# Patient Record
Sex: FEMALE | Race: WHITE | NOT HISPANIC OR LATINO | Employment: OTHER | URBAN - METROPOLITAN AREA
[De-identification: names, ages, dates, MRNs, and addresses within clinical notes are randomized per-mention and may not be internally consistent; named-entity substitution may affect disease eponyms.]

---

## 2017-01-03 RX ORDER — FAMOTIDINE 20 MG/1
20 TABLET, FILM COATED ORAL 2 TIMES DAILY
Qty: 120 TABLET | Refills: 1 | Status: SHIPPED | OUTPATIENT
Start: 2017-01-03 | End: 2017-11-22 | Stop reason: SDUPTHER

## 2017-02-16 ENCOUNTER — OFFICE VISIT (OUTPATIENT)
Dept: INTERNAL MEDICINE | Facility: CLINIC | Age: 72
End: 2017-02-16

## 2017-02-16 VITALS
TEMPERATURE: 97.4 F | DIASTOLIC BLOOD PRESSURE: 76 MMHG | SYSTOLIC BLOOD PRESSURE: 138 MMHG | RESPIRATION RATE: 16 BRPM | HEIGHT: 66 IN | WEIGHT: 181 LBS | HEART RATE: 68 BPM | BODY MASS INDEX: 29.09 KG/M2

## 2017-02-16 DIAGNOSIS — N18.30 CKD (CHRONIC KIDNEY DISEASE), STAGE III (HCC): ICD-10-CM

## 2017-02-16 DIAGNOSIS — I10 ESSENTIAL HYPERTENSION: ICD-10-CM

## 2017-02-16 DIAGNOSIS — E78.2 MIXED HYPERLIPIDEMIA: Primary | ICD-10-CM

## 2017-02-16 PROCEDURE — 99214 OFFICE O/P EST MOD 30 MIN: CPT | Performed by: FAMILY MEDICINE

## 2017-02-16 RX ORDER — TRAZODONE HYDROCHLORIDE 150 MG/1
TABLET ORAL
COMMUNITY
Start: 2017-01-16 | End: 2017-11-01 | Stop reason: DRUGHIGH

## 2017-02-16 RX ORDER — OXYCODONE HYDROCHLORIDE 15 MG/1
TABLET ORAL
COMMUNITY
Start: 2017-02-15 | End: 2017-02-16 | Stop reason: DRUGHIGH

## 2017-02-16 NOTE — PROGRESS NOTES
Subjective   Rachana Arguelles is a 71 y.o. female.     Chief Complaint   Patient presents with   • Back Pain   • Hypertension         History of Present Illness   Patient is seen with history of chronic pain syndrome chronic back pain with also chronic kidney disease level III and hypertension hyperlipidemia.  Her history is reviewed with her and her .  Medications are continued.  She'll follow-up with Dr. Downs nephrologist as well as Dr. Harper pain management.      The following portions of the patient's history were reviewed and updated as appropriate: allergies, current medications, past social history and problem list.    Review of Systems   Constitutional: Negative.    HENT: Negative.    Eyes: Negative.    Respiratory: Negative.    Cardiovascular: Negative.    Gastrointestinal: Negative.    Endocrine: Negative.    Genitourinary: Negative.    Musculoskeletal: Positive for arthralgias, back pain and myalgias.   Skin: Negative.    Allergic/Immunologic: Negative.    Neurological: Negative.    Hematological: Negative.    Psychiatric/Behavioral: Negative.        Objective   Vitals:    02/16/17 1149   BP: 138/76   Pulse: 68   Resp: 16   Temp: 97.4 °F (36.3 °C)     Physical Exam   Constitutional: She is oriented to person, place, and time. She appears well-developed and well-nourished.   HENT:   Head: Normocephalic and atraumatic.   Right Ear: Tympanic membrane and external ear normal.   Left Ear: Tympanic membrane and external ear normal.   Nose: Nose normal.   Mouth/Throat: Oropharynx is clear and moist.   Eyes: Conjunctivae and EOM are normal. Pupils are equal, round, and reactive to light.   Neck: Normal range of motion. Neck supple. No JVD present. No thyromegaly present.   Cardiovascular: Normal rate, regular rhythm, normal heart sounds and intact distal pulses.    Pulmonary/Chest: Effort normal and breath sounds normal.   Abdominal: Soft. Bowel sounds are normal.   Musculoskeletal:        Lumbar back:  She exhibits decreased range of motion.   Lymphadenopathy:     She has no cervical adenopathy.   Neurological: She is alert and oriented to person, place, and time. No cranial nerve deficit. Gait abnormal. Coordination normal.   Skin: Skin is warm and dry. No rash noted.   Psychiatric: She has a normal mood and affect. Her behavior is normal. Judgment and thought content normal.   Vitals reviewed.      Assessment/Plan   Problem List Items Addressed This Visit        Cardiovascular and Mediastinum    Hyperlipidemia - Primary    Hypertension       Genitourinary    CKD (chronic kidney disease), stage III       plan: Continue current medications recheck and labs in 6 months.  Follow-up with Dr. Stephenson nephrology.  Follow-up with chronic pain management.

## 2017-02-24 DIAGNOSIS — R11.0 NAUSEA: ICD-10-CM

## 2017-02-24 RX ORDER — PROMETHAZINE HYDROCHLORIDE 12.5 MG/1
TABLET ORAL
Qty: 30 TABLET | Refills: 1 | Status: SHIPPED | OUTPATIENT
Start: 2017-02-24 | End: 2017-09-22 | Stop reason: SDUPTHER

## 2017-04-28 RX ORDER — CARVEDILOL 25 MG/1
TABLET ORAL
Qty: 30 TABLET | Refills: 0 | OUTPATIENT
Start: 2017-04-28

## 2017-05-09 RX ORDER — CARVEDILOL 25 MG/1
TABLET ORAL
Qty: 30 TABLET | Refills: 0 | OUTPATIENT
Start: 2017-05-09

## 2017-05-12 RX ORDER — CARVEDILOL 25 MG/1
TABLET ORAL
Qty: 30 TABLET | Refills: 0 | OUTPATIENT
Start: 2017-05-12

## 2017-05-15 RX ORDER — CARVEDILOL 25 MG/1
TABLET ORAL
Qty: 30 TABLET | Refills: 0 | OUTPATIENT
Start: 2017-05-15

## 2017-05-17 RX ORDER — CARVEDILOL 25 MG/1
12.5 TABLET ORAL 2 TIMES DAILY WITH MEALS
Qty: 90 TABLET | Refills: 1 | Status: SHIPPED | OUTPATIENT
Start: 2017-05-17 | End: 2017-11-13 | Stop reason: SDUPTHER

## 2017-05-18 ENCOUNTER — TRANSCRIBE ORDERS (OUTPATIENT)
Dept: ADMINISTRATIVE | Facility: HOSPITAL | Age: 72
End: 2017-05-18

## 2017-05-18 DIAGNOSIS — M16.11 OSTEOARTHRITIS OF RIGHT HIP, UNSPECIFIED OSTEOARTHRITIS TYPE: Primary | ICD-10-CM

## 2017-05-26 ENCOUNTER — HOSPITAL ENCOUNTER (OUTPATIENT)
Dept: GENERAL RADIOLOGY | Facility: HOSPITAL | Age: 72
Discharge: HOME | End: 2017-05-26
Attending: ORTHOPAEDIC SURGERY | Admitting: ORTHOPAEDIC SURGERY

## 2017-05-26 DIAGNOSIS — M16.11 OSTEOARTHRITIS OF RIGHT HIP, UNSPECIFIED OSTEOARTHRITIS TYPE: ICD-10-CM

## 2017-05-26 PROCEDURE — 25010000002 METHYLPREDNISOLONE PER 80 MG: Performed by: RADIOLOGY

## 2017-05-26 PROCEDURE — 0 IOPAMIDOL 61 % SOLUTION: Performed by: RADIOLOGY

## 2017-05-26 PROCEDURE — 77002 NEEDLE LOCALIZATION BY XRAY: CPT

## 2017-05-26 RX ORDER — METHYLPREDNISOLONE ACETATE 80 MG/ML
80 INJECTION, SUSPENSION INTRA-ARTICULAR; INTRALESIONAL; INTRAMUSCULAR; SOFT TISSUE ONCE
Status: COMPLETED | OUTPATIENT
Start: 2017-05-26 | End: 2017-05-26

## 2017-05-26 RX ORDER — LIDOCAINE HYDROCHLORIDE 10 MG/ML
10 INJECTION, SOLUTION INFILTRATION; PERINEURAL ONCE
Status: COMPLETED | OUTPATIENT
Start: 2017-05-26 | End: 2017-05-26

## 2017-05-26 RX ORDER — BUPIVACAINE HYDROCHLORIDE 2.5 MG/ML
10 INJECTION, SOLUTION EPIDURAL; INFILTRATION; INTRACAUDAL ONCE
Status: COMPLETED | OUTPATIENT
Start: 2017-05-26 | End: 2017-05-26

## 2017-05-26 RX ADMIN — LIDOCAINE HYDROCHLORIDE 10 ML: 10 INJECTION, SOLUTION INFILTRATION; PERINEURAL at 11:15

## 2017-05-26 RX ADMIN — METHYLPREDNISOLONE ACETATE 80 MG: 80 INJECTION, SUSPENSION INTRA-ARTICULAR; INTRALESIONAL; INTRAMUSCULAR; SOFT TISSUE at 11:15

## 2017-05-26 RX ADMIN — IOPAMIDOL 0.5 ML: 612 INJECTION, SOLUTION INTRAVENOUS at 11:15

## 2017-05-26 RX ADMIN — BUPIVACAINE HYDROCHLORIDE 10 ML: 2.5 INJECTION, SOLUTION EPIDURAL; INFILTRATION; INTRACAUDAL; PERINEURAL at 11:15

## 2017-06-21 RX ORDER — SENNA AND DOCUSATE SODIUM 50; 8.6 MG/1; MG/1
TABLET, FILM COATED ORAL
Qty: 180 TABLET | Refills: 1 | Status: SHIPPED | OUTPATIENT
Start: 2017-06-21 | End: 2019-05-30

## 2017-08-17 ENCOUNTER — OFFICE VISIT (OUTPATIENT)
Dept: INTERNAL MEDICINE | Facility: CLINIC | Age: 72
End: 2017-08-17

## 2017-08-17 VITALS
BODY MASS INDEX: 30.51 KG/M2 | WEIGHT: 189 LBS | TEMPERATURE: 97.7 F | SYSTOLIC BLOOD PRESSURE: 146 MMHG | DIASTOLIC BLOOD PRESSURE: 66 MMHG | OXYGEN SATURATION: 97 % | HEART RATE: 71 BPM

## 2017-08-17 DIAGNOSIS — N18.30 CKD (CHRONIC KIDNEY DISEASE), STAGE III (HCC): ICD-10-CM

## 2017-08-17 DIAGNOSIS — D50.8 OTHER IRON DEFICIENCY ANEMIA: ICD-10-CM

## 2017-08-17 DIAGNOSIS — I10 ESSENTIAL HYPERTENSION: Primary | ICD-10-CM

## 2017-08-17 DIAGNOSIS — E78.2 MIXED HYPERLIPIDEMIA: ICD-10-CM

## 2017-08-17 PROCEDURE — 99214 OFFICE O/P EST MOD 30 MIN: CPT | Performed by: FAMILY MEDICINE

## 2017-08-17 NOTE — PROGRESS NOTES
Subjective   Rachana Arguelles is a 71 y.o. female.     Chief Complaint   Patient presents with   • Hyperlipidemia   • Hypertension   • Knee Pain         History of Present Illness patient has a fairly complex history.  She takes medicine for depression from her psychiatrist as well as seeing pain management Dr. Harper.  She also has a follow-up with Dr. Downs nephrologist for chronic kidney disease level III.  She has a history of hypertension hyperlipidemia but she is not on a statin medicine she cannot recall when she was on one half of one.  Her history is reviewed and she is seeing orthopedics Dr. Zelaya and Dr. Carrillo for chronic right shoulder right hip and right knee problems which she's had a left total knee replacement.    We discussed Prevnar 13 she has had Pneumovax last year.  She is in the hospital with pneumonia within the past year.  She'll consider getting Prevnar 13 which he gets a flu shot at [facility] in the fall.    Associated history of anemia lab wise but she cannot recall this.    The following portions of the patient's history were reviewed and updated as appropriate: allergies, current medications, past social history and problem list.    Review of Systems   Constitutional: Negative.    HENT: Negative.    Eyes: Negative.    Respiratory: Negative.    Cardiovascular: Negative.    Gastrointestinal: Negative.    Endocrine: Negative.    Genitourinary: Negative.    Musculoskeletal: Positive for arthralgias, back pain and myalgias.   Skin: Negative.    Allergic/Immunologic: Negative.    Neurological: Negative.    Hematological: Negative.    Psychiatric/Behavioral: Negative.        Objective   Vitals:    08/17/17 1017   BP: 146/66   Pulse: 71   Temp: 97.7 °F (36.5 °C)   SpO2: 97%     Physical Exam   Constitutional: She is oriented to person, place, and time. She appears well-developed and well-nourished.   HENT:   Head: Normocephalic and atraumatic.   Right Ear: Tympanic membrane and external ear  normal.   Left Ear: Tympanic membrane and external ear normal.   Nose: Nose normal.   Mouth/Throat: Oropharynx is clear and moist.   Eyes: Conjunctivae and EOM are normal. Pupils are equal, round, and reactive to light.   Neck: Normal range of motion. Neck supple. No JVD present. No thyromegaly present.   Cardiovascular: Normal rate, regular rhythm, normal heart sounds and intact distal pulses.    Pulmonary/Chest: Effort normal and breath sounds normal.   Abdominal: Soft. Bowel sounds are normal.   Musculoskeletal:        Right shoulder: She exhibits decreased range of motion and tenderness.        Right hip: She exhibits decreased range of motion.        Right knee: She exhibits decreased range of motion. Tenderness found.   Lymphadenopathy:     She has no cervical adenopathy.   Neurological: She is alert and oriented to person, place, and time. No cranial nerve deficit. Coordination normal.   Skin: Skin is warm and dry. No rash noted.   Psychiatric: She has a normal mood and affect. Her behavior is normal. Judgment and thought content normal.   Vitals reviewed.      Assessment/Plan   Problem List Items Addressed This Visit        Cardiovascular and Mediastinum    Hypertension - Primary    Relevant Orders    Iron and TIBC    Ferritin    Vitamin B12    CBC & Differential    Basic Metabolic Panel    Lipid Panel With / Chol / HDL Ratio    Hyperlipidemia    Relevant Orders    Iron and TIBC    Ferritin    Vitamin B12    CBC & Differential    Basic Metabolic Panel    Lipid Panel With / Chol / HDL Ratio       Genitourinary    CKD (chronic kidney disease), stage III    Relevant Orders    Iron and TIBC    Ferritin    Vitamin B12    CBC & Differential    Basic Metabolic Panel    Lipid Panel With / Chol / HDL Ratio      Other Visit Diagnoses     Other iron deficiency anemia        Relevant Orders    Iron and TIBC    Ferritin    Vitamin B12    CBC & Differential    Basic Metabolic Panel    Lipid Panel With / Chol / HDL Ratio       Plan: Screening labs including iron tests and CBC.  Follow-up with nephrology.  Follow-up in 6 months sooner if needed.  [facility]

## 2017-08-18 LAB
BASOPHILS # BLD AUTO: 0 10*3/MM3 (ref 0–0.2)
BASOPHILS NFR BLD AUTO: 0 % (ref 0–1.5)
BUN SERPL-MCNC: 23 MG/DL (ref 8–23)
BUN/CREAT SERPL: 13.1 (ref 7–25)
CALCIUM SERPL-MCNC: 9.1 MG/DL (ref 8.6–10.5)
CHLORIDE SERPL-SCNC: 101 MMOL/L (ref 98–107)
CHOLEST SERPL-MCNC: 263 MG/DL (ref 0–200)
CHOLEST/HDLC SERPL: 5.6 {RATIO}
CO2 SERPL-SCNC: 22.8 MMOL/L (ref 22–29)
CREAT SERPL-MCNC: 1.75 MG/DL (ref 0.57–1)
EOSINOPHIL # BLD AUTO: 0.02 10*3/MM3 (ref 0–0.7)
EOSINOPHIL NFR BLD AUTO: 0.3 % (ref 0.3–6.2)
ERYTHROCYTE [DISTWIDTH] IN BLOOD BY AUTOMATED COUNT: 12.7 % (ref 11.7–13)
FERRITIN SERPL-MCNC: 72.98 NG/ML (ref 13–150)
GLUCOSE SERPL-MCNC: 107 MG/DL (ref 65–99)
HCT VFR BLD AUTO: 37.7 % (ref 35.6–45.5)
HDLC SERPL-MCNC: 47 MG/DL (ref 40–60)
HGB BLD-MCNC: 11.9 G/DL (ref 11.9–15.5)
IMM GRANULOCYTES # BLD: 0.02 10*3/MM3 (ref 0–0.03)
IMM GRANULOCYTES NFR BLD: 0.3 % (ref 0–0.5)
IRON SATN MFR SERPL: 20 % (ref 20–50)
IRON SERPL-MCNC: 61 MCG/DL (ref 37–145)
LDLC SERPL CALC-MCNC: 190 MG/DL (ref 0–100)
LYMPHOCYTES # BLD AUTO: 1.92 10*3/MM3 (ref 0.9–4.8)
LYMPHOCYTES NFR BLD AUTO: 31.1 % (ref 19.6–45.3)
MCH RBC QN AUTO: 31.2 PG (ref 26.9–32)
MCHC RBC AUTO-ENTMCNC: 31.6 G/DL (ref 32.4–36.3)
MCV RBC AUTO: 98.7 FL (ref 80.5–98.2)
MONOCYTES # BLD AUTO: 0.44 10*3/MM3 (ref 0.2–1.2)
MONOCYTES NFR BLD AUTO: 7.1 % (ref 5–12)
NEUTROPHILS # BLD AUTO: 3.77 10*3/MM3 (ref 1.9–8.1)
NEUTROPHILS NFR BLD AUTO: 61.2 % (ref 42.7–76)
PLATELET # BLD AUTO: 261 10*3/MM3 (ref 140–500)
POTASSIUM SERPL-SCNC: 5.5 MMOL/L (ref 3.5–5.2)
RBC # BLD AUTO: 3.82 10*6/MM3 (ref 3.9–5.2)
SODIUM SERPL-SCNC: 139 MMOL/L (ref 136–145)
TIBC SERPL-MCNC: 308 MCG/DL
TRIGL SERPL-MCNC: 131 MG/DL (ref 0–150)
UIBC SERPL-MCNC: 247 MCG/DL
VIT B12 SERPL-MCNC: 178 PG/ML (ref 211–946)
VLDLC SERPL CALC-MCNC: 26.2 MG/DL (ref 5–40)
WBC # BLD AUTO: 6.17 10*3/MM3 (ref 4.5–10.7)

## 2017-08-21 DIAGNOSIS — E53.8 B12 DEFICIENCY: Primary | ICD-10-CM

## 2017-08-21 RX ORDER — CYANOCOBALAMIN 1000 UG/ML
1000 INJECTION, SOLUTION INTRAMUSCULAR; SUBCUTANEOUS
Status: DISCONTINUED | OUTPATIENT
Start: 2017-08-28 | End: 2018-08-10 | Stop reason: HOSPADM

## 2017-09-05 ENCOUNTER — CLINICAL SUPPORT (OUTPATIENT)
Dept: INTERNAL MEDICINE | Facility: CLINIC | Age: 72
End: 2017-09-05

## 2017-09-05 DIAGNOSIS — E53.8 B12 DEFICIENCY: Primary | ICD-10-CM

## 2017-09-05 PROCEDURE — 96372 THER/PROPH/DIAG INJ SC/IM: CPT | Performed by: FAMILY MEDICINE

## 2017-09-05 RX ADMIN — CYANOCOBALAMIN 1000 MCG: 1000 INJECTION, SOLUTION INTRAMUSCULAR; SUBCUTANEOUS at 16:07

## 2017-09-22 DIAGNOSIS — R11.0 NAUSEA: ICD-10-CM

## 2017-09-22 RX ORDER — PROMETHAZINE HYDROCHLORIDE 12.5 MG/1
TABLET ORAL
Qty: 30 TABLET | Refills: 0 | Status: SHIPPED | OUTPATIENT
Start: 2017-09-22 | End: 2017-11-01

## 2017-10-05 ENCOUNTER — CLINICAL SUPPORT (OUTPATIENT)
Dept: INTERNAL MEDICINE | Facility: CLINIC | Age: 72
End: 2017-10-05

## 2017-10-05 PROCEDURE — 96372 THER/PROPH/DIAG INJ SC/IM: CPT | Performed by: FAMILY MEDICINE

## 2017-10-05 RX ADMIN — CYANOCOBALAMIN 1000 MCG: 1000 INJECTION, SOLUTION INTRAMUSCULAR; SUBCUTANEOUS at 12:56

## 2017-10-30 ENCOUNTER — OFFICE VISIT (OUTPATIENT)
Dept: INTERNAL MEDICINE | Facility: CLINIC | Age: 72
End: 2017-10-30

## 2017-10-30 VITALS
WEIGHT: 194 LBS | SYSTOLIC BLOOD PRESSURE: 172 MMHG | BODY MASS INDEX: 31.18 KG/M2 | RESPIRATION RATE: 16 BRPM | HEIGHT: 66 IN | DIASTOLIC BLOOD PRESSURE: 80 MMHG | TEMPERATURE: 96.9 F | HEART RATE: 60 BPM | OXYGEN SATURATION: 96 %

## 2017-10-30 DIAGNOSIS — N18.30 CKD (CHRONIC KIDNEY DISEASE), STAGE III (HCC): Primary | ICD-10-CM

## 2017-10-30 DIAGNOSIS — F43.21 GRIEVING: ICD-10-CM

## 2017-10-30 DIAGNOSIS — F33.9 EPISODE OF RECURRENT MAJOR DEPRESSIVE DISORDER, UNSPECIFIED DEPRESSION EPISODE SEVERITY (HCC): ICD-10-CM

## 2017-10-30 DIAGNOSIS — F41.9 ANXIETY: ICD-10-CM

## 2017-10-30 DIAGNOSIS — Z09 HOSPITAL DISCHARGE FOLLOW-UP: ICD-10-CM

## 2017-10-30 PROCEDURE — 99213 OFFICE O/P EST LOW 20 MIN: CPT | Performed by: NURSE PRACTITIONER

## 2017-10-30 RX ORDER — ONDANSETRON 4 MG/1
TABLET, ORALLY DISINTEGRATING ORAL
COMMUNITY
Start: 2017-10-17 | End: 2017-11-01 | Stop reason: SDUPTHER

## 2017-10-30 RX ORDER — AMOXICILLIN AND CLAVULANATE POTASSIUM 875; 125 MG/1; MG/1
TABLET, FILM COATED ORAL
COMMUNITY
Start: 2017-10-17 | End: 2017-12-04

## 2017-10-30 NOTE — PROGRESS NOTES
"Vitals:    10/30/17 1307   BP: 172/80   Pulse: 60   Resp: 16   Temp: 96.9 °F (36.1 °C)   SpO2: 96%     Last 2 weights    10/30/17  1307   Weight: 194 lb (88 kg)     Social History   Substance Use Topics   • Smoking status: Former Smoker     Packs/day: 3.00     Years: 15.00     Types: Cigarettes     Quit date: 9/22/2003   • Smokeless tobacco: Not on file   • Alcohol use Yes      Comment: Social       Subjective     HPI  Pt presents to office today with ER follow up. She states she went to [facility] ER for a stinking feeling in her epigastric area and some SOA for past couple of days on 10/16. She was also having some nausea. Blood work up was relatively normal, MRA of neck was normal, MRI of brain showed fluid in mastoid on right side and recommeded correlation for mastoiditis. She followed up with ENT who stated that she should be evaluated by cardiology for some unkown reason. Today she is here for follow up.    She states she recently lost brother to stroke, and found out she has basal cell carcinoma. This news has created a toll on pt and has been very tearful at sporadic times, as seen in office visit today. Her med list shows she is on celexa 20 mg daily, ativan 0.5 mg bid (she states she take 0.5 mg QID per her psych md), and, effexor xr 150 mg daily. However pt is unsure if she is on these medications.     Dr Saha, psych doc, told pt to taking 0.5 mg five times a day but is taking 0.5 mg QID    She is followed by pain management as well.      Currently pt denies cp, palptiation, headaches, vision changes, dizziness, lightheadedness, URI symptoms, diarrhea or constipation, urinary complication, abdominal pain or cramping. Pt keeps referring to a \"sinking\" \"roller coaster\" \"throw up\" sensation in her stomach.     The following portions of the patient's history were reviewed and updated as appropriate: allergies, current medications, past medical history, past social history and problem list.    Review of Systems "   Constitutional: Negative.    Respiratory: Negative.    Cardiovascular: Negative.    Gastrointestinal: Positive for nausea.        ER follow up   Psychiatric/Behavioral: The patient is nervous/anxious.        Objective     Physical Exam   Constitutional: She is oriented to person, place, and time. Vital signs are normal. She appears well-developed and well-nourished.   HENT:   Head: Normocephalic and atraumatic.   Neck: Normal range of motion.   Cardiovascular: Normal rate, regular rhythm and normal heart sounds.    Pulmonary/Chest: Effort normal and breath sounds normal.   Musculoskeletal: Normal range of motion.   Neurological: She is oriented to person, place, and time.   Psychiatric: Her speech is normal. Judgment and thought content normal. Her mood appears anxious. She is hyperactive. Cognition and memory are normal. She exhibits a depressed mood.   Tearful during her HPI at random times and quick to stop crying   Nursing note and vitals reviewed.      Assessment/Plan   Rachana was seen today for follow-up.    Diagnoses and all orders for this visit:    CKD (chronic kidney disease), stage III  -     CBC & Differential  -     Comprehensive Metabolic Panel    Anxiety    Episode of recurrent major depressive disorder, unspecified depression episode severity    Grieving    Hospital discharge follow-up           -stress test?  -recommended pt come back with medications in hand to go over what she is currently taking as it is difficult to develop a plan  -will discuss pt with dr holloway prior to follow up  -lab work today  -cont home meds at this time

## 2017-10-31 LAB
ALBUMIN SERPL-MCNC: 4.1 G/DL (ref 3.5–5.2)
ALBUMIN/GLOB SERPL: 1.6 G/DL
ALP SERPL-CCNC: 96 U/L (ref 39–117)
ALT SERPL-CCNC: 20 U/L (ref 1–33)
AST SERPL-CCNC: 47 U/L (ref 1–32)
BASOPHILS # BLD AUTO: 0.01 10*3/MM3 (ref 0–0.2)
BASOPHILS NFR BLD AUTO: 0.1 % (ref 0–1.5)
BILIRUB SERPL-MCNC: 0.3 MG/DL (ref 0.1–1.2)
BUN SERPL-MCNC: 27 MG/DL (ref 8–23)
BUN/CREAT SERPL: 14.1 (ref 7–25)
CALCIUM SERPL-MCNC: 9.2 MG/DL (ref 8.6–10.5)
CHLORIDE SERPL-SCNC: 99 MMOL/L (ref 98–107)
CO2 SERPL-SCNC: 25.4 MMOL/L (ref 22–29)
CREAT SERPL-MCNC: 1.92 MG/DL (ref 0.57–1)
EOSINOPHIL # BLD AUTO: 0.05 10*3/MM3 (ref 0–0.7)
EOSINOPHIL NFR BLD AUTO: 0.7 % (ref 0.3–6.2)
ERYTHROCYTE [DISTWIDTH] IN BLOOD BY AUTOMATED COUNT: 13.3 % (ref 11.7–13)
GFR SERPLBLD CREATININE-BSD FMLA CKD-EPI: 26 ML/MIN/1.73
GFR SERPLBLD CREATININE-BSD FMLA CKD-EPI: 31 ML/MIN/1.73
GLOBULIN SER CALC-MCNC: 2.6 GM/DL
GLUCOSE SERPL-MCNC: 97 MG/DL (ref 65–99)
HCT VFR BLD AUTO: 37.9 % (ref 35.6–45.5)
HGB BLD-MCNC: 11.8 G/DL (ref 11.9–15.5)
IMM GRANULOCYTES # BLD: 0.03 10*3/MM3 (ref 0–0.03)
IMM GRANULOCYTES NFR BLD: 0.4 % (ref 0–0.5)
LYMPHOCYTES # BLD AUTO: 2.13 10*3/MM3 (ref 0.9–4.8)
LYMPHOCYTES NFR BLD AUTO: 28.4 % (ref 19.6–45.3)
MCH RBC QN AUTO: 31.2 PG (ref 26.9–32)
MCHC RBC AUTO-ENTMCNC: 31.1 G/DL (ref 32.4–36.3)
MCV RBC AUTO: 100.3 FL (ref 80.5–98.2)
MONOCYTES # BLD AUTO: 0.52 10*3/MM3 (ref 0.2–1.2)
MONOCYTES NFR BLD AUTO: 6.9 % (ref 5–12)
NEUTROPHILS # BLD AUTO: 4.76 10*3/MM3 (ref 1.9–8.1)
NEUTROPHILS NFR BLD AUTO: 63.5 % (ref 42.7–76)
PLATELET # BLD AUTO: 264 10*3/MM3 (ref 140–500)
POTASSIUM SERPL-SCNC: 5.8 MMOL/L (ref 3.5–5.2)
PROT SERPL-MCNC: 6.7 G/DL (ref 6–8.5)
RBC # BLD AUTO: 3.78 10*6/MM3 (ref 3.9–5.2)
SODIUM SERPL-SCNC: 137 MMOL/L (ref 136–145)
WBC # BLD AUTO: 7.5 10*3/MM3 (ref 4.5–10.7)

## 2017-11-01 ENCOUNTER — OFFICE VISIT (OUTPATIENT)
Dept: INTERNAL MEDICINE | Facility: CLINIC | Age: 72
End: 2017-11-01

## 2017-11-01 VITALS
OXYGEN SATURATION: 96 % | DIASTOLIC BLOOD PRESSURE: 70 MMHG | BODY MASS INDEX: 31.18 KG/M2 | HEART RATE: 93 BPM | WEIGHT: 194 LBS | SYSTOLIC BLOOD PRESSURE: 110 MMHG | HEIGHT: 66 IN | TEMPERATURE: 98.1 F

## 2017-11-01 DIAGNOSIS — R07.9 CHEST PAIN AT REST: Primary | ICD-10-CM

## 2017-11-01 DIAGNOSIS — R00.2 PALPITATION: ICD-10-CM

## 2017-11-01 DIAGNOSIS — R42 DIZZINESS: ICD-10-CM

## 2017-11-01 PROCEDURE — 99214 OFFICE O/P EST MOD 30 MIN: CPT | Performed by: NURSE PRACTITIONER

## 2017-11-01 RX ORDER — FLUCONAZOLE 150 MG/1
150 TABLET ORAL ONCE
Qty: 1 TABLET | Refills: 0 | Status: SHIPPED | OUTPATIENT
Start: 2017-11-01 | End: 2017-11-01

## 2017-11-01 RX ORDER — ONDANSETRON 4 MG/1
4 TABLET, ORALLY DISINTEGRATING ORAL EVERY 8 HOURS PRN
Qty: 45 TABLET | Refills: 0 | Status: SHIPPED | OUTPATIENT
Start: 2017-11-01 | End: 2018-02-06 | Stop reason: SDUPTHER

## 2017-11-01 RX ORDER — CYCLOBENZAPRINE HCL 10 MG
10 TABLET ORAL 3 TIMES DAILY PRN
COMMUNITY
End: 2017-11-01

## 2017-11-01 RX ORDER — OXYCODONE HYDROCHLORIDE 20 MG/1
20 TABLET ORAL EVERY 4 HOURS PRN
COMMUNITY
End: 2020-06-04

## 2017-11-01 RX ORDER — LORAZEPAM 0.5 MG/1
0.5 TABLET ORAL 3 TIMES DAILY
COMMUNITY
End: 2018-07-05 | Stop reason: SDUPTHER

## 2017-11-01 RX ORDER — TRAZODONE HYDROCHLORIDE 150 MG/1
75 TABLET ORAL NIGHTLY
COMMUNITY
End: 2018-06-28 | Stop reason: SDUPTHER

## 2017-11-01 RX ORDER — CITALOPRAM 20 MG/1
20 TABLET ORAL DAILY
Qty: 30 TABLET | Refills: 0 | Status: SHIPPED | OUTPATIENT
Start: 2017-11-01 | End: 2017-11-22 | Stop reason: SDUPTHER

## 2017-11-01 RX ORDER — CYCLOBENZAPRINE HCL 10 MG
10 TABLET ORAL 3 TIMES DAILY PRN
Status: ON HOLD | COMMUNITY
End: 2021-06-05 | Stop reason: SDUPTHER

## 2017-11-01 NOTE — PROGRESS NOTES
"Vitals:    11/01/17 1408   BP: 110/70   Pulse:    Temp:    SpO2:      Last 2 weights    11/01/17  1327   Weight: 194 lb (88 kg)     Social History   Substance Use Topics   • Smoking status: Former Smoker     Packs/day: 3.00     Years: 15.00     Types: Cigarettes     Quit date: 9/22/2003   • Smokeless tobacco: None   • Alcohol use Yes      Comment: Social       Subjective     HPI  Pt presents to office today with medication reconcillation and follow up. She states she is feeling better than a couple of days ago but feels like she still needs some extra to help with her anxiety. She if followed by a psych doc, Dr. Saha, and pain management for her roxicodone. Pt states she is still occasionally feeling the \"flip flop\" feeling in her chest at random times but not currently in office. Will do 48 hr holter monitor for that. Spoke to dr holloway and he would like her to see cardiology as well.     Would like to start pt on celexa 20 mg daily and okayed by dr holloway    Pt followed by pain management and psych.      Pt also states she has been having some vaginal itching    The following portions of the patient's history were reviewed and updated as appropriate: allergies, current medications, past medical history, past social history and problem list.    Review of Systems   Constitutional: Negative.         Med reconciliation     Respiratory: Negative.    Cardiovascular: Negative.         Flip flop in chest   Psychiatric/Behavioral: Positive for dysphoric mood. The patient is nervous/anxious.        Objective     Physical Exam   Constitutional: She is oriented to person, place, and time. Vital signs are normal. She appears well-developed and well-nourished.   HENT:   Head: Normocephalic and atraumatic.   Neck: Normal range of motion.   Cardiovascular: Normal rate, regular rhythm and normal heart sounds.    auscultating heart and normal; recent ER visit showed normal EKG   Pulmonary/Chest: Effort normal and breath sounds normal. "   Musculoskeletal: Normal range of motion.   Neurological: She is alert and oriented to person, place, and time. She has normal strength. No cranial nerve deficit or sensory deficit. She displays a negative Romberg sign. GCS eye subscore is 4. GCS verbal subscore is 5. GCS motor subscore is 6.   Psychiatric: Her speech is normal and behavior is normal. Judgment and thought content normal. Her mood appears anxious. Cognition and memory are normal.   Nursing note and vitals reviewed.      Assessment/Plan   Diagnoses and all orders for this visit:    Chest pain at rest  -     Ambulatory Referral to Cardiology  -     Holter Monitor - 48 Hour    Palpitation  -     Ambulatory Referral to Cardiology  -     Holter Monitor - 48 Hour    Dizziness  -     Ambulatory Referral to Cardiology  -     Holter Monitor - 48 Hour    Other orders  -     fluconazole (DIFLUCAN) 150 MG tablet; Take 1 tablet by mouth 1 (One) Time for 1 dose.  -     ondansetron ODT (ZOFRAN-ODT) 4 MG disintegrating tablet; Take 1 tablet by mouth Every 8 (Eight) Hours As Needed for Nausea or Vomiting.  -     citalopram (CeleXA) 20 MG tablet; Take 1 tablet by mouth Daily.           -start celexa 20 mg daily  -refer cardiology, start holter  -review and updated medications on her list in [facility]  -cont other home meds  -FU with dr holloway in 1 months  -spent more than 30 min in room with pt discussing hpi/pe/plan of care  -fu with me prn

## 2017-11-06 ENCOUNTER — CLINICAL SUPPORT (OUTPATIENT)
Dept: INTERNAL MEDICINE | Facility: CLINIC | Age: 72
End: 2017-11-06

## 2017-11-06 ENCOUNTER — TELEPHONE (OUTPATIENT)
Dept: INTERNAL MEDICINE | Facility: CLINIC | Age: 72
End: 2017-11-06

## 2017-11-06 DIAGNOSIS — E53.8 VITAMIN B12 DEFICIENCY: ICD-10-CM

## 2017-11-06 PROCEDURE — 96372 THER/PROPH/DIAG INJ SC/IM: CPT | Performed by: FAMILY MEDICINE

## 2017-11-06 RX ADMIN — CYANOCOBALAMIN 1000 MCG: 1000 INJECTION, SOLUTION INTRAMUSCULAR; SUBCUTANEOUS at 11:54

## 2017-11-06 NOTE — TELEPHONE ENCOUNTER
Pt came by for a b12 injections and was wondering about her recent labs, please review and either advise on what to tell the pt or call her please  She will be home later this afternoon

## 2017-11-06 NOTE — TELEPHONE ENCOUNTER
The pt came by for her B12 shot and was wanting you to look at some pictures the pain management took while doing one of her epidurals, he told her it looked like her esophagus was filled with stuff? She is wondering if she still needs to do the heart monitor (which she hasnt heard anything on)  Pics are in your folder please review and advise

## 2017-11-07 ENCOUNTER — TELEPHONE (OUTPATIENT)
Dept: INTERNAL MEDICINE | Facility: CLINIC | Age: 72
End: 2017-11-07

## 2017-11-07 DIAGNOSIS — I73.9 CLAUDICATION OF BOTH LOWER EXTREMITIES (HCC): ICD-10-CM

## 2017-11-07 DIAGNOSIS — I99.8 VASCULAR CALCIFICATION: Primary | ICD-10-CM

## 2017-11-07 NOTE — TELEPHONE ENCOUNTER
Attempted to call both numbers provided on pt chart regarding lab work and dr mcnally order to send to vascular regarding pain managements finding of vascular calcification and possible claudication of lower extremities. Also, potassium is a little elevated so I would like to stop ramipril and increase norvasc from 2.5 mg to 5 mg at night. Hemoglobin is stable. Will send message to ma to attempt to call back again later

## 2017-11-13 RX ORDER — CARVEDILOL 25 MG/1
TABLET ORAL
Qty: 90 TABLET | Refills: 1 | Status: SHIPPED | OUTPATIENT
Start: 2017-11-13 | End: 2018-06-03 | Stop reason: SDUPTHER

## 2017-11-14 RX ORDER — DOXYCYCLINE HYCLATE 100 MG/1
100 TABLET, DELAYED RELEASE ORAL 2 TIMES DAILY
Qty: 20 TABLET | Refills: 0 | Status: SHIPPED | OUTPATIENT
Start: 2017-11-14 | End: 2017-11-14

## 2017-11-14 RX ORDER — DOXYCYCLINE HYCLATE 50 MG/1
100 CAPSULE ORAL 2 TIMES DAILY
Qty: 40 CAPSULE | Refills: 0 | Status: SHIPPED | OUTPATIENT
Start: 2017-11-14 | End: 2017-12-04

## 2017-11-22 RX ORDER — FAMOTIDINE 20 MG/1
20 TABLET, FILM COATED ORAL 2 TIMES DAILY
Qty: 180 TABLET | Refills: 1 | Status: SHIPPED | OUTPATIENT
Start: 2017-11-22 | End: 2018-05-28 | Stop reason: SDUPTHER

## 2017-11-22 RX ORDER — CITALOPRAM 20 MG/1
20 TABLET ORAL DAILY
Qty: 90 TABLET | Refills: 1 | Status: SHIPPED | OUTPATIENT
Start: 2017-11-22 | End: 2017-12-04

## 2017-11-22 RX ORDER — CITALOPRAM 20 MG/1
TABLET ORAL
Qty: 30 TABLET | Refills: 5 | Status: SHIPPED | OUTPATIENT
Start: 2017-11-22 | End: 2017-11-22 | Stop reason: SDUPTHER

## 2017-12-04 ENCOUNTER — OFFICE VISIT (OUTPATIENT)
Dept: INTERNAL MEDICINE | Facility: CLINIC | Age: 72
End: 2017-12-04

## 2017-12-04 VITALS
TEMPERATURE: 97.7 F | SYSTOLIC BLOOD PRESSURE: 162 MMHG | WEIGHT: 191 LBS | DIASTOLIC BLOOD PRESSURE: 74 MMHG | OXYGEN SATURATION: 98 % | HEART RATE: 69 BPM | BODY MASS INDEX: 30.83 KG/M2

## 2017-12-04 DIAGNOSIS — I71.40 ABDOMINAL AORTIC ANEURYSM (AAA) WITHOUT RUPTURE (HCC): Primary | ICD-10-CM

## 2017-12-04 DIAGNOSIS — I95.1 ORTHOSTATIC HYPOTENSION: Chronic | ICD-10-CM

## 2017-12-04 DIAGNOSIS — E53.8 B12 DEFICIENCY: ICD-10-CM

## 2017-12-04 DIAGNOSIS — E78.2 MIXED HYPERLIPIDEMIA: ICD-10-CM

## 2017-12-04 PROCEDURE — 99214 OFFICE O/P EST MOD 30 MIN: CPT | Performed by: FAMILY MEDICINE

## 2017-12-04 PROCEDURE — 96372 THER/PROPH/DIAG INJ SC/IM: CPT | Performed by: FAMILY MEDICINE

## 2017-12-04 RX ORDER — SILVER SULFADIAZINE 1 %
CREAM (GRAM) TOPICAL
COMMUNITY
Start: 2017-11-13 | End: 2018-07-26

## 2017-12-04 RX ORDER — CYANOCOBALAMIN 1000 UG/ML
1000 INJECTION, SOLUTION INTRAMUSCULAR; SUBCUTANEOUS
Status: DISCONTINUED | OUTPATIENT
Start: 2017-12-04 | End: 2018-08-10 | Stop reason: HOSPADM

## 2017-12-04 RX ORDER — VENLAFAXINE HYDROCHLORIDE 150 MG/1
CAPSULE, EXTENDED RELEASE ORAL
COMMUNITY
Start: 2017-12-03 | End: 2018-06-28 | Stop reason: SDUPTHER

## 2017-12-04 RX ADMIN — CYANOCOBALAMIN 1000 MCG: 1000 INJECTION, SOLUTION INTRAMUSCULAR; SUBCUTANEOUS at 13:19

## 2017-12-04 NOTE — PROGRESS NOTES
Subjective   Rachana Arguelles is a 72 y.o. female.     Chief Complaint   Patient presents with   • GI Problem   • Chest Pain         History of Present Illness patient returns for recheck with the concern being a possible abdominal aortic aneurysm seen while getting an epidural from Dr. Harper pain management specialist.  Prior CTs in 2016 did not reveal specific aneurysm.  She has an upcoming appointment vascular surgery Dr. Mancera.  There is December 8.  I would like to get a duplex aortic scan prior to this.  Otherwise she has a vague sensation in the epigastrium periodically which she cannot adequately describe but it happens almost daily.    Patient otherwise has a B12 deficiency and she would B12 shot today.  She has chronic kidney disease and anemia which is not iron deficient.  She has a follow-up with nephrologist Dr. Dudley.    Otherwise she is off ramipril.  Treatment of hypertension and lipidemia are reviewed.    The following portions of the patient's history were reviewed and updated as appropriate: allergies, current medications, past social history and problem list.    Review of Systems   Constitutional: Negative.    HENT: Negative.    Eyes: Negative.    Respiratory: Negative.    Cardiovascular: Negative.    Gastrointestinal: Negative.    Endocrine: Negative.    Genitourinary: Negative.    Musculoskeletal: Negative.    Skin: Negative.    Allergic/Immunologic: Negative.    Neurological: Negative.    Hematological: Negative.    Psychiatric/Behavioral: Negative.        Objective   Vitals:    12/04/17 1151   BP: 162/74   Pulse: 69   Temp: 97.7 °F (36.5 °C)   SpO2: 98%     Physical Exam   Constitutional: She is oriented to person, place, and time. She appears well-developed and well-nourished.   HENT:   Head: Normocephalic and atraumatic.   Right Ear: Tympanic membrane and external ear normal.   Left Ear: Tympanic membrane and external ear normal.   Nose: Nose normal.   Mouth/Throat: Oropharynx is clear and  moist.   Eyes: Conjunctivae and EOM are normal. Pupils are equal, round, and reactive to light.   Neck: Normal range of motion. Neck supple. No JVD present. No thyromegaly present.   Cardiovascular: Normal rate, regular rhythm, normal heart sounds and intact distal pulses.    Pulmonary/Chest: Effort normal and breath sounds normal.   Abdominal: Soft. Bowel sounds are normal. She exhibits abdominal bruit (2/6 systolic abdominal bruit).       Musculoskeletal: Normal range of motion.   Lymphadenopathy:     She has no cervical adenopathy.   Neurological: She is alert and oriented to person, place, and time. No cranial nerve deficit. Coordination normal.   Skin: Skin is warm and dry. No rash noted.   Psychiatric: She has a normal mood and affect. Her behavior is normal. Judgment and thought content normal.   Vitals reviewed.      Assessment/Plan   Problem List Items Addressed This Visit        Cardiovascular and Mediastinum    Hyperlipidemia    RESOLVED: Orthostatic hypotension (Chronic)      Other Visit Diagnoses     Abdominal aortic aneurysm (AAA) without rupture    -  Primary    Relevant Orders    Duplex Abdominal Aorta & Iliac Artery Limited CAR    B12 deficiency        Relevant Medications    cyanocobalamin injection 1,000 mcg (Start on 12/4/2017  1:00 PM)      She has a follow-up with cardiology and vascular surgery.  We'll get an ultrasound duplex of abdominal aortic area.  Otherwise B12 shot today recheck in 3 months.  Continue treatment of hypertension hyperlipidemia.

## 2017-12-07 DIAGNOSIS — I71.40 ABDOMINAL AORTIC ANEURYSM WITHOUT RUPTURE (HCC): Primary | ICD-10-CM

## 2017-12-11 ENCOUNTER — OFFICE VISIT (OUTPATIENT)
Dept: CARDIOLOGY | Facility: CLINIC | Age: 72
End: 2017-12-11

## 2017-12-11 VITALS
DIASTOLIC BLOOD PRESSURE: 62 MMHG | SYSTOLIC BLOOD PRESSURE: 148 MMHG | WEIGHT: 191 LBS | HEART RATE: 60 BPM | HEIGHT: 66 IN | BODY MASS INDEX: 30.7 KG/M2

## 2017-12-11 DIAGNOSIS — E78.2 MIXED HYPERLIPIDEMIA: Primary | ICD-10-CM

## 2017-12-11 DIAGNOSIS — I73.9 PAD (PERIPHERAL ARTERY DISEASE) (HCC): ICD-10-CM

## 2017-12-11 DIAGNOSIS — I10 ESSENTIAL HYPERTENSION: ICD-10-CM

## 2017-12-11 PROCEDURE — 93000 ELECTROCARDIOGRAM COMPLETE: CPT | Performed by: INTERNAL MEDICINE

## 2017-12-11 PROCEDURE — 99204 OFFICE O/P NEW MOD 45 MIN: CPT | Performed by: INTERNAL MEDICINE

## 2017-12-11 NOTE — PROGRESS NOTES
Rachana Arguelles  1945  Date of Office Visit: 12/11/2017  Encounter Provider: Mat Coello MD  Place of Service: [facility] CARDIOLOGY      CHIEF COMPLAINT:  Palpitations  Chest pain  Abdominal pain  Aortic calcification     HISTORY OF PRESENT ILLNESS:  Dr. Crowder:    The patient is a very pleasant, 72-year-old female with a medical history of palpitations, chest pain with prior negative ischemic evaluation, hyperlipidemia, lumbosacral back pain, and essential hypertension who presents to me for evaluation of palpitations and previous chest discomfort.  She states that she has discomfort that she describes as a rumbling, dull, flip-flopping, along with multiple other terms type discomfort.  It seems to be more in her bilateral costal margin.  She does not have increase in that pain with activity.  It is moderate in intensity and she thinks may get better if she eats something.  She denies any orthopnea or paroxysmal nocturnal dyspnea.  She does have chronic lumbosacral back pain and has been receiving epidural injections.  She had some scanned imaging with her epidurals and was told that she might have an abdominal aortic aneurysm.  Of note, her imaging from 2016, including a CT of abdomen and pelvis, showed aortic calcification extending into the common iliacs; however, no evidence of aneurysm.      She does complain of leg cramping at night and some bilateral calf pain that is mild in intensity when she walks.         Review of Systems   Constitution: Negative for fever, weakness and malaise/fatigue.   HENT: Negative for nosebleeds and sore throat.    Eyes: Negative for blurred vision and double vision.   Cardiovascular: Positive for chest pain, dyspnea on exertion and leg swelling. Negative for claudication, palpitations and syncope.   Respiratory: Negative for cough, shortness of breath and snoring.    Endocrine: Negative for cold intolerance, heat intolerance and  polydipsia.   Skin: Negative for itching, poor wound healing and rash.   Musculoskeletal: Negative for joint pain, joint swelling, muscle weakness and myalgias.   Gastrointestinal: Negative for abdominal pain, melena, nausea and vomiting.   Neurological: Negative for light-headedness, loss of balance, seizures and vertigo.   Psychiatric/Behavioral: Negative for altered mental status and depression.       Past Medical History:   Diagnosis Date   • AAA (abdominal aortic aneurysm) without rupture    • Anxiety    • Arthritis    • Chest pain     at rest   • Chronic low back pain    • Chronic pain syndrome 3/12/2016   • CKD (chronic kidney disease), stage III    • Claustrophobia 10/26/2015    Resolved   • Depression    • Dizziness    • Gastroesophageal reflux disease 3/12/2016   • GERD (gastroesophageal reflux disease)    • GI problem    • Hiatal hernia    • History of migraine headaches    • Hyperlipidemia    • Hypertension    • Lumbar postlaminectomy syndrome 10/26/2015    Resolved   • Palpitations    • Polypharmacy 4/22/2016   • Pulmonary embolism 10/26/2015    January 2013 - Resolved, felt to be secondary to estrogen use   • Submandibular sialoadenitis 10/26/2015    Resolved   • Vitamin B 12 deficiency        The following portions of the patient's history were reviewed and updated as appropriate: Social history , Family history and Surgical history     Current Outpatient Prescriptions on File Prior to Visit   Medication Sig Dispense Refill   • aluminum hydroxide-mag carbonate (GAVISCON EXTRA RELIEF) 160-105 MG chewable tablet chewable tablet Chew 2 tablets 2 (Two) Times a Day As Needed.     • amLODIPine (NORVASC) 2.5 MG tablet Take 2.5 mg by mouth daily.     • aspirin-acetaminophen-caffeine (EXCEDRIN MIGRAINE) 250-250-65 MG per tablet Take 1 tablet by mouth Every 8 (Eight) Hours As Needed.     • carvedilol (COREG) 25 MG tablet TAKE ONE-HALF TABLET BY MOUTH TWICE DAILY WITH MEALS 90 tablet 1   • cetirizine (ZyrTEC) 10  "MG tablet Take 10 mg by mouth daily.     • cyclobenzaprine (FLEXERIL) 10 MG tablet Take 10 mg by mouth 3 (Three) Times a Day As Needed.     • famotidine (PEPCID) 20 MG tablet Take 1 tablet by mouth 2 (Two) Times a Day. 180 tablet 1   • fluticasone (FLONASE) 50 MCG/ACT nasal spray 2 sprays into each nostril 3 (three) times a day.     • LORazepam (ATIVAN) 0.5 MG tablet Take 0.5 mg by mouth 3 (Three) Times a Day.     • ondansetron ODT (ZOFRAN-ODT) 4 MG disintegrating tablet Take 1 tablet by mouth Every 8 (Eight) Hours As Needed for Nausea or Vomiting. 45 tablet 0   • oxyCODONE (ROXICODONE) 20 MG tablet Take 20 mg by mouth Every 4 (Four) Hours As Needed.     • polyethylene glycol (MIRALAX) powder Take 17 g by mouth daily as needed. Constipation. Mix in 4oz of water/juice/milk     • sennosides-docusate sodium (SENOKOT-S) 8.6-50 MG tablet TAKE TWO TABLETS BY MOUTH ONCE DAILY 180 tablet 1   • SSD 1 % cream      • sucralfate (CARAFATE) 1 G tablet Take 1 tablet by mouth 4 (four) times a day. (Patient taking differently: Take 1 g by mouth daily.) 120 tablet 0   • traZODone (DESYREL) 75 MG half tablet Take 75 mg by mouth Every Night.     • venlafaxine XR (EFFEXOR-XR) 150 MG 24 hr capsule      • vitamin D (ERGOCALCIFEROL) 68922 UNITS capsule capsule Take 50,000 Units by mouth 1 (one) time per week.       Current Facility-Administered Medications on File Prior to Visit   Medication Dose Route Frequency Provider Last Rate Last Dose   • cyanocobalamin injection 1,000 mcg  1,000 mcg Intramuscular Q28 Days Rhys Crowder Jr., MD   1,000 mcg at 11/06/17 1154   • cyanocobalamin injection 1,000 mcg  1,000 mcg Intramuscular Q30 Days Rhys Crowder Jr., MD   1,000 mcg at 12/04/17 1319       Allergies   Allergen Reactions   • Morphine        Vitals:    12/11/17 1351   BP: 148/62   BP Location: Right arm   Patient Position: Sitting   Pulse: 60   Weight: 86.6 kg (191 lb)   Height: 167.6 cm (66\")     Physical Exam   Constitutional: She is oriented " to person, place, and time. She appears well-developed and well-nourished.   HENT:   Head: Normocephalic and atraumatic.   Eyes: Conjunctivae and EOM are normal. No scleral icterus.   Neck: Normal range of motion. Neck supple. Normal carotid pulses, no hepatojugular reflux and no JVD present. Carotid bruit is not present. No tracheal deviation present. No thyromegaly present.   Cardiovascular: Normal rate and regular rhythm.  Exam reveals no gallop and no friction rub.    No murmur heard.  Pulses:       Carotid pulses are 2+ on the right side, and 2+ on the left side.       Radial pulses are 2+ on the right side, and 2+ on the left side.        Femoral pulses are 2+ on the right side, and 2+ on the left side.       Dorsalis pedis pulses are 2+ on the right side, and 2+ on the left side.        Posterior tibial pulses are 2+ on the right side, and 2+ on the left side.   Pulmonary/Chest: Breath sounds normal. No respiratory distress. She has no decreased breath sounds. She has no wheezes. She has no rhonchi. She has no rales. She exhibits no tenderness.   Abdominal: Soft. Bowel sounds are normal. She exhibits no distension. There is no tenderness. There is no rebound.   Musculoskeletal: She exhibits no edema or deformity.   Neurological: She is alert and oriented to person, place, and time. She has normal strength. No sensory deficit.   Skin: No rash noted. No erythema.   Psychiatric: She has a normal mood and affect. Her behavior is normal.       No components found for: CBC  No results found for: CMP  No components found for: LIPID  No results found for: BMP      ECG 12 Lead  Date/Time: 12/11/2017 1:56 PM  Performed by: TIKI WADE  Authorized by: TIKI WADE   Comparison: compared with previous ECG from 12/15/2016  Similar to previous ECG  Rhythm: sinus rhythm  Rate: normal  ST Segments: ST segments normal  T Waves: T waves normal  QRS axis: normal  Clinical impression: normal ECG            4/22/16  · Calculated EF = 69.2%.  · All left ventricular wall segments contract normally.  · Left ventricular diastolic dysfunction (grade I) consistent with impaired relaxation.  · Valve structure and doppler normal        4/23/16  · Myocardial perfusion imaging indicates a normal myocardial perfusion study with no evidence of ischemia.  · Left ventricular ejection fraction is hyperdynamic (Calculated EF > 70%).  · Impressions are consistent with a low risk study.          DISCUSSION/SUMMARYThe patient is a very pleasant, year-old female with a medical history of hypertension, reported hyperlipidemia (the last lipid panel with an LDL of 190) who presented to me for evaluation of palpitations along with discomfort that appears to be more bilateral around the costophrenic angle.  It does not appear to be consistent with angina.  She has previously undergone myocardial perfusion imaging, which was without evidence of ischemia or infarction.      1. Chest pain.  This appears to be a little lower and bilateral and not consistent with angina.  - Prior stress test was normal.   - I would recommend consideration of gastrointestinal follow-up.  I do not think she needs an additional ischemic evaluation at this time.  - No evidence of aneurysm in her abdominal aorta documented on previous imaging and just calcification.    - She thinks that this also improves with eating, which would not be consistent with mesenteric ischemia.  2.  Palpitations.  Monitor order has been placed.  I will try to make sure that this actually happens.    3.  Aortic along with common iliac calcification.  - She has an appointment scheduled with Dr. Christophe Mancera.  I will defer evaluation of that to him.  Hyperlipidemia.  The last lipid panel showed an LDL that was significantly out of control.  She does not appear to be on a statin at this time.  I will discuss this with her.  I do think we should start her on Lipitor or Crestor based on those  numbers.

## 2017-12-18 ENCOUNTER — TRANSCRIBE ORDERS (OUTPATIENT)
Dept: ADMINISTRATIVE | Facility: HOSPITAL | Age: 72
End: 2017-12-18

## 2017-12-18 DIAGNOSIS — I73.9 CLAUDICATION (HCC): Primary | ICD-10-CM

## 2017-12-21 ENCOUNTER — HOSPITAL ENCOUNTER (OUTPATIENT)
Dept: ULTRASOUND IMAGING | Facility: HOSPITAL | Age: 72
Discharge: HOME | End: 2017-12-21
Admitting: FAMILY MEDICINE

## 2017-12-21 DIAGNOSIS — I71.40 ABDOMINAL AORTIC ANEURYSM WITHOUT RUPTURE (HCC): ICD-10-CM

## 2017-12-21 PROCEDURE — 76775 US EXAM ABDO BACK WALL LIM: CPT

## 2018-01-03 RX ORDER — SUCRALFATE 1 G/1
1 TABLET ORAL 4 TIMES DAILY
Qty: 120 TABLET | Refills: 5 | Status: SHIPPED | OUTPATIENT
Start: 2018-01-03 | End: 2018-10-12 | Stop reason: SDUPTHER

## 2018-02-07 RX ORDER — ONDANSETRON 4 MG/1
TABLET, ORALLY DISINTEGRATING ORAL
Qty: 45 TABLET | Refills: 0 | Status: SHIPPED | OUTPATIENT
Start: 2018-02-07 | End: 2018-07-26

## 2018-03-19 DIAGNOSIS — N18.30 CKD (CHRONIC KIDNEY DISEASE), STAGE III (HCC): ICD-10-CM

## 2018-03-19 DIAGNOSIS — E78.2 MIXED HYPERLIPIDEMIA: Primary | ICD-10-CM

## 2018-03-19 DIAGNOSIS — I10 ESSENTIAL HYPERTENSION: ICD-10-CM

## 2018-03-20 LAB
ALBUMIN SERPL-MCNC: 4.1 G/DL (ref 3.5–5.2)
ALBUMIN/GLOB SERPL: 1.6 G/DL
ALP SERPL-CCNC: 98 U/L (ref 39–117)
ALT SERPL-CCNC: 12 U/L (ref 1–33)
APPEARANCE UR: CLEAR
AST SERPL-CCNC: 13 U/L (ref 1–32)
BASOPHILS # BLD AUTO: 0.01 10*3/MM3 (ref 0–0.2)
BASOPHILS NFR BLD AUTO: 0.2 % (ref 0–1.5)
BILIRUB SERPL-MCNC: 0.2 MG/DL (ref 0.1–1.2)
BILIRUB UR QL STRIP: NEGATIVE
BUN SERPL-MCNC: 19 MG/DL (ref 8–23)
BUN/CREAT SERPL: 15.3 (ref 7–25)
CALCIUM SERPL-MCNC: 9.5 MG/DL (ref 8.6–10.5)
CHLORIDE SERPL-SCNC: 102 MMOL/L (ref 98–107)
CHOLEST SERPL-MCNC: 294 MG/DL (ref 0–200)
CHOLEST/HDLC SERPL: 5.16 {RATIO}
CO2 SERPL-SCNC: 30.3 MMOL/L (ref 22–29)
COLOR UR: YELLOW
CREAT SERPL-MCNC: 1.24 MG/DL (ref 0.57–1)
EOSINOPHIL # BLD AUTO: 0.14 10*3/MM3 (ref 0–0.7)
EOSINOPHIL NFR BLD AUTO: 2.4 % (ref 0.3–6.2)
ERYTHROCYTE [DISTWIDTH] IN BLOOD BY AUTOMATED COUNT: 13.8 % (ref 11.7–13)
GFR SERPLBLD CREATININE-BSD FMLA CKD-EPI: 43 ML/MIN/1.73
GFR SERPLBLD CREATININE-BSD FMLA CKD-EPI: 52 ML/MIN/1.73
GLOBULIN SER CALC-MCNC: 2.5 GM/DL
GLUCOSE SERPL-MCNC: 108 MG/DL (ref 65–99)
GLUCOSE UR QL: NEGATIVE
HCT VFR BLD AUTO: 39.9 % (ref 35.6–45.5)
HDLC SERPL-MCNC: 57 MG/DL (ref 40–60)
HGB BLD-MCNC: 12.4 G/DL (ref 11.9–15.5)
HGB UR QL STRIP: ABNORMAL
IMM GRANULOCYTES # BLD: 0.03 10*3/MM3 (ref 0–0.03)
IMM GRANULOCYTES NFR BLD: 0.5 % (ref 0–0.5)
KETONES UR QL STRIP: NEGATIVE
LDLC SERPL CALC-MCNC: 202 MG/DL (ref 0–100)
LEUKOCYTE ESTERASE UR QL STRIP: ABNORMAL
LYMPHOCYTES # BLD AUTO: 2.07 10*3/MM3 (ref 0.9–4.8)
LYMPHOCYTES NFR BLD AUTO: 34.8 % (ref 19.6–45.3)
MCH RBC QN AUTO: 30.8 PG (ref 26.9–32)
MCHC RBC AUTO-ENTMCNC: 31.1 G/DL (ref 32.4–36.3)
MCV RBC AUTO: 99.3 FL (ref 80.5–98.2)
MONOCYTES # BLD AUTO: 0.51 10*3/MM3 (ref 0.2–1.2)
MONOCYTES NFR BLD AUTO: 8.6 % (ref 5–12)
NEUTROPHILS # BLD AUTO: 3.19 10*3/MM3 (ref 1.9–8.1)
NEUTROPHILS NFR BLD AUTO: 53.5 % (ref 42.7–76)
NITRITE UR QL STRIP: NEGATIVE
PH UR STRIP: 6.5 [PH] (ref 5–8)
PLATELET # BLD AUTO: 251 10*3/MM3 (ref 140–500)
POTASSIUM SERPL-SCNC: 4.9 MMOL/L (ref 3.5–5.2)
PROT SERPL-MCNC: 6.6 G/DL (ref 6–8.5)
PROT UR QL STRIP: ABNORMAL
RBC # BLD AUTO: 4.02 10*6/MM3 (ref 3.9–5.2)
SODIUM SERPL-SCNC: 143 MMOL/L (ref 136–145)
SP GR UR: 1.02 (ref 1–1.03)
TRIGL SERPL-MCNC: 176 MG/DL (ref 0–150)
UROBILINOGEN UR STRIP-MCNC: ABNORMAL MG/DL
VLDLC SERPL CALC-MCNC: 35.2 MG/DL (ref 5–40)
WBC # BLD AUTO: 5.95 10*3/MM3 (ref 4.5–10.7)

## 2018-03-27 ENCOUNTER — OFFICE VISIT (OUTPATIENT)
Dept: INTERNAL MEDICINE | Facility: CLINIC | Age: 73
End: 2018-03-27

## 2018-03-27 VITALS
SYSTOLIC BLOOD PRESSURE: 146 MMHG | HEART RATE: 75 BPM | TEMPERATURE: 96.4 F | WEIGHT: 192 LBS | OXYGEN SATURATION: 97 % | BODY MASS INDEX: 30.99 KG/M2 | DIASTOLIC BLOOD PRESSURE: 70 MMHG

## 2018-03-27 DIAGNOSIS — N18.30 CKD (CHRONIC KIDNEY DISEASE), STAGE III (HCC): ICD-10-CM

## 2018-03-27 DIAGNOSIS — E78.2 MIXED HYPERLIPIDEMIA: ICD-10-CM

## 2018-03-27 DIAGNOSIS — I10 ESSENTIAL HYPERTENSION: ICD-10-CM

## 2018-03-27 DIAGNOSIS — Z23 NEED FOR PNEUMOCOCCAL VACCINE: ICD-10-CM

## 2018-03-27 DIAGNOSIS — Z87.891 HISTORY OF NICOTINE DEPENDENCE: ICD-10-CM

## 2018-03-27 DIAGNOSIS — R49.0 HOARSENESS: ICD-10-CM

## 2018-03-27 DIAGNOSIS — F32.4 MAJOR DEPRESSIVE DISORDER WITH SINGLE EPISODE, IN PARTIAL REMISSION (HCC): ICD-10-CM

## 2018-03-27 DIAGNOSIS — F41.9 ANXIETY: Primary | ICD-10-CM

## 2018-03-27 DIAGNOSIS — Z12.2 ENCOUNTER FOR SCREENING FOR LUNG CANCER: ICD-10-CM

## 2018-03-27 PROCEDURE — 99214 OFFICE O/P EST MOD 30 MIN: CPT | Performed by: FAMILY MEDICINE

## 2018-03-27 PROCEDURE — 90670 PCV13 VACCINE IM: CPT | Performed by: FAMILY MEDICINE

## 2018-03-27 PROCEDURE — G0009 ADMIN PNEUMOCOCCAL VACCINE: HCPCS | Performed by: FAMILY MEDICINE

## 2018-03-27 RX ORDER — LOSARTAN POTASSIUM 25 MG/1
25 TABLET ORAL DAILY
COMMUNITY
Start: 2018-03-07 | End: 2018-07-26

## 2018-03-27 NOTE — PROGRESS NOTES
Low-Dose Lung Cancer CT Screening Visit    CHIEF COMPLAINT:    Shared Decision Making  I am discussing tobacco cessation today with Rachana Arguelles    SMOKING HISTORY:     History   Smoking Status   • Former Smoker   • Packs/day: 3.00   • Years: 15.00   • Types: Cigarettes   • Quit date: 9/22/2003   Smokeless Tobacco   • Not on file       SUBJECTIVE:     Rachana Arguelles is a former smoker quitting 12 years ago with a  40  pack year history.  she reports no use of alternate forms of tobacco, electronic cigarettes, marijuana or other substances.  Based on the recommendation of the [place] Preventive Services Task Force, this patient is at high risk for lung cancer and a low-dose CT screening scan is recommended.     The patient has had no hemoptysis, unintentional weight loss or increasing shortness of breath. The patient is asymptomatic and has no signs or symptoms of lung cancer.     Together we discussed the potential benefits and potential harms of being screened for lung cancer including the potential for follow up diagnostic testing, risk for over diagnosis, false positive rate and radiation exposure using the AHRQ: Is Lung Cancer Screening Right for Me Decision Aid (Publication 87-[id number]-[id number], web site www.ahrq.gov). A copy of this decision aid resource has been provided in the after visit summary.  We also reviewed the patient's smoking history and counseled her on the importance and health benefits of maintaining cigarette smoking abstinence.      OBJECTIVE:    /70 (BP Location: Left arm, Patient Position: Sitting, Cuff Size: Adult)   Pulse 75   Temp 96.4 °F (35.8 °C) (Tympanic)   Wt 87.1 kg (192 lb)   SpO2 97%   BMI 30.99 kg/m²   General: no distress, alert and oriented  Chest: Lung sounds are clear to auscultation, no wheezes, rales or rhonchi, with symmetric air entry. No respiratory distress  Cardiovascular: RRR with no murmur auscultated      Continued Smoking Abstinence discussion:      We discussed that there are a number of resources and interventions to assist with smoking cessation if needed in the future including the 1-800-Quit Now line.(Included in the decision aid shared with the patient today).   On a scale of zero to ten, the patient rates their motivation to stay quit at a 10 out of 10 today.  The patient is confident that they will never smoke in the future.    Recommendations for continued lung cancer screening:      We discussed the NCCN guidelines for lung cancer screening and the patient verbalized understanding that annual screening is recommended until fifteen years beyond smoking as long as they have no other disease or comorbidity that would prevent them from receiving cancer treatments such as surgery should a lung cancer be detected.  After review of the NCCN guidelines and recommendations for ongoing screening, the patient verbalized understanding of recommendations for follow-up.  The patient has decided to proceed with a Low Dose Lung Cancer Screening CT today.       10minutes face-to-face spent counseling patient on the continued health benefits of having quit tobacco, maintaining smoking abstinence, smoking cessation strategies and resources, as well as the importance of adherence to annual lung cancer low-dose CT screening.

## 2018-03-27 NOTE — PROGRESS NOTES
Subjective   Rachana Arguelles is a 72 y.o. female.     Chief Complaint   Patient presents with   • Anxiety   • Cough   • Hyperlipidemia   • Hypertension     Also anxiety depression hoarseness    History of Present Illness   Patient presents with a 12 year history of abstinence from smoking and a previous 40-pack-year history of smoking.  We will arrange for a CT low-dose for screening and also for hoarseness.  She has had recurrent hoarseness the past several months.  She got very ill in [place] in February.    Otherwise her psychiatrist to stop working as an outpatient psychiatrist and will return or resume her regular prescriptions pending further psychiatrist 3 follow-up.  We discussed treatment for hypercholesterolemia as her cholesterol is gone up a get a set of thyroid functions before the next visit.    She does have back pain and history of symptoms of warm sensation down both legs when she stands up.      The following portions of the patient's history were reviewed and updated as appropriate: allergies, current medications, past social history and problem list.    Review of Systems   Constitutional: Positive for fatigue.   HENT: Positive for voice change.    Eyes: Negative.    Respiratory: Positive for cough.    Cardiovascular: Negative.    Gastrointestinal: Negative.    Endocrine: Negative.    Genitourinary: Negative.    Musculoskeletal: Positive for back pain.   Skin: Negative.    Allergic/Immunologic: Negative.    Neurological: Negative.    Hematological: Negative.    Psychiatric/Behavioral: The patient is nervous/anxious.        Objective   Vitals:    03/27/18 1650   BP: 146/70   Pulse: 75   Temp: 96.4 °F (35.8 °C)   SpO2: 97%     Physical Exam   Constitutional: She is oriented to person, place, and time. She appears lethargic.   HENT:   Head: Normocephalic.   Right Ear: External ear normal.   Left Ear: External ear normal.   Nose: Nose normal.   Mouth/Throat: Oropharynx is clear and moist.   Eyes:  Conjunctivae and EOM are normal. Pupils are equal, round, and reactive to light.   Neck: Normal range of motion. Neck supple. No thyromegaly present.   Cardiovascular: Normal rate and regular rhythm.    Murmur heard.  Pulmonary/Chest: Effort normal and breath sounds normal.   Abdominal: Soft. Bowel sounds are normal.   Musculoskeletal:        Lumbar back: She exhibits decreased range of motion and tenderness.   Lymphadenopathy:     She has no cervical adenopathy.   Neurological: She is oriented to person, place, and time. She has normal strength and normal reflexes. She appears lethargic. A cranial nerve deficit is present. No sensory deficit. She exhibits abnormal muscle tone.   Nursing note and vitals reviewed.      Assessment/Plan   Problem List Items Addressed This Visit        Cardiovascular and Mediastinum    Hypertension    Relevant Medications    losartan (COZAAR) 25 MG tablet    Other Relevant Orders    Lipid Panel With / Chol / HDL Ratio    TSH    T4, Free    Comprehensive Metabolic Panel    Hyperlipidemia    Relevant Orders    Lipid Panel With / Chol / HDL Ratio    TSH    T4, Free    Comprehensive Metabolic Panel       Genitourinary    CKD (chronic kidney disease), stage III    Relevant Orders    Lipid Panel With / Chol / HDL Ratio    TSH    T4, Free    Comprehensive Metabolic Panel       Other    Anxiety - Primary    Depression      Other Visit Diagnoses     Encounter for screening for lung cancer        Relevant Orders    CT chest low dose wo    History of nicotine dependence        Relevant Orders    CT chest low dose wo    Need for pneumococcal vaccine        Relevant Orders    Pneumococcal Conjugate Vaccine 13-Valent All (PCV13)    Hoarseness        Relevant Orders    Ambulatory Referral to ENT (Otolaryngology)    Lipid Panel With / Chol / HDL Ratio    TSH    T4, Free    Comprehensive Metabolic Panel      plan: I recommend back in 3 months.  We'll get a referral to ENT and also get low-dose CT of the  chest.  Labs pending follow-up next 3 months including thyroid functions.  Consider refilling psychiatric medications pending follow-up.

## 2018-04-04 ENCOUNTER — HOSPITAL ENCOUNTER (OUTPATIENT)
Dept: PET IMAGING | Facility: HOSPITAL | Age: 73
Discharge: HOME | End: 2018-04-04
Admitting: FAMILY MEDICINE

## 2018-04-04 DIAGNOSIS — Z12.2 ENCOUNTER FOR SCREENING FOR LUNG CANCER: ICD-10-CM

## 2018-04-04 DIAGNOSIS — Z87.891 HISTORY OF NICOTINE DEPENDENCE: ICD-10-CM

## 2018-04-04 PROCEDURE — G0297 LDCT FOR LUNG CA SCREEN: HCPCS

## 2018-04-13 DIAGNOSIS — R93.89 ABNORMAL CHEST CT: Primary | ICD-10-CM

## 2018-04-20 ENCOUNTER — TELEPHONE (OUTPATIENT)
Dept: INTERNAL MEDICINE | Facility: CLINIC | Age: 73
End: 2018-04-20

## 2018-04-20 RX ORDER — METHYLPREDNISOLONE 4 MG/1
TABLET ORAL
Qty: 21 EACH | Refills: 0 | Status: SHIPPED | OUTPATIENT
Start: 2018-04-20 | End: 2018-06-28

## 2018-05-18 ENCOUNTER — TRANSCRIBE ORDERS (OUTPATIENT)
Dept: ADMINISTRATIVE | Facility: HOSPITAL | Age: 73
End: 2018-05-18

## 2018-05-18 DIAGNOSIS — R91.8 PULMONARY INFILTRATE: ICD-10-CM

## 2018-05-18 DIAGNOSIS — R91.1 LUNG NODULE: Primary | ICD-10-CM

## 2018-05-29 RX ORDER — FAMOTIDINE 20 MG/1
TABLET, FILM COATED ORAL
Qty: 180 TABLET | Refills: 1 | Status: SHIPPED | OUTPATIENT
Start: 2018-05-29 | End: 2018-07-26

## 2018-06-04 RX ORDER — CARVEDILOL 25 MG/1
TABLET ORAL
Qty: 90 TABLET | Refills: 1 | Status: SHIPPED | OUTPATIENT
Start: 2018-06-04 | End: 2018-07-26

## 2018-06-11 ENCOUNTER — HOSPITAL ENCOUNTER (OUTPATIENT)
Dept: CARDIOLOGY | Facility: HOSPITAL | Age: 73
Discharge: HOME | End: 2018-06-11
Attending: SURGERY | Admitting: SURGERY

## 2018-06-11 DIAGNOSIS — I73.9 CLAUDICATION (HCC): ICD-10-CM

## 2018-06-11 LAB
BH CV LOWER ARTERIAL LEFT ABI RATIO: 0.63
BH CV LOWER ARTERIAL LEFT CALF RATIO: 0.67
BH CV LOWER ARTERIAL LEFT DORSALIS PEDIS SYS MAX: 108 MMHG
BH CV LOWER ARTERIAL LEFT GREAT TOE SYS MAX: 85 MMHG
BH CV LOWER ARTERIAL LEFT HIGH THIGH SYS MAX: 141 MMHG
BH CV LOWER ARTERIAL LEFT LOW THIGH SYS MAX: 138 MMHG
BH CV LOWER ARTERIAL LEFT POPLITEAL SYS MAX: 114 MMHG
BH CV LOWER ARTERIAL LEFT POST TIBIAL SYS MAX: 105 MMHG
BH CV LOWER ARTERIAL LEFT TBI RATIO: 0.5
BH CV LOWER ARTERIAL RIGHT ABI RATIO: 1.09
BH CV LOWER ARTERIAL RIGHT CALF RATIO: 1.08
BH CV LOWER ARTERIAL RIGHT DORSALIS PEDIS SYS MAX: 173 MMHG
BH CV LOWER ARTERIAL RIGHT GREAT TOE SYS MAX: 173 MMHG
BH CV LOWER ARTERIAL RIGHT HIGH THIGH SYS MAX: 215 MMHG
BH CV LOWER ARTERIAL RIGHT LOW THIGH SYS MAX: 207 MMHG
BH CV LOWER ARTERIAL RIGHT POPLITEAL SYS MAX: 184 MMHG
BH CV LOWER ARTERIAL RIGHT POST TIBIAL SYS MAX: 187 MMHG
BH CV LOWER ARTERIAL RIGHT TBI RATIO: 1.01
UPPER ARTERIAL LEFT ARM BRACHIAL SYS MAX: 171 MMHG
UPPER ARTERIAL RIGHT ARM BRACHIAL SYS MAX: 167 MMHG

## 2018-06-11 PROCEDURE — 93923 UPR/LXTR ART STDY 3+ LVLS: CPT

## 2018-06-14 ENCOUNTER — RESULTS ENCOUNTER (OUTPATIENT)
Dept: INTERNAL MEDICINE | Facility: CLINIC | Age: 73
End: 2018-06-14

## 2018-06-14 DIAGNOSIS — E78.2 MIXED HYPERLIPIDEMIA: ICD-10-CM

## 2018-06-14 DIAGNOSIS — N18.30 CKD (CHRONIC KIDNEY DISEASE), STAGE III (HCC): ICD-10-CM

## 2018-06-14 DIAGNOSIS — I10 ESSENTIAL HYPERTENSION: ICD-10-CM

## 2018-06-14 DIAGNOSIS — R49.0 HOARSENESS: ICD-10-CM

## 2018-06-18 ENCOUNTER — TRANSCRIBE ORDERS (OUTPATIENT)
Dept: ADMINISTRATIVE | Facility: HOSPITAL | Age: 73
End: 2018-06-18

## 2018-06-21 LAB
ALBUMIN SERPL-MCNC: 3.7 G/DL (ref 3.5–5.2)
ALBUMIN/GLOB SERPL: 1.4 G/DL
ALP SERPL-CCNC: 107 U/L (ref 39–117)
ALT SERPL-CCNC: 9 U/L (ref 1–33)
AST SERPL-CCNC: 15 U/L (ref 1–32)
BILIRUB SERPL-MCNC: 0.2 MG/DL (ref 0.1–1.2)
BUN SERPL-MCNC: 33 MG/DL (ref 8–23)
BUN/CREAT SERPL: 21 (ref 7–25)
CALCIUM SERPL-MCNC: 9 MG/DL (ref 8.6–10.5)
CHLORIDE SERPL-SCNC: 103 MMOL/L (ref 98–107)
CHOLEST SERPL-MCNC: 223 MG/DL (ref 0–200)
CHOLEST/HDLC SERPL: 3.98 {RATIO}
CO2 SERPL-SCNC: 26.6 MMOL/L (ref 22–29)
CREAT SERPL-MCNC: 1.57 MG/DL (ref 0.57–1)
GFR SERPLBLD CREATININE-BSD FMLA CKD-EPI: 32 ML/MIN/1.73
GFR SERPLBLD CREATININE-BSD FMLA CKD-EPI: 39 ML/MIN/1.73
GLOBULIN SER CALC-MCNC: 2.7 GM/DL
GLUCOSE SERPL-MCNC: 97 MG/DL (ref 65–99)
HDLC SERPL-MCNC: 56 MG/DL (ref 40–60)
LDLC SERPL CALC-MCNC: 126 MG/DL (ref 0–100)
POTASSIUM SERPL-SCNC: 5.1 MMOL/L (ref 3.5–5.2)
PROT SERPL-MCNC: 6.4 G/DL (ref 6–8.5)
SODIUM SERPL-SCNC: 142 MMOL/L (ref 136–145)
T4 FREE SERPL-MCNC: 1 NG/DL (ref 0.93–1.7)
TRIGL SERPL-MCNC: 205 MG/DL (ref 0–150)
TSH SERPL DL<=0.005 MIU/L-ACNC: 2.28 MIU/ML (ref 0.27–4.2)
VLDLC SERPL CALC-MCNC: 41 MG/DL (ref 5–40)

## 2018-06-28 ENCOUNTER — OFFICE VISIT (OUTPATIENT)
Dept: INTERNAL MEDICINE | Facility: CLINIC | Age: 73
End: 2018-06-28

## 2018-06-28 VITALS
DIASTOLIC BLOOD PRESSURE: 80 MMHG | WEIGHT: 203 LBS | BODY MASS INDEX: 32.77 KG/M2 | HEART RATE: 73 BPM | TEMPERATURE: 97.3 F | SYSTOLIC BLOOD PRESSURE: 148 MMHG | OXYGEN SATURATION: 96 %

## 2018-06-28 DIAGNOSIS — N18.30 CKD (CHRONIC KIDNEY DISEASE), STAGE III (HCC): ICD-10-CM

## 2018-06-28 DIAGNOSIS — I73.9 PAD (PERIPHERAL ARTERY DISEASE) (HCC): Primary | ICD-10-CM

## 2018-06-28 DIAGNOSIS — I10 ESSENTIAL HYPERTENSION: ICD-10-CM

## 2018-06-28 DIAGNOSIS — E78.2 MIXED HYPERLIPIDEMIA: ICD-10-CM

## 2018-06-28 DIAGNOSIS — F32.1 MODERATE SINGLE CURRENT EPISODE OF MAJOR DEPRESSIVE DISORDER (HCC): ICD-10-CM

## 2018-06-28 DIAGNOSIS — I73.9 CLAUDICATION OF LEFT LOWER EXTREMITY (HCC): ICD-10-CM

## 2018-06-28 PROCEDURE — 99214 OFFICE O/P EST MOD 30 MIN: CPT | Performed by: FAMILY MEDICINE

## 2018-06-28 RX ORDER — VENLAFAXINE HYDROCHLORIDE 150 MG/1
150 CAPSULE, EXTENDED RELEASE ORAL DAILY
Qty: 30 CAPSULE | Refills: 5 | Status: SHIPPED | OUTPATIENT
Start: 2018-06-28 | End: 2018-09-13 | Stop reason: SDUPTHER

## 2018-06-28 RX ORDER — TRAZODONE HYDROCHLORIDE 150 MG/1
75 TABLET ORAL NIGHTLY
Qty: 30 TABLET | Refills: 5 | Status: SHIPPED | OUTPATIENT
Start: 2018-06-28 | End: 2018-12-13 | Stop reason: SDUPTHER

## 2018-06-28 NOTE — PROGRESS NOTES
Subjective   Rachana Arguelles is a 72 y.o. female.     Chief Complaint   Patient presents with   • Depression   Hypertension hyperlipidemia chronic kidney disease stage III peripheral artery disease.  Patient has had a number of problems.  Her psychiatrist is retiring.  Refilled her antidepressants.  Otherwise she saw ENT with evidence of reflux treated as a causative portions.  She saw Dr. Riki Mancera with evidence of peripheral rashes worse in the left leg with claudication at night.  She has night pain.  She is seeing her kidney specialist Dr. Downs.  We're trying to work out a method to safely give her IV contrast to take a look at the runoff vessels in both of her lower extremities.  I encouraged her to follow through with this.    Otherwise continue current medication for reflux.      History of Present Illness         The following portions of the patient's history were reviewed and updated as appropriate: allergies, current medications, past social history and problem list.    Review of Systems   Constitutional: Negative.    HENT: Negative.    Eyes: Negative.    Respiratory: Negative.    Cardiovascular: Negative.    Endocrine: Negative.    Genitourinary: Negative.    Musculoskeletal: Positive for arthralgias, gait problem and myalgias.   Allergic/Immunologic: Negative.    Hematological: Negative.    Psychiatric/Behavioral: Negative.        Objective   Vitals:    06/28/18 1624   BP: 148/80   Pulse: 73   Temp: 97.3 °F (36.3 °C)   SpO2: 96%     Physical Exam   Constitutional: She is oriented to person, place, and time. She appears well-developed.   HENT:   Head: Normocephalic.   Right Ear: External ear normal.   Left Ear: External ear normal.   Mouth/Throat: Oropharynx is clear and moist.   Eyes: Pupils are equal, round, and reactive to light.   Neck: Normal range of motion.   Cardiovascular: Normal rate, regular rhythm and normal heart sounds.    Pulmonary/Chest: Effort normal and breath sounds normal.    Abdominal: Soft. Bowel sounds are normal.   Musculoskeletal:        Lumbar back: She exhibits decreased range of motion and tenderness.     Vascular Status -  Her right foot exhibits abnormal foot vasculature . Her left foot exhibits abnormal foot vasculature .     Neurological: She is alert and oriented to person, place, and time.   Psychiatric: Her mood appears anxious. She exhibits a depressed mood.   Vitals reviewed.      Assessment/Plan   Problem List Items Addressed This Visit        Cardiovascular and Mediastinum    Hypertension    Hyperlipidemia       Genitourinary    CKD (chronic kidney disease), stage III       Other    Depression    Relevant Medications    venlafaxine XR (EFFEXOR-XR) 150 MG 24 hr capsule    traZODone (DESYREL) 150 MG tablet      Other Visit Diagnoses     PAD (peripheral artery disease)    -  Primary    Claudication of left lower extremity          Return in 3 months.  Have advised her to follow-up with Dr. Mancera vascular surgery refill venlafaxine also trazodone.

## 2018-07-06 RX ORDER — LORAZEPAM 0.5 MG/1
0.5 TABLET ORAL 3 TIMES DAILY
Qty: 90 TABLET | Refills: 2 | Status: SHIPPED | OUTPATIENT
Start: 2018-07-06 | End: 2018-10-06 | Stop reason: SDUPTHER

## 2018-07-09 ENCOUNTER — TRANSCRIBE ORDERS (OUTPATIENT)
Dept: ADMINISTRATIVE | Facility: HOSPITAL | Age: 73
End: 2018-07-09

## 2018-07-09 DIAGNOSIS — I73.9 PAD (PERIPHERAL ARTERY DISEASE) (HCC): Primary | ICD-10-CM

## 2018-07-09 DIAGNOSIS — N18.30 ACUTE WORSENING OF STAGE 3 CHRONIC KIDNEY DISEASE (HCC): Primary | ICD-10-CM

## 2018-07-17 ENCOUNTER — HOSPITAL ENCOUNTER (OUTPATIENT)
Dept: RADIOLOGY | Facility: HOSPITAL | Age: 73
Discharge: HOME | End: 2018-07-17
Attending: SURGERY

## 2018-07-17 ENCOUNTER — HOSPITAL ENCOUNTER (OUTPATIENT)
Dept: CT IMAGING | Facility: HOSPITAL | Age: 73
Discharge: HOME | End: 2018-07-17
Attending: SURGERY | Admitting: SURGERY

## 2018-07-17 VITALS — TEMPERATURE: 97.6 F

## 2018-07-17 DIAGNOSIS — I73.9 PAD (PERIPHERAL ARTERY DISEASE) (HCC): ICD-10-CM

## 2018-07-17 DIAGNOSIS — N18.30 ACUTE WORSENING OF STAGE 3 CHRONIC KIDNEY DISEASE (HCC): ICD-10-CM

## 2018-07-17 LAB — CREAT BLDA-MCNC: 1.5 MG/DL (ref 0.6–1.3)

## 2018-07-17 PROCEDURE — 96360 HYDRATION IV INFUSION INIT: CPT

## 2018-07-17 PROCEDURE — 96361 HYDRATE IV INFUSION ADD-ON: CPT

## 2018-07-17 PROCEDURE — 82565 ASSAY OF CREATININE: CPT

## 2018-07-17 PROCEDURE — 75635 CT ANGIO ABDOMINAL ARTERIES: CPT

## 2018-07-17 PROCEDURE — 25010000002 IOPAMIDOL 61 % SOLUTION: Performed by: SURGERY

## 2018-07-17 RX ORDER — SODIUM CHLORIDE 9 MG/ML
200 INJECTION, SOLUTION INTRAVENOUS ONCE
Status: COMPLETED | OUTPATIENT
Start: 2018-07-17 | End: 2018-07-17

## 2018-07-17 RX ORDER — SODIUM CHLORIDE 9 MG/ML
500 INJECTION, SOLUTION INTRAVENOUS ONCE
Status: COMPLETED | OUTPATIENT
Start: 2018-07-17 | End: 2018-07-17

## 2018-07-17 RX ADMIN — IOPAMIDOL 95 ML: 612 INJECTION, SOLUTION INTRAVENOUS at 12:04

## 2018-07-17 RX ADMIN — SODIUM CHLORIDE 500 ML: 0.9 INJECTION, SOLUTION INTRAVENOUS at 10:43

## 2018-07-17 RX ADMIN — SODIUM CHLORIDE 200 ML: 9 INJECTION, SOLUTION INTRAVENOUS at 12:15

## 2018-07-26 ENCOUNTER — HOSPITAL ENCOUNTER (OUTPATIENT)
Dept: GENERAL RADIOLOGY | Facility: HOSPITAL | Age: 73
Discharge: HOME | End: 2018-07-26
Admitting: SURGERY

## 2018-07-26 ENCOUNTER — APPOINTMENT (OUTPATIENT)
Dept: PREADMISSION TESTING | Facility: HOSPITAL | Age: 73
End: 2018-07-26

## 2018-07-26 VITALS
OXYGEN SATURATION: 98 % | TEMPERATURE: 97.8 F | HEIGHT: 66 IN | BODY MASS INDEX: 31.82 KG/M2 | SYSTOLIC BLOOD PRESSURE: 178 MMHG | HEART RATE: 73 BPM | RESPIRATION RATE: 16 BRPM | DIASTOLIC BLOOD PRESSURE: 81 MMHG | WEIGHT: 198 LBS

## 2018-07-26 LAB
ALBUMIN SERPL-MCNC: 4.3 G/DL (ref 3.5–5.2)
ALBUMIN/GLOB SERPL: 1.3 G/DL
ALP SERPL-CCNC: 128 U/L (ref 39–117)
ALT SERPL W P-5'-P-CCNC: 11 U/L (ref 1–33)
ANION GAP SERPL CALCULATED.3IONS-SCNC: 15.5 MMOL/L
APTT PPP: 32.5 SECONDS (ref 22.7–35.4)
AST SERPL-CCNC: 22 U/L (ref 1–32)
BACTERIA UR QL AUTO: ABNORMAL /HPF
BILIRUB SERPL-MCNC: 0.5 MG/DL (ref 0.1–1.2)
BILIRUB UR QL STRIP: NEGATIVE
BUN BLD-MCNC: 22 MG/DL (ref 8–23)
BUN/CREAT SERPL: 13.1 (ref 7–25)
CALCIUM SPEC-SCNC: 9.3 MG/DL (ref 8.6–10.5)
CHLORIDE SERPL-SCNC: 98 MMOL/L (ref 98–107)
CLARITY UR: CLEAR
CO2 SERPL-SCNC: 24.5 MMOL/L (ref 22–29)
COLOR UR: YELLOW
CREAT BLD-MCNC: 1.68 MG/DL (ref 0.57–1)
DEPRECATED RDW RBC AUTO: 46.8 FL (ref 37–54)
ERYTHROCYTE [DISTWIDTH] IN BLOOD BY AUTOMATED COUNT: 12.9 % (ref 11.7–13)
GFR SERPL CREATININE-BSD FRML MDRD: 30 ML/MIN/1.73
GLOBULIN UR ELPH-MCNC: 3.2 GM/DL
GLUCOSE BLD-MCNC: 92 MG/DL (ref 65–99)
GLUCOSE UR STRIP-MCNC: NEGATIVE MG/DL
HCT VFR BLD AUTO: 40 % (ref 35.6–45.5)
HGB BLD-MCNC: 12.4 G/DL (ref 11.9–15.5)
HGB UR QL STRIP.AUTO: ABNORMAL
HYALINE CASTS UR QL AUTO: ABNORMAL /LPF
INR PPP: 0.93 (ref 0.9–1.1)
KETONES UR QL STRIP: NEGATIVE
LEUKOCYTE ESTERASE UR QL STRIP.AUTO: NEGATIVE
MCH RBC QN AUTO: 30.7 PG (ref 26.9–32)
MCHC RBC AUTO-ENTMCNC: 31 G/DL (ref 32.4–36.3)
MCV RBC AUTO: 99 FL (ref 80.5–98.2)
NITRITE UR QL STRIP: NEGATIVE
PH UR STRIP.AUTO: <=5 [PH] (ref 5–8)
PLATELET # BLD AUTO: 280 10*3/MM3 (ref 140–500)
PMV BLD AUTO: 9.5 FL (ref 6–12)
POTASSIUM BLD-SCNC: 4.8 MMOL/L (ref 3.5–5.2)
PROT SERPL-MCNC: 7.5 G/DL (ref 6–8.5)
PROT UR QL STRIP: ABNORMAL
PROTHROMBIN TIME: 12.3 SECONDS (ref 11.7–14.2)
RBC # BLD AUTO: 4.04 10*6/MM3 (ref 3.9–5.2)
RBC # UR: ABNORMAL /HPF
REF LAB TEST METHOD: ABNORMAL
SODIUM BLD-SCNC: 138 MMOL/L (ref 136–145)
SP GR UR STRIP: 1.02 (ref 1–1.03)
SQUAMOUS #/AREA URNS HPF: ABNORMAL /HPF
UROBILINOGEN UR QL STRIP: ABNORMAL
WBC NRBC COR # BLD: 8.04 10*3/MM3 (ref 4.5–10.7)
WBC UR QL AUTO: ABNORMAL /HPF

## 2018-07-26 PROCEDURE — 85027 COMPLETE CBC AUTOMATED: CPT | Performed by: SURGERY

## 2018-07-26 PROCEDURE — 93005 ELECTROCARDIOGRAM TRACING: CPT

## 2018-07-26 PROCEDURE — 36415 COLL VENOUS BLD VENIPUNCTURE: CPT

## 2018-07-26 PROCEDURE — 71046 X-RAY EXAM CHEST 2 VIEWS: CPT

## 2018-07-26 PROCEDURE — 85610 PROTHROMBIN TIME: CPT | Performed by: SURGERY

## 2018-07-26 PROCEDURE — 93010 ELECTROCARDIOGRAM REPORT: CPT | Performed by: INTERNAL MEDICINE

## 2018-07-26 PROCEDURE — 85730 THROMBOPLASTIN TIME PARTIAL: CPT | Performed by: SURGERY

## 2018-07-26 PROCEDURE — 80053 COMPREHEN METABOLIC PANEL: CPT | Performed by: SURGERY

## 2018-07-26 PROCEDURE — 81001 URINALYSIS AUTO W/SCOPE: CPT | Performed by: SURGERY

## 2018-07-26 RX ORDER — CARVEDILOL 25 MG/1
12.5 TABLET ORAL 2 TIMES DAILY WITH MEALS
COMMUNITY
End: 2018-10-11 | Stop reason: SDUPTHER

## 2018-07-26 RX ORDER — AMLODIPINE BESYLATE 2.5 MG/1
5 TABLET ORAL DAILY
COMMUNITY
End: 2018-12-09

## 2018-07-26 RX ORDER — FAMOTIDINE 20 MG/1
20 TABLET, FILM COATED ORAL 2 TIMES DAILY
COMMUNITY
End: 2018-12-04 | Stop reason: SDUPTHER

## 2018-07-26 RX ORDER — OMEPRAZOLE 40 MG/1
40 CAPSULE, DELAYED RELEASE ORAL EVERY EVENING
COMMUNITY
End: 2019-12-30 | Stop reason: SDUPTHER

## 2018-07-26 RX ORDER — ATORVASTATIN CALCIUM 40 MG/1
40 TABLET, FILM COATED ORAL NIGHTLY
COMMUNITY
End: 2022-01-01 | Stop reason: HOSPADM

## 2018-07-26 RX ORDER — ONDANSETRON 8 MG/1
8 TABLET, ORALLY DISINTEGRATING ORAL EVERY 8 HOURS PRN
COMMUNITY
End: 2019-05-03 | Stop reason: SDUPTHER

## 2018-07-26 NOTE — DISCHARGE INSTRUCTIONS
Take the following medications the morning of surgery with a small sip of water: AMLODIPINE, CARVEDILOL, FAMOTIDINE, OMEPRAZOLE    ARRIVAL TIME 07:30        General Instructions:  • Do not eat solid food after midnight the night before surgery.  • You may drink clear liquids day of surgery but must stop at least one hour before your hospital arrival time.  • It is beneficial for you to have a clear drink that contains carbohydrates the day of surgery.  We suggest a 12 to 20 ounce bottle of Gatorade or Powerade for non-diabetic patients or a 12 to 20 ounce bottle of G2 or Powerade Zero for diabetic patients. (Pediatric patients, are not advised to drink a 12 to 20 ounce carbohydrate drink)    Clear liquids are liquids you can see through.  Nothing red in color.     Plain water                               Sports drinks  Sodas                                   Gelatin (Jell-O)  Fruit juices without pulp such as white grape juice and apple juice  Popsicles that contain no fruit or yogurt  Tea or coffee (no cream or milk added)  Gatorade / Powerade  G2 / Powerade Zero      • Patients who avoid smoking, chewing tobacco and alcohol for 4 weeks prior to surgery have a reduced risk of post-operative complications.  Quit smoking as many days before surgery as you can.  • Do not smoke, use chewing tobacco or drink alcohol the day of surgery.   • Bring any papers given to you in the doctor’s office.  • Wear clean comfortable clothes and socks.  • Do not wear contact lenses or make-up.  Bring a case for your glasses.   • Bring crutches or walker if applicable.  • Remove all piercings.  Leave jewelry and any other valuables at home.  • The Pre-Admission Testing nurse will instruct you to bring medications if unable to obtain an accurate list in Pre-Admission Testing.            Preventing a Surgical Site Infection:  • For 2 to 3 days before surgery, avoid shaving with a razor because the razor can irritate skin and make it  easier to develop an infection.    • Any areas of open skin can increase the risk of a post-operative wound infection by allowing bacteria to enter and travel throughout the body.  Notify your surgeon if you have any skin wounds / rashes even if it is not near the expected surgical site.  The area will need assessed to determine if surgery should be delayed until it is healed.  • The night prior to surgery sleep in a clean bed with clean clothing.  Do not allow pets to sleep with you.  • Shower on the morning of surgery using a fresh bar of anti-bacterial soap (such as Dial) and clean washcloth.  Dry with a clean towel and dress in clean clothing.  • Ask your surgeon if you will be receiving antibiotics prior to surgery.  • Make sure you, your family, and all healthcare providers clean their hands with soap and water or an alcohol based hand  before caring for you or your wound.    Day of surgery:  Upon arrival, a Pre-op nurse and Anesthesiologist will review your health history, obtain vital signs, and answer questions you may have.  The only belongings needed at this time will be your home medications and if applicable your C-PAP/BI-PAP machine.  If you are staying overnight your family can leave the rest of your belongings in the car and bring them to your room later.  A Pre-op nurse will start an IV and you may receive medication in preparation for surgery, including something to help you relax.  Your family will be able to see you in the Pre-op area.  While you are in surgery your family should notify the waiting room  if they leave the waiting room area and provide a contact phone number.    Please be aware that surgery does come with discomfort.  We want to make every effort to control your discomfort so please discuss any uncontrolled symptoms with your nurse.   Your doctor will most likely have prescribed pain medications.      If you are going home after surgery you will receive  individualized written care instructions before being discharged.  A responsible adult must drive you to and from the hospital on the day of your surgery and stay with you for 24 hours.    If you are staying overnight following surgery, you will be transported to your hospital room following the recovery period.  [facility] has all private rooms.    You have received a list of surgical assistants for your reference.  If you have any questions please call Pre-Admission Testing at [phone].  Deductibles and co-payments are collected on the day of service. Please be prepared to pay the required co-pay, deductible or deposit on the day of service as defined by your plan.

## 2018-08-01 ENCOUNTER — ANESTHESIA (OUTPATIENT)
Dept: PERIOP | Facility: HOSPITAL | Age: 73
End: 2018-08-01

## 2018-08-01 ENCOUNTER — ANESTHESIA EVENT (OUTPATIENT)
Dept: PERIOP | Facility: HOSPITAL | Age: 73
End: 2018-08-01

## 2018-08-01 ENCOUNTER — HOSPITAL ENCOUNTER (OUTPATIENT)
Facility: HOSPITAL | Age: 73
Setting detail: HOSPITAL OUTPATIENT SURGERY
Discharge: HOME | End: 2018-08-01
Attending: SURGERY | Admitting: SURGERY

## 2018-08-01 ENCOUNTER — APPOINTMENT (OUTPATIENT)
Dept: GENERAL RADIOLOGY | Facility: HOSPITAL | Age: 73
End: 2018-08-01

## 2018-08-01 VITALS
OXYGEN SATURATION: 94 % | RESPIRATION RATE: 16 BRPM | HEART RATE: 55 BPM | BODY MASS INDEX: 33.01 KG/M2 | DIASTOLIC BLOOD PRESSURE: 91 MMHG | WEIGHT: 205.4 LBS | SYSTOLIC BLOOD PRESSURE: 169 MMHG | HEIGHT: 66 IN | TEMPERATURE: 98 F

## 2018-08-01 PROCEDURE — 25010000002 MIDAZOLAM PER 1 MG: Performed by: ANESTHESIOLOGY

## 2018-08-01 RX ORDER — MIDAZOLAM HYDROCHLORIDE 1 MG/ML
1 INJECTION INTRAMUSCULAR; INTRAVENOUS
Status: DISCONTINUED | OUTPATIENT
Start: 2018-08-01 | End: 2018-08-01 | Stop reason: HOSPADM

## 2018-08-01 RX ORDER — OXYMETAZOLINE HYDROCHLORIDE 0.05 G/100ML
2 SPRAY NASAL AS NEEDED
COMMUNITY
End: 2019-05-17

## 2018-08-01 RX ORDER — SODIUM CHLORIDE 0.9 % (FLUSH) 0.9 %
1-10 SYRINGE (ML) INJECTION AS NEEDED
Status: DISCONTINUED | OUTPATIENT
Start: 2018-08-01 | End: 2018-08-01 | Stop reason: HOSPADM

## 2018-08-01 RX ORDER — MIDAZOLAM HYDROCHLORIDE 1 MG/ML
2 INJECTION INTRAMUSCULAR; INTRAVENOUS
Status: DISCONTINUED | OUTPATIENT
Start: 2018-08-01 | End: 2018-08-01 | Stop reason: HOSPADM

## 2018-08-01 RX ORDER — FAMOTIDINE 10 MG/ML
20 INJECTION, SOLUTION INTRAVENOUS
Status: DISCONTINUED | OUTPATIENT
Start: 2018-08-01 | End: 2018-08-01 | Stop reason: HOSPADM

## 2018-08-01 RX ORDER — SODIUM CHLORIDE, SODIUM LACTATE, POTASSIUM CHLORIDE, CALCIUM CHLORIDE 600; 310; 30; 20 MG/100ML; MG/100ML; MG/100ML; MG/100ML
9 INJECTION, SOLUTION INTRAVENOUS CONTINUOUS PRN
Status: DISCONTINUED | OUTPATIENT
Start: 2018-08-01 | End: 2018-08-01 | Stop reason: HOSPADM

## 2018-08-01 RX ORDER — CEFAZOLIN SODIUM 2 G/100ML
2 INJECTION, SOLUTION INTRAVENOUS ONCE
Status: DISCONTINUED | OUTPATIENT
Start: 2018-08-01 | End: 2018-08-01 | Stop reason: HOSPADM

## 2018-08-01 RX ADMIN — FAMOTIDINE 20 MG: 10 INJECTION, SOLUTION INTRAVENOUS at 08:50

## 2018-08-01 RX ADMIN — MIDAZOLAM 2 MG: 1 INJECTION INTRAMUSCULAR; INTRAVENOUS at 08:50

## 2018-08-01 RX ADMIN — SODIUM CHLORIDE, POTASSIUM CHLORIDE, SODIUM LACTATE AND CALCIUM CHLORIDE 9 ML/HR: 600; 310; 30; 20 INJECTION, SOLUTION INTRAVENOUS at 08:50

## 2018-08-01 NOTE — ANESTHESIA PREPROCEDURE EVALUATION
Anesthesia Evaluation     Patient summary reviewed                Airway   Mallampati: II  No difficulty expected  Dental      Pulmonary    (+) pulmonary embolism,   Cardiovascular     ECG reviewed  Rhythm: regular    (+) hypertension, PVD,       Neuro/Psych  GI/Hepatic/Renal/Endo    (+)  GERD,      Musculoskeletal     Abdominal    Substance History      OB/GYN          Other                        Anesthesia Plan    ASA 3     MAC     Anesthetic plan and risks discussed with patient.  Use of blood products discussed with patient .

## 2018-08-01 NOTE — PERIOPERATIVE NURSING NOTE
Vicky Villarreal RN from OR called and relayed message from Dr. Christophe Mancera that pt to be canceled today due to extended surgery time for the first surgery today.  Pt and spouse instructed to call Dr. Mancera's office to reschedule.

## 2018-08-07 ENCOUNTER — HOSPITAL ENCOUNTER (OUTPATIENT)
Dept: CT IMAGING | Facility: HOSPITAL | Age: 73
Discharge: HOME | End: 2018-08-07
Attending: INTERNAL MEDICINE | Admitting: INTERNAL MEDICINE

## 2018-08-07 DIAGNOSIS — R91.8 PULMONARY INFILTRATE: ICD-10-CM

## 2018-08-07 DIAGNOSIS — R91.1 LUNG NODULE: ICD-10-CM

## 2018-08-07 PROCEDURE — 71250 CT THORAX DX C-: CPT

## 2018-08-10 ENCOUNTER — ANESTHESIA (OUTPATIENT)
Dept: PERIOP | Facility: HOSPITAL | Age: 73
End: 2018-08-10

## 2018-08-10 ENCOUNTER — HOSPITAL ENCOUNTER (OUTPATIENT)
Facility: HOSPITAL | Age: 73
Setting detail: HOSPITAL OUTPATIENT SURGERY
Discharge: HOME | End: 2018-08-10
Attending: SURGERY | Admitting: SURGERY

## 2018-08-10 ENCOUNTER — APPOINTMENT (OUTPATIENT)
Dept: GENERAL RADIOLOGY | Facility: HOSPITAL | Age: 73
End: 2018-08-10

## 2018-08-10 ENCOUNTER — ANESTHESIA EVENT (OUTPATIENT)
Dept: PERIOP | Facility: HOSPITAL | Age: 73
End: 2018-08-10

## 2018-08-10 VITALS
OXYGEN SATURATION: 98 % | RESPIRATION RATE: 16 BRPM | HEART RATE: 66 BPM | SYSTOLIC BLOOD PRESSURE: 163 MMHG | WEIGHT: 195.11 LBS | TEMPERATURE: 98 F | DIASTOLIC BLOOD PRESSURE: 76 MMHG | BODY MASS INDEX: 30.62 KG/M2 | HEIGHT: 67 IN

## 2018-08-10 PROBLEM — I70.212 ATHEROSCLEROSIS OF NATIVE ARTERY OF LEFT LOWER EXTREMITY WITH INTERMITTENT CLAUDICATION (HCC): Status: ACTIVE | Noted: 2018-08-10

## 2018-08-10 PROCEDURE — 25010000002 HEPARIN (PORCINE) PER 1000 UNITS: Performed by: NURSE ANESTHETIST, CERTIFIED REGISTERED

## 2018-08-10 PROCEDURE — 25010000002 MIDAZOLAM PER 1 MG: Performed by: ANESTHESIOLOGY

## 2018-08-10 PROCEDURE — C1894 INTRO/SHEATH, NON-LASER: HCPCS | Performed by: SURGERY

## 2018-08-10 PROCEDURE — 25010000002 DEXAMETHASONE PER 1 MG: Performed by: NURSE ANESTHETIST, CERTIFIED REGISTERED

## 2018-08-10 PROCEDURE — C1874 STENT, COATED/COV W/DEL SYS: HCPCS | Performed by: SURGERY

## 2018-08-10 PROCEDURE — C1760 CLOSURE DEV, VASC: HCPCS | Performed by: SURGERY

## 2018-08-10 PROCEDURE — C1725 CATH, TRANSLUMIN NON-LASER: HCPCS | Performed by: SURGERY

## 2018-08-10 PROCEDURE — 0 IOPAMIDOL PER 1 ML: Performed by: SURGERY

## 2018-08-10 PROCEDURE — 25010000002 HYDROMORPHONE PER 4 MG: Performed by: NURSE ANESTHETIST, CERTIFIED REGISTERED

## 2018-08-10 PROCEDURE — 25010000002 HYDRALAZINE PER 20 MG: Performed by: ANESTHESIOLOGY

## 2018-08-10 PROCEDURE — 25010000002 SUCCINYLCHOLINE PER 20 MG: Performed by: NURSE ANESTHETIST, CERTIFIED REGISTERED

## 2018-08-10 PROCEDURE — 25010000002 FENTANYL CITRATE (PF) 100 MCG/2ML SOLUTION: Performed by: NURSE ANESTHETIST, CERTIFIED REGISTERED

## 2018-08-10 PROCEDURE — 25010000002 ONDANSETRON PER 1 MG: Performed by: NURSE ANESTHETIST, CERTIFIED REGISTERED

## 2018-08-10 PROCEDURE — 25010000002 PROPOFOL 10 MG/ML EMULSION: Performed by: NURSE ANESTHETIST, CERTIFIED REGISTERED

## 2018-08-10 PROCEDURE — 25010000002 HYDRALAZINE PER 20 MG

## 2018-08-10 PROCEDURE — 25010000003 CEFAZOLIN IN DEXTROSE 2-4 GM/100ML-% SOLUTION: Performed by: SURGERY

## 2018-08-10 PROCEDURE — C1769 GUIDE WIRE: HCPCS | Performed by: SURGERY

## 2018-08-10 PROCEDURE — 25010000002 PROTAMINE SULFATE PER 10 MG: Performed by: NURSE ANESTHETIST, CERTIFIED REGISTERED

## 2018-08-10 PROCEDURE — 25010000002 HEPARIN (PORCINE) PER 1000 UNITS: Performed by: SURGERY

## 2018-08-10 DEVICE — STENTGR ENDOPROSTH VIABAHN VBX EXP 7F 8X16X39MM 135CM: Type: IMPLANTABLE DEVICE | Status: FUNCTIONAL

## 2018-08-10 RX ORDER — FENTANYL CITRATE 50 UG/ML
50 INJECTION, SOLUTION INTRAMUSCULAR; INTRAVENOUS
Status: DISCONTINUED | OUTPATIENT
Start: 2018-08-10 | End: 2018-08-10 | Stop reason: HOSPADM

## 2018-08-10 RX ORDER — OXYCODONE HYDROCHLORIDE 5 MG/1
20 TABLET ORAL ONCE AS NEEDED
Status: COMPLETED | OUTPATIENT
Start: 2018-08-10 | End: 2018-08-10

## 2018-08-10 RX ORDER — PROPOFOL 10 MG/ML
VIAL (ML) INTRAVENOUS AS NEEDED
Status: DISCONTINUED | OUTPATIENT
Start: 2018-08-10 | End: 2018-08-10 | Stop reason: SURG

## 2018-08-10 RX ORDER — HYDROMORPHONE HYDROCHLORIDE 1 MG/ML
0.5 INJECTION, SOLUTION INTRAMUSCULAR; INTRAVENOUS; SUBCUTANEOUS
Status: DISCONTINUED | OUTPATIENT
Start: 2018-08-10 | End: 2018-08-10 | Stop reason: HOSPADM

## 2018-08-10 RX ORDER — FAMOTIDINE 10 MG/ML
20 INJECTION, SOLUTION INTRAVENOUS ONCE
Status: COMPLETED | OUTPATIENT
Start: 2018-08-10 | End: 2018-08-10

## 2018-08-10 RX ORDER — LIDOCAINE HYDROCHLORIDE 20 MG/ML
INJECTION, SOLUTION INFILTRATION; PERINEURAL AS NEEDED
Status: DISCONTINUED | OUTPATIENT
Start: 2018-08-10 | End: 2018-08-10 | Stop reason: SURG

## 2018-08-10 RX ORDER — LABETALOL HYDROCHLORIDE 5 MG/ML
5 INJECTION, SOLUTION INTRAVENOUS
Status: DISCONTINUED | OUTPATIENT
Start: 2018-08-10 | End: 2018-08-10 | Stop reason: HOSPADM

## 2018-08-10 RX ORDER — DEXAMETHASONE SODIUM PHOSPHATE 10 MG/ML
INJECTION INTRAMUSCULAR; INTRAVENOUS AS NEEDED
Status: DISCONTINUED | OUTPATIENT
Start: 2018-08-10 | End: 2018-08-10 | Stop reason: SURG

## 2018-08-10 RX ORDER — PROMETHAZINE HYDROCHLORIDE 25 MG/ML
12.5 INJECTION, SOLUTION INTRAMUSCULAR; INTRAVENOUS ONCE AS NEEDED
Status: DISCONTINUED | OUTPATIENT
Start: 2018-08-10 | End: 2018-08-10 | Stop reason: HOSPADM

## 2018-08-10 RX ORDER — SUCCINYLCHOLINE CHLORIDE 20 MG/ML
INJECTION INTRAMUSCULAR; INTRAVENOUS AS NEEDED
Status: DISCONTINUED | OUTPATIENT
Start: 2018-08-10 | End: 2018-08-10 | Stop reason: SURG

## 2018-08-10 RX ORDER — HYDRALAZINE HYDROCHLORIDE 20 MG/ML
5 INJECTION INTRAMUSCULAR; INTRAVENOUS
Status: DISCONTINUED | OUTPATIENT
Start: 2018-08-10 | End: 2018-08-10 | Stop reason: HOSPADM

## 2018-08-10 RX ORDER — NALOXONE HCL 0.4 MG/ML
0.2 VIAL (ML) INJECTION AS NEEDED
Status: DISCONTINUED | OUTPATIENT
Start: 2018-08-10 | End: 2018-08-10 | Stop reason: HOSPADM

## 2018-08-10 RX ORDER — LIDOCAINE HYDROCHLORIDE 10 MG/ML
0.5 INJECTION, SOLUTION EPIDURAL; INFILTRATION; INTRACAUDAL; PERINEURAL ONCE AS NEEDED
Status: DISCONTINUED | OUTPATIENT
Start: 2018-08-10 | End: 2018-08-10 | Stop reason: HOSPADM

## 2018-08-10 RX ORDER — SODIUM CHLORIDE, SODIUM LACTATE, POTASSIUM CHLORIDE, CALCIUM CHLORIDE 600; 310; 30; 20 MG/100ML; MG/100ML; MG/100ML; MG/100ML
9 INJECTION, SOLUTION INTRAVENOUS CONTINUOUS
Status: DISCONTINUED | OUTPATIENT
Start: 2018-08-10 | End: 2018-08-10 | Stop reason: HOSPADM

## 2018-08-10 RX ORDER — PROMETHAZINE HYDROCHLORIDE 25 MG/1
25 SUPPOSITORY RECTAL ONCE AS NEEDED
Status: DISCONTINUED | OUTPATIENT
Start: 2018-08-10 | End: 2018-08-10 | Stop reason: HOSPADM

## 2018-08-10 RX ORDER — MIDAZOLAM HYDROCHLORIDE 1 MG/ML
2 INJECTION INTRAMUSCULAR; INTRAVENOUS
Status: DISCONTINUED | OUTPATIENT
Start: 2018-08-10 | End: 2018-08-10 | Stop reason: HOSPADM

## 2018-08-10 RX ORDER — SODIUM CHLORIDE 0.9 % (FLUSH) 0.9 %
1-10 SYRINGE (ML) INJECTION AS NEEDED
Status: DISCONTINUED | OUTPATIENT
Start: 2018-08-10 | End: 2018-08-10 | Stop reason: HOSPADM

## 2018-08-10 RX ORDER — PROMETHAZINE HYDROCHLORIDE 25 MG/1
25 TABLET ORAL ONCE AS NEEDED
Status: DISCONTINUED | OUTPATIENT
Start: 2018-08-10 | End: 2018-08-10 | Stop reason: HOSPADM

## 2018-08-10 RX ORDER — GLYCOPYRROLATE 0.2 MG/ML
INJECTION INTRAMUSCULAR; INTRAVENOUS AS NEEDED
Status: DISCONTINUED | OUTPATIENT
Start: 2018-08-10 | End: 2018-08-10 | Stop reason: SURG

## 2018-08-10 RX ORDER — DIPHENHYDRAMINE HYDROCHLORIDE 50 MG/ML
12.5 INJECTION INTRAMUSCULAR; INTRAVENOUS
Status: DISCONTINUED | OUTPATIENT
Start: 2018-08-10 | End: 2018-08-10 | Stop reason: HOSPADM

## 2018-08-10 RX ORDER — CLOPIDOGREL BISULFATE 75 MG/1
75 TABLET ORAL DAILY
Qty: 30 TABLET | Refills: 3 | Status: ON HOLD | OUTPATIENT
Start: 2018-08-10 | End: 2019-12-20

## 2018-08-10 RX ORDER — PROTAMINE SULFATE 10 MG/ML
INJECTION, SOLUTION INTRAVENOUS AS NEEDED
Status: DISCONTINUED | OUTPATIENT
Start: 2018-08-10 | End: 2018-08-10 | Stop reason: SURG

## 2018-08-10 RX ORDER — HEPARIN SODIUM 1000 [USP'U]/ML
INJECTION, SOLUTION INTRAVENOUS; SUBCUTANEOUS AS NEEDED
Status: DISCONTINUED | OUTPATIENT
Start: 2018-08-10 | End: 2018-08-10 | Stop reason: SURG

## 2018-08-10 RX ORDER — PROMETHAZINE HYDROCHLORIDE 25 MG/1
12.5 TABLET ORAL ONCE AS NEEDED
Status: DISCONTINUED | OUTPATIENT
Start: 2018-08-10 | End: 2018-08-10 | Stop reason: HOSPADM

## 2018-08-10 RX ORDER — HYDRALAZINE HYDROCHLORIDE 20 MG/ML
INJECTION INTRAMUSCULAR; INTRAVENOUS
Status: COMPLETED
Start: 2018-08-10 | End: 2018-08-10

## 2018-08-10 RX ORDER — HYDROCODONE BITARTRATE AND ACETAMINOPHEN 7.5; 325 MG/1; MG/1
1 TABLET ORAL ONCE AS NEEDED
Status: DISCONTINUED | OUTPATIENT
Start: 2018-08-10 | End: 2018-08-10 | Stop reason: HOSPADM

## 2018-08-10 RX ORDER — HYDROCODONE BITARTRATE AND ACETAMINOPHEN 5; 325 MG/1; MG/1
2 TABLET ORAL EVERY 6 HOURS PRN
Qty: 24 TABLET | Refills: 0 | Status: SHIPPED | OUTPATIENT
Start: 2018-08-10 | End: 2019-01-09

## 2018-08-10 RX ORDER — ONDANSETRON 2 MG/ML
4 INJECTION INTRAMUSCULAR; INTRAVENOUS ONCE AS NEEDED
Status: DISCONTINUED | OUTPATIENT
Start: 2018-08-10 | End: 2018-08-10 | Stop reason: HOSPADM

## 2018-08-10 RX ORDER — CEFAZOLIN SODIUM 2 G/100ML
2 INJECTION, SOLUTION INTRAVENOUS ONCE
Status: COMPLETED | OUTPATIENT
Start: 2018-08-10 | End: 2018-08-10

## 2018-08-10 RX ORDER — FLUMAZENIL 0.1 MG/ML
0.2 INJECTION INTRAVENOUS AS NEEDED
Status: DISCONTINUED | OUTPATIENT
Start: 2018-08-10 | End: 2018-08-10 | Stop reason: HOSPADM

## 2018-08-10 RX ORDER — ROCURONIUM BROMIDE 10 MG/ML
INJECTION, SOLUTION INTRAVENOUS AS NEEDED
Status: DISCONTINUED | OUTPATIENT
Start: 2018-08-10 | End: 2018-08-10 | Stop reason: SURG

## 2018-08-10 RX ORDER — MIDAZOLAM HYDROCHLORIDE 1 MG/ML
1 INJECTION INTRAMUSCULAR; INTRAVENOUS
Status: DISCONTINUED | OUTPATIENT
Start: 2018-08-10 | End: 2018-08-10 | Stop reason: HOSPADM

## 2018-08-10 RX ORDER — FENTANYL CITRATE 50 UG/ML
INJECTION, SOLUTION INTRAMUSCULAR; INTRAVENOUS AS NEEDED
Status: DISCONTINUED | OUTPATIENT
Start: 2018-08-10 | End: 2018-08-10 | Stop reason: SURG

## 2018-08-10 RX ORDER — ONDANSETRON 2 MG/ML
INJECTION INTRAMUSCULAR; INTRAVENOUS AS NEEDED
Status: DISCONTINUED | OUTPATIENT
Start: 2018-08-10 | End: 2018-08-10 | Stop reason: SURG

## 2018-08-10 RX ORDER — EPHEDRINE SULFATE 50 MG/ML
5 INJECTION, SOLUTION INTRAVENOUS ONCE AS NEEDED
Status: DISCONTINUED | OUTPATIENT
Start: 2018-08-10 | End: 2018-08-10 | Stop reason: HOSPADM

## 2018-08-10 RX ADMIN — CEFAZOLIN SODIUM 2 G: 2 INJECTION, SOLUTION INTRAVENOUS at 07:26

## 2018-08-10 RX ADMIN — HYDRALAZINE HYDROCHLORIDE 5 MG: 20 INJECTION INTRAMUSCULAR; INTRAVENOUS at 09:37

## 2018-08-10 RX ADMIN — FENTANYL CITRATE 50 MCG: 50 INJECTION INTRAMUSCULAR; INTRAVENOUS at 07:56

## 2018-08-10 RX ADMIN — SUGAMMADEX 100 MG: 100 INJECTION, SOLUTION INTRAVENOUS at 08:59

## 2018-08-10 RX ADMIN — ROCURONIUM BROMIDE 25 MG: 10 INJECTION INTRAVENOUS at 07:45

## 2018-08-10 RX ADMIN — SUCCINYLCHOLINE CHLORIDE 100 MG: 20 INJECTION, SOLUTION INTRAMUSCULAR; INTRAVENOUS; PARENTERAL at 07:35

## 2018-08-10 RX ADMIN — HYDROMORPHONE HYDROCHLORIDE 0.5 MG: 1 INJECTION, SOLUTION INTRAMUSCULAR; INTRAVENOUS; SUBCUTANEOUS at 09:39

## 2018-08-10 RX ADMIN — HEPARIN SODIUM 5000 UNITS: 1000 INJECTION, SOLUTION INTRAVENOUS; SUBCUTANEOUS at 08:17

## 2018-08-10 RX ADMIN — LIDOCAINE HYDROCHLORIDE 100 MG: 20 INJECTION, SOLUTION INFILTRATION; PERINEURAL at 07:35

## 2018-08-10 RX ADMIN — HYDRALAZINE HYDROCHLORIDE 5 MG: 20 INJECTION INTRAMUSCULAR; INTRAVENOUS at 09:45

## 2018-08-10 RX ADMIN — GLYCOPYRROLATE 0.2 MG: 0.2 INJECTION INTRAMUSCULAR; INTRAVENOUS at 07:33

## 2018-08-10 RX ADMIN — PROPOFOL 150 MG: 10 INJECTION, EMULSION INTRAVENOUS at 07:35

## 2018-08-10 RX ADMIN — GLYCOPYRROLATE 0.2 MG: 0.2 INJECTION INTRAMUSCULAR; INTRAVENOUS at 08:56

## 2018-08-10 RX ADMIN — OXYCODONE HYDROCHLORIDE 20 MG: 5 TABLET ORAL at 09:52

## 2018-08-10 RX ADMIN — PROTAMINE SULFATE 30 MG: 10 INJECTION, SOLUTION INTRAVENOUS at 08:54

## 2018-08-10 RX ADMIN — ROCURONIUM BROMIDE 5 MG: 10 INJECTION INTRAVENOUS at 07:35

## 2018-08-10 RX ADMIN — ONDANSETRON 4 MG: 2 INJECTION INTRAMUSCULAR; INTRAVENOUS at 08:58

## 2018-08-10 RX ADMIN — FENTANYL CITRATE 50 MCG: 50 INJECTION INTRAMUSCULAR; INTRAVENOUS at 09:58

## 2018-08-10 RX ADMIN — HYDROMORPHONE HYDROCHLORIDE 0.5 MG: 1 INJECTION, SOLUTION INTRAMUSCULAR; INTRAVENOUS; SUBCUTANEOUS at 09:28

## 2018-08-10 RX ADMIN — IOPAMIDOL 99.6 ML: 510 INJECTION, SOLUTION INTRAVASCULAR at 07:11

## 2018-08-10 RX ADMIN — FAMOTIDINE 20 MG: 10 INJECTION INTRAVENOUS at 07:15

## 2018-08-10 RX ADMIN — SODIUM CHLORIDE, POTASSIUM CHLORIDE, SODIUM LACTATE AND CALCIUM CHLORIDE 9 ML/HR: 600; 310; 30; 20 INJECTION, SOLUTION INTRAVENOUS at 06:18

## 2018-08-10 RX ADMIN — MIDAZOLAM 2 MG: 1 INJECTION INTRAMUSCULAR; INTRAVENOUS at 07:16

## 2018-08-10 RX ADMIN — DEXAMETHASONE SODIUM PHOSPHATE 6 MG: 10 INJECTION INTRAMUSCULAR; INTRAVENOUS at 08:19

## 2018-08-10 RX ADMIN — FENTANYL CITRATE 50 MCG: 50 INJECTION INTRAMUSCULAR; INTRAVENOUS at 07:33

## 2018-08-10 NOTE — NURSING NOTE
Spoke to Dr ugarte through the OR nurse while he is scrubbed in assisting another MD. I reported the patient's fall from last night that she did not report prior to sx today that has resulted in a 5-6cm hematoma on her left flank. Pt's  is in demanding an xray before discharge. Dr ugarte reported that he would be very happy to come and see the pt and speak to her after he is out of sx if she wants to wait for him.

## 2018-08-10 NOTE — ANESTHESIA PROCEDURE NOTES
Airway  Urgency: elective    Airway not difficult    General Information and Staff    Patient location during procedure: OR  Anesthesiologist: APRIL LEON  CRNA: JONATHAN BAINS    Indications and Patient Condition  Indications for airway management: airway protection    Preoxygenated: yes  MILS maintained throughout  Mask difficulty assessment: 1 - vent by mask    Final Airway Details  Final airway type: endotracheal airway      Successful airway: ETT  Cuffed: yes   Successful intubation technique: direct laryngoscopy  Facilitating devices/methods: cricoid pressure  Endotracheal tube insertion site: oral  Blade: Martin  Blade size: #3  ETT size: 7.0 mm  Cormack-Lehane Classification: grade I - full view of glottis  Placement verified by: chest auscultation and capnometry   Inital cuff pressure (cm H2O): 22  Cuff volume (mL): 7  Measured from: lips  ETT to lips (cm): 22  Number of attempts at approach: 1

## 2018-08-10 NOTE — PERIOPERATIVE NURSING NOTE
Patient asked if could use Afrin nasal spray again in preop RN told patient no but could discuss with anesthesiologist. Patient ignored RN and the pt's  gave her the medication and she self administered anyway. Will alert anesthesia.

## 2018-08-10 NOTE — ANESTHESIA PREPROCEDURE EVALUATION
Anesthesia Evaluation     NPO Solid Status: > 8 hours             Airway   Mallampati: III  TM distance: >3 FB  Neck ROM: full  No difficulty expected  Dental          Pulmonary - normal exam   (+) pulmonary embolism,   Cardiovascular - normal exam    Patient on routine beta blocker    (+) hypertension,       Neuro/Psych  (+) dizziness/light headedness, psychiatric history Depression,     GI/Hepatic/Renal/Endo    (+)  hiatal hernia, GERD,  renal disease CRI,     Musculoskeletal     (+) back pain, chronic pain,   Abdominal    Substance History      OB/GYN          Other   (+) arthritis                     Anesthesia Plan    ASA 3     general     intravenous induction   Anesthetic plan and risks discussed with patient.

## 2018-08-10 NOTE — H&P
Patient Care Team:  Rhys Crowder Jr., MD as PCP - General (Family Medicine)  Reasor, Jaciel Torres MD as PCP - [facility]  Whitney Dudley MD as Consulting Physician (Nephrology)    Chief complaint PAD    Subjective     History of Present Illness  PAD with left leg pain  Review of Systems   No chest pain  Past Medical History:   Diagnosis Date   • AAA (abdominal aortic aneurysm) without rupture (CMS/HCC)    • Anxiety    • Arthritis    • Chest pain     at rest   • Chronic low back pain    • Chronic pain syndrome 3/12/2016   • CKD (chronic kidney disease), stage III    • Claustrophobia 10/26/2015    Resolved   • Depression    • Dizziness    • GERD (gastroesophageal reflux disease)    • GI problem    • Hiatal hernia    • History of migraine headaches    • Hyperlipidemia    • Hypertension    • Lumbar postlaminectomy syndrome 10/26/2015    Resolved   • Palpitations    • Polypharmacy 4/22/2016   • Pulmonary embolism (CMS/HCC) 10/26/2015    January 2013 - Resolved, felt to be secondary to estrogen use   • Submandibular sialoadenitis 10/26/2015    Resolved   • Vitamin B 12 deficiency      Past Surgical History:   Procedure Laterality Date   • BREAST SURGERY      Breast Surgery Reduction Procedure   • HYSTERECTOMY     • LUMBAR FUSION      Lumbar Vertebral Fusion   • SHOULDER ARTHROSCOPY  04/04/2013    Dr. Carrillo/[facility] - Resolved   • TOTAL HIP ARTHROPLASTY REVISION Left    • TOTAL KNEE ARTHROPLASTY Left    • TOTAL SHOULDER ARTHROPLASTY Left      Family History   Problem Relation Age of Onset   • Hypertension Mother    • Lung cancer Mother    • Cancer Mother         lung   • Kidney disease Father    • Other Brother         Coronary Artery Bypass Grafting   • Stroke Brother         Cerebrovascular Accident   • Malig Hyperthermia Neg Hx      Social History   Substance Use Topics   • Smoking status: Former Smoker     Packs/day: 2.50     Years: 15.00     Types: Cigarettes     Quit date: 9/22/2003   • Smokeless  tobacco: Never Used   • Alcohol use 0.6 oz/week     1 Cans of beer per week     Facility-Administered Medications Prior to Admission   Medication Dose Route Frequency Provider Last Rate Last Dose   • cyanocobalamin injection 1,000 mcg  1,000 mcg Intramuscular Q28 Days Rhys Crowder Jr., MD   1,000 mcg at 11/06/17 1154   • cyanocobalamin injection 1,000 mcg  1,000 mcg Intramuscular Q30 Days Rhys Crowder Jr., MD   1,000 mcg at 12/04/17 1319     Prescriptions Prior to Admission   Medication Sig Dispense Refill Last Dose   • aluminum hydroxide-mag carbonate (GAVISCON EXTRA RELIEF) 160-105 MG chewable tablet chewable tablet Chew 2 tablets 2 (Two) Times a Day As Needed.   Past Month at Unknown time   • amLODIPine (NORVASC) 2.5 MG tablet Take 2.5 mg by mouth Daily.   8/10/2018 at 0430   • atorvastatin (LIPITOR) 40 MG tablet Take 40 mg by mouth Every Night.   8/9/2018 at 2200   • carvedilol (COREG) 25 MG tablet Take 12.5 mg by mouth 2 (Two) Times a Day With Meals.   8/10/2018 at 0430   • cetirizine (ZyrTEC) 10 MG tablet Take 10 mg by mouth daily.   8/9/2018 at 0800   • cyclobenzaprine (FLEXERIL) 10 MG tablet Take 10 mg by mouth 3 (Three) Times a Day As Needed.   8/9/2018 at 2200   • famotidine (PEPCID) 20 MG tablet Take 20 mg by mouth 2 (Two) Times a Day.   8/10/2018 at 0430   • LORazepam (ATIVAN) 0.5 MG tablet Take 1 tablet by mouth 3 (Three) Times a Day. 90 tablet 2 8/9/2018 at 2200   • omeprazole (priLOSEC) 40 MG capsule Take 40 mg by mouth Daily.   8/10/2018 at 0430   • ondansetron ODT (ZOFRAN-ODT) 8 MG disintegrating tablet Take 8 mg by mouth Every 8 (Eight) Hours As Needed for Nausea or Vomiting.   8/9/2018 at Unknown time   • oxyCODONE (ROXICODONE) 20 MG tablet Take 20 mg by mouth Every 4 (Four) Hours As Needed.   8/9/2018 at 2200   • oxymetazoline (AFRIN) 0.05 % nasal spray 2 sprays into the nostril(s) as directed by provider As Needed for Congestion.   8/10/2018 at 0635   • polyethylene glycol (MIRALAX) powder Take  17 g by mouth daily as needed. Constipation. Mix in 4oz of water/juice/milk   8/9/2018 at 2200   • sennosides-docusate sodium (SENOKOT-S) 8.6-50 MG tablet TAKE TWO TABLETS BY MOUTH ONCE DAILY (Patient taking differently: Take 2 tablets by mouth 2 (Two) Times a Day.) 180 tablet 1 Past Month at Unknown time   • sucralfate (CARAFATE) 1 g tablet Take 1 tablet by mouth 4 (Four) Times a Day. 120 tablet 5 8/9/2018 at 1300   • traZODone (DESYREL) 150 MG tablet Take 0.5 tablets by mouth Every Night. (Patient taking differently: Take 225 mg by mouth Every Night.) 30 tablet 5 8/9/2018 at 2200   • venlafaxine XR (EFFEXOR-XR) 150 MG 24 hr capsule Take 1 capsule by mouth Daily. 30 capsule 5 8/9/2018 at 0800   • aspirin-acetaminophen-caffeine (EXCEDRIN MIGRAINE) 250-250-65 MG per tablet Take 1 tablet by mouth Every 8 (Eight) Hours As Needed. PT TO STOP PER MD INSTRUCTION   8/7/2018     Allergies:  Morphine    Objective      Vital Signs  Temp:  [97.8 °F (36.6 °C)] 97.8 °F (36.6 °C)  Heart Rate:  [60] 60  Resp:  [20] 20  BP: (177)/(73) 177/73    Physical Exam   Constitutional: She is oriented to person, place, and time. She appears well-developed and well-nourished.   Eyes: Conjunctivae and EOM are normal.   Cardiovascular: Normal rate.    Pulmonary/Chest: Effort normal.   Abdominal: Soft. There is no tenderness.   Neurological: She is alert and oriented to person, place, and time.   Vitals reviewed.      Results Review:   I reviewed the patient's new clinical results.      Assessment/Plan     Active Problems:    * No active hospital problems. *      Assessment & Plan      I discussed the patients findings and my recommendations with patient    Riki Mancera MD  08/10/18  7:05 AM    Time: OR

## 2018-08-10 NOTE — ANESTHESIA POSTPROCEDURE EVALUATION
Patient: Rachana Arguelles    Procedure Summary     Date:  08/10/18 Room / Location:  [facility] OR [address] [place] [address] MAIN OR    Anesthesia Start:  0724 Anesthesia Stop:  0913    Procedure:  BILATERAL ILIAC STENTS (Bilateral Thigh) Diagnosis:  Atherosclerosis of native arteries of extremities with intermittent claudication, left leg    Surgeon:  Riki Mancera MD Provider:  Katia Stevenson MD    Anesthesia Type:  general ASA Status:  3          Anesthesia Type: general  Last vitals  BP   163/76 (08/10/18 1325)   Temp   36.7 °C (98 °F) (08/10/18 1200)   Pulse   66 (08/10/18 1325)   Resp   16 (08/10/18 1325)     SpO2   98 % (08/10/18 1325)     Post Anesthesia Care and Evaluation    Patient location during evaluation: PACU  Patient participation: complete - patient participated  Level of consciousness: awake  Pain score: 2  Pain management: adequate  Airway patency: patent  Anesthetic complications: No anesthetic complications  PONV Status: none  Cardiovascular status: acceptable  Respiratory status: acceptable  Hydration status: acceptable

## 2018-08-10 NOTE — NURSING NOTE
Pt would like to go home rather than wait to see Dr ugarte. I suggested to her to use ice on her back and seek care at an urgent care or with her pcp if she felt she needed to be seen. She agreed with that plan

## 2018-08-10 NOTE — OP NOTE
Operative Note  Date of Admission:  8/10/2018  OR Date: 8/10/2018    Pre-op Diagnosis:  Atherosclerosis of native arteries of extremities with intermittent claudication, left leg [I70.212]    Post-op Diagnosis: Same    Procedure:   1) ultrasound-guided vascular access bilateral common femoral arteries (no charge)  2) bilateral common iliac artery stent (8 x 39 [place] VBX x2)    Surgeon: Riki Mancera MD    Assistant: Anselmo HERNÁNDEZA    Anesthesia: General    Staff:   Circulator: Quita Frye RN  Scrub Person: Robel Dupont; Francois Carrillo  Vascular Radiology Technician: Emilia Menjivar    Estimated Blood Loss: minimal    Specimen: * No orders in the log *    Complications: None    Findings: Minimal residual stenosis    Implants:   Implant Name Type Inv. Item Serial No.  Lot No. LRB No. Used   VIA BAHN VBX STENT Stent     Right 1   STENTGR ENDOPROSTH VIABAHN VBX EXP 7F [id number] 135CM - [id number] Implant STENTGR ENDOPROSTH VIABAHN VBX EXP 7F [id number] 135CM   [employer]   Left 1       Indications:    The patient is an 72 y.o. female seen for evaluation for left lower extremity claudication.  She also has a history of chronic pain is difficult to see how much of this was neurogenic versus vascular in etiology.  CT angiogram showed a heavily calcified severe stenosis at the origin of the left common iliac artery..       Procedure:    The patient was taken to the operating room and placed supine on the operating room table. After the induction of IV sedation and general anesthesia, because of patient's request for discomfort, the groins were prepped and draped with ChloraPrep. A timeout was observed. Femoral heads were marked under fluoroscopy. Ultrasound-guided vascular access was used to access both common femoral arteries under direct vision. Micropuncture technique was used and a 6-[place] sheath was placed on each side. From the right side, a pigtail catheter was introduced  into the suprarenal aorta. An AIF was obtained. Indeed, the patient does have a very severe near occluded right renal artery. We had already decided not to intervene after discussion with the nephrologist prior to today's case. Pull-down views in multiple angles were done to evaluate the anatomy of the origin of the iliacs. The left common iliac artery had an 80% heavily calcified stenosis best seen in an NICHOLE angle in order to get the lesion off spinal hardware. I elected to primarily stent this. I upsized to a 7-[place] sheath bilaterally. We began to place an 8 x 39 [place] VBX stent on the right but the device did not fit well into the 7-Gibraltarian sheath. I then upsized to an 8-[place] sheath bilaterally. We placed the right-sided VBX and positioned it based on magnified views and then tried to place the left. The left-sided stent would not cross the lesion without predilatation because of its severity. I went ahead and angioplastied that with a 5 x 4 balloon and then the stent graft crossed that easily. The stents were deployed sequentially and taken up to nominal pressures. There was a little bit of wasting on the left. I elected not to take it up to burst pressures because of the heavily calcified nature of the lesion and the risk of rupture. Completion angiogram showed about a 20% residual stenosis with a poor mismatch of the distal end of the stent. I had already felt like we would like need to flare this with a 10 balloon based on the patient's CT angiogram and we went ahead and did so by using a 10 x 4 balloon. After we molded the end of the left-sided stent, that looked much better. I did not feel like it was necessary on the right based on the angiogram. The sheaths were removed and Mynx closure devices were used successfully bilaterally. Heparin was reversed with protamine. Patient tolerated procedure well. There were no immediately apparent intraoperative complications, and she had excellent pedal Doppler  signals bilaterally.        Radiographic Findings:  1) Aorta is heavily calcified.  The right renal artery has a high-grade stenosis.  Left renal artery with minimal minimal stenosis.  Distal aorta is narrowed.  Left common iliac artery has an 80% heavily calcified stenosis at its origin.  SMA and MARILYNN are both patent.  External iliac arteries are patent and there is post stenotic dilatation of the left common iliac artery.    2) After angioplasty/stenting, there is minimal residual stenosis on the left estimated to be less than 20%.  Good flow was seen throughout.  No evidence of perforation or extravasation.            Active Hospital Problems    Diagnosis Date Noted   • Atherosclerosis of native artery of left lower extremity with intermittent claudication (CMS/[facility]) [I70.212] 08/10/2018      Resolved Hospital Problems    Diagnosis Date Noted Date Resolved   No resolved problems to display.      Riki Mancera MD     Date: 8/10/2018  Time: 9:02 AM

## 2018-08-10 NOTE — PERIOPERATIVE NURSING NOTE
Updated dr mancera of left groin small drainage and patients lower back pain 8/10, which is consistent with preop back pain. Patient takes 20mg roxicodone q4h at home. Dr Mancera stated ok to give dose in pacu.

## 2018-08-16 ENCOUNTER — TRANSCRIBE ORDERS (OUTPATIENT)
Dept: ADMINISTRATIVE | Facility: HOSPITAL | Age: 73
End: 2018-08-16

## 2018-08-16 DIAGNOSIS — R91.1 LUNG NODULE: Primary | ICD-10-CM

## 2018-08-22 ENCOUNTER — HOSPITAL ENCOUNTER (OUTPATIENT)
Dept: CT IMAGING | Facility: HOSPITAL | Age: 73
Discharge: HOME | End: 2018-08-22
Attending: INTERNAL MEDICINE | Admitting: RADIOLOGY

## 2018-08-22 VITALS
TEMPERATURE: 97.4 F | RESPIRATION RATE: 16 BRPM | OXYGEN SATURATION: 97 % | HEART RATE: 70 BPM | HEIGHT: 66 IN | BODY MASS INDEX: 31.34 KG/M2 | DIASTOLIC BLOOD PRESSURE: 71 MMHG | WEIGHT: 195 LBS | SYSTOLIC BLOOD PRESSURE: 150 MMHG

## 2018-08-22 DIAGNOSIS — R91.1 LUNG NODULE: ICD-10-CM

## 2018-08-22 LAB
INR PPP: 1.1 (ref 0.8–1.2)
PROTHROMBIN TIME: 12.8 SECONDS (ref 12.8–15.2)

## 2018-08-22 PROCEDURE — 88305 TISSUE EXAM BY PATHOLOGIST: CPT | Performed by: INTERNAL MEDICINE

## 2018-08-22 PROCEDURE — 77012 CT SCAN FOR NEEDLE BIOPSY: CPT

## 2018-08-22 PROCEDURE — 85610 PROTHROMBIN TIME: CPT

## 2018-08-22 RX ORDER — OXYCODONE HCL 20 MG/1
TABLET, FILM COATED, EXTENDED RELEASE ORAL
COMMUNITY
End: 2018-10-24

## 2018-08-22 RX ORDER — LIDOCAINE HYDROCHLORIDE 10 MG/ML
20 INJECTION, SOLUTION INFILTRATION; PERINEURAL ONCE
Status: COMPLETED | OUTPATIENT
Start: 2018-08-22 | End: 2018-08-22

## 2018-08-22 RX ORDER — SODIUM CHLORIDE 0.9 % (FLUSH) 0.9 %
1-10 SYRINGE (ML) INJECTION AS NEEDED
Status: DISCONTINUED | OUTPATIENT
Start: 2018-08-22 | End: 2018-08-23 | Stop reason: HOSPADM

## 2018-08-22 RX ADMIN — LIDOCAINE HYDROCHLORIDE 20 ML: 10 INJECTION, SOLUTION INFILTRATION; PERINEURAL at 14:19

## 2018-08-22 NOTE — DISCHARGE INSTRUCTIONS
EDUCATION /DISCHARGE INSTRUCTIONS  CT/US guided biopsy:  A biopsy is a procedure done to remove tissue for further analysis.  Before images are taken to locate the target area.  Images can be obtained using ultrasound, CT or MRI.  A physician will clean your skin with antiseptic soap, place a sterile towel around the site and administer a local anesthetic to numb the area.  The physician will then insert a special needle.  Sometimes images are taken of the needle after it is inserted to ensure the needle is in the correct area to be biopsied.   A sample is obtained and sent to the laboratory for study.  Occasionally the laboratory is unable to make a diagnosis from the sample and the procedure may need to be repeated.  Within a week the radiologist will send a report to your physician.  A pathologist will also examine the tissue and send a report.      Risks of the procedure include but are not limited to:   *  Bleeding    *  Infection   *  Puncture of surrounding organs *  Death     *  Lung collapse if the biopsy is near the chest which may require insertion of a       tube to re-inflate the lung if severe.      Benefits of the procedure:  Using x-ray helps to locate the area that requires a biopsy. The procedure is less invasive than a surgical procedure, there are no large incisions and it does not require anesthesia.      Alternatives to the procedure:  A biopsy can be performed surgically.  Risks of a surgical biopsy include exposure to anesthesia, infection, excessive bleeding and injury to abdominal organs.  A benefit of surgical biopsy is the ability to see the area to be biopsied and remove of a larger piece of tissue.    THIS EDUCATION INFORMATION WAS REVIEWED PRIOR TO PROCEDURE AND CONSENT. Patient initials__________________Time___________________    Post Procedure:    *  Expect the biopsy site may be tender up to one week.    *  Rest today (no pushing pulling or straining).   *  Slowly increase activity  tomorrow.    *  If you received sedation do not drive for 24 hours.   *  Keep dressing clean and dry.   *  Leave dressing on puncture site for 24 hours.    *  You may shower when dressing removed.    Call your doctor if experiencing:   *  Signs of infection such as redness, swelling, excessive pain and / or foul        smelling drainage from the puncture site.   *  Chills or fever over 101 degrees (by mouth).   *  Unrelieved pain.   *  Any new or severe symptoms.   *  If experiencing sudden / severe shortness of breath or chest pain go to the       nearest emergency room.     Following the procedure:     Follow-up with the ordering physician as directed.    Continue to take other medications as directed by your physician unless    otherwise instructed.   If applicable, resume taking your Plavix or Aspirin on Thursday 8/23/2018 after 1 pm       If you have any concerns please call the Radiology Nurses Desk at [phone].  You are the most important factor in your recovery.  Follow the above instructions carefully.

## 2018-08-22 NOTE — NURSING NOTE
Returned from CT. Dressing to right upper lateral trunk near armpit dry and intact. No complaints of chest pain or SOA. No distress noted.Lunch served.

## 2018-08-22 NOTE — NURSING NOTE
D/C instructions discussed and understood by patient and family member. Dressing dry and intact. No SOA. No distress.

## 2018-08-22 NOTE — H&P (VIEW-ONLY)
Patient Care Team:  Rhys Crowder Jr., MD as PCP - General (Family Medicine)  Reasor, Jaciel Torres MD as PCP - [facility]  Whitney Dudley MD as Consulting Physician (Nephrology)    Chief complaint PAD    Subjective     History of Present Illness  PAD with left leg pain  Review of Systems   No chest pain  Past Medical History:   Diagnosis Date   • AAA (abdominal aortic aneurysm) without rupture (CMS/HCC)    • Anxiety    • Arthritis    • Chest pain     at rest   • Chronic low back pain    • Chronic pain syndrome 3/12/2016   • CKD (chronic kidney disease), stage III    • Claustrophobia 10/26/2015    Resolved   • Depression    • Dizziness    • GERD (gastroesophageal reflux disease)    • GI problem    • Hiatal hernia    • History of migraine headaches    • Hyperlipidemia    • Hypertension    • Lumbar postlaminectomy syndrome 10/26/2015    Resolved   • Palpitations    • Polypharmacy 4/22/2016   • Pulmonary embolism (CMS/HCC) 10/26/2015    January 2013 - Resolved, felt to be secondary to estrogen use   • Submandibular sialoadenitis 10/26/2015    Resolved   • Vitamin B 12 deficiency      Past Surgical History:   Procedure Laterality Date   • BREAST SURGERY      Breast Surgery Reduction Procedure   • HYSTERECTOMY     • LUMBAR FUSION      Lumbar Vertebral Fusion   • SHOULDER ARTHROSCOPY  04/04/2013    Dr. Carrillo/[facility] - Resolved   • TOTAL HIP ARTHROPLASTY REVISION Left    • TOTAL KNEE ARTHROPLASTY Left    • TOTAL SHOULDER ARTHROPLASTY Left      Family History   Problem Relation Age of Onset   • Hypertension Mother    • Lung cancer Mother    • Cancer Mother         lung   • Kidney disease Father    • Other Brother         Coronary Artery Bypass Grafting   • Stroke Brother         Cerebrovascular Accident   • Malig Hyperthermia Neg Hx      Social History   Substance Use Topics   • Smoking status: Former Smoker     Packs/day: 2.50     Years: 15.00     Types: Cigarettes     Quit date: 9/22/2003   • Smokeless  tobacco: Never Used   • Alcohol use 0.6 oz/week     1 Cans of beer per week     Facility-Administered Medications Prior to Admission   Medication Dose Route Frequency Provider Last Rate Last Dose   • cyanocobalamin injection 1,000 mcg  1,000 mcg Intramuscular Q28 Days Rhys Crowder Jr., MD   1,000 mcg at 11/06/17 1154   • cyanocobalamin injection 1,000 mcg  1,000 mcg Intramuscular Q30 Days Rhys Crowder Jr., MD   1,000 mcg at 12/04/17 1319     Prescriptions Prior to Admission   Medication Sig Dispense Refill Last Dose   • aluminum hydroxide-mag carbonate (GAVISCON EXTRA RELIEF) 160-105 MG chewable tablet chewable tablet Chew 2 tablets 2 (Two) Times a Day As Needed.   Past Month at Unknown time   • amLODIPine (NORVASC) 2.5 MG tablet Take 2.5 mg by mouth Daily.   8/10/2018 at 0430   • atorvastatin (LIPITOR) 40 MG tablet Take 40 mg by mouth Every Night.   8/9/2018 at 2200   • carvedilol (COREG) 25 MG tablet Take 12.5 mg by mouth 2 (Two) Times a Day With Meals.   8/10/2018 at 0430   • cetirizine (ZyrTEC) 10 MG tablet Take 10 mg by mouth daily.   8/9/2018 at 0800   • cyclobenzaprine (FLEXERIL) 10 MG tablet Take 10 mg by mouth 3 (Three) Times a Day As Needed.   8/9/2018 at 2200   • famotidine (PEPCID) 20 MG tablet Take 20 mg by mouth 2 (Two) Times a Day.   8/10/2018 at 0430   • LORazepam (ATIVAN) 0.5 MG tablet Take 1 tablet by mouth 3 (Three) Times a Day. 90 tablet 2 8/9/2018 at 2200   • omeprazole (priLOSEC) 40 MG capsule Take 40 mg by mouth Daily.   8/10/2018 at 0430   • ondansetron ODT (ZOFRAN-ODT) 8 MG disintegrating tablet Take 8 mg by mouth Every 8 (Eight) Hours As Needed for Nausea or Vomiting.   8/9/2018 at Unknown time   • oxyCODONE (ROXICODONE) 20 MG tablet Take 20 mg by mouth Every 4 (Four) Hours As Needed.   8/9/2018 at 2200   • oxymetazoline (AFRIN) 0.05 % nasal spray 2 sprays into the nostril(s) as directed by provider As Needed for Congestion.   8/10/2018 at 0635   • polyethylene glycol (MIRALAX) powder Take  17 g by mouth daily as needed. Constipation. Mix in 4oz of water/juice/milk   8/9/2018 at 2200   • sennosides-docusate sodium (SENOKOT-S) 8.6-50 MG tablet TAKE TWO TABLETS BY MOUTH ONCE DAILY (Patient taking differently: Take 2 tablets by mouth 2 (Two) Times a Day.) 180 tablet 1 Past Month at Unknown time   • sucralfate (CARAFATE) 1 g tablet Take 1 tablet by mouth 4 (Four) Times a Day. 120 tablet 5 8/9/2018 at 1300   • traZODone (DESYREL) 150 MG tablet Take 0.5 tablets by mouth Every Night. (Patient taking differently: Take 225 mg by mouth Every Night.) 30 tablet 5 8/9/2018 at 2200   • venlafaxine XR (EFFEXOR-XR) 150 MG 24 hr capsule Take 1 capsule by mouth Daily. 30 capsule 5 8/9/2018 at 0800   • aspirin-acetaminophen-caffeine (EXCEDRIN MIGRAINE) 250-250-65 MG per tablet Take 1 tablet by mouth Every 8 (Eight) Hours As Needed. PT TO STOP PER MD INSTRUCTION   8/7/2018     Allergies:  Morphine    Objective      Vital Signs  Temp:  [97.8 °F (36.6 °C)] 97.8 °F (36.6 °C)  Heart Rate:  [60] 60  Resp:  [20] 20  BP: (177)/(73) 177/73    Physical Exam   Constitutional: She is oriented to person, place, and time. She appears well-developed and well-nourished.   Eyes: Conjunctivae and EOM are normal.   Cardiovascular: Normal rate.    Pulmonary/Chest: Effort normal.   Abdominal: Soft. There is no tenderness.   Neurological: She is alert and oriented to person, place, and time.   Vitals reviewed.      Results Review:   I reviewed the patient's new clinical results.      Assessment/Plan     Active Problems:    * No active hospital problems. *      Assessment & Plan      I discussed the patients findings and my recommendations with patient    Riki Mancera MD  08/10/18  7:05 AM    Time: OR

## 2018-08-23 ENCOUNTER — TELEPHONE (OUTPATIENT)
Dept: INTERVENTIONAL RADIOLOGY/VASCULAR | Facility: HOSPITAL | Age: 73
End: 2018-08-23

## 2018-08-23 LAB
CYTO UR: NORMAL
LAB AP CASE REPORT: NORMAL
PATH REPORT.FINAL DX SPEC: NORMAL
PATH REPORT.GROSS SPEC: NORMAL

## 2018-08-27 ENCOUNTER — TELEPHONE (OUTPATIENT)
Dept: INTERNAL MEDICINE | Facility: CLINIC | Age: 73
End: 2018-08-27

## 2018-08-27 RX ORDER — METHYLPREDNISOLONE 4 MG/1
TABLET ORAL
Qty: 21 TABLET | Refills: 0 | Status: SHIPPED | OUTPATIENT
Start: 2018-08-27 | End: 2018-10-23

## 2018-08-29 ENCOUNTER — TRANSCRIBE ORDERS (OUTPATIENT)
Dept: ADMINISTRATIVE | Facility: HOSPITAL | Age: 73
End: 2018-08-29

## 2018-08-29 DIAGNOSIS — R91.1 LUNG NODULE: Primary | ICD-10-CM

## 2018-09-13 RX ORDER — VENLAFAXINE HYDROCHLORIDE 150 MG/1
150 CAPSULE, EXTENDED RELEASE ORAL DAILY
Qty: 30 CAPSULE | Refills: 5 | Status: SHIPPED | OUTPATIENT
Start: 2018-09-13 | End: 2019-07-28 | Stop reason: SDUPTHER

## 2018-10-08 RX ORDER — LORAZEPAM 0.5 MG/1
TABLET ORAL
Qty: 90 TABLET | Refills: 1 | Status: SHIPPED | OUTPATIENT
Start: 2018-10-08 | End: 2018-12-06 | Stop reason: SDUPTHER

## 2018-10-11 RX ORDER — CARVEDILOL 25 MG/1
TABLET ORAL
Qty: 90 TABLET | Refills: 1 | Status: SHIPPED | OUTPATIENT
Start: 2018-10-11 | End: 2019-01-09

## 2018-10-12 RX ORDER — SUCRALFATE 1 G/1
1 TABLET ORAL 4 TIMES DAILY
Qty: 120 TABLET | Refills: 5 | Status: SHIPPED | OUTPATIENT
Start: 2018-10-12 | End: 2018-10-16 | Stop reason: SDUPTHER

## 2018-10-16 RX ORDER — SUCRALFATE 1 G/1
1 TABLET ORAL 4 TIMES DAILY
Qty: 120 TABLET | Refills: 5 | Status: SHIPPED | OUTPATIENT
Start: 2018-10-16 | End: 2019-05-17

## 2018-10-23 ENCOUNTER — OFFICE VISIT (OUTPATIENT)
Dept: INTERNAL MEDICINE | Facility: CLINIC | Age: 73
End: 2018-10-23

## 2018-10-23 ENCOUNTER — HOSPITAL ENCOUNTER (OUTPATIENT)
Dept: GENERAL RADIOLOGY | Facility: HOSPITAL | Age: 73
Discharge: HOME | End: 2018-10-23
Admitting: NURSE PRACTITIONER

## 2018-10-23 VITALS
OXYGEN SATURATION: 99 % | TEMPERATURE: 97.3 F | DIASTOLIC BLOOD PRESSURE: 66 MMHG | HEIGHT: 65 IN | WEIGHT: 214 LBS | RESPIRATION RATE: 16 BRPM | BODY MASS INDEX: 35.65 KG/M2 | SYSTOLIC BLOOD PRESSURE: 160 MMHG | HEART RATE: 74 BPM

## 2018-10-23 DIAGNOSIS — R05.3 CHRONIC COUGH: Primary | ICD-10-CM

## 2018-10-23 DIAGNOSIS — M79.89 LEG SWELLING: ICD-10-CM

## 2018-10-23 PROCEDURE — 99213 OFFICE O/P EST LOW 20 MIN: CPT | Performed by: NURSE PRACTITIONER

## 2018-10-23 PROCEDURE — 71046 X-RAY EXAM CHEST 2 VIEWS: CPT

## 2018-10-23 RX ORDER — METHYLPREDNISOLONE 4 MG/1
TABLET ORAL
Qty: 21 TABLET | Refills: 0 | Status: SHIPPED | OUTPATIENT
Start: 2018-10-23 | End: 2018-12-09

## 2018-10-23 NOTE — PROGRESS NOTES
"Subjective   Rachana Arguelles is a 73 y.o. female.   Chief Complaint   Patient presents with   • Cough     Persistant cough for the past 2 months    • Edema     Legs and ankles       Patient presents for evaluation of cough that has been present for 2 months.  This is a dry cough.  Patient states that she feels there is \"something to cough up\" but she has been unable to cough up any mucus.  She has been taking over-the-counter Mucinex, decongestants, and Zyrtec.  She does have a history significant for benign lung nodules.  On August 22, 2018 she had a lung biopsy which showed benign pulmonary parenchyma.  She does follow up with her pulmonologist on November 21, 2018, and has a follow-up CT of the chest scheduled on November 14, 2018.  This cough is persistent throughout the day, and worsens at night.  She also complains of shortness of breath on exertion.  She has no shortness of breath at rest.  She denies chest pain, palpitations, chest tightness.  She does have a history of hypertension for which she takes Corag 25 mg and Norvasc 2.5 mg.  She denies that she has ever been diagnosed with asthma or COPD.  She does have an appointment scheduled tomorrow with her cardiologist as well.  She also has a history of sleep apnea for which she wears a CPAP nightly since August.  She also endorses daily use of an Anoro inhaler prescribed by pulmonary.    She also presents today with a one-week history of bilateral leg and feet swelling.  She does have a significant history of peripheral artery disease for which she received peripheral vascular stents in August 2018.  She denies any history of DVT to her knowledge.  She states that the swelling in her legs began gradually one week ago and has gradually worsened.  She states that throughout the day her legs become increasingly red and hot to the touch, and she endorses pain to the posterior bilateral calves.  She denies any other symptoms associated with this new " "problem.      Cough   Pertinent negatives include no chest pain, chills, fever, postnasal drip, sore throat, shortness of breath, weight loss or wheezing.        The following portions of the patient's history were reviewed and updated as appropriate: allergies, current medications, past family history, past medical history, past social history, past surgical history and problem list.    Review of Systems   Constitutional: Negative for activity change, chills, fatigue, fever, unexpected weight gain and unexpected weight loss.   HENT: Negative for congestion, hearing loss, postnasal drip, sinus pressure, sneezing, sore throat and tinnitus.    Eyes: Negative for photophobia, pain and visual disturbance.   Respiratory: Positive for cough. Negative for apnea, chest tightness, shortness of breath, wheezing and stridor.    Cardiovascular: Positive for leg swelling. Negative for chest pain and palpitations.   Gastrointestinal: Negative for abdominal distention, abdominal pain, constipation, diarrhea, nausea and vomiting.   Endocrine: Negative for polydipsia, polyphagia and polyuria.   Genitourinary: Negative for dysuria, frequency, hematuria and urgency.   Skin: Positive for dry skin (bilateral legs).   Neurological: Negative for dizziness, weakness, numbness and headache.   Psychiatric/Behavioral: The patient is nervous/anxious.    All other systems reviewed and are negative.      Objective    /66 (BP Location: Left arm, Patient Position: Sitting, Cuff Size: Large Adult)   Pulse 74   Temp 97.3 °F (36.3 °C) (Oral)   Resp 16   Ht 165.1 cm (65\")   Wt 97.1 kg (214 lb)   SpO2 99%   BMI 35.61 kg/m²     Physical Exam   Constitutional: She is oriented to person, place, and time. She appears well-developed.   HENT:   Head: Normocephalic and atraumatic.   Right Ear: External ear normal.   Left Ear: External ear normal.   Nose: Nose normal.   Mouth/Throat: Oropharynx is clear and moist. No oropharyngeal exudate.   Eyes: " Pupils are equal, round, and reactive to light. Conjunctivae and EOM are normal.   Neck: Normal range of motion. Neck supple. No JVD present. No tracheal deviation present. No thyromegaly present.   Cardiovascular: Normal rate, regular rhythm, normal heart sounds and intact distal pulses.  Exam reveals no gallop and no friction rub.    No murmur heard.  Bilateral 2+ edema, nonpitting, 2 lower legs and feet.  No redness, slight heat to bilateral calves   Pulmonary/Chest: Effort normal. No apnea and no tachypnea. No respiratory distress. She has wheezes in the right upper field, the right middle field, the left upper field and the left middle field. She exhibits no tenderness.   Abdominal: Soft. Bowel sounds are normal. She exhibits no distension. There is no tenderness.   Musculoskeletal: Normal range of motion.   Lymphadenopathy:     She has no cervical adenopathy.   Neurological: She is alert and oriented to person, place, and time.   Skin: Capillary refill takes less than 2 seconds.   Psychiatric: She has a normal mood and affect. Her behavior is normal.   Nursing note and vitals reviewed.        Assessment/Plan   Rachana was seen today for cough and edema.    Diagnoses and all orders for this visit:    Chronic cough  -     MethylPREDNISolone (MEDROL, ARMIDA,) 4 MG tablet; Take as directed on package instructions.  -     XR Chest 2 View  -     CBC & Differential  -     Comprehensive metabolic panel    Leg swelling  -     Duplex Venous Lower Extremity - Bilateral CAR  -     CBC & Differential  -     Comprehensive metabolic panel      -Chronic cough: Prescribed Medrol Dosepak.  She has an appointment with cardiology tomorrow, who I suspect may order an echo for her progressive shortness of breath on exertion.  She also has follow-up CT scheduled on November 14, and an appointment with pulmonology on November 21. Continue Anoro inhaler as prescribed.  Ordered chest x-ray to rule out acute process as cause of  cough.    -Bilateral lower leg swelling: As she has a significant history of vascular issues, ordered bilateral venous Dopplers to rule out DVT.  Also ordered CBC and CMP to evaluate electrolyte status and renal status.  She has a follow-up appointment scheduled with nephrology on November 20.    -Educated patient on symptoms for which she should proceed to the ER including acute shortness of breath or chest pain.  Follow-up with cardiology, pulmonology, nephrology as scheduled.  Follow-up if symptoms persist or worsen.

## 2018-10-23 NOTE — PROGRESS NOTES
Please let patient know that her chest x-ray showed no acute abnormality. Ask her to make sure she sees cardiology tomorrow at her previously scheduled appointment, and follows up with her scheduled chest CT and pulmonology appointment. Let us know if she does not improve with completion of the medrol dosepack.

## 2018-10-24 ENCOUNTER — HOSPITAL ENCOUNTER (OUTPATIENT)
Dept: CARDIOLOGY | Facility: HOSPITAL | Age: 73
Discharge: HOME | End: 2018-10-24
Admitting: PHYSICIAN ASSISTANT

## 2018-10-24 ENCOUNTER — OFFICE VISIT (OUTPATIENT)
Dept: CARDIOLOGY | Facility: CLINIC | Age: 73
End: 2018-10-24

## 2018-10-24 VITALS
WEIGHT: 212 LBS | DIASTOLIC BLOOD PRESSURE: 90 MMHG | BODY MASS INDEX: 35.32 KG/M2 | HEIGHT: 65 IN | SYSTOLIC BLOOD PRESSURE: 182 MMHG | HEART RATE: 73 BPM | OXYGEN SATURATION: 98 %

## 2018-10-24 DIAGNOSIS — R60.0 LOWER EXTREMITY EDEMA: ICD-10-CM

## 2018-10-24 DIAGNOSIS — I10 ESSENTIAL HYPERTENSION: ICD-10-CM

## 2018-10-24 DIAGNOSIS — R00.2 PALPITATIONS: Primary | ICD-10-CM

## 2018-10-24 LAB
BH CV LOWER VASCULAR LEFT COMMON FEMORAL AUGMENT: NORMAL
BH CV LOWER VASCULAR LEFT COMMON FEMORAL COMPETENT: NORMAL
BH CV LOWER VASCULAR LEFT COMMON FEMORAL COMPRESS: NORMAL
BH CV LOWER VASCULAR LEFT COMMON FEMORAL PHASIC: NORMAL
BH CV LOWER VASCULAR LEFT COMMON FEMORAL SPONT: NORMAL
BH CV LOWER VASCULAR LEFT DISTAL FEMORAL COMPRESS: NORMAL
BH CV LOWER VASCULAR LEFT GASTRONEMIUS COMPRESS: NORMAL
BH CV LOWER VASCULAR LEFT GREATER SAPH AK COMPRESS: NORMAL
BH CV LOWER VASCULAR LEFT GREATER SAPH BK COMPRESS: NORMAL
BH CV LOWER VASCULAR LEFT MID FEMORAL AUGMENT: NORMAL
BH CV LOWER VASCULAR LEFT MID FEMORAL COMPETENT: NORMAL
BH CV LOWER VASCULAR LEFT MID FEMORAL COMPRESS: NORMAL
BH CV LOWER VASCULAR LEFT MID FEMORAL PHASIC: NORMAL
BH CV LOWER VASCULAR LEFT MID FEMORAL SPONT: NORMAL
BH CV LOWER VASCULAR LEFT PERONEAL COMPRESS: NORMAL
BH CV LOWER VASCULAR LEFT POPLITEAL AUGMENT: NORMAL
BH CV LOWER VASCULAR LEFT POPLITEAL COMPETENT: NORMAL
BH CV LOWER VASCULAR LEFT POPLITEAL COMPRESS: NORMAL
BH CV LOWER VASCULAR LEFT POPLITEAL PHASIC: NORMAL
BH CV LOWER VASCULAR LEFT POPLITEAL SPONT: NORMAL
BH CV LOWER VASCULAR LEFT POSTERIOR TIBIAL COMPRESS: NORMAL
BH CV LOWER VASCULAR LEFT PROXIMAL FEMORAL COMPRESS: NORMAL
BH CV LOWER VASCULAR LEFT SAPHENOFEMORAL JUNCTION AUGMENT: NORMAL
BH CV LOWER VASCULAR LEFT SAPHENOFEMORAL JUNCTION COMPETENT: NORMAL
BH CV LOWER VASCULAR LEFT SAPHENOFEMORAL JUNCTION COMPRESS: NORMAL
BH CV LOWER VASCULAR LEFT SAPHENOFEMORAL JUNCTION PHASIC: NORMAL
BH CV LOWER VASCULAR LEFT SAPHENOFEMORAL JUNCTION SPONT: NORMAL
BH CV LOWER VASCULAR RIGHT COMMON FEMORAL AUGMENT: NORMAL
BH CV LOWER VASCULAR RIGHT COMMON FEMORAL COMPETENT: NORMAL
BH CV LOWER VASCULAR RIGHT COMMON FEMORAL COMPRESS: NORMAL
BH CV LOWER VASCULAR RIGHT COMMON FEMORAL PHASIC: NORMAL
BH CV LOWER VASCULAR RIGHT COMMON FEMORAL SPONT: NORMAL
BH CV LOWER VASCULAR RIGHT DISTAL FEMORAL COMPRESS: NORMAL
BH CV LOWER VASCULAR RIGHT GASTRONEMIUS COMPRESS: NORMAL
BH CV LOWER VASCULAR RIGHT GREATER SAPH AK COMPRESS: NORMAL
BH CV LOWER VASCULAR RIGHT GREATER SAPH BK COMPRESS: NORMAL
BH CV LOWER VASCULAR RIGHT MID FEMORAL AUGMENT: NORMAL
BH CV LOWER VASCULAR RIGHT MID FEMORAL COMPETENT: NORMAL
BH CV LOWER VASCULAR RIGHT MID FEMORAL COMPRESS: NORMAL
BH CV LOWER VASCULAR RIGHT MID FEMORAL PHASIC: NORMAL
BH CV LOWER VASCULAR RIGHT MID FEMORAL SPONT: NORMAL
BH CV LOWER VASCULAR RIGHT PERONEAL COMPRESS: NORMAL
BH CV LOWER VASCULAR RIGHT POPLITEAL AUGMENT: NORMAL
BH CV LOWER VASCULAR RIGHT POPLITEAL COMPETENT: NORMAL
BH CV LOWER VASCULAR RIGHT POPLITEAL COMPRESS: NORMAL
BH CV LOWER VASCULAR RIGHT POPLITEAL PHASIC: NORMAL
BH CV LOWER VASCULAR RIGHT POPLITEAL SPONT: NORMAL
BH CV LOWER VASCULAR RIGHT POSTERIOR TIBIAL COMPRESS: NORMAL
BH CV LOWER VASCULAR RIGHT PROXIMAL FEMORAL COMPRESS: NORMAL
BH CV LOWER VASCULAR RIGHT SAPHENOFEMORAL JUNCTION AUGMENT: NORMAL
BH CV LOWER VASCULAR RIGHT SAPHENOFEMORAL JUNCTION COMPETENT: NORMAL
BH CV LOWER VASCULAR RIGHT SAPHENOFEMORAL JUNCTION COMPRESS: NORMAL
BH CV LOWER VASCULAR RIGHT SAPHENOFEMORAL JUNCTION PHASIC: NORMAL
BH CV LOWER VASCULAR RIGHT SAPHENOFEMORAL JUNCTION SPONT: NORMAL

## 2018-10-24 PROCEDURE — 99214 OFFICE O/P EST MOD 30 MIN: CPT | Performed by: PHYSICIAN ASSISTANT

## 2018-10-24 PROCEDURE — 93970 EXTREMITY STUDY: CPT

## 2018-10-24 PROCEDURE — 93000 ELECTROCARDIOGRAM COMPLETE: CPT | Performed by: PHYSICIAN ASSISTANT

## 2018-10-24 NOTE — PROGRESS NOTES
Date of Office Visit: 10/24/2018  Encounter Provider: JOSE ALBERTO Guerin  Place of Service: [facility] CARDIOLOGY  Patient Name: Rachana Arguelles  :1945    Chief Complaint   Patient presents with   • Hypertension     6 month follow up   • Pulmonary Embolism   :     HPI: Rachana Arguelles is a 73 y.o. female , new to me, who presents today for follow-up.  Old records have been obtained and reviewed by me.  She is a patient who was first evaluated by Dr. Coello in 2017 for chest pain and palpitations.  In 2016 she had a normal echocardiogram and a normal nuclear stress test.  She had an incidental finding of calcium in her aorta and iliac arteries, and was planning on following up with vascular surgery.  Dr. Coello felt that her chest pain did not sound anginal, and in light of her normal nuclear stress test did not recommend any further ischemic evaluation.  He ordered a Holter monitor to evaluate her palpitations.  Her 48 hour Holter monitor was normal.  It looks like she did follow up with Dr. Mancera and ultimately underwent bilateral common iliac artery stent placement in August of this year.   Since she was last in our office she's been doing fairly well from a cardiac standpoint.  She is no longer having any palpitations.  She is no longer having any chest pain.  Every now and then she will have pain in her right jaw.  This is random in its occurrence, it is not associated with exertion.  She had a lung biopsy a couple of months ago, this was benign.  Ever since then she has had a cough.  She also complains of some swelling in her left leg, which I can appreciate on physical exam.  Her left leg is a little tender to palpation.  Overall she seems a little scattered in her thoughts.  Her blood pressure is elevated today, however she does not check it at home.  She states that her nephrologist asked her to increase her amlodipine to 10 mg daily, however she has not  done this yet.  Toward the end of our visit she pulled a bottle of losartan out of her bag and states that her primary care doctor gave her that, however it was prescribed by her nephrologist.  She has not taken that yet either.    Past Medical History:   Diagnosis Date   • AAA (abdominal aortic aneurysm) without rupture (CMS/HCC)    • Anxiety    • Arthritis    • Cancer (CMS/HCC)     skin cancer   • Chest pain     at rest   • Chronic low back pain    • Chronic pain syndrome 3/12/2016   • CKD (chronic kidney disease), stage III (CMS/HCC)    • Claustrophobia 10/26/2015    Resolved   • Depression    • Dizziness    • GERD (gastroesophageal reflux disease)    • GI problem    • Hiatal hernia    • History of migraine headaches    • Hyperlipidemia    • Hypertension    • Lumbar postlaminectomy syndrome 10/26/2015    Resolved   • Palpitations    • Polypharmacy 4/22/2016   • Pulmonary embolism (CMS/HCC) 10/26/2015    January 2013 - Resolved, felt to be secondary to estrogen use   • Submandibular sialoadenitis 10/26/2015    Resolved   • Vitamin B 12 deficiency        Past Surgical History:   Procedure Laterality Date   • ANGIOPLASTY ILIAC ARTERY Bilateral 8/10/2018    Procedure: BILATERAL ILIAC STENTS;  Surgeon: Riki Mancera MD;  Location[facility];  Service: Vascular   • BREAST SURGERY      Breast Surgery Reduction Procedure   • HYSTERECTOMY     • LUMBAR FUSION      Lumbar Vertebral Fusion   • SHOULDER ARTHROSCOPY  04/04/2013    Dr. Carrillo/[facility]AN[facility] - Resolved   • TOTAL HIP ARTHROPLASTY REVISION Left    • TOTAL KNEE ARTHROPLASTY Left    • TOTAL SHOULDER ARTHROPLASTY Left        Social History     Social History   • Marital status:      Spouse name: N/A   • Number of children: N/A   • Years of education: N/A     Occupational History   • Not on file.     Social History Main Topics   • Smoking status: Former Smoker     Packs/day: 2.50     Years: 15.00     Types: Cigarettes     Quit date: 9/22/2003   • Smokeless  tobacco: Never Used   • Alcohol use 0.6 oz/week     1 Cans of beer per week   • Drug use: No   • Sexual activity: Defer     Other Topics Concern   • Not on file     Social History Narrative   • No narrative on file       Family History   Problem Relation Age of Onset   • Hypertension Mother    • Lung cancer Mother    • Cancer Mother         lung   • Kidney disease Father    • Other Brother         Coronary Artery Bypass Grafting   • Stroke Brother         Cerebrovascular Accident   • Malig Hyperthermia Neg Hx        Review of Systems   Constitution: Positive for malaise/fatigue. Negative for chills and fever.   Cardiovascular: Positive for dyspnea on exertion and leg swelling. Negative for chest pain, near-syncope, orthopnea, palpitations, paroxysmal nocturnal dyspnea and syncope.   Respiratory: Negative for cough and shortness of breath.    Musculoskeletal: Negative for joint pain, joint swelling and myalgias.   Gastrointestinal: Negative for abdominal pain, diarrhea, melena, nausea and vomiting.   Genitourinary: Negative for frequency and hematuria.   Neurological: Negative for light-headedness, numbness, paresthesias and seizures.   Allergic/Immunologic: Negative.    All other systems reviewed and are negative.      Allergies   Allergen Reactions   • Morphine Other (See Comments)     HEADACHE         Current Outpatient Prescriptions:   •  aluminum hydroxide-mag carbonate (GAVISCON EXTRA RELIEF) 160-105 MG chewable tablet chewable tablet, Chew 2 tablets 2 (Two) Times a Day As Needed., Disp: , Rfl:   •  amLODIPine (NORVASC) 2.5 MG tablet, Take 5 mg by mouth Daily., Disp: , Rfl:   •  aspirin-acetaminophen-caffeine (EXCEDRIN MIGRAINE) 250-250-65 MG per tablet, Take 1 tablet by mouth Every 8 (Eight) Hours As Needed. PT TO STOP PER MD INSTRUCTION, Disp: , Rfl:   •  atorvastatin (LIPITOR) 40 MG tablet, Take 40 mg by mouth Every Night., Disp: , Rfl:   •  carvedilol (COREG) 25 MG tablet, TAKE 1/2 TAB BY MOUTH TWICE A DAY  WITH MEALS, Disp: 90 tablet, Rfl: 1  •  cetirizine (ZyrTEC) 10 MG tablet, Take 10 mg by mouth daily., Disp: , Rfl:   •  clopidogrel (PLAVIX) 75 MG tablet, Take 1 tablet by mouth Daily., Disp: 30 tablet, Rfl: 3  •  cyclobenzaprine (FLEXERIL) 10 MG tablet, Take 10 mg by mouth 3 (Three) Times a Day As Needed., Disp: , Rfl:   •  famotidine (PEPCID) 20 MG tablet, Take 20 mg by mouth 2 (Two) Times a Day., Disp: , Rfl:   •  HYDROcodone-acetaminophen (NORCO) 5-325 MG per tablet, Take 2 tablets by mouth Every 6 (Six) Hours As Needed for Moderate Pain ., Disp: 24 tablet, Rfl: 0  •  LORazepam (ATIVAN) 0.5 MG tablet, TAKE 1 TABLET BY MOUTH THREE TIMES A DAY, Disp: 90 tablet, Rfl: 1  •  MethylPREDNISolone (MEDROL, ARMIDA,) 4 MG tablet, Take as directed on package instructions., Disp: 21 tablet, Rfl: 0  •  omeprazole (priLOSEC) 40 MG capsule, Take 40 mg by mouth Daily., Disp: , Rfl:   •  ondansetron ODT (ZOFRAN-ODT) 8 MG disintegrating tablet, Take 8 mg by mouth Every 8 (Eight) Hours As Needed for Nausea or Vomiting., Disp: , Rfl:   •  oxyCODONE (ROXICODONE) 20 MG tablet, Take 20 mg by mouth Every 4 (Four) Hours As Needed., Disp: , Rfl:   •  oxymetazoline (AFRIN) 0.05 % nasal spray, 2 sprays into the nostril(s) as directed by provider As Needed for Congestion., Disp: , Rfl:   •  polyethylene glycol (MIRALAX) powder, Take 17 g by mouth daily as needed. Constipation. Mix in 4oz of water/juice/milk, Disp: , Rfl:   •  sennosides-docusate sodium (SENOKOT-S) 8.6-50 MG tablet, TAKE TWO TABLETS BY MOUTH ONCE DAILY (Patient taking differently: Take 2 tablets by mouth 2 (Two) Times a Day.), Disp: 180 tablet, Rfl: 1  •  sucralfate (CARAFATE) 1 g tablet, Take 1 tablet by mouth 4 (Four) Times a Day., Disp: 120 tablet, Rfl: 5  •  traZODone (DESYREL) 150 MG tablet, Take 0.5 tablets by mouth Every Night. (Patient taking differently: Take 225 mg by mouth Every Night.), Disp: 30 tablet, Rfl: 5  •  venlafaxine XR (EFFEXOR-XR) 150 MG 24 hr capsule,  "Take 1 capsule by mouth Daily., Disp: 30 capsule, Rfl: 5      Objective:     Vitals:    10/24/18 1001 10/24/18 1016   BP: 170/86 (!) 182/90   BP Location: Right arm Left arm   Pulse: 73    SpO2: 98%    Weight: 96.2 kg (212 lb)    Height: 165.1 cm (65\")      Body mass index is 35.28 kg/m².    PHYSICAL EXAM:    Physical Exam   Constitutional: She is oriented to person, place, and time. She appears well-developed and well-nourished. No distress.   HENT:   Head: Normocephalic and atraumatic.   Eyes: Pupils are equal, round, and reactive to light.   Neck: No JVD present. No thyromegaly present.   Cardiovascular: Normal rate, regular rhythm, normal heart sounds and intact distal pulses.    No murmur heard.  Swelling left lower extremity up to the mid shin.   Pulmonary/Chest: Effort normal and breath sounds normal. No respiratory distress.   Abdominal: Soft. Bowel sounds are normal. She exhibits no distension. There is no splenomegaly or hepatomegaly. There is no tenderness.   Musculoskeletal: Normal range of motion. She exhibits no edema.   Neurological: She is alert and oriented to person, place, and time.   Skin: Skin is warm and dry. She is not diaphoretic. No erythema.   Psychiatric: She has a normal mood and affect. Her behavior is normal. Judgment normal. Her speech is slurred.         ECG 12 Lead  Date/Time: 10/24/2018 10:27 AM  Performed by: ASHER MARTINEZ  Authorized by: ASHER MARTINEZ   Comparison: compared with previous ECG from 7/26/2018  Similar to previous ECG  Rhythm: sinus rhythm  BPM: 69  Clinical impression: normal ECG  Comments: Indication: Palpitations.              Assessment:       Diagnosis Plan   1. Palpitations  ECG 12 Lead   2. Essential hypertension     3. Lower extremity edema  Duplex Venous Lower Extremity - Left CAR     Orders Placed This Encounter   Procedures   • ECG 12 Lead     This order was created via procedure documentation          Plan:       1.  Palpitations.  These have " resolved.    2.  Hypertension.  Her blood pressure is elevated today.  I did recheck it, and it remained in the 180s systolic.  It looks like her nephrologist is managing her blood pressure medications, she does have stage III kidney disease.  I have a call out to her nephrologist for further recommendations.  She has not yet made the changes that her nephrologist recommended.    3.  Leg swelling.  She does have swelling in her left ankle with some mild erythema and some tenderness.  She is very inactive, I'm going to check a left lower extremity Doppler ultrasound.    I'm not going to make any other changes to her medical regimen, and she will follow-up with Dr. Coello in 1 year or sooner if needed.    As always, it has been a pleasure to participate in your patient's care.      Sincerely,         Skylar Early PA-C

## 2018-11-14 ENCOUNTER — HOSPITAL ENCOUNTER (OUTPATIENT)
Dept: CT IMAGING | Facility: HOSPITAL | Age: 73
Discharge: HOME | End: 2018-11-14
Attending: INTERNAL MEDICINE | Admitting: INTERNAL MEDICINE

## 2018-11-14 DIAGNOSIS — R91.1 LUNG NODULE: ICD-10-CM

## 2018-11-14 PROCEDURE — 71250 CT THORAX DX C-: CPT

## 2018-11-21 ENCOUNTER — TRANSCRIBE ORDERS (OUTPATIENT)
Dept: ADMINISTRATIVE | Facility: HOSPITAL | Age: 73
End: 2018-11-21

## 2018-11-21 DIAGNOSIS — R91.1 LUNG NODULE: Primary | ICD-10-CM

## 2018-11-27 ENCOUNTER — HOSPITAL ENCOUNTER (OUTPATIENT)
Dept: PET IMAGING | Facility: HOSPITAL | Age: 73
Discharge: HOME | End: 2018-11-27
Attending: INTERNAL MEDICINE

## 2018-11-27 ENCOUNTER — HOSPITAL ENCOUNTER (OUTPATIENT)
Dept: PET IMAGING | Facility: HOSPITAL | Age: 73
Discharge: HOME | End: 2018-11-27
Attending: INTERNAL MEDICINE | Admitting: INTERNAL MEDICINE

## 2018-11-27 DIAGNOSIS — R91.1 LUNG NODULE: ICD-10-CM

## 2018-11-27 LAB — GLUCOSE BLDC GLUCOMTR-MCNC: 109 MG/DL (ref 70–130)

## 2018-11-27 PROCEDURE — 0 FLUDEOXYGLUCOSE F18 SOLUTION: Performed by: INTERNAL MEDICINE

## 2018-11-27 PROCEDURE — 78815 PET IMAGE W/CT SKULL-THIGH: CPT

## 2018-11-27 PROCEDURE — 82962 GLUCOSE BLOOD TEST: CPT

## 2018-11-27 PROCEDURE — A9552 F18 FDG: HCPCS | Performed by: INTERNAL MEDICINE

## 2018-11-27 RX ADMIN — FLUDEOXYGLUCOSE F18 1 DOSE: 300 INJECTION INTRAVENOUS at 12:00

## 2018-12-04 RX ORDER — FAMOTIDINE 20 MG/1
20 TABLET, FILM COATED ORAL 2 TIMES DAILY
Qty: 180 TABLET | Refills: 1 | Status: SHIPPED | OUTPATIENT
Start: 2018-12-04 | End: 2019-03-26 | Stop reason: SDUPTHER

## 2018-12-05 ENCOUNTER — OFFICE VISIT (OUTPATIENT)
Dept: OTHER | Facility: HOSPITAL | Age: 73
End: 2018-12-05
Attending: THORACIC SURGERY (CARDIOTHORACIC VASCULAR SURGERY)

## 2018-12-05 VITALS
TEMPERATURE: 97 F | DIASTOLIC BLOOD PRESSURE: 85 MMHG | OXYGEN SATURATION: 97 % | BODY MASS INDEX: 33.95 KG/M2 | RESPIRATION RATE: 18 BRPM | WEIGHT: 203.8 LBS | HEIGHT: 65 IN | SYSTOLIC BLOOD PRESSURE: 186 MMHG | HEART RATE: 73 BPM

## 2018-12-05 DIAGNOSIS — E66.01 MORBIDLY OBESE (HCC): ICD-10-CM

## 2018-12-05 DIAGNOSIS — I27.82 OTHER CHRONIC PULMONARY EMBOLISM WITHOUT ACUTE COR PULMONALE (HCC): ICD-10-CM

## 2018-12-05 DIAGNOSIS — R91.1 LUNG NODULE: Primary | ICD-10-CM

## 2018-12-05 PROCEDURE — 99204 OFFICE O/P NEW MOD 45 MIN: CPT | Performed by: THORACIC SURGERY (CARDIOTHORACIC VASCULAR SURGERY)

## 2018-12-05 PROCEDURE — G0463 HOSPITAL OUTPT CLINIC VISIT: HCPCS | Performed by: THORACIC SURGERY (CARDIOTHORACIC VASCULAR SURGERY)

## 2018-12-05 RX ORDER — ERGOCALCIFEROL 1.25 MG/1
CAPSULE ORAL
Refills: 2 | COMMUNITY
Start: 2018-11-12 | End: 2019-01-09

## 2018-12-05 RX ORDER — AMLODIPINE BESYLATE 10 MG/1
10 TABLET ORAL EVERY MORNING
Refills: 3 | COMMUNITY
Start: 2018-09-26 | End: 2021-05-25

## 2018-12-05 RX ORDER — FUROSEMIDE 40 MG/1
TABLET ORAL
Refills: 3 | COMMUNITY
Start: 2018-11-20 | End: 2018-12-12

## 2018-12-05 RX ORDER — LOSARTAN POTASSIUM 25 MG/1
25 TABLET ORAL NIGHTLY
Refills: 2 | COMMUNITY
Start: 2018-10-19 | End: 2018-12-12

## 2018-12-05 NOTE — PATIENT INSTRUCTIONS
Pt seen by Dr. Figueroa and will be scheduled for a stress test, CT needle bx and PFTs were completed today. Pt given instructions for PFTs and CT needle bx.  Pt instructed to call nurse navigator with any further questions or concerns. Pt given contact cards for Dr. Figueroa and nurse navigator.

## 2018-12-05 NOTE — H&P (VIEW-ONLY)
Subjective   Patient ID: Rachana Arguelles is a 73 y.o. female is being seen for consultation today at the request of Álvaro Gifford MD    History of Present Illness  Dear Colleague,  Rachana Arguelles was seen in our multidisciplinary lung cancer clinic today in consultation for a right upper lobe lung nodule.  Ms. Arguelles is a pleasant 72 yo lady who was found to have a 6mm lung mass in April of 2018.  On followup imaging in August, the mass had increased in size.  A CT guided biopsy was performed and was nondiagnostic.  She underwent a follow up CT chest in November which demonstrated a further increase in size.  Ms. Arguelles has significant complaints of shortness of breath and OSEGUERA.  She is unable to walk up a flight of steps without SOA.     The following portions of the patient's history were reviewed and updated as appropriate: allergies, current medications, past family history, past medical history, past social history, past surgical history and problem list.  Review of Systems   Constitution: Positive for weakness, malaise/fatigue, night sweats and weight gain.   HENT: Positive for congestion, hoarse voice and sore throat.    Eyes: Positive for blurred vision and visual disturbance.   Cardiovascular: Positive for dyspnea on exertion and leg swelling.   Respiratory: Positive for cough, shortness of breath and sputum production.    Endocrine: Positive for heat intolerance.   Hematologic/Lymphatic: Bruises/bleeds easily.   Skin: Positive for dry skin and skin cancer.   Musculoskeletal: Positive for arthritis, back pain, falls, gout, joint pain, joint swelling, muscle cramps, muscle weakness, neck pain and stiffness.   Gastrointestinal: Positive for bloating, abdominal pain, constipation, flatus, heartburn and nausea.   Genitourinary: Positive for bladder incontinence.   Neurological: Positive for difficulty with concentration, disturbances in coordination, excessive daytime sleepiness, focal weakness, headaches, loss of  balance and numbness.   Psychiatric/Behavioral: Positive for depression and memory loss. The patient is nervous/anxious.    Allergic/Immunologic: Negative.    All other systems reviewed and are negative.    Patient Active Problem List   Diagnosis   • Anxiety   • Chronic pain syndrome   • Depression   • Gastroesophageal reflux disease   • Hyperlipidemia   • Hypertension   • Osteoarthritis of hip   • Chronic low back pain   • Failed back syndrome of lumbar spine   • Pulmonary embolus (CMS/HCC)   • Weight loss   • Polypharmacy   • CKD (chronic kidney disease), stage III (CMS/HCC)   • Chronic constipation   • Sacroiliac joint pain   • Mixed incontinence   • Renal cyst   • Achilles tendonitis   • Atherosclerosis of native artery of left lower extremity with intermittent claudication (CMS/HCC)   • Morbidly obese (CMS/HCC)   • Lung nodule     Past Medical History:   Diagnosis Date   • AAA (abdominal aortic aneurysm) without rupture (CMS/HCC)    • Anxiety    • Arthritis    • Cancer (CMS/HCC)     skin cancer   • Chest pain     at rest   • Chronic low back pain    • Chronic pain syndrome 3/12/2016   • CKD (chronic kidney disease), stage III (CMS/HCC)    • Claustrophobia 10/26/2015    Resolved   • Depression    • Dizziness    • GERD (gastroesophageal reflux disease)    • GI problem    • Hiatal hernia    • History of migraine headaches    • Hyperlipidemia    • Hypertension    • Lumbar postlaminectomy syndrome 10/26/2015    Resolved   • Lung nodule    • Palpitations    • Polypharmacy 4/22/2016   • Pulmonary embolism (CMS/HCC) 10/26/2015    January 2013 - Resolved, felt to be secondary to estrogen use   • Submandibular sialoadenitis 10/26/2015    Resolved   • Vitamin B 12 deficiency      Past Surgical History:   Procedure Laterality Date   • BREAST SURGERY      Breast Surgery Reduction Procedure   • HYSTERECTOMY     • LUMBAR FUSION      Lumbar Vertebral Fusion   • SHOULDER ARTHROSCOPY  04/04/2013    Dr. Carrillo/[facility] - Resolved   •  TOTAL HIP ARTHROPLASTY REVISION Left    • TOTAL KNEE ARTHROPLASTY Left    • TOTAL SHOULDER ARTHROPLASTY Left      Family History   Problem Relation Age of Onset   • Hypertension Mother    • Lung cancer Mother    • Cancer Mother         lung   • Kidney disease Father    • Other Brother         Coronary Artery Bypass Grafting   • Stroke Brother         Cerebrovascular Accident   • Malig Hyperthermia Neg Hx      Social History     Socioeconomic History   • Marital status:      Spouse name: Not on file   • Number of children: Not on file   • Years of education: Not on file   • Highest education level: Not on file   Social Needs   • Financial resource strain: Not on file   • Food insecurity - worry: Not on file   • Food insecurity - inability: Not on file   • Transportation needs - medical: Not on file   • Transportation needs - non-medical: Not on file   Occupational History   • Not on file   Tobacco Use   • Smoking status: Former Smoker     Packs/day: 2.50     Years: 15.00     Pack years: 37.50     Types: Cigarettes     Last attempt to quit: 9/22/2003     Years since quitting: 15.2   • Smokeless tobacco: Never Used   Substance and Sexual Activity   • Alcohol use: Yes     Alcohol/week: 0.6 oz     Types: 1 Cans of beer per week   • Drug use: No   • Sexual activity: Defer   Other Topics Concern   • Not on file   Social History Narrative   • Not on file       Current Outpatient Medications:   •  aluminum hydroxide-mag carbonate (GAVISCON EXTRA RELIEF) 160-105 MG chewable tablet chewable tablet, Chew 2 tablets 2 (Two) Times a Day As Needed., Disp: , Rfl:   •  amLODIPine (NORVASC) 10 MG tablet, Take 10 mg by mouth Daily., Disp: , Rfl: 3  •  ANORO ELLIPTA 62.5-25 MCG/INH aerosol powder  inhaler, TAKE 1 PUFF BY MOUTH EVERY DAY, Disp: , Rfl: 5  •  aspirin-acetaminophen-caffeine (EXCEDRIN MIGRAINE) 250-250-65 MG per tablet, Take 1 tablet by mouth Every 8 (Eight) Hours As Needed. PT TO STOP PER MD INSTRUCTION, Disp: , Rfl:    •  atorvastatin (LIPITOR) 40 MG tablet, Take 40 mg by mouth Every Night., Disp: , Rfl:   •  carvedilol (COREG) 25 MG tablet, TAKE 1/2 TAB BY MOUTH TWICE A DAY WITH MEALS, Disp: 90 tablet, Rfl: 1  •  cetirizine (ZyrTEC) 10 MG tablet, Take 10 mg by mouth daily., Disp: , Rfl:   •  clopidogrel (PLAVIX) 75 MG tablet, Take 1 tablet by mouth Daily., Disp: 30 tablet, Rfl: 3  •  cyclobenzaprine (FLEXERIL) 10 MG tablet, Take 10 mg by mouth 3 (Three) Times a Day As Needed., Disp: , Rfl:   •  famotidine (PEPCID) 20 MG tablet, Take 1 tablet by mouth 2 (Two) Times a Day., Disp: 180 tablet, Rfl: 1  •  furosemide (LASIX) 40 MG tablet, TAKE 1 TABLET BY MOUTH EVERY DAY IN THE MORNING, Disp: , Rfl: 3  •  HYDROcodone-acetaminophen (NORCO) 5-325 MG per tablet, Take 2 tablets by mouth Every 6 (Six) Hours As Needed for Moderate Pain ., Disp: 24 tablet, Rfl: 0  •  losartan (COZAAR) 25 MG tablet, Take 25 mg by mouth Daily., Disp: , Rfl: 2  •  omeprazole (priLOSEC) 40 MG capsule, Take 40 mg by mouth Daily., Disp: , Rfl:   •  ondansetron ODT (ZOFRAN-ODT) 8 MG disintegrating tablet, Take 8 mg by mouth Every 8 (Eight) Hours As Needed for Nausea or Vomiting., Disp: , Rfl:   •  oxyCODONE (ROXICODONE) 20 MG tablet, Take 20 mg by mouth Every 4 (Four) Hours As Needed., Disp: , Rfl:   •  oxymetazoline (AFRIN) 0.05 % nasal spray, 2 sprays into the nostril(s) as directed by provider As Needed for Congestion., Disp: , Rfl:   •  polyethylene glycol (MIRALAX) powder, Take 17 g by mouth daily as needed. Constipation. Mix in 4oz of water/juice/milk, Disp: , Rfl:   •  sennosides-docusate sodium (SENOKOT-S) 8.6-50 MG tablet, TAKE TWO TABLETS BY MOUTH ONCE DAILY (Patient taking differently: Take 2 tablets by mouth 2 (Two) Times a Day.), Disp: 180 tablet, Rfl: 1  •  sucralfate (CARAFATE) 1 g tablet, Take 1 tablet by mouth 4 (Four) Times a Day., Disp: 120 tablet, Rfl: 5  •  traZODone (DESYREL) 150 MG tablet, Take 0.5 tablets by mouth Every Night. (Patient  taking differently: Take 225 mg by mouth Every Night.), Disp: 30 tablet, Rfl: 5  •  venlafaxine XR (EFFEXOR-XR) 150 MG 24 hr capsule, Take 1 capsule by mouth Daily., Disp: 30 capsule, Rfl: 5  •  vitamin D (ERGOCALCIFEROL) 68162 units capsule capsule, 1 (ONE) CAPSULE, ORAL, WEEKLY FOR VITAMIN D DEFICIENCY, Disp: , Rfl: 2  •  amLODIPine (NORVASC) 2.5 MG tablet, Take 5 mg by mouth Daily., Disp: , Rfl:   •  LORazepam (ATIVAN) 0.5 MG tablet, TAKE 1 TABLET BY MOUTH THREE TIMES A DAY, Disp: 90 tablet, Rfl: 1  •  MethylPREDNISolone (MEDROL, ARMIDA,) 4 MG tablet, Take as directed on package instructions., Disp: 21 tablet, Rfl: 0  Allergies   Allergen Reactions   • Morphine Other (See Comments)     HEADACHE        Objective   Vitals:    12/05/18 1024   BP: (!) 186/85   Pulse: 73   Resp: 18   Temp: 97 °F (36.1 °C)   SpO2: 97%     Physical Exam   Constitutional: She is oriented to person, place, and time. She appears well-developed and well-nourished.   HENT:   Head: Normocephalic and atraumatic.   Nose: Nose normal.   Mouth/Throat: Oropharynx is clear and moist.   Eyes: Conjunctivae and EOM are normal. Pupils are equal, round, and reactive to light.   Neck: Normal range of motion. Neck supple.   Cardiovascular: Normal rate, regular rhythm, normal heart sounds and intact distal pulses.   Pulmonary/Chest: Effort normal. She has wheezes.   Abdominal: Soft. Bowel sounds are normal.   Musculoskeletal: Normal range of motion. She exhibits edema.   Neurological: She is alert and oriented to person, place, and time.   Skin: Skin is warm and dry. Capillary refill takes less than 2 seconds.   Psychiatric: She has a normal mood and affect. Her behavior is normal. Judgment and thought content normal.   Nursing note and vitals reviewed.    Independent Review of Radiographic Studies:    I have indepently reviewed the images from the CT chest and PET CT scan from 11/14/18 and 11/27/18 respectively.  There is a 1.2cm right upper lobe lesion that  has increased from 6mm in 4/18.  This lesion is hypermetabolic.  There are no enlarged mediastinal lymph nodes.     Assessment/Plan   Ms. Arguelles is a pleasant 72 yo lady with multiple medical problems including a recent iliac stent for PAD, who presents with an enlarging right upper lobe mass that is concerning for bronchogenic carcinoma.  The lesion is located more centrally in the upper lobe and would be difficult for a VATS wedge resection.  She is also high risk for a surgical procedure and will require a CT guided biopsy for diagnosis.  I would also like to risk stratify her with PFTs and a stress test prior to any consideraiton of surgery as well.   I will plan to see her back with a CT guided biopsy, PFTs and stress.     Diagnoses and all orders for this visit:    Lung nodule  -     Full Pulmonary Function Test With Bronchodilator; Future  -     CT Biopsy Lung Mediastinum Right; Future  -     Tissue Pathology Exam; Standing    Other chronic pulmonary embolism without acute cor pulmonale (CMS/HCC)    Morbidly obese (CMS/HCC)    Other orders  -     amLODIPine (NORVASC) 10 MG tablet; Take 10 mg by mouth Daily.  -     vitamin D (ERGOCALCIFEROL) 25992 units capsule capsule; 1 (ONE) CAPSULE, ORAL, WEEKLY FOR VITAMIN D DEFICIENCY  -     furosemide (LASIX) 40 MG tablet; TAKE 1 TABLET BY MOUTH EVERY DAY IN THE MORNING  -     losartan (COZAAR) 25 MG tablet; Take 25 mg by mouth Daily.  -     ANORO ELLIPTA 62.5-25 MCG/INH aerosol powder  inhaler; TAKE 1 PUFF BY MOUTH EVERY DAY

## 2018-12-05 NOTE — PROGRESS NOTES
Subjective   Patient ID: Rachana Arguelles is a 73 y.o. female is being seen for consultation today at the request of Álvaro Gifford MD    History of Present Illness  Dear Colleague,  Rachana Arguelles was seen in our multidisciplinary lung cancer clinic today in consultation for a right upper lobe lung nodule.  Ms. Arguelles is a pleasant 72 yo lady who was found to have a 6mm lung mass in April of 2018.  On followup imaging in August, the mass had increased in size.  A CT guided biopsy was performed and was nondiagnostic.  She underwent a follow up CT chest in November which demonstrated a further increase in size.  Ms. Arguelles has significant complaints of shortness of breath and OSEGUERA.  She is unable to walk up a flight of steps without SOA.     The following portions of the patient's history were reviewed and updated as appropriate: allergies, current medications, past family history, past medical history, past social history, past surgical history and problem list.  Review of Systems   Constitution: Positive for weakness, malaise/fatigue, night sweats and weight gain.   HENT: Positive for congestion, hoarse voice and sore throat.    Eyes: Positive for blurred vision and visual disturbance.   Cardiovascular: Positive for dyspnea on exertion and leg swelling.   Respiratory: Positive for cough, shortness of breath and sputum production.    Endocrine: Positive for heat intolerance.   Hematologic/Lymphatic: Bruises/bleeds easily.   Skin: Positive for dry skin and skin cancer.   Musculoskeletal: Positive for arthritis, back pain, falls, gout, joint pain, joint swelling, muscle cramps, muscle weakness, neck pain and stiffness.   Gastrointestinal: Positive for bloating, abdominal pain, constipation, flatus, heartburn and nausea.   Genitourinary: Positive for bladder incontinence.   Neurological: Positive for difficulty with concentration, disturbances in coordination, excessive daytime sleepiness, focal weakness, headaches, loss of  balance and numbness.   Psychiatric/Behavioral: Positive for depression and memory loss. The patient is nervous/anxious.    Allergic/Immunologic: Negative.    All other systems reviewed and are negative.    Patient Active Problem List   Diagnosis   • Anxiety   • Chronic pain syndrome   • Depression   • Gastroesophageal reflux disease   • Hyperlipidemia   • Hypertension   • Osteoarthritis of hip   • Chronic low back pain   • Failed back syndrome of lumbar spine   • Pulmonary embolus (CMS/HCC)   • Weight loss   • Polypharmacy   • CKD (chronic kidney disease), stage III (CMS/HCC)   • Chronic constipation   • Sacroiliac joint pain   • Mixed incontinence   • Renal cyst   • Achilles tendonitis   • Atherosclerosis of native artery of left lower extremity with intermittent claudication (CMS/HCC)   • Morbidly obese (CMS/HCC)   • Lung nodule     Past Medical History:   Diagnosis Date   • AAA (abdominal aortic aneurysm) without rupture (CMS/HCC)    • Anxiety    • Arthritis    • Cancer (CMS/HCC)     skin cancer   • Chest pain     at rest   • Chronic low back pain    • Chronic pain syndrome 3/12/2016   • CKD (chronic kidney disease), stage III (CMS/HCC)    • Claustrophobia 10/26/2015    Resolved   • Depression    • Dizziness    • GERD (gastroesophageal reflux disease)    • GI problem    • Hiatal hernia    • History of migraine headaches    • Hyperlipidemia    • Hypertension    • Lumbar postlaminectomy syndrome 10/26/2015    Resolved   • Lung nodule    • Palpitations    • Polypharmacy 4/22/2016   • Pulmonary embolism (CMS/HCC) 10/26/2015    January 2013 - Resolved, felt to be secondary to estrogen use   • Submandibular sialoadenitis 10/26/2015    Resolved   • Vitamin B 12 deficiency      Past Surgical History:   Procedure Laterality Date   • BREAST SURGERY      Breast Surgery Reduction Procedure   • HYSTERECTOMY     • LUMBAR FUSION      Lumbar Vertebral Fusion   • SHOULDER ARTHROSCOPY  04/04/2013    Dr. Carrillo/[facility] - Resolved   •  TOTAL HIP ARTHROPLASTY REVISION Left    • TOTAL KNEE ARTHROPLASTY Left    • TOTAL SHOULDER ARTHROPLASTY Left      Family History   Problem Relation Age of Onset   • Hypertension Mother    • Lung cancer Mother    • Cancer Mother         lung   • Kidney disease Father    • Other Brother         Coronary Artery Bypass Grafting   • Stroke Brother         Cerebrovascular Accident   • Malig Hyperthermia Neg Hx      Social History     Socioeconomic History   • Marital status:      Spouse name: Not on file   • Number of children: Not on file   • Years of education: Not on file   • Highest education level: Not on file   Social Needs   • Financial resource strain: Not on file   • Food insecurity - worry: Not on file   • Food insecurity - inability: Not on file   • Transportation needs - medical: Not on file   • Transportation needs - non-medical: Not on file   Occupational History   • Not on file   Tobacco Use   • Smoking status: Former Smoker     Packs/day: 2.50     Years: 15.00     Pack years: 37.50     Types: Cigarettes     Last attempt to quit: 9/22/2003     Years since quitting: 15.2   • Smokeless tobacco: Never Used   Substance and Sexual Activity   • Alcohol use: Yes     Alcohol/week: 0.6 oz     Types: 1 Cans of beer per week   • Drug use: No   • Sexual activity: Defer   Other Topics Concern   • Not on file   Social History Narrative   • Not on file       Current Outpatient Medications:   •  aluminum hydroxide-mag carbonate (GAVISCON EXTRA RELIEF) 160-105 MG chewable tablet chewable tablet, Chew 2 tablets 2 (Two) Times a Day As Needed., Disp: , Rfl:   •  amLODIPine (NORVASC) 10 MG tablet, Take 10 mg by mouth Daily., Disp: , Rfl: 3  •  ANORO ELLIPTA 62.5-25 MCG/INH aerosol powder  inhaler, TAKE 1 PUFF BY MOUTH EVERY DAY, Disp: , Rfl: 5  •  aspirin-acetaminophen-caffeine (EXCEDRIN MIGRAINE) 250-250-65 MG per tablet, Take 1 tablet by mouth Every 8 (Eight) Hours As Needed. PT TO STOP PER MD INSTRUCTION, Disp: , Rfl:    •  atorvastatin (LIPITOR) 40 MG tablet, Take 40 mg by mouth Every Night., Disp: , Rfl:   •  carvedilol (COREG) 25 MG tablet, TAKE 1/2 TAB BY MOUTH TWICE A DAY WITH MEALS, Disp: 90 tablet, Rfl: 1  •  cetirizine (ZyrTEC) 10 MG tablet, Take 10 mg by mouth daily., Disp: , Rfl:   •  clopidogrel (PLAVIX) 75 MG tablet, Take 1 tablet by mouth Daily., Disp: 30 tablet, Rfl: 3  •  cyclobenzaprine (FLEXERIL) 10 MG tablet, Take 10 mg by mouth 3 (Three) Times a Day As Needed., Disp: , Rfl:   •  famotidine (PEPCID) 20 MG tablet, Take 1 tablet by mouth 2 (Two) Times a Day., Disp: 180 tablet, Rfl: 1  •  furosemide (LASIX) 40 MG tablet, TAKE 1 TABLET BY MOUTH EVERY DAY IN THE MORNING, Disp: , Rfl: 3  •  HYDROcodone-acetaminophen (NORCO) 5-325 MG per tablet, Take 2 tablets by mouth Every 6 (Six) Hours As Needed for Moderate Pain ., Disp: 24 tablet, Rfl: 0  •  losartan (COZAAR) 25 MG tablet, Take 25 mg by mouth Daily., Disp: , Rfl: 2  •  omeprazole (priLOSEC) 40 MG capsule, Take 40 mg by mouth Daily., Disp: , Rfl:   •  ondansetron ODT (ZOFRAN-ODT) 8 MG disintegrating tablet, Take 8 mg by mouth Every 8 (Eight) Hours As Needed for Nausea or Vomiting., Disp: , Rfl:   •  oxyCODONE (ROXICODONE) 20 MG tablet, Take 20 mg by mouth Every 4 (Four) Hours As Needed., Disp: , Rfl:   •  oxymetazoline (AFRIN) 0.05 % nasal spray, 2 sprays into the nostril(s) as directed by provider As Needed for Congestion., Disp: , Rfl:   •  polyethylene glycol (MIRALAX) powder, Take 17 g by mouth daily as needed. Constipation. Mix in 4oz of water/juice/milk, Disp: , Rfl:   •  sennosides-docusate sodium (SENOKOT-S) 8.6-50 MG tablet, TAKE TWO TABLETS BY MOUTH ONCE DAILY (Patient taking differently: Take 2 tablets by mouth 2 (Two) Times a Day.), Disp: 180 tablet, Rfl: 1  •  sucralfate (CARAFATE) 1 g tablet, Take 1 tablet by mouth 4 (Four) Times a Day., Disp: 120 tablet, Rfl: 5  •  traZODone (DESYREL) 150 MG tablet, Take 0.5 tablets by mouth Every Night. (Patient  taking differently: Take 225 mg by mouth Every Night.), Disp: 30 tablet, Rfl: 5  •  venlafaxine XR (EFFEXOR-XR) 150 MG 24 hr capsule, Take 1 capsule by mouth Daily., Disp: 30 capsule, Rfl: 5  •  vitamin D (ERGOCALCIFEROL) 98307 units capsule capsule, 1 (ONE) CAPSULE, ORAL, WEEKLY FOR VITAMIN D DEFICIENCY, Disp: , Rfl: 2  •  amLODIPine (NORVASC) 2.5 MG tablet, Take 5 mg by mouth Daily., Disp: , Rfl:   •  LORazepam (ATIVAN) 0.5 MG tablet, TAKE 1 TABLET BY MOUTH THREE TIMES A DAY, Disp: 90 tablet, Rfl: 1  •  MethylPREDNISolone (MEDROL, ARMIDA,) 4 MG tablet, Take as directed on package instructions., Disp: 21 tablet, Rfl: 0  Allergies   Allergen Reactions   • Morphine Other (See Comments)     HEADACHE        Objective   Vitals:    12/05/18 1024   BP: (!) 186/85   Pulse: 73   Resp: 18   Temp: 97 °F (36.1 °C)   SpO2: 97%     Physical Exam   Constitutional: She is oriented to person, place, and time. She appears well-developed and well-nourished.   HENT:   Head: Normocephalic and atraumatic.   Nose: Nose normal.   Mouth/Throat: Oropharynx is clear and moist.   Eyes: Conjunctivae and EOM are normal. Pupils are equal, round, and reactive to light.   Neck: Normal range of motion. Neck supple.   Cardiovascular: Normal rate, regular rhythm, normal heart sounds and intact distal pulses.   Pulmonary/Chest: Effort normal. She has wheezes.   Abdominal: Soft. Bowel sounds are normal.   Musculoskeletal: Normal range of motion. She exhibits edema.   Neurological: She is alert and oriented to person, place, and time.   Skin: Skin is warm and dry. Capillary refill takes less than 2 seconds.   Psychiatric: She has a normal mood and affect. Her behavior is normal. Judgment and thought content normal.   Nursing note and vitals reviewed.    Independent Review of Radiographic Studies:    I have indepently reviewed the images from the CT chest and PET CT scan from 11/14/18 and 11/27/18 respectively.  There is a 1.2cm right upper lobe lesion that  has increased from 6mm in 4/18.  This lesion is hypermetabolic.  There are no enlarged mediastinal lymph nodes.     Assessment/Plan   Ms. Arguelles is a pleasant 74 yo lady with multiple medical problems including a recent iliac stent for PAD, who presents with an enlarging right upper lobe mass that is concerning for bronchogenic carcinoma.  The lesion is located more centrally in the upper lobe and would be difficult for a VATS wedge resection.  She is also high risk for a surgical procedure and will require a CT guided biopsy for diagnosis.  I would also like to risk stratify her with PFTs and a stress test prior to any consideraiton of surgery as well.   I will plan to see her back with a CT guided biopsy, PFTs and stress.     Diagnoses and all orders for this visit:    Lung nodule  -     Full Pulmonary Function Test With Bronchodilator; Future  -     CT Biopsy Lung Mediastinum Right; Future  -     Tissue Pathology Exam; Standing    Other chronic pulmonary embolism without acute cor pulmonale (CMS/HCC)    Morbidly obese (CMS/HCC)    Other orders  -     amLODIPine (NORVASC) 10 MG tablet; Take 10 mg by mouth Daily.  -     vitamin D (ERGOCALCIFEROL) 42371 units capsule capsule; 1 (ONE) CAPSULE, ORAL, WEEKLY FOR VITAMIN D DEFICIENCY  -     furosemide (LASIX) 40 MG tablet; TAKE 1 TABLET BY MOUTH EVERY DAY IN THE MORNING  -     losartan (COZAAR) 25 MG tablet; Take 25 mg by mouth Daily.  -     ANORO ELLIPTA 62.5-25 MCG/INH aerosol powder  inhaler; TAKE 1 PUFF BY MOUTH EVERY DAY

## 2018-12-06 RX ORDER — LORAZEPAM 0.5 MG/1
TABLET ORAL
Qty: 90 TABLET | Refills: 1 | Status: SHIPPED | OUTPATIENT
Start: 2018-12-06 | End: 2019-01-09

## 2018-12-12 ENCOUNTER — OFFICE VISIT (OUTPATIENT)
Dept: CARDIOLOGY | Facility: CLINIC | Age: 73
End: 2018-12-12

## 2018-12-12 VITALS
WEIGHT: 203 LBS | HEART RATE: 70 BPM | OXYGEN SATURATION: 99 % | BODY MASS INDEX: 33.82 KG/M2 | HEIGHT: 65 IN | SYSTOLIC BLOOD PRESSURE: 160 MMHG | DIASTOLIC BLOOD PRESSURE: 88 MMHG

## 2018-12-12 DIAGNOSIS — I73.9 PAD (PERIPHERAL ARTERY DISEASE) (HCC): Primary | ICD-10-CM

## 2018-12-12 PROCEDURE — 99213 OFFICE O/P EST LOW 20 MIN: CPT | Performed by: PHYSICIAN ASSISTANT

## 2018-12-12 PROCEDURE — 93000 ELECTROCARDIOGRAM COMPLETE: CPT | Performed by: PHYSICIAN ASSISTANT

## 2018-12-12 NOTE — PROGRESS NOTES
Date of Office Visit: 2018  Encounter Provider: JOSE ALBERTO Guerin  Place of Service: [facility] CARDIOLOGY  Patient Name: Rachana Arguelles  :1945    Chief Complaint   Patient presents with   • Hypertension     2 month follow up   • Surgical Clearance   :     HPI: Rachana Arguelles is a 73 y.o. female who presents today for follow-up  Old records have been obtained and reviewed by me.  She is a patient who was first evaluated by Dr. Coello in 2017 for chest pain and palpitations.  In 2016 she had a normal echocardiogram and a normal nuclear stress test.  She had an incidental finding of calcium in her aorta and iliac arteries, and was planning on following up with vascular surgery.  Dr. Coello felt that her chest pain did not sound anginal, and in light of her normal nuclear stress test did not recommend any further ischemic evaluation.  He ordered a Holter monitor to evaluate her palpitations.  Her 48 hour Holter monitor was normal.  It looks like she did follow up with Dr. Mancera and ultimately underwent bilateral common iliac artery stent placement in August of this year.   She was last in our office to see me on 10/24/2018.  At that visit she was doing well from a cardiac standpoint.  She had recently had a lung biopsy and had a cough.  She also had some swelling in her left leg and tenderness in her left calf, I checked an ultrasound and this was normal with no evidence of DVT or SVT.  Her blood pressure was elevated, and I recommended that she follow-up with her nephrologist to is managing her blood pressure medications.  My recommendation was for her to follow-up with Dr. Coello in a year.  She is here today for cardiac clearance prior to a lobectomy.   She states that evidently the mass that they biopsied is growing and they want to do a lobectomy to remove the mass altogether.  She has some shortness of breath that is chronic and unchanged.  She still  has swelling in her left leg.  She denies any palpitations, anginal chest pain, dizziness, syncope, orthopnea, or PND.    Past Medical History:   Diagnosis Date   • AAA (abdominal aortic aneurysm) without rupture (CMS/HCC)    • Anxiety    • Arthritis    • Cancer (CMS/HCC)     skin cancer   • Chest pain     at rest   • Chronic low back pain    • Chronic pain syndrome 3/12/2016   • CKD (chronic kidney disease), stage III (CMS/HCC)    • Claustrophobia 10/26/2015    Resolved   • Depression    • Dizziness    • GERD (gastroesophageal reflux disease)    • GI problem    • Hiatal hernia    • History of migraine headaches    • Hyperlipidemia    • Hypertension    • Lumbar postlaminectomy syndrome 10/26/2015    Resolved   • Lung nodule    • Palpitations    • Polypharmacy 4/22/2016   • Pulmonary embolism (CMS/HCC) 10/26/2015    January 2013 - Resolved, felt to be secondary to estrogen use   • Submandibular sialoadenitis 10/26/2015    Resolved   • Vitamin B 12 deficiency        Past Surgical History:   Procedure Laterality Date   • BREAST SURGERY      Breast Surgery Reduction Procedure   • HYSTERECTOMY     • LUMBAR FUSION      Lumbar Vertebral Fusion   • SHOULDER ARTHROSCOPY  04/04/2013    Dr. Carrillo/[facility] - Resolved   • TOTAL HIP ARTHROPLASTY REVISION Left    • TOTAL KNEE ARTHROPLASTY Left    • TOTAL SHOULDER ARTHROPLASTY Left        Social History     Socioeconomic History   • Marital status:      Spouse name: Not on file   • Number of children: Not on file   • Years of education: Not on file   • Highest education level: Not on file   Social Needs   • Financial resource strain: Not on file   • Food insecurity - worry: Not on file   • Food insecurity - inability: Not on file   • Transportation needs - medical: Not on file   • Transportation needs - non-medical: Not on file   Occupational History   • Not on file   Tobacco Use   • Smoking status: Former Smoker     Packs/day: 2.50     Years: 15.00     Pack years: 37.50      Types: Cigarettes     Last attempt to quit: 9/22/2003     Years since quitting: 15.2   • Smokeless tobacco: Never Used   Substance and Sexual Activity   • Alcohol use: Yes     Alcohol/week: 0.6 oz     Types: 1 Cans of beer per week     Comment: occ   • Drug use: No   • Sexual activity: Defer   Other Topics Concern   • Not on file   Social History Narrative   • Not on file       Family History   Problem Relation Age of Onset   • Hypertension Mother    • Lung cancer Mother    • Cancer Mother         lung   • Kidney disease Father    • Other Brother         Coronary Artery Bypass Grafting   • Stroke Brother         Cerebrovascular Accident   • Malig Hyperthermia Neg Hx        Review of Systems   Constitution: Positive for malaise/fatigue. Negative for chills and fever.   Cardiovascular: Positive for dyspnea on exertion and leg swelling. Negative for chest pain, near-syncope, orthopnea, palpitations, paroxysmal nocturnal dyspnea and syncope.   Respiratory: Positive for cough. Negative for shortness of breath.    Musculoskeletal: Negative for joint pain, joint swelling and myalgias.   Gastrointestinal: Negative for abdominal pain, diarrhea, melena, nausea and vomiting.   Genitourinary: Negative for frequency and hematuria.   Neurological: Negative for light-headedness, numbness, paresthesias and seizures.   Allergic/Immunologic: Negative.    All other systems reviewed and are negative.      Allergies   Allergen Reactions   • Morphine Other (See Comments)     HEADACHE         Current Outpatient Medications:   •  aluminum hydroxide-mag carbonate (GAVISCON EXTRA RELIEF) 160-105 MG chewable tablet chewable tablet, Chew 2 tablets 2 (Two) Times a Day As Needed., Disp: , Rfl:   •  amLODIPine (NORVASC) 10 MG tablet, Take 10 mg by mouth Daily., Disp: , Rfl: 3  •  ANORO ELLIPTA 62.5-25 MCG/INH aerosol powder  inhaler, TAKE 1 PUFF BY MOUTH EVERY DAY, Disp: , Rfl: 5  •  aspirin-acetaminophen-caffeine (EXCEDRIN MIGRAINE) 250-250-65  MG per tablet, Take 1 tablet by mouth Every 8 (Eight) Hours As Needed. PT TO STOP PER MD INSTRUCTION, Disp: , Rfl:   •  atorvastatin (LIPITOR) 40 MG tablet, Take 40 mg by mouth Every Night., Disp: , Rfl:   •  carvedilol (COREG) 25 MG tablet, TAKE 1/2 TAB BY MOUTH TWICE A DAY WITH MEALS, Disp: 90 tablet, Rfl: 1  •  cetirizine (ZyrTEC) 10 MG tablet, Take 10 mg by mouth daily., Disp: , Rfl:   •  clopidogrel (PLAVIX) 75 MG tablet, Take 1 tablet by mouth Daily., Disp: 30 tablet, Rfl: 3  •  cyclobenzaprine (FLEXERIL) 10 MG tablet, Take 10 mg by mouth 3 (Three) Times a Day As Needed., Disp: , Rfl:   •  famotidine (PEPCID) 20 MG tablet, Take 1 tablet by mouth 2 (Two) Times a Day., Disp: 180 tablet, Rfl: 1  •  HYDROcodone-acetaminophen (NORCO) 5-325 MG per tablet, Take 2 tablets by mouth Every 6 (Six) Hours As Needed for Moderate Pain ., Disp: 24 tablet, Rfl: 0  •  LORazepam (ATIVAN) 0.5 MG tablet, TAKE 1 TABLET BY MOUTH THREE TIMES A DAY, Disp: 90 tablet, Rfl: 1  •  omeprazole (priLOSEC) 40 MG capsule, Take 40 mg by mouth Daily., Disp: , Rfl:   •  ondansetron ODT (ZOFRAN-ODT) 8 MG disintegrating tablet, Take 8 mg by mouth Every 8 (Eight) Hours As Needed for Nausea or Vomiting., Disp: , Rfl:   •  oxyCODONE (ROXICODONE) 20 MG tablet, Take 20 mg by mouth Every 4 (Four) Hours As Needed., Disp: , Rfl:   •  oxymetazoline (AFRIN) 0.05 % nasal spray, 2 sprays into the nostril(s) as directed by provider As Needed for Congestion., Disp: , Rfl:   •  polyethylene glycol (MIRALAX) powder, Take 17 g by mouth daily as needed. Constipation. Mix in 4oz of water/juice/milk, Disp: , Rfl:   •  sennosides-docusate sodium (SENOKOT-S) 8.6-50 MG tablet, TAKE TWO TABLETS BY MOUTH ONCE DAILY (Patient taking differently: Take 2 tablets by mouth 2 (Two) Times a Day.), Disp: 180 tablet, Rfl: 1  •  sucralfate (CARAFATE) 1 g tablet, Take 1 tablet by mouth 4 (Four) Times a Day., Disp: 120 tablet, Rfl: 5  •  traZODone (DESYREL) 150 MG tablet, Take 0.5  "tablets by mouth Every Night. (Patient taking differently: Take 225 mg by mouth Every Night.), Disp: 30 tablet, Rfl: 5  •  venlafaxine XR (EFFEXOR-XR) 150 MG 24 hr capsule, Take 1 capsule by mouth Daily., Disp: 30 capsule, Rfl: 5  •  vitamin D (ERGOCALCIFEROL) 86364 units capsule capsule, 1 (ONE) CAPSULE, ORAL, WEEKLY FOR VITAMIN D DEFICIENCY, Disp: , Rfl: 2      Objective:     Vitals:    12/12/18 0938 12/12/18 0954   BP: 158/90 160/88   BP Location: Right arm Left arm   Pulse: 70    SpO2: 99%    Weight: 92.1 kg (203 lb)    Height: 165.1 cm (65\")      Body mass index is 33.78 kg/m².    PHYSICAL EXAM:    Physical Exam   Constitutional: She is oriented to person, place, and time. She appears well-developed and well-nourished. No distress.   HENT:   Head: Normocephalic and atraumatic.   Eyes: Pupils are equal, round, and reactive to light.   Neck: No JVD present. No thyromegaly present.   Cardiovascular: Normal rate, regular rhythm, normal heart sounds and intact distal pulses.   No murmur heard.  Pulmonary/Chest: Effort normal and breath sounds normal. No respiratory distress.   Abdominal: Soft. Bowel sounds are normal. She exhibits no distension. There is no splenomegaly or hepatomegaly. There is no tenderness.   Musculoskeletal: Normal range of motion. She exhibits no edema.   Neurological: She is alert and oriented to person, place, and time.   Skin: Skin is warm and dry. She is not diaphoretic. No erythema.   Psychiatric: She has a normal mood and affect. Her behavior is normal. Judgment normal.         ECG 12 Lead  Date/Time: 12/12/2018 10:09 AM  Performed by: Skylar Early PA  Authorized by: Skylar Early PA   Comparison: compared with previous ECG from 10/24/2018  Similar to previous ECG  Rhythm: sinus rhythm  BPM: 71  Clinical impression: normal ECG  Comments: Indication: Peripheral arterial disease.              Assessment:       Diagnosis Plan   1. PAD (peripheral artery disease) (CMS/HCC)  ECG 12 " Lead     Orders Placed This Encounter   Procedures   • ECG 12 Lead     This order was created via procedure documentation          Plan:       This is a patient who has peripheral arterial disease and is followed by vascular surgery.  She is on aspirin and Plavix per their recommendations.  She has had a normal nuclear stress test and a normal echocardiogram in April 2016.  She has no symptoms of angina or heart failure.  She has a normal ECG with no evidence for ischemia or infarct.  At this time, I am not recommending any further cardiac workup prior to her lobectomy.  From a cardiac standpoint she is at an acceptable risk to undergo her surgery.  Her blood pressure is elevated today, this is managed by her PCP and her nephrologist.    She will follow-up with Dr. Coello for her regular appointment next year or sooner if needed.    As always, it has been a pleasure to participate in your patient's care.      Sincerely,         Skylar Early PA-C

## 2018-12-13 RX ORDER — TRAZODONE HYDROCHLORIDE 150 MG/1
75 TABLET ORAL NIGHTLY
Qty: 45 TABLET | Refills: 1 | Status: SHIPPED | OUTPATIENT
Start: 2018-12-13 | End: 2019-08-25 | Stop reason: SDUPTHER

## 2018-12-17 ENCOUNTER — HOSPITAL ENCOUNTER (OUTPATIENT)
Dept: GENERAL RADIOLOGY | Facility: HOSPITAL | Age: 73
Discharge: HOME | End: 2018-12-17
Attending: THORACIC SURGERY (CARDIOTHORACIC VASCULAR SURGERY)

## 2018-12-17 ENCOUNTER — HOSPITAL ENCOUNTER (OUTPATIENT)
Dept: CT IMAGING | Facility: HOSPITAL | Age: 73
Discharge: HOME | End: 2018-12-17
Attending: THORACIC SURGERY (CARDIOTHORACIC VASCULAR SURGERY) | Admitting: RADIOLOGY

## 2018-12-17 VITALS
HEIGHT: 65 IN | DIASTOLIC BLOOD PRESSURE: 60 MMHG | HEART RATE: 67 BPM | OXYGEN SATURATION: 95 % | TEMPERATURE: 97.9 F | RESPIRATION RATE: 18 BRPM | BODY MASS INDEX: 33.32 KG/M2 | SYSTOLIC BLOOD PRESSURE: 131 MMHG | WEIGHT: 200 LBS

## 2018-12-17 DIAGNOSIS — R91.1 LUNG NODULE: ICD-10-CM

## 2018-12-17 LAB
INR PPP: 1 (ref 0.8–1.2)
PROTHROMBIN TIME: 11.8 SECONDS (ref 12.8–15.2)

## 2018-12-17 PROCEDURE — 85610 PROTHROMBIN TIME: CPT

## 2018-12-17 PROCEDURE — 77012 CT SCAN FOR NEEDLE BIOPSY: CPT

## 2018-12-17 PROCEDURE — A9270 NON-COVERED ITEM OR SERVICE: HCPCS | Performed by: RADIOLOGY

## 2018-12-17 PROCEDURE — 63710000001 ALPRAZOLAM 0.5 MG TABLET: Performed by: RADIOLOGY

## 2018-12-17 PROCEDURE — 88342 IMHCHEM/IMCYTCHM 1ST ANTB: CPT | Performed by: THORACIC SURGERY (CARDIOTHORACIC VASCULAR SURGERY)

## 2018-12-17 PROCEDURE — 88360 TUMOR IMMUNOHISTOCHEM/MANUAL: CPT

## 2018-12-17 PROCEDURE — 71045 X-RAY EXAM CHEST 1 VIEW: CPT

## 2018-12-17 PROCEDURE — 63710000001 OXYCODONE-ACETAMINOPHEN 10-325 MG TABLET: Performed by: RADIOLOGY

## 2018-12-17 PROCEDURE — 88341 IMHCHEM/IMCYTCHM EA ADD ANTB: CPT | Performed by: THORACIC SURGERY (CARDIOTHORACIC VASCULAR SURGERY)

## 2018-12-17 PROCEDURE — 88305 TISSUE EXAM BY PATHOLOGIST: CPT | Performed by: THORACIC SURGERY (CARDIOTHORACIC VASCULAR SURGERY)

## 2018-12-17 RX ORDER — SODIUM CHLORIDE 0.9 % (FLUSH) 0.9 %
1-10 SYRINGE (ML) INJECTION AS NEEDED
Status: DISCONTINUED | OUTPATIENT
Start: 2018-12-17 | End: 2018-12-18 | Stop reason: HOSPADM

## 2018-12-17 RX ORDER — SODIUM CHLORIDE 0.9 % (FLUSH) 0.9 %
3 SYRINGE (ML) INJECTION EVERY 12 HOURS SCHEDULED
Status: DISCONTINUED | OUTPATIENT
Start: 2018-12-17 | End: 2018-12-18 | Stop reason: HOSPADM

## 2018-12-17 RX ORDER — ALPRAZOLAM 0.5 MG/1
0.5 TABLET ORAL ONCE
Status: COMPLETED | OUTPATIENT
Start: 2018-12-17 | End: 2018-12-17

## 2018-12-17 RX ORDER — OXYCODONE AND ACETAMINOPHEN 10; 325 MG/1; MG/1
2 TABLET ORAL ONCE
Status: COMPLETED | OUTPATIENT
Start: 2018-12-17 | End: 2018-12-17

## 2018-12-17 RX ORDER — LIDOCAINE HYDROCHLORIDE 10 MG/ML
20 INJECTION, SOLUTION INFILTRATION; PERINEURAL ONCE
Status: COMPLETED | OUTPATIENT
Start: 2018-12-17 | End: 2018-12-17

## 2018-12-17 RX ADMIN — ALPRAZOLAM 0.5 MG: 0.5 TABLET ORAL at 08:27

## 2018-12-17 RX ADMIN — LIDOCAINE HYDROCHLORIDE 20 ML: 10 INJECTION, SOLUTION INFILTRATION; PERINEURAL at 09:33

## 2018-12-17 RX ADMIN — OXYCODONE AND ACETAMINOPHEN 2 TABLET: 10; 325 TABLET ORAL at 10:30

## 2018-12-17 NOTE — NURSING NOTE
"Patient complaining of right shoulder pain.  Apparently, has a \"broken\" shoulder.  Arm was placed above her head during lung biopsy.  Some discomfort in back when she takes a breathe.  Most pain from shoulder.  Given what she normally takes at home. Hydrocodone does not work  "

## 2018-12-17 NOTE — NURSING NOTE
Dressing to outer right side with blood spot.  Coughing.  Scant amount of bright red blood.  No SOA or chest pain.

## 2018-12-17 NOTE — DISCHARGE INSTRUCTIONS
EDUCATION /DISCHARGE INSTRUCTIONS  CT/US guided biopsy:  A biopsy is a procedure done to remove tissue for further analysis.  Before images are taken to locate the target area.  Images can be obtained using ultrasound, CT or MRI.  A physician will clean your skin with antiseptic soap, place a sterile towel around the site and administer a local anesthetic to numb the area.  The physician will then insert a special needle.  Sometimes images are taken of the needle after it is inserted to ensure the needle is in the correct area to be biopsied.   A sample is obtained and sent to the laboratory for study.  Occasionally the laboratory is unable to make a diagnosis from the sample and the procedure may need to be repeated.  Within a week the radiologist will send a report to your physician.  A pathologist will also examine the tissue and send a report.      Risks of the procedure include but are not limited to:   *  Bleeding    *  Infection   *  Puncture of surrounding organs *  Death     *  Lung collapse if the biopsy is near the chest which may require insertion of a       tube to re-inflate the lung if severe.      Benefits of the procedure:  Using x-ray helps to locate the area that requires a biopsy. The procedure is less invasive than a surgical procedure, there are no large incisions and it does not require anesthesia.      Alternatives to the procedure:  A biopsy can be performed surgically.  Risks of a surgical biopsy include exposure to anesthesia, infection, excessive bleeding and injury to abdominal organs.  A benefit of surgical biopsy is the ability to see the area to be biopsied and remove of a larger piece of tissue.    THIS EDUCATION INFORMATION WAS REVIEWED PRIOR TO PROCEDURE AND CONSENT. Patient initials__________________Time_______0810____________    Post Procedure:    *  Expect the biopsy site may be tender up to one week.    *  Rest today (no pushing pulling or straining).   *  Slowly increase  activity tomorrow.    *  If you received sedation do not drive for 24 hours.   *  Keep dressing clean and dry.   *  Leave dressing on puncture site for 24 hours.    *  You may shower when dressing removed.    Call your doctor if experiencing:   *  Signs of infection such as redness, swelling, excessive pain and / or foul        smelling drainage from the puncture site.   *  Chills or fever over 101 degrees (by mouth).   *  Unrelieved pain.   *  Any new or severe symptoms.   *  If experiencing sudden / severe shortness of breath or chest pain go to the       nearest emergency room.     Following the procedure:     Follow-up with the ordering physician as directed.    Continue to take other medications as directed by your physician unless    otherwise instructed.   If applicable, resume taking your blood thinners (Clopidergrel) Plavix and Aspirin _12/18/18 after 1100__________.       If you have any concerns please call the Radiology Nurses Desk at [phone].  You are the most important factor in your recovery.  Follow the above instructions carefully.

## 2018-12-21 LAB
CYTO UR: NORMAL
LAB AP CASE REPORT: NORMAL
LAB AP CLINICAL INFORMATION: NORMAL
LAB AP DIAGNOSIS COMMENT: NORMAL
PATH REPORT.ADDENDUM SPEC: NORMAL
PATH REPORT.FINAL DX SPEC: NORMAL
PATH REPORT.GROSS SPEC: NORMAL

## 2018-12-26 ENCOUNTER — OFFICE VISIT (OUTPATIENT)
Dept: OTHER | Facility: HOSPITAL | Age: 73
End: 2018-12-26
Attending: THORACIC SURGERY (CARDIOTHORACIC VASCULAR SURGERY)

## 2018-12-26 ENCOUNTER — OFFICE VISIT (OUTPATIENT)
Dept: OTHER | Facility: HOSPITAL | Age: 73
End: 2018-12-26
Attending: INTERNAL MEDICINE

## 2018-12-26 ENCOUNTER — PREP FOR SURGERY (OUTPATIENT)
Dept: OTHER | Facility: HOSPITAL | Age: 73
End: 2018-12-26

## 2018-12-26 VITALS
SYSTOLIC BLOOD PRESSURE: 170 MMHG | RESPIRATION RATE: 18 BRPM | HEIGHT: 65 IN | OXYGEN SATURATION: 96 % | DIASTOLIC BLOOD PRESSURE: 90 MMHG | BODY MASS INDEX: 34.01 KG/M2 | TEMPERATURE: 98.1 F | WEIGHT: 204.1 LBS | HEART RATE: 75 BPM

## 2018-12-26 VITALS
BODY MASS INDEX: 34.01 KG/M2 | HEIGHT: 65 IN | DIASTOLIC BLOOD PRESSURE: 90 MMHG | SYSTOLIC BLOOD PRESSURE: 170 MMHG | WEIGHT: 204.1 LBS | HEART RATE: 75 BPM | RESPIRATION RATE: 18 BRPM | TEMPERATURE: 98.1 F | OXYGEN SATURATION: 96 %

## 2018-12-26 DIAGNOSIS — C34.11 MALIGNANT NEOPLASM OF RIGHT UPPER LOBE OF LUNG (HCC): Primary | ICD-10-CM

## 2018-12-26 DIAGNOSIS — C34.90 LUNG CANCER (HCC): Primary | ICD-10-CM

## 2018-12-26 DIAGNOSIS — R79.1 ABNORMAL COAGULATION PROFILE: ICD-10-CM

## 2018-12-26 DIAGNOSIS — C34.11 MALIGNANT NEOPLASM OF UPPER LOBE OF RIGHT LUNG (HCC): Primary | ICD-10-CM

## 2018-12-26 DIAGNOSIS — R06.02 SHORTNESS OF BREATH: ICD-10-CM

## 2018-12-26 PROCEDURE — G0463 HOSPITAL OUTPT CLINIC VISIT: HCPCS | Performed by: INTERNAL MEDICINE

## 2018-12-26 PROCEDURE — G0463 HOSPITAL OUTPT CLINIC VISIT: HCPCS

## 2018-12-26 PROCEDURE — 99215 OFFICE O/P EST HI 40 MIN: CPT | Performed by: THORACIC SURGERY (CARDIOTHORACIC VASCULAR SURGERY)

## 2018-12-26 PROCEDURE — 99205 OFFICE O/P NEW HI 60 MIN: CPT | Performed by: INTERNAL MEDICINE

## 2018-12-26 RX ORDER — HEPARIN SODIUM 5000 [USP'U]/ML
5000 INJECTION, SOLUTION INTRAVENOUS; SUBCUTANEOUS ONCE
Status: CANCELLED | OUTPATIENT
Start: 2019-01-11

## 2018-12-26 RX ORDER — SODIUM CHLORIDE 0.9 % (FLUSH) 0.9 %
3 SYRINGE (ML) INJECTION EVERY 12 HOURS SCHEDULED
Status: CANCELLED | OUTPATIENT
Start: 2019-01-11

## 2018-12-26 RX ORDER — CEFAZOLIN SODIUM 2 G/100ML
2 INJECTION, SOLUTION INTRAVENOUS ONCE
Status: CANCELLED | OUTPATIENT
Start: 2019-01-11 | End: 2018-12-26

## 2018-12-26 RX ORDER — SODIUM CHLORIDE 0.9 % (FLUSH) 0.9 %
3-10 SYRINGE (ML) INJECTION AS NEEDED
Status: CANCELLED | OUTPATIENT
Start: 2019-01-11

## 2018-12-26 NOTE — PROGRESS NOTES
Subjective   Patient ID: Rachana Arguelles is a 73 y.o. female is here today for follow-up.    History of Present Illness  Dear Colleague,  Rachana Arguelles was seen in our multidisciplinary lung cancer clinic today in follow up for a newly diagnosed right upper lobe lung nodule.   Ms. Arguelles is a pleasant 72 yo lady who was found to have a 6mm lung mass in April of 2018.  On followup imaging in August, the mass had increased in size.  A CT guided biopsy was performed and was nondiagnostic.  She underwent a follow up CT chest in November which demonstrated a further increase in size.  Ms. Arguelles has significant complaints of shortness of breath and OSEGUERA.  She is unable to walk up a flight of steps without SOA.     She presents today to discuss her most recent biopsy and PFTs.     The following portions of the patient's history were reviewed and updated as appropriate: allergies, current medications, past family history, past medical history, past social history, past surgical history and problem list.  Review of Systems   Constitution: Positive for weakness, malaise/fatigue and night sweats.   Eyes: Negative.    Cardiovascular: Negative.    Respiratory: Positive for cough, shortness of breath and sputum production.    Endocrine: Negative.    Hematologic/Lymphatic: Bruises/bleeds easily.   Skin: Negative.    Musculoskeletal: Negative.    Gastrointestinal: Negative.    Genitourinary: Negative.    Neurological: Positive for difficulty with concentration and headaches.   Psychiatric/Behavioral: Positive for depression and memory loss. The patient is nervous/anxious.    Allergic/Immunologic: Negative.    All other systems reviewed and are negative.    Patient Active Problem List   Diagnosis   • Anxiety   • Chronic pain syndrome   • Depression   • Gastroesophageal reflux disease   • Hyperlipidemia   • Hypertension   • Osteoarthritis of hip   • Chronic low back pain   • Failed back syndrome of lumbar spine   • Pulmonary embolus  (CMS/HCC)   • Weight loss   • Polypharmacy   • CKD (chronic kidney disease), stage III (CMS/HCC)   • Chronic constipation   • Sacroiliac joint pain   • Mixed incontinence   • Renal cyst   • Achilles tendonitis   • Atherosclerosis of native artery of left lower extremity with intermittent claudication (CMS/HCC)   • Morbidly obese (CMS/HCC)   • Lung nodule   • Malignant neoplasm of right upper lobe of lung (CMS/HCC)     Past Medical History:   Diagnosis Date   • AAA (abdominal aortic aneurysm) without rupture (CMS/HCC)    • Anxiety    • Arthritis    • Cancer (CMS/HCC)     skin cancer   • Chest pain     at rest   • Chronic low back pain    • Chronic pain syndrome 3/12/2016   • CKD (chronic kidney disease), stage III (CMS/HCC)    • Claustrophobia 10/26/2015    Resolved   • Depression    • Dizziness    • GERD (gastroesophageal reflux disease)    • GI problem    • Hiatal hernia    • History of migraine headaches    • Hyperlipidemia    • Hypertension    • Lumbar postlaminectomy syndrome 10/26/2015    Resolved   • Lung cancer (CMS/HCC)    • Lung nodule    • Palpitations    • Polypharmacy 4/22/2016   • Pulmonary embolism (CMS/HCC) 10/26/2015    January 2013 - Resolved, felt to be secondary to estrogen use   • Submandibular sialoadenitis 10/26/2015    Resolved   • Vitamin B 12 deficiency      Past Surgical History:   Procedure Laterality Date   • ANGIOPLASTY ILIAC ARTERY Bilateral 8/10/2018    Procedure: BILATERAL ILIAC STENTS;  Surgeon: Riki Mancera MD;  Location: [facility];  Service: Vascular   • BREAST SURGERY      Breast Surgery Reduction Procedure   • HYSTERECTOMY     • LUMBAR FUSION      Lumbar Vertebral Fusion   • SHOULDER ARTHROSCOPY  04/04/2013    Dr. Carrillo/MERRILL - Resolved   • TOTAL HIP ARTHROPLASTY REVISION Left    • TOTAL KNEE ARTHROPLASTY Left    • TOTAL SHOULDER ARTHROPLASTY Left      Family History   Problem Relation Age of Onset   • Hypertension Mother    • Lung cancer Mother    • Cancer Mother          lung   • Kidney disease Father    • Other Brother         Coronary Artery Bypass Grafting   • Stroke Brother         Cerebrovascular Accident   • Malig Hyperthermia Neg Hx      Social History     Socioeconomic History   • Marital status:      Spouse name: Not on file   • Number of children: Not on file   • Years of education: Not on file   • Highest education level: Not on file   Social Needs   • Financial resource strain: Not on file   • Food insecurity - worry: Not on file   • Food insecurity - inability: Not on file   • Transportation needs - medical: Not on file   • Transportation needs - non-medical: Not on file   Occupational History   • Not on file   Tobacco Use   • Smoking status: Former Smoker     Packs/day: 2.50     Years: 15.00     Pack years: 37.50     Types: Cigarettes     Last attempt to quit: 9/22/2003     Years since quitting: 15.2   • Smokeless tobacco: Never Used   Substance and Sexual Activity   • Alcohol use: Yes     Alcohol/week: 0.6 oz     Types: 1 Cans of beer per week     Comment: occ   • Drug use: No   • Sexual activity: Defer   Other Topics Concern   • Not on file   Social History Narrative   • Not on file       Current Outpatient Medications:   •  aluminum hydroxide-mag carbonate (GAVISCON EXTRA RELIEF) 160-105 MG chewable tablet chewable tablet, Chew 2 tablets 2 (Two) Times a Day As Needed., Disp: , Rfl:   •  amLODIPine (NORVASC) 10 MG tablet, Take 10 mg by mouth Daily., Disp: , Rfl: 3  •  ANORO ELLIPTA 62.5-25 MCG/INH aerosol powder  inhaler, TAKE 1 PUFF BY MOUTH EVERY DAY, Disp: , Rfl: 5  •  aspirin-acetaminophen-caffeine (EXCEDRIN MIGRAINE) 250-250-65 MG per tablet, Take 1 tablet by mouth Every 8 (Eight) Hours As Needed. PT TO STOP PER MD INSTRUCTION, Disp: , Rfl:   •  atorvastatin (LIPITOR) 40 MG tablet, Take 40 mg by mouth Every Night., Disp: , Rfl:   •  carvedilol (COREG) 25 MG tablet, TAKE 1/2 TAB BY MOUTH TWICE A DAY WITH MEALS, Disp: 90 tablet, Rfl: 1  •  cetirizine  (ZyrTEC) 10 MG tablet, Take 10 mg by mouth daily., Disp: , Rfl:   •  clopidogrel (PLAVIX) 75 MG tablet, Take 1 tablet by mouth Daily., Disp: 30 tablet, Rfl: 3  •  cyclobenzaprine (FLEXERIL) 10 MG tablet, Take 10 mg by mouth 3 (Three) Times a Day As Needed., Disp: , Rfl:   •  famotidine (PEPCID) 20 MG tablet, Take 1 tablet by mouth 2 (Two) Times a Day., Disp: 180 tablet, Rfl: 1  •  HYDROcodone-acetaminophen (NORCO) 5-325 MG per tablet, Take 2 tablets by mouth Every 6 (Six) Hours As Needed for Moderate Pain ., Disp: 24 tablet, Rfl: 0  •  LORazepam (ATIVAN) 0.5 MG tablet, TAKE 1 TABLET BY MOUTH THREE TIMES A DAY, Disp: 90 tablet, Rfl: 1  •  omeprazole (priLOSEC) 40 MG capsule, Take 40 mg by mouth Daily., Disp: , Rfl:   •  ondansetron ODT (ZOFRAN-ODT) 8 MG disintegrating tablet, Take 8 mg by mouth Every 8 (Eight) Hours As Needed for Nausea or Vomiting., Disp: , Rfl:   •  oxyCODONE (ROXICODONE) 20 MG tablet, Take 20 mg by mouth Every 4 (Four) Hours As Needed., Disp: , Rfl:   •  oxymetazoline (AFRIN) 0.05 % nasal spray, 2 sprays into the nostril(s) as directed by provider As Needed for Congestion., Disp: , Rfl:   •  polyethylene glycol (MIRALAX) powder, Take 17 g by mouth daily as needed. Constipation. Mix in 4oz of water/juice/milk, Disp: , Rfl:   •  sennosides-docusate sodium (SENOKOT-S) 8.6-50 MG tablet, TAKE TWO TABLETS BY MOUTH ONCE DAILY (Patient taking differently: Take 2 tablets by mouth 2 (Two) Times a Day.), Disp: 180 tablet, Rfl: 1  •  sucralfate (CARAFATE) 1 g tablet, Take 1 tablet by mouth 4 (Four) Times a Day., Disp: 120 tablet, Rfl: 5  •  traZODone (DESYREL) 150 MG tablet, Take 0.5 tablets by mouth Every Night., Disp: 45 tablet, Rfl: 1  •  venlafaxine XR (EFFEXOR-XR) 150 MG 24 hr capsule, Take 1 capsule by mouth Daily., Disp: 30 capsule, Rfl: 5  •  vitamin D (ERGOCALCIFEROL) 56793 units capsule capsule, 1 (ONE) CAPSULE, ORAL, WEEKLY FOR VITAMIN D DEFICIENCY, Disp: , Rfl: 2  Allergies   Allergen Reactions    • Morphine Other (See Comments)     HEADACHE        Objective   Vitals:    12/26/18 0957   BP: 170/90   Pulse: 75   Resp: 18   Temp: 98.1 °F (36.7 °C)   SpO2: 96%     Physical Exam   Constitutional: She is oriented to person, place, and time. She appears well-developed and well-nourished.   HENT:   Head: Normocephalic and atraumatic.   Nose: Nose normal.   Mouth/Throat: Oropharynx is clear and moist.   Eyes: Conjunctivae and EOM are normal. Pupils are equal, round, and reactive to light.   Neck: Normal range of motion. Neck supple.   Cardiovascular: Normal rate, regular rhythm, normal heart sounds and intact distal pulses.   Pulmonary/Chest: Effort normal and breath sounds normal.   Abdominal: Soft. Bowel sounds are normal.   Musculoskeletal: Normal range of motion.   Neurological: She is alert and oriented to person, place, and time.   Skin: Skin is warm and dry. Capillary refill takes less than 2 seconds.   Psychiatric: She has a normal mood and affect. Her behavior is normal. Judgment and thought content normal.   Nursing note and vitals reviewed.    Independent Review of Radiographic Studies:    I have indepently reviewed the images from the CT chest and PET CT scan from 11/14/18 and 11/27/18 respectively.  There is a 1.2cm right upper lobe lesion that has increased from 6mm in 4/18.  This lesion is hypermetabolic.  There are no enlarged mediastinal lymph nodes.     CT guided biopsy is consistent with Squamous cell lung cancer.      PFTs: FEV1: 2.05 or 90% predicted, DLCO 19.05 or 74% predicted    Assessment/Plan      Ms. Arguelles is a very pleasant 72 yo lady with a clinical stage I NSCLC.  We discussed possible treatment options at length this am, which include surgical resection vs. SBRT for this small right upper lobe lesion.  Ms. Arguelles is moderately high risk for lung resection surgery given her history of CKD, PAD and CAD, but her PFTs would support a resection.  I would like to have a stress echo prior to  proceeding with resection given her significant vascular disease and shortness of breath.  She would like to proceed to VATS lobectomy as the primary treatment of her lung cancer.  Risks and benefits of this procedure were discussed with the patient today including pneumonia, prolonged air leak and atrial fibrillaiton.        Diagnoses and all orders for this visit:    Malignant neoplasm of upper lobe of right lung (CMS/HCC)  -     Adult Stress Echo W/ Cont or Stress Agent if Necessary Per Protocol; Future    Shortness of breath  -     Adult Stress Echo W/ Cont or Stress Agent if Necessary Per Protocol; Future

## 2018-12-26 NOTE — PATIENT INSTRUCTIONS
"Pt seen by Dr. Figueroa and will be scheduled for a stress test and a right upper lobectomy. Pt given VATS brochure along with \"What to Expect\" VATS/Thoracotomy  handout with discharge instructions. Pt instructed to call nurse navigator with any questions or concerns. Pt given contact cards for Dr. Figueroa and nurse navigator.    "

## 2018-12-26 NOTE — PROGRESS NOTES
[facility] MULTIDISCIPLINARY THORACIC CLINIC MEDICAL ONCOLOGY NOTE    REFERRING PROVIDER:    Álvaro Gifford MD  [address] [facility] [address] [address]    REASON FOR CONSULTATION:  Clinical stage 1A2 (T1b, N0) squamous cell carcinoma of the right upper lobe.       HISTORY OF PRESENT ILLNESS:  Rachana Arguelles is a 73 y.o. female who is referred today for newly diagnosed right upper lobe squamous cell carcinoma.      She has seen Dr. Figueroa in the thoracic clinic and now returns today to follow up biopsy and imaging results.    She has a history of peripheral arterial disease s/p bilateral common iliac stenting on 8/10/2018 by Dr. Christophe Mancera.       She was initially seen by Dr. Figueroa on 12/7/2018 for an enlarging right upper lobe nodule.  This measured 6 mm in April 2018 and there was an increase in August.  A CT guided biopsy was non-diagnostic.  A PET scan on 11/27/2018 showed a 1.2 cm right upper lobe pulmonary nodule with no evidence for lymphadenopathy.  A biopsy performed on 12/17/2018 showed moderately differentiated squamous cell carcinoma.  PD-L1 60%.  She had repeat PFTs done but has not yet had a stress test.       She complains of some dyspnea on exertion.  She has a chronic cough.  No hemoptysis.  No chest pain.  She reports some leg edema worse in the left leg.                  Past Medical History:   Diagnosis Date   • AAA (abdominal aortic aneurysm) without rupture (CMS/HCC)    • Anxiety    • Arthritis    • Cancer (CMS/HCC)     skin cancer   • Chest pain     at rest   • Chronic low back pain    • Chronic pain syndrome 3/12/2016   • CKD (chronic kidney disease), stage III (CMS/HCC)    • Claustrophobia 10/26/2015    Resolved   • Depression    • Dizziness    • GERD (gastroesophageal reflux disease)    • GI problem    • Hiatal hernia    • History of migraine headaches    • Hyperlipidemia    • Hypertension    • Lumbar postlaminectomy syndrome 10/26/2015    Resolved   • Lung cancer  (CMS/[facility])    • Lung nodule    • Palpitations    • Polypharmacy 4/22/2016   • Pulmonary embolism (CMS/HCC) 10/26/2015    January 2013 - Resolved, felt to be secondary to estrogen use   • Submandibular sialoadenitis 10/26/2015    Resolved   • Vitamin B 12 deficiency        Past Surgical History:   Procedure Laterality Date   • ANGIOPLASTY ILIAC ARTERY Bilateral 8/10/2018    Procedure: BILATERAL ILIAC STENTS;  Surgeon: Riki Mancera MD;  Location: [facility];  Service: Vascular   • BREAST SURGERY      Breast Surgery Reduction Procedure   • HYSTERECTOMY     • LUMBAR FUSION      Lumbar Vertebral Fusion   • SHOULDER ARTHROSCOPY  04/04/2013    Dr. Carrillo/[facility] - Resolved   • TOTAL HIP ARTHROPLASTY REVISION Left    • TOTAL KNEE ARTHROPLASTY Left    • TOTAL SHOULDER ARTHROPLASTY Left        SOCIAL HISTORY:   reports that she quit smoking about 15 years ago. Her smoking use included cigarettes. She has a 37.50 pack-year smoking history. she has never used smokeless tobacco. She reports that she drinks about 0.6 oz of alcohol per week. She reports that she does not use drugs.    FAMILY HISTORY:  family history includes Cancer in her mother; Hypertension in her mother; Kidney disease in her father; Lung cancer in her mother; Other in her brother; Stroke in her brother.    ALLERGIES:  Allergies   Allergen Reactions   • Morphine Other (See Comments)     HEADACHE       MEDICATIONS:  The medication list has been reviewed with the patient by the medical assistant, and the list has been updated in the electronic medical record, which I reviewed.  Medication dosages and frequencies were confirmed to be accurate.    Review of Systems   Review of Systems   Constitutional: Negative for appetite change, chills, fatigue, fever and unexpected weight change.   HENT: Negative for congestion, dental problem, facial swelling, hearing loss, mouth sores, nosebleeds, rhinorrhea, sore throat, tinnitus, trouble swallowing and voice  "change.    Eyes: Negative.    Respiratory: Positive for cough and shortness of breath. Negative for chest tightness, wheezing and stridor.    Cardiovascular: Positive for leg swelling. Negative for chest pain and palpitations.   Gastrointestinal: Negative for abdominal distention, abdominal pain, blood in stool, constipation, diarrhea, nausea and vomiting.   Endocrine: Negative.    Genitourinary: Negative for difficulty urinating, dysuria, flank pain, frequency, hematuria, menstrual problem, pelvic pain, vaginal bleeding and vaginal discharge.   Musculoskeletal: Positive for joint swelling. Negative for arthralgias, back pain, myalgias, neck pain and neck stiffness.   Skin: Negative for color change, rash and wound.   Allergic/Immunologic: Negative for environmental allergies.   Neurological: Negative for dizziness, seizures, syncope, weakness, numbness and headaches.   Hematological: Negative for adenopathy. Does not bruise/bleed easily.   Psychiatric/Behavioral: Negative for agitation, confusion and sleep disturbance. The patient is not nervous/anxious.    All other systems reviewed and are negative.      Vitals:    12/26/18 0958   BP: 170/90   Pulse: 75   Resp: 18   Temp: 98.1 °F (36.7 °C)   TempSrc: Oral   SpO2: 96%  Comment: room air   Weight: 92.6 kg (204 lb 1.6 oz)   Height: 165.1 cm (65\")       Physical Exam   Constitutional: She is oriented to person, place, and time. She appears well-developed and well-nourished.   HENT:   Head: Normocephalic and atraumatic.   Nose: Nose normal.   Eyes: Conjunctivae and EOM are normal. Pupils are equal, round, and reactive to light.   Neck: Neck supple.   Cardiovascular: Normal rate, regular rhythm, S1 normal, S2 normal and normal heart sounds. Exam reveals no gallop and no friction rub.   No murmur heard.  Pulmonary/Chest: Effort normal and breath sounds normal. No stridor. No respiratory distress. She has no wheezes. She has no rhonchi. She has no rales. She exhibits no " tenderness.   Abdominal: Soft. Bowel sounds are normal. She exhibits no distension and no mass. There is no tenderness. There is no rebound and no guarding.   Musculoskeletal: Normal range of motion. She exhibits edema (1+ LLE).   Lymphadenopathy:     She has no cervical adenopathy.     She has no axillary adenopathy.        Right: No inguinal and no supraclavicular adenopathy present.        Left: No inguinal and no supraclavicular adenopathy present.   Neurological: She is alert and oriented to person, place, and time. No cranial nerve deficit or sensory deficit.   Skin: Skin is warm and dry. No rash noted. No erythema.   Psychiatric: Judgment and thought content normal.   anxious   Vitals reviewed.      DIAGNOSTIC DATA:    Results for orders placed or performed during the hospital encounter of 12/17/18   POC Protime / INR   Result Value Ref Range    Protime 11.8 (L) 12.8 - 15.2 seconds    INR 1.0 0.8 - 1.2   Tissue Pathology Exam   Result Value Ref Range    Addendum       Please see below the completely scanned PD-L1 Immunohistochemistry Analysis report from [facility].      Case Report       Surgical Pathology Report                         Case: [id number]                                  Authorizing Provider:  Quita Figueroa MD        Collected:           12/17/2018 09:55 AM          Ordering Location:     [facility]  Received:            12/17/2018 10:38 AM                                 CT                                                                           Pathologist:           Dino Garvey MD                                                         Specimen:    Lung, Right Upper Lobe, Right upper lobe lung bx                                           Clinical Information       Right upper lobe pet positive nodule      Final Diagnosis       1. Right Upper Lobe Lung Nodule, Biopsy:   A. Moderately differentiated squamous cell carcinoma.   B. Associated marked chronic inflammation and  "focal foreign body giant cell reaction noted.      Comment       A short panel of immunoperoxidase stains was performed to confirm the diagnosis including p40, p63, TTF1 and CK7 with appropriate controls.  Carcinoma is strongly immunoreactive for p63 and p40, but judged negative utilizing TTF1 and CK7. Results of IHC support a diagnosis of squamous cell carcinoma of lung. Metastatic squamous cell carcinoma cannot be excluded. Internal consultation with Dr. Tanner concurred with the above diagnosis.  The paraffin block will be sent for PD-L1 testing and reported separately.     bk/agueda      Gross Description       1.Received in formalin labeled \"right upper lobe lung nodule\" are two, tan-pink, cylindrical soft tissues measuring 0.8 x 0.1 cm and 1.0 x 0.1 cm, which are submitted en toto following eosin dye staining as 1A.      Jap/uso/bk/ks      Microscopic Description       Performed, incorporated in diagnosis.         IMAGING:    I personally reviewed PET scan images from 11/27/2018 that show a 1.2 cm right upper lobe pulmonary nodule that is hypermetabolic with an SUV of 4.9.  There is no lymphadenopathy.      ASSESSMENT:  This is a 73 y.o. female with:  1.  Peripheral arterial disease s/p bilateral iliac stenting on 8/10/2018  2.  Clinical stage 1A2 (T1b, N0) squamous cell carcinoma of the right upper lobe.   PD-L1 60%.  She had PFTs done in May that are acceptable for surgery.  Repeat PFTs were done recently in the pulmonary office with the result not yet available.  She has not yet had a stress test.  Dr. Figueroa has seen her again today as well.  She may be a candidate for a right upper lobectomy with lymphadenectomy.  Stereotactic radiation could be considered as well if for some reason she is not a surgical candidate.  We do not anticipate the need for adjuvant chemotherapy at this time.      PLAN:   1.  She will proceed with local therapy either with a right upper lobectomy (most likely) or potentially with " stereotactic radiation depending on the results of her stress test.    2.  At this time we do not anticipate the need for systemic therapy.  3.  I will not arrange a routine follow up appointment with me, but I am glad to see her in the future if needed.      Seen and dicussed today with Dr. Figueroa.  Discussed with the patient, , and daughter.

## 2019-01-02 PROBLEM — C34.90 LUNG CANCER: Status: ACTIVE | Noted: 2019-01-02

## 2019-01-03 ENCOUNTER — HOSPITAL ENCOUNTER (OUTPATIENT)
Dept: CARDIOLOGY | Facility: HOSPITAL | Age: 74
Discharge: HOME | End: 2019-01-03
Attending: THORACIC SURGERY (CARDIOTHORACIC VASCULAR SURGERY) | Admitting: THORACIC SURGERY (CARDIOTHORACIC VASCULAR SURGERY)

## 2019-01-03 VITALS
BODY MASS INDEX: 33.99 KG/M2 | SYSTOLIC BLOOD PRESSURE: 140 MMHG | OXYGEN SATURATION: 95 % | WEIGHT: 204 LBS | DIASTOLIC BLOOD PRESSURE: 60 MMHG | HEART RATE: 71 BPM | HEIGHT: 65 IN

## 2019-01-03 DIAGNOSIS — R06.02 SHORTNESS OF BREATH: ICD-10-CM

## 2019-01-03 DIAGNOSIS — C34.11 MALIGNANT NEOPLASM OF UPPER LOBE OF RIGHT LUNG (HCC): ICD-10-CM

## 2019-01-03 LAB
ASCENDING AORTA: 3.1 CM
BH CV ECHO MEAS - ACS: 1.7 CM
BH CV ECHO MEAS - AO MAX PG: 8.8 MMHG
BH CV ECHO MEAS - AO ROOT AREA (BSA CORRECTED): 1.6
BH CV ECHO MEAS - AO ROOT AREA: 7.7 CM^2
BH CV ECHO MEAS - AO ROOT DIAM: 3.1 CM
BH CV ECHO MEAS - AO V2 MAX: 148 CM/SEC
BH CV ECHO MEAS - ASC AORTA: 3.1 CM
BH CV ECHO MEAS - BSA(HAYCOCK): 2.1 M^2
BH CV ECHO MEAS - BSA: 2 M^2
BH CV ECHO MEAS - BZI_BMI: 33.9 KILOGRAMS/M^2
BH CV ECHO MEAS - BZI_METRIC_HEIGHT: 165.1 CM
BH CV ECHO MEAS - BZI_METRIC_WEIGHT: 92.5 KG
BH CV ECHO MEAS - EDV(CUBED): 165 ML
BH CV ECHO MEAS - EDV(MOD-SP2): 59 ML
BH CV ECHO MEAS - EDV(MOD-SP4): 62 ML
BH CV ECHO MEAS - EDV(TEICH): 146.5 ML
BH CV ECHO MEAS - EF(CUBED): 77.9 %
BH CV ECHO MEAS - EF(MOD-BP): 67 %
BH CV ECHO MEAS - EF(MOD-SP2): 62.7 %
BH CV ECHO MEAS - EF(MOD-SP4): 67.7 %
BH CV ECHO MEAS - EF(TEICH): 69.5 %
BH CV ECHO MEAS - ESV(CUBED): 36.6 ML
BH CV ECHO MEAS - ESV(MOD-SP2): 22 ML
BH CV ECHO MEAS - ESV(MOD-SP4): 20 ML
BH CV ECHO MEAS - ESV(TEICH): 44.7 ML
BH CV ECHO MEAS - FS: 39.5 %
BH CV ECHO MEAS - IVS/LVPW: 1
BH CV ECHO MEAS - IVSD: 1.1 CM
BH CV ECHO MEAS - LAT PEAK E' VEL: 7 CM/SEC
BH CV ECHO MEAS - LV DIASTOLIC VOL/BSA (35-75): 31.1 ML/M^2
BH CV ECHO MEAS - LV MASS(C)D: 222.7 GRAMS
BH CV ECHO MEAS - LV MASS(C)DI: 111.6 GRAMS/M^2
BH CV ECHO MEAS - LV SYSTOLIC VOL/BSA (12-30): 10 ML/M^2
BH CV ECHO MEAS - LVIDD: 5.5 CM
BH CV ECHO MEAS - LVIDS: 3.3 CM
BH CV ECHO MEAS - LVLD AP2: 7.6 CM
BH CV ECHO MEAS - LVLD AP4: 7.4 CM
BH CV ECHO MEAS - LVLS AP2: 6 CM
BH CV ECHO MEAS - LVLS AP4: 6.1 CM
BH CV ECHO MEAS - LVOT AREA (M): 2.5 CM^2
BH CV ECHO MEAS - LVOT AREA: 2.6 CM^2
BH CV ECHO MEAS - LVOT DIAM: 1.8 CM
BH CV ECHO MEAS - LVPWD: 1 CM
BH CV ECHO MEAS - MED PEAK E' VEL: 7 CM/SEC
BH CV ECHO MEAS - MV A DUR: 0.11 SEC
BH CV ECHO MEAS - MV A MAX VEL: 94.6 CM/SEC
BH CV ECHO MEAS - MV DEC SLOPE: 312.6 CM/SEC^2
BH CV ECHO MEAS - MV DEC TIME: 0.2 SEC
BH CV ECHO MEAS - MV E MAX VEL: 78.1 CM/SEC
BH CV ECHO MEAS - MV E/A: 0.83
BH CV ECHO MEAS - MV P1/2T MAX VEL: 85.3 CM/SEC
BH CV ECHO MEAS - MV P1/2T: 80 MSEC
BH CV ECHO MEAS - MVA P1/2T LCG: 2.6 CM^2
BH CV ECHO MEAS - MVA(P1/2T): 2.8 CM^2
BH CV ECHO MEAS - PULM A REVS DUR: 0.11 SEC
BH CV ECHO MEAS - PULM A REVS VEL: 30.2 CM/SEC
BH CV ECHO MEAS - PULM DIAS VEL: 43 CM/SEC
BH CV ECHO MEAS - PULM S/D: 1.7
BH CV ECHO MEAS - PULM SYS VEL: 71.7 CM/SEC
BH CV ECHO MEAS - SI(CUBED): 64.4 ML/M^2
BH CV ECHO MEAS - SI(MOD-SP2): 18.5 ML/M^2
BH CV ECHO MEAS - SI(MOD-SP4): 21.1 ML/M^2
BH CV ECHO MEAS - SI(TEICH): 51 ML/M^2
BH CV ECHO MEAS - SV(CUBED): 128.5 ML
BH CV ECHO MEAS - SV(MOD-SP2): 37 ML
BH CV ECHO MEAS - SV(MOD-SP4): 42 ML
BH CV ECHO MEAS - SV(TEICH): 101.8 ML
BH CV ECHO MEAS - TAPSE (>1.6): 2.2 CM2
BH CV ECHO MEAS - TR MAX VEL: 228 CM/SEC
BH CV ECHO MEASUREMENTS AVERAGE E/E' RATIO: 11.16
BH CV STRESS ATROPINE STAGE 5: 1
BH CV STRESS BP STAGE 1: NORMAL
BH CV STRESS BP STAGE 2: NORMAL
BH CV STRESS BP STAGE 3: NORMAL
BH CV STRESS BP STAGE 4: NORMAL
BH CV STRESS BP STAGE 5: NORMAL
BH CV STRESS DOB - ATROPINE STAGE 4: 1
BH CV STRESS DOSE DOBUTAMINE STAGE 1: 5
BH CV STRESS DOSE DOBUTAMINE STAGE 2: 10
BH CV STRESS DOSE DOBUTAMINE STAGE 3: 20
BH CV STRESS DOSE DOBUTAMINE STAGE 4: 30
BH CV STRESS DOSE DOBUTAMINE STAGE 5: 40
BH CV STRESS DURATION MIN STAGE 1: 3
BH CV STRESS DURATION MIN STAGE 2: 3
BH CV STRESS DURATION MIN STAGE 3: 3
BH CV STRESS DURATION MIN STAGE 4: 3
BH CV STRESS DURATION MIN STAGE 5: 6
BH CV STRESS DURATION SEC STAGE 1: 0
BH CV STRESS DURATION SEC STAGE 2: 0
BH CV STRESS DURATION SEC STAGE 3: 0
BH CV STRESS DURATION SEC STAGE 4: 0
BH CV STRESS DURATION SEC STAGE 5: 0
BH CV STRESS ECHO POST STRESS EJECTION FRACTION EF: 68 %
BH CV STRESS HR STAGE 1: 65
BH CV STRESS HR STAGE 2: 67
BH CV STRESS HR STAGE 3: 70
BH CV STRESS HR STAGE 4: 73
BH CV STRESS HR STAGE 5: 82
BH CV STRESS PROTOCOL 1: NORMAL
BH CV STRESS RATE STAGE 1: 28
BH CV STRESS RATE STAGE 2: 56
BH CV STRESS RATE STAGE 3: 112
BH CV STRESS RATE STAGE 4: 168
BH CV STRESS RATE STAGE 5: 224 ML/HR
BH CV STRESS RECOVERY BP: NORMAL MMHG
BH CV STRESS RECOVERY HR: 67 BPM
BH CV STRESS STAGE 1: 1
BH CV STRESS STAGE 2: 2
BH CV STRESS STAGE 3: 3
BH CV STRESS STAGE 4: 4
BH CV STRESS STAGE 5: 5
BH CV VAS BP RIGHT ARM: NORMAL MMHG
BH CV XLRA - RV BASE: 2.2 CM
BH CV XLRA - TDI S': 16 CM/SEC
LEFT ATRIUM VOLUME INDEX: 19 ML/M2
MAXIMAL PREDICTED HEART RATE: 147 BPM
PERCENT MAX PREDICTED HR: 55.78 %
SINUS: 2.8 CM
STJ: 3 CM
STRESS BASELINE BP: NORMAL MMHG
STRESS BASELINE HR: 71 BPM
STRESS PERCENT HR: 66 %
STRESS POST EXERCISE DUR MIN: 19 MIN
STRESS POST PEAK BP: NORMAL MMHG
STRESS POST PEAK HR: 82 BPM
STRESS TARGET HR: 125 BPM

## 2019-01-03 PROCEDURE — 93018 CV STRESS TEST I&R ONLY: CPT | Performed by: INTERNAL MEDICINE

## 2019-01-03 PROCEDURE — 93350 STRESS TTE ONLY: CPT

## 2019-01-03 PROCEDURE — 93320 DOPPLER ECHO COMPLETE: CPT

## 2019-01-03 PROCEDURE — 93325 DOPPLER ECHO COLOR FLOW MAPG: CPT

## 2019-01-03 PROCEDURE — 93325 DOPPLER ECHO COLOR FLOW MAPG: CPT | Performed by: INTERNAL MEDICINE

## 2019-01-03 PROCEDURE — 93320 DOPPLER ECHO COMPLETE: CPT | Performed by: INTERNAL MEDICINE

## 2019-01-03 PROCEDURE — 93017 CV STRESS TEST TRACING ONLY: CPT

## 2019-01-03 PROCEDURE — 93350 STRESS TTE ONLY: CPT | Performed by: INTERNAL MEDICINE

## 2019-01-03 PROCEDURE — 93016 CV STRESS TEST SUPVJ ONLY: CPT | Performed by: INTERNAL MEDICINE

## 2019-01-03 RX ORDER — DOBUTAMINE HYDROCHLORIDE 100 MG/100ML
35 INJECTION INTRAVENOUS
Status: DISCONTINUED | OUTPATIENT
Start: 2019-01-03 | End: 2019-01-04 | Stop reason: HOSPADM

## 2019-01-03 RX ORDER — ATROPINE SULFATE 1 MG/ML
2 INJECTION, SOLUTION INTRAMUSCULAR; INTRAVENOUS; SUBCUTANEOUS ONCE
Status: DISCONTINUED | OUTPATIENT
Start: 2019-01-03 | End: 2019-01-04 | Stop reason: HOSPADM

## 2019-01-04 DIAGNOSIS — R06.02 SHORTNESS OF BREATH: ICD-10-CM

## 2019-01-04 DIAGNOSIS — C34.11 MALIGNANT NEOPLASM OF UPPER LOBE OF RIGHT LUNG (HCC): Primary | ICD-10-CM

## 2019-01-07 ENCOUNTER — HOSPITAL ENCOUNTER (OUTPATIENT)
Dept: NUCLEAR MEDICINE | Facility: HOSPITAL | Age: 74
Discharge: HOME | End: 2019-01-07
Attending: THORACIC SURGERY (CARDIOTHORACIC VASCULAR SURGERY)

## 2019-01-07 DIAGNOSIS — R06.02 SHORTNESS OF BREATH: ICD-10-CM

## 2019-01-07 DIAGNOSIS — C34.11 MALIGNANT NEOPLASM OF UPPER LOBE OF RIGHT LUNG (HCC): ICD-10-CM

## 2019-01-07 LAB
BH CV STRESS COMMENTS STAGE 1: NORMAL
BH CV STRESS DOSE REGADENOSON STAGE 1: 0.4
BH CV STRESS DURATION MIN STAGE 1: 0
BH CV STRESS DURATION SEC STAGE 1: 10
BH CV STRESS PROTOCOL 1: NORMAL
BH CV STRESS RECOVERY BP: NORMAL MMHG
BH CV STRESS RECOVERY HR: 70 BPM
BH CV STRESS STAGE 1: 1
LV EF NUC BP: 64 %
MAXIMAL PREDICTED HEART RATE: 147 BPM
PERCENT MAX PREDICTED HR: 51.7 %
STRESS BASELINE BP: NORMAL MMHG
STRESS BASELINE HR: 63 BPM
STRESS PERCENT HR: 61 %
STRESS POST PEAK BP: NORMAL MMHG
STRESS POST PEAK HR: 76 BPM
STRESS TARGET HR: 125 BPM

## 2019-01-07 PROCEDURE — 78452 HT MUSCLE IMAGE SPECT MULT: CPT | Performed by: INTERNAL MEDICINE

## 2019-01-07 PROCEDURE — A9500 TC99M SESTAMIBI: HCPCS | Performed by: THORACIC SURGERY (CARDIOTHORACIC VASCULAR SURGERY)

## 2019-01-07 PROCEDURE — 25010000002 REGADENOSON 0.4 MG/5ML SOLUTION: Performed by: THORACIC SURGERY (CARDIOTHORACIC VASCULAR SURGERY)

## 2019-01-07 PROCEDURE — 0 TECHNETIUM SESTAMIBI: Performed by: THORACIC SURGERY (CARDIOTHORACIC VASCULAR SURGERY)

## 2019-01-07 PROCEDURE — 78452 HT MUSCLE IMAGE SPECT MULT: CPT

## 2019-01-07 PROCEDURE — 93017 CV STRESS TEST TRACING ONLY: CPT

## 2019-01-07 PROCEDURE — 93018 CV STRESS TEST I&R ONLY: CPT | Performed by: INTERNAL MEDICINE

## 2019-01-07 RX ADMIN — TECHNETIUM TC 99M SESTAMIBI 1 DOSE: 1 INJECTION INTRAVENOUS at 07:59

## 2019-01-07 RX ADMIN — REGADENOSON 0.4 MG: 0.08 INJECTION, SOLUTION INTRAVENOUS at 09:39

## 2019-01-07 RX ADMIN — TECHNETIUM TC 99M SESTAMIBI 1 DOSE: 1 INJECTION INTRAVENOUS at 09:39

## 2019-01-09 ENCOUNTER — APPOINTMENT (OUTPATIENT)
Dept: PREADMISSION TESTING | Facility: HOSPITAL | Age: 74
End: 2019-01-09

## 2019-01-09 VITALS
HEIGHT: 66 IN | BODY MASS INDEX: 31.98 KG/M2 | SYSTOLIC BLOOD PRESSURE: 155 MMHG | WEIGHT: 199 LBS | DIASTOLIC BLOOD PRESSURE: 78 MMHG | OXYGEN SATURATION: 99 % | TEMPERATURE: 97.3 F | HEART RATE: 90 BPM | RESPIRATION RATE: 18 BRPM

## 2019-01-09 DIAGNOSIS — R79.1 ABNORMAL COAGULATION PROFILE: ICD-10-CM

## 2019-01-09 DIAGNOSIS — C34.11 MALIGNANT NEOPLASM OF UPPER LOBE OF RIGHT LUNG (HCC): ICD-10-CM

## 2019-01-09 DIAGNOSIS — C34.90 LUNG CANCER (HCC): ICD-10-CM

## 2019-01-09 LAB
ABO GROUP BLD: NORMAL
ANION GAP SERPL CALCULATED.3IONS-SCNC: 12.2 MMOL/L
APTT PPP: 32.1 SECONDS (ref 22.7–35.4)
BASOPHILS # BLD AUTO: 0.01 10*3/MM3 (ref 0–0.2)
BASOPHILS NFR BLD AUTO: 0.1 % (ref 0–1.5)
BLD GP AB SCN SERPL QL: NEGATIVE
BUN BLD-MCNC: 20 MG/DL (ref 8–23)
BUN/CREAT SERPL: 13.1 (ref 7–25)
CALCIUM SPEC-SCNC: 9.2 MG/DL (ref 8.6–10.5)
CHLORIDE SERPL-SCNC: 97 MMOL/L (ref 98–107)
CO2 SERPL-SCNC: 26.8 MMOL/L (ref 22–29)
CREAT BLD-MCNC: 1.53 MG/DL (ref 0.57–1)
DEPRECATED RDW RBC AUTO: 44 FL (ref 37–54)
EOSINOPHIL # BLD AUTO: 0.08 10*3/MM3 (ref 0–0.7)
EOSINOPHIL NFR BLD AUTO: 1.1 % (ref 0.3–6.2)
ERYTHROCYTE [DISTWIDTH] IN BLOOD BY AUTOMATED COUNT: 12.4 % (ref 11.7–13)
GFR SERPL CREATININE-BSD FRML MDRD: 33 ML/MIN/1.73
GLUCOSE BLD-MCNC: 119 MG/DL (ref 65–99)
HCT VFR BLD AUTO: 37.6 % (ref 35.6–45.5)
HGB BLD-MCNC: 11.8 G/DL (ref 11.9–15.5)
IMM GRANULOCYTES # BLD AUTO: 0.02 10*3/MM3 (ref 0–0.03)
IMM GRANULOCYTES NFR BLD AUTO: 0.3 % (ref 0–0.5)
INR PPP: 0.99 (ref 0.9–1.1)
LYMPHOCYTES # BLD AUTO: 1.68 10*3/MM3 (ref 0.9–4.8)
LYMPHOCYTES NFR BLD AUTO: 22.7 % (ref 19.6–45.3)
MCH RBC QN AUTO: 30.4 PG (ref 26.9–32)
MCHC RBC AUTO-ENTMCNC: 31.4 G/DL (ref 32.4–36.3)
MCV RBC AUTO: 96.9 FL (ref 80.5–98.2)
MONOCYTES # BLD AUTO: 0.55 10*3/MM3 (ref 0.2–1.2)
MONOCYTES NFR BLD AUTO: 7.4 % (ref 5–12)
NEUTROPHILS # BLD AUTO: 5.07 10*3/MM3 (ref 1.9–8.1)
NEUTROPHILS NFR BLD AUTO: 68.4 % (ref 42.7–76)
PLATELET # BLD AUTO: 275 10*3/MM3 (ref 140–500)
PMV BLD AUTO: 8.9 FL (ref 6–12)
POTASSIUM BLD-SCNC: 4.3 MMOL/L (ref 3.5–5.2)
PROTHROMBIN TIME: 12.9 SECONDS (ref 11.7–14.2)
RBC # BLD AUTO: 3.88 10*6/MM3 (ref 3.9–5.2)
RH BLD: POSITIVE
SODIUM BLD-SCNC: 136 MMOL/L (ref 136–145)
T&S EXPIRATION DATE: NORMAL
WBC NRBC COR # BLD: 7.41 10*3/MM3 (ref 4.5–10.7)

## 2019-01-09 PROCEDURE — 86901 BLOOD TYPING SEROLOGIC RH(D): CPT | Performed by: THORACIC SURGERY (CARDIOTHORACIC VASCULAR SURGERY)

## 2019-01-09 PROCEDURE — 36415 COLL VENOUS BLD VENIPUNCTURE: CPT

## 2019-01-09 PROCEDURE — 80048 BASIC METABOLIC PNL TOTAL CA: CPT | Performed by: THORACIC SURGERY (CARDIOTHORACIC VASCULAR SURGERY)

## 2019-01-09 PROCEDURE — 85730 THROMBOPLASTIN TIME PARTIAL: CPT | Performed by: THORACIC SURGERY (CARDIOTHORACIC VASCULAR SURGERY)

## 2019-01-09 PROCEDURE — 86850 RBC ANTIBODY SCREEN: CPT | Performed by: THORACIC SURGERY (CARDIOTHORACIC VASCULAR SURGERY)

## 2019-01-09 PROCEDURE — 85610 PROTHROMBIN TIME: CPT | Performed by: THORACIC SURGERY (CARDIOTHORACIC VASCULAR SURGERY)

## 2019-01-09 PROCEDURE — 85025 COMPLETE CBC W/AUTO DIFF WBC: CPT | Performed by: THORACIC SURGERY (CARDIOTHORACIC VASCULAR SURGERY)

## 2019-01-09 PROCEDURE — 86900 BLOOD TYPING SEROLOGIC ABO: CPT | Performed by: THORACIC SURGERY (CARDIOTHORACIC VASCULAR SURGERY)

## 2019-01-09 RX ORDER — FUROSEMIDE 40 MG/1
40 TABLET ORAL DAILY
Status: ON HOLD | COMMUNITY
End: 2019-12-20

## 2019-01-09 RX ORDER — CARVEDILOL 25 MG/1
25 TABLET ORAL 2 TIMES DAILY WITH MEALS
COMMUNITY
End: 2021-01-11

## 2019-01-09 RX ORDER — LORAZEPAM 0.5 MG/1
0.5 TABLET ORAL 3 TIMES DAILY
COMMUNITY
End: 2019-05-17

## 2019-01-09 RX ORDER — ASPIRIN 81 MG/1
81 TABLET, CHEWABLE ORAL DAILY
COMMUNITY
End: 2019-05-30

## 2019-01-09 NOTE — DISCHARGE INSTRUCTIONS
Take the following medications the morning of surgery with a small sip of water:        General Instructions:  • Do not eat solid food after midnight the night before surgery.  • You may drink clear liquids day of surgery but must stop at least one hour before your hospital arrival time.  • It is beneficial for you to have a clear drink that contains carbohydrates the day of surgery.  We suggest a 12 to 20 ounce bottle of Gatorade or Powerade for non-diabetic patients or a 12 to 20 ounce bottle of G2 or Powerade Zero for diabetic patients. (Pediatric patients, are not advised to drink a 12 to 20 ounce carbohydrate drink)    Clear liquids are liquids you can see through.  Nothing red in color.     Plain water                               Sports drinks  Sodas                                   Gelatin (Jell-O)  Fruit juices without pulp such as white grape juice and apple juice  Popsicles that contain no fruit or yogurt  Tea or coffee (no cream or milk added)  Gatorade / Powerade  G2 / Powerade Zero    • Infants may have breast milk up to four hours before surgery.  • Infants drinking formula may drink formula up to six hours before surgery.   • Patients who avoid smoking, chewing tobacco and alcohol for 4 weeks prior to surgery have a reduced risk of post-operative complications.  Quit smoking as many days before surgery as you can.  • Do not smoke, use chewing tobacco or drink alcohol the day of surgery.   • If applicable bring your C-PAP/ BI-PAP machine.  • Bring any papers given to you in the doctor’s office.  • Wear clean comfortable clothes and socks.  • Do not wear contact lenses or make-up.  Bring a case for your glasses.   • Bring crutches or walker if applicable.  • Remove all piercings.  Leave jewelry and any other valuables at home.  • Hair extensions with metal clips must be removed prior to surgery.  • The Pre-Admission Testing nurse will instruct you to bring medications if unable to obtain an accurate  list in Pre-Admission Testing.        If you were given a blood bank ID arm band remember to bring it with you the day of surgery.    Preventing a Surgical Site Infection:  • For 2 to 3 days before surgery, avoid shaving with a razor because the razor can irritate skin and make it easier to develop an infection.    • Any areas of open skin can increase the risk of a post-operative wound infection by allowing bacteria to enter and travel throughout the body.  Notify your surgeon if you have any skin wounds / rashes even if it is not near the expected surgical site.  The area will need assessed to determine if surgery should be delayed until it is healed.  • The night prior to surgery sleep in a clean bed with clean clothing.  Do not allow pets to sleep with you.  • Shower on the morning of surgery using a fresh bar of anti-bacterial soap (such as Dial) and clean washcloth.  Dry with a clean towel and dress in clean clothing.  • Ask your surgeon if you will be receiving antibiotics prior to surgery.  • Make sure you, your family, and all healthcare providers clean their hands with soap and water or an alcohol based hand  before caring for you or your wound.    Day of surgery:  Upon arrival, a Pre-op nurse and Anesthesiologist will review your health history, obtain vital signs, and answer questions you may have.  The only belongings needed at this time will be your home medications and if applicable your C-PAP/BI-PAP machine.  If you are staying overnight your family can leave the rest of your belongings in the car and bring them to your room later.  A Pre-op nurse will start an IV and you may receive medication in preparation for surgery, including something to help you relax.  Your family will be able to see you in the Pre-op area.  While you are in surgery your family should notify the waiting room  if they leave the waiting room area and provide a contact phone number.    Please be aware that  surgery does come with discomfort.  We want to make every effort to control your discomfort so please discuss any uncontrolled symptoms with your nurse.   Your doctor will most likely have prescribed pain medications.      If you are going home after surgery you will receive individualized written care instructions before being discharged.  A responsible adult must drive you to and from the hospital on the day of your surgery and stay with you for 24 hours.    If you are staying overnight following surgery, you will be transported to your hospital room following the recovery period.  [facility] has all private rooms.    You have received a list of surgical assistants for your reference.  If you have any questions please call Pre-Admission Testing at [phone].  Deductibles and co-payments are collected on the day of service. Please be prepared to pay the required co-pay, deductible or deposit on the day of service as defined by your plan.

## 2019-01-10 ENCOUNTER — ANESTHESIA EVENT (OUTPATIENT)
Dept: PERIOP | Facility: HOSPITAL | Age: 74
End: 2019-01-10

## 2019-01-11 ENCOUNTER — HOSPITAL ENCOUNTER (INPATIENT)
Facility: HOSPITAL | Age: 74
LOS: 10 days | Discharge: HOME HEALTH | End: 2019-01-21
Attending: THORACIC SURGERY (CARDIOTHORACIC VASCULAR SURGERY) | Admitting: THORACIC SURGERY (CARDIOTHORACIC VASCULAR SURGERY)

## 2019-01-11 ENCOUNTER — APPOINTMENT (OUTPATIENT)
Dept: GENERAL RADIOLOGY | Facility: HOSPITAL | Age: 74
End: 2019-01-11

## 2019-01-11 ENCOUNTER — ANESTHESIA (OUTPATIENT)
Dept: PERIOP | Facility: HOSPITAL | Age: 74
End: 2019-01-11

## 2019-01-11 DIAGNOSIS — Z79.899 POLYPHARMACY: ICD-10-CM

## 2019-01-11 DIAGNOSIS — G89.4 CHRONIC PAIN SYNDROME: ICD-10-CM

## 2019-01-11 DIAGNOSIS — C34.90 LUNG CANCER (HCC): ICD-10-CM

## 2019-01-11 DIAGNOSIS — R26.2 DIFFICULTY WALKING: Primary | ICD-10-CM

## 2019-01-11 DIAGNOSIS — F41.9 ANXIETY: ICD-10-CM

## 2019-01-11 DIAGNOSIS — I70.212 ATHEROSCLEROSIS OF NATIVE ARTERY OF LEFT LOWER EXTREMITY WITH INTERMITTENT CLAUDICATION (HCC): ICD-10-CM

## 2019-01-11 PROCEDURE — 88331 PATH CONSLTJ SURG 1 BLK 1SPC: CPT | Performed by: THORACIC SURGERY (CARDIOTHORACIC VASCULAR SURGERY)

## 2019-01-11 PROCEDURE — 07T74ZZ RESECTION OF THORAX LYMPHATIC, PERCUTANEOUS ENDOSCOPIC APPROACH: ICD-10-PCS | Performed by: THORACIC SURGERY (CARDIOTHORACIC VASCULAR SURGERY)

## 2019-01-11 PROCEDURE — 0BTC4ZZ RESECTION OF RIGHT UPPER LUNG LOBE, PERCUTANEOUS ENDOSCOPIC APPROACH: ICD-10-PCS | Performed by: THORACIC SURGERY (CARDIOTHORACIC VASCULAR SURGERY)

## 2019-01-11 PROCEDURE — 25010000003 BUPIVACAINE LIPOSOME 1.3 % SUSPENSION: Performed by: THORACIC SURGERY (CARDIOTHORACIC VASCULAR SURGERY)

## 2019-01-11 PROCEDURE — 25010000002 ONDANSETRON PER 1 MG: Performed by: NURSE ANESTHETIST, CERTIFIED REGISTERED

## 2019-01-11 PROCEDURE — 25010000002 HYDROMORPHONE 1 MG/ML SOLUTION: Performed by: THORACIC SURGERY (CARDIOTHORACIC VASCULAR SURGERY)

## 2019-01-11 PROCEDURE — 88309 TISSUE EXAM BY PATHOLOGIST: CPT | Performed by: THORACIC SURGERY (CARDIOTHORACIC VASCULAR SURGERY)

## 2019-01-11 PROCEDURE — 25010000002 FENTANYL CITRATE (PF) 100 MCG/2ML SOLUTION: Performed by: NURSE ANESTHETIST, CERTIFIED REGISTERED

## 2019-01-11 PROCEDURE — 94799 UNLISTED PULMONARY SVC/PX: CPT

## 2019-01-11 PROCEDURE — 32674 THORACOSCOPY LYMPH NODE EXC: CPT | Performed by: THORACIC SURGERY (CARDIOTHORACIC VASCULAR SURGERY)

## 2019-01-11 PROCEDURE — 88307 TISSUE EXAM BY PATHOLOGIST: CPT | Performed by: THORACIC SURGERY (CARDIOTHORACIC VASCULAR SURGERY)

## 2019-01-11 PROCEDURE — 88313 SPECIAL STAINS GROUP 2: CPT | Performed by: THORACIC SURGERY (CARDIOTHORACIC VASCULAR SURGERY)

## 2019-01-11 PROCEDURE — 25010000002 LORAZEPAM PER 2 MG: Performed by: THORACIC SURGERY (CARDIOTHORACIC VASCULAR SURGERY)

## 2019-01-11 PROCEDURE — 25010000002 HYDROMORPHONE PER 4 MG: Performed by: NURSE ANESTHETIST, CERTIFIED REGISTERED

## 2019-01-11 PROCEDURE — 25010000002 PROPOFOL 10 MG/ML EMULSION: Performed by: NURSE ANESTHETIST, CERTIFIED REGISTERED

## 2019-01-11 PROCEDURE — 25010000003 CEFAZOLIN IN DEXTROSE 2-4 GM/100ML-% SOLUTION: Performed by: THORACIC SURGERY (CARDIOTHORACIC VASCULAR SURGERY)

## 2019-01-11 PROCEDURE — 71045 X-RAY EXAM CHEST 1 VIEW: CPT

## 2019-01-11 PROCEDURE — 32663 THORACOSCOPY W/LOBECTOMY: CPT | Performed by: THORACIC SURGERY (CARDIOTHORACIC VASCULAR SURGERY)

## 2019-01-11 PROCEDURE — 25010000002 HYDRALAZINE PER 20 MG: Performed by: NURSE ANESTHETIST, CERTIFIED REGISTERED

## 2019-01-11 PROCEDURE — 25010000002 MIDAZOLAM PER 1 MG: Performed by: ANESTHESIOLOGY

## 2019-01-11 PROCEDURE — 88305 TISSUE EXAM BY PATHOLOGIST: CPT | Performed by: THORACIC SURGERY (CARDIOTHORACIC VASCULAR SURGERY)

## 2019-01-11 PROCEDURE — 25010000002 HEPARIN (PORCINE) PER 1000 UNITS: Performed by: THORACIC SURGERY (CARDIOTHORACIC VASCULAR SURGERY)

## 2019-01-11 PROCEDURE — C9290 INJ, BUPIVACAINE LIPOSOME: HCPCS | Performed by: THORACIC SURGERY (CARDIOTHORACIC VASCULAR SURGERY)

## 2019-01-11 PROCEDURE — 25010000002 PROMETHAZINE PER 50 MG: Performed by: NURSE ANESTHETIST, CERTIFIED REGISTERED

## 2019-01-11 PROCEDURE — 25010000002 FENTANYL CITRATE (PF) 100 MCG/2ML SOLUTION: Performed by: ANESTHESIOLOGY

## 2019-01-11 DEVICE — CURVED TIP INTELLIGENT RELOAD AND INTRODUCER
Type: IMPLANTABLE DEVICE | Status: FUNCTIONAL
Brand: TRI-STAPLE 2.0

## 2019-01-11 DEVICE — ARTICULATION RELOAD WITH TRI-STAPLE TECHNOLOGY
Type: IMPLANTABLE DEVICE | Status: FUNCTIONAL
Brand: ENDO GIA

## 2019-01-11 DEVICE — ARTICULATING RELOAD WITH TRI-STAPLE TECHNOLOGY
Type: IMPLANTABLE DEVICE | Status: FUNCTIONAL
Brand: ENDO GIA

## 2019-01-11 RX ORDER — HYDRALAZINE HYDROCHLORIDE 20 MG/ML
5 INJECTION INTRAMUSCULAR; INTRAVENOUS
Status: DISCONTINUED | OUTPATIENT
Start: 2019-01-11 | End: 2019-01-11 | Stop reason: HOSPADM

## 2019-01-11 RX ORDER — DEXTROSE, SODIUM CHLORIDE, AND POTASSIUM CHLORIDE 5; .45; .15 G/100ML; G/100ML; G/100ML
75 INJECTION INTRAVENOUS CONTINUOUS
Status: DISCONTINUED | OUTPATIENT
Start: 2019-01-11 | End: 2019-01-14

## 2019-01-11 RX ORDER — PROPOFOL 10 MG/ML
VIAL (ML) INTRAVENOUS AS NEEDED
Status: DISCONTINUED | OUTPATIENT
Start: 2019-01-11 | End: 2019-01-11 | Stop reason: SURG

## 2019-01-11 RX ORDER — SUCRALFATE 1 G/1
1 TABLET ORAL 4 TIMES DAILY
Status: DISCONTINUED | OUTPATIENT
Start: 2019-01-11 | End: 2019-01-18

## 2019-01-11 RX ORDER — CETIRIZINE HYDROCHLORIDE 10 MG/1
10 TABLET ORAL DAILY
Status: DISCONTINUED | OUTPATIENT
Start: 2019-01-11 | End: 2019-01-16

## 2019-01-11 RX ORDER — MIDAZOLAM HYDROCHLORIDE 1 MG/ML
2 INJECTION INTRAMUSCULAR; INTRAVENOUS
Status: DISCONTINUED | OUTPATIENT
Start: 2019-01-11 | End: 2019-01-11 | Stop reason: HOSPADM

## 2019-01-11 RX ORDER — SODIUM CHLORIDE 0.9 % (FLUSH) 0.9 %
3-10 SYRINGE (ML) INJECTION AS NEEDED
Status: DISCONTINUED | OUTPATIENT
Start: 2019-01-11 | End: 2019-01-11 | Stop reason: HOSPADM

## 2019-01-11 RX ORDER — ONDANSETRON 2 MG/ML
INJECTION INTRAMUSCULAR; INTRAVENOUS AS NEEDED
Status: DISCONTINUED | OUTPATIENT
Start: 2019-01-11 | End: 2019-01-11 | Stop reason: SURG

## 2019-01-11 RX ORDER — HYDROMORPHONE HYDROCHLORIDE 1 MG/ML
0.5 INJECTION, SOLUTION INTRAMUSCULAR; INTRAVENOUS; SUBCUTANEOUS
Status: DISCONTINUED | OUTPATIENT
Start: 2019-01-11 | End: 2019-01-11 | Stop reason: HOSPADM

## 2019-01-11 RX ORDER — CEFAZOLIN SODIUM 2 G/100ML
2 INJECTION, SOLUTION INTRAVENOUS EVERY 8 HOURS
Status: COMPLETED | OUTPATIENT
Start: 2019-01-11 | End: 2019-01-12

## 2019-01-11 RX ORDER — NALOXONE HCL 0.4 MG/ML
0.1 VIAL (ML) INJECTION
Status: DISCONTINUED | OUTPATIENT
Start: 2019-01-11 | End: 2019-01-21 | Stop reason: HOSPADM

## 2019-01-11 RX ORDER — PROMETHAZINE HYDROCHLORIDE 25 MG/ML
12.5 INJECTION, SOLUTION INTRAMUSCULAR; INTRAVENOUS ONCE AS NEEDED
Status: COMPLETED | OUTPATIENT
Start: 2019-01-11 | End: 2019-01-11

## 2019-01-11 RX ORDER — OXYCODONE HYDROCHLORIDE AND ACETAMINOPHEN 5; 325 MG/1; MG/1
1 TABLET ORAL EVERY 4 HOURS PRN
Status: DISCONTINUED | OUTPATIENT
Start: 2019-01-11 | End: 2019-01-14

## 2019-01-11 RX ORDER — MIDAZOLAM HYDROCHLORIDE 1 MG/ML
1 INJECTION INTRAMUSCULAR; INTRAVENOUS
Status: DISCONTINUED | OUTPATIENT
Start: 2019-01-11 | End: 2019-01-11 | Stop reason: HOSPADM

## 2019-01-11 RX ORDER — HYDROMORPHONE HYDROCHLORIDE 2 MG/1
2 TABLET ORAL EVERY 4 HOURS PRN
Status: DISCONTINUED | OUTPATIENT
Start: 2019-01-11 | End: 2019-01-14

## 2019-01-11 RX ORDER — FAMOTIDINE 20 MG/1
20 TABLET, FILM COATED ORAL 2 TIMES DAILY
Status: DISCONTINUED | OUTPATIENT
Start: 2019-01-11 | End: 2019-01-21 | Stop reason: HOSPADM

## 2019-01-11 RX ORDER — FENTANYL CITRATE 50 UG/ML
50 INJECTION, SOLUTION INTRAMUSCULAR; INTRAVENOUS
Status: COMPLETED | OUTPATIENT
Start: 2019-01-11 | End: 2019-01-11

## 2019-01-11 RX ORDER — CYCLOBENZAPRINE HCL 10 MG
10 TABLET ORAL 3 TIMES DAILY PRN
Status: DISCONTINUED | OUTPATIENT
Start: 2019-01-11 | End: 2019-01-16

## 2019-01-11 RX ORDER — DIPHENHYDRAMINE HCL 25 MG
25 CAPSULE ORAL
Status: DISCONTINUED | OUTPATIENT
Start: 2019-01-11 | End: 2019-01-11 | Stop reason: HOSPADM

## 2019-01-11 RX ORDER — TRAZODONE HYDROCHLORIDE 50 MG/1
75 TABLET ORAL NIGHTLY
Status: DISCONTINUED | OUTPATIENT
Start: 2019-01-11 | End: 2019-01-16

## 2019-01-11 RX ORDER — FENTANYL CITRATE 50 UG/ML
INJECTION, SOLUTION INTRAMUSCULAR; INTRAVENOUS AS NEEDED
Status: DISCONTINUED | OUTPATIENT
Start: 2019-01-11 | End: 2019-01-11 | Stop reason: SURG

## 2019-01-11 RX ORDER — SODIUM CHLORIDE 0.9 % (FLUSH) 0.9 %
3 SYRINGE (ML) INJECTION EVERY 12 HOURS SCHEDULED
Status: DISCONTINUED | OUTPATIENT
Start: 2019-01-11 | End: 2019-01-11 | Stop reason: HOSPADM

## 2019-01-11 RX ORDER — HYDRALAZINE HYDROCHLORIDE 20 MG/ML
INJECTION INTRAMUSCULAR; INTRAVENOUS AS NEEDED
Status: DISCONTINUED | OUTPATIENT
Start: 2019-01-11 | End: 2019-01-11 | Stop reason: SURG

## 2019-01-11 RX ORDER — NALOXONE HCL 0.4 MG/ML
0.1 VIAL (ML) INJECTION
Status: DISCONTINUED | OUTPATIENT
Start: 2019-01-11 | End: 2019-01-11 | Stop reason: CLARIF

## 2019-01-11 RX ORDER — IPRATROPIUM BROMIDE AND ALBUTEROL SULFATE 2.5; .5 MG/3ML; MG/3ML
3 SOLUTION RESPIRATORY (INHALATION)
Status: DISCONTINUED | OUTPATIENT
Start: 2019-01-11 | End: 2019-01-15

## 2019-01-11 RX ORDER — LORAZEPAM 2 MG/ML
0.5 INJECTION INTRAMUSCULAR ONCE
Status: COMPLETED | OUTPATIENT
Start: 2019-01-11 | End: 2019-01-11

## 2019-01-11 RX ORDER — HEPARIN SODIUM 5000 [USP'U]/ML
5000 INJECTION, SOLUTION INTRAVENOUS; SUBCUTANEOUS ONCE
Status: COMPLETED | OUTPATIENT
Start: 2019-01-11 | End: 2019-01-11

## 2019-01-11 RX ORDER — LORAZEPAM 0.5 MG/1
0.5 TABLET ORAL 3 TIMES DAILY
Status: DISCONTINUED | OUTPATIENT
Start: 2019-01-11 | End: 2019-01-16

## 2019-01-11 RX ORDER — GABAPENTIN 300 MG/1
300 CAPSULE ORAL 3 TIMES DAILY
Status: DISCONTINUED | OUTPATIENT
Start: 2019-01-11 | End: 2019-01-12

## 2019-01-11 RX ORDER — VENLAFAXINE HYDROCHLORIDE 150 MG/1
150 CAPSULE, EXTENDED RELEASE ORAL DAILY
Status: DISCONTINUED | OUTPATIENT
Start: 2019-01-11 | End: 2019-01-16

## 2019-01-11 RX ORDER — EPHEDRINE SULFATE 50 MG/ML
5 INJECTION, SOLUTION INTRAVENOUS ONCE AS NEEDED
Status: DISCONTINUED | OUTPATIENT
Start: 2019-01-11 | End: 2019-01-11 | Stop reason: HOSPADM

## 2019-01-11 RX ORDER — PROMETHAZINE HYDROCHLORIDE 25 MG/1
25 SUPPOSITORY RECTAL ONCE AS NEEDED
Status: COMPLETED | OUTPATIENT
Start: 2019-01-11 | End: 2019-01-11

## 2019-01-11 RX ORDER — ACETAMINOPHEN 325 MG/1
650 TABLET ORAL ONCE AS NEEDED
Status: DISCONTINUED | OUTPATIENT
Start: 2019-01-11 | End: 2019-01-11 | Stop reason: HOSPADM

## 2019-01-11 RX ORDER — SODIUM CHLORIDE, SODIUM LACTATE, POTASSIUM CHLORIDE, CALCIUM CHLORIDE 600; 310; 30; 20 MG/100ML; MG/100ML; MG/100ML; MG/100ML
9 INJECTION, SOLUTION INTRAVENOUS CONTINUOUS
Status: DISCONTINUED | OUTPATIENT
Start: 2019-01-11 | End: 2019-01-20

## 2019-01-11 RX ORDER — MAGNESIUM HYDROXIDE 1200 MG/15ML
LIQUID ORAL AS NEEDED
Status: DISCONTINUED | OUTPATIENT
Start: 2019-01-11 | End: 2019-01-11 | Stop reason: HOSPADM

## 2019-01-11 RX ORDER — CEFAZOLIN SODIUM 2 G/100ML
2 INJECTION, SOLUTION INTRAVENOUS ONCE
Status: COMPLETED | OUTPATIENT
Start: 2019-01-11 | End: 2019-01-11

## 2019-01-11 RX ORDER — ROCURONIUM BROMIDE 10 MG/ML
INJECTION, SOLUTION INTRAVENOUS AS NEEDED
Status: DISCONTINUED | OUTPATIENT
Start: 2019-01-11 | End: 2019-01-11 | Stop reason: SURG

## 2019-01-11 RX ORDER — HYDROMORPHONE HCL IN 0.9% NACL 10 MG/50ML
PATIENT CONTROLLED ANALGESIA SYRINGE INTRAVENOUS CONTINUOUS
Status: DISCONTINUED | OUTPATIENT
Start: 2019-01-11 | End: 2019-01-11

## 2019-01-11 RX ORDER — DIPHENHYDRAMINE HYDROCHLORIDE 50 MG/ML
12.5 INJECTION INTRAMUSCULAR; INTRAVENOUS
Status: DISCONTINUED | OUTPATIENT
Start: 2019-01-11 | End: 2019-01-11 | Stop reason: HOSPADM

## 2019-01-11 RX ORDER — ONDANSETRON 4 MG/1
4 TABLET, FILM COATED ORAL EVERY 6 HOURS PRN
Status: DISCONTINUED | OUTPATIENT
Start: 2019-01-11 | End: 2019-01-21 | Stop reason: HOSPADM

## 2019-01-11 RX ORDER — ONDANSETRON 2 MG/ML
4 INJECTION INTRAMUSCULAR; INTRAVENOUS ONCE AS NEEDED
Status: DISCONTINUED | OUTPATIENT
Start: 2019-01-11 | End: 2019-01-11 | Stop reason: HOSPADM

## 2019-01-11 RX ORDER — ATORVASTATIN CALCIUM 20 MG/1
40 TABLET, FILM COATED ORAL NIGHTLY
Status: DISCONTINUED | OUTPATIENT
Start: 2019-01-11 | End: 2019-01-21 | Stop reason: HOSPADM

## 2019-01-11 RX ORDER — LABETALOL HYDROCHLORIDE 5 MG/ML
5 INJECTION, SOLUTION INTRAVENOUS
Status: DISCONTINUED | OUTPATIENT
Start: 2019-01-11 | End: 2019-01-11 | Stop reason: HOSPADM

## 2019-01-11 RX ORDER — ONDANSETRON 2 MG/ML
4 INJECTION INTRAMUSCULAR; INTRAVENOUS EVERY 6 HOURS PRN
Status: DISCONTINUED | OUTPATIENT
Start: 2019-01-11 | End: 2019-01-21 | Stop reason: HOSPADM

## 2019-01-11 RX ORDER — HYDROMORPHONE HCL IN 0.9% NACL 10 MG/50ML
PATIENT CONTROLLED ANALGESIA SYRINGE INTRAVENOUS CONTINUOUS
Status: DISCONTINUED | OUTPATIENT
Start: 2019-01-11 | End: 2019-01-12

## 2019-01-11 RX ORDER — ONDANSETRON 4 MG/1
4 TABLET, ORALLY DISINTEGRATING ORAL EVERY 6 HOURS PRN
Status: DISCONTINUED | OUTPATIENT
Start: 2019-01-11 | End: 2019-01-21 | Stop reason: HOSPADM

## 2019-01-11 RX ORDER — OXYCODONE AND ACETAMINOPHEN 7.5; 325 MG/1; MG/1
1 TABLET ORAL ONCE AS NEEDED
Status: DISCONTINUED | OUTPATIENT
Start: 2019-01-11 | End: 2019-01-11 | Stop reason: HOSPADM

## 2019-01-11 RX ORDER — OXYCODONE HYDROCHLORIDE AND ACETAMINOPHEN 5; 325 MG/1; MG/1
1 TABLET ORAL ONCE AS NEEDED
Status: DISCONTINUED | OUTPATIENT
Start: 2019-01-11 | End: 2019-01-11 | Stop reason: HOSPADM

## 2019-01-11 RX ORDER — FLUMAZENIL 0.1 MG/ML
0.2 INJECTION INTRAVENOUS AS NEEDED
Status: DISCONTINUED | OUTPATIENT
Start: 2019-01-11 | End: 2019-01-11 | Stop reason: HOSPADM

## 2019-01-11 RX ORDER — SODIUM CHLORIDE, SODIUM LACTATE, POTASSIUM CHLORIDE, CALCIUM CHLORIDE 600; 310; 30; 20 MG/100ML; MG/100ML; MG/100ML; MG/100ML
INJECTION, SOLUTION INTRAVENOUS CONTINUOUS PRN
Status: DISCONTINUED | OUTPATIENT
Start: 2019-01-11 | End: 2019-01-11 | Stop reason: SURG

## 2019-01-11 RX ORDER — FENTANYL CITRATE 50 UG/ML
50 INJECTION, SOLUTION INTRAMUSCULAR; INTRAVENOUS
Status: DISCONTINUED | OUTPATIENT
Start: 2019-01-11 | End: 2019-01-11 | Stop reason: HOSPADM

## 2019-01-11 RX ORDER — PROMETHAZINE HYDROCHLORIDE 25 MG/1
25 TABLET ORAL ONCE AS NEEDED
Status: COMPLETED | OUTPATIENT
Start: 2019-01-11 | End: 2019-01-11

## 2019-01-11 RX ORDER — PANTOPRAZOLE SODIUM 40 MG/1
40 TABLET, DELAYED RELEASE ORAL EVERY MORNING
Status: DISCONTINUED | OUTPATIENT
Start: 2019-01-12 | End: 2019-01-16

## 2019-01-11 RX ORDER — NITROGLYCERIN 0.4 MG/1
0.4 TABLET SUBLINGUAL
Status: DISCONTINUED | OUTPATIENT
Start: 2019-01-11 | End: 2019-01-21 | Stop reason: HOSPADM

## 2019-01-11 RX ORDER — HEPARIN SODIUM 5000 [USP'U]/ML
5000 INJECTION, SOLUTION INTRAVENOUS; SUBCUTANEOUS EVERY 8 HOURS SCHEDULED
Status: DISCONTINUED | OUTPATIENT
Start: 2019-01-12 | End: 2019-01-21 | Stop reason: HOSPADM

## 2019-01-11 RX ORDER — NALOXONE HCL 0.4 MG/ML
0.2 VIAL (ML) INJECTION AS NEEDED
Status: DISCONTINUED | OUTPATIENT
Start: 2019-01-11 | End: 2019-01-11 | Stop reason: HOSPADM

## 2019-01-11 RX ORDER — FAMOTIDINE 10 MG/ML
20 INJECTION, SOLUTION INTRAVENOUS ONCE
Status: COMPLETED | OUTPATIENT
Start: 2019-01-11 | End: 2019-01-11

## 2019-01-11 RX ORDER — SENNA AND DOCUSATE SODIUM 50; 8.6 MG/1; MG/1
2 TABLET, FILM COATED ORAL NIGHTLY
Status: DISCONTINUED | OUTPATIENT
Start: 2019-01-11 | End: 2019-01-21 | Stop reason: HOSPADM

## 2019-01-11 RX ORDER — LIDOCAINE HYDROCHLORIDE 20 MG/ML
INJECTION, SOLUTION INFILTRATION; PERINEURAL AS NEEDED
Status: DISCONTINUED | OUTPATIENT
Start: 2019-01-11 | End: 2019-01-11 | Stop reason: SURG

## 2019-01-11 RX ORDER — HYDROMORPHONE HCL IN 0.9% NACL 50 MG/50ML
PATIENT CONTROLLED ANALGESIA SYRINGE INTRAVENOUS CONTINUOUS
Status: DISCONTINUED | OUTPATIENT
Start: 2019-01-11 | End: 2019-01-11 | Stop reason: CLARIF

## 2019-01-11 RX ORDER — LIDOCAINE HYDROCHLORIDE 40 MG/ML
SOLUTION TOPICAL AS NEEDED
Status: DISCONTINUED | OUTPATIENT
Start: 2019-01-11 | End: 2019-01-11 | Stop reason: SURG

## 2019-01-11 RX ORDER — BISACODYL 10 MG
10 SUPPOSITORY, RECTAL RECTAL DAILY PRN
Status: DISCONTINUED | OUTPATIENT
Start: 2019-01-11 | End: 2019-01-21 | Stop reason: HOSPADM

## 2019-01-11 RX ADMIN — HYDRALAZINE HYDROCHLORIDE 5 MG: 20 INJECTION INTRAMUSCULAR; INTRAVENOUS at 13:17

## 2019-01-11 RX ADMIN — CEFAZOLIN SODIUM 2 G: 2 INJECTION, SOLUTION INTRAVENOUS at 08:00

## 2019-01-11 RX ADMIN — SODIUM CHLORIDE, POTASSIUM CHLORIDE, SODIUM LACTATE AND CALCIUM CHLORIDE: 600; 310; 30; 20 INJECTION, SOLUTION INTRAVENOUS at 07:00

## 2019-01-11 RX ADMIN — ONDANSETRON 4 MG: 2 INJECTION INTRAMUSCULAR; INTRAVENOUS at 10:30

## 2019-01-11 RX ADMIN — HYDROMORPHONE HYDROCHLORIDE 0.5 MG: 1 INJECTION, SOLUTION INTRAMUSCULAR; INTRAVENOUS; SUBCUTANEOUS at 12:51

## 2019-01-11 RX ADMIN — HYDROMORPHONE HYDROCHLORIDE 0.5 MG: 1 INJECTION, SOLUTION INTRAMUSCULAR; INTRAVENOUS; SUBCUTANEOUS at 11:54

## 2019-01-11 RX ADMIN — OXYCODONE HYDROCHLORIDE 20 MG: 15 TABLET ORAL at 15:50

## 2019-01-11 RX ADMIN — METOPROLOL TARTRATE 25 MG: 25 TABLET ORAL at 15:49

## 2019-01-11 RX ADMIN — HYDROMORPHONE HYDROCHLORIDE 1 MG: 1 INJECTION, SOLUTION INTRAMUSCULAR; INTRAVENOUS; SUBCUTANEOUS at 13:54

## 2019-01-11 RX ADMIN — MIDAZOLAM HYDROCHLORIDE 2 MG: 2 INJECTION, SOLUTION INTRAMUSCULAR; INTRAVENOUS at 07:15

## 2019-01-11 RX ADMIN — FENTANYL CITRATE 50 MCG: 50 INJECTION, SOLUTION INTRAMUSCULAR; INTRAVENOUS at 11:55

## 2019-01-11 RX ADMIN — FENTANYL CITRATE 50 MCG: 50 INJECTION, SOLUTION INTRAMUSCULAR; INTRAVENOUS at 11:33

## 2019-01-11 RX ADMIN — ROCURONIUM BROMIDE 50 MG: 10 INJECTION INTRAVENOUS at 07:44

## 2019-01-11 RX ADMIN — POTASSIUM CHLORIDE, DEXTROSE MONOHYDRATE AND SODIUM CHLORIDE 75 ML/HR: 150; 5; 450 INJECTION, SOLUTION INTRAVENOUS at 14:19

## 2019-01-11 RX ADMIN — HYDROMORPHONE HYDROCHLORIDE 0.5 MG: 1 INJECTION, SOLUTION INTRAMUSCULAR; INTRAVENOUS; SUBCUTANEOUS at 11:40

## 2019-01-11 RX ADMIN — PROPOFOL 150 MG: 10 INJECTION, EMULSION INTRAVENOUS at 07:44

## 2019-01-11 RX ADMIN — FENTANYL CITRATE 50 MCG: 50 INJECTION, SOLUTION INTRAMUSCULAR; INTRAVENOUS at 07:15

## 2019-01-11 RX ADMIN — SODIUM CHLORIDE 0.4 MCG/KG/HR: 900 INJECTION, SOLUTION INTRAVENOUS at 08:00

## 2019-01-11 RX ADMIN — MIDAZOLAM 1 MG: 1 INJECTION INTRAMUSCULAR; INTRAVENOUS at 12:53

## 2019-01-11 RX ADMIN — MIDAZOLAM 1 MG: 1 INJECTION INTRAMUSCULAR; INTRAVENOUS at 12:06

## 2019-01-11 RX ADMIN — HYDROMORPHONE HYDROCHLORIDE 2 MG: 2 TABLET ORAL at 23:51

## 2019-01-11 RX ADMIN — LORAZEPAM 0.5 MG: 0.5 TABLET ORAL at 23:51

## 2019-01-11 RX ADMIN — FENTANYL CITRATE 50 MCG: 50 INJECTION, SOLUTION INTRAMUSCULAR; INTRAVENOUS at 11:38

## 2019-01-11 RX ADMIN — LORAZEPAM 0.5 MG: 0.5 TABLET ORAL at 18:47

## 2019-01-11 RX ADMIN — CEFAZOLIN SODIUM 2 G: 2 INJECTION, SOLUTION INTRAVENOUS at 17:35

## 2019-01-11 RX ADMIN — LORAZEPAM 0.5 MG: 2 INJECTION INTRAMUSCULAR; INTRAVENOUS at 14:15

## 2019-01-11 RX ADMIN — SODIUM CHLORIDE, POTASSIUM CHLORIDE, SODIUM LACTATE AND CALCIUM CHLORIDE 9 ML/HR: 600; 310; 30; 20 INJECTION, SOLUTION INTRAVENOUS at 07:15

## 2019-01-11 RX ADMIN — FAMOTIDINE 20 MG: 10 INJECTION, SOLUTION INTRAVENOUS at 07:14

## 2019-01-11 RX ADMIN — HYDRALAZINE HYDROCHLORIDE 5 MG: 20 INJECTION INTRAMUSCULAR; INTRAVENOUS at 10:30

## 2019-01-11 RX ADMIN — LIDOCAINE HYDROCHLORIDE 1 EACH: 40 SOLUTION TOPICAL at 07:45

## 2019-01-11 RX ADMIN — SUGAMMADEX 200 MG: 100 INJECTION, SOLUTION INTRAVENOUS at 11:08

## 2019-01-11 RX ADMIN — PROMETHAZINE HYDROCHLORIDE 12.5 MG: 25 INJECTION INTRAMUSCULAR; INTRAVENOUS at 13:00

## 2019-01-11 RX ADMIN — HYDROMORPHONE HYDROCHLORIDE: 10 INJECTION INTRAMUSCULAR; INTRAVENOUS; SUBCUTANEOUS at 18:26

## 2019-01-11 RX ADMIN — HEPARIN SODIUM 5000 UNITS: 5000 INJECTION INTRAVENOUS; SUBCUTANEOUS at 07:26

## 2019-01-11 RX ADMIN — FENTANYL CITRATE 50 MCG: 50 INJECTION, SOLUTION INTRAMUSCULAR; INTRAVENOUS at 07:20

## 2019-01-11 RX ADMIN — PROPOFOL 50 MG: 10 INJECTION, EMULSION INTRAVENOUS at 07:46

## 2019-01-11 RX ADMIN — CYCLOBENZAPRINE 10 MG: 10 TABLET, FILM COATED ORAL at 12:50

## 2019-01-11 RX ADMIN — FENTANYL CITRATE 100 MCG: 50 INJECTION INTRAMUSCULAR; INTRAVENOUS at 07:44

## 2019-01-11 RX ADMIN — LIDOCAINE HYDROCHLORIDE 60 MG: 20 INJECTION, SOLUTION INFILTRATION; PERINEURAL at 07:44

## 2019-01-11 RX ADMIN — FENTANYL CITRATE 50 MCG: 50 INJECTION, SOLUTION INTRAMUSCULAR; INTRAVENOUS at 12:03

## 2019-01-11 RX ADMIN — IPRATROPIUM BROMIDE AND ALBUTEROL SULFATE 3 ML: 2.5; .5 SOLUTION RESPIRATORY (INHALATION) at 15:17

## 2019-01-11 NOTE — H&P
Referring Provider: Per order   Reason for Consultation: Acute post operative pain    Patient Care Team:  Rhys Crowder Jr., MD as PCP - General (Family Medicine)  Whitney Dudley MD as Consulting Physician (Nephrology)    Chief complaint acute postoperative pain    Subjective .     History of present illness:  Patient is a 73-year-old white female who is been followed my office for several years secondary to an underlying history of:  1.  Postlaminectomy syndrome of the lumbar spine  2.  Chronic low back pain  3.  Lumbar spondylosis  The patient underwent a video assisted thoracoscopy with right upper lobe lobectomy this morning.  Her daughter, who is at bedside, reports that a 1 cm malignant nodule was found in the right upper lobe.  Lymph nodes are still pending.  The patient is complaining primarily of severe low back pain which is causing her to be very restless.  When she attempts to sit up, she'll start grabbing at the chest tube on the right side as well as it is aggravating her back pain.  A Dilaudid PCA pump has been ordered, but is not on the floor yet.  At home she is on oxycodone 20 mg up to 5 times per day.  She is also on by mouth Ativan 0.5 mg 3 times a day.       Review of Systems  Pertinent items are noted in HPI    History  Past Medical History:   Diagnosis Date   • AAA (abdominal aortic aneurysm) without rupture (CMS/HCC)    • Anxiety    • Arthritis    • Cancer (CMS/HCC)     skin cancer   • Chest pain     at rest   • Chronic low back pain    • Chronic pain syndrome 3/12/2016   • CKD (chronic kidney disease), stage III (CMS/HCC)    • Claustrophobia 10/26/2015    Resolved   • Depression    • Dizziness    • GERD (gastroesophageal reflux disease)    • GI problem    • Hiatal hernia    • History of migraine headaches    • Hyperlipidemia    • Hypertension    • Lumbar postlaminectomy syndrome 10/26/2015    Resolved   • Lung cancer (CMS/HCC)    • Lung nodule    • Palpitations    • Polypharmacy  4/22/2016   • Pulmonary embolism (CMS/HCC) 10/26/2015    January 2013 - Resolved, felt to be secondary to estrogen use   • Submandibular sialoadenitis 10/26/2015    Resolved   • Vitamin B 12 deficiency      Past Surgical History:   Procedure Laterality Date   • ANGIOPLASTY ILIAC ARTERY Bilateral 8/10/2018    Procedure: BILATERAL ILIAC STENTS;  Surgeon: Riki Mancera MD;  Location: [facility];  Service: Vascular   • BREAST SURGERY      Breast Surgery Reduction Procedure   • HYSTERECTOMY     • LUMBAR FUSION      Lumbar Vertebral Fusion   • SHOULDER ARTHROSCOPY  04/04/2013    Dr. Carrillo/[facility] - Resolved   • TOTAL HIP ARTHROPLASTY REVISION Left    • TOTAL KNEE ARTHROPLASTY Left    • TOTAL SHOULDER ARTHROPLASTY Left      Social History     Socioeconomic History   • Marital status:      Spouse name: Not on file   • Number of children: Not on file   • Years of education: Not on file   • Highest education level: Not on file   Social Needs   • Financial resource strain: Not on file   • Food insecurity - worry: Not on file   • Food insecurity - inability: Not on file   • Transportation needs - medical: Not on file   • Transportation needs - non-medical: Not on file   Occupational History   • Not on file   Tobacco Use   • Smoking status: Former Smoker     Packs/day: 2.50     Years: 15.00     Pack years: 37.50     Types: Cigarettes     Last attempt to quit: 9/22/2003     Years since quitting: 15.3   • Smokeless tobacco: Never Used   Substance and Sexual Activity   • Alcohol use: Yes     Alcohol/week: 0.6 oz     Types: 1 Cans of beer per week     Comment: occ   • Drug use: No   • Sexual activity: Defer   Other Topics Concern   • Not on file   Social History Narrative   • Not on file     Family History   Problem Relation Age of Onset   • Hypertension Mother    • Lung cancer Mother    • Cancer Mother         lung   • Kidney disease Father    • Other Brother         Coronary Artery Bypass Grafting   • Stroke  Brother         Cerebrovascular Accident   • Malig Hyperthermia Neg Hx      Allergies   Allergen Reactions   • Morphine Other (See Comments)     HEADACHE       Objective     Vital Signs   Temp:  [97.7 °F (36.5 °C)-98.3 °F (36.8 °C)] 97.7 °F (36.5 °C)  Heart Rate:  [64-97] 97  Resp:  [14-20] 18  BP: (145-183)/(67-84) 151/79  Arterial Line BP: (166-227)/(53-65) 227/65    Physical Exam:   73-year-old white female mildly obese in moderate to marked discomfort.  Lying in bed.  HEENT-unremarkable.  Lungs-diminished breath sounds on the right side.  Chest tube in right thorax.  Heart-regular without murmur.  Abdomen-soft, nontender.   exam deferred.  Neurologic exam-grossly intact with no deficits noted.  No exam was made of lumbar spine due to the fact that she is in restraints.  However, after being seen in multiple times in the office, there is well-healed surgical scar over the base of the lumbar spine which is tender to palpation.    Results Review:   I reviewed the patient's new clinical results.      Assessment/Plan       Lung cancer (CMS/[facility])      Assessment:  1.  Chronic low back pain from postlaminectomy syndrome of the lumbar spine which has been exacerbated by recent surgery  2.  Acute postoperative pain after the assisted thoracoscopy  3.  Carcinoma of the right upper lobe    Plan:  1.  I will make some changes to her PCA settings as I fear of the current settings are too low and will not reduce her pain sufficiently  2.  Ideally, it would be nice to use IV Ativan to help with her anxiety and agitation.  However, there is a nationwide shortage and I am unable to order this through the EMR.  She will continue her oral Ativan at present.    I discussed the patients findings and my recommendations with patient and family    Jaciel Webber MD  01/11/19  6:06 PM    Time: More than 50% of time spent in counseling and coordination of care:  Total face-to-face/floor time 30 min.  Time spent in counseling 15 min.  Counseling included the following topics: Pain control

## 2019-01-11 NOTE — ANESTHESIA PREPROCEDURE EVALUATION
Anesthesia Evaluation     Patient summary reviewed and Nursing notes reviewed   no history of anesthetic complications:  NPO Solid Status: > 6 hours  NPO Liquid Status: > 6 hours           Airway   Mallampati: II  TM distance: >3 FB  Neck ROM: full  No difficulty expected  Dental - normal exam         Pulmonary - normal exam    breath sounds clear to auscultation  (+) pulmonary embolism, lung cancer,   (-) rhonchi, decreased breath sounds, wheezes, rales, stridor  Cardiovascular - normal exam    NYHA Classification: I  ECG reviewed  Patient on routine beta blocker and Beta blocker given within 24 hours of surgery  Rhythm: regular  Rate: normal    (+) hypertension, PVD, hyperlipidemia,   (-) murmur, weak pulses, friction rub, systolic click, carotid bruits, JVD, peripheral edema      Neuro/Psych  (+) psychiatric history Depression,     GI/Hepatic/Renal/Endo    (+)  hiatal hernia, GERD,      Musculoskeletal     Abdominal  - normal exam    Abdomen: soft.  Bowel sounds: normal.   Substance History - negative use     OB/GYN negative ob/gyn ROS         Other   (+) arthritis   history of cancer active                    Anesthesia Plan    ASA 3     general     intravenous induction   Anesthetic plan, all risks, benefits, and alternatives have been provided, discussed and informed consent has been obtained with: patient.

## 2019-01-11 NOTE — ANESTHESIA POSTPROCEDURE EVALUATION
Patient: Rachana Arguelles    Procedure Summary     Date:  01/11/19 Room / Location:  [facility] OR 09 / [facility] MAIN OR    Anesthesia Start:  0735 Anesthesia Stop:  1126    Procedure:  BRONCOSCOPY, THORACOSCOPY VIDEO ASSISTED WITH RIGHT UPPER LOBE WEDGE RESECTION, COMPLETION RIGHT UPPER LOBECTOMY, LYMPH NODE DISSECTION, INTERCOSTAL NERVE BLOCK (Right Chest) Diagnosis:       Lung cancer (CMS/HCC)      (Lung cancer (CMS/HCC) [C34.90])    Surgeon:  Quita Figueroa MD Provider:  Barry Vera MD    Anesthesia Type:  general ASA Status:  3          Anesthesia Type: general  Last vitals  BP   173/73 (01/11/19 1245)   Temp   36.6 °C (97.8 °F) (01/11/19 1122)   Pulse   85 (01/11/19 1245)   Resp   16 (01/11/19 1245)     SpO2   94 % (01/11/19 1245)     Post Anesthesia Care and Evaluation    Patient location during evaluation: bedside  Patient participation: complete - patient participated  Level of consciousness: awake and alert  Pain management: adequate  Airway patency: patent  Anesthetic complications: No anesthetic complications    Cardiovascular status: acceptable  Respiratory status: acceptable  Hydration status: acceptable    Comments: /73   Pulse 85   Temp 36.6 °C (97.8 °F) (Oral)   Resp 16   SpO2 94%

## 2019-01-11 NOTE — NURSING NOTE
"Pt arrived to unit at approximately 1:50pm, upon arrival pt was flailing in bed related to pain and possible confusion.  stating \" I have to get up, I have to pee\" pt has gene haney noted and told that multiple times. Family noted at bedside pt. attempting to talk to pt to calm her down. Family nor staff or able to calm  pt. Order received for wrist restraints. Pt received PRN pain medication and 1X dose of ativan. Unable to adequately assess pt. Mental status related to flailing and yelling out.   "

## 2019-01-11 NOTE — INTERVAL H&P NOTE
H&P reviewed. The patient was examined and there are no changes to the H&P.    Stress echo normal.  Risks and benefits of this procedure were discussed with the patient today including pneumonia, prolonged air leak and atrial fibrillaiton.  We will proceed with wedge resection for diagnosis.

## 2019-01-11 NOTE — OP NOTE
THORACOSCOPY VIDEO ASSISTED WITH LOBECTOMY  Procedure Report    Patient Name:  Rachana Arguelles  YOB: 1945    Date of Surgery:  1/11/2019     Indications:  Ms. Arguelles has a right upper lobe lesion that is concerning for a lung cancer. We will plan a VATS wedge resection for diagnosis followed by a completion lobectomy if there is cancer.     Pre-op Diagnosis:   Lung cancer (CMS/HCC) [C34.90]       Post-Op Diagnosis Codes:     * Lung cancer (CMS/HCC) [C34.90]    Procedure/CPT® Codes:      Procedure(s):  Therapeutic BRONCOSCOPY, THORACOSCOPY VIDEO ASSISTED WITH RIGHT UPPER LOBE WEDGE RESECTION, COMPLETION RIGHT UPPER LOBECTOMY, LYMPH NODE DISSECTION, INTERCOSTAL NERVE BLOCK    Staff:  Surgeon(s):  Quita Figueroa MD    Assistant: Elis Rocha CSA    Anesthesia: General    Estimated Blood Loss: 200 mL    Implants:    Implant Name Type Inv. Item Serial No.  Lot No. LRB No. Used   RELOAD STPLR ENDO SARABJIT TRISTAPLE 45 ART MD [facility] - [id number] Implant RELOAD STPLR ENDO SARABJIT TRISTAPLE 45 ART MD [facility] [id number] Right 5   RELOAD STPLR SIGNIA TRISTAPLE 2.0 60MM CRV [place] [facility] [id number] Implant RELOAD STPLR SIGNIA TRISTAPLE 2.0 60MM CRV ART MD/[facility] [id number] Right 1   RELOAD STPLR ENDO SARABJIT TRISTAPLE 2.0 45 ART JESSI WEEKS - [id number] Implant RELOAD STPLR ENDO SARABJIT TRISTAPLE 2.0 45 [facility] [id number] Right 1   RELOAD STPLR ENDO SARABJIT TRISTAPLE 45 ART JESSI GOLDSMITH - [id number] Implant RELOAD STPLR ENDO SARABJIT TRISTAPLE 45 ART JESSI BASILIO [id number] Right 2   RELOAD STPLR ENDO SARABJIT TRISTAPLE 2.0 45 ART JESSI WEEKS - [id number] Implant RELOAD STPLR ENDO SARABJIT TRISTAPLE 2.0 45 ART [facility] [id number] Right 1   RELOAD STPLR ENDO SARABJIT TRISTAPLE 60 ART MD NUÑEZ - [id number] Implant RELOAD STPLR ENDO SARABJIT TRISTAPLE 60 ART MD NUÑEZ  COVROWDY [id number] Right 3       Specimen:          ID Type Source Tests Collected by Time   A : RIGHT UPPER LOBE WEDGE FOR DIAGNOSIS OR9 PHONE#[id number]  Tissue Lung, Right Upper Lobe TISSUE PATHOLOGY EXAM Quita Figueroa MD 1/11/2019 0839   B : SUBCARINAL LYMPH NODE FRESH FOR PERMANENT Tissue Lymph Node TISSUE PATHOLOGY EXAM Quita Figueroa MD 1/11/2019 0846   C : RIGHT UPPER LOBE FOR COMPLETION LOBECTOMY FRESH FOR PERMANENT Tissue Lung, Right Upper Lobe TISSUE PATHOLOGY EXAM Quita Figueroa MD 1/11/2019 1004   D : LEVEL 8 LYMPH NODE FRESH FOR PERMANENT Tissue Lymph Node TISSUE PATHOLOGY EXAM Quita Figueroa MD 1/11/2019 1007   E : RIGHT LEVEL 4 LYMPH NODE FRESH FOR PERMANENT Tissue Lymph Node TISSUE PATHOLOGY EXAM Quita Figueroa MD 1/11/2019 1037   F : LEVEL 10 #1 LYMPH NODE FRESH FOR PERMANENT Tissue Lymph Node TISSUE PATHOLOGY EXAM Quita Figueroa MD 1/11/2019 1037   G : LEVEL 10 #2 LYMPH NODE FRESH FOR PERMANENT Tissue Lymph Node TISSUE PATHOLOGY EXAM Quita Figueroa MD 1/11/2019 1037   H : LEVEL 10 #3 LYMPH NODE FRESH FOR PERMANENT Tissue Lymph Node TISSUE PATHOLOGY EXAM Quita Figueroa MD 1/11/2019 1037         Findings: Right upper lobe lung cancer, copious secretions    Complications: None apparent    Description of Procedure: Rachana Arguelles was identified in the preoperative holding area and again her consent for the procedure was verified.  The patient was transported to the operating room and placed on the operating room table in supine position.  A general anesthetic was successfully administered and She was intubated with a double lumen endotracheal tube without difficulty.  Placement of the double lumen was confirmed with a video bronchoscope examination, although the copious secretions made this difficult. A time out was performed to verify correct patient, correct procedure and the correct administration of IV antibiotics per [facility] protocol.    The patient was placed in left lateral decubitus position and all pressure points were padded.  The patient was prepped and draped in standard sterile fashion.  An approximately 2cm incision was made in  the 8th intercostal space midaxillary line, dissection was carried down into the chest cavity using Bovie electrocautery under direct vision.  An ravi wound protector was placed into the incision.  The camera was inserted into the chest and there were no abnormalities noted on the chest wall or lung.  A second approximately 4cm incision was made in the anterior axillary line 5th intercostal space and the chest was entered and the rib space was opened under direct visualization.    Two Amairani clamps were inserted into the chest cavity and the upper lobe was examined.  The lesion was located deep in the anterolateral surface of the right upper lobe. The lung compression clamp was placed under the lesion and an EndoGIA purple tissue load was used in sequential firings to perform a wedge resection of the lesion.  The wedged lung tissue was removed in an EndoCatch bag from the chest cavity.  The lung was opened on the back table and a small piece of the lesion was taken for culture in case the lesion was not a lung cancer.    Frozen section confirmed that this lesion was a NSCLC and, therefore, we elected to proceed with a right upper lobectomy.  A diaphragm grasper was used to grasp the bare area of the diaphragm and retract it inferiorly in order to facilitate visualization.  The lung was retracted anteriorly and the right mainstem bronchus was identified.  The overlying pleura was divided using blunt dissection to below the ti.  The right upper lobe bronchus was identified.  A level 10 lymph node was identified in the bifurcation between the upper lobe and the bronchus intermedius and was removed.  Using blunt dissection, the right upper lobe bronchus was carefully encircled. A right paratracheal lymph node was retrieved from the superior portion of the right mainstem under the azygous vein.  A purple tissue staple load was used to encircle the right upper lobe bronchus, prior to firing the stapler, the video  bronchoscope was used to visualize and verify the length of the right upper lobe stump and the opening of the bronchus intermedius.  Once we confirmed stapler placement, the bronchus was divided.     The lung was then retracted posteriorly, and the overlying pleura was opened using blunt dissection to identify the upper lobe venous branch with care taken to preserve the right middle lobe branch.  The upper lobe pulmonary vein was encircled using blunt dissection with a Harkin clamp and divided using a tan staple load.  The pulmonary artery and vein were  with blunt dissection and once there was enough visualization, the two apical branches of the pulmonary artery were encircled and divided using a vascular staple load.      We then turned our attention to the fissure.  This patinet had almost complete fissure and we were able to begin the division of the upper lobe from the middle lobe.  The rest of the fissure was completed with sequential firings of purple tissue load staplers with care not to narrow the continuing pulmonary artery and to preserve the hilum of the right upper lobe.  Once the lung was completely divided, the upper lobe was placed in an EndoCatch bag and was removed from the chest cavity.    The inferior pulmonary ligament was mobilized and an inferior pulmonary ligament node was retrieved. The subcarinal space was opened and a level 7 lymph node was resected.     The chest cavity was then irrigated with saline, water and antibiotic irrigation.  The bronchial stump was visualized in the water and the lung was inflated. There was no evidence of air leak from the bronchial stump.  A 24 Ivorian chest tube was placed in the 8th intercostal space incision and the lung was reexpanded under direct visualization.  The middle lobe was not torsed and expanded normally.      The incisions were closed using deep vicryl sutures and Monocryl for the skin in standard fashion.  The chest tube was secured to  the skin.     The patient tolerated this procedure well and was extubated and transported to the recovery room in stable condition.        Quita Figueroa MD     Date: 1/11/2019  Time: 11:23 AM

## 2019-01-11 NOTE — ANESTHESIA PROCEDURE NOTES
ANESTHESIA INTUBATION  Urgency: elective    Date/Time: 1/11/2019 7:45 AM  Airway not difficult    General Information and Staff    Patient location during procedure: OR  Anesthesiologist: Barry Vera MD  CRNA: Wesley Vega CRNA    Indications and Patient Condition  Indications for airway management: airway protection    Preoxygenated: yes  MILS maintained throughout  Mask difficulty assessment: 2 - vent by mask + OA or adjuvant +/- NMBA    Final Airway Details  Final airway type: endotracheal airway      Successful airway: ETT - double lumen left  Cuffed: yes   Successful intubation technique: direct laryngoscopy  Facilitating devices/methods: intubating stylet  Endotracheal tube insertion site: oral  Blade: Martin  Blade size: 3  ETT DL size (fr): 35  Cormack-Lehane Classification: grade IIa - partial view of glottis  Placement verified by: chest auscultation, bronchoscopy and capnometry   Measured from: teeth  ETT to teeth (cm): 29  Number of attempts at approach: 1    Additional Comments  FOB verification of ETT after placement and again after positioning

## 2019-01-11 NOTE — ANESTHESIA PROCEDURE NOTES
Arterial Line    Pre-sedation assessment completed: 1/11/2019 7:15 AM    Patient location during procedure: pre-op  Start time: 1/11/2019 7:12 AM  Stop Time:1/11/2019 7:28 AM       Line placed for hemodynamic monitoring.  Performed By   Anesthesiologist: Barry Vera MD  Preanesthetic Checklist  Completed: patient identified, site marked, surgical consent, pre-op evaluation, timeout performed, IV checked, risks and benefits discussed and monitors and equipment checked  Arterial Line Prep   Sterile Tech: gloves and cap  Prep: ChloraPrep  Patient monitoring: blood pressure monitoring, continuous pulse oximetry and EKG  Arterial Line Procedure   Laterality:left  Location:  radial artery  Catheter size: 20 G   Guidance: landmark technique  Number of attempts: 1  Successful placement: yes          Post Assessment   Dressing Type: occlusive dressing applied, secured with tape and wrist guard applied.   Complications no  Circ/Move/Sens Assessment: normal.   Patient Tolerance: patient tolerated the procedure well with no apparent complications

## 2019-01-12 ENCOUNTER — APPOINTMENT (OUTPATIENT)
Dept: GENERAL RADIOLOGY | Facility: HOSPITAL | Age: 74
End: 2019-01-12

## 2019-01-12 LAB
ANION GAP SERPL CALCULATED.3IONS-SCNC: 9.7 MMOL/L
BASOPHILS # BLD AUTO: 0.01 10*3/MM3 (ref 0–0.2)
BASOPHILS NFR BLD AUTO: 0.1 % (ref 0–1.5)
BUN BLD-MCNC: 23 MG/DL (ref 8–23)
BUN/CREAT SERPL: 14.4 (ref 7–25)
CALCIUM SPEC-SCNC: 8.8 MG/DL (ref 8.6–10.5)
CHLORIDE SERPL-SCNC: 99 MMOL/L (ref 98–107)
CO2 SERPL-SCNC: 26.3 MMOL/L (ref 22–29)
CREAT BLD-MCNC: 1.6 MG/DL (ref 0.57–1)
CYTO UR: NORMAL
DEPRECATED RDW RBC AUTO: 45.7 FL (ref 37–54)
EOSINOPHIL # BLD AUTO: 0 10*3/MM3 (ref 0–0.7)
EOSINOPHIL NFR BLD AUTO: 0 % (ref 0.3–6.2)
ERYTHROCYTE [DISTWIDTH] IN BLOOD BY AUTOMATED COUNT: 13 % (ref 11.7–13)
GFR SERPL CREATININE-BSD FRML MDRD: 32 ML/MIN/1.73
GLUCOSE BLD-MCNC: 138 MG/DL (ref 65–99)
HCT VFR BLD AUTO: 28.7 % (ref 35.6–45.5)
HGB BLD-MCNC: 8.7 G/DL (ref 11.9–15.5)
IMM GRANULOCYTES # BLD AUTO: 0.04 10*3/MM3 (ref 0–0.03)
IMM GRANULOCYTES NFR BLD AUTO: 0.3 % (ref 0–0.5)
LAB AP CASE REPORT: NORMAL
LAB AP SPECIAL STAINS: NORMAL
LAB AP SYNOPTIC CHECKLIST: NORMAL
LYMPHOCYTES # BLD AUTO: 1.46 10*3/MM3 (ref 0.9–4.8)
LYMPHOCYTES NFR BLD AUTO: 11.6 % (ref 19.6–45.3)
Lab: NORMAL
MCH RBC QN AUTO: 29.4 PG (ref 26.9–32)
MCHC RBC AUTO-ENTMCNC: 30.3 G/DL (ref 32.4–36.3)
MCV RBC AUTO: 97 FL (ref 80.5–98.2)
MONOCYTES # BLD AUTO: 1.31 10*3/MM3 (ref 0.2–1.2)
MONOCYTES NFR BLD AUTO: 10.4 % (ref 5–12)
NEUTROPHILS # BLD AUTO: 9.84 10*3/MM3 (ref 1.9–8.1)
NEUTROPHILS NFR BLD AUTO: 77.9 % (ref 42.7–76)
PATH REPORT.FINAL DX SPEC: NORMAL
PATH REPORT.GROSS SPEC: NORMAL
PLATELET # BLD AUTO: 260 10*3/MM3 (ref 140–500)
PMV BLD AUTO: 9.3 FL (ref 6–12)
POTASSIUM BLD-SCNC: 4.4 MMOL/L (ref 3.5–5.2)
RBC # BLD AUTO: 2.96 10*6/MM3 (ref 3.9–5.2)
SODIUM BLD-SCNC: 135 MMOL/L (ref 136–145)
WBC NRBC COR # BLD: 12.62 10*3/MM3 (ref 4.5–10.7)

## 2019-01-12 PROCEDURE — 97162 PT EVAL MOD COMPLEX 30 MIN: CPT

## 2019-01-12 PROCEDURE — 71045 X-RAY EXAM CHEST 1 VIEW: CPT

## 2019-01-12 PROCEDURE — 85025 COMPLETE CBC W/AUTO DIFF WBC: CPT | Performed by: THORACIC SURGERY (CARDIOTHORACIC VASCULAR SURGERY)

## 2019-01-12 PROCEDURE — 25010000003 CEFAZOLIN IN DEXTROSE 2-4 GM/100ML-% SOLUTION: Performed by: THORACIC SURGERY (CARDIOTHORACIC VASCULAR SURGERY)

## 2019-01-12 PROCEDURE — 94799 UNLISTED PULMONARY SVC/PX: CPT

## 2019-01-12 PROCEDURE — 25010000002 HEPARIN (PORCINE) PER 1000 UNITS: Performed by: THORACIC SURGERY (CARDIOTHORACIC VASCULAR SURGERY)

## 2019-01-12 PROCEDURE — 80048 BASIC METABOLIC PNL TOTAL CA: CPT | Performed by: THORACIC SURGERY (CARDIOTHORACIC VASCULAR SURGERY)

## 2019-01-12 PROCEDURE — 99024 POSTOP FOLLOW-UP VISIT: CPT | Performed by: THORACIC SURGERY (CARDIOTHORACIC VASCULAR SURGERY)

## 2019-01-12 PROCEDURE — 97110 THERAPEUTIC EXERCISES: CPT

## 2019-01-12 PROCEDURE — 36415 COLL VENOUS BLD VENIPUNCTURE: CPT | Performed by: THORACIC SURGERY (CARDIOTHORACIC VASCULAR SURGERY)

## 2019-01-12 RX ORDER — HYDROMORPHONE HCL IN 0.9% NACL 10 MG/50ML
PATIENT CONTROLLED ANALGESIA SYRINGE INTRAVENOUS CONTINUOUS
Status: DISCONTINUED | OUTPATIENT
Start: 2019-01-12 | End: 2019-01-13

## 2019-01-12 RX ORDER — OXYMETAZOLINE HYDROCHLORIDE 0.05 G/100ML
2 SPRAY NASAL 2 TIMES DAILY
Status: DISPENSED | OUTPATIENT
Start: 2019-01-12 | End: 2019-01-15

## 2019-01-12 RX ADMIN — LORAZEPAM 0.5 MG: 0.5 TABLET ORAL at 15:32

## 2019-01-12 RX ADMIN — METOPROLOL TARTRATE 25 MG: 25 TABLET ORAL at 08:26

## 2019-01-12 RX ADMIN — IPRATROPIUM BROMIDE AND ALBUTEROL SULFATE 3 ML: 2.5; .5 SOLUTION RESPIRATORY (INHALATION) at 16:48

## 2019-01-12 RX ADMIN — HYDROMORPHONE HYDROCHLORIDE: 10 INJECTION INTRAMUSCULAR; INTRAVENOUS; SUBCUTANEOUS at 06:13

## 2019-01-12 RX ADMIN — PANTOPRAZOLE SODIUM 40 MG: 40 TABLET, DELAYED RELEASE ORAL at 06:12

## 2019-01-12 RX ADMIN — CEFAZOLIN SODIUM 2 G: 2 INJECTION, SOLUTION INTRAVENOUS at 01:03

## 2019-01-12 RX ADMIN — VENLAFAXINE HYDROCHLORIDE 150 MG: 150 CAPSULE, EXTENDED RELEASE ORAL at 08:26

## 2019-01-12 RX ADMIN — LORAZEPAM 0.5 MG: 0.5 TABLET ORAL at 08:26

## 2019-01-12 RX ADMIN — HEPARIN SODIUM 5000 UNITS: 5000 INJECTION INTRAVENOUS; SUBCUTANEOUS at 15:32

## 2019-01-12 RX ADMIN — IPRATROPIUM BROMIDE AND ALBUTEROL SULFATE 3 ML: 2.5; .5 SOLUTION RESPIRATORY (INHALATION) at 23:19

## 2019-01-12 RX ADMIN — POTASSIUM CHLORIDE, DEXTROSE MONOHYDRATE AND SODIUM CHLORIDE 75 ML/HR: 150; 5; 450 INJECTION, SOLUTION INTRAVENOUS at 06:12

## 2019-01-12 RX ADMIN — HEPARIN SODIUM 5000 UNITS: 5000 INJECTION INTRAVENOUS; SUBCUTANEOUS at 22:07

## 2019-01-12 RX ADMIN — HEPARIN SODIUM 5000 UNITS: 5000 INJECTION INTRAVENOUS; SUBCUTANEOUS at 06:12

## 2019-01-12 RX ADMIN — FAMOTIDINE 20 MG: 20 TABLET, FILM COATED ORAL at 08:26

## 2019-01-12 RX ADMIN — HYDROMORPHONE HYDROCHLORIDE: 10 INJECTION INTRAMUSCULAR; INTRAVENOUS; SUBCUTANEOUS at 19:17

## 2019-01-12 RX ADMIN — POTASSIUM CHLORIDE, DEXTROSE MONOHYDRATE AND SODIUM CHLORIDE 75 ML/HR: 150; 5; 450 INJECTION, SOLUTION INTRAVENOUS at 19:19

## 2019-01-12 RX ADMIN — Medication 2 SPRAY: at 11:39

## 2019-01-12 NOTE — THERAPY EVALUATION
Acute Care - Physical Therapy Initial Evaluation  [facility]     Patient Name: Rachana Arguelles  : 1945  MRN: [id number]  Today's Date: 2019   Onset of Illness/Injury or Date of Surgery: 19  Date of Referral to PT: 19  Referring Physician: Henry      Admit Date: 2019    Visit Dx:     ICD-10-CM ICD-9-CM   1. Difficulty walking R26.2 719.7   2. Lung cancer (CMS/HCC) C34.90 162.9     Patient Active Problem List   Diagnosis   • Anxiety   • Chronic pain syndrome   • Depression   • Gastroesophageal reflux disease   • Hyperlipidemia   • Hypertension   • Osteoarthritis of hip   • Chronic low back pain   • Failed back syndrome of lumbar spine   • Pulmonary embolus (CMS/HCC)   • Weight loss   • Polypharmacy   • CKD (chronic kidney disease), stage III (CMS/HCC)   • Chronic constipation   • Sacroiliac joint pain   • Mixed incontinence   • Renal cyst   • Achilles tendonitis   • Atherosclerosis of native artery of left lower extremity with intermittent claudication (CMS/HCC)   • Morbidly obese (CMS/HCC)   • Lung nodule   • Malignant neoplasm of right upper lobe of lung (CMS/HCC)   • Lung cancer (CMS/HCC)     Past Medical History:   Diagnosis Date   • AAA (abdominal aortic aneurysm) without rupture (CMS/HCC)    • Anxiety    • Arthritis    • Cancer (CMS/HCC)     skin cancer   • Chest pain     at rest   • Chronic low back pain    • Chronic pain syndrome 3/12/2016   • CKD (chronic kidney disease), stage III (CMS/HCC)    • Claustrophobia 10/26/2015    Resolved   • Depression    • Dizziness    • GERD (gastroesophageal reflux disease)    • GI problem    • Hiatal hernia    • History of migraine headaches    • Hyperlipidemia    • Hypertension    • Lumbar postlaminectomy syndrome 10/26/2015    Resolved   • Lung cancer (CMS/HCC)    • Lung nodule    • Palpitations    • Polypharmacy 2016   • Pulmonary embolism (CMS/HCC) 10/26/2015    2013 - Resolved, felt to be secondary to estrogen use   •  Submandibular sialoadenitis 10/26/2015    Resolved   • Vitamin B 12 deficiency      Past Surgical History:   Procedure Laterality Date   • ANGIOPLASTY ILIAC ARTERY Bilateral 8/10/2018    Procedure: BILATERAL ILIAC STENTS;  Surgeon: Riki Mancera MD;  Location: [facility] OR;  Service: Vascular   • BREAST SURGERY      Breast Surgery Reduction Procedure   • HYSTERECTOMY     • LUMBAR FUSION      Lumbar Vertebral Fusion   • SHOULDER ARTHROSCOPY  04/04/2013    Dr. Carrillo/MERRILL - Resolved   • TOTAL HIP ARTHROPLASTY REVISION Left    • TOTAL KNEE ARTHROPLASTY Left    • TOTAL SHOULDER ARTHROPLASTY Left         PT ASSESSMENT (last 12 hours)      Physical Therapy Evaluation     Row Name 01/12/19 1300          PT Evaluation Time/Intention    Subjective Information  pain limited non-verbal response but yelling out  -LS     Document Type  evaluation  -LS     Mode of Treatment  physical therapy  -LS     Total Evaluation Minutes, Physical Therapy  20  -LS     Patient Effort  fair  -LS     Symptoms Noted During/After Treatment  other (see comments)  (Significant)  severe confusion, flailing, yelling out  -LS     Row Name 01/12/19 1300          General Information    Patient Profile Reviewed?  yes  -LS     Onset of Illness/Injury or Date of Surgery  01/11/19  -LS     Referring Physician  Henry  -LS     Patient Observations  cooperative confusion  -LS     Patient/Family Observations  Pt family members present  -LS     General Observations of Patient  Pt supine in bed, IV, drain, O2, pain pump  -LS     Prior Level of Function  independent:  -LS     Equipment Currently Used at Home  cane, straight  -LS     Pertinent History of Current Functional Problem  Pt admitted with lung CA, s/p R upper lobectomy  -LS     Existing Precautions/Restrictions  fall  -LS     Limitations/Impairments  safety/cognitive  -LS     Risks Reviewed  patient and family:  -LS     Benefits Reviewed  patient and family:  -LS     Barriers to Rehab  cognitive  status  -LS     Row Name 01/12/19 1300          Relationship/Environment    Lives With  spouse  -LS     Row Name 01/12/19 1300          Resource/Environmental Concerns    Current Living Arrangements  home/apartment/condo  -LS     Row Name 01/12/19 1300          Cognitive Assessment/Intervention- PT/OT    Orientation Status (Cognition)  oriented to;person;disoriented to;place;situation;verbal cues/prompts needed for orientation  -LS     Follows Commands (Cognition)  0-24% accuracy;follows one step commands;delayed response/completion;increased processing time needed;physical/tactile prompts required;verbal cues/prompting required;repetition of directions required  -LS     Safety Deficit (Cognitive)  impulsivity;at risk behavior observed;moderate deficit;safety precautions awareness  -LS     Row Name 01/12/19 1300          Bed Mobility Assessment/Treatment    Bed Mobility Assessment/Treatment  bed mobility (all) activities  -     [place] Level (Bed Mobility)  moderate assist (50% patient effort);2 person assist  -LS     Bed Mobility, Safety Issues  cognitive deficits limit understanding  -LS     Row Name 01/12/19 1300          Transfer Assessment/Treatment    Transfer Assessment/Treatment  bed-chair transfer;sit-stand transfer  -LS     Bed-Chair [place] (Transfers)  moderate assist (50% patient effort);2 person assist;1 person to manage equipment  -LS     Sit-Stand [place] (Transfers)  moderate assist (50% patient effort);2 person assist;1 person to manage equipment  -LS     Row Name 01/12/19 1300          Sit-Stand Transfer    Assistive Device (Sit-Stand Transfers)  walker, front-wheeled  -     Row Name 01/12/19 1300          Gait/Stairs Assessment/Training    [place] Level (Gait)  not tested unsafe due to dec cognition, impulsivity  -LS     Row Name 01/12/19 1300          General ROM    GENERAL ROM COMMENTS  BLE WFL  -LS     Row Name 01/12/19 1300          MMT (Manual Muscle Testing)     General MMT Comments  BLE 4-/5  -LS     Row Name             Wound 01/11/19 0825 Right flank incision    Wound - Properties Group Date first assessed: 01/11/19  -SM Time first assessed: 0825  -SM Side: Right  -SM Location: flank  -SM Type: incision  -SM    Row Name 01/12/19 1300          Physical Therapy Clinical Impression    Date of Referral to PT  01/12/19  -LS     Functional Level at Time of Evaluation (PT Clinical Impression)  mod A x 2  -LS     Patient/Family Goals Statement (PT Clinical Impression)  Pt plans to return home with family to assist.  -LS     Criteria for Skilled Interventions Met (PT Clinical Impression)  yes  -LS     Pathology/Pathophysiology Noted (Describe Specifically for Each System)  musculoskeletal  -LS     Impairments Found (describe specific impairments)  gait, locomotion, and balance  -LS     Rehab Potential (PT Clinical Summary)  good, to achieve stated therapy goals  -LS     Row Name 01/12/19 1300          Physical Therapy Goals    Bed Mobility Goal Selection (PT)  bed mobility, PT goal 1  -LS     Transfer Goal Selection (PT)  transfer, PT goal 1  -LS     Gait Training Goal Selection (PT)  gait training, PT goal 1  -LS     Row Name 01/12/19 1300          Bed Mobility Goal 1 (PT)    Activity/Assistive Device (Bed Mobility Goal 1, PT)  bed mobility activities, all  -LS     [place] Level/Cues Needed (Bed Mobility Goal 1, PT)  supervision required  -LS     Time Frame (Bed Mobility Goal 1, PT)  1 week  -LS     Row Name 01/12/19 1300          Transfer Goal 1 (PT)    Activity/Assistive Device (Transfer Goal 1, PT)  transfers, all  -LS     [place] Level/Cues Needed (Transfer Goal 1, PT)  supervision required  -LS     Time Frame (Transfer Goal 1, PT)  1 week  -LS     Row Name 01/12/19 1300          Gait Training Goal 1 (PT)    Activity/Assistive Device (Gait Training Goal 1, PT)  gait (walking locomotion)  -LS     [place] Level (Gait Training Goal 1, PT)  supervision required   -LS     Distance (Gait Goal 1, PT)  150  -LS     Time Frame (Gait Training Goal 1, PT)  1 week  -LS     Row Name 01/12/19 1300          Positioning and Restraints    Pre-Treatment Position  in bed  -LS     Post Treatment Position  chair  -LS     In Chair  sitting;call light within reach;encouraged to call for assist;with family/caregiver;notified nsg  -LS     Row Name 01/12/19 1300          Living Environment    Home Accessibility  stairs to enter home  -LS       User Key  (r) = Recorded By, (t) = Taken By, (c) = Cosigned By    Initials Name Provider Type    Pieter Clemente, RN Registered Nurse    Aimee Henderson, PT Physical Therapist        Physical Therapy Education     Title: PT OT SLP Therapies (In Progress)     Topic: Physical Therapy (In Progress)     Point: Mobility training (In Progress)     Learning Progress Summary           Patient Acceptance, E,TB,D, NR by  at 1/12/2019  1:32 PM   Family Acceptance, E,TB,D, NR by  at 1/12/2019  1:32 PM                   Point: Home exercise program (In Progress)     Learning Progress Summary           Patient Acceptance, E,TB,D, NR by  at 1/12/2019  1:32 PM   Family Acceptance, E,TB,D, NR by  at 1/12/2019  1:32 PM                   Point: Body mechanics (In Progress)     Learning Progress Summary           Patient Acceptance, E,TB,D, NR by  at 1/12/2019  1:32 PM   Family Acceptance, E,TB,D, NR by  at 1/12/2019  1:32 PM                   Point: Precautions (In Progress)     Learning Progress Summary           Patient Acceptance, E,TB,D, NR by  at 1/12/2019  1:32 PM   Family Acceptance, E,TB,D, NR by  at 1/12/2019  1:32 PM                               User Key     Initials Effective Dates Name Provider Type [facility] 04/03/18 -  Aimee Ayala, TIMI Physical Therapist PT              PT Recommendation and Plan  Anticipated Discharge Disposition (PT): skilled nursing facility  Planned Therapy Interventions (PT Eval): bed mobility training, gait  training, transfer training  Therapy Frequency (PT Clinical Impression): daily  Outcome Summary/Treatment Plan (PT)  Anticipated Discharge Disposition (PT): skilled nursing facility  Plan of Care Reviewed With: patient  Outcome Summary: Pt is 72 yo female s/p R upper lobectomy yesterday demonstrating severe impulsivity, flailing, confusion, and difficulty following commands. Able to transfer to chair with assist of 2 with constant verbal and tactile cuing and redirection. Pt demonstrating 4/5 strength B LE but required high levels of assist due to confusion and difficulty following commands. Pt uses [facility] at home where she lives with her spouse. Recommend continued skilled PT services to improve transfers, bed mobility, and gait. Recommend [facility] vs. home with home health following d/c depending on progress.   Outcome Measures     Row Name 01/12/19 1300             How much help from another person do you currently need...    Turning from your back to your side while in flat bed without using bedrails?  2  -LS      Moving from lying on back to sitting on the side of a flat bed without bedrails?  2  -LS      Moving to and from a bed to a chair (including a wheelchair)?  2  -LS      Standing up from a chair using your arms (e.g., wheelchair, bedside chair)?  2  -LS      Climbing 3-5 steps with a railing?  1  -LS      To walk in hospital room?  1  -LS      AM-PAC 6 Clicks Score  10  -LS         Functional Assessment    Outcome Measure Options  AM-PAC 6 Clicks Basic Mobility (PT)  -LS        User Key  (r) = Recorded By, (t) = Taken By, (c) = Cosigned By    Initials Name Provider Type    Aimee Henderson, PT Physical Therapist         Time Calculation:   PT Charges     Row Name 01/12/19 1333             Time Calculation    Start Time  1310  -      Stop Time  1330  -      Time Calculation (min)  20 min  -LS      PT Received On  01/12/19  -      PT - Next Appointment  01/13/19  -      PT Goal Re-Cert Due Date  01/19/19   -LS         Time Calculation- PT    Total Timed Code Minutes- PT  20 minute(s)  -LS        User Key  (r) = Recorded By, (t) = Taken By, (c) = Cosigned By    Initials Name Provider Type    LS Aimee Ayala, TIMI Physical Therapist        Therapy Suggested Charges     Code   Minutes Charges    None           Therapy Charges for Today     Code Description Service Date Service Provider Modifiers Qty    25326809230 HC PT EVAL MOD COMPLEXITY 2 1/12/2019 Aimee Ayala, PT GP 1    81109582396 HC PT THER PROC EA 15 MIN 1/12/2019 Aimee Ayala, PT GP 1    33121964540 HC PT THER SUPP EA 15 MIN 1/12/2019 Aimee Ayala, PT GP 1          PT G-Codes  Outcome Measure Options: AM-PAC 6 Clicks Basic Mobility (PT)  AM-PAC 6 Clicks Score: 10      Aimee Ayala, PT  1/12/2019

## 2019-01-12 NOTE — PROGRESS NOTES
[facility]    POD #2 right VAT and upper lobe lobectomy    S: Feeling some better. Family at bedside reports she has not been as agitated. Chest tube to water seal.       atorvastatin 40 mg Oral Nightly   cetirizine 10 mg Oral Daily   famotidine 20 mg Oral BID   heparin (porcine) 5,000 Units Subcutaneous Q8H   ipratropium-albuterol 3 mL Nebulization Q8H - RT   LORazepam 0.5 mg Oral TID   metoprolol tartrate 25 mg Oral Q12H   oxymetazoline 2 spray Each Nare BID   pantoprazole 40 mg Oral QAM   sennosides-docusate sodium 2 tablet Oral Nightly   sucralfate 1 g Oral 4x Daily   traZODone 75 mg Oral Nightly   venlafaxine  mg Oral Daily       dextrose 5 % and sodium chloride 0.45 % with KCl 20 mEq/L 75 mL/hr Last Rate: 75 mL/hr (01/12/19 0612)   HYDROmorphone HCl-NaCl     lactated ringers 9 mL/hr Last Rate: Stopped (01/11/19 1108)     bisacodyl  •  cyclobenzaprine  •  HYDROmorphone  •  HYDROmorphone  •  magnesium hydroxide  •  naloxone  •  nitroglycerin  •  ondansetron **OR** ondansetron ODT **OR** ondansetron  •  oxyCODONE  •  oxyCODONE-acetaminophen  •  sod bicarb-citric acid-simethicone     O: Blood pressure 152/97, pulse 90, temperature 97.5 °F (36.4 °C), temperature source Oral, resp. rate 16, SpO2 95 %.  I/O last 3 completed shifts:  In: 2484 [P.O.:150; I.V.:2334]  Out: 1930 [Urine:1250; Blood:200; Chest Tube:480]  I/O this shift:  In: 0   Out: 600 [Urine:600]  Looks much more comfortable this AM  Awake and alert and follows commands  Lungs-diminished breath sounds  Heart-RR wo murmur  Neuro-intact      Glucose 138 Abnormally high  mg/dL   BUN 23 mg/dL   Creatinine 1.60 Abnormally high  mg/dL   Sodium 135 Abnormally low  mmol/L   Potassium 4.4 mmol/L   Chloride 99 mmol/L   CO2 26.3 mmol/L   Calcium 8.8 mg/dL   eGFR Non  Amer 32 Abnormally low  mL/min/1.73   BUN/Creatinine Ratio 14.4    Anion Gap 9.7 mmol/L               01/12/2019 0458  01/12/2019 0556 CBC Auto Differential [[id number]]    (Abnormal)   Blood    Final result WBC 12.62 Abnormally high  10*3/mm3   RBC 2.96 Abnormally low  10*6/mm3   Hemoglobin 8.7 Abnormally low  g/dL   Hematocrit 28.7 Abnormally low  %   MCV 97.0 fL   MCH 29.4 pg   MCHC 30.3 Abnormally low  g/dL   RDW 13.0 %   RDW-SD 45.7 fl   MPV 9.3 fL   Platelets 260 10*3/mm3   Neutrophil % 77.9 Abnormally high  %   Lymphocyte % 11.6 Abnormally low  %   Monocyte % 10.4 %   Eosinophil % 0.0 Abnormally low  %   Basophil % 0.1 %   Immature Grans % 0.3 %   Neutrophils, Absolute 9.84 Abnormally high  10*3/mm3   Lymphocytes, Absolute 1.46 10*3/mm3   Monocytes, Absolute 1.31 Abnormally high  10*3/mm3   Eosinophils, Absolute 0.00 10*3/mm3   Basophils, Absolute 0.01 10*3/mm3   Immature Grans, Absolute 0.04 Abnormally high  10*3/mm3        A:  1. Acute postoperative pain  2. Chronic low back pain  3. PLS-lumbar  4. Chronic shoulder pain  5. CA lung    P: Continue current PCA settings. I will be around on Monday. Call me in the interim if need be.

## 2019-01-12 NOTE — PROGRESS NOTES
Chief Complaint: Right upper lobe lung cancer, postoperative management  S/P: VATS right upper lobectomy  POD # 1    Subjective:  Symptoms:  Stable.  She reports chest pain and anxiety.  No shortness of breath.    Diet:  NPO.    Activity level: Impaired due to weakness.    Pain:  She complains of pain that is moderate.  Pain is well controlled.       Patient was confused and combative throughout the night.  Required placement of the restraints to prevent patient from hurting herself or pulling out tubes.  Patient was seen by her pain management physician Dr. Harper last night.  He adjusted her PCA settings and restarted her home medications.  This seemed to help.    Vital Signs:  Temp:  [97.5 °F (36.4 °C)-98.7 °F (37.1 °C)] 97.5 °F (36.4 °C)  Heart Rate:  [64-97] 90  Resp:  [14-20] 16  BP: (126-183)/(66-97) 152/97  Arterial Line BP: (166-227)/(53-65) 227/65    Intake & Output (last day)       01/11 0701 - 01/12 0700 01/12 0701 - 01/13 0700    P.O. 150 0    I.V. 2334     Total Intake 2484 0    Urine 1250     Blood 200     Chest Tube 480     Total Output 1930     Net +554 0                Objective:  General Appearance:  Comfortable, ill-appearing and in no acute distress.    Vital signs: (most recent): Blood pressure 152/97, pulse 90, temperature 97.5 °F (36.4 °C), temperature source Oral, resp. rate 16, SpO2 95 %.  No fever.    Output: Producing urine.    Lungs:  Normal effort and normal respiratory rate.  There are decreased breath sounds.    Heart: Normal rate.  Regular rhythm.  No murmur.   Abdomen: Abdomen is soft.  Bowel sounds are normal.   There is no abdominal tenderness.   There is no mass.   Extremities: There is no dependent edema.    Neurological: Patient is alert.  Normal strength.              Chest tube:   Site: Right, Clean, Dry, Intact and Securement device intact  Suction: -20 cm  Air Leak: negative  24 Hour Total: 480 cc    Results Review:     I reviewed the patient's new clinical results.  I  reviewed the patient's new imaging results and agree with the interpretation.    Imaging Results (last 24 hours)     Procedure Component Value Units Date/Time    XR Chest 1 View [[id number]] Collected:  01/12/19 0821     Updated:  01/12/19 0828    Narrative:       1-VIEW PORTABLE CHEST AT 5:55 AM     HISTORY: Recent right chest surgery for lung cancer. Chest tube.     FINDINGS: A right-sided chest tube remains in place and there is a  suspicion of a small right apical pneumothorax similar to yesterday's  exam. There is worsening of some bibasilar atelectasis with decreased  lung expansion on the current exam. The heart remains slightly enlarged.     This report was finalized on 1/12/2019 8:25 AM by Dr. Pedro Cazares M.D.       XR Chest 1 View [[id number]] Collected:  01/11/19 1211     Updated:  01/11/19 1223    Narrative:       Portable chest x-ray     HISTORY: Postop lung surgery.     Portable frontal chest x-rays provided and correlated with chest x-ray  December 17, 2018 and chest CT November 4, 2018.     FINDINGS: There is reverse arthroplasty hardware at the left shoulder  and there is a chest tube on the right. Chain sutures are observed  around the left hilum where there is some density in the lung, probably  postsurgical. A density interface in the right upper chest may be the  pleural reflection noting the presence of a pneumothorax. However, there  is no evidence of pneumothorax along the lateral mid to lower lung zone  region. Some mild atelectasis is observed the left lung base.       Impression:       Postsurgical change of the right hemithorax is questionable  skinfold versus pneumothorax in the right upper lung zone.     This report was finalized on 1/11/2019 12:20 PM by Dr. Karthikeyan Maloney M.D.             Lab Results:     Lab Results (last 24 hours)     Procedure Component Value Units Date/Time    Basic Metabolic Panel [[id number]]  (Abnormal) Collected:  01/12/19 0458    Specimen:  Blood Updated:   01/12/19 0558     Glucose 138 mg/dL      BUN 23 mg/dL      Creatinine 1.60 mg/dL      Sodium 135 mmol/L      Potassium 4.4 mmol/L      Chloride 99 mmol/L      CO2 26.3 mmol/L      Calcium 8.8 mg/dL      eGFR Non African Amer 32 mL/min/1.73      BUN/Creatinine Ratio 14.4     Anion Gap 9.7 mmol/L     Narrative:       The MDRD GFR formula is only valid for adults with stable renal function between ages 18 and 70.    CBC & Differential [[id number]] Collected:  01/12/19 0458    Specimen:  Blood Updated:  01/12/19 0556    Narrative:       The following orders were created for panel order CBC & Differential.  Procedure                               Abnormality         Status                     ---------                               -----------         ------                     CBC Auto Differential[[id number]]        Abnormal            Final result                 Please view results for these tests on the individual orders.    CBC Auto Differential [[id number]]  (Abnormal) Collected:  01/12/19 0458    Specimen:  Blood Updated:  01/12/19 0556     WBC 12.62 10*3/mm3      RBC 2.96 10*6/mm3      Hemoglobin 8.7 g/dL      Hematocrit 28.7 %      MCV 97.0 fL      MCH 29.4 pg      MCHC 30.3 g/dL      RDW 13.0 %      RDW-SD 45.7 fl      MPV 9.3 fL      Platelets 260 10*3/mm3      Neutrophil % 77.9 %      Lymphocyte % 11.6 %      Monocyte % 10.4 %      Eosinophil % 0.0 %      Basophil % 0.1 %      Immature Grans % 0.3 %      Neutrophils, Absolute 9.84 10*3/mm3      Lymphocytes, Absolute 1.46 10*3/mm3      Monocytes, Absolute 1.31 10*3/mm3      Eosinophils, Absolute 0.00 10*3/mm3      Basophils, Absolute 0.01 10*3/mm3      Immature Grans, Absolute 0.04 10*3/mm3     Tissue Pathology Exam [[id number]] Collected:  01/11/19 0839    Specimen:  Tissue from Lung, Right Upper Lobe; Tissue from Lymph Node; Tissue from Lung, Right Upper Lobe; Tissue from Lymph Node; Tissue from Lymph Node; Tissue from Lymph Node; Tissue from Lymph Node; Tissue  from Lymph Node Updated:  01/11/19 1050           Assessment/Plan       Lung cancer (CMS/HCC)       Assessment & Plan     Continuing with home medicines especially Ativan.  Maintain PCA as currently he has to help control pain.  Chest tube to waterseal today.  We'll try to get patient up in a chair today.  Continue to encourage good pulmonary hygiene.    Stefan Ny III, MD  [facility]  01/12/19  10:08 AM

## 2019-01-12 NOTE — PLAN OF CARE
Problem: Patient Care Overview  Goal: Plan of Care Review  Outcome: Ongoing (interventions implemented as appropriate)   01/12/19 0152   Coping/Psychosocial   Plan of Care Reviewed With spouse   Plan of Care Review   Progress no change   OTHER   Outcome Summary Monitor pain,labs,and vitals. Pt pain is currently controlled. Basal rate ordered for IV PCA. Pt requires constant reminders to use PCA button. Soft wrist restraints continue.Family at bedside. Will cont. to monitor.     Goal: Individualization and Mutuality  Outcome: Ongoing (interventions implemented as appropriate)    Goal: Discharge Needs Assessment  Outcome: Ongoing (interventions implemented as appropriate)    Goal: Interprofessional Rounds/Family Conf  Outcome: Ongoing (interventions implemented as appropriate)      Problem: Fall Risk (Adult)  Goal: Identify Related Risk Factors and Signs and Symptoms  Outcome: Ongoing (interventions implemented as appropriate)   01/12/19 0152   Fall Risk (Adult)   Related Risk Factors (Fall Risk) age-related changes;confusion/agitation;culprit medication(s);sleep pattern alteration;environment unfamiliar   Signs and Symptoms (Fall Risk) presence of risk factors     Goal: Absence of Fall  Outcome: Ongoing (interventions implemented as appropriate)   01/12/19 0152   Fall Risk (Adult)   Absence of Fall making progress toward outcome       Problem: Restraint, Nonbehavioral (Nonviolent)  Goal: Rationale and Justification  Outcome: Ongoing (interventions implemented as appropriate)   01/12/19 0152   Restraint, Nonbehavioral (Nonviolent)   Rationale and Justification prevent line/tube removal;failure of less restrictive safety measures;prevent harm to self     Goal: Nonbehavioral (Nonviolent) Restraint: Absence of Injury/Harm  Outcome: Ongoing (interventions implemented as appropriate)   01/12/19 0152   Restraint, Nonbehavioral (Nonviolent)   Nonbehavioral (Nonviolent) Restraint: Absence of Injury/Harm not met     Goal:  Nonbehavioral (Nonviolent) Restraint: Achievement of Discontinuation Criteria  Outcome: Ongoing (interventions implemented as appropriate)   01/12/19 0152   Restraint, Nonbehavioral (Nonviolent)   Nonbehavioral (Nonviolent) Restraint: Achievement of Discontinuation Criteria not met       Problem: Chest Tube Drainage Device (Adult)  Goal: Signs and Symptoms of Listed Potential Problems Will be Absent, Minimized or Managed (Chest Tube Drainage Device)  Outcome: Ongoing (interventions implemented as appropriate)   01/12/19 0152   Goal/Outcome Evaluation   Problems Assessed (Chest Tube Drainage Device) pain   Problems Assessed (Chest Tube Drain Dev) pain       Problem: Lung Surgery (via Thoracotomy) (Adult)  Goal: Signs and Symptoms of Listed Potential Problems Will be Absent, Minimized or Managed (Lung Surgery)  Outcome: Ongoing (interventions implemented as appropriate)   01/12/19 0152   Goal/Outcome Evaluation   Problems Assessed (Lung Surgery/Thoracotomy) pain;situational response   Problems Present (Lung Surgery) pain;situational response     Goal: Anesthesia/Sedation Recovery  Outcome: Ongoing (interventions implemented as appropriate)      Problem: Skin Injury Risk (Adult)  Goal: Identify Related Risk Factors and Signs and Symptoms  Outcome: Ongoing (interventions implemented as appropriate)   01/12/19 0152   Skin Injury Risk (Adult)   Related Risk Factors (Skin Injury Risk) cognitive impairment     Goal: Skin Health and Integrity  Outcome: Ongoing (interventions implemented as appropriate)   01/12/19 0152   Skin Injury Risk (Adult)   Skin Health and Integrity making progress toward outcome

## 2019-01-12 NOTE — PLAN OF CARE
Problem: Patient Care Overview  Goal: Plan of Care Review  Outcome: Ongoing (interventions implemented as appropriate)   01/12/19 5891   Coping/Psychosocial   Plan of Care Reviewed With patient   OTHER   Outcome Summary Pt is 74 yo female s/p R upper lobectomy yesterday demonstrating severe impulsivity, flailing, confusion, and difficulty following commands. Able to transfer to chair with assist of 2 with constant verbal and tactile cuing and redirection. Pt demonstrating 4/5 strength B LE but required high levels of assist due to confusion and difficulty following commands. Pt uses [facility] at home where she lives with her spouse. Recommend continued skilled PT services to improve transfers, bed mobility, and gait. Recommend [facility] vs. home with home health following d/c depending on progress.

## 2019-01-12 NOTE — PLAN OF CARE
Problem: Patient Care Overview  Goal: Plan of Care Review  Outcome: Ongoing (interventions implemented as appropriate)   01/12/19 4701   OTHER   Outcome Summary VSS. Patient remains confused with periods of extreme anxiety, flailing arms and legs about. Routine ativan given, dilaudid PCA in place. Patient up to chair multiple times today. Soft wrist restraints remain in place due to confusion and pulling at lines/tubes. Pena catheter d/c'd. Will continue to monitor.     Goal: Individualization and Mutuality  Outcome: Ongoing (interventions implemented as appropriate)    Goal: Interprofessional Rounds/Family Conf  Outcome: Ongoing (interventions implemented as appropriate)      Problem: Fall Risk (Adult)  Goal: Identify Related Risk Factors and Signs and Symptoms  Outcome: Ongoing (interventions implemented as appropriate)    Goal: Absence of Fall  Outcome: Ongoing (interventions implemented as appropriate)      Problem: Restraint, Nonbehavioral (Nonviolent)  Goal: Rationale and Justification  Outcome: Ongoing (interventions implemented as appropriate)    Goal: Nonbehavioral (Nonviolent) Restraint: Absence of Injury/Harm  Outcome: Ongoing (interventions implemented as appropriate)    Goal: Nonbehavioral (Nonviolent) Restraint: Achievement of Discontinuation Criteria  Outcome: Ongoing (interventions implemented as appropriate)      Problem: Chest Tube Drainage Device (Adult)  Goal: Signs and Symptoms of Listed Potential Problems Will be Absent, Minimized or Managed (Chest Tube Drainage Device)  Outcome: Ongoing (interventions implemented as appropriate)      Problem: Lung Surgery (via Thoracotomy) (Adult)  Goal: Signs and Symptoms of Listed Potential Problems Will be Absent, Minimized or Managed (Lung Surgery)  Outcome: Ongoing (interventions implemented as appropriate)    Goal: Anesthesia/Sedation Recovery  Outcome: Ongoing (interventions implemented as appropriate)      Problem: Skin Injury Risk (Adult)  Goal:  Identify Related Risk Factors and Signs and Symptoms  Outcome: Ongoing (interventions implemented as appropriate)    Goal: Skin Health and Integrity  Outcome: Ongoing (interventions implemented as appropriate)

## 2019-01-12 NOTE — THERAPY EVALUATION
Acute Care - Physical Therapy Initial Evaluation  [facility]     Patient Name: Rachana Arguelles  : 1945  MRN: [id number]  Today's Date: 2019   Onset of Illness/Injury or Date of Surgery: 19     Referring Physician: Henry      Admit Date: 2019    Visit Dx:     ICD-10-CM ICD-9-CM   1. Difficulty walking R26.2 719.7   2. Lung cancer (CMS/HCC) C34.90 162.9     Patient Active Problem List   Diagnosis   • Anxiety   • Chronic pain syndrome   • Depression   • Gastroesophageal reflux disease   • Hyperlipidemia   • Hypertension   • Osteoarthritis of hip   • Chronic low back pain   • Failed back syndrome of lumbar spine   • Pulmonary embolus (CMS/HCC)   • Weight loss   • Polypharmacy   • CKD (chronic kidney disease), stage III (CMS/HCC)   • Chronic constipation   • Sacroiliac joint pain   • Mixed incontinence   • Renal cyst   • Achilles tendonitis   • Atherosclerosis of native artery of left lower extremity with intermittent claudication (CMS/HCC)   • Morbidly obese (CMS/HCC)   • Lung nodule   • Malignant neoplasm of right upper lobe of lung (CMS/HCC)   • Lung cancer (CMS/HCC)     Past Medical History:   Diagnosis Date   • AAA (abdominal aortic aneurysm) without rupture (CMS/HCC)    • Anxiety    • Arthritis    • Cancer (CMS/HCC)     skin cancer   • Chest pain     at rest   • Chronic low back pain    • Chronic pain syndrome 3/12/2016   • CKD (chronic kidney disease), stage III (CMS/HCC)    • Claustrophobia 10/26/2015    Resolved   • Depression    • Dizziness    • GERD (gastroesophageal reflux disease)    • GI problem    • Hiatal hernia    • History of migraine headaches    • Hyperlipidemia    • Hypertension    • Lumbar postlaminectomy syndrome 10/26/2015    Resolved   • Lung cancer (CMS/HCC)    • Lung nodule    • Palpitations    • Polypharmacy 2016   • Pulmonary embolism (CMS/HCC) 10/26/2015    2013 - Resolved, felt to be secondary to estrogen use   • Submandibular sialoadenitis 10/26/2015     Resolved   • Vitamin B 12 deficiency      Past Surgical History:   Procedure Laterality Date   • ANGIOPLASTY ILIAC ARTERY Bilateral 8/10/2018    Procedure: BILATERAL ILIAC STENTS;  Surgeon: Riki Mancera MD;  Location: [facility];  Service: Vascular   • BREAST SURGERY      Breast Surgery Reduction Procedure   • HYSTERECTOMY     • LUMBAR FUSION      Lumbar Vertebral Fusion   • SHOULDER ARTHROSCOPY  04/04/2013    Dr. Carrillo/[facility] - Resolved   • TOTAL HIP ARTHROPLASTY REVISION Left    • TOTAL KNEE ARTHROPLASTY Left    • TOTAL SHOULDER ARTHROPLASTY Left         PT ASSESSMENT (last 12 hours)      Physical Therapy Evaluation     Row Name 01/12/19 1300          PT Evaluation Time/Intention    Subjective Information  pain limited non-verbal response but yelling out  -LS     Document Type  evaluation  -LS     Mode of Treatment  physical therapy  -LS     Total Evaluation Minutes, Physical Therapy  20  -LS     Patient Effort  fair  -LS     Symptoms Noted During/After Treatment  other (see comments)  (Significant)  severe confusion, flailing, yelling out  -LS     Row Name 01/12/19 1300          General Information    Patient Profile Reviewed?  yes  -LS     Onset of Illness/Injury or Date of Surgery  01/11/19  -LS     Referring Physician  Henry  -LS     Patient Observations  cooperative confusion  -LS     Patient/Family Observations  Pt family members present  -LS     General Observations of Patient  Pt supine in bed, IV, drain, O2, pain pump  -LS     Prior Level of Function  independent:  -LS     Equipment Currently Used at Home  cane, straight  -LS     Pertinent History of Current Functional Problem  Pt admitted with lung CA, s/p R upper lobectomy  -LS     Existing Precautions/Restrictions  fall  -LS     Limitations/Impairments  safety/cognitive  -LS     Risks Reviewed  patient and family:  -LS     Benefits Reviewed  patient and family:  -LS     Barriers to Rehab  cognitive status  -     Row Name 01/12/19 1300           Relationship/Environment    Lives With  spouse  -LS     Row Name 01/12/19 1300          Resource/Environmental Concerns    Current Living Arrangements  home/apartment/condo  -LS     Row Name 01/12/19 1300          Cognitive Assessment/Intervention- PT/OT    Orientation Status (Cognition)  oriented to;person;disoriented to;place;situation;verbal cues/prompts needed for orientation  -LS     Follows Commands (Cognition)  0-24% accuracy;follows one step commands;delayed response/completion;increased processing time needed;physical/tactile prompts required;verbal cues/prompting required;repetition of directions required  -LS     Safety Deficit (Cognitive)  impulsivity;at risk behavior observed;moderate deficit;safety precautions awareness  -LS     Row Name 01/12/19 1300          Bed Mobility Assessment/Treatment    Bed Mobility Assessment/Treatment  bed mobility (all) activities  -LS     Preston Level (Bed Mobility)  moderate assist (50% patient effort);2 person assist  -LS     Bed Mobility, Safety Issues  cognitive deficits limit understanding  -LS     Row Name 01/12/19 1300          Transfer Assessment/Treatment    Transfer Assessment/Treatment  bed-chair transfer;sit-stand transfer  -LS     Bed-Chair Preston (Transfers)  moderate assist (50% patient effort);2 person assist;1 person to manage equipment  -LS     Sit-Stand Preston (Transfers)  moderate assist (50% patient effort);2 person assist;1 person to manage equipment  -LS     Row Name 01/12/19 1300          Sit-Stand Transfer    Assistive Device (Sit-Stand Transfers)  walker, front-wheeled  -LS     Row Name 01/12/19 1300          Gait/Stairs Assessment/Training    Preston Level (Gait)  not tested unsafe due to dec cognition, impulsivity  -LS     Row Name             Wound 01/11/19 0825 Right flank incision    Wound - Properties Group Date first assessed: 01/11/19  - Time first assessed: 0825  - Side: Right  - Location: flank  -  Type: incision  -SM    Row Name 01/12/19 1300          Living Environment    Home Accessibility  stairs to enter home  -       User Key  (r) = Recorded By, (t) = Taken By, (c) = Cosigned By    Initials Name Provider Type    Pieter Clemente, RN Registered Nurse    Aimee Henderson, PT Physical Therapist        Physical Therapy Education     Title: PT OT SLP Therapies (In Progress)     Topic: Physical Therapy (In Progress)     Point: Mobility training (In Progress)     Learning Progress Summary           Patient Acceptance, E,TB,D, NR by  at 1/12/2019  1:32 PM   Family Acceptance, E,TB,D, NR by CAMACHO at 1/12/2019  1:32 PM                   Point: Home exercise program (In Progress)     Learning Progress Summary           Patient Acceptance, E,TB,D, NR by  at 1/12/2019  1:32 PM   Family Acceptance, E,TB,D, NR by  at 1/12/2019  1:32 PM                   Point: Body mechanics (In Progress)     Learning Progress Summary           Patient Acceptance, E,TB,D, NR by  at 1/12/2019  1:32 PM   Family Acceptance, E,TB,D, NR by  at 1/12/2019  1:32 PM                   Point: Precautions (In Progress)     Learning Progress Summary           Patient Acceptance, E,TB,D, NR by  at 1/12/2019  1:32 PM   Family Acceptance, E,TB,D, NR by  at 1/12/2019  1:32 PM                               User Key     Initials Effective Dates Name Provider Type Discipline     04/03/18 -  Aimee Ayala, TIMI Physical Therapist PT              PT Recommendation and Plan  Anticipated Discharge Disposition (PT): skilled nursing facility  Outcome Summary/Treatment Plan (PT)  Anticipated Discharge Disposition (PT): skilled nursing facility  Plan of Care Reviewed With: patient  Outcome Summary: Pt is 74 yo female s/p R upper lobectomy yesterday demonstrating severe impulsivity, flailing, confusion, and difficulty following commands. Able to transfer to chair with assist of 2 with constant verbal and tactile cuing and redirection. Pt  demonstrating 4/5 strength B LE but required high levels of assist due to confusion and difficulty following commands. Pt uses [facility] at home where she lives with her spouse. Recommend continued skilled PT services to improve transfers, bed mobility, and gait. Recommend [facility] vs. home with home health following d/c depending on progress.   Outcome Measures     Row Name 01/12/19 1300             How much help from another person do you currently need...    Turning from your back to your side while in flat bed without using bedrails?  2  -LS      Moving from lying on back to sitting on the side of a flat bed without bedrails?  2  -LS      Moving to and from a bed to a chair (including a wheelchair)?  2  -LS      Standing up from a chair using your arms (e.g., wheelchair, bedside chair)?  2  -LS      Climbing 3-5 steps with a railing?  1  -LS      To walk in hospital room?  1  -LS      AM-PAC 6 Clicks Score  10  -LS         Functional Assessment    Outcome Measure Options  AM-PAC 6 Clicks Basic Mobility (PT)  -        User Key  (r) = Recorded By, (t) = Taken By, (c) = Cosigned By    Initials Name Provider Type    Aimee Henderson, TIMI Physical Therapist         Time Calculation:   PT Charges     Row Name 01/12/19 1333             Time Calculation    Start Time  1310  -      Stop Time  1330  -      Time Calculation (min)  20 min  -      PT Received On  01/12/19  -      PT - Next Appointment  01/13/19  -      PT Goal Re-Cert Due Date  01/19/19  -         Time Calculation- PT    Total Timed Code Minutes- PT  20 minute(s)  -        User Key  (r) = Recorded By, (t) = Taken By, (c) = Cosigned By    Initials Name Provider Type    Aimee Henderson PT Physical Therapist        Therapy Suggested Charges     Code   Minutes Charges    None           Therapy Charges for Today     Code Description Service Date Service Provider Modifiers Qty    26601777979 HC PT EVAL MOD COMPLEXITY 2 1/12/2019 Aimee Ayala, TIMI GP 1     [id number]  PT THER PROC EA 15 MIN 1/12/2019 Aimee Ayala, PT GP 1    16340515822 HC PT THER SUPP EA 15 MIN 1/12/2019 Aimee Ayala, PT GP 1          PT G-Codes  Outcome Measure Options: AM-PAC 6 Clicks Basic Mobility (PT)  AM-PAC 6 Clicks Score: 10      Aimee Ayala, PT  1/12/2019

## 2019-01-13 ENCOUNTER — APPOINTMENT (OUTPATIENT)
Dept: GENERAL RADIOLOGY | Facility: HOSPITAL | Age: 74
End: 2019-01-13

## 2019-01-13 LAB
ANION GAP SERPL CALCULATED.3IONS-SCNC: 12.7 MMOL/L
BASOPHILS # BLD AUTO: 0.01 10*3/MM3 (ref 0–0.2)
BASOPHILS NFR BLD AUTO: 0.1 % (ref 0–1.5)
BUN BLD-MCNC: 28 MG/DL (ref 8–23)
BUN/CREAT SERPL: 16 (ref 7–25)
CALCIUM SPEC-SCNC: 9.1 MG/DL (ref 8.6–10.5)
CHLORIDE SERPL-SCNC: 102 MMOL/L (ref 98–107)
CO2 SERPL-SCNC: 21.3 MMOL/L (ref 22–29)
CREAT BLD-MCNC: 1.75 MG/DL (ref 0.57–1)
DEPRECATED RDW RBC AUTO: 47.3 FL (ref 37–54)
EOSINOPHIL # BLD AUTO: 0.03 10*3/MM3 (ref 0–0.7)
EOSINOPHIL NFR BLD AUTO: 0.3 % (ref 0.3–6.2)
ERYTHROCYTE [DISTWIDTH] IN BLOOD BY AUTOMATED COUNT: 13.1 % (ref 11.7–13)
GFR SERPL CREATININE-BSD FRML MDRD: 28 ML/MIN/1.73
GLUCOSE BLD-MCNC: 119 MG/DL (ref 65–99)
HCT VFR BLD AUTO: 26.8 % (ref 35.6–45.5)
HGB BLD-MCNC: 8.3 G/DL (ref 11.9–15.5)
IMM GRANULOCYTES # BLD AUTO: 0.03 10*3/MM3 (ref 0–0.03)
IMM GRANULOCYTES NFR BLD AUTO: 0.3 % (ref 0–0.5)
LYMPHOCYTES # BLD AUTO: 1.43 10*3/MM3 (ref 0.9–4.8)
LYMPHOCYTES NFR BLD AUTO: 13.3 % (ref 19.6–45.3)
MCH RBC QN AUTO: 30.7 PG (ref 26.9–32)
MCHC RBC AUTO-ENTMCNC: 31 G/DL (ref 32.4–36.3)
MCV RBC AUTO: 99.3 FL (ref 80.5–98.2)
MONOCYTES # BLD AUTO: 1.01 10*3/MM3 (ref 0.2–1.2)
MONOCYTES NFR BLD AUTO: 9.4 % (ref 5–12)
NEUTROPHILS # BLD AUTO: 8.28 10*3/MM3 (ref 1.9–8.1)
NEUTROPHILS NFR BLD AUTO: 76.6 % (ref 42.7–76)
PLATELET # BLD AUTO: 211 10*3/MM3 (ref 140–500)
PMV BLD AUTO: 9.3 FL (ref 6–12)
POTASSIUM BLD-SCNC: 4.8 MMOL/L (ref 3.5–5.2)
RBC # BLD AUTO: 2.7 10*6/MM3 (ref 3.9–5.2)
SODIUM BLD-SCNC: 136 MMOL/L (ref 136–145)
WBC NRBC COR # BLD: 10.79 10*3/MM3 (ref 4.5–10.7)

## 2019-01-13 PROCEDURE — 80048 BASIC METABOLIC PNL TOTAL CA: CPT | Performed by: THORACIC SURGERY (CARDIOTHORACIC VASCULAR SURGERY)

## 2019-01-13 PROCEDURE — 71045 X-RAY EXAM CHEST 1 VIEW: CPT

## 2019-01-13 PROCEDURE — 99024 POSTOP FOLLOW-UP VISIT: CPT | Performed by: THORACIC SURGERY (CARDIOTHORACIC VASCULAR SURGERY)

## 2019-01-13 PROCEDURE — 25010000002 HEPARIN (PORCINE) PER 1000 UNITS: Performed by: THORACIC SURGERY (CARDIOTHORACIC VASCULAR SURGERY)

## 2019-01-13 PROCEDURE — 94799 UNLISTED PULMONARY SVC/PX: CPT

## 2019-01-13 PROCEDURE — 36415 COLL VENOUS BLD VENIPUNCTURE: CPT | Performed by: THORACIC SURGERY (CARDIOTHORACIC VASCULAR SURGERY)

## 2019-01-13 PROCEDURE — 97110 THERAPEUTIC EXERCISES: CPT

## 2019-01-13 PROCEDURE — 85025 COMPLETE CBC W/AUTO DIFF WBC: CPT | Performed by: THORACIC SURGERY (CARDIOTHORACIC VASCULAR SURGERY)

## 2019-01-13 RX ORDER — LORAZEPAM 0.5 MG/1
0.5 TABLET ORAL ONCE
Status: COMPLETED | OUTPATIENT
Start: 2019-01-13 | End: 2019-01-13

## 2019-01-13 RX ORDER — HYDROMORPHONE HCL IN 0.9% NACL 10 MG/50ML
PATIENT CONTROLLED ANALGESIA SYRINGE INTRAVENOUS CONTINUOUS
Status: DISCONTINUED | OUTPATIENT
Start: 2019-01-13 | End: 2019-01-14

## 2019-01-13 RX ADMIN — LORAZEPAM 0.5 MG: 0.5 TABLET ORAL at 16:24

## 2019-01-13 RX ADMIN — METOPROLOL TARTRATE 25 MG: 25 TABLET ORAL at 12:16

## 2019-01-13 RX ADMIN — IPRATROPIUM BROMIDE AND ALBUTEROL SULFATE 3 ML: 2.5; .5 SOLUTION RESPIRATORY (INHALATION) at 15:18

## 2019-01-13 RX ADMIN — Medication 2 SPRAY: at 20:29

## 2019-01-13 RX ADMIN — VENLAFAXINE HYDROCHLORIDE 150 MG: 150 CAPSULE, EXTENDED RELEASE ORAL at 12:16

## 2019-01-13 RX ADMIN — HEPARIN SODIUM 5000 UNITS: 5000 INJECTION INTRAVENOUS; SUBCUTANEOUS at 16:25

## 2019-01-13 RX ADMIN — METOPROLOL TARTRATE 25 MG: 25 TABLET ORAL at 20:23

## 2019-01-13 RX ADMIN — HEPARIN SODIUM 5000 UNITS: 5000 INJECTION INTRAVENOUS; SUBCUTANEOUS at 22:08

## 2019-01-13 RX ADMIN — POTASSIUM CHLORIDE, DEXTROSE MONOHYDRATE AND SODIUM CHLORIDE 75 ML/HR: 150; 5; 450 INJECTION, SOLUTION INTRAVENOUS at 11:27

## 2019-01-13 RX ADMIN — Medication 2 SPRAY: at 11:26

## 2019-01-13 RX ADMIN — FAMOTIDINE 20 MG: 20 TABLET, FILM COATED ORAL at 12:16

## 2019-01-13 RX ADMIN — HYDROMORPHONE HYDROCHLORIDE: 10 INJECTION INTRAMUSCULAR; INTRAVENOUS; SUBCUTANEOUS at 06:45

## 2019-01-13 RX ADMIN — LORAZEPAM 0.5 MG: 0.5 TABLET ORAL at 20:22

## 2019-01-13 RX ADMIN — LORAZEPAM 0.5 MG: 0.5 TABLET ORAL at 11:47

## 2019-01-13 RX ADMIN — HEPARIN SODIUM 5000 UNITS: 5000 INJECTION INTRAVENOUS; SUBCUTANEOUS at 06:27

## 2019-01-13 RX ADMIN — TRAZODONE HYDROCHLORIDE 75 MG: 50 TABLET ORAL at 20:22

## 2019-01-13 RX ADMIN — FAMOTIDINE 20 MG: 20 TABLET, FILM COATED ORAL at 20:23

## 2019-01-13 RX ADMIN — IPRATROPIUM BROMIDE AND ALBUTEROL SULFATE 3 ML: 2.5; .5 SOLUTION RESPIRATORY (INHALATION) at 08:12

## 2019-01-13 RX ADMIN — LORAZEPAM 0.5 MG: 0.5 TABLET ORAL at 18:56

## 2019-01-13 RX ADMIN — CETIRIZINE HYDROCHLORIDE 10 MG: 10 TABLET, FILM COATED ORAL at 12:17

## 2019-01-13 NOTE — PLAN OF CARE
Problem: Patient Care Overview  Goal: Plan of Care Review  Outcome: Ongoing (interventions implemented as appropriate)   01/13/19 0123   Coping/Psychosocial   Plan of Care Reviewed With spouse   Plan of Care Review   Progress no change   OTHER   Outcome Summary Monitor pain,labs,and vitals. Pt remains confused.Pt is unable to follow commands. Dilaudid PCA. Soft wrist restraints. IV fluids. Encouraged PO fluids. Pt assisted to the BSC- unable to void- bladder scanned for 134ml. Will cont. to monitor.     Goal: Individualization and Mutuality  Outcome: Ongoing (interventions implemented as appropriate)    Goal: Discharge Needs Assessment  Outcome: Ongoing (interventions implemented as appropriate)    Goal: Interprofessional Rounds/Family Conf  Outcome: Ongoing (interventions implemented as appropriate)      Problem: Fall Risk (Adult)  Goal: Identify Related Risk Factors and Signs and Symptoms  Outcome: Ongoing (interventions implemented as appropriate)   01/13/19 0123   Fall Risk (Adult)   Related Risk Factors (Fall Risk) age-related changes;confusion/agitation;culprit medication(s);sleep pattern alteration;environment unfamiliar   Signs and Symptoms (Fall Risk) presence of risk factors     Goal: Absence of Fall  Outcome: Ongoing (interventions implemented as appropriate)   01/13/19 0123   Fall Risk (Adult)   Absence of Fall making progress toward outcome       Problem: Restraint, Nonbehavioral (Nonviolent)  Goal: Rationale and Justification   01/13/19 0123   Restraint, Nonbehavioral (Nonviolent)   Rationale and Justification prevent line/tube removal;failure of less restrictive safety measures;prevent harm to self     Goal: Nonbehavioral (Nonviolent) Restraint: Absence of Injury/Harm   01/13/19 0123   Restraint, Nonbehavioral (Nonviolent)   Nonbehavioral (Nonviolent) Restraint: Absence of Injury/Harm not met     Goal: Nonbehavioral (Nonviolent) Restraint: Achievement of Discontinuation Criteria   01/13/19 0123    Restraint, Nonbehavioral (Nonviolent)   Nonbehavioral (Nonviolent) Restraint: Achievement of Discontinuation Criteria not met       Problem: Chest Tube Drainage Device (Adult)  Goal: Signs and Symptoms of Listed Potential Problems Will be Absent, Minimized or Managed (Chest Tube Drainage Device)  Outcome: Ongoing (interventions implemented as appropriate)   01/13/19 0123   Goal/Outcome Evaluation   Problems Assessed (Chest Tube Drainage Device) pain   Problems Assessed (Chest Tube Drain Dev) pain       Problem: Lung Surgery (via Thoracotomy) (Adult)  Goal: Signs and Symptoms of Listed Potential Problems Will be Absent, Minimized or Managed (Lung Surgery)  Outcome: Ongoing (interventions implemented as appropriate)   01/13/19 0123   Goal/Outcome Evaluation   Problems Assessed (Lung Surgery/Thoracotomy) pain;situational response   Problems Present (Lung Surgery) pain;situational response       Problem: Skin Injury Risk (Adult)  Goal: Identify Related Risk Factors and Signs and Symptoms  Outcome: Ongoing (interventions implemented as appropriate)   01/13/19 0123   Skin Injury Risk (Adult)   Related Risk Factors (Skin Injury Risk) advanced age;cognitive impairment;fluid intake inadequate;medication     Goal: Skin Health and Integrity  Outcome: Ongoing (interventions implemented as appropriate)   01/13/19 0123   Skin Injury Risk (Adult)   Skin Health and Integrity making progress toward outcome

## 2019-01-13 NOTE — PLAN OF CARE
Problem: Patient Care Overview  Goal: Plan of Care Review  Breath sounds improved from this AM. Patient performed well with Flutter Valve-was able to follow instruct appropriately.

## 2019-01-13 NOTE — PLAN OF CARE
Problem: Patient Care Overview  Goal: Plan of Care Review  Outcome: Ongoing (interventions implemented as appropriate)   01/13/19 3271   Coping/Psychosocial   Plan of Care Reviewed With patient   OTHER   Outcome Summary Pt able to tolerate ambulation within room today. Inc agitation and fatigue with return to EOB and pt fearful to return to supine. Allowed her to hold thoracic pillow which calmed her. Pt intermittently able to answer questions clearly. Frequent flailing and eagerness to mobilize.

## 2019-01-13 NOTE — THERAPY TREATMENT NOTE
Acute Care - Physical Therapy Treatment Note  [facility]     Patient Name: Rachana Arguelles  : 1945  MRN: [id number]  Today's Date: 2019  Onset of Illness/Injury or Date of Surgery: 19  Date of Referral to PT: 19  Referring Physician: Henry    Admit Date: 2019    Visit Dx:    ICD-10-CM ICD-9-CM   1. Difficulty walking R26.2 719.7   2. Lung cancer (CMS/HCC) C34.90 162.9     Patient Active Problem List   Diagnosis   • Anxiety   • Chronic pain syndrome   • Depression   • Gastroesophageal reflux disease   • Hyperlipidemia   • Hypertension   • Osteoarthritis of hip   • Chronic low back pain   • Failed back syndrome of lumbar spine   • Pulmonary embolus (CMS/HCC)   • Weight loss   • Polypharmacy   • CKD (chronic kidney disease), stage III (CMS/HCC)   • Chronic constipation   • Sacroiliac joint pain   • Mixed incontinence   • Renal cyst   • Achilles tendonitis   • Atherosclerosis of native artery of left lower extremity with intermittent claudication (CMS/HCC)   • Morbidly obese (CMS/HCC)   • Lung nodule   • Malignant neoplasm of right upper lobe of lung (CMS/HCC)   • Lung cancer (CMS/HCC)       Therapy Treatment    Rehabilitation Treatment Summary     Row Name 19 1400             Treatment Time/Intention    Discipline  physical therapist  -LS      Document Type  therapy note (daily note)  -LS      Subjective Information  no complaints  -LS      Mode of Treatment  physical therapy  -LS      Patient/Family Observations  Pt family members present  -LS      Total Minutes, Physical Therapy Treatment  25  -LS      Patient Effort  good  -LS      Comment  pt flailing throughout, gets agitated easily. Able to calm her by holding pillow.  -LS      Recorded by [LS] Aimee Ayala, PT 19 1431      Row Name 19 1400             Vital Signs    O2 Delivery Intra Treatment  supplemental O2  -LS      O2 Delivery Post Treatment  supplemental O2  -LS      Pre Patient Position  Sitting  -LS       Intra Patient Position  Standing  -LS      Post Patient Position  Supine  -LS      Recorded by [LS] Aimee Ayala, PT 01/13/19 1431      Row Name 01/13/19 1400             Cognitive Assessment/Intervention- PT/OT    Orientation Status (Cognition)  oriented to;person;verbal cues/prompts needed for orientation  -LS      Follows Commands (Cognition)  follows one step commands;25-49% accuracy  -LS      Safety Deficit (Cognitive)  impulsivity;insight into deficits/self awareness;judgment;problem solving;safety precautions awareness  -LS      Recorded by [LS] Aimee Ayala, PT 01/13/19 1431      Row Name 01/13/19 1400             Bed Mobility Assessment/Treatment    Bed Mobility Assessment/Treatment  sit-supine  -LS      Atlas Level (Bed Mobility)  moderate assist (50% patient effort);2 person assist  -LS      Sit-Supine Atlas (Bed Mobility)  moderate assist (50% patient effort);2 person assist pt confusion limiting ability; agitated at EOB  -LS      Bed Mobility, Safety Issues  cognitive deficits limit understanding  -LS      Recorded by [LS] Aimee Ayala, PT 01/13/19 1431      Row Name 01/13/19 1400             Sit-Stand Transfer    Sit-Stand Atlas (Transfers)  minimum assist (75% patient effort);2 person assist;1 person to manage equipment  -LS      Assistive Device (Sit-Stand Transfers)  walker, front-wheeled  -LS      Recorded by [LS] Aimee Ayala, PT 01/13/19 1431      Row Name 01/13/19 1400             Gait/Stairs Assessment/Training    Atlas Level (Gait)  minimum assist (75% patient effort);2 person assist;1 person assist;1 person to manage equipment  -LS      Assistive Device (Gait)  walker, front-wheeled  -LS      Distance in Feet (Gait)  20  -LS      Pattern (Gait)  step-to  -LS      Deviations/Abnormal Patterns (Gait)  ataxic;festinating/shuffling;gait speed decreased;stride length decreased  -LS      Comment (Gait/Stairs)  Pt ambulated within room chair to sink to bed.  -LS       Recorded by [LS] Aimee Ayala, PT 01/13/19 1431      Row Name 01/13/19 1400             Positioning and Restraints    Pre-Treatment Position  sitting in chair/recliner  -LS      Post Treatment Position  bed  -LS      In Bed  supine;call light within reach;encouraged to call for assist;exit alarm on;with family/caregiver pt agitated  -LS      Recorded by [LS] Aimee Ayala, PT 01/13/19 1431      Row Name                Wound 01/11/19 0825 Right flank incision    Wound - Properties Group Date first assessed: 01/11/19 [SM] Time first assessed: 0825 [SM] Side: Right [SM] Location: flank [SM] Type: incision [SM] Recorded by:  [] Pieter Constantino RN 01/11/19 0825      User Key  (r) = Recorded By, (t) = Taken By, (c) = Cosigned By    Initials Name Effective Dates Discipline     Pieter Constantino, RN 06/16/16 -  Nurse    Aimee Henderson, PT 04/03/18 -  PT          Wound 01/11/19 0825 Right flank incision (Active)   Dressing Appearance dry;intact;moist drainage 1/13/2019  2:00 PM   Closure SARAH 1/13/2019  2:00 PM           Physical Therapy Education     Title: PT OT SLP Therapies (In Progress)     Topic: Physical Therapy (In Progress)     Point: Mobility training (In Progress)     Learning Progress Summary           Patient Acceptance, E,TB, NR by LS at 1/13/2019  2:32 PM    Acceptance, E,TB,D, NR by LS at 1/12/2019  1:32 PM   Family Acceptance, E,TB, NR by LS at 1/13/2019  2:32 PM    Acceptance, E,TB,D, NR by LS at 1/12/2019  1:32 PM                   Point: Home exercise program (In Progress)     Learning Progress Summary           Patient Acceptance, E,TB, NR by LS at 1/13/2019  2:32 PM    Acceptance, E,TB,D, NR by LS at 1/12/2019  1:32 PM   Family Acceptance, E,TB, NR by LS at 1/13/2019  2:32 PM    Acceptance, E,TB,D, NR by LS at 1/12/2019  1:32 PM                   Point: Body mechanics (In Progress)     Learning Progress Summary           Patient Acceptance, E,TB, NR by LS at 1/13/2019  2:32 PM    Acceptance,  E,TB,D, NR by  at 1/12/2019  1:32 PM   Family Acceptance, E,TB, NR by  at 1/13/2019  2:32 PM    Acceptance, E,TB,D, NR by  at 1/12/2019  1:32 PM                   Point: Precautions (In Progress)     Learning Progress Summary           Patient Acceptance, E,TB, NR by  at 1/13/2019  2:32 PM    Acceptance, E,TB,D, NR by  at 1/12/2019  1:32 PM   Family Acceptance, E,TB, NR by  at 1/13/2019  2:32 PM    Acceptance, E,TB,D, NR by  at 1/12/2019  1:32 PM                               User Key     Initials Effective Dates Name Provider Type Discipline     04/03/18 -  Aimee Ayala, PT Physical Therapist PT                PT Recommendation and Plan  Anticipated Discharge Disposition (PT): skilled nursing facility  Planned Therapy Interventions (PT Eval): bed mobility training, gait training, transfer training  Therapy Frequency (PT Clinical Impression): daily  Outcome Summary/Treatment Plan (PT)  Anticipated Discharge Disposition (PT): skilled nursing facility  Plan of Care Reviewed With: patient  Outcome Summary: Pt able to tolerate ambulation within room today. Inc agitation and fatigue with return to EOB and pt fearful to return to supine. Allowed her to hold thoracic pillow which calmed her. Pt intermittently able to answer questions clearly. Frequent flailing and eagerness to mobilize.   Outcome Measures     Row Name 01/13/19 1400 01/12/19 1300          How much help from another person do you currently need...    Turning from your back to your side while in flat bed without using bedrails?  3  -LS  2  -LS     Moving from lying on back to sitting on the side of a flat bed without bedrails?  2  -LS  2  -LS     Moving to and from a bed to a chair (including a wheelchair)?  2  -LS  2  -LS     Standing up from a chair using your arms (e.g., wheelchair, bedside chair)?  2  -LS  2  -LS     Climbing 3-5 steps with a railing?  1  -LS  1  -LS     To walk in hospital room?  2  -LS  1  -LS     AM-PAC 6 Clicks Score   12  -LS  10  -LS        Functional Assessment    Outcome Measure Options  AM-PAC 6 Clicks Basic Mobility (PT)  -LS  AM-PAC 6 Clicks Basic Mobility (PT)  -LS       User Key  (r) = Recorded By, (t) = Taken By, (c) = Cosigned By    Initials Name Provider Type    LS Aimee Ayala, PT Physical Therapist         Time Calculation:   PT Charges     Row Name 01/13/19 1433             Time Calculation    Start Time  1405  -LS      Stop Time  1430  -LS      Time Calculation (min)  25 min  -LS      PT Received On  01/13/19  -LS      PT - Next Appointment  01/14/19  -LS         Time Calculation- PT    Total Timed Code Minutes- PT  23 minute(s)  -LS        User Key  (r) = Recorded By, (t) = Taken By, (c) = Cosigned By    Initials Name Provider Type    Aimee Henderson, PT Physical Therapist        Therapy Suggested Charges     Code   Minutes Charges    None           Therapy Charges for Today     Code Description Service Date Service Provider Modifiers Qty    95960646711 HC PT EVAL MOD COMPLEXITY 2 1/12/2019 Aimee Ayala, PT GP 1    30574640157 HC PT THER PROC EA 15 MIN 1/12/2019 Aimee Ayala, PT GP 1    28951527445 HC PT THER SUPP EA 15 MIN 1/12/2019 Aimee Ayala, PT GP 1    18606745384 HC PT THER PROC EA 15 MIN 1/13/2019 Aimee Ayala, PT GP 2    49223888712 HC PT THER SUPP EA 15 MIN 1/13/2019 Aimee Ayala, PT GP 1          PT G-Codes  Outcome Measure Options: AM-PAC 6 Clicks Basic Mobility (PT)  AM-PAC 6 Clicks Score: 12    Aimee Ayala PT  1/13/2019

## 2019-01-13 NOTE — PROGRESS NOTES
"    Chief Complaint: Right upper lobe lung cancer, postoperative management  S/P: VATS right upper lobectomy  POD # 2    Subjective:  Symptoms:  Stable.  She reports chest pain and weakness.  No shortness of breath.    Diet:  Poor intake.  No nausea or vomiting.    Activity level: Impaired due to weakness.    Pain:  She complains of pain that is mild.  Pain is well controlled.         Patient is less confused today.  Daughter and son-in-law at the bedside and stated that she is close to her baseline mental status.  She is currently denying pain.  She reports no shortness of breath.  Pena catheter had to be reinserted last night because of urinary retention.        Vital Signs:  Temp:  [97.5 °F (36.4 °C)-98.7 °F (37.1 °C)] 98.7 °F (37.1 °C)  Heart Rate:  [] 81  Resp:  [16-24] 24  BP: (104-171)/(72-95) 171/76    Intake & Output (last day)       01/12 0701 - 01/13 0700 01/13 0701 - 01/14 0700    P.O. 100     I.V. (mL/kg) 800 (8.9)     Total Intake(mL/kg) 900 (10)     Urine (mL/kg/hr) 950 (0.4)     Stool 0     Blood      Chest Tube 240     Total Output 1190     Net -290           Stool Unmeasured Occurrence 0 x           Objective:  General Appearance:  Uncomfortable, well-appearing and in no acute distress.    Vital signs: (most recent): Blood pressure 171/76, pulse 81, temperature 98.7 °F (37.1 °C), temperature source Oral, resp. rate 24, height 167.6 cm (65.98\"), weight 90.3 kg (198 lb 15.8 oz), SpO2 96 %.  No fever.    Output: Producing urine.    Lungs:  Normal effort and normal respiratory rate.  There are decreased breath sounds.    Heart: Normal rate.  Regular rhythm.  No murmur.   Abdomen: Abdomen is soft and non-distended.  Bowel sounds are normal.   There is no abdominal tenderness.   There is no mass.   Extremities: There is no dependent edema.    Neurological: Patient is alert.  Normal strength.              Chest tube:   Site: Right, Clean, Dry, Intact and Securement device intact  Suction: " waterseal  Air Leak: negative  24 Hour Total: 240 cc    Results Review:     I reviewed the patient's new clinical results.  I reviewed the patient's new imaging results and agree with the interpretation.    Imaging Results (last 24 hours)     Procedure Component Value Units Date/Time    XR Chest 1 View [[id number]] Collected:  01/13/19 0701     Updated:  01/13/19 0821    Narrative:       ONE VIEW PORTABLE CHEST AT 5:34 AM     HISTORY: Recent right chest surgery. Chest tube and pneumothorax.     FINDINGS: A right-sided chest tube ends near the apex and there appears  to be a persistent right apical lateral pneumothorax measuring up to 2  cm in width and showing no significant change from yesterday's exam.  There remains some localized atelectasis at the left base that is  slightly improved. The heart remains mildly enlarged.     This report was finalized on 1/13/2019 8:18 AM by Dr. Pedro Cazares M.D.             Lab Results:     Lab Results (last 24 hours)     Procedure Component Value Units Date/Time    Basic Metabolic Panel [[id number]]  (Abnormal) Collected:  01/13/19 0517    Specimen:  Blood Updated:  01/13/19 0649     Glucose 119 mg/dL      BUN 28 mg/dL      Creatinine 1.75 mg/dL      Sodium 136 mmol/L      Potassium 4.8 mmol/L      Chloride 102 mmol/L      CO2 21.3 mmol/L      Calcium 9.1 mg/dL      eGFR Non African Amer 28 mL/min/1.73      BUN/Creatinine Ratio 16.0     Anion Gap 12.7 mmol/L     Narrative:       The MDRD GFR formula is only valid for adults with stable renal function between ages 18 and 70.    CBC & Differential [[id number]] Collected:  01/13/19 0517    Specimen:  Blood Updated:  01/13/19 0628    Narrative:       The following orders were created for panel order CBC & Differential.  Procedure                               Abnormality         Status                     ---------                               -----------         ------                     CBC Auto Differential[[id number]]         Abnormal            Final result                 Please view results for these tests on the individual orders.    CBC Auto Differential [[id number]]  (Abnormal) Collected:  01/13/19 0517    Specimen:  Blood Updated:  01/13/19 0628     WBC 10.79 10*3/mm3      RBC 2.70 10*6/mm3      Hemoglobin 8.3 g/dL      Hematocrit 26.8 %      MCV 99.3 fL      MCH 30.7 pg      MCHC 31.0 g/dL      RDW 13.1 %      RDW-SD 47.3 fl      MPV 9.3 fL      Platelets 211 10*3/mm3      Neutrophil % 76.6 %      Lymphocyte % 13.3 %      Monocyte % 9.4 %      Eosinophil % 0.3 %      Basophil % 0.1 %      Immature Grans % 0.3 %      Neutrophils, Absolute 8.28 10*3/mm3      Lymphocytes, Absolute 1.43 10*3/mm3      Monocytes, Absolute 1.01 10*3/mm3      Eosinophils, Absolute 0.03 10*3/mm3      Basophils, Absolute 0.01 10*3/mm3      Immature Grans, Absolute 0.03 10*3/mm3            Assessment/Plan       Lung cancer (CMS/[facility])       Assessment & Plan     Have discontinued the continuous Dilaudid infusion and will just go with the on demand dilaudid.  This seems to have helped her mental status.  We'll continue clear liquids only today.  If patient is more alert tomorrow will advance diet.  Chest x-ray showed an increase in the pneumothorax on the right on waterseal.  Have placed the chest tube back to -20 cm suction.  We'll check labs and x-ray in the morning.  Continuing to increase activity and encourage eating good pulmonary hygiene.    Stefan Ny III, MD  [facility]  01/13/19  2:31 PM

## 2019-01-14 ENCOUNTER — APPOINTMENT (OUTPATIENT)
Dept: CT IMAGING | Facility: HOSPITAL | Age: 74
End: 2019-01-14

## 2019-01-14 ENCOUNTER — APPOINTMENT (OUTPATIENT)
Dept: GENERAL RADIOLOGY | Facility: HOSPITAL | Age: 74
End: 2019-01-14

## 2019-01-14 LAB
ANION GAP SERPL CALCULATED.3IONS-SCNC: 6.6 MMOL/L
BASOPHILS # BLD AUTO: 0.01 10*3/MM3 (ref 0–0.2)
BASOPHILS NFR BLD AUTO: 0.1 % (ref 0–1.5)
BUN BLD-MCNC: 23 MG/DL (ref 8–23)
BUN/CREAT SERPL: 16.3 (ref 7–25)
CALCIUM SPEC-SCNC: 8.7 MG/DL (ref 8.6–10.5)
CHLORIDE SERPL-SCNC: 102 MMOL/L (ref 98–107)
CO2 SERPL-SCNC: 26.4 MMOL/L (ref 22–29)
CREAT BLD-MCNC: 1.41 MG/DL (ref 0.57–1)
DEPRECATED RDW RBC AUTO: 45.5 FL (ref 37–54)
EOSINOPHIL # BLD AUTO: 0.12 10*3/MM3 (ref 0–0.7)
EOSINOPHIL NFR BLD AUTO: 1.4 % (ref 0.3–6.2)
ERYTHROCYTE [DISTWIDTH] IN BLOOD BY AUTOMATED COUNT: 12.8 % (ref 11.7–13)
GFR SERPL CREATININE-BSD FRML MDRD: 37 ML/MIN/1.73
GLUCOSE BLD-MCNC: 106 MG/DL (ref 65–99)
GLUCOSE BLDC GLUCOMTR-MCNC: 114 MG/DL (ref 70–130)
GLUCOSE BLDC GLUCOMTR-MCNC: 116 MG/DL (ref 70–130)
HCT VFR BLD AUTO: 23.6 % (ref 35.6–45.5)
HGB BLD-MCNC: 7.5 G/DL (ref 11.9–15.5)
IMM GRANULOCYTES # BLD AUTO: 0.05 10*3/MM3 (ref 0–0.03)
IMM GRANULOCYTES NFR BLD AUTO: 0.6 % (ref 0–0.5)
LYMPHOCYTES # BLD AUTO: 1.67 10*3/MM3 (ref 0.9–4.8)
LYMPHOCYTES NFR BLD AUTO: 18.8 % (ref 19.6–45.3)
MCH RBC QN AUTO: 31 PG (ref 26.9–32)
MCHC RBC AUTO-ENTMCNC: 31.8 G/DL (ref 32.4–36.3)
MCV RBC AUTO: 97.5 FL (ref 80.5–98.2)
MONOCYTES # BLD AUTO: 0.99 10*3/MM3 (ref 0.2–1.2)
MONOCYTES NFR BLD AUTO: 11.2 % (ref 5–12)
NEUTROPHILS # BLD AUTO: 6.03 10*3/MM3 (ref 1.9–8.1)
NEUTROPHILS NFR BLD AUTO: 67.9 % (ref 42.7–76)
PLATELET # BLD AUTO: 177 10*3/MM3 (ref 140–500)
PMV BLD AUTO: 9.1 FL (ref 6–12)
POTASSIUM BLD-SCNC: 4.8 MMOL/L (ref 3.5–5.2)
RBC # BLD AUTO: 2.42 10*6/MM3 (ref 3.9–5.2)
SODIUM BLD-SCNC: 135 MMOL/L (ref 136–145)
WBC NRBC COR # BLD: 8.87 10*3/MM3 (ref 4.5–10.7)

## 2019-01-14 PROCEDURE — 92610 EVALUATE SWALLOWING FUNCTION: CPT | Performed by: SPEECH-LANGUAGE PATHOLOGIST

## 2019-01-14 PROCEDURE — 36415 COLL VENOUS BLD VENIPUNCTURE: CPT | Performed by: THORACIC SURGERY (CARDIOTHORACIC VASCULAR SURGERY)

## 2019-01-14 PROCEDURE — 25010000002 LORAZEPAM PER 2 MG: Performed by: THORACIC SURGERY (CARDIOTHORACIC VASCULAR SURGERY)

## 2019-01-14 PROCEDURE — 80048 BASIC METABOLIC PNL TOTAL CA: CPT | Performed by: THORACIC SURGERY (CARDIOTHORACIC VASCULAR SURGERY)

## 2019-01-14 PROCEDURE — 99024 POSTOP FOLLOW-UP VISIT: CPT | Performed by: NURSE PRACTITIONER

## 2019-01-14 PROCEDURE — 71045 X-RAY EXAM CHEST 1 VIEW: CPT

## 2019-01-14 PROCEDURE — 25010000002 HYDROMORPHONE PER 4 MG: Performed by: PAIN MEDICINE

## 2019-01-14 PROCEDURE — 82962 GLUCOSE BLOOD TEST: CPT

## 2019-01-14 PROCEDURE — 94799 UNLISTED PULMONARY SVC/PX: CPT

## 2019-01-14 PROCEDURE — 85025 COMPLETE CBC W/AUTO DIFF WBC: CPT | Performed by: THORACIC SURGERY (CARDIOTHORACIC VASCULAR SURGERY)

## 2019-01-14 PROCEDURE — 25010000002 HYDROMORPHONE 1 MG/ML SOLUTION: Performed by: THORACIC SURGERY (CARDIOTHORACIC VASCULAR SURGERY)

## 2019-01-14 PROCEDURE — 25010000002 HALOPERIDOL LACTATE PER 5 MG: Performed by: NURSE PRACTITIONER

## 2019-01-14 PROCEDURE — 25010000002 FUROSEMIDE PER 20 MG: Performed by: NURSE PRACTITIONER

## 2019-01-14 PROCEDURE — 25010000002 HEPARIN (PORCINE) PER 1000 UNITS: Performed by: THORACIC SURGERY (CARDIOTHORACIC VASCULAR SURGERY)

## 2019-01-14 RX ORDER — HALOPERIDOL 5 MG/ML
2 INJECTION INTRAMUSCULAR EVERY 6 HOURS PRN
Status: DISCONTINUED | OUTPATIENT
Start: 2019-01-14 | End: 2019-01-14

## 2019-01-14 RX ORDER — LORAZEPAM 2 MG/ML
2 INJECTION INTRAMUSCULAR ONCE
Status: DISCONTINUED | OUTPATIENT
Start: 2019-01-14 | End: 2019-01-16

## 2019-01-14 RX ORDER — HYDROMORPHONE HYDROCHLORIDE 1 MG/ML
0.5 INJECTION, SOLUTION INTRAMUSCULAR; INTRAVENOUS; SUBCUTANEOUS
Status: DISCONTINUED | OUTPATIENT
Start: 2019-01-14 | End: 2019-01-16

## 2019-01-14 RX ORDER — ENALAPRILAT 2.5 MG/2ML
1.25 INJECTION INTRAVENOUS EVERY 6 HOURS PRN
Status: DISCONTINUED | OUTPATIENT
Start: 2019-01-14 | End: 2019-01-16

## 2019-01-14 RX ORDER — LORAZEPAM 2 MG/ML
1 INJECTION INTRAMUSCULAR EVERY 4 HOURS PRN
Status: DISCONTINUED | OUTPATIENT
Start: 2019-01-14 | End: 2019-01-16

## 2019-01-14 RX ORDER — ZIPRASIDONE MESYLATE 20 MG/ML
20 INJECTION, POWDER, LYOPHILIZED, FOR SOLUTION INTRAMUSCULAR EVERY 6 HOURS PRN
Status: DISCONTINUED | OUTPATIENT
Start: 2019-01-14 | End: 2019-01-16

## 2019-01-14 RX ORDER — FUROSEMIDE 10 MG/ML
20 INJECTION INTRAMUSCULAR; INTRAVENOUS ONCE
Status: COMPLETED | OUTPATIENT
Start: 2019-01-14 | End: 2019-01-14

## 2019-01-14 RX ADMIN — OXYCODONE HYDROCHLORIDE 20 MG: 15 TABLET ORAL at 09:29

## 2019-01-14 RX ADMIN — HEPARIN SODIUM 5000 UNITS: 5000 INJECTION INTRAVENOUS; SUBCUTANEOUS at 06:42

## 2019-01-14 RX ADMIN — OXYCODONE HYDROCHLORIDE 20 MG: 15 TABLET ORAL at 15:25

## 2019-01-14 RX ADMIN — Medication 2 SPRAY: at 09:00

## 2019-01-14 RX ADMIN — METOPROLOL TARTRATE 5 MG: 5 INJECTION, SOLUTION INTRAVENOUS at 20:44

## 2019-01-14 RX ADMIN — POTASSIUM CHLORIDE, DEXTROSE MONOHYDRATE AND SODIUM CHLORIDE 75 ML/HR: 150; 5; 450 INJECTION, SOLUTION INTRAVENOUS at 00:30

## 2019-01-14 RX ADMIN — HYDROMORPHONE HYDROCHLORIDE: 10 INJECTION, SOLUTION INTRAMUSCULAR; INTRAVENOUS; SUBCUTANEOUS at 00:18

## 2019-01-14 RX ADMIN — OXYCODONE HYDROCHLORIDE 20 MG: 15 TABLET ORAL at 20:50

## 2019-01-14 RX ADMIN — LORAZEPAM 0.5 MG: 0.5 TABLET ORAL at 15:25

## 2019-01-14 RX ADMIN — LORAZEPAM 0.5 MG: 0.5 TABLET ORAL at 09:29

## 2019-01-14 RX ADMIN — HALOPERIDOL LACTATE 2 MG: 5 INJECTION INTRAMUSCULAR at 15:42

## 2019-01-14 RX ADMIN — HEPARIN SODIUM 5000 UNITS: 5000 INJECTION INTRAVENOUS; SUBCUTANEOUS at 15:25

## 2019-01-14 RX ADMIN — HYDROMORPHONE HYDROCHLORIDE 1 MG: 1 INJECTION, SOLUTION INTRAMUSCULAR; INTRAVENOUS; SUBCUTANEOUS at 00:25

## 2019-01-14 RX ADMIN — METOPROLOL TARTRATE 25 MG: 25 TABLET ORAL at 21:11

## 2019-01-14 RX ADMIN — VENLAFAXINE HYDROCHLORIDE 150 MG: 150 CAPSULE, EXTENDED RELEASE ORAL at 09:28

## 2019-01-14 RX ADMIN — HYDROMORPHONE HYDROCHLORIDE 0.5 MG: 1 INJECTION, SOLUTION INTRAMUSCULAR; INTRAVENOUS; SUBCUTANEOUS at 21:22

## 2019-01-14 RX ADMIN — ENALAPRILAT 1.25 MG: 1.25 INJECTION INTRAVENOUS at 21:46

## 2019-01-14 RX ADMIN — LORAZEPAM 1 MG: 2 INJECTION INTRAMUSCULAR; INTRAVENOUS at 16:12

## 2019-01-14 RX ADMIN — METOPROLOL TARTRATE 25 MG: 25 TABLET ORAL at 09:28

## 2019-01-14 RX ADMIN — LORAZEPAM 1 MG: 2 INJECTION INTRAMUSCULAR; INTRAVENOUS at 20:56

## 2019-01-14 RX ADMIN — HEPARIN SODIUM 5000 UNITS: 5000 INJECTION INTRAVENOUS; SUBCUTANEOUS at 22:32

## 2019-01-14 RX ADMIN — TRAZODONE HYDROCHLORIDE 75 MG: 50 TABLET ORAL at 21:12

## 2019-01-14 RX ADMIN — FUROSEMIDE 20 MG: 10 INJECTION, SOLUTION INTRAMUSCULAR; INTRAVENOUS at 10:13

## 2019-01-14 RX ADMIN — OXYCODONE HYDROCHLORIDE 20 MG: 15 TABLET ORAL at 00:05

## 2019-01-14 RX ADMIN — IPRATROPIUM BROMIDE AND ALBUTEROL SULFATE 3 ML: 2.5; .5 SOLUTION RESPIRATORY (INHALATION) at 06:32

## 2019-01-14 RX ADMIN — LORAZEPAM 0.5 MG: 0.5 TABLET ORAL at 20:44

## 2019-01-14 NOTE — CODE DOCUMENTATION
RRT called d/t severe agitation. Pt had been agitated earlier, was given haldol and ativan. Seemed to help but only briefly. Upon my arrival pt hollering out, multiple staff members at bedside. Call was placed to surya MARRERO SR on monitor. O2 sats 96% 4L/NC. IV was pulled out during her agitation.

## 2019-01-14 NOTE — PROGRESS NOTES
Discharge Planning Assessment  [facility]     Patient Name: Rachana Arguelles  MRN: [id number]  Today's Date: 1/14/2019    Admit Date: 1/11/2019    Discharge Needs Assessment     Row Name 01/14/19 1420       Living Environment    Lives With  spouse    Name(s) of Who Lives With Patient  Rai Arguelles [phone]    Current Living Arrangements  home/apartment/condo    Primary Care Provided by  self;spouse/significant other    Provides Primary Care For  no one    Family Caregiver if Needed  spouse;child(flaca), adult    Family Caregiver Names  Rai Arguelles Spouse [phone]; Katia Stephenson [phone]    Quality of Family Relationships  supportive;involved;helpful    Able to Return to Prior Arrangements  yes       Resource/Environmental Concerns    Resource/Environmental Concerns  none       Transition Planning    Patient/Family Anticipates Transition to  home with family    Transportation Anticipated  family or friend will provide       Discharge Needs Assessment    Equipment Currently Used at Home  cane, straight        Discharge Plan     Row Name 01/14/19 4927       Plan    Plan  Home with [facility]     Plan Comments  Pt's IMM signed 1/11/19.  spoke with pt's daughter Katia [phone] at bedside for screening of DCp/needs.  Pt was drowsey and could not participate in screening.  Pt's daughter stated that pt lives at home with her  and plan is for pt to return home upon D/C.   Pt's daughter stated that they wouldlike [facility] when pt is D/C.   Did inform pt's daughter that CCP would still need to follow pt's progress with pt to make sure pt can returnh ome with HH and woudl not need SNU.  Pt's daughter voiced understanding but did state that they would prefer for pt to D/C to home with [facility].  Referral  for [facility] called to [place] to follow for orders upon D/c.  CCP will follow with mundo cortes for ongoing assessment of pt's progress for home with [facility] vs SNU depending on progress        Destination      No service coordination in this  encounter.      Durable Medical Equipment      No service coordination in this encounter.      Dialysis/Infusion      No service coordination in this encounter.      Home Medical Care      No service coordination in this encounter.      Community Resources      No service coordination in this encounter.          Demographic Summary     Row Name 01/14/19 1419       General Information    Admission Type  inpatient    Arrived From  home    Referral Source  admission list;physician    Reason for Consult  discharge planning        Functional Status     Row Name 01/14/19 1419       Functional Status    Usual Activity Tolerance  moderate    Current Activity Tolerance  poor       Functional Status, IADL    Medications  independent    Meal Preparation  independent    Housekeeping  assistive person    Laundry  assistive person    Shopping  assistive person        Psychosocial    No documentation.       Abuse/Neglect    No documentation.       Legal    No documentation.       Substance Abuse    No documentation.       Patient Forms    No documentation.           IVET Perez

## 2019-01-14 NOTE — PLAN OF CARE
Problem: Patient Care Overview  Goal: Plan of Care Review  Outcome: Ongoing (interventions implemented as appropriate)   01/13/19 2017   OTHER   Outcome Summary VSS. Patient contines to be confused and extremely anxious at times and unable to calm or console. Anxiety and confusion much improved during day when family is present. Soft wrist restraints in place, patient continues to try and pull at lines/tubes. Dilaudid PCA continued, basal rate d/c'd. CT to -20 suction. Patient standing up out of recliner and trying to get out of bed stating she was going home multiple times. Scheduled PO ativan and one time dose of PO ativan given as ordered.  at bedside.      Goal: Individualization and Mutuality  Outcome: Ongoing (interventions implemented as appropriate)    Goal: Interprofessional Rounds/Family Conf  Outcome: Ongoing (interventions implemented as appropriate)      Problem: Fall Risk (Adult)  Goal: Identify Related Risk Factors and Signs and Symptoms  Outcome: Ongoing (interventions implemented as appropriate)    Goal: Absence of Fall  Outcome: Ongoing (interventions implemented as appropriate)      Problem: Restraint, Nonbehavioral (Nonviolent)  Goal: Rationale and Justification  Outcome: Ongoing (interventions implemented as appropriate)    Goal: Nonbehavioral (Nonviolent) Restraint: Absence of Injury/Harm  Outcome: Ongoing (interventions implemented as appropriate)    Goal: Nonbehavioral (Nonviolent) Restraint: Achievement of Discontinuation Criteria  Outcome: Ongoing (interventions implemented as appropriate)      Problem: Chest Tube Drainage Device (Adult)  Goal: Signs and Symptoms of Listed Potential Problems Will be Absent, Minimized or Managed (Chest Tube Drainage Device)  Outcome: Ongoing (interventions implemented as appropriate)      Problem: Lung Surgery (via Thoracotomy) (Adult)  Goal: Signs and Symptoms of Listed Potential Problems Will be Absent, Minimized or Managed (Lung Surgery)  Outcome:  Ongoing (interventions implemented as appropriate)    Goal: Anesthesia/Sedation Recovery  Outcome: Ongoing (interventions implemented as appropriate)      Problem: Skin Injury Risk (Adult)  Goal: Identify Related Risk Factors and Signs and Symptoms  Outcome: Ongoing (interventions implemented as appropriate)    Goal: Skin Health and Integrity  Outcome: Ongoing (interventions implemented as appropriate)

## 2019-01-14 NOTE — THERAPY EVALUATION
Acute Care - Speech Language Pathology   Swallow Initial Evaluation [facility]     Patient Name: Rachana Arguelles  : 1945  MRN: [id number]  Today's Date: 2019  Onset of Illness/Injury or Date of Surgery: 19     Referring Physician: Henry      Admit Date: 2019    Visit Dx:     ICD-10-CM ICD-9-CM   1. Difficulty walking R26.2 719.7   2. Lung cancer (CMS/HCC) C34.90 162.9   3. Anxiety F41.9 300.00   4. Polypharmacy Z79.899 V58.69   5. Chronic pain syndrome G89.4 338.4     Patient Active Problem List   Diagnosis   • Anxiety   • Chronic pain syndrome   • Depression   • Gastroesophageal reflux disease   • Hyperlipidemia   • Hypertension   • Osteoarthritis of hip   • Chronic low back pain   • Failed back syndrome of lumbar spine   • Pulmonary embolus (CMS/HCC)   • Weight loss   • Polypharmacy   • CKD (chronic kidney disease), stage III (CMS/HCC)   • Chronic constipation   • Sacroiliac joint pain   • Mixed incontinence   • Renal cyst   • Achilles tendonitis   • Atherosclerosis of native artery of left lower extremity with intermittent claudication (CMS/HCC)   • Morbidly obese (CMS/HCC)   • Lung nodule   • Malignant neoplasm of right upper lobe of lung (CMS/HCC)   • Lung cancer (CMS/HCC)     Past Medical History:   Diagnosis Date   • AAA (abdominal aortic aneurysm) without rupture (CMS/HCC)    • Anxiety    • Arthritis    • Cancer (CMS/HCC)     skin cancer   • Chest pain     at rest   • Chronic low back pain    • Chronic pain syndrome 3/12/2016   • CKD (chronic kidney disease), stage III (CMS/HCC)    • Claustrophobia 10/26/2015    Resolved   • Depression    • Dizziness    • GERD (gastroesophageal reflux disease)    • GI problem    • Hiatal hernia    • History of migraine headaches    • Hyperlipidemia    • Hypertension    • Lumbar postlaminectomy syndrome 10/26/2015    Resolved   • Lung cancer (CMS/HCC)    • Lung nodule    • Palpitations    • Polypharmacy 2016   • Pulmonary embolism (CMS/HCC)  "10/26/2015    January 2013 - Resolved, felt to be secondary to estrogen use   • Submandibular sialoadenitis 10/26/2015    Resolved   • Vitamin B 12 deficiency      Past Surgical History:   Procedure Laterality Date   • ANGIOPLASTY ILIAC ARTERY Bilateral 8/10/2018    Procedure: BILATERAL ILIAC STENTS;  Surgeon: Riki Mancera MD;  Location: [facility];  Service: Vascular   • BREAST SURGERY      Breast Surgery Reduction Procedure   • HYSTERECTOMY     • LUMBAR FUSION      Lumbar Vertebral Fusion   • SHOULDER ARTHROSCOPY  04/04/2013    Dr. Carrillo/MERRILL - Resolved   • THORACOSCOPY VIDEO ASSISTED WITH LOBECTOMY Right 1/11/2019    Procedure: BRONCOSCOPY, THORACOSCOPY VIDEO ASSISTED WITH RIGHT UPPER LOBE WEDGE RESECTION, COMPLETION RIGHT UPPER LOBECTOMY, LYMPH NODE DISSECTION, INTERCOSTAL NERVE BLOCK;  Surgeon: Quita Figueroa MD;  Location: [facility] OR;  Service: Thoracic   • TOTAL HIP ARTHROPLASTY REVISION Left    • TOTAL KNEE ARTHROPLASTY Left    • TOTAL SHOULDER ARTHROPLASTY Left         SWALLOW EVALUATION (last 72 hours)      SLP Adult Swallow Evaluation     Row Name 01/14/19 1400                   Rehab Evaluation    Document Type  evaluation  -KA        Subjective Information  no complaints  -KA        Patient Observations  alert;cooperative  -KA        Patient Effort  good  -KA        Symptoms Noted During/After Treatment  none  -KA           General Information    Patient Profile Reviewed  yes  -KA        Pertinent History Of Current Problem  Patient s/p  right upperlobe Lobectomy, SLP consult due to concern with safe PO due to patient's drowsiness. RN reported pt coughing with meds this am with water. Both RN and patients daughter stated \"patient can swallow,\" just concerned due to pt drowsiness. Daughter reports pt has been in pain and narcotics are being weaned and pt more alert each day. RN reports flucutation in patient's mental status and swallowing function throughout the day, she reports pt is " alert, however at times will orally hold water in mouth and other times has no difficulty and swallows fine.   -KA        Current Method of Nutrition  NPO  -KA        Precautions/Limitations, Vision  WFL;for purposes of eval  -KA        Precautions/Limitations, Hearing  WFL;for purposes of eval  -KA        Prior Level of Function-Communication  WFL  -KA        Prior Level of Function-Swallowing  no diet consistency restrictions;safe, efficient swallowing in all situations;other (see comments) per pt and daughter  -KA        Plans/Goals Discussed with  patient and family;agreed upon  -KA        Barriers to Rehab  cognitive status;medically complex  -KA        Patient's Goals for Discharge  return to PO diet  -KA        Family Goals for Discharge  patient able to return to PO diet  -KA           Oral Motor and Function    Dentition Assessment  natural, present and adequate  -KA        Secretion Management  WNL/WFL  -KA        Volitional Swallow  WFL  -KA        Volitional Cough  WFL  -KA           Oral Musculature and Cranial Nerve Assessment    Oral Motor General Assessment  other (see comments)  -KA        Oral Motor, Comment  Patient demonstrated slight left upper lip droop at rest, with daughter reporting this is normal and baseline for patient. Remainder of oral motor exam WNL, pt able to follow directions, some confusion at times with SLP providing visual cues.  -KA           General Eating/Swallowing Observations    Respiratory Support Currently in Use  room air  -KA        Eating/Swallowing Skills  self-fed;fed by SLP;other (see comments) ASSISTED BY SLP  -KA        Positioning During Eating  upright in chair  -KA        Utensils Used  spoon;cup;straw  -KA        Consistencies Trialed  regular textures;ground;pureed;thin liquids  -KA           Clinical Swallow Eval    Oral Prep Phase  WFL  -KA        Oral Transit  impaired  -KA        Oral Residue  WFL  -KA        Pharyngeal Phase  no overt signs/symptoms of  pharyngeal impairment  -KA        Clinical Swallow Evaluation Summary  Patient demonstrated no overt s/s of pen/asp with tested trials of thins via straw, puree, mechanical soft and regular solids. Robyntent demonstrated no oral holding or delay swallow initiation during bedside swallow eval, which has been reported by the RN. Patient presents with cognitive component placing her at risk for dysphagia, and is at risk for dysphagia due to RN reporting  flucuation in robyntent's alertness and mental status. SLP explained this to pt and daughter, and pt risks for dysphagia/aspiration. SLP recommends a regular diet and thin liquids only when is alert, awake, upright 90 degrees ideally in chair with 1:1 supervision to provide pt assistance and check oral cavity for pocketing and implement safe swallow strategies. Daughter verbalized understanding   -KA           Oral Transit Concerns    Oral Transit Concerns  increased oral transit time  -KA        Increased Oral Transit Time  mechanical soft;regular consistencies  -KA           Clinical Impression    SLP Swallowing Diagnosis  other (see comments) cognitive component placing pt at risk for dysphagia  -KA        Functional Impact  risk of aspiration/pneumonia  -KA        Rehab Potential/Prognosis, Swallowing  good, to achieve stated therapy goals  -KA        Swallow Criteria for Skilled Therapeutic Interventions Met  demonstrates skilled criteria  -KA           Recommendations    Therapy Frequency (Swallow)  PRN  -KA        Predicted Duration Therapy Intervention (Days)  until discharge  -KA        SLP Diet Recommendation  regular textures;thin liquids  -KA        Recommended Precautions and Strategies  upright posture during/after eating;small bites of food and sips of liquid;other (see comments) 1:1 assistance  -KA        SLP Rec. for Method of Medication Administration  meds crushed;with pudding or applesauce  -KA        Monitor for Signs of Aspiration  yes;notify SLP if  any concerns  -          User Key  (r) = Recorded By, (t) = Taken By, (c) = Cosigned By    Initials Name Effective Dates    Anish Lobato MA,CCC-SLP 06/08/18 -           EDUCATION  The patient has been educated in the following areas:   Dysphagia (Swallowing Impairment).    SLP Recommendation and Plan  SLP Swallowing Diagnosis: other (see comments)(cognitive component placing pt at risk for dysphagia)  SLP Diet Recommendation: regular textures, thin liquids  Recommended Precautions and Strategies: upright posture during/after eating, small bites of food and sips of liquid, other (see comments)(1:1 assistance)     Monitor for Signs of Aspiration: yes, notify SLP if any concerns     Swallow Criteria for Skilled Therapeutic Interventions Met: demonstrates skilled criteria     Rehab Potential/Prognosis, Swallowing: good, to achieve stated therapy goals  Therapy Frequency (Swallow): PRN  Predicted Duration Therapy Intervention (Days): until discharge       Plan of Care Reviewed With: patient, daughter  Plan of Care Review  Plan of Care Reviewed With: patient, daughter  Outcome Summary: SLP completed swallow evaluation, pt demonstrated no overt s/s of pen/asp with all tested consistencies. Patient presents at risk for dysphagia due to cognitive component and RN reporting flucuation in patient's cognitive status and alertness. During bedside pt was alert, able to follow commands with some visual cues at times, and sitting upright in chair. SLP educated pt and daugher on risk factors for aspiration/dysphagia and recommend regular solids and thin liquids only when pt is alert awake with 1:1 supervision/assistance, see bedside report for more detail         SLP Outcome Measures (last 72 hours)      SLP Outcome Measures     Row Name 01/14/19 1600             SLP Outcome Measures    Outcome Measure Used?  Adult NOMS  -KA         Adult FCM Scores    FCM Chosen  Swallowing  -KA      Swallowing FCM Score  6  -KA        User  Key  (r) = Recorded By, (t) = Taken By, (c) = Cosigned By    Initials Name Effective Dates    Anish Lobato MA,CHANTAL-SLP 06/08/18 -            Time Calculation:   Time Calculation- SLP     Row Name 01/14/19 1613             Time Calculation- SLP    SLP Start Time  1400  -KA      SLP Received On  01/14/19  -        User Key  (r) = Recorded By, (t) = Taken By, (c) = Cosigned By    Initials Name Provider Type    Anish Lobato MA,CCC-SLP Speech and Language Pathologist          Therapy Charges for Today     Code Description Service Date Service Provider Modifiers Qty    [id number] HC ST EVAL ORAL PHARYNG SWALLOW 4 1/14/2019 Anish Long MA,CCC-SLP GN 1               Anish Long MA,CHANTAL-SLP  1/14/2019

## 2019-01-14 NOTE — CODE DOCUMENTATION
Spoke with MARK Salinas on phone. Contemplating plan of action. Eventually decided to transfer pt. She was going to call MD on call for intensivist and discuss care. Awaiting room assignment. Pt calm at present no distress noted.

## 2019-01-14 NOTE — PLAN OF CARE
Problem: Patient Care Overview  Goal: Plan of Care Review  Outcome: Ongoing (interventions implemented as appropriate)   01/14/19 0628   Coping/Psychosocial   Plan of Care Reviewed With patient   Plan of Care Review   Progress no change   OTHER   Outcome Summary VSS, pt remains confused and anxious at times, pulling/reaching for lines and tubes.Soft wrist restraints in place. Right CT at -20 suction, Diladid PCA with PO and IV meds once throughout evening with pt resting well after administration. 2-4L O2 varying with pt anxiety. Pena catheter in place draining well. IV fluids, family friend at bedside all night.        Problem: Fall Risk (Adult)  Goal: Identify Related Risk Factors and Signs and Symptoms  Outcome: Ongoing (interventions implemented as appropriate)      Problem: Restraint, Nonbehavioral (Nonviolent)  Goal: Rationale and Justification  Outcome: Ongoing (interventions implemented as appropriate)      Problem: Chest Tube Drainage Device (Adult)  Goal: Signs and Symptoms of Listed Potential Problems Will be Absent, Minimized or Managed (Chest Tube Drainage Device)  Outcome: Ongoing (interventions implemented as appropriate)      Problem: Lung Surgery (via Thoracotomy) (Adult)  Goal: Signs and Symptoms of Listed Potential Problems Will be Absent, Minimized or Managed (Lung Surgery)  Outcome: Ongoing (interventions implemented as appropriate)      Problem: Skin Injury Risk (Adult)  Goal: Identify Related Risk Factors and Signs and Symptoms  Outcome: Ongoing (interventions implemented as appropriate)

## 2019-01-14 NOTE — PLAN OF CARE
Problem: Patient Care Overview  Goal: Plan of Care Review   01/14/19 1940   Coping/Psychosocial   Plan of Care Reviewed With patient;daughter   OTHER   Outcome Summary SLP completed swallow evaluation, pt demonstrated no overt s/s of pen/asp with all tested consistencies. Patient presents at risk for dysphagia due to RN reporting flucuation in patient's cognitive status and alertness. During bedside pt was alert, able to follow commands with some visual cues at times, and sitting upright in chair. SLP educated pt and brunaugher on risk factors for aspiration/dysphagia and recommend regular solids and thin liquids only when pt is alert awake with 1:1 supervision/assistance, see bedside report for more detail

## 2019-01-14 NOTE — CODE DOCUMENTATION
Cont await room to be ready. Report to primary RN. No distress noted. Will end rapid and update primary RN. New iv site per IV RN

## 2019-01-14 NOTE — PROGRESS NOTES
"    Chief Complaint: Right upper lobe lung cancer, postoperative management  S/P: VATS right upper lobectomy  POD # 3    Subjective    Patient is drowsy.  Has received pain medication and Ativan this morning.  She does awaken to voice and follows commands.  She moves all extremities. Daughter is at the bedside.  She states the patient easily there are awake, swelling and complaining of significant pain or sleepy and drowsy like she is presently.    Vital Signs:  Temp:  [97.7 °F (36.5 °C)-98.8 °F (37.1 °C)] 97.7 °F (36.5 °C)  Heart Rate:  [62-84] 64  Resp:  [14-24] 16  BP: (156-176)/(64-76) 156/67    Intake & Output (last day)       01/13 0701 - 01/14 0700 01/14 0701 - 01/15 0700    P.O. 100     I.V. (mL/kg) 1729 (19.1)     Total Intake(mL/kg) 1829 (20.3)     Urine (mL/kg/hr) 875 (0.4)     Stool      Chest Tube 100     Total Output 975     Net +854                 Objective:  General Appearance:  Comfortable, in no acute distress and not in pain.    Vital signs: (most recent): Blood pressure 156/67, pulse 64, temperature 97.7 °F (36.5 °C), temperature source Oral, resp. rate 16, height 167.6 cm (65.98\"), weight 90.3 kg (198 lb 15.8 oz), SpO2 98 %.  Vital signs are normal.  No fever.    Output: Producing urine.    HEENT: Normal HEENT exam.    Lungs:  Normal effort and normal respiratory rate.  She is not in respiratory distress.  There are decreased breath sounds (Right lung fields).    Heart: Normal rate.  Regular rhythm.  S1 normal and S2 normal.  No murmur.   Chest: Chest wall tenderness present.    Abdomen: Abdomen is soft.  Bowel sounds are normal.   There is no abdominal tenderness.   There is no mass.   Extremities: Normal range of motion.  There is dependent edema.    Pulses: Distal pulses are intact.    Neurological: Patient is alert and oriented to person, place and time.  Normal strength.    Pupils:  Pupils are equal, round, and reactive to light.    Skin:  Warm and dry.          Chest tube:   Site: Right, " Clean, Dry, Intact and Securement device intact  Suction: waterseal  Air Leak: negative  24 Hour Total: 100cc    Results Review:     I reviewed the patient's new clinical results.  I reviewed the patient's new imaging results and agree with the interpretation.  I reviewed the patient's other test results and agree with the interpretation  Discussed with patient, her daughter at bedside, RN and Dr. Figueroa.    Imaging Results (last 24 hours)     Procedure Component Value Units Date/Time    XR Chest 1 View [[id number]] Collected:  01/14/19 0821     Updated:  01/14/19 1029    Narrative:       CLINICAL HISTORY: 73-year-old female 3 days postop right upper lobectomy  for non-small cell lung carcinoma. Follow-up with right chest tube.     PORTABLE AP ERECT CHEST DATED 01/14/2019 AT 0536 HOURS     FINDINGS: When compared to the most recent available prior chest  radiograph, the portable AP erect projection of 01/13/2019 at 0537  hours, the right chest tube remains with its tip at the right upper  chest. There is a 1.5 cm thick right apical pneumothorax. Elevation of  right hemidiaphragm compatible with partial resection of right lung is  again demonstrated. Patchy to discoid opacity in the perihilar to  basilar left lung and patchy opacity in the perihilar to basilar right  lung remain. The aortic uncoiling and calcification and mild  cardiomegaly remain. Reverse left shoulder arthroplasty is again  demonstrated. Some degenerative change at the right shoulder and some  degenerative change in the spine are again noted. Oxygen cannula tubing  is present about the neck and left chest. Monitoring lead wires are  present.     CONCLUSION: The current exam demonstrates a small right apical  pneumothorax up to 1.5 cm pleural separation but without the pleural  margin at the lateral right mid to lower chest demonstrated on the prior  exam of 1/13/2019. Areas of patchy to discoid opacity in the lungs  remain. Mild cardiomegaly remains.  Right chest tube is again  demonstrated.     This report was finalized on 1/14/2019 10:26 AM by Dr. César Coronado M.D.             Lab Results:     Lab Results (last 24 hours)     Procedure Component Value Units Date/Time    Basic Metabolic Panel [[id number]]  (Abnormal) Collected:  01/14/19 0630    Specimen:  Blood Updated:  01/14/19 0721     Glucose 106 mg/dL      BUN 23 mg/dL      Creatinine 1.41 mg/dL      Sodium 135 mmol/L      Potassium 4.8 mmol/L      Chloride 102 mmol/L      CO2 26.4 mmol/L      Calcium 8.7 mg/dL      eGFR Non African Amer 37 mL/min/1.73      BUN/Creatinine Ratio 16.3     Anion Gap 6.6 mmol/L     Narrative:       The MDRD GFR formula is only valid for adults with stable renal function between ages 18 and 70.    CBC & Differential [[id number]] Collected:  01/14/19 0630    Specimen:  Blood Updated:  01/14/19 0711    Narrative:       The following orders were created for panel order CBC & Differential.  Procedure                               Abnormality         Status                     ---------                               -----------         ------                     CBC Auto Differential[[id number]]        Abnormal            Final result                 Please view results for these tests on the individual orders.    CBC Auto Differential [[id number]]  (Abnormal) Collected:  01/14/19 0630    Specimen:  Blood Updated:  01/14/19 0711     WBC 8.87 10*3/mm3      RBC 2.42 10*6/mm3      Hemoglobin 7.5 g/dL      Hematocrit 23.6 %      MCV 97.5 fL      MCH 31.0 pg      MCHC 31.8 g/dL      RDW 12.8 %      RDW-SD 45.5 fl      MPV 9.1 fL      Platelets 177 10*3/mm3      Neutrophil % 67.9 %      Lymphocyte % 18.8 %      Monocyte % 11.2 %      Eosinophil % 1.4 %      Basophil % 0.1 %      Immature Grans % 0.6 %      Neutrophils, Absolute 6.03 10*3/mm3      Lymphocytes, Absolute 1.67 10*3/mm3      Monocytes, Absolute 0.99 10*3/mm3      Eosinophils, Absolute 0.12 10*3/mm3      Basophils, Absolute 0.01  10*3/mm3      Immature Grans, Absolute 0.05 10*3/mm3            Assessment/Plan       Lung cancer (CMS/[facility])       Assessment:    Condition: In stable condition.       Plan:   Encourage ambulation.  Start/continue incentive spirometry.  NPO and sips/ice chips.  Chest x-ray.  Administer medications as ordered.       Ms. Arguelles has been complaining of significant pain overnight.  She was medicated this morning and now she is very drowsy.  She is having a difficult time taking medication by mouth because of her drowsiness, so the nurse is presently made her nothing by mouth with sips with meds.  Apparently, she coughed with sips of water at some point.  I have ordered a speech therapy evaluation.  She will need to be more awake and alert for the assessment.    I am concerned of her risk of developing pneumonia if she does not get out of bed and ambulate.  I have discontinued some of her when necessary pain medications including Percocet, by mouth and IV Dilaudid when necessary.  I will leave her PCA for now, particularly since she is so sleepy she is not using it much.    Appreciate assistance from pain management physician and providing good pain control, without oversedation.    This morning's chest x-ray shows a very minimal 1.5 cm pleural separation, and pulmonary opacities.  Lasix ordered. I will place her chest tube to waterseal today and reassess with a follow-up chest x-ray in the morning.      REENA Allen  [facility]  01/14/19  11:52 AM

## 2019-01-15 ENCOUNTER — APPOINTMENT (OUTPATIENT)
Dept: GENERAL RADIOLOGY | Facility: HOSPITAL | Age: 74
End: 2019-01-15

## 2019-01-15 ENCOUNTER — APPOINTMENT (OUTPATIENT)
Dept: CT IMAGING | Facility: HOSPITAL | Age: 74
End: 2019-01-15

## 2019-01-15 ENCOUNTER — APPOINTMENT (OUTPATIENT)
Dept: GENERAL RADIOLOGY | Facility: HOSPITAL | Age: 74
End: 2019-01-15
Attending: INTERNAL MEDICINE

## 2019-01-15 LAB
ANION GAP SERPL CALCULATED.3IONS-SCNC: 13.1 MMOL/L
ARTERIAL PATENCY WRIST A: ABNORMAL
ARTERIAL PATENCY WRIST A: POSITIVE
ARTERIAL PATENCY WRIST A: POSITIVE
ATMOSPHERIC PRESS: 756.5 MMHG
ATMOSPHERIC PRESS: 757.4 MMHG
ATMOSPHERIC PRESS: 757.5 MMHG
BACTERIA UR QL AUTO: ABNORMAL /HPF
BASE EXCESS BLDA CALC-SCNC: 0.8 MMOL/L (ref 0–2)
BASE EXCESS BLDA CALC-SCNC: 2.2 MMOL/L (ref 0–2)
BASE EXCESS BLDA CALC-SCNC: 2.4 MMOL/L (ref 0–2)
BDY SITE: ABNORMAL
BILIRUB UR QL STRIP: NEGATIVE
BUN BLD-MCNC: 22 MG/DL (ref 8–23)
BUN/CREAT SERPL: 14 (ref 7–25)
CALCIUM SPEC-SCNC: 8.6 MG/DL (ref 8.6–10.5)
CHLORIDE SERPL-SCNC: 102 MMOL/L (ref 98–107)
CLARITY UR: CLEAR
CO2 SERPL-SCNC: 22.9 MMOL/L (ref 22–29)
COLOR UR: YELLOW
CREAT BLD-MCNC: 1.57 MG/DL (ref 0.57–1)
DEPRECATED RDW RBC AUTO: 45.9 FL (ref 37–54)
ERYTHROCYTE [DISTWIDTH] IN BLOOD BY AUTOMATED COUNT: 12.8 % (ref 11.7–13)
GAS FLOW AIRWAY: 3 LPM
GAS FLOW AIRWAY: 5 LPM
GFR SERPL CREATININE-BSD FRML MDRD: 32 ML/MIN/1.73
GLUCOSE BLD-MCNC: 91 MG/DL (ref 65–99)
GLUCOSE BLDC GLUCOMTR-MCNC: 103 MG/DL (ref 70–130)
GLUCOSE BLDC GLUCOMTR-MCNC: 112 MG/DL (ref 70–130)
GLUCOSE BLDC GLUCOMTR-MCNC: 113 MG/DL (ref 70–130)
GLUCOSE BLDC GLUCOMTR-MCNC: 125 MG/DL (ref 70–130)
GLUCOSE BLDC GLUCOMTR-MCNC: 91 MG/DL (ref 70–130)
GLUCOSE UR STRIP-MCNC: NEGATIVE MG/DL
HCO3 BLDA-SCNC: 26.8 MMOL/L (ref 22–28)
HCO3 BLDA-SCNC: 27.7 MMOL/L (ref 22–28)
HCO3 BLDA-SCNC: 28 MMOL/L (ref 22–28)
HCT VFR BLD AUTO: 25.8 % (ref 35.6–45.5)
HGB BLD-MCNC: 8 G/DL (ref 11.9–15.5)
HGB UR QL STRIP.AUTO: ABNORMAL
HOROWITZ INDEX BLD+IHG-RTO: 30 %
HYALINE CASTS UR QL AUTO: ABNORMAL /LPF
KETONES UR QL STRIP: NEGATIVE
LEUKOCYTE ESTERASE UR QL STRIP.AUTO: NEGATIVE
MCH RBC QN AUTO: 30.3 PG (ref 26.9–32)
MCHC RBC AUTO-ENTMCNC: 31 G/DL (ref 32.4–36.3)
MCV RBC AUTO: 97.7 FL (ref 80.5–98.2)
MODALITY: ABNORMAL
NITRITE UR QL STRIP: NEGATIVE
O2 A-A PPRESDIFF RESPIRATORY: 0.4 MMHG
PCO2 BLDA: 46.1 MM HG (ref 35–45)
PCO2 BLDA: 47 MM HG (ref 35–45)
PCO2 BLDA: 47.3 MM HG (ref 35–45)
PEEP RESPIRATORY: 5 CM[H2O]
PH BLDA: 7.36 PH UNITS (ref 7.35–7.45)
PH BLDA: 7.38 PH UNITS (ref 7.35–7.45)
PH BLDA: 7.39 PH UNITS (ref 7.35–7.45)
PH UR STRIP.AUTO: 5.5 [PH] (ref 5–8)
PLATELET # BLD AUTO: 229 10*3/MM3 (ref 140–500)
PMV BLD AUTO: 9.6 FL (ref 6–12)
PO2 BLDA: 69 MM HG (ref 80–100)
PO2 BLDA: 71.2 MM HG (ref 80–100)
PO2 BLDA: 78 MM HG (ref 80–100)
POTASSIUM BLD-SCNC: 4.5 MMOL/L (ref 3.5–5.2)
PROT UR QL STRIP: ABNORMAL
RBC # BLD AUTO: 2.64 10*6/MM3 (ref 3.9–5.2)
RBC # UR: ABNORMAL /HPF
REF LAB TEST METHOD: ABNORMAL
SAO2 % BLDCOA: 93.1 % (ref 92–99)
SAO2 % BLDCOA: 93.3 % (ref 92–99)
SAO2 % BLDCOA: 95 % (ref 92–99)
SET MECH RESP RATE: 14
SODIUM BLD-SCNC: 138 MMOL/L (ref 136–145)
SP GR UR STRIP: 1.01 (ref 1–1.03)
SQUAMOUS #/AREA URNS HPF: ABNORMAL /HPF
TOTAL RATE: 16 BREATHS/MINUTE
TOTAL RATE: 20 BREATHS/MINUTE
TOTAL RATE: 20 BREATHS/MINUTE
UROBILINOGEN UR QL STRIP: ABNORMAL
VENTILATOR MODE: ABNORMAL
VIT B12 BLD-MCNC: 241 PG/ML (ref 211–946)
WBC NRBC COR # BLD: 8.83 10*3/MM3 (ref 4.5–10.7)
WBC UR QL AUTO: ABNORMAL /HPF

## 2019-01-15 PROCEDURE — 94640 AIRWAY INHALATION TREATMENT: CPT

## 2019-01-15 PROCEDURE — 87070 CULTURE OTHR SPECIMN AEROBIC: CPT | Performed by: INTERNAL MEDICINE

## 2019-01-15 PROCEDURE — 82803 BLOOD GASES ANY COMBINATION: CPT

## 2019-01-15 PROCEDURE — 82607 VITAMIN B-12: CPT | Performed by: INTERNAL MEDICINE

## 2019-01-15 PROCEDURE — 71045 X-RAY EXAM CHEST 1 VIEW: CPT

## 2019-01-15 PROCEDURE — 25010000002 LORAZEPAM PER 2 MG: Performed by: THORACIC SURGERY (CARDIOTHORACIC VASCULAR SURGERY)

## 2019-01-15 PROCEDURE — 85027 COMPLETE CBC AUTOMATED: CPT | Performed by: NURSE PRACTITIONER

## 2019-01-15 PROCEDURE — 99024 POSTOP FOLLOW-UP VISIT: CPT | Performed by: NURSE PRACTITIONER

## 2019-01-15 PROCEDURE — 87205 SMEAR GRAM STAIN: CPT | Performed by: INTERNAL MEDICINE

## 2019-01-15 PROCEDURE — 82962 GLUCOSE BLOOD TEST: CPT

## 2019-01-15 PROCEDURE — 70450 CT HEAD/BRAIN W/O DYE: CPT

## 2019-01-15 PROCEDURE — 94799 UNLISTED PULMONARY SVC/PX: CPT

## 2019-01-15 PROCEDURE — 25010000002 PROPOFOL 10 MG/ML EMULSION

## 2019-01-15 PROCEDURE — 36415 COLL VENOUS BLD VENIPUNCTURE: CPT | Performed by: NURSE PRACTITIONER

## 2019-01-15 PROCEDURE — 36600 WITHDRAWAL OF ARTERIAL BLOOD: CPT

## 2019-01-15 PROCEDURE — 81001 URINALYSIS AUTO W/SCOPE: CPT | Performed by: NURSE PRACTITIONER

## 2019-01-15 PROCEDURE — 94002 VENT MGMT INPAT INIT DAY: CPT

## 2019-01-15 PROCEDURE — 5A1955Z RESPIRATORY VENTILATION, GREATER THAN 96 CONSECUTIVE HOURS: ICD-10-PCS | Performed by: INTERNAL MEDICINE

## 2019-01-15 PROCEDURE — 25010000002 PROPOFOL 10 MG/ML EMULSION: Performed by: NURSE PRACTITIONER

## 2019-01-15 PROCEDURE — 0BH17EZ INSERTION OF ENDOTRACHEAL AIRWAY INTO TRACHEA, VIA NATURAL OR ARTIFICIAL OPENING: ICD-10-PCS | Performed by: INTERNAL MEDICINE

## 2019-01-15 PROCEDURE — 25010000002 HYDROMORPHONE PER 4 MG: Performed by: PAIN MEDICINE

## 2019-01-15 PROCEDURE — 80048 BASIC METABOLIC PNL TOTAL CA: CPT | Performed by: NURSE PRACTITIONER

## 2019-01-15 PROCEDURE — 25010000002 HEPARIN (PORCINE) PER 1000 UNITS: Performed by: THORACIC SURGERY (CARDIOTHORACIC VASCULAR SURGERY)

## 2019-01-15 PROCEDURE — 25010000002 ZIPRASIDONE MESYLATE PER 10 MG: Performed by: INTERNAL MEDICINE

## 2019-01-15 RX ORDER — PROPOFOL 10 MG/ML
10 VIAL (ML) INTRAVENOUS ONCE
Status: COMPLETED | OUTPATIENT
Start: 2019-01-15 | End: 2019-01-15

## 2019-01-15 RX ORDER — ALBUTEROL SULFATE 0.63 MG/3ML
0.63 SOLUTION RESPIRATORY (INHALATION) EVERY 6 HOURS PRN
Status: DISCONTINUED | OUTPATIENT
Start: 2019-01-15 | End: 2019-01-21 | Stop reason: HOSPADM

## 2019-01-15 RX ORDER — PROPOFOL 10 MG/ML
VIAL (ML) INTRAVENOUS
Status: COMPLETED
Start: 2019-01-15 | End: 2019-01-15

## 2019-01-15 RX ORDER — ALBUTEROL SULFATE 90 UG/1
6 AEROSOL, METERED RESPIRATORY (INHALATION)
Status: DISCONTINUED | OUTPATIENT
Start: 2019-01-15 | End: 2019-01-17

## 2019-01-15 RX ORDER — SODIUM CHLORIDE FOR INHALATION 7 %
4 VIAL, NEBULIZER (ML) INHALATION ONCE
Status: DISCONTINUED | OUTPATIENT
Start: 2019-01-15 | End: 2019-01-21 | Stop reason: HOSPADM

## 2019-01-15 RX ADMIN — SENNOSIDES AND DOCUSATE SODIUM 2 TABLET: 8.6; 5 TABLET ORAL at 20:03

## 2019-01-15 RX ADMIN — HYDROMORPHONE HYDROCHLORIDE 0.5 MG: 1 INJECTION, SOLUTION INTRAMUSCULAR; INTRAVENOUS; SUBCUTANEOUS at 05:24

## 2019-01-15 RX ADMIN — HEPARIN SODIUM 5000 UNITS: 5000 INJECTION INTRAVENOUS; SUBCUTANEOUS at 13:50

## 2019-01-15 RX ADMIN — HEPARIN SODIUM 5000 UNITS: 5000 INJECTION INTRAVENOUS; SUBCUTANEOUS at 21:08

## 2019-01-15 RX ADMIN — DEXMEDETOMIDINE HYDROCHLORIDE 1.5 MCG/KG/HR: 100 INJECTION, SOLUTION, CONCENTRATE INTRAVENOUS at 18:40

## 2019-01-15 RX ADMIN — CETIRIZINE HYDROCHLORIDE 10 MG: 10 TABLET, FILM COATED ORAL at 09:39

## 2019-01-15 RX ADMIN — IPRATROPIUM BROMIDE AND ALBUTEROL SULFATE 3 ML: 2.5; .5 SOLUTION RESPIRATORY (INHALATION) at 07:37

## 2019-01-15 RX ADMIN — PROPOFOL 100 MG: 10 INJECTION, EMULSION INTRAVENOUS at 17:03

## 2019-01-15 RX ADMIN — LORAZEPAM 1 MG: 2 INJECTION INTRAMUSCULAR; INTRAVENOUS at 05:24

## 2019-01-15 RX ADMIN — ALBUTEROL SULFATE 6 PUFF: 90 AEROSOL, METERED RESPIRATORY (INHALATION) at 18:58

## 2019-01-15 RX ADMIN — METOPROLOL TARTRATE 25 MG: 25 TABLET ORAL at 20:02

## 2019-01-15 RX ADMIN — HYDROMORPHONE HYDROCHLORIDE 0.5 MG: 1 INJECTION, SOLUTION INTRAMUSCULAR; INTRAVENOUS; SUBCUTANEOUS at 08:42

## 2019-01-15 RX ADMIN — FAMOTIDINE 20 MG: 20 TABLET, FILM COATED ORAL at 20:02

## 2019-01-15 RX ADMIN — Medication 100 MG: at 17:03

## 2019-01-15 RX ADMIN — LORAZEPAM 0.5 MG: 0.5 TABLET ORAL at 09:39

## 2019-01-15 RX ADMIN — LORAZEPAM 1 MG: 2 INJECTION INTRAMUSCULAR; INTRAVENOUS at 17:43

## 2019-01-15 RX ADMIN — ZIPRASIDONE MESYLATE 20 MG: 20 INJECTION, POWDER, LYOPHILIZED, FOR SOLUTION INTRAMUSCULAR at 00:18

## 2019-01-15 RX ADMIN — SODIUM CHLORIDE 5 MG/HR: 9 INJECTION, SOLUTION INTRAVENOUS at 09:43

## 2019-01-15 RX ADMIN — SUCRALFATE 1 G: 1 TABLET ORAL at 17:11

## 2019-01-15 RX ADMIN — METOPROLOL TARTRATE 25 MG: 25 TABLET ORAL at 09:40

## 2019-01-15 RX ADMIN — LORAZEPAM 1 MG: 2 INJECTION INTRAMUSCULAR; INTRAVENOUS at 22:34

## 2019-01-15 RX ADMIN — FAMOTIDINE 20 MG: 20 TABLET, FILM COATED ORAL at 09:39

## 2019-01-15 RX ADMIN — HYDROMORPHONE HYDROCHLORIDE 0.5 MG: 1 INJECTION, SOLUTION INTRAMUSCULAR; INTRAVENOUS; SUBCUTANEOUS at 22:38

## 2019-01-15 RX ADMIN — LORAZEPAM 0.5 MG: 0.5 TABLET ORAL at 20:03

## 2019-01-15 RX ADMIN — DEXMEDETOMIDINE HYDROCHLORIDE 1.5 MCG/KG/HR: 100 INJECTION, SOLUTION, CONCENTRATE INTRAVENOUS at 21:08

## 2019-01-15 RX ADMIN — ALBUTEROL SULFATE 6 PUFF: 90 AEROSOL, METERED RESPIRATORY (INHALATION) at 23:30

## 2019-01-15 RX ADMIN — CYCLOBENZAPRINE 10 MG: 10 TABLET, FILM COATED ORAL at 22:34

## 2019-01-15 RX ADMIN — HYDROMORPHONE HYDROCHLORIDE 0.5 MG: 1 INJECTION, SOLUTION INTRAMUSCULAR; INTRAVENOUS; SUBCUTANEOUS at 00:11

## 2019-01-15 RX ADMIN — HEPARIN SODIUM 5000 UNITS: 5000 INJECTION INTRAVENOUS; SUBCUTANEOUS at 05:23

## 2019-01-15 RX ADMIN — SUCRALFATE 1 G: 1 TABLET ORAL at 09:39

## 2019-01-15 RX ADMIN — LORAZEPAM 0.5 MG: 0.5 TABLET ORAL at 16:28

## 2019-01-15 RX ADMIN — VENLAFAXINE HYDROCHLORIDE 150 MG: 150 CAPSULE, EXTENDED RELEASE ORAL at 09:40

## 2019-01-15 RX ADMIN — OXYCODONE HYDROCHLORIDE 20 MG: 15 TABLET ORAL at 11:24

## 2019-01-15 RX ADMIN — Medication 2 MCG/KG/HR: at 16:15

## 2019-01-15 RX ADMIN — TRAZODONE HYDROCHLORIDE 75 MG: 50 TABLET ORAL at 20:02

## 2019-01-15 RX ADMIN — SODIUM CHLORIDE 5 MG/HR: 9 INJECTION, SOLUTION INTRAVENOUS at 14:49

## 2019-01-15 RX ADMIN — IPRATROPIUM BROMIDE AND ALBUTEROL SULFATE 3 ML: 2.5; .5 SOLUTION RESPIRATORY (INHALATION) at 15:51

## 2019-01-15 RX ADMIN — PROPOFOL 10 MG: 10 INJECTION, EMULSION INTRAVENOUS at 16:18

## 2019-01-15 RX ADMIN — SUCRALFATE 1 G: 1 TABLET ORAL at 13:51

## 2019-01-15 RX ADMIN — ATORVASTATIN CALCIUM 40 MG: 20 TABLET, FILM COATED ORAL at 20:02

## 2019-01-15 RX ADMIN — SUCRALFATE 1 G: 1 TABLET ORAL at 20:03

## 2019-01-15 RX ADMIN — LORAZEPAM 1 MG: 2 INJECTION INTRAMUSCULAR; INTRAVENOUS at 01:01

## 2019-01-15 RX ADMIN — ZIPRASIDONE MESYLATE 20 MG: 20 INJECTION, POWDER, LYOPHILIZED, FOR SOLUTION INTRAMUSCULAR at 08:41

## 2019-01-15 NOTE — PROGRESS NOTES
"    Chief Complaint: Right upper lobe lung cancer, postoperative management  S/P: VATS right upper lobectomy  POD # 4    Subjective     She is drowsy and lethargic.  She does follow commands and arouses to voice.  She appears comfortable.    Vital Signs:  Temp:  [97.6 °F (36.4 °C)-99.4 °F (37.4 °C)] 97.6 °F (36.4 °C)  Heart Rate:  [] 74  Resp:  [16-18] 16  BP: (119-211)/(51-83) 184/77    Intake & Output (last day)       01/14 0701 - 01/15 0700 01/15 0701 - 01/16 0700    P.O.      I.V. (mL/kg)      Total Intake(mL/kg)      Urine (mL/kg/hr) 1750 (0.8)     Chest Tube 120     Total Output 1870     Net -1870           Urine Unmeasured Occurrence 3 x           Objective:  General Appearance:  In no acute distress.    Vital signs: (most recent): Blood pressure (!) 184/77, pulse 74, temperature 97.6 °F (36.4 °C), temperature source Oral, resp. rate 16, height 167.6 cm (65.98\"), weight 90.3 kg (198 lb 15.8 oz), SpO2 98 %.  Vital signs are normal.    Output: Producing urine.    HEENT: Normal HEENT exam.    Lungs:  Normal effort and normal respiratory rate.  She is not in respiratory distress.  There are decreased breath sounds (Right lower lung field).  No rales, wheezes or rhonchi.    Heart: Normal rate.  Regular rhythm.  S1 normal and S2 normal.  No murmur.   Chest: Chest wall tenderness present.    Abdomen: Abdomen is soft.  There is no abdominal tenderness.   There is no mass.   Extremities: Normal range of motion.    Pulses: Distal pulses are intact.    Neurological: Patient is oriented to person, place and time.  Normal strength.    Pupils:  Pupils are equal, round, and reactive to light.    Skin:  Warm and dry.              Chest tube:   Site: Right, Clean, Intact, Drainage around chest tube site and Securement device intact  Suction: waterseal  Air Leak: negative  24 Hour Total: 120cc    Results Review:     I reviewed the patient's new clinical results.  I reviewed the patient's new imaging results and agree " with the interpretation.  I reviewed the patient's other test results and agree with the interpretation  Discussed with patient, RN, Dr. Figueroa and Dr. Rosenberg.    Imaging Results (last 24 hours)     Procedure Component Value Units Date/Time    CT Head Without Contrast [[id number]] Collected:  01/15/19 0659     Updated:  01/15/19 0659    Narrative:       NONCONTRAST HEAD CT 01/15/2019     CLINICAL HISTORY: Recent diagnosis of lung cancer, patient has confusion  and delirium.     TECHNIQUE: Spiral CT images were obtained from the base of the skull to  the vertex without intravenous contrast. Images were reformatted and are  submitted in 3 mm thick axial CT sections with brain algorithm, 2 mm  thick sagittal and coronal reconstructions were performed.     COMPARISON: There are no prior head CTs from [facility]  for comparison.      FINDINGS: There is very minimal low-density in the frontal  periventricular white matter consistent with very minimal small vessel  disease. The remainder of the brain parenchyma is normal in attenuation.  The ventricles are normal in size. I see no focal mass effect and no  midline shift and no extra-axial fluid collections are identified. There  is no evidence of acute intracranial hemorrhage. The paranasal sinuses  and the mastoid air cells and middle ear cavities are clear. The  calvarium and skull base are normal in appearance. The orbits are  unremarkable.             Impression:        Very minimal small vessel disease in the frontal periventricular white  matter. Otherwise, this is a normal head CT and the etiology of the new  onset confusion and delirium in this patient with recent diagnosis of  lung cancer is not established on this exam. Noncontrast head CT is  insensitive in the early evaluation for metastases to the brain and if  symptoms warrant, a contrast-enhanced MRI of the head could be obtained  for more complete assessment.     Radiation dose reduction  techniques were utilized, including automated  exposure control and exposure modulation based on body size.          XR Chest 1 View [[id number]] Collected:  01/15/19 0637     Updated:  01/15/19 0637    Narrative:       PORTABLE CHEST X-RAY     CLINICAL HISTORY: chest tube management; R26.2-Difficulty in walking,  not elsewhere classified; C34.90-Malignant neoplasm of unspecified part  of unspecified bronchus or lung; F41.9-Anxiety disorder, unspecified;  Z79.899-Other long term (current) drug therapy; G89.4-Chronic pain  syndrome     COMPARISON: 01/14/2019.     FINDINGS: Portable AP view of the chest was obtained with overlying  monitor leads in place. Lungs are poorly aerated with basilar  predominant opacities felt to reflect atelectasis. Small right apical  pneumothorax has diminished slightly now with 1 cm of apical pleural  separation, previously 1.5. Right thoracotomy tube remains in place.  Stable cardiomegaly. Normal vascularity.             Impression:       Some improvement in small right apical pneumothorax.                      Lab Results:     Lab Results (last 24 hours)     Procedure Component Value Units Date/Time    POC Glucose Once [[id number]]  (Normal) Collected:  01/15/19 1124    Specimen:  Blood Updated:  01/15/19 1139     Glucose 103 mg/dL     CBC (No Diff) [[id number]]  (Abnormal) Collected:  01/15/19 0858    Specimen:  Blood Updated:  01/15/19 0953     WBC 8.83 10*3/mm3      RBC 2.64 10*6/mm3      Hemoglobin 8.0 g/dL      Hematocrit 25.8 %      MCV 97.7 fL      MCH 30.3 pg      MCHC 31.0 g/dL      RDW 12.8 %      RDW-SD 45.9 fl      MPV 9.6 fL      Platelets 229 10*3/mm3     Basic Metabolic Panel [[id number]]  (Abnormal) Collected:  01/15/19 0858    Specimen:  Blood Updated:  01/15/19 0947     Glucose 91 mg/dL      BUN 22 mg/dL      Creatinine 1.57 mg/dL      Sodium 138 mmol/L      Potassium 4.5 mmol/L      Chloride 102 mmol/L      CO2 22.9 mmol/L      Calcium 8.6 mg/dL      eGFR Non African  Amer 32 mL/min/1.73      BUN/Creatinine Ratio 14.0     Anion Gap 13.1 mmol/L     Narrative:       The MDRD GFR formula is only valid for adults with stable renal function between ages 18 and 70.    POC Glucose Once [[id number]]  (Normal) Collected:  01/15/19 0810    Specimen:  Blood Updated:  01/15/19 0824     Glucose 91 mg/dL     Vitamin B12 [[id number]]  (Normal) Collected:  01/15/19 0420    Specimen:  Blood Updated:  01/15/19 0541     Vitamin B-12 241 pg/mL     Urinalysis With Culture If Indicated - Urine, Catheter In/Out [[id number]]  (Abnormal) Collected:  01/15/19 0103    Specimen:  Urine, Catheter In/Out Updated:  01/15/19 0122     Color, UA Yellow     Appearance, UA Clear     pH, UA 5.5     Specific Gravity, UA 1.015     Glucose, UA Negative     Ketones, UA Negative     Bilirubin, UA Negative     Blood, UA Large (3+)     Protein,  mg/dL (2+)     Leuk Esterase, UA Negative     Nitrite, UA Negative     Urobilinogen, UA 0.2 E.U./dL    Urinalysis, Microscopic Only - Urine, Catheter In/Out [[id number]]  (Abnormal) Collected:  01/15/19 0103    Specimen:  Urine, Catheter In/Out Updated:  01/15/19 0122     RBC, UA Too Numerous to Count /HPF      WBC, UA 0-2 /HPF      Bacteria, UA None Seen /HPF      Squamous Epithelial Cells, UA 0-2 /HPF      Hyaline Casts, UA 0-2 /LPF      Methodology Automated Microscopy    POC Glucose Once [[id number]]  (Normal) Collected:  01/15/19 0054    Specimen:  Blood Updated:  01/15/19 0104     Glucose 113 mg/dL     Blood Gas, Arterial [[id number]]  (Abnormal) Collected:  01/15/19 0059    Specimen:  Arterial Blood Updated:  01/15/19 0102     Site Arterial: left brachial     Sachin's Test N/A     pH, Arterial 7.379 pH units      pCO2, Arterial 47.3 mm Hg      pO2, Arterial 78.0 mm Hg      HCO3, Arterial 28.0 mmol/L      Base Excess, Arterial 2.2 mmol/L      O2 Saturation Calculated 95.0 %      Barometric Pressure for Blood Gas 757.4 mmHg      Modality Cannula     Flow Rate 5 lpm      Rate 20  Breaths/minute     POC Glucose Once [[id number]]  (Normal) Collected:  01/14/19 2011    Specimen:  Blood Updated:  01/14/19 2013     Glucose 114 mg/dL     POC Glucose Once [[id number]]  (Normal) Collected:  01/14/19 1721    Specimen:  Blood Updated:  01/14/19 1722     Glucose 116 mg/dL            Assessment/Plan       Lung cancer (CMS/HCC)       Assessment & Plan     Patient became increasingly agitated last evening.  She broke through two sets of bilateral soft wrist restraints.  She developed severe hypertension.  I had the patient transferred to intensive care and coronary consulted for assistance with managing her hypertension and appropriate sedation and pain management.     Discussed patient with Dr. Rosenberg.  Appreciate assistance from pulmonary service. Okay to use Precedex if needed.    This morning's chest x-ray shows decrease in the right pleural separation from 1.5 to now 1 cm.  There is no air leak in her Pleur-evac.  We will go ahead and clamp her chest tube.  If she remains stable, plan to remove chest tube tomorrow.    Okay to begin tube feedings and give medications via Cortrak.    Please continue home pain and anxiety regimen.  Continue Cardene drip for hypertension.        REENA Allen  [facility]  01/15/19  11:46 AM

## 2019-01-15 NOTE — NURSING NOTE
Pt. Slept for approximately hour after receiving dose of ativan and was back awake flailing and bed screaming out very tearful. Family noted at bedside. IV noted out, pt very uncontrollable. Bucking, flailing and kicking in bed. Code 5 called and rapid response  pts heart rate elevated and oxygen sats dropping, pt very aggitated. Pt placed in 4 point restraints orders received to transfer to ICU.

## 2019-01-15 NOTE — PROCEDURES
Endotracheal Intubation Procedure Note    Indication for endotracheal intubation: airway compromise.  Sedation: propofol.  Equipment: A video GlideScope was used and Martin 3 laryngoscope blade.  Number of attempts: 1.  ETT location confirmed by by CXR.etCO2 and auscultation    Umesh Rosenberg MD  1/15/2019

## 2019-01-15 NOTE — PROGRESS NOTES
"  PROGRESS NOTE  Patient Name: Rachana Arguelles  Age/Sex: 73 y.o. female  : 1945  MRN: [id number]    Date of Admission: 2019  Date of Encounter Visit: 01/15/19   LOS: 4 days   Patient Care Team:  Rhys Crowder Jr., MD as PCP - General (Family Medicine)  Whitney Dudley MD as Consulting Physician (Nephrology)    Chief Complaint: Transferred to ICU for agitated delirium    Hospital course: Patient has chronic pain and was on chronic pain medication and antianxiety medication, had lung cancer status post video-assisted thoracoscopic procedure, and went into withdrawal and agitation that required transfer to the ICU for proper sedation    Interval History: Patient is on  Geodon and Dilaudid this morning, we did discuss her case with the morning around team, she had nasogastric tube placed and and she was started on her oral regimen with improvement since she is getting her scheduled Roxicet and her Ativan and she is not requiring any Precedex drip anymore antipsychotic medication.    REVIEW OF SYSTEMS:   Drowsy and unable to answer any question    Ventilator/Non-Invasive Ventilation Settings (From admission, onward)    None            Vital Signs  Temp:  [97.3 °F (36.3 °C)-99.4 °F (37.4 °C)] 97.7 °F (36.5 °C)  Heart Rate:  [] 66  Resp:  [16-18] 16  BP: (119-211)/() 144/57  SpO2:  [87 %-98 %] 97 %  on  Flow (L/min):  [2-5] 3 Device (Oxygen Therapy): nasal cannula    Intake/Output Summary (Last 24 hours) at 1/15/2019 1439  Last data filed at 1/15/2019 0359  Gross per 24 hour   Intake --   Output 670 ml   Net -670 ml     Flowsheet Rows      First Filed Value   Admission Height  167.6 cm (65.98\") Documented at 2019   Admission Weight  90.3 kg (198 lb 15.8 oz) Documented at 2019        Body mass index is 32.13 kg/m².      19   Weight: 90.3 kg (198 lb 15.8 oz)       Physical Exam:  GEN:  Patient is obtunded but arousable to noxious stimulation, does not follow " command and drifts back to sleep but may shake head to simple questions   EYES:   Sclera clear. No icterus. PERRL. Normal EOM  ENT:   External ears/nose normal, no oral lesions, no thrush, mucous membranes moist  NECK:  Supple, midline trachea, no JVD  LUNGS: Normal chest on inspection, chest tube in position, no subcutaneous air, diminished breath sounds, unlabored.   CV:  Regular rhythm and rate. Normal S1/S2. No murmurs, gallops, or rubs noted.  ABD:  Soft, non-tender and non-distended. Normal bowel sounds. No guarding  EXT:  When she was agitated she was moving all extremities was no obvious focal deficit , No cyanosis. No redness. No edema.   Skin: dry, intact, no bleeding    Results Review:      Results from last 7 days   Lab Units  01/15/19   0858  01/14/19   0630  01/13/19   0517  01/12/19   0458  01/09/19   1254   SODIUM mmol/L  138  135*  136  135*  136   POTASSIUM mmol/L  4.5  4.8  4.8  4.4  4.3   CHLORIDE mmol/L  102  102  102  99  97*   CO2 mmol/L  22.9  26.4  21.3*  26.3  26.8   BUN mg/dL  22  23  28*  23  20   CREATININE mg/dL  1.57*  1.41*  1.75*  1.60*  1.53*   CALCIUM mg/dL  8.6  8.7  9.1  8.8  9.2   ANION GAP mmol/L  13.1  6.6  12.7  9.7  12.2   Results for TRISHA SOTO (MRN [id number]) as of 1/15/2019 14:40   Ref. Range 1/15/2019 04:20   Vitamin B-12 Latest Ref Range: 211 - 946 pg/mL 241                 Results from last 7 days   Lab Units  01/15/19   0858  01/14/19 0630 01/13/19 0517 01/12/19 0458 01/09/19   1253   WBC 10*3/mm3  8.83  8.87  10.79*  12.62*  7.41   HEMOGLOBIN g/dL  8.0*  7.5*  8.3*  8.7*  11.8*   HEMATOCRIT %  25.8*  23.6*  26.8*  28.7*  37.6   PLATELETS 10*3/mm3  229  177  211  260  275   MCV fL  97.7  97.5  99.3*  97.0  96.9   NEUTROPHIL % %   --   67.9  76.6*  77.9*  68.4   LYMPHOCYTE % %   --   18.8*  13.3*  11.6*  22.7   MONOCYTES % %   --   11.2  9.4  10.4  7.4   EOSINOPHIL % %   --   1.4  0.3  0.0*  1.1   BASOPHIL % %   --   0.1  0.1  0.1  0.1   IMM GRAN % %   --    0.6*  0.3  0.3  0.3     Results from last 7 days   Lab Units  01/09/19   1254   INR   0.99   APTT seconds  32.1               Invalid input(s): LDLCALC  Results from last 7 days   Lab Units  01/15/19   0059   PH, ARTERIAL pH units  7.379   PCO2, ARTERIAL mm Hg  47.3*   PO2 ART mm Hg  78.0*   HCO3 ART mmol/L  28.0         Glucose   Date/Time Value Ref Range Status   01/15/2019 1124 103 70 - 130 mg/dL Final   01/15/2019 0810 91 70 - 130 mg/dL Final   01/15/2019 0054 113 70 - 130 mg/dL Final   01/14/2019 2011 114 70 - 130 mg/dL Final   01/14/2019 1721 116 70 - 130 mg/dL Final             Results from last 7 days   Lab Units  01/15/19   0103   NITRITE UA   Negative   WBC UA /HPF  0-2   BACTERIA UA /HPF  None Seen   SQUAM EPITHEL UA /HPF  0-2               Imaging:   Imaging Results (all)     Procedure Component Value Units Date/Time    CT Head Without Contrast [[id number]] Collected:  01/15/19 0659     Updated:  01/15/19 0659    Narrative:              Impression:        Very minimal small vessel disease in the frontal periventricular white  matter. Otherwise, this is a normal head CT and the etiology of the new  onset confusion and delirium in this patient with recent diagnosis of  lung cancer is not established on this exam. Noncontrast head CT is  insensitive in the early evaluation for metastases to the brain and if  symptoms warrant, a contrast-enhanced MRI of the head could be obtained  for more complete assessment.     Radiation dose reduction techniques were utilized, including automated  exposure control and exposure modulation based on body size.          XR Chest 1 View [[id number]] Collected:  01/15/19 0637     Updated:  01/15/19 0637    Narrative:       PORTABLE CHEST X-RAY        COMPARISON: 01/14/2019.     FINDINGS: Portable AP view of the chest was obtained with overlying  monitor leads in place. Lungs are poorly aerated with basilar  predominant opacities felt to reflect atelectasis. Small right  apical  pneumothorax has diminished slightly now with 1 cm of apical pleural  separation, previously 1.5. Right thoracotomy tube remains in place.  Stable cardiomegaly. Normal vascularity.             Impression:       Some improvement in small right apical pneumothorax.                       Medication Review:     atorvastatin 40 mg Oral Nightly   cetirizine 10 mg Oral Daily   famotidine 20 mg Oral BID   heparin (porcine) 5,000 Units Subcutaneous Q8H   ipratropium-albuterol 3 mL Nebulization Q8H - RT   LORazepam 2 mg Intravenous Once   LORazepam 0.5 mg Oral TID   metoprolol tartrate 25 mg Oral Q12H   pantoprazole 40 mg Oral QAM   sennosides-docusate sodium 2 tablet Oral Nightly   sucralfate 1 g Oral 4x Daily   traZODone 75 mg Oral Nightly   venlafaxine  mg Oral Daily         lactated ringers 9 mL/hr Last Rate: Stopped (01/11/19 1108)   niCARdipine 5-15 mg/hr Last Rate: 5 mg/hr (01/15/19 0960)       ASSESSMENT:   1. Hyperactive delirium  2. Benzo/opiate withdrawal  3. Anemia  4. Lung cancer post VATS  5. Pain control  6. Chronic kidney disease  7. COPD  8. Chronic back pain  9. Hypertension    PLAN:  Expects agitation to get better as the pain is easier to control  Will resume her home regimen will continue with the home medication to avoid any withdrawal  Will have nasogastric tube for safe administration of her oral regimen until she is cleared by speech therapy  Start tube feeding  Will use Precedex drip earlier line will try to avoid any IV medication and use a home regimen but in case of any sign of respiratory compromise we'll check a stat ABG  Discussed with surgical team and with the nursing staff    Addendum:  Patient was responsive to painful stimulation   Dr. Figueroa at the bedside, she expressed concern about the patient not being awake enough to cough or secretion and felt much more comfortable if the patient can have her airway protected during that time and if we have the meantime to be able to go  in suction her secretions.  Per Dr. Figueroa the patient was bronched at the time of surgery and she had a lot of mucus and retained secretion to start with and she is a high risk for mucus plugging.  Given the above the decision was made to proceed intubate for both airway protection and to allow ability to suction and increased secretions, the patient was not in respiratory failure at the point.  That was felt to be much safer than the options of given Narcan given the severe agitation delirium she had on initial presentation    the case was discussed with the  at the bedside, he is agreeable to proceed  We'll start the patient on the ventilator, and will    Total critical care time was 41 minutes excluding intubation time      Disposition: We'll transfer out of the ICU once she is more stable from the respiratory standpoint    Umesh Rosenberg MD  01/15/19  2:39 PM    \      Dictated utilizing Dragon dictation

## 2019-01-15 NOTE — CONSULTS
Adult Nutrition  Assessment/PES    Patient Name:  Rachana Arguelles  YOB: 1945  MRN: [id number]  Admit Date:  1/11/2019    Assessment Date:  1/15/2019    Comments:  Nutrition screen completed. NG Cortrak placed for meds.    Reason for Assessment     Row Name 01/15/19 0855          Reason for Assessment    Reason For Assessment  physician consult cortrak for meds     Diagnosis  -- AMS, lung cancer, s/p VAT, HTN, CKD, Chronic low back pain           Anthropometrics     Row Name 01/15/19 0855          Body Mass Index (BMI)    BMI Assessment  BMI 30-34.9: obesity grade I         Labs/Tests/Procedures/Meds     Row Name 01/15/19 0856          Labs/Procedures/Meds    Lab Results Reviewed  reviewed     Lab Results Comments  na, cr, H/H        Diagnostic Tests/Procedures    Diagnostic Test/Procedure Reviewed  reviewed     Diagnostic Test/Procedures Comments  s/p VATS resection        Medications    Pertinent Medications Reviewed  reviewed     Pertinent Medications Comments  pepcid, heparin, ayrTECm, protonix         Physical Findings     Row Name 01/15/19 0857          Physical Findings    Overall Physical Appearance  obese;on oxygen therapy     Tubes  nasogastric tube for meds at this time     Skin  -- incision           Nutrition Prescription Ordered     Row Name 01/15/19 0858          Nutrition Prescription PO    Current PO Diet  NPO                 Problem/Interventions:  Problem 1     Row Name 01/15/19 0900          Nutrition Diagnoses Problem 1    Problem 1  Nutrition Appropriate for Condition at this Time     Etiology (related to)  MNT for Treatment/Condition                 Intervention Goal     Row Name 01/15/19 0900          Intervention Goal    General  Maintain nutrition     Weight  No significant weight loss         Nutrition Intervention     Row Name 01/15/19 0900          Nutrition Intervention    RD/Tech Action  Follow Tx progress;Care plan reviewd           Education/Evaluation     Row Name  01/15/19 0900          Education    Education  Education not appropriate at this time     Please explain  Patient confusion        Monitor/Evaluation    Monitor  Per protocol           Electronically signed by:  Elizabeth Francois RD  01/15/19 9:04 AM

## 2019-01-15 NOTE — SIGNIFICANT NOTE
Prior to 1600 assessment, Dr. Figueroa was at bedside and ordered STAT ABG (see results) and ordered intubation for airway protection/ aspiration precautions.

## 2019-01-15 NOTE — CONSULTS
[facility]    Reason for consult: critical care management    HPI: Mrs. Arguelles is a 72yo WF with a history of stage I lung cancer, she is now s/p VATS resection.  Since her resection she has had gradually increasing confusion.  Control of her pain has not seemed to help much.  She has a sitter at bedside as well, which has helped some.  She is currently in four point restraints, so I'd say its pretty severe and we are having to transfer her to the ICU.  She is on some benzos and effexor at home as well as some narcotics.  She drinks alcohol only about once a month per .  She last had surgery about three years ago and  does not recall her being this confused then.   Her  has not noticed any confusion at home.    Past Medical History:   Diagnosis Date   • AAA (abdominal aortic aneurysm) without rupture (CMS/HCC)    • Anxiety    • Arthritis    • Cancer (CMS/HCC)     skin cancer   • Chest pain     at rest   • Chronic low back pain    • Chronic pain syndrome 3/12/2016   • CKD (chronic kidney disease), stage III (CMS/HCC)    • Claustrophobia 10/26/2015    Resolved   • Depression    • Dizziness    • GERD (gastroesophageal reflux disease)    • GI problem    • Hiatal hernia    • History of migraine headaches    • Hyperlipidemia    • Hypertension    • Lumbar postlaminectomy syndrome 10/26/2015    Resolved   • Lung cancer (CMS/HCC)    • Lung nodule    • Palpitations    • Polypharmacy 4/22/2016   • Pulmonary embolism (CMS/HCC) 10/26/2015    January 2013 - Resolved, felt to be secondary to estrogen use   • Submandibular sialoadenitis 10/26/2015    Resolved   • Vitamin B 12 deficiency      Social History     Socioeconomic History   • Marital status:      Spouse name: Not on file   • Number of children: Not on file   • Years of education: Not on file   • Highest education level: Not on file   Tobacco Use   • Smoking status: Former Smoker     Packs/day: 2.50     Years: 15.00     Pack years:  37.50     Types: Cigarettes     Last attempt to quit: 9/22/2003     Years since quitting: 15.3   • Smokeless tobacco: Never Used   Substance and Sexual Activity   • Alcohol use: Yes     Alcohol/week: 0.6 oz     Types: 1 Cans of beer per week     Comment: occ   • Drug use: No   • Sexual activity: Defer     Family History   Problem Relation Age of Onset   • Hypertension Mother    • Lung cancer Mother    • Cancer Mother         lung   • Kidney disease Father    • Other Brother         Coronary Artery Bypass Grafting   • Stroke Brother         Cerebrovascular Accident   • Malig Hyperthermia Neg Hx      MEDS: reviewed as per chart  ROS: UTO 2/2 confusoin, says she has some mild pain in her chest at  Site of chest tube  ALL: Morphine    Vital Sign Min/Max for last 24 hours  Temp  Min: 97.7 °F (36.5 °C)  Max: 99.4 °F (37.4 °C)   BP  Min: 156/67  Max: 211/83   Pulse  Min: 62  Max: 117   Resp  Min: 14  Max: 20   SpO2  Min: 90 %  Max: 98 %   Flow (L/min)  Min: 3  Max: 5   No Data Recorded     GEN: , appears ill, confused, hyperactive delerium, oriented times 1 only  HEENT: PERRL, EOMI, no icterus, mmm, no jvd, trachea midline, neck supple  CHEST: CTA bilat, no wheezes, no crackles, no use of accessory muscles  CV: RRR, no m/g/r  ABD: soft, nt, nd +bs, no hepatosplenomegaly  EXT: no c/c/e  SKIN: no rashes, no xanthomas, nl turgor  LYMPH: no palpable cervical or supraclavicular lymphadenopathy  NEURO: CN 2-12 intact and symmetric bilaterally  PSYCH: impaired, hyperactive delerium  MSK: no kyphoscoliosis, 5/5 strength ue and le bilaterally    Labs:  Cr 1.41 (1.75)  Na 135  Bicarb 26  Wbc 8.87  hgb 7.5  plts 177    CXR: reviewed    A/P:  1. Hyperactive delerium -- supportive care.  Agree with transfer to ICU, sitter as needed, try and minimize restraints, looks like the chest  Tube might be soon to come out and we can liberalize restraints at that time likely.  She does have a h/o vitamin b12 deficiency, I will go ahead and check  that.   2. History of depression -- her home effexor and trazadone have been resumed. She is on ativan chronically at home as well, which has been resumed.   3. Pain control -- hopefully we can transition to po meds and avoid the dilaudid.    4. Lung cancer s/p VATS -- chest tube in place, CTS managing  5. CKD -- avoid nephrotoxins.   6. COPD -- bronchodilators/pulmonary toilet.

## 2019-01-15 NOTE — NURSING NOTE
Pt noted in recliner starts flailing in chair with daughter at bedside, staff assist patient back into bed. Pt very uncontrollable charge nurse called into room to assist. Order po medications not able to be administered to patient, pt not following verbal commends. Pt chewing on straw and holding medications in her mouth when asked to swallow. Call placed to provider, new order received for IV haldol.

## 2019-01-15 NOTE — NURSING NOTE
"Pt continues to remains very agitated with periods of flailing and sleeping. Anytime pt. Is awake she continues to flail and cry out stating \" I have to get out of here\" continues to william in bed. Call placed to provider regarding pt. Behavior. Order received for ativan   "

## 2019-01-15 NOTE — PLAN OF CARE
Problem: Patient Care Overview  Goal: Plan of Care Review  Outcome: Ongoing (interventions implemented as appropriate)   01/15/19 0638   Coping/Psychosocial   Plan of Care Reviewed With patient   Plan of Care Review   Progress no change   OTHER   Outcome Summary Pt was brought down from floor with agitation and uncontrolled pain. Soft wrist x2 and violent non-locking ankles were present on transfer. Pt severely agitated and restless. Pain med's, ativan and Geodon given. Pt finally became less agitated and fell asleep. Pain med's and ativan given PRN throughout night. Straight cathed once for retaining urine. Chest tube continues to leak, dressing changed. BP in the 190's/200's upon arrival to ICU, PRN med's given and Cardene ordered per MD Parks if needed to keep SBP less than 180. Pt now in soft wrist restraints only. Still very confused and restless at times. Sitter remained at bedside all shift. CT of head done this morning, waiting for results. Spoke to daughter on phone to give her an update. Continue to monitor.       Problem: Fall Risk (Adult)  Goal: Identify Related Risk Factors and Signs and Symptoms  Outcome: Ongoing (interventions implemented as appropriate)    Goal: Absence of Fall  Outcome: Ongoing (interventions implemented as appropriate)      Problem: Restraint, Nonbehavioral (Nonviolent)  Goal: Rationale and Justification  Outcome: Ongoing (interventions implemented as appropriate)    Goal: Nonbehavioral (Nonviolent) Restraint: Absence of Injury/Harm  Outcome: Ongoing (interventions implemented as appropriate)    Goal: Nonbehavioral (Nonviolent) Restraint: Achievement of Discontinuation Criteria  Outcome: Ongoing (interventions implemented as appropriate)      Problem: Chest Tube Drainage Device (Adult)  Goal: Signs and Symptoms of Listed Potential Problems Will be Absent, Minimized or Managed (Chest Tube Drainage Device)  Outcome: Ongoing (interventions implemented as appropriate)      Problem:  Lung Surgery (via Thoracotomy) (Adult)  Goal: Signs and Symptoms of Listed Potential Problems Will be Absent, Minimized or Managed (Lung Surgery)  Outcome: Ongoing (interventions implemented as appropriate)      Problem: Skin Injury Risk (Adult)  Goal: Identify Related Risk Factors and Signs and Symptoms  Outcome: Ongoing (interventions implemented as appropriate)    Goal: Skin Health and Integrity  Outcome: Ongoing (interventions implemented as appropriate)

## 2019-01-16 ENCOUNTER — APPOINTMENT (OUTPATIENT)
Dept: GENERAL RADIOLOGY | Facility: HOSPITAL | Age: 74
End: 2019-01-16

## 2019-01-16 LAB
ANION GAP SERPL CALCULATED.3IONS-SCNC: 7.6 MMOL/L
BUN BLD-MCNC: 28 MG/DL (ref 8–23)
BUN/CREAT SERPL: 18.7 (ref 7–25)
CALCIUM SPEC-SCNC: 8.9 MG/DL (ref 8.6–10.5)
CHLORIDE SERPL-SCNC: 102 MMOL/L (ref 98–107)
CO2 SERPL-SCNC: 28.4 MMOL/L (ref 22–29)
CREAT BLD-MCNC: 1.5 MG/DL (ref 0.57–1)
DEPRECATED RDW RBC AUTO: 43.2 FL (ref 37–54)
ERYTHROCYTE [DISTWIDTH] IN BLOOD BY AUTOMATED COUNT: 12.6 % (ref 11.7–13)
GFR SERPL CREATININE-BSD FRML MDRD: 34 ML/MIN/1.73
GLUCOSE BLD-MCNC: 131 MG/DL (ref 65–99)
GLUCOSE BLDC GLUCOMTR-MCNC: 128 MG/DL (ref 70–130)
GLUCOSE BLDC GLUCOMTR-MCNC: 135 MG/DL (ref 70–130)
GLUCOSE BLDC GLUCOMTR-MCNC: 143 MG/DL (ref 70–130)
HCT VFR BLD AUTO: 24.9 % (ref 35.6–45.5)
HGB BLD-MCNC: 8.1 G/DL (ref 11.9–15.5)
MCH RBC QN AUTO: 30.9 PG (ref 26.9–32)
MCHC RBC AUTO-ENTMCNC: 32.5 G/DL (ref 32.4–36.3)
MCV RBC AUTO: 95 FL (ref 80.5–98.2)
PLATELET # BLD AUTO: 251 10*3/MM3 (ref 140–500)
PMV BLD AUTO: 9 FL (ref 6–12)
POTASSIUM BLD-SCNC: 4.2 MMOL/L (ref 3.5–5.2)
RBC # BLD AUTO: 2.62 10*6/MM3 (ref 3.9–5.2)
SODIUM BLD-SCNC: 138 MMOL/L (ref 136–145)
WBC NRBC COR # BLD: 7.61 10*3/MM3 (ref 4.5–10.7)

## 2019-01-16 PROCEDURE — 94003 VENT MGMT INPAT SUBQ DAY: CPT

## 2019-01-16 PROCEDURE — 94799 UNLISTED PULMONARY SVC/PX: CPT

## 2019-01-16 PROCEDURE — 25010000002 HEPARIN (PORCINE) PER 1000 UNITS: Performed by: THORACIC SURGERY (CARDIOTHORACIC VASCULAR SURGERY)

## 2019-01-16 PROCEDURE — 71045 X-RAY EXAM CHEST 1 VIEW: CPT

## 2019-01-16 PROCEDURE — 80048 BASIC METABOLIC PNL TOTAL CA: CPT | Performed by: NURSE PRACTITIONER

## 2019-01-16 PROCEDURE — 25010000002 HYDROMORPHONE PER 4 MG: Performed by: PSYCHIATRY & NEUROLOGY

## 2019-01-16 PROCEDURE — 99221 1ST HOSP IP/OBS SF/LOW 40: CPT | Performed by: PSYCHIATRY & NEUROLOGY

## 2019-01-16 PROCEDURE — 25010000002 HYDROMORPHONE PER 4 MG: Performed by: PAIN MEDICINE

## 2019-01-16 PROCEDURE — 25010000002 LORAZEPAM PER 2 MG: Performed by: PSYCHIATRY & NEUROLOGY

## 2019-01-16 PROCEDURE — 99024 POSTOP FOLLOW-UP VISIT: CPT | Performed by: NURSE PRACTITIONER

## 2019-01-16 PROCEDURE — 25010000002 LORAZEPAM PER 2 MG: Performed by: THORACIC SURGERY (CARDIOTHORACIC VASCULAR SURGERY)

## 2019-01-16 PROCEDURE — 82962 GLUCOSE BLOOD TEST: CPT

## 2019-01-16 PROCEDURE — 85027 COMPLETE CBC AUTOMATED: CPT | Performed by: NURSE PRACTITIONER

## 2019-01-16 RX ORDER — LORAZEPAM 2 MG/ML
0.5 INJECTION INTRAMUSCULAR EVERY 6 HOURS
Status: DISCONTINUED | OUTPATIENT
Start: 2019-01-16 | End: 2019-01-18

## 2019-01-16 RX ORDER — HALOPERIDOL 5 MG/ML
2 INJECTION INTRAMUSCULAR EVERY 4 HOURS PRN
Status: DISCONTINUED | OUTPATIENT
Start: 2019-01-16 | End: 2019-01-21 | Stop reason: HOSPADM

## 2019-01-16 RX ORDER — LANSOPRAZOLE
30 KIT EVERY MORNING
Status: DISCONTINUED | OUTPATIENT
Start: 2019-01-17 | End: 2019-01-21 | Stop reason: HOSPADM

## 2019-01-16 RX ORDER — AMLODIPINE BESYLATE 10 MG/1
10 TABLET ORAL
Status: DISCONTINUED | OUTPATIENT
Start: 2019-01-16 | End: 2019-01-21 | Stop reason: HOSPADM

## 2019-01-16 RX ORDER — HYDROMORPHONE HYDROCHLORIDE 1 MG/ML
0.5 INJECTION, SOLUTION INTRAMUSCULAR; INTRAVENOUS; SUBCUTANEOUS EVERY 6 HOURS
Status: DISCONTINUED | OUTPATIENT
Start: 2019-01-16 | End: 2019-01-18

## 2019-01-16 RX ORDER — VENLAFAXINE 75 MG/1
75 TABLET ORAL 2 TIMES DAILY WITH MEALS
Status: DISCONTINUED | OUTPATIENT
Start: 2019-01-16 | End: 2019-01-16

## 2019-01-16 RX ORDER — PRAMIPEXOLE DIHYDROCHLORIDE 0.25 MG/1
0.25 TABLET ORAL DAILY
Status: DISCONTINUED | OUTPATIENT
Start: 2019-01-16 | End: 2019-01-16

## 2019-01-16 RX ORDER — HYDRALAZINE HYDROCHLORIDE 20 MG/ML
20 INJECTION INTRAMUSCULAR; INTRAVENOUS EVERY 4 HOURS PRN
Status: DISCONTINUED | OUTPATIENT
Start: 2019-01-16 | End: 2019-01-20

## 2019-01-16 RX ORDER — LORAZEPAM 2 MG/ML
0.5 INJECTION INTRAMUSCULAR EVERY 4 HOURS PRN
Status: DISCONTINUED | OUTPATIENT
Start: 2019-01-16 | End: 2019-01-16

## 2019-01-16 RX ORDER — OXYCODONE HYDROCHLORIDE 5 MG/1
10 TABLET ORAL EVERY 4 HOURS
Status: DISCONTINUED | OUTPATIENT
Start: 2019-01-16 | End: 2019-01-16

## 2019-01-16 RX ADMIN — SUCRALFATE 1 G: 1 TABLET ORAL at 20:44

## 2019-01-16 RX ADMIN — OXYCODONE HYDROCHLORIDE 10 MG: 5 TABLET ORAL at 11:00

## 2019-01-16 RX ADMIN — ALBUTEROL SULFATE 6 PUFF: 90 AEROSOL, METERED RESPIRATORY (INHALATION) at 10:45

## 2019-01-16 RX ADMIN — HYDROMORPHONE HYDROCHLORIDE 0.5 MG: 1 INJECTION, SOLUTION INTRAMUSCULAR; INTRAVENOUS; SUBCUTANEOUS at 20:43

## 2019-01-16 RX ADMIN — DEXMEDETOMIDINE HYDROCHLORIDE 1.5 MCG/KG/HR: 100 INJECTION, SOLUTION, CONCENTRATE INTRAVENOUS at 04:32

## 2019-01-16 RX ADMIN — PRAMIPEXOLE DIHYDROCHLORIDE 0.25 MG: 0.25 TABLET ORAL at 11:00

## 2019-01-16 RX ADMIN — ALBUTEROL SULFATE 6 PUFF: 90 AEROSOL, METERED RESPIRATORY (INHALATION) at 06:53

## 2019-01-16 RX ADMIN — ALBUTEROL SULFATE 6 PUFF: 90 AEROSOL, METERED RESPIRATORY (INHALATION) at 23:15

## 2019-01-16 RX ADMIN — AMLODIPINE BESYLATE 10 MG: 10 TABLET ORAL at 11:00

## 2019-01-16 RX ADMIN — HYDROMORPHONE HYDROCHLORIDE 0.5 MG: 1 INJECTION, SOLUTION INTRAMUSCULAR; INTRAVENOUS; SUBCUTANEOUS at 03:50

## 2019-01-16 RX ADMIN — METOPROLOL TARTRATE 25 MG: 25 TABLET ORAL at 20:44

## 2019-01-16 RX ADMIN — DEXMEDETOMIDINE HYDROCHLORIDE 1 MCG/KG/HR: 100 INJECTION, SOLUTION, CONCENTRATE INTRAVENOUS at 21:19

## 2019-01-16 RX ADMIN — HEPARIN SODIUM 5000 UNITS: 5000 INJECTION INTRAVENOUS; SUBCUTANEOUS at 15:08

## 2019-01-16 RX ADMIN — SUCRALFATE 1 G: 1 TABLET ORAL at 11:00

## 2019-01-16 RX ADMIN — HYDROMORPHONE HYDROCHLORIDE 0.5 MG: 1 INJECTION, SOLUTION INTRAMUSCULAR; INTRAVENOUS; SUBCUTANEOUS at 15:08

## 2019-01-16 RX ADMIN — ALBUTEROL SULFATE 6 PUFF: 90 AEROSOL, METERED RESPIRATORY (INHALATION) at 18:27

## 2019-01-16 RX ADMIN — ALBUTEROL SULFATE 6 PUFF: 90 AEROSOL, METERED RESPIRATORY (INHALATION) at 14:18

## 2019-01-16 RX ADMIN — FAMOTIDINE 20 MG: 20 TABLET, FILM COATED ORAL at 08:00

## 2019-01-16 RX ADMIN — PANTOPRAZOLE SODIUM 40 MG: 40 TABLET, DELAYED RELEASE ORAL at 06:31

## 2019-01-16 RX ADMIN — SUCRALFATE 1 G: 1 TABLET ORAL at 17:39

## 2019-01-16 RX ADMIN — DEXMEDETOMIDINE HYDROCHLORIDE 1.5 MCG/KG/HR: 100 INJECTION, SOLUTION, CONCENTRATE INTRAVENOUS at 00:21

## 2019-01-16 RX ADMIN — LORAZEPAM 1 MG: 2 INJECTION INTRAMUSCULAR; INTRAVENOUS at 03:46

## 2019-01-16 RX ADMIN — METOPROLOL TARTRATE 25 MG: 25 TABLET ORAL at 08:00

## 2019-01-16 RX ADMIN — LORAZEPAM 0.5 MG: 2 INJECTION INTRAMUSCULAR; INTRAVENOUS at 20:43

## 2019-01-16 RX ADMIN — HEPARIN SODIUM 5000 UNITS: 5000 INJECTION INTRAVENOUS; SUBCUTANEOUS at 22:30

## 2019-01-16 RX ADMIN — DEXMEDETOMIDINE HYDROCHLORIDE 1.2 MCG/KG/HR: 100 INJECTION, SOLUTION, CONCENTRATE INTRAVENOUS at 12:20

## 2019-01-16 RX ADMIN — FAMOTIDINE 20 MG: 20 TABLET, FILM COATED ORAL at 20:44

## 2019-01-16 RX ADMIN — SUCRALFATE 1 G: 1 TABLET ORAL at 07:59

## 2019-01-16 RX ADMIN — HEPARIN SODIUM 5000 UNITS: 5000 INJECTION INTRAVENOUS; SUBCUTANEOUS at 06:31

## 2019-01-16 RX ADMIN — SENNOSIDES AND DOCUSATE SODIUM 2 TABLET: 8.6; 5 TABLET ORAL at 20:44

## 2019-01-16 RX ADMIN — VENLAFAXINE HYDROCHLORIDE 150 MG: 150 CAPSULE, EXTENDED RELEASE ORAL at 08:00

## 2019-01-16 RX ADMIN — CETIRIZINE HYDROCHLORIDE 10 MG: 10 TABLET, FILM COATED ORAL at 08:00

## 2019-01-16 RX ADMIN — DEXMEDETOMIDINE HYDROCHLORIDE 1.4 MCG/KG/HR: 100 INJECTION, SOLUTION, CONCENTRATE INTRAVENOUS at 08:01

## 2019-01-16 RX ADMIN — ATORVASTATIN CALCIUM 40 MG: 20 TABLET, FILM COATED ORAL at 20:44

## 2019-01-16 RX ADMIN — DEXMEDETOMIDINE HYDROCHLORIDE 1 MCG/KG/HR: 100 INJECTION, SOLUTION, CONCENTRATE INTRAVENOUS at 16:07

## 2019-01-16 RX ADMIN — LORAZEPAM 0.5 MG: 2 INJECTION INTRAMUSCULAR; INTRAVENOUS at 15:08

## 2019-01-16 RX ADMIN — LORAZEPAM 0.5 MG: 0.5 TABLET ORAL at 08:01

## 2019-01-16 NOTE — CONSULTS
Patient Identification:  NAME:  Rachana Arguelles  Age:  73 y.o.   Sex:  female   :  1945   MRN:  [id number]       Chief complaint: Does Not have one, reason for consult is agitation and confusion    History of present illness: This patient is a 73-year-old right-handed white female to history of chronic pain for which she takes 20 mg Percocets at least 4 times per day she has history of migraines hyperlipidemia chronic pain also takes Ativan or regular basis has been diagnosed with lung cancer comes to the hospital with some agitation and confusion duration several days context patient who takes pain medicines on a regular basis CT of the head by my independent eyeball review shows no acute abnormality daughter is very states she does not look like she's had a stroke but she has been agitated areas as context she did get a thoracotomy recently and has been withdrawn and agitated since then.  She got Geodon with to lauded and it may have helped some..  Although she is listed as having a morphine allergy she tolerated the to lauded well      Past medical history:  Past Medical History:   Diagnosis Date   • AAA (abdominal aortic aneurysm) without rupture (CMS/HCC)    • Anxiety    • Arthritis    • Cancer (CMS/HCC)     skin cancer   • Chest pain     at rest   • Chronic low back pain    • Chronic pain syndrome 3/12/2016   • CKD (chronic kidney disease), stage III (CMS/HCC)    • Claustrophobia 10/26/2015    Resolved   • Depression    • Dizziness    • GERD (gastroesophageal reflux disease)    • GI problem    • Hiatal hernia    • History of migraine headaches    • Hyperlipidemia    • Hypertension    • Lumbar postlaminectomy syndrome 10/26/2015    Resolved   • Lung cancer (CMS/HCC)    • Lung nodule    • Palpitations    • Polypharmacy 2016   • Pulmonary embolism (CMS/HCC) 10/26/2015    2013 - Resolved, felt to be secondary to estrogen use   • Submandibular sialoadenitis 10/26/2015    Resolved   • Vitamin B  12 deficiency        Past surgical history:  Past Surgical History:   Procedure Laterality Date   • ANGIOPLASTY ILIAC ARTERY Bilateral 8/10/2018    Procedure: BILATERAL ILIAC STENTS;  Surgeon: Riki Mancera MD;  Location: [facility] OR;  Service: Vascular   • BREAST SURGERY      Breast Surgery Reduction Procedure   • HYSTERECTOMY     • INTUBATION  1/15/2019        • LUMBAR FUSION      Lumbar Vertebral Fusion   • SHOULDER ARTHROSCOPY  04/04/2013    Dr. Carrillo/[facility] [place]   • THORACOSCOPY VIDEO ASSISTED WITH LOBECTOMY Right 1/11/2019    Procedure: BRONCOSCOPY, THORACOSCOPY VIDEO ASSISTED WITH RIGHT UPPER LOBE WEDGE RESECTION, COMPLETION RIGHT UPPER LOBECTOMY, LYMPH NODE DISSECTION, INTERCOSTAL NERVE BLOCK;  Surgeon: Quita Figueroa MD;  Location: [facility] OR;  Service: Thoracic   • TOTAL HIP ARTHROPLASTY REVISION Left    • TOTAL KNEE ARTHROPLASTY Left    • TOTAL SHOULDER ARTHROPLASTY Left        Allergies:  Morphine    Home medications:  Medications Prior to Admission   Medication Sig Dispense Refill Last Dose   • aluminum hydroxide-mag carbonate (GAVISCON EXTRA RELIEF) 160-105 MG chewable tablet chewable tablet Chew 2 tablets 2 (Two) Times a Day As Needed.   Past Week at Unknown time   • amLODIPine (NORVASC) 10 MG tablet Take 10 mg by mouth Every Morning.  3 1/11/2019 at 0400   • aspirin 81 MG chewable tablet Chew 81 mg Daily. PT TO CONTINUE PER MD   1/10/2019 at 2100   • atorvastatin (LIPITOR) 40 MG tablet Take 40 mg by mouth Every Night.   1/10/2019 at 2100   • cetirizine (ZyrTEC) 10 MG tablet Take 10 mg by mouth As Needed.   Past Week at Unknown time   • cyclobenzaprine (FLEXERIL) 10 MG tablet Take 10 mg by mouth 3 (Three) Times a Day As Needed.   Past Week at Unknown time   • famotidine (PEPCID) 20 MG tablet Take 1 tablet by mouth 2 (Two) Times a Day. 180 tablet 1 1/11/2019 at 0400   • furosemide (LASIX) 40 MG tablet Take 40 mg by mouth Daily.   Past Week at Unknown time   • omeprazole (priLOSEC)  40 MG capsule Take 40 mg by mouth Every Evening.   Past Week at 2100   • ondansetron ODT (ZOFRAN-ODT) 8 MG disintegrating tablet Take 8 mg by mouth Every 8 (Eight) Hours As Needed for Nausea or Vomiting.   Past Week at Unknown time   • oxyCODONE (ROXICODONE) 20 MG tablet Take 20 mg by mouth Every 4 (Four) Hours As Needed.   1/10/2019 at 2100   • oxymetazoline (AFRIN) 0.05 % nasal spray 2 sprays into the nostril(s) as directed by provider As Needed for Congestion.   1/11/2019 at 0530   • polyethylene glycol (MIRALAX) powder Take 17 g by mouth Every Evening. Constipation. Mix in 4oz of water/juice/milk   1/10/2019 at 2100   • sennosides-docusate sodium (SENOKOT-S) 8.6-50 MG tablet TAKE TWO TABLETS BY MOUTH ONCE DAILY (Patient taking differently: Take 2 tablets by mouth 2 (Two) Times a Day.) 180 tablet 1 1/10/2019 at 2100   • sucralfate (CARAFATE) 1 g tablet Take 1 tablet by mouth 4 (Four) Times a Day. 120 tablet 5 1/10/2019 at 2100   • traZODone (DESYREL) 150 MG tablet Take 0.5 tablets by mouth Every Night. 45 tablet 1 1/10/2019 at 2100   • umeclidinium-vilanterol (ANORO ELLIPTA) 62.5-25 MCG/INH aerosol powder  inhaler Inhale 1 puff Daily.   Past Month at Unknown time   • venlafaxine XR (EFFEXOR-XR) 150 MG 24 hr capsule Take 1 capsule by mouth Daily. 30 capsule 5 1/10/2019 at 0800   • carvedilol (COREG) 25 MG tablet Take 25 mg by mouth 2 (Two) Times a Day With Meals. 1/2 TAB EACH DOSE-ON HOLD SINCE 1/5/19 1/5/2019   • clopidogrel (PLAVIX) 75 MG tablet Take 1 tablet by mouth Daily. 30 tablet 3 1/4/2019   • Ergocalciferol (VITAMIN D2 PO) Take 50,000 Units by mouth 1 (One) Time Per Week. WEDNESDAY 1/2/2019   • LORazepam (ATIVAN) 0.5 MG tablet Take 0.5 mg by mouth 3 (Three) Times a Day.   1/7/2019        Hospital medications:    albuterol sulfate HFA 6 puff Inhalation Q4H - RT   amLODIPine 10 mg Oral Q24H   atorvastatin 40 mg Oral Nightly   famotidine 20 mg Oral BID   heparin (porcine) 5,000 Units Subcutaneous Q8H    HYDROmorphone 0.5 mg Intravenous Q6H   [START ON 1/17/2019] lansoprazole 30 mg Nasogastric QAM   LORazepam 0.5 mg Intravenous Q6H   metoprolol tartrate 25 mg Oral Q12H   sennosides-docusate sodium 2 tablet Oral Nightly   sodium chloride 4 mL Nebulization Once   sucralfate 1 g Oral 4x Daily       dexmedetomidine 0.2-1.5 mcg/kg/hr Last Rate: 1.2 mcg/kg/hr (01/16/19 1220)   lactated ringers 9 mL/hr Last Rate: Stopped (01/11/19 1108)   niCARdipine 5-15 mg/hr Last Rate: Stopped (01/15/19 1958)     •  albuterol  •  bisacodyl  •  haloperidol lactate  •  hydrALAZINE  •  magnesium hydroxide  •  metoprolol tartrate  •  naloxone  •  nitroglycerin  •  ondansetron **OR** ondansetron ODT **OR** ondansetron  •  sod bicarb-citric acid-simethicone    Family history:  Family History   Problem Relation Age of Onset   • Hypertension Mother    • Lung cancer Mother    • Cancer Mother         lung   • Kidney disease Father    • Other Brother         Coronary Artery Bypass Grafting   • Stroke Brother         Cerebrovascular Accident   • Malig Hyperthermia Neg Hx        Social history:  Social History     Tobacco Use   • Smoking status: Former Smoker     Packs/day: 2.50     Years: 15.00     Pack years: 37.50     Types: Cigarettes     Last attempt to quit: 9/22/2003     Years since quitting: 15.3   • Smokeless tobacco: Never Used   Substance Use Topics   • Alcohol use: Yes     Alcohol/week: 0.6 oz     Types: 1 Cans of beer per week     Comment: occ   • Drug use: No       Review of systems:    The patient can't give any review of systems daughter is there and states she's had lung cancer has not been agitated like this before his chronic back pain and takes a pain medicine at home but that has chronic anxiety as well no other review of systems possible at all from the patient or her daughter and I have reviewed the chart fully    Objective:  Vitals Ranges:   Temp:  [98.2 °F (36.8 °C)-98.8 °F (37.1 °C)] 98.2 °F (36.8 °C)  Heart Rate:   [48-81] 52  Resp:  [16-20] 18  BP: (119-183)/() 147/60  FiO2 (%):  [30 %] 30 %      Physical Exam:  She is agitated restraining trying to get out of bed at times is not following any commands fund of knowledge attention span and concentration recent remote memory and language cannot be tested she is eating her eyes closed and resisting eye opening and not following any commands she does appear to be in some distress.  Pupils I think are at least 1-1/2 equal round and conjugate face is symmetrical no other cranial nerves to be tested motor equal tone in all 4 extremities there is distal atrophy rigidity bradykinesia but no resting tremor reflexes absent throughout toes downgoing bilaterally with good withdrawal reflexes as noted trace throughout and symmetrical sensation coordination station and gait completely impossible in this patient heart regular without murmur neck is moderately rigid without bruits extremities no clubbing cyanosis edema visual acuity she did not blink to threat    Results review:   I reviewed the patient's new clinical results.    Data review:  Lab Results (last 24 hours)     Procedure Component Value Units Date/Time    POC Glucose Once [[id number]]  (Abnormal) Collected:  01/16/19 1301    Specimen:  Blood Updated:  01/16/19 1310     Glucose 135 mg/dL     Respiratory Culture - Sputum, ET Suction [[id number]] Collected:  01/15/19 1621    Specimen:  Sputum from ET Suction Updated:  01/16/19 1150     Respiratory Culture Scant growth (1+) Normal Respiratory Akila     Gram Stain No WBCs or organisms seen    Basic Metabolic Panel [[id number]]  (Abnormal) Collected:  01/16/19 0811    Specimen:  Blood Updated:  01/16/19 0848     Glucose 131 mg/dL      BUN 28 mg/dL      Creatinine 1.50 mg/dL      Sodium 138 mmol/L      Potassium 4.2 mmol/L      Chloride 102 mmol/L      CO2 28.4 mmol/L      Calcium 8.9 mg/dL      eGFR Non African Amer 34 mL/min/1.73      BUN/Creatinine Ratio 18.7     Anion Gap 7.6  mmol/L     Narrative:       The MDRD GFR formula is only valid for adults with stable renal function between ages 18 and 70.    CBC (No Diff) [[id number]]  (Abnormal) Collected:  01/16/19 0811    Specimen:  Blood Updated:  01/16/19 0821     WBC 7.61 10*3/mm3      RBC 2.62 10*6/mm3      Hemoglobin 8.1 g/dL      Hematocrit 24.9 %      MCV 95.0 fL      MCH 30.9 pg      MCHC 32.5 g/dL      RDW 12.6 %      RDW-SD 43.2 fl      MPV 9.0 fL      Platelets 251 10*3/mm3     POC Glucose Once [[id number]]  (Normal) Collected:  01/16/19 0317    Specimen:  Blood Updated:  01/16/19 0319     Glucose 128 mg/dL     POC Glucose Once [[id number]]  (Normal) Collected:  01/15/19 2005    Specimen:  Blood Updated:  01/15/19 2012     Glucose 125 mg/dL     Blood Gas, Arterial [[id number]]  (Abnormal) Collected:  01/15/19 1715    Specimen:  Arterial Blood Updated:  01/15/19 1717     Site Arterial: left radial     Sachin's Test Positive     pH, Arterial 7.364 pH units      pCO2, Arterial 47.0 mm Hg      pO2, Arterial 71.2 mm Hg      HCO3, Arterial 26.8 mmol/L      Base Excess, Arterial 0.8 mmol/L      O2 Saturation Calculated 93.3 %      A-a Gradiant 0.4 mmHg      Barometric Pressure for Blood Gas 756.5 mmHg      Modality Adult Vent     FIO2 30 %      Ventilator Mode PC     Set Mech Resp Rate 14     Rate 16 Breaths/minute      PEEP 5    Blood Gas, Arterial [[id number]]  (Abnormal) Collected:  01/15/19 1546    Specimen:  Arterial Blood Updated:  01/15/19 1549     Site Arterial: left radial     Sachin's Test Positive     pH, Arterial 7.387 pH units      pCO2, Arterial 46.1 mm Hg      pO2, Arterial 69.0 mm Hg      HCO3, Arterial 27.7 mmol/L      Base Excess, Arterial 2.4 mmol/L      O2 Saturation Calculated 93.1 %      Barometric Pressure for Blood Gas 757.5 mmHg      Modality Cannula     Flow Rate 3 lpm      Rate 20 Breaths/minute     POC Glucose Once [[id number]]  (Normal) Collected:  01/15/19 1538    Specimen:  Blood Updated:  01/15/19 1540     Glucose  112 mg/dL            Imaging:  Imaging Results (last 24 hours)     Procedure Component Value Units Date/Time    XR Chest 1 View [[id number]] Collected:  01/16/19 0637     Updated:  01/16/19 0637    Narrative:       PORTABLE CHEST X-RAY     CLINICAL HISTORY: Chest tube management; R26.2-Difficulty in walking,  not elsewhere classified; C34.90-Malignant neoplasm of unspecified part  of unspecified bronchus or lung; F41.9-Anxiety disorder, unspecified;  Z79.899-Other long term (current) drug therapy; G89.4-Chronic pain  syndrome     COMPARISON: 01/15/2019.     FINDINGS: Portable AP view of the chest was obtained with overlying  monitor leads in place. Life support lines are unchanged. Lungs are  poorly aerated. Right pneumothorax is stable in size. There is  persistent basilar atelectasis, although slightly improved. No  significant effusion. Heart size and vascularity are normal.             Impression:       Some improvement in right greater than left basilar  atelectasis, stable exam otherwise.                XR Chest 1 View [[id number]] Collected:  01/15/19 0637     Updated:  01/15/19 2351    Narrative:       PORTABLE CHEST X-RAY     CLINICAL HISTORY: chest tube management; R26.2-Difficulty in walking,  not elsewhere classified; C34.90-Malignant neoplasm of unspecified part  of unspecified bronchus or lung; F41.9-Anxiety disorder, unspecified;  Z79.899-Other long term (current) drug therapy; G89.4-Chronic pain  syndrome     COMPARISON: 01/14/2019.     FINDINGS: Portable AP view of the chest was obtained with overlying  monitor leads in place. Lungs are poorly aerated with basilar  predominant opacities felt to reflect atelectasis. Small right apical  pneumothorax has diminished slightly now with 1 cm of apical pleural  separation, previously 1.5. Right thoracotomy tube remains in place.  Stable cardiomegaly. Normal vascularity.             Impression:       Some improvement in small right apical pneumothorax.         This report was finalized on 1/15/2019 11:48 PM by Bryan Hendrix M.D.       XR Chest 1 View [[id number]] Collected:  01/15/19 1645     Updated:  01/15/19 1652    Narrative:       XR CHEST 1 VW-     HISTORY: Female who is 73 years-old,  endotracheal intubation     TECHNIQUE: Frontal view of the chest     COMPARISON: 1/15/2019 0429 hours     FINDINGS: Endotracheal tube tip is 2.9 cm above the ti. Right chest  tube extends the right apical region. NG tube extends beyond the  diaphragm and off image inferiorly. Heart size is in range of normal for  technique. Persistent right pneumothorax, assessment of which is limited  by supine positioning, appearance is grossly similar to prior exam. No  left pneumothorax is noted. No large pleural effusions.  Atelectasis/infiltrate in the lower lungs appears mildly increased. No  acute osseous process.       Impression:       Endotracheal intubation. Mildly increased  atelectasis/infiltrate in the lower lungs. Persistent right  pneumothorax. Continued follow-up recommended.     This report was finalized on 1/15/2019 4:49 PM by Dr. Brad Prieto M.D.       CT Head Without Contrast [[id number]] Collected:  01/15/19 0659     Updated:  01/15/19 1602    Narrative:       NONCONTRAST HEAD CT 01/15/2019     CLINICAL HISTORY: Recent diagnosis of lung cancer, patient has confusion  and delirium.     TECHNIQUE: Spiral CT images were obtained from the base of the skull to  the vertex without intravenous contrast. Images were reformatted and are  submitted in 3 mm thick axial CT sections with brain algorithm, 2 mm  thick sagittal and coronal reconstructions were performed.     COMPARISON: There are no prior head CTs from [facility]  for comparison.      FINDINGS: There is very minimal low-density in the frontal  periventricular white matter consistent with very minimal small vessel  disease. The remainder of the brain parenchyma is normal in attenuation.  The  ventricles are normal in size. I see no focal mass effect and no  midline shift and no extra-axial fluid collections are identified. There  is no evidence of acute intracranial hemorrhage. The paranasal sinuses  and the mastoid air cells and middle ear cavities are clear. The  calvarium and skull base are normal in appearance. The orbits are  unremarkable.             Impression:        Very minimal small vessel disease in the frontal periventricular white  matter. Otherwise, this is a normal head CT and the etiology of the new  onset confusion and delirium in this patient with recent diagnosis of  lung cancer is not established on this exam. Noncontrast head CT is  insensitive in the early evaluation for metastases to the brain and if  symptoms warrant, a contrast-enhanced MRI of the head could be obtained  for more complete assessment.     Radiation dose reduction techniques were utilized, including automated  exposure control and exposure modulation based on body size.     This report was finalized on 1/15/2019 3:59 PM by Dr. Stefan Brown M.D.                Assessment and Plan:       Lung cancer (CMS/HCC)    Severe metabolic encephalopathy she is very uncomfortable this is a patient who takes 20 mg of Percocet at least 4 times per day at home along with Ativan.  I like to discontinue oral doses of these medications and began a scheduled dose of very low-dose IV Dilaudid.  This will bypass the hepatic first pass affect and eliminate any type of drug withdrawal from her list of possibilities.  Also if she is uncomfortable it would help any pain.  I will also put her on low-dose Ativan scheduled that will be held if she is asleep.  She definitely looks agitated and uncomfortable at this time and held all would be used on a when necessary basis.  At this point this combination of medications I believe have benefits greatly outweigh any risks.  She will be reassessed tomorrow and further recommendations can be  made.      Karthikeyan Manuel MD  01/16/19  2:39 PM

## 2019-01-16 NOTE — CONSULTS
It is my understanding that this consultation will be canceled.  I will see the patient if needed.

## 2019-01-16 NOTE — PROGRESS NOTES
PROGRESS NOTE  Patient Name: Rachana Arguelles  Age/Sex: 73 y.o. female  : 1945  MRN: [id number]    Date of Admission: 2019  Date of Encounter Visit: 19   LOS: 5 days   Patient Care Team:  Rhys Crowder Jr., MD as PCP - General (Family Medicine)  Whitney Dudley MD as Consulting Physician (Nephrology)    Chief Complaint: Transferred to ICU for agitated delirium, currently on the ventilator    Hospital course: Patient has chronic pain and was on chronic pain medication and antianxiety medication, had lung cancer status post video-assisted thoracoscopic procedure, and went into withdrawal and agitation that required transfer to the ICU for proper sedation, patient did get oversedated and had to be intubated at the cardiac thoracic surgery request because of the concern about  not being able to cough and clear secretions especially given the finding on the bronchoscopy intraoperatively    Interval History: Patient is currently on the Precedex drip, intubated on the ventilator.  She is restless, she does follow commands but was noted that she cannot keep her legs still, talking to the family she does have chronic history of restless leg syndrome.  She did require some Dilaudid around midnight and around 4 AM, she is off her scheduled pain medication, she did get the Ativan IV which was transitioned to by mouth today.  She has a feeding tube and she was at 40 cc per hour which would be held for the plan to extubate later    REVIEW OF SYSTEMS:   Intubated, on the ventilator l  restless leg    Ventilator/Non-Invasive Ventilation Settings (From admission, onward)    Assist-control ventilation             Vital Signs  Temp:  [97.3 °F (36.3 °C)-98.8 °F (37.1 °C)] 98.8 °F (37.1 °C)  Heart Rate:  [52-81] 60  Resp:  [17-20] 18  BP: (119-193)/() 164/67  FiO2 (%):  [30 %] 30 %  SpO2:  [87 %-100 %] 99 %  on  Flow (L/min):  [2-30] 30 Device (Oxygen Therapy): ventilator    Intake/Output Summary (Last 24  "hours) at 1/16/2019 0757  Last data filed at 1/16/2019 0500  Gross per 24 hour   Intake 1420 ml   Output 900 ml   Net 520 ml     Flowsheet Rows      First Filed Value   Admission Height  167.6 cm (65.98\") Documented at 01/12/2019 1820   Admission Weight  90.3 kg (198 lb 15.8 oz) Documented at 01/12/2019 1820        Body mass index is 32.13 kg/m².      01/12/19  1820   Weight: 90.3 kg (198 lb 15.8 oz)       Physical Exam:  GEN:  Patient is restless, on the ventilator, did follow commands, moving all extremities, good strength in both upper and lower extremities   EYES:   Sclera clear. No icterus. PERRL.  ENT:   External ears/nose normal, no oral lesions, no thrush, mucous membranes moist oral ET tube  NECK:  Supple, midline trachea, no JVD  LUNGS: Normal chest on inspection, chest tube in position, no subcutaneous air, good breath sounds bilaterally with no wheezes or crackles, on mechanical ventilator, breathing in sync with the ventilator.   CV:  Regular rhythm and rate. Normal S1/S2. No murmurs, gallops, or rubs noted.  ABD:  Soft, non-tender and non-distended. Normal bowel sounds. No guarding  EXT:  moving all extremities was no obvious focal deficit , No cyanosis. No redness. No edema.   Skin: dry, intact, no bleeding    Results Review:      Results from last 7 days   Lab Units 01/15/19  0858 01/14/19  0630 01/13/19  0517 01/12/19  0458 01/09/19  1254   SODIUM mmol/L 138 135* 136 135* 136   POTASSIUM mmol/L 4.5 4.8 4.8 4.4 4.3   CHLORIDE mmol/L 102 102 102 99 97*   CO2 mmol/L 22.9 26.4 21.3* 26.3 26.8   BUN mg/dL 22 23 28* 23 20   CREATININE mg/dL 1.57* 1.41* 1.75* 1.60* 1.53*   CALCIUM mg/dL 8.6 8.7 9.1 8.8 9.2   ANION GAP mmol/L 13.1 6.6 12.7 9.7 12.2   Results for TRISHA SOTO (MRN [id number]) as of 1/15/2019 14:40   Ref. Range 1/15/2019 04:20   Vitamin B-12 Latest Ref Range: 211 - 946 pg/mL 241                 Results from last 7 days   Lab Units 01/15/19  0858 01/14/19  0630 01/13/19  0517 01/12/19  0458 " 01/09/19  1253   WBC 10*3/mm3 8.83 8.87 10.79* 12.62* 7.41   HEMOGLOBIN g/dL 8.0* 7.5* 8.3* 8.7* 11.8*   HEMATOCRIT % 25.8* 23.6* 26.8* 28.7* 37.6   PLATELETS 10*3/mm3 229 177 211 260 275   MCV fL 97.7 97.5 99.3* 97.0 96.9   NEUTROPHIL % %  --  67.9 76.6* 77.9* 68.4   LYMPHOCYTE % %  --  18.8* 13.3* 11.6* 22.7   MONOCYTES % %  --  11.2 9.4 10.4 7.4   EOSINOPHIL % %  --  1.4 0.3 0.0* 1.1   BASOPHIL % %  --  0.1 0.1 0.1 0.1   IMM GRAN % %  --  0.6* 0.3 0.3 0.3     Results from last 7 days   Lab Units 01/09/19  1254   INR  0.99   APTT seconds 32.1               Invalid input(s): LDLCALC  Results from last 7 days   Lab Units 01/15/19  1715 01/15/19  1546 01/15/19  0059   PH, ARTERIAL pH units 7.364 7.387 7.379   PCO2, ARTERIAL mm Hg 47.0* 46.1* 47.3*   PO2 ART mm Hg 71.2* 69.0* 78.0*   HCO3 ART mmol/L 26.8 27.7 28.0         Glucose   Date/Time Value Ref Range Status   01/16/2019 0317 128 70 - 130 mg/dL Final   01/15/2019 2005 125 70 - 130 mg/dL Final   01/15/2019 1538 112 70 - 130 mg/dL Final   01/15/2019 1124 103 70 - 130 mg/dL Final   01/15/2019 0810 91 70 - 130 mg/dL Final   01/15/2019 0054 113 70 - 130 mg/dL Final   01/14/2019 2011 114 70 - 130 mg/dL Final   01/14/2019 1721 116 70 - 130 mg/dL Final             Results from last 7 days   Lab Units 01/15/19  0103   NITRITE UA  Negative   WBC UA /HPF 0-2   BACTERIA UA /HPF None Seen   SQUAM EPITHEL UA /HPF 0-2               Imaging:   Imaging Results (all)     Procedure Component Value Units Date/Time         Improved lung volumes, some linear atelectasis over the left lower lobe, ET tube is in better position after it was readjusted post intubation  Medication Review:     albuterol sulfate HFA 6 puff Inhalation Q4H - RT   atorvastatin 40 mg Oral Nightly   cetirizine 10 mg Oral Daily   famotidine 20 mg Oral BID   heparin (porcine) 5,000 Units Subcutaneous Q8H   LORazepam 2 mg Intravenous Once   LORazepam 0.5 mg Oral TID   metoprolol tartrate 25 mg Oral Q12H    pantoprazole 40 mg Oral QAM   sennosides-docusate sodium 2 tablet Oral Nightly   sodium chloride 4 mL Nebulization Once   sucralfate 1 g Oral 4x Daily   traZODone 75 mg Oral Nightly   venlafaxine  mg Oral Daily         dexmedetomidine 0.2-1.5 mcg/kg/hr Last Rate: 1.5 mcg/kg/hr (01/16/19 0432)   lactated ringers 9 mL/hr Last Rate: Stopped (01/11/19 1108)   niCARdipine 5-15 mg/hr Last Rate: Stopped (01/15/19 1958)       ASSESSMENT:   1. Hyperactive delirium  2. Respiratory failure, on the ventilator mainly for airway protection  3. Benzo/opiate withdrawal  4. Anemia  5. Lung cancer post VATS  6. Pain control  7. Chronic kidney disease  8. COPD  9. Chronic back pain  10. Hypertension  11. Restless leg syndrome    PLAN:  The plan for today is to get her off the ventilator while she is awake enough to cough and work on her pulmonary hygiene and expectoration.  We will resume her home medication at a lower dose  We will hold on the trazodone at night  We will start Mirapex for restless leg syndrome, that should not have any impact on oversedation  We will continue with the Dilaudid when necessary for breakthrough pain  We'll reassess later today and see if the patient is suitable for extubation  Discussed with the daughter at the bedside, will discuss with the thoracic surgery team before decision to extubate  Continue to monitor renal function, her renal output is borderline low but urine color is clear and slightly concentrated  Patient is already on stress ulcer and DVT prophylaxis    Total critical care time was 35 min        Disposition: monitor in ICU today    Umesh Rosenberg MD  01/16/19  7:57 AM    \      Dictated utilizing Dragon dictation

## 2019-01-16 NOTE — PLAN OF CARE
Problem: Patient Care Overview  Goal: Plan of Care Review  Outcome: Ongoing (interventions implemented as appropriate)   01/16/19 7334   Coping/Psychosocial   Plan of Care Reviewed With patient   Plan of Care Review   Progress no change   OTHER   Outcome Summary pt lethargic, encephalopathic, confused. Follows commands intermittently throughout shift. Will open eyes only at times. Neurology consulted today and has made changes to medication regimen. Weaning Precedex today from 1.5 to 1.0 . Adequate U/O-450cc. 1 BM. Continuing to monitor.      Goal: Individualization and Mutuality  Outcome: Ongoing (interventions implemented as appropriate)    Goal: Discharge Needs Assessment  Outcome: Ongoing (interventions implemented as appropriate)    Goal: Interprofessional Rounds/Family Conf  Outcome: Ongoing (interventions implemented as appropriate)      Problem: Fall Risk (Adult)  Goal: Identify Related Risk Factors and Signs and Symptoms  Outcome: Ongoing (interventions implemented as appropriate)    Goal: Absence of Fall  Outcome: Ongoing (interventions implemented as appropriate)      Problem: Restraint, Nonbehavioral (Nonviolent)  Goal: Rationale and Justification  Outcome: Ongoing (interventions implemented as appropriate)    Goal: Nonbehavioral (Nonviolent) Restraint: Absence of Injury/Harm  Outcome: Ongoing (interventions implemented as appropriate)    Goal: Nonbehavioral (Nonviolent) Restraint: Achievement of Discontinuation Criteria  Outcome: Ongoing (interventions implemented as appropriate)      Problem: Chest Tube Drainage Device (Adult)  Goal: Signs and Symptoms of Listed Potential Problems Will be Absent, Minimized or Managed (Chest Tube Drainage Device)  Outcome: Ongoing (interventions implemented as appropriate)      Problem: Lung Surgery (via Thoracotomy) (Adult)  Goal: Signs and Symptoms of Listed Potential Problems Will be Absent, Minimized or Managed (Lung Surgery)  Outcome: Ongoing (interventions  implemented as appropriate)    Goal: Anesthesia/Sedation Recovery  Outcome: Ongoing (interventions implemented as appropriate)      Problem: Skin Injury Risk (Adult)  Goal: Identify Related Risk Factors and Signs and Symptoms  Outcome: Ongoing (interventions implemented as appropriate)    Goal: Skin Health and Integrity  Outcome: Ongoing (interventions implemented as appropriate)      Problem: Pain, Acute (Adult)  Goal: Identify Related Risk Factors and Signs and Symptoms  Outcome: Ongoing (interventions implemented as appropriate)    Goal: Acceptable Pain Control/Comfort Level  Outcome: Ongoing (interventions implemented as appropriate)

## 2019-01-16 NOTE — PROGRESS NOTES
"    Chief Complaint: Right upper lobe lung cancer, postoperative management  S/P: VATS right upper lobectomy  POD # 5    Subjective    Patient is intubated. Responds to voice and follows commands to move extremities. Minimally opens eyes.    Vital Signs:  Temp:  [97.7 °F (36.5 °C)-98.8 °F (37.1 °C)] 98.2 °F (36.8 °C)  Heart Rate:  [48-81] 50  Resp:  [16-20] 16  BP: (119-183)/() 147/60  FiO2 (%):  [30 %] 30 %    Intake & Output (last day)       01/15 0701 - 01/16 0700 01/16 0701 - 01/17 0700    I.V. (mL/kg) 1079 (11.9)     Other 60     NG/     Total Intake(mL/kg) 1420 (15.7)     Urine (mL/kg/hr) 900 (0.4)     Chest Tube      Total Output 900     Net +520                 Objective:  General Appearance:  In no acute distress and not in pain.    Vital signs: (most recent): Blood pressure 147/60, pulse 50, temperature 98.2 °F (36.8 °C), temperature source Oral, resp. rate 16, height 167.6 cm (65.98\"), weight 90.3 kg (198 lb 15.8 oz), SpO2 99 %.  Vital signs are normal.    Output: Producing urine.    Lungs:  Normal effort and normal respiratory rate.  There are decreased breath sounds (bilateral bases, R more diminished than L).    Heart: Normal rate.  Regular rhythm.  S1 normal and S2 normal.  No murmur.   Abdomen: Abdomen is soft.  Bowel sounds are normal.   There is no mass.   Extremities: There is dependent edema.    Pulses: Distal pulses are intact.          Chest tube:   Site: Right, Clean, Intact, Drainage around chest tube site and Securement device intact  Suction: waterseal  Air Leak: negative  24 Hour Total: 0cc charted    Results Review:     I reviewed the patient's new clinical results.  I reviewed the patient's new imaging results and agree with the interpretation.  I reviewed the patient's other test results and agree with the interpretation  Discussed with patient, her daughter at bedside, RN, Dr. Rosenberg and Dr. Figueroa.    Imaging Results (last 24 hours)     Procedure Component Value Units " Date/Time    XR Chest 1 View [[id number]] Collected:  01/16/19 0637     Updated:  01/16/19 0637    Narrative:       PORTABLE CHEST X-RAY     CLINICAL HISTORY: Chest tube management; R26.2-Difficulty in walking,  not elsewhere classified; C34.90-Malignant neoplasm of unspecified part  of unspecified bronchus or lung; F41.9-Anxiety disorder, unspecified;  Z79.899-Other long term (current) drug therapy; G89.4-Chronic pain  syndrome     COMPARISON: 01/15/2019.     FINDINGS: Portable AP view of the chest was obtained with overlying  monitor leads in place. Life support lines are unchanged. Lungs are  poorly aerated. Right pneumothorax is stable in size. There is  persistent basilar atelectasis, although slightly improved. No  significant effusion. Heart size and vascularity are normal.             Impression:       Some improvement in right greater than left basilar  atelectasis, stable exam otherwise.                XR Chest 1 View [[id number]] Collected:  01/15/19 0637     Updated:  01/15/19 2351    Narrative:       PORTABLE CHEST X-RAY     CLINICAL HISTORY: chest tube management; R26.2-Difficulty in walking,  not elsewhere classified; C34.90-Malignant neoplasm of unspecified part  of unspecified bronchus or lung; F41.9-Anxiety disorder, unspecified;  Z79.899-Other long term (current) drug therapy; G89.4-Chronic pain  syndrome     COMPARISON: 01/14/2019.     FINDINGS: Portable AP view of the chest was obtained with overlying  monitor leads in place. Lungs are poorly aerated with basilar  predominant opacities felt to reflect atelectasis. Small right apical  pneumothorax has diminished slightly now with 1 cm of apical pleural  separation, previously 1.5. Right thoracotomy tube remains in place.  Stable cardiomegaly. Normal vascularity.             Impression:       Some improvement in small right apical pneumothorax.        This report was finalized on 1/15/2019 11:48 PM by Bryan Hendrix M.D.       XR Chest 1 View  [[id number]] Collected:  01/15/19 1645     Updated:  01/15/19 1652    Narrative:       XR CHEST 1 VW-     HISTORY: Female who is 73 years-old,  endotracheal intubation     TECHNIQUE: Frontal view of the chest     COMPARISON: 1/15/2019 0429 hours     FINDINGS: Endotracheal tube tip is 2.9 cm above the ti. Right chest  tube extends the right apical region. NG tube extends beyond the  diaphragm and off image inferiorly. Heart size is in range of normal for  technique. Persistent right pneumothorax, assessment of which is limited  by supine positioning, appearance is grossly similar to prior exam. No  left pneumothorax is noted. No large pleural effusions.  Atelectasis/infiltrate in the lower lungs appears mildly increased. No  acute osseous process.       Impression:       Endotracheal intubation. Mildly increased  atelectasis/infiltrate in the lower lungs. Persistent right  pneumothorax. Continued follow-up recommended.     This report was finalized on 1/15/2019 4:49 PM by Dr. Brad Prieto M.D.       CT Head Without Contrast [[id number]] Collected:  01/15/19 0659     Updated:  01/15/19 1602    Narrative:       NONCONTRAST HEAD CT 01/15/2019     CLINICAL HISTORY: Recent diagnosis of lung cancer, patient has confusion  and delirium.     TECHNIQUE: Spiral CT images were obtained from the base of the skull to  the vertex without intravenous contrast. Images were reformatted and are  submitted in 3 mm thick axial CT sections with brain algorithm, 2 mm  thick sagittal and coronal reconstructions were performed.     COMPARISON: There are no prior head CTs from [facility]  for comparison.      FINDINGS: There is very minimal low-density in the frontal  periventricular white matter consistent with very minimal small vessel  disease. The remainder of the brain parenchyma is normal in attenuation.  The ventricles are normal in size. I see no focal mass effect and no  midline shift and no extra-axial fluid  collections are identified. There  is no evidence of acute intracranial hemorrhage. The paranasal sinuses  and the mastoid air cells and middle ear cavities are clear. The  calvarium and skull base are normal in appearance. The orbits are  unremarkable.             Impression:        Very minimal small vessel disease in the frontal periventricular white  matter. Otherwise, this is a normal head CT and the etiology of the new  onset confusion and delirium in this patient with recent diagnosis of  lung cancer is not established on this exam. Noncontrast head CT is  insensitive in the early evaluation for metastases to the brain and if  symptoms warrant, a contrast-enhanced MRI of the head could be obtained  for more complete assessment.     Radiation dose reduction techniques were utilized, including automated  exposure control and exposure modulation based on body size.     This report was finalized on 1/15/2019 3:59 PM by Dr. Stefan Brown M.D.             Lab Results:     Lab Results (last 24 hours)     Procedure Component Value Units Date/Time    POC Glucose Once [[id number]]  (Abnormal) Collected:  01/16/19 1301    Specimen:  Blood Updated:  01/16/19 1310     Glucose 135 mg/dL     Respiratory Culture - Sputum, ET Suction [[id number]] Collected:  01/15/19 1621    Specimen:  Sputum from ET Suction Updated:  01/16/19 1150     Respiratory Culture Scant growth (1+) Normal Respiratory Akila     Gram Stain No WBCs or organisms seen    Basic Metabolic Panel [[id number]]  (Abnormal) Collected:  01/16/19 0811    Specimen:  Blood Updated:  01/16/19 0848     Glucose 131 mg/dL      BUN 28 mg/dL      Creatinine 1.50 mg/dL      Sodium 138 mmol/L      Potassium 4.2 mmol/L      Chloride 102 mmol/L      CO2 28.4 mmol/L      Calcium 8.9 mg/dL      eGFR Non African Amer 34 mL/min/1.73      BUN/Creatinine Ratio 18.7     Anion Gap 7.6 mmol/L     Narrative:       The MDRD GFR formula is only valid for adults with stable renal function  between ages 18 and 70.    CBC (No Diff) [[id number]]  (Abnormal) Collected:  01/16/19 0811    Specimen:  Blood Updated:  01/16/19 0821     WBC 7.61 10*3/mm3      RBC 2.62 10*6/mm3      Hemoglobin 8.1 g/dL      Hematocrit 24.9 %      MCV 95.0 fL      MCH 30.9 pg      MCHC 32.5 g/dL      RDW 12.6 %      RDW-SD 43.2 fl      MPV 9.0 fL      Platelets 251 10*3/mm3     POC Glucose Once [[id number]]  (Normal) Collected:  01/16/19 0317    Specimen:  Blood Updated:  01/16/19 0319     Glucose 128 mg/dL     POC Glucose Once [[id number]]  (Normal) Collected:  01/15/19 2005    Specimen:  Blood Updated:  01/15/19 2012     Glucose 125 mg/dL     Blood Gas, Arterial [[id number]]  (Abnormal) Collected:  01/15/19 1715    Specimen:  Arterial Blood Updated:  01/15/19 1717     Site Arterial: left radial     Sachin's Test Positive     pH, Arterial 7.364 pH units      pCO2, Arterial 47.0 mm Hg      pO2, Arterial 71.2 mm Hg      HCO3, Arterial 26.8 mmol/L      Base Excess, Arterial 0.8 mmol/L      O2 Saturation Calculated 93.3 %      A-a Gradiant 0.4 mmHg      Barometric Pressure for Blood Gas 756.5 mmHg      Modality Adult Vent     FIO2 30 %      Ventilator Mode PC     [facility]     Rate 16 Breaths/minute      PEEP 5    Blood Gas, Arterial [[id number]]  (Abnormal) Collected:  01/15/19 1546    Specimen:  Arterial Blood Updated:  01/15/19 1549     Site Arterial: left radial     Sachin's Test Positive     pH, Arterial 7.387 pH units      pCO2, Arterial 46.1 mm Hg      pO2, Arterial 69.0 mm Hg      HCO3, Arterial 27.7 mmol/L      Base Excess, Arterial 2.4 mmol/L      O2 Saturation Calculated 93.1 %      Barometric Pressure for Blood Gas 757.5 mmHg      Modality Cannula     Flow Rate 3 lpm      Rate 20 Breaths/minute     POC Glucose Once [[id number]]  (Normal) Collected:  01/15/19 1538    Specimen:  Blood Updated:  01/15/19 1540     Glucose 112 mg/dL            Assessment/Plan       Lung cancer (CMS/HCC)       Assessment:    Condition:  Unchanged.       Plan:   NPO (tube feeds).  Chest x-ray.  Administer medications as ordered.       Mrs. Rubio remains intubated.  Minimal ventilator support. FiO2 30%, with PEEP of 5 and spontaneous setting. Intubation for airway protection due to lethargy and decreased level of consciousness.  She did arouse to stimulus today, but is not alert and does not seem oriented.  Etiology behind delirium and sedation is still unknown.  We have consulted neurology to see the patient.  Psychiatric consult placed, but they have not seen the patient.    This morning's chest x-ray is stable persistent bibasilar atelectasis.  Patient's chest tube should remain to waterseal due to positive pressure ventilation.  Follow-up chest x-ray in the morning.    Appreciate assistance from pulmonary service.    REENA Allen  [facility]  01/16/19  2:21 PM

## 2019-01-16 NOTE — PLAN OF CARE
Problem: Patient Care Overview  Goal: Plan of Care Review  Outcome: Ongoing (interventions implemented as appropriate)   01/15/19 9161   Coping/Psychosocial   Plan of Care Reviewed With patient   Plan of Care Review   Progress no change   OTHER   Outcome Summary pt continues to be lethargic and confused. Answers to name. PRN Dilaudid and Geodon given for Cortrak placement. Prior to 1600 assessment, Dr. Figueroa was at bedside and requested ABG.  Upon further assessment, pt was arousable but lethargic. APRN has been at bedside more than once this shift. This RN has not seen any significant change with the patient over the course of shift. Pt was intubated for airway protection and aspiration precautions. Continuing to monitor.      Goal: Individualization and Mutuality  Outcome: Ongoing (interventions implemented as appropriate)    Goal: Discharge Needs Assessment  Outcome: Ongoing (interventions implemented as appropriate)    Goal: Interprofessional Rounds/Family Conf  Outcome: Ongoing (interventions implemented as appropriate)      Problem: Fall Risk (Adult)  Goal: Identify Related Risk Factors and Signs and Symptoms  Outcome: Ongoing (interventions implemented as appropriate)    Goal: Absence of Fall  Outcome: Ongoing (interventions implemented as appropriate)      Problem: Restraint, Nonbehavioral (Nonviolent)  Goal: Rationale and Justification  Outcome: Ongoing (interventions implemented as appropriate)    Goal: Nonbehavioral (Nonviolent) Restraint: Absence of Injury/Harm  Outcome: Ongoing (interventions implemented as appropriate)    Goal: Nonbehavioral (Nonviolent) Restraint: Achievement of Discontinuation Criteria  Outcome: Ongoing (interventions implemented as appropriate)      Problem: Chest Tube Drainage Device (Adult)  Goal: Signs and Symptoms of Listed Potential Problems Will be Absent, Minimized or Managed (Chest Tube Drainage Device)  Outcome: Ongoing (interventions implemented as  appropriate)      Problem: Lung Surgery (via Thoracotomy) (Adult)  Goal: Signs and Symptoms of Listed Potential Problems Will be Absent, Minimized or Managed (Lung Surgery)  Outcome: Ongoing (interventions implemented as appropriate)      Problem: Skin Injury Risk (Adult)  Goal: Identify Related Risk Factors and Signs and Symptoms  Outcome: Ongoing (interventions implemented as appropriate)    Goal: Skin Health and Integrity  Outcome: Ongoing (interventions implemented as appropriate)      Problem: Pain, Acute (Adult)  Goal: Identify Related Risk Factors and Signs and Symptoms  Outcome: Ongoing (interventions implemented as appropriate)    Goal: Acceptable Pain Control/Comfort Level  Outcome: Ongoing (interventions implemented as appropriate)

## 2019-01-16 NOTE — PROGRESS NOTES
MD and nursing staff in room w/ patient Discussed w/ Dr. Rosenberg. He will cancel [facility] and discuss cancelling Psych MD.

## 2019-01-17 ENCOUNTER — APPOINTMENT (OUTPATIENT)
Dept: GENERAL RADIOLOGY | Facility: HOSPITAL | Age: 74
End: 2019-01-17

## 2019-01-17 ENCOUNTER — APPOINTMENT (OUTPATIENT)
Dept: CARDIOLOGY | Facility: HOSPITAL | Age: 74
End: 2019-01-17
Attending: INTERNAL MEDICINE

## 2019-01-17 LAB
ANION GAP SERPL CALCULATED.3IONS-SCNC: 13.2 MMOL/L
ARTERIAL PATENCY WRIST A: POSITIVE
ATMOSPHERIC PRESS: 749.2 MMHG
BACTERIA SPEC RESP CULT: NORMAL
BASE EXCESS BLDA CALC-SCNC: 3.1 MMOL/L (ref 0–2)
BDY SITE: ABNORMAL
BH CV UPPER VENOUS LEFT INTERNAL JUGULAR AUGMENT: NORMAL
BH CV UPPER VENOUS LEFT INTERNAL JUGULAR COMPETENT: NORMAL
BH CV UPPER VENOUS LEFT INTERNAL JUGULAR COMPRESS: NORMAL
BH CV UPPER VENOUS LEFT INTERNAL JUGULAR PHASIC: NORMAL
BH CV UPPER VENOUS LEFT INTERNAL JUGULAR SPONT: NORMAL
BH CV UPPER VENOUS LEFT SUBCLAVIAN AUGMENT: NORMAL
BH CV UPPER VENOUS LEFT SUBCLAVIAN COMPETENT: NORMAL
BH CV UPPER VENOUS LEFT SUBCLAVIAN COMPRESS: NORMAL
BH CV UPPER VENOUS LEFT SUBCLAVIAN PHASIC: NORMAL
BH CV UPPER VENOUS LEFT SUBCLAVIAN SPONT: NORMAL
BH CV UPPER VENOUS RIGHT AXILLARY AUGMENT: NORMAL
BH CV UPPER VENOUS RIGHT AXILLARY COMPETENT: NORMAL
BH CV UPPER VENOUS RIGHT AXILLARY COMPRESS: NORMAL
BH CV UPPER VENOUS RIGHT AXILLARY PHASIC: NORMAL
BH CV UPPER VENOUS RIGHT AXILLARY SPONT: NORMAL
BH CV UPPER VENOUS RIGHT BASILIC FOREARM COMPRESS: NORMAL
BH CV UPPER VENOUS RIGHT BASILIC UPPER COMPRESS: NORMAL
BH CV UPPER VENOUS RIGHT BRACHIAL COMPRESS: NORMAL
BH CV UPPER VENOUS RIGHT CEPHALIC FOREARM COMPRESS: NORMAL
BH CV UPPER VENOUS RIGHT CEPHALIC UPPER COMPRESS: NORMAL
BH CV UPPER VENOUS RIGHT INTERNAL JUGULAR AUGMENT: NORMAL
BH CV UPPER VENOUS RIGHT INTERNAL JUGULAR COMPETENT: NORMAL
BH CV UPPER VENOUS RIGHT INTERNAL JUGULAR COMPRESS: NORMAL
BH CV UPPER VENOUS RIGHT INTERNAL JUGULAR PHASIC: NORMAL
BH CV UPPER VENOUS RIGHT INTERNAL JUGULAR SPONT: NORMAL
BH CV UPPER VENOUS RIGHT RADIAL COMPRESS: NORMAL
BH CV UPPER VENOUS RIGHT SUBCLAVIAN AUGMENT: NORMAL
BH CV UPPER VENOUS RIGHT SUBCLAVIAN COMPETENT: NORMAL
BH CV UPPER VENOUS RIGHT SUBCLAVIAN COMPRESS: NORMAL
BH CV UPPER VENOUS RIGHT SUBCLAVIAN PHASIC: NORMAL
BH CV UPPER VENOUS RIGHT SUBCLAVIAN SPONT: NORMAL
BH CV UPPER VENOUS RIGHT ULNAR COMPRESS: NORMAL
BUN BLD-MCNC: 29 MG/DL (ref 8–23)
BUN/CREAT SERPL: 21 (ref 7–25)
CALCIUM SPEC-SCNC: 8.8 MG/DL (ref 8.6–10.5)
CHLORIDE SERPL-SCNC: 102 MMOL/L (ref 98–107)
CO2 SERPL-SCNC: 21.8 MMOL/L (ref 22–29)
CREAT BLD-MCNC: 1.38 MG/DL (ref 0.57–1)
DEPRECATED RDW RBC AUTO: 46.1 FL (ref 37–54)
ERYTHROCYTE [DISTWIDTH] IN BLOOD BY AUTOMATED COUNT: 12.9 % (ref 11.7–13)
GFR SERPL CREATININE-BSD FRML MDRD: 37 ML/MIN/1.73
GLUCOSE BLD-MCNC: 131 MG/DL (ref 65–99)
GLUCOSE BLDC GLUCOMTR-MCNC: 152 MG/DL (ref 70–130)
GLUCOSE BLDC GLUCOMTR-MCNC: 170 MG/DL (ref 70–130)
GRAM STN SPEC: NORMAL
HCO3 BLDA-SCNC: 26.2 MMOL/L (ref 22–28)
HCT VFR BLD AUTO: 27.3 % (ref 35.6–45.5)
HGB BLD-MCNC: 8.4 G/DL (ref 11.9–15.5)
HOROWITZ INDEX BLD+IHG-RTO: 40 %
MCH RBC QN AUTO: 30.2 PG (ref 26.9–32)
MCHC RBC AUTO-ENTMCNC: 30.8 G/DL (ref 32.4–36.3)
MCV RBC AUTO: 98.2 FL (ref 80.5–98.2)
MODALITY: ABNORMAL
O2 A-A PPRESDIFF RESPIRATORY: 0.4 MMHG
PCO2 BLDA: 33.3 MM HG (ref 35–45)
PH BLDA: 7.5 PH UNITS (ref 7.35–7.45)
PLATELET # BLD AUTO: 254 10*3/MM3 (ref 140–500)
PMV BLD AUTO: 9.4 FL (ref 6–12)
PO2 BLDA: 94.6 MM HG (ref 80–100)
POTASSIUM BLD-SCNC: 4.4 MMOL/L (ref 3.5–5.2)
RBC # BLD AUTO: 2.78 10*6/MM3 (ref 3.9–5.2)
SAO2 % BLDCOA: 98.1 % (ref 92–99)
SET MECH RESP RATE: 18
SODIUM BLD-SCNC: 137 MMOL/L (ref 136–145)
TOTAL RATE: 34 BREATHS/MINUTE
VT ON VENT VENT: 505 ML
WBC NRBC COR # BLD: 11.8 10*3/MM3 (ref 4.5–10.7)

## 2019-01-17 PROCEDURE — 02HV33Z INSERTION OF INFUSION DEVICE INTO SUPERIOR VENA CAVA, PERCUTANEOUS APPROACH: ICD-10-PCS | Performed by: INTERNAL MEDICINE

## 2019-01-17 PROCEDURE — 36600 WITHDRAWAL OF ARTERIAL BLOOD: CPT

## 2019-01-17 PROCEDURE — 94799 UNLISTED PULMONARY SVC/PX: CPT

## 2019-01-17 PROCEDURE — 25010000002 HEPARIN (PORCINE) PER 1000 UNITS: Performed by: THORACIC SURGERY (CARDIOTHORACIC VASCULAR SURGERY)

## 2019-01-17 PROCEDURE — 25010000002 HALOPERIDOL LACTATE PER 5 MG: Performed by: PSYCHIATRY & NEUROLOGY

## 2019-01-17 PROCEDURE — 25010000002 LORAZEPAM PER 2 MG: Performed by: PSYCHIATRY & NEUROLOGY

## 2019-01-17 PROCEDURE — 99232 SBSQ HOSP IP/OBS MODERATE 35: CPT | Performed by: PSYCHIATRY & NEUROLOGY

## 2019-01-17 PROCEDURE — 94640 AIRWAY INHALATION TREATMENT: CPT

## 2019-01-17 PROCEDURE — 82803 BLOOD GASES ANY COMBINATION: CPT

## 2019-01-17 PROCEDURE — 99024 POSTOP FOLLOW-UP VISIT: CPT | Performed by: NURSE PRACTITIONER

## 2019-01-17 PROCEDURE — 25010000002 ZIPRASIDONE MESYLATE PER 10 MG: Performed by: INTERNAL MEDICINE

## 2019-01-17 PROCEDURE — 25010000002 HYDRALAZINE PER 20 MG: Performed by: INTERNAL MEDICINE

## 2019-01-17 PROCEDURE — 94003 VENT MGMT INPAT SUBQ DAY: CPT

## 2019-01-17 PROCEDURE — 82962 GLUCOSE BLOOD TEST: CPT

## 2019-01-17 PROCEDURE — 94660 CPAP INITIATION&MGMT: CPT

## 2019-01-17 PROCEDURE — 25010000002 METHYLPREDNISOLONE PER 125 MG: Performed by: INTERNAL MEDICINE

## 2019-01-17 PROCEDURE — 71045 X-RAY EXAM CHEST 1 VIEW: CPT

## 2019-01-17 PROCEDURE — 80048 BASIC METABOLIC PNL TOTAL CA: CPT | Performed by: NURSE PRACTITIONER

## 2019-01-17 PROCEDURE — 85027 COMPLETE CBC AUTOMATED: CPT | Performed by: NURSE PRACTITIONER

## 2019-01-17 PROCEDURE — 93971 EXTREMITY STUDY: CPT

## 2019-01-17 PROCEDURE — 25010000002 HYDROMORPHONE PER 4 MG: Performed by: PSYCHIATRY & NEUROLOGY

## 2019-01-17 RX ORDER — METHYLPREDNISOLONE SODIUM SUCCINATE 125 MG/2ML
INJECTION, POWDER, LYOPHILIZED, FOR SOLUTION INTRAMUSCULAR; INTRAVENOUS
Status: DISPENSED
Start: 2019-01-17 | End: 2019-01-18

## 2019-01-17 RX ORDER — METHYLPREDNISOLONE SODIUM SUCCINATE 125 MG/2ML
60 INJECTION, POWDER, LYOPHILIZED, FOR SOLUTION INTRAMUSCULAR; INTRAVENOUS ONCE
Status: COMPLETED | OUTPATIENT
Start: 2019-01-17 | End: 2019-01-17

## 2019-01-17 RX ORDER — IPRATROPIUM BROMIDE AND ALBUTEROL SULFATE 2.5; .5 MG/3ML; MG/3ML
3 SOLUTION RESPIRATORY (INHALATION)
Status: DISCONTINUED | OUTPATIENT
Start: 2019-01-17 | End: 2019-01-20

## 2019-01-17 RX ORDER — ZIPRASIDONE MESYLATE 20 MG/ML
20 INJECTION, POWDER, LYOPHILIZED, FOR SOLUTION INTRAMUSCULAR ONCE
Status: COMPLETED | OUTPATIENT
Start: 2019-01-17 | End: 2019-01-17

## 2019-01-17 RX ORDER — IPRATROPIUM BROMIDE AND ALBUTEROL SULFATE 2.5; .5 MG/3ML; MG/3ML
SOLUTION RESPIRATORY (INHALATION)
Status: COMPLETED
Start: 2019-01-17 | End: 2019-01-17

## 2019-01-17 RX ADMIN — HEPARIN SODIUM 5000 UNITS: 5000 INJECTION INTRAVENOUS; SUBCUTANEOUS at 14:04

## 2019-01-17 RX ADMIN — DEXMEDETOMIDINE HYDROCHLORIDE 1 MCG/KG/HR: 100 INJECTION, SOLUTION, CONCENTRATE INTRAVENOUS at 01:45

## 2019-01-17 RX ADMIN — HYDROMORPHONE HYDROCHLORIDE 0.5 MG: 1 INJECTION, SOLUTION INTRAMUSCULAR; INTRAVENOUS; SUBCUTANEOUS at 02:30

## 2019-01-17 RX ADMIN — LANSOPRAZOLE 30 MG: KIT at 06:51

## 2019-01-17 RX ADMIN — ALBUTEROL SULFATE 6 PUFF: 90 AEROSOL, METERED RESPIRATORY (INHALATION) at 06:43

## 2019-01-17 RX ADMIN — HEPARIN SODIUM 5000 UNITS: 5000 INJECTION INTRAVENOUS; SUBCUTANEOUS at 21:19

## 2019-01-17 RX ADMIN — LORAZEPAM 0.5 MG: 2 INJECTION INTRAMUSCULAR; INTRAVENOUS at 02:30

## 2019-01-17 RX ADMIN — IPRATROPIUM BROMIDE AND ALBUTEROL SULFATE 3 ML: 2.5; .5 SOLUTION RESPIRATORY (INHALATION) at 15:20

## 2019-01-17 RX ADMIN — ATORVASTATIN CALCIUM 40 MG: 20 TABLET, FILM COATED ORAL at 21:18

## 2019-01-17 RX ADMIN — RACEPINEPHRINE HYDROCHLORIDE 0.5 ML: 11.25 SOLUTION RESPIRATORY (INHALATION) at 15:12

## 2019-01-17 RX ADMIN — METOPROLOL TARTRATE 5 MG: 5 INJECTION, SOLUTION INTRAVENOUS at 02:58

## 2019-01-17 RX ADMIN — ALBUTEROL SULFATE 6 PUFF: 90 AEROSOL, METERED RESPIRATORY (INHALATION) at 10:55

## 2019-01-17 RX ADMIN — SUCRALFATE 1 G: 1 TABLET ORAL at 21:18

## 2019-01-17 RX ADMIN — SENNOSIDES AND DOCUSATE SODIUM 2 TABLET: 8.6; 5 TABLET ORAL at 21:18

## 2019-01-17 RX ADMIN — HALOPERIDOL LACTATE 2 MG: 5 INJECTION INTRAMUSCULAR at 00:00

## 2019-01-17 RX ADMIN — SUCRALFATE 1 G: 1 TABLET ORAL at 12:49

## 2019-01-17 RX ADMIN — METHYLPREDNISOLONE SODIUM SUCCINATE 60 MG: 125 INJECTION, POWDER, FOR SOLUTION INTRAMUSCULAR; INTRAVENOUS at 15:09

## 2019-01-17 RX ADMIN — AMLODIPINE BESYLATE 10 MG: 10 TABLET ORAL at 08:19

## 2019-01-17 RX ADMIN — HEPARIN SODIUM 5000 UNITS: 5000 INJECTION INTRAVENOUS; SUBCUTANEOUS at 06:51

## 2019-01-17 RX ADMIN — DEXMEDETOMIDINE HYDROCHLORIDE 1.2 MCG/KG/HR: 100 INJECTION, SOLUTION, CONCENTRATE INTRAVENOUS at 07:00

## 2019-01-17 RX ADMIN — FAMOTIDINE 20 MG: 20 TABLET, FILM COATED ORAL at 08:19

## 2019-01-17 RX ADMIN — METOPROLOL TARTRATE 25 MG: 25 TABLET ORAL at 21:18

## 2019-01-17 RX ADMIN — IPRATROPIUM BROMIDE AND ALBUTEROL SULFATE 3 ML: 2.5; .5 SOLUTION RESPIRATORY (INHALATION) at 19:18

## 2019-01-17 RX ADMIN — HYDRALAZINE HYDROCHLORIDE 20 MG: 20 INJECTION INTRAMUSCULAR; INTRAVENOUS at 02:20

## 2019-01-17 RX ADMIN — SODIUM CHLORIDE 10 MG/HR: 9 INJECTION, SOLUTION INTRAVENOUS at 03:33

## 2019-01-17 RX ADMIN — SUCRALFATE 1 G: 1 TABLET ORAL at 08:19

## 2019-01-17 RX ADMIN — METHYLPREDNISOLONE SODIUM SUCCINATE 60 MG: 125 INJECTION, POWDER, FOR SOLUTION INTRAMUSCULAR; INTRAVENOUS at 17:24

## 2019-01-17 RX ADMIN — HYDROMORPHONE HYDROCHLORIDE 0.5 MG: 1 INJECTION, SOLUTION INTRAMUSCULAR; INTRAVENOUS; SUBCUTANEOUS at 14:31

## 2019-01-17 RX ADMIN — DEXMEDETOMIDINE HYDROCHLORIDE 1.2 MCG/KG/HR: 100 INJECTION, SOLUTION, CONCENTRATE INTRAVENOUS at 11:14

## 2019-01-17 RX ADMIN — HYDROMORPHONE HYDROCHLORIDE 0.5 MG: 1 INJECTION, SOLUTION INTRAMUSCULAR; INTRAVENOUS; SUBCUTANEOUS at 21:19

## 2019-01-17 RX ADMIN — SODIUM CHLORIDE 15 MG/HR: 9 INJECTION, SOLUTION INTRAVENOUS at 14:24

## 2019-01-17 RX ADMIN — SODIUM CHLORIDE 5 MG/HR: 9 INJECTION, SOLUTION INTRAVENOUS at 08:19

## 2019-01-17 RX ADMIN — LORAZEPAM 0.5 MG: 2 INJECTION INTRAMUSCULAR; INTRAVENOUS at 10:40

## 2019-01-17 RX ADMIN — SODIUM CHLORIDE 12.5 MG/HR: 9 INJECTION, SOLUTION INTRAVENOUS at 18:30

## 2019-01-17 RX ADMIN — SODIUM CHLORIDE 15 MG/HR: 9 INJECTION, SOLUTION INTRAVENOUS at 16:35

## 2019-01-17 RX ADMIN — ZIPRASIDONE MESYLATE 20 MG: 20 INJECTION, POWDER, LYOPHILIZED, FOR SOLUTION INTRAMUSCULAR at 00:33

## 2019-01-17 RX ADMIN — HYDROMORPHONE HYDROCHLORIDE 0.5 MG: 1 INJECTION, SOLUTION INTRAMUSCULAR; INTRAVENOUS; SUBCUTANEOUS at 10:39

## 2019-01-17 RX ADMIN — SODIUM CHLORIDE 12.5 MG/HR: 9 INJECTION, SOLUTION INTRAVENOUS at 21:22

## 2019-01-17 RX ADMIN — HYDRALAZINE HYDROCHLORIDE 20 MG: 20 INJECTION INTRAMUSCULAR; INTRAVENOUS at 13:58

## 2019-01-17 RX ADMIN — DEXMEDETOMIDINE HYDROCHLORIDE 0.7 MCG/KG/HR: 100 INJECTION, SOLUTION, CONCENTRATE INTRAVENOUS at 15:57

## 2019-01-17 RX ADMIN — HYDRALAZINE HYDROCHLORIDE 20 MG: 20 INJECTION INTRAMUSCULAR; INTRAVENOUS at 18:30

## 2019-01-17 RX ADMIN — FAMOTIDINE 20 MG: 20 TABLET, FILM COATED ORAL at 21:18

## 2019-01-17 RX ADMIN — ALBUTEROL SULFATE 6 PUFF: 90 AEROSOL, METERED RESPIRATORY (INHALATION) at 03:03

## 2019-01-17 NOTE — PLAN OF CARE
Problem: Lung Surgery (via Thoracotomy) (Adult)  Goal: Signs and Symptoms of Listed Potential Problems Will be Absent, Minimized or Managed (Lung Surgery)  Outcome: Ongoing (interventions implemented as appropriate)   01/17/19 3416   Goal/Outcome Evaluation   Problems Assessed (Lung Surgery/Thoracotomy) situational response;all   Problems Present (Lung Surgery) respiratory compromise;situational response   Pt tolerated slow weaning of precedex, started shift at 1.2 and ended shift at 0.5. Pt became more awake around noon, following commands, opens eyes to voice and spontaneously, successfully extubated at 1420 but developed stridor within half hour. Dr caicedo at bedside, orders received, given breathing tx and solu-medrol for 2 doses. Stridor improved greatly, bipap placed, abg obtained, pt non labored, sating good. Will continue to monitor pt closely per orders

## 2019-01-17 NOTE — SIGNIFICANT NOTE
01/17/19 1107   Rehab Treatment   Discipline physical therapist   Reason Treatment Not Performed other (see comments)  (RN Katie states pt is still not appropriate for PT at this time. PT will check back tomorrow.)   Recommendation   PT - Next Appointment 01/18/19

## 2019-01-17 NOTE — PROGRESS NOTES
Patient Identification:  NAME:  Rachana Arguelles  Age:  73 y.o.   Sex:  female   :  1945   MRN:  [id number]       Chief complaint: Metabolic encephalopathy drug withdrawal    History of present illness:  She is deathly more comfortable today but not straining as much moves all extremities no seizure activity.  CT of the head by my independent eyeball review shows no acute abnormality  Past medical history:  Past Medical History:   Diagnosis Date   • AAA (abdominal aortic aneurysm) without rupture (CMS/HCC)    • Anxiety    • Arthritis    • Cancer (CMS/HCC)     skin cancer   • Chest pain     at rest   • Chronic low back pain    • Chronic pain syndrome 3/12/2016   • CKD (chronic kidney disease), stage III (CMS/HCC)    • Claustrophobia 10/26/2015    Resolved   • Depression    • Dizziness    • GERD (gastroesophageal reflux disease)    • GI problem    • Hiatal hernia    • History of migraine headaches    • Hyperlipidemia    • Hypertension    • Lumbar postlaminectomy syndrome 10/26/2015    Resolved   • Lung cancer (CMS/HCC)    • Lung nodule    • Palpitations    • Polypharmacy 2016   • Pulmonary embolism (CMS/HCC) 10/26/2015    2013 - Resolved, felt to be secondary to estrogen use   • Submandibular sialoadenitis 10/26/2015    Resolved   • Vitamin B 12 deficiency        Allergies:  Morphine    Home medications:  Medications Prior to Admission   Medication Sig Dispense Refill Last Dose   • aluminum hydroxide-mag carbonate (GAVISCON EXTRA RELIEF) 160-105 MG chewable tablet chewable tablet Chew 2 tablets 2 (Two) Times a Day As Needed.   Past Week at Unknown time   • amLODIPine (NORVASC) 10 MG tablet Take 10 mg by mouth Every Morning.  3 2019 at 0400   • aspirin 81 MG chewable tablet Chew 81 mg Daily. PT TO CONTINUE PER MD   1/10/2019 at 2100   • atorvastatin (LIPITOR) 40 MG tablet Take 40 mg by mouth Every Night.   1/10/2019 at 2100   • cetirizine (ZyrTEC) 10 MG tablet Take 10 mg by mouth As Needed.    Past Week at Unknown time   • cyclobenzaprine (FLEXERIL) 10 MG tablet Take 10 mg by mouth 3 (Three) Times a Day As Needed.   Past Week at Unknown time   • famotidine (PEPCID) 20 MG tablet Take 1 tablet by mouth 2 (Two) Times a Day. 180 tablet 1 1/11/2019 at 0400   • furosemide (LASIX) 40 MG tablet Take 40 mg by mouth Daily.   Past Week at Unknown time   • omeprazole (priLOSEC) 40 MG capsule Take 40 mg by mouth Every Evening.   Past Week at 2100   • ondansetron ODT (ZOFRAN-ODT) 8 MG disintegrating tablet Take 8 mg by mouth Every 8 (Eight) Hours As Needed for Nausea or Vomiting.   Past Week at Unknown time   • oxyCODONE (ROXICODONE) 20 MG tablet Take 20 mg by mouth Every 4 (Four) Hours As Needed.   1/10/2019 at 2100   • oxymetazoline (AFRIN) 0.05 % nasal spray 2 sprays into the nostril(s) as directed by provider As Needed for Congestion.   1/11/2019 at 0530   • polyethylene glycol (MIRALAX) powder Take 17 g by mouth Every Evening. Constipation. Mix in 4oz of water/juice/milk   1/10/2019 at 2100   • sennosides-docusate sodium (SENOKOT-S) 8.6-50 MG tablet TAKE TWO TABLETS BY MOUTH ONCE DAILY (Patient taking differently: Take 2 tablets by mouth 2 (Two) Times a Day.) 180 tablet 1 1/10/2019 at 2100   • sucralfate (CARAFATE) 1 g tablet Take 1 tablet by mouth 4 (Four) Times a Day. 120 tablet 5 1/10/2019 at 2100   • traZODone (DESYREL) 150 MG tablet Take 0.5 tablets by mouth Every Night. 45 tablet 1 1/10/2019 at 2100   • umeclidinium-vilanterol (ANORO ELLIPTA) 62.5-25 MCG/INH aerosol powder  inhaler Inhale 1 puff Daily.   Past Month at Unknown time   • venlafaxine XR (EFFEXOR-XR) 150 MG 24 hr capsule Take 1 capsule by mouth Daily. 30 capsule 5 1/10/2019 at 0800   • carvedilol (COREG) 25 MG tablet Take 25 mg by mouth 2 (Two) Times a Day With Meals. 1/2 TAB EACH DOSE-ON HOLD SINCE 1/5/19 1/5/2019   • clopidogrel (PLAVIX) 75 MG tablet Take 1 tablet by mouth Daily. 30 tablet 3 1/4/2019   • Ergocalciferol (VITAMIN D2 PO) Take  50,000 Units by mouth 1 (One) Time Per Week. WEDNESDAY 1/2/2019   • LORazepam (ATIVAN) 0.5 MG tablet Take 0.5 mg by mouth 3 (Three) Times a Day.   1/7/2019        Hospital medications:    amLODIPine 10 mg Oral Q24H   atorvastatin 40 mg Oral Nightly   famotidine 20 mg Oral BID   heparin (porcine) 5,000 Units Subcutaneous Q8H   HYDROmorphone 0.5 mg Intravenous Q6H   ipratropium-albuterol      ipratropium-albuterol 3 mL Nebulization Q4H - RT   lansoprazole 30 mg Nasogastric QAM   LORazepam 0.5 mg Intravenous Q6H   metoprolol tartrate 25 mg Oral Q12H   sennosides-docusate sodium 2 tablet Oral Nightly   sodium chloride 4 mL Nebulization Once   sucralfate 1 g Oral 4x Daily       dexmedetomidine 0.2-1.5 mcg/kg/hr Last Rate: 0.8 mcg/kg/hr (01/17/19 1435)   lactated ringers 9 mL/hr Last Rate: Stopped (01/11/19 1108)   niCARdipine 5-15 mg/hr Last Rate: 15 mg/hr (01/17/19 1424)     •  albuterol  •  bisacodyl  •  haloperidol lactate  •  hydrALAZINE  •  magnesium hydroxide  •  metoprolol tartrate  •  naloxone  •  nitroglycerin  •  ondansetron **OR** ondansetron ODT **OR** ondansetron  •  sod bicarb-citric acid-simethicone      Objective:  Vitals Ranges:   Temp:  [98.1 °F (36.7 °C)-98.6 °F (37 °C)] 98.6 °F (37 °C)  Heart Rate:  [50-85] 84  Resp:  [14-21] 17  BP: (117-191)/() 160/56  FiO2 (%):  [30 %] 30 %      Physical Exam:  Definitely more comfortable than yesterday not wrist restraining against restraints as much resists eye opening pupils one half constricting to 1 eyes are conjugate equal tone throughout distal atrophy noted reflexes trace throughout symmetrical toes downgoing bilaterally    Results review:   I reviewed the patient's new clinical results.    Data review:  Lab Results (last 24 hours)     Procedure Component Value Units Date/Time    POC Glucose Once [[id number]]  (Abnormal) Collected:  01/17/19 1126    Specimen:  Blood Updated:  01/17/19 1129     Glucose 152 mg/dL     Basic Metabolic Panel [[id number]]   (Abnormal) Collected:  01/17/19 0640    Specimen:  Blood Updated:  01/17/19 0807     Glucose 131 mg/dL      BUN 29 mg/dL      Creatinine 1.38 mg/dL      Sodium 137 mmol/L      Potassium 4.4 mmol/L      Chloride 102 mmol/L      CO2 21.8 mmol/L      Calcium 8.8 mg/dL      eGFR Non African Amer 37 mL/min/1.73      BUN/Creatinine Ratio 21.0     Anion Gap 13.2 mmol/L     Narrative:       The MDRD GFR formula is only valid for adults with stable renal function between ages 18 and 70.    Respiratory Culture - Sputum, ET Suction [[id number]] Collected:  01/15/19 1621    Specimen:  Sputum from ET Suction Updated:  01/17/19 0800     Respiratory Culture Scant growth (1+) Normal Respiratory Akila     Gram Stain No WBCs or organisms seen    CBC (No Diff) [[id number]]  (Abnormal) Collected:  01/17/19 0640    Specimen:  Blood Updated:  01/17/19 0727     WBC 11.80 10*3/mm3      RBC 2.78 10*6/mm3      Hemoglobin 8.4 g/dL      Hematocrit 27.3 %      MCV 98.2 fL      MCH 30.2 pg      MCHC 30.8 g/dL      RDW 12.9 %      RDW-SD 46.1 fl      MPV 9.4 fL      Platelets 254 10*3/mm3     POC Glucose Once [[id number]]  (Abnormal) Collected:  01/16/19 1929    Specimen:  Blood Updated:  01/16/19 1948     Glucose 143 mg/dL            Imaging:  Imaging Results (last 24 hours)     Procedure Component Value Units Date/Time    XR Chest 1 View [[id number]] Collected:  01/17/19 0807     Updated:  01/17/19 0807    Narrative:       PORTABLE CHEST X-RAY     CLINICAL HISTORY: Chest tube management; R26.2-Difficulty in walking,  not elsewhere classified; C34.90-Malignant neoplasm of unspecified part  of unspecified bronchus or lung; F41.9-Anxiety disorder, unspecified;  Z79.899-Other long term (current) drug therapy; G89.4-Chronic pain  syndrome.     COMPARISON: 01/16/2019     FINDINGS: Portable AP view of the chest was obtained with overlying  monitor leads in place. Life support lines are unchanged. Right apical  pneumothorax has increased very slightly now  with up to approximately  3.8 cm of apical pleural separation, previously 3.5. Improved bibasilar  atelectasis, particularly on the right side. No significant pleural  fluid. Heart size and vascularity are normal.             Impression:       Improving bibasilar atelectasis with minimal increase in  right apical pneumothorax.                XR Chest 1 View [[id number]] Collected:  01/16/19 0637     Updated:  01/17/19 0556    Narrative:       PORTABLE CHEST X-RAY     CLINICAL HISTORY: Chest tube management; R26.2-Difficulty in walking,  not elsewhere classified; C34.90-Malignant neoplasm of unspecified part  of unspecified bronchus or lung; F41.9-Anxiety disorder, unspecified;  Z79.899-Other long term (current) drug therapy; G89.4-Chronic pain  syndrome     COMPARISON: 01/15/2019.     FINDINGS: Portable AP view of the chest was obtained with overlying  monitor leads in place. Life support lines are unchanged. Lungs are  poorly aerated. Right pneumothorax is stable in size. There is  persistent basilar atelectasis, although slightly improved. No  significant effusion. Heart size and vascularity are normal.             Impression:       Some improvement in right greater than left basilar  atelectasis, stable exam otherwise.        This report was finalized on 1/17/2019 5:53 AM by Bryan Hendrix M.D.                Assessment and Plan:       Lung cancer (CMS/HCC)    Metabolic encephalopathy much better she is more calm she still resides resist eye movement and pulls against her restraints but definitely looks more comfortable and we're going the right direction.  We are replacing her daily dose of Percocet with scheduled IV Dilaudid.  She can definitely tolerate such a tiny dose easily.    Karthikeyan Manuel MD  01/17/19  3:23 PM

## 2019-01-17 NOTE — NURSING NOTE
Pt became very agitated during bath. Fighting the vent and kicking, four nurses to hold pt down. Precedex maxed and Haldol given. Spoke to MD Parks to give 20 mg of Geodon once.

## 2019-01-17 NOTE — CONSULTS
[facility] was ask to see pt. Due to mental status changes. Since order patient was found to have multiple medical issues that affect cognition. She is currently on a vent. Discussion w/ Dr. Rosenberg yesterday indicated that there is no current need for psychiatry. [facility] will sign off as pt is not able to provide any hx of participate in any mental health care.  Please reconsult should patient need mental health services later on in her care.

## 2019-01-17 NOTE — PROGRESS NOTES
LIBORIO doppler completed.  Preliminary results:  RT arm is negative for DVT and STP.  Results given to CHASE Wilson.

## 2019-01-17 NOTE — PROGRESS NOTES
"    Chief Complaint: Right upper lobe lung cancer, postoperative management  S/P: VATS right upper lobectomy  POD # 6    Subjective    Patient is intubated. Responds to voice and follows commands to move extremities. Minimally opens eyes.    Vital Signs:  Temp:  [98.1 °F (36.7 °C)-98.9 °F (37.2 °C)] 98.3 °F (36.8 °C)  Heart Rate:  [50-79] 66  Resp:  [14-21] 17  BP: (117-191)/() 135/54  FiO2 (%):  [30 %] 30 %    Intake & Output (last day)       01/16 0701 - 01/17 0700 01/17 0701 - 01/18 0700    I.V. (mL/kg) 799 (8.8)     Other 93     NG/     Total Intake(mL/kg) 1572 (17.4)     Urine (mL/kg/hr) 1075 (0.5)     Stool 0     Chest Tube 50     Total Output 1125     Net +447           Stool Unmeasured Occurrence 1 x           Objective:  General Appearance:  In no acute distress and not in pain.    Vital signs: (most recent): Blood pressure 135/54, pulse 66, temperature 98.3 °F (36.8 °C), temperature source Oral, resp. rate 17, height 167.6 cm (65.98\"), weight 90.3 kg (198 lb 15.8 oz), SpO2 100 %.  Vital signs are normal.    Output: Producing urine.    Lungs:  Normal effort and normal respiratory rate.  There are decreased breath sounds (bilateral bases, R more diminished than L).    Heart: Normal rate.  Regular rhythm.  S1 normal and S2 normal.  No murmur.   Chest: (Chest tube site is clean, dry and intact.  Minimal drainage.)  Abdomen: Abdomen is soft.  Bowel sounds are normal.   There is no mass.   Extremities: There is dependent edema (RUE > than others).    Pulses: Distal pulses are intact.    Skin:  Warm and dry.          Chest tube:   Site: Right, Clean, Intact, Drainage around chest tube site and Securement device intact  Suction: waterseal  Air Leak: negative  Level: 2000cc  24 Hour Total: 50cc     Results Review:     I reviewed the patient's new clinical results.  I reviewed the patient's new imaging results and agree with the interpretation.  I reviewed the patient's other test results and agree with " the interpretation  Discussed with patient, her daughter at bedside, RN, Dr. Rosenberg and Dr. Figueroa.    Imaging Results (last 24 hours)     Procedure Component Value Units Date/Time    XR Chest 1 View [[id number]] Collected:  01/17/19 0807     Updated:  01/17/19 0807    Narrative:       PORTABLE CHEST X-RAY     CLINICAL HISTORY: Chest tube management; R26.2-Difficulty in walking,  not elsewhere classified; C34.90-Malignant neoplasm of unspecified part  of unspecified bronchus or lung; F41.9-Anxiety disorder, unspecified;  Z79.899-Other long term (current) drug therapy; G89.4-Chronic pain  syndrome.     COMPARISON: 01/16/2019     FINDINGS: Portable AP view of the chest was obtained with overlying  monitor leads in place. Life support lines are unchanged. Right apical  pneumothorax has increased very slightly now with up to approximately  3.8 cm of apical pleural separation, previously 3.5. Improved bibasilar  atelectasis, particularly on the right side. No significant pleural  fluid. Heart size and vascularity are normal.             Impression:       Improving bibasilar atelectasis with minimal increase in  right apical pneumothorax.                XR Chest 1 View [[id number]] Collected:  01/16/19 0637     Updated:  01/17/19 0556    Narrative:       PORTABLE CHEST X-RAY     CLINICAL HISTORY: Chest tube management; R26.2-Difficulty in walking,  not elsewhere classified; C34.90-Malignant neoplasm of unspecified part  of unspecified bronchus or lung; F41.9-Anxiety disorder, unspecified;  Z79.899-Other long term (current) drug therapy; G89.4-Chronic pain  syndrome     COMPARISON: 01/15/2019.     FINDINGS: Portable AP view of the chest was obtained with overlying  monitor leads in place. Life support lines are unchanged. Lungs are  poorly aerated. Right pneumothorax is stable in size. There is  persistent basilar atelectasis, although slightly improved. No  significant effusion. Heart size and vascularity are normal.              Impression:       Some improvement in right greater than left basilar  atelectasis, stable exam otherwise.        This report was finalized on 1/17/2019 5:53 AM by Bryan Hendrix M.D.             Lab Results:     Lab Results (last 24 hours)     Procedure Component Value Units Date/Time    POC Glucose Once [[id number]]  (Abnormal) Collected:  01/17/19 1126    Specimen:  Blood Updated:  01/17/19 1129     Glucose 152 mg/dL     Basic Metabolic Panel [[id number]]  (Abnormal) Collected:  01/17/19 0640    Specimen:  Blood Updated:  01/17/19 0807     Glucose 131 mg/dL      BUN 29 mg/dL      Creatinine 1.38 mg/dL      Sodium 137 mmol/L      Potassium 4.4 mmol/L      Chloride 102 mmol/L      CO2 21.8 mmol/L      Calcium 8.8 mg/dL      eGFR Non African Amer 37 mL/min/1.73      BUN/Creatinine Ratio 21.0     Anion Gap 13.2 mmol/L     Narrative:       The MDRD GFR formula is only valid for adults with stable renal function between ages 18 and 70.    Respiratory Culture - Sputum, ET Suction [[id number]] Collected:  01/15/19 1621    Specimen:  Sputum from ET Suction Updated:  01/17/19 0800     Respiratory Culture Scant growth (1+) Normal Respiratory Akila     Gram Stain No WBCs or organisms seen    CBC (No Diff) [[id number]]  (Abnormal) Collected:  01/17/19 0640    Specimen:  Blood Updated:  01/17/19 0727     WBC 11.80 10*3/mm3      RBC 2.78 10*6/mm3      Hemoglobin 8.4 g/dL      Hematocrit 27.3 %      MCV 98.2 fL      MCH 30.2 pg      MCHC 30.8 g/dL      RDW 12.9 %      RDW-SD 46.1 fl      MPV 9.4 fL      Platelets 254 10*3/mm3     POC Glucose Once [[id number]]  (Abnormal) Collected:  01/16/19 1929    Specimen:  Blood Updated:  01/16/19 1948     Glucose 143 mg/dL     POC Glucose Once [[id number]]  (Abnormal) Collected:  01/16/19 1301    Specimen:  Blood Updated:  01/16/19 1310     Glucose 135 mg/dL            Assessment/Plan       Lung cancer (CMS/HCC)       Assessment:    Condition: Unchanged.       Plan:   NPO (tube feeds).  Chest  x-ray.  Administer medications as ordered.       Mrs. Arguelles remains intubated.  Minimal ventilator support. FiO2 30%, with PEEP of 5 and spontaneous setting. Intubation for airway protection due to lethargy and decreased level of consciousness.    She did arouse to stimulus today, but is not alert and does not seem oriented.     1. Lung cancer: Chest tube remains to waterseal while patient is intubated.This morning's chest x-ray is stable persistent bibasilar atelectasis is improved. Follow-up CXR in AM.  2.  Edema of right upper extremity: Doppler ordered.  3.  Postoperative delirium/encephalopathy: Appreciate neurology assistance.  4. Chronic anti-platelet therapy: Holding Plavix for postoperative state.  Daughter believes it is due to history of pulmonary embolus, but not sure it is necessary presently.  DVT prophylaxis with subcutaneous heparin continued.    AM CBC and BMP ordered.     Appreciate assistance from pulmonary service.    REENA Allen  [facility]  01/17/19  12:30 PM

## 2019-01-17 NOTE — NURSING NOTE
Pt successfully extubated at 1420 and developed stridor within half hour, dr caicedo at bedside, orders received solu- medrol given and breathing tx per RT. bipap placed on pt, ABG obtained, pt tachypnic nonlabored but sating 97%.

## 2019-01-17 NOTE — PLAN OF CARE
Problem: Fall Risk (Adult)  Goal: Identify Related Risk Factors and Signs and Symptoms  Outcome: Ongoing (interventions implemented as appropriate)    Goal: Absence of Fall  Outcome: Ongoing (interventions implemented as appropriate)      Problem: Restraint, Nonbehavioral (Nonviolent)  Goal: Rationale and Justification  Outcome: Ongoing (interventions implemented as appropriate)    Goal: Nonbehavioral (Nonviolent) Restraint: Absence of Injury/Harm  Outcome: Ongoing (interventions implemented as appropriate)    Goal: Nonbehavioral (Nonviolent) Restraint: Achievement of Discontinuation Criteria  Outcome: Ongoing (interventions implemented as appropriate)      Problem: Chest Tube Drainage Device (Adult)  Goal: Signs and Symptoms of Listed Potential Problems Will be Absent, Minimized or Managed (Chest Tube Drainage Device)  Outcome: Ongoing (interventions implemented as appropriate)      Problem: Lung Surgery (via Thoracotomy) (Adult)  Goal: Signs and Symptoms of Listed Potential Problems Will be Absent, Minimized or Managed (Lung Surgery)  Outcome: Ongoing (interventions implemented as appropriate)      Problem: Skin Injury Risk (Adult)  Goal: Identify Related Risk Factors and Signs and Symptoms  Outcome: Ongoing (interventions implemented as appropriate)    Goal: Skin Health and Integrity  Outcome: Ongoing (interventions implemented as appropriate)      Problem: Pain, Acute (Adult)  Goal: Identify Related Risk Factors and Signs and Symptoms  Outcome: Ongoing (interventions implemented as appropriate)    Goal: Acceptable Pain Control/Comfort Level  Outcome: Ongoing (interventions implemented as appropriate)

## 2019-01-17 NOTE — CONSULTS
Adult Nutrition  Assessment/PES    Patient Name:  Rachana Arguelles  YOB: 1945  MRN: [id number]  Admit Date:  1/11/2019    Assessment Date:  1/17/2019    Comments:  Pt now on the vent and receiving Fibersource HN with goal is at 60ml/hr.    Reason for Assessment     Row Name 01/17/19 0942          Reason for Assessment    Reason For Assessment  follow-up protocol;TF/PN     Diagnosis  -- respiratory failure on vent             Labs/Tests/Procedures/Meds     Row Name 01/17/19 0942          Labs/Procedures/Meds    Lab Results Reviewed  reviewed     Lab Results Comments  glu, bun, cr, wbc, H/H        Diagnostic Tests/Procedures    Diagnostic Test/Procedure Reviewed  reviewed        Medications    Pertinent Medications Reviewed  reviewed     Pertinent Medications Comments  pepcid, heparin, senokot, cardene         Physical Findings     Row Name 01/17/19 0948          Physical Findings    Overall Physical Appearance  on ventilator support;obese     Gastrointestinal  feeding tube     Tubes  nasogastric tube     Skin  -- incision                     Problem/Interventions:            Intervention Goal     Row Name 01/17/19 0949          Intervention Goal    General  Maintain nutrition     TF/PN  Tolerate TF at goal     Transition  TF to PO     Weight  No significant weight loss         Nutrition Intervention     Row Name 01/17/19 0949          Nutrition Intervention    RD/Tech Action  Follow Tx progress;Care plan reviewd         Nutrition Prescription     Row Name 01/17/19 0950          Nutrition Prescription EN    Enteral Prescription  Continue same protocol         Education/Evaluation     Row Name 01/17/19 0950          Monitor/Evaluation    Monitor  Per protocol;I&O;Pertinent labs;TF delivery/tolerance;Weight;Skin status           Electronically signed by:  Elizabeth Francois RD  01/17/19 9:50 AM

## 2019-01-18 ENCOUNTER — APPOINTMENT (OUTPATIENT)
Dept: GENERAL RADIOLOGY | Facility: HOSPITAL | Age: 74
End: 2019-01-18
Attending: INTERNAL MEDICINE

## 2019-01-18 ENCOUNTER — APPOINTMENT (OUTPATIENT)
Dept: GENERAL RADIOLOGY | Facility: HOSPITAL | Age: 74
End: 2019-01-18

## 2019-01-18 LAB
ALBUMIN SERPL-MCNC: 2.6 G/DL (ref 3.5–5.2)
ALBUMIN/GLOB SERPL: 0.7 G/DL
ALP SERPL-CCNC: 92 U/L (ref 39–117)
ALT SERPL W P-5'-P-CCNC: 17 U/L (ref 1–33)
ANION GAP SERPL CALCULATED.3IONS-SCNC: 14.5 MMOL/L
AST SERPL-CCNC: 17 U/L (ref 1–32)
BILIRUB SERPL-MCNC: 0.6 MG/DL (ref 0.1–1.2)
BUN BLD-MCNC: 40 MG/DL (ref 8–23)
BUN/CREAT SERPL: 21.2 (ref 7–25)
CALCIUM SPEC-SCNC: 8.7 MG/DL (ref 8.6–10.5)
CHLORIDE SERPL-SCNC: 106 MMOL/L (ref 98–107)
CO2 SERPL-SCNC: 21.5 MMOL/L (ref 22–29)
CREAT BLD-MCNC: 1.89 MG/DL (ref 0.57–1)
DEPRECATED RDW RBC AUTO: 47.4 FL (ref 37–54)
ERYTHROCYTE [DISTWIDTH] IN BLOOD BY AUTOMATED COUNT: 13.5 % (ref 11.7–13)
GFR SERPL CREATININE-BSD FRML MDRD: 26 ML/MIN/1.73
GLOBULIN UR ELPH-MCNC: 3.5 GM/DL
GLUCOSE BLD-MCNC: 148 MG/DL (ref 65–99)
GLUCOSE BLDC GLUCOMTR-MCNC: 161 MG/DL (ref 70–130)
GLUCOSE BLDC GLUCOMTR-MCNC: 165 MG/DL (ref 70–130)
HCT VFR BLD AUTO: 23.8 % (ref 35.6–45.5)
HGB BLD-MCNC: 7.4 G/DL (ref 11.9–15.5)
MCH RBC QN AUTO: 30.3 PG (ref 26.9–32)
MCHC RBC AUTO-ENTMCNC: 31.1 G/DL (ref 32.4–36.3)
MCV RBC AUTO: 97.5 FL (ref 80.5–98.2)
PLATELET # BLD AUTO: 289 10*3/MM3 (ref 140–500)
PMV BLD AUTO: 9.4 FL (ref 6–12)
POTASSIUM BLD-SCNC: 4.1 MMOL/L (ref 3.5–5.2)
PROT SERPL-MCNC: 6.1 G/DL (ref 6–8.5)
RBC # BLD AUTO: 2.44 10*6/MM3 (ref 3.9–5.2)
SODIUM BLD-SCNC: 142 MMOL/L (ref 136–145)
WBC NRBC COR # BLD: 11.22 10*3/MM3 (ref 4.5–10.7)

## 2019-01-18 PROCEDURE — 85027 COMPLETE CBC AUTOMATED: CPT | Performed by: NURSE PRACTITIONER

## 2019-01-18 PROCEDURE — 92610 EVALUATE SWALLOWING FUNCTION: CPT

## 2019-01-18 PROCEDURE — 80053 COMPREHEN METABOLIC PANEL: CPT | Performed by: INTERNAL MEDICINE

## 2019-01-18 PROCEDURE — 25010000002 HYDROMORPHONE PER 4 MG: Performed by: INTERNAL MEDICINE

## 2019-01-18 PROCEDURE — 25010000002 HYDROMORPHONE PER 4 MG: Performed by: PSYCHIATRY & NEUROLOGY

## 2019-01-18 PROCEDURE — 74018 RADEX ABDOMEN 1 VIEW: CPT

## 2019-01-18 PROCEDURE — 82962 GLUCOSE BLOOD TEST: CPT

## 2019-01-18 PROCEDURE — 94799 UNLISTED PULMONARY SVC/PX: CPT

## 2019-01-18 PROCEDURE — 99024 POSTOP FOLLOW-UP VISIT: CPT | Performed by: NURSE PRACTITIONER

## 2019-01-18 PROCEDURE — 97110 THERAPEUTIC EXERCISES: CPT

## 2019-01-18 PROCEDURE — 71045 X-RAY EXAM CHEST 1 VIEW: CPT

## 2019-01-18 PROCEDURE — 25010000002 LORAZEPAM PER 2 MG: Performed by: PSYCHIATRY & NEUROLOGY

## 2019-01-18 PROCEDURE — 25010000002 HEPARIN (PORCINE) PER 1000 UNITS: Performed by: THORACIC SURGERY (CARDIOTHORACIC VASCULAR SURGERY)

## 2019-01-18 PROCEDURE — 99232 SBSQ HOSP IP/OBS MODERATE 35: CPT | Performed by: PSYCHIATRY & NEUROLOGY

## 2019-01-18 RX ORDER — OXYCODONE HYDROCHLORIDE AND ACETAMINOPHEN 5; 325 MG/1; MG/1
2 TABLET ORAL EVERY 6 HOURS PRN
Status: DISCONTINUED | OUTPATIENT
Start: 2019-01-18 | End: 2019-01-21 | Stop reason: HOSPADM

## 2019-01-18 RX ORDER — CLOPIDOGREL BISULFATE 75 MG/1
75 TABLET ORAL DAILY
Status: DISCONTINUED | OUTPATIENT
Start: 2019-01-18 | End: 2019-01-21 | Stop reason: HOSPADM

## 2019-01-18 RX ORDER — LORAZEPAM 0.5 MG/1
0.5 TABLET ORAL EVERY 8 HOURS
Status: DISCONTINUED | OUTPATIENT
Start: 2019-01-18 | End: 2019-01-21 | Stop reason: HOSPADM

## 2019-01-18 RX ORDER — HYDROMORPHONE HYDROCHLORIDE 1 MG/ML
0.25 INJECTION, SOLUTION INTRAMUSCULAR; INTRAVENOUS; SUBCUTANEOUS EVERY 6 HOURS
Status: DISCONTINUED | OUTPATIENT
Start: 2019-01-18 | End: 2019-01-20

## 2019-01-18 RX ORDER — CLONIDINE HYDROCHLORIDE 0.1 MG/1
0.1 TABLET ORAL EVERY 8 HOURS SCHEDULED
Status: DISCONTINUED | OUTPATIENT
Start: 2019-01-18 | End: 2019-01-19

## 2019-01-18 RX ADMIN — SUCRALFATE 1 G: 1 TABLET ORAL at 09:03

## 2019-01-18 RX ADMIN — FAMOTIDINE 20 MG: 20 TABLET, FILM COATED ORAL at 20:16

## 2019-01-18 RX ADMIN — HEPARIN SODIUM 5000 UNITS: 5000 INJECTION INTRAVENOUS; SUBCUTANEOUS at 22:03

## 2019-01-18 RX ADMIN — OXYCODONE AND ACETAMINOPHEN 2 TABLET: 5; 325 TABLET ORAL at 20:16

## 2019-01-18 RX ADMIN — IPRATROPIUM BROMIDE AND ALBUTEROL SULFATE 3 ML: 2.5; .5 SOLUTION RESPIRATORY (INHALATION) at 03:02

## 2019-01-18 RX ADMIN — METOPROLOL TARTRATE 25 MG: 25 TABLET ORAL at 09:03

## 2019-01-18 RX ADMIN — IPRATROPIUM BROMIDE AND ALBUTEROL SULFATE 3 ML: 2.5; .5 SOLUTION RESPIRATORY (INHALATION) at 11:25

## 2019-01-18 RX ADMIN — HYDROMORPHONE HYDROCHLORIDE 0.25 MG: 1 INJECTION, SOLUTION INTRAMUSCULAR; INTRAVENOUS; SUBCUTANEOUS at 22:36

## 2019-01-18 RX ADMIN — LORAZEPAM 0.5 MG: 2 INJECTION INTRAMUSCULAR; INTRAVENOUS at 09:03

## 2019-01-18 RX ADMIN — IPRATROPIUM BROMIDE AND ALBUTEROL SULFATE 3 ML: 2.5; .5 SOLUTION RESPIRATORY (INHALATION) at 07:20

## 2019-01-18 RX ADMIN — HEPARIN SODIUM 5000 UNITS: 5000 INJECTION INTRAVENOUS; SUBCUTANEOUS at 14:13

## 2019-01-18 RX ADMIN — HYDROMORPHONE HYDROCHLORIDE 0.25 MG: 1 INJECTION, SOLUTION INTRAMUSCULAR; INTRAVENOUS; SUBCUTANEOUS at 15:03

## 2019-01-18 RX ADMIN — HEPARIN SODIUM 5000 UNITS: 5000 INJECTION INTRAVENOUS; SUBCUTANEOUS at 05:30

## 2019-01-18 RX ADMIN — CLONIDINE HYDROCHLORIDE 0.1 MG: 0.1 TABLET ORAL at 18:10

## 2019-01-18 RX ADMIN — DEXMEDETOMIDINE HYDROCHLORIDE 1 MCG/KG/HR: 100 INJECTION, SOLUTION, CONCENTRATE INTRAVENOUS at 06:16

## 2019-01-18 RX ADMIN — LANSOPRAZOLE 30 MG: KIT at 09:04

## 2019-01-18 RX ADMIN — LORAZEPAM 0.5 MG: 2 INJECTION INTRAMUSCULAR; INTRAVENOUS at 03:26

## 2019-01-18 RX ADMIN — METOPROLOL TARTRATE 25 MG: 25 TABLET ORAL at 20:16

## 2019-01-18 RX ADMIN — DEXMEDETOMIDINE HYDROCHLORIDE 0.5 MCG/KG/HR: 100 INJECTION, SOLUTION, CONCENTRATE INTRAVENOUS at 15:03

## 2019-01-18 RX ADMIN — IPRATROPIUM BROMIDE AND ALBUTEROL SULFATE 3 ML: 2.5; .5 SOLUTION RESPIRATORY (INHALATION) at 00:22

## 2019-01-18 RX ADMIN — CLOPIDOGREL 75 MG: 75 TABLET, FILM COATED ORAL at 14:13

## 2019-01-18 RX ADMIN — IPRATROPIUM BROMIDE AND ALBUTEROL SULFATE 3 ML: 2.5; .5 SOLUTION RESPIRATORY (INHALATION) at 15:23

## 2019-01-18 RX ADMIN — CLONIDINE HYDROCHLORIDE 0.1 MG: 0.1 TABLET ORAL at 22:03

## 2019-01-18 RX ADMIN — ATORVASTATIN CALCIUM 40 MG: 20 TABLET, FILM COATED ORAL at 20:16

## 2019-01-18 RX ADMIN — HYDROMORPHONE HYDROCHLORIDE 0.5 MG: 1 INJECTION, SOLUTION INTRAMUSCULAR; INTRAVENOUS; SUBCUTANEOUS at 09:03

## 2019-01-18 RX ADMIN — AMLODIPINE BESYLATE 10 MG: 10 TABLET ORAL at 09:03

## 2019-01-18 RX ADMIN — LORAZEPAM 0.5 MG: 0.5 TABLET ORAL at 18:11

## 2019-01-18 RX ADMIN — HYDROMORPHONE HYDROCHLORIDE 0.5 MG: 1 INJECTION, SOLUTION INTRAMUSCULAR; INTRAVENOUS; SUBCUTANEOUS at 03:26

## 2019-01-18 RX ADMIN — IPRATROPIUM BROMIDE AND ALBUTEROL SULFATE 3 ML: 2.5; .5 SOLUTION RESPIRATORY (INHALATION) at 19:58

## 2019-01-18 RX ADMIN — IPRATROPIUM BROMIDE AND ALBUTEROL SULFATE 3 ML: 2.5; .5 SOLUTION RESPIRATORY (INHALATION) at 23:16

## 2019-01-18 RX ADMIN — OXYCODONE AND ACETAMINOPHEN 2 TABLET: 5; 325 TABLET ORAL at 11:33

## 2019-01-18 RX ADMIN — FAMOTIDINE 20 MG: 20 TABLET, FILM COATED ORAL at 09:03

## 2019-01-18 NOTE — PROGRESS NOTES
PROGRESS NOTE  Patient Name: Rachana Arguelles  Age/Sex: 73 y.o. female  : 1945  MRN: [id number]    Date of Admission: 2019  Date of Encounter Visit: 19   LOS: 7 days   Patient Care Team:  Rhys Crowder Jr., MD as PCP - General (Family Medicine)  Whitney Dudley MD as Consulting Physician (Nephrology)    Chief Complaint: Transferred to ICU for agitated delirium, intubated for airway protection, liberated from the ventilator on 19, status post VATS for lung cancer    Hospital course: Patient has chronic pain and was on chronic pain medication and antianxiety medication, had lung cancer status post video-assisted thoracoscopic procedure, and went into withdrawal and agitation that required transfer to the ICU for proper sedation, patient did get oversedated and had to be intubated at the cardiac thoracic surgery request because of the concern about  not being able to cough and clear secretions especially given the finding on the bronchoscopy intraoperatively    Interval History:  Patient is still on the Precedex drip, she had episode of significant agitation when she pulled on her IV access, she does have some worsening edema in the right upper extremity which is asymmetrical.  She was started on her home regimen at a lower dose but after further evaluation by the neurology consultant team and suspecting significant metabolic encephalopathy they did revise her regimen and she is now on scheduled and when necessary Dilaudid and Ativan IV.  No fever or chills  Patient was liberated from the ventilator on 19, she did develop some post extubation stridor requiring steroids and BiPAP with no residual problem this morning  She is much more pleasant, alert and oriented, following commands but slightly drowsy.     REVIEW OF SYSTEMS:   Surgical pain is controlled  No shortness of breath  Voice quality is hoarse  Denies abdominal pain or discomfort or lower extremity pain or discomfort  No  "nausea or vomiting      Ventilator/Non-Invasive Ventilation Settings (From admission, onward)    Nasal cannula oxygen             Vital Signs  Temp:  [97.6 °F (36.4 °C)-98.6 °F (37 °C)] 98 °F (36.7 °C)  Heart Rate:  [58-92] 84  Resp:  [17-31] 19  BP: (124-205)/() 168/71  FiO2 (%):  [30 %-40 %] 30 %  SpO2:  [89 %-100 %] 95 %  on  Flow (L/min):  [2-4] 2 Device (Oxygen Therapy): nasal cannula    Intake/Output Summary (Last 24 hours) at 1/18/2019 0940  Last data filed at 1/18/2019 0515  Gross per 24 hour   Intake 1958.97 ml   Output 915 ml   Net 1043.97 ml     Flowsheet Rows      First Filed Value   Admission Height  167.6 cm (65.98\") Documented at 01/12/2019 1820   Admission Weight  90.3 kg (198 lb 15.8 oz) Documented at 01/12/2019 1820        Body mass index is 32.13 kg/m².      01/12/19 1820   Weight: 90.3 kg (198 lb 15.8 oz)       Physical Exam:  GEN:  Patient is arousable, calm, responsive, pleasant, in no distress    EYES:   Sclera clear. No icterus. PERRL.  ENT:   External ears/nose normal, no oral lesions, no thrush, mucous membranes moist   NECK:  Supple, midline trachea, no JVD  LUNGS: Normal chest on inspection, chest tube in position, no subcutaneous air, good breath sounds bilaterally with no wheezes or crackles, on  nasal cannula oxygen.   CV:  Regular rhythm and rate. Normal S1/S2. No murmurs, gallops, or rubs noted.  ABD:  Soft, non-tender and non-distended. Normal bowel sounds. No guarding  EXT:  moving all extremities was no obvious focal deficit , No cyanosis. No redness.  Mild asymmetric right upper extremity edema from the previous infiltrated IV.   Skin: dry, intact, no bleeding    Results Review:      Results from last 7 days   Lab Units 01/18/19  0719 01/17/19  0640 01/16/19  0811 01/15/19  0858 01/14/19  0630 01/13/19  0517 01/12/19  0458   SODIUM mmol/L 142 137 138 138 135* 136 135*   POTASSIUM mmol/L 4.1 4.4 4.2 4.5 4.8 4.8 4.4   CHLORIDE mmol/L 106 102 102 102 102 102 99   CO2 mmol/L " 21.5* 21.8* 28.4 22.9 26.4 21.3* 26.3   BUN mg/dL 40* 29* 28* 22 23 28* 23   CREATININE mg/dL 1.89* 1.38* 1.50* 1.57* 1.41* 1.75* 1.60*   CALCIUM mg/dL 8.7 8.8 8.9 8.6 8.7 9.1 8.8   AST (SGOT) U/L 17  --   --   --   --   --   --    ALT (SGPT) U/L 17  --   --   --   --   --   --    ANION GAP mmol/L 14.5 13.2 7.6 13.1 6.6 12.7 9.7   ALBUMIN g/dL 2.60*  --   --   --   --   --   --    Results for TRISHA SOTO (MRN [id number]) as of 1/15/2019 14:40   Ref. Range 1/15/2019 04:20   Vitamin B-12 Latest Ref Range: 211 - 946 pg/mL 241                 Results from last 7 days   Lab Units 01/18/19  0719 01/17/19  0640 01/16/19  0811 01/15/19  0858 01/14/19  0630 01/13/19  0517 01/12/19  0458   WBC 10*3/mm3 11.22* 11.80* 7.61 8.83 8.87 10.79* 12.62*   HEMOGLOBIN g/dL 7.4* 8.4* 8.1* 8.0* 7.5* 8.3* 8.7*   HEMATOCRIT % 23.8* 27.3* 24.9* 25.8* 23.6* 26.8* 28.7*   PLATELETS 10*3/mm3 289 254 251 229 177 211 260   MCV fL 97.5 98.2 95.0 97.7 97.5 99.3* 97.0   NEUTROPHIL % %  --   --   --   --  67.9 76.6* 77.9*   LYMPHOCYTE % %  --   --   --   --  18.8* 13.3* 11.6*   MONOCYTES % %  --   --   --   --  11.2 9.4 10.4   EOSINOPHIL % %  --   --   --   --  1.4 0.3 0.0*   BASOPHIL % %  --   --   --   --  0.1 0.1 0.1   IMM GRAN % %  --   --   --   --  0.6* 0.3 0.3                   Invalid input(s): LDLCALC  Results from last 7 days   Lab Units 01/17/19  1620 01/15/19  1715 01/15/19  1546 01/15/19  0059   PH, ARTERIAL pH units 7.504* 7.364 7.387 7.379   PCO2, ARTERIAL mm Hg 33.3* 47.0* 46.1* 47.3*   PO2 ART mm Hg 94.6 71.2* 69.0* 78.0*   HCO3 ART mmol/L 26.2 26.8 27.7 28.0         Glucose   Date/Time Value Ref Range Status   01/18/2019 0552 161 (H) 70 - 130 mg/dL Final   01/17/2019 2330 170 (H) 70 - 130 mg/dL Final   01/17/2019 1126 152 (H) 70 - 130 mg/dL Final   01/16/2019 1929 143 (H) 70 - 130 mg/dL Final   01/16/2019 1301 135 (H) 70 - 130 mg/dL Final   01/16/2019 0317 128 70 - 130 mg/dL Final   01/15/2019 2005 125 70 - 130 mg/dL Final    01/15/2019 1538 112 70 - 130 mg/dL Final         Results from last 7 days   Lab Units 01/15/19  1621   RESPCX  Scant growth (1+) Normal Respiratory Akila     Results from last 7 days   Lab Units 01/15/19  0103   NITRITE UA  Negative   WBC UA /HPF 0-2   BACTERIA UA /HPF None Seen   SQUAM EPITHEL UA /HPF 0-2               Imaging:   Imaging Results (all)     Procedure Component Value Units Date/Time         Improved lung volumes, some linear atelectasis over the left lower lobe, ET tube is in better position after it was readjusted post intubation  Medication Review:     amLODIPine 10 mg Oral Q24H   atorvastatin 40 mg Oral Nightly   famotidine 20 mg Oral BID   heparin (porcine) 5,000 Units Subcutaneous Q8H   HYDROmorphone 0.5 mg Intravenous Q6H   ipratropium-albuterol 3 mL Nebulization Q4H - RT   lansoprazole 30 mg Nasogastric QAM   LORazepam 0.5 mg Intravenous Q6H   metoprolol tartrate 25 mg Oral Q12H   sennosides-docusate sodium 2 tablet Oral Nightly   sodium chloride 4 mL Nebulization Once   sucralfate 1 g Oral 4x Daily         dexmedetomidine 0.2-1.5 mcg/kg/hr Last Rate: 1 mcg/kg/hr (01/18/19 0616)   lactated ringers 9 mL/hr Last Rate: Stopped (01/11/19 1108)   niCARdipine 5-15 mg/hr Last Rate: Stopped (01/18/19 2850)       ASSESSMENT:   1. Hyperactive delirium  2. Respiratory failure, no longer on the ventilator  3. Benzo/opiate withdrawal  4. Anemia  5. Lung cancer post VATS  6. Pain control  7. Chronic kidney disease  8. COPD  9. Chronic back pain  10. Hypertension  11. Restless leg syndrome  12. Anemia    PLAN:  Upper extremity ultrasound given the asymmetric edema in the right side was negative for DVT  Patient is still on precedex   With the plan to discontinue, were also de-escalate the dose on her narcotics and benzodiazepine and try to space them out.  Physical therapy to start working with the patient, plan to get out of bed and up in a  chair  Speech therapy to evaluate and discontinue nasogastric  tube and start oral feeding  Okay to transfer out of the ICU if stable off the Precedex for at least 6 hours  Further clarification on the Plavix, patient has previous stent and needs to be back on it ASAP  We'll hold on the PICC line is able to manage with the peripheral IV access  Hopefully chest tube can be removed today since she is off the positive pressure ventilation, cardiac thoracic surgery team will assess and decide     discussed with Judit Salinas from the cardiothoracic surgical team and with the ICU rounding team         Disposition: monitor in ICU today    Umesh Rosenberg MD  01/18/19  9:40 AM    \      Dictated utilizing Dragon dictation

## 2019-01-18 NOTE — PROGRESS NOTES
Continued Stay Note  [facility]     Patient Name: Rachana Arguelles  MRN: [id number]  Today's Date: 1/18/2019    Admit Date: 1/11/2019    Discharge Plan     Row Name 01/18/19 1027       Plan    Plan  Home via private auto with [facility] to follow.    Patient/Family in Agreement with Plan  yes spoke with dtr Katia    Plan Comments  Discussed current DC plan with pt's daughter Katia. Updated face sheet with her cell # listed as work phone [phone]. Katia states she does not work and she is either at home or with her parents. Pt is awake and off the vent. Plan remains home via private auto with [facility] to follow vs rehab. CCP to follow..............JW        Discharge Codes    No documentation.             Belén Minor RN

## 2019-01-18 NOTE — PROGRESS NOTES
Patient Identification:  NAME:  Rachana Arguelles  Age:  73 y.o.   Sex:  female   :  1945   MRN:  [id number]       Chief complaint: Metabolic encephalopathy    History of present illness:  She is dramatically better extubated awake and pretty alert still a little confused and paranoid but dramatically better  The CT scan from yesterday by my independent eyeball review shows atrophy only.    Past medical history:  Past Medical History:   Diagnosis Date   • AAA (abdominal aortic aneurysm) without rupture (CMS/HCC)    • Anxiety    • Arthritis    • Cancer (CMS/HCC)     skin cancer   • Chest pain     at rest   • Chronic low back pain    • Chronic pain syndrome 3/12/2016   • CKD (chronic kidney disease), stage III (CMS/HCC)    • Claustrophobia 10/26/2015    Resolved   • Depression    • Dizziness    • GERD (gastroesophageal reflux disease)    • GI problem    • Hiatal hernia    • History of migraine headaches    • Hyperlipidemia    • Hypertension    • Lumbar postlaminectomy syndrome 10/26/2015    Resolved   • Lung cancer (CMS/HCC)    • Lung nodule    • Palpitations    • Polypharmacy 2016   • Pulmonary embolism (CMS/HCC) 10/26/2015    2013 - Resolved, felt to be secondary to estrogen use   • Submandibular sialoadenitis 10/26/2015    Resolved   • Vitamin B 12 deficiency        Allergies:  Morphine    Home medications:  Medications Prior to Admission   Medication Sig Dispense Refill Last Dose   • aluminum hydroxide-mag carbonate (GAVISCON EXTRA RELIEF) 160-105 MG chewable tablet chewable tablet Chew 2 tablets 2 (Two) Times a Day As Needed.   Past Week at Unknown time   • amLODIPine (NORVASC) 10 MG tablet Take 10 mg by mouth Every Morning.  3 2019 at 0400   • aspirin 81 MG chewable tablet Chew 81 mg Daily. PT TO CONTINUE PER MD   1/10/2019 at 2100   • atorvastatin (LIPITOR) 40 MG tablet Take 40 mg by mouth Every Night.   1/10/2019 at 2100   • cetirizine (ZyrTEC) 10 MG tablet Take 10 mg by mouth As  Needed.   Past Week at Unknown time   • cyclobenzaprine (FLEXERIL) 10 MG tablet Take 10 mg by mouth 3 (Three) Times a Day As Needed.   Past Week at Unknown time   • famotidine (PEPCID) 20 MG tablet Take 1 tablet by mouth 2 (Two) Times a Day. 180 tablet 1 1/11/2019 at 0400   • furosemide (LASIX) 40 MG tablet Take 40 mg by mouth Daily.   Past Week at Unknown time   • omeprazole (priLOSEC) 40 MG capsule Take 40 mg by mouth Every Evening.   Past Week at 2100   • ondansetron ODT (ZOFRAN-ODT) 8 MG disintegrating tablet Take 8 mg by mouth Every 8 (Eight) Hours As Needed for Nausea or Vomiting.   Past Week at Unknown time   • oxyCODONE (ROXICODONE) 20 MG tablet Take 20 mg by mouth Every 4 (Four) Hours As Needed.   1/10/2019 at 2100   • oxymetazoline (AFRIN) 0.05 % nasal spray 2 sprays into the nostril(s) as directed by provider As Needed for Congestion.   1/11/2019 at 0530   • polyethylene glycol (MIRALAX) powder Take 17 g by mouth Every Evening. Constipation. Mix in 4oz of water/juice/milk   1/10/2019 at 2100   • sennosides-docusate sodium (SENOKOT-S) 8.6-50 MG tablet TAKE TWO TABLETS BY MOUTH ONCE DAILY (Patient taking differently: Take 2 tablets by mouth 2 (Two) Times a Day.) 180 tablet 1 1/10/2019 at 2100   • sucralfate (CARAFATE) 1 g tablet Take 1 tablet by mouth 4 (Four) Times a Day. 120 tablet 5 1/10/2019 at 2100   • traZODone (DESYREL) 150 MG tablet Take 0.5 tablets by mouth Every Night. 45 tablet 1 1/10/2019 at 2100   • umeclidinium-vilanterol (ANORO ELLIPTA) 62.5-25 MCG/INH aerosol powder  inhaler Inhale 1 puff Daily.   Past Month at Unknown time   • venlafaxine XR (EFFEXOR-XR) 150 MG 24 hr capsule Take 1 capsule by mouth Daily. 30 capsule 5 1/10/2019 at 0800   • carvedilol (COREG) 25 MG tablet Take 25 mg by mouth 2 (Two) Times a Day With Meals. 1/2 TAB EACH DOSE-ON HOLD SINCE 1/5/19 1/5/2019   • clopidogrel (PLAVIX) 75 MG tablet Take 1 tablet by mouth Daily. 30 tablet 3 1/4/2019   • Ergocalciferol (VITAMIN D2  PO) Take 50,000 Units by mouth 1 (One) Time Per Week. WEDNESDAY 1/2/2019   • LORazepam (ATIVAN) 0.5 MG tablet Take 0.5 mg by mouth 3 (Three) Times a Day.   1/7/2019        Hospital medications:    amLODIPine 10 mg Oral Q24H   atorvastatin 40 mg Oral Nightly   CloNIDine 0.1 mg Oral Q8H   clopidogrel 75 mg Oral Daily   famotidine 20 mg Oral BID   heparin (porcine) 5,000 Units Subcutaneous Q8H   HYDROmorphone 0.25 mg Intravenous Q6H   ipratropium-albuterol 3 mL Nebulization Q4H - RT   lansoprazole 30 mg Nasogastric QAM   LORazepam 0.5 mg Oral Q8H   metoprolol tartrate 25 mg Oral Q12H   sennosides-docusate sodium 2 tablet Oral Nightly   sodium chloride 4 mL Nebulization Once       dexmedetomidine 0.2-1.5 mcg/kg/hr Last Rate: 0.5 mcg/kg/hr (01/18/19 1449)   lactated ringers 9 mL/hr Last Rate: Stopped (01/11/19 1108)   niCARdipine 5-15 mg/hr Last Rate: Stopped (01/18/19 6220)     •  albuterol  •  bisacodyl  •  haloperidol lactate  •  hydrALAZINE  •  magnesium hydroxide  •  metoprolol tartrate  •  naloxone  •  nitroglycerin  •  ondansetron **OR** ondansetron ODT **OR** ondansetron  •  oxyCODONE-acetaminophen  •  sod bicarb-citric acid-simethicone      Objective:  Vitals Ranges:   Temp:  [97.6 °F (36.4 °C)-98.2 °F (36.8 °C)] 98.2 °F (36.8 °C)  Heart Rate:  [58-92] 78  Resp:  [18-31] 18  BP: (136-205)/(56-89) 173/76  FiO2 (%):  [30 %-40 %] 30 %      Physical Exam:  Awake alert extubated converses with me a bit she is still some what confused but very called normal cranial nerves II through VII moves all 4 extremities equally reflexes trace throughout symmetrical toes downgoing bilaterally    Results review:   I reviewed the patient's new clinical results.    Data review:  Lab Results (last 24 hours)     Procedure Component Value Units Date/Time    POC Glucose Once [[id number]]  (Abnormal) Collected:  01/18/19 1219    Specimen:  Blood Updated:  01/18/19 1221     Glucose 165 mg/dL     Comprehensive Metabolic Panel [[id number]]   (Abnormal) Collected:  01/18/19 0719    Specimen:  Blood Updated:  01/18/19 0832     Glucose 148 mg/dL      BUN 40 mg/dL      Creatinine 1.89 mg/dL      Sodium 142 mmol/L      Potassium 4.1 mmol/L      Chloride 106 mmol/L      CO2 21.5 mmol/L      Calcium 8.7 mg/dL      Total Protein 6.1 g/dL      Albumin 2.60 g/dL      ALT (SGPT) 17 U/L      AST (SGOT) 17 U/L      Alkaline Phosphatase 92 U/L      Total Bilirubin 0.6 mg/dL      eGFR Non African Amer 26 mL/min/1.73      Globulin 3.5 gm/dL      A/G Ratio 0.7 g/dL      BUN/Creatinine Ratio 21.2     Anion Gap 14.5 mmol/L     Narrative:       The MDRD GFR formula is only valid for adults with stable renal function between ages 18 and 70.    CBC (No Diff) [[id number]]  (Abnormal) Collected:  01/18/19 0719    Specimen:  Blood Updated:  01/18/19 0807     WBC 11.22 10*3/mm3      RBC 2.44 10*6/mm3      Hemoglobin 7.4 g/dL      Hematocrit 23.8 %      MCV 97.5 fL      MCH 30.3 pg      MCHC 31.1 g/dL      RDW 13.5 %      RDW-SD 47.4 fl      MPV 9.4 fL      Platelets 289 10*3/mm3     POC Glucose Once [[id number]]  (Abnormal) Collected:  01/18/19 0552    Specimen:  Blood Updated:  01/18/19 0553     Glucose 161 mg/dL     POC Glucose Once [[id number]]  (Abnormal) Collected:  01/17/19 2330    Specimen:  Blood Updated:  01/17/19 2331     Glucose 170 mg/dL     Blood Gas, Arterial [[id number]]  (Abnormal) Collected:  01/17/19 1620    Specimen:  Arterial Blood Updated:  01/17/19 1626     Site Arterial: right radial     Sachin's Test Positive     pH, Arterial 7.504 pH units      pCO2, Arterial 33.3 mm Hg      pO2, Arterial 94.6 mm Hg      HCO3, Arterial 26.2 mmol/L      Base Excess, Arterial 3.1 mmol/L      O2 Saturation Calculated 98.1 %      A-a Gradiant 0.4 mmHg      Barometric Pressure for Blood Gas 749.2 mmHg      Modality BiPap     FIO2 40 %      Set Tidal Volume 505     Set Mech Resp Rate 18     Rate 34 Breaths/minute            Imaging:  Imaging Results (last 24 hours)     Procedure  Component Value Units Date/Time    XR Abdomen KUB [[id number]] Collected:  01/18/19 0738     Updated:  01/18/19 0738    Narrative:       KUB 01/18/2019     HISTORY: Cortrak placement.     FINDINGS: The Cortrak catheter is seen with its tip projecting over the  upper lumbar spine probably in the distal stomach.     Bowel gas pattern is unremarkable. There is lumbosacral fusion.  Degenerative changes of the spine are seen. Vascular stents overlie the  lower abdomen.       Impression:       Cortrak catheter tip thought to be in the distal stomach.       XR Chest 1 View [[id number]] Collected:  01/18/19 0649     Updated:  01/18/19 0649    Narrative:       PORTABLE CHEST X-RAY     CLINICAL HISTORY: chest tube management; R26.2-Difficulty in walking,  not elsewhere classified; C34.90-Malignant neoplasm of unspecified part  of unspecified bronchus or lung; F41.9-Anxiety disorder, unspecified;  Z79.899-Other long term (current) drug therapy; G89.4-Chronic pain  syndrome     COMPARISON: 01/17/2019.     FINDINGS: Portable AP view of the chest obtained with overlying monitor  leads in place. ET tube has been removed. The right thoracotomy tube has  been retracted slightly now projecting near the lower margin of the  right pulmonary hilum. Feeding tube remains. Lungs are poorly aerated  with some increasing right infrahilar atelectasis. The known right-sided  pneumothorax has improved slightly now at 32 mm pleural separation,  previously 38 mm. There is no left pneumothorax. Heart size and  vascularity are normal. No significant effusion.             Impression:       1. Life support line changes as above with some improvement in right  pneumothorax.  2. Increasing right infrahilar atelectasis.             XR Chest 1 View [[id number]] Collected:  01/17/19 0807     Updated:  01/17/19 2336    Narrative:       PORTABLE CHEST X-RAY     CLINICAL HISTORY: Chest tube management; R26.2-Difficulty in walking,  not elsewhere classified;  C34.90-Malignant neoplasm of unspecified part  of unspecified bronchus or lung; F41.9-Anxiety disorder, unspecified;  Z79.899-Other long term (current) drug therapy; G89.4-Chronic pain  syndrome.     COMPARISON: 01/16/2019     FINDINGS: Portable AP view of the chest was obtained with overlying  monitor leads in place. Life support lines are unchanged. Right apical  pneumothorax has increased very slightly now with up to approximately  3.8 cm of apical pleural separation, previously 3.5. Improved bibasilar  atelectasis, particularly on the right side. No significant pleural  fluid. Heart size and vascularity are normal.             Impression:       Improving bibasilar atelectasis with minimal increase in  right apical pneumothorax.        This report was finalized on 1/17/2019 11:33 PM by Bryan Hendrix M.D.                Assessment and Plan:       Lung cancer (CMS/HCC)    Much more awake and alert metabolic encephalopathy much improved she is still a little confused and a little paranoid but dramatically better with respect to her anxiety and being calm.      Karthikeyan Manuel MD  01/18/19  2:52 PM

## 2019-01-18 NOTE — PLAN OF CARE
Problem: Patient Care Overview  Goal: Plan of Care Review  Outcome: Ongoing (interventions implemented as appropriate)   01/18/19 0633   Coping/Psychosocial   Plan of Care Reviewed With patient   Plan of Care Review   Progress improving   OTHER   Outcome Summary Pt less lethargic this shift and more calm. Precedex stopped for a while but was restarted when pt found to be trying to jump out of bed. Cardene stopped and BP WNL. Pt's speech is not audible or comprehensible at times. Oriented to name and stated she was in the hospital once. Noted to start crying impulsively. Coretrak pulled out some and reinserted. KUB ordered.

## 2019-01-18 NOTE — THERAPY EVALUATION
Acute Care - Speech Language Pathology   Swallow Initial Evaluation [facility]     Patient Name: Rachana Arguelles  : 1945  MRN: [id number]  Today's Date: 2019  Onset of Illness/Injury or Date of Surgery: 19     Referring Physician: Henry      Admit Date: 2019    Visit Dx:     ICD-10-CM ICD-9-CM   1. Difficulty walking R26.2 719.7   2. Lung cancer (CMS/HCC) C34.90 162.9   3. Anxiety F41.9 300.00   4. Polypharmacy Z79.899 V58.69   5. Chronic pain syndrome G89.4 338.4   6. Atherosclerosis of native artery of left lower extremity with intermittent claudication (CMS/HCC) I70.212 440.21     Patient Active Problem List   Diagnosis   • Anxiety   • Chronic pain syndrome   • Depression   • Gastroesophageal reflux disease   • Hyperlipidemia   • Hypertension   • Osteoarthritis of hip   • Chronic low back pain   • Failed back syndrome of lumbar spine   • Pulmonary embolus (CMS/HCC)   • Weight loss   • Polypharmacy   • CKD (chronic kidney disease), stage III (CMS/HCC)   • Chronic constipation   • Sacroiliac joint pain   • Mixed incontinence   • Renal cyst   • Achilles tendonitis   • Atherosclerosis of native artery of left lower extremity with intermittent claudication (CMS/HCC)   • Morbidly obese (CMS/HCC)   • Lung nodule   • Malignant neoplasm of right upper lobe of lung (CMS/HCC)   • Lung cancer (CMS/HCC)     Past Medical History:   Diagnosis Date   • AAA (abdominal aortic aneurysm) without rupture (CMS/HCC)    • Anxiety    • Arthritis    • Cancer (CMS/HCC)     skin cancer   • Chest pain     at rest   • Chronic low back pain    • Chronic pain syndrome 3/12/2016   • CKD (chronic kidney disease), stage III (CMS/HCC)    • Claustrophobia 10/26/2015    Resolved   • Depression    • Dizziness    • GERD (gastroesophageal reflux disease)    • GI problem    • Hiatal hernia    • History of migraine headaches    • Hyperlipidemia    • Hypertension    • Lumbar postlaminectomy syndrome 10/26/2015    Resolved   • Lung  cancer (CMS/HCC)    • Lung nodule    • Palpitations    • Polypharmacy 4/22/2016   • Pulmonary embolism (CMS/HCC) 10/26/2015    January 2013 - Resolved, felt to be secondary to estrogen use   • Submandibular sialoadenitis 10/26/2015    Resolved   • Vitamin B 12 deficiency      Past Surgical History:   Procedure Laterality Date   • ANGIOPLASTY ILIAC ARTERY Bilateral 8/10/2018    Procedure: BILATERAL ILIAC STENTS;  Surgeon: Riki Macnera MD;  Location: [facility];  Service: Vascular   • BREAST SURGERY      Breast Surgery Reduction Procedure   • HYSTERECTOMY     • INTUBATION  1/15/2019        • LUMBAR FUSION      Lumbar Vertebral Fusion   • SHOULDER ARTHROSCOPY  04/04/2013    Dr. Carrillo/E - Resolved   • THORACOSCOPY VIDEO ASSISTED WITH LOBECTOMY Right 1/11/2019    Procedure: BRONCOSCOPY, THORACOSCOPY VIDEO ASSISTED WITH RIGHT UPPER LOBE WEDGE RESECTION, COMPLETION RIGHT UPPER LOBECTOMY, LYMPH NODE DISSECTION, INTERCOSTAL NERVE BLOCK;  Surgeon: Quita Figueroa MD;  Location: [facility];  Service: Thoracic   • TOTAL HIP ARTHROPLASTY REVISION Left    • TOTAL KNEE ARTHROPLASTY Left    • TOTAL SHOULDER ARTHROPLASTY Left         SWALLOW EVALUATION (last 72 hours)      SLP Adult Swallow Evaluation     Row Name 01/18/19 1600                   Rehab Evaluation    Document Type  evaluation  -SH        Subjective Information  no complaints  -SH        Patient Observations  alert;cooperative  -SH        Patient Effort  good  -SH        Symptoms Noted During/After Treatment  none  -SH           General Information    Patient Profile Reviewed  yes  -SH        Pertinent History Of Current Problem  Pt post op day #7 s/p VATS R upper lobectomy d/t lung CA. Pt intubated 1/15-1/17 2/2 delirium and withdrawal. BSE 1/14 WFLs concern for dysphagia 2/2 cognitive status.  -SH        Current Method of Nutrition  NPO  -SH        Precautions/Limitations, Vision  WFL;for purposes of eval  -SH        Precautions/Limitations,  Hearing  WFL;for purposes of eval  -        Prior Level of Function-Communication  WF  -        Prior Level of Function-Swallowing  no diet consistency restrictions;safe, efficient swallowing in all situations;other (see comments)  -        Plans/Goals Discussed with  patient and family;agreed upon  -        Barriers to Rehab  cognitive status;medically complex  -        Patient's Goals for Discharge  return to PO diet  -        Family Goals for Discharge  patient able to return to PO diet  -           Oral Motor and Function    Dentition Assessment  natural, present and adequate  -        Secretion Management  dried secretions in oral cavity  -        Mucosal Quality  moist, healthy  -        Gag Response  WFL  -        Volitional Swallow  WFL  -        Volitional Cough  weak  -           Oral Musculature and Cranial Nerve Assessment    Oral Motor General Assessment  WF  -        Oral Motor, Comment  Hoarse voice  -           General Eating/Swallowing Observations    Respiratory Support Currently in Use  nasal cannula  -        Eating/Swallowing Skills  fed by SLP;other (see comments)  -        Positioning During Eating  upright in bed  -        Utensils Used  spoon;cup;straw  -        Consistencies Trialed  thin liquids;pureed;mechanical soft, no mixed consistencies  -           Clinical Swallow Eval    Clinical Swallow Evaluation Summary  Pt seen for bedside swallow. Sitter present, patient confused. Mild hoarse voice noted, strong cough. Oral care completed. No overt s/s of aspiration with ice or thins via spoon, cup, or straw. Laryngeal elevation appeared functional. Swallow initiation varied between timely and 1-3 second delay. Suspect oral holding at times. No s/s of aspiration with pudding, no oral residue post swallow. Incomplete mastication of mech soft, SLP suspects patient swallowing peaches whole 2/2 impaired cognition. SLP recs full liquids, meds whole with  thins. Please re consult SLP if s/s of aspiration noted. Confusion greatly impacts swallow safety.   -           Clinical Impression    SLP Swallowing Diagnosis  mild;oral dysfunction;pharyngeal dysfunction  -        Functional Impact  risk of aspiration/pneumonia  -        Rehab Potential/Prognosis, Swallowing  good, to achieve stated therapy goals  -        Swallow Criteria for Skilled Therapeutic Interventions Met  demonstrates skilled criteria  -           Recommendations    Therapy Frequency (Swallow)  PRN  -        Predicted Duration Therapy Intervention (Days)  until discharge  -        SLP Diet Recommendation  full liquid diet  -        Recommended Precautions and Strategies  upright posture during/after eating;small bites of food and sips of liquid;other (see comments)  -        SLP Rec. for Method of Medication Administration  meds whole;with thin liquids;as tolerated  -        Monitor for Signs of Aspiration  yes;notify SLP if any concerns  -        Anticipated Dischage Disposition  other (see comments) pending performance in tx  -           Swallow Goals (SLP)    Oral Nutrition/Hydration Goal Selection (SLP)  oral nutrition/hydration, SLP goal 1  -           Oral Nutrition/Hydration Goal 1 (SLP)    Oral Nutrition/Hydration Goal 1, SLP  Pt will tolerate PO without overt s/s of aspiration.   -        Time Frame (Oral Nutrition/Hydration Goal 1, SLP)  by discharge  -          User Key  (r) = Recorded By, (t) = Taken By, (c) = Cosigned By    Initials Name Effective Dates     Judit Lane MS CCC-SLP 03/07/18 -           EDUCATION  The patient has been educated in the following areas:   Dysphagia (Swallowing Impairment).    SLP Recommendation and Plan  SLP Swallowing Diagnosis: mild, oral dysfunction, pharyngeal dysfunction  SLP Diet Recommendation: full liquid diet  Recommended Precautions and Strategies: upright posture during/after eating, small bites of food and sips of  liquid, other (see comments)     Monitor for Signs of Aspiration: yes, notify SLP if any concerns     Swallow Criteria for Skilled Therapeutic Interventions Met: demonstrates skilled criteria  Anticipated Dischage Disposition: other (see comments)(pending performance in tx)  Rehab Potential/Prognosis, Swallowing: good, to achieve stated therapy goals  Therapy Frequency (Swallow): PRN  Predicted Duration Therapy Intervention (Days): until discharge       Plan of Care Reviewed With: patient  Plan of Care Review  Plan of Care Reviewed With: patient  Progress: improving  Outcome Summary: Pt seen for bedside swallow. Sitter present, patient confused. Mild hoarse voice noted. Oral care completed. No overt s/s of aspiration with ice or thins via spoon, cup, or straw. Laryngeal elevation appeared functional. Swallow initiation varied. Suspect oral holding at times. No s/s of aspiration with pudding, no oral residue post swallow. Incomplete mastication of mech soft, SLP suspects patient swallowing peaches whole 2/2 impaired cognition. SLP recs full liquids, meds whole with thins. Please re consult SLP if s/s of aspiration noted. Confusion greatly impacts swallow safety.     SLP GOALS     Row Name 01/18/19 1600             Oral Nutrition/Hydration Goal 1 (SLP)    Oral Nutrition/Hydration Goal 1, SLP  Pt will tolerate PO without overt s/s of aspiration.   -      Time Frame (Oral Nutrition/Hydration Goal 1, SLP)  by discharge  -        User Key  (r) = Recorded By, (t) = Taken By, (c) = Cosigned By    Initials Name Provider Type     Judit Lane MS CCC-SLP Speech and Language Pathologist           SLP Outcome Measures (last 72 hours)      SLP Outcome Measures     Row Name 01/18/19 1600             SLP Outcome Measures    Outcome Measure Used?  Adult NOMS  -         Adult FCM Scores    FCM Chosen  Swallowing  -      Swallowing FCM Score  4  -        User Key  (r) = Recorded By, (t) = Taken By, (c) = Cosigned By     Initials Name Effective Dates     Judit Laen MS CCC-SLP 03/07/18 -            Time Calculation:   Time Calculation- SLP     Row Name 01/18/19 1640             Time Calculation- SLP    SLP Start Time  1430  -      SLP Received On  01/18/19  -        User Key  (r) = Recorded By, (t) = Taken By, (c) = Cosigned By    Initials Name Provider Type     Judit Lane MS CCC-SLP Speech and Language Pathologist          Therapy Charges for Today     Code Description Service Date Service Provider Modifiers Qty    [id number]  ST EVAL ORAL PHARYNG SWALLOW 4 1/18/2019 Judit Lane MS CCC-SLP GN 1               Judit Lane MS CCC-SLP  1/18/2019

## 2019-01-18 NOTE — PROGRESS NOTES
"    Chief Complaint: Right upper lobe lung cancer, postoperative management  S/P: VATS right upper lobectomy  POD # 7    Subjective:  Symptoms:  Improved.  She reports shortness of breath, chest pain and anxiety.    Diet:  NPO.  No nausea or vomiting.    Activity level: Impaired due to pain.    Pain:  She complains of pain that is mild.  Pain is well controlled.        Vital Signs:  Temp:  [97.6 °F (36.4 °C)-98.1 °F (36.7 °C)] 98 °F (36.7 °C)  Heart Rate:  [58-92] 78  Resp:  [18-31] 18  BP: (136-205)/(56-89) 173/76  FiO2 (%):  [30 %-40 %] 30 %    Intake & Output (last day)       01/17 0701 - 01/18 0700 01/18 0701 - 01/19 0700    I.V. (mL/kg) 1959 (21.7)     Other      NG/GT      Total Intake(mL/kg) 1959 (21.7)     Urine (mL/kg/hr) 675 (0.3) 250 (0.4)    Stool      Chest Tube 240     Total Output 915 250    Net +1044 -250                Objective:  General Appearance:  Comfortable, in no acute distress and not in pain.    Vital signs: (most recent): Blood pressure 173/76, pulse 78, temperature 98 °F (36.7 °C), temperature source Oral, resp. rate 18, height 167.6 cm (65.98\"), weight 90.3 kg (198 lb 15.8 oz), SpO2 99 %.  Vital signs are normal.  No fever.    Output: Producing urine.    HEENT: Normal HEENT exam.    Lungs:  Normal effort and normal respiratory rate.  She is not in respiratory distress.  There are decreased breath sounds (right middle/lower lung fields) and rhonchi.    Heart: Normal rate.  Regular rhythm.  S1 normal and S2 normal.  No murmur.   Chest: Chest wall tenderness present.  (At chest tube site)  Abdomen: Abdomen is soft and non-distended.  Bowel sounds are normal.   There is no abdominal tenderness.   There is no mass.   Extremities: Normal range of motion.  There is no dependent edema.    Pulses: Distal pulses are intact.    Neurological: Patient is alert and oriented to person, place and time.  Normal strength.    Pupils:  Pupils are equal, round, and reactive to light.    Skin:  Warm and dry.  "         Chest tube:   Site: Right, Clean, Dry, Intact and Securement device intact  Suction: waterseal  Air Leak: negative  24 Hour Total: 240cc    Results Review:     I reviewed the patient's new clinical results.  I reviewed the patient's new imaging results and agree with the interpretation.  I reviewed the patient's other test results and agree with the interpretation  Discussed with patient, her daughter at the bedside, RN, Dr. Rosenberg and Dr. Figueroa.    Imaging Results (last 24 hours)     Procedure Component Value Units Date/Time    XR Abdomen KUB [[id number]] Collected:  01/18/19 0738     Updated:  01/18/19 0738    Narrative:       KUB 01/18/2019     HISTORY: Cortrak placement.     FINDINGS: The Cortrak catheter is seen with its tip projecting over the  upper lumbar spine probably in the distal stomach.     Bowel gas pattern is unremarkable. There is lumbosacral fusion.  Degenerative changes of the spine are seen. Vascular stents overlie the  lower abdomen.       Impression:       Cortrak catheter tip thought to be in the distal stomach.       XR Chest 1 View [[id number]] Collected:  01/18/19 0649     Updated:  01/18/19 0649    Narrative:       PORTABLE CHEST X-RAY     CLINICAL HISTORY: chest tube management; R26.2-Difficulty in walking,  not elsewhere classified; C34.90-Malignant neoplasm of unspecified part  of unspecified bronchus or lung; F41.9-Anxiety disorder, unspecified;  Z79.899-Other long term (current) drug therapy; G89.4-Chronic pain  syndrome     COMPARISON: 01/17/2019.     FINDINGS: Portable AP view of the chest obtained with overlying monitor  leads in place. ET tube has been removed. The right thoracotomy tube has  been retracted slightly now projecting near the lower margin of the  right pulmonary hilum. Feeding tube remains. Lungs are poorly aerated  with some increasing right infrahilar atelectasis. The known right-sided  pneumothorax has improved slightly now at 32 mm pleural  separation,  previously 38 mm. There is no left pneumothorax. Heart size and  vascularity are normal. No significant effusion.             Impression:       1. Life support line changes as above with some improvement in right  pneumothorax.  2. Increasing right infrahilar atelectasis.             XR Chest 1 View [[id number]] Collected:  01/17/19 0807     Updated:  01/17/19 2333    Narrative:       PORTABLE CHEST X-RAY     CLINICAL HISTORY: Chest tube management; R26.2-Difficulty in walking,  not elsewhere classified; C34.90-Malignant neoplasm of unspecified part  of unspecified bronchus or lung; F41.9-Anxiety disorder, unspecified;  Z79.899-Other long term (current) drug therapy; G89.4-Chronic pain  syndrome.     COMPARISON: 01/16/2019     FINDINGS: Portable AP view of the chest was obtained with overlying  monitor leads in place. Life support lines are unchanged. Right apical  pneumothorax has increased very slightly now with up to approximately  3.8 cm of apical pleural separation, previously 3.5. Improved bibasilar  atelectasis, particularly on the right side. No significant pleural  fluid. Heart size and vascularity are normal.             Impression:       Improving bibasilar atelectasis with minimal increase in  right apical pneumothorax.        This report was finalized on 1/17/2019 11:33 PM by Bryan Hendrix M.D.             Lab Results:     Lab Results (last 24 hours)     Procedure Component Value Units Date/Time    POC Glucose Once [[id number]]  (Abnormal) Collected:  01/18/19 1219    Specimen:  Blood Updated:  01/18/19 1221     Glucose 165 mg/dL     Comprehensive Metabolic Panel [[id number]]  (Abnormal) Collected:  01/18/19 0719    Specimen:  Blood Updated:  01/18/19 0832     Glucose 148 mg/dL      BUN 40 mg/dL      Creatinine 1.89 mg/dL      Sodium 142 mmol/L      Potassium 4.1 mmol/L      Chloride 106 mmol/L      CO2 21.5 mmol/L      Calcium 8.7 mg/dL      Total Protein 6.1 g/dL      Albumin 2.60 g/dL       ALT (SGPT) 17 U/L      AST (SGOT) 17 U/L      Alkaline Phosphatase 92 U/L      Total Bilirubin 0.6 mg/dL      eGFR Non African Amer 26 mL/min/1.73      Globulin 3.5 gm/dL      A/G Ratio 0.7 g/dL      BUN/Creatinine Ratio 21.2     Anion Gap 14.5 mmol/L     Narrative:       The MDRD GFR formula is only valid for adults with stable renal function between ages 18 and 70.    CBC (No Diff) [[id number]]  (Abnormal) Collected:  01/18/19 0719    Specimen:  Blood Updated:  01/18/19 0807     WBC 11.22 10*3/mm3      RBC 2.44 10*6/mm3      Hemoglobin 7.4 g/dL      Hematocrit 23.8 %      MCV 97.5 fL      MCH 30.3 pg      MCHC 31.1 g/dL      RDW 13.5 %      RDW-SD 47.4 fl      MPV 9.4 fL      Platelets 289 10*3/mm3     POC Glucose Once [[id number]]  (Abnormal) Collected:  01/18/19 0552    Specimen:  Blood Updated:  01/18/19 0553     Glucose 161 mg/dL     POC Glucose Once [[id number]]  (Abnormal) Collected:  01/17/19 2330    Specimen:  Blood Updated:  01/17/19 2331     Glucose 170 mg/dL     Blood Gas, Arterial [[id number]]  (Abnormal) Collected:  01/17/19 1620    Specimen:  Arterial Blood Updated:  01/17/19 1626     Site Arterial: right radial     Sachin's Test Positive     pH, Arterial 7.504 pH units      pCO2, Arterial 33.3 mm Hg      pO2, Arterial 94.6 mm Hg      HCO3, Arterial 26.2 mmol/L      Base Excess, Arterial 3.1 mmol/L      O2 Saturation Calculated 98.1 %      A-a Gradiant 0.4 mmHg      Barometric Pressure for Blood Gas 749.2 mmHg      Modality BiPap     FIO2 40 %      Set Tidal Volume 505     Set Mech Resp Rate 18     Rate 34 Breaths/minute            Assessment/Plan       Lung cancer (CMS/HCC)       Assessment:    Condition: In stable condition.  Improving.       Plan:   Encourage ambulation and per physical therapy.  Start/continue incentive spirometry.  NPO (pending post extubation swallow eval).  Chest x-ray.  Administer medications as ordered.       Mrs. Arguelles has certainly improved today.  She was extubated yesterday  afternoon.  She is opening her eyes and talking and making jokes today.  I believe there is a little bit of cloudiness still, but she is certainly much better.  Appreciate neurology's assistance with medication regimen.  I removed her chest tube today without incident.  We will order a follow-up chest x-ray in the morning.  Restarted her home Plavix as well.  RN is weaning Precedex.  Would prefer to hold off on PICC if at all possible.  Appreciate help from pulmonary service.    Disposition: Hope to discharge early to mid next week.    REENA Allen  [facility]  01/18/19  1:18 PM

## 2019-01-18 NOTE — THERAPY TREATMENT NOTE
Acute Care - Physical Therapy Treatment Note  [facility]     Patient Name: Rachana Arguelles  : 1945  MRN: [id number]  Today's Date: 2019  Onset of Illness/Injury or Date of Surgery: 19  Date of Referral to PT: 19  Referring Physician: Henry    Admit Date: 2019    Visit Dx:    ICD-10-CM ICD-9-CM   1. Difficulty walking R26.2 719.7   2. Lung cancer (CMS/HCC) C34.90 162.9   3. Anxiety F41.9 300.00   4. Polypharmacy Z79.899 V58.69   5. Chronic pain syndrome G89.4 338.4   6. Atherosclerosis of native artery of left lower extremity with intermittent claudication (CMS/HCC) I70.212 440.21     Patient Active Problem List   Diagnosis   • Anxiety   • Chronic pain syndrome   • Depression   • Gastroesophageal reflux disease   • Hyperlipidemia   • Hypertension   • Osteoarthritis of hip   • Chronic low back pain   • Failed back syndrome of lumbar spine   • Pulmonary embolus (CMS/HCC)   • Weight loss   • Polypharmacy   • CKD (chronic kidney disease), stage III (CMS/HCC)   • Chronic constipation   • Sacroiliac joint pain   • Mixed incontinence   • Renal cyst   • Achilles tendonitis   • Atherosclerosis of native artery of left lower extremity with intermittent claudication (CMS/HCC)   • Morbidly obese (CMS/HCC)   • Lung nodule   • Malignant neoplasm of right upper lobe of lung (CMS/HCC)   • Lung cancer (CMS/HCC)       Therapy Treatment    Rehabilitation Treatment Summary     Row Name 19 1714             Treatment Time/Intention    Discipline  physical therapist  -PC      Document Type  therapy note (daily note)  -PC      Subjective Information  no complaints  -PC      Mode of Treatment  physical therapy  -PC      Patient/Family Observations  pt is in bed, no acute distress, she is extubated, has chest tube out, still a little confused and needs frequent verbal cues for following directions  -PC      Therapy Frequency (PT Clinical Impression)  daily  -PC      Recorded by [PC] Melissa Ann, PT  01/18/19 1726      Row Name 01/18/19 1714             Cognitive Assessment/Intervention- PT/OT    Orientation Status (Cognition)  oriented to;person;place  -PC      Follows Commands (Cognition)  follows one step commands;75-90% accuracy;increased processing time needed;physical/tactile prompts required;repetition of directions required  -PC      Safety Deficit (Cognitive)  moderate deficit;awareness of need for assistance;insight into deficits/self awareness;judgment;impulsivity  -PC      Recorded by [PC] Melissa Ann, PT 01/18/19 1726      Row Name 01/18/19 1714             Bed Mobility Assessment/Treatment    Bed Mobility Assessment/Treatment  supine-sit;sit-supine  -PC      Lamar Level (Bed Mobility)  minimum assist (75% patient effort)  -PC      Supine-Sit Lamar (Bed Mobility)  2 person assist  -PC      Recorded by [PC] Melissa Ann, PT 01/18/19 1726      Row Name 01/18/19 1714             Transfer Assessment/Treatment    Transfer Assessment/Treatment  sit-stand transfer;stand-sit transfer  -PC      Recorded by [PC] Melissa Ann, PT 01/18/19 1726      Row Name 01/18/19 1714             Sit-Stand Transfer    Sit-Stand Lamar (Transfers)  minimum assist (75% patient effort);2 person assist  -PC      Assistive Device (Sit-Stand Transfers)  -- HHA x 2  -PC      Recorded by [PC] Melissa Ann, PT 01/18/19 1726      Row Name 01/18/19 1714             Stand-Sit Transfer    Stand-Sit Lamar (Transfers)  minimum assist (75% patient effort);2 person assist  -PC      Assistive Device (Stand-Sit Transfers)  -- HHA x 2  -PC      Recorded by [PC] Melissa Ann, PT 01/18/19 1726      Row Name 01/18/19 1714             Gait/Stairs Assessment/Training    Lamar Level (Gait)  minimum assist (75% patient effort);moderate assist (50% patient effort);2 person assist HHA x 22  -PC      Distance in Feet (Gait)  3 ft bed to chair  -PC      Pattern (Gait)  step-to  -PC       Deviations/Abnormal Patterns (Gait)  ataxic;stride length decreased  -PC      Recorded by [PC] Melissa Ann, PT 01/18/19 1726      Row Name 01/18/19 1714             Positioning and Restraints    Pre-Treatment Position  in bed  -PC      Post Treatment Position  chair  -PC      In Chair  reclined;call light within reach;encouraged to call for assist;with nsg sitter  -PC      Recorded by [PC] Melissa Ann, PT 01/18/19 1726      Row Name                Wound 01/11/19 0825 Right flank incision    Wound - Properties Group Date first assessed: 01/11/19 [SM] Time first assessed: 0825 [SM] Side: Right [SM] Location: flank [SM] Type: incision [SM] Recorded by:  [SM] Pieter Constantino RN 01/11/19 0825    Row Name 01/18/19 1714             Plan of Care Review    Plan of Care Reviewed With  patient  -PC      Recorded by [PC] Melissa Ann, PT 01/18/19 1726      Row Name 01/18/19 1714             Outcome Summary/Treatment Plan (PT)    Anticipated Discharge Disposition (PT)  skilled nursing facility  -PC      Recorded by [PC] Melissa Ann, PT 01/18/19 1726        User Key  (r) = Recorded By, (t) = Taken By, (c) = Cosigned By    Initials Name Effective Dates Discipline    PC Melissa Ann, PT 04/03/18 -  PT    SM Pieter Constantino RN 06/16/16 -  Nurse          Wound 01/11/19 0825 Right flank incision (Active)   Dressing Appearance no drainage;intact;dry 1/18/2019 12:00 PM   Closure Liquid skin adhesive 1/18/2019 12:00 PM   Drainage Amount small 1/18/2019 12:00 PM       Rehab Goal Summary     Row Name 01/18/19 1600             Swallow Goals (SLP)    Oral Nutrition/Hydration Goal Selection (SLP)  oral nutrition/hydration, SLP goal 1  -SH         Oral Nutrition/Hydration Goal 1 (SLP)    Oral Nutrition/Hydration Goal 1, SLP  Pt will tolerate PO without overt s/s of aspiration.   -SH      Time Frame (Oral Nutrition/Hydration Goal 1, SLP)  by discharge  -        User Key  (r) = Recorded By, (t) = Taken By, (c) = Cosigned By     Initials Name Provider Type Discipline    Judit Flores MS CCC-SLP Speech and Language Pathologist SLP          Physical Therapy Education     Title: PT OT SLP Therapies (In Progress)     Topic: Physical Therapy (In Progress)     Point: Mobility training (In Progress)     Learning Progress Summary           Patient Acceptance, E,D, NR by PC at 1/18/2019  5:26 PM    Acceptance, E,TB, NR by LS at 1/13/2019  2:32 PM    Acceptance, E,TB,D, NR by LS at 1/12/2019  1:32 PM   Family Acceptance, E,TB, NR by LS at 1/13/2019  2:32 PM    Acceptance, E,TB,D, NR by LS at 1/12/2019  1:32 PM                   Point: Home exercise program (In Progress)     Learning Progress Summary           Patient Acceptance, E,D, NR by PC at 1/18/2019  5:26 PM    Acceptance, E,TB, NR by LS at 1/13/2019  2:32 PM    Acceptance, E,TB,D, NR by LS at 1/12/2019  1:32 PM   Family Acceptance, E,TB, NR by LS at 1/13/2019  2:32 PM    Acceptance, E,TB,D, NR by LS at 1/12/2019  1:32 PM                   Point: Body mechanics (In Progress)     Learning Progress Summary           Patient Acceptance, E,D, NR by PC at 1/18/2019  5:26 PM    Acceptance, E,TB, NR by LS at 1/13/2019  2:32 PM    Acceptance, E,TB,D, NR by LS at 1/12/2019  1:32 PM   Family Acceptance, E,TB, NR by LS at 1/13/2019  2:32 PM    Acceptance, E,TB,D, NR by LS at 1/12/2019  1:32 PM                   Point: Precautions (In Progress)     Learning Progress Summary           Patient Acceptance, E,D, NR by PC at 1/18/2019  5:26 PM    Acceptance, E,TB, NR by LS at 1/13/2019  2:32 PM    Acceptance, E,TB,D, NR by LS at 1/12/2019  1:32 PM   Family Acceptance, E,TB, NR by LS at 1/13/2019  2:32 PM    Acceptance, E,TB,D, NR by LS at 1/12/2019  1:32 PM                               User Key     Initials Effective Dates Name Provider Type Discipline     04/03/18 -  Melissa Ann, PT Physical Therapist PT    LS 04/03/18 -  Aimee Ayala, TIMI Physical Therapist PT                PT Recommendation  and Plan  Anticipated Discharge Disposition (PT): skilled nursing facility  Therapy Frequency (PT Clinical Impression): daily  Outcome Summary/Treatment Plan (PT)  Anticipated Discharge Disposition (PT): skilled nursing facility  Plan of Care Reviewed With: patient  Progress: improving  Outcome Summary: pt able to get to chair today with assist of 2, she needed frequent verbal cueing to follow directions but overall did well, will progress with activity as tolerated  Outcome Measures     Row Name 01/18/19 1700             How much help from another person do you currently need...    Turning from your back to your side while in flat bed without using bedrails?  3  -PC      Moving from lying on back to sitting on the side of a flat bed without bedrails?  3  -PC      Moving to and from a bed to a chair (including a wheelchair)?  2  -PC      Standing up from a chair using your arms (e.g., wheelchair, bedside chair)?  2  -PC      Climbing 3-5 steps with a railing?  1  -PC      To walk in hospital room?  2  -PC      AM-PAC 6 Clicks Score  13  -PC        User Key  (r) = Recorded By, (t) = Taken By, (c) = Cosigned By    Initials Name Provider Type    PC Melissa Ann, PT Physical Therapist         Time Calculation:   PT Charges     Row Name 01/18/19 1727             Time Calculation    Start Time  1652  -PC      Stop Time  1715  -PC      Time Calculation (min)  23 min  -PC      PT Received On  01/18/19  -PC      PT - Next Appointment  01/19/19  -PC        User Key  (r) = Recorded By, (t) = Taken By, (c) = Cosigned By    Initials Name Provider Type    PC Melissa Ann, PT Physical Therapist        Therapy Suggested Charges     Code   Minutes Charges    None           Therapy Charges for Today     Code Description Service Date Service Provider Modifiers Qty    93907383858 HC PT THER PROC EA 15 MIN 1/18/2019 Melissa Ann, PT GP 2          PT G-Codes  Outcome Measure Options: AM-PAC 6 Clicks Basic Mobility (PT)  AM-PAC 6  Clicks Score: 13    Melissa Ann, PT  1/18/2019

## 2019-01-18 NOTE — PLAN OF CARE
Problem: Patient Care Overview  Goal: Plan of Care Review   01/18/19 3682   Coping/Psychosocial   Plan of Care Reviewed With patient   Plan of Care Review   Progress improving   OTHER   Outcome Summary Pt seen for bedside swallow. Sitter present, patient confused. Mild hoarse voice noted, strong cough. Oral care completed. No overt s/s of aspiration with ice or thins via spoon, cup, or straw. Laryngeal elevation appeared functional. Swallow initiation varied between timely and 1-3 second delay. Suspect oral holding at times. No s/s of aspiration with pudding, no oral residue post swallow. Incomplete mastication of mech soft, SLP suspects patient swallowing peaches whole 2/2 impaired cognition. SLP recs full liquids, meds whole with thins. Please re consult SLP if s/s of aspiration noted. Confusion greatly impacts swallow safety.

## 2019-01-18 NOTE — PLAN OF CARE
Problem: Patient Care Overview  Goal: Plan of Care Review  Outcome: Ongoing (interventions implemented as appropriate)   01/18/19 7359   Coping/Psychosocial   Plan of Care Reviewed With patient   Plan of Care Review   Progress improving   OTHER   Outcome Summary pt able to get to chair today with assist of 2, she needed frequent verbal cueing to follow directions but overall did well, will progress with activity as tolerated

## 2019-01-19 ENCOUNTER — APPOINTMENT (OUTPATIENT)
Dept: GENERAL RADIOLOGY | Facility: HOSPITAL | Age: 74
End: 2019-01-19

## 2019-01-19 LAB
ANION GAP SERPL CALCULATED.3IONS-SCNC: 11.8 MMOL/L
BUN BLD-MCNC: 44 MG/DL (ref 8–23)
BUN/CREAT SERPL: 26.7 (ref 7–25)
CALCIUM SPEC-SCNC: 8.5 MG/DL (ref 8.6–10.5)
CHLORIDE SERPL-SCNC: 107 MMOL/L (ref 98–107)
CO2 SERPL-SCNC: 22.2 MMOL/L (ref 22–29)
CREAT BLD-MCNC: 1.65 MG/DL (ref 0.57–1)
DEPRECATED RDW RBC AUTO: 52.1 FL (ref 37–54)
ERYTHROCYTE [DISTWIDTH] IN BLOOD BY AUTOMATED COUNT: 13.9 % (ref 11.7–13)
GFR SERPL CREATININE-BSD FRML MDRD: 30 ML/MIN/1.73
GLUCOSE BLD-MCNC: 159 MG/DL (ref 65–99)
GLUCOSE BLDC GLUCOMTR-MCNC: 100 MG/DL (ref 70–130)
GLUCOSE BLDC GLUCOMTR-MCNC: 114 MG/DL (ref 70–130)
HCT VFR BLD AUTO: 27.2 % (ref 35.6–45.5)
HGB BLD-MCNC: 8 G/DL (ref 11.9–15.5)
MCH RBC QN AUTO: 30.3 PG (ref 26.9–32)
MCHC RBC AUTO-ENTMCNC: 29.4 G/DL (ref 32.4–36.3)
MCV RBC AUTO: 103 FL (ref 80.5–98.2)
PLATELET # BLD AUTO: 306 10*3/MM3 (ref 140–500)
PMV BLD AUTO: 9.4 FL (ref 6–12)
POTASSIUM BLD-SCNC: 4.2 MMOL/L (ref 3.5–5.2)
RBC # BLD AUTO: 2.64 10*6/MM3 (ref 3.9–5.2)
SODIUM BLD-SCNC: 141 MMOL/L (ref 136–145)
WBC NRBC COR # BLD: 14.95 10*3/MM3 (ref 4.5–10.7)

## 2019-01-19 PROCEDURE — 25010000002 HYDROMORPHONE PER 4 MG: Performed by: INTERNAL MEDICINE

## 2019-01-19 PROCEDURE — 99024 POSTOP FOLLOW-UP VISIT: CPT | Performed by: THORACIC SURGERY (CARDIOTHORACIC VASCULAR SURGERY)

## 2019-01-19 PROCEDURE — 71045 X-RAY EXAM CHEST 1 VIEW: CPT

## 2019-01-19 PROCEDURE — 99231 SBSQ HOSP IP/OBS SF/LOW 25: CPT | Performed by: PSYCHIATRY & NEUROLOGY

## 2019-01-19 PROCEDURE — 94799 UNLISTED PULMONARY SVC/PX: CPT

## 2019-01-19 PROCEDURE — 82962 GLUCOSE BLOOD TEST: CPT

## 2019-01-19 PROCEDURE — 80048 BASIC METABOLIC PNL TOTAL CA: CPT | Performed by: NURSE PRACTITIONER

## 2019-01-19 PROCEDURE — 85027 COMPLETE CBC AUTOMATED: CPT | Performed by: NURSE PRACTITIONER

## 2019-01-19 PROCEDURE — 25010000002 HYDRALAZINE PER 20 MG: Performed by: INTERNAL MEDICINE

## 2019-01-19 PROCEDURE — 25010000002 HEPARIN (PORCINE) PER 1000 UNITS: Performed by: THORACIC SURGERY (CARDIOTHORACIC VASCULAR SURGERY)

## 2019-01-19 RX ORDER — CARVEDILOL 25 MG/1
25 TABLET ORAL EVERY 12 HOURS SCHEDULED
Status: DISCONTINUED | OUTPATIENT
Start: 2019-01-19 | End: 2019-01-21 | Stop reason: HOSPADM

## 2019-01-19 RX ORDER — CLONIDINE HYDROCHLORIDE 0.1 MG/1
0.1 TABLET ORAL EVERY 8 HOURS SCHEDULED
Status: DISCONTINUED | OUTPATIENT
Start: 2019-01-19 | End: 2019-01-20

## 2019-01-19 RX ORDER — CLONIDINE HYDROCHLORIDE 0.1 MG/1
0.2 TABLET ORAL EVERY 8 HOURS SCHEDULED
Status: DISCONTINUED | OUTPATIENT
Start: 2019-01-19 | End: 2019-01-19

## 2019-01-19 RX ADMIN — CARVEDILOL 25 MG: 25 TABLET, FILM COATED ORAL at 22:13

## 2019-01-19 RX ADMIN — LORAZEPAM 0.5 MG: 0.5 TABLET ORAL at 18:14

## 2019-01-19 RX ADMIN — FAMOTIDINE 20 MG: 20 TABLET, FILM COATED ORAL at 22:14

## 2019-01-19 RX ADMIN — ATORVASTATIN CALCIUM 40 MG: 20 TABLET, FILM COATED ORAL at 22:13

## 2019-01-19 RX ADMIN — IPRATROPIUM BROMIDE AND ALBUTEROL SULFATE 3 ML: 2.5; .5 SOLUTION RESPIRATORY (INHALATION) at 07:58

## 2019-01-19 RX ADMIN — HYDROMORPHONE HYDROCHLORIDE 0.25 MG: 1 INJECTION, SOLUTION INTRAMUSCULAR; INTRAVENOUS; SUBCUTANEOUS at 03:39

## 2019-01-19 RX ADMIN — OXYCODONE AND ACETAMINOPHEN 2 TABLET: 5; 325 TABLET ORAL at 18:14

## 2019-01-19 RX ADMIN — HEPARIN SODIUM 5000 UNITS: 5000 INJECTION INTRAVENOUS; SUBCUTANEOUS at 15:10

## 2019-01-19 RX ADMIN — HEPARIN SODIUM 5000 UNITS: 5000 INJECTION INTRAVENOUS; SUBCUTANEOUS at 06:21

## 2019-01-19 RX ADMIN — HYDROMORPHONE HYDROCHLORIDE 0.25 MG: 1 INJECTION, SOLUTION INTRAMUSCULAR; INTRAVENOUS; SUBCUTANEOUS at 21:53

## 2019-01-19 RX ADMIN — CLONIDINE HYDROCHLORIDE 0.1 MG: 0.1 TABLET ORAL at 22:14

## 2019-01-19 RX ADMIN — AMLODIPINE BESYLATE 10 MG: 10 TABLET ORAL at 09:23

## 2019-01-19 RX ADMIN — CLONIDINE HYDROCHLORIDE 0.1 MG: 0.1 TABLET ORAL at 13:09

## 2019-01-19 RX ADMIN — FAMOTIDINE 20 MG: 20 TABLET, FILM COATED ORAL at 09:23

## 2019-01-19 RX ADMIN — CLONIDINE HYDROCHLORIDE 0.1 MG: 0.1 TABLET ORAL at 06:21

## 2019-01-19 RX ADMIN — CARVEDILOL 25 MG: 25 TABLET, FILM COATED ORAL at 11:01

## 2019-01-19 RX ADMIN — LORAZEPAM 0.5 MG: 0.5 TABLET ORAL at 02:22

## 2019-01-19 RX ADMIN — HYDROMORPHONE HYDROCHLORIDE 0.25 MG: 1 INJECTION, SOLUTION INTRAMUSCULAR; INTRAVENOUS; SUBCUTANEOUS at 09:57

## 2019-01-19 RX ADMIN — IPRATROPIUM BROMIDE AND ALBUTEROL SULFATE 3 ML: 2.5; .5 SOLUTION RESPIRATORY (INHALATION) at 12:10

## 2019-01-19 RX ADMIN — METOPROLOL TARTRATE 5 MG: 5 INJECTION, SOLUTION INTRAVENOUS at 07:09

## 2019-01-19 RX ADMIN — LORAZEPAM 0.5 MG: 0.5 TABLET ORAL at 11:00

## 2019-01-19 RX ADMIN — HYDRALAZINE HYDROCHLORIDE 20 MG: 20 INJECTION INTRAMUSCULAR; INTRAVENOUS at 01:20

## 2019-01-19 RX ADMIN — CLOPIDOGREL 75 MG: 75 TABLET, FILM COATED ORAL at 09:23

## 2019-01-19 RX ADMIN — HYDRALAZINE HYDROCHLORIDE 20 MG: 20 INJECTION INTRAMUSCULAR; INTRAVENOUS at 22:30

## 2019-01-19 RX ADMIN — LANSOPRAZOLE 30 MG: KIT at 06:21

## 2019-01-19 RX ADMIN — HYDRALAZINE HYDROCHLORIDE 20 MG: 20 INJECTION INTRAMUSCULAR; INTRAVENOUS at 13:55

## 2019-01-19 RX ADMIN — HEPARIN SODIUM 5000 UNITS: 5000 INJECTION INTRAVENOUS; SUBCUTANEOUS at 22:14

## 2019-01-19 RX ADMIN — IPRATROPIUM BROMIDE AND ALBUTEROL SULFATE 3 ML: 2.5; .5 SOLUTION RESPIRATORY (INHALATION) at 21:47

## 2019-01-19 RX ADMIN — IPRATROPIUM BROMIDE AND ALBUTEROL SULFATE 3 ML: 2.5; .5 SOLUTION RESPIRATORY (INHALATION) at 17:04

## 2019-01-19 RX ADMIN — METOPROLOL TARTRATE 5 MG: 5 INJECTION, SOLUTION INTRAVENOUS at 16:03

## 2019-01-19 RX ADMIN — DEXMEDETOMIDINE HYDROCHLORIDE 0.5 MCG/KG/HR: 100 INJECTION, SOLUTION, CONCENTRATE INTRAVENOUS at 00:38

## 2019-01-19 RX ADMIN — IPRATROPIUM BROMIDE AND ALBUTEROL SULFATE 3 ML: 2.5; .5 SOLUTION RESPIRATORY (INHALATION) at 03:58

## 2019-01-19 RX ADMIN — HYDROMORPHONE HYDROCHLORIDE 0.25 MG: 1 INJECTION, SOLUTION INTRAMUSCULAR; INTRAVENOUS; SUBCUTANEOUS at 16:02

## 2019-01-19 NOTE — PROGRESS NOTES
[facility]  [phone]  Álvaro Gifford MD    Subjective:  LOS: 8    Awake and alert (and calm). Answers qs appropriately. Not much pain.    Objective   Vital Signs past 24hrs    Temp range: Temp (24hrs), Av.8 °F (36.6 °C), Min:97.5 °F (36.4 °C), Max:98.2 °F (36.8 °C)    BP range: BP: (127-193)/() 177/79  Pulse range: Heart Rate:  [68-92] 68  Resp rate range: Resp:  [14-18] 14    Device (Oxygen Therapy): nasal cannulaFlow (L/min):  [2] 2  Oxygen range:SpO2:  [78 %-100 %] 98 %      90.3 kg (198 lb 15.8 oz); Body mass index is 32.13 kg/m².    Intake/Output Summary (Last 24 hours) at 2019 0852  Last data filed at 2019 0628  Gross per 24 hour   Intake 1467.34 ml   Output 825 ml   Net 642.34 ml       Physical Exam   Constitutional: She is oriented to person, place, and time. She appears well-developed and well-nourished.   HENT:   Head: Normocephalic and atraumatic.   Eyes: EOM are normal. Pupils are equal, round, and reactive to light.   Cardiovascular: Normal rate and regular rhythm.   No murmur heard.  Pulmonary/Chest: Effort normal. No respiratory distress. She has no wheezes. She has no rales.   Abdominal: Soft. Bowel sounds are normal. She exhibits no mass. There is no tenderness.   Musculoskeletal: She exhibits edema (RUE).   Neurological: She is alert and oriented to person, place, and time.   Skin: No rash noted.     Results Review:    I have reviewed the laboratory and imaging data since the last note by LPC physician.  My annotations are noted in assessment and plan.    Medication Review:  I have reviewed the current MAR.  My annotations are noted in assessment and plan.      dexmedetomidine 0.2-1.5 mcg/kg/hr Last Rate: 0.3 mcg/kg/hr (19 0850)   lactated ringers 9 mL/hr Last Rate: Stopped (19 1108)   niCARdipine 5-15 mg/hr Last Rate: Stopped (19 4026)     Plan   PCCM Problems  1. Hyperactive delirium  2. Respiratory failure, no longer on the  ventilator  3. Benzo/opiate withdrawal  4. Anemia  5. Lung cancer post VATS  6. Pain control  7. Chronic kidney disease  8. COPD  9. Chronic back pain  10. Hypertension uncontrolled  11. Restless leg syndrome  12. Anemia  13. Right pneumothorax  14. RUE swelling    Plan of Treatment  Scheduled dilaudid and ativan helping with delirium. Try to get her off Precedex. Appears oriented and calm. Will try to dc restraints.    Tolerating RA. Try off O2.    HTN uncontrolled. Resume home Coreg 25 mg bid. Possibly dc catapres.    Tolerating po diet. Hold TF and dc cortrack if tolerates today.    I don't see RLS meds on her home list.    Doing well from surgical standpoint.CT is out. Small right pneumothorax - per CTS.    RUE swelling but no dvt. From iv infiltration due to patient agitation.    CKD stable.    Anemia stable.    Watch leukocytosis.    Continue PT and anticipate early transfer out of ICU.    Álvaro Gifford MD  01/19/19  8:52 AM    Part of this note may be an electronic transcription/translation of spoken language to printed text using the Dragon Dictation System.

## 2019-01-19 NOTE — SIGNIFICANT NOTE
01/19/19 4050   Rehab Treatment   Discipline physical therapist   Reason Treatment Not Performed other (see comments)  (Attempted to see this afternoon; however, pt now back to bed after being up in chair for period of time today. Pt very sleepy and worn out. Discussed with RNKatie. Will follow up tomorrow.)   Recommendation   PT - Next Appointment 01/20/19

## 2019-01-19 NOTE — PROGRESS NOTES
"    Chief Complaint: Right upper lobe lung cancer, postoperative management  S/P: VATS right upper lobectomy  POD # 8    Subjective:  Symptoms:  Improved.  She reports shortness of breath, chest pain and anxiety.    Diet:  NPO.  No nausea or vomiting.    Activity level: Impaired due to pain.    Pain:  She complains of pain that is mild.  Pain is well controlled.    Confusion much improved this am.  Following commands. OOB to chair.     Vital Signs:  Temp:  [97.5 °F (36.4 °C)-97.8 °F (36.6 °C)] 97.8 °F (36.6 °C)  Heart Rate:  [65-85] 65  Resp:  [14-18] 16  BP: (127-180)/() 165/108    Intake & Output (last day)       01/18 0701 - 01/19 0700 01/19 0701 - 01/20 0700    P.O.  240    I.V. (mL/kg) 336.3 (3.7)     Other 90     NG/GT 1041     Total Intake(mL/kg) 1467.3 (16.2) 240 (2.7)    Urine (mL/kg/hr) 825 (0.4) 700 (1)    Stool 0 0    Chest Tube      Total Output 825 700    Net +642.3 -460          Urine Unmeasured Occurrence 1 x 1 x    Stool Unmeasured Occurrence 2 x 1 x          Objective:  General Appearance:  Comfortable, in no acute distress and not in pain.    Vital signs: (most recent): Blood pressure (!) 165/108, pulse 65, temperature 97.8 °F (36.6 °C), temperature source Oral, resp. rate 16, height 167.6 cm (65.98\"), weight 90.3 kg (198 lb 15.8 oz), SpO2 94 %.  Vital signs are normal.  No fever.    Output: Producing urine.    HEENT: Normal HEENT exam.    Lungs:  Normal effort and normal respiratory rate.  She is not in respiratory distress.  There are decreased breath sounds (right middle/lower lung fields) and rhonchi.    Heart: Normal rate.  Regular rhythm.  S1 normal and S2 normal.  No murmur.   Chest: Chest wall tenderness present.  (At chest tube site)  Abdomen: Abdomen is soft and non-distended.  Bowel sounds are normal.   There is no abdominal tenderness.   There is no mass.   Extremities: Normal range of motion.  There is no dependent edema.    Pulses: Distal pulses are intact.    Neurological: " Patient is alert and oriented to person, place and time.  Normal strength.    Pupils:  Pupils are equal, round, and reactive to light.    Skin:  Warm and dry.              Results Review:    I reviewed the patient's new clinical results.  I reviewed the patient's new imaging results and agree with the interpretation.  I reviewed the patient's other test results and agree with the interpretation    Imaging Results (last 24 hours)     Procedure Component Value Units Date/Time    XR Chest 1 View [[id number]] Collected:  01/19/19 0333     Updated:  01/19/19 0339    Narrative:       PORTABLE CHEST X-RAY     CLINICAL HISTORY: s/p chest tube removal; R26.2-Difficulty in walking,  not elsewhere classified; C34.90-Malignant neoplasm of unspecified part  of unspecified bronchus or lung; F41.9-Anxiety disorder, unspecified;  Z79.899-Other long term (current) drug therapy; G89.4-Chronic pain  syndrome; I70.212-Atherosclerosis of native arteries of extremities with  intermittent claudication, left leg     COMPARISON: January 18, 2019.     FINDINGS: Portable AP view of the chest was obtained with overlying  monitor leads in place. Right thoracotomy tube has been removed. The  right pneumothorax has increased slightly in size now with up to  approximately 4.7 cm apical pleural separation, previously 3.2 cm. Lungs  are poorly aerated but without active air space disease process. Heart  size and vascularity felt to be normal considering under aeration. No  significant pleural fluid.             Impression:       Slight increase in size of known right pneumothorax  following thoracotomy tube removal. Continued follow-up recommended        This report was finalized on 1/19/2019 3:35 AM by Bryan Hendrix M.D.       XR Chest 1 View [[id number]] Collected:  01/18/19 0649     Updated:  01/19/19 0124    Narrative:       PORTABLE CHEST X-RAY     CLINICAL HISTORY: chest tube management; R26.2-Difficulty in walking,  not elsewhere classified;  C34.90-Malignant neoplasm of unspecified part  of unspecified bronchus or lung; F41.9-Anxiety disorder, unspecified;  Z79.899-Other long term (current) drug therapy; G89.4-Chronic pain  syndrome     COMPARISON: 01/17/2019.     FINDINGS: Portable AP view of the chest obtained with overlying monitor  leads in place. ET tube has been removed. The right thoracotomy tube has  been retracted slightly now projecting near the lower margin of the  right pulmonary hilum. Feeding tube remains. Lungs are poorly aerated  with some increasing right infrahilar atelectasis. The known right-sided  pneumothorax has improved slightly now at 32 mm pleural separation,  previously 38 mm. There is no left pneumothorax. Heart size and  vascularity are normal. No significant effusion.             Impression:       1. Life support line changes as above with some improvement in right  pneumothorax.  2. Increasing right infrahilar atelectasis.     This report was finalized on 1/19/2019 1:21 AM by Bryan Hendrix M.D.       XR Abdomen KUB [[id number]] Collected:  01/18/19 0738     Updated:  01/18/19 1559    Narrative:       KUB 01/18/2019     HISTORY: Cortrak placement.     FINDINGS: The Cortrak catheter is seen with its tip projecting over the  upper lumbar spine probably in the distal stomach.     Bowel gas pattern is unremarkable. There is lumbosacral fusion.  Degenerative changes of the spine are seen. Vascular stents overlie the  lower abdomen.       Impression:       Cortrak catheter tip thought to be in the distal stomach.     This report was finalized on 1/18/2019 3:56 PM by Dr. Martin Kennedy M.D.             Lab Results:     Lab Results (last 24 hours)     Procedure Component Value Units Date/Time    Basic Metabolic Panel [[id number]]  (Abnormal) Collected:  01/19/19 0640    Specimen:  Blood Updated:  01/19/19 0744     Glucose 159 mg/dL      BUN 44 mg/dL      Creatinine 1.65 mg/dL      Sodium 141 mmol/L      Potassium 4.2 mmol/L       Chloride 107 mmol/L      CO2 22.2 mmol/L      Calcium 8.5 mg/dL      eGFR Non African Amer 30 mL/min/1.73      BUN/Creatinine Ratio 26.7     Anion Gap 11.8 mmol/L     Narrative:       The MDRD GFR formula is only valid for adults with stable renal function between ages 18 and 70.    CBC (No Diff) [[id number]]  (Abnormal) Collected:  01/19/19 0640    Specimen:  Blood Updated:  01/19/19 0725     WBC 14.95 10*3/mm3      RBC 2.64 10*6/mm3      Hemoglobin 8.0 g/dL      Hematocrit 27.2 %      .0 fL      MCH 30.3 pg      MCHC 29.4 g/dL      RDW 13.9 %      RDW-SD 52.1 fl      MPV 9.4 fL      Platelets 306 10*3/mm3            Assessment/Plan       Lung cancer (CMS/HCC)       Assessment & Plan   Ms. Arguelles is s/p right VATs lobectomy POD #8.  Her mental status continues to improve.  Continue the scheduled dilaudid and ativan and transition her to oral medications tomorrow. Precedex is now off.    Chest tube out with residual space, will recheck a CXR in the am.      Quita Figueroa MD  [facility]  01/19/19  2:49 PM

## 2019-01-19 NOTE — NURSING NOTE
Spoke with Ying cook RN to follow up with Picc placement. Picc remains on Hold at this time with no further changes since Dr Rosenberg placed picc on hold this am. Pt continues to have a working Piv for iv meds. Please contact iv team with any access changes.

## 2019-01-19 NOTE — PROGRESS NOTES
Patient Identification:  NAME:  Rachana Arguelles  Age:  73 y.o.   Sex:  female   :  1945   MRN:  [id number]       Chief complaint: Metabolic encephalopathy    History of present illness:  Even better today awake alert still a little confused per the daughter but much better than she has been improving every day      Past medical history:  Past Medical History:   Diagnosis Date   • AAA (abdominal aortic aneurysm) without rupture (CMS/HCC)    • Anxiety    • Arthritis    • Cancer (CMS/HCC)     skin cancer   • Chest pain     at rest   • Chronic low back pain    • Chronic pain syndrome 3/12/2016   • CKD (chronic kidney disease), stage III (CMS/HCC)    • Claustrophobia 10/26/2015    Resolved   • Depression    • Dizziness    • GERD (gastroesophageal reflux disease)    • GI problem    • Hiatal hernia    • History of migraine headaches    • Hyperlipidemia    • Hypertension    • Lumbar postlaminectomy syndrome 10/26/2015    Resolved   • Lung cancer (CMS/HCC)    • Lung nodule    • Palpitations    • Polypharmacy 2016   • Pulmonary embolism (CMS/HCC) 10/26/2015    2013 - Resolved, felt to be secondary to estrogen use   • Submandibular sialoadenitis 10/26/2015    Resolved   • Vitamin B 12 deficiency        Allergies:  Morphine    Home medications:  Medications Prior to Admission   Medication Sig Dispense Refill Last Dose   • aluminum hydroxide-mag carbonate (GAVISCON EXTRA RELIEF) 160-105 MG chewable tablet chewable tablet Chew 2 tablets 2 (Two) Times a Day As Needed.   Past Week at Unknown time   • amLODIPine (NORVASC) 10 MG tablet Take 10 mg by mouth Every Morning.  3 2019 at 0400   • aspirin 81 MG chewable tablet Chew 81 mg Daily. PT TO CONTINUE PER MD   1/10/2019 at 2100   • atorvastatin (LIPITOR) 40 MG tablet Take 40 mg by mouth Every Night.   1/10/2019 at 2100   • cetirizine (ZyrTEC) 10 MG tablet Take 10 mg by mouth As Needed.   Past Week at Unknown time   • cyclobenzaprine (FLEXERIL) 10 MG tablet  Take 10 mg by mouth 3 (Three) Times a Day As Needed.   Past Week at Unknown time   • famotidine (PEPCID) 20 MG tablet Take 1 tablet by mouth 2 (Two) Times a Day. 180 tablet 1 1/11/2019 at 0400   • furosemide (LASIX) 40 MG tablet Take 40 mg by mouth Daily.   Past Week at Unknown time   • omeprazole (priLOSEC) 40 MG capsule Take 40 mg by mouth Every Evening.   Past Week at 2100   • ondansetron ODT (ZOFRAN-ODT) 8 MG disintegrating tablet Take 8 mg by mouth Every 8 (Eight) Hours As Needed for Nausea or Vomiting.   Past Week at Unknown time   • oxyCODONE (ROXICODONE) 20 MG tablet Take 20 mg by mouth Every 4 (Four) Hours As Needed.   1/10/2019 at 2100   • oxymetazoline (AFRIN) 0.05 % nasal spray 2 sprays into the nostril(s) as directed by provider As Needed for Congestion.   1/11/2019 at 0530   • polyethylene glycol (MIRALAX) powder Take 17 g by mouth Every Evening. Constipation. Mix in 4oz of water/juice/milk   1/10/2019 at 2100   • sennosides-docusate sodium (SENOKOT-S) 8.6-50 MG tablet TAKE TWO TABLETS BY MOUTH ONCE DAILY (Patient taking differently: Take 2 tablets by mouth 2 (Two) Times a Day.) 180 tablet 1 1/10/2019 at 2100   • sucralfate (CARAFATE) 1 g tablet Take 1 tablet by mouth 4 (Four) Times a Day. 120 tablet 5 1/10/2019 at 2100   • traZODone (DESYREL) 150 MG tablet Take 0.5 tablets by mouth Every Night. 45 tablet 1 1/10/2019 at 2100   • umeclidinium-vilanterol (ANORO ELLIPTA) 62.5-25 MCG/INH aerosol powder  inhaler Inhale 1 puff Daily.   Past Month at Unknown time   • venlafaxine XR (EFFEXOR-XR) 150 MG 24 hr capsule Take 1 capsule by mouth Daily. 30 capsule 5 1/10/2019 at 0800   • carvedilol (COREG) 25 MG tablet Take 25 mg by mouth 2 (Two) Times a Day With Meals. 1/2 TAB EACH DOSE-ON HOLD SINCE 1/5/19 1/5/2019   • clopidogrel (PLAVIX) 75 MG tablet Take 1 tablet by mouth Daily. 30 tablet 3 1/4/2019   • Ergocalciferol (VITAMIN D2 PO) Take 50,000 Units by mouth 1 (One) Time Per Week. WEDNESDAY 1/2/2019   •  LORazepam (ATIVAN) 0.5 MG tablet Take 0.5 mg by mouth 3 (Three) Times a Day.   1/7/2019        Hospital medications:    amLODIPine 10 mg Oral Q24H   atorvastatin 40 mg Oral Nightly   carvedilol 25 mg Oral Q12H   CloNIDine 0.1 mg Oral Q8H   clopidogrel 75 mg Oral Daily   famotidine 20 mg Oral BID   heparin (porcine) 5,000 Units Subcutaneous Q8H   HYDROmorphone 0.25 mg Intravenous Q6H   ipratropium-albuterol 3 mL Nebulization Q4H - RT   lansoprazole 30 mg Nasogastric QAM   LORazepam 0.5 mg Oral Q8H   sennosides-docusate sodium 2 tablet Oral Nightly   sodium chloride 4 mL Nebulization Once       dexmedetomidine 0.2-1.5 mcg/kg/hr Last Rate: Stopped (01/19/19 1101)   lactated ringers 9 mL/hr Last Rate: Stopped (01/11/19 1108)   niCARdipine 5-15 mg/hr Last Rate: Stopped (01/18/19 0410)     •  albuterol  •  bisacodyl  •  haloperidol lactate  •  hydrALAZINE  •  magnesium hydroxide  •  metoprolol tartrate  •  naloxone  •  nitroglycerin  •  ondansetron **OR** ondansetron ODT **OR** ondansetron  •  oxyCODONE-acetaminophen  •  sod bicarb-citric acid-simethicone      Objective:  Vitals Ranges:   Temp:  [97.5 °F (36.4 °C)-97.8 °F (36.6 °C)] 97.8 °F (36.6 °C)  Heart Rate:  [65-85] 65  Resp:  [14-18] 16  BP: (127-180)/() 165/108      Physical Exam:  She is awake alert talkative disoriented beyond ×1 and normal cranial nerves II through VII tongue is midline good motor times for extremities reflexes trace throughout symmetrical toes downgoing bilaterally    Results review:   I reviewed the patient's new clinical results.    Data review:  Lab Results (last 24 hours)     Procedure Component Value Units Date/Time    Basic Metabolic Panel [[id number]]  (Abnormal) Collected:  01/19/19 0640    Specimen:  Blood Updated:  01/19/19 0744     Glucose 159 mg/dL      BUN 44 mg/dL      Creatinine 1.65 mg/dL      Sodium 141 mmol/L      Potassium 4.2 mmol/L      Chloride 107 mmol/L      CO2 22.2 mmol/L      Calcium 8.5 mg/dL      eGFR Non   Amer 30 mL/min/1.73      BUN/Creatinine Ratio 26.7     Anion Gap 11.8 mmol/L     Narrative:       The MDRD GFR formula is only valid for adults with stable renal function between ages 18 and 70.    CBC (No Diff) [[id number]]  (Abnormal) Collected:  01/19/19 0640    Specimen:  Blood Updated:  01/19/19 0725     WBC 14.95 10*3/mm3      RBC 2.64 10*6/mm3      Hemoglobin 8.0 g/dL      Hematocrit 27.2 %      .0 fL      MCH 30.3 pg      MCHC 29.4 g/dL      RDW 13.9 %      RDW-SD 52.1 fl      MPV 9.4 fL      Platelets 306 10*3/mm3            Imaging:  Imaging Results (last 24 hours)     Procedure Component Value Units Date/Time    XR Chest 1 View [[id number]] Collected:  01/19/19 0333     Updated:  01/19/19 0339    Narrative:       PORTABLE CHEST X-RAY     CLINICAL HISTORY: s/p chest tube removal; R26.2-Difficulty in walking,  not elsewhere classified; C34.90-Malignant neoplasm of unspecified part  of unspecified bronchus or lung; F41.9-Anxiety disorder, unspecified;  Z79.899-Other long term (current) drug therapy; G89.4-Chronic pain  syndrome; I70.212-Atherosclerosis of native arteries of extremities with  intermittent claudication, left leg     COMPARISON: January 18, 2019.     FINDINGS: Portable AP view of the chest was obtained with overlying  monitor leads in place. Right thoracotomy tube has been removed. The  right pneumothorax has increased slightly in size now with up to  approximately 4.7 cm apical pleural separation, previously 3.2 cm. Lungs  are poorly aerated but without active air space disease process. Heart  size and vascularity felt to be normal considering under aeration. No  significant pleural fluid.             Impression:       Slight increase in size of known right pneumothorax  following thoracotomy tube removal. Continued follow-up recommended        This report was finalized on 1/19/2019 3:35 AM by Bryan Hendrix M.D.       XR Chest 1 View [[id number]] Collected:  01/18/19 0649      Updated:  01/19/19 0124    Narrative:       PORTABLE CHEST X-RAY     CLINICAL HISTORY: chest tube management; R26.2-Difficulty in walking,  not elsewhere classified; C34.90-Malignant neoplasm of unspecified part  of unspecified bronchus or lung; F41.9-Anxiety disorder, unspecified;  Z79.899-Other long term (current) drug therapy; G89.4-Chronic pain  syndrome     COMPARISON: 01/17/2019.     FINDINGS: Portable AP view of the chest obtained with overlying monitor  leads in place. ET tube has been removed. The right thoracotomy tube has  been retracted slightly now projecting near the lower margin of the  right pulmonary hilum. Feeding tube remains. Lungs are poorly aerated  with some increasing right infrahilar atelectasis. The known right-sided  pneumothorax has improved slightly now at 32 mm pleural separation,  previously 38 mm. There is no left pneumothorax. Heart size and  vascularity are normal. No significant effusion.             Impression:       1. Life support line changes as above with some improvement in right  pneumothorax.  2. Increasing right infrahilar atelectasis.     This report was finalized on 1/19/2019 1:21 AM by Bryan Hendrix M.D.       XR Abdomen KUB [[id number]] Collected:  01/18/19 0738     Updated:  01/18/19 1559    Narrative:       KUB 01/18/2019     HISTORY: Cortrak placement.     FINDINGS: The Cortrak catheter is seen with its tip projecting over the  upper lumbar spine probably in the distal stomach.     Bowel gas pattern is unremarkable. There is lumbosacral fusion.  Degenerative changes of the spine are seen. Vascular stents overlie the  lower abdomen.       Impression:       Cortrak catheter tip thought to be in the distal stomach.     This report was finalized on 1/18/2019 3:56 PM by Dr. Martin Kennedy M.D.                Assessment and Plan:     Her metabolic encephalopathy is dramatically better she still a little bit confused but so much better that I will go ahead and sign off  in follow-up.  Reconsult thank you      Karthikeyan Manuel MD  01/19/19  2:32 PM

## 2019-01-20 ENCOUNTER — APPOINTMENT (OUTPATIENT)
Dept: GENERAL RADIOLOGY | Facility: HOSPITAL | Age: 74
End: 2019-01-20

## 2019-01-20 LAB
ANION GAP SERPL CALCULATED.3IONS-SCNC: 12.3 MMOL/L
BUN BLD-MCNC: 36 MG/DL (ref 8–23)
BUN/CREAT SERPL: 24.2 (ref 7–25)
CALCIUM SPEC-SCNC: 8.1 MG/DL (ref 8.6–10.5)
CHLORIDE SERPL-SCNC: 103 MMOL/L (ref 98–107)
CO2 SERPL-SCNC: 23.7 MMOL/L (ref 22–29)
CREAT BLD-MCNC: 1.49 MG/DL (ref 0.57–1)
DEPRECATED RDW RBC AUTO: 47.7 FL (ref 37–54)
ERYTHROCYTE [DISTWIDTH] IN BLOOD BY AUTOMATED COUNT: 13.6 % (ref 11.7–13)
GFR SERPL CREATININE-BSD FRML MDRD: 34 ML/MIN/1.73
GLUCOSE BLD-MCNC: 97 MG/DL (ref 65–99)
HCT VFR BLD AUTO: 25.9 % (ref 35.6–45.5)
HGB BLD-MCNC: 8.1 G/DL (ref 11.9–15.5)
MAGNESIUM SERPL-MCNC: 2.2 MG/DL (ref 1.6–2.4)
MCH RBC QN AUTO: 30.2 PG (ref 26.9–32)
MCHC RBC AUTO-ENTMCNC: 31.3 G/DL (ref 32.4–36.3)
MCV RBC AUTO: 96.6 FL (ref 80.5–98.2)
PHOSPHATE SERPL-MCNC: 3 MG/DL (ref 2.5–4.5)
PLATELET # BLD AUTO: 354 10*3/MM3 (ref 140–500)
PMV BLD AUTO: 9.3 FL (ref 6–12)
POTASSIUM BLD-SCNC: 4.3 MMOL/L (ref 3.5–5.2)
RBC # BLD AUTO: 2.68 10*6/MM3 (ref 3.9–5.2)
SODIUM BLD-SCNC: 139 MMOL/L (ref 136–145)
WBC NRBC COR # BLD: 10.89 10*3/MM3 (ref 4.5–10.7)

## 2019-01-20 PROCEDURE — 83735 ASSAY OF MAGNESIUM: CPT | Performed by: INTERNAL MEDICINE

## 2019-01-20 PROCEDURE — 85027 COMPLETE CBC AUTOMATED: CPT | Performed by: INTERNAL MEDICINE

## 2019-01-20 PROCEDURE — 97110 THERAPEUTIC EXERCISES: CPT

## 2019-01-20 PROCEDURE — 99024 POSTOP FOLLOW-UP VISIT: CPT | Performed by: THORACIC SURGERY (CARDIOTHORACIC VASCULAR SURGERY)

## 2019-01-20 PROCEDURE — 25010000002 HEPARIN (PORCINE) PER 1000 UNITS: Performed by: THORACIC SURGERY (CARDIOTHORACIC VASCULAR SURGERY)

## 2019-01-20 PROCEDURE — 25010000002 HYDROMORPHONE PER 4 MG: Performed by: INTERNAL MEDICINE

## 2019-01-20 PROCEDURE — 94799 UNLISTED PULMONARY SVC/PX: CPT

## 2019-01-20 PROCEDURE — 80048 BASIC METABOLIC PNL TOTAL CA: CPT | Performed by: INTERNAL MEDICINE

## 2019-01-20 PROCEDURE — 71045 X-RAY EXAM CHEST 1 VIEW: CPT

## 2019-01-20 PROCEDURE — 84100 ASSAY OF PHOSPHORUS: CPT | Performed by: INTERNAL MEDICINE

## 2019-01-20 RX ORDER — OXYCODONE HYDROCHLORIDE 5 MG/1
5 TABLET ORAL EVERY 6 HOURS
Status: DISCONTINUED | OUTPATIENT
Start: 2019-01-20 | End: 2019-01-21 | Stop reason: HOSPADM

## 2019-01-20 RX ORDER — CLONIDINE HYDROCHLORIDE 0.1 MG/1
0.2 TABLET ORAL EVERY 8 HOURS SCHEDULED
Status: DISCONTINUED | OUTPATIENT
Start: 2019-01-20 | End: 2019-01-21 | Stop reason: HOSPADM

## 2019-01-20 RX ORDER — IPRATROPIUM BROMIDE AND ALBUTEROL SULFATE 2.5; .5 MG/3ML; MG/3ML
3 SOLUTION RESPIRATORY (INHALATION)
Status: DISCONTINUED | OUTPATIENT
Start: 2019-01-21 | End: 2019-01-21 | Stop reason: HOSPADM

## 2019-01-20 RX ADMIN — CLONIDINE HYDROCHLORIDE 0.2 MG: 0.1 TABLET ORAL at 21:08

## 2019-01-20 RX ADMIN — CLOPIDOGREL 75 MG: 75 TABLET, FILM COATED ORAL at 09:44

## 2019-01-20 RX ADMIN — FAMOTIDINE 20 MG: 20 TABLET, FILM COATED ORAL at 21:12

## 2019-01-20 RX ADMIN — HEPARIN SODIUM 5000 UNITS: 5000 INJECTION INTRAVENOUS; SUBCUTANEOUS at 16:11

## 2019-01-20 RX ADMIN — OXYCODONE HYDROCHLORIDE 5 MG: 5 TABLET ORAL at 21:12

## 2019-01-20 RX ADMIN — IPRATROPIUM BROMIDE AND ALBUTEROL SULFATE 3 ML: 2.5; .5 SOLUTION RESPIRATORY (INHALATION) at 11:16

## 2019-01-20 RX ADMIN — CLONIDINE HYDROCHLORIDE 0.1 MG: 0.1 TABLET ORAL at 16:10

## 2019-01-20 RX ADMIN — HYDROMORPHONE HYDROCHLORIDE 0.25 MG: 1 INJECTION, SOLUTION INTRAMUSCULAR; INTRAVENOUS; SUBCUTANEOUS at 03:57

## 2019-01-20 RX ADMIN — HEPARIN SODIUM 5000 UNITS: 5000 INJECTION INTRAVENOUS; SUBCUTANEOUS at 21:20

## 2019-01-20 RX ADMIN — HEPARIN SODIUM 5000 UNITS: 5000 INJECTION INTRAVENOUS; SUBCUTANEOUS at 06:07

## 2019-01-20 RX ADMIN — CLONIDINE HYDROCHLORIDE 0.1 MG: 0.1 TABLET ORAL at 06:07

## 2019-01-20 RX ADMIN — OXYCODONE HYDROCHLORIDE 5 MG: 5 TABLET ORAL at 16:10

## 2019-01-20 RX ADMIN — CARVEDILOL 25 MG: 25 TABLET, FILM COATED ORAL at 21:12

## 2019-01-20 RX ADMIN — IPRATROPIUM BROMIDE AND ALBUTEROL SULFATE 3 ML: 2.5; .5 SOLUTION RESPIRATORY (INHALATION) at 06:59

## 2019-01-20 RX ADMIN — LORAZEPAM 0.5 MG: 0.5 TABLET ORAL at 18:25

## 2019-01-20 RX ADMIN — LORAZEPAM 0.5 MG: 0.5 TABLET ORAL at 09:44

## 2019-01-20 RX ADMIN — ATORVASTATIN CALCIUM 40 MG: 20 TABLET, FILM COATED ORAL at 21:12

## 2019-01-20 RX ADMIN — FAMOTIDINE 20 MG: 20 TABLET, FILM COATED ORAL at 09:44

## 2019-01-20 RX ADMIN — HYDROMORPHONE HYDROCHLORIDE 0.25 MG: 1 INJECTION, SOLUTION INTRAMUSCULAR; INTRAVENOUS; SUBCUTANEOUS at 09:44

## 2019-01-20 RX ADMIN — LANSOPRAZOLE 30 MG: KIT at 06:08

## 2019-01-20 RX ADMIN — AMLODIPINE BESYLATE 10 MG: 10 TABLET ORAL at 09:44

## 2019-01-20 RX ADMIN — CARVEDILOL 25 MG: 25 TABLET, FILM COATED ORAL at 09:44

## 2019-01-20 RX ADMIN — IPRATROPIUM BROMIDE AND ALBUTEROL SULFATE 3 ML: 2.5; .5 SOLUTION RESPIRATORY (INHALATION) at 04:35

## 2019-01-20 RX ADMIN — LORAZEPAM 0.5 MG: 0.5 TABLET ORAL at 02:34

## 2019-01-20 RX ADMIN — OXYCODONE AND ACETAMINOPHEN 2 TABLET: 5; 325 TABLET ORAL at 00:43

## 2019-01-20 RX ADMIN — IPRATROPIUM BROMIDE AND ALBUTEROL SULFATE 3 ML: 2.5; .5 SOLUTION RESPIRATORY (INHALATION) at 15:10

## 2019-01-20 NOTE — PROGRESS NOTES
"    Chief Complaint: Right upper lobe lung cancer, postoperative management  S/P: VATS right upper lobectomy  POD # 9    Subjective:  Symptoms:  Improved.  She reports shortness of breath, chest pain and anxiety.    Diet:  NPO.  No nausea or vomiting.    Activity level: Impaired due to pain.    Pain:  She complains of pain that is mild.  Pain is well controlled.    Confusion much improved this am.  Following commands. OOB to chair.     Vital Signs:  Temp:  [98.3 °F (36.8 °C)-99 °F (37.2 °C)] 98.8 °F (37.1 °C)  Heart Rate:  [66-85] 66  Resp:  [16-20] 18  BP: (135-187)/(65-91) 172/73    Intake & Output (last day)       01/19 0701 - 01/20 0700 01/20 0701 - 01/21 0700    P.O. 960 600    I.V. (mL/kg)      Other      NG/GT      Total Intake(mL/kg) 960 (10.6) 600 (6.6)    Urine (mL/kg/hr) 700 (0.3)     Stool 0     Total Output 700     Net +260 +600          Urine Unmeasured Occurrence 8 x 4 x    Stool Unmeasured Occurrence 5 x           Objective:  General Appearance:  Comfortable, in no acute distress and not in pain.    Vital signs: (most recent): Blood pressure 172/73, pulse 66, temperature 98.8 °F (37.1 °C), temperature source Oral, resp. rate 18, height 167.6 cm (65.98\"), weight 90.3 kg (198 lb 15.8 oz), SpO2 96 %.  Vital signs are normal.  No fever.    Output: Producing urine.    HEENT: Normal HEENT exam.    Lungs:  Normal effort and normal respiratory rate.  She is not in respiratory distress.  There are decreased breath sounds (right middle/lower lung fields) and rhonchi.    Heart: Normal rate.  Regular rhythm.  S1 normal and S2 normal.  No murmur.   Chest: Chest wall tenderness present.  (At chest tube site)  Abdomen: Abdomen is soft and non-distended.  Bowel sounds are normal.   There is no abdominal tenderness.   There is no mass.   Extremities: Normal range of motion.  There is no dependent edema.    Pulses: Distal pulses are intact.    Neurological: Patient is alert and oriented to person, place and time.  " Normal strength.    Pupils:  Pupils are equal, round, and reactive to light.    Skin:  Warm and dry.              Results Review:    I reviewed the patient's new clinical results.  I reviewed the patient's new imaging results and agree with the interpretation.  I reviewed the patient's other test results and agree with the interpretation    Imaging Results (last 24 hours)     Procedure Component Value Units Date/Time    XR Chest 1 View [[id number]] Collected:  01/20/19 0724     Updated:  01/20/19 0730    Narrative:       XR CHEST 1 VW-     HISTORY: Female who is 73 years-old, right pneumothorax     TECHNIQUE: Frontal view of the chest     COMPARISON: 1/19/2019     FINDINGS: Previous NG tube is no longer seen. Heart size is borderline.  Pulmonary vasculature appears unremarkable. Aorta is tortuous. Small  likely atelectasis at the bases. Previous right apical pneumothorax  appears slightly decreased. No large pleural effusion, or left  pneumothorax. No acute osseous process.       Impression:       Slight decrease of right apical pneumothorax, continued  follow-up recommended.     This report was finalized on 1/20/2019 7:27 AM by Dr. Brad Prieto M.D.             Lab Results:     Lab Results (last 24 hours)     Procedure Component Value Units Date/Time    Basic Metabolic Panel [[id number]]  (Abnormal) Collected:  01/20/19 0656    Specimen:  Blood Updated:  01/20/19 0736     Glucose 97 mg/dL      BUN 36 mg/dL      Creatinine 1.49 mg/dL      Sodium 139 mmol/L      Potassium 4.3 mmol/L      Chloride 103 mmol/L      CO2 23.7 mmol/L      Calcium 8.1 mg/dL      eGFR Non African Amer 34 mL/min/1.73      BUN/Creatinine Ratio 24.2     Anion Gap 12.3 mmol/L     Narrative:       The MDRD GFR formula is only valid for adults with stable renal function between ages 18 and 70.    Magnesium [[id number]]  (Normal) Collected:  01/20/19 0656    Specimen:  Blood Updated:  01/20/19 0736     Magnesium 2.2 mg/dL     Phosphorus  [[id number]]  (Normal) Collected:  01/20/19 0656    Specimen:  Blood Updated:  01/20/19 0736     Phosphorus 3.0 mg/dL     CBC (No Diff) [[id number]]  (Abnormal) Collected:  01/20/19 0656    Specimen:  Blood Updated:  01/20/19 0723     WBC 10.89 10*3/mm3      RBC 2.68 10*6/mm3      Hemoglobin 8.1 g/dL      Hematocrit 25.9 %      MCV 96.6 fL      MCH 30.2 pg      MCHC 31.3 g/dL      RDW 13.6 %      RDW-SD 47.7 fl      MPV 9.3 fL      Platelets 354 10*3/mm3     POC Glucose Once [[id number]]  (Normal) Collected:  01/19/19 1759    Specimen:  Blood Updated:  01/19/19 1800     Glucose 100 mg/dL     POC Glucose Once [[id number]]  (Normal) Collected:  01/19/19 1537    Specimen:  Blood Updated:  01/19/19 1600     Glucose 114 mg/dL            Assessment/Plan       Lung cancer (CMS/HCC)       Assessment & Plan   Ms. Arguelles is s/p right VATs lobectomy POD #9.  Her mental status continues to improve.  Transitioned to oral oxycodone.  Chest tube out with residual space, will recheck a CXR in the am.  Home tomorrow.       Quita Figueroa MD  [facility]  01/20/19  2:30 PM

## 2019-01-20 NOTE — PLAN OF CARE
Problem: Patient Care Overview  Goal: Plan of Care Review  Outcome: Ongoing (interventions implemented as appropriate)   01/20/19 1545   Coping/Psychosocial   Plan of Care Reviewed With patient;spouse   Plan of Care Review   Progress improving   OTHER   Outcome Summary home in am, ambulates with assistance, patient weak, tires easily, oxygen weaned, pain controlled on oral medications       01/20/19 1545   Coping/Psychosocial   Plan of Care Reviewed With patient;spouse   Plan of Care Review   Progress improving   OTHER   Outcome Summary home in am, ambulates with assistance, patient weak, tires easily, oxygen weaned, pain controlled on oral medications      Goal: Individualization and Mutuality  Outcome: Ongoing (interventions implemented as appropriate)   01/20/19 1545   Individualization   Patient Specific Preferences lights dimmed, door open, television left on    Patient Specific Goals (Include Timeframe) pain controlled, mobility improved, home soon, no c/o sob    Patient Specific Interventions monitor vitals, oxygen sats, amb with assistance, incision care, enc t,c,db q 2hrs    Mutuality/Individual Preferences   What Anxieties, Fears, Concerns, or Questions Do You Have About Your Care? length of hospital stay, pain control, gen weakness, anxiety    What Information Would Help Us Give You More Personalized Care? give daily updates on plan of care    How Would You and/or Your Support Person Like to Participate in Your Care? be involved in decision making and plan of care    Mutuality/Individual Preferences   How to Address Anxieties/Fears discuss fears/concerns with physicians/nsg staff      Goal: Discharge Needs Assessment  Outcome: Ongoing (interventions implemented as appropriate)   01/20/19 1545   Discharge Needs Assessment   Readmission Within the Last 30 Days no previous admission in last 30 days   Concerns to be Addressed cognitive/perceptual;discharge planning   Patient/Family Anticipates Transition to  home with family   Patient/Family Anticipated Services at Transition    Transportation Concerns car, none   Transportation Anticipated family or friend will provide   Anticipated Changes Related to Illness none   Equipment Needed After Discharge none   Discharge Coordination/Progress due for discharge home tomorrow with family assistance    Disability   Equipment Currently Used at Home cane, straight       Problem: Fall Risk (Adult)  Goal: Identify Related Risk Factors and Signs and Symptoms  Outcome: Ongoing (interventions implemented as appropriate)   01/20/19 1545   Fall Risk (Adult)   Related Risk Factors (Fall Risk) age-related changes;fatigue/slow reaction;gait/mobility problems;environment unfamiliar;slippery/uneven surfaces   Signs and Symptoms (Fall Risk) presence of risk factors     Goal: Absence of Fall  Outcome: Ongoing (interventions implemented as appropriate)   01/20/19 1545   Fall Risk (Adult)   Absence of Fall making progress toward outcome       Problem: Lung Surgery (via Thoracotomy) (Adult)  Goal: Signs and Symptoms of Listed Potential Problems Will be Absent, Minimized or Managed (Lung Surgery)  Outcome: Ongoing (interventions implemented as appropriate)   01/20/19 1545   Goal/Outcome Evaluation   Problems Assessed (Lung Surgery/Thoracotomy) situational response   Problems Present (Lung Surgery) situational response;pain       Problem: Skin Injury Risk (Adult)  Goal: Identify Related Risk Factors and Signs and Symptoms  Outcome: Ongoing (interventions implemented as appropriate)   01/20/19 154   Skin Injury Risk (Adult)   Related Risk Factors (Skin Injury Risk) advanced age;cognitive impairment;mobility impaired;edema;hospitalization prolonged     Goal: Skin Health and Integrity  Outcome: Ongoing (interventions implemented as appropriate)   01/20/19 1546   Skin Injury Risk (Adult)   Skin Health and Integrity making progress toward outcome       Problem: Pain, Acute (Adult)  Goal:  Identify Related Risk Factors and Signs and Symptoms  Outcome: Ongoing (interventions implemented as appropriate)   01/20/19 1545   Pain, Acute (Adult)   Related Risk Factors (Acute Pain) knowledge deficit;patient perception;positioning   Signs and Symptoms (Acute Pain) BADLs/IADLs reluctance/inability to perform;guarding/abnormal posturing/positioning;verbalization of pain descriptors     Goal: Acceptable Pain Control/Comfort Level  Outcome: Ongoing (interventions implemented as appropriate)   01/20/19 1545   Pain, Acute (Adult)   Acceptable Pain Control/Comfort Level making progress toward outcome

## 2019-01-20 NOTE — PLAN OF CARE
Problem: Patient Care Overview  Goal: Plan of Care Review   01/20/19 1143   Coping/Psychosocial   Plan of Care Reviewed With patient   Plan of Care Review   Progress improving   OTHER   Outcome Summary Improved tolerance to functional activity this day with an increase in gait distance and decreased assist required for overall functional mobility.

## 2019-01-20 NOTE — THERAPY TREATMENT NOTE
Acute Care - Physical Therapy Treatment Note  [facility]     Patient Name: Rachana Arguelles  : 1945  MRN: [id number]  Today's Date: 2019  Onset of Illness/Injury or Date of Surgery: 19  Date of Referral to PT: 19  Referring Physician: Henry    Admit Date: 2019    Visit Dx:    ICD-10-CM ICD-9-CM   1. Difficulty walking R26.2 719.7   2. Lung cancer (CMS/HCC) C34.90 162.9   3. Anxiety F41.9 300.00   4. Polypharmacy Z79.899 V58.69   5. Chronic pain syndrome G89.4 338.4   6. Atherosclerosis of native artery of left lower extremity with intermittent claudication (CMS/HCC) I70.212 440.21     Patient Active Problem List   Diagnosis   • Anxiety   • Chronic pain syndrome   • Depression   • Gastroesophageal reflux disease   • Hyperlipidemia   • Hypertension   • Osteoarthritis of hip   • Chronic low back pain   • Failed back syndrome of lumbar spine   • Pulmonary embolus (CMS/HCC)   • Weight loss   • Polypharmacy   • CKD (chronic kidney disease), stage III (CMS/HCC)   • Chronic constipation   • Sacroiliac joint pain   • Mixed incontinence   • Renal cyst   • Achilles tendonitis   • Atherosclerosis of native artery of left lower extremity with intermittent claudication (CMS/HCC)   • Morbidly obese (CMS/HCC)   • Lung nodule   • Malignant neoplasm of right upper lobe of lung (CMS/HCC)   • Lung cancer (CMS/HCC)       Therapy Treatment    Rehabilitation Treatment Summary     Row Name 19 1139             Treatment Time/Intention    Discipline  physical therapist  -MS      Document Type  therapy note (daily note)  -MS      Subjective Information  complains of;weakness;fatigue  -MS      Mode of Treatment  physical therapy;individual therapy  -MS      Patient Effort  good  -MS      Comment  Pt. reports fatigue, weakness this AM but otherwise agreeable to work with P.T.  -MS      Existing Precautions/Restrictions  fall  (Significant)  Exit alarm  -MS      Recorded by [MS] Bryan Haddad, PT 19  1143      Row Name 01/20/19 1139             Cognitive Assessment/Intervention- PT/OT    Orientation Status (Cognition)  oriented to;person  -MS      Follows Commands (Cognition)  follows one step commands;WNL  -MS      Personal Safety Interventions  fall prevention program maintained;gait belt;nonskid shoes/slippers when out of bed;supervised activity  -MS      Recorded by [MS] Bryan Haddad, PT 01/20/19 1143      Row Name 01/20/19 1139             Bed Mobility Assessment/Treatment    Comment (Bed Mobility)  Pt. up in chair this AM.  -MS      Recorded by [MS] Bryan Haddad, PT 01/20/19 1143      Row Name 01/20/19 1139             Sit-Stand Transfer    Sit-Stand [place] (Transfers)  minimum assist (75% patient effort)  -MS      Assistive Device (Sit-Stand Transfers)  walker, front-wheeled  -MS      Recorded by [MS] Bryan Haddad, PT 01/20/19 1143      Row Name 01/20/19 1139             Stand-Sit Transfer    Stand-Sit [place] (Transfers)  minimum assist (75% patient effort)  -MS      Assistive Device (Stand-Sit Transfers)  walker, front-wheeled  -MS      Recorded by [MS] Bryan Haddad, PT 01/20/19 1143      Row Name 01/20/19 1139             Gait/Stairs Assessment/Training    [place] Level (Gait)  minimum assist (75% patient effort)  -MS      Assistive Device (Gait)  walker, front-wheeled  -MS      Distance in Feet (Gait)  100 feet  -MS      Pattern (Gait)  step-through  -MS      Deviations/Abnormal Patterns (Gait)  stride length decreased;janet decreased  -MS      Bilateral Gait Deviations  forward flexed posture  -MS      Comment (Gait/Stairs)  Pt. requires verbal/tactile cues for posture correction and Rwx guidance.  Pt. limited by overall fatigue, weakness this AM.  -MS      Recorded by [MS] Bryan Haddad, PT 01/20/19 1143      Row Name 01/20/19 1139             Therapeutic Exercise    Comment (Therapeutic Exercise)  BLE ther. ex. program x 10 reps completed (LAQ's, Hip  Flexion, Ankle Pumps)  -MS      Recorded by [MS] Bryan Haddad, PT 01/20/19 1143      Row Name 01/20/19 1139             Positioning and Restraints    Pre-Treatment Position  sitting in chair/recliner  -MS      Post Treatment Position  chair  -MS      In Chair  notified nsg;reclined;sitting;call light within reach;encouraged to call for assist;exit alarm on All lines intact.  -MS      Recorded by [MS] Bryan Haddad, PT 01/20/19 1143      Row Name 01/20/19 1139             Pain Assessment    Additional Documentation  Pain Scale: Numbers Pre/Post-Treatment (Group)  -MS      Recorded by [MS] Bryan Haddad, PT 01/20/19 1143      Row Name 01/20/19 1139             Pain Scale: Numbers Pre/Post-Treatment    Pain Scale: Numbers, Pretreatment  0/10 - no pain  -MS      Pain Scale: Numbers, Post-Treatment  0/10 - no pain  -MS      Recorded by [MS] Bryan Haddad, PT 01/20/19 1143      Row Name                Wound 01/11/19 0825 Right flank incision    Wound - Properties Group Date first assessed: 01/11/19 [SM] Time first assessed: 0825 [SM] Side: Right [SM] Location: flank [SM] Type: incision [SM] Recorded by:  [] Pieter Constantino RN 01/11/19 0825      User Key  (r) = Recorded By, (t) = Taken By, (c) = Cosigned By    Initials Name Effective Dates Discipline     Pieter Constantino RN 06/16/16 -  Nurse    MS Bryan Haddad, PT 04/03/18 -  PT          Wound 01/11/19 0825 Right flank incision (Active)   Dressing Appearance dry;intact;no drainage 1/20/2019  1:46 AM   Closure Liquid skin adhesive 1/20/2019  1:46 AM           Physical Therapy Education     Title: PT OT SLP Therapies (In Progress)     Topic: Physical Therapy (In Progress)     Point: Mobility training (In Progress)     Learning Progress Summary           Patient Acceptance, E,D, VU,NR by MS at 1/20/2019 11:43 AM    Acceptance, E, VU by KT at 1/20/2019 11:24 AM    Acceptance, E,D, NR by PC at 1/18/2019  5:26 PM    Acceptance, E,TB, NR by LS at  1/13/2019  2:32 PM    Acceptance, E,TB,D, NR by LS at 1/12/2019  1:32 PM   Family Acceptance, E,TB, NR by LS at 1/13/2019  2:32 PM    Acceptance, E,TB,D, NR by LS at 1/12/2019  1:32 PM                   Point: Home exercise program (In Progress)     Learning Progress Summary           Patient Acceptance, E,D, VU,NR by MS at 1/20/2019 11:43 AM    Acceptance, E, VU by KT at 1/20/2019 11:24 AM    Acceptance, E,D, NR by PC at 1/18/2019  5:26 PM    Acceptance, E,TB, NR by LS at 1/13/2019  2:32 PM    Acceptance, E,TB,D, NR by LS at 1/12/2019  1:32 PM   Family Acceptance, E,TB, NR by LS at 1/13/2019  2:32 PM    Acceptance, E,TB,D, NR by LS at 1/12/2019  1:32 PM                   Point: Body mechanics (In Progress)     Learning Progress Summary           Patient Acceptance, E,D, VU,NR by MS at 1/20/2019 11:43 AM    Acceptance, E, VU by KT at 1/20/2019 11:24 AM    Acceptance, E,D, NR by PC at 1/18/2019  5:26 PM    Acceptance, E,TB, NR by LS at 1/13/2019  2:32 PM    Acceptance, E,TB,D, NR by LS at 1/12/2019  1:32 PM   Family Acceptance, E,TB, NR by LS at 1/13/2019  2:32 PM    Acceptance, E,TB,D, NR by LS at 1/12/2019  1:32 PM                   Point: Precautions (In Progress)     Learning Progress Summary           Patient Acceptance, E,D, VU,NR by MS at 1/20/2019 11:43 AM    Acceptance, E, VU by KT at 1/20/2019 11:24 AM    Acceptance, E,D, NR by PC at 1/18/2019  5:26 PM    Acceptance, E,TB, NR by LS at 1/13/2019  2:32 PM    Acceptance, E,TB,D, NR by LS at 1/12/2019  1:32 PM   Family Acceptance, E,TB, NR by LS at 1/13/2019  2:32 PM    Acceptance, E,TB,D, NR by CAMACHO at 1/12/2019  1:32 PM                               User Key     Initials Effective Dates Name Provider Type Discipline    PC 04/03/18 -  Melissa Ann, PT Physical Therapist PT    MS 04/03/18 -  Bryan Haddad, PT Physical Therapist PT    KT 06/16/16 -  Yoli Harrington, RN Registered Nurse Nurse     04/03/18 -  Aimee Ayala, PT Physical Therapist PT                 PT Recommendation and Plan     Plan of Care Reviewed With: patient  Progress: improving  Outcome Summary: Improved tolerance to functional activity this day with an increase in gait distance and decreased assist required for overall functional mobility.   Outcome Measures     Row Name 01/20/19 1100 01/18/19 1700          How much help from another person do you currently need...    Turning from your back to your side while in flat bed without using bedrails?  3  -MS  3  -PC     Moving from lying on back to sitting on the side of a flat bed without bedrails?  3  -MS  3  -PC     Moving to and from a bed to a chair (including a wheelchair)?  3  -MS  2  -PC     Standing up from a chair using your arms (e.g., wheelchair, bedside chair)?  3  -MS  2  -PC     Climbing 3-5 steps with a railing?  2  -MS  1  -PC     To walk in hospital room?  3  -MS  2  -PC     AM-PAC 6 Clicks Score  17  -MS  13  -PC        Functional Assessment    Outcome Measure Options  AM-PAC 6 Clicks Basic Mobility (PT)  -MS  --       User Key  (r) = Recorded By, (t) = Taken By, (c) = Cosigned By    Initials Name Provider Type    PC Melissa Ann, PT Physical Therapist    Bryan Arteaga, PT Physical Therapist         Time Calculation:   PT Charges     Row Name 01/20/19 1144             Time Calculation    Start Time  1020  -MS      Stop Time  1035  -MS      Time Calculation (min)  15 min  -MS      PT Received On  01/20/19  -MS      PT - Next Appointment  01/21/19  -MS         Time Calculation- PT    Total Timed Code Minutes- PT  13 minute(s)  -MS        User Key  (r) = Recorded By, (t) = Taken By, (c) = Cosigned By    Initials Name Provider Type    Bryan Arteaga, PT Physical Therapist        Therapy Suggested Charges     Code   Minutes Charges    None           Therapy Charges for Today     Code Description Service Date Service Provider Modifiers Qty    04831639937 HC PT THER PROC EA 15 MIN 1/20/2019 Bryan Haddad, PT GP 1     [id number]  PT THER SUPP EA 15 MIN 1/20/2019 Bryan Haddad, PT GP 1          PT G-Codes  Outcome Measure Options: AM-PAC 6 Clicks Basic Mobility (PT)  AM-PAC 6 Clicks Score: 17    Bryan Haddad, PT  1/20/2019

## 2019-01-20 NOTE — CONSULTS
Iv team consulted to place a picc per Dr Rosenberg on 1/17/19 then he had placed the picc on hold. Spoke with Martha cook RN and pt doesn't need this picc due to she is scheduled to be released from the hospital tomorrow. Cancelling Picc at this time. Please re consult the iv team if access circumstances changes and we would be happy to reevaluate the pt.

## 2019-01-20 NOTE — PLAN OF CARE
Problem: Patient Care Overview  Goal: Plan of Care Review   01/20/19 2745   OTHER   Outcome Summary RN pulled to sit with pt last night. RN suggested trying pt without sitter Sunday morning because she was not confused, followed commands, and pleasant. Speech to evaluate Monday. Pain meds kept pt comfortable.  Sitting pt up in chair this morning with chair alarm.  Pain meds kept pt comfortable.

## 2019-01-21 ENCOUNTER — APPOINTMENT (OUTPATIENT)
Dept: GENERAL RADIOLOGY | Facility: HOSPITAL | Age: 74
End: 2019-01-21

## 2019-01-21 VITALS
SYSTOLIC BLOOD PRESSURE: 167 MMHG | HEART RATE: 65 BPM | DIASTOLIC BLOOD PRESSURE: 74 MMHG | HEIGHT: 66 IN | WEIGHT: 198.99 LBS | TEMPERATURE: 98.8 F | BODY MASS INDEX: 31.98 KG/M2 | OXYGEN SATURATION: 99 % | RESPIRATION RATE: 18 BRPM

## 2019-01-21 PROCEDURE — 99239 HOSP IP/OBS DSCHRG MGMT >30: CPT | Performed by: NURSE PRACTITIONER

## 2019-01-21 PROCEDURE — 94799 UNLISTED PULMONARY SVC/PX: CPT

## 2019-01-21 PROCEDURE — 71045 X-RAY EXAM CHEST 1 VIEW: CPT

## 2019-01-21 PROCEDURE — 92610 EVALUATE SWALLOWING FUNCTION: CPT

## 2019-01-21 PROCEDURE — 92526 ORAL FUNCTION THERAPY: CPT

## 2019-01-21 RX ORDER — CLONIDINE HYDROCHLORIDE 0.2 MG/1
0.2 TABLET ORAL EVERY 8 HOURS SCHEDULED
Qty: 30 TABLET | Refills: 0 | Status: SHIPPED | OUTPATIENT
Start: 2019-01-21 | End: 2019-01-25 | Stop reason: SDUPTHER

## 2019-01-21 RX ADMIN — LANSOPRAZOLE 30 MG: KIT at 08:00

## 2019-01-21 RX ADMIN — ALBUTEROL SULFATE 0.63 MG: 0.63 SOLUTION RESPIRATORY (INHALATION) at 02:23

## 2019-01-21 RX ADMIN — LORAZEPAM 0.5 MG: 0.5 TABLET ORAL at 01:49

## 2019-01-21 RX ADMIN — CLOPIDOGREL 75 MG: 75 TABLET, FILM COATED ORAL at 08:44

## 2019-01-21 RX ADMIN — CARVEDILOL 25 MG: 25 TABLET, FILM COATED ORAL at 08:44

## 2019-01-21 RX ADMIN — AMLODIPINE BESYLATE 10 MG: 10 TABLET ORAL at 08:44

## 2019-01-21 RX ADMIN — IPRATROPIUM BROMIDE AND ALBUTEROL SULFATE 3 ML: 2.5; .5 SOLUTION RESPIRATORY (INHALATION) at 08:28

## 2019-01-21 RX ADMIN — FAMOTIDINE 20 MG: 20 TABLET, FILM COATED ORAL at 08:44

## 2019-01-21 NOTE — PLAN OF CARE
Problem: Patient Care Overview  Goal: Plan of Care Review  Outcome: Ongoing (interventions implemented as appropriate)   01/21/19 1311   Coping/Psychosocial   Plan of Care Reviewed With patient;spouse   Plan of Care Review   Progress improving   OTHER   Outcome Summary Swallow re-evaluated,  present. Pt showed no s/s of aspiration on thin (cup, straw), pureed, mech soft, mixed, and regular solids. Pt needed cues to focus and chew. Discussed safe swallow strategies with pt and spouse. Spouse to supervise pt with meals at home. No f/u ST is indicated at this time.

## 2019-01-21 NOTE — PROGRESS NOTES
Continued Stay Note  [facility]     Patient Name: Rachana Arguelles  MRN: [id number]  Today's Date: 1/21/2019    Admit Date: 1/11/2019    Discharge Plan     Row Name 01/21/19 1103       Plan    Plan  Home vis private auto with [facility]    Patient/Family in Agreement with Plan  yes    Plan Comments  Spoke with pt's daughter, Tessa, who states plan is for pt to return home with [facility], spoke with [place] who will follow.  Requests walker for home, spoke with [employer] who will provide.  No further needs identified.  ANJELICA Haynes RN        Discharge Codes    No documentation.             Kristi Haynes

## 2019-01-21 NOTE — DISCHARGE SUMMARY
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 female who presented to the [facility] multidisciplinary lung cancer clinic for follow-up for a newly diagnosed right upper lobe lung nodule.  Patient was found to have a 6 mm lung mass in April 2018.  On follow-up imaging in August, the mass had increased in size.  CT-guided biopsy was performed and was nondiagnostic.  Follow-up CT of the chest in November demonstrated further increase in size.  Mrs. Arguelles has some dyspnea on exertion.  She is unable to walk up a flight of steps without shortness of air.   During her visit with Dr. Figueroa, multiple treatment options were discussed including surgical resection versus radiation.  The patient is a moderately high risk for lung resection giving her history of CKD, PAD and CAD, but her PFTs would support resection.  Dr. Figueroa recommended a stress echo prior to proceeding with resection given her significant vascular disease and shortness of breath.  Assuming she receives cardiology clearance, Dr. Figueroa recommends a video-assisted thoracoscopy with right upper lobectomy.  Risks and benefits of the procedure were discussed with the patient.  All questions were answered.  The patient asked that Dr. Figueroa proceed with surgery upon receiving cardiology clearance.  Stress echo return normal.  Mrs. Arguelles was then scheduled to proceed with surgery.    Hospital Course  Rachana Arguelles was admitted to [facility] on 1/11/19 for a scheduled right video-assisted thoracoscopy with right upper lobe wedge resection, completion right upper  lobectomy, lymph node dissection and intercostal nerve block.  Frozen section confirmed that the lesion was a non-small cell lung carcinoma and therefore Dr. Figueroa a left to proceed with a right upper lobectomy.  The patient tolerated the procedure well and was extubated in the operating room.  After initial recovery in the postanesthesia care unit, the patient was transferred to [facility] for hospital convalescence.  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  Lymph nodes and surgical margin were benign.  For more information regarding Mrs. Arguelles's surgery, please see Dr. Figueroa's operative note.  Rachana Arguelles has had a complicated postoperative course.  She complained of significant pain postoperatively.  Given her history of chronic pain, her pain management physician was consulted for assistance with managing her postoperative pain.  The patient had postoperative delirium and her neurologic status was labile.  Due to her confusion and agitation, soft wrist restraints were needed.  Ultimately, the patient had be transferred to the intensive care unit on postoperative day 3 for severe agitation.  Due to concern for airway management, the patient was intubated on postoperative day 4 and remained on the ventilator for 48 hours.  Neurology was consulted, and with their assistance her medication regimen was adjusted.  The patient began to become more alert.  She was more oriented although still somewhat confused.  After being extubated, the patient was ultimately transferred back to [facility] for the remainder of her hospitalization.  Mrs. Bishop chest tube was removed on postoperative day 7.  She has been weaned supplemental oxygen.  She does continue to have some hypertension, so her antihypertensive medication has been adjusted and appropriate medication has been ordered for discharge.  She is stable today, although does have some intermittent confusion.  Her vital  signs are within normal limits.  She is not requiring supplemental oxygen.  She does have some difficulty with ambulation and requires significant assistance with ADLs as well.  I have ordered home health to assist her with regaining her strength, performing ADLs and helping her manage her medication, so that she is not too sedated to participate in her recovery.  At this point, Rachana Arguelles is doing well. She is stable for discharge.  We will plan to follow up with her in our office in 2 weeks with a hospital performed chest x-ray.      Procedures Performed  Procedure(s):  BRONCOSCOPY, THORACOSCOPY VIDEO ASSISTED WITH RIGHT UPPER LOBE WEDGE RESECTION, COMPLETION RIGHT UPPER LOBECTOMY, LYMPH NODE DISSECTION, INTERCOSTAL NERVE BLOCK       Consults:   Consults     Date and Time Order Name Status Description    1/16/2019 0903 Inpatient Neurology Consult General Completed     1/14/2019 1732 Inpatient Pulmonology Consult Completed     1/14/2019 1516 Inpatient Psychiatrist Consult Completed     1/11/2019 1619 Inpatient Pain Medicine Physician Consult            Pertinent Test Results:     Imaging Results (last 24 hours)     Procedure Component Value Units Date/Time    XR Chest 1 View [[id number]] Collected:  01/21/19 0833     Updated:  01/21/19 0845    Narrative:       CLINICAL HISTORY: 73-year-old female 10 days postop right video-assisted  thoracoscopy with right upper lobectomy for malignancy.     EXAM: PORTABLE AP ERECT CHEST DATED 01/21/2019 AT 0530 HOURS.     FINDINGS: When compared to the exam dated 01/20/2019 at 0613 hours,  portable AP semierect projection, the reverse left shoulder  arthroplasty, degenerative changes in the right shoulder and spine,  atherosclerotic aortic uncoiling with calcification, and borderline to  mild cardiomegaly remain. Lung volumes are slightly diminished. Some  patchy perihilar to basilar atelectasis or infiltrate is present  bilaterally. Mild elevation right hemidiaphragm remains,  accompanied by  mild blunting of right costophrenic angle and some thickening of lateral  right pleural stripe. Complex markings at the right apex do not allow  exclusion of a tiny trace of right apical pneumothorax, but no large  pneumothorax is seen. Monitoring lead wires and oxygen cannula tubing  are present.     CONCLUSION: Slightly decreased lung volumes. Trace right apical  pneumothorax is not excluded.     This report was finalized on 1/21/2019 8:42 AM by Dr. César Coronado M.D.             Lab Results (last 24 hours)     ** No results found for the last 24 hours. **            Condition on Discharge:  Stable    Vital Signs  Temp:  [98.4 °F (36.9 °C)-99.1 °F (37.3 °C)] 98.8 °F (37.1 °C)  Heart Rate:  [65-74] 65  Resp:  [16-18] 18  BP: (153-179)/(60-83) 167/74    Physical Exam:    General Appearance:    Alert, cooperative, in no acute distress   Head:    Normocephalic, without obvious abnormality, atraumatic   Eyes:            Lids and lashes normal, conjunctivae and sclerae normal, no   icterus, no pallor, corneas clear, PERRLA   Ears:    Ears appear intact with no abnormalities noted   Throat:   No oral lesions, no thrush, oral mucosa moist   Neck:   No adenopathy, supple, trachea midline, no thyromegaly, no     carotid bruit, no JVD   Back:     No kyphosis present, no scoliosis present, no skin lesions,       erythema or scars, no tenderness to percussion or                   palpation,   range of motion normal   Lungs:     Clear to auscultation,respirations regular, even and                   unlabored    Heart:    Regular rhythm and normal rate, normal S1 and S2, no            murmur, no gallop, no rub, no click   Breast Exam:    Deferred   Abdomen:     Normal bowel sounds, no masses, no organomegaly, soft        non-tender, non-distended, no guarding, no rebound                 tenderness   Genitalia:    Deferred   Extremities:   Moves all extremities well, no edema, no cyanosis, no              redness    Pulses:   Pulses palpable and equal bilaterally   Skin:   No bleeding, bruising or rash.  Surgical incision sites are still with Dermabond.  There is no drainage.  Periwound skin is clean, dry, intact and without erythema.  Her chest tube site is healing nicely.  It is open to air.     Lymph nodes:   No palpable adenopathy   Neurologic:   Cranial nerves 2 - 12 grossly intact, sensation intact, DTR        present and equal bilaterally       Discharge Disposition  Home today    Discharge Medications     Discharge Medications      New Medications      Instructions Start Date   CloNIDine 0.2 MG tablet  Commonly known as:  CATAPRES   0.2 mg, Oral, Every 8 Hours Scheduled         Changes to Medications      Instructions Start Date   sennosides-docusate sodium 8.6-50 MG tablet  Commonly known as:  SENOKOT-S  What changed:    · how much to take  · how to take this  · when to take this  · additional instructions   TAKE TWO TABLETS BY MOUTH ONCE DAILY         Continue These Medications      Instructions Start Date   aluminum hydroxide-mag carbonate 160-105 MG chewable tablet chewable tablet  Commonly known as:  GAVISCON EXTRA RELIEF   2 tablets, Oral, 2 Times Daily PRN      amLODIPine 10 MG tablet  Commonly known as:  NORVASC   10 mg, Oral, Every Morning      ANORO ELLIPTA 62.5-25 MCG/INH aerosol powder  inhaler  Generic drug:  umeclidinium-vilanterol   1 puff, Inhalation, Daily - RT      aspirin 81 MG chewable tablet   81 mg, Oral, Daily, PT TO CONTINUE PER MD       atorvastatin 40 MG tablet  Commonly known as:  LIPITOR   40 mg, Oral, Nightly      carvedilol 25 MG tablet  Commonly known as:  COREG   25 mg, Oral, 2 Times Daily With Meals, 1/2 TAB EACH DOSE-ON HOLD SINCE 1/5/19       cetirizine 10 MG tablet  Commonly known as:  zyrTEC   10 mg, Oral, As Needed      clopidogrel 75 MG tablet  Commonly known as:  PLAVIX   75 mg, Oral, Daily      cyclobenzaprine 10 MG tablet  Commonly known as:  FLEXERIL   10 mg, Oral, 3 Times  Daily PRN      famotidine 20 MG tablet  Commonly known as:  PEPCID   20 mg, Oral, 2 Times Daily      furosemide 40 MG tablet  Commonly known as:  LASIX   40 mg, Oral, Daily      LORazepam 0.5 MG tablet  Commonly known as:  ATIVAN   0.5 mg, Oral, 3 Times Daily      omeprazole 40 MG capsule  Commonly known as:  priLOSEC   40 mg, Oral, Every Evening      ondansetron ODT 8 MG disintegrating tablet  Commonly known as:  ZOFRAN-ODT   8 mg, Oral, Every 8 Hours PRN      oxyCODONE 20 MG tablet  Commonly known as:  ROXICODONE   20 mg, Oral, Every 4 Hours PRN      oxymetazoline 0.05 % nasal spray  Commonly known as:  AFRIN   2 sprays, Nasal, As Needed      polyethylene glycol powder  Commonly known as:  MIRALAX   17 g, Oral, Every Evening, Constipation. Mix in 4oz of water/juice/milk      sucralfate 1 g tablet  Commonly known as:  CARAFATE   1 g, Oral, 4 Times Daily      traZODone 150 MG tablet  Commonly known as:  DESYREL   75 mg, Oral, Nightly      venlafaxine  MG 24 hr capsule  Commonly known as:  EFFEXOR-XR   150 mg, Oral, Daily      VITAMIN D2 PO   50,000 Units, Oral, Weekly, WEDNESDAY              Discharge Instructions:  · No heavy lifting, pushing, pulling greater than 10 pounds.  · No driving up until 2 weeks after surgery and no longer taking narcotics.  · Resume home diet as tolerated.  · Continue incentive spirometer at least 4 times per day.  · Remove dressing from post chest tube site after 48 hours, may shower and clean surgical sites with antibacterial soap or hydrogen peroxide, and apply gauze dressing or band-aid as needed for any drainage.  No dressing needed once no longer draining.          Follow-up Appointments  Future Appointments   Date Time Provider Department Center   2/4/2019 10:00 AM Quita Figueroa MD [facility] None   12/13/2019  3:20 PM Mat Coello MD [facility] None     Additional Instructions for the Follow-ups that You Need to Schedule     Ambulatory Referral to Home Health    As directed      Face to Face Visit Date:  1/21/2019    Follow-up Provider for Plan of Care?:  I will be treating the patient on an ongoing basis.  Please send me the Plan of Care for signature.    Follow-up Provider:  MICKIE GARCIA [[id number]]    Reason/Clinical Findings:  weakness, post-op    Describe mobility limitations that make leaving home difficult:  weakness after recent right chest surgery (lobectomy)    Nursing/Therapeutic Services Requested:  Skilled Nursing Physical Therapy Occupational Therapy    Skilled nursing orders:  Pain management (chronic pain medication) Medication education    PT orders:  Therapeutic exercise Home safety assessment Strengthening    Occupational orders:  Activities of daily living Strengthening Cognition Home safety assessment    Frequency:  1 Week 1         XR Chest 2 View    Feb 04, 2019 (Approximate)      Exam reason:  s/p VATS               Test Results Pending at Discharge      For any questions regarding patient's stay, please refer to patient's chart.    REENA Allen  [facility]  01/21/19  11:55 AM      Greater than 30 minutes was spent on the unit discharging this patient with more than 50% of that time assessing the patient, educating and counseling the patient and her family on her post-operative care and assisting with discharge planning.

## 2019-01-21 NOTE — PROGRESS NOTES
Case Management Discharge Note    Final Note: Pt discharged home with [facility].  [facility] for walker and BSC [facility]    Destination      No service has been selected for the patient.      Durable Medical Equipment      No service has been selected for the patient.      Dialysis/Infusion      No service has been selected for the patient.      Home Medical Care - Selection Complete      Service Provider Request Status Selected Services Address Phone Number Fax Number    [facility] Health Services [address] [address] [phone]      Community Resources      No service has been selected for the patient.        Other: Other(private auto)    Final Discharge Disposition Code: 06 - home with home health care

## 2019-01-21 NOTE — THERAPY RE-EVALUATION
Acute Care - Speech Language Pathology   Swallow Re-Evaluation [facility]     Patient Name: Rachana Arguelles  : 1945  MRN: [id number]  Today's Date: 2019  Onset of Illness/Injury or Date of Surgery: 19     Referring Physician: Henry      Admit Date: 2019    Visit Dx:     ICD-10-CM ICD-9-CM   1. Difficulty walking R26.2 719.7   2. Lung cancer (CMS/HCC) C34.90 162.9   3. Anxiety F41.9 300.00   4. Polypharmacy Z79.899 V58.69   5. Chronic pain syndrome G89.4 338.4   6. Atherosclerosis of native artery of left lower extremity with intermittent claudication (CMS/HCC) I70.212 440.21     Patient Active Problem List   Diagnosis   • Anxiety   • Chronic pain syndrome   • Depression   • Gastroesophageal reflux disease   • Hyperlipidemia   • Hypertension   • Osteoarthritis of hip   • Chronic low back pain   • Failed back syndrome of lumbar spine   • Pulmonary embolus (CMS/HCC)   • Weight loss   • Polypharmacy   • CKD (chronic kidney disease), stage III (CMS/HCC)   • Chronic constipation   • Sacroiliac joint pain   • Mixed incontinence   • Renal cyst   • Achilles tendonitis   • Atherosclerosis of native artery of left lower extremity with intermittent claudication (CMS/HCC)   • Morbidly obese (CMS/HCC)   • Lung nodule   • Malignant neoplasm of right upper lobe of lung (CMS/HCC)   • Lung cancer (CMS/HCC)     Past Medical History:   Diagnosis Date   • AAA (abdominal aortic aneurysm) without rupture (CMS/HCC)    • Anxiety    • Arthritis    • Cancer (CMS/HCC)     skin cancer   • Chest pain     at rest   • Chronic low back pain    • Chronic pain syndrome 3/12/2016   • CKD (chronic kidney disease), stage III (CMS/HCC)    • Claustrophobia 10/26/2015    Resolved   • Depression    • Dizziness    • GERD (gastroesophageal reflux disease)    • GI problem    • Hiatal hernia    • History of migraine headaches    • Hyperlipidemia    • Hypertension    • Lumbar postlaminectomy syndrome 10/26/2015    Resolved   • Lung  cancer (CMS/HCC)    • Lung nodule    • Palpitations    • Polypharmacy 4/22/2016   • Pulmonary embolism (CMS/HCC) 10/26/2015    January 2013 - Resolved, felt to be secondary to estrogen use   • Submandibular sialoadenitis 10/26/2015    Resolved   • Vitamin B 12 deficiency      Past Surgical History:   Procedure Laterality Date   • ANGIOPLASTY ILIAC ARTERY Bilateral 8/10/2018    Procedure: BILATERAL ILIAC STENTS;  Surgeon: Riki Mancera MD;  Location: [facility];  Service: Vascular   • BREAST SURGERY      Breast Surgery Reduction Procedure   • HYSTERECTOMY     • INTUBATION  1/15/2019        • LUMBAR FUSION      Lumbar Vertebral Fusion   • SHOULDER ARTHROSCOPY  04/04/2013    Dr. Carrillo/MERRILL - Resolved   • THORACOSCOPY VIDEO ASSISTED WITH LOBECTOMY Right 1/11/2019    Procedure: BRONCOSCOPY, THORACOSCOPY VIDEO ASSISTED WITH RIGHT UPPER LOBE WEDGE RESECTION, COMPLETION RIGHT UPPER LOBECTOMY, LYMPH NODE DISSECTION, INTERCOSTAL NERVE BLOCK;  Surgeon: Quita Figueroa MD;  Location: [facility];  Service: Thoracic   • TOTAL HIP ARTHROPLASTY REVISION Left    • TOTAL KNEE ARTHROPLASTY Left    • TOTAL SHOULDER ARTHROPLASTY Left         SWALLOW EVALUATION (last 72 hours)      SLP Adult Swallow Evaluation     Row Name 01/21/19 1315 01/18/19 1600                Rehab Evaluation    Document Type  evaluation  -AW  evaluation  -SH       Subjective Information  no complaints  -AW  no complaints  -SH       Patient Observations  alert;cooperative;agree to therapy  -AW  alert;cooperative  -SH       Patient/Family Observations  Pt confused, pleasant;  at bedisde. Confusion is baseline status.  -AW  --       Patient Effort  good  -AW  good  -SH       Symptoms Noted During/After Treatment  none  -AW  none  -SH          General Information    Patient Profile Reviewed  yes  -AW  yes  -SH       Pertinent History Of Current Problem  Post of VATS, has been on full liquid diet due to cognitive component to swallowing.  -AW   Pt post op day #7 s/p VATS R upper lobectomy d/t lung CA. Pt intubated 1/15-1/17 2/2 delirium and withdrawal. BSE 1/14 WFLs concern for dysphagia 2/2 cognitive status.  -SH       Current Method of Nutrition  full liquids;thin liquids  -AW  NPO  -SH       Precautions/Limitations, Vision  --  WFL;for purposes of eval  -SH       Precautions/Limitations, Hearing  --  WFL;for purposes of eval  -SH       Prior Level of Function-Communication  --  WFL  -SH       Prior Level of Function-Swallowing  no diet consistency restrictions  -AW  no diet consistency restrictions;safe, efficient swallowing in all situations;other (see comments)  -       Plans/Goals Discussed with  patient;spouse/S.O.;agreed upon  -AW  patient and family;agreed upon  -       Barriers to Rehab  none identified  -AW  cognitive status;medically complex  -SH       Patient's Goals for Discharge  return home  -AW  return to PO diet  -       Family Goals for Discharge  patient able to return to regular diet  -AW  patient able to return to PO diet  -          Pain Assessment    Additional Documentation  Pain Scale: Numbers Pre/Post-Treatment (Group)  -AW  --          Pain Scale: Numbers Pre/Post-Treatment    Pain Scale: Numbers, Pretreatment  0/10 - no pain  -AW  --       Pain Scale: Numbers, Post-Treatment  0/10 - no pain  -AW  --          Oral Motor and Function    Dentition Assessment  natural, present and adequate  -AW  natural, present and adequate  -SH       Secretion Management  WNL/WFL  -AW  dried secretions in oral cavity  -SH       Mucosal Quality  moist, healthy  -AW  moist, healthy  -SH       Gag Response  --  WFL  -SH       Volitional Swallow  WFL  -AW  WFL  -SH       Volitional Cough  WFL  -AW  weak  -SH          Oral Musculature and Cranial Nerve Assessment    Oral Motor General Assessment  WFL  -AW  WFL  -SH       Oral Motor, Comment  --  Hoarse voice  -SH          General Eating/Swallowing Observations    Respiratory Support Currently  in Use  nasal cannula  -AW  nasal cannula  -       Eating/Swallowing Skills  fed by SLP  -AW  fed by SLP;other (see comments)  -SH       Positioning During Eating  upright in bed  -AW  upright in bed  -SH       Utensils Used  spoon;cup;straw  -AW  spoon;cup;straw  -SH       Consistencies Trialed  regular textures;soft textures;pureed;thin liquids mixed  -AW  thin liquids;pureed;mechanical soft, no mixed consistencies  -          Clinical Swallow Eval    Oral Prep Phase  WFL  -AW  --       Oral Transit  WFL  -AW  --       Oral Residue  WFL  -AW  --       Pharyngeal Phase  no overt signs/symptoms of pharyngeal impairment  -AW  --       Clinical Swallow Evaluation Summary  Pt tolerated thins via cup and straw. Mastication was functional for pureed, mech soft, mixed, and regular consistencies. Pt needed cues to focus and chew food. Discussed safe swallow strategies with pt and .   -  Pt seen for bedside swallow. Sitter present, patient confused. Mild hoarse voice noted, strong cough. Oral care completed. No overt s/s of aspiration with ice or thins via spoon, cup, or straw. Laryngeal elevation appeared functional. Swallow initiation varied between timely and 1-3 second delay. Suspect oral holding at times. No s/s of aspiration with pudding, no oral residue post swallow. Incomplete mastication of mech soft, SLP suspects patient swallowing peaches whole 2/2 impaired cognition. SLP recs full liquids, meds whole with thins. Please re consult SLP if s/s of aspiration noted. Confusion greatly impacts swallow safety.   -SH          Clinical Impression    SLP Swallowing Diagnosis  functional oral phase;functional pharyngeal phase  -AW  mild;oral dysfunction;pharyngeal dysfunction  -SH       Functional Impact  no impact on function  -AW  risk of aspiration/pneumonia  -SH       Rehab Potential/Prognosis, Swallowing  good, to achieve stated therapy goals  -AW  good, to achieve stated therapy goals  -SH       Swallow  Criteria for Skilled Therapeutic Interventions Met  no problems identified which require skilled intervention  -AW  demonstrates skilled criteria  -          Recommendations    Therapy Frequency (Swallow)  PRN;other (see comments) Pt discharging today. No f/u indicated.  -  PRN  -       Predicted Duration Therapy Intervention (Days)  --  until discharge  -       SLP Diet Recommendation  regular textures;thin liquids  -  full liquid diet  -       Recommended Precautions and Strategies  upright posture during/after eating;small bites of food and sips of liquid;other (see comments) supervision with meals  -  upright posture during/after eating;small bites of food and sips of liquid;other (see comments)  -       SLP Rec. for Method of Medication Administration  meds whole;with thin liquids  -  meds whole;with thin liquids;as tolerated  -       Monitor for Signs of Aspiration  yes;notify SLP if any concerns  -AW  yes;notify SLP if any concerns  -       Anticipated Dischage Disposition  --  other (see comments) pending performance in tx  -          Swallow Goals (SLP)    Oral Nutrition/Hydration Goal Selection (SLP)  --  oral nutrition/hydration, SLP goal 1  -          Oral Nutrition/Hydration Goal 1 (SLP)    Oral Nutrition/Hydration Goal 1, SLP  --  Pt will tolerate PO without overt s/s of aspiration.   -       Time Frame (Oral Nutrition/Hydration Goal 1, SLP)  --  by discharge  -         User Key  (r) = Recorded By, (t) = Taken By, (c) = Cosigned By    Initials Name Effective Dates    Sade Kong, MS CCC-SLP 06/08/18 -      Judit Lane MS CCC-SLP 03/07/18 -           EDUCATION  The patient has been educated in the following areas:   Dysphagia (Swallowing Impairment) Oral Care/Hydration.    SLP Recommendation and Plan  SLP Swallowing Diagnosis: functional oral phase, functional pharyngeal phase  SLP Diet Recommendation: regular textures, thin liquids  Recommended Precautions and  Strategies: upright posture during/after eating, small bites of food and sips of liquid, other (see comments)(supervision with meals)     Monitor for Signs of Aspiration: yes, notify SLP if any concerns     Swallow Criteria for Skilled Therapeutic Interventions Met: no problems identified which require skilled intervention  Anticipated Dischage Disposition: home with assist  Rehab Potential/Prognosis, Swallowing: good, to achieve stated therapy goals  Therapy Frequency (Swallow): PRN, other (see comments)(Pt discharging today. No f/u indicated.)          Plan of Care Reviewed With: patient, spouse  Plan of Care Review  Plan of Care Reviewed With: patient, spouse  Progress: improving  Outcome Summary: Swallow re-evaluated,  present. Pt showed no s/s of aspiration on thin (cup, straw), pureed, mech soft, mixed, and regular solids. Pt needed cues to focus and chew. Discussed safe swallow strategies with pt and spouse. Spouse to supervise pt with meals at home. No f/u ST is indicated at this time.     SLP GOALS     Row Name 01/18/19 1600             Oral Nutrition/Hydration Goal 1 (SLP)    Oral Nutrition/Hydration Goal 1, SLP  Pt will tolerate PO without overt s/s of aspiration.   -      Time Frame (Oral Nutrition/Hydration Goal 1, SLP)  by discharge  -        User Key  (r) = Recorded By, (t) = Taken By, (c) = Cosigned By    Initials Name Provider Type    Judit Flores MS CCC-SLP Speech and Language Pathologist           SLP Outcome Measures (last 72 hours)      SLP Outcome Measures     Row Name 01/21/19 1300 01/18/19 1600          SLP Outcome Measures    Outcome Measure Used?  Adult NOMS  -AW  Adult NOMS  -SH        Adult FCM Scores    FCM Chosen  Swallowing  -AW  Swallowing  -SH     Swallowing FCM Score  7  -AW  4  -SH       User Key  (r) = Recorded By, (t) = Taken By, (c) = Cosigned By    Initials Name Effective Dates    Sade Kong MS CCC-SLP 06/08/18 -      Judit Lane MS CCC-SLP 03/07/18 -             Time Calculation:   Time Calculation- SLP     Row Name 01/21/19 1318             Time Calculation- SLP    SLP Start Time  1300  -AW      SLP Received On  01/21/19  -        User Key  (r) = Recorded By, (t) = Taken By, (c) = Cosigned By    Initials Name Provider Type    Sade Kong MS CCC-SLP Speech and Language Pathologist          Therapy Charges for Today     Code Description Service Date Service Provider Modifiers Qty    [id number] HC ST TREATMENT SWALLOW 3 1/21/2019 Sade Bejarano, MS CCC-SLP GN 1               Sade Bejarano MS CCC-SLP  1/21/2019

## 2019-01-21 NOTE — DISCHARGE INSTRUCTIONS
Discharge Instructions:  · No heavy lifting, pushing, pulling greater than 10 pounds.  · No driving up until 2 weeks after surgery and no longer taking narcotics.  · Resume home diet as tolerated.  · Continue incentive spirometer at least 4 times per day.  · Remove dressing from post chest tube site after 48 hours, may shower and clean surgical sites with antibacterial soap or hydrogen peroxide, and apply gauze dressing or band-aid as needed for any drainage.  No dressing needed once no longer draining.

## 2019-01-21 NOTE — PROGRESS NOTES
[facility]  [phone]  Álvaro Gifford MD    Subjective:  LOS: 9    She looks great today.  No pain and no shortness of breath.  Denies any wheezing or chest pain.    Objective   Vital Signs past 24hrs    Temp range: Temp (24hrs), Av.8 °F (37.1 °C), Min:98.4 °F (36.9 °C), Max:99.1 °F (37.3 °C)    BP range: BP: (153-178)/(60-78) 160/70  Pulse range: Heart Rate:  [66-85] 72  Resp rate range: Resp:  [16-20] 18    Device (Oxygen Therapy): room air   Oxygen range:SpO2:  [94 %-97 %] 96 %      90.3 kg (198 lb 15.8 oz); Body mass index is 32.13 kg/m².    Intake/Output Summary (Last 24 hours) at 2019 1933  Last data filed at 2019 1813  Gross per 24 hour   Intake 1440 ml   Output 1200 ml   Net 240 ml       Physical Exam   Constitutional: She is oriented to person, place, and time. She appears well-developed and well-nourished.   HENT:   Head: Normocephalic and atraumatic.   Eyes: EOM are normal. Pupils are equal, round, and reactive to light.   Cardiovascular: Normal rate and regular rhythm.   No murmur heard.  Pulmonary/Chest: Effort normal. No respiratory distress. She has no wheezes. She has no rales.   RLL pleural rub posteriorly   Abdominal: Soft. Bowel sounds are normal. She exhibits no mass. There is no tenderness.   Musculoskeletal: She exhibits edema (RUE).   Neurological: She is alert and oriented to person, place, and time.   Skin: No rash noted.     Results Review:    I have reviewed the laboratory and imaging data since the last note by [facility] physician.  My annotations are noted in assessment and plan.    Medication Review:  I have reviewed the current MAR.  My annotations are noted in assessment and plan.       Plan   PCCM Problems  1. Hyperactive delirium  2. Respiratory failure, no longer on the ventilator  3. Benzo/opiate withdrawal  4. Anemia  5. Lung cancer post VATS  6. Pain control  7. Chronic kidney disease  8. COPD  9. Chronic back pain  10. Hypertension  uncontrolled  11. Restless leg syndrome  12. Anemia  13. Right pneumothorax  14. RUE swelling    Plan of Treatment  She looks a lot better.  Now delirium has resolved.    Tolerating room air.    Right lower lobe pleural rub from recent surgery.  Should resolve.    Small right pneumothorax appears better on chest x-ray today.    RUE swelling but no dvt. From iv infiltration due to patient agitation.    CKD stable - better    Anemia stable.    Better leukocytosis.    BP elevated. Try higher dose clonidine.    Note plans for home tomorrow. No objection.    Álvaro Gifford MD  01/20/19  7:33 PM    Part of this note may be an electronic transcription/translation of spoken language to printed text using the Dragon Dictation System.

## 2019-01-22 ENCOUNTER — READMISSION MANAGEMENT (OUTPATIENT)
Dept: CALL CENTER | Facility: HOSPITAL | Age: 74
End: 2019-01-22

## 2019-01-23 ENCOUNTER — READMISSION MANAGEMENT (OUTPATIENT)
Dept: CALL CENTER | Facility: HOSPITAL | Age: 74
End: 2019-01-23

## 2019-01-23 NOTE — OUTREACH NOTE
Prep Survey      Responses   Facility patient discharged from?  [facility]   Is patient eligible?  Yes   Discharge diagnosis  Lung cancer-Upper lobe wedge resection lobectomy this visit   Does the patient have one of the following disease processes/diagnoses(primary or secondary)?  Cardiothoracic surgery   Does the patient have Home health ordered?  Yes   What is the Home health agency?   [facility]   Is there a DME ordered?  Yes   What DME was ordered?  walker via [employer]   Prep survey completed?  Yes          Bobbi Phelan RN

## 2019-01-23 NOTE — OUTREACH NOTE
CT Surgery Week 1 Survey      Responses   Facility patient discharged from?  [facility]   Does the patient have one of the following disease processes/diagnoses(primary or secondary)?  Cardiothoracic surgery   Is there a successful TCM telephone encounter documented?  No   Week 1 attempt successful?  Yes   Call start time  1221   Call end time  1229   Discharge diagnosis  Lung cancer-Upper lobe wedge resection lobectomy this visit   Meds reviewed with patient/caregiver?  Yes   Is the patient having any side effects they believe may be caused by any medication additions or changes?  No   Does the patient have all medications related to this admission filled (includes all antibiotics, pain medications, cardiac medications, etc.)  Yes   Is the patient taking all medications as directed (includes completed medication regime)?  Yes   Does the patient have a primary care provider?   Yes   Does the patient have an appointment scheduled with their C/T surgeon?  Yes   Has the patient kept scheduled appointments due by today?  N/A   What is the Home health agency?   Located within [facility]   Has home health visited the patient within 72 hours of discharge?  Yes   Home health interventions  Called Home Health agency   Home health comments  Called  to make sure PT will see pt.   What DME was ordered?  walker via [place] [employer]   Has all DME been delivered?  Yes   Psychosocial issues?  No   Did the patient receive a copy of their discharge instructions?  Yes   Nursing interventions  Reviewed instructions with patient   What is the patient's perception of their health status since discharge?  Improving   Nursing interventions  Nurse provided patient education   Is the patient/caregiver able to teach back normal signs of recovery?  Pain or discomfort at incisional site   Nursing interventions  Reassured on normal signs of recovery   Is the patient /caregiver able to teach back basic post-op care?  No tub bath, swimming, or hot tub until instructed by MD,  Keep incision areas clean, dry and protected, Take showers only when approved by MD-sponge bathe until then, Lifting as instructed by MD in discharge instructions   Is the patient/caregiver able to teach back signs and symptoms of incisional infection?  Increased redness, swelling or pain at the incisonal site, Increased drainage or bleeding, Pus or odor from incision, Fever   Is the patient/caregiver able to teach back steps to recovery at home?  Rest and rebuild strength, gradually increase activity   Is the patient/caregiver able to teach back the hierarchy of who to call/visit for symptoms/problems? PCP, Specialist, Home health nurse, Urgent Care, ED, 911  Yes   Week 1 call completed?  Yes            Abdirahman Graff RN

## 2019-01-25 RX ORDER — CLONIDINE HYDROCHLORIDE 0.2 MG/1
0.2 TABLET ORAL EVERY 8 HOURS SCHEDULED
Qty: 30 TABLET | Refills: 0 | Status: SHIPPED | OUTPATIENT
Start: 2019-01-25 | End: 2019-05-17

## 2019-01-28 ENCOUNTER — APPOINTMENT (OUTPATIENT)
Dept: CT IMAGING | Facility: HOSPITAL | Age: 74
End: 2019-01-28

## 2019-01-28 ENCOUNTER — HOSPITAL ENCOUNTER (EMERGENCY)
Facility: HOSPITAL | Age: 74
Discharge: HOME | End: 2019-01-28
Attending: EMERGENCY MEDICINE | Admitting: EMERGENCY MEDICINE

## 2019-01-28 VITALS
TEMPERATURE: 99.3 F | DIASTOLIC BLOOD PRESSURE: 83 MMHG | SYSTOLIC BLOOD PRESSURE: 157 MMHG | BODY MASS INDEX: 31.99 KG/M2 | RESPIRATION RATE: 18 BRPM | WEIGHT: 199.08 LBS | OXYGEN SATURATION: 98 % | HEART RATE: 66 BPM | HEIGHT: 66 IN

## 2019-01-28 DIAGNOSIS — R49.9 CHANGE OF VOICE: Primary | ICD-10-CM

## 2019-01-28 DIAGNOSIS — J38.00 VOCAL CORD PARALYSIS: ICD-10-CM

## 2019-01-28 LAB
ANION GAP SERPL CALCULATED.3IONS-SCNC: 15.7 MMOL/L
BASOPHILS # BLD AUTO: 0.01 10*3/MM3 (ref 0–0.2)
BASOPHILS NFR BLD AUTO: 0.1 % (ref 0–1.5)
BUN BLD-MCNC: 14 MG/DL (ref 8–23)
BUN/CREAT SERPL: 8.1 (ref 7–25)
CALCIUM SPEC-SCNC: 9.5 MG/DL (ref 8.6–10.5)
CHLORIDE SERPL-SCNC: 94 MMOL/L (ref 98–107)
CO2 SERPL-SCNC: 24.3 MMOL/L (ref 22–29)
CREAT BLD-MCNC: 1.72 MG/DL (ref 0.57–1)
DEPRECATED RDW RBC AUTO: 42.6 FL (ref 37–54)
EOSINOPHIL # BLD AUTO: 0.03 10*3/MM3 (ref 0–0.7)
EOSINOPHIL NFR BLD AUTO: 0.3 % (ref 0.3–6.2)
ERYTHROCYTE [DISTWIDTH] IN BLOOD BY AUTOMATED COUNT: 13 % (ref 11.7–13)
GFR SERPL CREATININE-BSD FRML MDRD: 29 ML/MIN/1.73
GLUCOSE BLD-MCNC: 91 MG/DL (ref 65–99)
HCT VFR BLD AUTO: 27.9 % (ref 35.6–45.5)
HGB BLD-MCNC: 9.5 G/DL (ref 11.9–15.5)
IMM GRANULOCYTES # BLD AUTO: 0.07 10*3/MM3 (ref 0–0.03)
IMM GRANULOCYTES NFR BLD AUTO: 0.7 % (ref 0–0.5)
LYMPHOCYTES # BLD AUTO: 1.84 10*3/MM3 (ref 0.9–4.8)
LYMPHOCYTES NFR BLD AUTO: 18.8 % (ref 19.6–45.3)
MCH RBC QN AUTO: 30.4 PG (ref 26.9–32)
MCHC RBC AUTO-ENTMCNC: 34.1 G/DL (ref 32.4–36.3)
MCV RBC AUTO: 89.4 FL (ref 80.5–98.2)
MONOCYTES # BLD AUTO: 0.87 10*3/MM3 (ref 0.2–1.2)
MONOCYTES NFR BLD AUTO: 8.9 % (ref 5–12)
NEUTROPHILS # BLD AUTO: 7.04 10*3/MM3 (ref 1.9–8.1)
NEUTROPHILS NFR BLD AUTO: 71.9 % (ref 42.7–76)
NRBC BLD AUTO-RTO: 0 /100 WBC (ref 0–0)
PLATELET # BLD AUTO: 496 10*3/MM3 (ref 140–500)
PMV BLD AUTO: 8.7 FL (ref 6–12)
POTASSIUM BLD-SCNC: 3.7 MMOL/L (ref 3.5–5.2)
RBC # BLD AUTO: 3.12 10*6/MM3 (ref 3.9–5.2)
SODIUM BLD-SCNC: 134 MMOL/L (ref 136–145)
WBC NRBC COR # BLD: 9.79 10*3/MM3 (ref 4.5–10.7)

## 2019-01-28 PROCEDURE — 99284 EMERGENCY DEPT VISIT MOD MDM: CPT

## 2019-01-28 PROCEDURE — 80048 BASIC METABOLIC PNL TOTAL CA: CPT | Performed by: EMERGENCY MEDICINE

## 2019-01-28 PROCEDURE — 70490 CT SOFT TISSUE NECK W/O DYE: CPT

## 2019-01-28 PROCEDURE — 96374 THER/PROPH/DIAG INJ IV PUSH: CPT

## 2019-01-28 PROCEDURE — 85025 COMPLETE CBC W/AUTO DIFF WBC: CPT | Performed by: EMERGENCY MEDICINE

## 2019-01-28 PROCEDURE — 25010000002 MIDAZOLAM PER 1 MG: Performed by: EMERGENCY MEDICINE

## 2019-01-28 RX ORDER — SODIUM CHLORIDE 0.9 % (FLUSH) 0.9 %
10 SYRINGE (ML) INJECTION AS NEEDED
Status: DISCONTINUED | OUTPATIENT
Start: 2019-01-28 | End: 2019-01-29 | Stop reason: HOSPADM

## 2019-01-28 RX ORDER — MIDAZOLAM HYDROCHLORIDE 1 MG/ML
2 INJECTION INTRAMUSCULAR; INTRAVENOUS ONCE
Status: COMPLETED | OUTPATIENT
Start: 2019-01-28 | End: 2019-01-28

## 2019-01-28 RX ADMIN — MIDAZOLAM HYDROCHLORIDE 2 MG: 2 INJECTION, SOLUTION INTRAMUSCULAR; INTRAVENOUS at 20:13

## 2019-01-29 ENCOUNTER — EPISODE CHANGES (OUTPATIENT)
Dept: CASE MANAGEMENT | Facility: OTHER | Age: 74
End: 2019-01-29

## 2019-01-30 ENCOUNTER — READMISSION MANAGEMENT (OUTPATIENT)
Dept: CALL CENTER | Facility: HOSPITAL | Age: 74
End: 2019-01-30

## 2019-01-30 NOTE — OUTREACH NOTE
CT Surgery Week 2 Survey      Responses   Facility patient discharged from?  [facility]   Does the patient have one of the following disease processes/diagnoses(primary or secondary)?  Cardiothoracic surgery   Week 2 attempt successful?  Yes   Call start time  0935   Rescheduled  Rescheduled-pt requested   Call end time  0936   Discharge diagnosis  Lung cancer-Upper lobe wedge resection lobectomy this visit          Kirby Ozuna RN

## 2019-01-31 ENCOUNTER — OFFICE VISIT (OUTPATIENT)
Dept: INTERNAL MEDICINE | Facility: CLINIC | Age: 74
End: 2019-01-31

## 2019-01-31 ENCOUNTER — READMISSION MANAGEMENT (OUTPATIENT)
Dept: CALL CENTER | Facility: HOSPITAL | Age: 74
End: 2019-01-31

## 2019-01-31 VITALS
DIASTOLIC BLOOD PRESSURE: 71 MMHG | RESPIRATION RATE: 16 BRPM | SYSTOLIC BLOOD PRESSURE: 164 MMHG | HEART RATE: 79 BPM | TEMPERATURE: 98.2 F | OXYGEN SATURATION: 95 %

## 2019-01-31 DIAGNOSIS — Z09 HOSPITAL DISCHARGE FOLLOW-UP: Primary | ICD-10-CM

## 2019-01-31 DIAGNOSIS — D64.9 LOW HEMOGLOBIN: ICD-10-CM

## 2019-01-31 DIAGNOSIS — M79.671 RIGHT FOOT PAIN: ICD-10-CM

## 2019-01-31 DIAGNOSIS — R35.0 URINE FREQUENCY: ICD-10-CM

## 2019-01-31 PROCEDURE — 99214 OFFICE O/P EST MOD 30 MIN: CPT | Performed by: NURSE PRACTITIONER

## 2019-01-31 NOTE — PROGRESS NOTES
"Santiago Arguelles is a 73 y.o. female.   Chief Complaint   Patient presents with   • Follow-up     Hospital       Patient presents for hospital follow-up.  Is a 73-year-old female patient of Dr. holloway with a history of PE, hypertension, hyperlipidemia, lung cancer, GERD, chronic kidney disease stage III, anxiety and depression.  On 1/20/18 she presented to the ER for a \"throat closing\" sensation, difficulty breathing, hoarseness and difficulty speaking.  She had been spitting her saliva into a cup.  On 1/11/19 she had undergone a partial right lung resection.  In the ER, a CT of the neck showed closed vocal cords but no acute developments.  She was given Ativan and Versed which improved her symptoms.  She was told that it would be appropriate to admit her overnight for observation, but she and her family felt more comfortable going home.  She was tolerating liquid denies cues at that time, and she was discharged with orders to follow-up with ENT.  She followed up this morning with Dr. Luther, ENT.  Her  and daughter accompany her to the appointment today.  They state that the ENT appointment with very well, and Dr. Luther stated that he would like to consult Dr. Gifford for possible bronchoscopy.  She states today that her hoarseness has not gone away, but she is not really having trouble breathing anymore.  She does endorse continuous production of thick green sputum, and states that she is having sinus tenderness and pressure.  She doesn't have much of an appetite at this time though.  She denies chest pain.  She is seeing PT, OT, and speech therapy at home.    She also reports that over the past 1-2 weeks she has been experiencing increasing frequency and urgency on urination.  She is not having flank pain.  She denies changes in color or clarity of her urine, and denies odorous urine.  She does state that it burns slightly when she urinates at times.  She denies seeing any blood or mucus in her " "urine.    She also reports that since she got out of the hospital from her lung resection, her right foot has been painful.  She states that it swells intermittently, but goes back to when she props it up.  She is having some pain in the right great toe, and does have a history of gout.  She states that she is unsure if this is related to gout though.  The pain is \"achy\" and throughout the dorsal side of her right foot.  She has not had any redness or heat in the foot.  She has not had any focal swelling to any toes.    She denies development of any other new issues today.         The following portions of the patient's history were reviewed and updated as appropriate: allergies, current medications, past family history, past medical history, past social history, past surgical history and problem list.    Review of Systems   Constitutional: Positive for appetite change (  Decreased appetite). Negative for activity change, chills, fatigue, fever, unexpected weight gain and unexpected weight loss.   HENT: Positive for voice change (  Hoarseness). Negative for congestion, hearing loss, postnasal drip, sinus pressure, sneezing, sore throat, tinnitus and trouble swallowing.    Eyes: Negative for photophobia, pain and visual disturbance.   Respiratory: Negative for cough, chest tightness, shortness of breath and wheezing.    Cardiovascular: Negative for chest pain, palpitations and leg swelling.   Gastrointestinal: Negative for abdominal distention, abdominal pain, constipation, diarrhea, nausea and vomiting.   Endocrine: Negative for polydipsia, polyphagia and polyuria.   Genitourinary: Positive for frequency and urgency. Negative for dyspareunia, dysuria and hematuria.   Musculoskeletal: Positive for arthralgias (  Right foot pain).   Neurological: Negative for dizziness, weakness, numbness and headache.   All other systems reviewed and are negative.      Objective    /71 (BP Location: Left arm, Patient Position: " Sitting, Cuff Size: Small Adult)   Pulse 79   Temp 98.2 °F (36.8 °C) (Oral)   Resp 16   SpO2 95%     Physical Exam   Constitutional: She is oriented to person, place, and time. She appears well-developed and well-nourished. No distress.   HENT:   Head: Normocephalic and atraumatic.   Right Ear: External ear normal.   Left Ear: External ear normal.   Nose: Nose normal. No sinus tenderness or congestion. Right sinus exhibits no maxillary sinus tenderness and no frontal sinus tenderness. Left sinus exhibits no maxillary sinus tenderness and no frontal sinus tenderness.   Mouth/Throat: Uvula is midline. Mucous membranes are dry. No oropharyngeal exudate, posterior oropharyngeal edema, posterior oropharyngeal erythema or tonsillar abscesses.   Mucous membranes dry   Eyes: Conjunctivae and EOM are normal. Pupils are equal, round, and reactive to light. Right eye exhibits no discharge. Left eye exhibits no discharge. No scleral icterus.   Neck: Normal range of motion. Neck supple. No JVD present. No tracheal deviation present. No thyromegaly present.   Cardiovascular: Normal rate, regular rhythm, normal heart sounds and intact distal pulses. Exam reveals no gallop and no friction rub.   No murmur heard.  Pulmonary/Chest: Effort normal. No stridor. No respiratory distress. She has decreased breath sounds in the right upper field and the left upper field. She has no wheezes. She has no rales. She exhibits no tenderness.   Abdominal: Soft. Bowel sounds are normal. She exhibits no distension. There is no tenderness.   Musculoskeletal: Normal range of motion.   Lymphadenopathy:     She has no cervical adenopathy.   Neurological: She is alert and oriented to person, place, and time.   Skin: Skin is warm and dry. Capillary refill takes less than 2 seconds. She is not diaphoretic.   Psychiatric: She has a normal mood and affect. Her behavior is normal. Judgment and thought content normal.   Nursing note and vitals  reviewed.        Assessment/Plan   Rachana was seen today for follow-up.    Diagnoses and all orders for this visit:    Hospital discharge follow-up    Urine frequency  -     Urinalysis With Microscopic - Urine, Clean Catch  -     Urine Culture - Urine, Urine, Clean Catch    Right foot pain  -     XR Foot 3+ View Right    Low hemoglobin  -     CBC & Differential  -     Iron Profile      - Hospital follow-up: Reviewed documentation and labs from ER.  Her hemoglobin was low, so we will recheck this today as well as an iron profile.  Encouraged her to increase her hydration, as well as nutritional status.  For increasing mucus production, asked her to begin Mucinex.  We will check a urine culture and urinalysis due to increasing urinary frequency.  Will obtain x-ray of the right foot to rule out fracture related to her foot pain.    - She is going to schedule another follow-up with ENT, Dr. Luther, and has a thoracic surgery consult on 2/4/19 with Dr. Figueroa.  She is seeing speech therapy, OT, PT at home multiple times per week.    - Follow-up if symptoms persist or worsen.  Follow up routinely with PCP, Dr. holloway.  We will call patient with results of her imaging and labs and any further recommendations.

## 2019-01-31 NOTE — OUTREACH NOTE
CT Surgery Week 2 Survey      Responses   Facility patient discharged from?  [facility]   Does the patient have one of the following disease processes/diagnoses(primary or secondary)?  Cardiothoracic surgery   Week 2 attempt successful?  No   Rescheduled  Revoked [no answer after asking to reschedule]          Kirby Ozuna RN

## 2019-02-01 ENCOUNTER — EPISODE CHANGES (OUTPATIENT)
Dept: CASE MANAGEMENT | Facility: OTHER | Age: 74
End: 2019-02-01

## 2019-02-01 DIAGNOSIS — D50.9 IRON DEFICIENCY ANEMIA, UNSPECIFIED IRON DEFICIENCY ANEMIA TYPE: ICD-10-CM

## 2019-02-01 DIAGNOSIS — J06.9 UPPER RESPIRATORY TRACT INFECTION, UNSPECIFIED TYPE: Primary | ICD-10-CM

## 2019-02-01 LAB
BASOPHILS # BLD AUTO: 0.01 10*3/MM3 (ref 0–0.2)
BASOPHILS NFR BLD AUTO: 0.1 % (ref 0–1.5)
EOSINOPHIL # BLD AUTO: 0.09 10*3/MM3 (ref 0–0.7)
EOSINOPHIL NFR BLD AUTO: 0.8 % (ref 0.3–6.2)
ERYTHROCYTE [DISTWIDTH] IN BLOOD BY AUTOMATED COUNT: 13.2 % (ref 11.7–13)
HCT VFR BLD AUTO: 31.3 % (ref 35.6–45.5)
HGB BLD-MCNC: 9.6 G/DL (ref 11.9–15.5)
IMM GRANULOCYTES # BLD AUTO: 0.08 10*3/MM3 (ref 0–0.03)
IMM GRANULOCYTES NFR BLD AUTO: 0.7 % (ref 0–0.5)
IRON SATN MFR SERPL: 9 % (ref 20–50)
IRON SERPL-MCNC: 26 MCG/DL (ref 37–145)
LYMPHOCYTES # BLD AUTO: 1.63 10*3/MM3 (ref 0.9–4.8)
LYMPHOCYTES NFR BLD AUTO: 14.2 % (ref 19.6–45.3)
MCH RBC QN AUTO: 29.5 PG (ref 26.9–32)
MCHC RBC AUTO-ENTMCNC: 30.7 G/DL (ref 32.4–36.3)
MCV RBC AUTO: 96.3 FL (ref 80.5–98.2)
MONOCYTES # BLD AUTO: 1.04 10*3/MM3 (ref 0.2–1.2)
MONOCYTES NFR BLD AUTO: 9.1 % (ref 5–12)
NEUTROPHILS # BLD AUTO: 8.61 10*3/MM3 (ref 1.9–8.1)
NEUTROPHILS NFR BLD AUTO: 75.1 % (ref 42.7–76)
PLATELET # BLD AUTO: 485 10*3/MM3 (ref 140–500)
RBC # BLD AUTO: 3.25 10*6/MM3 (ref 3.9–5.2)
TIBC SERPL-MCNC: 275 MCG/DL
UIBC SERPL-MCNC: 249 MCG/DL
UNABLE TO VOID: NORMAL
WBC # BLD AUTO: 11.46 10*3/MM3 (ref 4.5–10.7)

## 2019-02-01 RX ORDER — CEFDINIR 300 MG/1
300 CAPSULE ORAL 2 TIMES DAILY
Qty: 14 CAPSULE | Refills: 0 | Status: SHIPPED | OUTPATIENT
Start: 2019-02-01 | End: 2019-02-08

## 2019-02-01 RX ORDER — FERROUS SULFATE 325(65) MG
325 TABLET ORAL
Qty: 30 TABLET | Refills: 1 | Status: SHIPPED | OUTPATIENT
Start: 2019-02-01 | End: 2019-03-26 | Stop reason: SDUPTHER

## 2019-02-04 ENCOUNTER — OFFICE VISIT (OUTPATIENT)
Dept: SURGERY | Facility: CLINIC | Age: 74
End: 2019-02-04

## 2019-02-04 ENCOUNTER — HOSPITAL ENCOUNTER (OUTPATIENT)
Dept: GENERAL RADIOLOGY | Facility: HOSPITAL | Age: 74
Discharge: HOME | End: 2019-02-04

## 2019-02-04 ENCOUNTER — HOSPITAL ENCOUNTER (OUTPATIENT)
Dept: GENERAL RADIOLOGY | Facility: HOSPITAL | Age: 74
Discharge: HOME | End: 2019-02-04
Admitting: NURSE PRACTITIONER

## 2019-02-04 VITALS
BODY MASS INDEX: 31.34 KG/M2 | WEIGHT: 195 LBS | SYSTOLIC BLOOD PRESSURE: 142 MMHG | DIASTOLIC BLOOD PRESSURE: 82 MMHG | HEIGHT: 66 IN | OXYGEN SATURATION: 98 % | HEART RATE: 94 BPM

## 2019-02-04 DIAGNOSIS — C34.90 LUNG CANCER (HCC): ICD-10-CM

## 2019-02-04 DIAGNOSIS — N39.0 E-COLI UTI: Primary | ICD-10-CM

## 2019-02-04 DIAGNOSIS — B96.20 E-COLI UTI: Primary | ICD-10-CM

## 2019-02-04 DIAGNOSIS — C34.11 MALIGNANT NEOPLASM OF UPPER LOBE OF RIGHT LUNG (HCC): Primary | ICD-10-CM

## 2019-02-04 LAB
APPEARANCE UR: CLEAR
BACTERIA #/AREA URNS HPF: ABNORMAL /HPF
BACTERIA UR CULT: ABNORMAL
BACTERIA UR CULT: ABNORMAL
BILIRUB UR QL STRIP: NEGATIVE
CASTS URNS MICRO: ABNORMAL
COLOR UR: YELLOW
EPI CELLS #/AREA URNS HPF: ABNORMAL /HPF
GLUCOSE UR QL: NEGATIVE
HGB UR QL STRIP: NEGATIVE
KETONES UR QL STRIP: NEGATIVE
LEUKOCYTE ESTERASE UR QL STRIP: (no result)
NITRITE UR QL STRIP: NEGATIVE
OTHER ANTIBIOTIC SUSC ISLT: ABNORMAL
PH UR STRIP: 6 [PH] (ref 5–8)
PROT UR QL STRIP: (no result)
RBC #/AREA URNS HPF: ABNORMAL /HPF
SP GR UR: 1.01 (ref 1–1.03)
UROBILINOGEN UR STRIP-MCNC: (no result) MG/DL
WBC #/AREA URNS HPF: ABNORMAL /HPF

## 2019-02-04 PROCEDURE — 71046 X-RAY EXAM CHEST 2 VIEWS: CPT

## 2019-02-04 PROCEDURE — 99024 POSTOP FOLLOW-UP VISIT: CPT | Performed by: THORACIC SURGERY (CARDIOTHORACIC VASCULAR SURGERY)

## 2019-02-04 PROCEDURE — 73630 X-RAY EXAM OF FOOT: CPT

## 2019-02-04 NOTE — PROGRESS NOTES
Please let patient know that her urine did come back positive for UTI. The omnicef that we put her on should be effective in treating this UTI as well, but I want to check a repeat urine sample in two weeks to ensure that the infection has cleared. Please make lab apt in 2 weeks for urine.

## 2019-02-04 NOTE — PROGRESS NOTES
Subjective   Patient ID: Rachana Arguelles is a 73 y.o. female is here today for follow-up.    History of Present Illness  Dear Colleagues,   Rachana Arguelles is here today for their first postoperative visit after their surgery for a stage I NSCLC. Ms. Arguelles is a pleasant 74 yo lady s/p VATS right upper lobectomy for a T1bN0 NSCLC.  She is doing reasonably well since discharge except for complaints of hoarseness and bilateral lower extremity pain.      The following portions of the patient's history were reviewed and updated as appropriate: allergies, current medications, past family history, past medical history, past social history, past surgical history and problem list.  Review of Systems   Constitution: Negative.   HENT: Negative.    Eyes: Negative.    Cardiovascular: Negative.    Respiratory: Positive for cough, shortness of breath and wheezing.    Endocrine: Negative.    Hematologic/Lymphatic: Negative.    Skin: Negative.    Musculoskeletal: Negative.    Gastrointestinal: Negative.    Genitourinary: Negative.    Neurological: Negative.    Psychiatric/Behavioral: Negative.    Allergic/Immunologic: Negative.         Objective   Physical Exam   Constitutional: She is oriented to person, place, and time. She appears well-developed and well-nourished.   HENT:   Head: Normocephalic and atraumatic.   Nose: Nose normal.   Mouth/Throat: Oropharynx is clear and moist.   Eyes: Conjunctivae and EOM are normal. Pupils are equal, round, and reactive to light.   Neck: Normal range of motion. Neck supple.   Cardiovascular: Normal rate, regular rhythm, normal heart sounds and intact distal pulses.   Pulmonary/Chest: Effort normal and breath sounds normal.   Abdominal: Soft. Bowel sounds are normal.   Musculoskeletal: Normal range of motion.   Neurological: She is alert and oriented to person, place, and time.   Skin: Skin is warm and dry. Capillary refill takes less than 2 seconds.   Psychiatric: She has a normal mood and affect.  Her behavior is normal. Judgment and thought content normal.   Nursing note and vitals reviewed.      Assessment/Plan   Independent Review of Radiographic Studies:    I have indepently reviewed the images from the CXR performed today which demonstrates good aeration, no effusion, no ptx.   Assessment/Plan:    Ms. Arguelles is a pleasant 72 yo lady s/p VATs right upper lobectomy for a [id number] Stage Ia NSCLC.  She is doing well postoperatively.  She will plan to come back to see me in four months for her initial lung cancer surveillance scan.      Diagnoses and all orders for this visit:    Malignant neoplasm of upper lobe of right lung (CMS/HCC)  -     CT Chest Without Contrast; Future

## 2019-02-05 ENCOUNTER — DOCUMENTATION (OUTPATIENT)
Dept: INTERNAL MEDICINE | Facility: CLINIC | Age: 74
End: 2019-02-05

## 2019-02-05 DIAGNOSIS — R60.0 EDEMA OF FOOT: Primary | ICD-10-CM

## 2019-02-05 RX ORDER — METHYLPREDNISOLONE 4 MG/1
TABLET ORAL
Qty: 21 TABLET | Refills: 0 | Status: SHIPPED | OUTPATIENT
Start: 2019-02-05 | End: 2019-05-30

## 2019-02-05 NOTE — PROGRESS NOTES
I reviewed some x-rays of her foot with severe MTP arthropathy.  This appears to be an acute arthritis possibly gout or calcium pyrophosphate disease.  I reviewed her other labs she has postop anemia and has a history of lung cancer evaluation treatment.  We'll give her a Medrol Dosepak and see if this settles down the foot pending follow-up which she is to have very soon.  She will need to come in for a uric acid level.

## 2019-02-06 ENCOUNTER — LAB (OUTPATIENT)
Dept: INTERNAL MEDICINE | Facility: CLINIC | Age: 74
End: 2019-02-06

## 2019-02-06 DIAGNOSIS — D50.9 IRON DEFICIENCY ANEMIA, UNSPECIFIED IRON DEFICIENCY ANEMIA TYPE: ICD-10-CM

## 2019-02-06 DIAGNOSIS — B96.20 E-COLI UTI: ICD-10-CM

## 2019-02-06 DIAGNOSIS — N39.0 E-COLI UTI: ICD-10-CM

## 2019-02-06 DIAGNOSIS — J06.9 UPPER RESPIRATORY TRACT INFECTION, UNSPECIFIED TYPE: ICD-10-CM

## 2019-02-08 ENCOUNTER — ANESTHESIA EVENT (OUTPATIENT)
Dept: GASTROENTEROLOGY | Facility: HOSPITAL | Age: 74
End: 2019-02-08

## 2019-02-08 ENCOUNTER — HOSPITAL ENCOUNTER (OUTPATIENT)
Facility: HOSPITAL | Age: 74
Setting detail: HOSPITAL OUTPATIENT SURGERY
Discharge: HOME | End: 2019-02-08
Attending: INTERNAL MEDICINE | Admitting: INTERNAL MEDICINE

## 2019-02-08 ENCOUNTER — ANESTHESIA (OUTPATIENT)
Dept: GASTROENTEROLOGY | Facility: HOSPITAL | Age: 74
End: 2019-02-08

## 2019-02-08 VITALS
DIASTOLIC BLOOD PRESSURE: 92 MMHG | WEIGHT: 195 LBS | BODY MASS INDEX: 30.61 KG/M2 | SYSTOLIC BLOOD PRESSURE: 166 MMHG | OXYGEN SATURATION: 100 % | RESPIRATION RATE: 16 BRPM | TEMPERATURE: 98.5 F | HEART RATE: 75 BPM | HEIGHT: 67 IN

## 2019-02-08 PROCEDURE — 25010000002 PROPOFOL 10 MG/ML EMULSION: Performed by: ANESTHESIOLOGY

## 2019-02-08 RX ORDER — LIDOCAINE HYDROCHLORIDE 10 MG/ML
INJECTION, SOLUTION EPIDURAL; INFILTRATION; INTRACAUDAL; PERINEURAL AS NEEDED
Status: DISCONTINUED | OUTPATIENT
Start: 2019-02-08 | End: 2019-02-08 | Stop reason: HOSPADM

## 2019-02-08 RX ORDER — SODIUM CHLORIDE, SODIUM LACTATE, POTASSIUM CHLORIDE, CALCIUM CHLORIDE 600; 310; 30; 20 MG/100ML; MG/100ML; MG/100ML; MG/100ML
INJECTION, SOLUTION INTRAVENOUS CONTINUOUS PRN
Status: DISCONTINUED | OUTPATIENT
Start: 2019-02-08 | End: 2019-02-08 | Stop reason: SURG

## 2019-02-08 RX ORDER — SODIUM CHLORIDE 9 MG/ML
125 INJECTION, SOLUTION INTRAVENOUS CONTINUOUS
Status: DISCONTINUED | OUTPATIENT
Start: 2019-02-08 | End: 2019-02-08 | Stop reason: HOSPADM

## 2019-02-08 RX ORDER — LIDOCAINE HYDROCHLORIDE 20 MG/ML
INJECTION, SOLUTION EPIDURAL; INFILTRATION; INTRACAUDAL; PERINEURAL AS NEEDED
Status: DISCONTINUED | OUTPATIENT
Start: 2019-02-08 | End: 2019-02-08 | Stop reason: HOSPADM

## 2019-02-08 RX ORDER — SODIUM CHLORIDE 0.9 % (FLUSH) 0.9 %
3 SYRINGE (ML) INJECTION EVERY 12 HOURS SCHEDULED
Status: DISCONTINUED | OUTPATIENT
Start: 2019-02-08 | End: 2019-02-08 | Stop reason: HOSPADM

## 2019-02-08 RX ORDER — SODIUM CHLORIDE 0.9 % (FLUSH) 0.9 %
3-10 SYRINGE (ML) INJECTION AS NEEDED
Status: DISCONTINUED | OUTPATIENT
Start: 2019-02-08 | End: 2019-02-08 | Stop reason: HOSPADM

## 2019-02-08 RX ORDER — PROPOFOL 10 MG/ML
VIAL (ML) INTRAVENOUS AS NEEDED
Status: DISCONTINUED | OUTPATIENT
Start: 2019-02-08 | End: 2019-02-08 | Stop reason: SURG

## 2019-02-08 RX ORDER — LIDOCAINE HYDROCHLORIDE 20 MG/ML
INJECTION, SOLUTION INFILTRATION; PERINEURAL AS NEEDED
Status: DISCONTINUED | OUTPATIENT
Start: 2019-02-08 | End: 2019-02-08 | Stop reason: SURG

## 2019-02-08 RX ADMIN — PROPOFOL 450 MG: 10 INJECTION, EMULSION INTRAVENOUS at 10:40

## 2019-02-08 RX ADMIN — LIDOCAINE HYDROCHLORIDE 90 MG: 20 INJECTION, SOLUTION INFILTRATION; PERINEURAL at 10:40

## 2019-02-08 RX ADMIN — SODIUM CHLORIDE, POTASSIUM CHLORIDE, SODIUM LACTATE AND CALCIUM CHLORIDE: 600; 310; 30; 20 INJECTION, SOLUTION INTRAVENOUS at 10:40

## 2019-02-08 NOTE — ANESTHESIA PREPROCEDURE EVALUATION
Anesthesia Evaluation     Patient summary reviewed and Nursing notes reviewed   NPO Solid Status: > 8 hours  NPO Liquid Status: > 8 hours           Airway   Mallampati: II  TM distance: >3 FB  Neck ROM: full  no difficulty expected  Dental - normal exam     Pulmonary - normal exam   (+) pulmonary embolism, a smoker Former, lung cancer,   Cardiovascular - normal exam    (+) hypertension, PVD, hyperlipidemia,       Neuro/Psych  (+) dizziness/light headedness, psychiatric history Anxiety and Depression,     GI/Hepatic/Renal/Endo    (+) obesity,  hiatal hernia, GERD,      Musculoskeletal     Abdominal  - normal exam   Substance History      OB/GYN          Other   (+) arthritis   history of cancer                    Anesthesia Plan    ASA 3     MAC     intravenous induction   Anesthetic plan, all risks, benefits, and alternatives have been provided, discussed and informed consent has been obtained with: patient.

## 2019-02-08 NOTE — ANESTHESIA POSTPROCEDURE EVALUATION
"Patient: Rachana Arguelles    Procedure Summary     Date:  02/08/19 Room / Location:   [facility]    Anesthesia Start:  1037 Anesthesia Stop:  1105    Procedure:  BRONCHOSCOPY (N/A Bronchus) Diagnosis:      Surgeon:  Álvaro Gifford MD Provider:  Karthikeyan Arnold MD    Anesthesia Type:  MAC ASA Status:  3          Anesthesia Type: MAC  Last vitals  BP   166/92 (02/08/19 1124)   Temp   36.9 °C (98.5 °F) (02/08/19 0932)   Pulse   75 (02/08/19 1124)   Resp   16 (02/08/19 1124)     SpO2   100 % (02/08/19 1124)     Post Anesthesia Care and Evaluation    Patient location during evaluation: bedside  Patient participation: complete - patient participated  Level of consciousness: awake and alert  Pain score: 0  Pain management: adequate  Airway patency: patent  Anesthetic complications: No anesthetic complications    Cardiovascular status: acceptable  Respiratory status: acceptable  Hydration status: acceptable    Comments: /92 (BP Location: Left arm, Patient Position: Sitting)   Pulse 75   Temp 36.9 °C (98.5 °F) (Oral)   Resp 16   Ht 170.2 cm (67\")   Wt 88.5 kg (195 lb)   SpO2 100%   BMI 30.54 kg/m²       "

## 2019-02-08 NOTE — H&P
[facility]  Phone: [phone]  Álvaro Gifford MD      Subjective   LOS: 0 days     I was called by Dr. Luther for abnormal finding below the glottis.  This patient recently had lobectomy for lung cancer with complete resection.  She was seen during recent hospitalization.  She has done well since discharge except for some hoarseness and cough. She has had trouble swallowing.  Neck CT was done which was unremarkable.  Patient had dried mucus or debris or granuloma located just below the vocal cords.  Dr. Luther recommends bronchoscopy.    Rachana Arguelles  reports that she drinks about 0.6 oz of alcohol per week.,  reports that she quit smoking about 15 years ago. Her smoking use included cigarettes. She has a 37.50 pack-year smoking history. she has never used smokeless tobacco.     Past Hx:  has a past medical history of AAA (abdominal aortic aneurysm) without rupture (CMS/HCC), Anxiety, Arthritis, Cancer (CMS/HCC), Chest pain, Chronic low back pain, Chronic pain syndrome (3/12/2016), CKD (chronic kidney disease), stage III (CMS/HCC), Claustrophobia (10/26/2015), Depression, Dizziness, GERD (gastroesophageal reflux disease), GI problem, Hiatal hernia, History of migraine headaches, Hyperlipidemia, Hypertension, Lumbar postlaminectomy syndrome (10/26/2015), Lung cancer (CMS/HCC), Lung nodule, Palpitations, Polypharmacy (4/22/2016), Pulmonary embolism (CMS/HCC) (10/26/2015), Submandibular sialoadenitis (10/26/2015), and Vitamin B 12 deficiency.  Surg Hx:  has a past surgical history that includes Breast surgery; Revision total hip arthroplasty (Left); Hysterectomy; Total knee arthroplasty (Left); Lumbar fusion; Total shoulder arthroplasty (Left); Shoulder arthroscopy (04/04/2013); Angioplasty Iliac Artery (Bilateral, 8/10/2018); thoracoscopy video assisted with lobectomy (Right, 1/11/2019); intubation (1/15/2019); Appendectomy; Cholecystectomy; Tonsillectomy; and LASIK.  FH: family history  includes Cancer in her mother; Hypertension in her mother; Kidney disease in her father; Lung cancer in her mother; Other in her brother; Stroke in her brother.  SH:  reports that she quit smoking about 15 years ago. Her smoking use included cigarettes. She has a 37.50 pack-year smoking history. she has never used smokeless tobacco. She reports that she drinks about 0.6 oz of alcohol per week. She reports that she does not use drugs.    Medications Prior to Admission   Medication Sig Dispense Refill Last Dose   • aluminum hydroxide-mag carbonate (GAVISCON EXTRA RELIEF) 160-105 MG chewable tablet chewable tablet Chew 2 tablets 2 (Two) Times a Day As Needed.   2/7/2019 at Unknown time   • amLODIPine (NORVASC) 10 MG tablet Take 10 mg by mouth Every Morning.  3 2/7/2019 at Unknown time   • atorvastatin (LIPITOR) 40 MG tablet Take 40 mg by mouth Every Night.   2/7/2019 at Unknown time   • carvedilol (COREG) 25 MG tablet Take 25 mg by mouth 2 (Two) Times a Day With Meals. 1/2 TAB EACH DOSE-ON HOLD SINCE 1/5/19 2/7/2019 at Unknown time   • cefdinir (OMNICEF) 300 MG capsule Take 1 capsule by mouth 2 (Two) Times a Day for 7 days. 14 capsule 0 2/7/2019 at Unknown time   • CloNIDine (CATAPRES) 0.2 MG tablet Take 1 tablet by mouth Every 8 (Eight) Hours. 30 tablet 0 2/7/2019 at Unknown time   • clopidogrel (PLAVIX) 75 MG tablet Take 1 tablet by mouth Daily. 30 tablet 3 2/7/2019 at Unknown time   • cyclobenzaprine (FLEXERIL) 10 MG tablet Take 10 mg by mouth 3 (Three) Times a Day As Needed.   2/7/2019 at Unknown time   • Ergocalciferol (VITAMIN D2 PO) Take 50,000 Units by mouth 1 (One) Time Per Week. WEDNESDAY 2/7/2019 at Unknown time   • famotidine (PEPCID) 20 MG tablet Take 1 tablet by mouth 2 (Two) Times a Day. 180 tablet 1 2/7/2019 at Unknown time   • ferrous sulfate 325 (65 FE) MG tablet Take 1 tablet by mouth Daily With Breakfast. 30 tablet 1 2/7/2019 at Unknown time   • LORazepam (ATIVAN) 0.5 MG tablet Take 0.5 mg by  mouth 3 (Three) Times a Day.   2/7/2019 at Unknown time   • MethylPREDNISolone (MEDROL) 4 MG tablet follow package directions 21 tablet 0 2/7/2019 at Unknown time   • omeprazole (priLOSEC) 40 MG capsule Take 40 mg by mouth Every Evening.   2/7/2019 at Unknown time   • oxyCODONE (ROXICODONE) 20 MG tablet Take 20 mg by mouth Every 4 (Four) Hours As Needed.   2/7/2019 at Unknown time   • oxymetazoline (AFRIN) 0.05 % nasal spray 2 sprays into the nostril(s) as directed by provider As Needed for Congestion.   2/7/2019 at Unknown time   • sennosides-docusate sodium (SENOKOT-S) 8.6-50 MG tablet TAKE TWO TABLETS BY MOUTH ONCE DAILY (Patient taking differently: Take 2 tablets by mouth 2 (Two) Times a Day.) 180 tablet 1 2/7/2019 at Unknown time   • traZODone (DESYREL) 150 MG tablet Take 0.5 tablets by mouth Every Night. 45 tablet 1 2/7/2019 at Unknown time   • venlafaxine XR (EFFEXOR-XR) 150 MG 24 hr capsule Take 1 capsule by mouth Daily. 30 capsule 5 2/7/2019 at Unknown time   • aspirin 81 MG chewable tablet Chew 81 mg Daily. PT TO CONTINUE PER MD   Taking   • cetirizine (ZyrTEC) 10 MG tablet Take 10 mg by mouth As Needed.   Unknown at Unknown time   • furosemide (LASIX) 40 MG tablet Take 40 mg by mouth Daily.   Unknown at Unknown time   • ondansetron ODT (ZOFRAN-ODT) 8 MG disintegrating tablet Take 8 mg by mouth Every 8 (Eight) Hours As Needed for Nausea or Vomiting.   Taking   • polyethylene glycol (MIRALAX) powder Take 17 g by mouth Every Evening. Constipation. Mix in 4oz of water/juice/milk   Taking   • sucralfate (CARAFATE) 1 g tablet Take 1 tablet by mouth 4 (Four) Times a Day. 120 tablet 5 Unknown at Unknown time   • umeclidinium-vilanterol (ANORO ELLIPTA) 62.5-25 MCG/INH aerosol powder  inhaler Inhale 1 puff Daily.   Taking     Allergies   Allergen Reactions   • Neurontin [Gabapentin] Confusion   • Morphine Other (See Comments)     HEADACHE       Review of Systems   Constitutional: Negative for chills and fever.    HENT: Positive for sore throat and trouble swallowing.    Respiratory: Positive for cough. Negative for wheezing.    Cardiovascular: Negative for chest pain and leg swelling.   Gastrointestinal: Negative for abdominal pain, nausea and vomiting.   Genitourinary: Negative for dysuria and hematuria.   Musculoskeletal: Negative for arthralgias and back pain.   Skin: Negative for pallor and rash.   Neurological: Negative for seizures and speech difficulty.   Psychiatric/Behavioral: Negative for agitation and confusion.     Vital Signs past 24hrs  BP range: BP: (183)/(95) 183/95  Pulse range: Heart Rate:  [74] 74  Resp rate range: Resp:  [16] 16  Temp range: Temp (24hrs), Av.5 °F (36.9 °C), Min:98.5 °F (36.9 °C), Max:98.5 °F (36.9 °C)    Oxygen range: SpO2:  [100 %] 100 %;  ;   Device (Oxygen Therapy): room air  88.5 kg (195 lb); Body mass index is 30.54 kg/m².  No intake/output data recorded.    Adult female no acute distress.  Awake and alert.  Lungs reveal bilateral air entry with diminished breath sounds and clear.  Recently chest surgery with healing scar.  No wheezing.  Heart examination S1-S2 present rhythm regular no murmurs.  Extremities no edema.  Abdomen soft nontender bowel sounds present no liver spleen enlargement.  Oropharynx moist with no discharge.    Results Review:    I have reviewed the laboratory and imaging data from current admission. My annotations are as noted in assessment and plan.    Medication Review:  I have reviewed the current MAR. My annotations are as noted in assessment and plan.    Plan   PCCM Problems  Subglottic granuloma tissue  COPD  Lung cancer with recent lobectomy    Plan of Treatment  Proceed with bronchoscopy to further evaluate subglottic granuloma tissue suspected and seen by Dr. Luther on recent fiberoptic examination.          Part of this note may be an electronic transcription/translation of spoken language to printed text using the Dragon Dictation System.

## 2019-02-08 NOTE — NURSING NOTE
TOLD DR. THOMAS THAT PT DID NOT HOLD HER PLAVIX FOR THE BRONCHOSCOPY TODAY. TOOK PLAVIX YESTERDAY. DR THOMAS OK WITH IT.

## 2019-02-08 NOTE — ANESTHESIA PROCEDURE NOTES
Airway  Date/Time: 2/8/2019 10:47 AM  End Time:2/8/2019 10:47 AM  Airway not difficult    General Information and Staff    Patient location during procedure: OR  Anesthesiologist: Karthikeyan Arnold MD    Indications and Patient Condition  Indications for airway management: airway protection    Preoxygenated: yes  MILS maintained throughout  Mask difficulty assessment: 1 - vent by mask    Final Airway Details  Final airway type: supraglottic airway      Successful airway: classic  Size 4  Cuff Pressure (cm H2O): 5  Airway Seal Pressure (cm H2O): 5    Number of attempts at approach: 1

## 2019-02-11 ENCOUNTER — TELEPHONE (OUTPATIENT)
Dept: INTERNAL MEDICINE | Facility: CLINIC | Age: 74
End: 2019-02-11

## 2019-02-11 DIAGNOSIS — N39.0 URINARY TRACT INFECTION WITHOUT HEMATURIA, SITE UNSPECIFIED: Primary | ICD-10-CM

## 2019-02-11 DIAGNOSIS — D64.9 LOW HEMOGLOBIN: Primary | ICD-10-CM

## 2019-02-11 LAB
ALBUMIN SERPL-MCNC: 3.3 G/DL (ref 3.5–5.2)
ALBUMIN/GLOB SERPL: 1.1 G/DL
ALP SERPL-CCNC: 143 U/L (ref 39–117)
ALT SERPL-CCNC: 17 U/L (ref 1–33)
APPEARANCE UR: (no result)
AST SERPL-CCNC: 24 U/L (ref 1–32)
BACTERIA #/AREA URNS HPF: ABNORMAL /HPF
BACTERIA UR CULT: ABNORMAL
BACTERIA UR CULT: ABNORMAL
BASOPHILS # BLD AUTO: 0 10*3/MM3 (ref 0–0.2)
BASOPHILS NFR BLD AUTO: 0 % (ref 0–1.5)
BILIRUB SERPL-MCNC: 0.3 MG/DL (ref 0.1–1.2)
BILIRUB UR QL STRIP: NEGATIVE
BUN SERPL-MCNC: 16 MG/DL (ref 8–23)
BUN/CREAT SERPL: 10.7 (ref 7–25)
CALCIUM SERPL-MCNC: 9.2 MG/DL (ref 8.6–10.5)
CASTS URNS MICRO: ABNORMAL
CHLORIDE SERPL-SCNC: 95 MMOL/L (ref 98–107)
CO2 SERPL-SCNC: 25.3 MMOL/L (ref 22–29)
COLOR UR: YELLOW
CREAT SERPL-MCNC: 1.5 MG/DL (ref 0.57–1)
EOSINOPHIL # BLD AUTO: 0.02 10*3/MM3 (ref 0–0.7)
EOSINOPHIL NFR BLD AUTO: 0.2 % (ref 0.3–6.2)
EPI CELLS #/AREA URNS HPF: ABNORMAL /HPF
ERYTHROCYTE [DISTWIDTH] IN BLOOD BY AUTOMATED COUNT: 13.1 % (ref 11.7–13)
GLOBULIN SER CALC-MCNC: 3.1 GM/DL
GLUCOSE SERPL-MCNC: 123 MG/DL (ref 65–99)
GLUCOSE UR QL: NEGATIVE
HCT VFR BLD AUTO: 29.7 % (ref 35.6–45.5)
HGB BLD-MCNC: 9.5 G/DL (ref 11.9–15.5)
HGB UR QL STRIP: (no result)
IMM GRANULOCYTES # BLD AUTO: 0.1 10*3/MM3 (ref 0–0.03)
IMM GRANULOCYTES NFR BLD AUTO: 1 % (ref 0–0.5)
KETONES UR QL STRIP: NEGATIVE
LEUKOCYTE ESTERASE UR QL STRIP: (no result)
LYMPHOCYTES # BLD AUTO: 1.41 10*3/MM3 (ref 0.9–4.8)
LYMPHOCYTES NFR BLD AUTO: 14.3 % (ref 19.6–45.3)
MCH RBC QN AUTO: 29.5 PG (ref 26.9–32)
MCHC RBC AUTO-ENTMCNC: 32 G/DL (ref 32.4–36.3)
MCV RBC AUTO: 92.2 FL (ref 80.5–98.2)
MONOCYTES # BLD AUTO: 0.66 10*3/MM3 (ref 0.2–1.2)
MONOCYTES NFR BLD AUTO: 6.7 % (ref 5–12)
NEUTROPHILS # BLD AUTO: 7.74 10*3/MM3 (ref 1.9–8.1)
NEUTROPHILS NFR BLD AUTO: 78.8 % (ref 42.7–76)
NITRITE UR QL STRIP: POSITIVE
OTHER ANTIBIOTIC SUSC ISLT: ABNORMAL
PH UR STRIP: 6.5 [PH] (ref 5–8)
PLATELET # BLD AUTO: 551 10*3/MM3 (ref 140–500)
POTASSIUM SERPL-SCNC: 3.8 MMOL/L (ref 3.5–5.2)
PROT SERPL-MCNC: 6.4 G/DL (ref 6–8.5)
PROT UR QL STRIP: (no result)
RBC # BLD AUTO: 3.22 10*6/MM3 (ref 3.9–5.2)
RBC #/AREA URNS HPF: ABNORMAL /HPF
SODIUM SERPL-SCNC: 135 MMOL/L (ref 136–145)
SP GR UR: 1.02 (ref 1–1.03)
URATE SERPL-MCNC: 7.8 MG/DL (ref 2.4–5.7)
UROBILINOGEN UR STRIP-MCNC: (no result) MG/DL
WBC # BLD AUTO: 9.83 10*3/MM3 (ref 4.5–10.7)
WBC #/AREA URNS HPF: ABNORMAL /HPF

## 2019-02-11 RX ORDER — CIPROFLOXACIN 500 MG/1
500 TABLET, FILM COATED ORAL DAILY
Qty: 7 TABLET | Refills: 0 | Status: SHIPPED | OUTPATIENT
Start: 2019-02-11 | End: 2019-02-15 | Stop reason: ALTCHOICE

## 2019-02-11 NOTE — PROGRESS NOTES
Please let patient know that I have talked with Dr. Crowder, and her urine is showing a different bacteria in the culture this time. We will treat with cipro daily x 7 days, but it is very important that she is not on the medrol dosepack concurrently due to risk of tendon rupture. She should also not do any heavy lifting or strenuous activity while on the antibiotic. I want to repeat the urine culture in two weeks, please make lab apt. She should not take her iron or carafate while on the cipro due to the ability of these medications to decrease the absorption of the antibiotic.     As far as her hemoglobin, it is about the same as it was when we originally mirna it. I want her to restart the oral iron when she is done with the cipro, and we will recheck her CBC in two months.

## 2019-02-11 NOTE — TELEPHONE ENCOUNTER
Ivette did labs and results are supposedly given to her.  I reviewed the labs and she does have a UTI which should've been taken care of by the Omnicef she was given by Ivette.  If she still having symptoms we can give her a different antibiotic.  Otherwise she had some anemia which was a postoperative anemia.  Her repeat white blood count looks normal.  She had elevated uric acid which goes with gout.  Your kidney function appears stable.  Please send a copy of the labs.

## 2019-02-11 NOTE — TELEPHONE ENCOUNTER
I talked to Ivette about the UTI and she will send in cipro to make sure the infection is gone.  Let us know if the pins and needles are still there after the completion of the antibiotic.

## 2019-02-11 NOTE — TELEPHONE ENCOUNTER
Pt and her  advised, they are wanting to figure out why she feels like there are pins and needles in her feet?

## 2019-02-15 ENCOUNTER — PATIENT OUTREACH (OUTPATIENT)
Dept: CASE MANAGEMENT | Facility: OTHER | Age: 74
End: 2019-02-15

## 2019-02-15 RX ORDER — ONDANSETRON 4 MG/1
4 TABLET, FILM COATED ORAL EVERY 8 HOURS PRN
Qty: 20 TABLET | Refills: 0 | Status: SHIPPED | OUTPATIENT
Start: 2019-02-15 | End: 2019-05-02 | Stop reason: SDUPTHER

## 2019-02-15 RX ORDER — PREDNISONE 10 MG/1
TABLET ORAL
Qty: 9 TABLET | Refills: 0 | Status: SHIPPED | OUTPATIENT
Start: 2019-02-15 | End: 2019-02-24 | Stop reason: SDUPTHER

## 2019-02-15 RX ORDER — SUMATRIPTAN 25 MG/1
TABLET, FILM COATED ORAL
Qty: 6 TABLET | Refills: 0 | Status: SHIPPED | OUTPATIENT
Start: 2019-02-15 | End: 2019-05-30

## 2019-02-15 RX ORDER — CEFDINIR 300 MG/1
300 CAPSULE ORAL 2 TIMES DAILY
Qty: 20 CAPSULE | Refills: 1 | Status: SHIPPED | OUTPATIENT
Start: 2019-02-15 | End: 2019-05-17

## 2019-02-15 NOTE — OUTREACH NOTE
"Complaints of \"migraines\", \"thumping headache\" with nausea.  Taking Excedrin with relief but has been told not to take Excedrin due to impaired kidney function.  Continues with \"pins and needles\" sensation to bilateral feet accompanied by swelling and redness.  Describes pain as \"stabbing.\"  Questions if this could be gout. Currently  being treated for an UTI but has stopped the antibiotic, Cipro,  due to adverse effects while taking Tegretoll that has been prescribed by Dr. Cuellar.  Reports unable to schedule an appt today due to [facility] PT visit today.  CA contacted LILIBETH Joya for [facility], and discussed patient's concerns/issues.  Then spoke with Dr. Crowder and detailed patient's complaints and he is aware patient's complaints have  been described to the CA telephonically  and CA not in the home for an evaluation/assessment.  Dr. Crowder prescribing Imitrex for migraines, Zofran for nausea, Omnicef for the UTI and stop Cipro, and Prednisone 10mg twcie daily for 3 days, then daily for 3 days..  Patient notified that Dr. Crowder sending prescriptions to her pharmacy and contact her pharmacy first to make sure they are ready for . Adhere to medication regimen as prescribed.   Advised to have her physical therapist assess her feet today during evaluation.  Advised elevating lower extremities while sitting or laying.  Informed her that  Dr. Crowder is on call this evening and discussed on call services.  CA contact information provided.  Advised to schedule an appt next week if symptoms/issues not improved or worsen and she is in agreement.   "

## 2019-02-18 ENCOUNTER — TELEPHONE (OUTPATIENT)
Dept: INTERNAL MEDICINE | Facility: CLINIC | Age: 74
End: 2019-02-18

## 2019-02-18 ENCOUNTER — PATIENT OUTREACH (OUTPATIENT)
Dept: CASE MANAGEMENT | Facility: OTHER | Age: 74
End: 2019-02-18

## 2019-02-18 NOTE — TELEPHONE ENCOUNTER
The physical therapist called because the pt's BP was 160/100 today in both arms  The nurse will be there to see her tomorrow, she just wanted to make us aware

## 2019-02-18 NOTE — OUTREACH NOTE
Care Management Plan 2/18/2019   Lifestyle Goals Decrease falls risk;Eat a healthy diet;Fewer exacerbations;Medication management;Routine follow-up with doctor(s)   Self Management Medication Adherence;Get flu/pneumonia shot   Suggested Appointments Other (See Comment)   Suggested Appointments PCP   Annual Wellness Visit:  Patient Will Schedule   Specific Disease Process Teaching (No Data)   Specific Disease Process Teaching Lung Cancer   Does patient have depression diagnosis? Yes   Advanced Directives: Patient Has   Discharged From: [facility]   Discharged to: Home   Discharge destination: Home Health   Medication Adherence Medications understood   Health Literacy Moderate     The main concerns and/or symptoms the patient would like to address are: Continues with swelling and redness bilateral feet.  Feels it's related to gout and hopeful current medication regimen will provide relief..    Education/instruction provided by Care Coordinator: States improvement with current regimen for migraines and still with episodes of nausea.  Discussed utilizing the Zofran has prescribed.  Review of Prednisone dosing and adherent to regimen along with antibiotics as directed.  [facility] visit today and states nurse due to vsit tomorroa.  Plans for evalutaion of bilateral feet.during assessment.  Encouraged to schedule an appt with .  Denies shortness of air, coughing, or pain today.  AWV due.  Current with my chart.  Spouse listed as health care surrogate in EMR.       Follow Up Outreach Due: As needed.    Kandace Gonzales RN

## 2019-02-19 ENCOUNTER — DOCUMENTATION (OUTPATIENT)
Dept: INTERNAL MEDICINE | Facility: CLINIC | Age: 74
End: 2019-02-19

## 2019-02-19 NOTE — PROGRESS NOTES
I talked to the home health nurse about the patient.  Her blood pressure is elevated systolic above 200 with a headache.  She is on clonidine 0.2 every 8 hours plus generic Norvasc 10 mg daily plus carvedilol 12.5 twice daily.  I have instructed her to go up to 25 mg carvedilol twice daily and monitor blood pressure.  She will let us know how it is going in the next day or 2.

## 2019-02-24 DIAGNOSIS — N39.0 URINARY TRACT INFECTION WITHOUT HEMATURIA, SITE UNSPECIFIED: ICD-10-CM

## 2019-02-25 ENCOUNTER — RESULTS ENCOUNTER (OUTPATIENT)
Dept: INTERNAL MEDICINE | Facility: CLINIC | Age: 74
End: 2019-02-25

## 2019-02-25 DIAGNOSIS — N39.0 URINARY TRACT INFECTION WITHOUT HEMATURIA, SITE UNSPECIFIED: ICD-10-CM

## 2019-02-25 RX ORDER — CIPROFLOXACIN 500 MG/1
TABLET, FILM COATED ORAL
Qty: 7 TABLET | Refills: 0 | OUTPATIENT
Start: 2019-02-25

## 2019-02-27 RX ORDER — CLONIDINE HYDROCHLORIDE 0.2 MG/1
0.2 TABLET ORAL EVERY 8 HOURS SCHEDULED
Qty: 30 TABLET | Refills: 0 | OUTPATIENT
Start: 2019-02-27

## 2019-02-27 RX ORDER — PREDNISONE 10 MG/1
TABLET ORAL
Qty: 9 TABLET | Refills: 0 | Status: SHIPPED | OUTPATIENT
Start: 2019-02-27 | End: 2019-05-30

## 2019-03-07 RX ORDER — LORAZEPAM 0.5 MG/1
TABLET ORAL
Qty: 90 TABLET | Refills: 1 | Status: SHIPPED | OUTPATIENT
Start: 2019-03-07 | End: 2019-07-23 | Stop reason: SDUPTHER

## 2019-03-26 DIAGNOSIS — J06.9 UPPER RESPIRATORY TRACT INFECTION, UNSPECIFIED TYPE: ICD-10-CM

## 2019-03-26 DIAGNOSIS — D50.9 IRON DEFICIENCY ANEMIA, UNSPECIFIED IRON DEFICIENCY ANEMIA TYPE: ICD-10-CM

## 2019-03-26 RX ORDER — FERROUS SULFATE 325(65) MG
325 TABLET ORAL
Qty: 90 TABLET | Refills: 1 | Status: SHIPPED | OUTPATIENT
Start: 2019-03-26 | End: 2019-09-26 | Stop reason: SDUPTHER

## 2019-03-26 RX ORDER — FAMOTIDINE 20 MG/1
TABLET, FILM COATED ORAL
Qty: 180 TABLET | Refills: 1 | Status: SHIPPED | OUTPATIENT
Start: 2019-03-26 | End: 2019-12-17 | Stop reason: SDUPTHER

## 2019-04-11 ENCOUNTER — RESULTS ENCOUNTER (OUTPATIENT)
Dept: INTERNAL MEDICINE | Facility: CLINIC | Age: 74
End: 2019-04-11

## 2019-04-11 DIAGNOSIS — D64.9 LOW HEMOGLOBIN: ICD-10-CM

## 2019-04-29 ENCOUNTER — PATIENT OUTREACH (OUTPATIENT)
Dept: CASE MANAGEMENT | Facility: OTHER | Age: 74
End: 2019-04-29

## 2019-04-29 NOTE — OUTREACH NOTE
Patient Outreach Note    Reports getting out and about more and walking for exercise.  Discussed health care maintenance needs and advised to schedule a mammogram and her AWV with Dr. Crowder after follow-up with thoracic surgeon in June.  Active with my chart and spouse, Rai , is designated healthcare surrogate.      Kandace Gonzales RN    4/29/2019, 12:16 PM

## 2019-05-01 ENCOUNTER — EPISODE CHANGES (OUTPATIENT)
Dept: CASE MANAGEMENT | Facility: OTHER | Age: 74
End: 2019-05-01

## 2019-05-03 RX ORDER — ONDANSETRON 4 MG/1
4 TABLET, FILM COATED ORAL EVERY 8 HOURS PRN
Qty: 20 TABLET | Refills: 0 | Status: SHIPPED | OUTPATIENT
Start: 2019-05-03 | End: 2019-05-17

## 2019-05-07 ENCOUNTER — EPISODE CHANGES (OUTPATIENT)
Dept: CASE MANAGEMENT | Facility: OTHER | Age: 74
End: 2019-05-07

## 2019-05-17 ENCOUNTER — APPOINTMENT (OUTPATIENT)
Dept: GENERAL RADIOLOGY | Facility: HOSPITAL | Age: 74
End: 2019-05-17

## 2019-05-17 ENCOUNTER — HOSPITAL ENCOUNTER (EMERGENCY)
Facility: HOSPITAL | Age: 74
Discharge: HOME | End: 2019-05-17
Attending: EMERGENCY MEDICINE | Admitting: EMERGENCY MEDICINE

## 2019-05-17 ENCOUNTER — PATIENT OUTREACH (OUTPATIENT)
Dept: CASE MANAGEMENT | Facility: OTHER | Age: 74
End: 2019-05-17

## 2019-05-17 ENCOUNTER — OFFICE VISIT (OUTPATIENT)
Dept: OTHER | Facility: HOSPITAL | Age: 74
End: 2019-05-17

## 2019-05-17 VITALS
HEIGHT: 66 IN | WEIGHT: 195.11 LBS | SYSTOLIC BLOOD PRESSURE: 201 MMHG | OXYGEN SATURATION: 97 % | RESPIRATION RATE: 16 BRPM | HEART RATE: 62 BPM | BODY MASS INDEX: 31.36 KG/M2 | DIASTOLIC BLOOD PRESSURE: 103 MMHG | TEMPERATURE: 97.2 F

## 2019-05-17 VITALS
BODY MASS INDEX: 30.56 KG/M2 | OXYGEN SATURATION: 97 % | SYSTOLIC BLOOD PRESSURE: 213 MMHG | RESPIRATION RATE: 18 BRPM | WEIGHT: 195.1 LBS | TEMPERATURE: 99.2 F | DIASTOLIC BLOOD PRESSURE: 97 MMHG | HEART RATE: 64 BPM

## 2019-05-17 DIAGNOSIS — I10 UNCONTROLLED HYPERTENSION: Primary | ICD-10-CM

## 2019-05-17 DIAGNOSIS — C34.11 MALIGNANT NEOPLASM OF UPPER LOBE OF RIGHT LUNG (HCC): Primary | ICD-10-CM

## 2019-05-17 LAB
ALBUMIN SERPL-MCNC: 4.2 G/DL (ref 3.5–5.2)
ALBUMIN/GLOB SERPL: 1.8 G/DL
ALP SERPL-CCNC: 118 U/L (ref 39–117)
ALT SERPL W P-5'-P-CCNC: 13 U/L (ref 1–33)
ANION GAP SERPL CALCULATED.3IONS-SCNC: 10.8 MMOL/L
AST SERPL-CCNC: 22 U/L (ref 1–32)
BASOPHILS # BLD AUTO: 0.02 10*3/MM3 (ref 0–0.2)
BASOPHILS NFR BLD AUTO: 0.3 % (ref 0–1.5)
BILIRUB SERPL-MCNC: 0.2 MG/DL (ref 0.2–1.2)
BUN BLD-MCNC: 22 MG/DL (ref 8–23)
BUN/CREAT SERPL: 14 (ref 7–25)
CALCIUM SPEC-SCNC: 8.9 MG/DL (ref 8.6–10.5)
CHLORIDE SERPL-SCNC: 99 MMOL/L (ref 98–107)
CO2 SERPL-SCNC: 27.2 MMOL/L (ref 22–29)
CREAT BLD-MCNC: 1.57 MG/DL (ref 0.57–1)
DEPRECATED RDW RBC AUTO: 52.7 FL (ref 37–54)
EOSINOPHIL # BLD AUTO: 0.1 10*3/MM3 (ref 0–0.4)
EOSINOPHIL NFR BLD AUTO: 1.4 % (ref 0.3–6.2)
ERYTHROCYTE [DISTWIDTH] IN BLOOD BY AUTOMATED COUNT: 15 % (ref 12.3–15.4)
GFR SERPL CREATININE-BSD FRML MDRD: 32 ML/MIN/1.73
GLOBULIN UR ELPH-MCNC: 2.4 GM/DL
GLUCOSE BLD-MCNC: 100 MG/DL (ref 65–99)
HCT VFR BLD AUTO: 35 % (ref 34–46.6)
HGB BLD-MCNC: 11.1 G/DL (ref 12–15.9)
IMM GRANULOCYTES # BLD AUTO: 0.05 10*3/MM3 (ref 0–0.05)
IMM GRANULOCYTES NFR BLD AUTO: 0.7 % (ref 0–0.5)
LYMPHOCYTES # BLD AUTO: 2.04 10*3/MM3 (ref 0.7–3.1)
LYMPHOCYTES NFR BLD AUTO: 29.2 % (ref 19.6–45.3)
MCH RBC QN AUTO: 30.6 PG (ref 26.6–33)
MCHC RBC AUTO-ENTMCNC: 31.7 G/DL (ref 31.5–35.7)
MCV RBC AUTO: 96.4 FL (ref 79–97)
MONOCYTES # BLD AUTO: 0.53 10*3/MM3 (ref 0.1–0.9)
MONOCYTES NFR BLD AUTO: 7.6 % (ref 5–12)
NEUTROPHILS # BLD AUTO: 4.24 10*3/MM3 (ref 1.7–7)
NEUTROPHILS NFR BLD AUTO: 60.8 % (ref 42.7–76)
NRBC BLD AUTO-RTO: 0 /100 WBC (ref 0–0.2)
PLATELET # BLD AUTO: 262 10*3/MM3 (ref 140–450)
PMV BLD AUTO: 10.3 FL (ref 6–12)
POTASSIUM BLD-SCNC: 4.3 MMOL/L (ref 3.5–5.2)
PROT SERPL-MCNC: 6.6 G/DL (ref 6–8.5)
RBC # BLD AUTO: 3.63 10*6/MM3 (ref 3.77–5.28)
SODIUM BLD-SCNC: 137 MMOL/L (ref 136–145)
TROPONIN T SERPL-MCNC: <0.01 NG/ML (ref 0–0.03)
WBC NRBC COR # BLD: 6.98 10*3/MM3 (ref 3.4–10.8)

## 2019-05-17 PROCEDURE — 93005 ELECTROCARDIOGRAM TRACING: CPT | Performed by: EMERGENCY MEDICINE

## 2019-05-17 PROCEDURE — 93010 ELECTROCARDIOGRAM REPORT: CPT | Performed by: INTERNAL MEDICINE

## 2019-05-17 PROCEDURE — 85025 COMPLETE CBC W/AUTO DIFF WBC: CPT | Performed by: EMERGENCY MEDICINE

## 2019-05-17 PROCEDURE — 71046 X-RAY EXAM CHEST 2 VIEWS: CPT

## 2019-05-17 PROCEDURE — 84484 ASSAY OF TROPONIN QUANT: CPT | Performed by: EMERGENCY MEDICINE

## 2019-05-17 PROCEDURE — G0463 HOSPITAL OUTPT CLINIC VISIT: HCPCS | Performed by: NURSE PRACTITIONER

## 2019-05-17 PROCEDURE — 99215 OFFICE O/P EST HI 40 MIN: CPT | Performed by: NURSE PRACTITIONER

## 2019-05-17 PROCEDURE — 80053 COMPREHEN METABOLIC PANEL: CPT | Performed by: EMERGENCY MEDICINE

## 2019-05-17 PROCEDURE — 99284 EMERGENCY DEPT VISIT MOD MDM: CPT

## 2019-05-17 RX ORDER — CARBAMAZEPINE 200 MG/1
100 TABLET ORAL 2 TIMES DAILY
Refills: 0 | COMMUNITY
Start: 2019-03-06 | End: 2021-06-05 | Stop reason: HOSPADM

## 2019-05-17 RX ORDER — SODIUM CHLORIDE 0.9 % (FLUSH) 0.9 %
10 SYRINGE (ML) INJECTION AS NEEDED
Status: DISCONTINUED | OUTPATIENT
Start: 2019-05-17 | End: 2019-05-17 | Stop reason: HOSPADM

## 2019-05-17 RX ORDER — ALLOPURINOL 100 MG/1
100 TABLET ORAL DAILY
Refills: 2 | COMMUNITY
Start: 2019-03-05 | End: 2021-04-29

## 2019-05-17 NOTE — OUTREACH NOTE
Care Coordination Note    Sana, ED Case Manager, informed of ED presentation for hypertension, appt today for survivorship meeting and noted elevated BP documentation during the visit.  History of lung cancer,S/P wedge resection.  Lives with spouse, has a cane, used [facility] in the past.  Will follow and assist with CA services.  Transition visit or home health services available for patient if deemed appropriate upon ED discharge.      Kandace Gonzales RN    5/17/2019, 3:43 PM

## 2019-05-17 NOTE — PROGRESS NOTES
[facility] MULTIDISCIPLINARY CLINIC  SURVIVORSHIP VISIT    Rachana Arguelles is a pleasant 73 y.o.   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 Here today in our Cancer Survivorship Clinic with her spouse Rai, to review survivorship care plan.    TREATMENT HISTORY:        Lung cancer (CMS/HCC)    8/21/2018 Biopsy     1. RIGHT LUNG, UPPER LOBE, NEEDLE BIOPSIES:               FRAGMENTS OF BENIGN PULMONARY PARENCHYMA.               NO TUMOR OR GRANULOMA.         11/27/2018 Imaging     F-18 FDG PET FROM SKULL BASE TO MID THIGH WITH PET/CT FUSION     HISTORY: 73-year-old female with an enlarging right upper lobe pulmonary  nodule.     TECHNIQUE: Radiation dose reduction techniques were utilized, including  automated exposure control and exposure modulation based on body size.   Blood glucose level at time of injection was 109 mg/dL.  6.0 mCi of F-18  FDG were injected and PET was performed from skull base to mid thigh. CT  was obtained for localization and attenuation correction. Time at  injection 12 PM. PET start time 1:15 PM. Compared with chest CT from  11/14/2018.     FINDINGS: The approximately 1.2 cm irregular right upper lobe pulmonary  nodule is hypermetabolic with  a maximal SUV of 4.9. There is no  suspicious mediastinal or hilar activity. There is no suspicious  hypermetabolic activity within the neck. There is a speckled pattern of  activity throughout the liver with no definite suspicious hypermetabolic  foci. Adrenals are unremarkable. There is no suspicious hypermetabolic  activity within the abdomen or pelvis. There is hypermetabolic activity  at shoulder joints and hip joints related to degenerative change and  arthroplasty hardware at the left shoulder and left hip.     IMPRESSION:  The 1.2 cm right upper lobe pulmonary nodule is  hypermetabolic and has significantly increased in size since the chest  CT from 04/04/2018 on  which the nodule measured 0.6 cm. The nodule is  very suspicious for a primary lung malignancy. There is no evidence for  metastatic lymphadenopathy within the chest and there is no convincing  evidence for distant metastases.         12/17/2018 Initial Diagnosis     Lung cancer (CMS/HCC)         12/17/2018 Biopsy     CT-guided biopsy of right upper lobe pulmonary nodule    1. Right Upper Lobe Lung Nodule, Biopsy:               A. Moderately differentiated squamous cell carcinoma.               B. Associated marked chronic inflammation and focal foreign body giant cell reaction noted.    PD-L1: 60%         1/11/2019 Surgery     Bronchoscopy, thoracoscopy video-assisted with right upper lobe wedge resection, completion right upper lobectomy, lymph node dissection, intercostal nerve block per Quita Figueroa MD    1. Lung, Right Upper Lobe, Wedge:  Invasive keratinizing squamous cell carcinoma.                 A.  Histologic features:                             1.  Histologic type:  Invasive keratinizing squamous cell carcinoma.                             2.  Grade:  Moderately differentiated.                 B.  Tumor Size:  Tumor measures 1.2 x 1.1 x 1.0 cm.               C.  Margins of resection and extent:                              1.  The tumor extends to the parenchymal wedge margin.                             2.  Visceral pleural margin free of tumor.       2.  Lymph Node, Subcarinal, Excision:  Two benign lymph nodes.      3.  Lung, Right Upper Lobe, Lobectomy:  Benign lobe of lung with                A.  No residual tumor.               B.  Benign bronchial margin.               3.   Two benign lymph nodes.      4.  Lymph Node, Level 8, Excision:  Single benign lymph node.      5.  Lymph Node, Level 4, Excision:  Two benign lymph nodes.       6.  Lymph Node, Level 10:  Benign cartilage and lung tissue with no definitive lymph node.       7.  Lymph Node, Level 10, #2, Excision:  Benign fibrous tissue no  definitive lymph node identified.      8.  Lymph Node, Level 10, #3, Excision: Single benign lymph node.       Comment:  On prior case [id number] several special stains were performed to confirm squamous cell carcinoma.  Also PD-L1 testing was performed and tumor proportion score was 60%.  The lobectomy margin was free so the closest margin is the bronchial 4 cm away given the measurement from part 3.            Allergies as of 05/17/2019 - Reviewed 02/08/2019   Allergen Reaction Noted   • Neurontin [gabapentin] Confusion 02/08/2019   • Morphine Other (See Comments) 12/18/2015       MEDICATIONS:  I have reviewed the medication list with the patient and I updated it in the electronic medical record. Medication dosages and frequencies were confirmed to be accurate with the patient.      Review of Systems   Constitutional: Negative for activity change and appetite change.   Respiratory: Positive for cough (productive of thick dark blood colored sputum) and shortness of breath. Negative for choking and wheezing.    Cardiovascular: Positive for palpitations and leg swelling. Negative for chest pain.   Gastrointestinal: Positive for abdominal pain (epigastric discomfort and indigestion) and nausea. Negative for blood in stool, constipation and diarrhea.   Genitourinary: Negative for hematuria.   Musculoskeletal: Positive for arthralgias, back pain and myalgias.   Neurological: Positive for numbness.   Hematological: Bruises/bleeds easily.   Psychiatric/Behavioral: Positive for dysphoric mood. Negative for decreased concentration and sleep disturbance. The patient is nervous/anxious.        DISTRESS QUESTIONNAIRE: 8/10 EFFECT TO LEVEL OF FUNCTIONING: unanswered  Patient identified areas of distress: see flowsheet      There were no vitals taken for this visit.    Wt Readings from Last 3 Encounters:   02/08/19 88.5 kg (195 lb)   02/04/19 88.5 kg (195 lb)   01/28/19 90.3 kg (199 lb 1.2 oz)       There were no vitals filed for  this visit.      Physical Exam   Constitutional: She is oriented to person, place, and time. She appears well-developed and well-nourished. She is cooperative.   diaphoretic   HENT:   Head: Normocephalic and atraumatic.   Mouth/Throat: Oropharynx is clear and moist and mucous membranes are normal.   Cardiovascular: Normal rate and regular rhythm. Exam reveals distant heart sounds.   Hypertensive in clinic today see VS   Pulmonary/Chest: No stridor. No respiratory distress. She has decreased breath sounds in the right upper field. She has wheezes. She has no rhonchi. She has no rales.   Abdominal: Soft. Normal appearance and bowel sounds are normal.   Musculoskeletal: Normal range of motion.   Neurological: She is alert and oriented to person, place, and time.   Skin: Skin is warm, dry and intact. Bruising (scattered across arms) noted.   Psychiatric: She has a normal mood and affect. Her speech is normal. Thought content normal. She is hyperactive. Cognition and memory are normal. She expresses impulsivity.         Problem List Items Addressed This Visit        Active Problems    Lung cancer (CMS/[facility]) - Primary            SURVIVORSHIP DISCUSSION HELD TODAY:   Patient arrives to clinic today for her cancer survivorship visit.  She reports persistent fatigue, shortness of breath with exertion, epigastric discomfort/pain and indigestion with palpitations as problems that have not resolved since surgery.  She reports coughing up some dark red blood colored mucus beginning about 2 days ago.  She feels like she breaks out into sweats quite frequently.  On exam today blood pressures are elevated with Dinamap and manual cuff.  She reports taking all her prescribed medications as directed today.  She denies chest pain/tightness denies any radiating arm pain, denies nausea vomiting.  I did place a call to Dr. Carbone office to alert them to patient condition unfortunately they are unable to work her in.  I have instructed Ms.  Kindra to go to the emergency room for management of her hypertension and after some discussion she agrees.    In the meantime I have provided her with a copy of her treatment summary and care plan as well as a packet of resources and information regarding supportive services.  I let them know I will follow-up next week by phone to ensure they will have any questions about the treatment summary your care plan.  And she rates her distress an 8 out of 10 today I have placed a referral to our supportive oncology service and asked Judit NÚÑEZ to follow-up.  Per Dr. Carbone office patient has not been seen there since June 2018.  I have advised Ms. Arguelles that she will need to schedule a follow-up appointment with Dr. holloway and she verbalized understanding    Advance Care Planning   Advance Care Planning Discussion:    Patient does not have advance care planning complete. Brief discussion and written information provided regarding advance care planning and appropriateness for all healthy adults, choosing a healthcare surrogate and upcoming Advance Care Planning classes offered monthly at the [facility].         No orders of the defined types were placed in this encounter.      After review of the Survivorship Treatment Summary & Care Plan, the patient verbalized understanding of recommendations for follow-up. As outlined in the care plan, they were advised to continue with follow-up care in accordance with the NCCN surveillance guidelines while transitioning back to their primary care physician for continued general preventive and healthcare needs. We discussed the importance of healthy eating, exercise and weight management. We reviewed current guidelines for routine screening of other cancers.     A copy of the Survivorship Treatment Summary & Care Plan for Ms. Arguelles (see below) was provided to and forwarded to the providers identified on the care team.      Greater than 40 minutes spent with patient, more  than half of that spent face-to-face counseling patient extensively on treatment summary, surveillance for recurrence, late and long-term effects of disease and treatment, intimacy and self-image, preventative screening for other cancers, achieving/maintaining healthy BMI and link to risk reduction, adherence to current therapies, management of anxiety/uncertainty and self care strategies.       Lung Cancer Survivorship Plan      General Information   Patient name Rachana Arguelles    Date of birth 1945   Phone Home Phone [phone]        Email [email or web address]      Cancer Treatment Team     Patient Care Team:  Álvaro Gifford MD as Consulting Physician (Pulmonary Disease)    Provider Phone numbers  Care Team Provider: Whitney Dudley MD,  ([phone])  Care Team Provider: Jaciel Webber MD,  ([phone])  Care Team Provider: Quita Figueroa MD,  ([phone])  Care Team Provider: Sabas Luther MD,  ([phone])  Care Team Provider: Álvaro Gifford MD,  ([phone])     Post Treatment Care Team   Primary Care Physician Rhys Crowder Jr., MD  [phone]  [address] [place] [address]          Background Information   Medical history Past Medical History:   • AAA (abdominal aortic aneurysm) without rupture (CMS/HCC)   • Anxiety   • Arthritis   • Cancer (CMS/HCC)    skin cancer   • Chest pain    at rest   • Chronic low back pain   • Chronic pain syndrome   • CKD (chronic kidney disease), stage III (CMS/HCC)   • Claustrophobia    Resolved   • Depression   • Dizziness   • GERD (gastroesophageal reflux disease)   • GI problem   • Hiatal hernia   • History of migraine headaches   • Hyperlipidemia   • Hypertension   • Lumbar postlaminectomy syndrome    Resolved   • Lung cancer (CMS/HCC)   • Lung nodule   • Palpitations   • Polypharmacy   • Pulmonary embolism (CMS/HCC)    January 2013 - Resolved, felt to be secondary to estrogen use   • Submandibular sialoadenitis    Resolved   •  Vitamin B 12 deficiency        Surgical history Past Surgical History:   • ANGIOPLASTY ILIAC ARTERY    Procedure: BILATERAL ILIAC STENTS;  Surgeon: Riki Mancera MD;  Location: [facility] MAIN OR;  Service: Vascular   • APPENDECTOMY   • BREAST SURGERY    Breast Surgery Reduction Procedure   • BRONCHOSCOPY    Procedure: BRONCHOSCOPY;  Surgeon: Álvaro Gifford MD;  Location: [facility] ENDOSCOPY;  Service: Pulmonary   • CHOLECYSTECTOMY   • HYSTERECTOMY   • INTUBATION        • LASIK   • LUMBAR FUSION    Lumbar Vertebral Fusion   • SHOULDER ARTHROSCOPY    Dr. Carrillo/MERRILL Kent   • THORACOSCOPY VIDEO ASSISTED WITH LOBECTOMY    Procedure: BRONCOSCOPY, THORACOSCOPY VIDEO ASSISTED WITH RIGHT UPPER LOBE WEDGE RESECTION, COMPLETION RIGHT UPPER LOBECTOMY, LYMPH NODE DISSECTION, INTERCOSTAL NERVE BLOCK;  Surgeon: Quita Figueroa MD;  Location: [facility] MAIN OR;  Service: Thoracic   • TONSILLECTOMY   • TOTAL HIP ARTHROPLASTY REVISION   • TOTAL KNEE ARTHROPLASTY   • TOTAL SHOULDER ARTHROPLASTY        Tobacco use Social History     Tobacco Use   Smoking Status Former Smoker   • Packs/day: 2.50   • Years: 15.00   • Pack years: 37.50   • Types: Cigarettes   • Last attempt to quit: 9/22/2003   • Years since quitting: 15.6   Smokeless Tobacco Never Used        Family oncology history Cancer-related family history includes Cancer in her mother; Lung cancer in her mother.      Oncology Information      Lung cancer (CMS/HCC)    8/21/2018 Biopsy     1. RIGHT LUNG, UPPER LOBE, NEEDLE BIOPSIES:               FRAGMENTS OF BENIGN PULMONARY PARENCHYMA.               NO TUMOR OR GRANULOMA.           11/27/2018 Imaging     F-18 FDG PET FROM SKULL BASE TO MID THIGH WITH PET/CT FUSION     HISTORY: 73-year-old female with an enlarging right upper lobe pulmonary  nodule.     TECHNIQUE: Radiation dose reduction techniques were utilized, including  automated exposure control and exposure modulation based on body size.   Blood glucose level at time of  injection was 109 mg/dL.  6.0 mCi of F-18  FDG were injected and PET was performed from skull base to mid thigh. CT  was obtained for localization and attenuation correction. Time at  injection 12 PM. PET start time 1:15 PM. Compared with chest CT from  11/14/2018.     FINDINGS: The approximately 1.2 cm irregular right upper lobe pulmonary  nodule is hypermetabolic with  a maximal SUV of 4.9. There is no  suspicious mediastinal or hilar activity. There is no suspicious  hypermetabolic activity within the neck. There is a speckled pattern of  activity throughout the liver with no definite suspicious hypermetabolic  foci. Adrenals are unremarkable. There is no suspicious hypermetabolic  activity within the abdomen or pelvis. There is hypermetabolic activity  at shoulder joints and hip joints related to degenerative change and  arthroplasty hardware at the left shoulder and left hip.     IMPRESSION:  The 1.2 cm right upper lobe pulmonary nodule is  hypermetabolic and has significantly increased in size since the chest  CT from 04/04/2018 on which the nodule measured 0.6 cm. The nodule is  very suspicious for a primary lung malignancy. There is no evidence for  metastatic lymphadenopathy within the chest and there is no convincing  evidence for distant metastases.           12/17/2018 Initial Diagnosis     Lung cancer (CMS/HCC)           12/17/2018 Biopsy     CT-guided biopsy of right upper lobe pulmonary nodule    1. Right Upper Lobe Lung Nodule, Biopsy:               A. Moderately differentiated squamous cell carcinoma.               B. Associated marked chronic inflammation and focal foreign body giant cell reaction noted.    PD-L1: 60%           1/11/2019 Surgery     Bronchoscopy, thoracoscopy video-assisted with right upper lobe wedge resection, completion right upper lobectomy, lymph node dissection, intercostal nerve block per Quita Figueroa MD    1. Lung, Right Upper Lobe, Wedge:  Invasive keratinizing squamous  cell carcinoma.                 A.  Histologic features:                             1.  Histologic type:  Invasive keratinizing squamous cell carcinoma.                             2.  Grade:  Moderately differentiated.                 B.  Tumor Size:  Tumor measures 1.2 x 1.1 x 1.0 cm.               C.  Margins of resection and extent:                              1.  The tumor extends to the parenchymal wedge margin.                             2.  Visceral pleural margin free of tumor.       2.  Lymph Node, Subcarinal, Excision:  Two benign lymph nodes.      3.  Lung, Right Upper Lobe, Lobectomy:  Benign lobe of lung with                A.  No residual tumor.               B.  Benign bronchial margin.               3.   Two benign lymph nodes.      4.  Lymph Node, Level 8, Excision:  Single benign lymph node.      5.  Lymph Node, Level 4, Excision:  Two benign lymph nodes.       6.  Lymph Node, Level 10:  Benign cartilage and lung tissue with no definitive lymph node.       7.  Lymph Node, Level 10, #2, Excision:  Benign fibrous tissue no definitive lymph node identified.      8.  Lymph Node, Level 10, #3, Excision: Single benign lymph node.       Comment:  On prior case [id number] several special stains were performed to confirm squamous cell carcinoma.  Also PD-L1 testing was performed and tumor proportion score was 60%.  The lobectomy margin was free so the closest margin is the bronchial 4 cm away given the measurement from part 3.                  Complications during Therapy:    prolonged inpatient hospital stay, developed difficulties swallowing post-operatively    Modification to Treatment Plan:   No Modifications      Lifetime Dose Tracking:   No doses have been documented on this patient for the following tracked chemicals: Doxorubicin, Epirubicin, Idarubicin, Daunorubicin, Mitoxantrone, Bleomycin, Mitomycin, Doxorubicin Liposomal             [No treatment plan]         Persistent Treatment-Associated  Adverse Effects at Completion of Therapy   It is important to recognize that not every person experiences the following adverse events after treatment.  You may not have any of these issues, a few or many adverse effects.  Experiences are highly variable.  Please discuss any adverse effects of cancer treatment with your cancer care team.      After Surgical Therapy   Surgery to remove part or all of the lung may put you at higher risk for developing chronic pain, shortness of breath especially with activity, fatigue and weakness. These risks can increase if you were also treated with radiation and certain types of chemotherapy. If you smoke or are exposed to secondhand smoke it is important to stop smoking. Continuing to smoke can increase the risk of infections, further damage to the lungs and other side effects. Ask your doctor for resources and ways to quit smoking. Be sure to report any symptoms of cough, shortness of air or new pain to your doctor.       After Chemotherapy   No chemotherapy received.      After Radiation Therapy   No radiation treatments received.      Other   No PDT performed.      Care of your Venous Access Device   No Venous Access Device currently in place      General After Cancer Treatment        It is not uncommon for cancer to impact other areas of your life such as relationships, work and mental health.  If you develop financial concerns, resources are sometimes available to assist in these areas.  Depression and anxiety can present either during or after cancer diagnosis and treatment.  It is important to discuss with your physician any of these concerns so these resources can be made available to you.      General Cancer Support & Resources    [facility]    Cancer Resource Center & Multidisciplinary Clinic:  [address] [place] [address]  [phone]    Survivorship Clinical Nurse Specialist  Ashia Watkins [facility] - [phone]    Oncology Social  Worker:  Judit Summers [facility] - [phone]     Psychiatric Nurse Practitioner:  Deanne Granda APRN - [phone]    Financial Counselor and Contact Information:  [facility] - [phone]    Oncology Dietician Contact Information:  Karen [address] - [phone]     Common Concerns After Treatment    Managing Anxiety or Depression:  Referral to support group, e.g. American Cancer Society (www.cancer.org, 719.467.1529), Cancer Support Community (www.ConnectEdu.org, 906.571.6764)    Referral to a community provider for cognitive behavioral therapy or counseling.    Psychiatric care or counseling and/or initiation of pharmacotherapy are available at the [facility] Psychosocial Supportive Oncology . Please call [phone] or talk to a member of your treatment team about a referral.    For anonymous information, resource requests or support you can also call the 24-hour Crisis and Information Center at [phone]      Fear of Cancer Coming Back:  Patients need to know that these feelings are normal, and they won’t cause the cancer to come back.    • Referral for cognitive behavioral therapy or counseling    • Referral to support group, e.g. American Cancer Society (www.cancer.org, 393.625.1788), Cancer Support Community (www.[employer].org, 781.412.6330)      Fatigue:  Fatigue is one of the most common problems in patients with cancer. It may be related to the disease itself or cancer treatment and may continue beyond completion of treatment among long-term cancer survivors. Among people with cancer, 80% to 100% report fatigue.     Fatigue may be an isolated problem or occur as one part of a cluster of symptoms, such as pain, depression, dyspnea, anorexia, and sleep problems.    If you are taking an immunotherapy, fatigue may be a symptom of an endocrine disorder secondary to immunotherapy, such as thyroid or pituitary disorder, not just general  cancer-related fatigue. It is important to report any changes to your doctor (ONS, 2017)    Regular physical activity (goal of 150 minutes of activity per week) is the most important thing you can do to manage fatigue. Other treatments that are likely to be helpful include:    Progressive muscle relaxation and/or relaxation breathing with or without imagery or distraction  Cognitive behavioral therapy for sleep  Yoga  Meditation, mindfulness based stress reduction, and cognitive behavioral stress management  Cognitive behavioral therapy for fatigue, depression, and pain with or without hypnosis  Evaluation for other causes of fatigue including hypothyroidism, anemia and depression      Financial Concerns:  The financial challenges that people with cancer and their families face are very real.     For hospital bills, you may want to talk with a hospital financial counselor. You may be able to work out a monthly payment plan or even get a reduced rate. You may also want to stay in touch with your insurance company to make sure costs are covered.    For information about resources that are available you can go to the NCI database, “Organizations That Offer Support Services,” at http://www.cancer.gov, search terms “financial assistance.”     Or call the [employer] toll-free 8-348-0-CANCER (1-959.137.2979) to ask for help.      Managing Insomnia  Mindfulness based stress reduction  Practice good sleep hygiene (reduce/eliminate TV and electronic device screen time one hour before bed)  Avoid stimulants like caffeine and nicotine close to bedtime  Avoid alcohol close to bedtime. While alcohol is well-known to help you fall asleep faster, too much close to bedtime can disrupt sleep in the second half of the night as the body begins to process the alcohol.     Evaluation and management of other symptoms (hot flashes, anxiety, and pain)  Regular physical activity (goal of 150 minutes of activity per week)  The supplement melatonin  "may help with sleep disruption    More information at www.sleepfoundation.org      Concerns about Sexuality, Intimacy and Body Image  Ask your treatment team about referrals to counseling and/or support groups to address body image concerns.  Non-hormonal remedies for vaginal dryness for sexual activity include water-based vaginal lubricants (Astroglide, [place] Jelly). Avoid products with spermicides or additives as they can be irritating.  Some people use a non-hormonal vaginal moisturizer 1-3 times per week to improve general comfort (Replens, coconut oil).  Discuss pain with intercourse or sexual activity with your doctor.    Look Good Feel Better is a non-medical, brand-neutral public service program that teaches beauty techniques to people with cancer to help them manage the appearance-related side effects of cancer treatment. The program includes lessons on skin and nail care, cosmetics, wigs and turbans, accessories and styling, helping people with cancer to find some normalcy in a life that is by no means normal.    For more information and helpful videos visit:  http://lookgoodfeelbetter.org/       Changes in Memory or \"Brain Fog\"  Patients should know that 25% of cancer patients have cognitive dysfunction (changes in thinking or memory) after treatment and it usually gets better over time    Some things you can do to manage \"brain fog\" or memory problems include:  Mental exercises (e.g., word games, crossword puzzles)   Setting up and following routines  Repeating information  Keeping a memory journal  Avoiding multitasking  Exercising  Maintaining a healthy diet.   There is some evidence that Group Cognitive Training is also effective  Rule out depression, sleep disturbance      Coping with Shortness of Breath:  Try sitting in front of a fan or applying a cold washcloth to your forehead.    If you get winded take a break until your breathing returns to how it was when you started, then begin again if you " can.    Refer to enclosed handouts for diaphragmatic breathing, tripod position and pursed lip breathing exercises.       Maintain a Healthy Weight:  Aim for a target BMI of 20-25 m2  (Body mass index is 30.54 kg/m².)    An oncology specialized registered dietician is available at the [facility] for consultations to you. Please call [phone]        Prevention and Wellness:  Achieve and maintain a healthy body weight as weight gain is a risk factor for development or recurrence of some cancers (BMI between 20-25m2).  Current Body mass index is 30.54 kg/m².  Regular physical activity (preferably daily). Strive for at least 150 minutes of moderate or 75 minutes of vigorous activity per week.  Maintain a diet high in vegetables, fruits and whole grains and low in sugars and fats. Limit red meat and avoid processed foods.  Avoid all tobacco and second hand smoke.  Avoid all alcohol intake.  If you do drink, minimize alcohol exposure - no more than one drink in a day for women and two drinks in a day for men.  Continue all standard non-cancer related healthcare with your primary care provider. Any new, unusual, and/or persistent symptoms should be brought to the attention of your provider.           Non-Small Cell Lung Cancer Surveillance Stage I-II with no evidence of clinical/radiographic disease (primary treatment included surgery +/- chemotherapy)    How Frequent?    Medical Oncology visits Every 6 months for 2-3 years, then annually    Imaging exams      Chest CT Scan +/- contrast Every 6 months for 2-3 years, then low-dose non-contrast-enhanced chest CT annually.    Immunizations      Influenza      Herpes Zoster      Pneumococcal   Yearly  Once  As appropriate    Tobacco Cessation The patient is not currently a tobacco user.  Counseling given: Not Answered            Monitor for ongoing toxicities:   None Specified      Call your doctor if you have any of these signs or symptoms   New or  worsening symptoms.  Pain that is new, unrelieved or bothersome.  Difficulty with your emotions, anxiety, and/or depression.  Physical problems that affect your abilities in your daily life or those that are bothersome (for example, continued fatigue, trouble sleeping, sexual problems, or edema).      Referrals provided   There are no referral needs at this time.

## 2019-05-22 ENCOUNTER — EPISODE CHANGES (OUTPATIENT)
Dept: CASE MANAGEMENT | Facility: OTHER | Age: 74
End: 2019-05-22

## 2019-05-30 ENCOUNTER — OFFICE VISIT (OUTPATIENT)
Dept: INTERNAL MEDICINE | Facility: CLINIC | Age: 74
End: 2019-05-30

## 2019-05-30 VITALS
TEMPERATURE: 96.9 F | OXYGEN SATURATION: 95 % | SYSTOLIC BLOOD PRESSURE: 162 MMHG | WEIGHT: 194 LBS | DIASTOLIC BLOOD PRESSURE: 76 MMHG | HEART RATE: 70 BPM | BODY MASS INDEX: 31.31 KG/M2

## 2019-05-30 DIAGNOSIS — G24.4 JAW DYSKINESIA: ICD-10-CM

## 2019-05-30 DIAGNOSIS — R53.82 CHRONIC FATIGUE: ICD-10-CM

## 2019-05-30 DIAGNOSIS — R00.2 PALPITATIONS: ICD-10-CM

## 2019-05-30 DIAGNOSIS — R06.09 DYSPNEA ON EXERTION: ICD-10-CM

## 2019-05-30 DIAGNOSIS — E53.8 B12 DEFICIENCY: ICD-10-CM

## 2019-05-30 DIAGNOSIS — I10 ESSENTIAL HYPERTENSION: Primary | ICD-10-CM

## 2019-05-30 DIAGNOSIS — G25.71 AKATHISIA: ICD-10-CM

## 2019-05-30 DIAGNOSIS — F32.1 MODERATE SINGLE CURRENT EPISODE OF MAJOR DEPRESSIVE DISORDER (HCC): ICD-10-CM

## 2019-05-30 DIAGNOSIS — C34.11 MALIGNANT NEOPLASM OF RIGHT UPPER LOBE OF LUNG (HCC): ICD-10-CM

## 2019-05-30 PROCEDURE — 99214 OFFICE O/P EST MOD 30 MIN: CPT | Performed by: FAMILY MEDICINE

## 2019-05-30 RX ORDER — ERGOCALCIFEROL 1.25 MG/1
50000 CAPSULE ORAL WEEKLY
Refills: 2 | Status: ON HOLD | COMMUNITY
Start: 2019-05-20 | End: 2019-12-20

## 2019-05-30 RX ORDER — HYDRALAZINE HYDROCHLORIDE 25 MG/1
75 TABLET, FILM COATED ORAL 3 TIMES DAILY
Refills: 2 | COMMUNITY
Start: 2019-05-22 | End: 2019-12-02 | Stop reason: DRUGHIGH

## 2019-05-30 NOTE — PROGRESS NOTES
Subjective   Rachana Arguelles is a 73 y.o. female.     Chief Complaint   Patient presents with   • Shortness of Breath   • Hypertension   • Hyperlipidemia   • Fatigue   Repetitive movement of the jaw.      History of Present Illness   Patient has myriad symptoms.  She has side to side movement of the jaw which is unconscious and she has evidence of oral facial dyskinesia resulting abrading of the teeth.  Otherwise she tends to have some degree of akathisia for uncertain reasons.  She is had a jaw movement for over 2 years.  She has an appoint with Dr. Marshall neurology in July.    She has shortness of breath despite recovering from prior lung resection on the right side from lung cancer.  She did not require chemo or radiation.  She has anemia which is improving and she was seen in emergency room for accelerated blood pressure.  She is now on hydralazine increased dose 50 twice daily plus amlodipine plus carvedilol 25 mg twice daily.  She takes Lasix as well.  And she has a follow-up with nephrology Dr. Dudley labs are reviewed with creatinine 1.68.  She has a history of B12 deficiency and I have not seen a B12 level in some time now so we will get labs today including lipid panel as well as B12 methylmalonic acid folic acid.  She has dyspnea on exertion will get follow-up with pulmonary.  She does not wish to get a chest x-ray or pulmonary function test so she sees pulmonology within the next month or 2.  She has recently been seen in the emergency room as noted.      The following portions of the patient's history were reviewed and updated as appropriate: allergies, current medications, past social history and problem list.    Review of Systems    Objective   Vitals:    05/30/19 1132   BP: 162/76   Pulse: 70   Temp: 96.9 °F (36.1 °C)   SpO2: 95%     Physical Exam   Constitutional: She is oriented to person, place, and time. She appears well-developed and well-nourished.   HENT:   Head: Normocephalic and atraumatic.    Right Ear: Tympanic membrane and external ear normal.   Left Ear: Tympanic membrane and external ear normal.   Nose: Nose normal.   Mouth/Throat: Oropharynx is clear and moist.   Hoarseness is noted   Eyes: Conjunctivae and EOM are normal. Pupils are equal, round, and reactive to light.   Neck: Normal range of motion. Neck supple. No JVD present. No thyromegaly present.   Cardiovascular: Normal rate, regular rhythm, normal heart sounds and intact distal pulses.   Pulmonary/Chest: Effort normal and breath sounds normal.   Abdominal: Soft. Bowel sounds are normal.   Musculoskeletal: Normal range of motion.   Lymphadenopathy:     She has no cervical adenopathy.   Neurological: She is alert and oriented to person, place, and time. No cranial nerve deficit. She exhibits abnormal muscle tone. Coordination abnormal.   Skin: Skin is warm and dry. No rash noted.   Psychiatric: She has a normal mood and affect. Her behavior is normal. Judgment and thought content normal.   Vitals reviewed.      Assessment/Plan   Problem List Items Addressed This Visit        Cardiovascular and Mediastinum    Hypertension - Primary    Relevant Medications    hydrALAZINE (APRESOLINE) 25 MG tablet    Other Relevant Orders    CBC & Differential    Vitamin B12    Lipid Panel With / Chol / HDL Ratio    TSH    Testosterone, Free, Total    T3, Free    Sedimentation Rate    Folate    Methylmalonic Acid, Serum       Respiratory    Malignant neoplasm of right upper lobe of lung (CMS/HCC)    Relevant Orders    Ambulatory Referral to Pulmonology       Other    Moderate single current episode of major depressive disorder (CMS/HCC)      Other Visit Diagnoses     Jaw dyskinesia        Relevant Orders    CBC & Differential    Vitamin B12    Lipid Panel With / Chol / HDL Ratio    TSH    Testosterone, Free, Total    T3, Free    Sedimentation Rate    Folate    Methylmalonic Acid, Serum    Akathisia        Palpitations        Dyspnea on exertion        Relevant  Orders    Ambulatory Referral to Pulmonology    CBC & Differential    Vitamin B12    Lipid Panel With / Chol / HDL Ratio    TSH    Testosterone, Free, Total    T3, Free    Sedimentation Rate    Folate    Methylmalonic Acid, Serum    B12 deficiency        Relevant Orders    CBC & Differential    Vitamin B12    Lipid Panel With / Chol / HDL Ratio    TSH    Testosterone, Free, Total    T3, Free    Sedimentation Rate    Folate    Methylmalonic Acid, Serum    Chronic fatigue        Relevant Orders    CBC & Differential    Vitamin B12    Lipid Panel With / Chol / HDL Ratio    TSH    Testosterone, Free, Total    T3, Free    Sedimentation Rate    Folate    Methylmalonic Acid, Serum      Labs pending follow-up with neurology follow-up with pulmonary.  Recheck B12 level as noted.  She is taking vitamin D from nephrology.  She is taking 50,000 units weekly of vitamin D.  Blood pressure looks better on hydralazine 50 twice daily we will continue current antihypertensives.  She has occasional palpitations but will continue carvedilol.

## 2019-06-03 LAB
BASOPHILS # BLD AUTO: 0.02 10*3/MM3 (ref 0–0.2)
BASOPHILS NFR BLD AUTO: 0.3 % (ref 0–1.5)
CHOLEST SERPL-MCNC: 171 MG/DL (ref 0–200)
CHOLEST/HDLC SERPL: 3.64 {RATIO}
EOSINOPHIL # BLD AUTO: 0.15 10*3/MM3 (ref 0–0.4)
EOSINOPHIL NFR BLD AUTO: 1.9 % (ref 0.3–6.2)
ERYTHROCYTE [DISTWIDTH] IN BLOOD BY AUTOMATED COUNT: 14.3 % (ref 12.3–15.4)
ERYTHROCYTE [SEDIMENTATION RATE] IN BLOOD BY WESTERGREN METHOD: 34 MM/HR (ref 0–30)
FOLATE SERPL-MCNC: 7.56 NG/ML (ref 4.78–24.2)
HCT VFR BLD AUTO: 37.3 % (ref 34–46.6)
HDLC SERPL-MCNC: 47 MG/DL (ref 40–60)
HGB BLD-MCNC: 11.3 G/DL (ref 12–15.9)
IMM GRANULOCYTES # BLD AUTO: 0.04 10*3/MM3 (ref 0–0.05)
IMM GRANULOCYTES NFR BLD AUTO: 0.5 % (ref 0–0.5)
LDLC SERPL CALC-MCNC: 83 MG/DL (ref 0–100)
LYMPHOCYTES # BLD AUTO: 2.19 10*3/MM3 (ref 0.7–3.1)
LYMPHOCYTES NFR BLD AUTO: 27.9 % (ref 19.6–45.3)
Lab: ABNORMAL
MCH RBC QN AUTO: 30.5 PG (ref 26.6–33)
MCHC RBC AUTO-ENTMCNC: 30.3 G/DL (ref 31.5–35.7)
MCV RBC AUTO: 100.8 FL (ref 79–97)
METHYLMALONATE SERPL-SCNC: 1804 NMOL/L (ref 0–378)
MONOCYTES # BLD AUTO: 0.52 10*3/MM3 (ref 0.1–0.9)
MONOCYTES NFR BLD AUTO: 6.6 % (ref 5–12)
NEUTROPHILS # BLD AUTO: 4.94 10*3/MM3 (ref 1.7–7)
NEUTROPHILS NFR BLD AUTO: 62.8 % (ref 42.7–76)
NRBC BLD AUTO-RTO: 0 /100 WBC (ref 0–0.2)
PLATELET # BLD AUTO: 294 10*3/MM3 (ref 140–450)
RBC # BLD AUTO: 3.7 10*6/MM3 (ref 3.77–5.28)
T3FREE SERPL-MCNC: 2.5 PG/ML (ref 2–4.4)
TESTOST FREE SERPL-MCNC: 0.5 PG/ML (ref 0–4.2)
TESTOST SERPL-MCNC: <3 NG/DL (ref 3–41)
TRIGL SERPL-MCNC: 206 MG/DL (ref 0–150)
TSH SERPL DL<=0.005 MIU/L-ACNC: 4.44 MIU/ML (ref 0.27–4.2)
VIT B12 SERPL-MCNC: 259 PG/ML (ref 211–946)
VLDLC SERPL CALC-MCNC: 41.2 MG/DL
WBC # BLD AUTO: 7.86 10*3/MM3 (ref 3.4–10.8)

## 2019-06-05 ENCOUNTER — TRANSCRIBE ORDERS (OUTPATIENT)
Dept: CARDIOLOGY | Facility: CLINIC | Age: 74
End: 2019-06-05

## 2019-06-05 ENCOUNTER — HOSPITAL ENCOUNTER (OUTPATIENT)
Dept: CT IMAGING | Facility: HOSPITAL | Age: 74
Discharge: HOME | End: 2019-06-05
Admitting: THORACIC SURGERY (CARDIOTHORACIC VASCULAR SURGERY)

## 2019-06-05 DIAGNOSIS — C34.11 MALIGNANT NEOPLASM OF UPPER LOBE OF RIGHT LUNG (HCC): ICD-10-CM

## 2019-06-05 DIAGNOSIS — R00.2 PALPITATIONS: Primary | ICD-10-CM

## 2019-06-05 PROCEDURE — 71250 CT THORAX DX C-: CPT

## 2019-06-10 ENCOUNTER — OFFICE VISIT (OUTPATIENT)
Dept: SURGERY | Facility: CLINIC | Age: 74
End: 2019-06-10

## 2019-06-10 ENCOUNTER — PATIENT OUTREACH (OUTPATIENT)
Dept: CASE MANAGEMENT | Facility: OTHER | Age: 74
End: 2019-06-10

## 2019-06-10 VITALS
SYSTOLIC BLOOD PRESSURE: 158 MMHG | DIASTOLIC BLOOD PRESSURE: 82 MMHG | WEIGHT: 194 LBS | BODY MASS INDEX: 31.18 KG/M2 | HEART RATE: 72 BPM | OXYGEN SATURATION: 97 % | HEIGHT: 66 IN

## 2019-06-10 DIAGNOSIS — R06.02 SHORTNESS OF BREATH: ICD-10-CM

## 2019-06-10 DIAGNOSIS — C34.11 MALIGNANT NEOPLASM OF UPPER LOBE OF RIGHT LUNG (HCC): ICD-10-CM

## 2019-06-10 DIAGNOSIS — K76.89 LIVER CYST: Primary | ICD-10-CM

## 2019-06-10 PROCEDURE — 99214 OFFICE O/P EST MOD 30 MIN: CPT | Performed by: THORACIC SURGERY (CARDIOTHORACIC VASCULAR SURGERY)

## 2019-06-10 RX ORDER — SUCRALFATE 1 G/1
1 TABLET ORAL 4 TIMES DAILY
Refills: 5 | COMMUNITY
Start: 2019-06-02 | End: 2019-07-31

## 2019-06-10 NOTE — OUTREACH NOTE
Patient Outreach Note    Preparing for appt with thoracic surgeon today at 1:15PM.   Has followed-up with pulmonologist and placed on oxygen which she is non-adherent.  Discussed the importance of using oxygen and will consider.. Reports heart arrhythmia and has zio patch.  Contact information provided to [facility] for scheduling mammogram per her request.  Agreeable to CA follow-up at a later date.     Kandace Gonzales RN    6/10/2019, 12:29 PM

## 2019-06-10 NOTE — PROGRESS NOTES
Subjective   Patient ID: Rachana Arguelles is a 73 y.o. female is here today for follow-up.    History of Present Illness  Dear Colleague,  Rachana Arguelles was seen in our office today for continued follow up and surveillance  postoperative visit after surgery for lung cancer.  Ms. Arguelles is a pleasant 74 yo lady s/p VATS right upper lobectomy for a T1bN0 NSCLC.   Ms. Arguelles has some shortness of breath but is otherwise recovered well from her lung cancer surgery.  The following portions of the patient's history were reviewed and updated as appropriate: allergies, current medications, past family history, past medical history, past social history, past surgical history and problem list.  Review of Systems   Constitution: Negative.   HENT: Negative.    Eyes: Negative.    Cardiovascular: Negative.    Respiratory: Positive for cough and shortness of breath.    Endocrine: Negative.    Hematologic/Lymphatic: Negative.    Skin: Negative.    Musculoskeletal: Negative.    Gastrointestinal: Negative.    Genitourinary: Negative.    Neurological: Negative.    Psychiatric/Behavioral: Negative.    Allergic/Immunologic: Negative.      Patient Active Problem List   Diagnosis   • Anxiety   • Chronic pain syndrome   • Depression   • Gastroesophageal reflux disease   • Hyperlipidemia   • Hypertension   • Osteoarthritis of hip   • Chronic low back pain   • Failed back syndrome of lumbar spine   • Pulmonary embolus (CMS/HCC)   • Weight loss   • Polypharmacy   • CKD (chronic kidney disease), stage III (CMS/HCC)   • Chronic constipation   • Sacroiliac joint pain   • Mixed incontinence   • Renal cyst   • Achilles tendonitis   • Atherosclerosis of native artery of left lower extremity with intermittent claudication (CMS/HCC)   • Morbidly obese (CMS/HCC)   • Lung nodule   • Malignant neoplasm of right upper lobe of lung (CMS/HCC)   • Lung cancer (CMS/HCC)   • Moderate single current episode of major depressive disorder (CMS/HCC)     Past Medical  History:   Diagnosis Date   • AAA (abdominal aortic aneurysm) without rupture (CMS/HCC)    • Anxiety    • Arthritis    • Cancer (CMS/HCC)     skin cancer   • Chest pain     at rest   • Chronic low back pain    • Chronic pain syndrome 3/12/2016   • CKD (chronic kidney disease), stage III (CMS/HCC)    • Claustrophobia 10/26/2015    Resolved   • Depression    • Dizziness    • GERD (gastroesophageal reflux disease)    • GI problem    • Hiatal hernia    • History of migraine headaches    • Hyperlipidemia    • Hypertension    • Lumbar postlaminectomy syndrome 10/26/2015    Resolved   • Lung cancer (CMS/HCC)    • Lung nodule    • Palpitations    • Polypharmacy 4/22/2016   • Pulmonary embolism (CMS/HCC) 10/26/2015    January 2013 - Resolved, felt to be secondary to estrogen use   • Submandibular sialoadenitis 10/26/2015    Resolved   • Vitamin B 12 deficiency      Past Surgical History:   Procedure Laterality Date   • ANGIOPLASTY ILIAC ARTERY Bilateral 8/10/2018    Procedure: BILATERAL ILIAC STENTS;  Surgeon: Riki Mancera MD;  Location: [facility] MAIN OR;  Service: Vascular   • APPENDECTOMY     • BREAST SURGERY      Breast Surgery Reduction Procedure   • BRONCHOSCOPY N/A 2/8/2019    Procedure: BRONCHOSCOPY;  Surgeon: Álvaro Gifford MD;  Location: [facility] ENDOSCOPY;  Service: Pulmonary   • CHOLECYSTECTOMY     • HYSTERECTOMY     • INTUBATION  1/15/2019        • JOINT REPLACEMENT      left knee, hip, shoulder   • LASIK     • LUMBAR FUSION      Lumbar Vertebral Fusion   • SHOULDER ARTHROSCOPY  04/04/2013    Dr. Carrillo/[facility] - Resolved   • THORACOSCOPY VIDEO ASSISTED WITH LOBECTOMY Right 1/11/2019    Procedure: BRONCOSCOPY, THORACOSCOPY VIDEO ASSISTED WITH RIGHT UPPER LOBE WEDGE RESECTION, COMPLETION RIGHT UPPER LOBECTOMY, LYMPH NODE DISSECTION, INTERCOSTAL NERVE BLOCK;  Surgeon: Quita Figueroa MD;  Location: [facility] MAIN OR;  Service: Thoracic   • TONSILLECTOMY     • TOTAL HIP ARTHROPLASTY REVISION Left    • TOTAL KNEE  ARTHROPLASTY Left    • TOTAL SHOULDER ARTHROPLASTY Left      Family History   Problem Relation Age of Onset   • Hypertension Mother    • Lung cancer Mother    • Cancer Mother         lung   • Kidney disease Father    • Other Brother         Coronary Artery Bypass Grafting   • Stroke Brother         Cerebrovascular Accident   • Malig Hyperthermia Neg Hx      Social History     Socioeconomic History   • Marital status:      Spouse name: Not on file   • Number of children: Not on file   • Years of education: Not on file   • Highest education level: Not on file   Tobacco Use   • Smoking status: Former Smoker     Packs/day: 2.50     Years: 15.00     Pack years: 37.50     Types: Cigarettes     Last attempt to quit: 9/22/2003     Years since quitting: 15.7   • Smokeless tobacco: Never Used   Substance and Sexual Activity   • Alcohol use: No     Frequency: Never     Comment: occ   • Drug use: Defer   • Sexual activity: Defer       Current Outpatient Medications:   •  allopurinol (ZYLOPRIM) 100 MG tablet, Take 100 mg by mouth Daily., Disp: , Rfl: 2  •  amLODIPine (NORVASC) 10 MG tablet, Take 10 mg by mouth Every Morning., Disp: , Rfl: 3  •  atorvastatin (LIPITOR) 40 MG tablet, Take 40 mg by mouth Every Night., Disp: , Rfl:   •  carBAMazepine (TEGretol) 200 MG tablet, TAKE 0.5 TAB BY MOUTH TWICE A DAY, Disp: , Rfl: 0  •  carvedilol (COREG) 25 MG tablet, Take 25 mg by mouth 2 (Two) Times a Day With Meals. 1/2 TAB EACH DOSE-ON HOLD SINCE 1/5/19, Disp: , Rfl:   •  clopidogrel (PLAVIX) 75 MG tablet, Take 1 tablet by mouth Daily., Disp: 30 tablet, Rfl: 3  •  COLCHICINE PO, Take  by mouth Daily., Disp: , Rfl:   •  cyclobenzaprine (FLEXERIL) 10 MG tablet, Take 10 mg by mouth 3 (Three) Times a Day As Needed., Disp: , Rfl:   •  famotidine (PEPCID) 20 MG tablet, TAKE 1 TABLET BY MOUTH TWICE A DAY, Disp: 180 tablet, Rfl: 1  •  ferrous sulfate 325 (65 FE) MG tablet, Take 1 tablet by mouth Daily With Breakfast., Disp: 90 tablet,  Rfl: 1  •  furosemide (LASIX) 40 MG tablet, Take 40 mg by mouth Daily., Disp: , Rfl:   •  hydrALAZINE (APRESOLINE) 25 MG tablet, Take 50 mg by mouth 2 (Two) Times a Day., Disp: , Rfl: 2  •  LORazepam (ATIVAN) 0.5 MG tablet, TAKE 1 TABLET BY MOUTH THREE TIMES A DAY, Disp: 90 tablet, Rfl: 1  •  omeprazole (priLOSEC) 40 MG capsule, Take 40 mg by mouth Every Evening., Disp: , Rfl:   •  oxyCODONE (ROXICODONE) 20 MG tablet, Take 20 mg by mouth Every 4 (Four) Hours As Needed., Disp: , Rfl:   •  sucralfate (CARAFATE) 1 g tablet, Take 1 g by mouth 4 (Four) Times a Day., Disp: , Rfl: 5  •  traZODone (DESYREL) 150 MG tablet, Take 0.5 tablets by mouth Every Night., Disp: 45 tablet, Rfl: 1  •  venlafaxine XR (EFFEXOR-XR) 150 MG 24 hr capsule, Take 1 capsule by mouth Daily., Disp: 30 capsule, Rfl: 5  •  vitamin D (ERGOCALCIFEROL) 54386 units capsule capsule, Take 50,000 Units by mouth 1 (One) Time Per Week., Disp: , Rfl: 2  Allergies   Allergen Reactions   • Neurontin [Gabapentin] Confusion   • Morphine Other (See Comments)     HEADACHE        Objective   Vitals:    06/10/19 1402   BP: 158/82   Pulse: 72   SpO2: 97%     Physical Exam   Constitutional: She is oriented to person, place, and time. She appears well-developed and well-nourished.   HENT:   Head: Normocephalic and atraumatic.   Nose: Nose normal.   Mouth/Throat: Oropharynx is clear and moist.   Eyes: Conjunctivae and EOM are normal. Pupils are equal, round, and reactive to light.   Neck: Normal range of motion. Neck supple.   Cardiovascular: Normal rate, regular rhythm, normal heart sounds and intact distal pulses.   Pulmonary/Chest: Effort normal and breath sounds normal.   Abdominal: Soft. Bowel sounds are normal.   Musculoskeletal: Normal range of motion.   Neurological: She is alert and oriented to person, place, and time.   Skin: Skin is warm and dry. Capillary refill takes less than 2 seconds.   Psychiatric: She has a normal mood and affect. Her behavior is  normal. Judgment and thought content normal.   Nursing note and vitals reviewed.    Independent Review of Radiographic Studies:    I have independently reviewed the CT chest performed on 6/5/2019 which demonstrates no new nodules in the lung or mediastinum.  There is a trace amount of right pleural fluid consistent with right upper lobectomy.  The small pericardial effusion has increased slightly.  There is a new 3.6 cm posterior segment right hepatic lobe cystic appearing structure.  Assessment/Plan   Ms. Arguelles is a pleasant 73-year-old lady status post video-assisted right upper lobectomy for a stage I a non-small cell lung cancer.  She does not appear to have evidence of metastatic or recurrent disease on her most recent scan.  There is a new lesion in the right lobe of her liver that warrants biopsy.  We will plan a ultrasound-guided biopsy of this lesion with evacuation of the cyst.  I will plan to see her once this is complete to discuss her pathology.  She will need to see me also in 4 months with a CT of her chest to continue her lung cancer surveillance.    Diagnoses and all orders for this visit:    Liver cyst  -     US Guided Cyst Aspiration Liver; Future  -     Tissue Pathology Exam; Standing  -     Non-gynecologic Cytology; Standing  -     CT Chest Without Contrast; Future    Malignant neoplasm of upper lobe of right lung (CMS/HCC)    Shortness of breath    Other orders  -     sucralfate (CARAFATE) 1 g tablet; Take 1 g by mouth 4 (Four) Times a Day.

## 2019-06-10 NOTE — H&P (VIEW-ONLY)
Subjective   Patient ID: Rachana Arguelles is a 73 y.o. female is here today for follow-up.    History of Present Illness  Dear Colleague,  Rachnaa Arguelles was seen in our office today for continued follow up and surveillance  postoperative visit after surgery for lung cancer.  Ms. Arguelles is a pleasant 74 yo lady s/p VATS right upper lobectomy for a T1bN0 NSCLC.   Ms. Arguelles has some shortness of breath but is otherwise recovered well from her lung cancer surgery.  The following portions of the patient's history were reviewed and updated as appropriate: allergies, current medications, past family history, past medical history, past social history, past surgical history and problem list.  Review of Systems   Constitution: Negative.   HENT: Negative.    Eyes: Negative.    Cardiovascular: Negative.    Respiratory: Positive for cough and shortness of breath.    Endocrine: Negative.    Hematologic/Lymphatic: Negative.    Skin: Negative.    Musculoskeletal: Negative.    Gastrointestinal: Negative.    Genitourinary: Negative.    Neurological: Negative.    Psychiatric/Behavioral: Negative.    Allergic/Immunologic: Negative.      Patient Active Problem List   Diagnosis   • Anxiety   • Chronic pain syndrome   • Depression   • Gastroesophageal reflux disease   • Hyperlipidemia   • Hypertension   • Osteoarthritis of hip   • Chronic low back pain   • Failed back syndrome of lumbar spine   • Pulmonary embolus (CMS/HCC)   • Weight loss   • Polypharmacy   • CKD (chronic kidney disease), stage III (CMS/HCC)   • Chronic constipation   • Sacroiliac joint pain   • Mixed incontinence   • Renal cyst   • Achilles tendonitis   • Atherosclerosis of native artery of left lower extremity with intermittent claudication (CMS/HCC)   • Morbidly obese (CMS/HCC)   • Lung nodule   • Malignant neoplasm of right upper lobe of lung (CMS/HCC)   • Lung cancer (CMS/HCC)   • Moderate single current episode of major depressive disorder (CMS/HCC)     Past Medical  History:   Diagnosis Date   • AAA (abdominal aortic aneurysm) without rupture (CMS/HCC)    • Anxiety    • Arthritis    • Cancer (CMS/HCC)     skin cancer   • Chest pain     at rest   • Chronic low back pain    • Chronic pain syndrome 3/12/2016   • CKD (chronic kidney disease), stage III (CMS/HCC)    • Claustrophobia 10/26/2015    Resolved   • Depression    • Dizziness    • GERD (gastroesophageal reflux disease)    • GI problem    • Hiatal hernia    • History of migraine headaches    • Hyperlipidemia    • Hypertension    • Lumbar postlaminectomy syndrome 10/26/2015    Resolved   • Lung cancer (CMS/HCC)    • Lung nodule    • Palpitations    • Polypharmacy 4/22/2016   • Pulmonary embolism (CMS/HCC) 10/26/2015    January 2013 - Resolved, felt to be secondary to estrogen use   • Submandibular sialoadenitis 10/26/2015    Resolved   • Vitamin B 12 deficiency      Past Surgical History:   Procedure Laterality Date   • ANGIOPLASTY ILIAC ARTERY Bilateral 8/10/2018    Procedure: BILATERAL ILIAC STENTS;  Surgeon: Riki Mancera MD;  Location: [facility] MAIN OR;  Service: Vascular   • APPENDECTOMY     • BREAST SURGERY      Breast Surgery Reduction Procedure   • BRONCHOSCOPY N/A 2/8/2019    Procedure: BRONCHOSCOPY;  Surgeon: Álvaro Gifford MD;  Location: [facility] ENDOSCOPY;  Service: Pulmonary   • CHOLECYSTECTOMY     • HYSTERECTOMY     • INTUBATION  1/15/2019        • JOINT REPLACEMENT      left knee, hip, shoulder   • LASIK     • LUMBAR FUSION      Lumbar Vertebral Fusion   • SHOULDER ARTHROSCOPY  04/04/2013    Dr. Carrillo/[facility] - Resolved   • THORACOSCOPY VIDEO ASSISTED WITH LOBECTOMY Right 1/11/2019    Procedure: BRONCOSCOPY, THORACOSCOPY VIDEO ASSISTED WITH RIGHT UPPER LOBE WEDGE RESECTION, COMPLETION RIGHT UPPER LOBECTOMY, LYMPH NODE DISSECTION, INTERCOSTAL NERVE BLOCK;  Surgeon: Quita Figueroa MD;  Location: [facility] MAIN OR;  Service: Thoracic   • TONSILLECTOMY     • TOTAL HIP ARTHROPLASTY REVISION Left    • TOTAL KNEE  ARTHROPLASTY Left    • TOTAL SHOULDER ARTHROPLASTY Left      Family History   Problem Relation Age of Onset   • Hypertension Mother    • Lung cancer Mother    • Cancer Mother         lung   • Kidney disease Father    • Other Brother         Coronary Artery Bypass Grafting   • Stroke Brother         Cerebrovascular Accident   • Malig Hyperthermia Neg Hx      Social History     Socioeconomic History   • Marital status:      Spouse name: Not on file   • Number of children: Not on file   • Years of education: Not on file   • Highest education level: Not on file   Tobacco Use   • Smoking status: Former Smoker     Packs/day: 2.50     Years: 15.00     Pack years: 37.50     Types: Cigarettes     Last attempt to quit: 9/22/2003     Years since quitting: 15.7   • Smokeless tobacco: Never Used   Substance and Sexual Activity   • Alcohol use: No     Frequency: Never     Comment: occ   • Drug use: Defer   • Sexual activity: Defer       Current Outpatient Medications:   •  allopurinol (ZYLOPRIM) 100 MG tablet, Take 100 mg by mouth Daily., Disp: , Rfl: 2  •  amLODIPine (NORVASC) 10 MG tablet, Take 10 mg by mouth Every Morning., Disp: , Rfl: 3  •  atorvastatin (LIPITOR) 40 MG tablet, Take 40 mg by mouth Every Night., Disp: , Rfl:   •  carBAMazepine (TEGretol) 200 MG tablet, TAKE 0.5 TAB BY MOUTH TWICE A DAY, Disp: , Rfl: 0  •  carvedilol (COREG) 25 MG tablet, Take 25 mg by mouth 2 (Two) Times a Day With Meals. 1/2 TAB EACH DOSE-ON HOLD SINCE 1/5/19, Disp: , Rfl:   •  clopidogrel (PLAVIX) 75 MG tablet, Take 1 tablet by mouth Daily., Disp: 30 tablet, Rfl: 3  •  COLCHICINE PO, Take  by mouth Daily., Disp: , Rfl:   •  cyclobenzaprine (FLEXERIL) 10 MG tablet, Take 10 mg by mouth 3 (Three) Times a Day As Needed., Disp: , Rfl:   •  famotidine (PEPCID) 20 MG tablet, TAKE 1 TABLET BY MOUTH TWICE A DAY, Disp: 180 tablet, Rfl: 1  •  ferrous sulfate 325 (65 FE) MG tablet, Take 1 tablet by mouth Daily With Breakfast., Disp: 90 tablet,  Rfl: 1  •  furosemide (LASIX) 40 MG tablet, Take 40 mg by mouth Daily., Disp: , Rfl:   •  hydrALAZINE (APRESOLINE) 25 MG tablet, Take 50 mg by mouth 2 (Two) Times a Day., Disp: , Rfl: 2  •  LORazepam (ATIVAN) 0.5 MG tablet, TAKE 1 TABLET BY MOUTH THREE TIMES A DAY, Disp: 90 tablet, Rfl: 1  •  omeprazole (priLOSEC) 40 MG capsule, Take 40 mg by mouth Every Evening., Disp: , Rfl:   •  oxyCODONE (ROXICODONE) 20 MG tablet, Take 20 mg by mouth Every 4 (Four) Hours As Needed., Disp: , Rfl:   •  sucralfate (CARAFATE) 1 g tablet, Take 1 g by mouth 4 (Four) Times a Day., Disp: , Rfl: 5  •  traZODone (DESYREL) 150 MG tablet, Take 0.5 tablets by mouth Every Night., Disp: 45 tablet, Rfl: 1  •  venlafaxine XR (EFFEXOR-XR) 150 MG 24 hr capsule, Take 1 capsule by mouth Daily., Disp: 30 capsule, Rfl: 5  •  vitamin D (ERGOCALCIFEROL) 47261 units capsule capsule, Take 50,000 Units by mouth 1 (One) Time Per Week., Disp: , Rfl: 2  Allergies   Allergen Reactions   • Neurontin [Gabapentin] Confusion   • Morphine Other (See Comments)     HEADACHE        Objective   Vitals:    06/10/19 1402   BP: 158/82   Pulse: 72   SpO2: 97%     Physical Exam   Constitutional: She is oriented to person, place, and time. She appears well-developed and well-nourished.   HENT:   Head: Normocephalic and atraumatic.   Nose: Nose normal.   Mouth/Throat: Oropharynx is clear and moist.   Eyes: Conjunctivae and EOM are normal. Pupils are equal, round, and reactive to light.   Neck: Normal range of motion. Neck supple.   Cardiovascular: Normal rate, regular rhythm, normal heart sounds and intact distal pulses.   Pulmonary/Chest: Effort normal and breath sounds normal.   Abdominal: Soft. Bowel sounds are normal.   Musculoskeletal: Normal range of motion.   Neurological: She is alert and oriented to person, place, and time.   Skin: Skin is warm and dry. Capillary refill takes less than 2 seconds.   Psychiatric: She has a normal mood and affect. Her behavior is  normal. Judgment and thought content normal.   Nursing note and vitals reviewed.    Independent Review of Radiographic Studies:    I have independently reviewed the CT chest performed on 6/5/2019 which demonstrates no new nodules in the lung or mediastinum.  There is a trace amount of right pleural fluid consistent with right upper lobectomy.  The small pericardial effusion has increased slightly.  There is a new 3.6 cm posterior segment right hepatic lobe cystic appearing structure.  Assessment/Plan   Ms. Arguelles is a pleasant 73-year-old lady status post video-assisted right upper lobectomy for a stage I a non-small cell lung cancer.  She does not appear to have evidence of metastatic or recurrent disease on her most recent scan.  There is a new lesion in the right lobe of her liver that warrants biopsy.  We will plan a ultrasound-guided biopsy of this lesion with evacuation of the cyst.  I will plan to see her once this is complete to discuss her pathology.  She will need to see me also in 4 months with a CT of her chest to continue her lung cancer surveillance.    Diagnoses and all orders for this visit:    Liver cyst  -     US Guided Cyst Aspiration Liver; Future  -     Tissue Pathology Exam; Standing  -     Non-gynecologic Cytology; Standing  -     CT Chest Without Contrast; Future    Malignant neoplasm of upper lobe of right lung (CMS/HCC)    Shortness of breath    Other orders  -     sucralfate (CARAFATE) 1 g tablet; Take 1 g by mouth 4 (Four) Times a Day.

## 2019-06-19 ENCOUNTER — HOSPITAL ENCOUNTER (OUTPATIENT)
Dept: ULTRASOUND IMAGING | Facility: HOSPITAL | Age: 74
Discharge: HOME | End: 2019-06-19

## 2019-06-19 DIAGNOSIS — I65.22 STENOSIS OF LEFT CAROTID ARTERY: Primary | ICD-10-CM

## 2019-06-20 RX ORDER — CYANOCOBALAMIN (VITAMIN B-12) 1000 MCG
1000 TABLET ORAL DAILY
Qty: 90 TABLET | Refills: 3 | Status: SHIPPED | OUTPATIENT
Start: 2019-06-20 | End: 2019-12-02 | Stop reason: SDUPTHER

## 2019-06-21 ENCOUNTER — PATIENT OUTREACH (OUTPATIENT)
Dept: CASE MANAGEMENT | Facility: OTHER | Age: 74
End: 2019-06-21

## 2019-06-21 DIAGNOSIS — K76.9 LIVER LESION: Primary | ICD-10-CM

## 2019-06-21 NOTE — OUTREACH NOTE
Patient Outreach Note    Voiced anxieties related to liver biopsy scheduled for 6/24/19.  Discussed importance of adherence to oxygen use.  Has deferred scheduling mammogram and dicussed prioritizing health care needs. Has returned monitor to cardiology office and awaiting results.  Denies pain, non-productive cough, and episodes of shortness of air with exertion.  [facility] contact information provided.      Kandace Gonzales RN    6/21/2019, 3:33 PM

## 2019-06-24 ENCOUNTER — HOSPITAL ENCOUNTER (OUTPATIENT)
Dept: CT IMAGING | Facility: HOSPITAL | Age: 74
Discharge: HOME | End: 2019-06-24

## 2019-06-24 ENCOUNTER — HOSPITAL ENCOUNTER (OUTPATIENT)
Dept: ULTRASOUND IMAGING | Facility: HOSPITAL | Age: 74
Discharge: HOME | End: 2019-06-24
Admitting: THORACIC SURGERY (CARDIOTHORACIC VASCULAR SURGERY)

## 2019-06-24 VITALS
HEART RATE: 58 BPM | BODY MASS INDEX: 30.53 KG/M2 | TEMPERATURE: 97.9 F | SYSTOLIC BLOOD PRESSURE: 170 MMHG | WEIGHT: 190 LBS | RESPIRATION RATE: 20 BRPM | OXYGEN SATURATION: 95 % | HEIGHT: 66 IN | DIASTOLIC BLOOD PRESSURE: 83 MMHG

## 2019-06-24 DIAGNOSIS — K76.89 LIVER CYST: ICD-10-CM

## 2019-06-24 DIAGNOSIS — K76.9 LIVER LESION: ICD-10-CM

## 2019-06-24 LAB
INR PPP: 1.1 (ref 0.8–1.2)
PROTHROMBIN TIME: 13.2 SECONDS (ref 12.8–15.2)

## 2019-06-24 PROCEDURE — 88305 TISSUE EXAM BY PATHOLOGIST: CPT | Performed by: THORACIC SURGERY (CARDIOTHORACIC VASCULAR SURGERY)

## 2019-06-24 PROCEDURE — 85610 PROTHROMBIN TIME: CPT

## 2019-06-24 PROCEDURE — 87015 SPECIMEN INFECT AGNT CONCNTJ: CPT | Performed by: THORACIC SURGERY (CARDIOTHORACIC VASCULAR SURGERY)

## 2019-06-24 PROCEDURE — 87205 SMEAR GRAM STAIN: CPT | Performed by: THORACIC SURGERY (CARDIOTHORACIC VASCULAR SURGERY)

## 2019-06-24 PROCEDURE — 63710000001 ACETAMINOPHEN 325 MG TABLET: Performed by: RADIOLOGY

## 2019-06-24 PROCEDURE — A9270 NON-COVERED ITEM OR SERVICE: HCPCS | Performed by: RADIOLOGY

## 2019-06-24 PROCEDURE — 88307 TISSUE EXAM BY PATHOLOGIST: CPT | Performed by: THORACIC SURGERY (CARDIOTHORACIC VASCULAR SURGERY)

## 2019-06-24 PROCEDURE — 88104 CYTOPATH FL NONGYN SMEARS: CPT | Performed by: THORACIC SURGERY (CARDIOTHORACIC VASCULAR SURGERY)

## 2019-06-24 PROCEDURE — 87070 CULTURE OTHR SPECIMN AEROBIC: CPT | Performed by: THORACIC SURGERY (CARDIOTHORACIC VASCULAR SURGERY)

## 2019-06-24 PROCEDURE — 63710000001 ALPRAZOLAM 0.5 MG TABLET: Performed by: RADIOLOGY

## 2019-06-24 PROCEDURE — 77012 CT SCAN FOR NEEDLE BIOPSY: CPT

## 2019-06-24 RX ORDER — SODIUM CHLORIDE 0.9 % (FLUSH) 0.9 %
1-10 SYRINGE (ML) INJECTION AS NEEDED
Status: DISCONTINUED | OUTPATIENT
Start: 2019-06-24 | End: 2019-06-25 | Stop reason: HOSPADM

## 2019-06-24 RX ORDER — ACETAMINOPHEN 325 MG/1
650 TABLET ORAL ONCE
Status: COMPLETED | OUTPATIENT
Start: 2019-06-24 | End: 2019-06-24

## 2019-06-24 RX ORDER — LIDOCAINE HYDROCHLORIDE 10 MG/ML
20 INJECTION, SOLUTION INFILTRATION; PERINEURAL ONCE
Status: COMPLETED | OUTPATIENT
Start: 2019-06-24 | End: 2019-06-24

## 2019-06-24 RX ORDER — ALPRAZOLAM 0.5 MG/1
0.5 TABLET ORAL ONCE
Status: COMPLETED | OUTPATIENT
Start: 2019-06-24 | End: 2019-06-24

## 2019-06-24 RX ORDER — SODIUM CHLORIDE 0.9 % (FLUSH) 0.9 %
3 SYRINGE (ML) INJECTION EVERY 12 HOURS SCHEDULED
Status: DISCONTINUED | OUTPATIENT
Start: 2019-06-24 | End: 2019-06-25 | Stop reason: HOSPADM

## 2019-06-24 RX ADMIN — LIDOCAINE HYDROCHLORIDE 20 ML: 10 INJECTION, SOLUTION INFILTRATION; PERINEURAL at 08:13

## 2019-06-24 RX ADMIN — ALPRAZOLAM 0.5 MG: 0.5 TABLET ORAL at 07:05

## 2019-06-24 RX ADMIN — ACETAMINOPHEN 650 MG: 325 TABLET ORAL at 09:01

## 2019-06-24 NOTE — NURSING NOTE
"Returned to Triage post biopsy. Drsg toright lower side dry and intact.  c/o pain in right shoulder \" I have a torn rotator cuff\". Positioned for comfort. Breakfast served.    "

## 2019-06-24 NOTE — INTERVAL H&P NOTE
H&P reviewed. The patient was examined and there are no changes to the H&P.  last dose of plavix was 8d ago. Risks discussed included but not limited to pain, bleeding (which could result in death), infection, damaging surrounding structures (including liver, gallbladder, lung and kidney) and need for further procedures.

## 2019-06-24 NOTE — DISCHARGE INSTRUCTIONS
EDUCATION / DISCHARGE INSTRUCTIONS  Aspiration:  An aspiration is a procedure used to take a sample of cells or tissue from a lump, mass or other area of concern.   Within a week the radiologist will send a report to your physician.  A pathologist will also examine the tissue and send a report.  THIS EDUCATION INFORMATION WAS REVIEWED PRIOR TO THE PROCEDURE AND CONSENT.  Patient initials_________________Time_____0700___________      Post Procedure:    *  Rest today (no pushing pulling or straining).   *  Slowly increase activity tomorow.    *  If you received sedation do not drive for 24 hours.   *  Keep dressing clean and dry.   *  Leave dressing on puncture site for 24 hours.    *  You may shower when dressing removed.   *  If needed, use an ice pack at the site for up to 20 minutes at a time for pain.    Call your doctor if experiencing:   *  Signs of infection such as redness, swelling, excessive pain and / or foul      smelling drainage from the puncture site.   *  Chills or fever over 101 degrees (by mouth).   *  Unrelieved pain.   *  Any new or severe symptoms.      Following the procedure:     Follow-up with the ordering physician as directed.    Continue to take other medications as directed by your physician unless  otherwise instructed.   If applicable, resume taking your Plavix on _6/25/19 at 0900__________.     If you have any concerns please call the Radiology Nurses Desk at [phone].  You are the most important factor in your recovery.  Follow the above instructions carefully.    EDUCATION /DISCHARGE INSTRUCTIONS  CT/US guided biopsy:  A biopsy is a procedure done to remove tissue for further analysis.  Before images are taken to locate the target area.  Images can be obtained using ultrasound, CT or MRI.  A physician will clean your skin with antiseptic soap, place a sterile towel around the site and administer a local anesthetic to numb the area.  The physician will then insert a special needle.   Sometimes images are taken of the needle after it is inserted to ensure the needle is in the correct area to be biopsied.   A sample is obtained and sent to the laboratory for study.  Occasionally the laboratory is unable to make a diagnosis from the sample and the procedure may need to be repeated.  Within a week the radiologist will send a report to your physician.  A pathologist will also examine the tissue and send a report.      Risks of the procedure include but are not limited to:   *  Bleeding    *  Infection   *  Puncture of surrounding organs *  Death     *  Lung collapse if the biopsy is near the chest which may require insertion of a       tube to re-inflate the lung if severe.      Benefits of the procedure:  Using x-ray helps to locate the area that requires a biopsy. The procedure is less invasive than a surgical procedure, there are no large incisions and it does not require anesthesia.      Alternatives to the procedure:  A biopsy can be performed surgically.  Risks of a surgical biopsy include exposure to anesthesia, infection, excessive bleeding and injury to abdominal organs.  A benefit of surgical biopsy is the ability to see the area to be biopsied and remove of a larger piece of tissue.    THIS EDUCATION INFORMATION WAS REVIEWED PRIOR TO PROCEDURE AND CONSENT. Patient initials__________________Time______0700_____________    Post Procedure:    *  Expect the biopsy site may be tender up to one week.    *  Rest today (no pushing pulling or straining).   *  Slowly increase activity tomorrow.    *  If you received sedation do not drive for 24 hours.   *  Keep dressing clean and dry.   *  Leave dressing on puncture site for 24 hours.    *  You may shower when dressing removed.    Call your doctor if experiencing:   *  Signs of infection such as redness, swelling, excessive pain and / or foul        smelling drainage from the puncture site.   *  Chills or fever over 101 degrees (by mouth).   *   Unrelieved pain.   *  Any new or severe symptoms.   *  If experiencing sudden / severe shortness of breath or chest pain go to the       nearest emergency room.     Following the procedure:     Follow-up with the ordering physician as directed.    Continue to take other medications as directed by your physician unless    otherwise instructed.   If applicable, resume taking your Plavix on _6/25/19 at 0900__________.     If you have any concerns please call the Radiology Nurses Desk at [phone].  You are the most important factor in your recovery.  Follow the above instructions carefully.

## 2019-06-24 NOTE — NURSING NOTE
Discharge instructions reviewed, IV out, dressed, discharge to home via wheelchair and staff member to car.   driving.

## 2019-06-25 ENCOUNTER — TELEPHONE (OUTPATIENT)
Dept: INTERVENTIONAL RADIOLOGY/VASCULAR | Facility: HOSPITAL | Age: 74
End: 2019-06-25

## 2019-06-25 LAB
CYTO UR: NORMAL
CYTO UR: NORMAL
LAB AP CASE REPORT: NORMAL
LAB AP CASE REPORT: NORMAL
LAB AP CLINICAL INFORMATION: NORMAL
LAB AP DIAGNOSIS COMMENT: NORMAL
PATH REPORT.FINAL DX SPEC: NORMAL
PATH REPORT.FINAL DX SPEC: NORMAL
PATH REPORT.GROSS SPEC: NORMAL
PATH REPORT.GROSS SPEC: NORMAL

## 2019-06-25 NOTE — TELEPHONE ENCOUNTER
PT is doing ok. PT stated she was a little sore and she was advised to call ordering doctor in pain gets worse.

## 2019-06-27 LAB
BACTERIA FLD CULT: NO GROWTH
GRAM STN SPEC: NORMAL
GRAM STN SPEC: NORMAL

## 2019-07-01 ENCOUNTER — OFFICE VISIT (OUTPATIENT)
Dept: SURGERY | Facility: CLINIC | Age: 74
End: 2019-07-01

## 2019-07-01 VITALS
HEIGHT: 66 IN | SYSTOLIC BLOOD PRESSURE: 172 MMHG | OXYGEN SATURATION: 98 % | WEIGHT: 192 LBS | HEART RATE: 73 BPM | DIASTOLIC BLOOD PRESSURE: 92 MMHG | BODY MASS INDEX: 30.86 KG/M2

## 2019-07-01 DIAGNOSIS — C34.11 MALIGNANT NEOPLASM OF UPPER LOBE OF RIGHT LUNG (HCC): Primary | ICD-10-CM

## 2019-07-01 DIAGNOSIS — K76.9 LIVER LESION: ICD-10-CM

## 2019-07-01 PROCEDURE — 99213 OFFICE O/P EST LOW 20 MIN: CPT | Performed by: THORACIC SURGERY (CARDIOTHORACIC VASCULAR SURGERY)

## 2019-07-01 NOTE — PROGRESS NOTES
Subjective   Patient ID: Rachana Arguelles is a 73 y.o. female is here today for follow-up.    History of Present Illness  Dear Colleague,  Rachana Arguelles was seen in our office today for continued follow up and surveillance  postoperative visit after surgery for visit after surgery for lung cancer.  Ms. Arguelles is a pleasant 74 yo lady s/p VATS right upper lobectomy for a T1bN0 NSCLC.   Ms. Arguelles has some shortness of breath but is otherwise recovered well from her lung cancer surgery.      She presents today to discuss her recent liver biopsy.  The following portions of the patient's history were reviewed and updated as appropriate: allergies, current medications, past family history, past medical history, past social history, past surgical history and problem list.  Review of Systems   Constitution: Negative.   HENT: Negative.    Eyes: Negative.    Cardiovascular: Negative.    Respiratory: Positive for cough, shortness of breath and wheezing.    Endocrine: Negative.    Hematologic/Lymphatic: Negative.    Skin: Negative.    Musculoskeletal: Negative.    Gastrointestinal: Negative.    Genitourinary: Negative.    Neurological: Negative.    Psychiatric/Behavioral: Negative.    Allergic/Immunologic: Negative.      Patient Active Problem List   Diagnosis   • Anxiety   • Chronic pain syndrome   • Depression   • Gastroesophageal reflux disease   • Hyperlipidemia   • Hypertension   • Osteoarthritis of hip   • Chronic low back pain   • Failed back syndrome of lumbar spine   • Pulmonary embolus (CMS/HCC)   • Weight loss   • Polypharmacy   • CKD (chronic kidney disease), stage III (CMS/HCC)   • Chronic constipation   • Sacroiliac joint pain   • Mixed incontinence   • Renal cyst   • Achilles tendonitis   • Atherosclerosis of native artery of left lower extremity with intermittent claudication (CMS/HCC)   • Morbidly obese (CMS/HCC)   • Lung nodule   • Malignant neoplasm of right upper lobe of lung (CMS/HCC)   • Lung cancer (CMS/HCC)    • Moderate single current episode of major depressive disorder (CMS/HCC)     Past Medical History:   Diagnosis Date   • AAA (abdominal aortic aneurysm) without rupture (CMS/HCC)    • Anxiety    • Arthritis    • Cancer (CMS/HCC)     skin cancer   • Chest pain     at rest   • Chronic low back pain    • Chronic pain syndrome 3/12/2016   • CKD (chronic kidney disease), stage III (CMS/HCC)    • Claustrophobia 10/26/2015    Resolved   • Depression    • Dizziness    • GERD (gastroesophageal reflux disease)    • GI problem    • Hiatal hernia    • History of migraine headaches    • Hyperlipidemia    • Hypertension    • Lumbar postlaminectomy syndrome 10/26/2015    Resolved   • Lung cancer (CMS/HCC)    • Lung nodule    • Palpitations    • Polypharmacy 4/22/2016   • Pulmonary embolism (CMS/HCC) 10/26/2015    January 2013 - Resolved, felt to be secondary to estrogen use   • Submandibular sialoadenitis 10/26/2015    Resolved   • Vitamin B 12 deficiency      Past Surgical History:   Procedure Laterality Date   • ANGIOPLASTY ILIAC ARTERY Bilateral 8/10/2018    Procedure: BILATERAL ILIAC STENTS;  Surgeon: Riki Mancera MD;  Location: [facility] MAIN OR;  Service: Vascular   • APPENDECTOMY     • BREAST SURGERY      Breast Surgery Reduction Procedure   • BRONCHOSCOPY N/A 2/8/2019    Procedure: BRONCHOSCOPY;  Surgeon: Álvaro Gifford MD;  Location: [facility] ENDOSCOPY;  Service: Pulmonary   • CHOLECYSTECTOMY     • HYSTERECTOMY     • INTUBATION  1/15/2019        • JOINT REPLACEMENT      left knee, hip, shoulder   • LASIK     • LUMBAR FUSION      Lumbar Vertebral Fusion   • SHOULDER ARTHROSCOPY  04/04/2013    Dr. Carrillo/[facility] - Resolved   • THORACOSCOPY VIDEO ASSISTED WITH LOBECTOMY Right 1/11/2019    Procedure: BRONCOSCOPY, THORACOSCOPY VIDEO ASSISTED WITH RIGHT UPPER LOBE WEDGE RESECTION, COMPLETION RIGHT UPPER LOBECTOMY, LYMPH NODE DISSECTION, INTERCOSTAL NERVE BLOCK;  Surgeon: Quita Figueroa MD;  Location: [facility] MAIN OR;  Service:  Thoracic   • TONSILLECTOMY     • TOTAL HIP ARTHROPLASTY REVISION Left    • TOTAL KNEE ARTHROPLASTY Left    • TOTAL SHOULDER ARTHROPLASTY Left      Family History   Problem Relation Age of Onset   • Hypertension Mother    • Lung cancer Mother    • Cancer Mother         lung   • Kidney disease Father    • Other Brother         Coronary Artery Bypass Grafting   • Stroke Brother         Cerebrovascular Accident   • Malig Hyperthermia Neg Hx      Social History     Socioeconomic History   • Marital status:      Spouse name: Not on file   • Number of children: Not on file   • Years of education: Not on file   • Highest education level: Not on file   Tobacco Use   • Smoking status: Former Smoker     Packs/day: 2.50     Years: 15.00     Pack years: 37.50     Types: Cigarettes     Last attempt to quit: 9/22/2003     Years since quitting: 15.7   • Smokeless tobacco: Never Used   Substance and Sexual Activity   • Alcohol use: No     Frequency: Never     Comment: occ   • Drug use: Defer   • Sexual activity: Defer       Current Outpatient Medications:   •  allopurinol (ZYLOPRIM) 100 MG tablet, Take 100 mg by mouth Daily., Disp: , Rfl: 2  •  amLODIPine (NORVASC) 10 MG tablet, Take 10 mg by mouth Every Morning., Disp: , Rfl: 3  •  atorvastatin (LIPITOR) 40 MG tablet, Take 40 mg by mouth Every Night., Disp: , Rfl:   •  carBAMazepine (TEGretol) 200 MG tablet, TAKE 0.5 TAB BY MOUTH TWICE A DAY, Disp: , Rfl: 0  •  carvedilol (COREG) 25 MG tablet, Take 25 mg by mouth 2 (Two) Times a Day With Meals. 1/2 TAB EACH DOSE-ON HOLD SINCE 1/5/19, Disp: , Rfl:   •  clopidogrel (PLAVIX) 75 MG tablet, Take 1 tablet by mouth Daily., Disp: 30 tablet, Rfl: 3  •  COLCHICINE PO, Take  by mouth Daily., Disp: , Rfl:   •  Cyanocobalamin (B-12) 1000 MCG tablet, Take 1,000 mcg by mouth Daily., Disp: 90 tablet, Rfl: 3  •  cyclobenzaprine (FLEXERIL) 10 MG tablet, Take 10 mg by mouth 3 (Three) Times a Day As Needed., Disp: , Rfl:   •  famotidine  (PEPCID) 20 MG tablet, TAKE 1 TABLET BY MOUTH TWICE A DAY, Disp: 180 tablet, Rfl: 1  •  ferrous sulfate 325 (65 FE) MG tablet, Take 1 tablet by mouth Daily With Breakfast., Disp: 90 tablet, Rfl: 1  •  furosemide (LASIX) 40 MG tablet, Take 40 mg by mouth Daily., Disp: , Rfl:   •  hydrALAZINE (APRESOLINE) 25 MG tablet, Take 50 mg by mouth 2 (Two) Times a Day., Disp: , Rfl: 2  •  LORazepam (ATIVAN) 0.5 MG tablet, TAKE 1 TABLET BY MOUTH THREE TIMES A DAY (Patient taking differently: PRN), Disp: 90 tablet, Rfl: 1  •  omeprazole (priLOSEC) 40 MG capsule, Take 40 mg by mouth Every Evening., Disp: , Rfl:   •  oxyCODONE (ROXICODONE) 20 MG tablet, Take 20 mg by mouth Every 4 (Four) Hours As Needed., Disp: , Rfl:   •  sucralfate (CARAFATE) 1 g tablet, Take 1 g by mouth 4 (Four) Times a Day., Disp: , Rfl: 5  •  traZODone (DESYREL) 150 MG tablet, Take 0.5 tablets by mouth Every Night., Disp: 45 tablet, Rfl: 1  •  venlafaxine XR (EFFEXOR-XR) 150 MG 24 hr capsule, Take 1 capsule by mouth Daily., Disp: 30 capsule, Rfl: 5  •  vitamin D (ERGOCALCIFEROL) 57142 units capsule capsule, Take 50,000 Units by mouth 1 (One) Time Per Week., Disp: , Rfl: 2  Allergies   Allergen Reactions   • Neurontin [Gabapentin] Confusion   • Morphine Other (See Comments)     HEADACHE        Objective   Vitals:    07/01/19 1043   BP: 172/92   Pulse: 73   SpO2: 98%     Physical Exam   Constitutional: She is oriented to person, place, and time. She appears well-developed and well-nourished.   HENT:   Head: Normocephalic and atraumatic.   Nose: Nose normal.   Mouth/Throat: Oropharynx is clear and moist.   Eyes: Conjunctivae and EOM are normal. Pupils are equal, round, and reactive to light.   Neck: Normal range of motion. Neck supple.   Cardiovascular: Normal rate, regular rhythm, normal heart sounds and intact distal pulses.   Pulmonary/Chest: Effort normal and breath sounds normal.   Abdominal: Soft. Bowel sounds are normal.   Musculoskeletal: Normal range of  motion.   Neurological: She is alert and oriented to person, place, and time.   Skin: Skin is warm and dry. Capillary refill takes less than 2 seconds.   Psychiatric: She has a normal mood and affect. Her behavior is normal. Judgment and thought content normal.   Nursing note and vitals reviewed.    Independent Review of Radiographic Studies:    No new imaging  Assessment/Plan   Ms. Arguelles is a very pleasant 73-year-old lady status post right upper lobectomy for a T1BN0, stage I non-small cell lung cancer.  She is doing reasonably well, however, on her last surveillance CT scan there was a liver lesion.  She had a biopsy of this liver lesion which was consistent with a cyst.  She presented today to discuss those results.  She will plan to keep her prior appointment for a CT chest in 3 months to continue her lung cancer surveillance.    Diagnoses and all orders for this visit:    Malignant neoplasm of upper lobe of right lung (CMS/HCC)    Liver lesion

## 2019-07-02 ENCOUNTER — TELEPHONE (OUTPATIENT)
Dept: CARDIOLOGY | Facility: CLINIC | Age: 74
End: 2019-07-02

## 2019-07-02 NOTE — TELEPHONE ENCOUNTER
Pt called. She would like the results of her Zio Patch. Dr. Gifford ordered it for palpitations, but she is our patient.    Originally she was concerned when she was rescheduled out further by 2 weeks that meant we couldn't see her till January, however I let her know that isn't the case.     She can be reached at #[phone].    Thanks,  Kathy

## 2019-07-16 NOTE — TELEPHONE ENCOUNTER
Frequent pvcs one out of 10 beats.   If she is symptomatic we can have yon or pastor see sooner  Please let her know

## 2019-07-18 ENCOUNTER — PATIENT OUTREACH (OUTPATIENT)
Dept: CASE MANAGEMENT | Facility: OTHER | Age: 74
End: 2019-07-18

## 2019-07-18 NOTE — TELEPHONE ENCOUNTER
I called the pt and she is having symptoms. I put her on with Mary on 7/31/19 at 2:30p. I wasn't able to get the spot to let me schedule, so I held it. Can you put her in that spot for me please? She is aware of the date and time.    Thanks,  Kathy

## 2019-07-18 NOTE — OUTREACH NOTE
Care Coordination Note    Inbound call from Dr. Oumou Chavez's office, and has contacted patient with appt scheduled for 7/31/10 with an ARNP in cardiology practice and to discuss results and office visit for review of symptoms/assessment.     Kandace Gonzales RN    7/18/2019, 3:05 PM

## 2019-07-18 NOTE — OUTREACH NOTE
Care Coordination Note    E-mail sent to Kristin Practice Manager for Dr Crowder, requesting assistance with AWV scheduling.      Kandace Gonzales RN    7/18/2019, 1:26 PM

## 2019-07-18 NOTE — OUTREACH NOTE
Care Coordination Note    Voicemail left with LILIBETH Chavez for Dr. Coello requesting a call back to [place] and patient regarding cardiac testing results.    Kandace Gonzales RN    7/18/2019, 1:25 PM

## 2019-07-24 NOTE — TELEPHONE ENCOUNTER
Pt needs to come and sign a new contract-Pt's  advised  Pt came by and signed her contract  Larry ran and in on call folder and ok

## 2019-07-25 RX ORDER — LORAZEPAM 0.5 MG/1
TABLET ORAL
Qty: 90 TABLET | Refills: 1 | Status: SHIPPED | OUTPATIENT
Start: 2019-07-25 | End: 2019-10-23 | Stop reason: SDUPTHER

## 2019-07-29 ENCOUNTER — TELEPHONE (OUTPATIENT)
Dept: CARDIOLOGY | Facility: CLINIC | Age: 74
End: 2019-07-29

## 2019-07-29 RX ORDER — VENLAFAXINE HYDROCHLORIDE 150 MG/1
CAPSULE, EXTENDED RELEASE ORAL
Qty: 30 CAPSULE | Refills: 5 | Status: SHIPPED | OUTPATIENT
Start: 2019-07-29 | End: 2019-08-29 | Stop reason: DRUGHIGH

## 2019-07-29 NOTE — TELEPHONE ENCOUNTER
Dr. Coello:    Dr. Whitney Downs from [facility] would like to speak with you concerning this patient. She can be reached at [phone]

## 2019-07-31 ENCOUNTER — OFFICE VISIT (OUTPATIENT)
Dept: CARDIOLOGY | Facility: CLINIC | Age: 74
End: 2019-07-31

## 2019-07-31 VITALS
SYSTOLIC BLOOD PRESSURE: 140 MMHG | HEART RATE: 73 BPM | WEIGHT: 191.8 LBS | BODY MASS INDEX: 30.82 KG/M2 | OXYGEN SATURATION: 96 % | DIASTOLIC BLOOD PRESSURE: 76 MMHG | HEIGHT: 66 IN

## 2019-07-31 DIAGNOSIS — R00.2 PALPITATIONS: Primary | ICD-10-CM

## 2019-07-31 PROCEDURE — 93000 ELECTROCARDIOGRAM COMPLETE: CPT | Performed by: NURSE PRACTITIONER

## 2019-07-31 PROCEDURE — 99213 OFFICE O/P EST LOW 20 MIN: CPT | Performed by: NURSE PRACTITIONER

## 2019-07-31 NOTE — PROGRESS NOTES
Date of Office Visit: 2019  Encounter Provider: REENA Daniel  Place of Service: [facility] CARDIOLOGY  Patient Name: Rachana Arguelles  :1945    Chief Complaint   Patient presents with   • Palpitations   :     HPI: Rachana Arguelles is a 73 y.o. female, new to me, who presents today for follow-up.  Old records have been obtained and reviewed by me.  She is a patient of Dr. Coello's who is previously been evaluated for chest pain and palpitations.  She has a history of lung cancer for which she is status post right upper lobectomy in January of this year.  In 2016, she had a normal echocardiogram and a normal nuclear stress test.  She had an incidental finding of calcium in her aorta and iliac arteries.  In 2018, she underwent bilateral common iliac artery stent placement with Dr. Mancera and remains on aspirin and Plavix per their recommendation.  She was last seen in the office on 2018 at which time she was reporting some shortness of breath that was chronic and unchanged.  She still had swelling in her left leg.  She was having no symptoms of angina or heart failure.  No further cardiac work-up was recommended.  She was advised to follow-up in 1 year.  Prio to her lung surgery in January, she underwent a stress echocardiogram as well as a nuclear stress test.  The stress echocardiogram revealed normal LV systolic function, an EF of 67%, grade II diastolic dysfunction, and no significant valvular abnormalities.  She had a normal myocardial perfusion study with no evidence of ischemia.  On 2019, she was evaluated by Dr. Gifford for increased shortness of air.  He ordered a Holter monitor for associated palpitations.  He arranged for home oxygen, encouraged continuing with [facility], and advised pulmonary rehab.  He also noted that if she were to continue having symptoms she may need a bronch.  On 2019 of this year, she underwent vascular screening  "which revealed mild right carotid artery plaque and moderate amount of left carotid stenosis (50-60%) and a normal aortic screening.  It was recommended she follow-up with vascular. On 7/1/2019, she followed up with Dr. Figueroa with thoracic surgery.  She is scheduled for a CT of the chest in 3 months to continue lung cancer surveillance.  The Holter monitor revealed sinus rhythm with rare atrial ectopic beats and frequent ventricular ectopic beats accounting for 10% of overall beats.  On 7/2/2019, the patient called the office for the results of her ZIO patch.  Evidently she was having symptoms and was scheduled to see me today.   Over the past year she has overall been doing well from a cardiac standpoint.  She denies any chest pain, dizziness, or syncope.  She continues to have shortness of air with exertion ever since her lung surgery in January.  She is also reporting a \"sinking feeling\" in the center of her chest that she also describes as a fluttering sensation.  It is worse with exertion and resolves when she is relaxed.  These occur about 3 times a day.  She has occasional lower extremity edema.  She does not participate in any physical activity due to musculoskeletal pain.  She does not smoke.  She drinks alcohol occasionally.         Past Medical History:   Diagnosis Date   • AAA (abdominal aortic aneurysm) without rupture (CMS/HCC)    • Anxiety    • Arthritis    • Cancer (CMS/HCC)     skin cancer   • Chest pain     at rest   • Chronic low back pain    • Chronic pain syndrome 3/12/2016   • CKD (chronic kidney disease), stage III (CMS/HCC)    • Claustrophobia 10/26/2015    Resolved   • Depression    • Dizziness    • GERD (gastroesophageal reflux disease)    • GI problem    • Hiatal hernia    • History of migraine headaches    • Hyperlipidemia    • Hypertension    • Lumbar postlaminectomy syndrome 10/26/2015    Resolved   • Lung cancer (CMS/HCC)    • Lung nodule    • Palpitations    • Polypharmacy 4/22/2016 "   • Pulmonary embolism (CMS/HCC) 10/26/2015    January 2013 - Resolved, felt to be secondary to estrogen use   • Submandibular sialoadenitis 10/26/2015    Resolved   • Vitamin B 12 deficiency        Past Surgical History:   Procedure Laterality Date   • ANGIOPLASTY ILIAC ARTERY Bilateral 8/10/2018    Procedure: BILATERAL ILIAC STENTS;  Surgeon: Riki Mancera MD;  Location: [facility] MAIN OR;  Service: Vascular   • APPENDECTOMY     • BREAST SURGERY      Breast Surgery Reduction Procedure   • BRONCHOSCOPY N/A 2/8/2019    Procedure: BRONCHOSCOPY;  Surgeon: Álvaro Gifford MD;  Location: [facility] ENDOSCOPY;  Service: Pulmonary   • CHOLECYSTECTOMY     • HYSTERECTOMY     • INTUBATION  1/15/2019        • JOINT REPLACEMENT      left knee, hip, shoulder   • LASIK     • LUMBAR FUSION      Lumbar Vertebral Fusion   • SHOULDER ARTHROSCOPY  04/04/2013    Dr. Carrillo/[facility] - Resolved   • THORACOSCOPY VIDEO ASSISTED WITH LOBECTOMY Right 1/11/2019    Procedure: BRONCOSCOPY, THORACOSCOPY VIDEO ASSISTED WITH RIGHT UPPER LOBE WEDGE RESECTION, COMPLETION RIGHT UPPER LOBECTOMY, LYMPH NODE DISSECTION, INTERCOSTAL NERVE BLOCK;  Surgeon: Quita Figueroa MD;  Location: [facility] OR;  Service: Thoracic   • TONSILLECTOMY     • TOTAL HIP ARTHROPLASTY REVISION Left    • TOTAL KNEE ARTHROPLASTY Left    • TOTAL SHOULDER ARTHROPLASTY Left        Social History     Socioeconomic History   • Marital status:      Spouse name: Not on file   • Number of children: Not on file   • Years of education: Not on file   • Highest education level: Not on file   Tobacco Use   • Smoking status: Former Smoker     Packs/day: 2.50     Years: 15.00     Pack years: 37.50     Types: Cigarettes     Last attempt to quit: 9/22/2003     Years since quitting: 15.8   • Smokeless tobacco: Never Used   Substance and Sexual Activity   • Alcohol use: No     Frequency: Never     Comment: occ   • Drug use: Defer   • Sexual activity: Defer       Family History   Problem  Relation Age of Onset   • Hypertension Mother    • Lung cancer Mother    • Cancer Mother         lung   • Kidney disease Father    • Other Brother         Coronary Artery Bypass Grafting   • Stroke Brother         Cerebrovascular Accident   • Malig Hyperthermia Neg Hx        Review of Systems   Constitution: Positive for malaise/fatigue. Negative for chills and fever.   Cardiovascular: Positive for dyspnea on exertion, leg swelling and palpitations. Negative for chest pain, near-syncope, orthopnea, paroxysmal nocturnal dyspnea and syncope.   Respiratory: Negative for cough and shortness of breath.    Musculoskeletal: Negative for joint pain, joint swelling and myalgias.   Gastrointestinal: Negative for abdominal pain, diarrhea, melena, nausea and vomiting.   Genitourinary: Negative for frequency and hematuria.   Neurological: Negative for light-headedness, numbness, paresthesias and seizures.   Allergic/Immunologic: Negative.    All other systems reviewed and are negative.      Allergies   Allergen Reactions   • Neurontin [Gabapentin] Confusion   • Morphine Other (See Comments)     HEADACHE         Current Outpatient Medications:   •  allopurinol (ZYLOPRIM) 100 MG tablet, Take 100 mg by mouth Daily., Disp: , Rfl: 2  •  amLODIPine (NORVASC) 10 MG tablet, Take 10 mg by mouth Every Morning., Disp: , Rfl: 3  •  atorvastatin (LIPITOR) 40 MG tablet, Take 40 mg by mouth Every Night., Disp: , Rfl:   •  carBAMazepine (TEGretol) 200 MG tablet, TAKE 0.5 TAB BY MOUTH TWICE A DAY, Disp: , Rfl: 0  •  carvedilol (COREG) 25 MG tablet, Take 25 mg by mouth 2 (Two) Times a Day With Meals. 1/2 TAB EACH DOSE-ON HOLD SINCE 1/5/19, Disp: , Rfl:   •  clopidogrel (PLAVIX) 75 MG tablet, Take 1 tablet by mouth Daily., Disp: 30 tablet, Rfl: 3  •  COLCHICINE PO, Take  by mouth Daily., Disp: , Rfl:   •  Cyanocobalamin (B-12) 1000 MCG tablet, Take 1,000 mcg by mouth Daily., Disp: 90 tablet, Rfl: 3  •  cyclobenzaprine (FLEXERIL) 10 MG tablet, Take  "10 mg by mouth 3 (Three) Times a Day As Needed., Disp: , Rfl:   •  famotidine (PEPCID) 20 MG tablet, TAKE 1 TABLET BY MOUTH TWICE A DAY, Disp: 180 tablet, Rfl: 1  •  ferrous sulfate 325 (65 FE) MG tablet, Take 1 tablet by mouth Daily With Breakfast., Disp: 90 tablet, Rfl: 1  •  furosemide (LASIX) 40 MG tablet, Take 40 mg by mouth Daily., Disp: , Rfl:   •  hydrALAZINE (APRESOLINE) 25 MG tablet, Take 75 mg by mouth 3 (Three) Times a Day., Disp: , Rfl: 2  •  LORazepam (ATIVAN) 0.5 MG tablet, TAKE 1 TABLET BY MOUTH THREE TIMES A DAY, Disp: 90 tablet, Rfl: 1  •  omeprazole (priLOSEC) 40 MG capsule, Take 40 mg by mouth Every Evening., Disp: , Rfl:   •  oxyCODONE (ROXICODONE) 20 MG tablet, Take 20 mg by mouth Every 4 (Four) Hours As Needed., Disp: , Rfl:   •  traZODone (DESYREL) 150 MG tablet, Take 0.5 tablets by mouth Every Night., Disp: 45 tablet, Rfl: 1  •  venlafaxine XR (EFFEXOR-XR) 150 MG 24 hr capsule, TAKE 1 CAPSULE BY MOUTH EVERY DAY, Disp: 30 capsule, Rfl: 5  •  vitamin D (ERGOCALCIFEROL) 76504 units capsule capsule, Take 50,000 Units by mouth 1 (One) Time Per Week., Disp: , Rfl: 2      Objective:     Vitals:    07/31/19 1440 07/31/19 1441   BP: 138/76 140/76   BP Location: Right arm Left arm   Patient Position: Sitting Sitting   Pulse: 73    SpO2: 96%    Weight: 87 kg (191 lb 12.8 oz)    Height: 167.6 cm (66\")      Body mass index is 30.96 kg/m².    PHYSICAL EXAM:    Physical Exam   Constitutional: She is oriented to person, place, and time. She appears well-developed and well-nourished. No distress.   HENT:   Head: Normocephalic and atraumatic.   Eyes: Pupils are equal, round, and reactive to light.   Neck: No JVD present. No thyromegaly present.   Cardiovascular: Normal rate, regular rhythm, normal heart sounds and intact distal pulses.   No murmur heard.  Pulmonary/Chest: Effort normal and breath sounds normal. No respiratory distress.   Abdominal: Soft. Bowel sounds are normal. She exhibits no distension. " There is no splenomegaly or hepatomegaly. There is no tenderness.   Musculoskeletal: Normal range of motion. She exhibits no edema.   Neurological: She is alert and oriented to person, place, and time.   Skin: Skin is warm and dry. She is not diaphoretic. No erythema.   Psychiatric: She has a normal mood and affect. Her behavior is normal. Judgment normal.         ECG 12 Lead  Date/Time: 7/31/2019 2:45 PM  Performed by: Mary Haddad APRN  Authorized by: Mary Haddad APRN   Comparison: compared with previous ECG from 5/17/2019  Similar to previous ECG  Rhythm: sinus rhythm  Rate: normal  BPM: 63    Clinical impression: normal ECG  Comments: Indication: Palpitations              Assessment:       Diagnosis Plan   1. Palpitations  ECG 12 Lead     Orders Placed This Encounter   Procedures   • ECG 12 Lead     This order was created via procedure documentation          Plan:       Overall I think she is doing well from a cardiac standpoint.  She denies any symptoms of angina or heart failure.  I think that her shortness of air is related to her lung surgery.  The Holter revealed that she is having PVCs every 10 beats.  She remains asymptomatic other than the fluttering sensation that she reports.  She denies any dizziness, lightheadedness, or syncope.  Her EKG is unchanged.  Her blood pressure is well-controlled.  I did discuss the plan of care with Dr. Coello.  At this time, I am not going to make any changes to her medical regimen and she will follow-up with Dr. Coello in 6 months or sooner if needed.      As always, it has been a pleasure to participate in your patient's care.      Sincerely,         REENA Burr

## 2019-08-12 RX ORDER — ONDANSETRON 4 MG/1
4 TABLET, FILM COATED ORAL EVERY 8 HOURS PRN
Qty: 20 TABLET | Refills: 0 | Status: SHIPPED | OUTPATIENT
Start: 2019-08-12 | End: 2019-09-26 | Stop reason: SDUPTHER

## 2019-08-14 ENCOUNTER — PATIENT OUTREACH (OUTPATIENT)
Dept: CASE MANAGEMENT | Facility: OTHER | Age: 74
End: 2019-08-14

## 2019-08-14 NOTE — OUTREACH NOTE
Care Coordination Note    Requested assistance with scheduling subsequent wellness visit with Ivette CHEN on 8/29/19.    Kandace Gonzales RN    8/14/2019, 5:08 PM

## 2019-08-21 ENCOUNTER — APPOINTMENT (OUTPATIENT)
Dept: WOMENS IMAGING | Facility: HOSPITAL | Age: 74
End: 2019-08-21

## 2019-08-21 PROCEDURE — MDREVIEWSP: Performed by: RADIOLOGY

## 2019-08-21 PROCEDURE — 77063 BREAST TOMOSYNTHESIS BI: CPT | Performed by: RADIOLOGY

## 2019-08-21 PROCEDURE — 77067 SCR MAMMO BI INCL CAD: CPT | Performed by: RADIOLOGY

## 2019-08-22 NOTE — OUTREACH NOTE
Care Coordination Note    Per Kristin Kwon, Practice Manger, to schedule AWV post 8/29/19, visit with ARNP, at checkout for AWV follow-up.     Kandace Gonzales RN    8/22/2019, 11:32 AM

## 2019-08-23 ENCOUNTER — PATIENT OUTREACH (OUTPATIENT)
Dept: CASE MANAGEMENT | Facility: OTHER | Age: 74
End: 2019-08-23

## 2019-08-23 NOTE — OUTREACH NOTE
Patient Outreach Note    Patient has met care plan goals, all care gaps have been addressed, and patient has a strong sense of health self-management.Completed follow-up for mammogram which she reports normal results.  Will plan on flu vaccination this fall wither at PCP office or pharmacy. Has an appt in November with the [facility].  Difficulties with blurry vision and plans to address with at [facility] visit next week for a referral to an opthamologist.    The patient's annual wellness visit is due and to be scheduled at her checkout at next week's appt. .Active with My Chart.  . Patient has received advanced care planning materials and aware of informational meetings monthly [facility]. .    Kandace Gonzales RN    8/23/2019, 12:27 PM

## 2019-08-26 ENCOUNTER — EPISODE CHANGES (OUTPATIENT)
Dept: CASE MANAGEMENT | Facility: OTHER | Age: 74
End: 2019-08-26

## 2019-08-26 RX ORDER — TRAZODONE HYDROCHLORIDE 150 MG/1
75 TABLET ORAL NIGHTLY
Qty: 45 TABLET | Refills: 1 | Status: SHIPPED | OUTPATIENT
Start: 2019-08-26 | End: 2020-02-11

## 2019-08-29 ENCOUNTER — OFFICE VISIT (OUTPATIENT)
Dept: INTERNAL MEDICINE | Facility: CLINIC | Age: 74
End: 2019-08-29

## 2019-08-29 ENCOUNTER — RESULTS ENCOUNTER (OUTPATIENT)
Dept: INTERNAL MEDICINE | Facility: CLINIC | Age: 74
End: 2019-08-29

## 2019-08-29 VITALS
TEMPERATURE: 97.9 F | BODY MASS INDEX: 31.43 KG/M2 | DIASTOLIC BLOOD PRESSURE: 78 MMHG | RESPIRATION RATE: 19 BRPM | WEIGHT: 195.6 LBS | OXYGEN SATURATION: 95 % | HEART RATE: 71 BPM | SYSTOLIC BLOOD PRESSURE: 138 MMHG | HEIGHT: 66 IN

## 2019-08-29 DIAGNOSIS — F41.9 ANXIETY: ICD-10-CM

## 2019-08-29 DIAGNOSIS — Z00.00 MEDICARE ANNUAL WELLNESS VISIT, SUBSEQUENT: Primary | ICD-10-CM

## 2019-08-29 DIAGNOSIS — F32.1 CURRENT MODERATE EPISODE OF MAJOR DEPRESSIVE DISORDER WITHOUT PRIOR EPISODE (HCC): ICD-10-CM

## 2019-08-29 DIAGNOSIS — Z12.11 COLON CANCER SCREENING: ICD-10-CM

## 2019-08-29 DIAGNOSIS — C34.11 MALIGNANT NEOPLASM OF UPPER LOBE OF RIGHT LUNG (HCC): ICD-10-CM

## 2019-08-29 DIAGNOSIS — N18.30 CKD (CHRONIC KIDNEY DISEASE), STAGE III (HCC): ICD-10-CM

## 2019-08-29 DIAGNOSIS — I10 ESSENTIAL HYPERTENSION: ICD-10-CM

## 2019-08-29 DIAGNOSIS — Z00.00 HEALTHCARE MAINTENANCE: ICD-10-CM

## 2019-08-29 DIAGNOSIS — K21.9 GASTROESOPHAGEAL REFLUX DISEASE WITHOUT ESOPHAGITIS: ICD-10-CM

## 2019-08-29 DIAGNOSIS — E78.2 MIXED HYPERLIPIDEMIA: ICD-10-CM

## 2019-08-29 PROCEDURE — G0439 PPPS, SUBSEQ VISIT: HCPCS | Performed by: NURSE PRACTITIONER

## 2019-08-29 RX ORDER — VENLAFAXINE HYDROCHLORIDE 225 MG/1
225 TABLET, EXTENDED RELEASE ORAL
Qty: 90 EACH | Refills: 2 | Status: SHIPPED | OUTPATIENT
Start: 2019-08-29 | End: 2019-12-02 | Stop reason: DRUGHIGH

## 2019-08-29 NOTE — PROGRESS NOTES
The ABCs of the Annual Wellness Visit  Subsequent Medicare Wellness Visit    No chief complaint on file.      Subjective   History of Present Illness:  Rachana Arguelles is a 73 y.o. female who presents for a Subsequent Medicare Wellness Visit.    HEALTH RISK ASSESSMENT    Recent Hospitalizations:  Recently treated at the following:  [facility]    Current Medical Providers:  Patient Care Team:  Rhys Crowder Jr., MD as PCP - General (Family Medicine)  Jaciel Webber MD as PCP - Claims Attributed  Whitney Dudley MD as Consulting Physician (Nephrology)  Quita Figueroa MD as Surgeon (Thoracic Surgery)  Sabas Luther MD as Consulting Physician (Otolaryngology)  Álvaro Gifford MD as Consulting Physician (Pulmonary Disease)    Smoking Status:  Social History     Tobacco Use   Smoking Status Former Smoker   • Packs/day: 2.50   • Years: 15.00   • Pack years: 37.50   • Types: Cigarettes   • Last attempt to quit: 9/22/2003   • Years since quitting: 15.9   Smokeless Tobacco Never Used       Alcohol Consumption:  Social History     Substance and Sexual Activity   Alcohol Use No   • Frequency: Never    Comment: occ       Depression Screen:   PHQ-2/PHQ-9 Depression Screening 8/29/2019   Little interest or pleasure in doing things 3   Feeling down, depressed, or hopeless 3   Trouble falling or staying asleep, or sleeping too much 1   Feeling tired or having little energy 3   Poor appetite or overeating 3   Feeling bad about yourself - or that you are a failure or have let yourself or your family down 2   Trouble concentrating on things, such as reading the newspaper or watching television 0   Moving or speaking so slowly that other people could have noticed. Or the opposite - being so fidgety or restless that you have been moving around a lot more than usual 3   Thoughts that you would be better off dead, or of hurting yourself in some way 0   Total Score 18   If you checked off any problems, how  difficult have these problems made it for you to do your work, take care of things at home, or get along with other people? Very difficult       Fall Risk Screen:  NEAL Fall Risk Assessment was completed, and patient is at MODERATE risk for falls. Assessment completed on:8/29/2019    Health Habits and Functional and Cognitive Screening:  Functional & Cognitive Status 8/29/2019   Do you have difficulty preparing food and eating? No   Do you have difficulty bathing yourself, getting dressed or grooming yourself? Yes   Do you have difficulty using the toilet? No   Do you have difficulty moving around from place to place? Yes   Do you have trouble with steps or getting out of a bed or a chair? Yes   Current Diet Well Balanced Diet   Dental Exam Up to date   Eye Exam Up to date   Exercise (times per week) 0 times per week   Current Exercise Activities Include None   Do you need help using the phone?  No   Are you deaf or do you have serious difficulty hearing?  No   Do you need help with transportation? No   Do you need help shopping? No   Do you need help preparing meals?  No   Do you need help with housework?  Yes   Do you need help with laundry? Yes   Do you need help taking your medications? Yes   Do you need help managing money? No   Do you ever drive or ride in a car without wearing a seat belt? No   Have you felt unusual stress, anger or loneliness in the last month? Yes   Who do you live with? Spouse   If you need help, do you have trouble finding someone available to you? No   Have you been bothered in the last four weeks by sexual problems? No   Do you have difficulty concentrating, remembering or making decisions? Yes         Does the patient have evidence of cognitive impairment? No    Asprin use counseling:On clopidrogel as an alternative (due to ASA contraindication)    Age-appropriate Screening Schedule:  Refer to the list below for future screening recommendations based on patient's age, sex and/or  medical conditions. Orders for these recommended tests are listed in the plan section. The patient has been provided with a written plan.    Health Maintenance   Topic Date Due   • ZOSTER VACCINE (1 of 2) 09/14/1995   • INFLUENZA VACCINE  08/01/2019   • LIPID PANEL  05/30/2020   • MAMMOGRAM  08/21/2021   • COLONOSCOPY  04/04/2022   • TDAP/TD VACCINES (2 - Td) 03/23/2026   • PNEUMOCOCCAL VACCINES (65+ LOW/MEDIUM RISK)  Completed          The following portions of the patient's history were reviewed and updated as appropriate: allergies, current medications, past family history, past medical history, past social history, past surgical history and problem list.    Outpatient Medications Prior to Visit   Medication Sig Dispense Refill   • allopurinol (ZYLOPRIM) 100 MG tablet Take 100 mg by mouth Daily.  2   • amLODIPine (NORVASC) 10 MG tablet Take 10 mg by mouth Every Morning.  3   • atorvastatin (LIPITOR) 40 MG tablet Take 40 mg by mouth Every Night.     • carBAMazepine (TEGretol) 200 MG tablet TAKE 0.5 TAB BY MOUTH TWICE A DAY  0   • carvedilol (COREG) 25 MG tablet Take 25 mg by mouth 2 (Two) Times a Day With Meals. 1/2 TAB EACH DOSE-ON HOLD SINCE 1/5/19     • clopidogrel (PLAVIX) 75 MG tablet Take 1 tablet by mouth Daily. 30 tablet 3   • COLCHICINE PO Take  by mouth Daily.     • cyclobenzaprine (FLEXERIL) 10 MG tablet Take 10 mg by mouth 3 (Three) Times a Day As Needed.     • famotidine (PEPCID) 20 MG tablet TAKE 1 TABLET BY MOUTH TWICE A  tablet 1   • ferrous sulfate 325 (65 FE) MG tablet Take 1 tablet by mouth Daily With Breakfast. 90 tablet 1   • furosemide (LASIX) 40 MG tablet Take 40 mg by mouth Daily.     • LORazepam (ATIVAN) 0.5 MG tablet TAKE 1 TABLET BY MOUTH THREE TIMES A DAY 90 tablet 1   • omeprazole (priLOSEC) 40 MG capsule Take 40 mg by mouth Every Evening.     • ondansetron (ZOFRAN) 4 MG tablet TAKE 1 TABLET BY MOUTH EVERY 8 (EIGHT) HOURS AS NEEDED FOR NAUSEA OR VOMITING. 20 tablet 0   •  oxyCODONE (ROXICODONE) 20 MG tablet Take 20 mg by mouth Every 4 (Four) Hours As Needed.     • traZODone (DESYREL) 150 MG tablet TAKE 0.5 TABLETS BY MOUTH EVERY NIGHT. 45 tablet 1   • venlafaxine XR (EFFEXOR-XR) 150 MG 24 hr capsule TAKE 1 CAPSULE BY MOUTH EVERY DAY 30 capsule 5   • vitamin D (ERGOCALCIFEROL) 52990 units capsule capsule Take 50,000 Units by mouth 1 (One) Time Per Week.  2   • Cyanocobalamin (B-12) 1000 MCG tablet Take 1,000 mcg by mouth Daily. 90 tablet 3   • hydrALAZINE (APRESOLINE) 25 MG tablet Take 75 mg by mouth 3 (Three) Times a Day.  2     No facility-administered medications prior to visit.        Patient Active Problem List   Diagnosis   • Anxiety   • Chronic pain syndrome   • Depression   • Gastroesophageal reflux disease   • Hyperlipidemia   • Hypertension   • Osteoarthritis of hip   • Chronic low back pain   • Failed back syndrome of lumbar spine   • Pulmonary embolus (CMS/HCC)   • Weight loss   • Polypharmacy   • CKD (chronic kidney disease), stage III (CMS/HCC)   • Chronic constipation   • Sacroiliac joint pain   • Mixed incontinence   • Renal cyst   • Achilles tendonitis   • Atherosclerosis of native artery of left lower extremity with intermittent claudication (CMS/HCC)   • Morbidly obese (CMS/HCC)   • Lung nodule   • Malignant neoplasm of right upper lobe of lung (CMS/HCC)   • Lung cancer (CMS/HCC)   • Moderate single current episode of major depressive disorder (CMS/HCC)   • Palpitations       Advanced Care Planning:  Patient does not have an advance directive - not interested in additional information    Review of Systems    Compared to one year ago, the patient feels her physical health is worse.  Compared to one year ago, the patient feels her mental health is worse.    Reviewed chart for potential of high risk medication in the elderly: yes  Reviewed chart for potential of harmful drug interactions in the elderly:yes    Objective         Vitals:    08/29/19 1538   BP: 169/76   BP  "Location: Left arm   Patient Position: Sitting   Cuff Size: Small Adult   Pulse: 71   Resp: 19   Temp: 97.9 °F (36.6 °C)   TempSrc: Oral   SpO2: 95%   Weight: 88.7 kg (195 lb 9.6 oz)   Height: 167.6 cm (66\")   PainSc:   6   PainLoc: Back       Body mass index is 31.57 kg/m².  Discussed the patient's BMI with her. The BMI is in the acceptable range.    Physical Exam          Assessment/Plan   Medicare Risks and Personalized Health Plan  CMS Preventative Services Quick Reference  Advance Directive Discussion  Breast Cancer/Mammogram Screening  Cardiovascular risk  Dementia/Memory   Depression/Dysphoria  Fall Risk  Immunizations Discussed/Encouraged (specific immunizations; Shingrix )    The above risks/problems have been discussed with the patient.  Pertinent information has been shared with the patient in the After Visit Summary.  Follow up plans and orders are seen below in the Assessment/Plan Section.    Diagnoses and all orders for this visit:    1. Medicare annual wellness visit, subsequent (Primary)    2. Mixed hyperlipidemia    3. Essential hypertension    4. Malignant neoplasm of upper lobe of right lung (CMS/HCC)    5. Anxiety  -     venlafaxine 225 MG tablet sustained-release 24 hour 24 hr tablet; Take 1 tablet by mouth Daily With Breakfast.  Dispense: 90 each; Refill: 2    6. CKD (chronic kidney disease), stage III (CMS/HCC)    7. Gastroesophageal reflux disease without esophagitis    8. Healthcare maintenance    9. Current moderate episode of major depressive disorder without prior episode (CMS/HCC)  -     venlafaxine 225 MG tablet sustained-release 24 hour 24 hr tablet; Take 1 tablet by mouth Daily With Breakfast.  Dispense: 90 each; Refill: 2    10. Colon cancer screening  -     Cologuard - Stool, Per Rectum; Future    Other orders  -     Cancel: CBC & Differential  -     Cancel: Comprehensive metabolic panel  -     Cancel: Lipid panel  -     Cancel: TSH  -     Cancel: T4, Free  -     Cancel: Hepatitis C " antibody      Follow Up:  No Follow-up on file.     An After Visit Summary and PPPS were given to the patient.       -Hyperlipidemia: Continue atorvastatin 40 mg daily.  We will check a lipid panel at her next follow-up.    -Hypertension: Blood pressure is stable today at 138/78.  We will have her monitor her blood pressures consistently at home and report if it is raising consistently greater than 130/80.  Continue amlodipine 10 mg daily, Coreg 25 mg twice daily, Lasix 40 mg daily and hydralazine 25 mg twice daily.    -Right lung cancer: Follows with Dr. Figueroa, thoracic surgery.  She will get a CT of the chest for follow-up on 10/9/2018.  She is status post right upper lobectomy last year.  She has some chronic shortness of breath.  She had a liver lesion which was biopsied and turned out to just be a cyst.  Continue routine follow-ups with thoracic surgery as well as routine imaging.    -GERD: Stable with omeprazole 20 mg daily.  Continue avoidance of trigger foods as well.    -CKD stage III: Continue routine follow-ups and creatinine checks.  Avoid NSAIDs.    -Depression: We discussed this in length.  She feels that her Effexor X are 150 mg is not working as well as it could be.  She has no SI or HI.  We will increase her Effexor to 225 mg extended release daily.  We will follow-up in 3 months on this increase.    -We discussed colon cancer screening.  She does have a personal history of polyps.  It appears that her last colonoscopy was in 2012.  She does not want to proceed with further colonoscopies and wants to do Cologuard instead.  I advised her that I would recommend she proceed with colonoscopy instead due to her history of polyps, but she states that she would like to do Cologuard instead.  We have gotten this ordered for her.    -We will see her back in 3 months for follow-up on chronic conditions and fasting labs.  Follow-up PRN in the meantime.

## 2019-08-29 NOTE — PATIENT INSTRUCTIONS
Please get the Shingrix vaccine at your pharmacy.  Medicare Wellness  Personal Prevention Plan of Service     Date of Office Visit:  2019  Encounter Provider:  REENA Robbins  Place of Service:  [facility] INTERNAL MED[facility]INE  Patient Name: Rachana Arguelles  :  1945    As part of the Medicare Wellness portion of your visit today, we are providing you with this personalized preventive plan of services (PPPS). This plan is based upon recommendations of the [place] Preventive Services Task Force (USPSTF) and the Advisory Committee on Immunization Practices (ACIP).    This lists the preventive care services that should be considered, and provides dates of when you are due. Items listed as completed are up-to-date and do not require any further intervention.    Health Maintenance   Topic Date Due   • HEPATITIS C SCREENING  2016   • MEDICARE ANNUAL WELLNESS  2017   • INFLUENZA VACCINE  2019   • ZOSTER VACCINE (1 of 2) 2020 (Originally 1995)   • LIPID PANEL  2020   • MAMMOGRAM  2021   • COLONOSCOPY  2022   • TDAP/TD VACCINES (2 - Td) 2026   • PNEUMOCOCCAL VACCINES (65+ LOW/MEDIUM RISK)  Completed   • LUNG CANCER SCREENING  Discontinued       No orders of the defined types were placed in this encounter.      No Follow-up on file.

## 2019-09-11 RX ORDER — VENLAFAXINE 75 MG/1
75 TABLET ORAL DAILY
Qty: 90 TABLET | Refills: 0 | Status: SHIPPED | OUTPATIENT
Start: 2019-09-11 | End: 2019-12-15 | Stop reason: SDUPTHER

## 2019-09-11 RX ORDER — VENLAFAXINE HYDROCHLORIDE 150 MG/1
150 CAPSULE, EXTENDED RELEASE ORAL DAILY
Qty: 90 CAPSULE | Refills: 0 | Status: SHIPPED | OUTPATIENT
Start: 2019-09-11 | End: 2019-12-16 | Stop reason: SDUPTHER

## 2019-09-26 DIAGNOSIS — J06.9 UPPER RESPIRATORY TRACT INFECTION, UNSPECIFIED TYPE: ICD-10-CM

## 2019-09-26 DIAGNOSIS — D50.9 IRON DEFICIENCY ANEMIA, UNSPECIFIED IRON DEFICIENCY ANEMIA TYPE: ICD-10-CM

## 2019-09-26 RX ORDER — FERROUS SULFATE 325(65) MG
TABLET ORAL
Qty: 90 TABLET | Refills: 1 | Status: ON HOLD | OUTPATIENT
Start: 2019-09-26 | End: 2019-12-20

## 2019-09-26 RX ORDER — ONDANSETRON 4 MG/1
4 TABLET, FILM COATED ORAL EVERY 8 HOURS PRN
Qty: 20 TABLET | Refills: 2 | Status: SHIPPED | OUTPATIENT
Start: 2019-09-26 | End: 2019-12-30 | Stop reason: SDUPTHER

## 2019-10-09 ENCOUNTER — HOSPITAL ENCOUNTER (OUTPATIENT)
Dept: CT IMAGING | Facility: HOSPITAL | Age: 74
Discharge: HOME | End: 2019-10-09
Admitting: THORACIC SURGERY (CARDIOTHORACIC VASCULAR SURGERY)

## 2019-10-09 DIAGNOSIS — K76.89 LIVER CYST: ICD-10-CM

## 2019-10-09 PROCEDURE — 71250 CT THORAX DX C-: CPT

## 2019-10-21 DIAGNOSIS — R19.5 POSITIVE COLORECTAL CANCER SCREENING USING COLOGUARD TEST: Primary | ICD-10-CM

## 2019-10-22 RX ORDER — LORAZEPAM 0.5 MG/1
TABLET ORAL
Qty: 90 TABLET | Refills: 1 | Status: CANCELLED | OUTPATIENT
Start: 2019-10-22

## 2019-10-24 ENCOUNTER — TELEPHONE (OUTPATIENT)
Dept: INTERNAL MEDICINE | Facility: CLINIC | Age: 74
End: 2019-10-24

## 2019-10-24 RX ORDER — LORAZEPAM 0.5 MG/1
0.5 TABLET ORAL 3 TIMES DAILY
Qty: 90 TABLET | Refills: 2 | Status: SHIPPED | OUTPATIENT
Start: 2019-10-24 | End: 2020-04-10

## 2019-10-24 NOTE — TELEPHONE ENCOUNTER
----- Message from REENA Robbins sent at 10/24/2019 10:19 AM EDT -----  Regarding: Positive Cologuard  Can you make sure that the patient got the message about her Cologuard being positive and that I ordered a colonoscopy for her

## 2019-10-28 ENCOUNTER — OFFICE VISIT (OUTPATIENT)
Dept: SURGERY | Facility: CLINIC | Age: 74
End: 2019-10-28

## 2019-10-28 VITALS
HEART RATE: 67 BPM | OXYGEN SATURATION: 96 % | BODY MASS INDEX: 32.3 KG/M2 | HEIGHT: 66 IN | WEIGHT: 201 LBS | SYSTOLIC BLOOD PRESSURE: 182 MMHG | DIASTOLIC BLOOD PRESSURE: 87 MMHG

## 2019-10-28 DIAGNOSIS — C34.11 MALIGNANT NEOPLASM OF UPPER LOBE OF RIGHT LUNG (HCC): Primary | ICD-10-CM

## 2019-10-28 PROCEDURE — 99213 OFFICE O/P EST LOW 20 MIN: CPT | Performed by: THORACIC SURGERY (CARDIOTHORACIC VASCULAR SURGERY)

## 2019-11-14 ENCOUNTER — PATIENT OUTREACH (OUTPATIENT)
Dept: CASE MANAGEMENT | Facility: OTHER | Age: 74
End: 2019-11-14

## 2019-11-14 ENCOUNTER — TELEPHONE (OUTPATIENT)
Dept: CARDIOLOGY | Facility: CLINIC | Age: 74
End: 2019-11-14

## 2019-11-14 ENCOUNTER — PREP FOR SURGERY (OUTPATIENT)
Dept: OTHER | Facility: HOSPITAL | Age: 74
End: 2019-11-14

## 2019-11-14 DIAGNOSIS — R13.19 OTHER DYSPHAGIA: Primary | ICD-10-CM

## 2019-11-14 DIAGNOSIS — R19.5 POSITIVE COLORECTAL CANCER SCREENING USING DNA-BASED STOOL TEST: Primary | ICD-10-CM

## 2019-11-14 NOTE — OUTREACH NOTE
Patient Outreach Note:  Requested assistance with scheduling colonoscopy with [facility] due to posiive Cologuard.  Aware Dr. Velasquez office will contact her with scheduling information once reviewed by Dr. Velasquez and orders submitted.     Kandace Gonzales RN  Community Care Coordinator    11/14/2019, 2:35 PM

## 2019-11-14 NOTE — OUTREACH NOTE
Care Coordination Note    [facility]  and reports patient has seen Dr. Velasquez in the past.  She has reviewed the paperwork and submitted to Dr. Velasquez for his review and will contact patient with his decision regarding scheduling of a colonoscopy.      Kandace Gonzales RN  Community Care Coordinator    11/14/2019, 2:37 PM

## 2019-11-14 NOTE — TELEPHONE ENCOUNTER
Pt called asking about clearance for her 12/5/19 colonoscopy and holding her Plavix. Dr Christophe Mancera manages her Plavix so I told her to call his office about that.   But in case she still needs to be cleared by us, is she?  She saw Mary last on 7/31/19.  Please advise.    Thanks,  Kathy

## 2019-11-19 ENCOUNTER — TELEPHONE (OUTPATIENT)
Dept: GASTROENTEROLOGY | Facility: CLINIC | Age: 74
End: 2019-11-19

## 2019-11-19 NOTE — TELEPHONE ENCOUNTER
Spoke with patient regarding holding her Plavix prior to her scheduled colonoscopy with Dr Garg.  Patient said that Dr Coello's office instructed her to call Dr Mancera' office to obtain clearance.  Patient said that Dr Mancera said that it will be okay for her to hold her Plavix for 7 days prior to her colonoscopy.  Informed patient that her PCP, Dr Crowder also said that it is okay to hold her Plavix prior to her scheduled colonoscopy.  Patient voiced understanding.

## 2019-11-19 NOTE — TELEPHONE ENCOUNTER
----- Message from Rhys Crowder Jr., MD sent at 11/14/2019  5:19 PM EST -----  Regarding: RE: Plavix  Yes that would be okay.  ----- Message -----  From: Jessica Russ RN  Sent: 11/14/2019   3:13 PM  To: Rhys Crowder Jr., MD  Subject: Plavix                                           Dr Crowder,  Ms Arguelles is requesting to schedule a colonoscopy with Dr Garg.  Dr Garg recommends that the patient hold her Plavix for 7 days prior to a scheduled colonoscopy if this is okay with you.  Please advise.  Thank you, Jessica  ----- Message -----  From: Riki Garg MD  Sent: 11/14/2019   2:49 PM  To: Jessica Russ RN, #  Subject: OA colon                                         OA colon ok, split dose  suprep or moviprep  She likely has a polyp in the colon that I need to remove with a positive Cologuard meaning she has to be off Plavix 7 days for me to remove a polyp    Please get permission from her cardiologist or her primary care doctor to hold Plavix 7 days for me to do a colonoscopy with polyp removal

## 2019-12-02 ENCOUNTER — OFFICE VISIT (OUTPATIENT)
Dept: INTERNAL MEDICINE | Facility: CLINIC | Age: 74
End: 2019-12-02

## 2019-12-02 VITALS
OXYGEN SATURATION: 96 % | TEMPERATURE: 98.2 F | DIASTOLIC BLOOD PRESSURE: 72 MMHG | SYSTOLIC BLOOD PRESSURE: 144 MMHG | HEART RATE: 66 BPM

## 2019-12-02 DIAGNOSIS — M70.50 ANSERINE BURSITIS: ICD-10-CM

## 2019-12-02 DIAGNOSIS — F32.1 CURRENT MODERATE EPISODE OF MAJOR DEPRESSIVE DISORDER WITHOUT PRIOR EPISODE (HCC): ICD-10-CM

## 2019-12-02 DIAGNOSIS — E53.8 B12 DEFICIENCY: ICD-10-CM

## 2019-12-02 DIAGNOSIS — I10 ESSENTIAL HYPERTENSION: ICD-10-CM

## 2019-12-02 DIAGNOSIS — G24.9 DYSTONIA: Primary | ICD-10-CM

## 2019-12-02 DIAGNOSIS — E78.2 MIXED HYPERLIPIDEMIA: ICD-10-CM

## 2019-12-02 PROCEDURE — 99214 OFFICE O/P EST MOD 30 MIN: CPT | Performed by: FAMILY MEDICINE

## 2019-12-02 RX ORDER — TRIHEXYPHENIDYL HYDROCHLORIDE 2 MG/1
2 TABLET ORAL 2 TIMES DAILY WITH MEALS
COMMUNITY
End: 2020-06-04

## 2019-12-02 RX ORDER — SERTRALINE HYDROCHLORIDE 25 MG/1
25 TABLET, FILM COATED ORAL DAILY
Start: 2019-12-02 | End: 2019-12-02

## 2019-12-02 RX ORDER — HYDRALAZINE HYDROCHLORIDE 50 MG/1
TABLET, FILM COATED ORAL
Refills: 2 | Status: ON HOLD | COMMUNITY
Start: 2019-10-23 | End: 2019-12-20

## 2019-12-02 RX ORDER — CYANOCOBALAMIN (VITAMIN B-12) 1000 MCG
1000 TABLET ORAL DAILY
Qty: 90 TABLET | Refills: 3 | Status: ON HOLD | OUTPATIENT
Start: 2019-12-02 | End: 2019-12-20

## 2019-12-03 ENCOUNTER — TELEPHONE (OUTPATIENT)
Dept: GASTROENTEROLOGY | Facility: CLINIC | Age: 74
End: 2019-12-03

## 2019-12-03 ENCOUNTER — PREP FOR SURGERY (OUTPATIENT)
Dept: OTHER | Facility: HOSPITAL | Age: 74
End: 2019-12-03

## 2019-12-03 DIAGNOSIS — K21.9 GASTROESOPHAGEAL REFLUX DISEASE, ESOPHAGITIS PRESENCE NOT SPECIFIED: ICD-10-CM

## 2019-12-03 DIAGNOSIS — Z12.11 ENCOUNTER FOR SCREENING FOR MALIGNANT NEOPLASM OF COLON: Primary | ICD-10-CM

## 2019-12-04 ENCOUNTER — TELEPHONE (OUTPATIENT)
Dept: GASTROENTEROLOGY | Facility: CLINIC | Age: 74
End: 2019-12-04

## 2019-12-04 PROBLEM — Z12.11 ENCOUNTER FOR SCREENING FOR MALIGNANT NEOPLASM OF COLON: Status: ACTIVE | Noted: 2019-12-04

## 2019-12-04 LAB
BASOPHILS # BLD AUTO: 0 X10E3/UL (ref 0–0.2)
BASOPHILS NFR BLD AUTO: 0 %
CHOLEST SERPL-MCNC: 182 MG/DL (ref 100–199)
CHOLEST/HDLC SERPL: 4.1 RATIO (ref 0–4.4)
EOSINOPHIL # BLD AUTO: 0.3 X10E3/UL (ref 0–0.4)
EOSINOPHIL NFR BLD AUTO: 3 %
ERYTHROCYTE [DISTWIDTH] IN BLOOD BY AUTOMATED COUNT: 13 % (ref 12.3–15.4)
ERYTHROCYTE [SEDIMENTATION RATE] IN BLOOD BY WESTERGREN METHOD: 34 MM/HR (ref 0–40)
FOLATE SERPL-MCNC: 10.7 NG/ML
HCT VFR BLD AUTO: 34.1 % (ref 34–46.6)
HDLC SERPL-MCNC: 44 MG/DL
HGB BLD-MCNC: 11.3 G/DL (ref 11.1–15.9)
IMM GRANULOCYTES # BLD AUTO: 0.1 X10E3/UL (ref 0–0.1)
IMM GRANULOCYTES NFR BLD AUTO: 1 %
LDLC SERPL CALC-MCNC: 100 MG/DL (ref 0–99)
LYMPHOCYTES # BLD AUTO: 1.7 X10E3/UL (ref 0.7–3.1)
LYMPHOCYTES NFR BLD AUTO: 19 %
Lab: NORMAL
MCH RBC QN AUTO: 32.5 PG (ref 26.6–33)
MCHC RBC AUTO-ENTMCNC: 33.1 G/DL (ref 31.5–35.7)
MCV RBC AUTO: 98 FL (ref 79–97)
METHYLMALONATE SERPL-SCNC: 242 NMOL/L (ref 0–378)
MONOCYTES # BLD AUTO: 0.7 X10E3/UL (ref 0.1–0.9)
MONOCYTES NFR BLD AUTO: 8 %
NEUTROPHILS # BLD AUTO: 6 X10E3/UL (ref 1.4–7)
NEUTROPHILS NFR BLD AUTO: 69 %
PLATELET # BLD AUTO: 316 X10E3/UL (ref 150–450)
RBC # BLD AUTO: 3.48 X10E6/UL (ref 3.77–5.28)
TRIGL SERPL-MCNC: 188 MG/DL (ref 0–149)
VIT B12 SERPL-MCNC: 1323 PG/ML (ref 232–1245)
VLDLC SERPL CALC-MCNC: 38 MG/DL (ref 5–40)
WBC # BLD AUTO: 8.7 X10E3/UL (ref 3.4–10.8)

## 2019-12-04 NOTE — TELEPHONE ENCOUNTER
Returned patient's phone call. She states she was suppose to hold her Lasix 7 days prior to her scope and questions if she should back on the medication.  Advised patient it is her PLAVIX she will need to stop 7 days prior to her procedure. Advised since her procedure is on 12/18, she will need to start holding PLAVIX on 12/11. Advised she will need to continue to take her Lasix. She verb understanding.

## 2019-12-04 NOTE — TELEPHONE ENCOUNTER
----- Message from Ayala Christiansen sent at 12/4/2019  9:34 AM EST -----  Regarding: pt question   Contact: [phone]  Pt is asking about her medication LASIX. She is asking for a nurse to call her back. This is the pt I asked you about this am. You said for her to just skip the morning of but she said she is suppose to be off a week prior. She is schedule for scope on dec 18th.

## 2019-12-10 RX ORDER — SUCRALFATE 1 G/1
1 TABLET ORAL 4 TIMES DAILY
Qty: 360 TABLET | Refills: 1 | OUTPATIENT
Start: 2019-12-10

## 2019-12-16 ENCOUNTER — HOSPITAL ENCOUNTER (EMERGENCY)
Facility: HOSPITAL | Age: 74
Discharge: HOME | End: 2019-12-17
Attending: EMERGENCY MEDICINE | Admitting: EMERGENCY MEDICINE

## 2019-12-16 ENCOUNTER — APPOINTMENT (OUTPATIENT)
Dept: MRI IMAGING | Facility: HOSPITAL | Age: 74
End: 2019-12-16

## 2019-12-16 ENCOUNTER — APPOINTMENT (OUTPATIENT)
Dept: CT IMAGING | Facility: HOSPITAL | Age: 74
End: 2019-12-16

## 2019-12-16 ENCOUNTER — APPOINTMENT (OUTPATIENT)
Dept: GENERAL RADIOLOGY | Facility: HOSPITAL | Age: 74
End: 2019-12-16

## 2019-12-16 DIAGNOSIS — S16.1XXA STRAIN OF NECK MUSCLE, INITIAL ENCOUNTER: ICD-10-CM

## 2019-12-16 DIAGNOSIS — S01.01XA LACERATION OF SCALP, INITIAL ENCOUNTER: ICD-10-CM

## 2019-12-16 DIAGNOSIS — S09.90XA INJURY OF HEAD, INITIAL ENCOUNTER: Primary | ICD-10-CM

## 2019-12-16 DIAGNOSIS — R93.7 ABNORMAL CT SCAN, CERVICAL SPINE: ICD-10-CM

## 2019-12-16 LAB
HOLD SPECIMEN: NORMAL
HOLD SPECIMEN: NORMAL
WHOLE BLOOD HOLD SPECIMEN: NORMAL
WHOLE BLOOD HOLD SPECIMEN: NORMAL

## 2019-12-16 PROCEDURE — 96374 THER/PROPH/DIAG INJ IV PUSH: CPT

## 2019-12-16 PROCEDURE — 72110 X-RAY EXAM L-2 SPINE 4/>VWS: CPT

## 2019-12-16 PROCEDURE — 25010000002 TDAP 5-2.5-18.5 LF-MCG/0.5 SUSPENSION: Performed by: PHYSICIAN ASSISTANT

## 2019-12-16 PROCEDURE — 72141 MRI NECK SPINE W/O DYE: CPT

## 2019-12-16 PROCEDURE — 96372 THER/PROPH/DIAG INJ SC/IM: CPT

## 2019-12-16 PROCEDURE — 72125 CT NECK SPINE W/O DYE: CPT

## 2019-12-16 PROCEDURE — 70450 CT HEAD/BRAIN W/O DYE: CPT

## 2019-12-16 PROCEDURE — 25010000002 HYDROMORPHONE PER 4 MG: Performed by: EMERGENCY MEDICINE

## 2019-12-16 PROCEDURE — 90471 IMMUNIZATION ADMIN: CPT | Performed by: PHYSICIAN ASSISTANT

## 2019-12-16 PROCEDURE — 90715 TDAP VACCINE 7 YRS/> IM: CPT | Performed by: PHYSICIAN ASSISTANT

## 2019-12-16 PROCEDURE — 99284 EMERGENCY DEPT VISIT MOD MDM: CPT

## 2019-12-16 PROCEDURE — 25010000002 LORAZEPAM PER 2 MG: Performed by: EMERGENCY MEDICINE

## 2019-12-16 PROCEDURE — 63710000001 ONDANSETRON ODT 4 MG TABLET DISPERSIBLE: Performed by: EMERGENCY MEDICINE

## 2019-12-16 RX ORDER — LIDOCAINE HYDROCHLORIDE AND EPINEPHRINE 10; 10 MG/ML; UG/ML
10 INJECTION, SOLUTION INFILTRATION; PERINEURAL ONCE
Status: COMPLETED | OUTPATIENT
Start: 2019-12-16 | End: 2019-12-16

## 2019-12-16 RX ORDER — VENLAFAXINE 75 MG/1
TABLET ORAL
Qty: 90 TABLET | Refills: 1 | Status: SHIPPED | OUTPATIENT
Start: 2019-12-16 | End: 2020-01-06 | Stop reason: SDUPTHER

## 2019-12-16 RX ORDER — LORAZEPAM 2 MG/ML
1 INJECTION INTRAMUSCULAR ONCE
Status: COMPLETED | OUTPATIENT
Start: 2019-12-16 | End: 2019-12-16

## 2019-12-16 RX ORDER — HYDROMORPHONE HYDROCHLORIDE 1 MG/ML
1 INJECTION, SOLUTION INTRAMUSCULAR; INTRAVENOUS; SUBCUTANEOUS ONCE
Status: COMPLETED | OUTPATIENT
Start: 2019-12-16 | End: 2019-12-16

## 2019-12-16 RX ORDER — HYDROMORPHONE HYDROCHLORIDE 1 MG/ML
0.5 INJECTION, SOLUTION INTRAMUSCULAR; INTRAVENOUS; SUBCUTANEOUS ONCE
Status: COMPLETED | OUTPATIENT
Start: 2019-12-16 | End: 2019-12-17

## 2019-12-16 RX ORDER — HYDROMORPHONE HYDROCHLORIDE 1 MG/ML
0.5 INJECTION, SOLUTION INTRAMUSCULAR; INTRAVENOUS; SUBCUTANEOUS ONCE
Status: DISCONTINUED | OUTPATIENT
Start: 2019-12-16 | End: 2019-12-16

## 2019-12-16 RX ORDER — SODIUM CHLORIDE 0.9 % (FLUSH) 0.9 %
10 SYRINGE (ML) INJECTION AS NEEDED
Status: DISCONTINUED | OUTPATIENT
Start: 2019-12-16 | End: 2019-12-17 | Stop reason: HOSPADM

## 2019-12-16 RX ORDER — ONDANSETRON 4 MG/1
4 TABLET, ORALLY DISINTEGRATING ORAL ONCE
Status: COMPLETED | OUTPATIENT
Start: 2019-12-16 | End: 2019-12-16

## 2019-12-16 RX ADMIN — HYDROMORPHONE HYDROCHLORIDE 1 MG: 1 INJECTION, SOLUTION INTRAMUSCULAR; INTRAVENOUS; SUBCUTANEOUS at 20:47

## 2019-12-16 RX ADMIN — LIDOCAINE HYDROCHLORIDE,EPINEPHRINE BITARTRATE 10 ML: 10; .01 INJECTION, SOLUTION INFILTRATION; PERINEURAL at 21:20

## 2019-12-16 RX ADMIN — ONDANSETRON 4 MG: 4 TABLET, ORALLY DISINTEGRATING ORAL at 20:45

## 2019-12-16 RX ADMIN — LORAZEPAM 1 MG: 2 INJECTION INTRAMUSCULAR; INTRAVENOUS at 22:16

## 2019-12-16 RX ADMIN — TETANUS TOXOID, REDUCED DIPHTHERIA TOXOID AND ACELLULAR PERTUSSIS VACCINE, ADSORBED 0.5 ML: 5; 2.5; 8; 8; 2.5 SUSPENSION INTRAMUSCULAR at 20:51

## 2019-12-17 ENCOUNTER — TELEPHONE (OUTPATIENT)
Dept: NEUROSURGERY | Facility: CLINIC | Age: 74
End: 2019-12-17

## 2019-12-17 VITALS
TEMPERATURE: 97.9 F | HEIGHT: 67 IN | DIASTOLIC BLOOD PRESSURE: 117 MMHG | WEIGHT: 204.6 LBS | OXYGEN SATURATION: 94 % | RESPIRATION RATE: 16 BRPM | BODY MASS INDEX: 32.11 KG/M2 | SYSTOLIC BLOOD PRESSURE: 200 MMHG | HEART RATE: 76 BPM

## 2019-12-17 DIAGNOSIS — M54.2 NECK PAIN: Primary | ICD-10-CM

## 2019-12-17 PROCEDURE — 96375 TX/PRO/DX INJ NEW DRUG ADDON: CPT

## 2019-12-17 PROCEDURE — 25010000002 HYDROMORPHONE PER 4 MG: Performed by: EMERGENCY MEDICINE

## 2019-12-17 RX ORDER — MECLIZINE HYDROCHLORIDE 25 MG/1
25 TABLET ORAL ONCE
Status: COMPLETED | OUTPATIENT
Start: 2019-12-17 | End: 2019-12-17

## 2019-12-17 RX ORDER — FAMOTIDINE 20 MG/1
TABLET, FILM COATED ORAL
Qty: 180 TABLET | Refills: 1 | Status: SHIPPED | OUTPATIENT
Start: 2019-12-17 | End: 2020-04-27

## 2019-12-17 RX ORDER — MECLIZINE HYDROCHLORIDE 25 MG/1
25 TABLET ORAL 3 TIMES DAILY PRN
Qty: 15 TABLET | Refills: 0 | Status: SHIPPED | OUTPATIENT
Start: 2019-12-17 | End: 2019-12-30 | Stop reason: SDUPTHER

## 2019-12-17 RX ADMIN — SODIUM CHLORIDE, PRESERVATIVE FREE 10 ML: 5 INJECTION INTRAVENOUS at 00:08

## 2019-12-17 RX ADMIN — MECLIZINE HYDROCHLORIDE 25 MG: 25 TABLET ORAL at 00:43

## 2019-12-17 RX ADMIN — HYDROMORPHONE HYDROCHLORIDE 0.5 MG: 1 INJECTION, SOLUTION INTRAMUSCULAR; INTRAVENOUS; SUBCUTANEOUS at 00:08

## 2019-12-17 RX ADMIN — MECLIZINE HYDROCHLORIDE 25 MG: 25 TABLET ORAL at 00:44

## 2019-12-17 NOTE — DISCHARGE INSTRUCTIONS
-Keep laceration clean and dry, apply antibiotic ointment once or twice daily, watch carefully for signs of infection, staples need to be removed in 10 days. Return to care if any complications.     -Return to the ER with any further concerns, should your condition change/worsen, or should you develop a fever.

## 2019-12-17 NOTE — ED PROVIDER NOTES
EMERGENCY DEPARTMENT ENCOUNTER    CHIEF COMPLAINT  Chief Complaint: Head Injury  History given by: Patient  History limited by: none  Room Number: 22/22  PMD: Rhys Crowder Jr., MD      HPI:  Pt is a 74 y.o. female who presents complaining of head injury s/p mechanical fall [place] She denies any LOC. Pt is on Plavix for hx of PE, but states that she did not take today. She reports having some nausea, but no vomiting. She states a laceration to the posterior scalp. She reports some neck pain as well and exacerbation of her chronic back pain. Tetanus status is unknown.     PAST MEDICAL HISTORY  Active Ambulatory Problems     Diagnosis Date Noted   • Anxiety 03/12/2016   • Chronic pain syndrome 03/12/2016   • Depression 03/12/2016   • Gastroesophageal reflux disease 03/12/2016   • Hyperlipidemia 03/12/2016   • Hypertension 03/12/2016   • Osteoarthritis of hip 03/12/2016   • Chronic low back pain 03/12/2016   • Failed back syndrome of lumbar spine 03/12/2016   • Pulmonary embolus (CMS/HCC) 03/12/2016   • Weight loss 04/11/2016   • Polypharmacy 04/22/2016   • CKD (chronic kidney disease), stage III (CMS/HCC)    • Chronic constipation 05/19/2016   • Sacroiliac joint pain 05/08/2014   • Mixed incontinence 02/01/2016   • Renal cyst 08/31/2016   • Achilles tendonitis 08/31/2016   • Atherosclerosis of native artery of left lower extremity with intermittent claudication (CMS/HCC) 08/10/2018   • Morbidly obese (CMS/HCC) 12/05/2018   • Lung nodule 12/05/2018   • Malignant neoplasm of right upper lobe of lung (CMS/HCC) 12/26/2018   • Lung cancer (CMS/HCC) 01/02/2019   • Moderate single current episode of major depressive disorder (CMS/HCC) 05/30/2019   • Palpitations 07/31/2019   • Encounter for screening for malignant neoplasm of colon 12/04/2019     Resolved Ambulatory Problems     Diagnosis Date Noted   • Acute-on-chronic renal failure (CMS/HCC) 03/12/2016   • Lumbar radiculopathy 03/12/2016    • Claustrophobia 03/12/2016   • Impaired glucose tolerance 03/12/2016   • Sciatica 03/12/2016   • Inflammation of submandibular gland 03/12/2016   • Increased frequency of urination 03/12/2016   • Oral phase dysphagia 03/23/2016   • Medicare annual wellness visit, subsequent 03/23/2016   • Choreiform movements 03/25/2016   • Gastritis 04/11/2016   • Shortness of breath 04/11/2016   • Hypercalcemia 04/11/2016   • Chest pain radiating to jaw 04/22/2016   • Orthostatic hypotension 04/22/2016   • Nocturia 02/01/2016   • Elevated troponin I level 01/20/2013   • CAP (community acquired pneumonia) 12/15/2016     Past Medical History:   Diagnosis Date   • AAA (abdominal aortic aneurysm) without rupture (CMS/HCC)    • Arthritis    • Cancer (CMS/HCC)    • Chest pain    • Dizziness    • GERD (gastroesophageal reflux disease)    • GI problem    • Hiatal hernia    • History of migraine headaches    • Lumbar postlaminectomy syndrome 10/26/2015   • Pulmonary embolism (CMS/HCC) 10/26/2015   • Submandibular sialoadenitis 10/26/2015   • Vitamin B 12 deficiency        PAST SURGICAL HISTORY  Past Surgical History:   Procedure Laterality Date   • ANGIOPLASTY ILIAC ARTERY Bilateral 8/10/2018    Procedure: BILATERAL ILIAC STENTS;  Surgeon: Riki Mancera MD;  Location: [facility] MAIN OR;  Service: Vascular   • APPENDECTOMY     • BREAST SURGERY      Breast Surgery Reduction Procedure   • BRONCHOSCOPY N/A 2/8/2019    Procedure: BRONCHOSCOPY;  Surgeon: Álvaro Gifford MD;  Location: [facility] ENDOSCOPY;  Service: Pulmonary   • CHOLECYSTECTOMY     • HYSTERECTOMY     • INTUBATION  1/15/2019        • JOINT REPLACEMENT      left knee, hip, shoulder   • LASIK     • LUMBAR FUSION      Lumbar Vertebral Fusion   • SHOULDER ARTHROSCOPY  04/04/2013    Dr. Carrillo/[facility] - [place]   • THORACOSCOPY VIDEO ASSISTED WITH LOBECTOMY Right 1/11/2019    Procedure: BRONCOSCOPY, THORACOSCOPY VIDEO ASSISTED WITH RIGHT UPPER LOBE WEDGE RESECTION, COMPLETION RIGHT  UPPER LOBECTOMY, LYMPH NODE DISSECTION, INTERCOSTAL NERVE BLOCK;  Surgeon: Quita Figueroa MD;  Location: [facility] OR;  Service: Thoracic   • TONSILLECTOMY     • TOTAL HIP ARTHROPLASTY REVISION Left    • TOTAL KNEE ARTHROPLASTY Left    • TOTAL SHOULDER ARTHROPLASTY Left        FAMILY HISTORY  Family History   Problem Relation Age of Onset   • Hypertension Mother    • Lung cancer Mother    • Cancer Mother         lung   • Kidney disease Father    • Other Brother         Coronary Artery Bypass Grafting   • Stroke Brother         Cerebrovascular Accident   • Malig Hyperthermia Neg Hx        SOCIAL HISTORY  Social History     Socioeconomic History   • Marital status:      Spouse name: Not on file   • Number of children: Not on file   • Years of education: Not on file   • Highest education level: Not on file   Tobacco Use   • Smoking status: Former Smoker     Packs/day: 2.50     Years: 15.00     Pack years: 37.50     Types: Cigarettes     Last attempt to quit: 2003     Years since quittin.2   • Smokeless tobacco: Never Used   Substance and Sexual Activity   • Alcohol use: No     Frequency: Never     Comment: occ   • Drug use: Defer   • Sexual activity: Defer       ALLERGIES  Neurontin [gabapentin] and Morphine    REVIEW OF SYSTEMS  Review of Systems   Constitutional: Negative for fever.   HENT: Negative for sore throat.    Eyes: Negative.    Respiratory: Negative for cough and shortness of breath.    Cardiovascular: Negative for chest pain.   Gastrointestinal: Positive for nausea. Negative for abdominal pain, diarrhea and vomiting.   Genitourinary: Negative for dysuria.   Musculoskeletal: Positive for back pain and neck pain.   Skin: Positive for wound (scalp). Negative for rash.   Allergic/Immunologic: Negative.    Neurological: Negative for weakness, numbness and headaches.   Hematological: Negative.    Psychiatric/Behavioral: Negative.    All other systems reviewed and are negative.      PHYSICAL  EXAM  ED Triage Vitals [12/16/19 1938]   Temp Heart Rate Resp BP SpO2   97.9 °F (36.6 °C) 68 18 (!) 179/108 100 %      Temp src Heart Rate Source Patient Position BP Location FiO2 (%)   Tympanic Monitor Sitting Right arm --       Physical Exam   Constitutional: She is oriented to person, place, and time. No distress.   HENT:   Head: Normocephalic. Head is with laceration (8cm occipital ).   Eyes: Pupils are equal, round, and reactive to light. EOM are normal.   Neck: Spinous process tenderness (no step off) and muscular tenderness present.   C-collar in place   Cardiovascular: Normal rate, regular rhythm and normal heart sounds.   Pulmonary/Chest: Effort normal and breath sounds normal. No respiratory distress.   Abdominal: Soft. There is no tenderness. There is no rebound and no guarding.   Musculoskeletal: Normal range of motion. She exhibits no edema.        Thoracic back: She exhibits tenderness.   Neurological: She is alert and oriented to person, place, and time. She has normal sensation and normal strength. She displays tremor (chronic mild resting).   Skin: Skin is warm and dry. No rash noted.   Psychiatric: Affect normal. Her mood appears anxious.   Nursing note and vitals reviewed.      LAB RESULTS  Lab Results (last 24 hours)     ** No results found for the last 24 hours. **          I ordered the above labs and reviewed the results    RADIOLOGY  XR Spine Lumbar Complete 4+VW   Final Result   No acute fracture or subluxation identified.       This report was finalized on 12/17/2019 12:31 AM by Dr. Frannie Jackson M.D.          MRI Cervical Spine Without Contrast   Final Result   1. Extremely technically limited examination due to significant motion   artifact. No acute fracture or subluxation is identified. However, as   was previously discussed, the patient's presentation interval is   increased when compared to prior imaging. In addition, there is some   fluid seen within the predentate space.  Appearance is nonspecific, and   may be related to degenerative spondylitis, but acute injury is not   completely excluded, given history of trauma.   2. Cord signal abnormality seen on the T2-weighted images on the   sagittal series. This may be artifactual finding, related to motion   artifact. Ultimately, repeat imaging may be necessary for further   clarification.       FINDINGS were discussed with the PA, Jose Marroquin, at 12:04 AM.       This report was finalized on 12/17/2019 12:10 AM by Dr. Frannie Jackson M.D.          CT Head Without Contrast   Preliminary Result   1. Right posterior vertex scalp hematoma and laceration. No evidence for   underlying fracture or acute intracranial abnormality.   2. The predental space measures 3 mm which is upper normal though has   increased from 1.5 mm on neck CT 01/28/2019. This may be due to   ligamentous laxity or potentially ligamentous injury in the proper   clinical setting and MRI would be the best means to further evaluate if   possible. There is no evidence for a fracture.   3. Reversal of the normal cervical lordotic curvature at C5-6.   Degenerative disc disease in the cervical spine best demonstrated C5-6   and C6-7.       The findings were discussed with Chago Marroquin in the emergency   department on 12/16/2019 at 8:57 PM.          CT Cervical Spine Without Contrast   Preliminary Result   1. Right posterior vertex scalp hematoma and laceration. No evidence for   underlying fracture or acute intracranial abnormality.   2. The predental space measures 3 mm which is upper normal though has   increased from 1.5 mm on neck CT 01/28/2019. This may be due to   ligamentous laxity or potentially ligamentous injury in the proper   clinical setting and MRI would be the best means to further evaluate if   possible. There is no evidence for a fracture.   3. Reversal of the normal cervical lordotic curvature at C5-6.   Degenerative disc disease in the cervical spine  best demonstrated C5-6   and C6-7.       The findings were discussed with Chago Marroquin in the emergency   department on 12/16/2019 at 8:57 PM.               I ordered the above noted radiological studies. Interpreted by radiologist. Reviewed by me in PACS.       PROCEDURES  Laceration Repair  Date/Time: 12/16/2019 11:36 PM  Performed by: Chago Marroquin III, PA  Authorized by: Dustin Hightower MD     Consent:     Consent obtained:  Verbal    Consent given by:  Patient  Anesthesia (see MAR for exact dosages):     Anesthesia method:  Local infiltration    Local anesthetic:  Lidocaine 1% WITH epi  Laceration details:     Location:  Scalp    Scalp location:  Occipital    Length (cm):  8  Repair type:     Repair type:  Simple  Pre-procedure details:     Preparation:  Patient was prepped and draped in usual sterile fashion  Exploration:     Hemostasis achieved with:  Direct pressure  Treatment:     Area cleansed with:  Saline and Hibiclens    Amount of cleaning:  Standard    Irrigation solution:  Sterile saline    Irrigation method:  Tap  Skin repair:     Repair method:  Staples    Number of staples:  14  Approximation:     Approximation:  Close  Post-procedure details:     Dressing:  Open (no dressing)    Patient tolerance of procedure:  Tolerated well, no immediate complications          PROGRESS AND CONSULTS     8:18 PM  Ordered CT head, C-spine. Dilaudid/Zofran ordered.    9:03 PM  MRI neck ordered to r/o ligamentous injury.     10:08 PM  Ativan ordered prior to MRI.    11:35 PM  Repeat Dilaudid ordered.    12:50 AM  Reviewed pt's history and workup with Dr. Hightower.  After a bedside evaluation; Dr Hightower agrees with the plan of care    1:43 AM  Pt able to ambulate in the ED. Will discharge.    MEDICAL DECISION MAKING  Results were reviewed/discussed with the patient and they were also made aware of online access. Pt also made aware that some labs, such as cultures, will not be resulted during ER visit and  "follow up with PMD is necessary.     MDM  Number of Diagnoses or Management Options     Amount and/or Complexity of Data Reviewed  Review and summarize past medical records: yes (Seen here 5/2019 for HTN:    \"73-year-old female who was sent by her primary care for systolic blood pressure of 215 area patient seems to have no complaints currently related to her blood pressure such as chest pain, new shortness of air or new neurologic complaints.  She does complain of several weeks of off and on irregular heartbeat and intermittent generalized weakness.  Physical exam is fairly unremarkable.  Neurologic exam is grossly normal without significant motor or sensory or cognitive deficits.  Initial systolic blood pressure was 188.  We will hold on blood pressure treatment at this point as patient is asymptomatic.\")           DIAGNOSIS  Final diagnoses:   Injury of head, initial encounter   Strain of neck muscle, initial encounter   Laceration of scalp, initial encounter   Abnormal CT scan, cervical spine (Questionable Ligamentous Laxity)       DISPOSITION  DISCHARGE    Patient discharged in stable condition.    Reviewed implications of results, diagnosis, meds, responsibility to follow up, warning signs and symptoms of possible worsening, potential complications and reasons to return to ER    Patient/Family voiced understanding of above instructions.    Discussed plan for discharge, as there is no emergent indication for admission. Patient referred to primary care provider for BP management due to today's BP. Pt/family is agreeable and understands need for follow up and repeat testing.  Pt is aware that discharge does not mean that nothing is wrong but it indicates no emergency is present that requires admission and they must continue care with follow-up as given below or physician of their choice.     FOLLOW-UP  Rhys Crowder Jr., MD  [address] [id number] [phone]    Call   For Primary " Physician follow-up         Medication List      New Prescriptions    meclizine 25 MG tablet  Commonly known as:  ANTIVERT  Take 1 tablet by mouth 3 (Three) Times a Day As Needed for Dizziness or   Nausea.              Latest Documented Vital Signs:  As of 6:00 AM  BP- (!) 200/117 HR- 76 Temp- 97.9 °F (36.6 °C) (Tympanic) O2 sat- 94%    --  Documentation assistance provided by kenyatta King for Jose Marroquin PA-C.  Information recorded by the scribe was done at my direction and has been verified and validated by me.       Ld King  12/17/19 0143       Chago Marroquin III, PA  12/17/19 0601

## 2019-12-17 NOTE — ED TRIAGE NOTES
Pt to ED from [facility] per [facility].  Pt fell.      Lac to posterior scalp, no LOC reported.  Pt c/o nausea.  Pt takes plavix, but last dose was yesterday AM.

## 2019-12-17 NOTE — TELEPHONE ENCOUNTER
"Patient's  called stating that his wife fell yesterday and went to [facility] ER and was told she had \" cervical ligament injury\" and to follow up with Dr CRONIN.    I explained to patient that he is in surgery today, however, he would be in office tomorrow and I can discuss with him and have him look at her films and then let me know when she needs to be seen.  Informed him that I would call him back tomorrow  "

## 2019-12-18 NOTE — TELEPHONE ENCOUNTER
Per Dr CRONIN, he will come from surgery to see her 12.20.19 at 10 am and she needs AP/Lat cervical spine plain films    Spoke with both patient and her  and informed them of this and hospital has orders and they do not need an appt for the xrays

## 2019-12-18 NOTE — PROGRESS NOTES
Subjective   Patient ID: Rachana Arguelles is a 74 y.o. female is here today for follow-up to ER visit at [facility] 12.16.19 for head injury/laceration [place]. Patient did receive sutures to posterior head wound and is on Plavix.  She also suffered neck injury with possible ligamentous instability.  She was released in a c-collar and prescribed Antivert.    She is in pain management with Dr Webber due to prior back surgery    Patient states that she is having constant dizziness, neck pain, but denies arm pain or weakness.  She is in a wheelchair due to dizziness.  She denies headaches or double/blurry vision, no arm pain or arm weakness.    Patient was released home and has not been getting home therapies, she fell again a 2nd time at home striking her head again which has caused more bleeding from her head wound.  She is on Plavix.  Patient states that she is very dizzy.    She is seeing a movement disorder specialist at  for dystonia and is taking Artane and a history of stage III kidney disease.    Patient has a history of right upper lobectomy for non- small cell lung cancer on 1.11.19 with Dr Figueroa    This patient is with her daughter. I got a call about her from the emergency room 4 days ago. She is on Plavix for some peripheral vascular disease and a stent. She has chronic pain in her low back and has had some shoulder problems. [place] and hit the back of her head and had a bad scalp laceration which was stapled in the emergency room. She complained of neck pain and a bit of headache. The CT scan of the head was normal but she had a CT and MRI of the cervical spine that was worrisome for a ligamentous injury at C2 because of the increased predental space. She is in a hard collar. The laceration has been oozing because she has been on Plavix and the daughter stopped it yesterday, but she is more dizzy and has more headaches. There is still some neck pain. I am concerned  about the possibility on Plavix of having some delayed hemorrhage and so I thought it best to go ahead and admit her for observation and get a CT Scan. In the meantime we can get a cervical flexion/extension set of films. If that is unremarkable then we do not need to keep her in a hard collar and the increased predental space is essentially incidental or within normal limits assuming she is not unstable. We will also have the nursing staff clean up the matted blood on the scalp laceration which was just stapled. If everything checks out okay then we can probably let her go home tomorrow but I would like to settle this question early because I do not want her to have a delay in any kind of intracranial hemorrhage if it is present.      Dizziness   The problem occurs constantly. The problem has been unchanged. Associated symptoms include neck pain. Pertinent negatives include no arthralgias, headaches, numbness or weakness.   Neck Pain    The problem occurs intermittently. The problem has been unchanged. The pain is present in the midline. The pain is at a severity of 4/10. Pertinent negatives include no headaches, numbness or weakness.       The following portions of the patient's history were reviewed and updated as appropriate: allergies, current medications, past family history, past medical history, past social history, past surgical history and problem list.     ** in wheelchair cannot stand on scale **    Review of Systems   Musculoskeletal: Positive for gait problem and neck pain. Negative for arthralgias.   Neurological: Negative for dizziness, tremors, seizures, syncope, facial asymmetry, speech difficulty, weakness, light-headedness, numbness and headaches.   All other systems reviewed and are negative.      Objective   Physical Exam   Constitutional: She is oriented to person, place, and time. She appears well-developed and well-nourished.   HENT:   Head: Normocephalic and atraumatic.   Eyes: Pupils are  equal, round, and reactive to light. Conjunctivae and EOM are normal.   Fundoscopic exam:       The right eye shows no papilledema. The right eye shows venous pulsations.        The left eye shows no papilledema. The left eye shows venous pulsations.   Neck: Carotid bruit is not present.   Neurological: She is oriented to person, place, and time. She has a normal Finger-Nose-Finger Test and a normal Heel to Shin Test. Gait normal.   Reflex Scores:       Tricep reflexes are 2+ on the right side and 2+ on the left side.       Bicep reflexes are 2+ on the right side and 2+ on the left side.       Brachioradialis reflexes are 2+ on the right side and 2+ on the left side.       Patellar reflexes are 2+ on the right side and 2+ on the left side.       Achilles reflexes are 2+ on the right side and 2+ on the left side.  Psychiatric: Her speech is normal.     Neurologic Exam     Mental Status   Oriented to person, place, and time.   Registration of memory: Good recent and remote memory.   Attention: normal. Concentration: normal.   Speech: speech is normal   Level of consciousness: alert  Knowledge: consistent with education.     Cranial Nerves     CN II   Visual fields full to confrontation.   Visual acuity: normal    CN III, IV, VI   Pupils are equal, round, and reactive to light.  Extraocular motions are normal.     CN V   Facial sensation intact.   Right corneal reflex: normal  Left corneal reflex: normal    CN VII   Facial expression full, symmetric.   Right facial weakness: none  Left facial weakness: none    CN VIII   Hearing: intact    CN IX, X   Palate: symmetric    CN XI   Right sternocleidomastoid strength: normal  Left sternocleidomastoid strength: normal    CN XII   Tongue: not atrophic  Tongue deviation: none    Motor Exam   Muscle bulk: normal  Right arm tone: normal  Left arm tone: normal  Right leg tone: normal  Left leg tone: normal    Strength   Strength 5/5 except as noted.     Sensory Exam   Light touch  normal.     Gait, Coordination, and Reflexes     Gait  Gait: normal    Coordination   Finger to nose coordination: normal  Heel to shin coordination: normal    Reflexes   Right brachioradialis: 2+  Left brachioradialis: 2+  Right biceps: 2+  Left biceps: 2+  Right triceps: 2+  Left triceps: 2+  Right patellar: 2+  Left patellar: 2+  Right achilles: 2+  Left achilles: 2+  Right : 2+  Left : 2+      Assessment/Plan   Independent Review of Radiographic Studies:    I reviewed the CT scan of the cervical spine and MRI of the cervical spine done on 12/16/2019 which shows a bit of separation between the dens and the arch of C1, the so-called predental space.  There is no obvious signs of ligamentous injury but the increased predental space suggested.  The head CT was unremarkable.  Agree with the report.      Medical Decision Making:    Again because of the concern about some kind of intracranial hemorrhage on the Plavix we will go ahead and admit her for observation and get a noncontrast head CT.  We will also get cervical flexion-extension films and clean up the occipital laceration.  If the CT scan is unremarkable and the flexion-extension films are unremarkable then she can probably go home tomorrow.      Rachana was seen today for neck pain and dizziness.    Diagnoses and all orders for this visit:    Hematoma of scalp, initial encounter    Acute neck pain    Dizziness      Return for admit for observation.

## 2019-12-19 ENCOUNTER — PATIENT OUTREACH (OUTPATIENT)
Dept: CASE MANAGEMENT | Facility: OTHER | Age: 74
End: 2019-12-19

## 2019-12-19 ENCOUNTER — HOSPITAL ENCOUNTER (OUTPATIENT)
Dept: GENERAL RADIOLOGY | Facility: HOSPITAL | Age: 74
Discharge: HOME | End: 2019-12-19
Admitting: NEUROLOGICAL SURGERY

## 2019-12-19 DIAGNOSIS — M54.2 NECK PAIN: ICD-10-CM

## 2019-12-19 PROCEDURE — 72100 X-RAY EXAM L-S SPINE 2/3 VWS: CPT

## 2019-12-19 NOTE — OUTREACH NOTE
Patient Outreach Note     Introduced self, explained CA role and provided contact information. Spoke with , Don. Patient resting.  states he will have patient call back.      Katie Guerrier RN  Ambulatory     12/19/2019, 11:58 AM

## 2019-12-20 ENCOUNTER — APPOINTMENT (OUTPATIENT)
Dept: CT IMAGING | Facility: HOSPITAL | Age: 74
End: 2019-12-20

## 2019-12-20 ENCOUNTER — APPOINTMENT (OUTPATIENT)
Dept: GENERAL RADIOLOGY | Facility: HOSPITAL | Age: 74
End: 2019-12-20

## 2019-12-20 ENCOUNTER — OFFICE VISIT (OUTPATIENT)
Dept: NEUROSURGERY | Facility: CLINIC | Age: 74
End: 2019-12-20

## 2019-12-20 ENCOUNTER — HOSPITAL ENCOUNTER (OUTPATIENT)
Facility: HOSPITAL | Age: 74
Setting detail: OBSERVATION
Discharge: HOME | End: 2019-12-21
Attending: NEUROLOGICAL SURGERY | Admitting: NEUROLOGICAL SURGERY

## 2019-12-20 VITALS
HEART RATE: 72 BPM | DIASTOLIC BLOOD PRESSURE: 78 MMHG | BODY MASS INDEX: 32.04 KG/M2 | SYSTOLIC BLOOD PRESSURE: 148 MMHG | HEIGHT: 67 IN

## 2019-12-20 DIAGNOSIS — R42 DIZZINESS: ICD-10-CM

## 2019-12-20 DIAGNOSIS — M54.2 ACUTE NECK PAIN: ICD-10-CM

## 2019-12-20 DIAGNOSIS — S00.03XA HEMATOMA OF SCALP, INITIAL ENCOUNTER: Primary | ICD-10-CM

## 2019-12-20 PROBLEM — M53.2X2 UNSTABLE CERVICAL SPINE: Status: ACTIVE | Noted: 2019-12-20

## 2019-12-20 LAB
ANION GAP SERPL CALCULATED.3IONS-SCNC: 14.9 MMOL/L (ref 5–15)
BASOPHILS # BLD AUTO: 0.03 10*3/MM3 (ref 0–0.2)
BASOPHILS NFR BLD AUTO: 0.3 % (ref 0–1.5)
BUN BLD-MCNC: 30 MG/DL (ref 8–23)
BUN/CREAT SERPL: 18.3 (ref 7–25)
CALCIUM SPEC-SCNC: 8.6 MG/DL (ref 8.6–10.5)
CHLORIDE SERPL-SCNC: 98 MMOL/L (ref 98–107)
CO2 SERPL-SCNC: 23.1 MMOL/L (ref 22–29)
CREAT BLD-MCNC: 1.64 MG/DL (ref 0.57–1)
DEPRECATED RDW RBC AUTO: 42.8 FL (ref 37–54)
EOSINOPHIL # BLD AUTO: 0.04 10*3/MM3 (ref 0–0.4)
EOSINOPHIL NFR BLD AUTO: 0.4 % (ref 0.3–6.2)
ERYTHROCYTE [DISTWIDTH] IN BLOOD BY AUTOMATED COUNT: 12.2 % (ref 12.3–15.4)
GFR SERPL CREATININE-BSD FRML MDRD: 31 ML/MIN/1.73
GLUCOSE BLD-MCNC: 144 MG/DL (ref 65–99)
HCT VFR BLD AUTO: 30.5 % (ref 34–46.6)
HGB BLD-MCNC: 10.2 G/DL (ref 12–15.9)
IMM GRANULOCYTES # BLD AUTO: 0.08 10*3/MM3 (ref 0–0.05)
IMM GRANULOCYTES NFR BLD AUTO: 0.8 % (ref 0–0.5)
LYMPHOCYTES # BLD AUTO: 1.4 10*3/MM3 (ref 0.7–3.1)
LYMPHOCYTES NFR BLD AUTO: 14.3 % (ref 19.6–45.3)
MCH RBC QN AUTO: 32.6 PG (ref 26.6–33)
MCHC RBC AUTO-ENTMCNC: 33.4 G/DL (ref 31.5–35.7)
MCV RBC AUTO: 97.4 FL (ref 79–97)
MONOCYTES # BLD AUTO: 0.46 10*3/MM3 (ref 0.1–0.9)
MONOCYTES NFR BLD AUTO: 4.7 % (ref 5–12)
NEUTROPHILS # BLD AUTO: 7.8 10*3/MM3 (ref 1.7–7)
NEUTROPHILS NFR BLD AUTO: 79.5 % (ref 42.7–76)
NRBC BLD AUTO-RTO: 0 /100 WBC (ref 0–0.2)
PLATELET # BLD AUTO: 260 10*3/MM3 (ref 140–450)
PMV BLD AUTO: 8.8 FL (ref 6–12)
POTASSIUM BLD-SCNC: 4.1 MMOL/L (ref 3.5–5.2)
RBC # BLD AUTO: 3.13 10*6/MM3 (ref 3.77–5.28)
SODIUM BLD-SCNC: 136 MMOL/L (ref 136–145)
WBC NRBC COR # BLD: 9.81 10*3/MM3 (ref 3.4–10.8)

## 2019-12-20 PROCEDURE — 63710000001 ONDANSETRON PER 8 MG: Performed by: NURSE PRACTITIONER

## 2019-12-20 PROCEDURE — G0378 HOSPITAL OBSERVATION PER HR: HCPCS

## 2019-12-20 PROCEDURE — 70450 CT HEAD/BRAIN W/O DYE: CPT

## 2019-12-20 PROCEDURE — 72052 X-RAY EXAM NECK SPINE 6/>VWS: CPT

## 2019-12-20 PROCEDURE — G0379 DIRECT REFER HOSPITAL OBSERV: HCPCS

## 2019-12-20 PROCEDURE — 99214 OFFICE O/P EST MOD 30 MIN: CPT | Performed by: NEUROLOGICAL SURGERY

## 2019-12-20 PROCEDURE — 80048 BASIC METABOLIC PNL TOTAL CA: CPT | Performed by: NEUROLOGICAL SURGERY

## 2019-12-20 PROCEDURE — 85025 COMPLETE CBC W/AUTO DIFF WBC: CPT | Performed by: NEUROLOGICAL SURGERY

## 2019-12-20 RX ORDER — FAMOTIDINE 20 MG/1
20 TABLET, FILM COATED ORAL 2 TIMES DAILY
Status: DISCONTINUED | OUTPATIENT
Start: 2019-12-20 | End: 2019-12-21 | Stop reason: HOSPADM

## 2019-12-20 RX ORDER — CARVEDILOL 25 MG/1
25 TABLET ORAL 2 TIMES DAILY WITH MEALS
Status: DISCONTINUED | OUTPATIENT
Start: 2019-12-20 | End: 2019-12-21 | Stop reason: HOSPADM

## 2019-12-20 RX ORDER — VENLAFAXINE 75 MG/1
75 TABLET ORAL DAILY
Status: DISCONTINUED | OUTPATIENT
Start: 2019-12-20 | End: 2019-12-20

## 2019-12-20 RX ORDER — AMLODIPINE BESYLATE 10 MG/1
10 TABLET ORAL EVERY MORNING
Status: DISCONTINUED | OUTPATIENT
Start: 2019-12-21 | End: 2019-12-21 | Stop reason: HOSPADM

## 2019-12-20 RX ORDER — ONDANSETRON 4 MG/1
4 TABLET, FILM COATED ORAL EVERY 8 HOURS PRN
Status: DISCONTINUED | OUTPATIENT
Start: 2019-12-20 | End: 2019-12-21 | Stop reason: HOSPADM

## 2019-12-20 RX ORDER — CARBAMAZEPINE 200 MG/1
200 TABLET ORAL ONCE
Status: DISCONTINUED | OUTPATIENT
Start: 2019-12-20 | End: 2019-12-21 | Stop reason: HOSPADM

## 2019-12-20 RX ORDER — CYCLOBENZAPRINE HCL 10 MG
10 TABLET ORAL 3 TIMES DAILY PRN
Status: DISCONTINUED | OUTPATIENT
Start: 2019-12-20 | End: 2019-12-21 | Stop reason: HOSPADM

## 2019-12-20 RX ORDER — PANTOPRAZOLE SODIUM 40 MG/1
40 TABLET, DELAYED RELEASE ORAL EVERY MORNING
Status: DISCONTINUED | OUTPATIENT
Start: 2019-12-21 | End: 2019-12-20

## 2019-12-20 RX ORDER — ALLOPURINOL 100 MG/1
100 TABLET ORAL DAILY
Status: DISCONTINUED | OUTPATIENT
Start: 2019-12-20 | End: 2019-12-21 | Stop reason: HOSPADM

## 2019-12-20 RX ORDER — ATORVASTATIN CALCIUM 20 MG/1
40 TABLET, FILM COATED ORAL NIGHTLY
Status: DISCONTINUED | OUTPATIENT
Start: 2019-12-20 | End: 2019-12-21 | Stop reason: HOSPADM

## 2019-12-20 RX ORDER — SODIUM CHLORIDE 0.9 % (FLUSH) 0.9 %
10 SYRINGE (ML) INJECTION EVERY 12 HOURS SCHEDULED
Status: DISCONTINUED | OUTPATIENT
Start: 2019-12-20 | End: 2019-12-21 | Stop reason: HOSPADM

## 2019-12-20 RX ORDER — TRIHEXYPHENIDYL HYDROCHLORIDE 2 MG/1
2 TABLET ORAL 2 TIMES DAILY WITH MEALS
Status: DISCONTINUED | OUTPATIENT
Start: 2019-12-20 | End: 2019-12-21 | Stop reason: HOSPADM

## 2019-12-20 RX ORDER — SODIUM CHLORIDE 0.9 % (FLUSH) 0.9 %
10 SYRINGE (ML) INJECTION AS NEEDED
Status: DISCONTINUED | OUTPATIENT
Start: 2019-12-20 | End: 2019-12-21 | Stop reason: HOSPADM

## 2019-12-20 RX ORDER — LORAZEPAM 0.5 MG/1
0.5 TABLET ORAL 3 TIMES DAILY
Status: DISCONTINUED | OUTPATIENT
Start: 2019-12-20 | End: 2019-12-21 | Stop reason: HOSPADM

## 2019-12-20 RX ORDER — MECLIZINE HYDROCHLORIDE 25 MG/1
25 TABLET ORAL 3 TIMES DAILY PRN
Status: DISCONTINUED | OUTPATIENT
Start: 2019-12-20 | End: 2019-12-21 | Stop reason: HOSPADM

## 2019-12-20 RX ORDER — TRAZODONE HYDROCHLORIDE 50 MG/1
75 TABLET ORAL NIGHTLY
Status: DISCONTINUED | OUTPATIENT
Start: 2019-12-20 | End: 2019-12-21 | Stop reason: HOSPADM

## 2019-12-20 RX ORDER — VENLAFAXINE 75 MG/1
225 TABLET ORAL DAILY
Status: DISCONTINUED | OUTPATIENT
Start: 2019-12-21 | End: 2019-12-21 | Stop reason: HOSPADM

## 2019-12-20 RX ADMIN — CARVEDILOL 25 MG: 25 TABLET, FILM COATED ORAL at 17:32

## 2019-12-20 RX ADMIN — OXYCODONE HYDROCHLORIDE 20 MG: 15 TABLET ORAL at 17:31

## 2019-12-20 RX ADMIN — SODIUM CHLORIDE, PRESERVATIVE FREE 10 ML: 5 INJECTION INTRAVENOUS at 20:57

## 2019-12-20 RX ADMIN — ATORVASTATIN CALCIUM 40 MG: 20 TABLET, FILM COATED ORAL at 20:59

## 2019-12-20 RX ADMIN — SODIUM CHLORIDE, PRESERVATIVE FREE 10 ML: 5 INJECTION INTRAVENOUS at 23:42

## 2019-12-20 RX ADMIN — TRIHEXYPHENIDYL HYDROCHLORIDE 2 MG: 2 TABLET ORAL at 17:31

## 2019-12-20 RX ADMIN — ONDANSETRON HYDROCHLORIDE 4 MG: 4 TABLET, FILM COATED ORAL at 21:17

## 2019-12-20 RX ADMIN — OXYCODONE HYDROCHLORIDE 20 MG: 15 TABLET ORAL at 23:41

## 2019-12-20 RX ADMIN — FAMOTIDINE 20 MG: 20 TABLET, FILM COATED ORAL at 21:00

## 2019-12-20 RX ADMIN — CYCLOBENZAPRINE 10 MG: 10 TABLET, FILM COATED ORAL at 17:31

## 2019-12-20 RX ADMIN — TRAZODONE HYDROCHLORIDE 75 MG: 50 TABLET ORAL at 20:58

## 2019-12-20 RX ADMIN — SODIUM CHLORIDE, PRESERVATIVE FREE 10 ML: 5 INJECTION INTRAVENOUS at 21:00

## 2019-12-20 RX ADMIN — LORAZEPAM 0.5 MG: 0.5 TABLET ORAL at 19:12

## 2019-12-20 NOTE — H&P
Juan Antonio Jones MD   Physician   Neurosurgery   Progress Notes   Signed   Encounter Date:  12/20/2019          Related encounter: Office Visit from 12/20/2019 in [facility] NEUROSURGERY with Juan Antonio Jones MD         Signed        Expand All Collapse All         Subjective      Patient ID: Rachana Arguelles is a 74 y.o. female is here today for follow-up to ER visit at [facility] 12.16.19 for head injury/laceration [place]. Patient did receive sutures to posterior head wound and is on Plavix.  She also suffered neck injury with possible ligamentous instability.  She was released in a c-collar and prescribed Antivert.     She is in pain management with Dr Webber due to prior back surgery     Patient states that she is having constant dizziness, neck pain, but denies arm pain or weakness.  She is in a wheelchair due to dizziness.  She denies headaches or double/blurry vision, no arm pain or arm weakness.     Patient was released home and has not been getting home therapies, she fell again a 2nd time at home striking her head again which has caused more bleeding from her head wound.  She is on Plavix.  Patient states that she is very dizzy.     She is seeing a movement disorder specialist at  for dystonia and is taking Artane and a history of stage III kidney disease.     Patient has a history of right upper lobectomy for non- small cell lung cancer on 1.11.19 with Dr Figueroa      Dictation on: 12/20/2019 11:14 AM by: JUAN ANTONIO JONES [[id number]]         Dizziness   The problem occurs constantly. The problem has been unchanged. Associated symptoms include neck pain. Pertinent negatives include no arthralgias, headaches, numbness or weakness.   Neck Pain    The problem occurs intermittently. The problem has been unchanged. The pain is present in the midline. The pain is at a severity of 4/10. Pertinent negatives include no headaches, numbness or weakness.         The following  portions of the patient's history were reviewed and updated as appropriate: allergies, current medications, past family history, past medical history, past social history, past surgical history and problem list.      ** in wheelchair cannot stand on scale **     Review of Systems   Musculoskeletal: Positive for gait problem and neck pain. Negative for arthralgias.   Neurological: Negative for dizziness, tremors, seizures, syncope, facial asymmetry, speech difficulty, weakness, light-headedness, numbness and headaches.   All other systems reviewed and are negative.           Objective      Physical Exam   Constitutional: She is oriented to person, place, and time. She appears well-developed and well-nourished.   HENT:   Head: Normocephalic and atraumatic.   Eyes: Pupils are equal, round, and reactive to light. Conjunctivae and EOM are normal.   Fundoscopic exam:       The right eye shows no papilledema. The right eye shows venous pulsations.        The left eye shows no papilledema. The left eye shows venous pulsations.   Neck: Carotid bruit is not present.   Neurological: She is oriented to person, place, and time. She has a normal Finger-Nose-Finger Test and a normal Heel to Shin Test. Gait normal.   Reflex Scores:       Tricep reflexes are 2+ on the right side and 2+ on the left side.       Bicep reflexes are 2+ on the right side and 2+ on the left side.       Brachioradialis reflexes are 2+ on the right side and 2+ on the left side.       Patellar reflexes are 2+ on the right side and 2+ on the left side.       Achilles reflexes are 2+ on the right side and 2+ on the left side.  Psychiatric: Her speech is normal.      Neurologic Exam      Mental Status   Oriented to person, place, and time.   Registration of memory: Good recent and remote memory.   Attention: normal. Concentration: normal.   Speech: speech is normal   Level of consciousness: alert  Knowledge: consistent with education.      Cranial Nerves      CN  II   Visual fields full to confrontation.   Visual acuity: normal     CN III, IV, VI   Pupils are equal, round, and reactive to light.  Extraocular motions are normal.      CN V   Facial sensation intact.   Right corneal reflex: normal  Left corneal reflex: normal     CN VII   Facial expression full, symmetric.   Right facial weakness: none  Left facial weakness: none     CN VIII   Hearing: intact     CN IX, X   Palate: symmetric     CN XI   Right sternocleidomastoid strength: normal  Left sternocleidomastoid strength: normal     CN XII   Tongue: not atrophic  Tongue deviation: none     Motor Exam   Muscle bulk: normal  Right arm tone: normal  Left arm tone: normal  Right leg tone: normal  Left leg tone: normal     Strength   Strength 5/5 except as noted.      Sensory Exam   Light touch normal.      Gait, Coordination, and Reflexes      Gait  Gait: normal     Coordination   Finger to nose coordination: normal  Heel to shin coordination: normal     Reflexes   Right brachioradialis: 2+  Left brachioradialis: 2+  Right biceps: 2+  Left biceps: 2+  Right triceps: 2+  Left triceps: 2+  Right patellar: 2+  Left patellar: 2+  Right achilles: 2+  Left achilles: 2+  Right : 2+  Left : 2+           Assessment/Plan      Independent Review of Radiographic Studies:    I reviewed the CT scan of the cervical spine and MRI of the cervical spine done on 12/16/2019 which shows a bit of separation between the dens and the arch of C1, the so-called predental space.  There is no obvious signs of ligamentous injury but the increased predental space suggested.  The head CT was unremarkable.  Agree with the report.        Medical Decision Making:    Again because of the concern about some kind of intracranial hemorrhage on the Plavix we will go ahead and admit her for observation and get a noncontrast head CT.  We will also get cervical flexion-extension films and clean up the occipital laceration.  If the CT scan is unremarkable  and the flexion-extension films are unremarkable then she can probably go home tomorrow.        Rachana was seen today for neck pain and dizziness.     Diagnoses and all orders for this visit:     Hematoma of scalp, initial encounter     Acute neck pain     Dizziness        Return for admit for observation.

## 2019-12-21 VITALS
RESPIRATION RATE: 16 BRPM | TEMPERATURE: 98.7 F | DIASTOLIC BLOOD PRESSURE: 74 MMHG | HEART RATE: 62 BPM | SYSTOLIC BLOOD PRESSURE: 159 MMHG | WEIGHT: 193 LBS | HEIGHT: 67 IN | BODY MASS INDEX: 30.29 KG/M2 | OXYGEN SATURATION: 90 %

## 2019-12-21 PROCEDURE — 63710000001 ONDANSETRON PER 8 MG: Performed by: NURSE PRACTITIONER

## 2019-12-21 PROCEDURE — G0378 HOSPITAL OBSERVATION PER HR: HCPCS

## 2019-12-21 RX ORDER — MECLIZINE HCL 25MG 25 MG/1
25 TABLET, CHEWABLE ORAL 3 TIMES DAILY PRN
Qty: 30 TABLET | Refills: 0 | Status: SHIPPED | OUTPATIENT
Start: 2019-12-21 | End: 2019-12-30 | Stop reason: SDUPTHER

## 2019-12-21 RX ADMIN — AMLODIPINE BESYLATE 10 MG: 10 TABLET ORAL at 06:37

## 2019-12-21 RX ADMIN — MECLIZINE HYDROCHLORIDE 25 MG: 25 TABLET ORAL at 13:03

## 2019-12-21 RX ADMIN — ALLOPURINOL 100 MG: 100 TABLET ORAL at 08:59

## 2019-12-21 RX ADMIN — LORAZEPAM 0.5 MG: 0.5 TABLET ORAL at 06:36

## 2019-12-21 RX ADMIN — OXYCODONE HYDROCHLORIDE 20 MG: 15 TABLET ORAL at 08:59

## 2019-12-21 RX ADMIN — VENLAFAXINE HYDROCHLORIDE 225 MG: 75 TABLET ORAL at 08:59

## 2019-12-21 RX ADMIN — FAMOTIDINE 20 MG: 20 TABLET, FILM COATED ORAL at 08:59

## 2019-12-21 RX ADMIN — CARVEDILOL 25 MG: 25 TABLET, FILM COATED ORAL at 09:00

## 2019-12-21 RX ADMIN — CYCLOBENZAPRINE 10 MG: 10 TABLET, FILM COATED ORAL at 11:00

## 2019-12-21 RX ADMIN — TRIHEXYPHENIDYL HYDROCHLORIDE 2 MG: 2 TABLET ORAL at 08:59

## 2019-12-21 RX ADMIN — ONDANSETRON HYDROCHLORIDE 4 MG: 4 TABLET, FILM COATED ORAL at 12:46

## 2019-12-21 RX ADMIN — SODIUM CHLORIDE, PRESERVATIVE FREE 10 ML: 5 INJECTION INTRAVENOUS at 08:00

## 2019-12-21 NOTE — PLAN OF CARE
Problem: Patient Care Overview  Goal: Plan of Care Review  Outcome: Ongoing (interventions implemented as appropriate)  Flowsheets  Taken 12/21/2019 3567  Progress: improving  Outcome Summary: Pt A+O x4, VSS (slight high BP, but pt to get scheduled BP meds and managing pain control). Zofran given this shift for slight nausea and it has been alleviated. Soft collar worn. PRN wound care (peroxide and NS) provided for back of head, no new blood or signs of infection noted. Bruising on right lateral head still present, but no changes. Pt has ambulated to restroom assist standby well to void. Pt has slept well. Will continue to monitor and progress towards goals as tolerated.  Taken 12/20/2019 5847  Plan of Care Reviewed With: patient  Goal: Individualization and Mutuality  Outcome: Ongoing (interventions implemented as appropriate)  Goal: Discharge Needs Assessment  Outcome: Ongoing (interventions implemented as appropriate)  Goal: Interprofessional Rounds/Family Conf  Outcome: Ongoing (interventions implemented as appropriate)     Problem: Skin Injury Risk (Adult)  Goal: Identify Related Risk Factors and Signs and Symptoms  Outcome: Ongoing (interventions implemented as appropriate)  Goal: Skin Health and Integrity  Outcome: Ongoing (interventions implemented as appropriate)     Problem: Pain, Chronic (Adult)  Goal: Identify Related Risk Factors and Signs and Symptoms  Outcome: Ongoing (interventions implemented as appropriate)  Goal: Acceptable Pain/Comfort Level and Functional Ability  Outcome: Ongoing (interventions implemented as appropriate)     Problem: Infection, Risk/Actual (Adult)  Goal: Identify Related Risk Factors and Signs and Symptoms  Outcome: Ongoing (interventions implemented as appropriate)  Goal: Infection Prevention/Resolution  Outcome: Ongoing (interventions implemented as appropriate)     Problem: Fall Risk (Adult)  Goal: Identify Related Risk Factors and Signs and Symptoms  Outcome: Ongoing  (interventions implemented as appropriate)  Goal: Absence of Fall  Outcome: Ongoing (interventions implemented as appropriate)

## 2019-12-21 NOTE — DISCHARGE SUMMARY
Rachana Arguelles  1945    Patient Care Team:  Rhys Crowder Jr., MD as PCP - General (Family Medicine)  Jaciel Webber MD as PCP - Claims Attributed  Whitney Dudley MD as Consulting Physician (Nephrology)  Quita Figueroa MD as Surgeon (Thoracic Surgery)  Sabas Luther MD as Consulting Physician (Otolaryngology)  Álvaro Gifford MD as Consulting Physician (Pulmonary Disease)    Date of Admit: 12/20/2019    Date of Discharge:  12/21/2019    Discharge Diagnosis:  Unstable cervical spine      Procedures Performed         Consultants:   Consults     No orders found for last 30 day(s).          Condition on Discharge: stable    Discharge disposition: home    Pertinent Test Results: labs: CBC and BMP    Results from last 7 days   Lab Units 12/20/19  1723   WBC 10*3/mm3 9.81   HEMOGLOBIN g/dL 10.2*   HEMATOCRIT % 30.5*   PLATELETS 10*3/mm3 260     Results from last 7 days   Lab Units 12/20/19  1723   SODIUM mmol/L 136   POTASSIUM mmol/L 4.1   CHLORIDE mmol/L 98   CO2 mmol/L 23.1   BUN mg/dL 30*   CREATININE mg/dL 1.64*   GLUCOSE mg/dL 144*   CALCIUM mg/dL 8.6     XR SPINE CERVICAL COMPLETE W FLEX EXT-     INDICATIONS: Unstable cervical spine     TECHNIQUE: 7 views of the cervical spine     COMPARISON: CT from 12/16/2019     FINDINGS:     Slight/borderline anterolisthesis of C2 on C3, C4 on C5, and  slight/borderline retrolisthesis of C5 on C6, without significant change  between flexion and extension. The pre-dens space does not appear to  change significantly between flexion and extension. Reversed curvature  cervical spine is redemonstrated. No acute fracture or abnormal  prevertebral soft tissue swelling is noted. Multilevel endplate  spurring. Disc space narrowing at C5-C7, mildly at C4/5. Bilateral  neuroforaminal narrowing is apparent at C5-C7, predominantly related to  uncovertebral joint hypertrophy.     IMPRESSION:     No acute fracture or laxity identified. Degenerative changes. Follow up  as  indications persist.      CT HEAD WITHOUT CONTRAST     HISTORY: Fall, on Plavix.     Noncontrasted CT examination of brain was performed and compared to the  CT examination of 12/16/2019.     FINDINGS: The brain ventricles are symmetrical. There is no evidence of  intracranial hemorrhage, hydrocephalus or of abnormal extra-axial fluid.  Bone windows show no evidence of a calvarial fracture. Skin staples are  noted posteriorly at the midline. There is a small to moderate size  scalp hematoma posteriorly which is new versus 12/16/2019.     IMPRESSION:  No evidence of acute infarction, intracranial hemorrhage,  hydrocephalus or of abnormal extra-axial fluid. A scalp hematoma in the  posterior parietal region at the midline new versus 12/16/2019.     The above information was called to the patient's nurse at time of  dictation. The patient's nurse is to immediately relay the information  to the clinical service.         Brief HPI: She was recommended from the office yesterday due to a recent fall resulting in a head injury and laceration.  She was placed in a cervical hard collar out of concern for ligamentous injury.  She was also on Plavix and there was concern for an intracerebral hemorrhage.    Hospital Course: She is doing well since being admitted to the hospital yesterday.  Head CT showed stable findings with no evidence of intracerebral hemorrhage.  It did reveal and confirm the  extracranial hematoma that was treated with staples in the ER on Monday.  She also had cervical flexion extension x-rays which showed no instability, therefore she is fine to remain out of the cervical hard collar.  She is doing and feeling well this morning.  She does have chronic low back pain which is unchanged.  She denies any new problems and states that she is ready to go home.    Regarding the staples that were placed due to the hematoma, she can either arrange to have this taken out at our office or her primary care office 10 to  14 days after placement.  The earliest would be this Thursday, December 26.  She has no restrictions from our standpoint.  She is to resume all medications per discharge med rec as directed.      Discharge Physical Exam:    General Appearance No acute distress  Well-appearing older female  Obese               Chest wall No abnormalities observed   Abdomen Normal bowel sounds, no masses, no hepatomegaly, soft   Extremities Moves all extremities well, no edema, no cyanosis, no redness   Neurological Alert and oriented x 3  Moving all extremities well  Staples covering intact posterior extracranial laceration     Discharge Medications     Discharge Medications      Changes to Medications      Instructions Start Date   venlafaxine 75 MG tablet  Commonly known as:  EFFEXOR  What changed:  how much to take   TAKE 1 TABLET BY MOUTH EVERY DAY         Continue These Medications      Instructions Start Date   allopurinol 100 MG tablet  Commonly known as:  ZYLOPRIM   100 mg, Oral, Daily      amLODIPine 10 MG tablet  Commonly known as:  NORVASC   10 mg, Oral, Every Morning      atorvastatin 40 MG tablet  Commonly known as:  LIPITOR   40 mg, Oral, Nightly      carBAMazepine 200 MG tablet  Commonly known as:  TEGretol   TAKE 0.5 TAB BY MOUTH TWICE A DAY      carvedilol 25 MG tablet  Commonly known as:  COREG   25 mg, Oral, 2 Times Daily With Meals, 1/2 TAB EACH DOSE-ON HOLD SINCE 1/5/19       cyclobenzaprine 10 MG tablet  Commonly known as:  FLEXERIL   10 mg, Oral, 3 Times Daily PRN      famotidine 20 MG tablet  Commonly known as:  PEPCID   TAKE 1 TABLET BY MOUTH TWICE A DAY      LORazepam 0.5 MG tablet  Commonly known as:  ATIVAN   0.5 mg, Oral, 3 Times Daily      meclizine 25 MG tablet  Commonly known as:  ANTIVERT   25 mg, Oral, 3 Times Daily PRN      omeprazole 40 MG capsule  Commonly known as:  priLOSEC   40 mg, Oral, Every Evening      ondansetron 4 MG tablet  Commonly known as:  ZOFRAN   4 mg, Oral, Every 8 Hours PRN       oxyCODONE 20 MG tablet  Commonly known as:  ROXICODONE   20 mg, Oral, Every 4 Hours PRN      traZODone 150 MG tablet  Commonly known as:  DESYREL   75 mg, Oral, Nightly      trihexyphenidyl 2 MG tablet  Commonly known as:  ARTANE   2 mg, Oral, 2 Times Daily With Meals             Discharge Diet: As tolerated    Activity at Discharge: No restrictions, no need to wear cervical hard collar    Call for: Patient instructed to call for fever >100.5, chills, wound  swelling/drainage/redness, change in neurologic status including but not limited to recurrent falls or head injury    Follow-up Appointments  Future Appointments   Date Time Provider Department Center   1/24/2020 10:00 AM YA CT 3  YA CT YA   1/27/2020 12:40 PM Mat Coello MD [facility] CD [id number] None   1/30/2020 10:15 AM Quita Figueroa MD [facility] YA None   4/2/2020 10:15 AM Rhys Crowder Jr., MD [id number] None        REENA Ballard  12/21/19  12:06 PM    30 min spent in reviewing records, discussion and examination of the patient and discussion with other members of the patient's medical team.

## 2019-12-23 NOTE — PROGRESS NOTES
Case Management Discharge Note      Final Note: Discharged home              Transportation Services  Private: Car    Final Discharge Disposition Code: 01 - home or self-care

## 2019-12-30 ENCOUNTER — OFFICE VISIT (OUTPATIENT)
Dept: INTERNAL MEDICINE | Facility: CLINIC | Age: 74
End: 2019-12-30

## 2019-12-30 VITALS
HEART RATE: 63 BPM | RESPIRATION RATE: 16 BRPM | HEIGHT: 67 IN | BODY MASS INDEX: 30.29 KG/M2 | WEIGHT: 193 LBS | OXYGEN SATURATION: 96 % | TEMPERATURE: 97.1 F | SYSTOLIC BLOOD PRESSURE: 140 MMHG | DIASTOLIC BLOOD PRESSURE: 80 MMHG

## 2019-12-30 DIAGNOSIS — H81.10 BENIGN PAROXYSMAL POSITIONAL VERTIGO, UNSPECIFIED LATERALITY: ICD-10-CM

## 2019-12-30 DIAGNOSIS — R42 DIZZINESS: ICD-10-CM

## 2019-12-30 DIAGNOSIS — Z09 HOSPITAL DISCHARGE FOLLOW-UP: Primary | ICD-10-CM

## 2019-12-30 DIAGNOSIS — Z48.02 VISIT FOR SUTURE REMOVAL: ICD-10-CM

## 2019-12-30 PROCEDURE — 99213 OFFICE O/P EST LOW 20 MIN: CPT | Performed by: NURSE PRACTITIONER

## 2019-12-30 RX ORDER — MECLIZINE HYDROCHLORIDE 25 MG/1
25 TABLET ORAL 3 TIMES DAILY PRN
Qty: 30 TABLET | Refills: 0 | Status: SHIPPED | OUTPATIENT
Start: 2019-12-30 | End: 2020-01-27

## 2019-12-30 RX ORDER — ONDANSETRON 4 MG/1
4 TABLET, FILM COATED ORAL EVERY 8 HOURS PRN
Qty: 30 TABLET | Refills: 2 | Status: SHIPPED | OUTPATIENT
Start: 2019-12-30 | End: 2020-06-23 | Stop reason: SDUPTHER

## 2019-12-30 RX ORDER — VENLAFAXINE HYDROCHLORIDE 150 MG/1
CAPSULE, EXTENDED RELEASE ORAL
COMMUNITY
Start: 2019-10-06 | End: 2020-01-06

## 2019-12-30 RX ORDER — OMEPRAZOLE 40 MG/1
40 CAPSULE, DELAYED RELEASE ORAL DAILY
COMMUNITY
Start: 2019-12-22 | End: 2020-06-22 | Stop reason: SDUPTHER

## 2019-12-30 RX ORDER — MECLIZINE HYDROCHLORIDE 25 MG/1
25 TABLET ORAL 3 TIMES DAILY PRN
Qty: 30 TABLET | Refills: 0 | Status: SHIPPED | OUTPATIENT
Start: 2019-12-30 | End: 2019-12-30 | Stop reason: SDUPTHER

## 2019-12-30 NOTE — PROGRESS NOTES
"Subjective   Rachana Arguelles is a 74 y.o. female.   CC: ER follow-up, staple removal, dizziness    Patient presents for staple removal.  This is a 74-year-old female patient of Dr. holloway.  She presented to the ER after [place].  She does take Plavix.  She ended up with a scalp hematoma and 14 staples in her posterior scalp.  A CT showed possible ligamentous instability in her cervical spine.  She was followed closely by Dr. Coleman, neurosurgery and was readmitted for observation.  She did follow-up with Dr. Coleman in his office on 12/20/2019.  She presents today for follow-up.  She is still having some persistent dizziness in the fall but this is only elicited with quick position changes.  She has some accompanying nausea with the dizziness.  She reports no known history of vertigo but describes the dizziness as \"room spinning.\"  She was prescribed meclizine on her ER visit and this does take the edge off of her dizziness.  She endorses no visual changes, shortness of breath or chest discomfort.  No persistent headache.  She does see cardiology on 1/27/2020 and PCP, Dr. holloway in April.  She is accompanied by her  today.  She denies development of any other new issues today.       The following portions of the patient's history were reviewed and updated as appropriate: allergies, current medications, past family history, past medical history, past social history, past surgical history and problem list.    Review of Systems   Constitutional: Negative for activity change, chills, fatigue, fever, unexpected weight gain and unexpected weight loss.   HENT: Negative for congestion, hearing loss, postnasal drip, sinus pressure, sneezing, sore throat, swollen glands and tinnitus.    Eyes: Negative for photophobia, pain and visual disturbance.   Respiratory: Negative for cough, chest tightness, shortness of breath and wheezing.    Cardiovascular: Negative for chest pain, " "palpitations and leg swelling.   Gastrointestinal: Positive for nausea. Negative for abdominal distention, abdominal pain, constipation, diarrhea and vomiting.   Endocrine: Negative for polydipsia, polyphagia and polyuria.   Genitourinary: Negative for dysuria, frequency, hematuria and urgency.   Musculoskeletal: Positive for arthralgias.   Neurological: Positive for dizziness. Negative for weakness, numbness and headache.   All other systems reviewed and are negative.      Objective    /80   Pulse 63   Temp 97.1 °F (36.2 °C) (Oral)   Resp 16   Ht 170.2 cm (67\")   Wt 87.5 kg (193 lb)   SpO2 96%   BMI 30.23 kg/m²     Physical Exam   Constitutional: She is oriented to person, place, and time. She appears well-developed and well-nourished. No distress.   HENT:   Head: Normocephalic and atraumatic.   Eyes: Pupils are equal, round, and reactive to light.   Neck: Normal range of motion. Neck supple.   Cardiovascular: Normal rate, regular rhythm, normal heart sounds and intact distal pulses. Exam reveals no gallop and no friction rub.   No murmur heard.  Pulmonary/Chest: Effort normal and breath sounds normal.   Lungs are CTA   Abdominal: Soft. Bowel sounds are normal. She exhibits no distension. There is no tenderness.   Musculoskeletal: Normal range of motion.   Neurological: She is alert and oriented to person, place, and time.   Skin: Skin is warm and dry. Capillary refill takes less than 2 seconds. She is not diaphoretic.   Posterior scalp laceration with 14 staples, well approximated, scabbing.  No drainage.   Psychiatric: She has a normal mood and affect. Her behavior is normal. Judgment and thought content normal.   Nursing note and vitals reviewed.    Current outpatient and discharge medications have been reconciled for the patient.  Reviewed by: REENA Robbins      Assessment/Plan   Rachana was seen today for suture / staple removal.    Diagnoses and all orders for this visit:    Hospital discharge " follow-up    Visit for suture removal    Dizziness  -     Ambulatory Referral to ENT (Otolaryngology)    Benign paroxysmal positional vertigo, unspecified laterality  -     Ambulatory Referral to ENT (Otolaryngology)    Other orders  -     ondansetron (ZOFRAN) 4 MG tablet; Take 1 tablet by mouth Every 8 (Eight) Hours As Needed for Nausea or Vomiting.  -     Discontinue: meclizine (ANTIVERT) 25 MG tablet; Take 1 tablet by mouth 3 (Three) Times a Day As Needed for Dizziness or Nausea. Indications: Sensation of Spinning or Whirling  -     meclizine (ANTIVERT) 25 MG tablet; Take 1 tablet by mouth 3 (Three) Times a Day As Needed for Dizziness or Nausea. Indications: Sensation of Spinning or Whirling    -Staple removal performed, patient tolerated well, 14 staples removed.  See procedure note.    -ER and admission records reviewed from December 2019.  She is having some persistent dizziness that is only present upon quick position changes and is accompanied by nausea.  Due to the fact that this has worsened since her fall I do want her to follow back up with neurosurgery and patient/ will call for an appointment.  This does sound like vertigo, especially when accompanied by the fact that meclizine makes a difference in her symptoms.  I have refilled her meclizine and provided Zofran for the nausea and we will get her to ENT for further evaluation, diagnosis and treatment of vertigo.    -Follow-up PRN if symptoms persist or worsen and with ENT, neurosurgery and PCP, Dr. holloway.

## 2020-01-03 ENCOUNTER — TELEPHONE (OUTPATIENT)
Dept: NEUROSURGERY | Facility: CLINIC | Age: 75
End: 2020-01-03

## 2020-01-03 DIAGNOSIS — R42 DIZZINESS: Primary | ICD-10-CM

## 2020-01-03 NOTE — TELEPHONE ENCOUNTER
I thought she was supposed to come and see us in follow-up.  I did not see a follow-up appointment.  In any event go ahead and get an MRI of the brain without contrast and have her come back to see 1 of the extenders.  This is probably all postconcussive and should get better on its own but we will go ahead and reimage her.

## 2020-01-03 NOTE — TELEPHONE ENCOUNTER
Patient's daughter called stating that she is still very dizzy and there is swelling at site where she had laceration ( scalp) and they cannot get into see ENT until Feb.  Patient is not having headaches or visual changes.    Patient's daughter told her that she needs to be seen by her neurosurgeon as we need to handle this    How soon do you need to see and does she need repeat imaging?    Please advise

## 2020-01-06 ENCOUNTER — TELEPHONE (OUTPATIENT)
Dept: INTERNAL MEDICINE | Facility: CLINIC | Age: 75
End: 2020-01-06

## 2020-01-06 RX ORDER — VENLAFAXINE HYDROCHLORIDE 150 MG/1
CAPSULE, EXTENDED RELEASE ORAL
Qty: 90 CAPSULE | Refills: 0 | Status: SHIPPED | OUTPATIENT
Start: 2020-01-06 | End: 2020-03-30

## 2020-01-06 NOTE — TELEPHONE ENCOUNTER
I sent her for urgent referral for vertigo to [facility].  Another option would be Dr. Luther with [facility].

## 2020-01-06 NOTE — TELEPHONE ENCOUNTER
Pt's Daughter called asking about other ENT's for Vertigo, the pt is scheduled with Dr Uriostegui but he can't see her until next month

## 2020-01-06 NOTE — TELEPHONE ENCOUNTER
I spoke with her daughter Katia and informed her of Dr CRONIN's response.  [facility] will contact her schedule brain MRI and then her daughter will call back and let us know when it scheduled so we can make fu appt in our office with an extender

## 2020-01-09 DIAGNOSIS — H81.10 BENIGN PAROXYSMAL POSITIONAL VERTIGO, UNSPECIFIED LATERALITY: ICD-10-CM

## 2020-01-09 DIAGNOSIS — R42 DIZZINESS: Primary | ICD-10-CM

## 2020-01-13 ENCOUNTER — TELEPHONE (OUTPATIENT)
Dept: INTERNAL MEDICINE | Facility: CLINIC | Age: 75
End: 2020-01-13

## 2020-01-13 DIAGNOSIS — S06.9XAA VERTIGO DUE TO BRAIN INJURY (HCC): Primary | ICD-10-CM

## 2020-01-13 DIAGNOSIS — R42 VERTIGO DUE TO BRAIN INJURY (HCC): Primary | ICD-10-CM

## 2020-01-13 DIAGNOSIS — R42 VERTIGO: ICD-10-CM

## 2020-01-13 RX ORDER — HYDRALAZINE HYDROCHLORIDE 50 MG/1
TABLET, FILM COATED ORAL
Qty: 90 TABLET | Refills: 0 | OUTPATIENT
Start: 2020-01-13 | End: 2020-08-30 | Stop reason: HOSPADM

## 2020-01-24 ENCOUNTER — HOSPITAL ENCOUNTER (OUTPATIENT)
Dept: CT IMAGING | Facility: HOSPITAL | Age: 75
Discharge: HOME | End: 2020-01-24
Admitting: THORACIC SURGERY (CARDIOTHORACIC VASCULAR SURGERY)

## 2020-01-24 DIAGNOSIS — C34.11 MALIGNANT NEOPLASM OF UPPER LOBE OF RIGHT LUNG (HCC): ICD-10-CM

## 2020-01-24 PROCEDURE — 71250 CT THORAX DX C-: CPT

## 2020-01-27 ENCOUNTER — OFFICE VISIT (OUTPATIENT)
Dept: CARDIOLOGY | Facility: CLINIC | Age: 75
End: 2020-01-27

## 2020-01-27 VITALS
HEART RATE: 60 BPM | DIASTOLIC BLOOD PRESSURE: 80 MMHG | HEIGHT: 67 IN | SYSTOLIC BLOOD PRESSURE: 156 MMHG | BODY MASS INDEX: 31.08 KG/M2 | WEIGHT: 198 LBS

## 2020-01-27 DIAGNOSIS — F32.1 CURRENT MODERATE EPISODE OF MAJOR DEPRESSIVE DISORDER WITHOUT PRIOR EPISODE (HCC): ICD-10-CM

## 2020-01-27 DIAGNOSIS — R00.2 PALPITATIONS: Primary | ICD-10-CM

## 2020-01-27 DIAGNOSIS — I49.3 PVC'S (PREMATURE VENTRICULAR CONTRACTIONS): ICD-10-CM

## 2020-01-27 DIAGNOSIS — E66.01 MORBIDLY OBESE (HCC): ICD-10-CM

## 2020-01-27 DIAGNOSIS — E78.2 MIXED HYPERLIPIDEMIA: ICD-10-CM

## 2020-01-27 PROCEDURE — 99214 OFFICE O/P EST MOD 30 MIN: CPT | Performed by: INTERNAL MEDICINE

## 2020-01-27 PROCEDURE — 93000 ELECTROCARDIOGRAM COMPLETE: CPT | Performed by: INTERNAL MEDICINE

## 2020-01-27 RX ORDER — MECLIZINE HYDROCHLORIDE 25 MG/1
TABLET ORAL
Qty: 30 TABLET | Refills: 1 | Status: SHIPPED | OUTPATIENT
Start: 2020-01-27 | End: 2020-08-10

## 2020-01-27 NOTE — PROGRESS NOTES
Date of Office Visit: 20  Encounter Provider: Mat Coello MD  Place of Service: [facility] CARDIOLOGY  Patient Name: Rachana Arguelles  :1945    Chief complaint  Palpitations  History of     HPI: Rachana Arguelles is a 74 y.o. female who presents today for follow-up.   She has a history of lung cancer for which she is status post right upper lobectomy in January of this year.  In 2016, she had a normal echocardiogram and a normal nuclear stress test.  She had an incidental finding of calcium in her aorta and iliac arteries.  In 2018, she underwent bilateral common iliac artery stent placement with Dr. Mancera and remains on aspirin and Plavix per their recommendation.  She was last seen in the office on 2018 at which time she was reporting some shortness of breath that was chronic and unchanged.  She still had swelling in her left leg.  She was having no symptoms of angina or heart failure.  No further cardiac work-up was recommended.  She was advised to follow-up in 1 year.  Prior to her lung surgery in January, she underwent a stress echocardiogram as well as a nuclear stress test.  The stress echocardiogram revealed normal LV systolic function, an EF of 67%, grade II diastolic dysfunction, and no significant valvular abnormalities.  She had a normal myocardial perfusion study with no evidence of ischemia.      On 2019, she was evaluated by Dr. Gifford for increased shortness of air.  He ordered a Holter monitor for associated palpitations.  He arranged for home oxygen, encouraged continuing with Anoro, and advised pulmonary rehab.  He also noted that if she were to continue having symptoms she may need a bronch.      Her last Holter monitor was in 2019.  The Holter monitor revealed sinus rhythm with rare atrial ectopic beats and frequent ventricular ectopic beats accounting for 10% of overall beats.  She denies any increase in the frequency  of her palpitations.  She denies any chest pain or significant dyspnea on exertion.  Unfortunately, since our last visit she did have a fall where she had trauma to the posterior head. She had staples back there and this appears to have healed up well. She has not had additional fall since that time.           Past Medical History:   Diagnosis Date   • AAA (abdominal aortic aneurysm) without rupture (CMS/HCC)    • Anxiety    • Arthritis    • Cancer (CMS/HCC)     skin cancer   • Chest pain     at rest   • Chronic low back pain    • Chronic pain syndrome 3/12/2016   • CKD (chronic kidney disease), stage III (CMS/HCC)    • Claustrophobia 10/26/2015    Resolved   • Depression    • Dizziness    • GERD (gastroesophageal reflux disease)    • GI problem    • Hiatal hernia    • History of migraine headaches    • Hyperlipidemia    • Hypertension    • Lumbar postlaminectomy syndrome 10/26/2015    Resolved   • Lung cancer (CMS/HCC)    • Lung nodule    • Palpitations    • Polypharmacy 4/22/2016   • Pulmonary embolism (CMS/HCC) 10/26/2015    January 2013 - Resolved, felt to be secondary to estrogen use   • Submandibular sialoadenitis 10/26/2015    Resolved   • Vitamin B 12 deficiency        Past Surgical History:   Procedure Laterality Date   • ANGIOPLASTY ILIAC ARTERY Bilateral 8/10/2018    Procedure: BILATERAL ILIAC STENTS;  Surgeon: Riki Mancera MD;  Location: [facility] MAIN OR;  Service: Vascular   • APPENDECTOMY     • BREAST SURGERY      Breast Surgery Reduction Procedure   • BRONCHOSCOPY N/A 2/8/2019    Procedure: BRONCHOSCOPY;  Surgeon: Álvaro Gifford MD;  Location: [facility] ENDOSCOPY;  Service: Pulmonary   • CHOLECYSTECTOMY     • HYSTERECTOMY     • INTUBATION  1/15/2019        • JOINT REPLACEMENT      left knee, hip, shoulder   • LASIK     • LUMBAR FUSION      Lumbar Vertebral Fusion   • SHOULDER ARTHROSCOPY  04/04/2013    Dr. Carrillo/[facility] - Resolved   • THORACOSCOPY VIDEO ASSISTED WITH LOBECTOMY Right 1/11/2019     Procedure: BRONCOSCOPY, THORACOSCOPY VIDEO ASSISTED WITH RIGHT UPPER LOBE WEDGE RESECTION, COMPLETION RIGHT UPPER LOBECTOMY, LYMPH NODE DISSECTION, INTERCOSTAL NERVE BLOCK;  Surgeon: Quita Figueroa MD;  Location: [facility];  Service: Thoracic   • TONSILLECTOMY     • TOTAL HIP ARTHROPLASTY REVISION Left    • TOTAL KNEE ARTHROPLASTY Left    • TOTAL SHOULDER ARTHROPLASTY Left        Social History     Socioeconomic History   • Marital status:      Spouse name: Not on file   • Number of children: Not on file   • Years of education: Not on file   • Highest education level: Not on file   Tobacco Use   • Smoking status: Former Smoker     Packs/day: 2.50     Years: 15.00     Pack years: 37.50     Types: Cigarettes     Last attempt to quit: 2003     Years since quittin.3   • Smokeless tobacco: Never Used   Substance and Sexual Activity   • Alcohol use: No     Frequency: Never     Comment: occ   • Drug use: Never   • Sexual activity: Defer       Family History   Problem Relation Age of Onset   • Hypertension Mother    • Lung cancer Mother    • Cancer Mother         lung   • Kidney disease Father    • Other Brother         Coronary Artery Bypass Grafting   • Stroke Brother         Cerebrovascular Accident   • Malig Hyperthermia Neg Hx        Review of Systems   Constitution: Positive for malaise/fatigue. Negative for chills and fever.   HENT: Negative for nosebleeds and sore throat.    Eyes: Negative for blurred vision and double vision.   Cardiovascular: Positive for dyspnea on exertion, leg swelling and palpitations. Negative for chest pain, claudication, near-syncope, orthopnea, paroxysmal nocturnal dyspnea and syncope.   Respiratory: Negative for cough, shortness of breath and snoring.    Endocrine: Negative for cold intolerance, heat intolerance and polydipsia.   Skin: Negative for itching, poor wound healing and rash.   Musculoskeletal: Negative for joint pain, joint swelling, muscle weakness and  myalgias.   Gastrointestinal: Negative for abdominal pain, diarrhea, melena, nausea and vomiting.   Genitourinary: Negative for frequency and hematuria.   Neurological: Negative for light-headedness, loss of balance, numbness, paresthesias, seizures, vertigo and weakness.   Psychiatric/Behavioral: Negative for altered mental status and depression.   Allergic/Immunologic: Negative.    All other systems reviewed and are negative.      Allergies   Allergen Reactions   • Neurontin [Gabapentin] Confusion   • Morphine Other (See Comments)     HEADACHE         Current Outpatient Medications:   •  allopurinol (ZYLOPRIM) 100 MG tablet, Take 100 mg by mouth Daily., Disp: , Rfl: 2  •  amLODIPine (NORVASC) 10 MG tablet, Take 10 mg by mouth Every Morning., Disp: , Rfl: 3  •  atorvastatin (LIPITOR) 40 MG tablet, Take 40 mg by mouth Every Night., Disp: , Rfl:   •  carBAMazepine (TEGretol) 200 MG tablet, TAKE 0.5 TAB BY MOUTH TWICE A DAY, Disp: , Rfl: 0  •  carvedilol (COREG) 25 MG tablet, Take 25 mg by mouth 2 (Two) Times a Day With Meals. 1/2 TAB EACH DOSE-ON HOLD SINCE 1/5/19, Disp: , Rfl:   •  cyclobenzaprine (FLEXERIL) 10 MG tablet, Take 10 mg by mouth 3 (Three) Times a Day As Needed., Disp: , Rfl:   •  famotidine (PEPCID) 20 MG tablet, TAKE 1 TABLET BY MOUTH TWICE A DAY, Disp: 180 tablet, Rfl: 1  •  FERROUS SULFATE PO, Take  by mouth., Disp: , Rfl:   •  hydrALAZINE (APRESOLINE) 50 MG tablet, Take 1.5 tablets by mouth two times a day, Disp: 90 tablet, Rfl: 0  •  LORazepam (ATIVAN) 0.5 MG tablet, Take 1 tablet by mouth 3 (Three) Times a Day., Disp: 90 tablet, Rfl: 2  •  meclizine (ANTIVERT) 25 MG tablet, TAKE 1 TABLET BY MOUTH 3 TIMES A DAY AS NEEDED FOR DIZZINESS/NAUSEA/SENSATION OF SPINNING/WHIRLING, Disp: 30 tablet, Rfl: 1  •  omeprazole (priLOSEC) 40 MG capsule, , Disp: , Rfl:   •  ondansetron (ZOFRAN) 4 MG tablet, Take 1 tablet by mouth Every 8 (Eight) Hours As Needed for Nausea or Vomiting., Disp: 30 tablet, Rfl: 2  •   "oxyCODONE (ROXICODONE) 20 MG tablet, Take 20 mg by mouth Every 4 (Four) Hours As Needed., Disp: , Rfl:   •  traZODone (DESYREL) 150 MG tablet, TAKE 0.5 TABLETS BY MOUTH EVERY NIGHT., Disp: 45 tablet, Rfl: 1  •  trihexyphenidyl (ARTANE) 2 MG tablet, Take 2 mg by mouth 2 (Two) Times a Day With Meals., Disp: , Rfl:   •  venlafaxine XR (EFFEXOR-XR) 150 MG 24 hr capsule, TAKE 1 CAPSULE BY MOUTH EVERY DAY, Disp: 90 capsule, Rfl: 0      Objective:     Vitals:    01/27/20 1252   BP: 156/80   Pulse: 60   Weight: 89.8 kg (198 lb)   Height: 170.2 cm (67\")     Body mass index is 31.01 kg/m².    PHYSICAL EXAM:    Physical Exam   Constitutional: She is oriented to person, place, and time. She appears well-developed and well-nourished. No distress.   Using walker     HENT:   Head: Normocephalic and atraumatic.   Eyes: Pupils are equal, round, and reactive to light.   Neck: No JVD present. No thyromegaly present.   Cardiovascular: Normal rate, regular rhythm, normal heart sounds and intact distal pulses.   No murmur heard.  Pulmonary/Chest: Effort normal and breath sounds normal. No respiratory distress.   Abdominal: Soft. Bowel sounds are normal. She exhibits no distension. There is no splenomegaly or hepatomegaly. There is no tenderness.   Musculoskeletal: Normal range of motion. She exhibits no edema.   Neurological: She is alert and oriented to person, place, and time.   Skin: Skin is warm and dry. She is not diaphoretic. No erythema.   Psychiatric: She has a normal mood and affect. Her behavior is normal. Judgment normal.         ECG 12 Lead  Date/Time: 1/27/2020 1:29 PM  Performed by: Mat Coello MD  Authorized by: Mat Coello MD   Comparison: compared with previous ECG from 7/31/2019  Similar to previous ECG  Rhythm: sinus rhythm  Rate: normal  QRS axis: normal  Other findings: non-specific ST-T wave changes    Clinical impression: non-specific ECG  Comments: Premature ventricular complex is " new              Assessment:    Very pleasant 74-year-old female with a medical history of palpitations, monomorphic PVCs, normal ejection fraction, and negative ischemic workup, who presents back to me for evaluation. She has been doing well from a cardiac standpoint and does not have any new symptoms of chest pain or dyspnea on exertion. She does have chronic monomorphic PVCs which are unchanged from prior. She has no evidence of heart failure and a normal ejection fraction previously documented.     1. PVCs: Monomorphic.  -Continue carvedilol at current dose.  -No indication for additional workup.   -Ejection fraction is preserved.     2. Essential hypertension: Mildly elevated today. I have recommended that if she has elevated blood pressure at home and we need to consider additional therapy that I would just move her hydralazine to q. 8 h. She is aware.     3. Falls.   -These appear to be mechanical. I do not think we need to do additional cardiac workup.         Plan:

## 2020-01-30 ENCOUNTER — OFFICE VISIT (OUTPATIENT)
Dept: SURGERY | Facility: CLINIC | Age: 75
End: 2020-01-30

## 2020-01-30 VITALS
WEIGHT: 198 LBS | DIASTOLIC BLOOD PRESSURE: 78 MMHG | HEART RATE: 75 BPM | HEIGHT: 67 IN | BODY MASS INDEX: 31.08 KG/M2 | SYSTOLIC BLOOD PRESSURE: 142 MMHG | OXYGEN SATURATION: 97 %

## 2020-01-30 DIAGNOSIS — C34.11 MALIGNANT NEOPLASM OF UPPER LOBE OF RIGHT LUNG (HCC): Primary | ICD-10-CM

## 2020-01-30 PROCEDURE — 99213 OFFICE O/P EST LOW 20 MIN: CPT | Performed by: THORACIC SURGERY (CARDIOTHORACIC VASCULAR SURGERY)

## 2020-01-30 NOTE — PROGRESS NOTES
Subjective   Patient ID: Rachana Arguelles is a 74 y.o. female is here today for follow-up.    History of Present Illness  Dear Colleague,  Rachana Arguelles was seen in our office today for continued follow up and surveillance  postoperative visit after surgery for a stage I, T1BN0 non-small cell lung cancer.  She denies any complaints of fever, chills, cough, hemoptysis, pleuritic chest pain, shortness of air, dyspnea with exertion, night sweats, hoarseness, or unintentional weight loss. Underlying medical conditions including hypertension remain stable.  She has had a recent fall and complains of vertigo.  She is planning to follow-up with Dr. Coleman and Dr. Uriostegui regarding this.    The following portions of the patient's history were reviewed and updated as appropriate: allergies, current medications, past family history, past medical history, past social history, past surgical history and problem list.  Review of Systems   Constitution: Negative.   HENT: Negative.    Eyes: Negative.    Cardiovascular: Negative.    Respiratory: Positive for cough and shortness of breath.    Endocrine: Negative.    Hematologic/Lymphatic: Negative.    Skin: Negative.    Musculoskeletal: Negative.    Gastrointestinal: Negative.    Genitourinary: Negative.    Neurological: Negative.    Psychiatric/Behavioral: Negative.    Allergic/Immunologic: Negative.      Patient Active Problem List   Diagnosis   • Anxiety   • Chronic pain syndrome   • Depression   • Gastroesophageal reflux disease   • Hyperlipidemia   • Hypertension   • Osteoarthritis of hip   • Chronic low back pain   • Failed back syndrome of lumbar spine   • Pulmonary embolus (CMS/HCC)   • Weight loss   • Polypharmacy   • CKD (chronic kidney disease), stage III (CMS/HCC)   • Chronic constipation   • Sacroiliac joint pain   • Mixed incontinence   • Renal cyst   • Achilles tendonitis   • Atherosclerosis of native artery of left lower extremity with intermittent claudication  (CMS/HCC)   • Morbidly obese (CMS/HCC)   • Lung nodule   • Malignant neoplasm of right upper lobe of lung (CMS/HCC)   • Lung cancer (CMS/HCC)   • Moderate single current episode of major depressive disorder (CMS/HCC)   • Palpitations   • Encounter for screening for malignant neoplasm of colon   • Hematoma of scalp   • Acute neck pain   • Dizziness   • Unstable cervical spine     Past Medical History:   Diagnosis Date   • AAA (abdominal aortic aneurysm) without rupture (CMS/HCC)    • Anxiety    • Arthritis    • Cancer (CMS/HCC)     skin cancer   • Chest pain     at rest   • Chronic low back pain    • Chronic pain syndrome 3/12/2016   • CKD (chronic kidney disease), stage III (CMS/HCC)    • Claustrophobia 10/26/2015    Resolved   • Depression    • Dizziness    • GERD (gastroesophageal reflux disease)    • GI problem    • Hiatal hernia    • History of migraine headaches    • Hyperlipidemia    • Hypertension    • Lumbar postlaminectomy syndrome 10/26/2015    Resolved   • Lung cancer (CMS/HCC)    • Lung nodule    • Palpitations    • Polypharmacy 4/22/2016   • Pulmonary embolism (CMS/HCC) 10/26/2015    January 2013 - Resolved, felt to be secondary to estrogen use   • Submandibular sialoadenitis 10/26/2015    Resolved   • Vitamin B 12 deficiency      Past Surgical History:   Procedure Laterality Date   • ANGIOPLASTY ILIAC ARTERY Bilateral 8/10/2018    Procedure: BILATERAL ILIAC STENTS;  Surgeon: Rkii Mancera MD;  Location: [facility] MAIN OR;  Service: Vascular   • APPENDECTOMY     • BREAST SURGERY      Breast Surgery Reduction Procedure   • BRONCHOSCOPY N/A 2/8/2019    Procedure: BRONCHOSCOPY;  Surgeon: Álvaro Gifford MD;  Location: [facility] ENDOSCOPY;  Service: Pulmonary   • CHOLECYSTECTOMY     • HYSTERECTOMY     • INTUBATION  1/15/2019        • JOINT REPLACEMENT      left knee, hip, shoulder   • LASIK     • LUMBAR FUSION      Lumbar Vertebral Fusion   • SHOULDER ARTHROSCOPY  04/04/2013    Dr. Carrillo/[facility] - Resolved   •  THORACOSCOPY VIDEO ASSISTED WITH LOBECTOMY Right 2019    Procedure: BRONCOSCOPY, THORACOSCOPY VIDEO ASSISTED WITH RIGHT UPPER LOBE WEDGE RESECTION, COMPLETION RIGHT UPPER LOBECTOMY, LYMPH NODE DISSECTION, INTERCOSTAL NERVE BLOCK;  Surgeon: Quita Figueroa MD;  Location: [facility];  Service: Thoracic   • TONSILLECTOMY     • TOTAL HIP ARTHROPLASTY REVISION Left    • TOTAL KNEE ARTHROPLASTY Left    • TOTAL SHOULDER ARTHROPLASTY Left      Family History   Problem Relation Age of Onset   • Hypertension Mother    • Lung cancer Mother    • Cancer Mother         lung   • Kidney disease Father    • Other Brother         Coronary Artery Bypass Grafting   • Stroke Brother         Cerebrovascular Accident   • Malig Hyperthermia Neg Hx      Social History     Socioeconomic History   • Marital status:      Spouse name: Not on file   • Number of children: Not on file   • Years of education: Not on file   • Highest education level: Not on file   Tobacco Use   • Smoking status: Former Smoker     Packs/day: 2.50     Years: 15.00     Pack years: 37.50     Types: Cigarettes     Last attempt to quit: 2003     Years since quittin.3   • Smokeless tobacco: Never Used   Substance and Sexual Activity   • Alcohol use: No     Frequency: Never     Comment: occ   • Drug use: Never   • Sexual activity: Defer       Current Outpatient Medications:   •  allopurinol (ZYLOPRIM) 100 MG tablet, Take 100 mg by mouth Daily., Disp: , Rfl: 2  •  amLODIPine (NORVASC) 10 MG tablet, Take 10 mg by mouth Every Morning., Disp: , Rfl: 3  •  atorvastatin (LIPITOR) 40 MG tablet, Take 40 mg by mouth Every Night., Disp: , Rfl:   •  carBAMazepine (TEGretol) 200 MG tablet, TAKE 0.5 TAB BY MOUTH TWICE A DAY, Disp: , Rfl: 0  •  carvedilol (COREG) 25 MG tablet, Take 25 mg by mouth 2 (Two) Times a Day With Meals. 1/2 TAB EACH DOSE-ON HOLD SINCE 19, Disp: , Rfl:   •  cyclobenzaprine (FLEXERIL) 10 MG tablet, Take 10 mg by mouth 3 (Three) Times  a Day As Needed., Disp: , Rfl:   •  famotidine (PEPCID) 20 MG tablet, TAKE 1 TABLET BY MOUTH TWICE A DAY, Disp: 180 tablet, Rfl: 1  •  FERROUS SULFATE PO, Take  by mouth., Disp: , Rfl:   •  hydrALAZINE (APRESOLINE) 50 MG tablet, Take 1.5 tablets by mouth two times a day, Disp: 90 tablet, Rfl: 0  •  LORazepam (ATIVAN) 0.5 MG tablet, Take 1 tablet by mouth 3 (Three) Times a Day., Disp: 90 tablet, Rfl: 2  •  meclizine (ANTIVERT) 25 MG tablet, TAKE 1 TABLET BY MOUTH 3 TIMES A DAY AS NEEDED FOR DIZZINESS/NAUSEA/SENSATION OF SPINNING/WHIRLING, Disp: 30 tablet, Rfl: 1  •  omeprazole (priLOSEC) 40 MG capsule, , Disp: , Rfl:   •  ondansetron (ZOFRAN) 4 MG tablet, Take 1 tablet by mouth Every 8 (Eight) Hours As Needed for Nausea or Vomiting., Disp: 30 tablet, Rfl: 2  •  oxyCODONE (ROXICODONE) 20 MG tablet, Take 20 mg by mouth Every 4 (Four) Hours As Needed., Disp: , Rfl:   •  traZODone (DESYREL) 150 MG tablet, TAKE 0.5 TABLETS BY MOUTH EVERY NIGHT., Disp: 45 tablet, Rfl: 1  •  trihexyphenidyl (ARTANE) 2 MG tablet, Take 2 mg by mouth 2 (Two) Times a Day With Meals., Disp: , Rfl:   •  venlafaxine XR (EFFEXOR-XR) 150 MG 24 hr capsule, TAKE 1 CAPSULE BY MOUTH EVERY DAY, Disp: 90 capsule, Rfl: 0  Allergies   Allergen Reactions   • Neurontin [Gabapentin] Confusion   • Morphine Other (See Comments)     HEADACHE        Objective   Vitals:    01/30/20 0959   BP: 142/78   Pulse: 75   SpO2: 97%     Physical Exam   Constitutional: She is oriented to person, place, and time. She appears well-developed and well-nourished.   HENT:   Head: Normocephalic and atraumatic.   Nose: Nose normal.   Mouth/Throat: Oropharynx is clear and moist.   Eyes: Pupils are equal, round, and reactive to light. Conjunctivae and EOM are normal.   Neck: Normal range of motion. Neck supple.   Cardiovascular: Normal rate, regular rhythm, normal heart sounds and intact distal pulses.   Pulmonary/Chest: Effort normal and breath sounds normal.   Abdominal: Soft. Bowel  sounds are normal.   Musculoskeletal: Normal range of motion.   Neurological: She is alert and oriented to person, place, and time.   Skin: Skin is warm and dry. Capillary refill takes less than 2 seconds.   Psychiatric: She has a normal mood and affect. Her behavior is normal. Judgment and thought content normal.   Nursing note and vitals reviewed.    Independent Review of Radiographic Studies:    I have independently reviewed the CT chest performed on 1/24/2020 which demonstrated a nodular pleural density that is decreased in size since the prior exam.  There are no other masses or opacities that are new.  There is no pleural effusion.  There is a small pericardial effusion that is improving.  There is no mediastinal or hilar lymphadenopathy.  Assessment/Plan   Ms. Arguelles is a pleasant 74-year-old lady status post right upper lobectomy for a T1 BN 0, stage I, non-small cell cancer on 1/11/2019.  The prior right lower lobe opacity has diminished in size and is consistent with scarring.  I would like to continue with lung cancer surveillance with a CT of the chest in 4 months in order to document further improvement in this 10 mm nodule.  I will plan to see her again in 4 months with a CT chest.    Diagnoses and all orders for this visit:    Malignant neoplasm of upper lobe of right lung (CMS/HCC)  -     CT Chest Without Contrast; Future

## 2020-01-31 DIAGNOSIS — R42 VERTIGO: Primary | ICD-10-CM

## 2020-02-03 ENCOUNTER — HOSPITAL ENCOUNTER (OUTPATIENT)
Dept: MRI IMAGING | Facility: HOSPITAL | Age: 75
Discharge: HOME | End: 2020-02-03
Admitting: NEUROLOGICAL SURGERY

## 2020-02-03 DIAGNOSIS — R42 DIZZINESS: ICD-10-CM

## 2020-02-03 PROCEDURE — 70551 MRI BRAIN STEM W/O DYE: CPT

## 2020-02-04 NOTE — PROGRESS NOTES
Subjective   Patient ID: Rachana Arguelles is a 74 y.o. female is here today for follow-up to discuss brain MRI results.  She is in pain management with Dr Webber for chronic low back pain.  She saw Dr Uriostegui, ENT, for residual dizziness.  He felt this is related to post concussive syndrome and told her to discuss with her our office.  She does not see a neurologist    Patient is currently having intermittent bilateral temporal headaches, dizziness, nausea, double and blurry vision, her last eye exam was 6 months ago and it was ok.  She has intermittent neck stiffness, no arm pain or weakness.  She does need right shoulder surgery with Dr Carrillo, however, she is reluctant to do so.  She is having a lot of problems with her balance and gait and is having to use a walker to ambulate with.      Patient was last seen 12.20.19 for follow up to ER for head and neck injury secondary to fall.  Patient was admitted to hospital from the office.    Patient was admitted to [facility] from 12.20.19 to 12.21.19    This very nice lady is seen with her . I saw her in late 12/2019 after she had fallen. We were concerned at that time that there might be some hemorrhagic component in the brain and she was admitted, but the CT scan was normal. There was also a question of whether or not she had a ligamentous injury at C2 but that was ruled out with flexion/extension films. The scalp hematoma that she had has healed and the staples have been removed. She still has some symptoms that are most consistent with postconcussive syndrome including dizziness and some headaches. There is some unsteadiness in her gait. She uses a walker. I have reassured her and her  that from a structural standpoint, the MRI was unremarkable. One would have expected most of her symptoms to have resolved and they have not, so I think it would be worthwhile to send her to Dr. Anton Voss who specializes in postconcussive syndrome for further management. I will  defer to him in terms of medicines and maybe even some therapy. Will keep it open-ended here since there is not much from a neurosurgical prospective to do anymore.      Headache    The problem occurs constantly. The pain is located in the bilateral region. The quality of the pain is described as shooting. The pain is at a severity of 4/10. The pain is moderate. Associated symptoms include dizziness and nausea. Pertinent negatives include no numbness, seizures or weakness.   Dizziness   The problem occurs constantly. The problem has been unchanged. Associated symptoms include headaches and nausea. Pertinent negatives include no numbness or weakness.   Difficulty Walking   The problem occurs constantly. The problem has been unchanged. Associated symptoms include headaches and nausea. Pertinent negatives include no numbness or weakness.       The following portions of the patient's history were reviewed and updated as appropriate: allergies, current medications, past family history, past medical history, past social history, past surgical history and problem list.    Review of Systems   Eyes: Positive for visual disturbance.   Gastrointestinal: Positive for nausea.   Musculoskeletal: Positive for gait problem.   Neurological: Positive for dizziness and headaches. Negative for tremors, seizures, syncope, facial asymmetry, speech difficulty, weakness, light-headedness and numbness.   All other systems reviewed and are negative.      Objective   Physical Exam   Constitutional: She is oriented to person, place, and time. She appears well-developed and well-nourished.   HENT:   Head: Normocephalic and atraumatic.   Eyes: Pupils are equal, round, and reactive to light. Conjunctivae and EOM are normal.   Fundoscopic exam:       The right eye shows no papilledema. The right eye shows venous pulsations.        The left eye shows no papilledema. The left eye shows venous pulsations.   Neck: Carotid bruit is not present.    Neurological: She is oriented to person, place, and time. She has a normal Finger-Nose-Finger Test and a normal Heel to Shin Test. Gait normal.   Reflex Scores:       Tricep reflexes are 2+ on the right side and 2+ on the left side.       Bicep reflexes are 2+ on the right side and 2+ on the left side.       Brachioradialis reflexes are 2+ on the right side and 2+ on the left side.       Patellar reflexes are 2+ on the right side and 2+ on the left side.       Achilles reflexes are 2+ on the right side and 2+ on the left side.  Psychiatric: Her speech is normal.     Neurologic Exam     Mental Status   Oriented to person, place, and time.   Registration of memory: Good recent and remote memory.   Attention: normal. Concentration: normal.   Speech: speech is normal   Level of consciousness: alert  Knowledge: consistent with education.     Cranial Nerves     CN II   Visual fields full to confrontation.   Visual acuity: normal    CN III, IV, VI   Pupils are equal, round, and reactive to light.  Extraocular motions are normal.     CN V   Facial sensation intact.   Right corneal reflex: normal  Left corneal reflex: normal    CN VII   Facial expression full, symmetric.   Right facial weakness: none  Left facial weakness: none    CN VIII   Hearing: intact    CN IX, X   Palate: symmetric    CN XI   Right sternocleidomastoid strength: normal  Left sternocleidomastoid strength: normal    CN XII   Tongue: not atrophic  Tongue deviation: none    Motor Exam   Muscle bulk: normal  Right arm tone: normal  Left arm tone: normal  Right leg tone: normal  Left leg tone: normal    Strength   Strength 5/5 except as noted.     Sensory Exam   Light touch normal.     Gait, Coordination, and Reflexes     Gait  Gait: normal    Coordination   Finger to nose coordination: normal  Heel to shin coordination: normal    Reflexes   Right brachioradialis: 2+  Left brachioradialis: 2+  Right biceps: 2+  Left biceps: 2+  Right triceps: 2+  Left  triceps: 2+  Right patellar: 2+  Left patellar: 2+  Right achilles: 2+  Left achilles: 2+  Right : 2+  Left : 2+      Assessment/Plan   Independent Review of Radiographic Studies:    I reviewed the brain MRI done on 2/3/2020 which showed no evidence of any acute or subacute traumatic injury.  There is some small vessel disease and a remote pair of small cerebellar infarcts.  The subgaleal scalp hematoma has resolved.  Agree with the report.      Medical Decision Making:    We will send her to see Dr. Anton Voss for management of her post concussive syndrome including the headaches.  There is really not much more that I have to offer other than the reassurance that I think this will resolve slowly over the next few months.  We will be seeing her on an as-needed basis.    Rachana was seen today for difficulty walking, headache, dizziness, diplopia and neck pain.    Diagnoses and all orders for this visit:    Dizziness    Postconcussive syndrome  -     Ambulatory Referral to Neurology      Return if symptoms worsen or fail to improve.

## 2020-02-05 ENCOUNTER — PREP FOR SURGERY (OUTPATIENT)
Dept: OTHER | Facility: HOSPITAL | Age: 75
End: 2020-02-05

## 2020-02-05 DIAGNOSIS — R13.10 DYSPHAGIA, UNSPECIFIED TYPE: ICD-10-CM

## 2020-02-05 DIAGNOSIS — R19.5 POSITIVE COLORECTAL CANCER SCREENING USING COLOGUARD TEST: Primary | ICD-10-CM

## 2020-02-10 ENCOUNTER — OFFICE VISIT (OUTPATIENT)
Dept: NEUROSURGERY | Facility: CLINIC | Age: 75
End: 2020-02-10

## 2020-02-10 VITALS
BODY MASS INDEX: 31 KG/M2 | HEIGHT: 67 IN | DIASTOLIC BLOOD PRESSURE: 71 MMHG | HEART RATE: 72 BPM | SYSTOLIC BLOOD PRESSURE: 148 MMHG

## 2020-02-10 DIAGNOSIS — R42 DIZZINESS: Primary | ICD-10-CM

## 2020-02-10 DIAGNOSIS — F07.81 POSTCONCUSSIVE SYNDROME: ICD-10-CM

## 2020-02-10 PROCEDURE — 99214 OFFICE O/P EST MOD 30 MIN: CPT | Performed by: NEUROLOGICAL SURGERY

## 2020-02-11 RX ORDER — TRAZODONE HYDROCHLORIDE 150 MG/1
75 TABLET ORAL NIGHTLY
Qty: 45 TABLET | Refills: 1 | Status: SHIPPED | OUTPATIENT
Start: 2020-02-11 | End: 2020-08-03

## 2020-02-12 PROBLEM — R19.5 POSITIVE COLORECTAL CANCER SCREENING USING COLOGUARD TEST: Status: ACTIVE | Noted: 2020-02-12

## 2020-02-12 PROBLEM — R13.10 DYSPHAGIA: Status: ACTIVE | Noted: 2020-02-12

## 2020-02-21 ENCOUNTER — OFFICE VISIT (OUTPATIENT)
Dept: INTERNAL MEDICINE | Facility: CLINIC | Age: 75
End: 2020-02-21

## 2020-02-21 VITALS
BODY MASS INDEX: 31.55 KG/M2 | WEIGHT: 201 LBS | HEART RATE: 76 BPM | DIASTOLIC BLOOD PRESSURE: 90 MMHG | TEMPERATURE: 97.8 F | OXYGEN SATURATION: 97 % | HEIGHT: 67 IN | SYSTOLIC BLOOD PRESSURE: 160 MMHG

## 2020-02-21 DIAGNOSIS — R06.02 SHORTNESS OF BREATH: ICD-10-CM

## 2020-02-21 DIAGNOSIS — J06.9 UPPER RESPIRATORY TRACT INFECTION, UNSPECIFIED TYPE: Primary | ICD-10-CM

## 2020-02-21 PROCEDURE — 99213 OFFICE O/P EST LOW 20 MIN: CPT | Performed by: NURSE PRACTITIONER

## 2020-02-21 RX ORDER — ALBUTEROL SULFATE 90 UG/1
2 AEROSOL, METERED RESPIRATORY (INHALATION) EVERY 4 HOURS PRN
Qty: 18 G | Refills: 0 | Status: SHIPPED | OUTPATIENT
Start: 2020-02-21 | End: 2020-03-16

## 2020-02-21 RX ORDER — LORATADINE 10 MG/1
10 TABLET ORAL DAILY
Qty: 30 TABLET | Refills: 2
Start: 2020-02-21 | End: 2020-12-17

## 2020-02-21 RX ORDER — BENZONATATE 200 MG/1
200 CAPSULE ORAL 3 TIMES DAILY PRN
Qty: 30 CAPSULE | Refills: 0 | Status: SHIPPED | OUTPATIENT
Start: 2020-02-21 | End: 2020-03-26 | Stop reason: SDUPTHER

## 2020-02-21 RX ORDER — FLUTICASONE PROPIONATE 50 MCG
2 SPRAY, SUSPENSION (ML) NASAL DAILY
Qty: 1 BOTTLE | Refills: 3 | Status: SHIPPED | OUTPATIENT
Start: 2020-02-21 | End: 2020-03-22

## 2020-02-21 NOTE — PROGRESS NOTES
"Santiago Arguelles is a 74 y.o. female.         Patient presents with:  URI  Cough    PMH lung cancer.        URI    This is a new problem. The current episode started 1 to 4 weeks ago. The problem has been unchanged. There has been no fever. Associated symptoms include congestion, coughing, rhinorrhea and wheezing. Pertinent negatives include no chest pain, ear pain, headaches, plugged ear sensation, sinus pain or sore throat. Treatments tried: coricidin. The treatment provided mild relief.        The following portions of the patient's history were reviewed and updated as appropriate: allergies, current medications, past social history and problem list.    Review of Systems   Constitutional: Negative for fatigue and fever.   HENT: Positive for congestion, postnasal drip, rhinorrhea and sinus pressure. Negative for ear pain, sinus pain and sore throat.    Respiratory: Positive for cough, shortness of breath and wheezing.    Cardiovascular: Negative for chest pain and palpitations.   Neurological: Negative for headaches.       Objective     /90 (BP Location: Left arm, Patient Position: Sitting, Cuff Size: Adult)   Pulse 76   Temp 97.8 °F (36.6 °C) (Oral)   Ht 170.2 cm (67\")   Wt 91.2 kg (201 lb)   SpO2 97%   BMI 31.48 kg/m²     Physical Exam   Constitutional: She appears well-developed and well-nourished. No distress.   HENT:   Head: Normocephalic and atraumatic.   Nose: Mucosal edema and rhinorrhea present. Right sinus exhibits no maxillary sinus tenderness and no frontal sinus tenderness. Left sinus exhibits no maxillary sinus tenderness and no frontal sinus tenderness.   Mouth/Throat: Uvula is midline and mucous membranes are normal. No oropharyngeal exudate or posterior oropharyngeal erythema (clear hypersecretions). No tonsillar exudate.   Eyes: Conjunctivae are normal. Right eye exhibits no discharge. Left eye exhibits no discharge.   Cardiovascular: Normal rate, regular rhythm and normal " heart sounds.   No murmur heard.  Pulmonary/Chest: Effort normal. No tachypnea. No respiratory distress. She has wheezes.   Lymphadenopathy:        Head (right side): No submental, no submandibular and no tonsillar adenopathy present.        Head (left side): No submental, no submandibular and no tonsillar adenopathy present.   Neurological: She is alert.   Skin: She is not diaphoretic.   Psychiatric: She has a normal mood and affect.   Vitals reviewed.      Assessment/Plan     Rachana was seen today for uri and cough.    Diagnoses and all orders for this visit:    Upper respiratory tract infection, unspecified type  -     benzonatate (TESSALON) 200 MG capsule; Take 1 capsule by mouth 3 (Three) Times a Day As Needed for Cough.  -     fluticasone (FLONASE) 50 MCG/ACT nasal spray; 2 sprays into the nostril(s) as directed by provider Daily for 30 days.  -     loratadine (CLARITIN) 10 MG tablet; Take 1 tablet by mouth Daily.    Shortness of breath  -     albuterol sulfate  (90 Base) MCG/ACT inhaler; Inhale 2 puffs Every 4 (Four) Hours As Needed for Wheezing or Shortness of Air.    She currently takes anoro given to her by her pulmonologist but she is almost out of it.     Trelegy sample given.    Increase fluid intake and rest.    Return for worsening of sx.

## 2020-02-24 RX ORDER — SUCRALFATE 1 G/1
1 TABLET ORAL 4 TIMES DAILY
Qty: 360 TABLET | Refills: 1 | OUTPATIENT
Start: 2020-02-24

## 2020-03-11 ENCOUNTER — TELEPHONE (OUTPATIENT)
Dept: NEUROSURGERY | Facility: CLINIC | Age: 75
End: 2020-03-11

## 2020-03-11 NOTE — TELEPHONE ENCOUNTER
PT CALLED AND SAID THE REFERRAL FOR DR. MONAE NEEDS TO BE CHANGED TO DR. ESCOTO. SHE STATED THAT DR. MONAE ONLY SEES ATHLETIC PEOPLE.    PHONE NUMBER FOR DR. FRANCO [phone]

## 2020-03-15 DIAGNOSIS — R06.02 SHORTNESS OF BREATH: ICD-10-CM

## 2020-03-16 RX ORDER — ALBUTEROL SULFATE 90 UG/1
2 AEROSOL, METERED RESPIRATORY (INHALATION) EVERY 4 HOURS PRN
Qty: 1 INHALER | Refills: 2 | Status: SHIPPED | OUTPATIENT
Start: 2020-03-16 | End: 2020-06-04

## 2020-03-23 DIAGNOSIS — J06.9 UPPER RESPIRATORY TRACT INFECTION, UNSPECIFIED TYPE: ICD-10-CM

## 2020-03-23 RX ORDER — FUROSEMIDE 40 MG/1
TABLET ORAL
Qty: 30 TABLET | Refills: 1 | Status: SHIPPED | OUTPATIENT
Start: 2020-03-23 | End: 2020-08-30 | Stop reason: HOSPADM

## 2020-03-24 RX ORDER — BENZONATATE 200 MG/1
200 CAPSULE ORAL 3 TIMES DAILY PRN
Qty: 30 CAPSULE | Refills: 0 | OUTPATIENT
Start: 2020-03-24

## 2020-03-26 ENCOUNTER — TELEPHONE (OUTPATIENT)
Dept: INTERNAL MEDICINE | Facility: CLINIC | Age: 75
End: 2020-03-26

## 2020-03-26 DIAGNOSIS — J06.9 UPPER RESPIRATORY TRACT INFECTION, UNSPECIFIED TYPE: ICD-10-CM

## 2020-03-26 RX ORDER — BENZONATATE 200 MG/1
200 CAPSULE ORAL 3 TIMES DAILY PRN
Qty: 30 CAPSULE | Refills: 0 | Status: SHIPPED | OUTPATIENT
Start: 2020-03-26 | End: 2020-06-04

## 2020-03-26 NOTE — TELEPHONE ENCOUNTER
Patient had questions about whether or not she should be tested for corona virus. Patient has had a cough at night not on going during the day denies fever along with her usual short of breathe but patient had a lung surgery years ago due to her lung cancer so these are all common symptoms for her but now with all this co-vid 19 virus she wants to be tested if Dr Crowder thinks its warranted.     Patient has been staying at home with her  to stay away from all the madness.       Patient can be reached at [phone]      Patient stated she was also seen recently by the aprn and she prescribed tessalon perels and the patient would like to know if a refill of that could be called in as well if Dr Crowder agrees with that treatment plan

## 2020-03-26 NOTE — TELEPHONE ENCOUNTER
Please let her know that I have reordered Tessalon Perles.  I talked her on the phone and she has a cough but no fever no other symptoms.  I do not think she will qualify for testing.  Corrected testing in [facility] is available if she is been seen by [facility] doctor which she has not.  I do not know of any other drive for testing in the area.

## 2020-03-29 DIAGNOSIS — J06.9 UPPER RESPIRATORY TRACT INFECTION, UNSPECIFIED TYPE: ICD-10-CM

## 2020-03-29 DIAGNOSIS — D50.9 IRON DEFICIENCY ANEMIA, UNSPECIFIED IRON DEFICIENCY ANEMIA TYPE: ICD-10-CM

## 2020-03-30 RX ORDER — FERROUS SULFATE 325(65) MG
TABLET ORAL
Qty: 90 TABLET | Refills: 0 | Status: SHIPPED | OUTPATIENT
Start: 2020-03-30 | End: 2020-06-04

## 2020-03-30 RX ORDER — VENLAFAXINE HYDROCHLORIDE 150 MG/1
CAPSULE, EXTENDED RELEASE ORAL
Qty: 90 CAPSULE | Refills: 0 | Status: SHIPPED | OUTPATIENT
Start: 2020-03-30 | End: 2020-05-21

## 2020-04-10 DIAGNOSIS — F32.1 CURRENT MODERATE EPISODE OF MAJOR DEPRESSIVE DISORDER WITHOUT PRIOR EPISODE (HCC): Primary | ICD-10-CM

## 2020-04-10 RX ORDER — LORAZEPAM 0.5 MG/1
TABLET ORAL
Qty: 90 TABLET | Refills: 2 | Status: SHIPPED | OUTPATIENT
Start: 2020-04-10 | End: 2020-09-08

## 2020-04-27 RX ORDER — FAMOTIDINE 20 MG/1
TABLET, FILM COATED ORAL
Qty: 180 TABLET | Refills: 1 | Status: SHIPPED | OUTPATIENT
Start: 2020-04-27 | End: 2020-12-17 | Stop reason: SDUPTHER

## 2020-05-01 ENCOUNTER — TELEPHONE (OUTPATIENT)
Dept: GASTROENTEROLOGY | Facility: CLINIC | Age: 75
End: 2020-05-01

## 2020-05-01 NOTE — TELEPHONE ENCOUNTER
----- Message from Riki Garg MD sent at 5/1/2020  8:58 AM EDT -----  Regarding: Hold Plavix starting today  Please let patient know we will forge ahead with her scope because she had a positive Cologuard it is scheduled for Tuesday she needs to be off Plavix 5 days for the procedure  Please tell her to hold Plavix at this point

## 2020-05-12 ENCOUNTER — TRANSCRIBE ORDERS (OUTPATIENT)
Dept: ADMINISTRATIVE | Facility: HOSPITAL | Age: 75
End: 2020-05-12

## 2020-05-12 DIAGNOSIS — M25.511 RIGHT SHOULDER PAIN, UNSPECIFIED CHRONICITY: Primary | ICD-10-CM

## 2020-05-20 ENCOUNTER — APPOINTMENT (OUTPATIENT)
Dept: CT IMAGING | Facility: HOSPITAL | Age: 75
End: 2020-05-20

## 2020-05-21 RX ORDER — VENLAFAXINE HYDROCHLORIDE 150 MG/1
CAPSULE, EXTENDED RELEASE ORAL
Qty: 90 CAPSULE | Refills: 0 | Status: SHIPPED | OUTPATIENT
Start: 2020-05-21 | End: 2020-08-31

## 2020-05-22 ENCOUNTER — HOSPITAL ENCOUNTER (OUTPATIENT)
Dept: CT IMAGING | Facility: HOSPITAL | Age: 75
Discharge: HOME | End: 2020-05-22
Admitting: THORACIC SURGERY (CARDIOTHORACIC VASCULAR SURGERY)

## 2020-05-22 DIAGNOSIS — M25.511 RIGHT SHOULDER PAIN, UNSPECIFIED CHRONICITY: ICD-10-CM

## 2020-05-22 DIAGNOSIS — C34.11 MALIGNANT NEOPLASM OF UPPER LOBE OF RIGHT LUNG (HCC): ICD-10-CM

## 2020-05-22 PROCEDURE — 71250 CT THORAX DX C-: CPT

## 2020-05-22 PROCEDURE — 73200 CT UPPER EXTREMITY W/O DYE: CPT

## 2020-05-27 ENCOUNTER — APPOINTMENT (OUTPATIENT)
Dept: CT IMAGING | Facility: HOSPITAL | Age: 75
End: 2020-05-27

## 2020-06-01 ENCOUNTER — OFFICE VISIT (OUTPATIENT)
Dept: SURGERY | Facility: CLINIC | Age: 75
End: 2020-06-01

## 2020-06-01 VITALS
SYSTOLIC BLOOD PRESSURE: 172 MMHG | HEART RATE: 72 BPM | WEIGHT: 200 LBS | TEMPERATURE: 97.3 F | HEIGHT: 67 IN | BODY MASS INDEX: 31.39 KG/M2 | OXYGEN SATURATION: 96 % | DIASTOLIC BLOOD PRESSURE: 90 MMHG

## 2020-06-01 DIAGNOSIS — C34.11 MALIGNANT NEOPLASM OF UPPER LOBE OF RIGHT LUNG (HCC): Primary | ICD-10-CM

## 2020-06-01 PROCEDURE — 99213 OFFICE O/P EST LOW 20 MIN: CPT | Performed by: THORACIC SURGERY (CARDIOTHORACIC VASCULAR SURGERY)

## 2020-06-01 RX ORDER — HYDROCODONE BITARTRATE AND ACETAMINOPHEN 10; 300 MG/1; MG/1
1 TABLET ORAL EVERY 6 HOURS PRN
COMMUNITY
End: 2020-06-04

## 2020-06-01 RX ORDER — CYANOCOBALAMIN (VITAMIN B-12) 1000 MCG
1000 TABLET ORAL DAILY
COMMUNITY
End: 2020-08-30 | Stop reason: HOSPADM

## 2020-06-01 RX ORDER — FLUTICASONE PROPIONATE 50 MCG
1 SPRAY, SUSPENSION (ML) NASAL DAILY
COMMUNITY
Start: 2020-05-17 | End: 2020-06-15

## 2020-06-01 RX ORDER — CLOPIDOGREL BISULFATE 75 MG/1
75 TABLET ORAL DAILY
COMMUNITY
End: 2022-01-01 | Stop reason: HOSPADM

## 2020-06-01 NOTE — PROGRESS NOTES
Subjective   Patient ID: Rachana Arguelles is a 74 y.o. female is here today for follow-up.    History of Present Illness  Dear Colleague,  Rachana Arguelles was seen in our office today for continued follow up and surveillance  postoperative visit after surgery for a stage I, T1BN0 non-small cell lung cancer status post resection.  She denies any complaints of fever, chills, cough, hemoptysis, pleuritic chest pain, shortness of air, dyspnea with exertion, night sweats, hoarseness, or unintentional weight loss. Underlying medical conditions including osteoarthritis remain stable.  She has no other somatic complaints or alleviating or exacerbating factors aside from those mentioned above.    The following portions of the patient's history were reviewed and updated as appropriate: allergies, current medications, past family history, past medical history, past social history, past surgical history and problem list.  Review of Systems   Constitution: Negative.   HENT: Negative.    Eyes: Negative.    Cardiovascular: Negative.    Respiratory: Positive for shortness of breath.    Endocrine: Negative.    Hematologic/Lymphatic: Negative.    Skin: Negative.    Musculoskeletal: Negative.    Gastrointestinal: Negative.    Genitourinary: Negative.    Neurological: Negative.    Psychiatric/Behavioral: Negative.    Allergic/Immunologic: Negative.      Patient Active Problem List   Diagnosis   • Anxiety   • Chronic pain syndrome   • Depression   • Gastroesophageal reflux disease   • Hyperlipidemia   • Hypertension   • Osteoarthritis of hip   • Chronic low back pain   • Failed back syndrome of lumbar spine   • Pulmonary embolus (CMS/HCC)   • Weight loss   • Polypharmacy   • CKD (chronic kidney disease), stage III (CMS/HCC)   • Chronic constipation   • Sacroiliac joint pain   • Mixed incontinence   • Renal cyst   • Achilles tendonitis   • Atherosclerosis of native artery of left lower extremity with intermittent claudication (CMS/HCC)   •  Morbidly obese (CMS/HCC)   • Lung nodule   • Malignant neoplasm of right upper lobe of lung (CMS/HCC)   • Lung cancer (CMS/HCC)   • Moderate single current episode of major depressive disorder (CMS/HCC)   • Palpitations   • Encounter for screening for malignant neoplasm of colon   • Hematoma of scalp   • Acute neck pain   • Dizziness   • Unstable cervical spine   • Postconcussive syndrome   • Positive colorectal cancer screening using Cologuard test   • Dysphagia     Past Medical History:   Diagnosis Date   • AAA (abdominal aortic aneurysm) without rupture (CMS/HCC)    • Anxiety    • Arthritis    • Cancer (CMS/HCC)     skin cancer   • Chest pain     at rest   • Chronic low back pain    • Chronic pain syndrome 3/12/2016   • CKD (chronic kidney disease), stage III (CMS/HCC)    • Claustrophobia 10/26/2015    Resolved   • Depression    • Dizziness    • GERD (gastroesophageal reflux disease)    • GI problem    • Hiatal hernia    • History of migraine headaches    • Hyperlipidemia    • Hypertension    • Lumbar postlaminectomy syndrome 10/26/2015    Resolved   • Lung cancer (CMS/HCC)    • Lung nodule    • Palpitations    • Polypharmacy 4/22/2016   • Pulmonary embolism (CMS/HCC) 10/26/2015    January 2013 - Resolved, felt to be secondary to estrogen use   • Submandibular sialoadenitis 10/26/2015    Resolved   • Vitamin B 12 deficiency      Past Surgical History:   Procedure Laterality Date   • ANGIOPLASTY ILIAC ARTERY Bilateral 8/10/2018    Procedure: BILATERAL ILIAC STENTS;  Surgeon: Riki Mancera MD;  [facility] OR;  Service: Vascular   • APPENDECTOMY     • BREAST SURGERY      Breast Surgery Reduction Procedure   • BRONCHOSCOPY N/A 2/8/2019    Procedure: BRONCHOSCOPY;  Surgeon: Álvaro Gifford MD;  Location: [facility] ENDOSCOPY;  Service: Pulmonary   • CHOLECYSTECTOMY     • HYSTERECTOMY     • INTUBATION  1/15/2019        • JOINT REPLACEMENT      left knee, hip, shoulder   • LASIK     • LUMBAR FUSION       Lumbar Vertebral Fusion   • SHOULDER ARTHROSCOPY  2013    Dr. Carrillo/[facility] - Resolved   • THORACOSCOPY VIDEO ASSISTED WITH LOBECTOMY Right 2019    Procedure: BRONCOSCOPY, THORACOSCOPY VIDEO ASSISTED WITH RIGHT UPPER LOBE WEDGE RESECTION, COMPLETION RIGHT UPPER LOBECTOMY, LYMPH NODE DISSECTION, INTERCOSTAL NERVE BLOCK;  Surgeon: Quita Figueroa MD;  Location: [facility];  Service: Thoracic   • TONSILLECTOMY     • TOTAL HIP ARTHROPLASTY REVISION Left    • TOTAL KNEE ARTHROPLASTY Left    • TOTAL SHOULDER ARTHROPLASTY Left      Family History   Problem Relation Age of Onset   • Hypertension Mother    • Lung cancer Mother    • Cancer Mother         lung   • Kidney disease Father    • Other Brother         Coronary Artery Bypass Grafting   • Stroke Brother         Cerebrovascular Accident   • Malig Hyperthermia Neg Hx      Social History     Socioeconomic History   • Marital status:      Spouse name: Not on file   • Number of children: Not on file   • Years of education: Not on file   • Highest education level: Not on file   Tobacco Use   • Smoking status: Former Smoker     Packs/day: 2.50     Years: 15.00     Pack years: 37.50     Types: Cigarettes     Last attempt to quit: 2003     Years since quittin.7   • Smokeless tobacco: Never Used   Substance and Sexual Activity   • Alcohol use: No     Frequency: Never     Comment: occ   • Drug use: Never   • Sexual activity: Defer       Current Outpatient Medications:   •  allopurinol (ZYLOPRIM) 100 MG tablet, Take 100 mg by mouth Daily., Disp: , Rfl: 2  •  amLODIPine (NORVASC) 10 MG tablet, Take 10 mg by mouth Every Morning., Disp: , Rfl: 3  •  atorvastatin (LIPITOR) 40 MG tablet, Take 40 mg by mouth Every Night., Disp: , Rfl:   •  carvedilol (COREG) 25 MG tablet, Take 25 mg by mouth 2 (Two) Times a Day With Meals. 1/2 TAB EACH DOSE-ON HOLD SINCE 19, Disp: , Rfl:   •  clopidogrel (PLAVIX) 75 MG tablet, Take 75 mg by mouth Daily., Disp: , Rfl:    •  Cyanocobalamin (B-12) 1000 MCG tablet, Take  by mouth., Disp: , Rfl:   •  cyclobenzaprine (FLEXERIL) 10 MG tablet, Take 10 mg by mouth 3 (Three) Times a Day As Needed., Disp: , Rfl:   •  famotidine (PEPCID) 20 MG tablet, TAKE 1 TABLET BY MOUTH TWICE A DAY, Disp: 180 tablet, Rfl: 1  •  ferrous sulfate 325 (65 FE) MG tablet, TAKE 1 TABLET BY MOUTH EVERY DAY WITH BREAKFAST, Disp: 90 tablet, Rfl: 0  •  fluticasone (FLONASE) 50 MCG/ACT nasal spray, PLACE 2 SPRAYS IN EACH NOSTRILL EVERY DAY AS DIRECTED, Disp: , Rfl:   •  furosemide (LASIX) 40 MG tablet, TAKE 1 TABLET BY MOUTH EVERY DAY IN THE MORNING, Disp: 30 tablet, Rfl: 1  •  hydrALAZINE (APRESOLINE) 50 MG tablet, Take 1.5 tablets by mouth two times a day, Disp: 90 tablet, Rfl: 0  •  HYDROcodone-Acetaminophen (XODOL )  MG per tablet, Take 1 tablet by mouth Every 6 (Six) Hours As Needed for Moderate Pain ., Disp: , Rfl:   •  LORazepam (ATIVAN) 0.5 MG tablet, TAKE 1 TABLET BY MOUTH THREE TIMES A DAY, Disp: 90 tablet, Rfl: 2  •  omeprazole (priLOSEC) 40 MG capsule, , Disp: , Rfl:   •  ondansetron (ZOFRAN) 4 MG tablet, Take 1 tablet by mouth Every 8 (Eight) Hours As Needed for Nausea or Vomiting., Disp: 30 tablet, Rfl: 2  •  sertraline (ZOLOFT) 25 MG tablet, , Disp: , Rfl:   •  traZODone (DESYREL) 150 MG tablet, TAKE 0.5 TABLETS BY MOUTH EVERY NIGHT., Disp: 45 tablet, Rfl: 1  •  trihexyphenidyl (ARTANE) 2 MG tablet, Take 2 mg by mouth 2 (Two) Times a Day With Meals., Disp: , Rfl:   •  venlafaxine XR (EFFEXOR-XR) 150 MG 24 hr capsule, TAKE 1 CAPSULE BY MOUTH EVERY DAY, Disp: 90 capsule, Rfl: 0  •  ALBUTEROL SULFATE  (90 Base) MCG/ACT inhaler, INHALE 2 PUFFS EVERY 4 (FOUR) HOURS AS NEEDED FOR WHEEZING OR SHORTNESS OF AIR., Disp: 1 inhaler, Rfl: 2  •  benzonatate (TESSALON) 200 MG capsule, Take 1 capsule by mouth 3 (Three) Times a Day As Needed for Cough., Disp: 30 capsule, Rfl: 0  •  carBAMazepine (TEGretol) 200 MG tablet, TAKE 0.5 TAB BY MOUTH TWICE  A DAY, Disp: , Rfl: 0  •  FERROUS SULFATE PO, Take  by mouth., Disp: , Rfl:   •  loratadine (CLARITIN) 10 MG tablet, Take 1 tablet by mouth Daily., Disp: 30 tablet, Rfl: 2  •  meclizine (ANTIVERT) 25 MG tablet, TAKE 1 TABLET BY MOUTH 3 TIMES A DAY AS NEEDED FOR DIZZINESS/NAUSEA/SENSATION OF SPINNING/WHIRLING, Disp: 30 tablet, Rfl: 1  •  oxyCODONE (ROXICODONE) 20 MG tablet, Take 20 mg by mouth Every 4 (Four) Hours As Needed., Disp: , Rfl:   Allergies   Allergen Reactions   • Neurontin [Gabapentin] Confusion   • Morphine Other (See Comments)     HEADACHE        Objective   Vitals:    06/01/20 1005   BP: 172/90   Pulse: 72   Temp: 97.3 °F (36.3 °C)   SpO2: 96%     Physical Exam   Constitutional: She is oriented to person, place, and time. She appears well-developed and well-nourished.   HENT:   Head: Normocephalic and atraumatic.   Nose: Nose normal.   Mouth/Throat: Oropharynx is clear and moist.   Eyes: Pupils are equal, round, and reactive to light. Conjunctivae and EOM are normal.   Neck: Normal range of motion. Neck supple.   Cardiovascular: Normal rate, regular rhythm, normal heart sounds and intact distal pulses.   Pulmonary/Chest: Effort normal and breath sounds normal.   Abdominal: Soft. Bowel sounds are normal.   Musculoskeletal: Normal range of motion.   Neurological: She is alert and oriented to person, place, and time.   Skin: Skin is warm and dry. Capillary refill takes less than 2 seconds.   Psychiatric: She has a normal mood and affect. Her behavior is normal. Judgment and thought content normal.   Nursing note and vitals reviewed.    Independent Review of Radiographic Studies:    I have independently reviewed the CT of the chest performed on 5/22/2020 which demonstrates scarring secondary to right thoracoscopy.  This is stable.  There is a new small groundglass opacity in the left upper lobe.  There is no pleural pericardial effusion.  There is no hilar or mediastinal lymphadenopathy.  The lesion in the  liver has decreased in size.  Assessment/Plan   Ms. Arguelles is a very pleasant 74-year-old lady status post right upper lobectomy for a T1BN0, stage I, non-small cell lung cancer on 1/11/2019.  She is doing very well.  Her CT of the chest does not demonstrate any evidence of recurrence.  She does have a small groundglass opacity in the left upper lobe which is new and will need to be followed.  Because of this groundglass opacity, she will need a CT of the chest in 4 months.    Ms. Arguelles also needs a shoulder replacement secondary to her arthritis.  From a thoracic surgery perspective, Ms. Arguelles can undergo her shoulder replacement at any point.    Diagnoses and all orders for this visit:    Malignant neoplasm of upper lobe of right lung (CMS/HCC)  -     CT Chest Without Contrast; Future    Other orders  -     clopidogrel (PLAVIX) 75 MG tablet; Take 75 mg by mouth Daily.  -     Cyanocobalamin (B-12) 1000 MCG tablet; Take  by mouth.  -     fluticasone (FLONASE) 50 MCG/ACT nasal spray; PLACE 2 SPRAYS IN EACH NOSTRILL EVERY DAY AS DIRECTED  -     sertraline (ZOLOFT) 25 MG tablet  -     HYDROcodone-Acetaminophen (XODOL )  MG per tablet; Take 1 tablet by mouth Every 6 (Six) Hours As Needed for Moderate Pain .

## 2020-06-03 ENCOUNTER — TRANSCRIBE ORDERS (OUTPATIENT)
Dept: PREADMISSION TESTING | Facility: HOSPITAL | Age: 75
End: 2020-06-03

## 2020-06-03 DIAGNOSIS — Z01.818 OTHER SPECIFIED PRE-OPERATIVE EXAMINATION: Primary | ICD-10-CM

## 2020-06-04 ENCOUNTER — HOSPITAL ENCOUNTER (OUTPATIENT)
Dept: GENERAL RADIOLOGY | Facility: HOSPITAL | Age: 75
Discharge: HOME | End: 2020-06-04

## 2020-06-04 ENCOUNTER — APPOINTMENT (OUTPATIENT)
Dept: PREADMISSION TESTING | Facility: HOSPITAL | Age: 75
End: 2020-06-04

## 2020-06-04 ENCOUNTER — HOSPITAL ENCOUNTER (OUTPATIENT)
Dept: GENERAL RADIOLOGY | Facility: HOSPITAL | Age: 75
Discharge: HOME | End: 2020-06-04
Admitting: ORTHOPAEDIC SURGERY

## 2020-06-04 VITALS
HEIGHT: 67 IN | BODY MASS INDEX: 31.39 KG/M2 | RESPIRATION RATE: 18 BRPM | WEIGHT: 200 LBS | HEART RATE: 64 BPM | OXYGEN SATURATION: 96 % | TEMPERATURE: 98.7 F | DIASTOLIC BLOOD PRESSURE: 77 MMHG | SYSTOLIC BLOOD PRESSURE: 175 MMHG

## 2020-06-04 LAB
ABO GROUP BLD: NORMAL
ANION GAP SERPL CALCULATED.3IONS-SCNC: 9.4 MMOL/L (ref 5–15)
BLD GP AB SCN SERPL QL: NEGATIVE
BUN BLD-MCNC: 22 MG/DL (ref 8–23)
BUN/CREAT SERPL: 13.3 (ref 7–25)
CALCIUM SPEC-SCNC: 8.6 MG/DL (ref 8.6–10.5)
CHLORIDE SERPL-SCNC: 98 MMOL/L (ref 98–107)
CO2 SERPL-SCNC: 26.6 MMOL/L (ref 22–29)
CREAT BLD-MCNC: 1.66 MG/DL (ref 0.57–1)
DEPRECATED RDW RBC AUTO: 44.2 FL (ref 37–54)
ERYTHROCYTE [DISTWIDTH] IN BLOOD BY AUTOMATED COUNT: 12.6 % (ref 12.3–15.4)
GFR SERPL CREATININE-BSD FRML MDRD: 30 ML/MIN/1.73
GLUCOSE BLD-MCNC: 112 MG/DL (ref 65–99)
HCT VFR BLD AUTO: 34.6 % (ref 34–46.6)
HGB BLD-MCNC: 11.6 G/DL (ref 12–15.9)
MCH RBC QN AUTO: 32 PG (ref 26.6–33)
MCHC RBC AUTO-ENTMCNC: 33.5 G/DL (ref 31.5–35.7)
MCV RBC AUTO: 95.3 FL (ref 79–97)
PLATELET # BLD AUTO: 281 10*3/MM3 (ref 140–450)
PMV BLD AUTO: 8.9 FL (ref 6–12)
POTASSIUM BLD-SCNC: 4.4 MMOL/L (ref 3.5–5.2)
RBC # BLD AUTO: 3.63 10*6/MM3 (ref 3.77–5.28)
RH BLD: POSITIVE
SODIUM BLD-SCNC: 134 MMOL/L (ref 136–145)
T&S EXPIRATION DATE: NORMAL
WBC NRBC COR # BLD: 7.82 10*3/MM3 (ref 3.4–10.8)

## 2020-06-04 PROCEDURE — 86850 RBC ANTIBODY SCREEN: CPT | Performed by: ORTHOPAEDIC SURGERY

## 2020-06-04 PROCEDURE — 63710000001 MUPIROCIN 2 % OINTMENT: Performed by: ORTHOPAEDIC SURGERY

## 2020-06-04 PROCEDURE — 86900 BLOOD TYPING SEROLOGIC ABO: CPT | Performed by: ORTHOPAEDIC SURGERY

## 2020-06-04 PROCEDURE — 93010 ELECTROCARDIOGRAM REPORT: CPT | Performed by: INTERNAL MEDICINE

## 2020-06-04 PROCEDURE — 93005 ELECTROCARDIOGRAM TRACING: CPT

## 2020-06-04 PROCEDURE — A9270 NON-COVERED ITEM OR SERVICE: HCPCS | Performed by: ORTHOPAEDIC SURGERY

## 2020-06-04 PROCEDURE — 71046 X-RAY EXAM CHEST 2 VIEWS: CPT

## 2020-06-04 PROCEDURE — 80048 BASIC METABOLIC PNL TOTAL CA: CPT | Performed by: ORTHOPAEDIC SURGERY

## 2020-06-04 PROCEDURE — 73030 X-RAY EXAM OF SHOULDER: CPT

## 2020-06-04 PROCEDURE — 86901 BLOOD TYPING SEROLOGIC RH(D): CPT | Performed by: ORTHOPAEDIC SURGERY

## 2020-06-04 PROCEDURE — 85027 COMPLETE CBC AUTOMATED: CPT | Performed by: ORTHOPAEDIC SURGERY

## 2020-06-04 PROCEDURE — 36415 COLL VENOUS BLD VENIPUNCTURE: CPT

## 2020-06-04 RX ORDER — COLCHICINE 0.6 MG/1
0.3 TABLET ORAL DAILY
COMMUNITY
End: 2020-08-30 | Stop reason: HOSPADM

## 2020-06-04 RX ORDER — CETIRIZINE HYDROCHLORIDE 10 MG/1
10 TABLET ORAL AS NEEDED
COMMUNITY
End: 2020-12-17

## 2020-06-04 RX ORDER — ACETAMINOPHEN, ASPIRIN AND CAFFEINE 250; 250; 65 MG/1; MG/1; MG/1
1 TABLET, FILM COATED ORAL EVERY 6 HOURS PRN
COMMUNITY
End: 2020-08-30 | Stop reason: HOSPADM

## 2020-06-04 RX ORDER — ERGOCALCIFEROL 1.25 MG/1
50000 CAPSULE ORAL WEEKLY
COMMUNITY
End: 2020-08-30 | Stop reason: HOSPADM

## 2020-06-04 RX ORDER — CHLORHEXIDINE GLUCONATE 500 MG/1
CLOTH TOPICAL TAKE AS DIRECTED
COMMUNITY
End: 2020-06-19 | Stop reason: HOSPADM

## 2020-06-04 NOTE — DISCHARGE INSTRUCTIONS
Take the following medications the morning of surgery: OMEPRAZOLE, SERTRALINE, AMLODIPINE, CARVEDILOL, CARBAMAZEPINE, FLEXERIL, HYDRALAZINE, ATIVAN, AND EFFEXOR    TO BE CALLED WITH ARRIVAL TIME    General Instructions:  • Do not eat solid food after midnight the night before surgery.  • You may drink clear liquids day of surgery but must stop at least one hour before your hospital arrival time.  • It is beneficial for you to have a clear drink that contains carbohydrates the day of surgery.  We suggest a 12 to 20 ounce bottle of Gatorade or Powerade for non-diabetic patients or a 12 to 20 ounce bottle of G2 or Powerade Zero for diabetic patients. (Pediatric patients, are not advised to drink a 12 to 20 ounce carbohydrate drink)    Clear liquids are liquids you can see through.  Nothing red in color.     Plain water                               Sports drinks  Sodas                                   Gelatin (Jell-O)  Fruit juices without pulp such as white grape juice and apple juice  Popsicles that contain no fruit or yogurt  Tea or coffee (no cream or milk added)  Gatorade / Powerade  G2 / Powerade Zero    • Infants may have breast milk up to four hours before surgery.  • Infants drinking formula may drink formula up to six hours before surgery.   • Patients who avoid smoking, chewing tobacco and alcohol for 4 weeks prior to surgery have a reduced risk of post-operative complications.  Quit smoking as many days before surgery as you can.  • Do not smoke, use chewing tobacco or drink alcohol the day of surgery.   • If applicable bring your C-PAP/ BI-PAP machine.  • Bring any papers given to you in the doctor’s office.  • Wear clean comfortable clothes.  • Do not wear contact lenses, false eyelashes or make-up.  Bring a case for your glasses.   • Bring crutches or walker if applicable.  • Remove all piercings.  Leave jewelry and any other valuables at home.  • Hair extensions with metal clips must be removed prior  to surgery.  • The Pre-Admission Testing nurse will instruct you to bring medications if unable to obtain an accurate list in Pre-Admission Testing.            Preventing a Surgical Site Infection:  • For 2 to 3 days before surgery, avoid shaving with a razor because the razor can irritate skin and make it easier to develop an infection.    • Any areas of open skin can increase the risk of a post-operative wound infection by allowing bacteria to enter and travel throughout the body.  Notify your surgeon if you have any skin wounds / rashes even if it is not near the expected surgical site.  The area will need assessed to determine if surgery should be delayed until it is healed.  • The night prior to surgery shower using a fresh bar of anti-bacterial soap (such as Dial) and clean washcloth.  Sleep in a clean bed with clean clothing.  Do not allow pets to sleep with you.  • Shower on the morning of surgery using a fresh bar of anti-bacterial soap (such as Dial) and clean washcloth.  Dry with a clean towel and dress in clean clothing.  • Ask your surgeon if you will be receiving antibiotics prior to surgery.  • Make sure you, your family, and all healthcare providers clean their hands with soap and water or an alcohol based hand  before caring for you or your wound.    Day of surgery:  Your arrival time is approximately two hours before your scheduled surgery time.  Upon arrival, a Pre-op nurse and Anesthesiologist will review your health history, obtain vital signs, and answer questions you may have.  The only belongings needed at this time will be a list of your home medications and if applicable your C-PAP/BI-PAP machine.  If you are staying overnight your family can leave the rest of your belongings in the car and bring them to your room later.  A Pre-op nurse will start an IV and you may receive medication in preparation for surgery, including something to help you relax.  Your family will be able to see  you in the Pre-op area.  Two visitors at a time will be allowed in the Pre-op room.  While you are in surgery your family should notify the waiting room  if they leave the waiting room area and provide a contact phone number.    Please be aware that surgery does come with discomfort.  We want to make every effort to control your discomfort so please discuss any uncontrolled symptoms with your nurse.   Your doctor will most likely have prescribed pain medications.      If you are going home after surgery you will receive individualized written care instructions before being discharged.  A responsible adult must drive you to and from the hospital on the day of your surgery and stay with you for 24 hours.    If you are staying overnight following surgery, you will be transported to your hospital room following the recovery period.  [facility] has all private rooms.    If you have any questions please call Pre-Admission Testing at [phone].  Deductibles and co-payments are collected on the day of service. Please be prepared to pay the required co-pay, deductible or deposit on the day of service as defined by your plan.    Self-Quarantine Prior to Surgery    • Stay at home. Do not leave your home after you have been given the Covid test for your upcoming surgery.   • Wash your hands often with soap and water for at least 20 seconds especially after you have been in a public place, or after blowing your nose, coughing, or sneezing.  • If soap and water are not readily available, use a hand  that contains at least 60% alcohol. Cover all surfaces of your hands and rub them together until they feel dry.  • Avoid touching your eyes, nose, and mouth with unwashed hands.  • Take everyday precautions to keep space between yourself and others (stay 6 feet away, which is about two arm lengths).  Remember that some people without symptoms may be able to spread virus.  • You should stay in a  specific room away from others if possible.  Stay at least 6 feet away from others in the home if you cannot have a dedicated room to yourself. Do not have visitors.   • Wear a mask around others in your home if you are unable to stay in a separate room or 6 feet apart. If you are unable to wear a mask, have your family member wear a mask if they must be within 6 feet of you.   • Do not snuggle with your pet. While the CDC says there is no evidence that pets can spread COVID-19 or be infected from humans, it is probably best to avoid “petting, snuggling, being kissed or licked and sharing food (during self-quarantine)”, according to the CDC.   • Do not share anything - Have your own utensils, drinking glass, dishes, towels and bedding.   • Do not share a bathroom with others, if possible.   • Clean and disinfect frequently touched services daily. This includes tables, doorknobs, light switches, countertops, handles, desks, phones, keyboards, toilets, faucets, and sinks.  • Do not travel prior to surgery.    Call your surgeon immediately if you experience any of the following symptoms:  • Sore Throat      • Shortness of Breath or difficulty breathing  • Cough  • Chills  • Body soreness or muscle pain  • Headache  • Fever  • New loss of taste or smell  • Do not arrive for your surgery ill.  Your procedure will need to be rescheduled to another time.  You will need to call your physician before the day of surgery to avoid any unnecessary exposure to hospital staff as well as other patients.    CHLORHEXIDINE CLOTH INSTRUCTIONS  The morning of surgery follow these instructions using the Chlorhexidine cloths you've been given.  These steps reduce bacteria on the body.  Do not use the cloths near your eyes, ears mouth, genitalia or on open wounds.  Throw the cloths away after use but do not try to flush them down a toilet.      • Open and remove one cloth at a time from the package.    • Leave the cloth unfolded and begin  the bathing.  • Massage the skin with the cloths using gentle pressure to remove bacteria.  Do not scrub harshly.   • Follow the steps below with one 2% CHG cloth per area (6 total cloths).  • One cloth for neck, shoulders and chest.  • One cloth for both arms, hands, fingers and underarms (do underarms last).  • One cloth for the abdomen followed by groin.  • One cloth for right leg and foot including between the toes.  • One cloth for left leg and foot including between the toes.  • The last cloth is to be used for the back of the neck, back and buttocks.    Allow the CHG to air dry 3 minutes on the skin which will give it time to work and decrease the chance of irritation.  The skin may feel sticky until it is dry.  Do not rinse with water or any other liquid or you will lose the beneficial effects of the CHG.  If mild skin irritation occurs, do rinse the skin to remove the CHG.  Report this to the nurse at time of admission.  Do not apply lotions, creams, ointments, deodorants or perfumes after using the clothes. Dress in clean clothes before coming to the hospital.    BACTROBAN NASAL OINTMENT  There are many germs normally in your nose. Bactroban is an ointment that will help reduce these germs. Please follow these instructions for Bactroban use:      ____The day before surgery in the morning  Date________    ____The day before surgery in the evening              Date________    ____The day of surgery in the morning    Date________    **Squirt ½ package of Bactroban Ointment onto a cotton applicator and apply to inside of 1st nostril.  Squirt the remaining Bactroban and apply to the inside of the other nostril.

## 2020-06-05 RX ORDER — VENLAFAXINE 75 MG/1
TABLET ORAL
Qty: 90 TABLET | Refills: 0 | Status: SHIPPED | OUTPATIENT
Start: 2020-06-05 | End: 2020-08-30 | Stop reason: HOSPADM

## 2020-06-14 DIAGNOSIS — J06.9 ACUTE UPPER RESPIRATORY INFECTION, UNSPECIFIED: ICD-10-CM

## 2020-06-15 ENCOUNTER — LAB (OUTPATIENT)
Dept: LAB | Facility: HOSPITAL | Age: 75
End: 2020-06-15

## 2020-06-15 DIAGNOSIS — Z01.818 OTHER SPECIFIED PRE-OPERATIVE EXAMINATION: ICD-10-CM

## 2020-06-15 PROCEDURE — U0004 COV-19 TEST NON-CDC HGH THRU: HCPCS

## 2020-06-15 RX ORDER — FLUTICASONE PROPIONATE 50 MCG
SPRAY, SUSPENSION (ML) NASAL
Qty: 48 ML | Refills: 1 | Status: ON HOLD | OUTPATIENT
Start: 2020-06-15 | End: 2020-08-23

## 2020-06-16 LAB
REF LAB TEST METHOD: NORMAL
SARS-COV-2 RNA RESP QL NAA+PROBE: NOT DETECTED

## 2020-06-16 NOTE — H&P
PA/NP/PoA: ROD GUZMAN PA-C  Supervising Physician: MONICA KENNEDY MD  ANGELIKA Reich is a pleasant 74-year-old  female who is here today in follow-up for right shoulder pain.  She was last seen 9/17/18 for right shoulder rotator cuff tear.  At that time we did a subacromial cortisone injection but unfortunately she did not get a light of long-term pain relief.  She denies any new injury or change of activity but has had a recurrence of her pain.  She is still on Plavix.  She's here today to discuss CT scan results.  She states pain is constant and ranges from a 5-9 out of 10.  She also has right knee pain and her last injection was January 2020.  Review of Systems:  Positive for: Decreased Motion, Joint Pain and Headaches.    Patient denies: Abdominal Pain, Bleeding, Chest Pain, Convulsions/Seizure, Depression, Difficulty Swallowing, Easy Bruisability, Emotional Disturbances, Eyes or Vision Problems, Fecal Incontinence, Fever/Chills, Increased Thirst, Increased Hunger, Insomnia, Nausea/Vomiting, Night Sweats, Poor Balance, Persistent Cough, Rash, Shortness of Breath, Shortness of Breath While Lying down, Skin Problems, Urinary Retention and Weakness.  Allergies:  Morphine (critical)  Medications:  * will bring in list   venlafaxine hcl er 150 mg oral tablet extended release 24 hour (venlafaxine hcl) ; route: oral  stool softener capsule (docusate sodium caps)   losartan potassium 25 mg oral tablet (losartan potassium) ; route: oral  amlodipine besylate 2.5 mg oral tablet (amlodipine besylate) ; route: oral  trazodone hcl 150 mg oral tablet (trazodone hcl) ; route: oral  famotidine 20 mg oral tablet (famotidine) ; route: oral  vitamin d2 tablet (ergocalciferol tabs)   sucralfate 1 gm oral tablet (sucralfate) ; route: oral  carvedilol 25 mg oral tablet (carvedilol) ; route: oral  oxycodone hcl 20 mg oral tablet (oxycodone hcl) ; route: oral  lorazepam 0.5 mg oral tablet (lorazepam) ; route:  oral  cyclobenzaprine hcl 10 mg oral tablet (cyclobenzaprine hcl) ; route: oral  Patient History of:  CANCER - LUNG  GOUT  OSTEOARTHRITIS  ANXIETY DISORDER  BLOOD CLOTS/EMBOLISM - UNKNOWN  BLADDER INFECTIONS  KIDNEY DISEASE  MIGRAINES  HYPERTENSION  Surgical History:  NOSE SURGERY-   Tooth Extraction-[CPT-86374]   Tonsillectomy*-[CPT-30128]   Hysterectomy-Partial-[CPT-32456]   Gallbladder/Cholecystectomy-[CPT-62168]   LEFT KNEE REPLACED-   LEFT SHOULDER REPLACED-   LEFT HIP REPLACED-   Known Family History of:  hypertension- parents, sibling  kidney disease-father  Past medical, social, family histories and ROS reviewed today with the patient and changes documented in the chart (05/28/2020).  PCP Dr. CALE GARNER MD, [facility]    Physical Exam  Height:  66 in.    Weight:  206 lbs.     BMI:  33.37      Gait: antalgic                     Right Knee  Skin is normal.  There is quad atrophy.  Effusion is 1+.  No warmth.  No erythema.  Normal sensation.  Capillary refill is normal.  Reflexes are normal.  Range of motion of the knee is 5 to <120 degrees of flexion.  There is moderate tenderness in the medial patella femoral.  Moderate patella crepitation.  The leg lengths are equal.  Pes planus is negative.  PTTI is negative.      Left Knee      Mental/HEENT/Cardio/Skin  The patient's general appearance is well nourished, well hydrated, no acute distress.  Orientation is alert and oriented x 3.  The patient's mood is normal.  Pulmonary exam shows normal air exchange, no labored breathing, or shortness of breath.  A lymphatic exam reveals no adenopathy in the area of examination.      Right Shoulder  Skin is normal.  There is no atrophy.  There is no effusion.  There is no warmth.  No erythema.  Lymphadenopathy is negative.  Right shoulder active ROM today shows: Elevation = 140; ER(side) = 40; ER(abd) = 80; IR(abd) = 60; IR(vert) = to waist; Abduction = 100.  Right shoulder passive ROM today shows: Elevation = 120; ER(side) = 20;  ER(abd) = 60; IR(abd) = 20; IR(vert) = pocket; Abduction = 100.  Shoulder strength: Elevation = 3/5; ER = 4/5; IR = 5-/5; ABD = 4/5.  Crepitation in the glenohumeral joint.  There is tenderness in the.  Neer test is positive.  Avery test is positive.  Arc of motion is positive.  Empty can test was positive.  Pulses are normal.  Normal sensation.  Capillary refill is normal.  Tender over the anterior and posterior glenohumeral joint to palpation.       Imaging/Diagnostic Studies  Right shoulder CT: 05/22/2020    Right shoulder CT shows 1. Chronic rotator cuff tear with advanced cuff arthropathy. High-riding  humeral head with remodeling of the undersurface of the acromion and  fragmentation. Synovial/capsular mineralization associated with chronic  synovitis.   2. Large glenohumeral joint effusion with marked fluid distention  subacromial-subdeltoid bursa as well as the long head biceps tendon  sheath and subscapularis and subcoracoid recesses.  3. Moderate AC joint arthritis with capsular thickening and  mineralization at the AC joint.      Impression  Right rotator cuff arthropathy  Right shoulder effusion  Right shoulder synovitis      Plan  We have discussed a right reverse total shoulder arthroplasty. We will get her scheduled for a right reverse total shoulder arthroplasty 6/18/2020. She will need clearance from her lung CA doctor and the physician who has her on Plavix.     We discussed the benefits of surgical intervention, as well as alternative treatments.  Potential surgical risks and complications include but are not limited to bleeding, infection, failure of repaired structures to heal biologically, stiffness, recurrence, nerve or vascular injury, residual pain, and the possibility of revision surgery and prolonged convalescence.  Sufficient opportunity was given to discuss the condition and treatment plan with the doctor, and all questions were answered for the patient.  TRISHA agreed to proceed with  the surgery.

## 2020-06-18 ENCOUNTER — HOSPITAL ENCOUNTER (INPATIENT)
Facility: HOSPITAL | Age: 75
LOS: 1 days | Discharge: HOME | End: 2020-06-19
Attending: ORTHOPAEDIC SURGERY | Admitting: ORTHOPAEDIC SURGERY

## 2020-06-18 ENCOUNTER — ANESTHESIA (OUTPATIENT)
Dept: PERIOP | Facility: HOSPITAL | Age: 75
End: 2020-06-18

## 2020-06-18 ENCOUNTER — APPOINTMENT (OUTPATIENT)
Dept: GENERAL RADIOLOGY | Facility: HOSPITAL | Age: 75
End: 2020-06-18

## 2020-06-18 ENCOUNTER — ANESTHESIA EVENT (OUTPATIENT)
Dept: PERIOP | Facility: HOSPITAL | Age: 75
End: 2020-06-18

## 2020-06-18 DIAGNOSIS — Z96.611 S/P REVERSE TOTAL SHOULDER ARTHROPLASTY, RIGHT: Primary | ICD-10-CM

## 2020-06-18 PROCEDURE — 25010000003 CEFAZOLIN IN DEXTROSE 2-4 GM/100ML-% SOLUTION: Performed by: ORTHOPAEDIC SURGERY

## 2020-06-18 PROCEDURE — 25010000002 PROPOFOL 10 MG/ML EMULSION: Performed by: NURSE ANESTHETIST, CERTIFIED REGISTERED

## 2020-06-18 PROCEDURE — 25010000002 DEXAMETHASONE PER 1 MG: Performed by: NURSE ANESTHETIST, CERTIFIED REGISTERED

## 2020-06-18 PROCEDURE — 25010000002 ONDANSETRON PER 1 MG: Performed by: NURSE ANESTHETIST, CERTIFIED REGISTERED

## 2020-06-18 PROCEDURE — 25010000002 DEXAMETHASONE PER 1 MG: Performed by: ANESTHESIOLOGY

## 2020-06-18 PROCEDURE — 25010000002 PHENYLEPHRINE PER 1 ML: Performed by: NURSE ANESTHETIST, CERTIFIED REGISTERED

## 2020-06-18 PROCEDURE — 25010000002 ROPIVACAINE PER 1 MG: Performed by: ANESTHESIOLOGY

## 2020-06-18 PROCEDURE — C1776 JOINT DEVICE (IMPLANTABLE): HCPCS | Performed by: ORTHOPAEDIC SURGERY

## 2020-06-18 PROCEDURE — 25010000002 SUCCINYLCHOLINE PER 20 MG: Performed by: NURSE ANESTHETIST, CERTIFIED REGISTERED

## 2020-06-18 PROCEDURE — 25010000002 ATROPINE PER 0.01 MG: Performed by: NURSE ANESTHETIST, CERTIFIED REGISTERED

## 2020-06-18 PROCEDURE — 0RRJ00Z REPLACEMENT OF RIGHT SHOULDER JOINT WITH REVERSE BALL AND SOCKET SYNTHETIC SUBSTITUTE, OPEN APPROACH: ICD-10-PCS | Performed by: ORTHOPAEDIC SURGERY

## 2020-06-18 PROCEDURE — 25010000003 CEFAZOLIN PER 500 MG: Performed by: ORTHOPAEDIC SURGERY

## 2020-06-18 PROCEDURE — 25010000002 ONDANSETRON PER 1 MG: Performed by: ORTHOPAEDIC SURGERY

## 2020-06-18 PROCEDURE — 73020 X-RAY EXAM OF SHOULDER: CPT

## 2020-06-18 PROCEDURE — 25010000002 MIDAZOLAM PER 1 MG: Performed by: ANESTHESIOLOGY

## 2020-06-18 PROCEDURE — 25010000002 FENTANYL CITRATE (PF) 100 MCG/2ML SOLUTION: Performed by: ANESTHESIOLOGY

## 2020-06-18 DEVICE — IMPLANTABLE DEVICE: Type: IMPLANTABLE DEVICE | Site: SHOULDER | Status: FUNCTIONAL

## 2020-06-18 DEVICE — GLENOSPHERE REV SHLDR EQUINOXE 36MM SM: Type: IMPLANTABLE DEVICE | Site: SHOULDER | Status: FUNCTIONAL

## 2020-06-18 DEVICE — SCRW LK EQUINOXE GLENOSPHERE REV/SHLDR: Type: IMPLANTABLE DEVICE | Site: SHOULDER | Status: FUNCTIONAL

## 2020-06-18 DEVICE — PLT AUG GLEN EQUINOXE SUPR/POST REV 8DEG SM RT: Type: IMPLANTABLE DEVICE | Site: SHOULDER | Status: FUNCTIONAL

## 2020-06-18 DEVICE — STEM HUM PRI EQUINOXE PRESSFIT 8MM SHT: Type: IMPLANTABLE DEVICE | Site: SHOULDER | Status: FUNCTIONAL

## 2020-06-18 DEVICE — SCRW COMPR EQUINOXE LK 4.5X26MM: Type: IMPLANTABLE DEVICE | Site: SHOULDER | Status: FUNCTIONAL

## 2020-06-18 DEVICE — TRY HUM EQUINOXE ADPT REV SHLDR PLS0: Type: IMPLANTABLE DEVICE | Site: SHOULDER | Status: FUNCTIONAL

## 2020-06-18 DEVICE — LINER HUM EQUINOXE REV SHLDR PLS0 36MM: Type: IMPLANTABLE DEVICE | Site: SHOULDER | Status: FUNCTIONAL

## 2020-06-18 DEVICE — SCRW EQUINOXE TORQ DEFINE REV SHLDR KT: Type: IMPLANTABLE DEVICE | Site: SHOULDER | Status: FUNCTIONAL

## 2020-06-18 DEVICE — SCRW COMPR EQUINOXE LK 4.5X22MM: Type: IMPLANTABLE DEVICE | Site: SHOULDER | Status: FUNCTIONAL

## 2020-06-18 DEVICE — SCRW COMPR EQUINOXE LK 4.5X34MM: Type: IMPLANTABLE DEVICE | Site: SHOULDER | Status: FUNCTIONAL

## 2020-06-18 RX ORDER — PROMETHAZINE HYDROCHLORIDE 25 MG/ML
6.25 INJECTION, SOLUTION INTRAMUSCULAR; INTRAVENOUS ONCE AS NEEDED
Status: DISCONTINUED | OUTPATIENT
Start: 2020-06-18 | End: 2020-06-18 | Stop reason: HOSPADM

## 2020-06-18 RX ORDER — MECLIZINE HYDROCHLORIDE 25 MG/1
25 TABLET ORAL 3 TIMES DAILY PRN
Status: DISCONTINUED | OUTPATIENT
Start: 2020-06-18 | End: 2020-06-19 | Stop reason: HOSPADM

## 2020-06-18 RX ORDER — EPHEDRINE SULFATE 50 MG/ML
INJECTION, SOLUTION INTRAVENOUS AS NEEDED
Status: DISCONTINUED | OUTPATIENT
Start: 2020-06-18 | End: 2020-06-18 | Stop reason: SURG

## 2020-06-18 RX ORDER — MIDAZOLAM HYDROCHLORIDE 1 MG/ML
1 INJECTION INTRAMUSCULAR; INTRAVENOUS
Status: DISCONTINUED | OUTPATIENT
Start: 2020-06-18 | End: 2020-06-18 | Stop reason: HOSPADM

## 2020-06-18 RX ORDER — LORAZEPAM 0.5 MG/1
0.5 TABLET ORAL 3 TIMES DAILY
Status: DISCONTINUED | OUTPATIENT
Start: 2020-06-18 | End: 2020-06-19 | Stop reason: HOSPADM

## 2020-06-18 RX ORDER — CARBAMAZEPINE 200 MG/1
200 TABLET ORAL DAILY
Status: DISCONTINUED | OUTPATIENT
Start: 2020-06-18 | End: 2020-06-19 | Stop reason: HOSPADM

## 2020-06-18 RX ORDER — SODIUM CHLORIDE 450 MG/100ML
75 INJECTION, SOLUTION INTRAVENOUS CONTINUOUS
Status: DISCONTINUED | OUTPATIENT
Start: 2020-06-18 | End: 2020-06-19 | Stop reason: HOSPADM

## 2020-06-18 RX ORDER — PROMETHAZINE HYDROCHLORIDE 25 MG/1
25 SUPPOSITORY RECTAL ONCE AS NEEDED
Status: DISCONTINUED | OUTPATIENT
Start: 2020-06-18 | End: 2020-06-18 | Stop reason: HOSPADM

## 2020-06-18 RX ORDER — CEFAZOLIN SODIUM 2 G/100ML
2 INJECTION, SOLUTION INTRAVENOUS ONCE
Status: COMPLETED | OUTPATIENT
Start: 2020-06-18 | End: 2020-06-18

## 2020-06-18 RX ORDER — FUROSEMIDE 40 MG/1
40 TABLET ORAL EVERY MORNING
Status: DISCONTINUED | OUTPATIENT
Start: 2020-06-18 | End: 2020-06-19 | Stop reason: HOSPADM

## 2020-06-18 RX ORDER — PROMETHAZINE HYDROCHLORIDE 25 MG/1
25 TABLET ORAL ONCE AS NEEDED
Status: DISCONTINUED | OUTPATIENT
Start: 2020-06-18 | End: 2020-06-18 | Stop reason: HOSPADM

## 2020-06-18 RX ORDER — SUCCINYLCHOLINE CHLORIDE 20 MG/ML
INJECTION INTRAMUSCULAR; INTRAVENOUS AS NEEDED
Status: DISCONTINUED | OUTPATIENT
Start: 2020-06-18 | End: 2020-06-18 | Stop reason: SURG

## 2020-06-18 RX ORDER — VASOPRESSIN 20 U/ML
INJECTION PARENTERAL AS NEEDED
Status: DISCONTINUED | OUTPATIENT
Start: 2020-06-18 | End: 2020-06-18 | Stop reason: SURG

## 2020-06-18 RX ORDER — HYDRALAZINE HYDROCHLORIDE 20 MG/ML
5 INJECTION INTRAMUSCULAR; INTRAVENOUS
Status: DISCONTINUED | OUTPATIENT
Start: 2020-06-18 | End: 2020-06-18 | Stop reason: HOSPADM

## 2020-06-18 RX ORDER — FENTANYL CITRATE 50 UG/ML
100 INJECTION, SOLUTION INTRAMUSCULAR; INTRAVENOUS
Status: DISCONTINUED | OUTPATIENT
Start: 2020-06-18 | End: 2020-06-18 | Stop reason: HOSPADM

## 2020-06-18 RX ORDER — ONDANSETRON 2 MG/ML
INJECTION INTRAMUSCULAR; INTRAVENOUS AS NEEDED
Status: DISCONTINUED | OUTPATIENT
Start: 2020-06-18 | End: 2020-06-18 | Stop reason: SURG

## 2020-06-18 RX ORDER — LIDOCAINE HYDROCHLORIDE 20 MG/ML
INJECTION, SOLUTION INFILTRATION; PERINEURAL AS NEEDED
Status: DISCONTINUED | OUTPATIENT
Start: 2020-06-18 | End: 2020-06-18 | Stop reason: SURG

## 2020-06-18 RX ORDER — CARVEDILOL 25 MG/1
25 TABLET ORAL 2 TIMES DAILY WITH MEALS
Status: DISCONTINUED | OUTPATIENT
Start: 2020-06-18 | End: 2020-06-19 | Stop reason: HOSPADM

## 2020-06-18 RX ORDER — ATROPINE SULFATE 0.4 MG/ML
AMPUL (ML) INJECTION AS NEEDED
Status: DISCONTINUED | OUTPATIENT
Start: 2020-06-18 | End: 2020-06-18 | Stop reason: SURG

## 2020-06-18 RX ORDER — DIAZEPAM 5 MG/1
5 TABLET ORAL EVERY 6 HOURS PRN
Status: DISCONTINUED | OUTPATIENT
Start: 2020-06-18 | End: 2020-06-19 | Stop reason: HOSPADM

## 2020-06-18 RX ORDER — VENLAFAXINE HYDROCHLORIDE 150 MG/1
150 CAPSULE, EXTENDED RELEASE ORAL DAILY
Status: DISCONTINUED | OUTPATIENT
Start: 2020-06-18 | End: 2020-06-19 | Stop reason: HOSPADM

## 2020-06-18 RX ORDER — PANTOPRAZOLE SODIUM 40 MG/1
40 TABLET, DELAYED RELEASE ORAL EVERY MORNING
Status: DISCONTINUED | OUTPATIENT
Start: 2020-06-19 | End: 2020-06-19 | Stop reason: HOSPADM

## 2020-06-18 RX ORDER — SODIUM CHLORIDE 0.9 % (FLUSH) 0.9 %
3 SYRINGE (ML) INJECTION EVERY 12 HOURS SCHEDULED
Status: DISCONTINUED | OUTPATIENT
Start: 2020-06-18 | End: 2020-06-18 | Stop reason: HOSPADM

## 2020-06-18 RX ORDER — HYDRALAZINE HYDROCHLORIDE 50 MG/1
50 TABLET, FILM COATED ORAL 3 TIMES DAILY
Status: DISCONTINUED | OUTPATIENT
Start: 2020-06-18 | End: 2020-06-19 | Stop reason: HOSPADM

## 2020-06-18 RX ORDER — FENTANYL CITRATE 50 UG/ML
50 INJECTION, SOLUTION INTRAMUSCULAR; INTRAVENOUS
Status: DISCONTINUED | OUTPATIENT
Start: 2020-06-18 | End: 2020-06-18 | Stop reason: HOSPADM

## 2020-06-18 RX ORDER — ONDANSETRON 2 MG/ML
4 INJECTION INTRAMUSCULAR; INTRAVENOUS EVERY 6 HOURS PRN
Status: DISCONTINUED | OUTPATIENT
Start: 2020-06-18 | End: 2020-06-19 | Stop reason: HOSPADM

## 2020-06-18 RX ORDER — DEXAMETHASONE SODIUM PHOSPHATE 10 MG/ML
INJECTION INTRAMUSCULAR; INTRAVENOUS AS NEEDED
Status: DISCONTINUED | OUTPATIENT
Start: 2020-06-18 | End: 2020-06-18 | Stop reason: SURG

## 2020-06-18 RX ORDER — ONDANSETRON 4 MG/1
4 TABLET, FILM COATED ORAL EVERY 6 HOURS PRN
Status: DISCONTINUED | OUTPATIENT
Start: 2020-06-18 | End: 2020-06-19 | Stop reason: HOSPADM

## 2020-06-18 RX ORDER — DEXAMETHASONE SODIUM PHOSPHATE 4 MG/ML
INJECTION, SOLUTION INTRA-ARTICULAR; INTRALESIONAL; INTRAMUSCULAR; INTRAVENOUS; SOFT TISSUE
Status: COMPLETED | OUTPATIENT
Start: 2020-06-18 | End: 2020-06-18

## 2020-06-18 RX ORDER — ALLOPURINOL 100 MG/1
100 TABLET ORAL DAILY
Status: DISCONTINUED | OUTPATIENT
Start: 2020-06-18 | End: 2020-06-19 | Stop reason: HOSPADM

## 2020-06-18 RX ORDER — OXYCODONE AND ACETAMINOPHEN 10; 325 MG/1; MG/1
2 TABLET ORAL EVERY 4 HOURS PRN
Status: DISCONTINUED | OUTPATIENT
Start: 2020-06-18 | End: 2020-06-19 | Stop reason: HOSPADM

## 2020-06-18 RX ORDER — FAMOTIDINE 10 MG/ML
20 INJECTION, SOLUTION INTRAVENOUS ONCE
Status: COMPLETED | OUTPATIENT
Start: 2020-06-18 | End: 2020-06-18

## 2020-06-18 RX ORDER — HYDROMORPHONE HYDROCHLORIDE 1 MG/ML
0.5 INJECTION, SOLUTION INTRAMUSCULAR; INTRAVENOUS; SUBCUTANEOUS
Status: DISCONTINUED | OUTPATIENT
Start: 2020-06-18 | End: 2020-06-18 | Stop reason: HOSPADM

## 2020-06-18 RX ORDER — OXYCODONE AND ACETAMINOPHEN 7.5; 325 MG/1; MG/1
1 TABLET ORAL ONCE AS NEEDED
Status: DISCONTINUED | OUTPATIENT
Start: 2020-06-18 | End: 2020-06-18 | Stop reason: HOSPADM

## 2020-06-18 RX ORDER — FAMOTIDINE 20 MG/1
20 TABLET, FILM COATED ORAL 2 TIMES DAILY
Status: DISCONTINUED | OUTPATIENT
Start: 2020-06-18 | End: 2020-06-18

## 2020-06-18 RX ORDER — ONDANSETRON 2 MG/ML
4 INJECTION INTRAMUSCULAR; INTRAVENOUS ONCE AS NEEDED
Status: DISCONTINUED | OUTPATIENT
Start: 2020-06-18 | End: 2020-06-18 | Stop reason: HOSPADM

## 2020-06-18 RX ORDER — FLUTICASONE PROPIONATE 50 MCG
1 SPRAY, SUSPENSION (ML) NASAL DAILY
Status: DISCONTINUED | OUTPATIENT
Start: 2020-06-18 | End: 2020-06-19 | Stop reason: HOSPADM

## 2020-06-18 RX ORDER — SODIUM CHLORIDE 0.9 % (FLUSH) 0.9 %
3-10 SYRINGE (ML) INJECTION AS NEEDED
Status: DISCONTINUED | OUTPATIENT
Start: 2020-06-18 | End: 2020-06-18 | Stop reason: HOSPADM

## 2020-06-18 RX ORDER — TRAZODONE HYDROCHLORIDE 50 MG/1
75 TABLET ORAL NIGHTLY
Status: DISCONTINUED | OUTPATIENT
Start: 2020-06-18 | End: 2020-06-19 | Stop reason: HOSPADM

## 2020-06-18 RX ORDER — ROPIVACAINE HYDROCHLORIDE 5 MG/ML
INJECTION, SOLUTION EPIDURAL; INFILTRATION; PERINEURAL
Status: COMPLETED | OUTPATIENT
Start: 2020-06-18 | End: 2020-06-18

## 2020-06-18 RX ORDER — GLYCOPYRROLATE 0.2 MG/ML
INJECTION INTRAMUSCULAR; INTRAVENOUS AS NEEDED
Status: DISCONTINUED | OUTPATIENT
Start: 2020-06-18 | End: 2020-06-18 | Stop reason: SURG

## 2020-06-18 RX ORDER — FLUMAZENIL 0.1 MG/ML
0.2 INJECTION INTRAVENOUS AS NEEDED
Status: DISCONTINUED | OUTPATIENT
Start: 2020-06-18 | End: 2020-06-18 | Stop reason: HOSPADM

## 2020-06-18 RX ORDER — LIDOCAINE HYDROCHLORIDE 10 MG/ML
0.5 INJECTION, SOLUTION EPIDURAL; INFILTRATION; INTRACAUDAL; PERINEURAL ONCE AS NEEDED
Status: DISCONTINUED | OUTPATIENT
Start: 2020-06-18 | End: 2020-06-18 | Stop reason: HOSPADM

## 2020-06-18 RX ORDER — OXYCODONE AND ACETAMINOPHEN 10; 325 MG/1; MG/1
1 TABLET ORAL EVERY 4 HOURS PRN
Status: DISCONTINUED | OUTPATIENT
Start: 2020-06-18 | End: 2020-06-19 | Stop reason: HOSPADM

## 2020-06-18 RX ORDER — NALOXONE HCL 0.4 MG/ML
0.1 VIAL (ML) INJECTION
Status: DISCONTINUED | OUTPATIENT
Start: 2020-06-18 | End: 2020-06-19 | Stop reason: HOSPADM

## 2020-06-18 RX ORDER — VENLAFAXINE 75 MG/1
75 TABLET ORAL DAILY
Status: DISCONTINUED | OUTPATIENT
Start: 2020-06-19 | End: 2020-06-19 | Stop reason: HOSPADM

## 2020-06-18 RX ORDER — HYDROCODONE BITARTRATE AND ACETAMINOPHEN 7.5; 325 MG/1; MG/1
1 TABLET ORAL ONCE AS NEEDED
Status: DISCONTINUED | OUTPATIENT
Start: 2020-06-18 | End: 2020-06-18 | Stop reason: HOSPADM

## 2020-06-18 RX ORDER — AMLODIPINE BESYLATE 10 MG/1
10 TABLET ORAL EVERY MORNING
Status: DISCONTINUED | OUTPATIENT
Start: 2020-06-18 | End: 2020-06-19 | Stop reason: HOSPADM

## 2020-06-18 RX ORDER — CLOPIDOGREL BISULFATE 75 MG/1
75 TABLET ORAL DAILY
Status: DISCONTINUED | OUTPATIENT
Start: 2020-06-19 | End: 2020-06-19 | Stop reason: HOSPADM

## 2020-06-18 RX ORDER — PROPOFOL 10 MG/ML
VIAL (ML) INTRAVENOUS AS NEEDED
Status: DISCONTINUED | OUTPATIENT
Start: 2020-06-18 | End: 2020-06-18 | Stop reason: SURG

## 2020-06-18 RX ORDER — ATORVASTATIN CALCIUM 20 MG/1
40 TABLET, FILM COATED ORAL NIGHTLY
Status: DISCONTINUED | OUTPATIENT
Start: 2020-06-18 | End: 2020-06-19 | Stop reason: HOSPADM

## 2020-06-18 RX ORDER — SODIUM CHLORIDE, SODIUM LACTATE, POTASSIUM CHLORIDE, CALCIUM CHLORIDE 600; 310; 30; 20 MG/100ML; MG/100ML; MG/100ML; MG/100ML
9 INJECTION, SOLUTION INTRAVENOUS CONTINUOUS
Status: DISCONTINUED | OUTPATIENT
Start: 2020-06-18 | End: 2020-06-19 | Stop reason: HOSPADM

## 2020-06-18 RX ORDER — FAMOTIDINE 20 MG/1
20 TABLET, FILM COATED ORAL DAILY
Status: DISCONTINUED | OUTPATIENT
Start: 2020-06-19 | End: 2020-06-19 | Stop reason: HOSPADM

## 2020-06-18 RX ORDER — ESMOLOL HYDROCHLORIDE 10 MG/ML
INJECTION INTRAVENOUS AS NEEDED
Status: DISCONTINUED | OUTPATIENT
Start: 2020-06-18 | End: 2020-06-18 | Stop reason: SURG

## 2020-06-18 RX ORDER — MAGNESIUM HYDROXIDE 1200 MG/15ML
LIQUID ORAL AS NEEDED
Status: DISCONTINUED | OUTPATIENT
Start: 2020-06-18 | End: 2020-06-18 | Stop reason: HOSPADM

## 2020-06-18 RX ORDER — OXYCODONE AND ACETAMINOPHEN 10; 325 MG/1; MG/1
2 TABLET ORAL EVERY 4 HOURS PRN
Status: DISCONTINUED | OUTPATIENT
Start: 2020-06-18 | End: 2020-06-18

## 2020-06-18 RX ORDER — EPHEDRINE SULFATE 50 MG/ML
5 INJECTION, SOLUTION INTRAVENOUS ONCE AS NEEDED
Status: DISCONTINUED | OUTPATIENT
Start: 2020-06-18 | End: 2020-06-18 | Stop reason: HOSPADM

## 2020-06-18 RX ORDER — ROPIVACAINE HYDROCHLORIDE 2 MG/ML
INJECTION, SOLUTION EPIDURAL; INFILTRATION; PERINEURAL
Status: COMPLETED | OUTPATIENT
Start: 2020-06-18 | End: 2020-06-18

## 2020-06-18 RX ORDER — CEFAZOLIN SODIUM 2 G/100ML
2 INJECTION, SOLUTION INTRAVENOUS EVERY 8 HOURS
Status: COMPLETED | OUTPATIENT
Start: 2020-06-18 | End: 2020-06-18

## 2020-06-18 RX ADMIN — PHENYLEPHRINE HYDROCHLORIDE 100 MCG: 10 INJECTION INTRAVENOUS at 07:55

## 2020-06-18 RX ADMIN — VASOPRESSIN 2 UNITS: 20 INJECTION INTRAVENOUS at 08:28

## 2020-06-18 RX ADMIN — SODIUM CHLORIDE, POTASSIUM CHLORIDE, SODIUM LACTATE AND CALCIUM CHLORIDE: 600; 310; 30; 20 INJECTION, SOLUTION INTRAVENOUS at 08:24

## 2020-06-18 RX ADMIN — ROPIVACAINE HYDROCHLORIDE 10 ML: 5 INJECTION, SOLUTION EPIDURAL; INFILTRATION; PERINEURAL at 06:53

## 2020-06-18 RX ADMIN — FENTANYL CITRATE 100 MCG: 50 INJECTION, SOLUTION INTRAMUSCULAR; INTRAVENOUS at 06:45

## 2020-06-18 RX ADMIN — FAMOTIDINE 20 MG: 10 INJECTION, SOLUTION INTRAVENOUS at 07:00

## 2020-06-18 RX ADMIN — DEXAMETHASONE SODIUM PHOSPHATE 10 MG: 10 INJECTION INTRAMUSCULAR; INTRAVENOUS at 07:26

## 2020-06-18 RX ADMIN — EPHEDRINE SULFATE 10 MG: 50 INJECTION INTRAVENOUS at 08:41

## 2020-06-18 RX ADMIN — ONDANSETRON 4 MG: 2 INJECTION INTRAMUSCULAR; INTRAVENOUS at 12:16

## 2020-06-18 RX ADMIN — DEXAMETHASONE SODIUM PHOSPHATE 4 MG: 4 INJECTION INTRA-ARTICULAR; INTRALESIONAL; INTRAMUSCULAR; INTRAVENOUS; SOFT TISSUE at 06:53

## 2020-06-18 RX ADMIN — ESMOLOL HYDROCHLORIDE 20 MG: 10 INJECTION, SOLUTION INTRAVENOUS at 07:14

## 2020-06-18 RX ADMIN — ONDANSETRON HYDROCHLORIDE 4 MG: 2 SOLUTION INTRAMUSCULAR; INTRAVENOUS at 08:31

## 2020-06-18 RX ADMIN — SODIUM CHLORIDE 75 ML/HR: 4.5 INJECTION, SOLUTION INTRAVENOUS at 12:17

## 2020-06-18 RX ADMIN — ALLOPURINOL 100 MG: 100 TABLET ORAL at 12:17

## 2020-06-18 RX ADMIN — VASOPRESSIN 2 UNITS: 20 INJECTION INTRAVENOUS at 08:19

## 2020-06-18 RX ADMIN — VENLAFAXINE HYDROCHLORIDE 150 MG: 150 CAPSULE, EXTENDED RELEASE ORAL at 12:17

## 2020-06-18 RX ADMIN — SODIUM CHLORIDE, POTASSIUM CHLORIDE, SODIUM LACTATE AND CALCIUM CHLORIDE: 600; 310; 30; 20 INJECTION, SOLUTION INTRAVENOUS at 07:09

## 2020-06-18 RX ADMIN — CEFAZOLIN SODIUM 2 G: 2 INJECTION, SOLUTION INTRAVENOUS at 07:21

## 2020-06-18 RX ADMIN — CARVEDILOL 25 MG: 25 TABLET, FILM COATED ORAL at 16:44

## 2020-06-18 RX ADMIN — VASOPRESSIN 2 UNITS: 20 INJECTION INTRAVENOUS at 08:38

## 2020-06-18 RX ADMIN — HYDRALAZINE HYDROCHLORIDE 50 MG: 50 TABLET, FILM COATED ORAL at 21:34

## 2020-06-18 RX ADMIN — CARBAMAZEPINE 200 MG: 200 TABLET ORAL at 12:16

## 2020-06-18 RX ADMIN — VASOPRESSIN 2 UNITS: 20 INJECTION INTRAVENOUS at 08:23

## 2020-06-18 RX ADMIN — EPHEDRINE SULFATE 10 MG: 50 INJECTION INTRAVENOUS at 07:34

## 2020-06-18 RX ADMIN — OXYCODONE HYDROCHLORIDE AND ACETAMINOPHEN 2 TABLET: 10; 325 TABLET ORAL at 21:34

## 2020-06-18 RX ADMIN — GLYCOPYRROLATE 0.2 MG: 0.2 INJECTION INTRAMUSCULAR; INTRAVENOUS at 07:50

## 2020-06-18 RX ADMIN — VASOPRESSIN 2 UNITS: 20 INJECTION INTRAVENOUS at 07:59

## 2020-06-18 RX ADMIN — MIDAZOLAM 2 MG: 1 INJECTION INTRAMUSCULAR; INTRAVENOUS at 06:45

## 2020-06-18 RX ADMIN — PROPOFOL 150 MG: 10 INJECTION, EMULSION INTRAVENOUS at 07:14

## 2020-06-18 RX ADMIN — OXYCODONE HYDROCHLORIDE AND ACETAMINOPHEN 2 TABLET: 10; 325 TABLET ORAL at 12:16

## 2020-06-18 RX ADMIN — PHENYLEPHRINE HYDROCHLORIDE 100 MCG: 10 INJECTION INTRAVENOUS at 07:44

## 2020-06-18 RX ADMIN — AMLODIPINE BESYLATE 10 MG: 10 TABLET ORAL at 12:17

## 2020-06-18 RX ADMIN — FUROSEMIDE 40 MG: 40 TABLET ORAL at 12:17

## 2020-06-18 RX ADMIN — EPHEDRINE SULFATE 15 MG: 50 INJECTION INTRAVENOUS at 07:48

## 2020-06-18 RX ADMIN — ATROPINE SULFATE 0.2 MG: 0.4 INJECTION, SOLUTION INTRAMUSCULAR; INTRAVENOUS; SUBCUTANEOUS at 07:54

## 2020-06-18 RX ADMIN — ATORVASTATIN CALCIUM 40 MG: 20 TABLET, FILM COATED ORAL at 21:33

## 2020-06-18 RX ADMIN — GLYCOPYRROLATE 0.2 MG: 0.2 INJECTION INTRAMUSCULAR; INTRAVENOUS at 07:44

## 2020-06-18 RX ADMIN — CEFAZOLIN SODIUM 2 G: 2 INJECTION, SOLUTION INTRAVENOUS at 23:15

## 2020-06-18 RX ADMIN — LORAZEPAM 0.5 MG: 0.5 TABLET ORAL at 16:44

## 2020-06-18 RX ADMIN — EPHEDRINE SULFATE 10 MG: 50 INJECTION INTRAVENOUS at 07:38

## 2020-06-18 RX ADMIN — SODIUM CHLORIDE, POTASSIUM CHLORIDE, SODIUM LACTATE AND CALCIUM CHLORIDE 9 ML/HR: 600; 310; 30; 20 INJECTION, SOLUTION INTRAVENOUS at 06:38

## 2020-06-18 RX ADMIN — LIDOCAINE HYDROCHLORIDE 80 MG: 20 INJECTION, SOLUTION INFILTRATION; PERINEURAL at 07:14

## 2020-06-18 RX ADMIN — LORAZEPAM 0.5 MG: 0.5 TABLET ORAL at 21:33

## 2020-06-18 RX ADMIN — TRAZODONE HYDROCHLORIDE 75 MG: 50 TABLET ORAL at 21:35

## 2020-06-18 RX ADMIN — CEFAZOLIN SODIUM 2 G: 2 INJECTION, SOLUTION INTRAVENOUS at 14:45

## 2020-06-18 RX ADMIN — ATROPINE SULFATE 0.2 MG: 0.4 INJECTION, SOLUTION INTRAMUSCULAR; INTRAVENOUS; SUBCUTANEOUS at 07:59

## 2020-06-18 RX ADMIN — SUCCINYLCHOLINE CHLORIDE 140 MG: 20 INJECTION, SOLUTION INTRAMUSCULAR; INTRAVENOUS at 07:14

## 2020-06-18 RX ADMIN — ROPIVACAINE HYDROCHLORIDE 10 ML: 2 INJECTION, SOLUTION EPIDURAL; INFILTRATION at 06:53

## 2020-06-18 RX ADMIN — VASOPRESSIN 2 UNITS: 20 INJECTION INTRAVENOUS at 08:08

## 2020-06-18 RX ADMIN — OXYCODONE HYDROCHLORIDE AND ACETAMINOPHEN 2 TABLET: 10; 325 TABLET ORAL at 16:44

## 2020-06-18 RX ADMIN — HYDRALAZINE HYDROCHLORIDE 50 MG: 50 TABLET, FILM COATED ORAL at 16:44

## 2020-06-18 NOTE — NURSING NOTE
Dr. Le at bedside to assess patient prior to sending to floor. Relayed to him that patient has been feeling some SOA. States due to lung CA Hx and block, that is normal. Will continue to monitor.

## 2020-06-18 NOTE — ANESTHESIA POSTPROCEDURE EVALUATION
Patient: Rachana Arguelles    Procedure Summary     Date:  06/18/20 Room / Location:   [facility]    Anesthesia Start:  0709 Anesthesia Stop:  0909    Procedure:  RIGHT TOTAL SHOULDER REVERSE ARTHROPLASTY WITH GPS (Right Shoulder) Diagnosis:      Surgeon:  Lino Carrillo MD Provider:  Karthikeyan Le MD    Anesthesia Type:  general ASA Status:  3          Anesthesia Type: general    Vitals  Vitals Value Taken Time   /76 6/18/2020  9:35 AM   Temp 36.3 °C (97.4 °F) 6/18/2020  9:06 AM   Pulse 64 6/18/2020  9:20 AM   Resp 20 6/18/2020  9:20 AM   SpO2 94 % 6/18/2020  9:38 AM   Vitals shown include unvalidated device data.        Post Anesthesia Care and Evaluation    Patient location during evaluation: bedside  Patient participation: complete - patient participated  Level of consciousness: awake  Pain score: 1  Pain management: adequate  Airway patency: patent  Anesthetic complications: No anesthetic complications  PONV Status: controlled  Cardiovascular status: acceptable  Respiratory status: acceptable  Hydration status: acceptable    Comments: --------------------            06/18/20               0920     --------------------   BP:       162/73     Pulse:      64       Resp:       20       Temp:                SpO2:      99%      --------------------

## 2020-06-18 NOTE — PLAN OF CARE
See below.    Problem: Patient Care Overview  Goal: Plan of Care Review  Outcome: Ongoing (interventions implemented as appropriate)  Flowsheets (Taken 6/18/2020 4779)  Progress: improving  Plan of Care Reviewed With: patient  Outcome Summary: 74/F POD#0 right reverse TSA.  ALOx4, RA, lungs clear but diminished, previous lung CA noted in 2019, BS hypoactive, voiding independently.  Up x1 BRP with gait belt/sling in place.  2+ radial pulses noted bilaterally, numbness/tingling noted in operative extremity, dressing CDI.  Pain well-controlled with PO pain meds only.  PIV infusing 1/2 NS @ 75 cc/hr per MD orders.  D/C planning in progress, plans to D/C home with family tomorrow when medically ready.

## 2020-06-18 NOTE — ANESTHESIA PREPROCEDURE EVALUATION
Anesthesia Evaluation     Patient summary reviewed and Nursing notes reviewed   NPO Solid Status: > 8 hours             Airway   Mallampati: II  TM distance: >3 FB  Neck ROM: full  no difficulty expected  Dental - normal exam     Pulmonary - normal exam   (+) pulmonary embolism, a smoker Former, lung cancer,   Cardiovascular - normal exam    (+) hypertension, PVD, hyperlipidemia,       Neuro/Psych  (+) headaches, dizziness/light headedness, psychiatric history Anxiety and Depression, dementia,     GI/Hepatic/Renal/Endo    (+) obesity,  hiatal hernia, GERD,  renal disease CRI,     Musculoskeletal     (+) neck pain,   Abdominal  - normal exam   Substance History      OB/GYN          Other   arthritis,    history of cancer                    Anesthesia Plan    ASA 3     general   (Isb popc    No nsaids per her nephrologist)  intravenous induction     Anesthetic plan, all risks, benefits, and alternatives have been provided, discussed and informed consent has been obtained with: patient.    Plan discussed with CRNA.

## 2020-06-18 NOTE — ANESTHESIA PROCEDURE NOTES
Airway  Urgency: elective    Date/Time: 6/18/2020 7:19 AM    General Information and Staff    Patient location during procedure: OR  Anesthesiologist: Karthikeyan Le MD  CRNA: Nona Rashid CRNA    Indications and Patient Condition  Indications for airway management: airway protection    Preoxygenated: yes  Mask difficulty assessment: 1 - vent by mask    Final Airway Details  Final airway type: endotracheal airway      Successful airway: ETT  Cuffed: yes   Successful intubation technique: direct laryngoscopy  Facilitating devices/methods: Bougie and anterior pressure/BURP  Endotracheal tube insertion site: oral  Blade: Martin  Blade size: 3  ETT size (mm): 7.0  Cormack-Lehane Classification: grade IIb - view of arytenoids or posterior of glottis only  Placement verified by: chest auscultation and capnometry   Measured from: lips  ETT/EBT  to lips (cm): 22  Number of attempts at approach: 1  Assessment: lips, teeth, and gum same as pre-op and atraumatic intubation    Additional Comments  Kaden irregularities/chips noted in front incisors prior to induction. No change noted.

## 2020-06-18 NOTE — ANESTHESIA PROCEDURE NOTES
Peripheral Block    Pre-sedation assessment completed: 6/18/2020 6:43 AM    Patient reassessed immediately prior to procedure    Patient location during procedure: pre-op  Start time: 6/18/2020 6:43 AM  Stop time: 6/18/2020 6:53 AM  Reason for block: at surgeon's request and post-op pain management  Performed by  Anesthesiologist: Karthikeyan Le MD  Preanesthetic Checklist  Completed: patient identified, site marked, surgical consent, pre-op evaluation, timeout performed, IV checked, risks and benefits discussed and monitors and equipment checked  Prep:  Pt Position: supine  Sterile barriers:cap, gloves, mask and sterile barriers  Prep: ChloraPrep  Patient monitoring: blood pressure monitoring, continuous pulse oximetry and EKG  Procedure  Sedation:yes  Performed under: local infiltration  Guidance:ultrasound guided  ULTRASOUND INTERPRETATION.  Using ultrasound guidance a 22 G gauge needle was placed in close proximity to the brachial plexus nerve, at which point, under ultrasound guidance anesthetic was injected in the area of the nerve and spread of the anesthesia was seen on ultrasound in close proximity thereto.  There were no abnormalities seen on ultrasound; a digital image was taken; and the patient tolerated the procedure with no complications. Images:still images obtained    Laterality:right  Block Type:interscalene  Injection Technique:single-shot  Needle Type:echogenic  Needle Gauge:22 G  Resistance on Injection: less than 15 psi    Medications Used: dexamethasone (DECADRON) injection, 4 mg  ropivacaine (NAROPIN) 0.5 % injection, 10 mL  ropivacaine (NAROPIN) 0.2 % injection, 10 mL  Med admintered at 6/18/2020 6:53 AM      Post Assessment  Injection Assessment: negative aspiration for heme, no paresthesia on injection and incremental injection  Patient Tolerance:comfortable throughout block  Complications:no

## 2020-06-18 NOTE — OP NOTE
TOTAL SHOULDER REVERSE ARTHROPLASTY  Procedure Note    Rachana Arguelles  6/18/2020    Pre-op Diagnosis:   Right rotator cuff arthropathy    Post-op Diagnosis  Right rotator cuff arthropathy    Procedure:  Right reverse total shoulder arthroplasty with GPS computer navigation    Surgeon: Lino Carrillo M.D.    Surgical assistant: April Brennan CSA      Anesthesia: General with Block  Anesthesiologist: Karthikeyan Le MD  CRNA: Nona Rashid CRNA    Staff:   Circulator: Quita Weiss RN  Scrub Person: Romy Mims  Assistant: April Brennan    Indication for Asst.  A surgical assistant, April Brennan CSA , was utilized as a medical necessity and was present through the entire procedure allowing safe completion of the procedure as well as reducing overall operative time and morbidity for the patient.    IV antibiotic: Cefazolin    Estimated Blood Loss: 200 ml    Specimens: * No orders in the log *    Complications: None    Implants: [employer] reverse total shoulder system     Implant specifics: [employer] Equinox 8 mm preserve press-fit stem, +0 humeral adapter tray, +0 humeral liner 36 mm, small reverse glenoid baseplate with superior and posterior augment, 36 mm glenosphere, 4 compression screws and locking caps      Procedure: The patient was taken to the operative suite.  After adequate anesthesia was established the upper extremity was prepped and draped in the usual fashion.  The head was placed in a neutral position and bony prominences were padded.  Ioban was utilized covering the skin over the shoulder and axilla.  An anterior incision was made starting lateral to the coracoid towards the axillary crease through skin and subcutaneous tissues.  The deltopectoral interval was entered.  The cephalic vein was preserved.  The conjoined tendon was reflected medially.  The biceps tendon was identified and tenodesed to the pectoralis.  The tendon was then tenotomized more proximally.  The  subscapularis was divided and tagged.  A large amount of fluid was evacuated from the joint due to the severe damage and arthropathy.  Arthropathic changes were noted in the glenohumeral joint.  The rotator cuff was in poor condition with extensive tearing noted.  The capsule was released off the humeral neck and the posterior soft tissue attachments were protected. An external cutting guide was utilized in the head was cut at a 20° retroverted angle.  Excess osteophytes were excised. Appropriate impaction broaching was carried out.  The final broach was left in place for later trialing.   I then visualized the glenoid and retractors were placed.  The capsule was reflected off the deep surface of the subscapularis.  The capsule was also released off the humeral neck and off the inferior glenoid protecting the axillary nerve and transversing blood vessels throughout.  Soft tissues were dissected off the superior glenoid including the biceps stump.  This allowed for visualization of the glenoid.  The coracoid was skeletonized with the Bovie exposing the anterior portion and the neck of the coracoid.  A tracker was fixed to the coracoid with 2 small bicortical screws with firm fixation.  Acquisitions were then obtained from the bony surface of the coracoid and glenoid to transfer data to the computer to allow for navigation.  Once this was complete I was able to use computer guidance for placement of the glenoid.  Using navigation a central drill point was drilled.  Sequential reaming starting with the smallest reamer was carried out to the appropriate depth and size set by our preoperative plan.  A central hole was then drilled for the cage using computer navigation.  This assured that we were within the vault of the glenoid and preserved as much bone as possible while placing the baseplate in a stable position.    Autogenous bone graft was harvested from the humeral head and packed into the cage of the 36 mm mini  baseplate.  The baseplate was then compressed onto the glenoid.  Computer navigation was then used to drill holes for the compression screws.  These were then placed to the appropriate depth and locking caps were placed over the screw heads.  Stable fixation was noted.  An appropriate size 36 mm glenosphere was chosen and fixed to the baseplate with a central compression screw.  I then removed the tracker from the coracoid by removing both bicortical screws.  I then trialed the humeral tray and polyethylene.  Once satisfied with range of motion and stability I remove the humeral components.  I pulse lavaged the proximal humerus and press-fit the appropriate size stem with good purchase and fixation.  Next I re-trialed the humeral tray and humeral liner.  When I was satisfied with range of motion and stability I removed the trial liner and tray.  The appropriate humeral tray was in place and held with a torque limiting screw.  The polyethylene was then inserted and compressed into place and the shoulder was reduced.  Subdeltoid scar tissue was excised area  Good range of motion was noted.  Good stability was noted.  Vigorous irrigation and suctioning was performed. The deltopectoral interval was closed with 0 Vicryl.  The subcutaneous tissues were closed with 2-0 Vicryl.  The skin was closed with a zip line device.  The patient had a sterile compression dressing applied and was awoken and taken to the postanesthetic recovery unit in good condition.    Lino Carrillo MD     Date: 6/18/2020  Time: 14:36

## 2020-06-19 ENCOUNTER — READMISSION MANAGEMENT (OUTPATIENT)
Dept: CALL CENTER | Facility: HOSPITAL | Age: 75
End: 2020-06-19

## 2020-06-19 VITALS
OXYGEN SATURATION: 94 % | WEIGHT: 199.52 LBS | RESPIRATION RATE: 18 BRPM | HEART RATE: 65 BPM | HEIGHT: 67 IN | SYSTOLIC BLOOD PRESSURE: 142 MMHG | BODY MASS INDEX: 31.31 KG/M2 | DIASTOLIC BLOOD PRESSURE: 56 MMHG | TEMPERATURE: 97.3 F

## 2020-06-19 LAB
ANION GAP SERPL CALCULATED.3IONS-SCNC: 11.1 MMOL/L (ref 5–15)
BUN BLD-MCNC: 27 MG/DL (ref 8–23)
BUN/CREAT SERPL: 12.1 (ref 7–25)
CALCIUM SPEC-SCNC: 8.1 MG/DL (ref 8.6–10.5)
CHLORIDE SERPL-SCNC: 101 MMOL/L (ref 98–107)
CO2 SERPL-SCNC: 21.9 MMOL/L (ref 22–29)
CREAT BLD-MCNC: 2.23 MG/DL (ref 0.57–1)
GFR SERPL CREATININE-BSD FRML MDRD: 21 ML/MIN/1.73
GLUCOSE BLD-MCNC: 115 MG/DL (ref 65–99)
HCT VFR BLD AUTO: 25.1 % (ref 34–46.6)
HGB BLD-MCNC: 8.5 G/DL (ref 12–15.9)
POTASSIUM BLD-SCNC: 3.7 MMOL/L (ref 3.5–5.2)
SODIUM BLD-SCNC: 134 MMOL/L (ref 136–145)

## 2020-06-19 PROCEDURE — 25010000002 HYDROMORPHONE 1 MG/ML SOLUTION: Performed by: ORTHOPAEDIC SURGERY

## 2020-06-19 PROCEDURE — 80048 BASIC METABOLIC PNL TOTAL CA: CPT | Performed by: ORTHOPAEDIC SURGERY

## 2020-06-19 PROCEDURE — 85018 HEMOGLOBIN: CPT | Performed by: ORTHOPAEDIC SURGERY

## 2020-06-19 PROCEDURE — 97110 THERAPEUTIC EXERCISES: CPT

## 2020-06-19 PROCEDURE — 85014 HEMATOCRIT: CPT | Performed by: ORTHOPAEDIC SURGERY

## 2020-06-19 PROCEDURE — 97162 PT EVAL MOD COMPLEX 30 MIN: CPT

## 2020-06-19 RX ORDER — OXYCODONE AND ACETAMINOPHEN 10; 325 MG/1; MG/1
1 TABLET ORAL EVERY 4 HOURS PRN
Qty: 40 TABLET | Refills: 0 | Status: SHIPPED | OUTPATIENT
Start: 2020-06-19 | End: 2020-06-28

## 2020-06-19 RX ADMIN — VENLAFAXINE HYDROCHLORIDE 150 MG: 150 CAPSULE, EXTENDED RELEASE ORAL at 08:08

## 2020-06-19 RX ADMIN — HYDROMORPHONE HYDROCHLORIDE 1 MG: 1 INJECTION, SOLUTION INTRAMUSCULAR; INTRAVENOUS; SUBCUTANEOUS at 03:24

## 2020-06-19 RX ADMIN — PANTOPRAZOLE SODIUM 40 MG: 40 TABLET, DELAYED RELEASE ORAL at 05:20

## 2020-06-19 RX ADMIN — OXYCODONE HYDROCHLORIDE AND ACETAMINOPHEN 2 TABLET: 10; 325 TABLET ORAL at 01:26

## 2020-06-19 RX ADMIN — HYDRALAZINE HYDROCHLORIDE 50 MG: 50 TABLET, FILM COATED ORAL at 08:09

## 2020-06-19 RX ADMIN — LORAZEPAM 0.5 MG: 0.5 TABLET ORAL at 08:09

## 2020-06-19 RX ADMIN — FLUTICASONE PROPIONATE 1 SPRAY: 50 SPRAY, METERED NASAL at 08:09

## 2020-06-19 RX ADMIN — FUROSEMIDE 40 MG: 40 TABLET ORAL at 08:08

## 2020-06-19 RX ADMIN — DIAZEPAM 5 MG: 5 TABLET ORAL at 10:24

## 2020-06-19 RX ADMIN — OXYCODONE HYDROCHLORIDE AND ACETAMINOPHEN 2 TABLET: 10; 325 TABLET ORAL at 05:26

## 2020-06-19 RX ADMIN — CARVEDILOL 25 MG: 25 TABLET, FILM COATED ORAL at 08:08

## 2020-06-19 RX ADMIN — OXYCODONE HYDROCHLORIDE AND ACETAMINOPHEN 2 TABLET: 10; 325 TABLET ORAL at 09:24

## 2020-06-19 RX ADMIN — VENLAFAXINE HYDROCHLORIDE 75 MG: 75 TABLET ORAL at 08:08

## 2020-06-19 RX ADMIN — CLOPIDOGREL 75 MG: 75 TABLET, FILM COATED ORAL at 08:09

## 2020-06-19 RX ADMIN — FAMOTIDINE 20 MG: 20 TABLET, FILM COATED ORAL at 08:12

## 2020-06-19 RX ADMIN — AMLODIPINE BESYLATE 10 MG: 10 TABLET ORAL at 08:08

## 2020-06-19 RX ADMIN — ALLOPURINOL 100 MG: 100 TABLET ORAL at 08:09

## 2020-06-19 RX ADMIN — CARBAMAZEPINE 200 MG: 200 TABLET ORAL at 08:09

## 2020-06-19 NOTE — DISCHARGE SUMMARY
Orthopedic Discharge Summary      Patient: Rachana Arguelles      YOB: 1945    Medical Record Number: [id number]    Attending Physician: Lino Carrillo MD  Consulting Physician(s):   Date of Admission: 6/18/2020  5:36 AM  Date of Discharge:       Patient Active Problem List   Diagnosis   • Anxiety   • Chronic pain syndrome   • Depression   • Gastroesophageal reflux disease   • Hyperlipidemia   • Hypertension   • Osteoarthritis of hip   • Chronic low back pain   • Failed back syndrome of lumbar spine   • Pulmonary embolus (CMS/HCC)   • Weight loss   • Polypharmacy   • CKD (chronic kidney disease), stage III (CMS/HCC)   • Chronic constipation   • Sacroiliac joint pain   • Mixed incontinence   • Renal cyst   • Achilles tendonitis   • Atherosclerosis of native artery of left lower extremity with intermittent claudication (CMS/HCC)   • Morbidly obese (CMS/HCC)   • Lung nodule   • Malignant neoplasm of right upper lobe of lung (CMS/HCC)   • Lung cancer (CMS/HCC)   • Moderate single current episode of major depressive disorder (CMS/HCC)   • Palpitations   • Encounter for screening for malignant neoplasm of colon   • Hematoma of scalp   • Acute neck pain   • Dizziness   • Unstable cervical spine   • Postconcussive syndrome   • Positive colorectal cancer screening using Cologuard test   • Dysphagia   • S/P reverse total shoulder arthroplasty, right     [unfilled]    Allergies:   Allergies   Allergen Reactions   • Neurontin [Gabapentin] Confusion   • Morphine Other (See Comments)     HEADACHE       Current Medications:     Discharge Medications      ASK your doctor about these medications      Instructions Start Date   allopurinol 100 MG tablet  Commonly known as:  ZYLOPRIM   100 mg, Oral, Daily      amLODIPine 10 MG tablet  Commonly known as:  NORVASC   10 mg, Oral, Every Morning      aspirin-acetaminophen-caffeine 250-250-65 MG per tablet  Commonly known as:  EXCEDRIN MIGRAINE   1 tablet, Oral, Every 6 Hours  PRN, PT TO HOLD 1 WEEK PRIOR TO SURGERY       atorvastatin 40 MG tablet  Commonly known as:  LIPITOR   40 mg, Oral, Nightly      B-12 1000 MCG tablet   1,000 mcg, Oral, Daily      carBAMazepine 200 MG tablet  Commonly known as:  TEGretol   200 mg, Oral, Daily      carvedilol 25 MG tablet  Commonly known as:  COREG   25 mg, Oral, 2 Times Daily With Meals, 1/2 TAB EACH DOSE-ON HOLD SINCE 1/5/19       cetirizine 10 MG tablet  Commonly known as:  zyrTEC   10 mg, Oral, As Needed      Chlorhexidine Gluconate Cloth 2 % pads   Apply externally, Take As Directed, PREOP       clopidogrel 75 MG tablet  Commonly known as:  PLAVIX   75 mg, Oral, Daily, PT STATED TO HOLD FOR SURGERY 5-7 DAYS PRIOR      colchicine 0.6 MG tablet   0.3 mg, Oral, Daily      cyclobenzaprine 10 MG tablet  Commonly known as:  FLEXERIL   10 mg, Oral, 3 Times Daily PRN      famotidine 20 MG tablet  Commonly known as:  PEPCID   TAKE 1 TABLET BY MOUTH TWICE A DAY      fluticasone 50 MCG/ACT nasal spray  Commonly known as:  FLONASE   PLACE 2 SPRAYS IN EACH NOSTRILL EVERY DAY AS DIRECTED      furosemide 40 MG tablet  Commonly known as:  LASIX   TAKE 1 TABLET BY MOUTH EVERY DAY IN THE MORNING      hydrALAZINE 50 MG tablet  Commonly known as:  APRESOLINE   Take 1.5 tablets by mouth two times a day      loratadine 10 MG tablet  Commonly known as:  CLARITIN   10 mg, Oral, Daily      LORazepam 0.5 MG tablet  Commonly known as:  ATIVAN   TAKE 1 TABLET BY MOUTH THREE TIMES A DAY      meclizine 25 MG tablet  Commonly known as:  ANTIVERT   TAKE 1 TABLET BY MOUTH 3 TIMES A DAY AS NEEDED FOR DIZZINESS/NAUSEA/SENSATION OF SPINNING/WHIRLING      mupirocin 2 % nasal ointment  Commonly known as:  BACTROBAN   Nasal, Take As Directed      omeprazole 40 MG capsule  Commonly known as:  priLOSEC   40 mg, Oral, Daily      ondansetron 4 MG tablet  Commonly known as:  ZOFRAN   4 mg, Oral, Every 8 Hours PRN      OXYCODONE-ACETAMINOPHEN PO   1 tablet, Oral, As Needed, 20/325MG        SERTRALINE HCL PO   Oral, Daily      traZODone 150 MG tablet  Commonly known as:  DESYREL   75 mg, Oral, Nightly      venlafaxine  MG 24 hr capsule  Commonly known as:  EFFEXOR-XR   TAKE 1 CAPSULE BY MOUTH EVERY DAY      venlafaxine 75 MG tablet  Commonly known as:  EFFEXOR   TAKE 1 TABLET BY MOUTH EVERY DAY      vitamin D 1.25 MG (56553 UT) capsule capsule  Commonly known as:  ERGOCALCIFEROL   50,000 Units, Oral, Weekly                 Past Medical History:   Diagnosis Date   • AAA (abdominal aortic aneurysm) without rupture (CMS/HCC)    • Anxiety    • Arthritis    • Cancer (CMS/HCC)     skin cancer   • Chest pain     OCCAS   • Chronic low back pain    • Chronic pain syndrome 3/12/2016   • CKD (chronic kidney disease), stage III (CMS/HCC)    • Claustrophobia 10/26/2015    Resolved   • Depression    • Dizziness    • GERD (gastroesophageal reflux disease)    • GI problem    • H/O concussion    • Head trauma     FELL CUT HEAD 12 STAPLES   • Hiatal hernia    • History of migraine headaches    • Hyperlipidemia    • Hypertension    • Lumbar postlaminectomy syndrome 10/26/2015    Resolved   • Lung cancer (CMS/HCC)    • Lung nodule    • Migraines    • Nausea    • Palpitations    • Polypharmacy 4/22/2016   • Pulmonary embolism (CMS/HCC) 10/26/2015    January 2013 - Resolved, felt to be secondary to estrogen use   • Slow to wake up after anesthesia    • Submandibular sialoadenitis 10/26/2015    Resolved   • Visual changes    • Vitamin B 12 deficiency         Past Surgical History:   Procedure Laterality Date   • ANGIOPLASTY ILIAC ARTERY Bilateral 8/10/2018    Procedure: BILATERAL ILIAC STENTS;  Surgeon: Riki Mancera MD;  Location: [facility] MAIN OR;  Service: Vascular   • APPENDECTOMY     • BREAST SURGERY      Breast Surgery Reduction Procedure   • BRONCHOSCOPY N/A 2/8/2019    Procedure: BRONCHOSCOPY;  Surgeon: Álvaro Gifford MD;  Location: [facility] ENDOSCOPY;  Service: Pulmonary   • CHOLECYSTECTOMY     •  HYSTERECTOMY     • INTUBATION  1/15/2019        • JOINT REPLACEMENT      left knee, hip, shoulder   • LASIK     • LUMBAR FUSION      Lumbar Vertebral Fusion   • SHOULDER ARTHROSCOPY  2013    Dr. Carrillo/[facility]   • THORACOSCOPY VIDEO ASSISTED WITH LOBECTOMY Right 2019    Procedure: BRONCOSCOPY, THORACOSCOPY VIDEO ASSISTED WITH RIGHT UPPER LOBE WEDGE RESECTION, COMPLETION RIGHT UPPER LOBECTOMY, LYMPH NODE DISSECTION, INTERCOSTAL NERVE BLOCK;  Surgeon: Quita Figueroa MD;  Location[facility];  Service: Thoracic   • TONSILLECTOMY     • TOTAL HIP ARTHROPLASTY REVISION Left    • TOTAL KNEE ARTHROPLASTY Left    • TOTAL SHOULDER ARTHROPLASTY Left         Social History     Occupational History   • Not on file   Tobacco Use   • Smoking status: Former Smoker     Packs/day: 2.50     Years: 15.00     Pack years: 37.50     Types: Cigarettes     Last attempt to quit: 2003     Years since quittin.7   • Smokeless tobacco: Never Used   Substance and Sexual Activity   • Alcohol use: No     Frequency: Never     Comment: occ   • Drug use: Never   • Sexual activity: Defer      Social History     Social History Narrative   • Not on file        Family History   Problem Relation Age of Onset   • Hypertension Mother    • Lung cancer Mother    • Cancer Mother         lung   • Kidney disease Father    • Other Brother         Coronary Artery Bypass Grafting   • Stroke Brother         Cerebrovascular Accident   • Malig Hyperthermia Neg Hx          Physical Exam: 74 y.o. female  General Appearance:    Alert, cooperative, in no acute distress                      Vitals:    20 1512 20 1950 20 2332 20 0323   BP: 141/71 146/71 154/69 160/66   BP Location: Left arm Left arm Left arm Left arm   Patient Position: Sitting Lying Lying Lying   Pulse: 67 67 75 74   Resp:    Temp: 97.8 °F (36.6 °C) 97.8 °F (36.6 °C) 98.4 °F (36.9 °C) 98.4 °F (36.9 °C)   TempSrc: Skin Temporal Temporal  Temporal   SpO2: 95% 95% 90% 96%   Weight:       Height:                                                        Extremities:   Incision intact without signs or symptoms of infection.               Neurovascular status remains intact to operative extremity.      Moves all extremities well, no edema, no cyanosis, no              redness   Pulses:   Pulses palpable and equal bilaterally   Skin:   No bleeding, bruising or rash   Lymph nodes:   No palpable adenopathy   Neurologic:   Cranial nerves 2 - 12 grossly intact, sensation intact, DTR        present and equal bilaterally           Hospital Course:  74 y.o. female admitted to [facility] to services of Lino Carrillo MD with S/P reverse total shoulder arthroplasty, right [Z96.611] on 6/18/2020 and underwent [unfilled]  Per [unfilled]. Antibiotic and VTE prophylaxis were per SCIP protocols. Post-operatively the patient transferred to the post-operative floor where the patient underwent mobilization therapy that included active as well as passive ROM exercises. Opioids were titrated to achieve appropriate pain management to allow for participation in mobilization exercises. Vital signs are now stable. The incision is intact without signs or symptoms of infection. Operative extremity neurovascular status remains intact.   Appropriate education re: incision care, activity levels, medications, and follow up visits was completed and all questions were answered. The patient is now deemed stable for discharge to Home.      DIAGNOSTIC TESTS:   [unfilled]      [unfilled]    Discharge and Follow up Instructions: Follow up in 10-14 days.        Date: [unfilled]  Lino Carrillo MD      Time:

## 2020-06-19 NOTE — DISCHARGE INSTRUCTIONS
Dr. Carrillo  [address] [facility] [address]  [phone]    TOTAL & REVERSE TOTAL SHOULDER REPLACEMENT  HOSPITAL DISCHARGE INSTRUCTIONS     GENERAL INFORMATION: With improved surgical techniques for total shoulder and reverse total shoulder replacement and the Latter-day of ultrasound guided long acting nerve blocks, the hospital stay for patients has decreased significantly. Patients generally go home the following morning after consultation with a physical therapist.  SPECIFIC POST-OP INSTRUCTIONS:  * FOLLOW UP APPOINTMENT: You will need to call our office [phone] and make an appointment to follow up 10-14 days after your date of surgery. We can see you back sooner if there are any problems or concerns.   * BLOOD THINNERS: All patients will be on some type of blood thinner post-op to prevent blood clot. In the hospital, we often use a blood thinning shot the first day post-op. Most patients who are not already on a blood thinner are discharged on over-the-counter aspirin 325mg daily. If you were taking a blood thinner prior to surgery, we will have you resume this on the first day post-op.   * STAPLES: Staples are taken our 10-14 days post-op. This will be done during your first post-op appointment in our office.   * ICE: Ice helps to decrease both pain and swelling. Ice should be applied to the shoulder 20-25 minutes each hour while awake.   * DRESSING CHANGES: We ask that you keep the incision clean and dry. Please use water proof bandaids to cover incision while showering. These can be purchased at your local pharmacy. They may need to be overlapped to cover entire length of incision. These can be changed daily or as needed. Do not use any ointment on the incision.   * SHOWERING: The wound edges typically come together and seal by 3-5 days post-op if there is no drainage. Again, please use waterproof bandaids to cover incision while showering. DO NOT SUBMERSE SHOULDER IN POOL,HOT TUB OR BATH UNTIL AT LEAST 3-4  WEEKS POST-OP (EVEN WITH WATERPROOF BANDAIDS).  * PAIN  MEDICINE: You will be given a prescription for oral pain medication prior to discharge from the hospital. Please let us know if you have a sensitivity to certain pain medications prior to discharge. Additional pain medication prescriptions can be written, but must be picked up at either our [place] or [place] office locations. They can NOT be called into your pharmacy.   * ORAL ANTI-INFLAMMATORIES:   You will be asked to discontinue ALL oral anti-inflammatories prior to surgery. You can resume these the first day post-op.These can be combined with the oral pain medications safely. DO NOT TAKE ADDITIONAL TYLENOL WITH THE NARCOTIC PAIN MEDICATION (it already has Tylenol in it). If you were not taking an anti-inflammatory pre-op, you can start one on the first day post-op. It will help decrease pain and swelling. Typical medications and dosages are as follows:   Advil/Motrin/Ibuprofen 200mg, 4 pills every 8 hours   Aleve/Naproxen Sodium 220mg, 2 pills every 12 hours  * SLING: We recommend you wear the sling at all times, other than when showering or doing physical therapy exercises.    * WEIGHT BEARING: We ask that you be non-weight bearing on the operative shoulder. Do not use the operative arm to lift/push/pull greater than 5lbs.   * PHYSICAL THERAPY: A physical therapist will see you in the hospital your first day post-op. They will teach you some very gentle range of motion exercises to do at home for the first 10-14 days. After we see you in the office during your first post-op visit, we will give you a prescription to start formal physical therapy. This can be done downstairs at [facility] or at a location of your choice. Physical therapy is typically 2-3 times a week for 4-6 weeks, depending on the individual and their progress.   * DRIVING A CAR: Medically/legally we can not recommend you drive a car while in the sling or while on pain medication (roughly  "10-14 days).   * RETURNING TO DAILY AND RECREATIONAL ACTIVITIES: For the most part patients can progress to daily activities as tolerated (keeping in mind the restrictions listed above). Initially, we do not want you to \"overdo\" it in an attempt to minimize post-op pain and swelling. Once the swelling is controlled, you can progress with activities as tolerated. Pain and swelling should be your guide to increasing your activity level.   * RETURN TO WORK: The return to work date depends on many factors and is very dependent on the individual. You would most likely be able to return to a sedentary job after your first post-op visit (10-14 days after surgery). A more physical job would obviously require a longer recovery time before return to work.  "

## 2020-06-19 NOTE — PLAN OF CARE
Problem: Patient Care Overview  Goal: Plan of Care Review  Outcome: Ongoing (interventions implemented as appropriate)  Flowsheets (Taken 6/19/2020 0254)  Progress: improving  Plan of Care Reviewed With: patient  Outcome Summary: vss, dsg c/d/i, nerve block still in effect,voiding well, reports adequate pain control, educated on monitoring b/p due to hx hypertension, discharge home is home today, will continue to monitor

## 2020-06-19 NOTE — PLAN OF CARE
Problem: Patient Care Overview  Goal: Plan of Care Review  6/19/2020 1203 by Manuela Lizarraga, PT  Flowsheets (Taken 6/19/2020 1157)  Plan of Care Reviewed With: patient  Outcome Summary: Pt is a 73 yo F who is post-op R reverse TSA. Pt presents to PT with some impaired functional mobiltiy and gait secondary to R UE weakness, decreased ROM, and decreased WBing post-op. Pt reports she uses a walker PRN. Pt educated that she unable to weight bear on her R UE and PT assessed pt's gait and balance with a QC. Pt demonstrated safe ambulation with cane. Pt also demonstrated understanding of exercises and voiced understanding of how to navigate stairs. Pt plans to DC home today. PT will follow up tomorrow if DC is delayed.  Note:   Patient was wearing a face mask during this therapy encounter. Therapist used appropriate personal protective equipment including mask and gloves.  Mask used was standard procedure mask. Appropriate PPE was worn during the entire therapy session. Hand hygiene was completed before and after therapy session. Patient is not in enhanced droplet precautions.    6/19/2020 1202 by Manuela Lizarraga, PT  Reactivated

## 2020-06-19 NOTE — PROGRESS NOTES
Continued Stay Note  [facility]     Patient Name: Rachana Arguelles  MRN: [id number]  Today's Date: 6/19/2020    Admit Date: 6/18/2020    Discharge Plan     Row Name 06/19/20 1344       Plan    Final Discharge Disposition Code  01 - home or self-care    Final Note  Pt d/c'ed home s/p TSA.        Discharge Codes    No documentation.       Expected Discharge Date and Time     Expected Discharge Date Expected Discharge Time    Jun 19, 2020             Elizabeth Elkins RN

## 2020-06-19 NOTE — PROGRESS NOTES
Orthopedic Progress Note    Subjective     Post-Operative Day: POD #1  Systemic or Specific Complaints: No Complaints. Pain is under control with PO meds. Effects of block have resolved     Objective     Vital signs in last 24 hours:  Temp:  [97.4 °F (36.3 °C)-98.4 °F (36.9 °C)] 98.4 °F (36.9 °C)  Heart Rate:  [63-75] 74  Resp:  [18-20] 18  BP: (131-184)/(54-98) 160/66    General: alert, appears stated age and cooperative   Neurovascular: Radial nerve: Intact, Ulnar nerve: Intact and Median nerve: Intact  Radial pulse: 2+.   Wound: Dressing clean and dry. No evidence of infection.   Range of Motion: Hand/Wrist/elbow ROM WNL. ROM shoulder not tested.     Data Review  CBC:  Results from last 7 days   Lab Units 06/19/20  0410   HEMOGLOBIN g/dL 8.5*   HEMATOCRIT % 25.1*       Assessment/Plan     IMPRESSION:stable     Pain Relief: some relief    PLAN: D/C home today after physical therapy and dressing change. Follow up in office 10-14 days.     Activity: Stay in sling.      LOS: 1 day     Lino Carrillo MD    Date: 6/19/2020  Time: 07:33

## 2020-06-19 NOTE — THERAPY EVALUATION
Patient Name: Rachana Arguelles  : 1945    MRN: [id number]                              Today's Date: 2020       Admit Date: 2020    Visit Dx:     ICD-10-CM ICD-9-CM   1. S/P reverse total shoulder arthroplasty, right Z96.611 V43.61     Patient Active Problem List   Diagnosis   • Anxiety   • Chronic pain syndrome   • Depression   • Gastroesophageal reflux disease   • Hyperlipidemia   • Hypertension   • Osteoarthritis of hip   • Chronic low back pain   • Failed back syndrome of lumbar spine   • Pulmonary embolus (CMS/HCC)   • Weight loss   • Polypharmacy   • CKD (chronic kidney disease), stage III (CMS/HCC)   • Chronic constipation   • Sacroiliac joint pain   • Mixed incontinence   • Renal cyst   • Achilles tendonitis   • Atherosclerosis of native artery of left lower extremity with intermittent claudication (CMS/HCC)   • Morbidly obese (CMS/HCC)   • Lung nodule   • Malignant neoplasm of right upper lobe of lung (CMS/HCC)   • Lung cancer (CMS/HCC)   • Moderate single current episode of major depressive disorder (CMS/HCC)   • Palpitations   • Encounter for screening for malignant neoplasm of colon   • Hematoma of scalp   • Acute neck pain   • Dizziness   • Unstable cervical spine   • Postconcussive syndrome   • Positive colorectal cancer screening using Cologuard test   • Dysphagia   • S/P reverse total shoulder arthroplasty, right     Past Medical History:   Diagnosis Date   • AAA (abdominal aortic aneurysm) without rupture (CMS/HCC)    • Anxiety    • Arthritis    • Cancer (CMS/HCC)     skin cancer   • Chest pain     OCCAS   • Chronic low back pain    • Chronic pain syndrome 3/12/2016   • CKD (chronic kidney disease), stage III (CMS/HCC)    • Claustrophobia 10/26/2015    Resolved   • Depression    • Dizziness    • GERD (gastroesophageal reflux disease)    • GI problem    • H/O concussion    • Head trauma     FELL CUT HEAD 12 STAPLES   • Hiatal hernia    • History of migraine headaches    •  Hyperlipidemia    • Hypertension    • Lumbar postlaminectomy syndrome 10/26/2015    Resolved   • Lung cancer (CMS/HCC)    • Lung nodule    • Migraines    • Nausea    • Palpitations    • Polypharmacy 4/22/2016   • Pulmonary embolism (CMS/HCC) 10/26/2015    January 2013 - Resolved, felt to be secondary to estrogen use   • Slow to wake up after anesthesia    • Submandibular sialoadenitis 10/26/2015    Resolved   • Visual changes    • Vitamin B 12 deficiency      Past Surgical History:   Procedure Laterality Date   • ANGIOPLASTY ILIAC ARTERY Bilateral 8/10/2018    Procedure: BILATERAL ILIAC STENTS;  Surgeon: Riki Mancera MD;  Location: [facility] MAIN OR;  Service: Vascular   • APPENDECTOMY     • BREAST SURGERY      Breast Surgery Reduction Procedure   • BRONCHOSCOPY N/A 2/8/2019    Procedure: BRONCHOSCOPY;  Surgeon: Álvaro Gifford MD;  Location: [facility] ENDOSCOPY;  Service: Pulmonary   • CHOLECYSTECTOMY     • HYSTERECTOMY     • INTUBATION  1/15/2019        • JOINT REPLACEMENT      left knee, hip, shoulder   • LASIK     • LUMBAR FUSION      Lumbar Vertebral Fusion   • SHOULDER ARTHROSCOPY  04/04/2013    Dr. Carrillo/[facility] - Resolved   • THORACOSCOPY VIDEO ASSISTED WITH LOBECTOMY Right 1/11/2019    Procedure: BRONCOSCOPY, THORACOSCOPY VIDEO ASSISTED WITH RIGHT UPPER LOBE WEDGE RESECTION, COMPLETION RIGHT UPPER LOBECTOMY, LYMPH NODE DISSECTION, INTERCOSTAL NERVE BLOCK;  Surgeon: Quita Figueroa MD;  Location: [facility] OR;  Service: Thoracic   • TONSILLECTOMY     • TOTAL HIP ARTHROPLASTY REVISION Left    • TOTAL KNEE ARTHROPLASTY Left    • TOTAL SHOULDER ARTHROPLASTY Left    • TOTAL SHOULDER ARTHROPLASTY W/ DISTAL CLAVICLE EXCISION Right 6/18/2020    Procedure: RIGHT TOTAL SHOULDER REVERSE ARTHROPLASTY WITH GPS;  Surgeon: Lino Carrillo MD;  Location: [facility] OSC;  Service: Orthopedics;  Laterality: Right;     General Information     Row Name 06/19/20 1152          PT Evaluation Time/Intention    Document Type   evaluation  -     Mode of Treatment  individual therapy;physical therapy  -     Row Name 06/19/20 1152          General Information    Prior Level of Function  independent:;gait;transfer;bed mobility walker for community use  -     Existing Precautions/Restrictions  fall;non-weight bearing R UE NWB  -     Barriers to Rehab  none identified  -     Row Name 06/19/20 1152          Relationship/Environment    Lives With  spouse  -     Row Name 06/19/20 1152          Resource/Environmental Concerns    Current Living Arrangements  home/apartment/condo  -     Row Name 06/19/20 1152          Home Main Entrance    Number of Stairs, Main Entrance  three  -     Row Name 06/19/20 1152          Cognitive Assessment/Intervention- PT/OT    Orientation Status (Cognition)  oriented x 3  -     Row Name 06/19/20 1152          Safety Issues, Functional Mobility    Impairments Affecting Function (Mobility)  pain;range of motion (ROM);strength  -       User Key  (r) = Recorded By, (t) = Taken By, (c) = Cosigned By    Initials Name Provider Type     Manuela Lizarraga, PT Physical Therapist        Mobility     Row Name 06/19/20 1154          Bed Mobility Assessment/Treatment    Bed Mobility Assessment/Treatment  supine-sit;sit-supine  -     Supine-Sit [place] (Bed Mobility)  supervision  -     Sit-Supine [place] (Bed Mobility)  supervision  -     Row Name 06/19/20 1154          Sit-Stand Transfer    Sit-Stand [place] (Transfers)  verbal cues;stand by assist  -[facility] Name 06/19/20 1154          Gait/Stairs Assessment/Training    [place] Level (Gait)  verbal cues;nonverbal cues (demo/gesture);contact guard  -     Assistive Device (Gait)  cane, quad  -     Distance in Feet (Gait)  35  -     Deviations/Abnormal Patterns (Gait)  janet decreased;gait speed decreased;stride length decreased  -     Bilateral Gait Deviations  forward flexed posture  -     Comment (Gait/Stairs)  No LOB  noted during ambulation, pt reports she has cane at home if needed, PT suggested she use cane when ascending and descending stairs  -       User Key  (r) = Recorded By, (t) = Taken By, (c) = Cosigned By    Initials Name Provider Type    Manuela Loyd PT Physical Therapist        Obj/Interventions     [facility] Name 06/19/20 1156          General ROM    GENERAL ROM COMMENTS  R UE ROM not tested post-op, all other ROM WFL  -[facility] Name 06/19/20 1156          MMT (Manual Muscle Testing)    General MMT Comments  R UE strength no tested post-op, all other strength WFL  -[facility] Name 06/19/20 1156          Therapeutic Exercise    Comment (Therapeutic Exercise)  PT provided handout and education on TSA protocol exercises, pt demonstrated and voiced understanding  -[facility] Name 06/19/20 1156          Static Standing Balance    Level of [place] (Supported Standing, Static Balance)  supervision  -CH     Row Name 06/19/20 1156          Dynamic Standing Balance    Level of [place], Reaches Outside Midline (Standing, Dynamic Balance)  contact guard assist  -       User Key  (r) = Recorded By, (t) = Taken By, (c) = Cosigned By    Initials Name Provider Type    Manuela Loyd, PT Physical Therapist        Goals/Plan     [facility] Name 06/19/20 1201          Transfer Goal 1 (PT)    Activity/Assistive Device (Transfer Goal 1, PT)  transfers, all  -CH     [place] Level/Cues Needed (Transfer Goal 1, PT)  supervision required  -CH     Time Frame (Transfer Goal 1, PT)  1 week  -[facility] Name 06/19/20 1201          Gait Training Goal 1 (PT)    Activity/Assistive Device (Gait Training Goal 1, PT)  gait (walking locomotion)  -     [place] Level (Gait Training Goal 1, PT)  supervision required  -     Distance (Gait Goal 1, PT)  150  -CH     Time Frame (Gait Training Goal 1, PT)  1 week  -[facility] Name 06/19/20 1201          Stairs Goal 1 (PT)    Activity/Assistive Device (Stairs Goal 1, PT)   stairs, all skills  -     [place] Level/Cues Needed (Stairs Goal 1, PT)  contact guard assist  -     Number of Stairs (Stairs Goal 1, PT)  3  -CH     Time Frame (Stairs Goal 1, PT)  1 week  -       User Key  (r) = Recorded By, (t) = Taken By, (c) = Cosigned By    Initials Name Provider Type     Manuela Lizarraga, PT Physical Therapist        Clinical Impression     Row Name 06/19/20 5790          Pain Assessment    Additional Documentation  Pain Scale: Numbers Pre/Post-Treatment (Group)  -     Row Name 06/19/20 6179          Pain Scale: Numbers Pre/Post-Treatment    Pain Scale: Numbers, Pretreatment  8/10  -CH     Pain Scale: Numbers, Post-Treatment  8/10  -CH     Pain Location - Side  Right  -     Pain Location  shoulder  -     Pain Intervention(s)  Cold applied;Repositioned  -     Row Name 06/19/20 6629          Plan of Care Review    Plan of Care Reviewed With  patient  -CH     Outcome Summary  Pt is a 73 yo F who is post-op R reverse TSA. Pt presents to PT with some impaired functional mobiltiy and gait secondary to R UE weakness, decreased ROM, and decreased WBing post-op. Pt reports she uses a walker PRN. Pt educated that she unable to weight bear on her R UE and PT assessed pt's gait and balance with a QC. Pt demonstrated safe ambulation with cane. Pt also demonstrated understanding of exercises and voiced understanding of how to navigate stairs. Pt plans to DC home today. PT will follow up tomorrow if DC is delayed.  -     Row Name 06/19/20 1438          Physical Therapy Clinical Impression    Patient/Family Goals Statement (PT Clinical Impression)  to return to OF  -     Criteria for Skilled Interventions Met (PT Clinical Impression)  treatment indicated  -     Rehab Potential (PT Clinical Summary)  good, to achieve stated therapy goals  -     Row Name 06/19/20 0206          Positioning and Restraints    Pre-Treatment Position  in bed  -     Post Treatment Position  bed   -CH     In Bed  supine;call light within reach;encouraged to call for assist;exit alarm on  -       User Key  (r) = Recorded By, (t) = Taken By, (c) = Cosigned By    Initials Name Provider Type    Manuela Loyd PT Physical Therapist        Outcome Measures     Row Name 06/19/20 1202          How much help from another person do you currently need...    Turning from your back to your side while in flat bed without using bedrails?  4  -CH     Moving from lying on back to sitting on the side of a flat bed without bedrails?  4  -CH     Moving to and from a bed to a chair (including a wheelchair)?  3  -CH     Standing up from a chair using your arms (e.g., wheelchair, bedside chair)?  3  -CH     Climbing 3-5 steps with a railing?  3  -CH     To walk in hospital room?  3  -CH     AM-PAC 6 Clicks Score (PT)  20  -CH     Row Name 06/19/20 1202          Functional Assessment    Outcome Measure Options  AM-PAC 6 Clicks Basic Mobility (PT)  -       User Key  (r) = Recorded By, (t) = Taken By, (c) = Cosigned By    Initials Name Provider Type    Manuela Loyd PT Physical Therapist        Physical Therapy Education                 Title: PT OT SLP Therapies (Done)     Topic: Physical Therapy (Done)     Point: Mobility training (Done)     Description:   Instruct learner(s) on safety and technique for assisting patient out of bed, chair or wheelchair.  Instruct in the proper use of assistive devices, such as walker, crutches, cane or brace.              Patient Friendly Description:   It's important to get you on your feet again, but we need to do so in a way that is safe for you. Falling has serious consequences, and your personal safety is the most important thing of all.        When it's time to get out of bed, one of us or a family member will sit next to you on the bed to give you support.     If your doctor or nurse tells you to use a walker, crutches, a cane, or a brace, be sure you use it every time  you get out of bed, even if you think you don't need it.    Learning Progress Summary           Patient Acceptance, E,TB,D, VU,NR by  at 6/19/2020 1202                   Point: Home exercise program (Done)     Description:   Instruct learner(s) on appropriate technique for monitoring, assisting and/or progressing patient with therapeutic exercises and activities.              Learning Progress Summary           Patient Acceptance, E,TB,D, VU,NR by  at 6/19/2020 1202                   Point: Body mechanics (Done)     Description:   Instruct learner(s) on proper positioning and spine alignment for patient and/or caregiver during mobility tasks and/or exercises.              Learning Progress Summary           Patient Acceptance, E,TB,D, VU,NR by  at 6/19/2020 1202                   Point: Precautions (Done)     Description:   Instruct learner(s) on prescribed precautions during mobility and gait tasks              Learning Progress Summary           Patient Acceptance, E,TB,D, VU,NR by  at 6/19/2020 1202                               User Key     Initials Effective Dates Name Provider Type Discipline     04/03/18 -  Manuela Lizarraga, PT Physical Therapist PT              PT Recommendation and Plan  Planned Therapy Interventions (PT Eval): balance training, bed mobility training, gait training, home exercise program, patient/family education, stair training, strengthening, transfer training  Outcome Summary/Treatment Plan (PT)  Anticipated Discharge Disposition (PT): home with home health, home with OP services  Plan of Care Reviewed With: patient  Outcome Summary: Pt is a 75 yo F who is post-op R reverse TSA. Pt presents to PT with some impaired functional mobiltiy and gait secondary to R UE weakness, decreased ROM, and decreased WBing post-op. Pt reports she uses a walker PRN. Pt educated that she unable to weight bear on her R UE and PT assessed pt's gait and balance with a QC. Pt demonstrated safe  ambulation with cane. Pt also demonstrated understanding of exercises and voiced understanding of how to navigate stairs. Pt plans to DC home today. PT will follow up tomorrow if DC is delayed.     Time Calculation:   PT Charges     Row Name 06/19/20 1119             Time Calculation    Start Time  0924  -      Stop Time  0939  -      Time Calculation (min)  15 min  -      PT Received On  06/19/20  -      PT - Next Appointment  06/20/20  -      PT Goal Re-Cert Due Date  06/26/20  -         Time Calculation- PT    Total Timed Code Minutes- PT  8 minute(s)  -        User Key  (r) = Recorded By, (t) = Taken By, (c) = Cosigned By    Initials Name Provider Type     Manuela Lizarraga, PT Physical Therapist        Therapy Charges for Today     Code Description Service Date Service Provider Modifiers Qty    [id number]  PT EVAL MOD COMPLEXITY 2 6/19/2020 Manuela Lizarraga, PT GP 1    [id number]  PT THER PROC EA 15 MIN 6/19/2020 Manuela Lizarraga, PT GP 1          PT G-Codes  Outcome Measure Options: AM-PAC 6 Clicks Basic Mobility (PT)  AM-PAC 6 Clicks Score (PT): 20    Manuela Lizarraga PT  6/19/2020

## 2020-06-20 NOTE — OUTREACH NOTE
Prep Survey      Responses   [facility] [place] operated by [facility] patient discharged from?  [place]   Is LACE score < 7 ?  No   Eligibility  [facility]   Date of Admission  06/18/20   Date of Discharge  06/19/20   Discharge Disposition  Home or Self Care   Discharge diagnosis  reverse total shoulder arthroplasty,    COVID-19 Test Status  Negative   Does the patient have one of the following disease processes/diagnoses(primary or secondary)?  Total Joint Replacement   Does the patient have Home health ordered?  No   Is there a DME ordered?  No   Prep survey completed?  Yes          Alicia Mccualey RN

## 2020-06-22 ENCOUNTER — TRANSITIONAL CARE MANAGEMENT TELEPHONE ENCOUNTER (OUTPATIENT)
Dept: CALL CENTER | Facility: HOSPITAL | Age: 75
End: 2020-06-22

## 2020-06-22 DIAGNOSIS — D50.9 IRON DEFICIENCY ANEMIA, UNSPECIFIED IRON DEFICIENCY ANEMIA TYPE: ICD-10-CM

## 2020-06-22 DIAGNOSIS — J06.9 UPPER RESPIRATORY TRACT INFECTION, UNSPECIFIED TYPE: ICD-10-CM

## 2020-06-22 RX ORDER — OMEPRAZOLE 40 MG/1
40 CAPSULE, DELAYED RELEASE ORAL DAILY
Qty: 90 CAPSULE | Refills: 0 | Status: SHIPPED | OUTPATIENT
Start: 2020-06-22 | End: 2020-08-30 | Stop reason: HOSPADM

## 2020-06-22 RX ORDER — FERROUS SULFATE 325(65) MG
TABLET ORAL
Qty: 90 TABLET | Refills: 0 | OUTPATIENT
Start: 2020-06-22

## 2020-06-22 NOTE — OUTREACH NOTE
Call Center TCM Note      Responses   [facility] patient discharged from?  [place]   Does the patient have one of the following disease processes/diagnoses(primary or secondary)?  Total Joint Replacement   Joint surgery performed?  Shoulder   TCM attempt successful?  Yes   Call start time  0955   Call end time  1004   Has the patient been back in either the hospital or Emergency Department since discharge?  No   Discharge diagnosis  reverse total shoulder arthroplasty,    Is patient permission given to speak with other caregiver?  Yes   List who call center can speak with  Rai Arguelles, spouse   Person spoke with today (if not patient) and relationship  spoke to  and patient   Does the patient have all medications related to this admission filled (includes all antibiotics, pain medications, etc.)  Yes   Is the patient taking all medications as directed (includes completed medication regime)?  Yes   Is the patient able to teach back alternate methods of pain control?  Ice, Shoulder-elevate above heart/ keep in sling as advised   Does the patient have a follow up appointment with their surgeon?  No   What is preventing the patient from scheduling follow up appointments within 7 days of discharge?  Haven't had time   Nursing Interventions  Educated patient on importance of making appointment, Advised patient to make appointment   Has the patient kept scheduled appointments due by today?  N/A   Comments  No hospital f/u appts in place at this time.    Has home health visited the patient within 72 hours of discharge?  N/A   DME comments  Patient to wear sling at all times except for shower, dressing and doing pendulum exercises.    Psychosocial issues?  No   Has the patient began therapy sessions (either in the home or as an out patient)?  No   Does the patient have a wound vac in place?  No   Has the patient fallen since discharge?  No   Did the patient receive a copy of their discharge instructions?  Yes    Nursing interventions  Reviewed instructions with patient   What is the patient's perception of their functional status since discharge?  Same [Patient reports that she has had a lot of pain since discharge. ]   Is the patient able to teach back signs and symptoms of infection?  Temp >100.4 for 24h or longer, Incisional drainage, Increased swelling or redness around incision (not associated with surgical edema), Severe discomfort or pain   Is the patient able to teach back how to prevent infection?  Check incision daily   Did the patient implement home safety suggestions from pre-surgery classes if attended?  N/A   Is the patient/caregiver able to teach back the hierarchy of who to call/visit for symptoms/problems? PCP, Specialist, Home health nurse, Urgent Care, ED, 911  Yes   Additional teach back comments  Encouraged patient to use ice 20 minutes every hour to shoulder for pain.    TCM call completed?  Yes          Ying Hdez RN    6/22/2020, 10:04

## 2020-06-23 ENCOUNTER — TELEMEDICINE (OUTPATIENT)
Dept: INTERNAL MEDICINE | Facility: CLINIC | Age: 75
End: 2020-06-23

## 2020-06-23 DIAGNOSIS — Z09 HOSPITAL DISCHARGE FOLLOW-UP: ICD-10-CM

## 2020-06-23 DIAGNOSIS — R42 VERTIGO DUE TO BRAIN INJURY (HCC): ICD-10-CM

## 2020-06-23 DIAGNOSIS — S06.9XAA VERTIGO DUE TO BRAIN INJURY (HCC): ICD-10-CM

## 2020-06-23 DIAGNOSIS — F07.81 POSTCONCUSSION SYNDROME: ICD-10-CM

## 2020-06-23 DIAGNOSIS — S10.93XA HEMATOMA OF NECK, INITIAL ENCOUNTER: ICD-10-CM

## 2020-06-23 DIAGNOSIS — D50.9 IRON DEFICIENCY ANEMIA, UNSPECIFIED IRON DEFICIENCY ANEMIA TYPE: Primary | ICD-10-CM

## 2020-06-23 PROCEDURE — 99496 TRANSJ CARE MGMT HIGH F2F 7D: CPT | Performed by: FAMILY MEDICINE

## 2020-06-23 RX ORDER — DOCUSATE SODIUM 100 MG/1
100 CAPSULE, LIQUID FILLED ORAL 2 TIMES DAILY PRN
COMMUNITY
End: 2021-01-01

## 2020-06-23 RX ORDER — ONDANSETRON 4 MG/1
4 TABLET, FILM COATED ORAL EVERY 8 HOURS PRN
Qty: 30 TABLET | Refills: 2 | Status: SHIPPED | OUTPATIENT
Start: 2020-06-23 | End: 2020-09-11 | Stop reason: SDUPTHER

## 2020-06-23 NOTE — PROGRESS NOTES
Subjective   Rachana Arguelles is a 74 y.o. female.     Chief Complaint   Patient presents with   • Transitional Care Management     Current outpatient and discharge medications have been reconciled for the patient.  Reviewed by: Rhys Crowder Jr., MD    This was an audio and video enabled telemedicine encounter.You have chosen to receive care through a telehealth visit.  Do you consent to use a video/audio connection for your medical care today? Yes  History of Present Illness   Transitional Care Management Certification  I certify that the following are true:  1. Communication was not medically needed prior to appointment because face to face visit occurred within two business days of discharge.  2. Complexity of Medical Decision Making is high.  3. Face to face visit occurred within 5 days.    *Note: 01616 is for high complexity patients with a face to face visit within 7 days of discharge.  19485 is for high complexity patients with a face to face on days 8-14 post discharge or medium complexity with face to face visit within 14 days post discharge.    This is a complex follow-up for transitional care management after hospitalization for right total shoulder replacement by Dr. Carrillo.    Problems addressed include a bump on the back of the neck which is sore she has evidence of a hematoma on video with bruising extending down the the right upper back.    She has a follow-up with Dr. Carrillo next week.    Hemoglobin upon discharge 4 days ago was 8.4 resulting in some lethargy.    She has had nausea no appetite is back on her usual pain pills Roxicet is given to her by Dr. Harper for pain management.    She is also been having postconcussion syndrome with vertigo and has had physical therapy in that regard recently.  She still has some dizziness and does have meclizine at home.  Her medications are reviewed in depth and are quite lengthy.    We discussed using Flintstones with iron as a supplement plus Zofran for nausea  which will be supplied and refilled.      The following portions of the patient's history were reviewed and updated as appropriate: allergies, current medications, past social history and problem list.    Review of Systems   Constitutional: Positive for fatigue.   HENT: Negative.    Eyes: Negative.    Respiratory: Negative.    Cardiovascular: Negative.    Gastrointestinal: Positive for nausea.   Endocrine: Negative.    Genitourinary: Negative.    Musculoskeletal: Positive for arthralgias, myalgias, neck pain and neck stiffness.   Skin: Negative.    Allergic/Immunologic: Negative.    Neurological: Negative.    Hematological: Negative.    Psychiatric/Behavioral: Negative.        Objective   There were no vitals filed for this visit.  Physical Exam   Constitutional:   Patient obviously does not feel well postoperatively.   HENT:   Head: Normocephalic and atraumatic.   Eyes: Pupils are equal, round, and reactive to light.   Neck: Muscular tenderness present. Decreased range of motion present.       Pulmonary/Chest: Effort normal.   Neurological: She is alert.   Psychiatric: She has a normal mood and affect.    Physical Exam   Constitutional:   Patient obviously does not feel well postoperatively.   HENT:   Head: Normocephalic and atraumatic.   Eyes: Pupils are equal, round, and reactive to light.   Neck: Muscular tenderness present. Decreased range of motion present.       Pulmonary/Chest: Effort normal.   .      Neurological: She is alert.   Psychiatric: She has a normal mood and affect.       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  She will need to recheck CBC fairly soon.

## 2020-07-01 ENCOUNTER — READMISSION MANAGEMENT (OUTPATIENT)
Dept: CALL CENTER | Facility: HOSPITAL | Age: 75
End: 2020-07-01

## 2020-07-01 NOTE — OUTREACH NOTE
Total Joint Week 2 Survey      Responses   [facility] patient discharged from?  [place]   Does the patient have one of the following disease processes/diagnoses(primary or secondary)?  Total Joint Replacement   Joint surgery performed?  Shoulder   Week 2 attempt successful?  Yes   Call start time  1029   Rescheduled  Rescheduled-pt requested   Call end time  1029          Kindra Coles RN

## 2020-07-02 ENCOUNTER — READMISSION MANAGEMENT (OUTPATIENT)
Dept: CALL CENTER | Facility: HOSPITAL | Age: 75
End: 2020-07-02

## 2020-07-02 NOTE — OUTREACH NOTE
Total Joint Week 2 Survey      Responses   [facility] patient discharged from?  [place]   Does the patient have one of the following disease processes/diagnoses(primary or secondary)?  Total Joint Replacement   Joint surgery performed?  Shoulder   Week 2 attempt successful?  Yes   Call start time  1635   Call end time  1642   Has the patient been back in either the hospital or Emergency Department since discharge?  No   Discharge diagnosis  reverse total shoulder arthroplasty,    Is patient permission given to speak with other caregiver?  Yes   List who call center can speak with  Rai Arguelles, spouse   Does the patient have all medications related to this admission filled (includes all antibiotics, pain medications, etc.)  Yes   Is the patient taking all medications as directed (includes completed medication regime)?  Yes   Is the patient able to teach back alternate methods of pain control?  Ice, Shoulder-elevate above heart/ keep in sling as advised   Does the patient have a follow up appointment with their surgeon?  Yes [7/9/20]   Has the patient kept scheduled appointments due by today?  Yes [telemedicine appt with PCP]   Has home health visited the patient within 72 hours of discharge?  N/A   Psychosocial issues?  No   Has the patient began therapy sessions (either in the home or as an out patient)?  No   Does the patient have a wound vac in place?  N/A   Has the patient fallen since discharge?  No   Did the patient receive a copy of their discharge instructions?  Yes   Nursing interventions  Reviewed instructions with patient   What is the patient's perception of their functional status since discharge?  Improving   Is the patient able to teach back signs and symptoms of infection?  Severe discomfort or pain, Blisters around incision, Increased swelling or redness around incision (not associated with surgical edema), Incisional drainage, Temp >100.4 for 24h or longer, Changes in mobility   Is the  patient able to teach back how to prevent infection?  Check incision daily, Wash hands before and after touching incision   Is the patient able to teach back home safety measures?  Modifications with ADLs such as dressing, cooking, toileting   Did the patient implement home safety suggestions from pre-surgery classes if attended?  N/A   Is the patient/caregiver able to teach back the hierarchy of who to call/visit for symptoms/problems? PCP, Specialist, Home health nurse, Urgent Care, ED, 911  Yes   Week 2 call completed?  Yes          Ying Hdez RN

## 2020-07-20 ENCOUNTER — READMISSION MANAGEMENT (OUTPATIENT)
Dept: CALL CENTER | Facility: HOSPITAL | Age: 75
End: 2020-07-20

## 2020-07-20 NOTE — OUTREACH NOTE
"Total Joint Month 1 Survey      Responses   [facility] patient discharged from?  [place]   Does the patient have one of the following disease processes/diagnoses(primary or secondary)?  Total Joint Replacement   Joint surgery performed?  Shoulder   Month 1 attempt successful?  Yes   Call start time  1345   Call end time  1347   Has the patient been back in either the hospital or Emergency Department since discharge?  No   Discharge diagnosis  reverse total shoulder arthroplasty,    Is the patient taking all medications as directed (includes completed medication regime)?  Yes   Has the patient kept scheduled appointments due by today?  Yes   Is the patient still receiving Home Health Services?  N/A   Is the patient still attending therapy sessions(either in the home or as an outpatient)?  Yes [Patient reports she has a \"new ailment in her back\" that is interfering with therapy slightly, but she is still doing some therapy.]   Has the patient fallen since discharge?  No   What is the patient's perception of their functional status since discharge?  Improving   Month 1 call completed?  Yes   Wrap up additional comments  Patient reports she is doing well regarding shoulder repair.  Denies any needs during this call.          Bobbi Stephenson RN  "

## 2020-07-24 ENCOUNTER — TELEPHONE (OUTPATIENT)
Dept: SURGERY | Facility: CLINIC | Age: 75
End: 2020-07-24

## 2020-07-24 NOTE — TELEPHONE ENCOUNTER
----- Message from REENA Allen sent at 7/24/2020  1:41 PM EDT -----  Regarding: RE:  That’s absolutely perfect! Thank you!  ----- Message -----  From: Vangie Robles  Sent: 7/24/2020   1:33 PM EDT  To: REENA Allen    Mr. Arguelles called today @ 1:35pm.  He is asking for an order for oxygen.  She is a patient of [facility], Dr. Gifford. He said he has asked Dr. Gifford for oxygen in the past.  I explained to him, we normally don't order oxygen, that would come from her pulmonologist.  He said he would give that office a call.  I gave him [facility] phone number.    I also asked him that if she is getting more SOA that he should take her to the ER.    I wanted to make sure you are in agreement with this plan.      Thank you.

## 2020-07-28 ENCOUNTER — TELEPHONE (OUTPATIENT)
Dept: INTERNAL MEDICINE | Facility: CLINIC | Age: 75
End: 2020-07-28

## 2020-07-28 NOTE — TELEPHONE ENCOUNTER
PT CALLED STATING SHE HAS TO GET TESTED FOR COVID-19 BEFORE BEGINNING PHYSICAL THERAPY FOR HER SHOULDER SURGERY. SHE HAS NO FEVER, NO COUGH, BUT HAS BEEN SHORT OF BREATH AND EXPERIENCING WEAKNESS.    PLEASE ADVISE WHERE SHE CAN GO TO GET TESTED, AND IF SHE NEEDS ORDERS.    CALLBACK NUMBER: [phone]

## 2020-08-03 RX ORDER — TRAZODONE HYDROCHLORIDE 150 MG/1
75 TABLET ORAL NIGHTLY
Qty: 45 TABLET | Refills: 1 | Status: SHIPPED | OUTPATIENT
Start: 2020-08-03 | End: 2020-08-30 | Stop reason: HOSPADM

## 2020-08-10 RX ORDER — MECLIZINE HYDROCHLORIDE 25 MG/1
TABLET ORAL
Qty: 30 TABLET | Refills: 1 | Status: SHIPPED | OUTPATIENT
Start: 2020-08-10 | End: 2020-11-09

## 2020-08-13 ENCOUNTER — TRANSCRIBE ORDERS (OUTPATIENT)
Dept: ADMINISTRATIVE | Facility: HOSPITAL | Age: 75
End: 2020-08-13

## 2020-08-13 DIAGNOSIS — R06.00 DYSPNEA, UNSPECIFIED TYPE: Primary | ICD-10-CM

## 2020-08-14 PROBLEM — R06.02 SHORTNESS OF BREATH: Status: ACTIVE | Noted: 2020-08-14

## 2020-08-14 PROBLEM — N17.9 AKI (ACUTE KIDNEY INJURY) (HCC): Status: ACTIVE | Noted: 2020-08-14

## 2020-08-14 PROBLEM — I49.3 FREQUENT PVCS: Status: ACTIVE | Noted: 2020-08-14

## 2020-08-14 PROBLEM — G43.009 MIGRAINE WITHOUT AURA AND WITHOUT STATUS MIGRAINOSUS, NOT INTRACTABLE: Status: ACTIVE | Noted: 2020-05-21

## 2020-08-14 PROBLEM — G44.40 MEDICATION OVERUSE HEADACHE: Status: ACTIVE | Noted: 2020-05-21

## 2020-08-14 PROBLEM — Z90.2 S/P LOBECTOMY OF LUNG: Status: ACTIVE | Noted: 2020-08-14

## 2020-08-14 PROBLEM — G44.329 CHRONIC POST-TRAUMATIC HEADACHE, NOT INTRACTABLE: Status: ACTIVE | Noted: 2020-05-21

## 2020-08-14 PROBLEM — D64.9 ANEMIA: Status: ACTIVE | Noted: 2020-08-14

## 2020-08-14 PROBLEM — I73.9 PAD (PERIPHERAL ARTERY DISEASE) (HCC): Status: ACTIVE | Noted: 2020-08-14

## 2020-08-14 PROBLEM — I65.23 BILATERAL CAROTID ARTERY STENOSIS: Status: ACTIVE | Noted: 2020-08-14

## 2020-08-17 ENCOUNTER — TRANSCRIBE ORDERS (OUTPATIENT)
Dept: SLEEP MEDICINE | Facility: HOSPITAL | Age: 75
End: 2020-08-17

## 2020-08-17 DIAGNOSIS — Z01.818 OTHER SPECIFIED PRE-OPERATIVE EXAMINATION: Primary | ICD-10-CM

## 2020-08-18 ENCOUNTER — LAB (OUTPATIENT)
Dept: LAB | Facility: HOSPITAL | Age: 75
End: 2020-08-18

## 2020-08-18 DIAGNOSIS — Z01.818 OTHER SPECIFIED PRE-OPERATIVE EXAMINATION: ICD-10-CM

## 2020-08-18 PROCEDURE — U0004 COV-19 TEST NON-CDC HGH THRU: HCPCS

## 2020-08-18 PROCEDURE — C9803 HOPD COVID-19 SPEC COLLECT: HCPCS

## 2020-08-19 ENCOUNTER — READMISSION MANAGEMENT (OUTPATIENT)
Dept: CALL CENTER | Facility: HOSPITAL | Age: 75
End: 2020-08-19

## 2020-08-19 ENCOUNTER — APPOINTMENT (OUTPATIENT)
Dept: NUCLEAR MEDICINE | Facility: HOSPITAL | Age: 75
End: 2020-08-19

## 2020-08-19 LAB
REF LAB TEST METHOD: NORMAL
SARS-COV-2 RNA RESP QL NAA+PROBE: NOT DETECTED

## 2020-08-19 NOTE — OUTREACH NOTE
Total Joint Month 2 Survey      Responses   [facility] patient discharged from?  [place]   Does the patient have one of the following disease processes/diagnoses(primary or secondary)?  Total Joint Replacement   Joint surgery performed?  Shoulder   Month 2 attempt successful?  Yes   Call start time  1343   Call end time  1346   Has the patient been back in either the hospital or Emergency Department since discharge?  No   Discharge diagnosis  reverse total shoulder arthroplasty,    Is the patient taking all medications as directed (includes completed medication regime)?  Yes   Has the patient kept scheduled appointments due by today?  Yes   Comments  Patient has an appointment at 1245pm for CXR   Is the patient still receiving Home Health Services?  N/A   Is the patient still attending therapy sessions(either in the home or as an outpatient)?  No   Has the patient fallen since discharge?  No   Comments  Patient states shoulder is doing well.  She currently has cold symptoms and had covid test yesterday.     What is the patient's perception of their functional status since discharge?  Improving   Month 2 Call Completed?  Yes   Wrap up additional comments  Patient reports she is doing well regarding shoulder repair.  Denies any needs during this call.          Bobbi Stephenson RN

## 2020-08-20 ENCOUNTER — HOSPITAL ENCOUNTER (OUTPATIENT)
Dept: NUCLEAR MEDICINE | Facility: HOSPITAL | Age: 75
Discharge: HOME | End: 2020-08-20

## 2020-08-20 ENCOUNTER — HOSPITAL ENCOUNTER (OUTPATIENT)
Dept: GENERAL RADIOLOGY | Facility: HOSPITAL | Age: 75
Discharge: HOME | End: 2020-08-20
Admitting: INTERNAL MEDICINE

## 2020-08-20 DIAGNOSIS — R06.00 DYSPNEA, UNSPECIFIED TYPE: ICD-10-CM

## 2020-08-20 DIAGNOSIS — R06.09 DYSPNEA ON EXERTION: ICD-10-CM

## 2020-08-20 PROCEDURE — A9540 TC99M MAA: HCPCS | Performed by: INTERNAL MEDICINE

## 2020-08-20 PROCEDURE — 0 TECHNETIUM ALBUMIN AGGREGATED: Performed by: INTERNAL MEDICINE

## 2020-08-20 PROCEDURE — A9558 XE133 XENON 10MCI: HCPCS | Performed by: INTERNAL MEDICINE

## 2020-08-20 PROCEDURE — 78582 LUNG VENTILAT&PERFUS IMAGING: CPT

## 2020-08-20 PROCEDURE — 71046 X-RAY EXAM CHEST 2 VIEWS: CPT

## 2020-08-20 PROCEDURE — 0 XENON XE 133: Performed by: INTERNAL MEDICINE

## 2020-08-20 RX ADMIN — Medication 1 DOSE: at 13:14

## 2020-08-20 RX ADMIN — XENON XE-133 8.3 MILLICURIE: 10 GAS RESPIRATORY (INHALATION) at 13:12

## 2020-08-23 ENCOUNTER — APPOINTMENT (OUTPATIENT)
Dept: GENERAL RADIOLOGY | Facility: HOSPITAL | Age: 75
End: 2020-08-23

## 2020-08-23 ENCOUNTER — HOSPITAL ENCOUNTER (INPATIENT)
Facility: HOSPITAL | Age: 75
LOS: 7 days | Discharge: HOME HEALTH | End: 2020-08-30
Attending: EMERGENCY MEDICINE | Admitting: HOSPITALIST

## 2020-08-23 ENCOUNTER — READMISSION MANAGEMENT (OUTPATIENT)
Dept: CALL CENTER | Facility: HOSPITAL | Age: 75
End: 2020-08-23

## 2020-08-23 DIAGNOSIS — Z20.822 SUSPECTED COVID-19 VIRUS INFECTION: ICD-10-CM

## 2020-08-23 DIAGNOSIS — J18.9 PNEUMONIA OF BOTH LOWER LOBES DUE TO INFECTIOUS ORGANISM: Primary | ICD-10-CM

## 2020-08-23 DIAGNOSIS — D64.9 CHRONIC ANEMIA: ICD-10-CM

## 2020-08-23 PROBLEM — Z85.118 HISTORY OF LUNG CANCER: Status: ACTIVE | Noted: 2020-08-23

## 2020-08-23 PROBLEM — I16.0 HYPERTENSIVE URGENCY: Status: ACTIVE | Noted: 2020-08-23

## 2020-08-23 LAB
ALBUMIN SERPL-MCNC: 3.6 G/DL (ref 3.5–5.2)
ALBUMIN/GLOB SERPL: 1.3 G/DL
ALP SERPL-CCNC: 128 U/L (ref 39–117)
ALT SERPL W P-5'-P-CCNC: 11 U/L (ref 1–33)
ANION GAP SERPL CALCULATED.3IONS-SCNC: 14.5 MMOL/L (ref 5–15)
AST SERPL-CCNC: 16 U/L (ref 1–32)
B PARAPERT DNA SPEC QL NAA+PROBE: NOT DETECTED
B PERT DNA SPEC QL NAA+PROBE: NOT DETECTED
BASOPHILS # BLD AUTO: 0.03 10*3/MM3 (ref 0–0.2)
BASOPHILS NFR BLD AUTO: 0.3 % (ref 0–1.5)
BILIRUB SERPL-MCNC: 0.3 MG/DL (ref 0–1.2)
BUN SERPL-MCNC: 20 MG/DL (ref 8–23)
BUN/CREAT SERPL: 11.8 (ref 7–25)
C PNEUM DNA NPH QL NAA+NON-PROBE: NOT DETECTED
CALCIUM SPEC-SCNC: 8.7 MG/DL (ref 8.6–10.5)
CARBAMAZEPINE SERPL-MCNC: 6 MCG/ML (ref 4–12)
CHLORIDE SERPL-SCNC: 99 MMOL/L (ref 98–107)
CO2 SERPL-SCNC: 20.5 MMOL/L (ref 22–29)
CREAT SERPL-MCNC: 1.7 MG/DL (ref 0.57–1)
D-LACTATE SERPL-SCNC: 0.7 MMOL/L (ref 0.5–2)
DEPRECATED RDW RBC AUTO: 44.9 FL (ref 37–54)
EOSINOPHIL # BLD AUTO: 0.09 10*3/MM3 (ref 0–0.4)
EOSINOPHIL NFR BLD AUTO: 0.8 % (ref 0.3–6.2)
ERYTHROCYTE [DISTWIDTH] IN BLOOD BY AUTOMATED COUNT: 13.1 % (ref 12.3–15.4)
FLUAV H1 2009 PAND RNA NPH QL NAA+PROBE: NOT DETECTED
FLUAV H1 HA GENE NPH QL NAA+PROBE: NOT DETECTED
FLUAV H3 RNA NPH QL NAA+PROBE: NOT DETECTED
FLUAV SUBTYP SPEC NAA+PROBE: NOT DETECTED
FLUBV RNA ISLT QL NAA+PROBE: NOT DETECTED
GFR SERPL CREATININE-BSD FRML MDRD: 29 ML/MIN/1.73
GLOBULIN UR ELPH-MCNC: 2.8 GM/DL
GLUCOSE SERPL-MCNC: 118 MG/DL (ref 65–99)
HADV DNA SPEC NAA+PROBE: NOT DETECTED
HCOV 229E RNA SPEC QL NAA+PROBE: NOT DETECTED
HCOV HKU1 RNA SPEC QL NAA+PROBE: NOT DETECTED
HCOV NL63 RNA SPEC QL NAA+PROBE: NOT DETECTED
HCOV OC43 RNA SPEC QL NAA+PROBE: NOT DETECTED
HCT VFR BLD AUTO: 27.6 % (ref 34–46.6)
HGB BLD-MCNC: 9.1 G/DL (ref 12–15.9)
HMPV RNA NPH QL NAA+NON-PROBE: NOT DETECTED
HPIV1 RNA SPEC QL NAA+PROBE: NOT DETECTED
HPIV2 RNA SPEC QL NAA+PROBE: NOT DETECTED
HPIV3 RNA NPH QL NAA+PROBE: NOT DETECTED
HPIV4 P GENE NPH QL NAA+PROBE: NOT DETECTED
IMM GRANULOCYTES # BLD AUTO: 0.07 10*3/MM3 (ref 0–0.05)
IMM GRANULOCYTES NFR BLD AUTO: 0.6 % (ref 0–0.5)
LYMPHOCYTES # BLD AUTO: 0.82 10*3/MM3 (ref 0.7–3.1)
LYMPHOCYTES NFR BLD AUTO: 7.2 % (ref 19.6–45.3)
M PNEUMO IGG SER IA-ACNC: NOT DETECTED
MCH RBC QN AUTO: 30.7 PG (ref 26.6–33)
MCHC RBC AUTO-ENTMCNC: 33 G/DL (ref 31.5–35.7)
MCV RBC AUTO: 93.2 FL (ref 79–97)
MONOCYTES # BLD AUTO: 0.82 10*3/MM3 (ref 0.1–0.9)
MONOCYTES NFR BLD AUTO: 7.2 % (ref 5–12)
NEUTROPHILS NFR BLD AUTO: 83.9 % (ref 42.7–76)
NEUTROPHILS NFR BLD AUTO: 9.63 10*3/MM3 (ref 1.7–7)
NRBC BLD AUTO-RTO: 0 /100 WBC (ref 0–0.2)
NT-PROBNP SERPL-MCNC: 2137 PG/ML (ref 0–900)
PLATELET # BLD AUTO: 264 10*3/MM3 (ref 140–450)
PMV BLD AUTO: 9.4 FL (ref 6–12)
POTASSIUM SERPL-SCNC: 4 MMOL/L (ref 3.5–5.2)
PROCALCITONIN SERPL-MCNC: 0.11 NG/ML (ref 0–0.25)
PROT SERPL-MCNC: 6.4 G/DL (ref 6–8.5)
RBC # BLD AUTO: 2.96 10*6/MM3 (ref 3.77–5.28)
RESP PATH DNA+RNA PNL NPH NAA+NON-PROBE: NOT DETECTED
RHINOVIRUS RNA SPEC NAA+PROBE: NOT DETECTED
RSV RNA NPH QL NAA+NON-PROBE: NOT DETECTED
SODIUM SERPL-SCNC: 134 MMOL/L (ref 136–145)
TROPONIN T SERPL-MCNC: <0.01 NG/ML (ref 0–0.03)
WBC # BLD AUTO: 11.46 10*3/MM3 (ref 3.4–10.8)

## 2020-08-23 PROCEDURE — 93005 ELECTROCARDIOGRAM TRACING: CPT | Performed by: EMERGENCY MEDICINE

## 2020-08-23 PROCEDURE — 80053 COMPREHEN METABOLIC PANEL: CPT | Performed by: EMERGENCY MEDICINE

## 2020-08-23 PROCEDURE — 87040 BLOOD CULTURE FOR BACTERIA: CPT | Performed by: EMERGENCY MEDICINE

## 2020-08-23 PROCEDURE — 85025 COMPLETE CBC W/AUTO DIFF WBC: CPT | Performed by: EMERGENCY MEDICINE

## 2020-08-23 PROCEDURE — 25010000002 ENOXAPARIN PER 10 MG: Performed by: HOSPITALIST

## 2020-08-23 PROCEDURE — 83880 ASSAY OF NATRIURETIC PEPTIDE: CPT | Performed by: EMERGENCY MEDICINE

## 2020-08-23 PROCEDURE — 93010 ELECTROCARDIOGRAM REPORT: CPT | Performed by: INTERNAL MEDICINE

## 2020-08-23 PROCEDURE — 25010000002 AZITHROMYCIN PER 500 MG: Performed by: EMERGENCY MEDICINE

## 2020-08-23 PROCEDURE — 36415 COLL VENOUS BLD VENIPUNCTURE: CPT

## 2020-08-23 PROCEDURE — 84484 ASSAY OF TROPONIN QUANT: CPT | Performed by: EMERGENCY MEDICINE

## 2020-08-23 PROCEDURE — 0202U NFCT DS 22 TRGT SARS-COV-2: CPT | Performed by: EMERGENCY MEDICINE

## 2020-08-23 PROCEDURE — 83605 ASSAY OF LACTIC ACID: CPT | Performed by: EMERGENCY MEDICINE

## 2020-08-23 PROCEDURE — 71045 X-RAY EXAM CHEST 1 VIEW: CPT

## 2020-08-23 PROCEDURE — 25010000002 FUROSEMIDE PER 20 MG: Performed by: HOSPITALIST

## 2020-08-23 PROCEDURE — 99285 EMERGENCY DEPT VISIT HI MDM: CPT

## 2020-08-23 PROCEDURE — 80156 ASSAY CARBAMAZEPINE TOTAL: CPT | Performed by: EMERGENCY MEDICINE

## 2020-08-23 PROCEDURE — 84145 PROCALCITONIN (PCT): CPT | Performed by: EMERGENCY MEDICINE

## 2020-08-23 PROCEDURE — 25010000002 CEFTRIAXONE PER 250 MG: Performed by: EMERGENCY MEDICINE

## 2020-08-23 PROCEDURE — 25010000002 CEFTRIAXONE PER 250 MG: Performed by: HOSPITALIST

## 2020-08-23 RX ORDER — LORAZEPAM 0.5 MG/1
0.5 TABLET ORAL EVERY 8 HOURS PRN
Status: DISCONTINUED | OUTPATIENT
Start: 2020-08-23 | End: 2020-08-30 | Stop reason: HOSPADM

## 2020-08-23 RX ORDER — CLOPIDOGREL BISULFATE 75 MG/1
75 TABLET ORAL DAILY
Status: DISCONTINUED | OUTPATIENT
Start: 2020-08-24 | End: 2020-08-30 | Stop reason: HOSPADM

## 2020-08-23 RX ORDER — AMLODIPINE BESYLATE 10 MG/1
10 TABLET ORAL EVERY MORNING
Status: DISCONTINUED | OUTPATIENT
Start: 2020-08-24 | End: 2020-08-30 | Stop reason: HOSPADM

## 2020-08-23 RX ORDER — NITROGLYCERIN 0.4 MG/1
0.4 TABLET SUBLINGUAL
Status: DISCONTINUED | OUTPATIENT
Start: 2020-08-23 | End: 2020-08-30 | Stop reason: HOSPADM

## 2020-08-23 RX ORDER — CARVEDILOL 25 MG/1
25 TABLET ORAL 2 TIMES DAILY WITH MEALS
Status: DISCONTINUED | OUTPATIENT
Start: 2020-08-23 | End: 2020-08-30 | Stop reason: HOSPADM

## 2020-08-23 RX ORDER — HYDRALAZINE HYDROCHLORIDE 50 MG/1
50 TABLET, FILM COATED ORAL 3 TIMES DAILY
Status: DISCONTINUED | OUTPATIENT
Start: 2020-08-23 | End: 2020-08-25

## 2020-08-23 RX ORDER — ERGOCALCIFEROL 1.25 MG/1
50000 CAPSULE ORAL WEEKLY
Status: DISCONTINUED | OUTPATIENT
Start: 2020-08-24 | End: 2020-08-26

## 2020-08-23 RX ORDER — ONDANSETRON 4 MG/1
4 TABLET, FILM COATED ORAL EVERY 6 HOURS PRN
Status: DISCONTINUED | OUTPATIENT
Start: 2020-08-23 | End: 2020-08-30 | Stop reason: HOSPADM

## 2020-08-23 RX ORDER — CEFTRIAXONE SODIUM 1 G/50ML
1 INJECTION, SOLUTION INTRAVENOUS EVERY 24 HOURS
Status: COMPLETED | OUTPATIENT
Start: 2020-08-23 | End: 2020-08-29

## 2020-08-23 RX ORDER — DOCUSATE SODIUM 100 MG/1
100 CAPSULE, LIQUID FILLED ORAL 2 TIMES DAILY PRN
Status: DISCONTINUED | OUTPATIENT
Start: 2020-08-23 | End: 2020-08-30 | Stop reason: HOSPADM

## 2020-08-23 RX ORDER — OXYMETAZOLINE HYDROCHLORIDE 0.05 G/100ML
2 SPRAY NASAL 2 TIMES DAILY
Status: COMPLETED | OUTPATIENT
Start: 2020-08-23 | End: 2020-08-26

## 2020-08-23 RX ORDER — PANTOPRAZOLE SODIUM 40 MG/1
40 TABLET, DELAYED RELEASE ORAL EVERY MORNING
Status: DISCONTINUED | OUTPATIENT
Start: 2020-08-24 | End: 2020-08-30 | Stop reason: HOSPADM

## 2020-08-23 RX ORDER — OXYMETAZOLINE HYDROCHLORIDE 0.05 G/100ML
2 SPRAY NASAL 3 TIMES DAILY PRN
COMMUNITY
End: 2020-08-30 | Stop reason: HOSPADM

## 2020-08-23 RX ORDER — ACETAMINOPHEN 500 MG
1000 TABLET ORAL ONCE
Status: COMPLETED | OUTPATIENT
Start: 2020-08-23 | End: 2020-08-23

## 2020-08-23 RX ORDER — MECLIZINE HYDROCHLORIDE 25 MG/1
25 TABLET ORAL 3 TIMES DAILY PRN
Status: DISCONTINUED | OUTPATIENT
Start: 2020-08-23 | End: 2020-08-30 | Stop reason: HOSPADM

## 2020-08-23 RX ORDER — VENLAFAXINE HYDROCHLORIDE 150 MG/1
150 CAPSULE, EXTENDED RELEASE ORAL DAILY
Status: DISCONTINUED | OUTPATIENT
Start: 2020-08-24 | End: 2020-08-30 | Stop reason: HOSPADM

## 2020-08-23 RX ORDER — FAMOTIDINE 20 MG/1
20 TABLET, FILM COATED ORAL 2 TIMES DAILY
Status: DISCONTINUED | OUTPATIENT
Start: 2020-08-23 | End: 2020-08-24

## 2020-08-23 RX ORDER — SODIUM CHLORIDE 0.9 % (FLUSH) 0.9 %
10 SYRINGE (ML) INJECTION AS NEEDED
Status: DISCONTINUED | OUTPATIENT
Start: 2020-08-23 | End: 2020-08-30 | Stop reason: HOSPADM

## 2020-08-23 RX ORDER — CETIRIZINE HYDROCHLORIDE 10 MG/1
5 TABLET ORAL DAILY
Status: DISCONTINUED | OUTPATIENT
Start: 2020-08-24 | End: 2020-08-30 | Stop reason: HOSPADM

## 2020-08-23 RX ORDER — BENZONATATE 100 MG/1
100 CAPSULE ORAL 3 TIMES DAILY PRN
Status: DISCONTINUED | OUTPATIENT
Start: 2020-08-23 | End: 2020-08-30 | Stop reason: HOSPADM

## 2020-08-23 RX ORDER — FUROSEMIDE 40 MG/1
40 TABLET ORAL EVERY MORNING
Status: DISCONTINUED | OUTPATIENT
Start: 2020-08-24 | End: 2020-08-26

## 2020-08-23 RX ORDER — CEFTRIAXONE SODIUM 1 G/50ML
1 INJECTION, SOLUTION INTRAVENOUS ONCE
Status: COMPLETED | OUTPATIENT
Start: 2020-08-23 | End: 2020-08-23

## 2020-08-23 RX ORDER — CYCLOBENZAPRINE HCL 10 MG
10 TABLET ORAL 3 TIMES DAILY PRN
Status: DISCONTINUED | OUTPATIENT
Start: 2020-08-23 | End: 2020-08-30 | Stop reason: HOSPADM

## 2020-08-23 RX ORDER — SODIUM CHLORIDE 0.9 % (FLUSH) 0.9 %
10 SYRINGE (ML) INJECTION EVERY 12 HOURS SCHEDULED
Status: DISCONTINUED | OUTPATIENT
Start: 2020-08-23 | End: 2020-08-26

## 2020-08-23 RX ORDER — ATORVASTATIN CALCIUM 20 MG/1
40 TABLET, FILM COATED ORAL NIGHTLY
Status: DISCONTINUED | OUTPATIENT
Start: 2020-08-23 | End: 2020-08-30 | Stop reason: HOSPADM

## 2020-08-23 RX ORDER — ACETAMINOPHEN 325 MG/1
650 TABLET ORAL EVERY 4 HOURS PRN
Status: DISCONTINUED | OUTPATIENT
Start: 2020-08-23 | End: 2020-08-30 | Stop reason: HOSPADM

## 2020-08-23 RX ORDER — OXYCODONE HYDROCHLORIDE 15 MG/1
20 TABLET ORAL EVERY 4 HOURS PRN
COMMUNITY
End: 2020-12-17 | Stop reason: SDUPTHER

## 2020-08-23 RX ORDER — FUROSEMIDE 10 MG/ML
40 INJECTION INTRAMUSCULAR; INTRAVENOUS ONCE
Status: COMPLETED | OUTPATIENT
Start: 2020-08-23 | End: 2020-08-23

## 2020-08-23 RX ORDER — CHOLECALCIFEROL (VITAMIN D3) 125 MCG
1000 CAPSULE ORAL DAILY
Status: DISCONTINUED | OUTPATIENT
Start: 2020-08-24 | End: 2020-08-26

## 2020-08-23 RX ORDER — ALLOPURINOL 100 MG/1
100 TABLET ORAL DAILY
Status: DISCONTINUED | OUTPATIENT
Start: 2020-08-24 | End: 2020-08-30 | Stop reason: HOSPADM

## 2020-08-23 RX ORDER — CARBAMAZEPINE 200 MG/1
200 TABLET ORAL DAILY
Status: DISCONTINUED | OUTPATIENT
Start: 2020-08-24 | End: 2020-08-30 | Stop reason: HOSPADM

## 2020-08-23 RX ORDER — CETIRIZINE HYDROCHLORIDE 10 MG/1
5 TABLET ORAL DAILY PRN
Status: DISCONTINUED | OUTPATIENT
Start: 2020-08-23 | End: 2020-08-30 | Stop reason: HOSPADM

## 2020-08-23 RX ORDER — SODIUM CHLORIDE 0.9 % (FLUSH) 0.9 %
10 SYRINGE (ML) INJECTION AS NEEDED
Status: DISCONTINUED | OUTPATIENT
Start: 2020-08-23 | End: 2020-08-26

## 2020-08-23 RX ADMIN — CEFTRIAXONE SODIUM 1 G: 1 INJECTION, SOLUTION INTRAVENOUS at 16:59

## 2020-08-23 RX ADMIN — OXYCODONE HYDROCHLORIDE 20 MG: 15 TABLET ORAL at 21:53

## 2020-08-23 RX ADMIN — OXYCODONE HYDROCHLORIDE 20 MG: 15 TABLET ORAL at 18:01

## 2020-08-23 RX ADMIN — ATORVASTATIN CALCIUM 40 MG: 20 TABLET, FILM COATED ORAL at 21:53

## 2020-08-23 RX ADMIN — ACETAMINOPHEN 1000 MG: 500 TABLET ORAL at 13:25

## 2020-08-23 RX ADMIN — CARVEDILOL 25 MG: 25 TABLET, FILM COATED ORAL at 17:04

## 2020-08-23 RX ADMIN — CEFTRIAXONE SODIUM 1 G: 1 INJECTION, SOLUTION INTRAVENOUS at 14:26

## 2020-08-23 RX ADMIN — FUROSEMIDE 40 MG: 10 INJECTION, SOLUTION INTRAMUSCULAR; INTRAVENOUS at 16:59

## 2020-08-23 RX ADMIN — HYDRALAZINE HYDROCHLORIDE 50 MG: 50 TABLET, FILM COATED ORAL at 16:59

## 2020-08-23 RX ADMIN — SODIUM CHLORIDE, PRESERVATIVE FREE 10 ML: 5 INJECTION INTRAVENOUS at 21:54

## 2020-08-23 RX ADMIN — ENOXAPARIN SODIUM 30 MG: 30 INJECTION SUBCUTANEOUS at 16:59

## 2020-08-23 RX ADMIN — FAMOTIDINE 20 MG: 20 TABLET, FILM COATED ORAL at 21:52

## 2020-08-23 RX ADMIN — LORAZEPAM 0.5 MG: 0.5 TABLET ORAL at 21:56

## 2020-08-23 RX ADMIN — HYDRALAZINE HYDROCHLORIDE 50 MG: 50 TABLET, FILM COATED ORAL at 21:53

## 2020-08-23 RX ADMIN — ACETAMINOPHEN 650 MG: 325 TABLET, FILM COATED ORAL at 16:59

## 2020-08-23 RX ADMIN — AZITHROMYCIN MONOHYDRATE 500 MG: 500 INJECTION, POWDER, LYOPHILIZED, FOR SOLUTION INTRAVENOUS at 15:05

## 2020-08-23 NOTE — PROGRESS NOTES
On call note: Spoke with patient's daughter via on call answering service. Stated that patient had a COVID test yesterday which was negative. She has been congested and short of breath, and this morning woke up increasingly short of air and has a high temp and spitting up thick, dark drainage. She is taking patient to the ER at [facility] now and requested that I call to let the ER know. I called and gave triage nurse report on above mentioned information. Pt/family will call back for any questions or concerns and are leaving for the ER shortly.

## 2020-08-24 ENCOUNTER — APPOINTMENT (OUTPATIENT)
Dept: CARDIOLOGY | Facility: HOSPITAL | Age: 75
End: 2020-08-24

## 2020-08-24 LAB
ANION GAP SERPL CALCULATED.3IONS-SCNC: 9.2 MMOL/L (ref 5–15)
ASCENDING AORTA: 2.9 CM
BASOPHILS # BLD AUTO: 0.02 10*3/MM3 (ref 0–0.2)
BASOPHILS NFR BLD AUTO: 0.2 % (ref 0–1.5)
BH CV ECHO MEAS - ACS: 1.7 CM
BH CV ECHO MEAS - AO MAX PG (FULL): 5.9 MMHG
BH CV ECHO MEAS - AO MAX PG: 13.7 MMHG
BH CV ECHO MEAS - AO MEAN PG (FULL): 4 MMHG
BH CV ECHO MEAS - AO MEAN PG: 8 MMHG
BH CV ECHO MEAS - AO ROOT AREA (BSA CORRECTED): 1.4
BH CV ECHO MEAS - AO ROOT AREA: 6.2 CM^2
BH CV ECHO MEAS - AO ROOT DIAM: 2.8 CM
BH CV ECHO MEAS - AO V2 MAX: 185 CM/SEC
BH CV ECHO MEAS - AO V2 MEAN: 135 CM/SEC
BH CV ECHO MEAS - AO V2 VTI: 46 CM
BH CV ECHO MEAS - ASC AORTA: 2.9 CM
BH CV ECHO MEAS - AVA(I,A): 2 CM^2
BH CV ECHO MEAS - AVA(I,D): 2 CM^2
BH CV ECHO MEAS - AVA(V,A): 2.4 CM^2
BH CV ECHO MEAS - AVA(V,D): 2.4 CM^2
BH CV ECHO MEAS - BSA(HAYCOCK): 2.1 M^2
BH CV ECHO MEAS - BSA: 2 M^2
BH CV ECHO MEAS - BZI_BMI: 31.3 KILOGRAMS/M^2
BH CV ECHO MEAS - BZI_METRIC_HEIGHT: 170.2 CM
BH CV ECHO MEAS - BZI_METRIC_WEIGHT: 90.7 KG
BH CV ECHO MEAS - EDV(MOD-SP2): 63 ML
BH CV ECHO MEAS - EDV(MOD-SP4): 80 ML
BH CV ECHO MEAS - EDV(TEICH): 123.8 ML
BH CV ECHO MEAS - EF(CUBED): 75.3 %
BH CV ECHO MEAS - EF(MOD-BP): 62 %
BH CV ECHO MEAS - EF(MOD-SP2): 60.3 %
BH CV ECHO MEAS - EF(MOD-SP4): 62.5 %
BH CV ECHO MEAS - EF(TEICH): 66.9 %
BH CV ECHO MEAS - ESV(MOD-SP2): 25 ML
BH CV ECHO MEAS - ESV(MOD-SP4): 30 ML
BH CV ECHO MEAS - ESV(TEICH): 41 ML
BH CV ECHO MEAS - FS: 37.3 %
BH CV ECHO MEAS - IVS/LVPW: 0.89
BH CV ECHO MEAS - IVSD: 0.8 CM
BH CV ECHO MEAS - LAT PEAK E' VEL: 11 CM/SEC
BH CV ECHO MEAS - LV DIASTOLIC VOL/BSA (35-75): 39.6 ML/M^2
BH CV ECHO MEAS - LV MASS(C)D: 151.8 GRAMS
BH CV ECHO MEAS - LV MASS(C)DI: 75.1 GRAMS/M^2
BH CV ECHO MEAS - LV MAX PG: 7.8 MMHG
BH CV ECHO MEAS - LV MEAN PG: 4 MMHG
BH CV ECHO MEAS - LV SYSTOLIC VOL/BSA (12-30): 14.8 ML/M^2
BH CV ECHO MEAS - LV V1 MAX: 140 CM/SEC
BH CV ECHO MEAS - LV V1 MEAN: 96.6 CM/SEC
BH CV ECHO MEAS - LV V1 VTI: 29.5 CM
BH CV ECHO MEAS - LVIDD: 5.1 CM
BH CV ECHO MEAS - LVIDS: 3.2 CM
BH CV ECHO MEAS - LVLD AP2: 7.7 CM
BH CV ECHO MEAS - LVLD AP4: 7.9 CM
BH CV ECHO MEAS - LVLS AP2: 6.4 CM
BH CV ECHO MEAS - LVLS AP4: 6.3 CM
BH CV ECHO MEAS - LVOT AREA (M): 3.1 CM^2
BH CV ECHO MEAS - LVOT AREA: 3.1 CM^2
BH CV ECHO MEAS - LVOT DIAM: 2 CM
BH CV ECHO MEAS - LVPWD: 0.9 CM
BH CV ECHO MEAS - MED PEAK E' VEL: 7.8 CM/SEC
BH CV ECHO MEAS - MR MAX PG: 46 MMHG
BH CV ECHO MEAS - MR MAX VEL: 339 CM/SEC
BH CV ECHO MEAS - MV A DUR: 0.14 SEC
BH CV ECHO MEAS - MV A MAX VEL: 114 CM/SEC
BH CV ECHO MEAS - MV DEC SLOPE: 550 CM/SEC^2
BH CV ECHO MEAS - MV DEC TIME: 0.19 SEC
BH CV ECHO MEAS - MV E MAX VEL: 81.7 CM/SEC
BH CV ECHO MEAS - MV E/A: 0.72
BH CV ECHO MEAS - MV MAX PG: 6.7 MMHG
BH CV ECHO MEAS - MV MEAN PG: 3 MMHG
BH CV ECHO MEAS - MV P1/2T MAX VEL: 107 CM/SEC
BH CV ECHO MEAS - MV P1/2T: 57 MSEC
BH CV ECHO MEAS - MV V2 MAX: 129 CM/SEC
BH CV ECHO MEAS - MV V2 MEAN: 73.6 CM/SEC
BH CV ECHO MEAS - MV V2 VTI: 29.4 CM
BH CV ECHO MEAS - MVA P1/2T LCG: 2.1 CM^2
BH CV ECHO MEAS - MVA(P1/2T): 3.9 CM^2
BH CV ECHO MEAS - MVA(VTI): 3.2 CM^2
BH CV ECHO MEAS - PA ACC TIME: 0.14 SEC
BH CV ECHO MEAS - PA MAX PG (FULL): 1.5 MMHG
BH CV ECHO MEAS - PA MAX PG: 5.4 MMHG
BH CV ECHO MEAS - PA PR(ACCEL): 17.4 MMHG
BH CV ECHO MEAS - PA V2 MAX: 116 CM/SEC
BH CV ECHO MEAS - PULM A REVS DUR: 0.1 SEC
BH CV ECHO MEAS - PULM A REVS VEL: 31.2 CM/SEC
BH CV ECHO MEAS - PULM DIAS VEL: 42.7 CM/SEC
BH CV ECHO MEAS - PULM S/D: 1.4
BH CV ECHO MEAS - PULM SYS VEL: 58.3 CM/SEC
BH CV ECHO MEAS - PVA(V,A): 2.4 CM^2
BH CV ECHO MEAS - PVA(V,D): 2.4 CM^2
BH CV ECHO MEAS - QP/QS: 0.62
BH CV ECHO MEAS - RAP SYSTOLE: 3 MMHG
BH CV ECHO MEAS - RV MAX PG: 3.9 MMHG
BH CV ECHO MEAS - RV MEAN PG: 2 MMHG
BH CV ECHO MEAS - RV V1 MAX: 99.1 CM/SEC
BH CV ECHO MEAS - RV V1 MEAN: 68.7 CM/SEC
BH CV ECHO MEAS - RV V1 VTI: 20.4 CM
BH CV ECHO MEAS - RVOT AREA: 2.8 CM^2
BH CV ECHO MEAS - RVOT DIAM: 1.9 CM
BH CV ECHO MEAS - RVSP: 31.9 MMHG
BH CV ECHO MEAS - SI(AO): 140.1 ML/M^2
BH CV ECHO MEAS - SI(CUBED): 49.4 ML/M^2
BH CV ECHO MEAS - SI(LVOT): 45.8 ML/M^2
BH CV ECHO MEAS - SI(MOD-SP2): 18.8 ML/M^2
BH CV ECHO MEAS - SI(MOD-SP4): 24.7 ML/M^2
BH CV ECHO MEAS - SI(TEICH): 41 ML/M^2
BH CV ECHO MEAS - SUP REN AO DIAM: 1.6 CM
BH CV ECHO MEAS - SV(AO): 283.2 ML
BH CV ECHO MEAS - SV(CUBED): 99.9 ML
BH CV ECHO MEAS - SV(LVOT): 92.7 ML
BH CV ECHO MEAS - SV(MOD-SP2): 38 ML
BH CV ECHO MEAS - SV(MOD-SP4): 50 ML
BH CV ECHO MEAS - SV(RVOT): 57.8 ML
BH CV ECHO MEAS - SV(TEICH): 82.8 ML
BH CV ECHO MEAS - TAPSE (>1.6): 2.9 CM
BH CV ECHO MEAS - TR MAX VEL: 269 CM/SEC
BH CV ECHO MEASUREMENTS AVERAGE E/E' RATIO: 8.69
BH CV VAS BP LEFT ARM: NORMAL MMHG
BH CV XLRA - RV BASE: 3.5 CM
BH CV XLRA - RV LENGTH: 6.3 CM
BH CV XLRA - RV MID: 2.9 CM
BH CV XLRA - TDI S': 20.4 CM/SEC
BUN SERPL-MCNC: 20 MG/DL (ref 8–23)
BUN/CREAT SERPL: 13.8 (ref 7–25)
CALCIUM SPEC-SCNC: 9 MG/DL (ref 8.6–10.5)
CHLORIDE SERPL-SCNC: 96 MMOL/L (ref 98–107)
CO2 SERPL-SCNC: 24.8 MMOL/L (ref 22–29)
CREAT SERPL-MCNC: 1.45 MG/DL (ref 0.57–1)
DEPRECATED RDW RBC AUTO: 45.8 FL (ref 37–54)
EOSINOPHIL # BLD AUTO: 0.07 10*3/MM3 (ref 0–0.4)
EOSINOPHIL NFR BLD AUTO: 0.6 % (ref 0.3–6.2)
ERYTHROCYTE [DISTWIDTH] IN BLOOD BY AUTOMATED COUNT: 13.2 % (ref 12.3–15.4)
FOLATE SERPL-MCNC: 5.03 NG/ML (ref 4.78–24.2)
GFR SERPL CREATININE-BSD FRML MDRD: 35 ML/MIN/1.73
GLUCOSE SERPL-MCNC: 113 MG/DL (ref 65–99)
HCT VFR BLD AUTO: 25.4 % (ref 34–46.6)
HGB BLD-MCNC: 8.5 G/DL (ref 12–15.9)
IMM GRANULOCYTES # BLD AUTO: 0.12 10*3/MM3 (ref 0–0.05)
IMM GRANULOCYTES NFR BLD AUTO: 1 % (ref 0–0.5)
IRON 24H UR-MRATE: 12 MCG/DL (ref 37–145)
IRON SATN MFR SERPL: 6 % (ref 20–50)
LEFT ATRIUM VOLUME INDEX: 37 ML/M2
LV EF 2D ECHO EST: 62 %
LYMPHOCYTES # BLD AUTO: 1.17 10*3/MM3 (ref 0.7–3.1)
LYMPHOCYTES NFR BLD AUTO: 9.3 % (ref 19.6–45.3)
MCH RBC QN AUTO: 31.5 PG (ref 26.6–33)
MCHC RBC AUTO-ENTMCNC: 33.5 G/DL (ref 31.5–35.7)
MCV RBC AUTO: 94.1 FL (ref 79–97)
MONOCYTES # BLD AUTO: 0.92 10*3/MM3 (ref 0.1–0.9)
MONOCYTES NFR BLD AUTO: 7.3 % (ref 5–12)
NEUTROPHILS NFR BLD AUTO: 10.29 10*3/MM3 (ref 1.7–7)
NEUTROPHILS NFR BLD AUTO: 81.6 % (ref 42.7–76)
NRBC BLD AUTO-RTO: 0.1 /100 WBC (ref 0–0.2)
PLATELET # BLD AUTO: 241 10*3/MM3 (ref 140–450)
PMV BLD AUTO: 9.3 FL (ref 6–12)
POTASSIUM SERPL-SCNC: 3.4 MMOL/L (ref 3.5–5.2)
RBC # BLD AUTO: 2.7 10*6/MM3 (ref 3.77–5.28)
SINUS: 3 CM
SODIUM SERPL-SCNC: 130 MMOL/L (ref 136–145)
STJ: 2.4 CM
TIBC SERPL-MCNC: 188 MCG/DL (ref 298–536)
TRANSFERRIN SERPL-MCNC: 126 MG/DL (ref 200–360)
VIT B12 BLD-MCNC: 1259 PG/ML (ref 211–946)
WBC # BLD AUTO: 12.59 10*3/MM3 (ref 3.4–10.8)

## 2020-08-24 PROCEDURE — 97110 THERAPEUTIC EXERCISES: CPT

## 2020-08-24 PROCEDURE — 25010000002 HYDRALAZINE PER 20 MG: Performed by: INTERNAL MEDICINE

## 2020-08-24 PROCEDURE — 82746 ASSAY OF FOLIC ACID SERUM: CPT | Performed by: HOSPITALIST

## 2020-08-24 PROCEDURE — 82607 VITAMIN B-12: CPT | Performed by: HOSPITALIST

## 2020-08-24 PROCEDURE — 84466 ASSAY OF TRANSFERRIN: CPT | Performed by: HOSPITALIST

## 2020-08-24 PROCEDURE — 85025 COMPLETE CBC W/AUTO DIFF WBC: CPT | Performed by: HOSPITALIST

## 2020-08-24 PROCEDURE — 97530 THERAPEUTIC ACTIVITIES: CPT

## 2020-08-24 PROCEDURE — 25010000002 ENOXAPARIN PER 10 MG: Performed by: HOSPITALIST

## 2020-08-24 PROCEDURE — 63710000001 ONDANSETRON PER 8 MG: Performed by: HOSPITALIST

## 2020-08-24 PROCEDURE — 80048 BASIC METABOLIC PNL TOTAL CA: CPT | Performed by: HOSPITALIST

## 2020-08-24 PROCEDURE — 97162 PT EVAL MOD COMPLEX 30 MIN: CPT

## 2020-08-24 PROCEDURE — 25010000002 CEFTRIAXONE PER 250 MG: Performed by: HOSPITALIST

## 2020-08-24 PROCEDURE — 97166 OT EVAL MOD COMPLEX 45 MIN: CPT

## 2020-08-24 PROCEDURE — 93306 TTE W/DOPPLER COMPLETE: CPT | Performed by: INTERNAL MEDICINE

## 2020-08-24 PROCEDURE — 93306 TTE W/DOPPLER COMPLETE: CPT

## 2020-08-24 PROCEDURE — 25010000002 FUROSEMIDE PER 20 MG: Performed by: INTERNAL MEDICINE

## 2020-08-24 PROCEDURE — 83540 ASSAY OF IRON: CPT | Performed by: HOSPITALIST

## 2020-08-24 RX ORDER — POTASSIUM CHLORIDE 750 MG/1
40 CAPSULE, EXTENDED RELEASE ORAL ONCE
Status: COMPLETED | OUTPATIENT
Start: 2020-08-24 | End: 2020-08-24

## 2020-08-24 RX ORDER — FUROSEMIDE 10 MG/ML
40 INJECTION INTRAMUSCULAR; INTRAVENOUS ONCE
Status: COMPLETED | OUTPATIENT
Start: 2020-08-24 | End: 2020-08-24

## 2020-08-24 RX ORDER — FOLIC ACID 1 MG/1
1 TABLET ORAL DAILY
Status: DISCONTINUED | OUTPATIENT
Start: 2020-08-25 | End: 2020-08-30 | Stop reason: HOSPADM

## 2020-08-24 RX ORDER — HYDRALAZINE HYDROCHLORIDE 20 MG/ML
20 INJECTION INTRAMUSCULAR; INTRAVENOUS EVERY 6 HOURS PRN
Status: DISCONTINUED | OUTPATIENT
Start: 2020-08-24 | End: 2020-08-30 | Stop reason: HOSPADM

## 2020-08-24 RX ORDER — FAMOTIDINE 20 MG/1
20 TABLET, FILM COATED ORAL DAILY
Status: DISCONTINUED | OUTPATIENT
Start: 2020-08-25 | End: 2020-08-30 | Stop reason: HOSPADM

## 2020-08-24 RX ORDER — FERROUS SULFATE 325(65) MG
325 TABLET ORAL
Status: DISCONTINUED | OUTPATIENT
Start: 2020-08-25 | End: 2020-08-26

## 2020-08-24 RX ADMIN — OXYCODONE HYDROCHLORIDE 20 MG: 15 TABLET ORAL at 01:58

## 2020-08-24 RX ADMIN — HYDRALAZINE HYDROCHLORIDE 50 MG: 50 TABLET, FILM COATED ORAL at 16:20

## 2020-08-24 RX ADMIN — HYDRALAZINE HYDROCHLORIDE 50 MG: 50 TABLET, FILM COATED ORAL at 21:07

## 2020-08-24 RX ADMIN — VENLAFAXINE HYDROCHLORIDE 150 MG: 150 CAPSULE, EXTENDED RELEASE ORAL at 10:14

## 2020-08-24 RX ADMIN — FUROSEMIDE 40 MG: 10 INJECTION, SOLUTION INTRAMUSCULAR; INTRAVENOUS at 12:50

## 2020-08-24 RX ADMIN — POTASSIUM CHLORIDE 40 MEQ: 10 CAPSULE, COATED, EXTENDED RELEASE ORAL at 14:32

## 2020-08-24 RX ADMIN — OXYCODONE HYDROCHLORIDE 20 MG: 15 TABLET ORAL at 21:07

## 2020-08-24 RX ADMIN — LORAZEPAM 0.5 MG: 0.5 TABLET ORAL at 06:21

## 2020-08-24 RX ADMIN — HYDRALAZINE HYDROCHLORIDE 20 MG: 20 INJECTION INTRAMUSCULAR; INTRAVENOUS at 23:40

## 2020-08-24 RX ADMIN — Medication 1000 MCG: at 10:15

## 2020-08-24 RX ADMIN — FUROSEMIDE 40 MG: 40 TABLET ORAL at 06:22

## 2020-08-24 RX ADMIN — CEFTRIAXONE SODIUM 1 G: 1 INJECTION, SOLUTION INTRAVENOUS at 16:20

## 2020-08-24 RX ADMIN — CARVEDILOL 25 MG: 25 TABLET, FILM COATED ORAL at 18:48

## 2020-08-24 RX ADMIN — ALLOPURINOL 100 MG: 100 TABLET ORAL at 10:15

## 2020-08-24 RX ADMIN — OXYMETAZOLINE HCL 2 SPRAY: 0.05 SPRAY NASAL at 21:08

## 2020-08-24 RX ADMIN — OXYCODONE HYDROCHLORIDE 20 MG: 15 TABLET ORAL at 10:45

## 2020-08-24 RX ADMIN — CARVEDILOL 25 MG: 25 TABLET, FILM COATED ORAL at 10:15

## 2020-08-24 RX ADMIN — AMLODIPINE BESYLATE 10 MG: 10 TABLET ORAL at 06:21

## 2020-08-24 RX ADMIN — ONDANSETRON HYDROCHLORIDE 4 MG: 4 TABLET, FILM COATED ORAL at 11:38

## 2020-08-24 RX ADMIN — ATORVASTATIN CALCIUM 40 MG: 20 TABLET, FILM COATED ORAL at 21:07

## 2020-08-24 RX ADMIN — SERTRALINE 50 MG: 50 TABLET, FILM COATED ORAL at 10:15

## 2020-08-24 RX ADMIN — OXYCODONE HYDROCHLORIDE 20 MG: 15 TABLET ORAL at 06:21

## 2020-08-24 RX ADMIN — ERGOCALCIFEROL 50000 UNITS: 1.25 CAPSULE ORAL at 10:14

## 2020-08-24 RX ADMIN — ENOXAPARIN SODIUM 30 MG: 30 INJECTION SUBCUTANEOUS at 16:20

## 2020-08-24 RX ADMIN — LORAZEPAM 0.5 MG: 0.5 TABLET ORAL at 14:33

## 2020-08-24 RX ADMIN — ONDANSETRON HYDROCHLORIDE 4 MG: 4 TABLET, FILM COATED ORAL at 21:14

## 2020-08-24 RX ADMIN — LORAZEPAM 0.5 MG: 0.5 TABLET ORAL at 23:30

## 2020-08-24 RX ADMIN — FAMOTIDINE 20 MG: 20 TABLET, FILM COATED ORAL at 10:14

## 2020-08-24 RX ADMIN — HYDRALAZINE HYDROCHLORIDE 50 MG: 50 TABLET, FILM COATED ORAL at 10:16

## 2020-08-24 RX ADMIN — PANTOPRAZOLE SODIUM 40 MG: 40 TABLET, DELAYED RELEASE ORAL at 06:22

## 2020-08-24 RX ADMIN — SODIUM CHLORIDE, PRESERVATIVE FREE 10 ML: 5 INJECTION INTRAVENOUS at 21:08

## 2020-08-24 RX ADMIN — CLOPIDOGREL 75 MG: 75 TABLET, FILM COATED ORAL at 10:16

## 2020-08-24 RX ADMIN — HYDRALAZINE HYDROCHLORIDE 20 MG: 20 INJECTION INTRAMUSCULAR; INTRAVENOUS at 14:32

## 2020-08-24 RX ADMIN — CARBAMAZEPINE 200 MG: 200 TABLET ORAL at 10:14

## 2020-08-24 RX ADMIN — SODIUM CHLORIDE, PRESERVATIVE FREE 10 ML: 5 INJECTION INTRAVENOUS at 10:17

## 2020-08-24 RX ADMIN — CETIRIZINE HYDROCHLORIDE 5 MG: 10 TABLET ORAL at 10:16

## 2020-08-24 RX ADMIN — OXYMETAZOLINE HCL 2 SPRAY: 0.05 SPRAY NASAL at 00:31

## 2020-08-24 RX ADMIN — OXYMETAZOLINE HCL 2 SPRAY: 0.05 SPRAY NASAL at 10:17

## 2020-08-24 NOTE — OUTREACH NOTE
Total Joint Month 3 Survey      Responses   [facility] patient discharged from?  [place]   Does the patient have one of the following disease processes/diagnoses(primary or secondary)?  Total Joint Replacement   Joint surgery performed?  Shoulder   Month 3 attempt successful?  No   Revoke  Readmitted          Gladys Sharpe RN

## 2020-08-25 ENCOUNTER — APPOINTMENT (OUTPATIENT)
Dept: GENERAL RADIOLOGY | Facility: HOSPITAL | Age: 75
End: 2020-08-25

## 2020-08-25 LAB
ANION GAP SERPL CALCULATED.3IONS-SCNC: 9.4 MMOL/L (ref 5–15)
BASOPHILS # BLD AUTO: 0.04 10*3/MM3 (ref 0–0.2)
BASOPHILS NFR BLD AUTO: 0.4 % (ref 0–1.5)
BUN SERPL-MCNC: 22 MG/DL (ref 8–23)
BUN/CREAT SERPL: 13.9 (ref 7–25)
CALCIUM SPEC-SCNC: 9.3 MG/DL (ref 8.6–10.5)
CHLORIDE SERPL-SCNC: 101 MMOL/L (ref 98–107)
CO2 SERPL-SCNC: 22.6 MMOL/L (ref 22–29)
CREAT SERPL-MCNC: 1.58 MG/DL (ref 0.57–1)
DEPRECATED RDW RBC AUTO: 43.4 FL (ref 37–54)
EOSINOPHIL # BLD AUTO: 0.17 10*3/MM3 (ref 0–0.4)
EOSINOPHIL NFR BLD AUTO: 1.6 % (ref 0.3–6.2)
ERYTHROCYTE [DISTWIDTH] IN BLOOD BY AUTOMATED COUNT: 13 % (ref 12.3–15.4)
GFR SERPL CREATININE-BSD FRML MDRD: 32 ML/MIN/1.73
GLUCOSE SERPL-MCNC: 110 MG/DL (ref 65–99)
HCT VFR BLD AUTO: 26.5 % (ref 34–46.6)
HGB BLD-MCNC: 8.8 G/DL (ref 12–15.9)
IMM GRANULOCYTES # BLD AUTO: 0.2 10*3/MM3 (ref 0–0.05)
IMM GRANULOCYTES NFR BLD AUTO: 1.9 % (ref 0–0.5)
LYMPHOCYTES # BLD AUTO: 1.24 10*3/MM3 (ref 0.7–3.1)
LYMPHOCYTES NFR BLD AUTO: 11.6 % (ref 19.6–45.3)
MCH RBC QN AUTO: 30.7 PG (ref 26.6–33)
MCHC RBC AUTO-ENTMCNC: 33.2 G/DL (ref 31.5–35.7)
MCV RBC AUTO: 92.3 FL (ref 79–97)
MONOCYTES # BLD AUTO: 0.97 10*3/MM3 (ref 0.1–0.9)
MONOCYTES NFR BLD AUTO: 9.1 % (ref 5–12)
NEUTROPHILS NFR BLD AUTO: 75.4 % (ref 42.7–76)
NEUTROPHILS NFR BLD AUTO: 8.05 10*3/MM3 (ref 1.7–7)
NRBC BLD AUTO-RTO: 0 /100 WBC (ref 0–0.2)
PLATELET # BLD AUTO: 245 10*3/MM3 (ref 140–450)
PMV BLD AUTO: 9.1 FL (ref 6–12)
POTASSIUM SERPL-SCNC: 3.8 MMOL/L (ref 3.5–5.2)
RBC # BLD AUTO: 2.87 10*6/MM3 (ref 3.77–5.28)
SODIUM SERPL-SCNC: 133 MMOL/L (ref 136–145)
WBC # BLD AUTO: 10.67 10*3/MM3 (ref 3.4–10.8)

## 2020-08-25 PROCEDURE — 97110 THERAPEUTIC EXERCISES: CPT

## 2020-08-25 PROCEDURE — 25010000002 ENOXAPARIN PER 10 MG: Performed by: HOSPITALIST

## 2020-08-25 PROCEDURE — 25010000002 HYDRALAZINE PER 20 MG: Performed by: INTERNAL MEDICINE

## 2020-08-25 PROCEDURE — 97530 THERAPEUTIC ACTIVITIES: CPT

## 2020-08-25 PROCEDURE — 25010000002 CEFTRIAXONE PER 250 MG: Performed by: HOSPITALIST

## 2020-08-25 PROCEDURE — 63710000001 ONDANSETRON PER 8 MG: Performed by: HOSPITALIST

## 2020-08-25 PROCEDURE — 71046 X-RAY EXAM CHEST 2 VIEWS: CPT

## 2020-08-25 PROCEDURE — 85025 COMPLETE CBC W/AUTO DIFF WBC: CPT | Performed by: INTERNAL MEDICINE

## 2020-08-25 PROCEDURE — 80048 BASIC METABOLIC PNL TOTAL CA: CPT | Performed by: INTERNAL MEDICINE

## 2020-08-25 RX ADMIN — HYDRALAZINE HYDROCHLORIDE 75 MG: 50 TABLET, FILM COATED ORAL at 16:08

## 2020-08-25 RX ADMIN — FUROSEMIDE 40 MG: 40 TABLET ORAL at 10:46

## 2020-08-25 RX ADMIN — FERROUS SULFATE TAB 325 MG (65 MG ELEMENTAL FE) 325 MG: 325 (65 FE) TAB at 10:45

## 2020-08-25 RX ADMIN — CETIRIZINE HYDROCHLORIDE 5 MG: 10 TABLET ORAL at 10:46

## 2020-08-25 RX ADMIN — SERTRALINE 50 MG: 50 TABLET, FILM COATED ORAL at 10:47

## 2020-08-25 RX ADMIN — OXYCODONE HYDROCHLORIDE 20 MG: 15 TABLET ORAL at 20:49

## 2020-08-25 RX ADMIN — OXYMETAZOLINE HCL 2 SPRAY: 0.05 SPRAY NASAL at 11:41

## 2020-08-25 RX ADMIN — CARBAMAZEPINE 200 MG: 200 TABLET ORAL at 10:47

## 2020-08-25 RX ADMIN — OXYMETAZOLINE HCL 2 SPRAY: 0.05 SPRAY NASAL at 21:00

## 2020-08-25 RX ADMIN — OXYCODONE HYDROCHLORIDE 20 MG: 15 TABLET ORAL at 06:26

## 2020-08-25 RX ADMIN — ATORVASTATIN CALCIUM 40 MG: 20 TABLET, FILM COATED ORAL at 20:49

## 2020-08-25 RX ADMIN — CARVEDILOL 25 MG: 25 TABLET, FILM COATED ORAL at 17:14

## 2020-08-25 RX ADMIN — HYDRALAZINE HYDROCHLORIDE 75 MG: 50 TABLET, FILM COATED ORAL at 20:49

## 2020-08-25 RX ADMIN — ENOXAPARIN SODIUM 30 MG: 30 INJECTION SUBCUTANEOUS at 17:14

## 2020-08-25 RX ADMIN — FAMOTIDINE 20 MG: 20 TABLET, FILM COATED ORAL at 10:46

## 2020-08-25 RX ADMIN — ALLOPURINOL 100 MG: 100 TABLET ORAL at 10:45

## 2020-08-25 RX ADMIN — SODIUM CHLORIDE, PRESERVATIVE FREE 10 ML: 5 INJECTION INTRAVENOUS at 10:48

## 2020-08-25 RX ADMIN — OXYCODONE HYDROCHLORIDE 20 MG: 15 TABLET ORAL at 16:07

## 2020-08-25 RX ADMIN — OXYCODONE HYDROCHLORIDE 20 MG: 15 TABLET ORAL at 10:45

## 2020-08-25 RX ADMIN — HYDRALAZINE HYDROCHLORIDE 50 MG: 50 TABLET, FILM COATED ORAL at 06:00

## 2020-08-25 RX ADMIN — AMLODIPINE BESYLATE 10 MG: 10 TABLET ORAL at 06:28

## 2020-08-25 RX ADMIN — ONDANSETRON HYDROCHLORIDE 4 MG: 4 TABLET, FILM COATED ORAL at 16:07

## 2020-08-25 RX ADMIN — CARVEDILOL 25 MG: 25 TABLET, FILM COATED ORAL at 10:46

## 2020-08-25 RX ADMIN — VENLAFAXINE HYDROCHLORIDE 150 MG: 150 CAPSULE, EXTENDED RELEASE ORAL at 10:47

## 2020-08-25 RX ADMIN — LORAZEPAM 0.5 MG: 0.5 TABLET ORAL at 20:49

## 2020-08-25 RX ADMIN — ONDANSETRON HYDROCHLORIDE 4 MG: 4 TABLET, FILM COATED ORAL at 10:45

## 2020-08-25 RX ADMIN — Medication 1000 MCG: at 10:47

## 2020-08-25 RX ADMIN — FOLIC ACID 1 MG: 1 TABLET ORAL at 10:47

## 2020-08-25 RX ADMIN — HYDRALAZINE HYDROCHLORIDE 20 MG: 20 INJECTION INTRAMUSCULAR; INTRAVENOUS at 06:05

## 2020-08-25 RX ADMIN — CLOPIDOGREL 75 MG: 75 TABLET, FILM COATED ORAL at 10:47

## 2020-08-25 RX ADMIN — PANTOPRAZOLE SODIUM 40 MG: 40 TABLET, DELAYED RELEASE ORAL at 06:26

## 2020-08-25 RX ADMIN — ONDANSETRON HYDROCHLORIDE 4 MG: 4 TABLET, FILM COATED ORAL at 23:05

## 2020-08-25 RX ADMIN — CEFTRIAXONE SODIUM 1 G: 1 INJECTION, SOLUTION INTRAVENOUS at 17:14

## 2020-08-26 ENCOUNTER — APPOINTMENT (OUTPATIENT)
Dept: CARDIOLOGY | Facility: HOSPITAL | Age: 75
End: 2020-08-26

## 2020-08-26 LAB
ANION GAP SERPL CALCULATED.3IONS-SCNC: 9.1 MMOL/L (ref 5–15)
BACTERIA UR QL AUTO: NORMAL /HPF
BH CV ECHO MEAS - DIST REN A EDV LEFT: -26.1 CM/SEC
BH CV ECHO MEAS - DIST REN A PSV LEFT: 99.1 CM/SEC
BH CV ECHO MEAS - DIST REN A RI LEFT: 1.3
BH CV ECHO MEAS - MID REN A PSV LEFT: 140 CM/SEC
BH CV ECHO MEAS - PROX REN A PSV LEFT: 164 CM/SEC
BH CV VAS BP LEFT ARM: NORMAL MMHG
BH CV VAS BP RIGHT ARM: NORMAL MMHG
BH CV VAS RENAL AORTIC MID PSV: 147 CM/S
BH CV VAS RENAL HILUM LEFT PSV: 26.5 CM/S
BH CV VAS RENAL HILUM RIGHT EDV: 9.37 CM/S
BH CV VAS RENAL HILUM RIGHT PSV: 32.7 CM/S
BH CV XLRA MEAS - KID L LEFT: 10.5 CM
BH CV XLRA MEAS DIST REN A EDV RIGHT: 9.5 CM/SEC
BH CV XLRA MEAS DIST REN A PSV RIGHT: 32.8 CM/SEC
BH CV XLRA MEAS DIST REN A RI RIGHT: 0.71
BH CV XLRA MEAS KID L RIGHT: 10.8 CM
BH CV XLRA MEAS KID W RIGHT: 5.1 CM
BH CV XLRA MEAS MID REN A EDV RIGHT: 13.9 CM/SEC
BH CV XLRA MEAS MID REN A PSV RIGHT: 48.1 CM/SEC
BH CV XLRA MEAS MID REN A RI RIGHT: 0.71
BH CV XLRA MEAS PROX REN A EDV RIGHT: 25.3 CM/SEC
BH CV XLRA MEAS PROX REN A PSV RIGHT: 91.2 CM/SEC
BH CV XLRA MEAS PROX REN A RI RIGHT: 0.72
BH CV XLRA MEAS RAR LEFT: 1.12
BH CV XLRA MEAS RAR RIGHT: 0.84
BH CV XLRA MEAS RENAL A ORG EDV LEFT: 24.7 CM/S
BH CV XLRA MEAS RENAL A ORG EDV RIGHT: 30.7 CM/SEC
BH CV XLRA MEAS RENAL A ORG PSV LEFT: 102 CM/S
BH CV XLRA MEAS RENAL A ORG PSV RIGHT: 123 CM/SEC
BH CV XLRA MEAS RENAL A ORG RI RIGHT: 0.75
BILIRUB UR QL STRIP: NEGATIVE
BUN SERPL-MCNC: 25 MG/DL (ref 8–23)
BUN/CREAT SERPL: 12.6 (ref 7–25)
CALCIUM SPEC-SCNC: 8.9 MG/DL (ref 8.6–10.5)
CHLORIDE SERPL-SCNC: 99 MMOL/L (ref 98–107)
CLARITY UR: CLEAR
CO2 SERPL-SCNC: 22.9 MMOL/L (ref 22–29)
COLOR UR: YELLOW
CREAT SERPL-MCNC: 1.99 MG/DL (ref 0.57–1)
CREAT UR-MCNC: 50 MG/DL
DEPRECATED RDW RBC AUTO: 46.2 FL (ref 37–54)
ERYTHROCYTE [DISTWIDTH] IN BLOOD BY AUTOMATED COUNT: 13.2 % (ref 12.3–15.4)
GFR SERPL CREATININE-BSD FRML MDRD: 24 ML/MIN/1.73
GLUCOSE BLDC GLUCOMTR-MCNC: 146 MG/DL (ref 70–130)
GLUCOSE SERPL-MCNC: 112 MG/DL (ref 65–99)
GLUCOSE UR STRIP-MCNC: NEGATIVE MG/DL
HCT VFR BLD AUTO: 24.1 % (ref 34–46.6)
HCT VFR BLD AUTO: 24.3 % (ref 34–46.6)
HGB BLD-MCNC: 7.6 G/DL (ref 12–15.9)
HGB BLD-MCNC: 8.1 G/DL (ref 12–15.9)
HGB UR QL STRIP.AUTO: NEGATIVE
HYALINE CASTS UR QL AUTO: NORMAL /LPF
KETONES UR QL STRIP: NEGATIVE
LEFT KIDNEY WIDTH: 5.4 CM
LEFT RENAL UPPER PARENCHYMA MAX: 19.8 CM/S
LEUKOCYTE ESTERASE UR QL STRIP.AUTO: NEGATIVE
MCH RBC QN AUTO: 29.9 PG (ref 26.6–33)
MCHC RBC AUTO-ENTMCNC: 31.5 G/DL (ref 31.5–35.7)
MCV RBC AUTO: 94.9 FL (ref 79–97)
NITRITE UR QL STRIP: NEGATIVE
PH UR STRIP.AUTO: 5.5 [PH] (ref 5–8)
PLATELET # BLD AUTO: 279 10*3/MM3 (ref 140–450)
PMV BLD AUTO: 9.5 FL (ref 6–12)
POTASSIUM SERPL-SCNC: 3.6 MMOL/L (ref 3.5–5.2)
PROT UR QL STRIP: ABNORMAL
PROT UR-MCNC: 70 MG/DL
PROT/CREAT UR: 1400 MG/G CREA (ref 0–200)
RBC # BLD AUTO: 2.54 10*6/MM3 (ref 3.77–5.28)
RBC # UR: NORMAL /HPF
REF LAB TEST METHOD: NORMAL
RIGHT RENAL UPPER PARENCHYMA MAX: 17.3 CM/S
RIGHT RENAL UPPER PARENCHYMA MIN: 6.87 CM/S
RIGHT RENAL UPPER PARENCHYMA RI: 0.6
SODIUM SERPL-SCNC: 131 MMOL/L (ref 136–145)
SP GR UR STRIP: 1.01 (ref 1–1.03)
SQUAMOUS #/AREA URNS HPF: NORMAL /HPF
UROBILINOGEN UR QL STRIP: ABNORMAL
WBC # BLD AUTO: 10.36 10*3/MM3 (ref 3.4–10.8)
WBC UR QL AUTO: NORMAL /HPF

## 2020-08-26 PROCEDURE — 94640 AIRWAY INHALATION TREATMENT: CPT

## 2020-08-26 PROCEDURE — 25010000002 CEFTRIAXONE PER 250 MG: Performed by: HOSPITALIST

## 2020-08-26 PROCEDURE — 63710000001 ONDANSETRON PER 8 MG: Performed by: HOSPITALIST

## 2020-08-26 PROCEDURE — 25010000002 FUROSEMIDE PER 20 MG: Performed by: INTERNAL MEDICINE

## 2020-08-26 PROCEDURE — 94799 UNLISTED PULMONARY SVC/PX: CPT

## 2020-08-26 PROCEDURE — 97530 THERAPEUTIC ACTIVITIES: CPT

## 2020-08-26 PROCEDURE — 93975 VASCULAR STUDY: CPT

## 2020-08-26 PROCEDURE — 97110 THERAPEUTIC EXERCISES: CPT

## 2020-08-26 PROCEDURE — 85027 COMPLETE CBC AUTOMATED: CPT | Performed by: INTERNAL MEDICINE

## 2020-08-26 PROCEDURE — 85018 HEMOGLOBIN: CPT | Performed by: INTERNAL MEDICINE

## 2020-08-26 PROCEDURE — 85014 HEMATOCRIT: CPT | Performed by: INTERNAL MEDICINE

## 2020-08-26 PROCEDURE — 80048 BASIC METABOLIC PNL TOTAL CA: CPT | Performed by: INTERNAL MEDICINE

## 2020-08-26 PROCEDURE — 82088 ASSAY OF ALDOSTERONE: CPT | Performed by: INTERNAL MEDICINE

## 2020-08-26 PROCEDURE — 82962 GLUCOSE BLOOD TEST: CPT

## 2020-08-26 PROCEDURE — 81001 URINALYSIS AUTO W/SCOPE: CPT | Performed by: INTERNAL MEDICINE

## 2020-08-26 PROCEDURE — 82384 ASSAY THREE CATECHOLAMINES: CPT | Performed by: INTERNAL MEDICINE

## 2020-08-26 PROCEDURE — 82570 ASSAY OF URINE CREATININE: CPT | Performed by: INTERNAL MEDICINE

## 2020-08-26 PROCEDURE — 84156 ASSAY OF PROTEIN URINE: CPT | Performed by: INTERNAL MEDICINE

## 2020-08-26 PROCEDURE — 84244 ASSAY OF RENIN: CPT | Performed by: INTERNAL MEDICINE

## 2020-08-26 PROCEDURE — 83835 ASSAY OF METANEPHRINES: CPT | Performed by: INTERNAL MEDICINE

## 2020-08-26 RX ORDER — POTASSIUM CHLORIDE 750 MG/1
20 CAPSULE, EXTENDED RELEASE ORAL ONCE
Status: COMPLETED | OUTPATIENT
Start: 2020-08-26 | End: 2020-08-26

## 2020-08-26 RX ORDER — ALBUTEROL SULFATE 2.5 MG/3ML
2.5 SOLUTION RESPIRATORY (INHALATION) EVERY 6 HOURS PRN
Status: DISCONTINUED | OUTPATIENT
Start: 2020-08-26 | End: 2020-08-30 | Stop reason: HOSPADM

## 2020-08-26 RX ORDER — HYDRALAZINE HYDROCHLORIDE 50 MG/1
100 TABLET, FILM COATED ORAL 3 TIMES DAILY
Status: DISCONTINUED | OUTPATIENT
Start: 2020-08-26 | End: 2020-08-30 | Stop reason: HOSPADM

## 2020-08-26 RX ORDER — FUROSEMIDE 10 MG/ML
40 INJECTION INTRAMUSCULAR; INTRAVENOUS ONCE
Status: COMPLETED | OUTPATIENT
Start: 2020-08-26 | End: 2020-08-26

## 2020-08-26 RX ORDER — HYDRALAZINE HYDROCHLORIDE 25 MG/1
25 TABLET, FILM COATED ORAL ONCE
Status: COMPLETED | OUTPATIENT
Start: 2020-08-26 | End: 2020-08-26

## 2020-08-26 RX ADMIN — OXYMETAZOLINE HCL 2 SPRAY: 0.05 SPRAY NASAL at 08:55

## 2020-08-26 RX ADMIN — OXYCODONE HYDROCHLORIDE 20 MG: 15 TABLET ORAL at 02:04

## 2020-08-26 RX ADMIN — CLOPIDOGREL 75 MG: 75 TABLET, FILM COATED ORAL at 08:53

## 2020-08-26 RX ADMIN — CARVEDILOL 25 MG: 25 TABLET, FILM COATED ORAL at 17:47

## 2020-08-26 RX ADMIN — ATORVASTATIN CALCIUM 40 MG: 20 TABLET, FILM COATED ORAL at 22:05

## 2020-08-26 RX ADMIN — OXYCODONE HYDROCHLORIDE 20 MG: 15 TABLET ORAL at 06:37

## 2020-08-26 RX ADMIN — OXYCODONE HYDROCHLORIDE 20 MG: 15 TABLET ORAL at 22:06

## 2020-08-26 RX ADMIN — LORAZEPAM 0.5 MG: 0.5 TABLET ORAL at 07:56

## 2020-08-26 RX ADMIN — VENLAFAXINE HYDROCHLORIDE 150 MG: 150 CAPSULE, EXTENDED RELEASE ORAL at 13:57

## 2020-08-26 RX ADMIN — CETIRIZINE HYDROCHLORIDE 5 MG: 10 TABLET ORAL at 17:48

## 2020-08-26 RX ADMIN — HYDRALAZINE HYDROCHLORIDE 75 MG: 50 TABLET, FILM COATED ORAL at 15:20

## 2020-08-26 RX ADMIN — AMLODIPINE BESYLATE 10 MG: 10 TABLET ORAL at 06:38

## 2020-08-26 RX ADMIN — ALBUTEROL SULFATE 2.5 MG: 2.5 SOLUTION RESPIRATORY (INHALATION) at 16:02

## 2020-08-26 RX ADMIN — FUROSEMIDE 40 MG: 10 INJECTION, SOLUTION INTRAMUSCULAR; INTRAVENOUS at 17:47

## 2020-08-26 RX ADMIN — FUROSEMIDE 40 MG: 40 TABLET ORAL at 06:38

## 2020-08-26 RX ADMIN — HYDRALAZINE HYDROCHLORIDE 100 MG: 50 TABLET, FILM COATED ORAL at 22:05

## 2020-08-26 RX ADMIN — SERTRALINE 50 MG: 50 TABLET, FILM COATED ORAL at 17:50

## 2020-08-26 RX ADMIN — Medication 1000 MCG: at 13:57

## 2020-08-26 RX ADMIN — CARVEDILOL 25 MG: 25 TABLET, FILM COATED ORAL at 08:53

## 2020-08-26 RX ADMIN — CEFTRIAXONE SODIUM 1 G: 1 INJECTION, SOLUTION INTRAVENOUS at 17:49

## 2020-08-26 RX ADMIN — ONDANSETRON HYDROCHLORIDE 4 MG: 4 TABLET, FILM COATED ORAL at 06:38

## 2020-08-26 RX ADMIN — ALLOPURINOL 100 MG: 100 TABLET ORAL at 08:53

## 2020-08-26 RX ADMIN — OXYCODONE HYDROCHLORIDE 20 MG: 15 TABLET ORAL at 18:08

## 2020-08-26 RX ADMIN — SODIUM CHLORIDE, PRESERVATIVE FREE 10 ML: 5 INJECTION INTRAVENOUS at 01:28

## 2020-08-26 RX ADMIN — LORAZEPAM 0.5 MG: 0.5 TABLET ORAL at 22:06

## 2020-08-26 RX ADMIN — HYDRALAZINE HYDROCHLORIDE 75 MG: 50 TABLET, FILM COATED ORAL at 08:53

## 2020-08-26 RX ADMIN — HYDRALAZINE HYDROCHLORIDE 25 MG: 50 TABLET, FILM COATED ORAL at 17:49

## 2020-08-26 RX ADMIN — CARBAMAZEPINE 200 MG: 200 TABLET ORAL at 08:53

## 2020-08-26 RX ADMIN — POTASSIUM CHLORIDE 20 MEQ: 10 CAPSULE, COATED, EXTENDED RELEASE ORAL at 17:47

## 2020-08-26 RX ADMIN — FAMOTIDINE 20 MG: 20 TABLET, FILM COATED ORAL at 13:57

## 2020-08-26 RX ADMIN — OXYCODONE HYDROCHLORIDE 20 MG: 15 TABLET ORAL at 13:57

## 2020-08-26 RX ADMIN — PANTOPRAZOLE SODIUM 40 MG: 40 TABLET, DELAYED RELEASE ORAL at 06:38

## 2020-08-27 ENCOUNTER — APPOINTMENT (OUTPATIENT)
Dept: CARDIOLOGY | Facility: HOSPITAL | Age: 75
End: 2020-08-27

## 2020-08-27 ENCOUNTER — APPOINTMENT (OUTPATIENT)
Dept: CT IMAGING | Facility: HOSPITAL | Age: 75
End: 2020-08-27

## 2020-08-27 LAB
ALBUMIN SERPL-MCNC: 3.5 G/DL (ref 3.5–5.2)
ANION GAP SERPL CALCULATED.3IONS-SCNC: 12.1 MMOL/L (ref 5–15)
BUN SERPL-MCNC: 22 MG/DL (ref 8–23)
BUN/CREAT SERPL: 11.9 (ref 7–25)
CALCIUM SPEC-SCNC: 9.4 MG/DL (ref 8.6–10.5)
CHLORIDE SERPL-SCNC: 98 MMOL/L (ref 98–107)
CO2 SERPL-SCNC: 23.9 MMOL/L (ref 22–29)
CREAT SERPL-MCNC: 1.85 MG/DL (ref 0.57–1)
CRP SERPL-MCNC: 30.07 MG/DL (ref 0–0.5)
DEPRECATED RDW RBC AUTO: 44.5 FL (ref 37–54)
ERYTHROCYTE [DISTWIDTH] IN BLOOD BY AUTOMATED COUNT: 13.1 % (ref 12.3–15.4)
GFR SERPL CREATININE-BSD FRML MDRD: 27 ML/MIN/1.73
GLUCOSE SERPL-MCNC: 158 MG/DL (ref 65–99)
HCT VFR BLD AUTO: 28.4 % (ref 34–46.6)
HEMOCCULT STL QL: NEGATIVE
HGB BLD-MCNC: 9.3 G/DL (ref 12–15.9)
MAGNESIUM SERPL-MCNC: 2 MG/DL (ref 1.6–2.4)
MAXIMAL PREDICTED HEART RATE: 146 BPM
MCH RBC QN AUTO: 30.4 PG (ref 26.6–33)
MCHC RBC AUTO-ENTMCNC: 32.7 G/DL (ref 31.5–35.7)
MCV RBC AUTO: 92.8 FL (ref 79–97)
PHOSPHATE SERPL-MCNC: 4.7 MG/DL (ref 2.5–4.5)
PLATELET # BLD AUTO: 373 10*3/MM3 (ref 140–450)
PMV BLD AUTO: 9 FL (ref 6–12)
POTASSIUM SERPL-SCNC: 3.6 MMOL/L (ref 3.5–5.2)
PROCALCITONIN SERPL-MCNC: 0.78 NG/ML (ref 0–0.25)
RBC # BLD AUTO: 3.06 10*6/MM3 (ref 3.77–5.28)
SODIUM SERPL-SCNC: 134 MMOL/L (ref 136–145)
STRESS TARGET HR: 124 BPM
URATE SERPL-MCNC: 8.1 MG/DL (ref 2.4–5.7)
WBC # BLD AUTO: 9.39 10*3/MM3 (ref 3.4–10.8)

## 2020-08-27 PROCEDURE — 99223 1ST HOSP IP/OBS HIGH 75: CPT | Performed by: INTERNAL MEDICINE

## 2020-08-27 PROCEDURE — 94799 UNLISTED PULMONARY SVC/PX: CPT

## 2020-08-27 PROCEDURE — 85027 COMPLETE CBC AUTOMATED: CPT | Performed by: INTERNAL MEDICINE

## 2020-08-27 PROCEDURE — 94669 MECHANICAL CHEST WALL OSCILL: CPT

## 2020-08-27 PROCEDURE — 84550 ASSAY OF BLOOD/URIC ACID: CPT | Performed by: INTERNAL MEDICINE

## 2020-08-27 PROCEDURE — 93308 TTE F-UP OR LMTD: CPT

## 2020-08-27 PROCEDURE — 93308 TTE F-UP OR LMTD: CPT | Performed by: INTERNAL MEDICINE

## 2020-08-27 PROCEDURE — 97116 GAIT TRAINING THERAPY: CPT

## 2020-08-27 PROCEDURE — 83735 ASSAY OF MAGNESIUM: CPT | Performed by: INTERNAL MEDICINE

## 2020-08-27 PROCEDURE — 71250 CT THORAX DX C-: CPT

## 2020-08-27 PROCEDURE — 86140 C-REACTIVE PROTEIN: CPT | Performed by: INTERNAL MEDICINE

## 2020-08-27 PROCEDURE — 25010000002 HYDRALAZINE PER 20 MG: Performed by: INTERNAL MEDICINE

## 2020-08-27 PROCEDURE — 25010000002 CEFTRIAXONE PER 250 MG: Performed by: HOSPITALIST

## 2020-08-27 PROCEDURE — 82272 OCCULT BLD FECES 1-3 TESTS: CPT | Performed by: INTERNAL MEDICINE

## 2020-08-27 PROCEDURE — 84145 PROCALCITONIN (PCT): CPT | Performed by: INTERNAL MEDICINE

## 2020-08-27 PROCEDURE — 80069 RENAL FUNCTION PANEL: CPT | Performed by: INTERNAL MEDICINE

## 2020-08-27 RX ORDER — SODIUM CHLORIDE FOR INHALATION 7 %
4 VIAL, NEBULIZER (ML) INHALATION
Status: DISCONTINUED | OUTPATIENT
Start: 2020-08-27 | End: 2020-08-30 | Stop reason: HOSPADM

## 2020-08-27 RX ORDER — POTASSIUM CHLORIDE 750 MG/1
20 CAPSULE, EXTENDED RELEASE ORAL DAILY
Status: DISCONTINUED | OUTPATIENT
Start: 2020-08-27 | End: 2020-08-30 | Stop reason: HOSPADM

## 2020-08-27 RX ORDER — TORSEMIDE 20 MG/1
40 TABLET ORAL DAILY
Status: DISCONTINUED | OUTPATIENT
Start: 2020-08-27 | End: 2020-08-30 | Stop reason: HOSPADM

## 2020-08-27 RX ORDER — CLONIDINE HYDROCHLORIDE 0.1 MG/1
0.1 TABLET ORAL EVERY 12 HOURS SCHEDULED
Status: DISCONTINUED | OUTPATIENT
Start: 2020-08-27 | End: 2020-08-28

## 2020-08-27 RX ADMIN — OXYCODONE HYDROCHLORIDE 20 MG: 15 TABLET ORAL at 12:08

## 2020-08-27 RX ADMIN — AMLODIPINE BESYLATE 10 MG: 10 TABLET ORAL at 06:00

## 2020-08-27 RX ADMIN — ALLOPURINOL 100 MG: 100 TABLET ORAL at 08:44

## 2020-08-27 RX ADMIN — Medication 4 ML: at 20:57

## 2020-08-27 RX ADMIN — CETIRIZINE HYDROCHLORIDE 5 MG: 10 TABLET ORAL at 08:44

## 2020-08-27 RX ADMIN — CLOPIDOGREL 75 MG: 75 TABLET, FILM COATED ORAL at 08:46

## 2020-08-27 RX ADMIN — CARVEDILOL 25 MG: 25 TABLET, FILM COATED ORAL at 18:29

## 2020-08-27 RX ADMIN — CLONIDINE HYDROCHLORIDE 0.1 MG: 0.1 TABLET ORAL at 20:36

## 2020-08-27 RX ADMIN — HYDRALAZINE HYDROCHLORIDE 100 MG: 50 TABLET, FILM COATED ORAL at 20:36

## 2020-08-27 RX ADMIN — TORSEMIDE 40 MG: 20 TABLET ORAL at 12:08

## 2020-08-27 RX ADMIN — ACETAMINOPHEN 650 MG: 325 TABLET, FILM COATED ORAL at 20:42

## 2020-08-27 RX ADMIN — FOLIC ACID 1 MG: 1 TABLET ORAL at 08:44

## 2020-08-27 RX ADMIN — LORAZEPAM 0.5 MG: 0.5 TABLET ORAL at 15:22

## 2020-08-27 RX ADMIN — LORAZEPAM 0.5 MG: 0.5 TABLET ORAL at 22:28

## 2020-08-27 RX ADMIN — CLONIDINE HYDROCHLORIDE 0.1 MG: 0.1 TABLET ORAL at 12:08

## 2020-08-27 RX ADMIN — HYDRALAZINE HYDROCHLORIDE 100 MG: 50 TABLET, FILM COATED ORAL at 08:51

## 2020-08-27 RX ADMIN — HYDRALAZINE HYDROCHLORIDE 100 MG: 50 TABLET, FILM COATED ORAL at 15:02

## 2020-08-27 RX ADMIN — PANTOPRAZOLE SODIUM 40 MG: 40 TABLET, DELAYED RELEASE ORAL at 06:00

## 2020-08-27 RX ADMIN — POTASSIUM CHLORIDE 20 MEQ: 10 CAPSULE, COATED, EXTENDED RELEASE ORAL at 12:08

## 2020-08-27 RX ADMIN — ATORVASTATIN CALCIUM 40 MG: 20 TABLET, FILM COATED ORAL at 20:36

## 2020-08-27 RX ADMIN — ALBUTEROL SULFATE 2.5 MG: 2.5 SOLUTION RESPIRATORY (INHALATION) at 20:53

## 2020-08-27 RX ADMIN — CARVEDILOL 25 MG: 25 TABLET, FILM COATED ORAL at 08:44

## 2020-08-27 RX ADMIN — HYDRALAZINE HYDROCHLORIDE 20 MG: 20 INJECTION INTRAMUSCULAR; INTRAVENOUS at 06:16

## 2020-08-27 RX ADMIN — OXYCODONE HYDROCHLORIDE 20 MG: 15 TABLET ORAL at 18:29

## 2020-08-27 RX ADMIN — VENLAFAXINE HYDROCHLORIDE 150 MG: 150 CAPSULE, EXTENDED RELEASE ORAL at 08:44

## 2020-08-27 RX ADMIN — SERTRALINE 50 MG: 50 TABLET, FILM COATED ORAL at 08:44

## 2020-08-27 RX ADMIN — CEFTRIAXONE SODIUM 1 G: 1 INJECTION, SOLUTION INTRAVENOUS at 15:17

## 2020-08-27 RX ADMIN — CARBAMAZEPINE 200 MG: 200 TABLET ORAL at 08:46

## 2020-08-27 RX ADMIN — OXYCODONE HYDROCHLORIDE 15 MG: 15 TABLET ORAL at 22:28

## 2020-08-27 RX ADMIN — LORAZEPAM 0.5 MG: 0.5 TABLET ORAL at 05:58

## 2020-08-27 RX ADMIN — ALBUTEROL SULFATE 2.5 MG: 2.5 SOLUTION RESPIRATORY (INHALATION) at 04:33

## 2020-08-27 RX ADMIN — OXYCODONE HYDROCHLORIDE 20 MG: 15 TABLET ORAL at 05:58

## 2020-08-27 RX ADMIN — FAMOTIDINE 20 MG: 20 TABLET, FILM COATED ORAL at 08:44

## 2020-08-28 LAB
ALBUMIN SERPL-MCNC: 3.1 G/DL (ref 3.5–5.2)
ANION GAP SERPL CALCULATED.3IONS-SCNC: 10.6 MMOL/L (ref 5–15)
B PARAPERT DNA SPEC QL NAA+PROBE: NOT DETECTED
B PERT DNA SPEC QL NAA+PROBE: NOT DETECTED
BACTERIA SPEC AEROBE CULT: NORMAL
BACTERIA SPEC AEROBE CULT: NORMAL
BASOPHILS # BLD AUTO: 0.04 10*3/MM3 (ref 0–0.2)
BASOPHILS NFR BLD AUTO: 0.4 % (ref 0–1.5)
BUN SERPL-MCNC: 26 MG/DL (ref 8–23)
BUN/CREAT SERPL: 14.6 (ref 7–25)
C PNEUM DNA NPH QL NAA+NON-PROBE: NOT DETECTED
CALCIUM SPEC-SCNC: 9.2 MG/DL (ref 8.6–10.5)
CHLORIDE SERPL-SCNC: 98 MMOL/L (ref 98–107)
CO2 SERPL-SCNC: 25.4 MMOL/L (ref 22–29)
CREAT SERPL-MCNC: 1.78 MG/DL (ref 0.57–1)
DEPRECATED RDW RBC AUTO: 42.9 FL (ref 37–54)
EOSINOPHIL # BLD AUTO: 0.19 10*3/MM3 (ref 0–0.4)
EOSINOPHIL NFR BLD AUTO: 2 % (ref 0.3–6.2)
ERYTHROCYTE [DISTWIDTH] IN BLOOD BY AUTOMATED COUNT: 13.1 % (ref 12.3–15.4)
FLUAV H1 2009 PAND RNA NPH QL NAA+PROBE: NOT DETECTED
FLUAV H1 HA GENE NPH QL NAA+PROBE: NOT DETECTED
FLUAV H3 RNA NPH QL NAA+PROBE: NOT DETECTED
FLUAV SUBTYP SPEC NAA+PROBE: NOT DETECTED
FLUBV RNA ISLT QL NAA+PROBE: NOT DETECTED
GFR SERPL CREATININE-BSD FRML MDRD: 28 ML/MIN/1.73
GLUCOSE SERPL-MCNC: 118 MG/DL (ref 65–99)
HADV DNA SPEC NAA+PROBE: NOT DETECTED
HCOV 229E RNA SPEC QL NAA+PROBE: NOT DETECTED
HCOV HKU1 RNA SPEC QL NAA+PROBE: NOT DETECTED
HCOV NL63 RNA SPEC QL NAA+PROBE: NOT DETECTED
HCOV OC43 RNA SPEC QL NAA+PROBE: NOT DETECTED
HCT VFR BLD AUTO: 25.1 % (ref 34–46.6)
HGB BLD-MCNC: 8.5 G/DL (ref 12–15.9)
HMPV RNA NPH QL NAA+NON-PROBE: NOT DETECTED
HPIV1 RNA SPEC QL NAA+PROBE: NOT DETECTED
HPIV2 RNA SPEC QL NAA+PROBE: NOT DETECTED
HPIV3 RNA NPH QL NAA+PROBE: NOT DETECTED
HPIV4 P GENE NPH QL NAA+PROBE: NOT DETECTED
IMM GRANULOCYTES # BLD AUTO: 0.23 10*3/MM3 (ref 0–0.05)
IMM GRANULOCYTES NFR BLD AUTO: 2.5 % (ref 0–0.5)
LYMPHOCYTES # BLD AUTO: 1.04 10*3/MM3 (ref 0.7–3.1)
LYMPHOCYTES NFR BLD AUTO: 11.2 % (ref 19.6–45.3)
M PNEUMO IGG SER IA-ACNC: NOT DETECTED
MCH RBC QN AUTO: 30.4 PG (ref 26.6–33)
MCHC RBC AUTO-ENTMCNC: 33.9 G/DL (ref 31.5–35.7)
MCV RBC AUTO: 89.6 FL (ref 79–97)
MONOCYTES # BLD AUTO: 0.95 10*3/MM3 (ref 0.1–0.9)
MONOCYTES NFR BLD AUTO: 10.2 % (ref 5–12)
NEUTROPHILS NFR BLD AUTO: 6.86 10*3/MM3 (ref 1.7–7)
NEUTROPHILS NFR BLD AUTO: 73.7 % (ref 42.7–76)
NRBC BLD AUTO-RTO: 0 /100 WBC (ref 0–0.2)
PHOSPHATE SERPL-MCNC: 4.3 MG/DL (ref 2.5–4.5)
PLATELET # BLD AUTO: 353 10*3/MM3 (ref 140–450)
PMV BLD AUTO: 9.3 FL (ref 6–12)
POTASSIUM SERPL-SCNC: 3.8 MMOL/L (ref 3.5–5.2)
RBC # BLD AUTO: 2.8 10*6/MM3 (ref 3.77–5.28)
RHINOVIRUS RNA SPEC NAA+PROBE: NOT DETECTED
RSV RNA NPH QL NAA+NON-PROBE: NOT DETECTED
SARS-COV-2 RNA NPH QL NAA+NON-PROBE: NOT DETECTED
SODIUM SERPL-SCNC: 134 MMOL/L (ref 136–145)
URATE SERPL-MCNC: 9.1 MG/DL (ref 2.4–5.7)
WBC # BLD AUTO: 9.31 10*3/MM3 (ref 3.4–10.8)

## 2020-08-28 PROCEDURE — 80069 RENAL FUNCTION PANEL: CPT | Performed by: INTERNAL MEDICINE

## 2020-08-28 PROCEDURE — 25010000002 CEFTRIAXONE PER 250 MG: Performed by: HOSPITALIST

## 2020-08-28 PROCEDURE — 94799 UNLISTED PULMONARY SVC/PX: CPT

## 2020-08-28 PROCEDURE — 85025 COMPLETE CBC W/AUTO DIFF WBC: CPT | Performed by: INTERNAL MEDICINE

## 2020-08-28 PROCEDURE — 63710000001 ONDANSETRON PER 8 MG: Performed by: HOSPITALIST

## 2020-08-28 PROCEDURE — 97116 GAIT TRAINING THERAPY: CPT

## 2020-08-28 PROCEDURE — 84550 ASSAY OF BLOOD/URIC ACID: CPT | Performed by: INTERNAL MEDICINE

## 2020-08-28 PROCEDURE — 99232 SBSQ HOSP IP/OBS MODERATE 35: CPT | Performed by: INTERNAL MEDICINE

## 2020-08-28 PROCEDURE — 0202U NFCT DS 22 TRGT SARS-COV-2: CPT | Performed by: INTERNAL MEDICINE

## 2020-08-28 PROCEDURE — 94669 MECHANICAL CHEST WALL OSCILL: CPT

## 2020-08-28 PROCEDURE — 99222 1ST HOSP IP/OBS MODERATE 55: CPT | Performed by: INTERNAL MEDICINE

## 2020-08-28 RX ORDER — CLONIDINE HYDROCHLORIDE 0.1 MG/1
0.2 TABLET ORAL EVERY 12 HOURS SCHEDULED
Status: DISCONTINUED | OUTPATIENT
Start: 2020-08-28 | End: 2020-08-30 | Stop reason: HOSPADM

## 2020-08-28 RX ADMIN — PANTOPRAZOLE SODIUM 40 MG: 40 TABLET, DELAYED RELEASE ORAL at 06:24

## 2020-08-28 RX ADMIN — ONDANSETRON HYDROCHLORIDE 4 MG: 4 TABLET, FILM COATED ORAL at 16:37

## 2020-08-28 RX ADMIN — ALLOPURINOL 100 MG: 100 TABLET ORAL at 10:09

## 2020-08-28 RX ADMIN — OXYCODONE HYDROCHLORIDE 20 MG: 15 TABLET ORAL at 10:22

## 2020-08-28 RX ADMIN — VENLAFAXINE HYDROCHLORIDE 150 MG: 150 CAPSULE, EXTENDED RELEASE ORAL at 10:03

## 2020-08-28 RX ADMIN — FOLIC ACID 1 MG: 1 TABLET ORAL at 10:04

## 2020-08-28 RX ADMIN — LORAZEPAM 0.5 MG: 0.5 TABLET ORAL at 21:12

## 2020-08-28 RX ADMIN — AMLODIPINE BESYLATE 10 MG: 10 TABLET ORAL at 10:09

## 2020-08-28 RX ADMIN — CARVEDILOL 25 MG: 25 TABLET, FILM COATED ORAL at 10:08

## 2020-08-28 RX ADMIN — HYDRALAZINE HYDROCHLORIDE 100 MG: 50 TABLET, FILM COATED ORAL at 20:22

## 2020-08-28 RX ADMIN — CLONIDINE HYDROCHLORIDE 0.2 MG: 0.1 TABLET ORAL at 10:04

## 2020-08-28 RX ADMIN — CETIRIZINE HYDROCHLORIDE 5 MG: 10 TABLET ORAL at 10:05

## 2020-08-28 RX ADMIN — SODIUM CHLORIDE, PRESERVATIVE FREE 10 ML: 5 INJECTION INTRAVENOUS at 20:23

## 2020-08-28 RX ADMIN — POTASSIUM CHLORIDE 20 MEQ: 10 CAPSULE, COATED, EXTENDED RELEASE ORAL at 10:01

## 2020-08-28 RX ADMIN — OXYCODONE HYDROCHLORIDE 20 MG: 15 TABLET ORAL at 04:39

## 2020-08-28 RX ADMIN — CLOPIDOGREL 75 MG: 75 TABLET, FILM COATED ORAL at 10:04

## 2020-08-28 RX ADMIN — OXYCODONE HYDROCHLORIDE 20 MG: 15 TABLET ORAL at 16:37

## 2020-08-28 RX ADMIN — CARVEDILOL 25 MG: 25 TABLET, FILM COATED ORAL at 17:32

## 2020-08-28 RX ADMIN — LORAZEPAM 0.5 MG: 0.5 TABLET ORAL at 10:23

## 2020-08-28 RX ADMIN — SERTRALINE 50 MG: 50 TABLET, FILM COATED ORAL at 10:04

## 2020-08-28 RX ADMIN — CEFTRIAXONE SODIUM 1 G: 1 INJECTION, SOLUTION INTRAVENOUS at 16:38

## 2020-08-28 RX ADMIN — ATORVASTATIN CALCIUM 40 MG: 20 TABLET, FILM COATED ORAL at 20:22

## 2020-08-28 RX ADMIN — ALBUTEROL SULFATE 2.5 MG: 2.5 SOLUTION RESPIRATORY (INHALATION) at 19:53

## 2020-08-28 RX ADMIN — CARBAMAZEPINE 200 MG: 200 TABLET ORAL at 14:35

## 2020-08-28 RX ADMIN — HYDRALAZINE HYDROCHLORIDE 100 MG: 50 TABLET, FILM COATED ORAL at 16:38

## 2020-08-28 RX ADMIN — TORSEMIDE 40 MG: 20 TABLET ORAL at 10:04

## 2020-08-28 RX ADMIN — FAMOTIDINE 20 MG: 20 TABLET, FILM COATED ORAL at 10:03

## 2020-08-28 RX ADMIN — Medication 4 ML: at 20:04

## 2020-08-28 RX ADMIN — OXYCODONE HYDROCHLORIDE 20 MG: 15 TABLET ORAL at 20:23

## 2020-08-28 RX ADMIN — CLONIDINE HYDROCHLORIDE 0.2 MG: 0.1 TABLET ORAL at 20:22

## 2020-08-28 RX ADMIN — SODIUM CHLORIDE, PRESERVATIVE FREE 10 ML: 5 INJECTION INTRAVENOUS at 10:10

## 2020-08-28 RX ADMIN — HYDRALAZINE HYDROCHLORIDE 100 MG: 50 TABLET, FILM COATED ORAL at 10:04

## 2020-08-29 LAB
ALBUMIN SERPL-MCNC: 3 G/DL (ref 3.5–5.2)
ANION GAP SERPL CALCULATED.3IONS-SCNC: 10 MMOL/L (ref 5–15)
BASOPHILS # BLD AUTO: 0.05 10*3/MM3 (ref 0–0.2)
BASOPHILS NFR BLD AUTO: 0.5 % (ref 0–1.5)
BUN SERPL-MCNC: 27 MG/DL (ref 8–23)
BUN/CREAT SERPL: 15.1 (ref 7–25)
CALCIUM SPEC-SCNC: 8.8 MG/DL (ref 8.6–10.5)
CHLORIDE SERPL-SCNC: 98 MMOL/L (ref 98–107)
CO2 SERPL-SCNC: 25 MMOL/L (ref 22–29)
CREAT SERPL-MCNC: 1.79 MG/DL (ref 0.57–1)
DEPRECATED RDW RBC AUTO: 45.6 FL (ref 37–54)
EOSINOPHIL # BLD AUTO: 0.22 10*3/MM3 (ref 0–0.4)
EOSINOPHIL NFR BLD AUTO: 2.3 % (ref 0.3–6.2)
ERYTHROCYTE [DISTWIDTH] IN BLOOD BY AUTOMATED COUNT: 13.3 % (ref 12.3–15.4)
GFR SERPL CREATININE-BSD FRML MDRD: 28 ML/MIN/1.73
GLUCOSE SERPL-MCNC: 113 MG/DL (ref 65–99)
HCT VFR BLD AUTO: 24.8 % (ref 34–46.6)
HGB BLD-MCNC: 8.1 G/DL (ref 12–15.9)
IMM GRANULOCYTES # BLD AUTO: 0.3 10*3/MM3 (ref 0–0.05)
IMM GRANULOCYTES NFR BLD AUTO: 3.1 % (ref 0–0.5)
LYMPHOCYTES # BLD AUTO: 1.67 10*3/MM3 (ref 0.7–3.1)
LYMPHOCYTES NFR BLD AUTO: 17.2 % (ref 19.6–45.3)
MCH RBC QN AUTO: 30.5 PG (ref 26.6–33)
MCHC RBC AUTO-ENTMCNC: 32.7 G/DL (ref 31.5–35.7)
MCV RBC AUTO: 93.2 FL (ref 79–97)
MONOCYTES # BLD AUTO: 1.11 10*3/MM3 (ref 0.1–0.9)
MONOCYTES NFR BLD AUTO: 11.4 % (ref 5–12)
NEUTROPHILS NFR BLD AUTO: 6.38 10*3/MM3 (ref 1.7–7)
NEUTROPHILS NFR BLD AUTO: 65.5 % (ref 42.7–76)
NRBC BLD AUTO-RTO: 0.1 /100 WBC (ref 0–0.2)
PHOSPHATE SERPL-MCNC: 4.4 MG/DL (ref 2.5–4.5)
PLATELET # BLD AUTO: 361 10*3/MM3 (ref 140–450)
PMV BLD AUTO: 9.3 FL (ref 6–12)
POTASSIUM SERPL-SCNC: 3.9 MMOL/L (ref 3.5–5.2)
RBC # BLD AUTO: 2.66 10*6/MM3 (ref 3.77–5.28)
SODIUM SERPL-SCNC: 133 MMOL/L (ref 136–145)
WBC # BLD AUTO: 9.73 10*3/MM3 (ref 3.4–10.8)

## 2020-08-29 PROCEDURE — 94669 MECHANICAL CHEST WALL OSCILL: CPT

## 2020-08-29 PROCEDURE — 94799 UNLISTED PULMONARY SVC/PX: CPT

## 2020-08-29 PROCEDURE — 85025 COMPLETE CBC W/AUTO DIFF WBC: CPT | Performed by: INTERNAL MEDICINE

## 2020-08-29 PROCEDURE — 63710000001 ONDANSETRON PER 8 MG: Performed by: HOSPITALIST

## 2020-08-29 PROCEDURE — 80069 RENAL FUNCTION PANEL: CPT | Performed by: INTERNAL MEDICINE

## 2020-08-29 PROCEDURE — 25010000002 CEFTRIAXONE PER 250 MG: Performed by: HOSPITALIST

## 2020-08-29 RX ADMIN — ONDANSETRON HYDROCHLORIDE 4 MG: 4 TABLET, FILM COATED ORAL at 16:17

## 2020-08-29 RX ADMIN — LORAZEPAM 0.5 MG: 0.5 TABLET ORAL at 16:17

## 2020-08-29 RX ADMIN — Medication 4 ML: at 07:55

## 2020-08-29 RX ADMIN — CLOPIDOGREL 75 MG: 75 TABLET, FILM COATED ORAL at 08:13

## 2020-08-29 RX ADMIN — ALBUTEROL SULFATE 2.5 MG: 2.5 SOLUTION RESPIRATORY (INHALATION) at 15:51

## 2020-08-29 RX ADMIN — HYDRALAZINE HYDROCHLORIDE 100 MG: 50 TABLET, FILM COATED ORAL at 16:17

## 2020-08-29 RX ADMIN — TORSEMIDE 40 MG: 20 TABLET ORAL at 08:12

## 2020-08-29 RX ADMIN — CEFTRIAXONE SODIUM 1 G: 1 INJECTION, SOLUTION INTRAVENOUS at 19:09

## 2020-08-29 RX ADMIN — OXYCODONE HYDROCHLORIDE 20 MG: 15 TABLET ORAL at 08:11

## 2020-08-29 RX ADMIN — CARVEDILOL 25 MG: 25 TABLET, FILM COATED ORAL at 19:09

## 2020-08-29 RX ADMIN — CETIRIZINE HYDROCHLORIDE 5 MG: 10 TABLET ORAL at 08:12

## 2020-08-29 RX ADMIN — OXYCODONE HYDROCHLORIDE 20 MG: 15 TABLET ORAL at 20:13

## 2020-08-29 RX ADMIN — ALBUTEROL SULFATE 2.5 MG: 2.5 SOLUTION RESPIRATORY (INHALATION) at 20:52

## 2020-08-29 RX ADMIN — ALBUTEROL SULFATE 2.5 MG: 2.5 SOLUTION RESPIRATORY (INHALATION) at 07:50

## 2020-08-29 RX ADMIN — AMLODIPINE BESYLATE 10 MG: 10 TABLET ORAL at 08:13

## 2020-08-29 RX ADMIN — OXYCODONE HYDROCHLORIDE 20 MG: 15 TABLET ORAL at 23:58

## 2020-08-29 RX ADMIN — SODIUM CHLORIDE, PRESERVATIVE FREE 10 ML: 5 INJECTION INTRAVENOUS at 08:13

## 2020-08-29 RX ADMIN — VENLAFAXINE HYDROCHLORIDE 150 MG: 150 CAPSULE, EXTENDED RELEASE ORAL at 08:12

## 2020-08-29 RX ADMIN — LORAZEPAM 0.5 MG: 0.5 TABLET ORAL at 08:11

## 2020-08-29 RX ADMIN — CLONIDINE HYDROCHLORIDE 0.2 MG: 0.1 TABLET ORAL at 20:12

## 2020-08-29 RX ADMIN — ALLOPURINOL 100 MG: 100 TABLET ORAL at 08:13

## 2020-08-29 RX ADMIN — FOLIC ACID 1 MG: 1 TABLET ORAL at 08:12

## 2020-08-29 RX ADMIN — LORAZEPAM 0.5 MG: 0.5 TABLET ORAL at 23:58

## 2020-08-29 RX ADMIN — ONDANSETRON HYDROCHLORIDE 4 MG: 4 TABLET, FILM COATED ORAL at 08:13

## 2020-08-29 RX ADMIN — ONDANSETRON HYDROCHLORIDE 4 MG: 4 TABLET, FILM COATED ORAL at 23:58

## 2020-08-29 RX ADMIN — CLONIDINE HYDROCHLORIDE 0.2 MG: 0.1 TABLET ORAL at 08:12

## 2020-08-29 RX ADMIN — OXYCODONE HYDROCHLORIDE 20 MG: 15 TABLET ORAL at 00:17

## 2020-08-29 RX ADMIN — PANTOPRAZOLE SODIUM 40 MG: 40 TABLET, DELAYED RELEASE ORAL at 08:11

## 2020-08-29 RX ADMIN — HYDRALAZINE HYDROCHLORIDE 100 MG: 50 TABLET, FILM COATED ORAL at 20:12

## 2020-08-29 RX ADMIN — HYDRALAZINE HYDROCHLORIDE 100 MG: 50 TABLET, FILM COATED ORAL at 08:12

## 2020-08-29 RX ADMIN — OXYCODONE HYDROCHLORIDE 20 MG: 15 TABLET ORAL at 16:17

## 2020-08-29 RX ADMIN — POTASSIUM CHLORIDE 20 MEQ: 10 CAPSULE, COATED, EXTENDED RELEASE ORAL at 08:11

## 2020-08-29 RX ADMIN — FAMOTIDINE 20 MG: 20 TABLET, FILM COATED ORAL at 08:11

## 2020-08-29 RX ADMIN — ATORVASTATIN CALCIUM 40 MG: 20 TABLET, FILM COATED ORAL at 20:18

## 2020-08-29 RX ADMIN — SERTRALINE 50 MG: 50 TABLET, FILM COATED ORAL at 08:13

## 2020-08-29 RX ADMIN — CARVEDILOL 25 MG: 25 TABLET, FILM COATED ORAL at 08:13

## 2020-08-29 RX ADMIN — Medication 4 ML: at 15:58

## 2020-08-29 RX ADMIN — Medication 4 ML: at 20:59

## 2020-08-29 RX ADMIN — CARBAMAZEPINE 200 MG: 200 TABLET ORAL at 08:13

## 2020-08-30 ENCOUNTER — READMISSION MANAGEMENT (OUTPATIENT)
Dept: CALL CENTER | Facility: HOSPITAL | Age: 75
End: 2020-08-30

## 2020-08-30 VITALS
HEIGHT: 67 IN | TEMPERATURE: 98 F | RESPIRATION RATE: 18 BRPM | SYSTOLIC BLOOD PRESSURE: 174 MMHG | BODY MASS INDEX: 29.78 KG/M2 | HEART RATE: 63 BPM | WEIGHT: 189.7 LBS | OXYGEN SATURATION: 98 % | DIASTOLIC BLOOD PRESSURE: 73 MMHG

## 2020-08-30 LAB
ANION GAP SERPL CALCULATED.3IONS-SCNC: 9.8 MMOL/L (ref 5–15)
BASOPHILS # BLD AUTO: 0.04 10*3/MM3 (ref 0–0.2)
BASOPHILS NFR BLD AUTO: 0.4 % (ref 0–1.5)
BUN SERPL-MCNC: 28 MG/DL (ref 8–23)
BUN/CREAT SERPL: 15.4 (ref 7–25)
CALCIUM SPEC-SCNC: 8.8 MG/DL (ref 8.6–10.5)
CHLORIDE SERPL-SCNC: 99 MMOL/L (ref 98–107)
CO2 SERPL-SCNC: 24.2 MMOL/L (ref 22–29)
CREAT SERPL-MCNC: 1.82 MG/DL (ref 0.57–1)
DEPRECATED RDW RBC AUTO: 45 FL (ref 37–54)
EOSINOPHIL # BLD AUTO: 0.19 10*3/MM3 (ref 0–0.4)
EOSINOPHIL NFR BLD AUTO: 2 % (ref 0.3–6.2)
ERYTHROCYTE [DISTWIDTH] IN BLOOD BY AUTOMATED COUNT: 13.1 % (ref 12.3–15.4)
GFR SERPL CREATININE-BSD FRML MDRD: 27 ML/MIN/1.73
GLUCOSE SERPL-MCNC: 112 MG/DL (ref 65–99)
HCT VFR BLD AUTO: 25.2 % (ref 34–46.6)
HGB BLD-MCNC: 8.1 G/DL (ref 12–15.9)
IMM GRANULOCYTES # BLD AUTO: 0.33 10*3/MM3 (ref 0–0.05)
IMM GRANULOCYTES NFR BLD AUTO: 3.5 % (ref 0–0.5)
LYMPHOCYTES # BLD AUTO: 1.45 10*3/MM3 (ref 0.7–3.1)
LYMPHOCYTES NFR BLD AUTO: 15.5 % (ref 19.6–45.3)
MCH RBC QN AUTO: 30 PG (ref 26.6–33)
MCHC RBC AUTO-ENTMCNC: 32.1 G/DL (ref 31.5–35.7)
MCV RBC AUTO: 93.3 FL (ref 79–97)
MONOCYTES # BLD AUTO: 0.86 10*3/MM3 (ref 0.1–0.9)
MONOCYTES NFR BLD AUTO: 9.2 % (ref 5–12)
NEUTROPHILS NFR BLD AUTO: 6.46 10*3/MM3 (ref 1.7–7)
NEUTROPHILS NFR BLD AUTO: 69.4 % (ref 42.7–76)
NRBC BLD AUTO-RTO: 0 /100 WBC (ref 0–0.2)
PLATELET # BLD AUTO: 375 10*3/MM3 (ref 140–450)
PMV BLD AUTO: 9.3 FL (ref 6–12)
POTASSIUM SERPL-SCNC: 4 MMOL/L (ref 3.5–5.2)
RBC # BLD AUTO: 2.7 10*6/MM3 (ref 3.77–5.28)
SODIUM SERPL-SCNC: 133 MMOL/L (ref 136–145)
WBC # BLD AUTO: 9.33 10*3/MM3 (ref 3.4–10.8)

## 2020-08-30 PROCEDURE — 94799 UNLISTED PULMONARY SVC/PX: CPT

## 2020-08-30 PROCEDURE — 94669 MECHANICAL CHEST WALL OSCILL: CPT

## 2020-08-30 PROCEDURE — 80048 BASIC METABOLIC PNL TOTAL CA: CPT | Performed by: INTERNAL MEDICINE

## 2020-08-30 PROCEDURE — 85025 COMPLETE CBC W/AUTO DIFF WBC: CPT | Performed by: INTERNAL MEDICINE

## 2020-08-30 PROCEDURE — 63710000001 ONDANSETRON PER 8 MG: Performed by: HOSPITALIST

## 2020-08-30 RX ORDER — ALBUTEROL SULFATE 90 UG/1
2 AEROSOL, METERED RESPIRATORY (INHALATION) EVERY 4 HOURS PRN
Qty: 1 G | Refills: 0 | Status: SHIPPED | OUTPATIENT
Start: 2020-08-30 | End: 2020-09-04

## 2020-08-30 RX ORDER — FOLIC ACID 1 MG/1
1 TABLET ORAL DAILY
Qty: 30 TABLET | Refills: 0 | Status: SHIPPED | OUTPATIENT
Start: 2020-08-31 | End: 2020-10-12

## 2020-08-30 RX ORDER — LOSARTAN POTASSIUM 25 MG/1
25 TABLET ORAL
Qty: 30 TABLET | Refills: 0 | Status: SHIPPED | OUTPATIENT
Start: 2020-08-30 | End: 2020-09-11 | Stop reason: SDUPTHER

## 2020-08-30 RX ORDER — CLONIDINE HYDROCHLORIDE 0.2 MG/1
0.2 TABLET ORAL EVERY 12 HOURS SCHEDULED
Qty: 60 TABLET | Refills: 0 | Status: SHIPPED | OUTPATIENT
Start: 2020-08-30 | End: 2020-09-11 | Stop reason: SDUPTHER

## 2020-08-30 RX ORDER — HYDRALAZINE HYDROCHLORIDE 100 MG/1
100 TABLET, FILM COATED ORAL 3 TIMES DAILY
Qty: 90 TABLET | Refills: 0 | Status: SHIPPED | OUTPATIENT
Start: 2020-08-30 | End: 2020-09-11 | Stop reason: SDUPTHER

## 2020-08-30 RX ORDER — LOSARTAN POTASSIUM 25 MG/1
25 TABLET ORAL
Status: DISCONTINUED | OUTPATIENT
Start: 2020-08-30 | End: 2020-08-30 | Stop reason: HOSPADM

## 2020-08-30 RX ORDER — TORSEMIDE 20 MG/1
40 TABLET ORAL DAILY
Qty: 30 TABLET | Refills: 0 | Status: SHIPPED | OUTPATIENT
Start: 2020-08-31 | End: 2020-09-11 | Stop reason: SDUPTHER

## 2020-08-30 RX ADMIN — FOLIC ACID 1 MG: 1 TABLET ORAL at 08:16

## 2020-08-30 RX ADMIN — TORSEMIDE 40 MG: 20 TABLET ORAL at 08:15

## 2020-08-30 RX ADMIN — LORAZEPAM 0.5 MG: 0.5 TABLET ORAL at 08:16

## 2020-08-30 RX ADMIN — VENLAFAXINE HYDROCHLORIDE 150 MG: 150 CAPSULE, EXTENDED RELEASE ORAL at 08:15

## 2020-08-30 RX ADMIN — AMLODIPINE BESYLATE 10 MG: 10 TABLET ORAL at 06:33

## 2020-08-30 RX ADMIN — SERTRALINE 50 MG: 50 TABLET, FILM COATED ORAL at 08:15

## 2020-08-30 RX ADMIN — CARVEDILOL 25 MG: 25 TABLET, FILM COATED ORAL at 08:15

## 2020-08-30 RX ADMIN — HYDRALAZINE HYDROCHLORIDE 100 MG: 50 TABLET, FILM COATED ORAL at 08:15

## 2020-08-30 RX ADMIN — ALBUTEROL SULFATE 2.5 MG: 2.5 SOLUTION RESPIRATORY (INHALATION) at 09:07

## 2020-08-30 RX ADMIN — FAMOTIDINE 20 MG: 20 TABLET, FILM COATED ORAL at 08:16

## 2020-08-30 RX ADMIN — CARBAMAZEPINE 200 MG: 200 TABLET ORAL at 08:16

## 2020-08-30 RX ADMIN — ONDANSETRON HYDROCHLORIDE 4 MG: 4 TABLET, FILM COATED ORAL at 08:16

## 2020-08-30 RX ADMIN — OXYCODONE HYDROCHLORIDE 20 MG: 15 TABLET ORAL at 12:46

## 2020-08-30 RX ADMIN — CLONIDINE HYDROCHLORIDE 0.2 MG: 0.1 TABLET ORAL at 08:16

## 2020-08-30 RX ADMIN — PANTOPRAZOLE SODIUM 40 MG: 40 TABLET, DELAYED RELEASE ORAL at 06:33

## 2020-08-30 RX ADMIN — Medication 4 ML: at 09:15

## 2020-08-30 RX ADMIN — CETIRIZINE HYDROCHLORIDE 5 MG: 10 TABLET ORAL at 08:15

## 2020-08-30 RX ADMIN — SODIUM CHLORIDE, PRESERVATIVE FREE 10 ML: 5 INJECTION INTRAVENOUS at 08:16

## 2020-08-30 RX ADMIN — POTASSIUM CHLORIDE 20 MEQ: 10 CAPSULE, COATED, EXTENDED RELEASE ORAL at 08:16

## 2020-08-30 RX ADMIN — ALLOPURINOL 100 MG: 100 TABLET ORAL at 08:15

## 2020-08-30 RX ADMIN — CLOPIDOGREL 75 MG: 75 TABLET, FILM COATED ORAL at 08:15

## 2020-08-30 RX ADMIN — OXYCODONE HYDROCHLORIDE 20 MG: 15 TABLET ORAL at 08:16

## 2020-08-30 RX ADMIN — LOSARTAN POTASSIUM 25 MG: 25 TABLET, FILM COATED ORAL at 15:14

## 2020-08-30 NOTE — OUTREACH NOTE
Prep Survey      Responses   [facility] from?  [place]   Is LACE score < 7 ?  No   Eligibility  [facility]   Date of Admission  08/23/20   Date of Discharge  08/30/20   Discharge Disposition  Home or Self Care   Discharge diagnosis  Bilateral PNA, Hypertensive urgency, CKD   COVID-19 Test Status  Negative   Does the patient have one of the following disease processes/diagnoses(primary or secondary)?  COPD/Pneumonia   Does the patient have Home health ordered?  Yes   What is the Home health agency?   [facility]    Is there a DME ordered?  Yes   What DME was ordered?  O2 from [place]   Prep survey completed?  Yes          Gladys Sharpe RN

## 2020-08-31 ENCOUNTER — TRANSITIONAL CARE MANAGEMENT TELEPHONE ENCOUNTER (OUTPATIENT)
Dept: CALL CENTER | Facility: HOSPITAL | Age: 75
End: 2020-08-31

## 2020-08-31 ENCOUNTER — NURSE TRIAGE (OUTPATIENT)
Dept: CALL CENTER | Facility: HOSPITAL | Age: 75
End: 2020-08-31

## 2020-08-31 RX ORDER — VENLAFAXINE HYDROCHLORIDE 150 MG/1
150 CAPSULE, EXTENDED RELEASE ORAL DAILY
Qty: 90 CAPSULE | Refills: 0 | Status: SHIPPED | OUTPATIENT
Start: 2020-08-31 | End: 2020-10-27 | Stop reason: SDUPTHER

## 2020-08-31 RX ORDER — VENLAFAXINE 75 MG/1
TABLET ORAL
Qty: 90 TABLET | Refills: 0 | Status: SHIPPED | OUTPATIENT
Start: 2020-08-31 | End: 2020-10-27

## 2020-08-31 NOTE — OUTREACH NOTE
Call Center TCM Note      Responses   [facility]   COVID-19 Test Status  Negative   Does the patient have one of the following disease processes/diagnoses(primary or secondary)?  COPD/Pneumonia   Was the primary reason for admission:  Pneumonia   TCM attempt successful?  Yes   Call start time  1612   Call end time  1625   Discharge diagnosis  Bilateral PNA, Hypertensive urgency, CKD   Is patient permission given to speak with other caregiver?  Yes   Person spoke with today (if not patient) and relationship  Don/ and Daughter   Meds reviewed with patient/caregiver?  Yes   Does the patient have all medications ordered at discharge?  Yes   Is the patient taking all medications as directed (includes completed medication regime)?  Yes   Does the patient have a primary care provider?   Yes   Does the patient have an appointment with their PCP or pulmonologist within 7 days of discharge?  No   What is preventing the patient from scheduling follow up appointments within 7 days of discharge?  Haven't had time   Urgent appointment interventions  Facilitated patient appointment   Comments  Appt made with Ivette Pruett due to no schedule openings with Dr. Crowder.  Appt made for 9/11 @ 3:15   What is the Home health agency?   [facility]   Has home health visited the patient within 72 hours of discharge?  Yes   What DME was ordered?  O2 from [place]   Has all DME been delivered?  Yes   Pulse Ox monitoring  None [Encouraged to purchase pulse ox to monitor Sats.]   Comments  Home health nursing came in and they are waiting for OT and PT.   Did the patient receive a copy of their discharge instructions?  Yes   Nursing interventions  Reviewed instructions with patient   What is the patient's perception of their health status since discharge?  Improving   Is the patient/caregiver able to teach back the hierarchy of who to call/visit for symptoms/problems? PCP, Specialist, Home health nurse,  Urgent Care, ED, 911  Yes   Additional teach back comments  States she is still weak but they are watching her closely.  She is using 2L of oxygen and home health came in today. Daughter states they ordered her medications in a pill pack to make it easier to take medication correctly.  They have appts also with pulmonary and nephrology.  If they have any issues they will contact Dr. Crowder's office.   Is the patient/caregiver able to teach back signs and symptoms of worsening condition:  Shortness of breath, Chest pain, Fever/chills   Is the patient/caregiver able to teach back importance of completing antibiotic course of treatment?  Yes   TCM call completed?  Yes   Wrap up additional comments  Appt made with PCP with no other needs or questions at this time          Meghann Encarnacion LPN    8/31/2020, 16:30

## 2020-08-31 NOTE — TELEPHONE ENCOUNTER
"Daughter calling saying no antibiotics were called in for her mother on discharge why, looked through the notes,  said ID had treated in hospital with IV antibiotics and no need for oral antibiotics, told Daughter this none was ordered.   Reason for Disposition  • Caller has medication question only, adult not sick, and triager answers question    Additional Information  • Negative: Drug overdose and triager unable to answer question  • Negative: Caller requesting information unrelated to medicine  • Negative: Caller requesting a prescription for Strep throat and has a positive culture result  • Negative: Rash while taking a medication or within 3 days of stopping it  • Negative: Immunization reaction suspected  • Negative: [1] Asthma and [2] having symptoms of asthma (cough, wheezing, etc.)  • Negative: [1] Influenza symptoms AND [2] anti-viral med prescription request, such as Tamiflu  • Negative: [1] Symptom of illness (e.g., headache, abdominal pain, earache, vomiting) AND [2] more than mild  • Negative: MORE THAN A DOUBLE DOSE of a prescription or over-the-counter (OTC) drug  • Negative: [1] DOUBLE DOSE (an extra dose or lesser amount) of over-the-counter (OTC) drug AND [2] any symptoms (e.g., dizziness, nausea, pain, sleepiness)  • Negative: [1] DOUBLE DOSE (an extra dose or lesser amount) of prescription drug AND [2] any symptoms (e.g., dizziness, nausea, pain, sleepiness)  • Negative: Took another person's prescription drug  • Negative: [1] DOUBLE DOSE (an extra dose or lesser amount) of prescription drug AND [2] NO symptoms (Exception: a double dose of antibiotics)  • Negative: Diabetes drug error or overdose (e.g., took wrong type of insulin or took extra dose)  • Negative: [1] Request for URGENT new prescription or refill of \"essential\" medication (i.e., likelihood of harm to patient if not taken) AND [2] triager unable to fill per unit policy  • Negative: [1] Prescription not at pharmacy AND [2] was " "prescribed by PCP recently  • Negative: [1] Pharmacy calling with prescription questions AND [2] triager unable to answer question  • Negative: [1] Caller has URGENT medication question about med that PCP or specialist prescribed AND [2] triager unable to answer question  • Negative: [1] Caller has NON-URGENT medication question about med that PCP prescribed AND [2] triager unable to answer question  • Negative: [1] Caller requesting a NON-URGENT new prescription or refill AND [2] triager unable to refill per unit policy  • Negative: [1] Caller has medication question about med not prescribed by PCP AND [2] triager unable to answer question (e.g., compatibility with other med, storage)  • Negative: Caller requesting a CONTROLLED substance prescription refill (e.g., narcotics, ADHD medicines)  • Negative: Caller wants to use a complementary or alternative medicine  • Negative: [1] Prescription prescribed recently is not at pharmacy AND [2] triager has access to patient's EMR AND [3] prescription is recorded in the EMR  • Negative: [1] DOUBLE DOSE (an extra dose or lesser amount) of over-the-counter (OTC) drug AND [2] NO symptoms  • Negative: [1] DOUBLE DOSE (an extra dose or lesser amount) of antibiotic drug AND [2] NO symptoms  • Negative: Caller has medication question, adult has minor symptoms, caller declines triage, AND triager answers question  • Negative: Caller requesting information about medication during pregnancy; adult is not ill AND triager answers question  • Negative: Caller requesting information about medication use with breastfeeding; neither adult nor infant is ill, triager answers question  • Negative: Caller requesting a refill, no triage required, and triager able to refill per unit policy  • Negative: Pharmacy calling with prescription question and triager answers question    Answer Assessment - Initial Assessment Questions  1.   NAME of MEDICATION: \"What medicine are you calling about?\"      " "antibiotics  2.   QUESTION: \"What is your question?\"      Why was there none called in  3.   PRESCRIBING HCP: \"Who prescribed it?\" Reason: if prescribed by specialist, call should be referred to that group.      Hospitalist  4. SYMPTOMS: \"Do you have any symptoms?\"      Had bilat pneumonia now discharged  5. SEVERITY: If symptoms are present, ask \"Are they mild, moderate or severe?\"      moderate  6.  PREGNANCY:  \"Is there any chance that you are pregnant?\" \"When was your last menstrual period?\"      no    Protocols used: MEDICATION QUESTION CALL-ADULT-    "

## 2020-09-01 NOTE — TELEPHONE ENCOUNTER
Are they in the computer folder? Talk to me on Tuesday about this.  She could have achalasia and esophageal partial obstruction. Order received for Phase II Cardiac Rehab. Staff will follow up after heart cath.

## 2020-09-04 ENCOUNTER — TELEPHONE (OUTPATIENT)
Dept: INTERNAL MEDICINE | Facility: CLINIC | Age: 75
End: 2020-09-04

## 2020-09-04 DIAGNOSIS — F32.1 CURRENT MODERATE EPISODE OF MAJOR DEPRESSIVE DISORDER WITHOUT PRIOR EPISODE (HCC): ICD-10-CM

## 2020-09-04 LAB
CREAT 24H UR-MCNC: NORMAL MG/DL
DOPAMINE UR-MCNC: NORMAL UG/L
EPINEPH UR-MCNC: NORMAL PG/ML
METANEPHS UR-MCNC: 51 UG/L
NOREPINEPH UR-MCNC: NORMAL UG/ML
NORMETANEPHRINE 24H UR-MCNC: 166 UG/L

## 2020-09-04 RX ORDER — ALBUTEROL SULFATE 90 UG/1
2 AEROSOL, METERED RESPIRATORY (INHALATION) EVERY 4 HOURS PRN
Qty: 18 G | Refills: 1 | Status: SHIPPED | OUTPATIENT
Start: 2020-09-04 | End: 2021-04-29

## 2020-09-04 NOTE — TELEPHONE ENCOUNTER
TUCKER FROM [facility] WAS CALLING TO ALERT OFFICE THAT THE PATIENT WAS HAVING SOME CHEST TIGHTNESS WHEN THEY CAME TO VISIT YESTERDAY AND SHE BELIEVES SHE NEEDS TO BE ON AN INHALER.     WHEN [facility] QUESTIONED THE PATIENT ABOUT IT SHE STATED THAT THE [facility] TOLD HER THAT HER INSURANCE DENIED PAYING FOR THE ALBUTEROL INHALER. [facility] IS CURIOUS IF DR MADSEN WOULD SUGGEST ANOTHER ONE PLEASE ADVISE     TUCKER CAN BE REACHED [phone]

## 2020-09-08 ENCOUNTER — READMISSION MANAGEMENT (OUTPATIENT)
Dept: CALL CENTER | Facility: HOSPITAL | Age: 75
End: 2020-09-08

## 2020-09-08 RX ORDER — LORAZEPAM 0.5 MG/1
0.5 TABLET ORAL EVERY 8 HOURS PRN
Qty: 90 TABLET | Refills: 0 | Status: SHIPPED | OUTPATIENT
Start: 2020-09-08 | End: 2021-01-04

## 2020-09-08 NOTE — OUTREACH NOTE
COPD/PN Week 2 Survey      Responses   [facility] patient discharged from?  [place]   COVID-19 Test Status  Negative   Does the patient have one of the following disease processes/diagnoses(primary or secondary)?  COPD/Pneumonia   Was the primary reason for admission:  Pneumonia   Week 2 attempt successful?  Yes   Call start time  1555   Rescheduled  Rescheduled-pt requested   Discharge diagnosis  Bilateral PNA, Hypertensive urgency, CKD          Rylie Colon RN

## 2020-09-09 DIAGNOSIS — R06.02 SHORTNESS OF BREATH: Primary | ICD-10-CM

## 2020-09-09 NOTE — TELEPHONE ENCOUNTER
I received an albuterol inhaler denial via fax and therefore have sent breo to pt's pharmacy as it looks like it may be covered. Please call and let nurse know.

## 2020-09-11 ENCOUNTER — OFFICE VISIT (OUTPATIENT)
Dept: INTERNAL MEDICINE | Facility: CLINIC | Age: 75
End: 2020-09-11

## 2020-09-11 VITALS
HEIGHT: 67 IN | HEART RATE: 61 BPM | TEMPERATURE: 98.2 F | OXYGEN SATURATION: 91 % | SYSTOLIC BLOOD PRESSURE: 154 MMHG | BODY MASS INDEX: 29.71 KG/M2 | DIASTOLIC BLOOD PRESSURE: 70 MMHG

## 2020-09-11 DIAGNOSIS — R79.89 ELEVATED SERUM CREATININE: ICD-10-CM

## 2020-09-11 DIAGNOSIS — Z09 HOSPITAL DISCHARGE FOLLOW-UP: Primary | ICD-10-CM

## 2020-09-11 DIAGNOSIS — D50.9 IRON DEFICIENCY ANEMIA, UNSPECIFIED IRON DEFICIENCY ANEMIA TYPE: ICD-10-CM

## 2020-09-11 DIAGNOSIS — I10 ESSENTIAL HYPERTENSION: ICD-10-CM

## 2020-09-11 DIAGNOSIS — J18.9 PNEUMONIA DUE TO INFECTIOUS ORGANISM, UNSPECIFIED LATERALITY, UNSPECIFIED PART OF LUNG: ICD-10-CM

## 2020-09-11 PROCEDURE — 99214 OFFICE O/P EST MOD 30 MIN: CPT | Performed by: NURSE PRACTITIONER

## 2020-09-11 RX ORDER — ONDANSETRON 4 MG/1
4-8 TABLET, FILM COATED ORAL EVERY 8 HOURS PRN
Qty: 60 TABLET | Refills: 1 | Status: SHIPPED | OUTPATIENT
Start: 2020-09-11 | End: 2021-01-01 | Stop reason: SDUPTHER

## 2020-09-11 RX ORDER — TRAZODONE HYDROCHLORIDE 150 MG/1
75 TABLET ORAL NIGHTLY
COMMUNITY
End: 2020-11-29

## 2020-09-11 RX ORDER — LOSARTAN POTASSIUM 25 MG/1
25 TABLET ORAL
Qty: 90 TABLET | Refills: 0 | Status: SHIPPED | OUTPATIENT
Start: 2020-09-11 | End: 2020-12-17 | Stop reason: ALTCHOICE

## 2020-09-11 RX ORDER — CLONIDINE HYDROCHLORIDE 0.2 MG/1
0.2 TABLET ORAL EVERY 12 HOURS SCHEDULED
Qty: 180 TABLET | Refills: 0 | Status: SHIPPED | OUTPATIENT
Start: 2020-09-11 | End: 2020-12-17

## 2020-09-11 RX ORDER — HYDRALAZINE HYDROCHLORIDE 100 MG/1
100 TABLET, FILM COATED ORAL 3 TIMES DAILY
Qty: 180 TABLET | Refills: 0 | Status: SHIPPED | OUTPATIENT
Start: 2020-09-11 | End: 2021-04-29

## 2020-09-11 RX ORDER — TORSEMIDE 20 MG/1
40 TABLET ORAL DAILY
Qty: 180 TABLET | Refills: 0 | Status: SHIPPED | OUTPATIENT
Start: 2020-09-11 | End: 2020-12-17 | Stop reason: SDUPTHER

## 2020-09-11 NOTE — PROGRESS NOTES
Subjective   Rachana Arguelles is a 74 y.o. female.   CC: Hospital follow up, pneumonia     Patient presents for hospital follow up. This is a 74 YOF patient of Dr. Crowder, accompanied by her daughter.     She was hospitalized from 8/23/2020 through 8/30/2020 at [facility], treated for bacterial PNA. Repeat COVID swabs were negative. She went home with home O2 and multiple changes were made to her antihypertensive regimen due to labile HTN. She was also anemic at discharge with hgb 8.1. There was concern with the addition of an ARB, losartan 25 mg daily, as her d/c creatinine was 1.82. She presents today for follow up. Overall BP at home has improved on new BP med regiment. This includes amlodipine 10 mg daily, coreg 12.5 mg BID, Clonidine 0.2 mg q12h, hydralazine 100 mg TID, losartan 25 mg daily, torsemide 40 mg daily. She reports good compliance with this regimen. Reports that she had repeat labs drawn for nephrology earlier this week and has a follow up with Dr. Dudley next week. She is feeling a bit better but is a bit frustrated as she is still feeling quite tired and weak. No visual changes, chest discomfort, fevers. She has since followed up with pulmonology, Dr. Gfiford/REENA Coulter, and is now on a Omnicef outpatient and is to start prednisone taper per their Rx as well. She denies development of any other new issues today.        The following portions of the patient's history were reviewed and updated as appropriate: allergies, current medications, past family history, past medical history, past social history, past surgical history and problem list.    Review of Systems   Constitutional: Positive for fatigue. Negative for activity change, chills, fever, unexpected weight gain and unexpected weight loss.   HENT: Negative for congestion, hearing loss, postnasal drip, sinus pressure, sneezing, sore throat, swollen glands and tinnitus.    Eyes: Negative for photophobia, pain and visual disturbance.   Respiratory: Negative for  "cough, chest tightness, shortness of breath and wheezing.    Cardiovascular: Negative for chest pain, palpitations and leg swelling.   Gastrointestinal: Negative for abdominal distention, abdominal pain, constipation, diarrhea, nausea and vomiting.   Endocrine: Negative for polydipsia, polyphagia and polyuria.   Genitourinary: Negative for dysuria, frequency, hematuria and urgency.   Neurological: Negative for dizziness, weakness, numbness and headache.   All other systems reviewed and are negative.      Objective    /70   Pulse 61   Temp 98.2 °F (36.8 °C)   Ht 170.2 cm (67\")   SpO2 91%   BMI 29.71 kg/m²     Physical Exam   Constitutional: She is oriented to person, place, and time. She appears well-developed and well-nourished. No distress.   HENT:   Head: Normocephalic and atraumatic.   Eyes: Pupils are equal, round, and reactive to light. EOM are normal.   Neck: Normal range of motion. Neck supple. No JVD present. No tracheal deviation present. No thyromegaly present.   Cardiovascular: Normal rate and regular rhythm.   No peripheral pitting edema. Posterior tib pulses 2+ and equal bilat.   Pulmonary/Chest: Effort normal and breath sounds normal. No stridor. No respiratory distress. She has no wheezes. She has no rales. She exhibits no tenderness.   Lungs CTA bilat.   Abdominal: Soft. Bowel sounds are normal. She exhibits no distension. There is no tenderness.   Musculoskeletal: Normal range of motion.   Lymphadenopathy:     She has no cervical adenopathy.   Neurological: She is alert and oriented to person, place, and time.   Skin: Skin is warm and dry. Capillary refill takes less than 2 seconds. She is not diaphoretic.   Psychiatric: She has a normal mood and affect. Her behavior is normal. Judgment and thought content normal.   Nursing note and vitals reviewed.    Current outpatient and discharge medications have been reconciled for the patient.  Reviewed by: REENA Robbins      Assessment/Plan "   Rachana was seen today for hospital follow up visit and eye problem.    Diagnoses and all orders for this visit:    Hospital discharge follow-up    Pneumonia due to infectious organism, unspecified laterality, unspecified part of lung    Iron deficiency anemia, unspecified iron deficiency anemia type  -     CBC No Differential; Future    Essential hypertension    Elevated serum creatinine    Other orders  -     cloNIDine (CATAPRES) 0.2 MG tablet; Take 1 tablet by mouth Every 12 (Twelve) Hours.  -     torsemide (DEMADEX) 20 MG tablet; Take 2 tablets by mouth Daily.  -     hydrALAZINE (APRESOLINE) 100 MG tablet; Take 1 tablet by mouth 3 (Three) Times a Day.  -     losartan (COZAAR) 25 MG tablet; Take 1 tablet by mouth Daily.  -     ondansetron (ZOFRAN) 4 MG tablet; Take 1-2 tablets by mouth Every 8 (Eight) Hours As Needed for Nausea or Vomiting.    -Reviewed d/c summary from August 2020 hospital stay. Reconciled d/c med rec with patient and daughter and refills are sent of her meds that were new.     -She has follow up in place next week with nephrology.     -I am concerned with her hgb at 8.1 on d/c. I have asked her to come back for redraw in two weeks to ensure that it is trending up, and she is agreeable to this.     -Continue close home BP monitoring. She is on multiple antihypertensives and still has some labile blood pressure. Goal is <140/80.     Follow up PRN, lab apt for CBC in two weeks and three months for follow up on chronic conditions/routine health maintenance with PCP, Dr. Crowder.

## 2020-09-14 ENCOUNTER — READMISSION MANAGEMENT (OUTPATIENT)
Dept: CALL CENTER | Facility: HOSPITAL | Age: 75
End: 2020-09-14

## 2020-09-14 NOTE — OUTREACH NOTE
COPD/PN Week 2 Survey      Responses   [facility] patient discharged from?  [place]   COVID-19 Test Status  Negative   Does the patient have one of the following disease processes/diagnoses(primary or secondary)?  COPD/Pneumonia   Was the primary reason for admission:  Pneumonia   Week 2 attempt successful?  Yes   Call start time  1131   Rescheduled  Revoked [This was a rescheduled call, the caller answered, wanted to be called at a latter time. The call was disenrolled. ]   Call end time  1132   Week 2 call completed?  Yes          Sade Cheng RN

## 2020-09-25 ENCOUNTER — RESULTS ENCOUNTER (OUTPATIENT)
Dept: INTERNAL MEDICINE | Facility: CLINIC | Age: 75
End: 2020-09-25

## 2020-09-25 DIAGNOSIS — D50.9 IRON DEFICIENCY ANEMIA, UNSPECIFIED IRON DEFICIENCY ANEMIA TYPE: ICD-10-CM

## 2020-09-27 ENCOUNTER — LAB REQUISITION (OUTPATIENT)
Dept: LAB | Facility: HOSPITAL | Age: 75
End: 2020-09-27

## 2020-09-27 DIAGNOSIS — Z00.00 ENCOUNTER FOR GENERAL ADULT MEDICAL EXAMINATION WITHOUT ABNORMAL FINDINGS: ICD-10-CM

## 2020-09-27 LAB
BASOPHILS # BLD AUTO: 0.02 10*3/MM3 (ref 0–0.2)
BASOPHILS NFR BLD AUTO: 0.4 % (ref 0–1.5)
DEPRECATED RDW RBC AUTO: 46.4 FL (ref 37–54)
EOSINOPHIL # BLD AUTO: 0.18 10*3/MM3 (ref 0–0.4)
EOSINOPHIL NFR BLD AUTO: 3.3 % (ref 0.3–6.2)
ERYTHROCYTE [DISTWIDTH] IN BLOOD BY AUTOMATED COUNT: 14.1 % (ref 12.3–15.4)
HCT VFR BLD AUTO: 24.6 % (ref 34–46.6)
HGB BLD-MCNC: 8 G/DL (ref 12–15.9)
IMM GRANULOCYTES # BLD AUTO: 0.02 10*3/MM3 (ref 0–0.05)
IMM GRANULOCYTES NFR BLD AUTO: 0.4 % (ref 0–0.5)
LYMPHOCYTES # BLD AUTO: 1.17 10*3/MM3 (ref 0.7–3.1)
LYMPHOCYTES NFR BLD AUTO: 21.5 % (ref 19.6–45.3)
MCH RBC QN AUTO: 29.7 PG (ref 26.6–33)
MCHC RBC AUTO-ENTMCNC: 32.5 G/DL (ref 31.5–35.7)
MCV RBC AUTO: 91.4 FL (ref 79–97)
MONOCYTES # BLD AUTO: 0.49 10*3/MM3 (ref 0.1–0.9)
MONOCYTES NFR BLD AUTO: 9 % (ref 5–12)
NEUTROPHILS NFR BLD AUTO: 3.55 10*3/MM3 (ref 1.7–7)
NEUTROPHILS NFR BLD AUTO: 65.4 % (ref 42.7–76)
NRBC BLD AUTO-RTO: 0 /100 WBC (ref 0–0.2)
PLATELET # BLD AUTO: 202 10*3/MM3 (ref 140–450)
PMV BLD AUTO: 9.5 FL (ref 6–12)
RBC # BLD AUTO: 2.69 10*6/MM3 (ref 3.77–5.28)
WBC # BLD AUTO: 5.43 10*3/MM3 (ref 3.4–10.8)

## 2020-09-27 PROCEDURE — 85025 COMPLETE CBC W/AUTO DIFF WBC: CPT | Performed by: FAMILY MEDICINE

## 2020-09-28 ENCOUNTER — TELEPHONE (OUTPATIENT)
Dept: INTERNAL MEDICINE | Facility: CLINIC | Age: 75
End: 2020-09-28

## 2020-09-28 NOTE — TELEPHONE ENCOUNTER
There is a CBC result in her chart.  I see lab requisition under Dr. Crowder yesterday, but wasn't sure if I should send to him.

## 2020-09-28 NOTE — TELEPHONE ENCOUNTER
Patients  called stated the home health nurse mirna labs yesterday for patient and he is calling for the results.    Please advise  [phone]

## 2020-09-29 ENCOUNTER — HOSPITAL ENCOUNTER (OUTPATIENT)
Dept: CT IMAGING | Facility: HOSPITAL | Age: 75
Discharge: HOME | End: 2020-09-29
Admitting: THORACIC SURGERY (CARDIOTHORACIC VASCULAR SURGERY)

## 2020-09-29 DIAGNOSIS — C34.11 MALIGNANT NEOPLASM OF UPPER LOBE OF RIGHT LUNG (HCC): ICD-10-CM

## 2020-09-29 PROCEDURE — 71250 CT THORAX DX C-: CPT

## 2020-09-30 ENCOUNTER — TELEPHONE (OUTPATIENT)
Dept: INTERNAL MEDICINE | Facility: CLINIC | Age: 75
End: 2020-09-30

## 2020-09-30 NOTE — TELEPHONE ENCOUNTER
RAYSA WITH [facility] CALLED FOR VERBAL ORDERS TO CONTINUE SEEING THE PT FOR PHYSICAL THERAPY.    PLEASE CALL BACK [phone]

## 2020-10-12 ENCOUNTER — OFFICE VISIT (OUTPATIENT)
Dept: SURGERY | Facility: CLINIC | Age: 75
End: 2020-10-12

## 2020-10-12 DIAGNOSIS — C34.11 MALIGNANT NEOPLASM OF UPPER LOBE OF RIGHT LUNG (HCC): Primary | ICD-10-CM

## 2020-10-12 PROCEDURE — 99442 PR PHYS/QHP TELEPHONE EVALUATION 11-20 MIN: CPT | Performed by: THORACIC SURGERY (CARDIOTHORACIC VASCULAR SURGERY)

## 2020-10-12 RX ORDER — FOLIC ACID 1 MG/1
TABLET ORAL
Qty: 30 TABLET | Refills: 0 | Status: SHIPPED | OUTPATIENT
Start: 2020-10-12 | End: 2020-11-02

## 2020-10-12 NOTE — PROGRESS NOTES
Subjective   Patient ID: Rachana Arguelles is a 75 y.o. female is here today for follow-up via telephone.  You have chosen to receive care through a telephone visit. Do you consent to use a telephone visit for your medical care today? Yes      History of Present Illness  Dear Colleague,  Rachana Arguelles was valuated via telephone today for continued follow up and surveillance  postoperative visit after surgery for a stage I, T1b N0 non-small cell lung cancer status post resection.  She denies any complaints of fever, chills, cough, hemoptysis, pleuritic chest pain, shortness of air, dyspnea with exertion, night sweats, hoarseness, or unintentional weight loss. Underlying medical conditions including the arthritis remain stable.  She has no other somatic complaints or alleviating or exacerbating factors aside from those mentioned above.  She has no new complaints.  The following portions of the patient's history were reviewed and updated as appropriate: allergies, current medications, past family history, past medical history, past social history, past surgical history and problem list.  Review of Systems   Constitution: Negative.   HENT: Negative.    Eyes: Negative.    Cardiovascular: Negative.    Respiratory: Negative.    Endocrine: Negative.    Hematologic/Lymphatic: Negative.    Skin: Negative.    Musculoskeletal: Negative.    Gastrointestinal: Negative.    Genitourinary: Negative.    Neurological: Negative.    Psychiatric/Behavioral: Negative.    Allergic/Immunologic: Negative.      Patient Active Problem List   Diagnosis   • Anxiety   • Chronic pain syndrome   • Depression   • Gastroesophageal reflux disease   • Hyperlipidemia   • Hypertension   • Osteoarthritis of hip   • Chronic low back pain   • Failed back syndrome of lumbar spine   • Pulmonary embolus (CMS/HCC)   • Weight loss   • Polypharmacy   • CKD (chronic kidney disease), stage III (CMS/HCC)   • Chronic constipation   • Sacroiliac joint pain   • Mixed  incontinence   • Renal cyst   • Achilles tendonitis   • Atherosclerosis of native artery of left lower extremity with intermittent claudication (CMS/HCC)   • Morbidly obese (CMS/HCC)   • Lung nodule   • Malignant neoplasm of right upper lobe of lung (CMS/HCC)   • Lung cancer (CMS/HCC)   • Moderate single current episode of major depressive disorder (CMS/HCC)   • Palpitations   • Encounter for screening for malignant neoplasm of colon   • Hematoma of scalp   • Acute neck pain   • Dizziness   • Unstable cervical spine   • Postconcussive syndrome   • Positive colorectal cancer screening using Cologuard test   • Dysphagia   • S/P reverse total shoulder arthroplasty, right   • Chronic post-traumatic headache, not intractable   • Medication overuse headache   • Migraine without aura and without status migrainosus, not intractable   • Shortness of breath   • PAD (peripheral artery disease) (CMS/HCC)   • Bilateral carotid artery stenosis   • S/P lobectomy of lung   • YVONNE (acute kidney injury) (CMS/HCC)   • Anemia   • Frequent PVCs   • Pneumonia of both lower lobes due to infectious organism   • History of lung cancer   • Hypertensive urgency     Past Medical History:   Diagnosis Date   • AAA (abdominal aortic aneurysm) without rupture (CMS/HCC)    • Anxiety    • Arthritis    • Bilateral carotid artery stenosis 8/14/2020   • Cancer (CMS/HCC)     skin cancer   • Chest pain     OCCAS   • Chronic low back pain    • Chronic pain syndrome 3/12/2016   • CKD (chronic kidney disease), stage III    • Claustrophobia 10/26/2015    Resolved   • COPD (chronic obstructive pulmonary disease) (CMS/HCC)    • Depression    • Dizziness    • Dyspnea    • GERD (gastroesophageal reflux disease)    • GI problem    • H/O concussion    • Head trauma     FELL CUT HEAD 12 STAPLES   • Hiatal hernia    • History of migraine headaches    • Hyperlipidemia    • Hypertension    • Lumbar postlaminectomy syndrome 10/26/2015    Resolved   • Lung cancer  (CMS/HCC)    • Lung nodule    • Migraines    • Nausea    • Palpitations    • Polypharmacy 4/22/2016   • Pulmonary embolism (CMS/HCC) 10/26/2015    January 2013 - Resolved, felt to be secondary to estrogen use   • Slow to wake up after anesthesia    • Submandibular sialoadenitis 10/26/2015    Resolved   • Visual changes    • Vitamin B 12 deficiency      Past Surgical History:   Procedure Laterality Date   • ANGIOPLASTY ILIAC ARTERY Bilateral 8/10/2018    Procedure: BILATERAL ILIAC STENTS;  Surgeon: Riki Mancera MD;  Location: [facility] MAIN OR;  Service: Vascular   • APPENDECTOMY     • BREAST SURGERY      Breast Surgery Reduction Procedure   • BRONCHOSCOPY N/A 2/8/2019    Procedure: BRONCHOSCOPY;  Surgeon: Álvaro Gifford MD;  Location: [facility] ENDOSCOPY;  Service: Pulmonary   • CHOLECYSTECTOMY     • HYSTERECTOMY     • INTUBATION  1/15/2019        • JOINT REPLACEMENT      left knee, hip, shoulder   • LASIK     • LUMBAR FUSION      Lumbar Vertebral Fusion   • SHOULDER ARTHROSCOPY  04/04/2013    Dr. Carrillo/[facility] - Resolved   • THORACOSCOPY VIDEO ASSISTED WITH LOBECTOMY Right 1/11/2019    Procedure: BRONCOSCOPY, THORACOSCOPY VIDEO ASSISTED WITH RIGHT UPPER LOBE WEDGE RESECTION, COMPLETION RIGHT UPPER LOBECTOMY, LYMPH NODE DISSECTION, INTERCOSTAL NERVE BLOCK;  Surgeon: Quita Figueroa MD;  Location: [facility] MAIN OR;  Service: Thoracic   • TONSILLECTOMY     • TOTAL HIP ARTHROPLASTY REVISION Left    • TOTAL KNEE ARTHROPLASTY Left    • TOTAL SHOULDER ARTHROPLASTY Left    • TOTAL SHOULDER ARTHROPLASTY W/ DISTAL CLAVICLE EXCISION Right 6/18/2020    Procedure: RIGHT TOTAL SHOULDER REVERSE ARTHROPLASTY WITH GPS;  Surgeon: Lino Carrillo MD;  Location: [facility] OR [facility];  Service: Orthopedics;  Laterality: Right;     Family History   Problem Relation Age of Onset   • Hypertension Mother    • Lung cancer Mother    • Cancer Mother         lung   • Kidney disease Father    • Other Brother         Coronary Artery Bypass Grafting   •  Stroke Brother         Cerebrovascular Accident   • Malig Hyperthermia Neg Hx      Social History     Socioeconomic History   • Marital status:      Spouse name: Not on file   • Number of children: Not on file   • Years of education: Not on file   • Highest education level: Not on file   Tobacco Use   • Smoking status: Former Smoker     Packs/day: 2.50     Years: 15.00     Pack years: 37.50     Types: Cigarettes     Quit date: 2003     Years since quittin.0   • Smokeless tobacco: Never Used   Substance and Sexual Activity   • Alcohol use: No     Frequency: Never     Comment: occ   • Drug use: Never   • Sexual activity: Defer       Current Outpatient Medications:   •  albuterol sulfate HFA (Ventolin HFA) 108 (90 Base) MCG/ACT inhaler, Inhale 2 puffs Every 4 (Four) Hours As Needed for Wheezing or Shortness of Air., Disp: 18 g, Rfl: 1  •  allopurinol (ZYLOPRIM) 100 MG tablet, Take 100 mg by mouth Daily., Disp: , Rfl: 2  •  amLODIPine (NORVASC) 10 MG tablet, Take 10 mg by mouth Every Morning., Disp: , Rfl: 3  •  atorvastatin (LIPITOR) 40 MG tablet, Take 40 mg by mouth Every Night., Disp: , Rfl:   •  carBAMazepine (TEGretol) 200 MG tablet, Take 200 mg by mouth Daily., Disp: , Rfl: 0  •  carvedilol (COREG) 25 MG tablet, Take 25 mg by mouth 2 (Two) Times a Day With Meals. 1/2 TAB EACH DOSE-ON HOLD SINCE 19, Disp: , Rfl:   •  cetirizine (zyrTEC) 10 MG tablet, Take 10 mg by mouth As Needed for Allergies., Disp: , Rfl:   •  cloNIDine (CATAPRES) 0.2 MG tablet, Take 1 tablet by mouth Every 12 (Twelve) Hours., Disp: 180 tablet, Rfl: 0  •  clopidogrel (PLAVIX) 75 MG tablet, Take 75 mg by mouth Daily. PT STATED TO HOLD FOR SURGERY 5-7 DAYS PRIOR, Disp: , Rfl:   •  cyclobenzaprine (FLEXERIL) 10 MG tablet, Take 10 mg by mouth 3 (Three) Times a Day As Needed., Disp: , Rfl:   •  docusate sodium (COLACE) 100 MG capsule, Take 100 mg by mouth 2 (Two) Times a Day As Needed for Constipation., Disp: , Rfl:   •   famotidine (PEPCID) 20 MG tablet, TAKE 1 TABLET BY MOUTH TWICE A DAY (Patient taking differently: Take 20 mg by mouth 2 (Two) Times a Day.), Disp: 180 tablet, Rfl: 1  •  Fluticasone Furoate-Vilanterol (BREO ELLIPTA) 100-25 MCG/INH inhaler, Inhale 1 puff Daily., Disp: 28 each, Rfl: 1  •  folic acid (FOLVITE) 1 MG tablet, TAKE 1 TABLET BY MOUTH EVERY DAY, Disp: 30 tablet, Rfl: 0  •  hydrALAZINE (APRESOLINE) 100 MG tablet, Take 1 tablet by mouth 3 (Three) Times a Day., Disp: 180 tablet, Rfl: 0  •  loratadine (CLARITIN) 10 MG tablet, Take 1 tablet by mouth Daily. (Patient taking differently: Take 10 mg by mouth As Needed.), Disp: 30 tablet, Rfl: 2  •  LORazepam (ATIVAN) 0.5 MG tablet, Take 1 tablet by mouth Every 8 (Eight) Hours As Needed for Anxiety., Disp: 90 tablet, Rfl: 0  •  losartan (COZAAR) 25 MG tablet, Take 1 tablet by mouth Daily., Disp: 90 tablet, Rfl: 0  •  meclizine (ANTIVERT) 25 MG tablet, TAKE 1 TABLET BY MOUTH 3 TIMES A DAY AS NEEDED FOR DIZZINESS/NAUSEA/SENSATION OF SPINNING/WHIRLING, Disp: 30 tablet, Rfl: 1  •  ondansetron (ZOFRAN) 4 MG tablet, Take 1-2 tablets by mouth Every 8 (Eight) Hours As Needed for Nausea or Vomiting., Disp: 60 tablet, Rfl: 1  •  oxyCODONE (ROXICODONE) 15 MG immediate release tablet, Take 20 mg by mouth Every 4 (Four) Hours As Needed for Severe Pain ., Disp: , Rfl:   •  SERTRALINE HCL PO, Take  by mouth Daily., Disp: , Rfl:   •  torsemide (DEMADEX) 20 MG tablet, Take 2 tablets by mouth Daily., Disp: 180 tablet, Rfl: 0  •  traZODone (DESYREL) 150 MG tablet, Take 75 mg by mouth Every Night., Disp: , Rfl:   •  venlafaxine (EFFEXOR) 75 MG tablet, TAKE 1 TABLET BY MOUTH EVERY DAY, Disp: 90 tablet, Rfl: 0  •  venlafaxine XR (EFFEXOR-XR) 150 MG 24 hr capsule, Take 1 capsule by mouth Daily. Swallow whole; do not crush or chew., Disp: 90 capsule, Rfl: 0  Allergies   Allergen Reactions   • Neurontin [Gabapentin] Confusion   • Morphine Other (See Comments)     HEADACHE        Objective    There were no vitals filed for this visit.  Physical Exam  Independent Review of Radiographic Studies:    Independently reviewed the CT of the chest performed on 9/29/2020 which demonstrates changes consistent with a right upper lobectomy.  Previously noted groundglass opacities in the left upper lobe and right lower lobe have improved but there are still small residual amount of glass opacity.  There is a new groundglass infiltrate in the left lower lobe.  There is no significant mediastinal or hilar lymphadenopathy.  There is a stable right pleural effusion.  Assessment/Plan   Ms. Arguelles is a very pleasant 75-year-old lady status post lobectomy for a T1BN0 non-small cell lung cancer.  She is doing very well.  Her CT today demonstrates groundglass opacities that are waxing and waning, most consistent with an inflammatory process.  She will continue to need surveillance for these groundglass opacities as well as for her lung cancer.  I will plan to see her in 4 months with a CT chest.    I spent approximate 11 minutes on the phone with Ms. Arguelles today discussing her symptoms, results and plan.    Diagnoses and all orders for this visit:    Malignant neoplasm of upper lobe of right lung (CMS/HCC)  -     CT Chest Without Contrast; Future

## 2020-10-16 ENCOUNTER — TRANSCRIBE ORDERS (OUTPATIENT)
Dept: ADMINISTRATIVE | Facility: HOSPITAL | Age: 75
End: 2020-10-16

## 2020-10-16 DIAGNOSIS — R13.10 DYSPHAGIA, UNSPECIFIED TYPE: Primary | ICD-10-CM

## 2020-10-19 ENCOUNTER — TRANSCRIBE ORDERS (OUTPATIENT)
Dept: ADMINISTRATIVE | Facility: HOSPITAL | Age: 75
End: 2020-10-19

## 2020-10-19 DIAGNOSIS — Z01.818 OTHER SPECIFIED PRE-OPERATIVE EXAMINATION: Primary | ICD-10-CM

## 2020-10-22 ENCOUNTER — DOCUMENTATION (OUTPATIENT)
Dept: SOCIAL WORK | Facility: HOSPITAL | Age: 75
End: 2020-10-22

## 2020-10-27 ENCOUNTER — TELEPHONE (OUTPATIENT)
Dept: INTERNAL MEDICINE | Facility: CLINIC | Age: 75
End: 2020-10-27

## 2020-10-27 ENCOUNTER — LAB (OUTPATIENT)
Dept: LAB | Facility: HOSPITAL | Age: 75
End: 2020-10-27

## 2020-10-27 DIAGNOSIS — R13.10 SWALLOWING DYSFUNCTION: Primary | ICD-10-CM

## 2020-10-27 DIAGNOSIS — Z01.818 OTHER SPECIFIED PRE-OPERATIVE EXAMINATION: ICD-10-CM

## 2020-10-27 DIAGNOSIS — R53.1 WEAKNESS GENERALIZED: ICD-10-CM

## 2020-10-27 PROCEDURE — C9803 HOPD COVID-19 SPEC COLLECT: HCPCS

## 2020-10-27 PROCEDURE — U0004 COV-19 TEST NON-CDC HGH THRU: HCPCS | Performed by: INTERNAL MEDICINE

## 2020-10-27 RX ORDER — VENLAFAXINE HYDROCHLORIDE 150 MG/1
150 CAPSULE, EXTENDED RELEASE ORAL DAILY
Qty: 90 CAPSULE | Refills: 2 | Status: SHIPPED | OUTPATIENT
Start: 2020-10-27 | End: 2020-12-17

## 2020-10-27 NOTE — TELEPHONE ENCOUNTER
Dr. Dudley at [facility] would like to know if you can refer over to Dr. Tyson for increasing weakness and trouble swallowing.

## 2020-10-28 ENCOUNTER — TRANSCRIBE ORDERS (OUTPATIENT)
Dept: ADMINISTRATIVE | Facility: HOSPITAL | Age: 75
End: 2020-10-28

## 2020-10-28 DIAGNOSIS — Z01.818 OTHER SPECIFIED PRE-OPERATIVE EXAMINATION: Primary | ICD-10-CM

## 2020-10-28 LAB — SARS-COV-2 RNA RESP QL NAA+PROBE: NOT DETECTED

## 2020-10-29 ENCOUNTER — HOSPITAL ENCOUNTER (OUTPATIENT)
Dept: GENERAL RADIOLOGY | Facility: HOSPITAL | Age: 75
Discharge: HOME | End: 2020-10-29
Admitting: INTERNAL MEDICINE

## 2020-10-29 DIAGNOSIS — R13.10 DYSPHAGIA, UNSPECIFIED TYPE: ICD-10-CM

## 2020-10-29 PROCEDURE — 63710000001 BARIUM SULFATE 96 % RECONSTITUTED SUSPENSION: Performed by: INTERNAL MEDICINE

## 2020-10-29 PROCEDURE — A9270 NON-COVERED ITEM OR SERVICE: HCPCS | Performed by: INTERNAL MEDICINE

## 2020-10-29 PROCEDURE — 74220 X-RAY XM ESOPHAGUS 1CNTRST: CPT

## 2020-10-29 PROCEDURE — 63710000001 SOD BICARB-CITRIC ACID-SIMETHICONE 2.21-1.53-0.04 G PACK: Performed by: INTERNAL MEDICINE

## 2020-10-29 PROCEDURE — 63710000001 BARIUM SULFATE 98 % RECONSTITUTED SUSPENSION: Performed by: INTERNAL MEDICINE

## 2020-10-29 RX ADMIN — ANTACID/ANTIFLATULENT 1 TABLET: 380; 550; 10; 10 GRANULE, EFFERVESCENT ORAL at 10:45

## 2020-10-29 RX ADMIN — BARIUM SULFATE 183 ML: 960 POWDER, FOR SUSPENSION ORAL at 10:45

## 2020-10-29 RX ADMIN — BARIUM SULFATE 135 ML: 980 POWDER, FOR SUSPENSION ORAL at 10:45

## 2020-10-31 ENCOUNTER — LAB (OUTPATIENT)
Dept: LAB | Facility: HOSPITAL | Age: 75
End: 2020-10-31

## 2020-10-31 DIAGNOSIS — Z01.818 OTHER SPECIFIED PRE-OPERATIVE EXAMINATION: ICD-10-CM

## 2020-10-31 PROCEDURE — C9803 HOPD COVID-19 SPEC COLLECT: HCPCS

## 2020-10-31 PROCEDURE — U0004 COV-19 TEST NON-CDC HGH THRU: HCPCS | Performed by: INTERNAL MEDICINE

## 2020-11-02 LAB — SARS-COV-2 RNA RESP QL NAA+PROBE: NOT DETECTED

## 2020-11-02 RX ORDER — FOLIC ACID 1 MG/1
TABLET ORAL
Qty: 30 TABLET | Refills: 0 | Status: SHIPPED | OUTPATIENT
Start: 2020-11-02 | End: 2020-12-14

## 2020-11-03 ENCOUNTER — ANESTHESIA (OUTPATIENT)
Dept: GASTROENTEROLOGY | Facility: HOSPITAL | Age: 75
End: 2020-11-03

## 2020-11-03 ENCOUNTER — INPATIENT HOSPITAL (OUTPATIENT)
Dept: URBAN - METROPOLITAN AREA HOSPITAL 113 | Facility: HOSPITAL | Age: 75
End: 2020-11-03

## 2020-11-03 ENCOUNTER — TRANSCRIBE ORDERS (OUTPATIENT)
Dept: ADMINISTRATIVE | Facility: HOSPITAL | Age: 75
End: 2020-11-03

## 2020-11-03 ENCOUNTER — HOSPITAL ENCOUNTER (OUTPATIENT)
Facility: HOSPITAL | Age: 75
Setting detail: HOSPITAL OUTPATIENT SURGERY
Discharge: HOME | End: 2020-11-03
Attending: INTERNAL MEDICINE | Admitting: INTERNAL MEDICINE

## 2020-11-03 ENCOUNTER — ANESTHESIA EVENT (OUTPATIENT)
Dept: GASTROENTEROLOGY | Facility: HOSPITAL | Age: 75
End: 2020-11-03

## 2020-11-03 VITALS
OXYGEN SATURATION: 95 % | BODY MASS INDEX: 29.57 KG/M2 | WEIGHT: 184 LBS | RESPIRATION RATE: 16 BRPM | SYSTOLIC BLOOD PRESSURE: 197 MMHG | DIASTOLIC BLOOD PRESSURE: 82 MMHG | TEMPERATURE: 97.8 F | HEART RATE: 56 BPM | HEIGHT: 66 IN

## 2020-11-03 DIAGNOSIS — D63.1 ANEMIA IN CHRONIC KIDNEY DISEASE (CODE): Primary | ICD-10-CM

## 2020-11-03 DIAGNOSIS — R13.14 DYSPHAGIA, PHARYNGOESOPHAGEAL PHASE: ICD-10-CM

## 2020-11-03 DIAGNOSIS — N18.30 STAGE 3 CHRONIC KIDNEY DISEASE, UNSPECIFIED WHETHER STAGE 3A OR 3B CKD (HCC): ICD-10-CM

## 2020-11-03 DIAGNOSIS — K22.2 ESOPHAGEAL OBSTRUCTION: ICD-10-CM

## 2020-11-03 PROCEDURE — 43450 DILATE ESOPHAGUS 1/MULT PASS: CPT | Performed by: INTERNAL MEDICINE

## 2020-11-03 PROCEDURE — 43235 EGD DIAGNOSTIC BRUSH WASH: CPT | Performed by: INTERNAL MEDICINE

## 2020-11-03 PROCEDURE — 25010000002 PROPOFOL 10 MG/ML EMULSION: Performed by: ANESTHESIOLOGY

## 2020-11-03 RX ORDER — SODIUM CHLORIDE 9 MG/ML
30 INJECTION, SOLUTION INTRAVENOUS CONTINUOUS PRN
Status: DISCONTINUED | OUTPATIENT
Start: 2020-11-03 | End: 2020-11-03 | Stop reason: HOSPADM

## 2020-11-03 RX ORDER — PROPOFOL 10 MG/ML
VIAL (ML) INTRAVENOUS AS NEEDED
Status: DISCONTINUED | OUTPATIENT
Start: 2020-11-03 | End: 2020-11-03 | Stop reason: SURG

## 2020-11-03 RX ORDER — SODIUM CHLORIDE, SODIUM LACTATE, POTASSIUM CHLORIDE, CALCIUM CHLORIDE 600; 310; 30; 20 MG/100ML; MG/100ML; MG/100ML; MG/100ML
30 INJECTION, SOLUTION INTRAVENOUS CONTINUOUS PRN
Status: DISCONTINUED | OUTPATIENT
Start: 2020-11-03 | End: 2020-11-03

## 2020-11-03 RX ORDER — LIDOCAINE HYDROCHLORIDE 20 MG/ML
INJECTION, SOLUTION INFILTRATION; PERINEURAL AS NEEDED
Status: DISCONTINUED | OUTPATIENT
Start: 2020-11-03 | End: 2020-11-03 | Stop reason: SURG

## 2020-11-03 RX ORDER — PROPOFOL 10 MG/ML
VIAL (ML) INTRAVENOUS CONTINUOUS PRN
Status: DISCONTINUED | OUTPATIENT
Start: 2020-11-03 | End: 2020-11-03 | Stop reason: SURG

## 2020-11-03 RX ORDER — SODIUM CHLORIDE, SODIUM LACTATE, POTASSIUM CHLORIDE, CALCIUM CHLORIDE 600; 310; 30; 20 MG/100ML; MG/100ML; MG/100ML; MG/100ML
INJECTION, SOLUTION INTRAVENOUS CONTINUOUS PRN
Status: DISCONTINUED | OUTPATIENT
Start: 2020-11-03 | End: 2020-11-03 | Stop reason: SURG

## 2020-11-03 RX ADMIN — PROPOFOL 140 MCG/KG/MIN: 10 INJECTION, EMULSION INTRAVENOUS at 12:09

## 2020-11-03 RX ADMIN — LIDOCAINE HYDROCHLORIDE 100 MG: 20 INJECTION, SOLUTION INFILTRATION; PERINEURAL at 12:09

## 2020-11-03 RX ADMIN — SODIUM CHLORIDE, POTASSIUM CHLORIDE, SODIUM LACTATE AND CALCIUM CHLORIDE: 600; 310; 30; 20 INJECTION, SOLUTION INTRAVENOUS at 12:02

## 2020-11-03 RX ADMIN — PROPOFOL 120 MG: 10 INJECTION, EMULSION INTRAVENOUS at 12:09

## 2020-11-03 RX ADMIN — SODIUM CHLORIDE 30 ML/HR: 9 INJECTION, SOLUTION INTRAVENOUS at 11:27

## 2020-11-03 NOTE — H&P
Patient Care Team:  Rhys Crowder Jr., MD as PCP - General (Family Medicine)  Whitney Dudley MD as Consulting Physician (Nephrology)  Quita Figueroa MD as Surgeon (Thoracic Surgery)  Sabas Luther MD as Consulting Physician (Otolaryngology)  Álvaro Gifford MD as Consulting Physician (Pulmonary Disease)    Chief complaint trouble swallowing solids    Subjective   Solids more than liquids.    History of Present Illness    Review of Systems   HENT: Positive for trouble swallowing.         Past Medical History:   Diagnosis Date   • AAA (abdominal aortic aneurysm) without rupture (CMS/[facility])    • Anxiety    • Arthritis    • Bilateral carotid artery stenosis 8/14/2020   • Cancer (CMS/[facility])     skin cancer   • Chest pain     OCCAS   • Chronic low back pain    • Chronic pain syndrome 3/12/2016   • CKD (chronic kidney disease), stage III    • Claustrophobia 10/26/2015    Resolved   • COPD (chronic obstructive pulmonary disease) (CMS/[facility])    • Depression    • Dizziness    • Dyspnea    • GERD (gastroesophageal reflux disease)    • GI problem    • H/O concussion    • Head trauma     FELL CUT HEAD 12 STAPLES   • Hiatal hernia    • History of migraine headaches    • Hyperlipidemia    • Hypertension    • Lumbar postlaminectomy syndrome 10/26/2015    Resolved   • Lung cancer (CMS/[facility])    • Lung nodule    • Migraines    • Nausea    • Palpitations    • Polypharmacy 4/22/2016   • Pulmonary embolism (CMS/HCC) 10/26/2015    January 2013 - Resolved, felt to be secondary to estrogen use   • Slow to wake up after anesthesia    • Submandibular sialoadenitis 10/26/2015    Resolved   • Visual changes    • Vitamin B 12 deficiency      Past Surgical History:   Procedure Laterality Date   • ANGIOPLASTY ILIAC ARTERY Bilateral 8/10/2018    Procedure: BILATERAL ILIAC STENTS;  Surgeon: Riki Mancera MD;  Location: [facility] OR;  Service: Vascular   • APPENDECTOMY     • BREAST SURGERY      Breast Surgery Reduction Procedure    • BRONCHOSCOPY N/A 2019    Procedure: BRONCHOSCOPY;  Surgeon: Álvaro Gifford MD;  Location: [facility] ENDOSCOPY;  Service: Pulmonary   • CHOLECYSTECTOMY     • HYSTERECTOMY     • INTUBATION  1/15/2019        • JOINT REPLACEMENT      left knee, hip, shoulder   • LASIK     • LUMBAR FUSION      Lumbar Vertebral Fusion   • SHOULDER ARTHROSCOPY  2013    Dr. Carrillo/[facility]   • THORACOSCOPY VIDEO ASSISTED WITH LOBECTOMY Right 2019    Procedure: BRONCOSCOPY, THORACOSCOPY VIDEO ASSISTED WITH RIGHT UPPER LOBE WEDGE RESECTION, COMPLETION RIGHT UPPER LOBECTOMY, LYMPH NODE DISSECTION, INTERCOSTAL NERVE BLOCK;  Surgeon: Quita Figueroa MD;  Location: [facility] MAIN OR;  Service: Thoracic   • TONSILLECTOMY     • TOTAL HIP ARTHROPLASTY REVISION Left    • TOTAL KNEE ARTHROPLASTY Left    • TOTAL SHOULDER ARTHROPLASTY Left    • TOTAL SHOULDER ARTHROPLASTY W/ DISTAL CLAVICLE EXCISION Right 2020    Procedure: RIGHT TOTAL SHOULDER REVERSE ARTHROPLASTY WITH GPS;  Surgeon: Lino Carrillo MD;  Location: [facility] OR [facility];  Service: Orthopedics;  Laterality: Right;     Family History   Problem Relation Age of Onset   • Hypertension Mother    • Lung cancer Mother    • Cancer Mother         lung   • Kidney disease Father    • Other Brother         Coronary Artery Bypass Grafting   • Stroke Brother         Cerebrovascular Accident   • Malig Hyperthermia Neg Hx      Social History     Tobacco Use   • Smoking status: Former Smoker     Packs/day: 2.50     Years: 15.00     Pack years: 37.50     Types: Cigarettes     Quit date: 2003     Years since quittin.1   • Smokeless tobacco: Never Used   Substance Use Topics   • Alcohol use: No     Frequency: Never     Comment: occ   • Drug use: Never     E-cigarette/Vaping   • E-cigarette/Vaping Use Never User      E-cigarette/Vaping Substances     Medications Prior to Admission   Medication Sig Dispense Refill Last Dose   • albuterol sulfate HFA (Ventolin HFA) 108 (90 Base)  MCG/ACT inhaler Inhale 2 puffs Every 4 (Four) Hours As Needed for Wheezing or Shortness of Air. 18 g 1 Past Week at Unknown time   • amLODIPine (NORVASC) 10 MG tablet Take 10 mg by mouth Every Morning.  3 11/2/2020 at Unknown time   • atorvastatin (LIPITOR) 40 MG tablet Take 40 mg by mouth Every Night.   11/2/2020 at Unknown time   • carBAMazepine (TEGretol) 200 MG tablet Take 200 mg by mouth Daily.  0 11/2/2020 at Unknown time   • carvedilol (COREG) 25 MG tablet Take 25 mg by mouth 2 (Two) Times a Day With Meals. 1/2 TAB EACH DOSE-ON HOLD SINCE 1/5/19 11/2/2020 at Unknown time   • cetirizine (zyrTEC) 10 MG tablet Take 10 mg by mouth As Needed for Allergies.   11/2/2020 at Unknown time   • cloNIDine (CATAPRES) 0.2 MG tablet Take 1 tablet by mouth Every 12 (Twelve) Hours. 180 tablet 0 11/2/2020 at Unknown time   • cyclobenzaprine (FLEXERIL) 10 MG tablet Take 10 mg by mouth 3 (Three) Times a Day As Needed.   11/2/2020 at Unknown time   • docusate sodium (COLACE) 100 MG capsule Take 100 mg by mouth 2 (Two) Times a Day As Needed for Constipation.   11/2/2020 at Unknown time   • famotidine (PEPCID) 20 MG tablet TAKE 1 TABLET BY MOUTH TWICE A DAY (Patient taking differently: Take 20 mg by mouth 2 (Two) Times a Day.) 180 tablet 1 11/2/2020 at Unknown time   • Fluticasone Furoate-Vilanterol (BREO ELLIPTA) 100-25 MCG/INH inhaler Inhale 1 puff Daily. 28 each 1 11/2/2020 at Unknown time   • folic acid (FOLVITE) 1 MG tablet TAKE 1 TABLET BY MOUTH EVERY DAY 30 tablet 0 11/2/2020 at Unknown time   • hydrALAZINE (APRESOLINE) 100 MG tablet Take 1 tablet by mouth 3 (Three) Times a Day. 180 tablet 0 11/2/2020 at Unknown time   • loratadine (CLARITIN) 10 MG tablet Take 1 tablet by mouth Daily. (Patient taking differently: Take 10 mg by mouth As Needed.) 30 tablet 2 11/2/2020 at Unknown time   • losartan (COZAAR) 25 MG tablet Take 1 tablet by mouth Daily. 90 tablet 0 11/2/2020 at Unknown time   • meclizine (ANTIVERT) 25 MG tablet  TAKE 1 TABLET BY MOUTH 3 TIMES A DAY AS NEEDED FOR DIZZINESS/NAUSEA/SENSATION OF SPINNING/WHIRLING 30 tablet 1 11/2/2020 at Unknown time   • ondansetron (ZOFRAN) 4 MG tablet Take 1-2 tablets by mouth Every 8 (Eight) Hours As Needed for Nausea or Vomiting. 60 tablet 1 11/2/2020 at Unknown time   • oxyCODONE (ROXICODONE) 15 MG immediate release tablet Take 20 mg by mouth Every 4 (Four) Hours As Needed for Severe Pain .   11/2/2020 at Unknown time   • SERTRALINE HCL PO Take  by mouth Daily.   11/2/2020 at Unknown time   • torsemide (DEMADEX) 20 MG tablet Take 2 tablets by mouth Daily. 180 tablet 0 11/2/2020 at Unknown time   • traZODone (DESYREL) 150 MG tablet Take 75 mg by mouth Every Night.   11/2/2020 at Unknown time   • venlafaxine XR (EFFEXOR-XR) 150 MG 24 hr capsule Take 1 capsule by mouth Daily. Swallow whole; do not crush or chew. 90 capsule 2 11/2/2020 at Unknown time   • allopurinol (ZYLOPRIM) 100 MG tablet Take 100 mg by mouth Daily.  2    • clopidogrel (PLAVIX) 75 MG tablet Take 75 mg by mouth Daily. PT STATED TO HOLD FOR SURGERY 5-7 DAYS PRIOR   10/31/2020   • LORazepam (ATIVAN) 0.5 MG tablet Take 1 tablet by mouth Every 8 (Eight) Hours As Needed for Anxiety. 90 tablet 0 Unknown at Unknown time     Allergies:  Neurontin [gabapentin] and Morphine    Objective      Vital Signs  Temp:  [97.8 °F (36.6 °C)] 97.8 °F (36.6 °C)  Heart Rate:  [56] 56  Resp:  [16] 16  BP: (195)/(80) 195/80    Physical Exam  HENT:      Head: Normocephalic.      Right Ear: External ear normal.      Mouth/Throat:      Pharynx: Oropharynx is clear.   Eyes:      Conjunctiva/sclera: Conjunctivae normal.   Pulmonary:      Effort: Pulmonary effort is normal.   Abdominal:      General: Bowel sounds are normal.   Skin:     General: Skin is warm and dry.   Neurological:      Mental Status: She is alert.   Psychiatric:         Mood and Affect: Mood normal.         Results Review:   I reviewed the patient's new clinical results.       Assessment/Plan       * No active hospital problems. *      Assessment:  (Solid food dysphagia ).     Plan:   (Upper tract endoscopy with dilation , risks, alternatives and benefits discussed with patient and patient is agreeable to proceed).       I discussed the patients findings and my recommendations with patient and nursing staff    Yunior Vo MD  11/03/20  12:10 EST

## 2020-11-03 NOTE — ANESTHESIA POSTPROCEDURE EVALUATION
Patient: Rachana Arguelles    Procedure Summary     Date: 11/03/20 Room / Location:  [facility]    Anesthesia Start: 1202 Anesthesia Stop: 1223    Procedure: ESOPHAGOGASTRODUODENOSCOPY with 54 [place] vernon dilation (N/A Esophagus) Diagnosis:     Surgeon: Yunior Vo MD Provider: Paul Ramires MD    Anesthesia Type: MAC ASA Status: 3          Anesthesia Type: MAC    Vitals  No vitals data found for the desired time range.          Post Anesthesia Care and Evaluation    Patient location during evaluation: bedside  Patient participation: complete - patient participated  Level of consciousness: responsive to light touch, responsive to verbal stimuli and sleepy but conscious  Pain score: 0  Pain management: adequate  Airway patency: patent  Anesthetic complications: No anesthetic complications  PONV Status: none  Cardiovascular status: acceptable and hemodynamically stable  Respiratory status: acceptable  Hydration status: acceptable       aeb NPO

## 2020-11-03 NOTE — ANESTHESIA PREPROCEDURE EVALUATION
Anesthesia Evaluation     Patient summary reviewed and Nursing notes reviewed   no history of anesthetic complications:  NPO Solid Status: > 8 hours  NPO Liquid Status: > 2 hours           Airway   Mallampati: II  TM distance: >3 FB  Neck ROM: full  no difficulty expected  Dental - normal exam     Pulmonary - normal exam   (+) pneumonia resolved , pulmonary embolism, a smoker Former, lung cancer, COPD mild, shortness of breath,   Cardiovascular - normal exam  Exercise tolerance: good (4-7 METS)    ECG reviewed  Rhythm: regular  Rate: normal    (+) hypertension well controlled 2 medications or greater, PVD, hyperlipidemia,  carotid artery disease carotid bilateral  (-) valvular problems/murmurs, past MI, CAD, dysrhythmias, angina, CHF, cardiac stents    ROS comment: Known hx/o stable AAA.    Neuro/Psych  (+) headaches, dizziness/light headedness, psychiatric history Anxiety and Depression,     (-) seizures, TIA, CVA  GI/Hepatic/Renal/Endo    (+) obesity,  hiatal hernia, GERD poorly controlled,  renal disease CRI,   (-) PUD, hepatitis, liver disease, diabetes, GI bleed, no thyroid disorder    Musculoskeletal     (+) neck pain,   Abdominal  - normal exam   Substance History - negative use     OB/GYN negative ob/gyn ROS         Other   arthritis,    history of cancer remission                  Anesthesia Plan    ASA 3     MAC     intravenous induction     Anesthetic plan, all risks, benefits, and alternatives have been provided, discussed and informed consent has been obtained with: patient.

## 2020-11-03 NOTE — DISCHARGE INSTRUCTIONS
Dr. Vo   [phone]    Upper Endoscopy, Adult, Care After  This sheet gives you information about how to care for yourself after your procedure. Your health care provider may also give you more specific instructions. If you have problems or questions, contact your health care provider.  What can I expect after the procedure?  After the procedure, it is common to have:  · A sore throat.  · Mild stomach pain or discomfort.  · Bloating.  · Nausea.  Follow these instructions at home:    · Follow instructions from your health care provider about what to eat or drink after your procedure.  · Return to your normal activities as told by your health care provider. Ask your health care provider what activities are safe for you.  · Take over-the-counter and prescription medicines only as told by your health care provider.  · Do not drive for 24 hours if you were given a sedative during your procedure.  · Keep all follow-up visits as told by your health care provider. This is important.  Contact a health care provider if you have:  · A sore throat that lasts longer than one day.  · Trouble swallowing.  Get help right away if:  · You vomit blood or your vomit looks like coffee grounds.  · You have:  ? A fever.  ? Bloody, black, or tarry stools.  ? A severe sore throat or you cannot swallow.  ? Difficulty breathing.  ? Severe pain in your chest or abdomen.  Summary  · After the procedure, it is common to have a sore throat, mild stomach discomfort, bloating, and nausea.  · Do not drive for 24 hours if you were given a sedative during the procedure.  · Follow instructions from your health care provider about what to eat or drink after your procedure.  · Return to your normal activities as told by your health care provider.  This information is not intended to replace advice given to you by your health care provider. Make sure you discuss any questions you have with your health care provider.  Document Released: 06/18/2013 Document  Revised: 06/11/2019 Document Reviewed: 05/20/2019  [employer] Patient Education © 2020 [employer]    Esophageal Stricture    Esophageal stricture is a narrowing (stricture) of the esophagus. The esophagus is the part of the body that moves food and liquid from your mouth to your stomach. The esophagus can become narrow because of disease or damage to the area. This condition can make swallowing difficult, painful, or even impossible. It also makes choking more likely.  What are the causes?  The most common cause of this condition is gastroesophageal reflux disease (GERD). Normally, food travels down the esophagus and stays in the stomach to be digested. In GERD, food and stomach acid move back up into the esophagus. Over time, this causes scar tissue and leads to narrowing.  Other causes of esophageal stricture include:  · Scarring from swallowing (ingesting) a harmful substance.  · Damage from medical instruments used in the esophagus.  · Radiation therapy.  · Cancer.  · Inflammation of the esophagus.  What increases the risk?  You are more likely to develop an esophageal stricture if you have GERD or esophageal cancer.  What are the signs or symptoms?  Symptoms of this condition include:  · Difficulty swallowing.  · Pain when swallowing.  · Burning pain or discomfort in the throat or chest (heartburn).  · Vomiting or spitting up (regurgitating) food or liquids.  · Unexplained weight loss.  How is this diagnosed?  This condition may be diagnosed based on:  · Your symptoms and a physical exam.  · Tests, such as:  ? Upper endoscopy. Your health care provider will insert a flexible tube with a tiny camera on it (endoscope) into your esophagus to check for a stricture. A tissue sample (biopsy) may also be taken to be examined under a microscope.  ? Esophageal pH monitoring. This test involves using a tube to collect acid in the esophagus to determine how much stomach acid is entering the esophagus.  ? Barium swallow  test. For this test, you will drink a chalky liquid (barium solution) that coats the lining of the esophagus. Then you will have an X-ray taken. The barium solution helps to show if there is a stricture.  How is this treated?  Treatment for esophageal stricture depends on what is causing your condition and how severe your condition is. Treatment options include:  · Esophageal dilation. In this procedure, a health care provider inserts an endoscope or a tool called a dilator into the esophagus to gently stretch it and make the opening wider.  · Stents. In some cases, a health care provider may place a small device (stent) in the esophagus to keep it open.  · Acid-blocking medicines. Taking these can help you manage GERD symptoms after an esophageal stricture. Controlling your GERD symptoms or being free of them can prevent the stricture from returning.  Follow these instructions at home:  Eating and drinking         · Follow instructions from your health care provider about any diet changes.  · Cut your food into small pieces, chew well, and eat slowly  · Try to eat soft food that is easier to swallow.  · Eat and drink only when you are sitting upright.  · Do not drink alcohol. If you need help quitting, ask your health care provider.  · Do not eat during the 3 hours before bedtime.  · Do not overeat at meals.  · Do not eat foods that can make reflux worse. These include:  ? Fatty foods, such as red meat and processed foods.  ? Spicy foods.  ? Soda.  ? Tomato products.  ? Chocolate.  General instructions  · Take over-the-counter and prescription medicines only as told by your health care provider.  · Do not use any products that contain nicotine or tobacco, such as cigarettes and e-cigarettes. If you need help quitting, ask your health care provider.  · Lose weight if you are overweight.  · Wear loose, comfortable clothing.  · When lying in bed, raise (elevate) your head with pillows. This will help to prevent your  stomach contents from backing up into your esophagus while you sleep.  · Keep all follow-up visits as told by your health care provider. This is important.  Contact a health care provider if:  · You have problems eating or swallowing.  · You regurgitate food and liquid.  · Your symptoms do not improve with treatment.  Get help right away if:  · You can no longer keep down any food, drinks, or your saliva.  Summary  · Esophageal stricture is a narrowing of the part of the body that moves food and liquid from your mouth to your stomach (esophagus).  · The esophagus can become narrow because of disease or damage to the area. This can make swallowing difficult, painful, or even impossible.  · Treatment for esophageal stricture depends on what is causing your condition and how severe your condition is. In some cases, procedures may be done to make the opening of the esophagus wider or to place a stent in the esophagus to keep it open.  · Do not drink alcohol, overeat at meals, or eat foods that can make reflux worse.  This information is not intended to replace advice given to you by your health care provider. Make sure you discuss any questions you have with your health care provider.  Document Released: 08/28/2007 Document Revised: 03/15/2019 Document Reviewed: 08/24/2018  [employer] Patient Education © 2020 [employer]

## 2020-11-06 ENCOUNTER — HOSPITAL ENCOUNTER (OUTPATIENT)
Dept: INFUSION THERAPY | Facility: HOSPITAL | Age: 75
Discharge: HOME | End: 2020-11-06
Admitting: INTERNAL MEDICINE

## 2020-11-06 VITALS
OXYGEN SATURATION: 99 % | HEART RATE: 52 BPM | SYSTOLIC BLOOD PRESSURE: 140 MMHG | RESPIRATION RATE: 20 BRPM | TEMPERATURE: 98.1 F | DIASTOLIC BLOOD PRESSURE: 82 MMHG

## 2020-11-06 DIAGNOSIS — N18.30 ANEMIA DUE TO STAGE 3 CHRONIC KIDNEY DISEASE, UNSPECIFIED WHETHER STAGE 3A OR 3B CKD (HCC): Primary | ICD-10-CM

## 2020-11-06 DIAGNOSIS — D63.1 ANEMIA DUE TO STAGE 3 CHRONIC KIDNEY DISEASE, UNSPECIFIED WHETHER STAGE 3A OR 3B CKD (HCC): Primary | ICD-10-CM

## 2020-11-06 LAB
25(OH)D3 SERPL-MCNC: 61.5 NG/ML (ref 30–100)
ALBUMIN SERPL-MCNC: 4.1 G/DL (ref 3.5–5.2)
ALBUMIN/GLOB SERPL: 2 G/DL
ALP SERPL-CCNC: 126 U/L (ref 39–117)
ALT SERPL W P-5'-P-CCNC: 7 U/L (ref 1–33)
ANION GAP SERPL CALCULATED.3IONS-SCNC: 9.3 MMOL/L (ref 5–15)
AST SERPL-CCNC: 17 U/L (ref 1–32)
BILIRUB SERPL-MCNC: 0.3 MG/DL (ref 0–1.2)
BUN SERPL-MCNC: 20 MG/DL (ref 8–23)
BUN/CREAT SERPL: 12.5 (ref 7–25)
CALCIUM SPEC-SCNC: 8.4 MG/DL (ref 8.6–10.5)
CALCIUM SPEC-SCNC: 8.5 MG/DL (ref 8.6–10.5)
CHLORIDE SERPL-SCNC: 102 MMOL/L (ref 98–107)
CO2 SERPL-SCNC: 25.7 MMOL/L (ref 22–29)
CREAT SERPL-MCNC: 1.6 MG/DL (ref 0.57–1)
DEPRECATED RDW RBC AUTO: 48.6 FL (ref 37–54)
ERYTHROCYTE [DISTWIDTH] IN BLOOD BY AUTOMATED COUNT: 14.5 % (ref 12.3–15.4)
FERRITIN SERPL-MCNC: 127 NG/ML (ref 13–150)
FOLATE SERPL-MCNC: >20 NG/ML (ref 4.78–24.2)
GFR SERPL CREATININE-BSD FRML MDRD: 31 ML/MIN/1.73
GLOBULIN UR ELPH-MCNC: 2.1 GM/DL
GLUCOSE SERPL-MCNC: 123 MG/DL (ref 65–99)
HCT VFR BLD AUTO: 27.4 % (ref 34–46.6)
HGB BLD-MCNC: 8.8 G/DL (ref 12–15.9)
IRON 24H UR-MRATE: 68 MCG/DL (ref 37–145)
IRON SATN MFR SERPL: 26 % (ref 20–50)
MCH RBC QN AUTO: 29.2 PG (ref 26.6–33)
MCHC RBC AUTO-ENTMCNC: 32.1 G/DL (ref 31.5–35.7)
MCV RBC AUTO: 91 FL (ref 79–97)
PHOSPHATE SERPL-MCNC: 4.5 MG/DL (ref 2.5–4.5)
PLATELET # BLD AUTO: 234 10*3/MM3 (ref 140–450)
PMV BLD AUTO: 9.3 FL (ref 6–12)
POTASSIUM SERPL-SCNC: 4.6 MMOL/L (ref 3.5–5.2)
PROT SERPL-MCNC: 6.2 G/DL (ref 6–8.5)
PTH-INTACT SERPL-MCNC: 132 PG/ML (ref 15–65)
RBC # BLD AUTO: 3.01 10*6/MM3 (ref 3.77–5.28)
SODIUM SERPL-SCNC: 137 MMOL/L (ref 136–145)
TIBC SERPL-MCNC: 258 MCG/DL (ref 298–536)
TRANSFERRIN SERPL-MCNC: 169 MG/DL (ref 200–360)
TRANSFERRIN SERPL-MCNC: 173 MG/DL (ref 200–360)
URATE SERPL-MCNC: 7.7 MG/DL (ref 2.4–5.7)
VIT B12 BLD-MCNC: 714 PG/ML (ref 211–946)
WBC # BLD AUTO: 6.87 10*3/MM3 (ref 3.4–10.8)

## 2020-11-06 PROCEDURE — 82746 ASSAY OF FOLIC ACID SERUM: CPT | Performed by: INTERNAL MEDICINE

## 2020-11-06 PROCEDURE — 80053 COMPREHEN METABOLIC PANEL: CPT | Performed by: INTERNAL MEDICINE

## 2020-11-06 PROCEDURE — 84100 ASSAY OF PHOSPHORUS: CPT | Performed by: INTERNAL MEDICINE

## 2020-11-06 PROCEDURE — 82607 VITAMIN B-12: CPT | Performed by: INTERNAL MEDICINE

## 2020-11-06 PROCEDURE — 25010000002 EPOETIN ALFA PER 1000 UNITS: Performed by: INTERNAL MEDICINE

## 2020-11-06 PROCEDURE — 83540 ASSAY OF IRON: CPT | Performed by: INTERNAL MEDICINE

## 2020-11-06 PROCEDURE — 82306 VITAMIN D 25 HYDROXY: CPT | Performed by: INTERNAL MEDICINE

## 2020-11-06 PROCEDURE — 82728 ASSAY OF FERRITIN: CPT | Performed by: INTERNAL MEDICINE

## 2020-11-06 PROCEDURE — 85027 COMPLETE CBC AUTOMATED: CPT | Performed by: INTERNAL MEDICINE

## 2020-11-06 PROCEDURE — 36415 COLL VENOUS BLD VENIPUNCTURE: CPT

## 2020-11-06 PROCEDURE — 96372 THER/PROPH/DIAG INJ SC/IM: CPT

## 2020-11-06 PROCEDURE — 83970 ASSAY OF PARATHORMONE: CPT | Performed by: INTERNAL MEDICINE

## 2020-11-06 PROCEDURE — 84466 ASSAY OF TRANSFERRIN: CPT | Performed by: INTERNAL MEDICINE

## 2020-11-06 PROCEDURE — 82310 ASSAY OF CALCIUM: CPT | Performed by: INTERNAL MEDICINE

## 2020-11-06 PROCEDURE — 84550 ASSAY OF BLOOD/URIC ACID: CPT | Performed by: INTERNAL MEDICINE

## 2020-11-06 RX ADMIN — ERYTHROPOIETIN 20000 UNITS: 20000 INJECTION, SOLUTION INTRAVENOUS; SUBCUTANEOUS at 16:01

## 2020-11-06 NOTE — PROGRESS NOTES
Patient tolerated injection without difficulty. Patient monitored for 30 minutes post injection. Injection site checked, no s/s of reaction noted. Patient discharged at 1430 per wheelchair with .

## 2020-11-06 NOTE — PATIENT INSTRUCTIONS
Epoetin Trevor injection  What is this medicine?  EPOETIN TREVOR (e SUKHJINDER e tin AL fa) helps your body make more red blood cells. This medicine is used to treat anemia caused by chronic kidney disease, cancer chemotherapy, or HIV-therapy. It may also be used before surgery if you have anemia.  This medicine may be used for other purposes; ask your health care provider or pharmacist if you have questions.  COMMON BRAND NAME(S): Epogen, Procrit, Retacrit  What should I tell my health care provider before I take this medicine?  They need to know if you have any of these conditions:  · cancer  · heart disease  · high blood pressure  · history of blood clots  · history of stroke  · low levels of folate, iron, or vitamin B12 in the blood  · seizures  · an unusual or allergic reaction to erythropoietin, albumin, benzyl alcohol, hamster proteins, other medicines, foods, dyes, or preservatives  · pregnant or trying to get pregnant  · breast-feeding  How should I use this medicine?  This medicine is for injection into a vein or under the skin. It is usually given by a health care professional in a hospital or clinic setting.  If you get this medicine at home, you will be taught how to prepare and give this medicine. Use exactly as directed. Take your medicine at regular intervals. Do not take your medicine more often than directed.  It is important that you put your used needles and syringes in a special sharps container. Do not put them in a trash can. If you do not have a sharps container, call your pharmacist or healthcare provider to get one.  A special MedGuide will be given to you by the pharmacist with each prescription and refill. Be sure to read this information carefully each time.  Talk to your pediatrician regarding the use of this medicine in children. While this drug may be prescribed for selected conditions, precautions do apply.  Overdosage: If you think you have taken too much of this medicine contact a poison  control center or emergency room at once.  NOTE: This medicine is only for you. Do not share this medicine with others.  What if I miss a dose?  If you miss a dose, take it as soon as you can. If it is almost time for your next dose, take only that dose. Do not take double or extra doses.  What may interact with this medicine?  Interactions have not been studied.  This list may not describe all possible interactions. Give your health care provider a list of all the medicines, herbs, non-prescription drugs, or dietary supplements you use. Also tell them if you smoke, drink alcohol, or use illegal drugs. Some items may interact with your medicine.  What should I watch for while using this medicine?  Your condition will be monitored carefully while you are receiving this medicine.  You may need blood work done while you are taking this medicine.  This medicine may cause a decrease in vitamin B6. You should make sure that you get enough vitamin B6 while you are taking this medicine. Discuss the foods you eat and the vitamins you take with your health care professional.  What side effects may I notice from receiving this medicine?  Side effects that you should report to your doctor or health care professional as soon as possible:  · allergic reactions like skin rash, itching or hives, swelling of the face, lips, or tongue  · seizures  · signs and symptoms of a blood clot such as breathing problems; changes in vision; chest pain; severe, sudden headache; pain, swelling, warmth in the leg; trouble speaking; sudden numbness or weakness of the face, arm or leg  · signs and symptoms of a stroke like changes in vision; confusion; trouble speaking or understanding; severe headaches; sudden numbness or weakness of the face, arm or leg; trouble walking; dizziness; loss of balance or coordination  Side effects that usually do not require medical attention (report to your doctor or health care professional if they continue or are  bothersome):  · chills  · cough  · dizziness  · fever  · headaches  · joint pain  · muscle cramps  · muscle pain  · nausea, vomiting  · pain, redness, or irritation at site where injected  This list may not describe all possible side effects. Call your doctor for medical advice about side effects. You may report side effects to FDA at 8-579-BFJ-9142.  Where should I keep my medicine?  Keep out of the reach of children.  Store in a refrigerator between 2 and 8 degrees C (36 and 46 degrees F). Do not freeze or shake. Throw away any unused portion if using a single-dose vial. Multi-dose vials can be kept in the refrigerator for up to 21 days after the initial dose. Throw away unused medicine.  NOTE: This sheet is a summary. It may not cover all possible information. If you have questions about this medicine, talk to your doctor, pharmacist, or health care provider.  © 2020 [employer] (2018-07-27 08:35:19)

## 2020-11-09 RX ORDER — MECLIZINE HYDROCHLORIDE 25 MG/1
TABLET ORAL
Qty: 30 TABLET | Refills: 1 | Status: SHIPPED | OUTPATIENT
Start: 2020-11-09 | End: 2021-04-29

## 2020-11-20 ENCOUNTER — HOSPITAL ENCOUNTER (OUTPATIENT)
Dept: INFUSION THERAPY | Facility: HOSPITAL | Age: 75
Discharge: HOME | End: 2020-11-20
Admitting: INTERNAL MEDICINE

## 2020-11-20 VITALS
HEART RATE: 53 BPM | OXYGEN SATURATION: 98 % | DIASTOLIC BLOOD PRESSURE: 73 MMHG | SYSTOLIC BLOOD PRESSURE: 172 MMHG | TEMPERATURE: 97.3 F | RESPIRATION RATE: 20 BRPM

## 2020-11-20 DIAGNOSIS — D63.1 ANEMIA DUE TO STAGE 3 CHRONIC KIDNEY DISEASE, UNSPECIFIED WHETHER STAGE 3A OR 3B CKD (HCC): Primary | ICD-10-CM

## 2020-11-20 DIAGNOSIS — N18.30 ANEMIA DUE TO STAGE 3 CHRONIC KIDNEY DISEASE, UNSPECIFIED WHETHER STAGE 3A OR 3B CKD (HCC): Primary | ICD-10-CM

## 2020-11-20 LAB
HCT VFR BLD AUTO: 31.1 % (ref 34–46.6)
HGB BLD-MCNC: 10.1 G/DL (ref 12–15.9)

## 2020-11-20 PROCEDURE — 36415 COLL VENOUS BLD VENIPUNCTURE: CPT

## 2020-11-20 PROCEDURE — 96372 THER/PROPH/DIAG INJ SC/IM: CPT

## 2020-11-20 PROCEDURE — 85018 HEMOGLOBIN: CPT | Performed by: INTERNAL MEDICINE

## 2020-11-20 PROCEDURE — 85014 HEMATOCRIT: CPT | Performed by: INTERNAL MEDICINE

## 2020-11-20 PROCEDURE — 25010000002 EPOETIN ALFA PER 1000 UNITS: Performed by: INTERNAL MEDICINE

## 2020-11-20 RX ADMIN — ERYTHROPOIETIN 20000 UNITS: 20000 INJECTION, SOLUTION INTRAVENOUS; SUBCUTANEOUS at 14:05

## 2020-11-20 NOTE — PROGRESS NOTES
Procrit  Indicated per lab results & MD order.  Injection site x 1 with band aide applied.  AVS given & pt DC per wheelchair with  after completion of visit.

## 2020-11-29 RX ORDER — TRAZODONE HYDROCHLORIDE 150 MG/1
TABLET ORAL
Qty: 45 TABLET | Refills: 1 | Status: SHIPPED | OUTPATIENT
Start: 2020-11-29 | End: 2021-01-01

## 2020-12-04 ENCOUNTER — APPOINTMENT (OUTPATIENT)
Dept: INFUSION THERAPY | Facility: HOSPITAL | Age: 75
End: 2020-12-04

## 2020-12-14 RX ORDER — FOLIC ACID 1 MG/1
TABLET ORAL
Qty: 30 TABLET | Refills: 0 | Status: SHIPPED | OUTPATIENT
Start: 2020-12-14 | End: 2021-01-11

## 2020-12-17 ENCOUNTER — OFFICE VISIT (OUTPATIENT)
Dept: INTERNAL MEDICINE | Facility: CLINIC | Age: 75
End: 2020-12-17

## 2020-12-17 VITALS
BODY MASS INDEX: 29.89 KG/M2 | WEIGHT: 186 LBS | HEART RATE: 65 BPM | DIASTOLIC BLOOD PRESSURE: 70 MMHG | SYSTOLIC BLOOD PRESSURE: 128 MMHG | TEMPERATURE: 97.7 F | HEIGHT: 66 IN | OXYGEN SATURATION: 98 %

## 2020-12-17 DIAGNOSIS — S09.90XS INJURY OF HEAD, SEQUELA: ICD-10-CM

## 2020-12-17 DIAGNOSIS — N18.30 ANEMIA DUE TO STAGE 3 CHRONIC KIDNEY DISEASE, UNSPECIFIED WHETHER STAGE 3A OR 3B CKD (HCC): ICD-10-CM

## 2020-12-17 DIAGNOSIS — F07.81 POSTCONCUSSION SYNDROME: ICD-10-CM

## 2020-12-17 DIAGNOSIS — N18.30 STAGE 3 CHRONIC KIDNEY DISEASE, UNSPECIFIED WHETHER STAGE 3A OR 3B CKD (HCC): ICD-10-CM

## 2020-12-17 DIAGNOSIS — Z00.00 MEDICARE ANNUAL WELLNESS VISIT, SUBSEQUENT: ICD-10-CM

## 2020-12-17 DIAGNOSIS — D63.1 ANEMIA DUE TO STAGE 3 CHRONIC KIDNEY DISEASE, UNSPECIFIED WHETHER STAGE 3A OR 3B CKD (HCC): ICD-10-CM

## 2020-12-17 DIAGNOSIS — I10 ESSENTIAL HYPERTENSION: Primary | ICD-10-CM

## 2020-12-17 DIAGNOSIS — L98.9 GENERALIZED SKIN LESIONS: ICD-10-CM

## 2020-12-17 DIAGNOSIS — E78.2 MIXED HYPERLIPIDEMIA: ICD-10-CM

## 2020-12-17 DIAGNOSIS — R51.9 CHRONIC DAILY HEADACHE: ICD-10-CM

## 2020-12-17 PROCEDURE — 90694 VACC AIIV4 NO PRSRV 0.5ML IM: CPT | Performed by: FAMILY MEDICINE

## 2020-12-17 PROCEDURE — G0008 ADMIN INFLUENZA VIRUS VAC: HCPCS | Performed by: FAMILY MEDICINE

## 2020-12-17 PROCEDURE — G0439 PPPS, SUBSEQ VISIT: HCPCS | Performed by: FAMILY MEDICINE

## 2020-12-17 PROCEDURE — 99214 OFFICE O/P EST MOD 30 MIN: CPT | Performed by: FAMILY MEDICINE

## 2020-12-17 RX ORDER — OXYCODONE HYDROCHLORIDE 20 MG/1
20 TABLET ORAL EVERY 4 HOURS PRN
COMMUNITY
Start: 2020-11-18 | End: 2021-01-01 | Stop reason: HOSPADM

## 2020-12-17 RX ORDER — FUROSEMIDE 40 MG/1
40 TABLET ORAL EVERY MORNING
COMMUNITY
Start: 2020-09-20 | End: 2021-04-29 | Stop reason: ALTCHOICE

## 2020-12-17 RX ORDER — VENLAFAXINE HYDROCHLORIDE 150 MG/1
150 CAPSULE, EXTENDED RELEASE ORAL DAILY
COMMUNITY
End: 2021-03-30

## 2020-12-17 RX ORDER — AMLODIPINE BESYLATE 5 MG/1
5 TABLET ORAL DAILY
COMMUNITY
Start: 2020-11-11 | End: 2021-06-05 | Stop reason: HOSPADM

## 2020-12-17 RX ORDER — FAMOTIDINE 40 MG/1
40 TABLET, FILM COATED ORAL DAILY PRN
COMMUNITY
Start: 2020-11-12 | End: 2021-01-01

## 2020-12-17 RX ORDER — ERGOCALCIFEROL 1.25 MG/1
50000 CAPSULE ORAL
COMMUNITY
Start: 2020-11-21 | End: 2022-01-01 | Stop reason: SDUPTHER

## 2020-12-17 RX ORDER — FEXOFENADINE HCL 180 MG/1
180 TABLET ORAL DAILY PRN
Qty: 90 TABLET | Refills: 2 | Status: SHIPPED | OUTPATIENT
Start: 2020-12-17 | End: 2021-01-01 | Stop reason: HOSPADM

## 2020-12-17 RX ORDER — SERTRALINE HYDROCHLORIDE 100 MG/1
100 TABLET, FILM COATED ORAL DAILY
Qty: 90 TABLET | Refills: 3 | Status: SHIPPED | OUTPATIENT
Start: 2020-12-17 | End: 2021-01-01 | Stop reason: SDUPTHER

## 2020-12-17 RX ORDER — CLONIDINE HYDROCHLORIDE 0.3 MG/1
0.3 TABLET ORAL 2 TIMES DAILY
COMMUNITY
Start: 2020-11-23 | End: 2021-06-05 | Stop reason: HOSPADM

## 2020-12-17 RX ORDER — TORSEMIDE 20 MG/1
40 TABLET ORAL DAILY
COMMUNITY
End: 2021-06-05 | Stop reason: HOSPADM

## 2020-12-17 RX ORDER — CYCLOBENZAPRINE HCL 10 MG
10 TABLET ORAL 3 TIMES DAILY PRN
COMMUNITY
End: 2021-01-28 | Stop reason: SDUPTHER

## 2020-12-17 RX ORDER — OMEPRAZOLE 40 MG/1
40 CAPSULE, DELAYED RELEASE ORAL DAILY
Status: ON HOLD | COMMUNITY
Start: 2020-12-02 | End: 2021-06-01

## 2020-12-17 NOTE — PROGRESS NOTES
Subjective   Rachana Arguelles is a 75 y.o. female.     Chief Complaint   Patient presents with   • Medicare Wellness-subsequent         History of Present Illness   Patient is coming by daughter she has had a terrible year.  We discussed active management hypertension with amlodipine 50 mg daily.  She is seeing nephrology who is increased her amlodipine.  Otherwise she gets Epogen periodically for anemia associated with renal dysfunction and renal failure.  She has been quite anemic for the past 3 or 4 months with improvement now to above 10.  She has had dyspnea on exertion probably relating to anemia as one of the factors.  She is seeing pulmonary as well with history of lung cancer and COPD and also atherosclerotic coronary artery disease which appears stable.  Active management hyperlipidemia reviewed.    She has had chronic daily headaches which are terrible with a diffuse headache for greater than 30 days history includes traumatic head injury when she fell on [address] resulting in large hematoma and post concussion.  We discussed getting her repeat head CT given history of head trauma a year ago and increasing headache.    We discussed dry mouth and the switch from Benadryl OTC to Allegra 180 mg daily as needed for allergic symptoms.    Depression is worse we discussed her medication will increase Zoloft 200 mg with precautions given that she is on Effexor  mg daily.  Will monitor this closely.    She has some actinic keratosis and skin lesions over the hands will send her to dermatology Dr. Izaguirre.      The following portions of the patient's history were reviewed and updated as appropriate: allergies, current medications, past social history and problem list.    Review of Systems   Constitutional: Positive for fatigue.   HENT: Negative.    Eyes: Negative.    Respiratory: Positive for shortness of breath.    Cardiovascular: Negative.    Gastrointestinal: Negative.    Endocrine: Negative.    Genitourinary:  Negative.    Musculoskeletal: Negative.    Skin: Positive for color change.   Allergic/Immunologic: Negative.    Neurological: Negative.    Hematological: Negative.    Psychiatric/Behavioral: Positive for dysphoric mood.       Objective   Vitals:    12/17/20 1130   BP: 128/70   Pulse: 65   Temp: 97.7 °F (36.5 °C)   SpO2: 98%     Physical Exam  Vitals signs reviewed.   Constitutional:       Appearance: She is well-developed.   HENT:      Head: Normocephalic and atraumatic.      Right Ear: Tympanic membrane and external ear normal.      Left Ear: Tympanic membrane and external ear normal.   Eyes:      Conjunctiva/sclera: Conjunctivae normal.      Pupils: Pupils are equal, round, and reactive to light.   Neck:      Musculoskeletal: Normal range of motion and neck supple.      Thyroid: No thyromegaly.      Vascular: No JVD.   Cardiovascular:      Rate and Rhythm: Normal rate and regular rhythm.      Heart sounds: Normal heart sounds.   Pulmonary:      Effort: Pulmonary effort is normal.      Breath sounds: Normal breath sounds.   Abdominal:      General: Bowel sounds are normal.      Palpations: Abdomen is soft.   Musculoskeletal: Normal range of motion.   Lymphadenopathy:      Cervical: No cervical adenopathy.   Skin:     General: Skin is warm and dry.      Findings: No rash.          Neurological:      Mental Status: She is alert and oriented to person, place, and time.      Cranial Nerves: No cranial nerve deficit.      Motor: Weakness present.      Coordination: Coordination abnormal.      Gait: Gait abnormal.   Psychiatric:         Mood and Affect: Mood is anxious and depressed.         Behavior: Behavior normal.         Thought Content: Thought content normal.         Judgment: Judgment normal.         Assessment/Plan   Problems Addressed this Visit        Cardiovascular and Mediastinum    Hypertension - Primary    Relevant Medications    amLODIPine (NORVASC) 5 MG tablet    cloNIDine (CATAPRES) 0.3 MG tablet     furosemide (LASIX) 40 MG tablet    torsemide (DEMADEX) 20 MG tablet    Hyperlipidemia       Genitourinary    CKD (chronic kidney disease), stage III (CMS/[facility])    Relevant Medications    furosemide (LASIX) 40 MG tablet    torsemide (DEMADEX) 20 MG tablet       Hematopoietic and Hemostatic    Anemia      Other Visit Diagnoses     Postconcussion syndrome        Relevant Medications    sertraline (Zoloft) 100 MG tablet    venlafaxine XR (EFFEXOR-XR) 150 MG 24 hr capsule    Other Relevant Orders    CT Head Without Contrast    Chronic daily headache        Relevant Medications    amLODIPine (NORVASC) 5 MG tablet    oxyCODONE (ROXICODONE) 20 MG tablet    sertraline (Zoloft) 100 MG tablet    venlafaxine XR (EFFEXOR-XR) 150 MG 24 hr capsule    cyclobenzaprine (FLEXERIL) 10 MG tablet    Other Relevant Orders    CT Head Without Contrast    Injury of head, sequela        Relevant Orders    CT Head Without Contrast    Medicare annual wellness visit, subsequent        Generalized skin lesions        Relevant Orders    Ambulatory Referral to Dermatology      Diagnoses       Codes Comments    Essential hypertension    -  Primary ICD-10-CM: I10  ICD-9-CM: 401.9     Mixed hyperlipidemia     ICD-10-CM: E78.2  ICD-9-CM: 272.2     Postconcussion syndrome     ICD-10-CM: F07.81  ICD-9-CM: 310.2     Chronic daily headache     ICD-10-CM: R51.9  ICD-9-CM: 784.0     Injury of head, sequela     ICD-10-CM: S09.90XS  ICD-9-CM: 908.9     Stage 3 chronic kidney disease, unspecified whether stage 3a or 3b CKD     ICD-10-CM: N18.30  ICD-9-CM: 585.3     Anemia due to stage 3 chronic kidney disease, unspecified whether stage 3a or 3b CKD     ICD-10-CM: N18.30, D63.1  ICD-9-CM: 285.21, 585.3     Medicare annual wellness visit, subsequent     ICD-10-CM: Z00.00  ICD-9-CM: V70.0     Generalized skin lesions     ICD-10-CM: L98.9  ICD-9-CM: 709.9       Plan: Referral to dermatology    Increase Zoloft 100 mg daily monitor closely given the fact she is on  Effexor  mg daily.    Discontinue Benadryl start Allegra 180 mg daily for allergic symptoms with dry mouth.    Get CT of the head no contrast for history of headache which has been progressive.  History of head trauma and hematoma 10 months ago noted.

## 2020-12-17 NOTE — PROGRESS NOTES
The ABCs of the Annual Wellness Visit  Subsequent Medicare Wellness Visit    Chief Complaint   Patient presents with   • Medicare Wellness-subsequent       Subjective   History of Present Illness:  Rachana Arguelles is a 75 y.o. female who presents for a Subsequent Medicare Wellness Visit.    HEALTH RISK ASSESSMENT    Recent Hospitalizations:  Recently treated at the following:  [facility]    Current Medical Providers:  Patient Care Team:  Rhys Crowder Jr., MD as PCP - General (Family Medicine)  Whitney Dudley MD as Consulting Physician (Nephrology)  Quita Figueroa MD as Surgeon (Thoracic Surgery)  Sabas Luther MD as Consulting Physician (Otolaryngology)  Álvaro Gifford MD as Consulting Physician (Pulmonary Disease)    Smoking Status:  Social History     Tobacco Use   Smoking Status Former Smoker   • Packs/day: 2.50   • Years: 15.00   • Pack years: 37.50   • Types: Cigarettes   • Quit date: 2003   • Years since quittin.2   Smokeless Tobacco Never Used       Alcohol Consumption:  Social History     Substance and Sexual Activity   Alcohol Use No   • Frequency: Never    Comment: occ       Depression Screen:   PHQ-2/PHQ-9 Depression Screening 2020   Little interest or pleasure in doing things 0   Feeling down, depressed, or hopeless 3   Trouble falling or staying asleep, or sleeping too much 0   Feeling tired or having little energy 3   Poor appetite or overeating 0   Feeling bad about yourself - or that you are a failure or have let yourself or your family down 1   Trouble concentrating on things, such as reading the newspaper or watching television 0   Moving or speaking so slowly that other people could have noticed. Or the opposite - being so fidgety or restless that you have been moving around a lot more than usual 1   Thoughts that you would be better off dead, or of hurting yourself in some way 0   Total Score 8   If you checked off any problems, how difficult have these  problems made it for you to do your work, take care of things at home, or get along with other people? Very difficult       Fall Risk Screen:  NEAL Fall Risk Assessment was completed, and patient is at HIGH risk for falls. Assessment completed on:12/17/2020    Health Habits and Functional and Cognitive Screening:  Functional & Cognitive Status 12/17/2020   Do you have difficulty preparing food and eating? Yes   Do you have difficulty bathing yourself, getting dressed or grooming yourself? Yes   Do you have difficulty using the toilet? Yes   Do you have difficulty moving around from place to place? Yes   Do you have trouble with steps or getting out of a bed or a chair? Yes   Current Diet Well Balanced Diet   Dental Exam -   Eye Exam -   Exercise (times per week) 0 times per week   Current Exercises Include No Regular Exercise   Current Exercise Activities Include -   Do you need help using the phone?  No   Are you deaf or do you have serious difficulty hearing?  No   Do you need help with transportation? Yes   Do you need help shopping? Yes   Do you need help preparing meals?  Yes   Do you need help with housework?  Yes   Do you need help with laundry? Yes   Do you need help taking your medications? Yes   Do you need help managing money? Yes   Do you ever drive or ride in a car without wearing a seat belt? No   Have you felt unusual stress, anger or loneliness in the last month? Yes   Who do you live with? Spouse   If you need help, do you have trouble finding someone available to you? No   Have you been bothered in the last four weeks by sexual problems? No   Do you have difficulty concentrating, remembering or making decisions? Yes         Does the patient have evidence of cognitive impairment? Yes    Asprin use counseling:Does not need ASA (and currently is not on it)    Age-appropriate Screening Schedule:  Refer to the list below for future screening recommendations based on patient's age, sex and/or medical  conditions. Orders for these recommended tests are listed in the plan section. The patient has been provided with a written plan.    Health Maintenance   Topic Date Due   • ZOSTER VACCINE (1 of 2) 09/14/1995   • INFLUENZA VACCINE  08/01/2020   • LIPID PANEL  12/02/2020   • MAMMOGRAM  08/21/2021   • COLONOSCOPY  04/04/2022   • TDAP/TD VACCINES (3 - Td) 12/16/2029          The following portions of the patient's history were reviewed and updated as appropriate: allergies, current medications, past family history, past medical history, past social history, past surgical history and problem list.    Outpatient Medications Prior to Visit   Medication Sig Dispense Refill   • albuterol sulfate HFA (Ventolin HFA) 108 (90 Base) MCG/ACT inhaler Inhale 2 puffs Every 4 (Four) Hours As Needed for Wheezing or Shortness of Air. 18 g 1   • amLODIPine (NORVASC) 10 MG tablet Take 10 mg by mouth Every Morning.  3   • amLODIPine (NORVASC) 5 MG tablet Take 5 mg by mouth Daily.     • atorvastatin (LIPITOR) 40 MG tablet Take 40 mg by mouth Every Night.     • carBAMazepine (TEGretol) 200 MG tablet Take 200 mg by mouth Daily.  0   • carvedilol (COREG) 25 MG tablet Take 25 mg by mouth 2 (Two) Times a Day With Meals. 1/2 TAB EACH DOSE-ON HOLD SINCE 1/5/19     • cetirizine (zyrTEC) 10 MG tablet Take 10 mg by mouth As Needed for Allergies.     • cloNIDine (CATAPRES) 0.3 MG tablet Take 0.3 mg by mouth 3 (Three) Times a Day.     • clopidogrel (PLAVIX) 75 MG tablet Take 75 mg by mouth Daily. PT STATED TO HOLD FOR SURGERY 5-7 DAYS PRIOR     • cyclobenzaprine (FLEXERIL) 10 MG tablet Take 10 mg by mouth 3 (Three) Times a Day As Needed.     • docusate sodium (COLACE) 100 MG capsule Take 100 mg by mouth 2 (Two) Times a Day As Needed for Constipation.     • famotidine (PEPCID) 40 MG tablet Take 40 mg by mouth 2 (Two) Times a Day.     • Fluticasone Furoate-Vilanterol (BREO ELLIPTA) 100-25 MCG/INH inhaler Inhale 1 puff Daily. 28 each 1   • folic acid  (FOLVITE) 1 MG tablet TAKE 1 TABLET BY MOUTH EVERY DAY 30 tablet 0   • loratadine (CLARITIN) 10 MG tablet Take 1 tablet by mouth Daily. (Patient taking differently: Take 10 mg by mouth As Needed.) 30 tablet 2   • LORazepam (ATIVAN) 0.5 MG tablet Take 1 tablet by mouth Every 8 (Eight) Hours As Needed for Anxiety. 90 tablet 0   • meclizine (ANTIVERT) 25 MG tablet TAKE 1 TABLET BY MOUTH 3 TIMES A DAY AS NEEDED FOR DIZZINESS/NAUSEA/SENSATION OF SPINNING/WHIRLING 30 tablet 1   • omeprazole (priLOSEC) 40 MG capsule Take 40 mg by mouth Daily.     • ondansetron (ZOFRAN) 4 MG tablet Take 1-2 tablets by mouth Every 8 (Eight) Hours As Needed for Nausea or Vomiting. 60 tablet 1   • oxyCODONE (ROXICODONE) 20 MG tablet TAKE 1 TABLET BY MOUTH EVERY 4 TO 6 HOURS NO MORE THAN 5 PER DAY     • torsemide (DEMADEX) 20 MG tablet Take 40 mg by mouth Daily.     • traZODone (DESYREL) 150 MG tablet TAKE 0.5 TABLETS BY MOUTH EVERY NIGHT 45 tablet 1   • vitamin D (ERGOCALCIFEROL) 1.25 MG (25622 UT) capsule capsule 1 (ONE) CAPSULE WEEKLY FOR VITAMIN D DEFICIENCY     • sertraline (ZOLOFT) 50 MG tablet Take 50 mg by mouth Daily.     • venlafaxine XR (EFFEXOR-XR) 150 MG 24 hr capsule Take 1 capsule by mouth Daily. Swallow whole; do not crush or chew. 90 capsule 2   • allopurinol (ZYLOPRIM) 100 MG tablet Take 100 mg by mouth Daily.  2   • furosemide (LASIX) 40 MG tablet Take 40 mg by mouth Every Morning.     • hydrALAZINE (APRESOLINE) 100 MG tablet Take 1 tablet by mouth 3 (Three) Times a Day. 180 tablet 0   • cloNIDine (CATAPRES) 0.2 MG tablet Take 1 tablet by mouth Every 12 (Twelve) Hours. 180 tablet 0   • famotidine (PEPCID) 20 MG tablet TAKE 1 TABLET BY MOUTH TWICE A DAY (Patient taking differently: Take 20 mg by mouth 2 (Two) Times a Day.) 180 tablet 1   • losartan (COZAAR) 25 MG tablet Take 1 tablet by mouth Daily. 90 tablet 0   • oxyCODONE (ROXICODONE) 15 MG immediate release tablet Take 20 mg by mouth Every 4 (Four) Hours As Needed for  Severe Pain .     • SERTRALINE HCL PO Take  by mouth Daily.     • torsemide (DEMADEX) 20 MG tablet Take 2 tablets by mouth Daily. 180 tablet 0     No facility-administered medications prior to visit.        Patient Active Problem List   Diagnosis   • Anxiety   • Chronic pain syndrome   • Depression   • Gastroesophageal reflux disease   • Hyperlipidemia   • Hypertension   • Osteoarthritis of hip   • Chronic low back pain   • Failed back syndrome of lumbar spine   • Pulmonary embolus (CMS/HCC)   • Weight loss   • Polypharmacy   • CKD (chronic kidney disease), stage III (CMS/[facility])   • Chronic constipation   • Sacroiliac joint pain   • Mixed incontinence   • Renal cyst   • Achilles tendonitis   • Atherosclerosis of native artery of left lower extremity with intermittent claudication (CMS/HCC)   • Morbidly obese (CMS/[facility])   • Lung nodule   • Malignant neoplasm of right upper lobe of lung (CMS/HCC)   • Lung cancer (CMS/[facility])   • Moderate single current episode of major depressive disorder (CMS/[facility])   • Palpitations   • Encounter for screening for malignant neoplasm of colon   • Hematoma of scalp   • Acute neck pain   • Dizziness   • Unstable cervical spine   • Postconcussive syndrome   • Positive colorectal cancer screening using Cologuard test   • Dysphagia   • S/P reverse total shoulder arthroplasty, right   • Chronic post-traumatic headache, not intractable   • Medication overuse headache   • Migraine without aura and without status migrainosus, not intractable   • Shortness of breath   • PAD (peripheral artery disease) (CMS/[facility])   • Bilateral carotid artery stenosis   • S/P lobectomy of lung   • YVONNE (acute kidney injury) (CMS/[facility])   • Anemia   • Frequent PVCs   • Pneumonia of both lower lobes due to infectious organism   • History of lung cancer   • Hypertensive urgency       Advanced Care Planning:  ACP discussion was held with the patient during this visit. Patient does not have an advance directive, information  "provided.    Review of Systems    Compared to one year ago, the patient feels her physical health is worse.  Compared to one year ago, the patient feels her mental health is worse.    Reviewed chart for potential of high risk medication in the elderly: yes  Reviewed chart for potential of harmful drug interactions in the elderly:yes    Objective         Vitals:    12/17/20 1130   BP: 128/70   BP Location: Left arm   Patient Position: Sitting   Cuff Size: Adult   Pulse: 65   Temp: 97.7 °F (36.5 °C)   SpO2: 98%   Weight: 84.4 kg (186 lb)   Height: 167.6 cm (66\")       Body mass index is 30.02 kg/m².  Discussed the patient's BMI with her. The BMI is above average; no BMI management plan is appropriate..    Physical Exam          Assessment/Plan   Medicare Risks and Personalized Health Plan  CMS Preventative Services Quick Reference  Cardiovascular risk  Depression/Dysphoria  Diabetic Lab Screening   Fall Risk  Lung Cancer Risk    The above risks/problems have been discussed with the patient.  Pertinent information has been shared with the patient in the After Visit Summary.  Follow up plans and orders are seen below in the Assessment/Plan Section.    Diagnoses and all orders for this visit:    1. Essential hypertension (Primary)    2. Mixed hyperlipidemia    3. Postconcussion syndrome  -     CT Head Without Contrast; Future    4. Chronic daily headache  -     CT Head Without Contrast; Future    5. Injury of head, sequela  -     CT Head Without Contrast; Future    6. Stage 3 chronic kidney disease, unspecified whether stage 3a or 3b CKD    7. Anemia due to stage 3 chronic kidney disease, unspecified whether stage 3a or 3b CKD    8. Medicare annual wellness visit, subsequent    Other orders  -     sertraline (Zoloft) 100 MG tablet; Take 1 tablet by mouth Daily.  Dispense: 90 tablet; Refill: 3      Follow Up:  No follow-ups on file.     An After Visit Summary and PPPS were given to the patient.             "

## 2020-12-28 ENCOUNTER — TELEPHONE (OUTPATIENT)
Dept: SLEEP MEDICINE | Facility: HOSPITAL | Age: 75
End: 2020-12-28

## 2020-12-28 NOTE — TELEPHONE ENCOUNTER
----- Message from Yunior Vo MD sent at 12/21/2020  5:35 PM EST -----  So achalasia is diagnosed with esophageal mannometry.  She had a scope not long ago and dilated an eosphageal ring at the time,  the esophagus did not look like a classic achalasia at the time.  Happy for her to touch bases and get a mannometry .  ----- Message -----  From: Marylin Umanzor APRN  Sent: 12/21/2020  12:51 PM EST  To: Yunior Vo MD    Hello (again) Dr Vo! This is our mutual patient who recently saw Dr Gifford. He asked me to reach out to you and consult with you re- recent esophagogram showing achalasia. This patient also has CT chest findings consistent with ground glass opacities-which are appearing and disappearing. She has chronic cough. Is there anything we can do for the achalasia ? such as botox.    Thanks for your help.    Marylin Gifford's NP.

## 2021-01-01 ENCOUNTER — APPOINTMENT (OUTPATIENT)
Dept: GENERAL RADIOLOGY | Facility: HOSPITAL | Age: 76
End: 2021-01-01

## 2021-01-01 ENCOUNTER — TELEPHONE (OUTPATIENT)
Dept: ONCOLOGY | Facility: CLINIC | Age: 76
End: 2021-01-01

## 2021-01-01 ENCOUNTER — APPOINTMENT (OUTPATIENT)
Dept: INFUSION THERAPY | Facility: HOSPITAL | Age: 76
End: 2021-01-01

## 2021-01-01 ENCOUNTER — APPOINTMENT (OUTPATIENT)
Dept: ONCOLOGY | Facility: HOSPITAL | Age: 76
End: 2021-01-01

## 2021-01-01 ENCOUNTER — APPOINTMENT (OUTPATIENT)
Dept: CT IMAGING | Facility: HOSPITAL | Age: 76
End: 2021-01-01

## 2021-01-01 ENCOUNTER — READMISSION MANAGEMENT (OUTPATIENT)
Dept: CALL CENTER | Facility: HOSPITAL | Age: 76
End: 2021-01-01

## 2021-01-01 ENCOUNTER — OFFICE VISIT (OUTPATIENT)
Dept: INTERNAL MEDICINE | Facility: CLINIC | Age: 76
End: 2021-01-01

## 2021-01-01 ENCOUNTER — ANESTHESIA EVENT (OUTPATIENT)
Dept: PERIOP | Facility: HOSPITAL | Age: 76
End: 2021-01-01

## 2021-01-01 ENCOUNTER — APPOINTMENT (OUTPATIENT)
Dept: CARDIOLOGY | Facility: HOSPITAL | Age: 76
End: 2021-01-01

## 2021-01-01 ENCOUNTER — CONSULT (OUTPATIENT)
Dept: ONCOLOGY | Facility: CLINIC | Age: 76
End: 2021-01-01

## 2021-01-01 ENCOUNTER — LAB (OUTPATIENT)
Dept: LAB | Facility: HOSPITAL | Age: 76
End: 2021-01-01

## 2021-01-01 ENCOUNTER — TELEPHONE (OUTPATIENT)
Dept: INTERNAL MEDICINE | Facility: CLINIC | Age: 76
End: 2021-01-01

## 2021-01-01 ENCOUNTER — OFFICE VISIT (OUTPATIENT)
Dept: CARDIOLOGY | Facility: CLINIC | Age: 76
End: 2021-01-01

## 2021-01-01 ENCOUNTER — TRANSCRIBE ORDERS (OUTPATIENT)
Dept: ADMINISTRATIVE | Facility: HOSPITAL | Age: 76
End: 2021-01-01

## 2021-01-01 ENCOUNTER — HOSPITAL ENCOUNTER (EMERGENCY)
Facility: HOSPITAL | Age: 76
Discharge: HOME | End: 2021-07-14
Attending: EMERGENCY MEDICINE | Admitting: EMERGENCY MEDICINE

## 2021-01-01 ENCOUNTER — DOCUMENTATION (OUTPATIENT)
Dept: INTERNAL MEDICINE | Facility: CLINIC | Age: 76
End: 2021-01-01

## 2021-01-01 ENCOUNTER — ANESTHESIA (OUTPATIENT)
Dept: PERIOP | Facility: HOSPITAL | Age: 76
End: 2021-01-01

## 2021-01-01 ENCOUNTER — TELEPHONE (OUTPATIENT)
Dept: ONCOLOGY | Facility: OTHER | Age: 76
End: 2021-01-01

## 2021-01-01 ENCOUNTER — HOSPITAL ENCOUNTER (OUTPATIENT)
Dept: CT IMAGING | Facility: HOSPITAL | Age: 76
Discharge: HOME | End: 2021-08-17
Admitting: NURSE PRACTITIONER

## 2021-01-01 ENCOUNTER — TELEPHONE (OUTPATIENT)
Dept: NEUROLOGY | Facility: CLINIC | Age: 76
End: 2021-01-01

## 2021-01-01 ENCOUNTER — TELEPHONE (OUTPATIENT)
Dept: CARDIOLOGY | Facility: CLINIC | Age: 76
End: 2021-01-01

## 2021-01-01 ENCOUNTER — TRANSITIONAL CARE MANAGEMENT TELEPHONE ENCOUNTER (OUTPATIENT)
Dept: CALL CENTER | Facility: HOSPITAL | Age: 76
End: 2021-01-01

## 2021-01-01 ENCOUNTER — HOSPITAL ENCOUNTER (OUTPATIENT)
Dept: CARDIOLOGY | Facility: HOSPITAL | Age: 76
Discharge: HOME | End: 2021-07-01

## 2021-01-01 ENCOUNTER — APPOINTMENT (OUTPATIENT)
Dept: WOMENS IMAGING | Facility: HOSPITAL | Age: 76
End: 2021-01-01

## 2021-01-01 ENCOUNTER — INFUSION (OUTPATIENT)
Dept: ONCOLOGY | Facility: HOSPITAL | Age: 76
End: 2021-01-01

## 2021-01-01 ENCOUNTER — TELEPHONE (OUTPATIENT)
Dept: GENERAL RADIOLOGY | Facility: HOSPITAL | Age: 76
End: 2021-01-01

## 2021-01-01 ENCOUNTER — HOSPITAL ENCOUNTER (OUTPATIENT)
Dept: CARDIOLOGY | Facility: HOSPITAL | Age: 76
Discharge: HOME | End: 2021-06-21
Admitting: NURSE PRACTITIONER

## 2021-01-01 ENCOUNTER — PRE-ADMISSION TESTING (OUTPATIENT)
Dept: PREADMISSION TESTING | Facility: HOSPITAL | Age: 76
End: 2021-01-01

## 2021-01-01 ENCOUNTER — APPOINTMENT (OUTPATIENT)
Dept: LAB | Facility: HOSPITAL | Age: 76
End: 2021-01-01

## 2021-01-01 ENCOUNTER — HOSPITAL ENCOUNTER (EMERGENCY)
Facility: HOSPITAL | Age: 76
Discharge: HOME | End: 2021-10-07
Attending: EMERGENCY MEDICINE | Admitting: STUDENT IN AN ORGANIZED HEALTH CARE EDUCATION/TRAINING PROGRAM

## 2021-01-01 ENCOUNTER — OFFICE VISIT (OUTPATIENT)
Dept: SURGERY | Facility: CLINIC | Age: 76
End: 2021-01-01

## 2021-01-01 ENCOUNTER — HOSPITAL ENCOUNTER (INPATIENT)
Facility: HOSPITAL | Age: 76
LOS: 6 days | Discharge: HOME HEALTH | End: 2021-11-28
Attending: EMERGENCY MEDICINE | Admitting: HOSPITALIST

## 2021-01-01 ENCOUNTER — APPOINTMENT (OUTPATIENT)
Dept: ULTRASOUND IMAGING | Facility: HOSPITAL | Age: 76
End: 2021-01-01

## 2021-01-01 ENCOUNTER — HOSPITAL ENCOUNTER (OUTPATIENT)
Dept: INFUSION THERAPY | Facility: HOSPITAL | Age: 76
Discharge: HOME | End: 2021-07-01

## 2021-01-01 ENCOUNTER — OFFICE VISIT (OUTPATIENT)
Dept: ONCOLOGY | Facility: CLINIC | Age: 76
End: 2021-01-01

## 2021-01-01 ENCOUNTER — HOSPITAL ENCOUNTER (EMERGENCY)
Facility: HOSPITAL | Age: 76
Discharge: HOME | End: 2021-06-24
Attending: EMERGENCY MEDICINE | Admitting: EMERGENCY MEDICINE

## 2021-01-01 ENCOUNTER — HOSPITAL ENCOUNTER (OUTPATIENT)
Dept: INFUSION THERAPY | Facility: HOSPITAL | Age: 76
Discharge: HOME | End: 2021-08-02
Admitting: INTERNAL MEDICINE

## 2021-01-01 ENCOUNTER — HOSPITAL ENCOUNTER (OUTPATIENT)
Facility: HOSPITAL | Age: 76
Setting detail: HOSPITAL OUTPATIENT SURGERY
Discharge: HOME | End: 2021-10-14
Attending: SURGERY | Admitting: SURGERY

## 2021-01-01 ENCOUNTER — HOSPITAL ENCOUNTER (OUTPATIENT)
Dept: ULTRASOUND IMAGING | Facility: HOSPITAL | Age: 76
Discharge: HOME | End: 2021-07-01

## 2021-01-01 ENCOUNTER — APPOINTMENT (OUTPATIENT)
Dept: OTHER | Facility: HOSPITAL | Age: 76
End: 2021-01-01

## 2021-01-01 ENCOUNTER — HOSPITAL ENCOUNTER (OUTPATIENT)
Facility: HOSPITAL | Age: 76
Setting detail: HOSPITAL OUTPATIENT SURGERY
Discharge: HOME | End: 2021-09-02
Attending: SURGERY | Admitting: SURGERY

## 2021-01-01 ENCOUNTER — HOSPITAL ENCOUNTER (INPATIENT)
Facility: HOSPITAL | Age: 76
LOS: 6 days | Discharge: HOME | End: 2021-07-07
Attending: INTERNAL MEDICINE | Admitting: INTERNAL MEDICINE

## 2021-01-01 VITALS
DIASTOLIC BLOOD PRESSURE: 65 MMHG | OXYGEN SATURATION: 96 % | WEIGHT: 145.4 LBS | TEMPERATURE: 97.4 F | HEIGHT: 66 IN | HEART RATE: 61 BPM | RESPIRATION RATE: 16 BRPM | SYSTOLIC BLOOD PRESSURE: 127 MMHG | BODY MASS INDEX: 23.37 KG/M2

## 2021-01-01 VITALS
SYSTOLIC BLOOD PRESSURE: 130 MMHG | HEART RATE: 71 BPM | RESPIRATION RATE: 20 BRPM | WEIGHT: 166.3 LBS | HEIGHT: 67 IN | BODY MASS INDEX: 26.1 KG/M2 | TEMPERATURE: 97.8 F | DIASTOLIC BLOOD PRESSURE: 62 MMHG | OXYGEN SATURATION: 99 %

## 2021-01-01 VITALS
BODY MASS INDEX: 28.42 KG/M2 | RESPIRATION RATE: 18 BRPM | OXYGEN SATURATION: 95 % | HEIGHT: 66 IN | DIASTOLIC BLOOD PRESSURE: 60 MMHG | SYSTOLIC BLOOD PRESSURE: 141 MMHG | HEART RATE: 69 BPM | TEMPERATURE: 98.3 F | WEIGHT: 176.8 LBS

## 2021-01-01 VITALS
HEIGHT: 67 IN | BODY MASS INDEX: 26.51 KG/M2 | RESPIRATION RATE: 16 BRPM | TEMPERATURE: 98 F | WEIGHT: 168.9 LBS | OXYGEN SATURATION: 95 % | DIASTOLIC BLOOD PRESSURE: 57 MMHG | SYSTOLIC BLOOD PRESSURE: 110 MMHG | HEART RATE: 65 BPM

## 2021-01-01 VITALS
HEART RATE: 96 BPM | WEIGHT: 180 LBS | RESPIRATION RATE: 18 BRPM | BODY MASS INDEX: 28.25 KG/M2 | DIASTOLIC BLOOD PRESSURE: 95 MMHG | SYSTOLIC BLOOD PRESSURE: 146 MMHG | HEIGHT: 67 IN | OXYGEN SATURATION: 92 % | TEMPERATURE: 97.4 F

## 2021-01-01 VITALS
RESPIRATION RATE: 16 BRPM | HEIGHT: 66 IN | SYSTOLIC BLOOD PRESSURE: 129 MMHG | BODY MASS INDEX: 27.32 KG/M2 | TEMPERATURE: 98.2 F | DIASTOLIC BLOOD PRESSURE: 96 MMHG | OXYGEN SATURATION: 93 % | WEIGHT: 170 LBS | HEART RATE: 62 BPM

## 2021-01-01 VITALS
DIASTOLIC BLOOD PRESSURE: 60 MMHG | WEIGHT: 180 LBS | HEIGHT: 67 IN | BODY MASS INDEX: 28.25 KG/M2 | SYSTOLIC BLOOD PRESSURE: 138 MMHG | HEART RATE: 62 BPM

## 2021-01-01 VITALS
BODY MASS INDEX: 28.72 KG/M2 | DIASTOLIC BLOOD PRESSURE: 56 MMHG | WEIGHT: 183 LBS | SYSTOLIC BLOOD PRESSURE: 162 MMHG | HEART RATE: 61 BPM | HEIGHT: 67 IN

## 2021-01-01 VITALS
DIASTOLIC BLOOD PRESSURE: 70 MMHG | OXYGEN SATURATION: 97 % | WEIGHT: 175.7 LBS | BODY MASS INDEX: 28.24 KG/M2 | TEMPERATURE: 97.8 F | RESPIRATION RATE: 16 BRPM | HEART RATE: 78 BPM | SYSTOLIC BLOOD PRESSURE: 182 MMHG | HEIGHT: 66 IN

## 2021-01-01 VITALS
DIASTOLIC BLOOD PRESSURE: 50 MMHG | WEIGHT: 164.8 LBS | SYSTOLIC BLOOD PRESSURE: 94 MMHG | TEMPERATURE: 98.6 F | BODY MASS INDEX: 25.87 KG/M2 | OXYGEN SATURATION: 99 % | HEIGHT: 67 IN | HEART RATE: 56 BPM

## 2021-01-01 VITALS
WEIGHT: 165.7 LBS | RESPIRATION RATE: 16 BRPM | HEART RATE: 58 BPM | SYSTOLIC BLOOD PRESSURE: 131 MMHG | HEIGHT: 66 IN | TEMPERATURE: 98.4 F | DIASTOLIC BLOOD PRESSURE: 55 MMHG | OXYGEN SATURATION: 95 % | BODY MASS INDEX: 26.63 KG/M2

## 2021-01-01 VITALS
HEIGHT: 67 IN | SYSTOLIC BLOOD PRESSURE: 106 MMHG | TEMPERATURE: 98.2 F | BODY MASS INDEX: 27.94 KG/M2 | HEART RATE: 82 BPM | OXYGEN SATURATION: 98 % | DIASTOLIC BLOOD PRESSURE: 52 MMHG | WEIGHT: 178 LBS

## 2021-01-01 VITALS
DIASTOLIC BLOOD PRESSURE: 60 MMHG | HEART RATE: 73 BPM | OXYGEN SATURATION: 95 % | RESPIRATION RATE: 20 BRPM | TEMPERATURE: 97.1 F | SYSTOLIC BLOOD PRESSURE: 165 MMHG

## 2021-01-01 VITALS
DIASTOLIC BLOOD PRESSURE: 57 MMHG | BODY MASS INDEX: 28.98 KG/M2 | TEMPERATURE: 97.6 F | HEIGHT: 66 IN | OXYGEN SATURATION: 98 % | RESPIRATION RATE: 16 BRPM | SYSTOLIC BLOOD PRESSURE: 142 MMHG | HEART RATE: 66 BPM | WEIGHT: 180.3 LBS

## 2021-01-01 VITALS
HEIGHT: 67 IN | DIASTOLIC BLOOD PRESSURE: 71 MMHG | HEART RATE: 67 BPM | OXYGEN SATURATION: 97 % | RESPIRATION RATE: 16 BRPM | WEIGHT: 182.98 LBS | BODY MASS INDEX: 28.72 KG/M2 | SYSTOLIC BLOOD PRESSURE: 179 MMHG | TEMPERATURE: 98.3 F

## 2021-01-01 VITALS
RESPIRATION RATE: 16 BRPM | TEMPERATURE: 98 F | OXYGEN SATURATION: 96 % | WEIGHT: 164 LBS | HEIGHT: 67 IN | SYSTOLIC BLOOD PRESSURE: 134 MMHG | BODY MASS INDEX: 25.74 KG/M2 | DIASTOLIC BLOOD PRESSURE: 65 MMHG | HEART RATE: 71 BPM

## 2021-01-01 VITALS
BODY MASS INDEX: 28.51 KG/M2 | OXYGEN SATURATION: 84 % | TEMPERATURE: 98 F | DIASTOLIC BLOOD PRESSURE: 56 MMHG | HEART RATE: 61 BPM | SYSTOLIC BLOOD PRESSURE: 108 MMHG | WEIGHT: 182 LBS

## 2021-01-01 VITALS
SYSTOLIC BLOOD PRESSURE: 140 MMHG | OXYGEN SATURATION: 91 % | RESPIRATION RATE: 16 BRPM | HEART RATE: 70 BPM | DIASTOLIC BLOOD PRESSURE: 49 MMHG | TEMPERATURE: 97.5 F

## 2021-01-01 VITALS
HEIGHT: 66 IN | OXYGEN SATURATION: 98 % | DIASTOLIC BLOOD PRESSURE: 72 MMHG | HEART RATE: 80 BPM | BODY MASS INDEX: 23.47 KG/M2 | SYSTOLIC BLOOD PRESSURE: 124 MMHG | TEMPERATURE: 98 F

## 2021-01-01 DIAGNOSIS — I10 ESSENTIAL HYPERTENSION: ICD-10-CM

## 2021-01-01 DIAGNOSIS — S09.90XS INJURY OF HEAD, SEQUELA: Primary | ICD-10-CM

## 2021-01-01 DIAGNOSIS — D63.1 ANEMIA DUE TO STAGE 4 CHRONIC KIDNEY DISEASE (HCC): ICD-10-CM

## 2021-01-01 DIAGNOSIS — Z85.118 HISTORY OF LUNG CANCER: Primary | ICD-10-CM

## 2021-01-01 DIAGNOSIS — I95.9 HYPOTENSION, UNSPECIFIED HYPOTENSION TYPE: ICD-10-CM

## 2021-01-01 DIAGNOSIS — R91.1 SOLITARY PULMONARY NODULE: ICD-10-CM

## 2021-01-01 DIAGNOSIS — D63.1 ANEMIA DUE TO STAGE 4 CHRONIC KIDNEY DISEASE (HCC): Primary | ICD-10-CM

## 2021-01-01 DIAGNOSIS — I10 PRIMARY HYPERTENSION: ICD-10-CM

## 2021-01-01 DIAGNOSIS — R06.00 DYSPNEA, UNSPECIFIED TYPE: ICD-10-CM

## 2021-01-01 DIAGNOSIS — Z90.2 S/P LOBECTOMY OF LUNG: ICD-10-CM

## 2021-01-01 DIAGNOSIS — N18.4 ANEMIA DUE TO STAGE 4 CHRONIC KIDNEY DISEASE (HCC): Primary | ICD-10-CM

## 2021-01-01 DIAGNOSIS — G25.79 SEROTONIN SYNDROME: ICD-10-CM

## 2021-01-01 DIAGNOSIS — I73.9 PAD (PERIPHERAL ARTERY DISEASE) (HCC): Primary | ICD-10-CM

## 2021-01-01 DIAGNOSIS — J18.9 COMMUNITY ACQUIRED PNEUMONIA, UNSPECIFIED LATERALITY: Primary | ICD-10-CM

## 2021-01-01 DIAGNOSIS — K21.9 GASTROESOPHAGEAL REFLUX DISEASE, UNSPECIFIED WHETHER ESOPHAGITIS PRESENT: ICD-10-CM

## 2021-01-01 DIAGNOSIS — I70.1 ATHEROSCLEROSIS OF RENAL ARTERY (HCC): ICD-10-CM

## 2021-01-01 DIAGNOSIS — D47.2 IGG LAMBDA MONOCLONAL GAMMOPATHY: Primary | ICD-10-CM

## 2021-01-01 DIAGNOSIS — Z99.2 ESRD (END STAGE RENAL DISEASE) ON DIALYSIS (HCC): ICD-10-CM

## 2021-01-01 DIAGNOSIS — J44.9 CHRONIC OBSTRUCTIVE PULMONARY DISEASE, UNSPECIFIED COPD TYPE (HCC): ICD-10-CM

## 2021-01-01 DIAGNOSIS — D47.2 IGG MONOCLONAL GAMMOPATHY: Primary | ICD-10-CM

## 2021-01-01 DIAGNOSIS — C34.11 MALIGNANT NEOPLASM OF RIGHT UPPER LOBE OF LUNG (HCC): Primary | ICD-10-CM

## 2021-01-01 DIAGNOSIS — R06.02 SHORTNESS OF BREATH: ICD-10-CM

## 2021-01-01 DIAGNOSIS — Z78.9 PROBLEM WITH VASCULAR ACCESS: Primary | ICD-10-CM

## 2021-01-01 DIAGNOSIS — R42 DIZZINESS: ICD-10-CM

## 2021-01-01 DIAGNOSIS — R53.1 WEAKNESS: ICD-10-CM

## 2021-01-01 DIAGNOSIS — R10.84 GENERALIZED ABDOMINAL PAIN: ICD-10-CM

## 2021-01-01 DIAGNOSIS — I70.1 ATHEROSCLEROSIS OF RENAL ARTERY (HCC): Primary | ICD-10-CM

## 2021-01-01 DIAGNOSIS — I31.39 PERICARDIAL EFFUSION: ICD-10-CM

## 2021-01-01 DIAGNOSIS — R06.02 SHORTNESS OF BREATH: Primary | ICD-10-CM

## 2021-01-01 DIAGNOSIS — N18.6 ESRD (END STAGE RENAL DISEASE) (HCC): ICD-10-CM

## 2021-01-01 DIAGNOSIS — N18.4 ANEMIA DUE TO STAGE 4 CHRONIC KIDNEY DISEASE (HCC): ICD-10-CM

## 2021-01-01 DIAGNOSIS — F41.9 ANXIETY: ICD-10-CM

## 2021-01-01 DIAGNOSIS — G47.00 INSOMNIA, UNSPECIFIED TYPE: ICD-10-CM

## 2021-01-01 DIAGNOSIS — D63.1 ANEMIA DUE TO STAGE 3 CHRONIC KIDNEY DISEASE, UNSPECIFIED WHETHER STAGE 3A OR 3B CKD (HCC): Primary | ICD-10-CM

## 2021-01-01 DIAGNOSIS — N18.6 ESRD (END STAGE RENAL DISEASE) ON DIALYSIS (HCC): ICD-10-CM

## 2021-01-01 DIAGNOSIS — Z09 HOSPITAL DISCHARGE FOLLOW-UP: Primary | ICD-10-CM

## 2021-01-01 DIAGNOSIS — E53.8 FOLIC ACID DEFICIENCY: ICD-10-CM

## 2021-01-01 DIAGNOSIS — N17.9 AKI (ACUTE KIDNEY INJURY) (HCC): Primary | ICD-10-CM

## 2021-01-01 DIAGNOSIS — R25.8 CLONUS: ICD-10-CM

## 2021-01-01 DIAGNOSIS — N18.9 CHRONIC KIDNEY DISEASE, UNSPECIFIED CKD STAGE: ICD-10-CM

## 2021-01-01 DIAGNOSIS — N18.6 ESRD (END STAGE RENAL DISEASE) (HCC): Primary | ICD-10-CM

## 2021-01-01 DIAGNOSIS — N18.32 STAGE 3B CHRONIC KIDNEY DISEASE (HCC): ICD-10-CM

## 2021-01-01 DIAGNOSIS — N18.30 ANEMIA DUE TO STAGE 3 CHRONIC KIDNEY DISEASE, UNSPECIFIED WHETHER STAGE 3A OR 3B CKD (HCC): Primary | ICD-10-CM

## 2021-01-01 DIAGNOSIS — Z09 FOLLOW UP: ICD-10-CM

## 2021-01-01 DIAGNOSIS — N17.9 AKI (ACUTE KIDNEY INJURY) (HCC): ICD-10-CM

## 2021-01-01 DIAGNOSIS — F11.20 UNCOMPLICATED OPIOID DEPENDENCE (HCC): ICD-10-CM

## 2021-01-01 DIAGNOSIS — I70.1 RENAL ARTERY STENOSIS (HCC): ICD-10-CM

## 2021-01-01 DIAGNOSIS — F32.1 CURRENT MODERATE EPISODE OF MAJOR DEPRESSIVE DISORDER WITHOUT PRIOR EPISODE (HCC): ICD-10-CM

## 2021-01-01 DIAGNOSIS — R60.0 PEDAL EDEMA: ICD-10-CM

## 2021-01-01 DIAGNOSIS — I10 ESSENTIAL HYPERTENSION: Primary | ICD-10-CM

## 2021-01-01 DIAGNOSIS — R06.02 SHORT OF BREATH ON EXERTION: Primary | ICD-10-CM

## 2021-01-01 DIAGNOSIS — G47.00 INSOMNIA, UNSPECIFIED TYPE: Primary | ICD-10-CM

## 2021-01-01 DIAGNOSIS — N18.4 STAGE 4 CHRONIC KIDNEY DISEASE (HCC): ICD-10-CM

## 2021-01-01 DIAGNOSIS — C34.11 MALIGNANT NEOPLASM OF UPPER LOBE OF RIGHT LUNG (HCC): ICD-10-CM

## 2021-01-01 DIAGNOSIS — Z87.01 HISTORY OF ASPIRATION PNEUMONIA: ICD-10-CM

## 2021-01-01 DIAGNOSIS — J90 BILATERAL PLEURAL EFFUSION: ICD-10-CM

## 2021-01-01 DIAGNOSIS — R06.00 DYSPNEA, UNSPECIFIED TYPE: Primary | ICD-10-CM

## 2021-01-01 DIAGNOSIS — E53.8 FOLIC ACID DEFICIENCY: Primary | ICD-10-CM

## 2021-01-01 DIAGNOSIS — I50.9 ACUTE ON CHRONIC CONGESTIVE HEART FAILURE, UNSPECIFIED HEART FAILURE TYPE (HCC): Primary | ICD-10-CM

## 2021-01-01 DIAGNOSIS — D63.8 ANEMIA OF CHRONIC DISEASE: Chronic | ICD-10-CM

## 2021-01-01 LAB
ALBUMIN 24H MFR UR ELPH: 63.6 %
ALBUMIN FLD-MCNC: 2.4 G/DL
ALBUMIN SERPL ELPH-MCNC: 2.4 G/DL (ref 2.9–4.4)
ALBUMIN SERPL-MCNC: 2.3 G/DL (ref 3.5–5.2)
ALBUMIN SERPL-MCNC: 2.4 G/DL (ref 3.5–5.2)
ALBUMIN SERPL-MCNC: 2.5 G/DL (ref 3.5–5.2)
ALBUMIN SERPL-MCNC: 2.5 G/DL (ref 3.5–5.2)
ALBUMIN SERPL-MCNC: 2.7 G/DL (ref 3.5–5.2)
ALBUMIN SERPL-MCNC: 2.8 G/DL (ref 3.5–5.2)
ALBUMIN SERPL-MCNC: 2.8 G/DL (ref 3.5–5.2)
ALBUMIN SERPL-MCNC: 2.9 G/DL (ref 3.5–5.2)
ALBUMIN SERPL-MCNC: 3 G/DL (ref 3.5–5.2)
ALBUMIN SERPL-MCNC: 3.2 G/DL (ref 3.5–5.2)
ALBUMIN SERPL-MCNC: 3.2 G/DL (ref 3.5–5.2)
ALBUMIN SERPL-MCNC: 3.7 G/DL (ref 3.5–5.2)
ALBUMIN/GLOB SERPL: 0.8 G/DL
ALBUMIN/GLOB SERPL: 0.9 G/DL
ALBUMIN/GLOB SERPL: 0.9 {RATIO} (ref 0.7–1.7)
ALBUMIN/GLOB SERPL: 1 G/DL
ALBUMIN/GLOB SERPL: 1 G/DL
ALBUMIN/GLOB SERPL: 1 G/DL (ref 1.1–2.4)
ALBUMIN/GLOB SERPL: 1.1 G/DL
ALBUMIN/GLOB SERPL: 1.1 G/DL
ALBUMIN/GLOB SERPL: 1.2 G/DL
ALBUMIN/GLOB SERPL: 1.2 G/DL
ALP SERPL-CCNC: 102 U/L (ref 39–117)
ALP SERPL-CCNC: 83 U/L (ref 39–117)
ALP SERPL-CCNC: 85 U/L (ref 39–117)
ALP SERPL-CCNC: 85 U/L (ref 39–117)
ALP SERPL-CCNC: 88 U/L (ref 39–117)
ALP SERPL-CCNC: 90 U/L (ref 39–117)
ALP SERPL-CCNC: 94 U/L (ref 38–116)
ALP SERPL-CCNC: 96 U/L (ref 39–117)
ALP SERPL-CCNC: 98 U/L (ref 39–117)
ALPHA1 GLOB 24H MFR UR ELPH: 9.6 %
ALPHA1 GLOB SERPL ELPH-MCNC: 0.3 G/DL (ref 0–0.4)
ALPHA2 GLOB 24H MFR UR ELPH: 7.8 %
ALPHA2 GLOB SERPL ELPH-MCNC: 1.1 G/DL (ref 0.4–1)
ALT SERPL W P-5'-P-CCNC: 11 U/L (ref 0–33)
ALT SERPL W P-5'-P-CCNC: 11 U/L (ref 1–33)
ALT SERPL W P-5'-P-CCNC: 12 U/L (ref 1–33)
ALT SERPL W P-5'-P-CCNC: 13 U/L (ref 1–33)
ALT SERPL W P-5'-P-CCNC: 13 U/L (ref 1–33)
ALT SERPL W P-5'-P-CCNC: 14 U/L (ref 1–33)
ALT SERPL W P-5'-P-CCNC: 15 U/L (ref 1–33)
ALT SERPL W P-5'-P-CCNC: 15 U/L (ref 1–33)
ALT SERPL W P-5'-P-CCNC: 16 U/L (ref 1–33)
ANION GAP SERPL CALCULATED.3IONS-SCNC: 10 MMOL/L (ref 5–15)
ANION GAP SERPL CALCULATED.3IONS-SCNC: 10.1 MMOL/L (ref 5–15)
ANION GAP SERPL CALCULATED.3IONS-SCNC: 10.5 MMOL/L (ref 5–15)
ANION GAP SERPL CALCULATED.3IONS-SCNC: 11.2 MMOL/L (ref 5–15)
ANION GAP SERPL CALCULATED.3IONS-SCNC: 11.3 MMOL/L (ref 5–15)
ANION GAP SERPL CALCULATED.3IONS-SCNC: 11.8 MMOL/L (ref 5–15)
ANION GAP SERPL CALCULATED.3IONS-SCNC: 12.2 MMOL/L (ref 5–15)
ANION GAP SERPL CALCULATED.3IONS-SCNC: 12.5 MMOL/L (ref 5–15)
ANION GAP SERPL CALCULATED.3IONS-SCNC: 13.2 MMOL/L (ref 5–15)
ANION GAP SERPL CALCULATED.3IONS-SCNC: 13.2 MMOL/L (ref 5–15)
ANION GAP SERPL CALCULATED.3IONS-SCNC: 13.4 MMOL/L (ref 5–15)
ANION GAP SERPL CALCULATED.3IONS-SCNC: 13.4 MMOL/L (ref 5–15)
ANION GAP SERPL CALCULATED.3IONS-SCNC: 13.6 MMOL/L (ref 5–15)
ANION GAP SERPL CALCULATED.3IONS-SCNC: 14.8 MMOL/L (ref 5–15)
ANION GAP SERPL CALCULATED.3IONS-SCNC: 15.7 MMOL/L (ref 5–15)
ANION GAP SERPL CALCULATED.3IONS-SCNC: 16.4 MMOL/L (ref 5–15)
ANION GAP SERPL CALCULATED.3IONS-SCNC: 8.2 MMOL/L (ref 5–15)
ANION GAP SERPL CALCULATED.3IONS-SCNC: 8.3 MMOL/L (ref 5–15)
ANION GAP SERPL CALCULATED.3IONS-SCNC: 8.8 MMOL/L (ref 5–15)
ANION GAP SERPL CALCULATED.3IONS-SCNC: 9.5 MMOL/L (ref 5–15)
ANION GAP SERPL CALCULATED.3IONS-SCNC: 9.6 MMOL/L (ref 5–15)
APPEARANCE FLD: ABNORMAL
APTT PPP: 37.4 SECONDS (ref 22.7–35.4)
ARTERIAL PATENCY WRIST A: ABNORMAL
AST SERPL-CCNC: 15 U/L (ref 1–32)
AST SERPL-CCNC: 19 U/L (ref 1–32)
AST SERPL-CCNC: 22 U/L (ref 0–32)
AST SERPL-CCNC: 22 U/L (ref 1–32)
AST SERPL-CCNC: 22 U/L (ref 1–32)
AST SERPL-CCNC: 30 U/L (ref 1–32)
AST SERPL-CCNC: 31 U/L (ref 1–32)
AST SERPL-CCNC: 33 U/L (ref 1–32)
AST SERPL-CCNC: 33 U/L (ref 1–32)
ATMOSPHERIC PRESS: 750.5 MMHG
ATMOSPHERIC PRESS: 754 MMHG
ATMOSPHERIC PRESS: 756.7 MMHG
B PARAPERT DNA SPEC QL NAA+PROBE: NOT DETECTED
B PERT DNA SPEC QL NAA+PROBE: NOT DETECTED
B-GLOBULIN MFR UR ELPH: 10.6 %
B-GLOBULIN SERPL ELPH-MCNC: 0.8 G/DL (ref 0.7–1.3)
BACTERIA SPEC RESP CULT: NORMAL
BACTERIA UR QL AUTO: ABNORMAL /HPF
BASE EXCESS BLDA CALC-SCNC: -0.8 MMOL/L (ref 0–2)
BASE EXCESS BLDA CALC-SCNC: -1 MMOL/L (ref 0–2)
BASE EXCESS BLDA CALC-SCNC: 1.2 MMOL/L (ref 0–2)
BASOPHILS # BLD AUTO: 0.02 10*3/MM3 (ref 0–0.2)
BASOPHILS # BLD AUTO: 0.03 10*3/MM3 (ref 0–0.2)
BASOPHILS # BLD AUTO: 0.04 10*3/MM3 (ref 0–0.2)
BASOPHILS # BLD AUTO: 0.05 10*3/MM3 (ref 0–0.2)
BASOPHILS # BLD AUTO: 0.05 10*3/MM3 (ref 0–0.2)
BASOPHILS # BLD AUTO: 0.06 10*3/MM3 (ref 0–0.2)
BASOPHILS NFR BLD AUTO: 0.2 % (ref 0–1.5)
BASOPHILS NFR BLD AUTO: 0.3 % (ref 0–1.5)
BASOPHILS NFR BLD AUTO: 0.4 % (ref 0–1.5)
BASOPHILS NFR BLD AUTO: 0.5 % (ref 0–1.5)
BASOPHILS NFR BLD AUTO: 0.6 % (ref 0–1.5)
BDY SITE: ABNORMAL
BH CV ECHO MEAS - BSA(HAYCOCK): 2 M^2
BH CV ECHO MEAS - BSA: 1.9 M^2
BH CV ECHO MEAS - BZI_BMI: 27.7 KILOGRAMS/M^2
BH CV ECHO MEAS - BZI_METRIC_HEIGHT: 170 CM
BH CV ECHO MEAS - BZI_METRIC_WEIGHT: 80 KG
BH CV ECHO MEAS - DIST REN A EDV LEFT: -19.9 CM/SEC
BH CV ECHO MEAS - DIST REN A PSV LEFT: -204 CM/SEC
BH CV ECHO MEAS - DIST REN A RI LEFT: 0.9
BH CV ECHO MEAS - MID REN A EDV LEFT: -28.2 CM/SEC
BH CV ECHO MEAS - MID REN A PSV LEFT: -168 CM/SEC
BH CV ECHO MEAS - MID REN A RI LEFT: 0.83
BH CV ECHO MEAS - PROX REN A EDV LEFT: 17 CM/S
BH CV ECHO MEAS - PROX REN A PSV LEFT: 144 CM/S
BH CV VAS BP LEFT ARM: NORMAL MMHG
BH CV VAS BP RIGHT ARM: NORMAL MMHG
BH CV VAS DIALYSIS ARTERIAL ANASTOMOSIS DIAMETER: 0.27 CM
BH CV VAS DIALYSIS ARTERIAL ANASTOMOSIS EDV: 218 CM/SEC
BH CV VAS DIALYSIS ARTERIAL ANASTOMOSIS PSV: 503 CM/SEC
BH CV VAS DIALYSIS CONDUIT DIST DEPTH: 0.62 CM
BH CV VAS DIALYSIS CONDUIT DIST DIAMETER: 0.69 CM
BH CV VAS DIALYSIS CONDUIT DIST EDV: 54 CM/SEC
BH CV VAS DIALYSIS CONDUIT DIST FLOW VOL: 1383 ML/MIN
BH CV VAS DIALYSIS CONDUIT DIST PSV: 138 CM/SEC
BH CV VAS DIALYSIS CONDUIT MID DEPTH: 0.27 CM
BH CV VAS DIALYSIS CONDUIT MID DIAMETER: 0.65 CM
BH CV VAS DIALYSIS CONDUIT MID EDV: 62 CM/SEC
BH CV VAS DIALYSIS CONDUIT MID FLOW VOL: 828 ML/MIN
BH CV VAS DIALYSIS CONDUIT MID PSV: 160 CM/SEC
BH CV VAS DIALYSIS CONDUIT MID/DIST DEPTH: 0.54 CM
BH CV VAS DIALYSIS CONDUIT MID/DIST DIAMETER: 0.67 CM
BH CV VAS DIALYSIS CONDUIT MID/DIST EDV: 65 CM/SEC
BH CV VAS DIALYSIS CONDUIT MID/DIST FLOW VOL: 1248 ML/MIN
BH CV VAS DIALYSIS CONDUIT MID/DIST PSV: 159 CM/SEC
BH CV VAS DIALYSIS CONDUIT PROX DEPTH: 0.32 CM
BH CV VAS DIALYSIS CONDUIT PROX DIAMETER: 0.42 CM
BH CV VAS DIALYSIS CONDUIT PROX EDV: 130 CM/SEC
BH CV VAS DIALYSIS CONDUIT PROX FLOW VOL: 1009 ML/MIN
BH CV VAS DIALYSIS CONDUIT PROX PSV: 304 CM/SEC
BH CV VAS DIALYSIS CONDUIT PROX/MID DEPTH: 0.47 CM
BH CV VAS DIALYSIS CONDUIT PROX/MID DIAMETER: 0.65 CM
BH CV VAS DIALYSIS CONDUIT PROX/MID EDV: 90 CM/SEC
BH CV VAS DIALYSIS CONDUIT PROX/MID FLOW VOL: 1354 ML/MIN
BH CV VAS DIALYSIS CONDUIT PROX/MID PSV: 197 CM/SEC
BH CV VAS DIALYSIS PRE-INFLOW BRACHIAL DIAMETER: 0.57 CM
BH CV VAS DIALYSIS PRE-INFLOW BRACHIAL EDV: 144 CM/SEC
BH CV VAS DIALYSIS PRE-INFLOW BRACHIAL FLOW VOL: 2097 ML/MIN
BH CV VAS DIALYSIS PRE-INFLOW BRACHIAL PSV: 300 CM/SEC
BH CV VAS DIALYSIS VENOUS ANASTOMOSIS DIAMETER: 0.38 CM
BH CV VAS DIALYSIS VENOUS ANASTOMOSIS EDV: 68 CM/SEC
BH CV VAS DIALYSIS VENOUS ANASTOMOSIS PSV: 176 CM/SEC
BH CV VAS DIALYSIS VENOUS OUTFLOW AXILLARY DIAMETER: 0.98 CM
BH CV VAS DIALYSIS VENOUS OUTFLOW AXILLARY EDV: 36 CM/SEC
BH CV VAS DIALYSIS VENOUS OUTFLOW AXILLARY PSV: 91 CM/SEC
BH CV VAS DIALYSIS VENOUS OUTFLOW BASILIC VEIN DIAMETER: 0.39 CM
BH CV VAS DIALYSIS VENOUS OUTFLOW BASILIC VEIN EDV: 125 CM/SEC
BH CV VAS DIALYSIS VENOUS OUTFLOW BASILIC VEIN PSV: 266 CM/SEC
BH CV VAS DIALYSIS VENOUS OUTFLOW SUBCLAVIAN DIAMETER: 0.89 CM
BH CV VAS DIALYSIS VENOUS OUTFLOW SUBCLAVIAN EDV: 30 CM/SEC
BH CV VAS DIALYSIS VENOUS OUTFLOW SUBCLAVIAN PSV: 66 CM/SEC
BH CV VAS RENAL AORTIC MID PSV: 158 CM/S
BH CV VAS RENAL HILUM LEFT EDV: 6 CM/S
BH CV VAS RENAL HILUM LEFT PSV: 56.4 CM/S
BH CV VAS RENAL HILUM RIGHT EDV: 23 CM/S
BH CV VAS RENAL HILUM RIGHT PSV: 117 CM/S
BH CV XLRA MEAS - KID L LEFT: 10.6 CM
BH CV XLRA MEAS - RENAL A ORG RI LEFT: 0.83
BH CV XLRA MEAS DIST REN A EDV RIGHT: 29 CM/S
BH CV XLRA MEAS DIST REN A PSV RIGHT: 137 CM/S
BH CV XLRA MEAS KID H RIGHT: 5.7 CM
BH CV XLRA MEAS KID L RIGHT: 11.8 CM
BH CV XLRA MEAS KID W RIGHT: 5.76 CM
BH CV XLRA MEAS MID REN A EDV RIGHT: 24 CM/S
BH CV XLRA MEAS MID REN A PSV RIGHT: 102 CM/S
BH CV XLRA MEAS PROX REN A PSV RIGHT: 188 CM/S
BH CV XLRA MEAS RAR LEFT: 1.59
BH CV XLRA MEAS RAR RIGHT: 1.35
BH CV XLRA MEAS RENAL A ORG EDV LEFT: 18 CM/SEC
BH CV XLRA MEAS RENAL A ORG EDV RIGHT: 34 CM/S
BH CV XLRA MEAS RENAL A ORG PSV LEFT: 251 CM/SEC
BH CV XLRA MEAS RENAL A ORG PSV RIGHT: 213 CM/S
BILIRUB SERPL-MCNC: 0.2 MG/DL (ref 0.2–1.2)
BILIRUB SERPL-MCNC: 0.2 MG/DL (ref 0–1.2)
BILIRUB SERPL-MCNC: 0.3 MG/DL (ref 0–1.2)
BILIRUB UR QL STRIP: NEGATIVE
BUN SERPL-MCNC: 10 MG/DL (ref 8–23)
BUN SERPL-MCNC: 15 MG/DL (ref 8–23)
BUN SERPL-MCNC: 15 MG/DL (ref 8–23)
BUN SERPL-MCNC: 17 MG/DL (ref 8–23)
BUN SERPL-MCNC: 18 MG/DL (ref 6–20)
BUN SERPL-MCNC: 23 MG/DL (ref 8–23)
BUN SERPL-MCNC: 23 MG/DL (ref 8–23)
BUN SERPL-MCNC: 31 MG/DL (ref 8–23)
BUN SERPL-MCNC: 32 MG/DL (ref 8–23)
BUN SERPL-MCNC: 33 MG/DL (ref 8–23)
BUN SERPL-MCNC: 34 MG/DL (ref 8–23)
BUN SERPL-MCNC: 34 MG/DL (ref 8–23)
BUN SERPL-MCNC: 35 MG/DL (ref 8–23)
BUN SERPL-MCNC: 35 MG/DL (ref 8–23)
BUN SERPL-MCNC: 36 MG/DL (ref 6–20)
BUN SERPL-MCNC: 37 MG/DL (ref 8–23)
BUN SERPL-MCNC: 38 MG/DL (ref 8–23)
BUN SERPL-MCNC: 47 MG/DL (ref 8–23)
BUN SERPL-MCNC: 8 MG/DL (ref 8–23)
BUN/CREAT SERPL: 10.2 (ref 7–25)
BUN/CREAT SERPL: 10.5 (ref 7–25)
BUN/CREAT SERPL: 10.7 (ref 7–25)
BUN/CREAT SERPL: 10.9 (ref 7.3–30)
BUN/CREAT SERPL: 10.9 (ref 7–25)
BUN/CREAT SERPL: 3.6 (ref 7–25)
BUN/CREAT SERPL: 4 (ref 7–25)
BUN/CREAT SERPL: 4.1 (ref 7–25)
BUN/CREAT SERPL: 4.6 (ref 7–25)
BUN/CREAT SERPL: 5.1 (ref 7–25)
BUN/CREAT SERPL: 5.8 (ref 7–25)
BUN/CREAT SERPL: 6.1 (ref 7–25)
BUN/CREAT SERPL: 6.5 (ref 7.3–30)
BUN/CREAT SERPL: 7.7 (ref 7–25)
BUN/CREAT SERPL: 7.7 (ref 7–25)
BUN/CREAT SERPL: 7.8 (ref 7–25)
BUN/CREAT SERPL: 7.8 (ref 7–25)
BUN/CREAT SERPL: 8 (ref 7–25)
BUN/CREAT SERPL: 8.9 (ref 7–25)
BUN/CREAT SERPL: 9.6 (ref 7–25)
BUN/CREAT SERPL: 9.8 (ref 7–25)
C PNEUM DNA NPH QL NAA+NON-PROBE: NOT DETECTED
C3 SERPL-MCNC: 138 MG/DL (ref 82–167)
C4 SERPL-MCNC: 28 MG/DL (ref 14–44)
CALCIUM SPEC-SCNC: 7.9 MG/DL (ref 8.6–10.5)
CALCIUM SPEC-SCNC: 8 MG/DL (ref 8.6–10.5)
CALCIUM SPEC-SCNC: 8.1 MG/DL (ref 8.5–10.2)
CALCIUM SPEC-SCNC: 8.1 MG/DL (ref 8.6–10.5)
CALCIUM SPEC-SCNC: 8.2 MG/DL (ref 8.6–10.5)
CALCIUM SPEC-SCNC: 8.2 MG/DL (ref 8.6–10.5)
CALCIUM SPEC-SCNC: 8.3 MG/DL (ref 8.6–10.5)
CALCIUM SPEC-SCNC: 8.4 MG/DL (ref 8.6–10.5)
CALCIUM SPEC-SCNC: 8.4 MG/DL (ref 8.6–10.5)
CALCIUM SPEC-SCNC: 8.5 MG/DL (ref 8.6–10.5)
CALCIUM SPEC-SCNC: 8.5 MG/DL (ref 8.6–10.5)
CALCIUM SPEC-SCNC: 8.6 MG/DL (ref 8.6–10.5)
CALCIUM SPEC-SCNC: 8.7 MG/DL (ref 8.5–10.2)
CALCIUM SPEC-SCNC: 8.7 MG/DL (ref 8.6–10.5)
CALCIUM SPEC-SCNC: 9 MG/DL (ref 8.6–10.5)
CALCIUM SPEC-SCNC: 9.2 MG/DL (ref 8.6–10.5)
CALCIUM SPEC-SCNC: 9.2 MG/DL (ref 8.6–10.5)
CALCIUM SPEC-SCNC: 9.6 MG/DL (ref 8.6–10.5)
CHLORIDE SERPL-SCNC: 100 MMOL/L (ref 98–107)
CHLORIDE SERPL-SCNC: 101 MMOL/L (ref 98–107)
CHLORIDE SERPL-SCNC: 102 MMOL/L (ref 98–107)
CHLORIDE SERPL-SCNC: 103 MMOL/L (ref 98–107)
CHLORIDE SERPL-SCNC: 93 MMOL/L (ref 98–107)
CHLORIDE SERPL-SCNC: 96 MMOL/L (ref 98–107)
CHLORIDE SERPL-SCNC: 97 MMOL/L (ref 98–107)
CHLORIDE SERPL-SCNC: 97 MMOL/L (ref 98–107)
CHLORIDE SERPL-SCNC: 98 MMOL/L (ref 98–107)
CHLORIDE SERPL-SCNC: 98 MMOL/L (ref 98–107)
CHLORIDE SERPL-SCNC: 99 MMOL/L (ref 98–107)
CHLORIDE UR-SCNC: 43 MMOL/L
CLARITY UR: CLEAR
CO2 SERPL-SCNC: 21.2 MMOL/L (ref 22–29)
CO2 SERPL-SCNC: 21.8 MMOL/L (ref 22–29)
CO2 SERPL-SCNC: 23.8 MMOL/L (ref 22–29)
CO2 SERPL-SCNC: 24.2 MMOL/L (ref 22–29)
CO2 SERPL-SCNC: 24.5 MMOL/L (ref 22–29)
CO2 SERPL-SCNC: 24.6 MMOL/L (ref 22–29)
CO2 SERPL-SCNC: 25.4 MMOL/L (ref 22–29)
CO2 SERPL-SCNC: 25.5 MMOL/L (ref 22–29)
CO2 SERPL-SCNC: 25.5 MMOL/L (ref 22–29)
CO2 SERPL-SCNC: 26.3 MMOL/L (ref 22–29)
CO2 SERPL-SCNC: 27 MMOL/L (ref 22–29)
CO2 SERPL-SCNC: 27.4 MMOL/L (ref 22–29)
CO2 SERPL-SCNC: 27.6 MMOL/L (ref 22–29)
CO2 SERPL-SCNC: 27.6 MMOL/L (ref 22–29)
CO2 SERPL-SCNC: 27.7 MMOL/L (ref 22–29)
CO2 SERPL-SCNC: 27.8 MMOL/L (ref 22–29)
CO2 SERPL-SCNC: 27.9 MMOL/L (ref 22–29)
CO2 SERPL-SCNC: 28.8 MMOL/L (ref 22–29)
CO2 SERPL-SCNC: 29.2 MMOL/L (ref 22–29)
CO2 SERPL-SCNC: 30.7 MMOL/L (ref 22–29)
CO2 SERPL-SCNC: 32.8 MMOL/L (ref 22–29)
COLOR FLD: ABNORMAL
COLOR UR: ABNORMAL
CREAT SERPL-MCNC: 2.25 MG/DL (ref 0.57–1)
CREAT SERPL-MCNC: 2.5 MG/DL (ref 0.57–1)
CREAT SERPL-MCNC: 2.75 MG/DL (ref 0.6–1.1)
CREAT SERPL-MCNC: 2.87 MG/DL (ref 0.57–1)
CREAT SERPL-MCNC: 3.12 MG/DL (ref 0.57–1)
CREAT SERPL-MCNC: 3.26 MG/DL (ref 0.57–1)
CREAT SERPL-MCNC: 3.28 MG/DL (ref 0.57–1)
CREAT SERPL-MCNC: 3.29 MG/DL (ref 0.6–1.1)
CREAT SERPL-MCNC: 3.32 MG/DL (ref 0.57–1)
CREAT SERPL-MCNC: 3.37 MG/DL (ref 0.57–1)
CREAT SERPL-MCNC: 3.61 MG/DL (ref 0.57–1)
CREAT SERPL-MCNC: 3.65 MG/DL (ref 0.57–1)
CREAT SERPL-MCNC: 3.85 MG/DL (ref 0.57–1)
CREAT SERPL-MCNC: 3.94 MG/DL (ref 0.57–1)
CREAT SERPL-MCNC: 3.95 MG/DL (ref 0.57–1)
CREAT SERPL-MCNC: 3.96 MG/DL (ref 0.57–1)
CREAT SERPL-MCNC: 4.13 MG/DL (ref 0.57–1)
CREAT SERPL-MCNC: 4.23 MG/DL (ref 0.57–1)
CREAT SERPL-MCNC: 4.29 MG/DL (ref 0.57–1)
CREAT SERPL-MCNC: 4.6 MG/DL (ref 0.57–1)
CREAT SERPL-MCNC: 5.56 MG/DL (ref 0.57–1)
CREAT UR-MCNC: 57.5 MG/DL
CREAT UR-MCNC: 58.1 MG/DL
CREAT UR-MCNC: 69.9 MG/DL
CYTO UR: NORMAL
DEPRECATED RDW RBC AUTO: 42.5 FL (ref 37–54)
DEPRECATED RDW RBC AUTO: 42.9 FL (ref 37–54)
DEPRECATED RDW RBC AUTO: 43.9 FL (ref 37–54)
DEPRECATED RDW RBC AUTO: 44.3 FL (ref 37–54)
DEPRECATED RDW RBC AUTO: 45.6 FL (ref 37–54)
DEPRECATED RDW RBC AUTO: 46.3 FL (ref 37–54)
DEPRECATED RDW RBC AUTO: 46.5 FL (ref 37–54)
DEPRECATED RDW RBC AUTO: 47.1 FL (ref 37–54)
DEPRECATED RDW RBC AUTO: 48.7 FL (ref 37–54)
DEPRECATED RDW RBC AUTO: 51 FL (ref 37–54)
DEPRECATED RDW RBC AUTO: 52.2 FL (ref 37–54)
DEPRECATED RDW RBC AUTO: 52.7 FL (ref 37–54)
DEPRECATED RDW RBC AUTO: 53.5 FL (ref 37–54)
DEPRECATED RDW RBC AUTO: 54.1 FL (ref 37–54)
DEPRECATED RDW RBC AUTO: 54.4 FL (ref 37–54)
DEPRECATED RDW RBC AUTO: 54.4 FL (ref 37–54)
DEPRECATED RDW RBC AUTO: 55.1 FL (ref 37–54)
DEPRECATED RDW RBC AUTO: 55.8 FL (ref 37–54)
DEPRECATED RDW RBC AUTO: 57.1 FL (ref 37–54)
EOSINOPHIL # BLD AUTO: 0 10*3/MM3 (ref 0–0.4)
EOSINOPHIL # BLD AUTO: 0.08 10*3/MM3 (ref 0–0.4)
EOSINOPHIL # BLD AUTO: 0.1 10*3/MM3 (ref 0–0.4)
EOSINOPHIL # BLD AUTO: 0.11 10*3/MM3 (ref 0–0.4)
EOSINOPHIL # BLD AUTO: 0.14 10*3/MM3 (ref 0–0.4)
EOSINOPHIL # BLD AUTO: 0.16 10*3/MM3 (ref 0–0.4)
EOSINOPHIL # BLD AUTO: 0.16 10*3/MM3 (ref 0–0.4)
EOSINOPHIL # BLD AUTO: 0.17 10*3/MM3 (ref 0–0.4)
EOSINOPHIL # BLD AUTO: 0.21 10*3/MM3 (ref 0–0.4)
EOSINOPHIL # BLD AUTO: 0.21 10*3/MM3 (ref 0–0.4)
EOSINOPHIL # BLD AUTO: 0.24 10*3/MM3 (ref 0–0.4)
EOSINOPHIL # BLD AUTO: 0.24 10*3/MM3 (ref 0–0.4)
EOSINOPHIL NFR BLD AUTO: 0 % (ref 0.3–6.2)
EOSINOPHIL NFR BLD AUTO: 0.8 % (ref 0.3–6.2)
EOSINOPHIL NFR BLD AUTO: 0.9 % (ref 0.3–6.2)
EOSINOPHIL NFR BLD AUTO: 1.1 % (ref 0.3–6.2)
EOSINOPHIL NFR BLD AUTO: 1.4 % (ref 0.3–6.2)
EOSINOPHIL NFR BLD AUTO: 1.7 % (ref 0.3–6.2)
EOSINOPHIL NFR BLD AUTO: 2.1 % (ref 0.3–6.2)
EOSINOPHIL NFR BLD AUTO: 2.4 % (ref 0.3–6.2)
EOSINOPHIL NFR BLD AUTO: 2.8 % (ref 0.3–6.2)
EOSINOPHIL NFR BLD AUTO: 2.8 % (ref 0.3–6.2)
EOSINOPHIL NFR BLD AUTO: 3.4 % (ref 0.3–6.2)
EOSINOPHIL NFR BLD AUTO: 3.6 % (ref 0.3–6.2)
EOSINOPHIL NFR FLD MANUAL: 6 %
EPO SERPL-ACNC: 6.1 MIU/ML (ref 2.6–18.5)
ERYTHROCYTE [DISTWIDTH] IN BLOOD BY AUTOMATED COUNT: 12.7 % (ref 12.3–15.4)
ERYTHROCYTE [DISTWIDTH] IN BLOOD BY AUTOMATED COUNT: 12.7 % (ref 12.3–15.4)
ERYTHROCYTE [DISTWIDTH] IN BLOOD BY AUTOMATED COUNT: 12.8 % (ref 12.3–15.4)
ERYTHROCYTE [DISTWIDTH] IN BLOOD BY AUTOMATED COUNT: 12.9 % (ref 12.3–15.4)
ERYTHROCYTE [DISTWIDTH] IN BLOOD BY AUTOMATED COUNT: 13 % (ref 12.3–15.4)
ERYTHROCYTE [DISTWIDTH] IN BLOOD BY AUTOMATED COUNT: 13.1 % (ref 12.3–15.4)
ERYTHROCYTE [DISTWIDTH] IN BLOOD BY AUTOMATED COUNT: 13.2 % (ref 12.3–15.4)
ERYTHROCYTE [DISTWIDTH] IN BLOOD BY AUTOMATED COUNT: 14.5 % (ref 12.3–15.4)
ERYTHROCYTE [DISTWIDTH] IN BLOOD BY AUTOMATED COUNT: 14.6 % (ref 12.3–15.4)
ERYTHROCYTE [DISTWIDTH] IN BLOOD BY AUTOMATED COUNT: 14.6 % (ref 12.3–15.4)
ERYTHROCYTE [DISTWIDTH] IN BLOOD BY AUTOMATED COUNT: 15 % (ref 12.3–15.4)
ERYTHROCYTE [DISTWIDTH] IN BLOOD BY AUTOMATED COUNT: 15.2 % (ref 12.3–15.4)
ERYTHROCYTE [DISTWIDTH] IN BLOOD BY AUTOMATED COUNT: 15.4 % (ref 12.3–15.4)
ERYTHROCYTE [DISTWIDTH] IN BLOOD BY AUTOMATED COUNT: 15.4 % (ref 12.3–15.4)
ERYTHROCYTE [DISTWIDTH] IN BLOOD BY AUTOMATED COUNT: 15.6 % (ref 12.3–15.4)
ERYTHROCYTE [DISTWIDTH] IN BLOOD BY AUTOMATED COUNT: 15.7 % (ref 12.3–15.4)
ERYTHROCYTE [DISTWIDTH] IN BLOOD BY AUTOMATED COUNT: 16 % (ref 12.3–15.4)
FERRITIN SERPL-MCNC: 164 NG/ML (ref 13–150)
FERRITIN SERPL-MCNC: 174.8 NG/ML (ref 13–150)
FLUAV SUBTYP SPEC NAA+PROBE: NOT DETECTED
FLUBV RNA ISLT QL NAA+PROBE: NOT DETECTED
FOLATE BLD-MCNC: >620 NG/ML
FOLATE RBC-MCNC: >2199 NG/ML
GAMMA GLOB 24H MFR UR ELPH: 8.4 %
GAMMA GLOB SERPL ELPH-MCNC: 0.6 G/DL (ref 0.4–1.8)
GAS FLOW AIRWAY: 4 LPM
GFR SERPL CREATININE-BSD FRML MDRD: 10 ML/MIN/1.73
GFR SERPL CREATININE-BSD FRML MDRD: 10 ML/MIN/1.73
GFR SERPL CREATININE-BSD FRML MDRD: 11 ML/MIN/1.73
GFR SERPL CREATININE-BSD FRML MDRD: 12 ML/MIN/1.73
GFR SERPL CREATININE-BSD FRML MDRD: 12 ML/MIN/1.73
GFR SERPL CREATININE-BSD FRML MDRD: 13 ML/MIN/1.73
GFR SERPL CREATININE-BSD FRML MDRD: 13 ML/MIN/1.73
GFR SERPL CREATININE-BSD FRML MDRD: 14 ML/MIN/1.73
GFR SERPL CREATININE-BSD FRML MDRD: 15 ML/MIN/1.73
GFR SERPL CREATININE-BSD FRML MDRD: 16 ML/MIN/1.73
GFR SERPL CREATININE-BSD FRML MDRD: 17 ML/MIN/1.73
GFR SERPL CREATININE-BSD FRML MDRD: 19 ML/MIN/1.73
GFR SERPL CREATININE-BSD FRML MDRD: 21 ML/MIN/1.73
GFR SERPL CREATININE-BSD FRML MDRD: 7 ML/MIN/1.73
GFR SERPL CREATININE-BSD FRML MDRD: 9 ML/MIN/1.73
GFR SERPL CREATININE-BSD FRML MDRD: ABNORMAL ML/MIN/{1.73_M2}
GLOBULIN SER-MCNC: 2.8 G/DL (ref 2.2–3.9)
GLOBULIN UR ELPH-MCNC: 2.4 GM/DL
GLOBULIN UR ELPH-MCNC: 2.5 GM/DL
GLOBULIN UR ELPH-MCNC: 2.6 GM/DL
GLOBULIN UR ELPH-MCNC: 2.8 GM/DL
GLOBULIN UR ELPH-MCNC: 2.9 GM/DL
GLOBULIN UR ELPH-MCNC: 2.9 GM/DL
GLOBULIN UR ELPH-MCNC: 3 GM/DL (ref 1.8–3.5)
GLOBULIN UR ELPH-MCNC: 3.4 GM/DL
GLOBULIN UR ELPH-MCNC: 3.6 GM/DL
GLUCOSE BLDC GLUCOMTR-MCNC: 100 MG/DL (ref 70–130)
GLUCOSE BLDC GLUCOMTR-MCNC: 102 MG/DL (ref 70–130)
GLUCOSE BLDC GLUCOMTR-MCNC: 105 MG/DL (ref 70–130)
GLUCOSE BLDC GLUCOMTR-MCNC: 90 MG/DL (ref 70–130)
GLUCOSE SERPL-MCNC: 101 MG/DL (ref 65–99)
GLUCOSE SERPL-MCNC: 102 MG/DL (ref 65–99)
GLUCOSE SERPL-MCNC: 102 MG/DL (ref 65–99)
GLUCOSE SERPL-MCNC: 105 MG/DL (ref 65–99)
GLUCOSE SERPL-MCNC: 105 MG/DL (ref 65–99)
GLUCOSE SERPL-MCNC: 115 MG/DL (ref 65–99)
GLUCOSE SERPL-MCNC: 118 MG/DL (ref 65–99)
GLUCOSE SERPL-MCNC: 121 MG/DL (ref 74–124)
GLUCOSE SERPL-MCNC: 123 MG/DL (ref 65–99)
GLUCOSE SERPL-MCNC: 124 MG/DL (ref 65–99)
GLUCOSE SERPL-MCNC: 129 MG/DL (ref 74–124)
GLUCOSE SERPL-MCNC: 133 MG/DL (ref 65–99)
GLUCOSE SERPL-MCNC: 136 MG/DL (ref 65–99)
GLUCOSE SERPL-MCNC: 70 MG/DL (ref 65–99)
GLUCOSE SERPL-MCNC: 87 MG/DL (ref 65–99)
GLUCOSE SERPL-MCNC: 88 MG/DL (ref 65–99)
GLUCOSE SERPL-MCNC: 90 MG/DL (ref 65–99)
GLUCOSE SERPL-MCNC: 92 MG/DL (ref 65–99)
GLUCOSE SERPL-MCNC: 92 MG/DL (ref 65–99)
GLUCOSE SERPL-MCNC: 94 MG/DL (ref 65–99)
GLUCOSE SERPL-MCNC: 95 MG/DL (ref 65–99)
GLUCOSE UR STRIP-MCNC: NEGATIVE MG/DL
GRAM STN SPEC: NORMAL
HADV DNA SPEC NAA+PROBE: NOT DETECTED
HBV SURFACE AG SERPL QL IA: NORMAL
HCO3 BLDA-SCNC: 26.1 MMOL/L (ref 22–28)
HCO3 BLDA-SCNC: 27.1 MMOL/L (ref 22–28)
HCO3 BLDA-SCNC: 29 MMOL/L (ref 22–28)
HCOV 229E RNA SPEC QL NAA+PROBE: NOT DETECTED
HCOV HKU1 RNA SPEC QL NAA+PROBE: NOT DETECTED
HCOV NL63 RNA SPEC QL NAA+PROBE: NOT DETECTED
HCOV OC43 RNA SPEC QL NAA+PROBE: NOT DETECTED
HCT VFR BLD AUTO: 24.7 % (ref 34–46.6)
HCT VFR BLD AUTO: 25.7 % (ref 34–46.6)
HCT VFR BLD AUTO: 25.8 % (ref 34–46.6)
HCT VFR BLD AUTO: 26 % (ref 34–46.6)
HCT VFR BLD AUTO: 26.1 % (ref 34–46.6)
HCT VFR BLD AUTO: 26.1 % (ref 34–46.6)
HCT VFR BLD AUTO: 26.3 % (ref 34–46.6)
HCT VFR BLD AUTO: 26.4 % (ref 34–46.6)
HCT VFR BLD AUTO: 26.7 % (ref 34–46.6)
HCT VFR BLD AUTO: 27.1 % (ref 34–46.6)
HCT VFR BLD AUTO: 27.1 % (ref 34–46.6)
HCT VFR BLD AUTO: 27.5 % (ref 34–46.6)
HCT VFR BLD AUTO: 27.6 % (ref 34–46.6)
HCT VFR BLD AUTO: 28 % (ref 34–46.6)
HCT VFR BLD AUTO: 28 % (ref 34–46.6)
HCT VFR BLD AUTO: 28.1 % (ref 34–46.6)
HCT VFR BLD AUTO: 28.2 % (ref 34–46.6)
HCT VFR BLD AUTO: 28.3 % (ref 34–46.6)
HCT VFR BLD AUTO: 28.5 % (ref 34–46.6)
HCT VFR BLD AUTO: 28.9 % (ref 34–46.6)
HCT VFR BLD AUTO: 29.1 % (ref 34–46.6)
HCT VFR BLD AUTO: 31.1 % (ref 34–46.6)
HGB BLD-MCNC: 7.8 G/DL (ref 12–15.9)
HGB BLD-MCNC: 8 G/DL (ref 12–15.9)
HGB BLD-MCNC: 8.2 G/DL (ref 12–15.9)
HGB BLD-MCNC: 8.3 G/DL (ref 12–15.9)
HGB BLD-MCNC: 8.4 G/DL (ref 12–15.9)
HGB BLD-MCNC: 8.4 G/DL (ref 12–15.9)
HGB BLD-MCNC: 8.5 G/DL (ref 12–15.9)
HGB BLD-MCNC: 8.6 G/DL (ref 12–15.9)
HGB BLD-MCNC: 8.7 G/DL (ref 12–15.9)
HGB BLD-MCNC: 8.8 G/DL (ref 12–15.9)
HGB BLD-MCNC: 8.9 G/DL (ref 12–15.9)
HGB BLD-MCNC: 9.2 G/DL (ref 12–15.9)
HGB BLD-MCNC: 9.3 G/DL (ref 12–15.9)
HGB BLD-MCNC: 9.9 G/DL (ref 12–15.9)
HGB RETIC QN AUTO: 35.7 PG (ref 29.8–36.1)
HGB UR QL STRIP.AUTO: NEGATIVE
HISTONE IGG SER IA-ACNC: 0.5 UNITS (ref 0–0.9)
HIV 1 & 2 AB SER-IMP: NORMAL
HMPV RNA NPH QL NAA+NON-PROBE: NOT DETECTED
HOLD SPECIMEN: NORMAL
HPIV1 RNA ISLT QL NAA+PROBE: NOT DETECTED
HPIV2 RNA SPEC QL NAA+PROBE: NOT DETECTED
HPIV3 RNA NPH QL NAA+PROBE: NOT DETECTED
HPIV4 P GENE NPH QL NAA+PROBE: NOT DETECTED
HYALINE CASTS UR QL AUTO: ABNORMAL /LPF
IGA SERPL-MCNC: 136 MG/DL (ref 64–422)
IGA SERPL-MCNC: 141 MG/DL (ref 64–422)
IGG SERPL-MCNC: 465 MG/DL (ref 586–1602)
IGG SERPL-MCNC: 474 MG/DL (ref 586–1602)
IGM SERPL-MCNC: 290 MG/DL (ref 26–217)
IGM SERPL-MCNC: 329 MG/DL (ref 26–217)
IMM GRANULOCYTES # BLD AUTO: 0.05 10*3/MM3 (ref 0–0.05)
IMM GRANULOCYTES # BLD AUTO: 0.05 10*3/MM3 (ref 0–0.05)
IMM GRANULOCYTES # BLD AUTO: 0.06 10*3/MM3 (ref 0–0.05)
IMM GRANULOCYTES # BLD AUTO: 0.08 10*3/MM3 (ref 0–0.05)
IMM GRANULOCYTES # BLD AUTO: 0.09 10*3/MM3 (ref 0–0.05)
IMM GRANULOCYTES # BLD AUTO: 0.09 10*3/MM3 (ref 0–0.05)
IMM GRANULOCYTES # BLD AUTO: 0.17 10*3/MM3 (ref 0–0.05)
IMM GRANULOCYTES # BLD AUTO: 0.18 10*3/MM3 (ref 0–0.05)
IMM GRANULOCYTES # BLD AUTO: 0.2 10*3/MM3 (ref 0–0.05)
IMM GRANULOCYTES # BLD AUTO: 0.2 10*3/MM3 (ref 0–0.05)
IMM GRANULOCYTES # BLD AUTO: 0.22 10*3/MM3 (ref 0–0.05)
IMM GRANULOCYTES NFR BLD AUTO: 0.7 % (ref 0–0.5)
IMM GRANULOCYTES NFR BLD AUTO: 0.7 % (ref 0–0.5)
IMM GRANULOCYTES NFR BLD AUTO: 0.8 % (ref 0–0.5)
IMM GRANULOCYTES NFR BLD AUTO: 0.8 % (ref 0–0.5)
IMM GRANULOCYTES NFR BLD AUTO: 1 % (ref 0–0.5)
IMM GRANULOCYTES NFR BLD AUTO: 1.1 % (ref 0–0.5)
IMM GRANULOCYTES NFR BLD AUTO: 1.1 % (ref 0–0.5)
IMM GRANULOCYTES NFR BLD AUTO: 1.2 % (ref 0–0.5)
IMM GRANULOCYTES NFR BLD AUTO: 1.3 % (ref 0–0.5)
IMM GRANULOCYTES NFR BLD AUTO: 1.5 % (ref 0–0.5)
IMM GRANULOCYTES NFR BLD AUTO: 1.6 % (ref 0–0.5)
IMM GRANULOCYTES NFR BLD AUTO: 1.7 % (ref 0–0.5)
IMM GRANULOCYTES NFR BLD AUTO: 1.9 % (ref 0–0.5)
IMM GRANULOCYTES NFR BLD AUTO: 2.1 % (ref 0–0.5)
IMM RETICS NFR: 9 % (ref 3–15.8)
INHALED O2 CONCENTRATION: 50 %
INHALED O2 CONCENTRATION: 50 %
INR PPP: 1 (ref 0.9–1.1)
INR PPP: 1.06 (ref 0.9–1.1)
INR PPP: 1.17 (ref 0.9–1.1)
INTERPRETATION SERPL IEP-IMP: ABNORMAL
INTERPRETATION UR IFE-IMP: NORMAL
IRON 24H UR-MRATE: 19 MCG/DL (ref 37–145)
IRON 24H UR-MRATE: 62 MCG/DL (ref 37–145)
IRON SATN MFR SERPL: 12 % (ref 20–50)
IRON SATN MFR SERPL: 36 % (ref 14–48)
KAPPA LC FREE SER-MCNC: 82.4 MG/L (ref 3.3–19.4)
KAPPA LC FREE/LAMBDA FREE SER: 0.58 {RATIO} (ref 0.26–1.65)
KETONES UR QL STRIP: NEGATIVE
LAB AP CASE REPORT: NORMAL
LABORATORY COMMENT REPORT: ABNORMAL
LAMBDA LC FREE SERPL-MCNC: 142.9 MG/L (ref 5.7–26.3)
LDH FLD-CCNC: 249 U/L
LDH SERPL-CCNC: 378 U/L (ref 135–214)
LEFT RENAL UPPER PARENCHYMA MAX: 22.9 CM/S
LEFT RENAL UPPER PARENCHYMA MIN: 5 CM/S
LEFT RENAL UPPER PARENCHYMA RI: 0.78
LEUKOCYTE ESTERASE UR QL STRIP.AUTO: ABNORMAL
LIPASE SERPL-CCNC: 43 U/L (ref 13–60)
LYMPHOCYTES # BLD AUTO: 0.54 10*3/MM3 (ref 0.7–3.1)
LYMPHOCYTES # BLD AUTO: 0.79 10*3/MM3 (ref 0.7–3.1)
LYMPHOCYTES # BLD AUTO: 0.9 10*3/MM3 (ref 0.7–3.1)
LYMPHOCYTES # BLD AUTO: 0.9 10*3/MM3 (ref 0.7–3.1)
LYMPHOCYTES # BLD AUTO: 0.97 10*3/MM3 (ref 0.7–3.1)
LYMPHOCYTES # BLD AUTO: 1.06 10*3/MM3 (ref 0.7–3.1)
LYMPHOCYTES # BLD AUTO: 1.07 10*3/MM3 (ref 0.7–3.1)
LYMPHOCYTES # BLD AUTO: 1.11 10*3/MM3 (ref 0.7–3.1)
LYMPHOCYTES # BLD AUTO: 1.2 10*3/MM3 (ref 0.7–3.1)
LYMPHOCYTES # BLD AUTO: 1.25 10*3/MM3 (ref 0.7–3.1)
LYMPHOCYTES # BLD AUTO: 1.25 10*3/MM3 (ref 0.7–3.1)
LYMPHOCYTES # BLD AUTO: 1.28 10*3/MM3 (ref 0.7–3.1)
LYMPHOCYTES # BLD AUTO: 1.32 10*3/MM3 (ref 0.7–3.1)
LYMPHOCYTES # BLD AUTO: 1.54 10*3/MM3 (ref 0.7–3.1)
LYMPHOCYTES NFR BLD AUTO: 10.8 % (ref 19.6–45.3)
LYMPHOCYTES NFR BLD AUTO: 12.3 % (ref 19.6–45.3)
LYMPHOCYTES NFR BLD AUTO: 12.3 % (ref 19.6–45.3)
LYMPHOCYTES NFR BLD AUTO: 12.5 % (ref 19.6–45.3)
LYMPHOCYTES NFR BLD AUTO: 14.2 % (ref 19.6–45.3)
LYMPHOCYTES NFR BLD AUTO: 15.9 % (ref 19.6–45.3)
LYMPHOCYTES NFR BLD AUTO: 16.5 % (ref 19.6–45.3)
LYMPHOCYTES NFR BLD AUTO: 17.6 % (ref 19.6–45.3)
LYMPHOCYTES NFR BLD AUTO: 19.8 % (ref 19.6–45.3)
LYMPHOCYTES NFR BLD AUTO: 21.6 % (ref 19.6–45.3)
LYMPHOCYTES NFR BLD AUTO: 4.6 % (ref 19.6–45.3)
LYMPHOCYTES NFR BLD AUTO: 6.5 % (ref 19.6–45.3)
LYMPHOCYTES NFR BLD AUTO: 7.1 % (ref 19.6–45.3)
LYMPHOCYTES NFR BLD AUTO: 9.3 % (ref 19.6–45.3)
LYMPHOCYTES NFR FLD MANUAL: 37 %
M PNEUMO IGG SER IA-ACNC: NOT DETECTED
M PROTEIN 24H MFR UR ELPH: NORMAL %
M PROTEIN SERPL ELPH-MCNC: ABNORMAL G/DL
MAGNESIUM SERPL-MCNC: 2 MG/DL (ref 1.6–2.4)
MAGNESIUM SERPL-MCNC: 2.1 MG/DL (ref 1.6–2.4)
MAGNESIUM SERPL-MCNC: 2.1 MG/DL (ref 1.6–2.4)
MAXIMAL PREDICTED HEART RATE: 145 BPM
MAXIMAL PREDICTED HEART RATE: 145 BPM
MCH RBC QN AUTO: 29.5 PG (ref 26.6–33)
MCH RBC QN AUTO: 29.6 PG (ref 26.6–33)
MCH RBC QN AUTO: 29.8 PG (ref 26.6–33)
MCH RBC QN AUTO: 29.9 PG (ref 26.6–33)
MCH RBC QN AUTO: 30.1 PG (ref 26.6–33)
MCH RBC QN AUTO: 30.4 PG (ref 26.6–33)
MCH RBC QN AUTO: 30.5 PG (ref 26.6–33)
MCH RBC QN AUTO: 30.6 PG (ref 26.6–33)
MCH RBC QN AUTO: 30.6 PG (ref 26.6–33)
MCH RBC QN AUTO: 30.9 PG (ref 26.6–33)
MCH RBC QN AUTO: 31 PG (ref 26.6–33)
MCH RBC QN AUTO: 31.3 PG (ref 26.6–33)
MCH RBC QN AUTO: 31.4 PG (ref 26.6–33)
MCH RBC QN AUTO: 31.6 PG (ref 26.6–33)
MCH RBC QN AUTO: 31.6 PG (ref 26.6–33)
MCH RBC QN AUTO: 31.7 PG (ref 26.6–33)
MCH RBC QN AUTO: 32.1 PG (ref 26.6–33)
MCHC RBC AUTO-ENTMCNC: 29.6 G/DL (ref 31.5–35.7)
MCHC RBC AUTO-ENTMCNC: 30.7 G/DL (ref 31.5–35.7)
MCHC RBC AUTO-ENTMCNC: 31 G/DL (ref 31.5–35.7)
MCHC RBC AUTO-ENTMCNC: 31.2 G/DL (ref 31.5–35.7)
MCHC RBC AUTO-ENTMCNC: 31.2 G/DL (ref 31.5–35.7)
MCHC RBC AUTO-ENTMCNC: 31.3 G/DL (ref 31.5–35.7)
MCHC RBC AUTO-ENTMCNC: 31.3 G/DL (ref 31.5–35.7)
MCHC RBC AUTO-ENTMCNC: 31.6 G/DL (ref 31.5–35.7)
MCHC RBC AUTO-ENTMCNC: 31.8 G/DL (ref 31.5–35.7)
MCHC RBC AUTO-ENTMCNC: 31.9 G/DL (ref 31.5–35.7)
MCHC RBC AUTO-ENTMCNC: 32.2 G/DL (ref 31.5–35.7)
MCHC RBC AUTO-ENTMCNC: 32.5 G/DL (ref 31.5–35.7)
MCHC RBC AUTO-ENTMCNC: 32.6 G/DL (ref 31.5–35.7)
MCHC RBC AUTO-ENTMCNC: 32.9 G/DL (ref 31.5–35.7)
MCHC RBC AUTO-ENTMCNC: 32.9 G/DL (ref 31.5–35.7)
MCHC RBC AUTO-ENTMCNC: 33.3 G/DL (ref 31.5–35.7)
MCHC RBC AUTO-ENTMCNC: 33.5 G/DL (ref 31.5–35.7)
MCV RBC AUTO: 100 FL (ref 79–97)
MCV RBC AUTO: 101.1 FL (ref 79–97)
MCV RBC AUTO: 91.8 FL (ref 79–97)
MCV RBC AUTO: 93.8 FL (ref 79–97)
MCV RBC AUTO: 94 FL (ref 79–97)
MCV RBC AUTO: 94.6 FL (ref 79–97)
MCV RBC AUTO: 95.1 FL (ref 79–97)
MCV RBC AUTO: 95.3 FL (ref 79–97)
MCV RBC AUTO: 95.5 FL (ref 79–97)
MCV RBC AUTO: 95.9 FL (ref 79–97)
MCV RBC AUTO: 96.2 FL (ref 79–97)
MCV RBC AUTO: 97.1 FL (ref 79–97)
MCV RBC AUTO: 97.2 FL (ref 79–97)
MCV RBC AUTO: 97.4 FL (ref 79–97)
MCV RBC AUTO: 97.4 FL (ref 79–97)
MCV RBC AUTO: 98 FL (ref 79–97)
MCV RBC AUTO: 98.1 FL (ref 79–97)
MCV RBC AUTO: 98.5 FL (ref 79–97)
MCV RBC AUTO: 98.9 FL (ref 79–97)
METHOD: ABNORMAL
MODALITY: ABNORMAL
MONOCYTES # BLD AUTO: 0.44 10*3/MM3 (ref 0.1–0.9)
MONOCYTES # BLD AUTO: 0.61 10*3/MM3 (ref 0.1–0.9)
MONOCYTES # BLD AUTO: 0.62 10*3/MM3 (ref 0.1–0.9)
MONOCYTES # BLD AUTO: 0.66 10*3/MM3 (ref 0.1–0.9)
MONOCYTES # BLD AUTO: 0.68 10*3/MM3 (ref 0.1–0.9)
MONOCYTES # BLD AUTO: 0.7 10*3/MM3 (ref 0.1–0.9)
MONOCYTES # BLD AUTO: 0.74 10*3/MM3 (ref 0.1–0.9)
MONOCYTES # BLD AUTO: 0.76 10*3/MM3 (ref 0.1–0.9)
MONOCYTES # BLD AUTO: 0.78 10*3/MM3 (ref 0.1–0.9)
MONOCYTES # BLD AUTO: 0.83 10*3/MM3 (ref 0.1–0.9)
MONOCYTES # BLD AUTO: 0.98 10*3/MM3 (ref 0.1–0.9)
MONOCYTES # BLD AUTO: 1.08 10*3/MM3 (ref 0.1–0.9)
MONOCYTES # BLD AUTO: 1.23 10*3/MM3 (ref 0.1–0.9)
MONOCYTES # BLD AUTO: 1.39 10*3/MM3 (ref 0.1–0.9)
MONOCYTES NFR BLD AUTO: 10.2 % (ref 5–12)
MONOCYTES NFR BLD AUTO: 10.2 % (ref 5–12)
MONOCYTES NFR BLD AUTO: 10.3 % (ref 5–12)
MONOCYTES NFR BLD AUTO: 10.4 % (ref 5–12)
MONOCYTES NFR BLD AUTO: 10.9 % (ref 5–12)
MONOCYTES NFR BLD AUTO: 11.1 % (ref 5–12)
MONOCYTES NFR BLD AUTO: 11.4 % (ref 5–12)
MONOCYTES NFR BLD AUTO: 3.8 % (ref 5–12)
MONOCYTES NFR BLD AUTO: 6.5 % (ref 5–12)
MONOCYTES NFR BLD AUTO: 7.7 % (ref 5–12)
MONOCYTES NFR BLD AUTO: 8.5 % (ref 5–12)
MONOCYTES NFR BLD AUTO: 8.6 % (ref 5–12)
MONOCYTES NFR BLD AUTO: 9.4 % (ref 5–12)
MONOCYTES NFR BLD AUTO: 9.5 % (ref 5–12)
MONOCYTES NFR FLD: 13 %
MONOS+MACROS NFR FLD: 26 %
NEUTROPHILS NFR BLD AUTO: 10.43 10*3/MM3 (ref 1.7–7)
NEUTROPHILS NFR BLD AUTO: 10.48 10*3/MM3 (ref 1.7–7)
NEUTROPHILS NFR BLD AUTO: 4.33 10*3/MM3 (ref 1.7–7)
NEUTROPHILS NFR BLD AUTO: 4.63 10*3/MM3 (ref 1.7–7)
NEUTROPHILS NFR BLD AUTO: 4.74 10*3/MM3 (ref 1.7–7)
NEUTROPHILS NFR BLD AUTO: 5.17 10*3/MM3 (ref 1.7–7)
NEUTROPHILS NFR BLD AUTO: 5.17 10*3/MM3 (ref 1.7–7)
NEUTROPHILS NFR BLD AUTO: 5.31 10*3/MM3 (ref 1.7–7)
NEUTROPHILS NFR BLD AUTO: 5.79 10*3/MM3 (ref 1.7–7)
NEUTROPHILS NFR BLD AUTO: 6.41 10*3/MM3 (ref 1.7–7)
NEUTROPHILS NFR BLD AUTO: 64.8 % (ref 42.7–76)
NEUTROPHILS NFR BLD AUTO: 65.1 % (ref 42.7–76)
NEUTROPHILS NFR BLD AUTO: 68.2 % (ref 42.7–76)
NEUTROPHILS NFR BLD AUTO: 7.15 10*3/MM3 (ref 1.7–7)
NEUTROPHILS NFR BLD AUTO: 7.98 10*3/MM3 (ref 1.7–7)
NEUTROPHILS NFR BLD AUTO: 70.3 % (ref 42.7–76)
NEUTROPHILS NFR BLD AUTO: 71.3 % (ref 42.7–76)
NEUTROPHILS NFR BLD AUTO: 71.3 % (ref 42.7–76)
NEUTROPHILS NFR BLD AUTO: 73.5 % (ref 42.7–76)
NEUTROPHILS NFR BLD AUTO: 74.7 % (ref 42.7–76)
NEUTROPHILS NFR BLD AUTO: 76.9 % (ref 42.7–76)
NEUTROPHILS NFR BLD AUTO: 77.1 % (ref 42.7–76)
NEUTROPHILS NFR BLD AUTO: 78.1 % (ref 42.7–76)
NEUTROPHILS NFR BLD AUTO: 80.1 % (ref 42.7–76)
NEUTROPHILS NFR BLD AUTO: 82.5 % (ref 42.7–76)
NEUTROPHILS NFR BLD AUTO: 89.5 % (ref 42.7–76)
NEUTROPHILS NFR BLD AUTO: 9.16 10*3/MM3 (ref 1.7–7)
NEUTROPHILS NFR BLD AUTO: 9.76 10*3/MM3 (ref 1.7–7)
NEUTROPHILS NFR FLD MANUAL: 18 %
NITRITE UR QL STRIP: NEGATIVE
NRBC BLD AUTO-RTO: 0 /100 WBC (ref 0–0.2)
NRBC BLD AUTO-RTO: 0.1 /100 WBC (ref 0–0.2)
NRBC BLD AUTO-RTO: 0.3 /100 WBC (ref 0–0.2)
NT-PROBNP SERPL-MCNC: 5083 PG/ML (ref 0–1800)
NT-PROBNP SERPL-MCNC: 7263 PG/ML (ref 0–1800)
NUC CELL # FLD: 1357 /MM3
O2 A-A PPRESDIFF RESPIRATORY: 0.2 MMHG
O2 A-A PPRESDIFF RESPIRATORY: 0.2 MMHG
PATH REPORT.FINAL DX SPEC: NORMAL
PATH REPORT.GROSS SPEC: NORMAL
PCO2 BLDA: 56 MM HG (ref 35–45)
PCO2 BLDA: 58.1 MM HG (ref 35–45)
PCO2 BLDA: 63.5 MM HG (ref 35–45)
PEEP RESPIRATORY: 8 CM[H2O]
PH BLDA: 7.27 PH UNITS (ref 7.35–7.45)
PH BLDA: 7.28 PH UNITS (ref 7.35–7.45)
PH BLDA: 7.28 PH UNITS (ref 7.35–7.45)
PH FLD: 7.31 [PH]
PH UR STRIP.AUTO: 6.5 [PH] (ref 5–8)
PHOSPHATE SERPL-MCNC: 3.2 MG/DL (ref 2.5–4.5)
PHOSPHATE SERPL-MCNC: 3.3 MG/DL (ref 2.5–4.5)
PHOSPHATE SERPL-MCNC: 4.5 MG/DL (ref 2.5–4.5)
PHOSPHATE SERPL-MCNC: 7 MG/DL (ref 2.5–4.5)
PLATELET # BLD AUTO: 234 10*3/MM3 (ref 140–450)
PLATELET # BLD AUTO: 240 10*3/MM3 (ref 140–450)
PLATELET # BLD AUTO: 248 10*3/MM3 (ref 140–450)
PLATELET # BLD AUTO: 252 10*3/MM3 (ref 140–450)
PLATELET # BLD AUTO: 259 10*3/MM3 (ref 140–450)
PLATELET # BLD AUTO: 266 10*3/MM3 (ref 140–450)
PLATELET # BLD AUTO: 273 10*3/MM3 (ref 140–450)
PLATELET # BLD AUTO: 274 10*3/MM3 (ref 140–450)
PLATELET # BLD AUTO: 278 10*3/MM3 (ref 140–450)
PLATELET # BLD AUTO: 293 10*3/MM3 (ref 140–450)
PLATELET # BLD AUTO: 294 10*3/MM3 (ref 140–450)
PLATELET # BLD AUTO: 329 10*3/MM3 (ref 140–450)
PLATELET # BLD AUTO: 339 10*3/MM3 (ref 140–450)
PLATELET # BLD AUTO: 353 10*3/MM3 (ref 140–450)
PLATELET # BLD AUTO: 363 10*3/MM3 (ref 140–450)
PLATELET # BLD AUTO: 383 10*3/MM3 (ref 140–450)
PLATELET # BLD AUTO: 389 10*3/MM3 (ref 140–450)
PMV BLD AUTO: 8.5 FL (ref 6–12)
PMV BLD AUTO: 8.9 FL (ref 6–12)
PMV BLD AUTO: 9 FL (ref 6–12)
PMV BLD AUTO: 9 FL (ref 6–12)
PMV BLD AUTO: 9.1 FL (ref 6–12)
PMV BLD AUTO: 9.1 FL (ref 6–12)
PMV BLD AUTO: 9.2 FL (ref 6–12)
PMV BLD AUTO: 9.2 FL (ref 6–12)
PMV BLD AUTO: 9.3 FL (ref 6–12)
PMV BLD AUTO: 9.4 FL (ref 6–12)
PMV BLD AUTO: 9.5 FL (ref 6–12)
PMV BLD AUTO: 9.7 FL (ref 6–12)
PMV BLD AUTO: 9.8 FL (ref 6–12)
PMV BLD AUTO: 9.8 FL (ref 6–12)
PMV BLD AUTO: 9.9 FL (ref 6–12)
PO2 BLDA: 47.8 MM HG (ref 80–100)
PO2 BLDA: 55.8 MM HG (ref 80–100)
PO2 BLDA: 62.2 MM HG (ref 80–100)
POTASSIUM SERPL-SCNC: 2.8 MMOL/L (ref 3.5–5.2)
POTASSIUM SERPL-SCNC: 3 MMOL/L (ref 3.5–5.2)
POTASSIUM SERPL-SCNC: 3.2 MMOL/L (ref 3.5–4.7)
POTASSIUM SERPL-SCNC: 3.2 MMOL/L (ref 3.5–5.2)
POTASSIUM SERPL-SCNC: 3.3 MMOL/L (ref 3.5–5.2)
POTASSIUM SERPL-SCNC: 3.3 MMOL/L (ref 3.5–5.2)
POTASSIUM SERPL-SCNC: 3.4 MMOL/L (ref 3.5–5.2)
POTASSIUM SERPL-SCNC: 3.4 MMOL/L (ref 3.5–5.2)
POTASSIUM SERPL-SCNC: 3.6 MMOL/L (ref 3.5–5.2)
POTASSIUM SERPL-SCNC: 3.6 MMOL/L (ref 3.5–5.2)
POTASSIUM SERPL-SCNC: 3.8 MMOL/L (ref 3.5–5.2)
POTASSIUM SERPL-SCNC: 3.8 MMOL/L (ref 3.5–5.2)
POTASSIUM SERPL-SCNC: 3.9 MMOL/L (ref 3.5–5.2)
POTASSIUM SERPL-SCNC: 3.9 MMOL/L (ref 3.5–5.2)
POTASSIUM SERPL-SCNC: 4 MMOL/L (ref 3.5–4.7)
POTASSIUM SERPL-SCNC: 4.2 MMOL/L (ref 3.5–5.2)
POTASSIUM SERPL-SCNC: 4.2 MMOL/L (ref 3.5–5.2)
POTASSIUM SERPL-SCNC: 4.3 MMOL/L (ref 3.5–5.2)
POTASSIUM SERPL-SCNC: 4.7 MMOL/L (ref 3.5–5.2)
POTASSIUM SERPL-SCNC: 4.9 MMOL/L (ref 3.5–5.2)
POTASSIUM SERPL-SCNC: 5 MMOL/L (ref 3.5–5.2)
PROCALCITONIN SERPL-MCNC: 0.24 NG/ML (ref 0–0.25)
PROT FLD-MCNC: 4 G/DL
PROT PATTERN SERPL IFE-IMP: ABNORMAL
PROT SERPL-MCNC: 5.2 G/DL (ref 6–8.5)
PROT SERPL-MCNC: 5.2 G/DL (ref 6–8.5)
PROT SERPL-MCNC: 5.3 G/DL (ref 6–8.5)
PROT SERPL-MCNC: 5.3 G/DL (ref 6–8.5)
PROT SERPL-MCNC: 5.5 G/DL (ref 6–8.5)
PROT SERPL-MCNC: 5.7 G/DL (ref 6–8.5)
PROT SERPL-MCNC: 5.9 G/DL (ref 6.3–8)
PROT SERPL-MCNC: 6 G/DL (ref 6–8.5)
PROT SERPL-MCNC: 6.8 G/DL (ref 6–8.5)
PROT SERPL-MCNC: 7.1 G/DL (ref 6–8.5)
PROT UR QL STRIP: ABNORMAL
PROT UR-MCNC: 509.7 MG/DL
PROT UR-MCNC: 730 MG/DL
PROT UR-MCNC: 740 MG/DL
PROT UR-MCNC: 930 MG/DL
PROT/CREAT UR: ABNORMAL MG/G CREA (ref 0–200)
PROTHROMBIN TIME: 13 SECONDS (ref 11.7–14.2)
PROTHROMBIN TIME: 13.7 SECONDS (ref 11.7–14.2)
PROTHROMBIN TIME: 14.7 SECONDS (ref 11.7–14.2)
QT INTERVAL: 434 MS
QT INTERVAL: 466 MS
RBC # BLD AUTO: 2.52 10*6/MM3 (ref 3.77–5.28)
RBC # BLD AUTO: 2.65 10*6/MM3 (ref 3.77–5.28)
RBC # BLD AUTO: 2.68 10*6/MM3 (ref 3.77–5.28)
RBC # BLD AUTO: 2.68 10*6/MM3 (ref 3.77–5.28)
RBC # BLD AUTO: 2.72 10*6/MM3 (ref 3.77–5.28)
RBC # BLD AUTO: 2.74 10*6/MM3 (ref 3.77–5.28)
RBC # BLD AUTO: 2.75 10*6/MM3 (ref 3.77–5.28)
RBC # BLD AUTO: 2.76 10*6/MM3 (ref 3.77–5.28)
RBC # BLD AUTO: 2.78 10*6/MM3 (ref 3.77–5.28)
RBC # BLD AUTO: 2.78 10*6/MM3 (ref 3.77–5.28)
RBC # BLD AUTO: 2.85 10*6/MM3 (ref 3.77–5.28)
RBC # BLD AUTO: 2.89 10*6/MM3 (ref 3.77–5.28)
RBC # BLD AUTO: 2.91 10*6/MM3 (ref 3.77–5.28)
RBC # BLD AUTO: 2.95 10*6/MM3 (ref 3.77–5.28)
RBC # BLD AUTO: 2.98 10*6/MM3 (ref 3.77–5.28)
RBC # BLD AUTO: 2.99 10*6/MM3 (ref 3.77–5.28)
RBC # BLD AUTO: 3.2 10*6/MM3 (ref 3.77–5.28)
RBC # FLD AUTO: ABNORMAL /MM3
RBC # UR: ABNORMAL /HPF
REF LAB TEST METHOD: ABNORMAL
RETICS # AUTO: 0.05 10*6/MM3 (ref 0.02–0.13)
RETICS/RBC NFR AUTO: 1.45 % (ref 0.7–1.9)
RHINOVIRUS RNA SPEC NAA+PROBE: NOT DETECTED
RIGHT RENAL UPPER PARENCHYMA MAX: 16.7 CM/S
RIGHT RENAL UPPER PARENCHYMA MIN: 6 CM/S
RIGHT RENAL UPPER PARENCHYMA RI: 0.64
RSV RNA NPH QL NAA+NON-PROBE: NOT DETECTED
SAO2 % BLDCOA: 76.8 % (ref 92–99)
SAO2 % BLDCOA: 83.9 % (ref 92–99)
SAO2 % BLDCOA: 87 % (ref 92–99)
SARS-COV-2 ORF1AB RESP QL NAA+PROBE: NOT DETECTED
SARS-COV-2 RNA NPH QL NAA+NON-PROBE: NOT DETECTED
SARS-COV-2 RNA PNL SPEC NAA+PROBE: NOT DETECTED
SARS-COV-2 RNA PNL SPEC NAA+PROBE: NOT DETECTED
SET MECH RESP RATE: 13
SET MECH RESP RATE: 18
SODIUM SERPL-SCNC: 132 MMOL/L (ref 136–145)
SODIUM SERPL-SCNC: 133 MMOL/L (ref 136–145)
SODIUM SERPL-SCNC: 134 MMOL/L (ref 136–145)
SODIUM SERPL-SCNC: 134 MMOL/L (ref 136–145)
SODIUM SERPL-SCNC: 135 MMOL/L (ref 136–145)
SODIUM SERPL-SCNC: 136 MMOL/L (ref 134–145)
SODIUM SERPL-SCNC: 136 MMOL/L (ref 136–145)
SODIUM SERPL-SCNC: 137 MMOL/L (ref 136–145)
SODIUM SERPL-SCNC: 137 MMOL/L (ref 136–145)
SODIUM SERPL-SCNC: 138 MMOL/L (ref 136–145)
SODIUM SERPL-SCNC: 139 MMOL/L (ref 134–145)
SODIUM SERPL-SCNC: 139 MMOL/L (ref 136–145)
SODIUM SERPL-SCNC: 139 MMOL/L (ref 136–145)
SODIUM SERPL-SCNC: 141 MMOL/L (ref 136–145)
SODIUM SERPL-SCNC: 142 MMOL/L (ref 136–145)
SODIUM SERPL-SCNC: 143 MMOL/L (ref 136–145)
SODIUM UR-SCNC: 74 MMOL/L
SP GR UR STRIP: 1.02 (ref 1–1.03)
SQUAMOUS #/AREA URNS HPF: ABNORMAL /HPF
STRESS TARGET HR: 123 BPM
STRESS TARGET HR: 123 BPM
TIBC SERPL-MCNC: 158 MCG/DL (ref 298–536)
TIBC SERPL-MCNC: 172 MCG/DL (ref 249–505)
TOTAL RATE: 13 BREATHS/MINUTE
TOTAL RATE: 19 BREATHS/MINUTE
TOTAL RATE: 20 BREATHS/MINUTE
TRANSFERRIN SERPL-MCNC: 106 MG/DL (ref 200–360)
TRANSFERRIN SERPL-MCNC: 123 MG/DL (ref 200–360)
TROPONIN T SERPL-MCNC: 0.01 NG/ML (ref 0–0.03)
TROPONIN T SERPL-MCNC: 0.02 NG/ML (ref 0–0.03)
TROPONIN T SERPL-MCNC: 0.02 NG/ML (ref 0–0.03)
TROPONIN T SERPL-MCNC: <0.01 NG/ML (ref 0–0.03)
URATE SERPL-MCNC: 8 MG/DL (ref 2.4–5.7)
URATE SERPL-MCNC: 8.2 MG/DL (ref 2.4–5.7)
URATE SERPL-MCNC: 8.8 MG/DL (ref 2.4–5.7)
UROBILINOGEN UR QL STRIP: ABNORMAL
VIT B12 BLD-MCNC: 714 PG/ML (ref 211–946)
VT ON VENT VENT: 390 ML
VT ON VENT VENT: 450 ML
WBC # BLD AUTO: 10.2 10*3/MM3 (ref 3.4–10.8)
WBC # BLD AUTO: 11.65 10*3/MM3 (ref 3.4–10.8)
WBC # BLD AUTO: 12.7 10*3/MM3 (ref 3.4–10.8)
WBC # BLD AUTO: 14.4 10*3/MM3 (ref 3.4–10.8)
WBC # BLD AUTO: 15.91 10*3/MM3 (ref 3.4–10.8)
WBC # BLD AUTO: 6.65 10*3/MM3 (ref 3.4–10.8)
WBC # BLD AUTO: 6.74 10*3/MM3 (ref 3.4–10.8)
WBC # BLD AUTO: 7.13 10*3/MM3 (ref 3.4–10.8)
WBC # BLD AUTO: 7.26 10*3/MM3 (ref 3.4–10.8)
WBC # BLD AUTO: 7.45 10*3/MM3 (ref 3.4–10.8)
WBC # BLD AUTO: 7.58 10*3/MM3 (ref 3.4–10.8)
WBC # BLD AUTO: 7.75 10*3/MM3 (ref 3.4–10.8)
WBC # BLD AUTO: 8.33 10*3/MM3 (ref 3.4–10.8)
WBC # BLD AUTO: 9.38 10*3/MM3 (ref 3.4–10.8)
WBC # BLD AUTO: 9.73 10*3/MM3 (ref 3.4–10.8)
WBC NRBC COR # BLD: 11.9 10*3/MM3 (ref 3.4–10.8)
WBC NRBC COR # BLD: 12.07 10*3/MM3 (ref 3.4–10.8)
WBC NRBC COR # BLD: 12.18 10*3/MM3 (ref 3.4–10.8)
WBC NRBC COR # BLD: 8.53 10*3/MM3 (ref 3.4–10.8)
WBC UR QL AUTO: ABNORMAL /HPF
WHOLE BLOOD HOLD SPECIMEN: NORMAL

## 2021-01-01 PROCEDURE — 25010000002 ONDANSETRON PER 1 MG: Performed by: INTERNAL MEDICINE

## 2021-01-01 PROCEDURE — 71045 X-RAY EXAM CHEST 1 VIEW: CPT

## 2021-01-01 PROCEDURE — 82962 GLUCOSE BLOOD TEST: CPT

## 2021-01-01 PROCEDURE — 94799 UNLISTED PULMONARY SVC/PX: CPT

## 2021-01-01 PROCEDURE — 85025 COMPLETE CBC W/AUTO DIFF WBC: CPT

## 2021-01-01 PROCEDURE — 25010000002 ONDANSETRON PER 1 MG: Performed by: NURSE PRACTITIONER

## 2021-01-01 PROCEDURE — C9803 HOPD COVID-19 SPEC COLLECT: HCPCS | Performed by: NURSE PRACTITIONER

## 2021-01-01 PROCEDURE — 82607 VITAMIN B-12: CPT | Performed by: INTERNAL MEDICINE

## 2021-01-01 PROCEDURE — 83735 ASSAY OF MAGNESIUM: CPT | Performed by: HOSPITALIST

## 2021-01-01 PROCEDURE — 99495 TRANSJ CARE MGMT MOD F2F 14D: CPT | Performed by: FAMILY MEDICINE

## 2021-01-01 PROCEDURE — 99214 OFFICE O/P EST MOD 30 MIN: CPT | Performed by: INTERNAL MEDICINE

## 2021-01-01 PROCEDURE — 94660 CPAP INITIATION&MGMT: CPT

## 2021-01-01 PROCEDURE — 97110 THERAPEUTIC EXERCISES: CPT

## 2021-01-01 PROCEDURE — 84466 ASSAY OF TRANSFERRIN: CPT | Performed by: INTERNAL MEDICINE

## 2021-01-01 PROCEDURE — 93990 DOPPLER FLOW TESTING: CPT

## 2021-01-01 PROCEDURE — 99232 SBSQ HOSP IP/OBS MODERATE 35: CPT | Performed by: NURSE PRACTITIONER

## 2021-01-01 PROCEDURE — 25010000003 CEFAZOLIN IN DEXTROSE 2-4 GM/100ML-% SOLUTION: Performed by: SURGERY

## 2021-01-01 PROCEDURE — 82436 ASSAY OF URINE CHLORIDE: CPT | Performed by: INTERNAL MEDICINE

## 2021-01-01 PROCEDURE — 94761 N-INVAS EAR/PLS OXIMETRY MLT: CPT

## 2021-01-01 PROCEDURE — 36415 COLL VENOUS BLD VENIPUNCTURE: CPT

## 2021-01-01 PROCEDURE — C9803 HOPD COVID-19 SPEC COLLECT: HCPCS

## 2021-01-01 PROCEDURE — 25010000003 LIDOCAINE 1 % SOLUTION 20 ML VIAL: Performed by: SURGERY

## 2021-01-01 PROCEDURE — 85014 HEMATOCRIT: CPT | Performed by: INTERNAL MEDICINE

## 2021-01-01 PROCEDURE — U0004 COV-19 TEST NON-CDC HGH THRU: HCPCS | Performed by: INTERNAL MEDICINE

## 2021-01-01 PROCEDURE — 83880 ASSAY OF NATRIURETIC PEPTIDE: CPT | Performed by: EMERGENCY MEDICINE

## 2021-01-01 PROCEDURE — 63710000001 PREDNISONE PER 1 MG: Performed by: INTERNAL MEDICINE

## 2021-01-01 PROCEDURE — U0005 INFEC AGEN DETEC AMPLI PROBE: HCPCS | Performed by: NURSE PRACTITIONER

## 2021-01-01 PROCEDURE — 83615 LACTATE (LD) (LDH) ENZYME: CPT | Performed by: HOSPITALIST

## 2021-01-01 PROCEDURE — 25010000002 FENTANYL CITRATE (PF) 50 MCG/ML SOLUTION: Performed by: ANESTHESIOLOGY

## 2021-01-01 PROCEDURE — 1111F DSCHRG MED/CURRENT MED MERGE: CPT | Performed by: FAMILY MEDICINE

## 2021-01-01 PROCEDURE — 25010000002 EPOETIN ALFA PER 1000 UNITS: Performed by: INTERNAL MEDICINE

## 2021-01-01 PROCEDURE — 89051 BODY FLUID CELL COUNT: CPT | Performed by: HOSPITALIST

## 2021-01-01 PROCEDURE — 83540 ASSAY OF IRON: CPT | Performed by: INTERNAL MEDICINE

## 2021-01-01 PROCEDURE — 99215 OFFICE O/P EST HI 40 MIN: CPT | Performed by: INTERNAL MEDICINE

## 2021-01-01 PROCEDURE — 80048 BASIC METABOLIC PNL TOTAL CA: CPT

## 2021-01-01 PROCEDURE — 82803 BLOOD GASES ANY COMBINATION: CPT

## 2021-01-01 PROCEDURE — 86160 COMPLEMENT ANTIGEN: CPT | Performed by: INTERNAL MEDICINE

## 2021-01-01 PROCEDURE — 74230 X-RAY XM SWLNG FUNCJ C+: CPT

## 2021-01-01 PROCEDURE — 94640 AIRWAY INHALATION TREATMENT: CPT

## 2021-01-01 PROCEDURE — 93005 ELECTROCARDIOGRAM TRACING: CPT | Performed by: EMERGENCY MEDICINE

## 2021-01-01 PROCEDURE — C1889 IMPLANT/INSERT DEVICE, NOC: HCPCS | Performed by: SURGERY

## 2021-01-01 PROCEDURE — 25010000002 LORAZEPAM PER 2 MG: Performed by: HOSPITALIST

## 2021-01-01 PROCEDURE — 85025 COMPLETE CBC W/AUTO DIFF WBC: CPT | Performed by: NURSE PRACTITIONER

## 2021-01-01 PROCEDURE — 80053 COMPREHEN METABOLIC PANEL: CPT | Performed by: INTERNAL MEDICINE

## 2021-01-01 PROCEDURE — 85018 HEMOGLOBIN: CPT | Performed by: NURSE PRACTITIONER

## 2021-01-01 PROCEDURE — 63710000001 ONDANSETRON PER 8 MG: Performed by: INTERNAL MEDICINE

## 2021-01-01 PROCEDURE — 85610 PROTHROMBIN TIME: CPT | Performed by: HOSPITALIST

## 2021-01-01 PROCEDURE — 93308 TTE F-UP OR LMTD: CPT | Performed by: INTERNAL MEDICINE

## 2021-01-01 PROCEDURE — 94669 MECHANICAL CHEST WALL OSCILL: CPT

## 2021-01-01 PROCEDURE — 84484 ASSAY OF TROPONIN QUANT: CPT | Performed by: EMERGENCY MEDICINE

## 2021-01-01 PROCEDURE — 25010000002 PROPOFOL 10 MG/ML EMULSION: Performed by: STUDENT IN AN ORGANIZED HEALTH CARE EDUCATION/TRAINING PROGRAM

## 2021-01-01 PROCEDURE — 99222 1ST HOSP IP/OBS MODERATE 55: CPT | Performed by: INTERNAL MEDICINE

## 2021-01-01 PROCEDURE — 82042 OTHER SOURCE ALBUMIN QUAN EA: CPT | Performed by: HOSPITALIST

## 2021-01-01 PROCEDURE — 5A1D70Z PERFORMANCE OF URINARY FILTRATION, INTERMITTENT, LESS THAN 6 HOURS PER DAY: ICD-10-PCS | Performed by: HOSPITALIST

## 2021-01-01 PROCEDURE — 99215 OFFICE O/P EST HI 40 MIN: CPT | Performed by: NURSE PRACTITIONER

## 2021-01-01 PROCEDURE — 80048 BASIC METABOLIC PNL TOTAL CA: CPT | Performed by: HOSPITALIST

## 2021-01-01 PROCEDURE — 25010000002 HYDROMORPHONE 1 MG/ML SOLUTION: Performed by: EMERGENCY MEDICINE

## 2021-01-01 PROCEDURE — 86335 IMMUNFIX E-PHORSIS/URINE/CSF: CPT | Performed by: INTERNAL MEDICINE

## 2021-01-01 PROCEDURE — C1894 INTRO/SHEATH, NON-LASER: HCPCS | Performed by: SURGERY

## 2021-01-01 PROCEDURE — 25010000002 FENTANYL CITRATE (PF) 50 MCG/ML SOLUTION: Performed by: NURSE ANESTHETIST, CERTIFIED REGISTERED

## 2021-01-01 PROCEDURE — 83735 ASSAY OF MAGNESIUM: CPT | Performed by: INTERNAL MEDICINE

## 2021-01-01 PROCEDURE — 96372 THER/PROPH/DIAG INJ SC/IM: CPT

## 2021-01-01 PROCEDURE — 25010000002 ONDANSETRON PER 1 MG: Performed by: EMERGENCY MEDICINE

## 2021-01-01 PROCEDURE — 87070 CULTURE OTHR SPECIMN AEROBIC: CPT | Performed by: INTERNAL MEDICINE

## 2021-01-01 PROCEDURE — 97162 PT EVAL MOD COMPLEX 30 MIN: CPT

## 2021-01-01 PROCEDURE — 80053 COMPREHEN METABOLIC PANEL: CPT | Performed by: EMERGENCY MEDICINE

## 2021-01-01 PROCEDURE — 36600 WITHDRAWAL OF ARTERIAL BLOOD: CPT

## 2021-01-01 PROCEDURE — 85027 COMPLETE CBC AUTOMATED: CPT | Performed by: INTERNAL MEDICINE

## 2021-01-01 PROCEDURE — 25010000002 FENTANYL CITRATE (PF) 50 MCG/ML SOLUTION: Performed by: STUDENT IN AN ORGANIZED HEALTH CARE EDUCATION/TRAINING PROGRAM

## 2021-01-01 PROCEDURE — 86334 IMMUNOFIX E-PHORESIS SERUM: CPT | Performed by: INTERNAL MEDICINE

## 2021-01-01 PROCEDURE — 84156 ASSAY OF PROTEIN URINE: CPT | Performed by: INTERNAL MEDICINE

## 2021-01-01 PROCEDURE — 76000 FLUOROSCOPY <1 HR PHYS/QHP: CPT

## 2021-01-01 PROCEDURE — 85025 COMPLETE CBC W/AUTO DIFF WBC: CPT | Performed by: HOSPITALIST

## 2021-01-01 PROCEDURE — 36415 COLL VENOUS BLD VENIPUNCTURE: CPT | Performed by: NURSE PRACTITIONER

## 2021-01-01 PROCEDURE — 93308 TTE F-UP OR LMTD: CPT

## 2021-01-01 PROCEDURE — U0004 COV-19 TEST NON-CDC HGH THRU: HCPCS

## 2021-01-01 PROCEDURE — 25010000002 FUROSEMIDE PER 20 MG: Performed by: INTERNAL MEDICINE

## 2021-01-01 PROCEDURE — U0004 COV-19 TEST NON-CDC HGH THRU: HCPCS | Performed by: NURSE PRACTITIONER

## 2021-01-01 PROCEDURE — 85610 PROTHROMBIN TIME: CPT | Performed by: INTERNAL MEDICINE

## 2021-01-01 PROCEDURE — 25010000002 MIDAZOLAM PER 1 MG: Performed by: ANESTHESIOLOGY

## 2021-01-01 PROCEDURE — 97166 OT EVAL MOD COMPLEX 45 MIN: CPT

## 2021-01-01 PROCEDURE — 99285 EMERGENCY DEPT VISIT HI MDM: CPT

## 2021-01-01 PROCEDURE — 85025 COMPLETE CBC W/AUTO DIFF WBC: CPT | Performed by: EMERGENCY MEDICINE

## 2021-01-01 PROCEDURE — 25010000002 HYDROMORPHONE PER 4 MG: Performed by: STUDENT IN AN ORGANIZED HEALTH CARE EDUCATION/TRAINING PROGRAM

## 2021-01-01 PROCEDURE — 99283 EMERGENCY DEPT VISIT LOW MDM: CPT

## 2021-01-01 PROCEDURE — 94664 DEMO&/EVAL PT USE INHALER: CPT

## 2021-01-01 PROCEDURE — 80048 BASIC METABOLIC PNL TOTAL CA: CPT | Performed by: SURGERY

## 2021-01-01 PROCEDURE — 92611 MOTION FLUOROSCOPY/SWALLOW: CPT

## 2021-01-01 PROCEDURE — 84550 ASSAY OF BLOOD/URIC ACID: CPT | Performed by: INTERNAL MEDICINE

## 2021-01-01 PROCEDURE — 82570 ASSAY OF URINE CREATININE: CPT | Performed by: INTERNAL MEDICINE

## 2021-01-01 PROCEDURE — 93321 DOPPLER ECHO F-UP/LMTD STD: CPT | Performed by: INTERNAL MEDICINE

## 2021-01-01 PROCEDURE — 80069 RENAL FUNCTION PANEL: CPT | Performed by: HOSPITALIST

## 2021-01-01 PROCEDURE — 84300 ASSAY OF URINE SODIUM: CPT | Performed by: INTERNAL MEDICINE

## 2021-01-01 PROCEDURE — C1750 CATH, HEMODIALYSIS,LONG-TERM: HCPCS

## 2021-01-01 PROCEDURE — 93010 ELECTROCARDIOGRAM REPORT: CPT | Performed by: INTERNAL MEDICINE

## 2021-01-01 PROCEDURE — 25010000003 CEFAZOLIN PER 500 MG: Performed by: SURGERY

## 2021-01-01 PROCEDURE — 99232 SBSQ HOSP IP/OBS MODERATE 35: CPT | Performed by: INTERNAL MEDICINE

## 2021-01-01 PROCEDURE — 85046 RETICYTE/HGB CONCENTRATE: CPT | Performed by: INTERNAL MEDICINE

## 2021-01-01 PROCEDURE — 25010000002 PROPOFOL 10 MG/ML EMULSION: Performed by: NURSE ANESTHETIST, CERTIFIED REGISTERED

## 2021-01-01 PROCEDURE — 84550 ASSAY OF BLOOD/URIC ACID: CPT | Performed by: HOSPITALIST

## 2021-01-01 PROCEDURE — 84484 ASSAY OF TROPONIN QUANT: CPT | Performed by: INTERNAL MEDICINE

## 2021-01-01 PROCEDURE — 76942 ECHO GUIDE FOR BIOPSY: CPT

## 2021-01-01 PROCEDURE — C1750 CATH, HEMODIALYSIS,LONG-TERM: HCPCS | Performed by: SURGERY

## 2021-01-01 PROCEDURE — 93000 ELECTROCARDIOGRAM COMPLETE: CPT | Performed by: NURSE PRACTITIONER

## 2021-01-01 PROCEDURE — 0 LIDOCAINE 1 % SOLUTION: Performed by: RADIOLOGY

## 2021-01-01 PROCEDURE — 80048 BASIC METABOLIC PNL TOTAL CA: CPT | Performed by: NURSE PRACTITIONER

## 2021-01-01 PROCEDURE — 25010000003 CEFAZOLIN IN DEXTROSE 2-4 GM/100ML-% SOLUTION: Performed by: STUDENT IN AN ORGANIZED HEALTH CARE EDUCATION/TRAINING PROGRAM

## 2021-01-01 PROCEDURE — 96374 THER/PROPH/DIAG INJ IV PUSH: CPT

## 2021-01-01 PROCEDURE — 86235 NUCLEAR ANTIGEN ANTIBODY: CPT | Performed by: NURSE PRACTITIONER

## 2021-01-01 PROCEDURE — 93321 DOPPLER ECHO F-UP/LMTD STD: CPT

## 2021-01-01 PROCEDURE — 99213 OFFICE O/P EST LOW 20 MIN: CPT | Performed by: NURSE PRACTITIONER

## 2021-01-01 PROCEDURE — 25010000002 HEPARIN (PORCINE) PER 1000 UNITS: Performed by: NURSE ANESTHETIST, CERTIFIED REGISTERED

## 2021-01-01 PROCEDURE — 80048 BASIC METABOLIC PNL TOTAL CA: CPT | Performed by: ANESTHESIOLOGY

## 2021-01-01 PROCEDURE — 25010000002 HEPARIN (PORCINE) PER 1000 UNITS: Performed by: INTERNAL MEDICINE

## 2021-01-01 PROCEDURE — 71250 CT THORAX DX C-: CPT

## 2021-01-01 PROCEDURE — 25010000002 ROPIVACAINE PER 1 MG: Performed by: ANESTHESIOLOGY

## 2021-01-01 PROCEDURE — 84100 ASSAY OF PHOSPHORUS: CPT | Performed by: INTERNAL MEDICINE

## 2021-01-01 PROCEDURE — 85730 THROMBOPLASTIN TIME PARTIAL: CPT | Performed by: EMERGENCY MEDICINE

## 2021-01-01 PROCEDURE — 87205 SMEAR GRAM STAIN: CPT | Performed by: INTERNAL MEDICINE

## 2021-01-01 PROCEDURE — 96375 TX/PRO/DX INJ NEW DRUG ADDON: CPT

## 2021-01-01 PROCEDURE — 25010000002 HEPARIN (PORCINE) PER 1000 UNITS: Performed by: SURGERY

## 2021-01-01 PROCEDURE — 80053 COMPREHEN METABOLIC PANEL: CPT

## 2021-01-01 PROCEDURE — 84145 PROCALCITONIN (PCT): CPT | Performed by: EMERGENCY MEDICINE

## 2021-01-01 PROCEDURE — 93005 ELECTROCARDIOGRAM TRACING: CPT | Performed by: PHYSICIAN ASSISTANT

## 2021-01-01 PROCEDURE — 74176 CT ABD & PELVIS W/O CONTRAST: CPT

## 2021-01-01 PROCEDURE — 83690 ASSAY OF LIPASE: CPT

## 2021-01-01 PROCEDURE — 80048 BASIC METABOLIC PNL TOTAL CA: CPT | Performed by: INTERNAL MEDICINE

## 2021-01-01 PROCEDURE — 81001 URINALYSIS AUTO W/SCOPE: CPT | Performed by: EMERGENCY MEDICINE

## 2021-01-01 PROCEDURE — 92610 EVALUATE SWALLOWING FUNCTION: CPT | Performed by: SPEECH-LANGUAGE PATHOLOGIST

## 2021-01-01 PROCEDURE — 97535 SELF CARE MNGMENT TRAINING: CPT

## 2021-01-01 PROCEDURE — 87635 SARS-COV-2 COVID-19 AMP PRB: CPT | Performed by: STUDENT IN AN ORGANIZED HEALTH CARE EDUCATION/TRAINING PROGRAM

## 2021-01-01 PROCEDURE — U0005 INFEC AGEN DETEC AMPLI PROBE: HCPCS

## 2021-01-01 PROCEDURE — 71046 X-RAY EXAM CHEST 2 VIEWS: CPT | Performed by: FAMILY MEDICINE

## 2021-01-01 PROCEDURE — 84157 ASSAY OF PROTEIN OTHER: CPT | Performed by: HOSPITALIST

## 2021-01-01 PROCEDURE — 99213 OFFICE O/P EST LOW 20 MIN: CPT | Performed by: FAMILY MEDICINE

## 2021-01-01 PROCEDURE — 85027 COMPLETE CBC AUTOMATED: CPT | Performed by: HOSPITALIST

## 2021-01-01 PROCEDURE — 25010000002 PROTAMINE SULFATE PER 10 MG: Performed by: NURSE ANESTHETIST, CERTIFIED REGISTERED

## 2021-01-01 PROCEDURE — 85025 COMPLETE CBC W/AUTO DIFF WBC: CPT | Performed by: INTERNAL MEDICINE

## 2021-01-01 PROCEDURE — 85027 COMPLETE CBC AUTOMATED: CPT | Performed by: NURSE PRACTITIONER

## 2021-01-01 PROCEDURE — 0202U NFCT DS 22 TRGT SARS-COV-2: CPT | Performed by: HOSPITALIST

## 2021-01-01 PROCEDURE — 0W9B3ZZ DRAINAGE OF LEFT PLEURAL CAVITY, PERCUTANEOUS APPROACH: ICD-10-PCS | Performed by: HOSPITALIST

## 2021-01-01 PROCEDURE — 88112 CYTOPATH CELL ENHANCE TECH: CPT | Performed by: HOSPITALIST

## 2021-01-01 PROCEDURE — 80053 COMPREHEN METABOLIC PANEL: CPT | Performed by: NURSE PRACTITIONER

## 2021-01-01 PROCEDURE — 83986 ASSAY PH BODY FLUID NOS: CPT | Performed by: HOSPITALIST

## 2021-01-01 PROCEDURE — 82728 ASSAY OF FERRITIN: CPT | Performed by: INTERNAL MEDICINE

## 2021-01-01 PROCEDURE — 71046 X-RAY EXAM CHEST 2 VIEWS: CPT | Performed by: RADIOLOGY

## 2021-01-01 PROCEDURE — 85018 HEMOGLOBIN: CPT | Performed by: INTERNAL MEDICINE

## 2021-01-01 PROCEDURE — 82784 ASSAY IGA/IGD/IGG/IGM EACH: CPT | Performed by: INTERNAL MEDICINE

## 2021-01-01 PROCEDURE — 93975 VASCULAR STUDY: CPT

## 2021-01-01 PROCEDURE — 93000 ELECTROCARDIOGRAM COMPLETE: CPT | Performed by: INTERNAL MEDICINE

## 2021-01-01 PROCEDURE — 76942 ECHO GUIDE FOR BIOPSY: CPT | Performed by: SURGERY

## 2021-01-01 PROCEDURE — C1768 GRAFT, VASCULAR: HCPCS | Performed by: SURGERY

## 2021-01-01 PROCEDURE — 80069 RENAL FUNCTION PANEL: CPT | Performed by: INTERNAL MEDICINE

## 2021-01-01 PROCEDURE — 80053 COMPREHEN METABOLIC PANEL: CPT | Performed by: HOSPITALIST

## 2021-01-01 PROCEDURE — 85610 PROTHROMBIN TIME: CPT | Performed by: EMERGENCY MEDICINE

## 2021-01-01 PROCEDURE — 76775 US EXAM ABDO BACK WALL LIM: CPT

## 2021-01-01 PROCEDURE — 85014 HEMATOCRIT: CPT | Performed by: NURSE PRACTITIONER

## 2021-01-01 PROCEDURE — 87340 HEPATITIS B SURFACE AG IA: CPT | Performed by: INTERNAL MEDICINE

## 2021-01-01 PROCEDURE — 84166 PROTEIN E-PHORESIS/URINE/CSF: CPT | Performed by: INTERNAL MEDICINE

## 2021-01-01 PROCEDURE — 25010000002 ONDANSETRON PER 1 MG: Performed by: STUDENT IN AN ORGANIZED HEALTH CARE EDUCATION/TRAINING PROGRAM

## 2021-01-01 PROCEDURE — 87635 SARS-COV-2 COVID-19 AMP PRB: CPT | Performed by: HOSPITALIST

## 2021-01-01 PROCEDURE — U0005 INFEC AGEN DETEC AMPLI PROBE: HCPCS | Performed by: INTERNAL MEDICINE

## 2021-01-01 PROCEDURE — 94760 N-INVAS EAR/PLS OXIMETRY 1: CPT

## 2021-01-01 PROCEDURE — 99284 EMERGENCY DEPT VISIT MOD MDM: CPT

## 2021-01-01 DEVICE — GRFT VASC PROPAT HEP IR 4/7 38 45CM: Type: IMPLANTABLE DEVICE | Site: ARM | Status: FUNCTIONAL

## 2021-01-01 DEVICE — ABSORBABLE HEMOSTAT (OXIDIZED REGENERATED CELLULOSE, U.S.P.)
Type: IMPLANTABLE DEVICE | Site: ARM | Status: FUNCTIONAL
Brand: SURGICEL

## 2021-01-01 DEVICE — LIGACLIP MCA MULTIPLE CLIP APPLIERS, 20 SMALL CLIPS
Type: IMPLANTABLE DEVICE | Site: ARM | Status: FUNCTIONAL
Brand: LIGACLIP

## 2021-01-01 DEVICE — LIGACLIP MCA MULTIPLE CLIP APPLIERS, 20 MEDIUM CLIPS
Type: IMPLANTABLE DEVICE | Site: ARM | Status: FUNCTIONAL
Brand: LIGACLIP

## 2021-01-01 RX ORDER — AMLODIPINE BESYLATE 10 MG/1
10 TABLET ORAL DAILY
Status: DISCONTINUED | OUTPATIENT
Start: 2021-01-01 | End: 2021-01-01

## 2021-01-01 RX ORDER — HYDRALAZINE HYDROCHLORIDE 20 MG/ML
5 INJECTION INTRAMUSCULAR; INTRAVENOUS
Status: DISCONTINUED | OUTPATIENT
Start: 2021-01-01 | End: 2021-01-01 | Stop reason: HOSPADM

## 2021-01-01 RX ORDER — BUMETANIDE 0.25 MG/ML
4 INJECTION INTRAMUSCULAR; INTRAVENOUS EVERY 8 HOURS
Status: COMPLETED | OUTPATIENT
Start: 2021-01-01 | End: 2021-01-01

## 2021-01-01 RX ORDER — MIDAZOLAM HYDROCHLORIDE 1 MG/ML
0.5 INJECTION INTRAMUSCULAR; INTRAVENOUS
Status: DISCONTINUED | OUTPATIENT
Start: 2021-01-01 | End: 2021-01-01 | Stop reason: HOSPADM

## 2021-01-01 RX ORDER — HEPARIN SODIUM 1000 [USP'U]/ML
3800 INJECTION, SOLUTION INTRAVENOUS; SUBCUTANEOUS ONCE
Status: COMPLETED | OUTPATIENT
Start: 2021-01-01 | End: 2021-01-01

## 2021-01-01 RX ORDER — ONDANSETRON 2 MG/ML
4 INJECTION INTRAMUSCULAR; INTRAVENOUS ONCE AS NEEDED
Status: DISCONTINUED | OUTPATIENT
Start: 2021-01-01 | End: 2021-01-01 | Stop reason: HOSPADM

## 2021-01-01 RX ORDER — FENTANYL CITRATE 50 UG/ML
25 INJECTION, SOLUTION INTRAMUSCULAR; INTRAVENOUS
Status: DISCONTINUED | OUTPATIENT
Start: 2021-01-01 | End: 2021-01-01 | Stop reason: HOSPADM

## 2021-01-01 RX ORDER — SODIUM CHLORIDE 0.9 % (FLUSH) 0.9 %
10 SYRINGE (ML) INJECTION AS NEEDED
Status: DISCONTINUED | OUTPATIENT
Start: 2021-01-01 | End: 2021-01-01 | Stop reason: HOSPADM

## 2021-01-01 RX ORDER — NIFEDIPINE 30 MG/1
90 TABLET, EXTENDED RELEASE ORAL EVERY EVENING
Status: DISCONTINUED | OUTPATIENT
Start: 2021-01-01 | End: 2021-01-01 | Stop reason: HOSPADM

## 2021-01-01 RX ORDER — NALOXONE HCL 0.4 MG/ML
0.2 VIAL (ML) INJECTION AS NEEDED
Status: DISCONTINUED | OUTPATIENT
Start: 2021-01-01 | End: 2021-01-01 | Stop reason: HOSPADM

## 2021-01-01 RX ORDER — CLOPIDOGREL BISULFATE 75 MG/1
75 TABLET ORAL DAILY
Status: DISCONTINUED | OUTPATIENT
Start: 2021-01-01 | End: 2021-01-01 | Stop reason: HOSPADM

## 2021-01-01 RX ORDER — CALCIUM ACETATE 667 MG/1
1334 CAPSULE ORAL
Status: DISCONTINUED | OUTPATIENT
Start: 2021-01-01 | End: 2021-01-01

## 2021-01-01 RX ORDER — SUCRALFATE 1 G/1
1 TABLET ORAL
Status: DISCONTINUED | OUTPATIENT
Start: 2021-01-01 | End: 2021-01-01 | Stop reason: HOSPADM

## 2021-01-01 RX ORDER — HYDROMORPHONE HYDROCHLORIDE 1 MG/ML
0.5 INJECTION, SOLUTION INTRAMUSCULAR; INTRAVENOUS; SUBCUTANEOUS
Status: DISCONTINUED | OUTPATIENT
Start: 2021-01-01 | End: 2021-01-01 | Stop reason: HOSPADM

## 2021-01-01 RX ORDER — SODIUM BICARBONATE 650 MG/1
1300 TABLET ORAL 3 TIMES DAILY
Qty: 180 TABLET | Refills: 0 | OUTPATIENT
Start: 2021-01-01 | End: 2021-01-01

## 2021-01-01 RX ORDER — SODIUM CHLORIDE, SODIUM LACTATE, POTASSIUM CHLORIDE, CALCIUM CHLORIDE 600; 310; 30; 20 MG/100ML; MG/100ML; MG/100ML; MG/100ML
9 INJECTION, SOLUTION INTRAVENOUS CONTINUOUS
Status: DISCONTINUED | OUTPATIENT
Start: 2021-01-01 | End: 2021-01-01 | Stop reason: HOSPADM

## 2021-01-01 RX ORDER — FOLIC ACID 1 MG/1
1 TABLET ORAL DAILY
Status: DISCONTINUED | OUTPATIENT
Start: 2021-01-01 | End: 2021-01-01 | Stop reason: HOSPADM

## 2021-01-01 RX ORDER — DIPHENHYDRAMINE HYDROCHLORIDE 50 MG/ML
12.5 INJECTION INTRAMUSCULAR; INTRAVENOUS
Status: DISCONTINUED | OUTPATIENT
Start: 2021-01-01 | End: 2021-01-01 | Stop reason: HOSPADM

## 2021-01-01 RX ORDER — BUMETANIDE 0.25 MG/ML
2 INJECTION INTRAMUSCULAR; INTRAVENOUS ONCE
Status: COMPLETED | OUTPATIENT
Start: 2021-01-01 | End: 2021-01-01

## 2021-01-01 RX ORDER — AMOXICILLIN 250 MG
2 CAPSULE ORAL 2 TIMES DAILY
Status: DISCONTINUED | OUTPATIENT
Start: 2021-01-01 | End: 2021-01-01 | Stop reason: HOSPADM

## 2021-01-01 RX ORDER — ATORVASTATIN CALCIUM 20 MG/1
40 TABLET, FILM COATED ORAL NIGHTLY
Status: DISCONTINUED | OUTPATIENT
Start: 2021-01-01 | End: 2021-01-01 | Stop reason: HOSPADM

## 2021-01-01 RX ORDER — BUDESONIDE AND FORMOTEROL FUMARATE DIHYDRATE 160; 4.5 UG/1; UG/1
2 AEROSOL RESPIRATORY (INHALATION)
Status: DISCONTINUED | OUTPATIENT
Start: 2021-01-01 | End: 2021-01-01 | Stop reason: HOSPADM

## 2021-01-01 RX ORDER — SODIUM BICARBONATE 650 MG/1
1300 TABLET ORAL 3 TIMES DAILY
Status: DISCONTINUED | OUTPATIENT
Start: 2021-01-01 | End: 2021-01-01

## 2021-01-01 RX ORDER — FENTANYL CITRATE 50 UG/ML
INJECTION, SOLUTION INTRAMUSCULAR; INTRAVENOUS AS NEEDED
Status: DISCONTINUED | OUTPATIENT
Start: 2021-01-01 | End: 2021-01-01 | Stop reason: SURG

## 2021-01-01 RX ORDER — TORSEMIDE 20 MG/1
40 TABLET ORAL DAILY
Status: DISCONTINUED | OUTPATIENT
Start: 2021-01-01 | End: 2021-01-01 | Stop reason: HOSPADM

## 2021-01-01 RX ORDER — HEPARIN SODIUM 1000 [USP'U]/ML
INJECTION, SOLUTION INTRAVENOUS; SUBCUTANEOUS AS NEEDED
Status: DISCONTINUED | OUTPATIENT
Start: 2021-01-01 | End: 2021-01-01 | Stop reason: HOSPADM

## 2021-01-01 RX ORDER — CALCIUM ACETATE 667 MG/1
1334 CAPSULE ORAL
COMMUNITY
End: 2021-01-01 | Stop reason: HOSPADM

## 2021-01-01 RX ORDER — HEPARIN SODIUM 1000 [USP'U]/ML
INJECTION, SOLUTION INTRAVENOUS; SUBCUTANEOUS AS NEEDED
Status: DISCONTINUED | OUTPATIENT
Start: 2021-01-01 | End: 2021-01-01 | Stop reason: SURG

## 2021-01-01 RX ORDER — LABETALOL HYDROCHLORIDE 5 MG/ML
5 INJECTION, SOLUTION INTRAVENOUS
Status: DISCONTINUED | OUTPATIENT
Start: 2021-01-01 | End: 2021-01-01 | Stop reason: HOSPADM

## 2021-01-01 RX ORDER — ACETAMINOPHEN 500 MG
1000 TABLET ORAL EVERY 8 HOURS PRN
Start: 2021-01-01

## 2021-01-01 RX ORDER — ONDANSETRON 2 MG/ML
4 INJECTION INTRAMUSCULAR; INTRAVENOUS EVERY 6 HOURS PRN
Status: DISCONTINUED | OUTPATIENT
Start: 2021-01-01 | End: 2021-01-01 | Stop reason: HOSPADM

## 2021-01-01 RX ORDER — HYDROCODONE BITARTRATE AND ACETAMINOPHEN 7.5; 325 MG/1; MG/1
1 TABLET ORAL ONCE AS NEEDED
Status: COMPLETED | OUTPATIENT
Start: 2021-01-01 | End: 2021-01-01

## 2021-01-01 RX ORDER — IBUPROFEN 600 MG/1
600 TABLET ORAL ONCE AS NEEDED
Status: DISCONTINUED | OUTPATIENT
Start: 2021-01-01 | End: 2021-01-01 | Stop reason: HOSPADM

## 2021-01-01 RX ORDER — SODIUM BICARBONATE 650 MG/1
1300 TABLET ORAL 3 TIMES DAILY
Qty: 180 TABLET | Refills: 0 | Status: SHIPPED | OUTPATIENT
Start: 2021-01-01 | End: 2021-01-01

## 2021-01-01 RX ORDER — OXYCODONE AND ACETAMINOPHEN 10; 325 MG/1; MG/1
1 TABLET ORAL EVERY 4 HOURS PRN
Status: DISCONTINUED | OUTPATIENT
Start: 2021-01-01 | End: 2021-01-01 | Stop reason: HOSPADM

## 2021-01-01 RX ORDER — CEFDINIR 300 MG/1
300 CAPSULE ORAL ONCE
Status: COMPLETED | OUTPATIENT
Start: 2021-01-01 | End: 2021-01-01

## 2021-01-01 RX ORDER — FLUMAZENIL 0.1 MG/ML
0.2 INJECTION INTRAVENOUS AS NEEDED
Status: DISCONTINUED | OUTPATIENT
Start: 2021-01-01 | End: 2021-01-01 | Stop reason: HOSPADM

## 2021-01-01 RX ORDER — LIDOCAINE HYDROCHLORIDE 10 MG/ML
0.5 INJECTION, SOLUTION EPIDURAL; INFILTRATION; INTRACAUDAL; PERINEURAL ONCE AS NEEDED
Status: DISCONTINUED | OUTPATIENT
Start: 2021-01-01 | End: 2021-01-01 | Stop reason: HOSPADM

## 2021-01-01 RX ORDER — CEFDINIR 300 MG/1
300 CAPSULE ORAL DAILY
Qty: 7 CAPSULE | Refills: 0 | Status: SHIPPED | OUTPATIENT
Start: 2021-01-01 | End: 2021-01-01

## 2021-01-01 RX ORDER — POTASSIUM CHLORIDE 750 MG/1
40 TABLET, FILM COATED, EXTENDED RELEASE ORAL ONCE
Status: COMPLETED | OUTPATIENT
Start: 2021-01-01 | End: 2021-01-01

## 2021-01-01 RX ORDER — HYDRALAZINE HYDROCHLORIDE 50 MG/1
100 TABLET, FILM COATED ORAL EVERY 8 HOURS SCHEDULED
Status: DISCONTINUED | OUTPATIENT
Start: 2021-01-01 | End: 2021-01-01 | Stop reason: HOSPADM

## 2021-01-01 RX ORDER — AMLODIPINE BESYLATE 5 MG/1
5 TABLET ORAL DAILY
COMMUNITY
Start: 2021-01-01 | End: 2021-01-01

## 2021-01-01 RX ORDER — TRAZODONE HYDROCHLORIDE 50 MG/1
50 TABLET ORAL NIGHTLY
Qty: 30 TABLET | Refills: 1 | Status: SHIPPED | OUTPATIENT
Start: 2021-01-01 | End: 2022-01-01

## 2021-01-01 RX ORDER — LIDOCAINE HYDROCHLORIDE 20 MG/ML
INJECTION, SOLUTION INFILTRATION; PERINEURAL AS NEEDED
Status: DISCONTINUED | OUTPATIENT
Start: 2021-01-01 | End: 2021-01-01 | Stop reason: SURG

## 2021-01-01 RX ORDER — TORSEMIDE 20 MG/1
40 TABLET ORAL 2 TIMES DAILY
Qty: 60 TABLET | Refills: 0
Start: 2021-01-01 | End: 2021-01-01 | Stop reason: DRUGHIGH

## 2021-01-01 RX ORDER — TORSEMIDE 20 MG/1
40 TABLET ORAL DAILY
Qty: 60 TABLET | Refills: 1
Start: 2021-01-01 | End: 2021-01-01 | Stop reason: SDUPTHER

## 2021-01-01 RX ORDER — FENTANYL CITRATE 50 UG/ML
INJECTION, SOLUTION INTRAMUSCULAR; INTRAVENOUS
Status: COMPLETED | OUTPATIENT
Start: 2021-01-01 | End: 2021-01-01

## 2021-01-01 RX ORDER — HYDRALAZINE HYDROCHLORIDE 100 MG/1
100 TABLET, FILM COATED ORAL 3 TIMES DAILY
Qty: 90 TABLET | Refills: 2 | Status: SHIPPED | OUTPATIENT
Start: 2021-01-01 | End: 2021-01-01 | Stop reason: HOSPADM

## 2021-01-01 RX ORDER — ONDANSETRON 4 MG/1
4 TABLET, FILM COATED ORAL EVERY 6 HOURS PRN
Status: DISCONTINUED | OUTPATIENT
Start: 2021-01-01 | End: 2021-01-01 | Stop reason: HOSPADM

## 2021-01-01 RX ORDER — EPHEDRINE SULFATE 50 MG/ML
5 INJECTION, SOLUTION INTRAVENOUS ONCE AS NEEDED
Status: DISCONTINUED | OUTPATIENT
Start: 2021-01-01 | End: 2021-01-01 | Stop reason: HOSPADM

## 2021-01-01 RX ORDER — SODIUM CHLORIDE 9 MG/ML
9 INJECTION, SOLUTION INTRAVENOUS CONTINUOUS PRN
Status: DISCONTINUED | OUTPATIENT
Start: 2021-01-01 | End: 2021-01-01 | Stop reason: HOSPADM

## 2021-01-01 RX ORDER — ONDANSETRON 2 MG/ML
INJECTION INTRAMUSCULAR; INTRAVENOUS AS NEEDED
Status: DISCONTINUED | OUTPATIENT
Start: 2021-01-01 | End: 2021-01-01 | Stop reason: SURG

## 2021-01-01 RX ORDER — NIFEDIPINE 90 MG/1
90 TABLET, EXTENDED RELEASE ORAL EVERY EVENING
Qty: 30 TABLET | Refills: 3 | Status: SHIPPED | OUTPATIENT
Start: 2021-01-01 | End: 2022-01-01 | Stop reason: SDUPTHER

## 2021-01-01 RX ORDER — ONDANSETRON 2 MG/ML
4 INJECTION INTRAMUSCULAR; INTRAVENOUS ONCE
Status: COMPLETED | OUTPATIENT
Start: 2021-01-01 | End: 2021-01-01

## 2021-01-01 RX ORDER — TERAZOSIN 5 MG/1
5 CAPSULE ORAL NIGHTLY
Status: DISCONTINUED | OUTPATIENT
Start: 2021-01-01 | End: 2021-01-01

## 2021-01-01 RX ORDER — CEFAZOLIN SODIUM 2 G/100ML
2 INJECTION, SOLUTION INTRAVENOUS ONCE
Status: COMPLETED | OUTPATIENT
Start: 2021-01-01 | End: 2021-01-01

## 2021-01-01 RX ORDER — CARVEDILOL 25 MG/1
25 TABLET ORAL 2 TIMES DAILY WITH MEALS
Qty: 60 TABLET | Refills: 0 | Status: SHIPPED | OUTPATIENT
Start: 2021-01-01 | End: 2022-01-01

## 2021-01-01 RX ORDER — VENLAFAXINE HYDROCHLORIDE 150 MG/1
150 CAPSULE, EXTENDED RELEASE ORAL DAILY
COMMUNITY
End: 2021-01-01 | Stop reason: ALTCHOICE

## 2021-01-01 RX ORDER — NIFEDIPINE 90 MG/1
90 TABLET, EXTENDED RELEASE ORAL DAILY
Qty: 30 TABLET | Refills: 0 | Status: SHIPPED | OUTPATIENT
Start: 2021-01-01 | End: 2021-01-01 | Stop reason: ALTCHOICE

## 2021-01-01 RX ORDER — PANTOPRAZOLE SODIUM 40 MG/1
40 TABLET, DELAYED RELEASE ORAL EVERY MORNING
Status: DISCONTINUED | OUTPATIENT
Start: 2021-01-01 | End: 2021-01-01 | Stop reason: HOSPADM

## 2021-01-01 RX ORDER — FAMOTIDINE 10 MG/ML
20 INJECTION, SOLUTION INTRAVENOUS ONCE
Status: COMPLETED | OUTPATIENT
Start: 2021-01-01 | End: 2021-01-01

## 2021-01-01 RX ORDER — PANTOPRAZOLE SODIUM 40 MG/1
40 TABLET, DELAYED RELEASE ORAL DAILY
COMMUNITY
Start: 2021-01-01 | End: 2021-01-01 | Stop reason: HOSPADM

## 2021-01-01 RX ORDER — POLYETHYLENE GLYCOL 3350 17 G/17G
17 POWDER, FOR SOLUTION ORAL DAILY
Status: DISCONTINUED | OUTPATIENT
Start: 2021-01-01 | End: 2021-01-01 | Stop reason: HOSPADM

## 2021-01-01 RX ORDER — HYDROCODONE BITARTRATE AND ACETAMINOPHEN 5; 325 MG/1; MG/1
2 TABLET ORAL EVERY 8 HOURS PRN
Qty: 8 TABLET | Refills: 0 | Status: SHIPPED | OUTPATIENT
Start: 2021-01-01 | End: 2021-01-01

## 2021-01-01 RX ORDER — PROMETHAZINE HYDROCHLORIDE 25 MG/1
25 SUPPOSITORY RECTAL ONCE AS NEEDED
Status: DISCONTINUED | OUTPATIENT
Start: 2021-01-01 | End: 2021-01-01 | Stop reason: HOSPADM

## 2021-01-01 RX ORDER — PROMETHAZINE HYDROCHLORIDE 12.5 MG/1
25 TABLET ORAL ONCE AS NEEDED
Status: DISCONTINUED | OUTPATIENT
Start: 2021-01-01 | End: 2021-01-01 | Stop reason: HOSPADM

## 2021-01-01 RX ORDER — POLYETHYLENE GLYCOL 3350 17 G/17G
17 POWDER, FOR SOLUTION ORAL DAILY PRN
Status: DISCONTINUED | OUTPATIENT
Start: 2021-01-01 | End: 2021-01-01 | Stop reason: HOSPADM

## 2021-01-01 RX ORDER — SODIUM CHLORIDE 0.9 % (FLUSH) 0.9 %
3-10 SYRINGE (ML) INJECTION AS NEEDED
Status: DISCONTINUED | OUTPATIENT
Start: 2021-01-01 | End: 2021-01-01 | Stop reason: HOSPADM

## 2021-01-01 RX ORDER — SODIUM CHLORIDE 0.9 % (FLUSH) 0.9 %
10 SYRINGE (ML) INJECTION EVERY 12 HOURS SCHEDULED
Status: DISCONTINUED | OUTPATIENT
Start: 2021-01-01 | End: 2021-01-01

## 2021-01-01 RX ORDER — ALBUTEROL SULFATE 2.5 MG/3ML
2.5 SOLUTION RESPIRATORY (INHALATION) ONCE AS NEEDED
Status: DISCONTINUED | OUTPATIENT
Start: 2021-01-01 | End: 2021-01-01 | Stop reason: HOSPADM

## 2021-01-01 RX ORDER — SODIUM CHLORIDE, SODIUM LACTATE, POTASSIUM CHLORIDE, CALCIUM CHLORIDE 600; 310; 30; 20 MG/100ML; MG/100ML; MG/100ML; MG/100ML
INJECTION, SOLUTION INTRAVENOUS CONTINUOUS PRN
Status: DISCONTINUED | OUTPATIENT
Start: 2021-01-01 | End: 2021-01-01 | Stop reason: SURG

## 2021-01-01 RX ORDER — LORAZEPAM 0.5 MG/1
0.5 TABLET ORAL EVERY 8 HOURS PRN
Qty: 90 TABLET | Refills: 1 | Status: SHIPPED | OUTPATIENT
Start: 2021-01-01 | End: 2022-01-01 | Stop reason: SDUPTHER

## 2021-01-01 RX ORDER — CETIRIZINE HYDROCHLORIDE 10 MG/1
5 TABLET ORAL DAILY
Status: DISCONTINUED | OUTPATIENT
Start: 2021-01-01 | End: 2021-01-01 | Stop reason: HOSPADM

## 2021-01-01 RX ORDER — HYDROMORPHONE HYDROCHLORIDE 1 MG/ML
0.25 INJECTION, SOLUTION INTRAMUSCULAR; INTRAVENOUS; SUBCUTANEOUS
Status: DISCONTINUED | OUTPATIENT
Start: 2021-01-01 | End: 2021-01-01 | Stop reason: HOSPADM

## 2021-01-01 RX ORDER — TORSEMIDE 20 MG/1
40 TABLET ORAL
Status: DISCONTINUED | OUTPATIENT
Start: 2021-01-01 | End: 2021-01-01

## 2021-01-01 RX ORDER — SODIUM CHLORIDE 0.9 % (FLUSH) 0.9 %
3 SYRINGE (ML) INJECTION EVERY 12 HOURS SCHEDULED
Status: DISCONTINUED | OUTPATIENT
Start: 2021-01-01 | End: 2021-01-01 | Stop reason: HOSPADM

## 2021-01-01 RX ORDER — SODIUM BICARBONATE 650 MG/1
650 TABLET ORAL 2 TIMES DAILY
COMMUNITY
Start: 2021-01-01 | End: 2021-01-01 | Stop reason: HOSPADM

## 2021-01-01 RX ORDER — FOLIC ACID 1 MG/1
TABLET ORAL
Qty: 30 TABLET | Refills: 2 | Status: SHIPPED | OUTPATIENT
Start: 2021-01-01 | End: 2021-01-01

## 2021-01-01 RX ORDER — NIFEDIPINE 90 MG/1
90 TABLET, EXTENDED RELEASE ORAL EVERY EVENING
COMMUNITY
End: 2021-01-01 | Stop reason: SDUPTHER

## 2021-01-01 RX ORDER — FENTANYL CITRATE 50 UG/ML
50 INJECTION, SOLUTION INTRAMUSCULAR; INTRAVENOUS
Status: DISCONTINUED | OUTPATIENT
Start: 2021-01-01 | End: 2021-01-01 | Stop reason: HOSPADM

## 2021-01-01 RX ORDER — PROPOFOL 10 MG/ML
VIAL (ML) INTRAVENOUS AS NEEDED
Status: DISCONTINUED | OUTPATIENT
Start: 2021-01-01 | End: 2021-01-01 | Stop reason: SURG

## 2021-01-01 RX ORDER — FAMOTIDINE 20 MG/1
20 TABLET, FILM COATED ORAL 2 TIMES DAILY
Status: DISCONTINUED | OUTPATIENT
Start: 2021-01-01 | End: 2021-01-01

## 2021-01-01 RX ORDER — DIPHENHYDRAMINE HCL 25 MG
25 CAPSULE ORAL
Status: DISCONTINUED | OUTPATIENT
Start: 2021-01-01 | End: 2021-01-01 | Stop reason: HOSPADM

## 2021-01-01 RX ORDER — TRAZODONE HYDROCHLORIDE 150 MG/1
TABLET ORAL
Qty: 45 TABLET | Refills: 1 | Status: SHIPPED | OUTPATIENT
Start: 2021-01-01 | End: 2021-01-01 | Stop reason: HOSPADM

## 2021-01-01 RX ORDER — UREA 10 %
3 LOTION (ML) TOPICAL NIGHTLY PRN
Status: DISCONTINUED | OUTPATIENT
Start: 2021-01-01 | End: 2021-01-01 | Stop reason: HOSPADM

## 2021-01-01 RX ORDER — LORAZEPAM 0.5 MG/1
0.5 TABLET ORAL EVERY 8 HOURS PRN
Status: DISCONTINUED | OUTPATIENT
Start: 2021-01-01 | End: 2021-01-01 | Stop reason: HOSPADM

## 2021-01-01 RX ORDER — PROPOFOL 10 MG/ML
VIAL (ML) INTRAVENOUS CONTINUOUS PRN
Status: DISCONTINUED | OUTPATIENT
Start: 2021-01-01 | End: 2021-01-01 | Stop reason: SURG

## 2021-01-01 RX ORDER — TRAZODONE HYDROCHLORIDE 50 MG/1
75 TABLET ORAL NIGHTLY
Status: DISCONTINUED | OUTPATIENT
Start: 2021-01-01 | End: 2021-01-01 | Stop reason: HOSPADM

## 2021-01-01 RX ORDER — NIFEDIPINE 90 MG/1
TABLET, EXTENDED RELEASE ORAL
Qty: 30 TABLET | Refills: 0 | OUTPATIENT
Start: 2021-01-01

## 2021-01-01 RX ORDER — DOCUSATE SODIUM 100 MG/1
100 CAPSULE, LIQUID FILLED ORAL 2 TIMES DAILY PRN
Status: DISCONTINUED | OUTPATIENT
Start: 2021-01-01 | End: 2021-01-01 | Stop reason: HOSPADM

## 2021-01-01 RX ORDER — LIDOCAINE HYDROCHLORIDE 10 MG/ML
10 INJECTION, SOLUTION INFILTRATION; PERINEURAL ONCE
Status: COMPLETED | OUTPATIENT
Start: 2021-01-01 | End: 2021-01-01

## 2021-01-01 RX ORDER — HYDRALAZINE HYDROCHLORIDE 100 MG/1
100 TABLET, FILM COATED ORAL 3 TIMES DAILY
Qty: 90 TABLET | Refills: 0 | Status: SHIPPED | OUTPATIENT
Start: 2021-01-01 | End: 2021-01-01

## 2021-01-01 RX ORDER — SODIUM CHLORIDE 0.9 % (FLUSH) 0.9 %
10 SYRINGE (ML) INJECTION AS NEEDED
Status: DISCONTINUED | OUTPATIENT
Start: 2021-01-01 | End: 2021-01-01

## 2021-01-01 RX ORDER — PROMETHAZINE HYDROCHLORIDE 25 MG/1
25 TABLET ORAL ONCE AS NEEDED
Status: DISCONTINUED | OUTPATIENT
Start: 2021-01-01 | End: 2021-01-01 | Stop reason: HOSPADM

## 2021-01-01 RX ORDER — HEPARIN SODIUM 1000 [USP'U]/ML
4000 INJECTION, SOLUTION INTRAVENOUS; SUBCUTANEOUS AS NEEDED
Status: DISCONTINUED | OUTPATIENT
Start: 2021-01-01 | End: 2021-01-01 | Stop reason: HOSPADM

## 2021-01-01 RX ORDER — HYDROCODONE BITARTRATE AND ACETAMINOPHEN 5; 325 MG/1; MG/1
1 TABLET ORAL EVERY 6 HOURS PRN
Qty: 15 TABLET | Refills: 0 | Status: SHIPPED | OUTPATIENT
Start: 2021-01-01 | End: 2021-01-01

## 2021-01-01 RX ORDER — TERAZOSIN 5 MG/1
5 CAPSULE ORAL NIGHTLY
Qty: 30 CAPSULE | Refills: 0 | Status: SHIPPED | OUTPATIENT
Start: 2021-01-01 | End: 2021-01-01 | Stop reason: HOSPADM

## 2021-01-01 RX ORDER — FOLIC ACID 1 MG/1
TABLET ORAL
Qty: 30 TABLET | Refills: 2 | Status: SHIPPED | OUTPATIENT
Start: 2021-01-01 | End: 2022-01-01

## 2021-01-01 RX ORDER — POLYETHYLENE GLYCOL 3350 17 G/17G
17 POWDER, FOR SOLUTION ORAL EVERY EVENING
Status: DISCONTINUED | OUTPATIENT
Start: 2021-01-01 | End: 2021-01-01 | Stop reason: HOSPADM

## 2021-01-01 RX ORDER — ACETAMINOPHEN 325 MG/1
650 TABLET ORAL EVERY 4 HOURS PRN
Status: DISCONTINUED | OUTPATIENT
Start: 2021-01-01 | End: 2021-01-01 | Stop reason: HOSPADM

## 2021-01-01 RX ORDER — ALBUMIN (HUMAN) 12.5 G/50ML
12.5 SOLUTION INTRAVENOUS AS NEEDED
Status: DISCONTINUED | OUTPATIENT
Start: 2021-01-01 | End: 2021-01-01 | Stop reason: HOSPADM

## 2021-01-01 RX ORDER — IPRATROPIUM BROMIDE AND ALBUTEROL SULFATE 2.5; .5 MG/3ML; MG/3ML
3 SOLUTION RESPIRATORY (INHALATION) EVERY 6 HOURS PRN
Status: DISCONTINUED | OUTPATIENT
Start: 2021-01-01 | End: 2021-01-01 | Stop reason: HOSPADM

## 2021-01-01 RX ORDER — ROPIVACAINE HYDROCHLORIDE 5 MG/ML
INJECTION, SOLUTION EPIDURAL; INFILTRATION; PERINEURAL
Status: COMPLETED | OUTPATIENT
Start: 2021-01-01 | End: 2021-01-01

## 2021-01-01 RX ORDER — FAMOTIDINE 20 MG/1
20 TABLET, FILM COATED ORAL DAILY
Status: DISCONTINUED | OUTPATIENT
Start: 2021-01-01 | End: 2021-01-01 | Stop reason: HOSPADM

## 2021-01-01 RX ORDER — CHLORHEXIDINE GLUCONATE 500 MG/1
1 CLOTH TOPICAL
COMMUNITY
End: 2021-01-01 | Stop reason: HOSPADM

## 2021-01-01 RX ORDER — SODIUM BICARBONATE 650 MG/1
650 TABLET ORAL 2 TIMES DAILY
Status: DISCONTINUED | OUTPATIENT
Start: 2021-01-01 | End: 2021-01-01

## 2021-01-01 RX ORDER — TERAZOSIN 5 MG/1
5 CAPSULE ORAL NIGHTLY
Status: DISCONTINUED | OUTPATIENT
Start: 2021-01-01 | End: 2021-01-01 | Stop reason: HOSPADM

## 2021-01-01 RX ORDER — IPRATROPIUM BROMIDE AND ALBUTEROL SULFATE 2.5; .5 MG/3ML; MG/3ML
3 SOLUTION RESPIRATORY (INHALATION)
Status: DISCONTINUED | OUTPATIENT
Start: 2021-01-01 | End: 2021-01-01

## 2021-01-01 RX ORDER — CARVEDILOL 25 MG/1
25 TABLET ORAL 2 TIMES DAILY WITH MEALS
Status: DISCONTINUED | OUTPATIENT
Start: 2021-01-01 | End: 2021-01-01 | Stop reason: HOSPADM

## 2021-01-01 RX ORDER — OMEPRAZOLE 40 MG/1
CAPSULE, DELAYED RELEASE ORAL
Qty: 90 CAPSULE | Refills: 1 | Status: SHIPPED | OUTPATIENT
Start: 2021-01-01 | End: 2022-01-01 | Stop reason: SDUPTHER

## 2021-01-01 RX ORDER — PREDNISONE 20 MG/1
40 TABLET ORAL DAILY
Status: COMPLETED | OUTPATIENT
Start: 2021-01-01 | End: 2021-01-01

## 2021-01-01 RX ORDER — HYDROXYZINE HYDROCHLORIDE 10 MG/1
10 TABLET, FILM COATED ORAL 3 TIMES DAILY PRN
Status: DISCONTINUED | OUTPATIENT
Start: 2021-01-01 | End: 2021-01-01

## 2021-01-01 RX ORDER — FUROSEMIDE 10 MG/ML
40 INJECTION INTRAMUSCULAR; INTRAVENOUS EVERY 12 HOURS
Status: DISCONTINUED | OUTPATIENT
Start: 2021-01-01 | End: 2021-01-01

## 2021-01-01 RX ORDER — CYCLOBENZAPRINE HCL 10 MG
5 TABLET ORAL 3 TIMES DAILY PRN
Status: DISCONTINUED | OUTPATIENT
Start: 2021-01-01 | End: 2021-01-01

## 2021-01-01 RX ORDER — POTASSIUM CHLORIDE 750 MG/1
10 TABLET, EXTENDED RELEASE ORAL EVERY OTHER DAY
COMMUNITY
Start: 2021-01-01 | End: 2021-01-01 | Stop reason: HOSPADM

## 2021-01-01 RX ORDER — TORSEMIDE 20 MG/1
40 TABLET ORAL 2 TIMES DAILY
COMMUNITY
End: 2021-01-01 | Stop reason: HOSPADM

## 2021-01-01 RX ORDER — SODIUM CHLORIDE 9 MG/ML
9 INJECTION, SOLUTION INTRAVENOUS CONTINUOUS
Status: ACTIVE | OUTPATIENT
Start: 2021-01-01 | End: 2021-01-01

## 2021-01-01 RX ORDER — NITROGLYCERIN 0.4 MG/1
0.4 TABLET SUBLINGUAL
Status: DISCONTINUED | OUTPATIENT
Start: 2021-01-01 | End: 2021-01-01 | Stop reason: HOSPADM

## 2021-01-01 RX ORDER — BISACODYL 10 MG
10 SUPPOSITORY, RECTAL RECTAL DAILY PRN
Status: DISCONTINUED | OUTPATIENT
Start: 2021-01-01 | End: 2021-01-01 | Stop reason: HOSPADM

## 2021-01-01 RX ORDER — HYDRALAZINE HYDROCHLORIDE 50 MG/1
50 TABLET, FILM COATED ORAL 3 TIMES DAILY
Status: DISCONTINUED | OUTPATIENT
Start: 2021-01-01 | End: 2021-01-01

## 2021-01-01 RX ORDER — PROTAMINE SULFATE 10 MG/ML
INJECTION, SOLUTION INTRAVENOUS AS NEEDED
Status: DISCONTINUED | OUTPATIENT
Start: 2021-01-01 | End: 2021-01-01 | Stop reason: SURG

## 2021-01-01 RX ORDER — VENLAFAXINE HYDROCHLORIDE 150 MG/1
150 CAPSULE, EXTENDED RELEASE ORAL DAILY
Status: DISCONTINUED | OUTPATIENT
Start: 2021-01-01 | End: 2021-01-01 | Stop reason: HOSPADM

## 2021-01-01 RX ORDER — TRAZODONE HYDROCHLORIDE 50 MG/1
75 TABLET ORAL NIGHTLY
Status: DISCONTINUED | OUTPATIENT
Start: 2021-01-01 | End: 2021-01-01

## 2021-01-01 RX ORDER — ACETAMINOPHEN 325 MG/1
650 TABLET ORAL ONCE AS NEEDED
Status: DISCONTINUED | OUTPATIENT
Start: 2021-01-01 | End: 2021-01-01 | Stop reason: HOSPADM

## 2021-01-01 RX ORDER — MIDAZOLAM HYDROCHLORIDE 1 MG/ML
INJECTION INTRAMUSCULAR; INTRAVENOUS
Status: COMPLETED | OUTPATIENT
Start: 2021-01-01 | End: 2021-01-01

## 2021-01-01 RX ORDER — BISACODYL 5 MG/1
5 TABLET, DELAYED RELEASE ORAL DAILY PRN
Status: DISCONTINUED | OUTPATIENT
Start: 2021-01-01 | End: 2021-01-01 | Stop reason: HOSPADM

## 2021-01-01 RX ORDER — LORAZEPAM 0.5 MG/1
0.5 TABLET ORAL ONCE
Status: COMPLETED | OUTPATIENT
Start: 2021-01-01 | End: 2021-01-01

## 2021-01-01 RX ORDER — HYDROXYZINE HYDROCHLORIDE 25 MG/1
25 TABLET, FILM COATED ORAL 3 TIMES DAILY PRN
COMMUNITY

## 2021-01-01 RX ORDER — CARVEDILOL 25 MG/1
25 TABLET ORAL ONCE
Status: COMPLETED | OUTPATIENT
Start: 2021-01-01 | End: 2021-01-01

## 2021-01-01 RX ORDER — ASCORBIC ACID 500 MG
500 TABLET ORAL DAILY
Status: DISCONTINUED | OUTPATIENT
Start: 2021-01-01 | End: 2021-01-01 | Stop reason: HOSPADM

## 2021-01-01 RX ORDER — SODIUM CHLORIDE, SODIUM LACTATE, POTASSIUM CHLORIDE, CALCIUM CHLORIDE 600; 310; 30; 20 MG/100ML; MG/100ML; MG/100ML; MG/100ML
9 INJECTION, SOLUTION INTRAVENOUS CONTINUOUS
Status: DISCONTINUED | OUTPATIENT
Start: 2021-01-01 | End: 2021-01-01

## 2021-01-01 RX ORDER — AMOXICILLIN 500 MG/1
500 CAPSULE ORAL 2 TIMES DAILY
COMMUNITY
Start: 2021-01-01 | End: 2021-01-01

## 2021-01-01 RX ORDER — SERTRALINE HYDROCHLORIDE 100 MG/1
100 TABLET, FILM COATED ORAL DAILY
Qty: 90 TABLET | Refills: 1 | Status: SHIPPED | OUTPATIENT
Start: 2021-01-01

## 2021-01-01 RX ORDER — NIFEDIPINE 90 MG/1
90 TABLET, EXTENDED RELEASE ORAL EVERY EVENING
Status: DISCONTINUED | OUTPATIENT
Start: 2021-01-01 | End: 2021-01-01

## 2021-01-01 RX ORDER — LORAZEPAM 2 MG/ML
0.5 INJECTION INTRAMUSCULAR EVERY 8 HOURS PRN
Status: DISCONTINUED | OUTPATIENT
Start: 2021-01-01 | End: 2021-01-01

## 2021-01-01 RX ORDER — POTASSIUM CHLORIDE 750 MG/1
20 TABLET, FILM COATED, EXTENDED RELEASE ORAL ONCE
Status: COMPLETED | OUTPATIENT
Start: 2021-01-01 | End: 2021-01-01

## 2021-01-01 RX ORDER — HYDRALAZINE HYDROCHLORIDE 50 MG/1
50 TABLET, FILM COATED ORAL ONCE
Status: COMPLETED | OUTPATIENT
Start: 2021-01-01 | End: 2021-01-01

## 2021-01-01 RX ORDER — SODIUM CHLORIDE FOR INHALATION 7 %
4 VIAL, NEBULIZER (ML) INHALATION 4 TIMES DAILY
Status: DISCONTINUED | OUTPATIENT
Start: 2021-01-01 | End: 2021-01-01

## 2021-01-01 RX ORDER — CYCLOBENZAPRINE HCL 10 MG
5 TABLET ORAL 3 TIMES DAILY PRN
Status: DISCONTINUED | OUTPATIENT
Start: 2021-01-01 | End: 2021-01-01 | Stop reason: HOSPADM

## 2021-01-01 RX ADMIN — CARVEDILOL 25 MG: 25 TABLET, FILM COATED ORAL at 23:06

## 2021-01-01 RX ADMIN — BUMETANIDE 4 MG: 0.25 INJECTION, SOLUTION INTRAMUSCULAR; INTRAVENOUS at 00:27

## 2021-01-01 RX ADMIN — TRAZODONE HYDROCHLORIDE 75 MG: 50 TABLET ORAL at 21:27

## 2021-01-01 RX ADMIN — SUCRALFATE 1 G: 1 TABLET ORAL at 17:39

## 2021-01-01 RX ADMIN — FAMOTIDINE 20 MG: 10 INJECTION INTRAVENOUS at 07:36

## 2021-01-01 RX ADMIN — CALCIUM ACETATE 1334 MG: 667 CAPSULE ORAL at 09:50

## 2021-01-01 RX ADMIN — ACETAMINOPHEN 650 MG: 325 TABLET, FILM COATED ORAL at 16:26

## 2021-01-01 RX ADMIN — CALCIUM ACETATE 1334 MG: 667 CAPSULE ORAL at 11:52

## 2021-01-01 RX ADMIN — BUDESONIDE AND FORMOTEROL FUMARATE DIHYDRATE 2 PUFF: 160; 4.5 AEROSOL RESPIRATORY (INHALATION) at 07:56

## 2021-01-01 RX ADMIN — VENLAFAXINE HYDROCHLORIDE 150 MG: 150 CAPSULE, EXTENDED RELEASE ORAL at 08:40

## 2021-01-01 RX ADMIN — SODIUM CHLORIDE: 9 INJECTION, SOLUTION INTRAVENOUS at 10:53

## 2021-01-01 RX ADMIN — CLOPIDOGREL 75 MG: 75 TABLET, FILM COATED ORAL at 09:51

## 2021-01-01 RX ADMIN — CLOPIDOGREL 75 MG: 75 TABLET, FILM COATED ORAL at 11:42

## 2021-01-01 RX ADMIN — CARVEDILOL 25 MG: 25 TABLET, FILM COATED ORAL at 18:56

## 2021-01-01 RX ADMIN — OXYCODONE HYDROCHLORIDE 20 MG: 15 TABLET ORAL at 22:10

## 2021-01-01 RX ADMIN — SUCRALFATE 1 G: 1 TABLET ORAL at 10:05

## 2021-01-01 RX ADMIN — IPRATROPIUM BROMIDE AND ALBUTEROL SULFATE 3 ML: 2.5; .5 SOLUTION RESPIRATORY (INHALATION) at 11:22

## 2021-01-01 RX ADMIN — BUDESONIDE AND FORMOTEROL FUMARATE DIHYDRATE 2 PUFF: 160; 4.5 AEROSOL RESPIRATORY (INHALATION) at 09:02

## 2021-01-01 RX ADMIN — CARVEDILOL 25 MG: 25 TABLET, FILM COATED ORAL at 17:00

## 2021-01-01 RX ADMIN — HYDROMORPHONE HYDROCHLORIDE 0.5 MG: 1 INJECTION, SOLUTION INTRAMUSCULAR; INTRAVENOUS; SUBCUTANEOUS at 18:14

## 2021-01-01 RX ADMIN — BUDESONIDE AND FORMOTEROL FUMARATE DIHYDRATE 2 PUFF: 160; 4.5 AEROSOL RESPIRATORY (INHALATION) at 08:23

## 2021-01-01 RX ADMIN — SODIUM CHLORIDE, PRESERVATIVE FREE 10 ML: 5 INJECTION INTRAVENOUS at 08:44

## 2021-01-01 RX ADMIN — SUCRALFATE 1 G: 1 TABLET ORAL at 09:16

## 2021-01-01 RX ADMIN — LORAZEPAM 0.5 MG: 0.5 TABLET ORAL at 01:01

## 2021-01-01 RX ADMIN — FAMOTIDINE 20 MG: 10 INJECTION INTRAVENOUS at 08:33

## 2021-01-01 RX ADMIN — NIFEDIPINE 90 MG: 30 TABLET, FILM COATED, EXTENDED RELEASE ORAL at 18:02

## 2021-01-01 RX ADMIN — OXYCODONE HYDROCHLORIDE 20 MG: 15 TABLET ORAL at 16:12

## 2021-01-01 RX ADMIN — CARVEDILOL 25 MG: 25 TABLET, FILM COATED ORAL at 18:26

## 2021-01-01 RX ADMIN — ERYTHROPOIETIN 20000 UNITS: 20000 INJECTION, SOLUTION INTRAVENOUS; SUBCUTANEOUS at 12:45

## 2021-01-01 RX ADMIN — DOCUSATE SODIUM 50MG AND SENNOSIDES 8.6MG 2 TABLET: 8.6; 5 TABLET, FILM COATED ORAL at 09:51

## 2021-01-01 RX ADMIN — PROPOFOL 100 MCG/KG/MIN: 10 INJECTION, EMULSION INTRAVENOUS at 08:09

## 2021-01-01 RX ADMIN — CARVEDILOL 25 MG: 25 TABLET, FILM COATED ORAL at 08:41

## 2021-01-01 RX ADMIN — SODIUM CHLORIDE 4 ML: 7 NEBU SOLN,3 % NEBU at 21:33

## 2021-01-01 RX ADMIN — LIDOCAINE HYDROCHLORIDE 5 ML: 10 INJECTION, SOLUTION INFILTRATION; PERINEURAL at 13:28

## 2021-01-01 RX ADMIN — ERYTHROPOIETIN 20000 UNITS: 20000 INJECTION, SOLUTION INTRAVENOUS; SUBCUTANEOUS at 13:03

## 2021-01-01 RX ADMIN — ATORVASTATIN CALCIUM 40 MG: 20 TABLET, FILM COATED ORAL at 21:56

## 2021-01-01 RX ADMIN — BUDESONIDE AND FORMOTEROL FUMARATE DIHYDRATE 2 PUFF: 160; 4.5 AEROSOL RESPIRATORY (INHALATION) at 21:30

## 2021-01-01 RX ADMIN — HYDRALAZINE HYDROCHLORIDE 100 MG: 50 TABLET, FILM COATED ORAL at 21:53

## 2021-01-01 RX ADMIN — SODIUM BICARBONATE 1300 MG: 650 TABLET ORAL at 08:40

## 2021-01-01 RX ADMIN — FAMOTIDINE 20 MG: 20 TABLET, FILM COATED ORAL at 20:31

## 2021-01-01 RX ADMIN — SODIUM BICARBONATE 650 MG: 650 TABLET ORAL at 09:51

## 2021-01-01 RX ADMIN — POTASSIUM CHLORIDE 40 MEQ: 750 TABLET, EXTENDED RELEASE ORAL at 14:36

## 2021-01-01 RX ADMIN — ONDANSETRON 4 MG: 2 INJECTION INTRAMUSCULAR; INTRAVENOUS at 09:50

## 2021-01-01 RX ADMIN — HYDRALAZINE HYDROCHLORIDE 50 MG: 50 TABLET, FILM COATED ORAL at 16:15

## 2021-01-01 RX ADMIN — Medication 125 MCG/KG/MIN: at 16:00

## 2021-01-01 RX ADMIN — CLOPIDOGREL 75 MG: 75 TABLET, FILM COATED ORAL at 08:04

## 2021-01-01 RX ADMIN — POLYETHYLENE GLYCOL 3350 17 G: 17 POWDER, FOR SOLUTION ORAL at 18:02

## 2021-01-01 RX ADMIN — ONDANSETRON 4 MG: 2 INJECTION INTRAMUSCULAR; INTRAVENOUS at 19:00

## 2021-01-01 RX ADMIN — BUDESONIDE AND FORMOTEROL FUMARATE DIHYDRATE 2 PUFF: 160; 4.5 AEROSOL RESPIRATORY (INHALATION) at 20:40

## 2021-01-01 RX ADMIN — HYDROCODONE BITARTRATE AND ACETAMINOPHEN 1 TABLET: 7.5; 325 TABLET ORAL at 09:06

## 2021-01-01 RX ADMIN — POLYETHYLENE GLYCOL 3350 17 G: 17 POWDER, FOR SOLUTION ORAL at 17:59

## 2021-01-01 RX ADMIN — MIDAZOLAM 0.5 MG: 1 INJECTION INTRAMUSCULAR; INTRAVENOUS at 07:36

## 2021-01-01 RX ADMIN — PANTOPRAZOLE SODIUM 40 MG: 40 TABLET, DELAYED RELEASE ORAL at 06:19

## 2021-01-01 RX ADMIN — ATORVASTATIN CALCIUM 40 MG: 20 TABLET, FILM COATED ORAL at 00:17

## 2021-01-01 RX ADMIN — IPRATROPIUM BROMIDE AND ALBUTEROL SULFATE 3 ML: 2.5; .5 SOLUTION RESPIRATORY (INHALATION) at 20:09

## 2021-01-01 RX ADMIN — BARIUM SULFATE 4 ML: 980 POWDER, FOR SUSPENSION ORAL at 10:01

## 2021-01-01 RX ADMIN — CARVEDILOL 25 MG: 25 TABLET, FILM COATED ORAL at 08:44

## 2021-01-01 RX ADMIN — OXYCODONE HYDROCHLORIDE AND ACETAMINOPHEN 500 MG: 500 TABLET ORAL at 23:06

## 2021-01-01 RX ADMIN — ATORVASTATIN CALCIUM 40 MG: 20 TABLET, FILM COATED ORAL at 21:27

## 2021-01-01 RX ADMIN — AMLODIPINE BESYLATE 10 MG: 10 TABLET ORAL at 08:41

## 2021-01-01 RX ADMIN — BUDESONIDE AND FORMOTEROL FUMARATE DIHYDRATE 2 PUFF: 160; 4.5 AEROSOL RESPIRATORY (INHALATION) at 21:28

## 2021-01-01 RX ADMIN — SODIUM BICARBONATE 1300 MG: 650 TABLET ORAL at 10:05

## 2021-01-01 RX ADMIN — NIFEDIPINE 90 MG: 30 TABLET, FILM COATED, EXTENDED RELEASE ORAL at 17:22

## 2021-01-01 RX ADMIN — SERTRALINE HYDROCHLORIDE 50 MG: 50 TABLET, FILM COATED ORAL at 09:46

## 2021-01-01 RX ADMIN — BUDESONIDE AND FORMOTEROL FUMARATE DIHYDRATE 2 PUFF: 160; 4.5 AEROSOL RESPIRATORY (INHALATION) at 20:56

## 2021-01-01 RX ADMIN — CARVEDILOL 25 MG: 25 TABLET, FILM COATED ORAL at 08:52

## 2021-01-01 RX ADMIN — MIDAZOLAM 1 MG: 1 INJECTION INTRAMUSCULAR; INTRAVENOUS at 08:49

## 2021-01-01 RX ADMIN — SODIUM CHLORIDE, PRESERVATIVE FREE 10 ML: 5 INJECTION INTRAVENOUS at 09:29

## 2021-01-01 RX ADMIN — CLOPIDOGREL 75 MG: 75 TABLET, FILM COATED ORAL at 16:12

## 2021-01-01 RX ADMIN — TERAZOSIN HYDROCHLORIDE 5 MG: 5 CAPSULE ORAL at 23:06

## 2021-01-01 RX ADMIN — BARIUM SULFATE 55 ML: 0.81 POWDER, FOR SUSPENSION ORAL at 10:00

## 2021-01-01 RX ADMIN — TRAZODONE HYDROCHLORIDE 75 MG: 50 TABLET ORAL at 20:02

## 2021-01-01 RX ADMIN — SODIUM BICARBONATE 1300 MG: 650 TABLET ORAL at 23:06

## 2021-01-01 RX ADMIN — CEFAZOLIN SODIUM 2 G: 2 INJECTION, SOLUTION INTRAVENOUS at 15:41

## 2021-01-01 RX ADMIN — CARVEDILOL 25 MG: 25 TABLET, FILM COATED ORAL at 17:24

## 2021-01-01 RX ADMIN — AMLODIPINE BESYLATE 10 MG: 10 TABLET ORAL at 10:04

## 2021-01-01 RX ADMIN — BUDESONIDE AND FORMOTEROL FUMARATE DIHYDRATE 2 PUFF: 160; 4.5 AEROSOL RESPIRATORY (INHALATION) at 08:17

## 2021-01-01 RX ADMIN — CALCIUM ACETATE 1334 MG: 667 CAPSULE ORAL at 16:13

## 2021-01-01 RX ADMIN — SODIUM BICARBONATE 1300 MG: 650 TABLET ORAL at 09:14

## 2021-01-01 RX ADMIN — TRAZODONE HYDROCHLORIDE 75 MG: 50 TABLET ORAL at 21:23

## 2021-01-01 RX ADMIN — CARVEDILOL 25 MG: 25 TABLET, FILM COATED ORAL at 17:39

## 2021-01-01 RX ADMIN — SODIUM CHLORIDE 4 ML: 7 NEBU SOLN,3 % NEBU at 20:57

## 2021-01-01 RX ADMIN — FENTANYL CITRATE 25 MCG: 0.05 INJECTION, SOLUTION INTRAMUSCULAR; INTRAVENOUS at 16:19

## 2021-01-01 RX ADMIN — POLYETHYLENE GLYCOL 3350 17 G: 17 POWDER, FOR SOLUTION ORAL at 08:43

## 2021-01-01 RX ADMIN — ACETAMINOPHEN 650 MG: 325 TABLET, FILM COATED ORAL at 08:43

## 2021-01-01 RX ADMIN — ERYTHROPOIETIN 20000 UNITS: 20000 INJECTION, SOLUTION INTRAVENOUS; SUBCUTANEOUS at 12:11

## 2021-01-01 RX ADMIN — OXYCODONE HYDROCHLORIDE 20 MG: 15 TABLET ORAL at 11:57

## 2021-01-01 RX ADMIN — TRAZODONE HYDROCHLORIDE 75 MG: 50 TABLET ORAL at 21:01

## 2021-01-01 RX ADMIN — SERTRALINE HYDROCHLORIDE 50 MG: 50 TABLET, FILM COATED ORAL at 09:42

## 2021-01-01 RX ADMIN — HYDRALAZINE HYDROCHLORIDE 100 MG: 50 TABLET, FILM COATED ORAL at 14:50

## 2021-01-01 RX ADMIN — HYDROMORPHONE HYDROCHLORIDE 0.5 MG: 1 INJECTION, SOLUTION INTRAMUSCULAR; INTRAVENOUS; SUBCUTANEOUS at 17:18

## 2021-01-01 RX ADMIN — BUDESONIDE AND FORMOTEROL FUMARATE DIHYDRATE 2 PUFF: 160; 4.5 AEROSOL RESPIRATORY (INHALATION) at 20:32

## 2021-01-01 RX ADMIN — HYDRALAZINE HYDROCHLORIDE 100 MG: 50 TABLET, FILM COATED ORAL at 15:00

## 2021-01-01 RX ADMIN — BUDESONIDE AND FORMOTEROL FUMARATE DIHYDRATE 2 PUFF: 160; 4.5 AEROSOL RESPIRATORY (INHALATION) at 07:24

## 2021-01-01 RX ADMIN — ATORVASTATIN CALCIUM 40 MG: 20 TABLET, FILM COATED ORAL at 21:01

## 2021-01-01 RX ADMIN — ATORVASTATIN CALCIUM 40 MG: 20 TABLET, FILM COATED ORAL at 20:02

## 2021-01-01 RX ADMIN — SODIUM CHLORIDE, POTASSIUM CHLORIDE, SODIUM LACTATE AND CALCIUM CHLORIDE: 600; 310; 30; 20 INJECTION, SOLUTION INTRAVENOUS at 15:54

## 2021-01-01 RX ADMIN — HYDRALAZINE HYDROCHLORIDE 50 MG: 50 TABLET, FILM COATED ORAL at 10:52

## 2021-01-01 RX ADMIN — PREDNISONE 40 MG: 20 TABLET ORAL at 09:46

## 2021-01-01 RX ADMIN — TORSEMIDE 40 MG: 20 TABLET ORAL at 08:04

## 2021-01-01 RX ADMIN — HYDRALAZINE HYDROCHLORIDE 100 MG: 50 TABLET, FILM COATED ORAL at 06:35

## 2021-01-01 RX ADMIN — CARVEDILOL 25 MG: 25 TABLET, FILM COATED ORAL at 11:41

## 2021-01-01 RX ADMIN — MIDAZOLAM 1 MG: 1 INJECTION INTRAMUSCULAR; INTRAVENOUS at 08:51

## 2021-01-01 RX ADMIN — BUMETANIDE 2 MG: 0.25 INJECTION INTRAMUSCULAR; INTRAVENOUS at 11:02

## 2021-01-01 RX ADMIN — CETIRIZINE HYDROCHLORIDE ALLERGY 5 MG: 10 TABLET ORAL at 09:14

## 2021-01-01 RX ADMIN — POLYETHYLENE GLYCOL 3350 17 G: 17 POWDER, FOR SOLUTION ORAL at 09:55

## 2021-01-01 RX ADMIN — CETIRIZINE HYDROCHLORIDE ALLERGY 5 MG: 10 TABLET ORAL at 10:05

## 2021-01-01 RX ADMIN — FOLIC ACID 1 MG: 1 TABLET ORAL at 09:14

## 2021-01-01 RX ADMIN — ONDANSETRON HYDROCHLORIDE 4 MG: 4 TABLET, FILM COATED ORAL at 16:05

## 2021-01-01 RX ADMIN — CARVEDILOL 25 MG: 25 TABLET, FILM COATED ORAL at 16:14

## 2021-01-01 RX ADMIN — HYDRALAZINE HYDROCHLORIDE 100 MG: 50 TABLET, FILM COATED ORAL at 10:04

## 2021-01-01 RX ADMIN — POLYETHYLENE GLYCOL 3350 17 G: 17 POWDER, FOR SOLUTION ORAL at 17:22

## 2021-01-01 RX ADMIN — FAMOTIDINE 20 MG: 20 TABLET, FILM COATED ORAL at 08:03

## 2021-01-01 RX ADMIN — OXYCODONE HYDROCHLORIDE AND ACETAMINOPHEN 500 MG: 500 TABLET ORAL at 09:17

## 2021-01-01 RX ADMIN — PROPOFOL 100 MG: 10 INJECTION, EMULSION INTRAVENOUS at 11:06

## 2021-01-01 RX ADMIN — SERTRALINE HYDROCHLORIDE 50 MG: 50 TABLET, FILM COATED ORAL at 10:05

## 2021-01-01 RX ADMIN — CARVEDILOL 25 MG: 25 TABLET, FILM COATED ORAL at 09:45

## 2021-01-01 RX ADMIN — DOCUSATE SODIUM 50MG AND SENNOSIDES 8.6MG 2 TABLET: 8.6; 5 TABLET, FILM COATED ORAL at 00:17

## 2021-01-01 RX ADMIN — CARVEDILOL 25 MG: 25 TABLET, FILM COATED ORAL at 09:17

## 2021-01-01 RX ADMIN — LIDOCAINE HYDROCHLORIDE 80 MG: 20 INJECTION, SOLUTION INFILTRATION; PERINEURAL at 11:05

## 2021-01-01 RX ADMIN — BUMETANIDE 4 MG: 0.25 INJECTION INTRAMUSCULAR; INTRAVENOUS at 22:13

## 2021-01-01 RX ADMIN — ERYTHROPOIETIN 20000 UNITS: 20000 INJECTION, SOLUTION INTRAVENOUS; SUBCUTANEOUS at 13:44

## 2021-01-01 RX ADMIN — OXYCODONE HYDROCHLORIDE 20 MG: 15 TABLET ORAL at 10:10

## 2021-01-01 RX ADMIN — SUCRALFATE 1 G: 1 TABLET ORAL at 08:52

## 2021-01-01 RX ADMIN — IPRATROPIUM BROMIDE AND ALBUTEROL SULFATE 3 ML: 2.5; .5 SOLUTION RESPIRATORY (INHALATION) at 15:11

## 2021-01-01 RX ADMIN — SERTRALINE HYDROCHLORIDE 50 MG: 50 TABLET, FILM COATED ORAL at 09:51

## 2021-01-01 RX ADMIN — OXYCODONE HYDROCHLORIDE 20 MG: 15 TABLET ORAL at 20:31

## 2021-01-01 RX ADMIN — ACETAMINOPHEN 650 MG: 325 TABLET, FILM COATED ORAL at 09:50

## 2021-01-01 RX ADMIN — CARVEDILOL 25 MG: 25 TABLET, FILM COATED ORAL at 09:51

## 2021-01-01 RX ADMIN — ROPIVACAINE HYDROCHLORIDE 30 ML: 5 INJECTION, SOLUTION EPIDURAL; INFILTRATION; PERINEURAL at 08:56

## 2021-01-01 RX ADMIN — NIFEDIPINE 90 MG: 30 TABLET, FILM COATED, EXTENDED RELEASE ORAL at 17:24

## 2021-01-01 RX ADMIN — FENTANYL CITRATE 50 MCG: 0.05 INJECTION, SOLUTION INTRAMUSCULAR; INTRAVENOUS at 16:49

## 2021-01-01 RX ADMIN — NIFEDIPINE 90 MG: 30 TABLET, FILM COATED, EXTENDED RELEASE ORAL at 17:59

## 2021-01-01 RX ADMIN — ONDANSETRON 4 MG: 2 INJECTION INTRAMUSCULAR; INTRAVENOUS at 14:27

## 2021-01-01 RX ADMIN — OXYCODONE HYDROCHLORIDE 20 MG: 15 TABLET ORAL at 19:00

## 2021-01-01 RX ADMIN — SUCRALFATE 1 G: 1 TABLET ORAL at 08:41

## 2021-01-01 RX ADMIN — HYDRALAZINE HYDROCHLORIDE 50 MG: 50 TABLET, FILM COATED ORAL at 09:51

## 2021-01-01 RX ADMIN — SODIUM BICARBONATE 650 MG: 650 TABLET ORAL at 21:01

## 2021-01-01 RX ADMIN — VENLAFAXINE HYDROCHLORIDE 150 MG: 150 CAPSULE, EXTENDED RELEASE ORAL at 08:52

## 2021-01-01 RX ADMIN — HYDRALAZINE HYDROCHLORIDE 100 MG: 50 TABLET, FILM COATED ORAL at 06:39

## 2021-01-01 RX ADMIN — CARVEDILOL 25 MG: 25 TABLET, FILM COATED ORAL at 09:41

## 2021-01-01 RX ADMIN — FENTANYL CITRATE 50 MCG: 50 INJECTION, SOLUTION INTRAMUSCULAR; INTRAVENOUS at 09:05

## 2021-01-01 RX ADMIN — CEFDINIR 300 MG: 300 CAPSULE ORAL at 21:33

## 2021-01-01 RX ADMIN — LORAZEPAM 0.5 MG: 0.5 TABLET ORAL at 23:16

## 2021-01-01 RX ADMIN — OXYCODONE HYDROCHLORIDE AND ACETAMINOPHEN 500 MG: 500 TABLET ORAL at 08:42

## 2021-01-01 RX ADMIN — VENLAFAXINE HYDROCHLORIDE 150 MG: 150 CAPSULE, EXTENDED RELEASE ORAL at 09:46

## 2021-01-01 RX ADMIN — Medication 3 MG: at 22:10

## 2021-01-01 RX ADMIN — Medication 140 MCG/KG/MIN: at 11:07

## 2021-01-01 RX ADMIN — SERTRALINE HYDROCHLORIDE 50 MG: 50 TABLET, FILM COATED ORAL at 08:59

## 2021-01-01 RX ADMIN — FENTANYL CITRATE 50 MCG: 0.05 INJECTION, SOLUTION INTRAMUSCULAR; INTRAVENOUS at 18:01

## 2021-01-01 RX ADMIN — PROPOFOL 50 MG: 10 INJECTION, EMULSION INTRAVENOUS at 16:00

## 2021-01-01 RX ADMIN — POLYETHYLENE GLYCOL 3350 17 G: 17 POWDER, FOR SOLUTION ORAL at 10:06

## 2021-01-01 RX ADMIN — HYDROMORPHONE HYDROCHLORIDE 1 MG: 1 INJECTION, SOLUTION INTRAMUSCULAR; INTRAVENOUS; SUBCUTANEOUS at 18:36

## 2021-01-01 RX ADMIN — LORAZEPAM 0.5 MG: 0.5 TABLET ORAL at 00:18

## 2021-01-01 RX ADMIN — CALCIUM ACETATE 1334 MG: 667 CAPSULE ORAL at 09:41

## 2021-01-01 RX ADMIN — BUDESONIDE AND FORMOTEROL FUMARATE DIHYDRATE 2 PUFF: 160; 4.5 AEROSOL RESPIRATORY (INHALATION) at 09:16

## 2021-01-01 RX ADMIN — NIFEDIPINE 90 MG: 30 TABLET, FILM COATED, EXTENDED RELEASE ORAL at 16:12

## 2021-01-01 RX ADMIN — VENLAFAXINE HYDROCHLORIDE 150 MG: 150 CAPSULE, EXTENDED RELEASE ORAL at 10:05

## 2021-01-01 RX ADMIN — FENTANYL CITRATE 25 MCG: 50 INJECTION INTRAMUSCULAR; INTRAVENOUS at 13:42

## 2021-01-01 RX ADMIN — IPRATROPIUM BROMIDE AND ALBUTEROL SULFATE 3 ML: 2.5; .5 SOLUTION RESPIRATORY (INHALATION) at 11:35

## 2021-01-01 RX ADMIN — CARVEDILOL 25 MG: 25 TABLET, FILM COATED ORAL at 17:54

## 2021-01-01 RX ADMIN — CEFAZOLIN SODIUM 2 G: 2 INJECTION, SOLUTION INTRAVENOUS at 10:45

## 2021-01-01 RX ADMIN — SERTRALINE HYDROCHLORIDE 50 MG: 50 TABLET, FILM COATED ORAL at 08:55

## 2021-01-01 RX ADMIN — ATORVASTATIN CALCIUM 40 MG: 20 TABLET, FILM COATED ORAL at 21:34

## 2021-01-01 RX ADMIN — PANTOPRAZOLE SODIUM 40 MG: 40 TABLET, DELAYED RELEASE ORAL at 06:22

## 2021-01-01 RX ADMIN — TRAZODONE HYDROCHLORIDE 75 MG: 50 TABLET ORAL at 23:05

## 2021-01-01 RX ADMIN — SODIUM BICARBONATE 1300 MG: 650 TABLET ORAL at 14:49

## 2021-01-01 RX ADMIN — SODIUM BICARBONATE 1300 MG: 650 TABLET ORAL at 21:27

## 2021-01-01 RX ADMIN — LIDOCAINE HYDROCHLORIDE 80 MG: 20 INJECTION, SOLUTION INFILTRATION; PERINEURAL at 08:08

## 2021-01-01 RX ADMIN — POTASSIUM CHLORIDE 40 MEQ: 750 TABLET, EXTENDED RELEASE ORAL at 14:27

## 2021-01-01 RX ADMIN — CEFAZOLIN SODIUM 2 G: 2 INJECTION, SOLUTION INTRAVENOUS at 07:59

## 2021-01-01 RX ADMIN — CLOPIDOGREL 75 MG: 75 TABLET, FILM COATED ORAL at 09:46

## 2021-01-01 RX ADMIN — FUROSEMIDE 40 MG: 10 INJECTION, SOLUTION INTRAMUSCULAR; INTRAVENOUS at 20:31

## 2021-01-01 RX ADMIN — SODIUM BICARBONATE 1300 MG: 650 TABLET ORAL at 16:55

## 2021-01-01 RX ADMIN — BUDESONIDE AND FORMOTEROL FUMARATE DIHYDRATE 2 PUFF: 160; 4.5 AEROSOL RESPIRATORY (INHALATION) at 08:30

## 2021-01-01 RX ADMIN — OXYCODONE HYDROCHLORIDE 20 MG: 15 TABLET ORAL at 02:31

## 2021-01-01 RX ADMIN — TERAZOSIN HYDROCHLORIDE 5 MG: 5 CAPSULE ORAL at 21:46

## 2021-01-01 RX ADMIN — CARVEDILOL 25 MG: 25 TABLET, FILM COATED ORAL at 10:06

## 2021-01-01 RX ADMIN — BARIUM SULFATE 50 ML: 400 SUSPENSION ORAL at 10:01

## 2021-01-01 RX ADMIN — HYDRALAZINE HYDROCHLORIDE 50 MG: 50 TABLET, FILM COATED ORAL at 19:00

## 2021-01-01 RX ADMIN — HYDROCODONE BITARTRATE AND ACETAMINOPHEN 1 TABLET: 7.5; 325 TABLET ORAL at 17:24

## 2021-01-01 RX ADMIN — HYDRALAZINE HYDROCHLORIDE 100 MG: 50 TABLET, FILM COATED ORAL at 22:10

## 2021-01-01 RX ADMIN — HYDRALAZINE HYDROCHLORIDE 100 MG: 50 TABLET, FILM COATED ORAL at 06:04

## 2021-01-01 RX ADMIN — CARVEDILOL 25 MG: 25 TABLET, FILM COATED ORAL at 08:04

## 2021-01-01 RX ADMIN — SODIUM CHLORIDE, PRESERVATIVE FREE 10 ML: 5 INJECTION INTRAVENOUS at 21:22

## 2021-01-01 RX ADMIN — LORAZEPAM 0.5 MG: 2 INJECTION INTRAMUSCULAR; INTRAVENOUS at 17:48

## 2021-01-01 RX ADMIN — FAMOTIDINE 20 MG: 10 INJECTION INTRAVENOUS at 14:11

## 2021-01-01 RX ADMIN — ONDANSETRON 4 MG: 2 INJECTION INTRAMUSCULAR; INTRAVENOUS at 13:34

## 2021-01-01 RX ADMIN — POLYETHYLENE GLYCOL 3350 17 G: 17 POWDER, FOR SOLUTION ORAL at 08:07

## 2021-01-01 RX ADMIN — ATORVASTATIN CALCIUM 40 MG: 20 TABLET, FILM COATED ORAL at 21:24

## 2021-01-01 RX ADMIN — OXYCODONE HYDROCHLORIDE AND ACETAMINOPHEN 500 MG: 500 TABLET ORAL at 08:04

## 2021-01-01 RX ADMIN — DOCUSATE SODIUM 50MG AND SENNOSIDES 8.6MG 2 TABLET: 8.6; 5 TABLET, FILM COATED ORAL at 08:44

## 2021-01-01 RX ADMIN — SERTRALINE HYDROCHLORIDE 50 MG: 50 TABLET, FILM COATED ORAL at 11:42

## 2021-01-01 RX ADMIN — CETIRIZINE HYDROCHLORIDE ALLERGY 5 MG: 10 TABLET ORAL at 08:41

## 2021-01-01 RX ADMIN — CETIRIZINE HYDROCHLORIDE ALLERGY 5 MG: 10 TABLET ORAL at 08:51

## 2021-01-01 RX ADMIN — PANTOPRAZOLE SODIUM 40 MG: 40 TABLET, DELAYED RELEASE ORAL at 06:39

## 2021-01-01 RX ADMIN — POTASSIUM CHLORIDE 40 MEQ: 750 TABLET, EXTENDED RELEASE ORAL at 17:44

## 2021-01-01 RX ADMIN — BUMETANIDE 4 MG: 0.25 INJECTION, SOLUTION INTRAMUSCULAR; INTRAVENOUS at 06:39

## 2021-01-01 RX ADMIN — LORAZEPAM 0.5 MG: 2 INJECTION INTRAMUSCULAR; INTRAVENOUS at 18:54

## 2021-01-01 RX ADMIN — SODIUM CHLORIDE 4 ML: 7 NEBU SOLN,3 % NEBU at 07:09

## 2021-01-01 RX ADMIN — POLYETHYLENE GLYCOL 3350 17 G: 17 POWDER, FOR SOLUTION ORAL at 18:26

## 2021-01-01 RX ADMIN — OXYCODONE HYDROCHLORIDE 20 MG: 15 TABLET ORAL at 16:29

## 2021-01-01 RX ADMIN — BUMETANIDE 4 MG: 0.25 INJECTION INTRAMUSCULAR; INTRAVENOUS at 15:20

## 2021-01-01 RX ADMIN — ONDANSETRON 4 MG: 2 INJECTION INTRAMUSCULAR; INTRAVENOUS at 16:26

## 2021-01-01 RX ADMIN — SUCRALFATE 1 G: 1 TABLET ORAL at 16:56

## 2021-01-01 RX ADMIN — DOCUSATE SODIUM 50MG AND SENNOSIDES 8.6MG 2 TABLET: 8.6; 5 TABLET, FILM COATED ORAL at 08:59

## 2021-01-01 RX ADMIN — SODIUM BICARBONATE 1300 MG: 650 TABLET ORAL at 22:13

## 2021-01-01 RX ADMIN — TIOTROPIUM BROMIDE INHALATION SPRAY 2 PUFF: 3.12 SPRAY, METERED RESPIRATORY (INHALATION) at 07:25

## 2021-01-01 RX ADMIN — SERTRALINE HYDROCHLORIDE 50 MG: 50 TABLET, FILM COATED ORAL at 09:17

## 2021-01-01 RX ADMIN — FENTANYL CITRATE 50 MCG: 0.05 INJECTION, SOLUTION INTRAMUSCULAR; INTRAVENOUS at 08:49

## 2021-01-01 RX ADMIN — SUCRALFATE 1 G: 1 TABLET ORAL at 06:03

## 2021-01-01 RX ADMIN — ONDANSETRON 4 MG: 2 INJECTION INTRAMUSCULAR; INTRAVENOUS at 18:36

## 2021-01-01 RX ADMIN — FAMOTIDINE 20 MG: 20 TABLET, FILM COATED ORAL at 09:55

## 2021-01-01 RX ADMIN — HYDRALAZINE HYDROCHLORIDE 100 MG: 50 TABLET, FILM COATED ORAL at 07:50

## 2021-01-01 RX ADMIN — CALCIUM ACETATE 1334 MG: 667 CAPSULE ORAL at 17:24

## 2021-01-01 RX ADMIN — CLOPIDOGREL 75 MG: 75 TABLET, FILM COATED ORAL at 08:44

## 2021-01-01 RX ADMIN — SODIUM CHLORIDE, POTASSIUM CHLORIDE, SODIUM LACTATE AND CALCIUM CHLORIDE 9 ML/HR: 600; 310; 30; 20 INJECTION, SOLUTION INTRAVENOUS at 07:36

## 2021-01-01 RX ADMIN — ONDANSETRON 4 MG: 2 INJECTION INTRAMUSCULAR; INTRAVENOUS at 16:29

## 2021-01-01 RX ADMIN — PREDNISONE 40 MG: 20 TABLET ORAL at 14:49

## 2021-01-01 RX ADMIN — SERTRALINE HYDROCHLORIDE 50 MG: 50 TABLET, FILM COATED ORAL at 16:13

## 2021-01-01 RX ADMIN — IPRATROPIUM BROMIDE AND ALBUTEROL SULFATE 3 ML: 2.5; .5 SOLUTION RESPIRATORY (INHALATION) at 00:56

## 2021-01-01 RX ADMIN — SODIUM CHLORIDE, PRESERVATIVE FREE 10 ML: 5 INJECTION INTRAVENOUS at 21:01

## 2021-01-01 RX ADMIN — FENTANYL CITRATE 50 MCG: 0.05 INJECTION, SOLUTION INTRAMUSCULAR; INTRAVENOUS at 16:59

## 2021-01-01 RX ADMIN — FOLIC ACID 1 MG: 1 TABLET ORAL at 10:05

## 2021-01-01 RX ADMIN — FAMOTIDINE 20 MG: 20 TABLET, FILM COATED ORAL at 09:15

## 2021-01-01 RX ADMIN — HYDROCODONE BITARTRATE AND ACETAMINOPHEN 1 TABLET: 7.5; 325 TABLET ORAL at 13:41

## 2021-01-01 RX ADMIN — ATORVASTATIN CALCIUM 40 MG: 20 TABLET, FILM COATED ORAL at 21:12

## 2021-01-01 RX ADMIN — CLOPIDOGREL 75 MG: 75 TABLET, FILM COATED ORAL at 08:52

## 2021-01-01 RX ADMIN — HEPARIN SODIUM 4000 UNITS: 1000 INJECTION INTRAVENOUS; SUBCUTANEOUS at 14:17

## 2021-01-01 RX ADMIN — CLOPIDOGREL 75 MG: 75 TABLET, FILM COATED ORAL at 08:59

## 2021-01-01 RX ADMIN — OXYCODONE HYDROCHLORIDE 20 MG: 15 TABLET ORAL at 06:24

## 2021-01-01 RX ADMIN — HEPARIN SODIUM 5000 UNITS: 1000 INJECTION INTRAVENOUS; SUBCUTANEOUS at 11:36

## 2021-01-01 RX ADMIN — SODIUM BICARBONATE 1300 MG: 650 TABLET ORAL at 17:54

## 2021-01-01 RX ADMIN — PANTOPRAZOLE SODIUM 40 MG: 40 TABLET, DELAYED RELEASE ORAL at 06:14

## 2021-01-01 RX ADMIN — HYDRALAZINE HYDROCHLORIDE 100 MG: 50 TABLET, FILM COATED ORAL at 21:27

## 2021-01-01 RX ADMIN — HYDRALAZINE HYDROCHLORIDE 100 MG: 50 TABLET, FILM COATED ORAL at 14:36

## 2021-01-01 RX ADMIN — ONDANSETRON HYDROCHLORIDE 4 MG: 4 TABLET, FILM COATED ORAL at 16:33

## 2021-01-01 RX ADMIN — CYCLOBENZAPRINE 5 MG: 10 TABLET, FILM COATED ORAL at 19:32

## 2021-01-01 RX ADMIN — CARVEDILOL 25 MG: 25 TABLET, FILM COATED ORAL at 18:02

## 2021-01-01 RX ADMIN — BUDESONIDE AND FORMOTEROL FUMARATE DIHYDRATE 2 PUFF: 160; 4.5 AEROSOL RESPIRATORY (INHALATION) at 19:31

## 2021-01-01 RX ADMIN — TERAZOSIN HYDROCHLORIDE 5 MG: 5 CAPSULE ORAL at 21:23

## 2021-01-01 RX ADMIN — IPRATROPIUM BROMIDE AND ALBUTEROL SULFATE 3 ML: 2.5; .5 SOLUTION RESPIRATORY (INHALATION) at 07:04

## 2021-01-01 RX ADMIN — OXYCODONE HYDROCHLORIDE 20 MG: 15 TABLET ORAL at 23:24

## 2021-01-01 RX ADMIN — ERYTHROPOIETIN 20000 UNITS: 20000 INJECTION, SOLUTION INTRAVENOUS; SUBCUTANEOUS at 13:11

## 2021-01-01 RX ADMIN — DOCUSATE SODIUM 50MG AND SENNOSIDES 8.6MG 2 TABLET: 8.6; 5 TABLET, FILM COATED ORAL at 09:41

## 2021-01-01 RX ADMIN — IPRATROPIUM BROMIDE AND ALBUTEROL SULFATE 3 ML: 2.5; .5 SOLUTION RESPIRATORY (INHALATION) at 07:02

## 2021-01-01 RX ADMIN — BUMETANIDE 4 MG: 0.25 INJECTION, SOLUTION INTRAMUSCULAR; INTRAVENOUS at 14:49

## 2021-01-01 RX ADMIN — HYDRALAZINE HYDROCHLORIDE 100 MG: 50 TABLET, FILM COATED ORAL at 00:27

## 2021-01-01 RX ADMIN — OXYCODONE HYDROCHLORIDE 20 MG: 15 TABLET ORAL at 16:05

## 2021-01-01 RX ADMIN — NIFEDIPINE 90 MG: 60 TABLET, FILM COATED, EXTENDED RELEASE ORAL at 08:04

## 2021-01-01 RX ADMIN — FOLIC ACID 1 MG: 1 TABLET ORAL at 09:46

## 2021-01-01 RX ADMIN — CETIRIZINE HYDROCHLORIDE ALLERGY 5 MG: 10 TABLET ORAL at 23:06

## 2021-01-01 RX ADMIN — HEPARIN SODIUM 4000 UNITS: 1000 INJECTION INTRAVENOUS; SUBCUTANEOUS at 17:32

## 2021-01-01 RX ADMIN — SUCRALFATE 1 G: 1 TABLET ORAL at 06:47

## 2021-01-01 RX ADMIN — OXYCODONE HYDROCHLORIDE AND ACETAMINOPHEN 500 MG: 500 TABLET ORAL at 09:45

## 2021-01-01 RX ADMIN — CARVEDILOL 25 MG: 25 TABLET, FILM COATED ORAL at 17:22

## 2021-01-01 RX ADMIN — ERYTHROPOIETIN 25000 UNITS: 20000 INJECTION, SOLUTION INTRAVENOUS; SUBCUTANEOUS at 13:23

## 2021-01-01 RX ADMIN — SERTRALINE HYDROCHLORIDE 50 MG: 50 TABLET, FILM COATED ORAL at 08:44

## 2021-01-01 RX ADMIN — OXYCODONE HYDROCHLORIDE 20 MG: 15 TABLET ORAL at 09:55

## 2021-01-01 RX ADMIN — VENLAFAXINE HYDROCHLORIDE 150 MG: 150 CAPSULE, EXTENDED RELEASE ORAL at 08:05

## 2021-01-01 RX ADMIN — FAMOTIDINE 20 MG: 20 TABLET, FILM COATED ORAL at 08:41

## 2021-01-01 RX ADMIN — FOLIC ACID 1 MG: 1 TABLET ORAL at 08:04

## 2021-01-01 RX ADMIN — NIFEDIPINE 90 MG: 60 TABLET, FILM COATED, EXTENDED RELEASE ORAL at 15:01

## 2021-01-01 RX ADMIN — CLOPIDOGREL 75 MG: 75 TABLET, FILM COATED ORAL at 10:05

## 2021-01-01 RX ADMIN — OXYCODONE HYDROCHLORIDE AND ACETAMINOPHEN 500 MG: 500 TABLET ORAL at 10:04

## 2021-01-01 RX ADMIN — HYDRALAZINE HYDROCHLORIDE 100 MG: 50 TABLET, FILM COATED ORAL at 21:24

## 2021-01-01 RX ADMIN — SODIUM BICARBONATE 1300 MG: 650 TABLET ORAL at 21:46

## 2021-01-01 RX ADMIN — CLOPIDOGREL 75 MG: 75 TABLET, FILM COATED ORAL at 08:42

## 2021-01-01 RX ADMIN — TORSEMIDE 40 MG: 20 TABLET ORAL at 17:46

## 2021-01-01 RX ADMIN — OXYCODONE HYDROCHLORIDE 20 MG: 15 TABLET ORAL at 18:50

## 2021-01-01 RX ADMIN — HEPARIN SODIUM 3800 UNITS: 1000 INJECTION INTRAVENOUS; SUBCUTANEOUS at 00:00

## 2021-01-01 RX ADMIN — BUDESONIDE AND FORMOTEROL FUMARATE DIHYDRATE 2 PUFF: 160; 4.5 AEROSOL RESPIRATORY (INHALATION) at 07:21

## 2021-01-01 RX ADMIN — VENLAFAXINE HYDROCHLORIDE 150 MG: 150 CAPSULE, EXTENDED RELEASE ORAL at 09:14

## 2021-01-01 RX ADMIN — PROPOFOL 70 MG: 10 INJECTION, EMULSION INTRAVENOUS at 08:08

## 2021-01-01 RX ADMIN — SODIUM CHLORIDE, PRESERVATIVE FREE 10 ML: 5 INJECTION INTRAVENOUS at 02:15

## 2021-01-01 RX ADMIN — OXYCODONE HYDROCHLORIDE 20 MG: 15 TABLET ORAL at 22:08

## 2021-01-01 RX ADMIN — BARIUM SULFATE 1 TEASPOON(S): 0.6 CREAM ORAL at 10:01

## 2021-01-01 RX ADMIN — HYDRALAZINE HYDROCHLORIDE 100 MG: 50 TABLET, FILM COATED ORAL at 15:17

## 2021-01-01 RX ADMIN — CARVEDILOL 25 MG: 25 TABLET, FILM COATED ORAL at 08:59

## 2021-01-01 RX ADMIN — FAMOTIDINE 20 MG: 20 TABLET, FILM COATED ORAL at 10:04

## 2021-01-01 RX ADMIN — OXYCODONE HYDROCHLORIDE 20 MG: 15 TABLET ORAL at 00:42

## 2021-01-01 RX ADMIN — HYDRALAZINE HYDROCHLORIDE 100 MG: 50 TABLET, FILM COATED ORAL at 14:45

## 2021-01-01 RX ADMIN — FENTANYL CITRATE 50 MCG: 50 INJECTION INTRAMUSCULAR; INTRAVENOUS at 14:11

## 2021-01-01 RX ADMIN — FENTANYL CITRATE 50 MCG: 50 INJECTION, SOLUTION INTRAMUSCULAR; INTRAVENOUS at 08:53

## 2021-01-01 RX ADMIN — TERAZOSIN HYDROCHLORIDE 5 MG: 5 CAPSULE ORAL at 21:01

## 2021-01-01 RX ADMIN — SERTRALINE HYDROCHLORIDE 50 MG: 50 TABLET, FILM COATED ORAL at 08:51

## 2021-01-01 RX ADMIN — OXYCODONE HYDROCHLORIDE 20 MG: 15 TABLET ORAL at 16:33

## 2021-01-01 RX ADMIN — FAMOTIDINE 20 MG: 20 TABLET, FILM COATED ORAL at 08:52

## 2021-01-01 RX ADMIN — TRAZODONE HYDROCHLORIDE 75 MG: 50 TABLET ORAL at 21:46

## 2021-01-01 RX ADMIN — BUDESONIDE AND FORMOTEROL FUMARATE DIHYDRATE 2 PUFF: 160; 4.5 AEROSOL RESPIRATORY (INHALATION) at 20:22

## 2021-01-01 RX ADMIN — BUDESONIDE AND FORMOTEROL FUMARATE DIHYDRATE 2 PUFF: 160; 4.5 AEROSOL RESPIRATORY (INHALATION) at 11:51

## 2021-01-01 RX ADMIN — TERAZOSIN HYDROCHLORIDE 5 MG: 5 CAPSULE ORAL at 21:53

## 2021-01-01 RX ADMIN — HYDRALAZINE HYDROCHLORIDE 50 MG: 50 TABLET, FILM COATED ORAL at 09:41

## 2021-01-01 RX ADMIN — AMLODIPINE BESYLATE 10 MG: 10 TABLET ORAL at 09:16

## 2021-01-01 RX ADMIN — SODIUM CHLORIDE 4 ML: 7 NEBU SOLN,3 % NEBU at 11:27

## 2021-01-01 RX ADMIN — HYDRALAZINE HYDROCHLORIDE 100 MG: 50 TABLET, FILM COATED ORAL at 08:02

## 2021-01-01 RX ADMIN — SODIUM CHLORIDE, PRESERVATIVE FREE 10 ML: 5 INJECTION INTRAVENOUS at 10:53

## 2021-01-01 RX ADMIN — SODIUM BICARBONATE 650 MG: 650 TABLET ORAL at 09:41

## 2021-01-01 RX ADMIN — BUDESONIDE AND FORMOTEROL FUMARATE DIHYDRATE 2 PUFF: 160; 4.5 AEROSOL RESPIRATORY (INHALATION) at 19:14

## 2021-01-01 RX ADMIN — AMLODIPINE BESYLATE 10 MG: 10 TABLET ORAL at 08:51

## 2021-01-01 RX ADMIN — POLYETHYLENE GLYCOL 3350 17 G: 17 POWDER, FOR SOLUTION ORAL at 17:27

## 2021-01-01 RX ADMIN — CARVEDILOL 25 MG: 25 TABLET, FILM COATED ORAL at 10:51

## 2021-01-01 RX ADMIN — CARVEDILOL 25 MG: 25 TABLET, FILM COATED ORAL at 09:50

## 2021-01-01 RX ADMIN — CARVEDILOL 25 MG: 25 TABLET, FILM COATED ORAL at 17:59

## 2021-01-01 RX ADMIN — IPRATROPIUM BROMIDE AND ALBUTEROL SULFATE 3 ML: 2.5; .5 SOLUTION RESPIRATORY (INHALATION) at 16:28

## 2021-01-01 RX ADMIN — PANTOPRAZOLE SODIUM 40 MG: 40 TABLET, DELAYED RELEASE ORAL at 06:23

## 2021-01-01 RX ADMIN — SERTRALINE HYDROCHLORIDE 50 MG: 50 TABLET, FILM COATED ORAL at 08:04

## 2021-01-01 RX ADMIN — POTASSIUM CHLORIDE 20 MEQ: 750 TABLET, EXTENDED RELEASE ORAL at 10:06

## 2021-01-01 RX ADMIN — ONDANSETRON 4 MG: 2 INJECTION INTRAMUSCULAR; INTRAVENOUS at 01:30

## 2021-01-01 RX ADMIN — FOLIC ACID 1 MG: 1 TABLET ORAL at 08:42

## 2021-01-01 RX ADMIN — CETIRIZINE HYDROCHLORIDE ALLERGY 5 MG: 10 TABLET ORAL at 08:04

## 2021-01-01 RX ADMIN — ATORVASTATIN CALCIUM 40 MG: 20 TABLET, FILM COATED ORAL at 20:31

## 2021-01-01 RX ADMIN — ATORVASTATIN CALCIUM 40 MG: 20 TABLET, FILM COATED ORAL at 20:06

## 2021-01-01 RX ADMIN — SUCRALFATE 1 G: 1 TABLET ORAL at 18:56

## 2021-01-01 RX ADMIN — BUDESONIDE AND FORMOTEROL FUMARATE DIHYDRATE 2 PUFF: 160; 4.5 AEROSOL RESPIRATORY (INHALATION) at 11:11

## 2021-01-01 RX ADMIN — SODIUM BICARBONATE 1300 MG: 650 TABLET ORAL at 09:46

## 2021-01-01 RX ADMIN — OXYCODONE HYDROCHLORIDE AND ACETAMINOPHEN 500 MG: 500 TABLET ORAL at 08:52

## 2021-01-01 RX ADMIN — HYDROMORPHONE HYDROCHLORIDE 0.5 MG: 1 INJECTION, SOLUTION INTRAMUSCULAR; INTRAVENOUS; SUBCUTANEOUS at 17:38

## 2021-01-01 RX ADMIN — SODIUM CHLORIDE, PRESERVATIVE FREE 10 ML: 5 INJECTION INTRAVENOUS at 09:43

## 2021-01-01 RX ADMIN — SUCRALFATE 1 G: 1 TABLET ORAL at 17:44

## 2021-01-01 RX ADMIN — FOLIC ACID 1 MG: 1 TABLET ORAL at 08:51

## 2021-01-01 RX ADMIN — TERAZOSIN HYDROCHLORIDE 5 MG: 5 CAPSULE ORAL at 20:01

## 2021-01-01 RX ADMIN — ATORVASTATIN CALCIUM 40 MG: 20 TABLET, FILM COATED ORAL at 21:46

## 2021-01-01 RX ADMIN — SODIUM CHLORIDE 9 ML/HR: 9 INJECTION, SOLUTION INTRAVENOUS at 14:12

## 2021-01-01 RX ADMIN — HYDRALAZINE HYDROCHLORIDE 100 MG: 50 TABLET, FILM COATED ORAL at 06:24

## 2021-01-01 RX ADMIN — CETIRIZINE HYDROCHLORIDE ALLERGY 5 MG: 10 TABLET ORAL at 09:46

## 2021-01-01 RX ADMIN — NIFEDIPINE 90 MG: 60 TABLET, FILM COATED, EXTENDED RELEASE ORAL at 10:52

## 2021-01-01 RX ADMIN — SUCRALFATE 1 G: 1 TABLET ORAL at 17:54

## 2021-01-01 RX ADMIN — LIDOCAINE HYDROCHLORIDE 40 MG: 20 INJECTION, SOLUTION INFILTRATION; PERINEURAL at 15:58

## 2021-01-01 RX ADMIN — ERYTHROPOIETIN 25000 UNITS: 20000 INJECTION, SOLUTION INTRAVENOUS; SUBCUTANEOUS at 09:17

## 2021-01-01 RX ADMIN — IPRATROPIUM BROMIDE AND ALBUTEROL SULFATE 3 ML: 2.5; .5 SOLUTION RESPIRATORY (INHALATION) at 11:31

## 2021-01-01 RX ADMIN — HYDROMORPHONE HYDROCHLORIDE 0.5 MG: 1 INJECTION, SOLUTION INTRAMUSCULAR; INTRAVENOUS; SUBCUTANEOUS at 17:52

## 2021-01-01 RX ADMIN — AMLODIPINE BESYLATE 10 MG: 10 TABLET ORAL at 09:45

## 2021-01-01 RX ADMIN — NIFEDIPINE 90 MG: 30 TABLET, FILM COATED, EXTENDED RELEASE ORAL at 18:26

## 2021-01-01 RX ADMIN — PROTAMINE SULFATE 40 MG: 10 INJECTION, SOLUTION INTRAVENOUS at 12:28

## 2021-01-01 RX ADMIN — TRAZODONE HYDROCHLORIDE 75 MG: 50 TABLET ORAL at 21:53

## 2021-01-01 RX ADMIN — BUDESONIDE AND FORMOTEROL FUMARATE DIHYDRATE 2 PUFF: 160; 4.5 AEROSOL RESPIRATORY (INHALATION) at 20:33

## 2021-01-01 RX ADMIN — HYDRALAZINE HYDROCHLORIDE 100 MG: 50 TABLET, FILM COATED ORAL at 23:06

## 2021-01-01 RX ADMIN — FENTANYL CITRATE 25 MCG: 0.05 INJECTION, SOLUTION INTRAMUSCULAR; INTRAVENOUS at 16:22

## 2021-01-01 RX ADMIN — CLOPIDOGREL 75 MG: 75 TABLET, FILM COATED ORAL at 09:16

## 2021-01-01 RX ADMIN — TERAZOSIN HYDROCHLORIDE 5 MG: 5 CAPSULE ORAL at 21:27

## 2021-01-01 RX ADMIN — ONDANSETRON 4 MG: 2 INJECTION INTRAMUSCULAR; INTRAVENOUS at 09:29

## 2021-01-01 RX ADMIN — BUMETANIDE 4 MG: 0.25 INJECTION INTRAMUSCULAR; INTRAVENOUS at 08:04

## 2021-01-04 DIAGNOSIS — F32.1 CURRENT MODERATE EPISODE OF MAJOR DEPRESSIVE DISORDER WITHOUT PRIOR EPISODE (HCC): ICD-10-CM

## 2021-01-04 RX ORDER — LORAZEPAM 0.5 MG/1
0.5 TABLET ORAL EVERY 8 HOURS PRN
Qty: 90 TABLET | Refills: 0 | Status: SHIPPED | OUTPATIENT
Start: 2021-01-04 | End: 2021-03-26

## 2021-01-07 ENCOUNTER — HOSPITAL ENCOUNTER (OUTPATIENT)
Dept: INFUSION THERAPY | Facility: HOSPITAL | Age: 76
Discharge: HOME | End: 2021-01-07
Admitting: INTERNAL MEDICINE

## 2021-01-07 VITALS
HEART RATE: 72 BPM | SYSTOLIC BLOOD PRESSURE: 158 MMHG | DIASTOLIC BLOOD PRESSURE: 60 MMHG | OXYGEN SATURATION: 95 % | TEMPERATURE: 97.3 F | RESPIRATION RATE: 20 BRPM

## 2021-01-07 DIAGNOSIS — D63.1 ANEMIA DUE TO STAGE 3 CHRONIC KIDNEY DISEASE, UNSPECIFIED WHETHER STAGE 3A OR 3B CKD (HCC): Primary | ICD-10-CM

## 2021-01-07 DIAGNOSIS — N18.30 ANEMIA DUE TO STAGE 3 CHRONIC KIDNEY DISEASE, UNSPECIFIED WHETHER STAGE 3A OR 3B CKD (HCC): Primary | ICD-10-CM

## 2021-01-07 LAB
HCT VFR BLD AUTO: 30.8 % (ref 34–46.6)
HGB BLD-MCNC: 10.1 G/DL (ref 12–15.9)

## 2021-01-07 PROCEDURE — 36415 COLL VENOUS BLD VENIPUNCTURE: CPT

## 2021-01-07 PROCEDURE — 85018 HEMOGLOBIN: CPT | Performed by: INTERNAL MEDICINE

## 2021-01-07 PROCEDURE — 96372 THER/PROPH/DIAG INJ SC/IM: CPT

## 2021-01-07 PROCEDURE — 85014 HEMATOCRIT: CPT | Performed by: INTERNAL MEDICINE

## 2021-01-07 PROCEDURE — 25010000002 EPOETIN ALFA PER 1000 UNITS: Performed by: INTERNAL MEDICINE

## 2021-01-07 RX ADMIN — ERYTHROPOIETIN 20000 UNITS: 20000 INJECTION, SOLUTION INTRAVENOUS; SUBCUTANEOUS at 14:21

## 2021-01-08 ENCOUNTER — OFFICE VISIT (OUTPATIENT)
Dept: INTERNAL MEDICINE | Facility: CLINIC | Age: 76
End: 2021-01-08

## 2021-01-08 VITALS
BODY MASS INDEX: 29.89 KG/M2 | WEIGHT: 186 LBS | OXYGEN SATURATION: 97 % | SYSTOLIC BLOOD PRESSURE: 192 MMHG | DIASTOLIC BLOOD PRESSURE: 80 MMHG | TEMPERATURE: 98.2 F | HEIGHT: 66 IN | HEART RATE: 61 BPM | RESPIRATION RATE: 18 BRPM

## 2021-01-08 DIAGNOSIS — J01.10 ACUTE NON-RECURRENT FRONTAL SINUSITIS: Primary | ICD-10-CM

## 2021-01-08 DIAGNOSIS — R05.9 COUGH: ICD-10-CM

## 2021-01-08 PROCEDURE — 99214 OFFICE O/P EST MOD 30 MIN: CPT | Performed by: NURSE PRACTITIONER

## 2021-01-08 RX ORDER — AZITHROMYCIN 250 MG/1
TABLET, FILM COATED ORAL
Qty: 6 TABLET | Refills: 0 | Status: SHIPPED | OUTPATIENT
Start: 2021-01-08 | End: 2021-01-28

## 2021-01-08 NOTE — PROGRESS NOTES
"Chief Complaint  Cough    Subjective          Rachana Arguelles presents to [facility] for   History of Present Illness     Patient presents for evaluation of cough, nausea and fatigue.  This is a 75-year-old female patient of Dr. Crowder with peripheral artery disease, CKD stage III, GERD, hypertension and hyperlipidemia.  Symptoms began about 2 weeks ago.  She endorses cough with occasional sputum production.  No shortness of breath.  No fever or chills.  She feels quite fatigued.  She has had some nausea but believes this is occurring from the drainage going down her throat from her sinuses.  She endorses frontal sinus pressure.  She has taken Zofran for the nausea.  This helps slightly.  Denies development of any other new issues today.    Objective   Vital Signs:   BP (!) 192/80 (BP Location: Left arm, Patient Position: Sitting, Cuff Size: Adult)   Pulse 61   Temp 98.2 °F (36.8 °C) (Oral)   Resp 18   Ht 167.6 cm (66\")   Wt 84.4 kg (186 lb)   SpO2 97%   BMI 30.02 kg/m²     Physical Exam  Vitals signs and nursing note reviewed.   Constitutional:       General: She is not in acute distress.     Appearance: Normal appearance. She is well-developed. She is not ill-appearing, toxic-appearing or diaphoretic.   HENT:      Head: Normocephalic and atraumatic.      Right Ear: Tympanic membrane, ear canal and external ear normal.      Left Ear: Tympanic membrane, ear canal and external ear normal.   Eyes:      Pupils: Pupils are equal, round, and reactive to light.   Neck:      Musculoskeletal: Normal range of motion and neck supple. No neck rigidity or muscular tenderness.      Vascular: No carotid bruit.   Cardiovascular:      Rate and Rhythm: Normal rate and regular rhythm.      Pulses: Normal pulses.      Heart sounds: Normal heart sounds.   Pulmonary:      Effort: Pulmonary effort is normal. No respiratory distress.      Breath sounds: Normal breath sounds. No stridor. No wheezing, " rhonchi or rales.   Chest:      Chest wall: No tenderness.   Abdominal:      General: Bowel sounds are normal. There is no distension.      Palpations: Abdomen is soft. There is no mass.      Tenderness: There is no abdominal tenderness. There is no right CVA tenderness, left CVA tenderness, guarding or rebound.      Hernia: No hernia is present.   Musculoskeletal: Normal range of motion.   Lymphadenopathy:      Cervical: No cervical adenopathy.   Skin:     General: Skin is warm and dry.      Capillary Refill: Capillary refill takes less than 2 seconds.   Neurological:      General: No focal deficit present.      Mental Status: She is alert and oriented to person, place, and time. Mental status is at baseline.   Psychiatric:         Mood and Affect: Mood normal.         Behavior: Behavior normal.         Thought Content: Thought content normal.         Judgment: Judgment normal.        Result Review :   The following data was reviewed by: REENA Robbins on 01/08/2021:  Common labs    Common Labsle 11/6/20 11/6/20 11/6/20 11/6/20 11/20/20 1/7/21    1531 1531 1531 1531     BUN    20     Creatinine    1.60 (A)     eGFR Non  Am    31 (A)     Sodium    137     Potassium    4.6     Chloride    102     Calcium  8.4 (A)  8.5 (A)     Albumin    4.10     Total Bilirubin    0.3     Alkaline Phosphatase    126 (A)     AST (SGOT)    17     ALT (SGPT)    7     WBC 6.87        Hemoglobin 8.8 (A)    10.1 (A) 10.1 (A)   Hematocrit 27.4 (A)    31.1 (A) 30.8 (A)   Platelets 234        Uric Acid   7.7 (A)      (A) Abnormal value            Current outpatient and discharge medications have been reconciled for the patient.  Reviewed by: REENA Robbins           Assessment and Plan    Problem List Items Addressed This Visit     None      Visit Diagnoses     Acute non-recurrent frontal sinusitis    -  Primary    Relevant Medications    azithromycin (Zithromax Z-Maahd) 250 MG tablet    Cough        Relevant Orders     COVID-19,[facility] ROUTINE, NP/OP SWAB IN TRANSPORT MEDIA OR ESWAB 72 HR TAT - Swab, Oropharynx      Treating for frontal sinusitis with azithromycin. Recommended Mucinex and she can continue Delsym OTC. COVID-19 swab to be safe although she denies any exposure.     Follow up PRN if symptoms persist or worsen and routinely with PCP, Dr. Crowder.     Follow Up   No follow-ups on file.  Patient was given instructions and counseling regarding her condition or for health maintenance advice. Please see specific information pulled into the AVS if appropriate.

## 2021-01-10 LAB — SARS-COV-2 RNA RESP QL NAA+PROBE: NOT DETECTED

## 2021-01-11 RX ORDER — CARVEDILOL 25 MG/1
TABLET ORAL
Qty: 180 TABLET | Refills: 2 | Status: SHIPPED | OUTPATIENT
Start: 2021-01-11 | End: 2021-06-05 | Stop reason: HOSPADM

## 2021-01-11 RX ORDER — FOLIC ACID 1 MG/1
TABLET ORAL
Qty: 30 TABLET | Refills: 0 | Status: SHIPPED | OUTPATIENT
Start: 2021-01-11 | End: 2021-02-10

## 2021-01-14 ENCOUNTER — HOSPITAL ENCOUNTER (OUTPATIENT)
Dept: CT IMAGING | Facility: HOSPITAL | Age: 76
Discharge: HOME | End: 2021-01-14
Admitting: FAMILY MEDICINE

## 2021-01-14 DIAGNOSIS — R51.9 CHRONIC DAILY HEADACHE: ICD-10-CM

## 2021-01-14 DIAGNOSIS — S09.90XS INJURY OF HEAD, SEQUELA: ICD-10-CM

## 2021-01-14 DIAGNOSIS — F07.81 POSTCONCUSSION SYNDROME: ICD-10-CM

## 2021-01-14 PROCEDURE — 70450 CT HEAD/BRAIN W/O DYE: CPT

## 2021-01-21 ENCOUNTER — HOSPITAL ENCOUNTER (OUTPATIENT)
Dept: INFUSION THERAPY | Facility: HOSPITAL | Age: 76
Discharge: HOME | End: 2021-01-21
Admitting: INTERNAL MEDICINE

## 2021-01-21 VITALS
RESPIRATION RATE: 20 BRPM | HEART RATE: 58 BPM | OXYGEN SATURATION: 97 % | TEMPERATURE: 97.1 F | DIASTOLIC BLOOD PRESSURE: 76 MMHG | SYSTOLIC BLOOD PRESSURE: 145 MMHG

## 2021-01-21 DIAGNOSIS — D63.1 ANEMIA DUE TO STAGE 3 CHRONIC KIDNEY DISEASE, UNSPECIFIED WHETHER STAGE 3A OR 3B CKD (HCC): Primary | ICD-10-CM

## 2021-01-21 DIAGNOSIS — N18.30 ANEMIA DUE TO STAGE 3 CHRONIC KIDNEY DISEASE, UNSPECIFIED WHETHER STAGE 3A OR 3B CKD (HCC): Primary | ICD-10-CM

## 2021-01-21 LAB
HCT VFR BLD AUTO: 33.2 % (ref 34–46.6)
HGB BLD-MCNC: 10.6 G/DL (ref 12–15.9)

## 2021-01-21 PROCEDURE — 85018 HEMOGLOBIN: CPT | Performed by: INTERNAL MEDICINE

## 2021-01-21 PROCEDURE — 25010000002 EPOETIN ALFA PER 1000 UNITS: Performed by: INTERNAL MEDICINE

## 2021-01-21 PROCEDURE — 85014 HEMATOCRIT: CPT | Performed by: INTERNAL MEDICINE

## 2021-01-21 PROCEDURE — 36415 COLL VENOUS BLD VENIPUNCTURE: CPT

## 2021-01-21 PROCEDURE — 96372 THER/PROPH/DIAG INJ SC/IM: CPT

## 2021-01-21 RX ADMIN — ERYTHROPOIETIN 20000 UNITS: 20000 INJECTION, SOLUTION INTRAVENOUS; SUBCUTANEOUS at 14:43

## 2021-01-21 NOTE — PROGRESS NOTES
Procrit  Indicated per lab results & MD order.  Injection site x 1 with band aide applied.  AVS given & pt DC per wheelchair after completion of visit.

## 2021-01-28 ENCOUNTER — HOSPITAL ENCOUNTER (OUTPATIENT)
Dept: CT IMAGING | Facility: HOSPITAL | Age: 76
Discharge: HOME | End: 2021-01-28
Admitting: THORACIC SURGERY (CARDIOTHORACIC VASCULAR SURGERY)

## 2021-01-28 ENCOUNTER — OFFICE VISIT (OUTPATIENT)
Dept: CARDIOLOGY | Facility: CLINIC | Age: 76
End: 2021-01-28

## 2021-01-28 VITALS
HEIGHT: 66 IN | BODY MASS INDEX: 29.09 KG/M2 | WEIGHT: 181 LBS | SYSTOLIC BLOOD PRESSURE: 142 MMHG | DIASTOLIC BLOOD PRESSURE: 76 MMHG | HEART RATE: 53 BPM

## 2021-01-28 DIAGNOSIS — E66.01 MORBIDLY OBESE (HCC): ICD-10-CM

## 2021-01-28 DIAGNOSIS — I70.212 ATHEROSCLEROSIS OF NATIVE ARTERY OF LEFT LOWER EXTREMITY WITH INTERMITTENT CLAUDICATION (HCC): ICD-10-CM

## 2021-01-28 DIAGNOSIS — R00.2 PALPITATIONS: Primary | ICD-10-CM

## 2021-01-28 DIAGNOSIS — C34.11 MALIGNANT NEOPLASM OF UPPER LOBE OF RIGHT LUNG (HCC): ICD-10-CM

## 2021-01-28 PROCEDURE — 99214 OFFICE O/P EST MOD 30 MIN: CPT | Performed by: INTERNAL MEDICINE

## 2021-01-28 PROCEDURE — 71250 CT THORAX DX C-: CPT

## 2021-01-28 PROCEDURE — 93000 ELECTROCARDIOGRAM COMPLETE: CPT | Performed by: INTERNAL MEDICINE

## 2021-01-28 RX ORDER — LOSARTAN POTASSIUM 25 MG/1
50 TABLET ORAL DAILY
COMMUNITY
Start: 2021-01-02 | End: 2021-06-05 | Stop reason: HOSPADM

## 2021-01-28 RX ORDER — VITAMIN B COMPLEX
1 CAPSULE ORAL DAILY
COMMUNITY
End: 2021-04-29

## 2021-01-28 NOTE — PROGRESS NOTES
Date of Office Visit: 21  Encounter Provider: Mat Coello MD  Place of Service: [facility] CARDIOLOGY  Patient Name: Rachana Arguelles  :1945    Chief complaint  Palpitations    HPI:   Rachana Arguelles is a 75 y.o. female who presents today for follow-up.   She has a history of lung cancer for which she is status post right upper lobectomy in January of this year.  In 2016, she had a normal echocardiogram and a normal nuclear stress test.  She had an incidental finding of calcium in her aorta and iliac arteries.  In 2018, she underwent bilateral common iliac artery stent placement with Dr. Mancera and remains on aspirin and Plavix per their recommendation.  She was last seen in the office on 2018 at which time she was reporting some shortness of breath that was chronic and unchanged.  She still had swelling in her left leg.  She was having no symptoms of angina or heart failure.  No further cardiac work-up was recommended.  She was advised to follow-up in 1 year.  Prior to her lung surgery in January, she underwent a stress echocardiogram as well as a nuclear stress test.  The stress echocardiogram revealed normal LV systolic function, an EF of 67%, grade II diastolic dysfunction, and no significant valvular abnormalities.  She had a normal myocardial perfusion study with no evidence of ischemia.      On 2019, she was evaluated by Dr. Gifford for increased shortness of air.  He ordered a Holter monitor for associated palpitations.  He arranged for home oxygen, encouraged continuing with Anoro, and advised pulmonary rehab.    She presents back for followup today and she continues to complain of palpitations, however they are infrequent and short in duration.  They do not last longer than 5 seconds and go away.  They are anxiety provoking but she has not had any issues with chest pain or lightheadedness with them.  She has unifocal PVCs that are  infrequent and isolated to the right ventricular outflow tract on her electrocardiogram.                Past Medical History:   Diagnosis Date   • AAA (abdominal aortic aneurysm) without rupture (CMS/[facility])    • Anxiety    • Arthritis    • Bilateral carotid artery stenosis 8/14/2020   • Cancer (CMS/[facility])     skin cancer   • Chest pain     OCCAS   • Chronic low back pain    • Chronic pain syndrome 3/12/2016   • CKD (chronic kidney disease), stage III (CMS/[facility])    • Claustrophobia 10/26/2015    Resolved   • COPD (chronic obstructive pulmonary disease) (CMS/[facility])    • Depression    • Dizziness    • Dyspnea    • GERD (gastroesophageal reflux disease)    • GI problem    • H/O concussion    • Head trauma     FELL CUT HEAD 12 STAPLES   • Hiatal hernia    • History of migraine headaches    • Hyperlipidemia    • Hypertension    • Lumbar postlaminectomy syndrome 10/26/2015    Resolved   • Lung cancer (CMS/[facility])    • Lung nodule    • Migraines    • Nausea    • Palpitations    • Polypharmacy 4/22/2016   • Pulmonary embolism (CMS/HCC) 10/26/2015    January 2013 - Resolved, felt to be secondary to estrogen use   • Slow to wake up after anesthesia    • Submandibular sialoadenitis 10/26/2015    Resolved   • Visual changes    • Vitamin B 12 deficiency        Past Surgical History:   Procedure Laterality Date   • ANGIOPLASTY ILIAC ARTERY Bilateral 8/10/2018    Procedure: BILATERAL ILIAC STENTS;  Surgeon: Riki Mancera MD;  Location: [facility] MAIN OR;  Service: Vascular   • APPENDECTOMY     • BREAST SURGERY      Breast Surgery Reduction Procedure   • BRONCHOSCOPY N/A 2/8/2019    Procedure: BRONCHOSCOPY;  Surgeon: Álvaro Gifford MD;  Location: [facility] ENDOSCOPY;  Service: Pulmonary   • CHOLECYSTECTOMY     • ENDOSCOPY N/A 11/3/2020    Procedure: ESOPHAGOGASTRODUODENOSCOPY with 54 Austrian vernon dilation;  Surgeon: Yunior Vo MD;  Location: [facility] ENDOSCOPY;  Service: Gastroenterology;  Laterality: N/A;  pre- dysphagia  post-  esophageal ring, hiatal hernia   • HYSTERECTOMY     • INTUBATION  1/15/2019        • JOINT REPLACEMENT      left knee, hip, shoulder   • LASIK     • LUMBAR FUSION      Lumbar Vertebral Fusion   • SHOULDER ARTHROSCOPY  2013    Dr. Carrillo/JANINE Kent   • THORACOSCOPY VIDEO ASSISTED WITH LOBECTOMY Right 2019    Procedure: BRONCOSCOPY, THORACOSCOPY VIDEO ASSISTED WITH RIGHT UPPER LOBE WEDGE RESECTION, COMPLETION RIGHT UPPER LOBECTOMY, LYMPH NODE DISSECTION, INTERCOSTAL NERVE BLOCK;  Surgeon: Quita Figueroa MD;  Location: [facility] OR;  Service: Thoracic   • TONSILLECTOMY     • TOTAL HIP ARTHROPLASTY REVISION Left    • TOTAL KNEE ARTHROPLASTY Left    • TOTAL SHOULDER ARTHROPLASTY Left    • TOTAL SHOULDER ARTHROPLASTY W/ DISTAL CLAVICLE EXCISION Right 2020    Procedure: RIGHT TOTAL SHOULDER REVERSE ARTHROPLASTY WITH GPS;  Surgeon: Lino Carrillo MD;  Location: [facility];  Service: Orthopedics;  Laterality: Right;       Social History     Socioeconomic History   • Marital status:      Spouse name: Not on file   • Number of children: Not on file   • Years of education: Not on file   • Highest education level: Not on file   Tobacco Use   • Smoking status: Former Smoker     Packs/day: 2.50     Years: 15.00     Pack years: 37.50     Types: Cigarettes     Quit date: 2003     Years since quittin.3   • Smokeless tobacco: Never Used   Substance and Sexual Activity   • Alcohol use: No     Frequency: Never     Comment: occ   • Drug use: Never   • Sexual activity: Defer       Family History   Problem Relation Age of Onset   • Hypertension Mother    • Lung cancer Mother    • Cancer Mother         lung   • Kidney disease Father    • Other Brother         Coronary Artery Bypass Grafting   • Stroke Brother         Cerebrovascular Accident   • Malig Hyperthermia Neg Hx        Review of Systems   Constitution: Positive for malaise/fatigue. Negative for chills and fever.   HENT: Negative for  nosebleeds and sore throat.    Eyes: Negative for blurred vision and double vision.   Cardiovascular: Positive for dyspnea on exertion, leg swelling and palpitations. Negative for chest pain, claudication, near-syncope, orthopnea, paroxysmal nocturnal dyspnea and syncope.   Respiratory: Negative for cough, shortness of breath and snoring.    Endocrine: Negative for cold intolerance, heat intolerance and polydipsia.   Skin: Negative for itching, poor wound healing and rash.   Musculoskeletal: Negative for joint pain, joint swelling, muscle weakness and myalgias.   Gastrointestinal: Negative for abdominal pain, diarrhea, melena, nausea and vomiting.   Genitourinary: Negative for frequency and hematuria.   Neurological: Negative for light-headedness, loss of balance, numbness, paresthesias, seizures, vertigo and weakness.   Psychiatric/Behavioral: Negative for altered mental status and depression.   Allergic/Immunologic: Negative.    All other systems reviewed and are negative.      Allergies   Allergen Reactions   • Neurontin [Gabapentin] Confusion   • Morphine Other (See Comments)     HEADACHE         Current Outpatient Medications:   •  albuterol sulfate HFA (Ventolin HFA) 108 (90 Base) MCG/ACT inhaler, Inhale 2 puffs Every 4 (Four) Hours As Needed for Wheezing or Shortness of Air., Disp: 18 g, Rfl: 1  •  amLODIPine (NORVASC) 10 MG tablet, Take 10 mg by mouth Every Morning., Disp: , Rfl: 3  •  amLODIPine (NORVASC) 5 MG tablet, Take 5 mg by mouth Daily., Disp: , Rfl:   •  atorvastatin (LIPITOR) 40 MG tablet, Take 40 mg by mouth Every Night., Disp: , Rfl:   •  B Complex Vitamins (vitamin b complex) capsule capsule, Take 1 capsule by mouth Daily., Disp: , Rfl:   •  carBAMazepine (TEGretol) 200 MG tablet, Take 200 mg by mouth Daily., Disp: , Rfl: 0  •  carvedilol (COREG) 25 MG tablet, TAKE 1 TABLET BY MOUTH TWICE A DAY, Disp: 180 tablet, Rfl: 2  •  cloNIDine (CATAPRES) 0.3 MG tablet, Take 0.3 mg by mouth 3 (Three) Times  a Day., Disp: , Rfl:   •  cyclobenzaprine (FLEXERIL) 10 MG tablet, Take 10 mg by mouth 3 (Three) Times a Day As Needed., Disp: , Rfl:   •  docusate sodium (COLACE) 100 MG capsule, Take 100 mg by mouth 2 (Two) Times a Day As Needed for Constipation., Disp: , Rfl:   •  famotidine (PEPCID) 40 MG tablet, Take 40 mg by mouth 2 (Two) Times a Day., Disp: , Rfl:   •  fexofenadine (Allegra Allergy) 180 MG tablet, Take 1 tablet by mouth Daily As Needed (allergies)., Disp: 90 tablet, Rfl: 2  •  Fluticasone Furoate-Vilanterol (BREO ELLIPTA) 100-25 MCG/INH inhaler, Inhale 1 puff Daily., Disp: 28 each, Rfl: 1  •  folic acid (FOLVITE) 1 MG tablet, TAKE 1 TABLET BY MOUTH EVERY DAY, Disp: 30 tablet, Rfl: 0  •  furosemide (LASIX) 40 MG tablet, Take 40 mg by mouth Every Morning., Disp: , Rfl:   •  LORazepam (ATIVAN) 0.5 MG tablet, TAKE 1 TABLET BY MOUTH EVERY 8 (EIGHT) HOURS AS NEEDED FOR ANXIETY., Disp: 90 tablet, Rfl: 0  •  losartan (COZAAR) 25 MG tablet, Take 25 mg by mouth Daily., Disp: , Rfl:   •  meclizine (ANTIVERT) 25 MG tablet, TAKE 1 TABLET BY MOUTH 3 TIMES A DAY AS NEEDED FOR DIZZINESS/NAUSEA/SENSATION OF SPINNING/WHIRLING, Disp: 30 tablet, Rfl: 1  •  omeprazole (priLOSEC) 40 MG capsule, Take 40 mg by mouth Daily., Disp: , Rfl:   •  ondansetron (ZOFRAN) 4 MG tablet, Take 1-2 tablets by mouth Every 8 (Eight) Hours As Needed for Nausea or Vomiting., Disp: 60 tablet, Rfl: 1  •  oxyCODONE (ROXICODONE) 20 MG tablet, TAKE 1 TABLET BY MOUTH EVERY 4 TO 6 HOURS NO MORE THAN 5 PER DAY, Disp: , Rfl:   •  sertraline (Zoloft) 100 MG tablet, Take 1 tablet by mouth Daily., Disp: 90 tablet, Rfl: 3  •  torsemide (DEMADEX) 20 MG tablet, Take 40 mg by mouth Daily., Disp: , Rfl:   •  traZODone (DESYREL) 150 MG tablet, TAKE 0.5 TABLETS BY MOUTH EVERY NIGHT, Disp: 45 tablet, Rfl: 1  •  venlafaxine XR (EFFEXOR-XR) 150 MG 24 hr capsule, Take 150 mg by mouth Daily., Disp: , Rfl:   •  vitamin D (ERGOCALCIFEROL) 1.25 MG (78596 UT) capsule capsule, 1  "(ONE) CAPSULE WEEKLY FOR VITAMIN D DEFICIENCY, Disp: , Rfl:   •  allopurinol (ZYLOPRIM) 100 MG tablet, Take 100 mg by mouth Daily., Disp: , Rfl: 2  •  clopidogrel (PLAVIX) 75 MG tablet, Take 75 mg by mouth Daily. PT STATED TO HOLD FOR SURGERY 5-7 DAYS PRIOR, Disp: , Rfl:   •  hydrALAZINE (APRESOLINE) 100 MG tablet, Take 1 tablet by mouth 3 (Three) Times a Day., Disp: 180 tablet, Rfl: 0      Objective:     Vitals:    01/28/21 1254   BP: 142/76   Pulse: 53   Weight: 82.1 kg (181 lb)   Height: 167.6 cm (66\")     Body mass index is 29.21 kg/m².    PHYSICAL EXAM:    Physical Exam   Constitutional: She is oriented to person, place, and time. She appears well-developed and well-nourished. No distress.   Using walker     HENT:   Head: Normocephalic and atraumatic.   Eyes: Pupils are equal, round, and reactive to light.   Neck: No JVD present. No thyromegaly present.   Cardiovascular: Normal rate, regular rhythm, normal heart sounds and intact distal pulses.   No murmur heard.  Pulmonary/Chest: Effort normal and breath sounds normal. No respiratory distress.   Abdominal: Soft. Bowel sounds are normal. She exhibits no distension. There is no splenomegaly or hepatomegaly. There is no abdominal tenderness.   Musculoskeletal: Normal range of motion.         General: No edema.   Neurological: She is alert and oriented to person, place, and time.   Skin: Skin is warm and dry. She is not diaphoretic. No erythema.   Psychiatric: She has a normal mood and affect. Her behavior is normal. Judgment normal.         ECG 12 Lead    Date/Time: 1/28/2021 1:47 PM  Performed by: Mat Coello MD  Authorized by: Mat Coello MD   Comparison: compared with previous ECG from 8/23/2020  Similar to previous ECG  Rhythm: sinus rhythm  Ectopy: unifocal PVCs  Rate: normal    Clinical impression: abnormal EKG              Assessment:    Very pleasant 75-year-old female with a medical history of palpitations, monomorphic PVCs, normal " ejection fraction, and negative ischemic workup, who presents back to me for evaluation. She has been doing well from a cardiac standpoint and does not have any new symptoms of chest pain or dyspnea on exertion. She does have chronic monomorphic PVCs which are unchanged from prior. She has no evidence of heart failure and a normal ejection fraction previously documented.     1. PVCs: Monomorphic.  Infrequent.  RVOT.  -Continue carvedilol at current dose.  -No indication for additional workup.   -Ejection fraction is preserved.     2. Essential hypertension: M reasonably controlled.  I would recommend continuing her current regimen of amlodipine, carvedilol, losartan, and clonidine  -I have reviewed her most recent lab work and she has no evidence of alteration in her baseline chronic kidney disease on the losartan therapy.  She has no lower extremity edema with the amlodipine.  She is tolerating his medicines well.    3. Falls.   -These appear to be mechanical. I do not think we need to do additional cardiac workup.

## 2021-01-30 RX ORDER — ONDANSETRON 4 MG/1
4-8 TABLET, FILM COATED ORAL EVERY 8 HOURS PRN
Qty: 60 TABLET | Refills: 1 | Status: SHIPPED | OUTPATIENT
Start: 2021-01-30 | End: 2021-04-16 | Stop reason: SDUPTHER

## 2021-02-03 ENCOUNTER — OFFICE VISIT (OUTPATIENT)
Dept: SURGERY | Facility: CLINIC | Age: 76
End: 2021-02-03

## 2021-02-03 DIAGNOSIS — C34.11 MALIGNANT NEOPLASM OF UPPER LOBE OF RIGHT LUNG (HCC): Primary | ICD-10-CM

## 2021-02-03 PROCEDURE — 99442 PR PHYS/QHP TELEPHONE EVALUATION 11-20 MIN: CPT | Performed by: NURSE PRACTITIONER

## 2021-02-03 NOTE — PROGRESS NOTES
Chief Complaint  Non-small cell lung cancer surveillance with CT chest  You have chosen to receive care through a telephone visit. Do you consent to use a telephone visit for your medical care today? Yes    Subjective          Rachana Arguelles presents to [facility] THORACIC SURGERY for continued follow-up and surveillance of a non-small cell lung cancer for which she underwent a right video-assisted thoracoscopy with right upper lobe wedge resection, completion right upper lobectomy and lymph node dissection by Dr. Quita Figueroa on 1/11/2019.    History of Present Illness     Ms. Arguelles presents today a follow-up CT of the chest for continued surveillance of her non-small cell lung cancer.  She reports some stable shortness of air but denies hemoptysis or cough.  She has some difficulty ambulating due to persistent knee problems.    Objective   No vital signs assessed her physical examination form secondary to telemedicine visit.  There were no vitals taken for this visit.    Physical Exam     Result Review :   The following data was reviewed by: REENA Allen on 02/03/2021:    Data reviewed: Radiologic studies CT of the chest performed without contrast on 1/28/2021.  There is stable postoperative findings s/p right upper lobectomy.  Previously seen bilateral groundglass infiltrates have resolved.  There are no new infiltrates or suspicious nodules.  No evidence of suspicious lymphadenopathy.  Stable small pericardial effusion and minimal pleural fluid at the right lung base.          Assessment and Plan    Problem List Items Addressed This Visit        Other    Lung cancer (CMS/HCC) - Primary    Overview     Added automatically from request for surgery [id number]         Relevant Orders    CT Chest Without Contrast Diagnostic        Stable CT of the chest.  We will continue our surveillance with another CT without contrast in 6 months.  Of course we are happy to see Mrs. Arguelles sooner, should the  need arise.  Thank you for allowing us to participate in the care of Rachana Arguelles.    I spent 16 minutes caring for Rachana on this date of service. This time includes time spent by me in the following activities:preparing for the visit, reviewing tests, obtaining and/or reviewing a separately obtained history, performing a medically appropriate examination and/or evaluation , ordering medications, tests, or procedures, referring and communicating with other health care professionals , documenting information in the medical record and independently interpreting results and communicating that information with the patient/family/caregiver  Follow Up {Instructions Charge Capture  Follow-up Communications :23}  Return in about 6 months (around 8/3/2021) for Next scheduled follow up with CT chest.  Patient was given instructions and counseling regarding her condition or for health maintenance advice. Please see specific information pulled into the AVS if appropriate.

## 2021-02-04 ENCOUNTER — APPOINTMENT (OUTPATIENT)
Dept: INFUSION THERAPY | Facility: HOSPITAL | Age: 76
End: 2021-02-04

## 2021-02-10 RX ORDER — FOLIC ACID 1 MG/1
TABLET ORAL
Qty: 30 TABLET | Refills: 0 | Status: SHIPPED | OUTPATIENT
Start: 2021-02-10 | End: 2021-03-08

## 2021-02-25 ENCOUNTER — HOSPITAL ENCOUNTER (OUTPATIENT)
Dept: INFUSION THERAPY | Facility: HOSPITAL | Age: 76
Discharge: HOME | End: 2021-02-25
Admitting: INTERNAL MEDICINE

## 2021-02-25 VITALS
TEMPERATURE: 97.5 F | RESPIRATION RATE: 18 BRPM | OXYGEN SATURATION: 99 % | DIASTOLIC BLOOD PRESSURE: 82 MMHG | SYSTOLIC BLOOD PRESSURE: 200 MMHG | HEART RATE: 47 BPM

## 2021-02-25 DIAGNOSIS — D63.1 ANEMIA DUE TO STAGE 3 CHRONIC KIDNEY DISEASE, UNSPECIFIED WHETHER STAGE 3A OR 3B CKD (HCC): Primary | ICD-10-CM

## 2021-02-25 DIAGNOSIS — N18.30 ANEMIA DUE TO STAGE 3 CHRONIC KIDNEY DISEASE, UNSPECIFIED WHETHER STAGE 3A OR 3B CKD (HCC): Primary | ICD-10-CM

## 2021-02-25 LAB
25(OH)D3 SERPL-MCNC: 70.9 NG/ML (ref 30–100)
ALBUMIN SERPL-MCNC: 3.8 G/DL (ref 3.5–5.2)
ALBUMIN/GLOB SERPL: 1.7 G/DL
ALP SERPL-CCNC: 109 U/L (ref 39–117)
ALT SERPL W P-5'-P-CCNC: 9 U/L (ref 1–33)
ANION GAP SERPL CALCULATED.3IONS-SCNC: 11 MMOL/L (ref 5–15)
AST SERPL-CCNC: 17 U/L (ref 1–32)
BASOPHILS # BLD AUTO: 0.02 10*3/MM3 (ref 0–0.2)
BASOPHILS NFR BLD AUTO: 0.3 % (ref 0–1.5)
BILIRUB SERPL-MCNC: 0.3 MG/DL (ref 0–1.2)
BUN SERPL-MCNC: 19 MG/DL (ref 8–23)
BUN/CREAT SERPL: 14.3 (ref 7–25)
CALCIUM SPEC-SCNC: 8.8 MG/DL (ref 8.6–10.5)
CALCIUM SPEC-SCNC: 9 MG/DL (ref 8.6–10.5)
CHLORIDE SERPL-SCNC: 103 MMOL/L (ref 98–107)
CO2 SERPL-SCNC: 25 MMOL/L (ref 22–29)
CREAT SERPL-MCNC: 1.33 MG/DL (ref 0.57–1)
DEPRECATED RDW RBC AUTO: 47.2 FL (ref 37–54)
EOSINOPHIL # BLD AUTO: 0.13 10*3/MM3 (ref 0–0.4)
EOSINOPHIL NFR BLD AUTO: 2 % (ref 0.3–6.2)
ERYTHROCYTE [DISTWIDTH] IN BLOOD BY AUTOMATED COUNT: 14.1 % (ref 12.3–15.4)
FERRITIN SERPL-MCNC: 84.2 NG/ML (ref 13–150)
FOLATE SERPL-MCNC: >20 NG/ML (ref 4.78–24.2)
GFR SERPL CREATININE-BSD FRML MDRD: 39 ML/MIN/1.73
GLOBULIN UR ELPH-MCNC: 2.3 GM/DL
GLUCOSE SERPL-MCNC: 94 MG/DL (ref 65–99)
HCT VFR BLD AUTO: 33.2 % (ref 34–46.6)
HGB BLD-MCNC: 11 G/DL (ref 12–15.9)
IMM GRANULOCYTES # BLD AUTO: 0.04 10*3/MM3 (ref 0–0.05)
IMM GRANULOCYTES NFR BLD AUTO: 0.6 % (ref 0–0.5)
IRON 24H UR-MRATE: 82 MCG/DL (ref 37–145)
IRON SATN MFR SERPL: 32 % (ref 20–50)
LYMPHOCYTES # BLD AUTO: 1.29 10*3/MM3 (ref 0.7–3.1)
LYMPHOCYTES NFR BLD AUTO: 19.5 % (ref 19.6–45.3)
MCH RBC QN AUTO: 30.2 PG (ref 26.6–33)
MCHC RBC AUTO-ENTMCNC: 33.1 G/DL (ref 31.5–35.7)
MCV RBC AUTO: 91.2 FL (ref 79–97)
MONOCYTES # BLD AUTO: 0.56 10*3/MM3 (ref 0.1–0.9)
MONOCYTES NFR BLD AUTO: 8.4 % (ref 5–12)
NEUTROPHILS NFR BLD AUTO: 4.59 10*3/MM3 (ref 1.7–7)
NEUTROPHILS NFR BLD AUTO: 69.2 % (ref 42.7–76)
NRBC BLD AUTO-RTO: 0 /100 WBC (ref 0–0.2)
PHOSPHATE SERPL-MCNC: 4 MG/DL (ref 2.5–4.5)
PLATELET # BLD AUTO: 205 10*3/MM3 (ref 140–450)
PMV BLD AUTO: 10 FL (ref 6–12)
POTASSIUM SERPL-SCNC: 4.2 MMOL/L (ref 3.5–5.2)
PROT SERPL-MCNC: 6.1 G/DL (ref 6–8.5)
PTH-INTACT SERPL-MCNC: 82.3 PG/ML (ref 15–65)
RBC # BLD AUTO: 3.64 10*6/MM3 (ref 3.77–5.28)
SODIUM SERPL-SCNC: 139 MMOL/L (ref 136–145)
TIBC SERPL-MCNC: 256 MCG/DL (ref 298–536)
TRANSFERRIN SERPL-MCNC: 172 MG/DL (ref 200–360)
URATE SERPL-MCNC: 7.4 MG/DL (ref 2.4–5.7)
VIT B12 BLD-MCNC: 751 PG/ML (ref 211–946)
WBC # BLD AUTO: 6.63 10*3/MM3 (ref 3.4–10.8)

## 2021-02-25 PROCEDURE — 82746 ASSAY OF FOLIC ACID SERUM: CPT | Performed by: INTERNAL MEDICINE

## 2021-02-25 PROCEDURE — 83970 ASSAY OF PARATHORMONE: CPT | Performed by: INTERNAL MEDICINE

## 2021-02-25 PROCEDURE — 82306 VITAMIN D 25 HYDROXY: CPT | Performed by: INTERNAL MEDICINE

## 2021-02-25 PROCEDURE — 82728 ASSAY OF FERRITIN: CPT | Performed by: INTERNAL MEDICINE

## 2021-02-25 PROCEDURE — 82607 VITAMIN B-12: CPT | Performed by: INTERNAL MEDICINE

## 2021-02-25 PROCEDURE — 84100 ASSAY OF PHOSPHORUS: CPT | Performed by: INTERNAL MEDICINE

## 2021-02-25 PROCEDURE — 80053 COMPREHEN METABOLIC PANEL: CPT | Performed by: INTERNAL MEDICINE

## 2021-02-25 PROCEDURE — 85025 COMPLETE CBC W/AUTO DIFF WBC: CPT | Performed by: INTERNAL MEDICINE

## 2021-02-25 PROCEDURE — 36415 COLL VENOUS BLD VENIPUNCTURE: CPT

## 2021-02-25 PROCEDURE — G0463 HOSPITAL OUTPT CLINIC VISIT: HCPCS

## 2021-02-25 PROCEDURE — 84550 ASSAY OF BLOOD/URIC ACID: CPT | Performed by: INTERNAL MEDICINE

## 2021-02-25 PROCEDURE — 83540 ASSAY OF IRON: CPT | Performed by: INTERNAL MEDICINE

## 2021-02-25 PROCEDURE — 84466 ASSAY OF TRANSFERRIN: CPT | Performed by: INTERNAL MEDICINE

## 2021-03-03 DIAGNOSIS — Z23 IMMUNIZATION DUE: ICD-10-CM

## 2021-03-08 RX ORDER — FOLIC ACID 1 MG/1
TABLET ORAL
Qty: 30 TABLET | Refills: 0 | Status: SHIPPED | OUTPATIENT
Start: 2021-03-08 | End: 2021-04-02

## 2021-03-09 NOTE — OUTREACH NOTE
"Patient Outreach Note    Discussed results of liver biopsy which she states is benign.  Concerns now related to a \"floppy jaw\" that is unintentional and saw a neurologist who has recommended a MRI which she will complete on Monday, 7/22.  Discussed AWV and gaps in care and again, contact information to [facility] of [phone], patient to call and schedule mammogram. Requested assistance with cardiac testing results and concerns related to a delayed appt in Jan 2020.  Informed would contact Cardiology office and request they contact her with results.  No further needs or concerns voiced.      Kandace Gonzales RN    7/18/2019, 1:13 PM      " Pt. Reassessed at this time and is resting in bed in the POC with no new complaints in NAD. Pt mother at bedside. MD notified and will continue to monitor. no

## 2021-03-11 ENCOUNTER — APPOINTMENT (OUTPATIENT)
Dept: INFUSION THERAPY | Facility: HOSPITAL | Age: 76
End: 2021-03-11

## 2021-03-11 RX ORDER — SUCRALFATE 1 G/1
1 TABLET ORAL 2 TIMES DAILY
Qty: 180 TABLET | Refills: 3 | Status: SHIPPED | OUTPATIENT
Start: 2021-03-11 | End: 2021-01-01 | Stop reason: ALTCHOICE

## 2021-03-16 LAB
ALDOST SERPL-MCNC: <1 NG/DL (ref 0–30)
RENIN PLAS-CCNC: 0.55 NG/ML/HR (ref 0.17–5.38)

## 2021-03-16 RX ORDER — AZITHROMYCIN 250 MG/1
TABLET, FILM COATED ORAL
Qty: 6 TABLET | Refills: 0 | Status: SHIPPED | OUTPATIENT
Start: 2021-03-16 | End: 2021-04-16

## 2021-03-18 ENCOUNTER — HOSPITAL ENCOUNTER (OUTPATIENT)
Dept: INFUSION THERAPY | Facility: HOSPITAL | Age: 76
Discharge: HOME | End: 2021-03-18
Admitting: INTERNAL MEDICINE

## 2021-03-18 VITALS
TEMPERATURE: 97.1 F | DIASTOLIC BLOOD PRESSURE: 69 MMHG | SYSTOLIC BLOOD PRESSURE: 129 MMHG | OXYGEN SATURATION: 92 % | RESPIRATION RATE: 20 BRPM | HEART RATE: 41 BPM

## 2021-03-18 DIAGNOSIS — D63.1 ANEMIA DUE TO STAGE 3 CHRONIC KIDNEY DISEASE, UNSPECIFIED WHETHER STAGE 3A OR 3B CKD (HCC): Primary | ICD-10-CM

## 2021-03-18 DIAGNOSIS — N18.30 ANEMIA DUE TO STAGE 3 CHRONIC KIDNEY DISEASE, UNSPECIFIED WHETHER STAGE 3A OR 3B CKD (HCC): Primary | ICD-10-CM

## 2021-03-18 LAB
HCT VFR BLD AUTO: 32.8 % (ref 34–46.6)
HGB BLD-MCNC: 10.6 G/DL (ref 12–15.9)

## 2021-03-18 PROCEDURE — 96372 THER/PROPH/DIAG INJ SC/IM: CPT

## 2021-03-18 PROCEDURE — 85018 HEMOGLOBIN: CPT | Performed by: INTERNAL MEDICINE

## 2021-03-18 PROCEDURE — 36415 COLL VENOUS BLD VENIPUNCTURE: CPT

## 2021-03-18 PROCEDURE — 85014 HEMATOCRIT: CPT | Performed by: INTERNAL MEDICINE

## 2021-03-18 PROCEDURE — 25010000002 EPOETIN ALFA PER 1000 UNITS: Performed by: INTERNAL MEDICINE

## 2021-03-18 RX ADMIN — ERYTHROPOIETIN 20000 UNITS: 20000 INJECTION, SOLUTION INTRAVENOUS; SUBCUTANEOUS at 14:49

## 2021-03-23 ENCOUNTER — TELEPHONE (OUTPATIENT)
Dept: CARDIOLOGY | Facility: CLINIC | Age: 76
End: 2021-03-23

## 2021-03-23 DIAGNOSIS — R00.1 BRADYCARDIA, SINUS: Primary | ICD-10-CM

## 2021-03-23 DIAGNOSIS — R42 DIZZINESS: ICD-10-CM

## 2021-03-23 NOTE — TELEPHONE ENCOUNTER
I called the pt's  and advised. Can you call and schedule her when you get a chance.    Thanks a bunch!  Kathy

## 2021-03-23 NOTE — TELEPHONE ENCOUNTER
Pt's  called. She saw Dr. Gifford on 3/17/21. The note is under Media for 3/17/21.   He recommended a Holter for her Bradycardia. Page 3   They were wanting to know if you can order this.  Please advise.    Thanks,  Kathy

## 2021-03-24 ENCOUNTER — APPOINTMENT (OUTPATIENT)
Dept: WOMENS IMAGING | Facility: HOSPITAL | Age: 76
End: 2021-03-24

## 2021-03-24 PROCEDURE — 77063 BREAST TOMOSYNTHESIS BI: CPT | Performed by: RADIOLOGY

## 2021-03-24 PROCEDURE — 77067 SCR MAMMO BI INCL CAD: CPT | Performed by: RADIOLOGY

## 2021-03-25 DIAGNOSIS — F32.1 CURRENT MODERATE EPISODE OF MAJOR DEPRESSIVE DISORDER WITHOUT PRIOR EPISODE (HCC): ICD-10-CM

## 2021-03-26 RX ORDER — LORAZEPAM 0.5 MG/1
0.5 TABLET ORAL EVERY 8 HOURS PRN
Qty: 90 TABLET | Refills: 0 | Status: SHIPPED | OUTPATIENT
Start: 2021-03-26 | End: 2021-01-01 | Stop reason: SDUPTHER

## 2021-03-30 RX ORDER — VENLAFAXINE HYDROCHLORIDE 150 MG/1
CAPSULE, EXTENDED RELEASE ORAL
Qty: 90 CAPSULE | Refills: 2 | Status: SHIPPED | OUTPATIENT
Start: 2021-03-30 | End: 2021-01-01

## 2021-04-02 RX ORDER — FOLIC ACID 1 MG/1
TABLET ORAL
Qty: 30 TABLET | Refills: 2 | Status: SHIPPED | OUTPATIENT
Start: 2021-04-02 | End: 2021-01-01

## 2021-04-08 ENCOUNTER — HOSPITAL ENCOUNTER (OUTPATIENT)
Dept: INFUSION THERAPY | Facility: HOSPITAL | Age: 76
Discharge: HOME | End: 2021-04-08
Admitting: INTERNAL MEDICINE

## 2021-04-08 VITALS
DIASTOLIC BLOOD PRESSURE: 81 MMHG | HEART RATE: 39 BPM | TEMPERATURE: 97.3 F | SYSTOLIC BLOOD PRESSURE: 172 MMHG | OXYGEN SATURATION: 95 % | RESPIRATION RATE: 16 BRPM

## 2021-04-08 DIAGNOSIS — D63.1 ANEMIA DUE TO STAGE 3 CHRONIC KIDNEY DISEASE, UNSPECIFIED WHETHER STAGE 3A OR 3B CKD (HCC): Primary | ICD-10-CM

## 2021-04-08 DIAGNOSIS — N18.30 ANEMIA DUE TO STAGE 3 CHRONIC KIDNEY DISEASE, UNSPECIFIED WHETHER STAGE 3A OR 3B CKD (HCC): Primary | ICD-10-CM

## 2021-04-08 LAB
HCT VFR BLD AUTO: 34.9 % (ref 34–46.6)
HGB BLD-MCNC: 11.3 G/DL (ref 12–15.9)

## 2021-04-08 PROCEDURE — 36415 COLL VENOUS BLD VENIPUNCTURE: CPT

## 2021-04-08 PROCEDURE — G0463 HOSPITAL OUTPT CLINIC VISIT: HCPCS

## 2021-04-08 PROCEDURE — 85014 HEMATOCRIT: CPT | Performed by: INTERNAL MEDICINE

## 2021-04-08 PROCEDURE — 85018 HEMOGLOBIN: CPT | Performed by: INTERNAL MEDICINE

## 2021-04-16 ENCOUNTER — OFFICE VISIT (OUTPATIENT)
Dept: INTERNAL MEDICINE | Facility: CLINIC | Age: 76
End: 2021-04-16

## 2021-04-16 VITALS
HEIGHT: 66 IN | DIASTOLIC BLOOD PRESSURE: 70 MMHG | TEMPERATURE: 98.2 F | SYSTOLIC BLOOD PRESSURE: 126 MMHG | BODY MASS INDEX: 28.45 KG/M2 | OXYGEN SATURATION: 97 % | HEART RATE: 52 BPM | WEIGHT: 177 LBS

## 2021-04-16 DIAGNOSIS — R06.09 DYSPNEA ON MINIMAL EXERTION: Primary | ICD-10-CM

## 2021-04-16 DIAGNOSIS — E78.2 MIXED HYPERLIPIDEMIA: ICD-10-CM

## 2021-04-16 DIAGNOSIS — I10 ESSENTIAL HYPERTENSION: ICD-10-CM

## 2021-04-16 DIAGNOSIS — N18.30 STAGE 3 CHRONIC KIDNEY DISEASE, UNSPECIFIED WHETHER STAGE 3A OR 3B CKD (HCC): ICD-10-CM

## 2021-04-16 PROCEDURE — 99214 OFFICE O/P EST MOD 30 MIN: CPT | Performed by: FAMILY MEDICINE

## 2021-04-16 RX ORDER — ONDANSETRON 4 MG/1
4-8 TABLET, FILM COATED ORAL EVERY 8 HOURS PRN
Qty: 60 TABLET | Refills: 1 | Status: SHIPPED | OUTPATIENT
Start: 2021-04-16 | End: 2021-01-01

## 2021-04-16 NOTE — PROGRESS NOTES
"Chief Complaint  Hyperlipidemia and Hypertension    Subjective          Rachana Arguelles presents to [facility]IMARY [facility]ARE  Patient here for active management of hyperlipidemia hypertension.  And complaint shortness of air on exertion at 20 feet.  I reviewed all the things she has had performed on her over the past couple of years.  She has had lung cancer with left lobectomy as well as bilateral pneumonia last year similar to Covid with resolution of groundglass appearance according to pulmonary.  Otherwise she had prior shoulder surgery last summer preceded by cardiac clearance.  She has had a recent Holter monitor results show some bradycardia at times and several PVCs.  Confer with Dr. Coello about whether to pursue any more cardiac testing based on PVCs and bradycardia.  I do not have much else to offer other than to consider reducing carvedilol to 12.5 twice daily which I am hesitant due to given the difficulties with high blood pressure in the past.  She is on multiple antihypertensives.  She does have get difficulty getting down the townsend in the office and had to go in a wheelchair.      Objective   Vital Signs:   /70   Pulse 52   Temp 98.2 °F (36.8 °C)   Ht 167.6 cm (66\")   Wt 80.3 kg (177 lb)   SpO2 97%   BMI 28.57 kg/m²     Physical Exam  Vitals reviewed.   Constitutional:       Appearance: She is well-developed.   HENT:      Head: Normocephalic and atraumatic.      Right Ear: Tympanic membrane and external ear normal.      Left Ear: Tympanic membrane and external ear normal.      Nose: Nose normal.   Eyes:      Conjunctiva/sclera: Conjunctivae normal.      Pupils: Pupils are equal, round, and reactive to light.   Neck:      Thyroid: No thyromegaly.      Vascular: No JVD.   Cardiovascular:      Rate and Rhythm: Regular rhythm. Bradycardia present. Frequent extrasystoles are present.     Heart sounds: Normal heart sounds.   Pulmonary:      Effort: Pulmonary effort is normal.     "  Breath sounds: Normal breath sounds.   Abdominal:      General: Bowel sounds are normal.      Palpations: Abdomen is soft.   Musculoskeletal:         General: Normal range of motion.      Cervical back: Normal range of motion and neck supple.   Lymphadenopathy:      Cervical: No cervical adenopathy.   Skin:     General: Skin is warm and dry.      Findings: No rash.   Neurological:      Mental Status: She is alert and oriented to person, place, and time.      Cranial Nerves: No cranial nerve deficit.      Motor: Weakness present.      Coordination: Coordination normal.      Gait: Gait abnormal.      Comments: Patient was overall weak on ambulation and had to ride in a wheelchair.   Psychiatric:         Behavior: Behavior normal.         Thought Content: Thought content normal.         Judgment: Judgment normal.        Result Review :   The following data was reviewed by: Rhys Crowder Jr., MD on 04/16/2021:  Common labs    Common Labsle 3/18/21 4/8/21 4/29/21   Hemoglobin 10.6 (A) 11.3 (A) 10.6 (A)   Hematocrit 32.8 (A) 34.9 31.5 (A)   (A) Abnormal value            Data reviewed: Cardiology studies Holter monitor          Assessment and Plan    Diagnoses and all orders for this visit:    1. Dyspnea on minimal exertion (Primary)  Comments:  Confer with cardiology.  Consider reducing carvedilol to 12.5 twice daily.    2. Stage 3 chronic kidney disease, unspecified whether stage 3a or 3b CKD (CMS/HCC)  Comments:  Follow-up with Dr. Dudley and periodic Procrit    3. Essential hypertension  Comments:  Normotensive today.    4. Mixed hyperlipidemia  Comments:  Continue current treatment    Other orders  -     ondansetron (ZOFRAN) 4 MG tablet; Take 1-2 tablets by mouth Every 8 (Eight) Hours As Needed for Nausea or Vomiting.  Dispense: 60 tablet; Refill: 1        Follow Up   Return in about 3 months (around 7/16/2021) for Recheck.  Patient was given instructions and counseling regarding her condition or for health  maintenance advice. Please see specific information pulled into the AVS if appropriate.

## 2021-04-21 NOTE — PROGRESS NOTES
Addendum: Spoke with Dr. Coello cardiology through [employer] messaging.  Plan to reduce carvedilol to 12.5 mg twice daily and see if this helps with her dyspnea on exertion.  She has also seen pulmonary recently with no new changes.  If she has increase in PVCs she will have to go back to cardiology for consideration of antiarrhythmic with Dr. Gonzales.

## 2021-04-29 ENCOUNTER — OFFICE VISIT (OUTPATIENT)
Dept: CARDIOLOGY | Facility: CLINIC | Age: 76
End: 2021-04-29

## 2021-04-29 ENCOUNTER — HOSPITAL ENCOUNTER (OUTPATIENT)
Dept: INFUSION THERAPY | Facility: HOSPITAL | Age: 76
Discharge: HOME | End: 2021-04-29
Admitting: INTERNAL MEDICINE

## 2021-04-29 ENCOUNTER — TELEPHONE (OUTPATIENT)
Dept: CARDIOLOGY | Facility: CLINIC | Age: 76
End: 2021-04-29

## 2021-04-29 VITALS
WEIGHT: 174 LBS | HEIGHT: 66 IN | HEART RATE: 41 BPM | BODY MASS INDEX: 27.97 KG/M2 | SYSTOLIC BLOOD PRESSURE: 162 MMHG | OXYGEN SATURATION: 95 % | DIASTOLIC BLOOD PRESSURE: 64 MMHG | RESPIRATION RATE: 18 BRPM

## 2021-04-29 VITALS
OXYGEN SATURATION: 98 % | HEART RATE: 48 BPM | SYSTOLIC BLOOD PRESSURE: 139 MMHG | RESPIRATION RATE: 18 BRPM | TEMPERATURE: 97.1 F | DIASTOLIC BLOOD PRESSURE: 64 MMHG

## 2021-04-29 DIAGNOSIS — R06.02 SHORTNESS OF BREATH: ICD-10-CM

## 2021-04-29 DIAGNOSIS — N18.30 ANEMIA DUE TO STAGE 3 CHRONIC KIDNEY DISEASE, UNSPECIFIED WHETHER STAGE 3A OR 3B CKD (HCC): Primary | ICD-10-CM

## 2021-04-29 DIAGNOSIS — R00.1 BRADYCARDIA, SINUS: ICD-10-CM

## 2021-04-29 DIAGNOSIS — I49.3 FREQUENT PVCS: Primary | ICD-10-CM

## 2021-04-29 DIAGNOSIS — R42 DIZZINESS: ICD-10-CM

## 2021-04-29 DIAGNOSIS — D63.1 ANEMIA DUE TO STAGE 3 CHRONIC KIDNEY DISEASE, UNSPECIFIED WHETHER STAGE 3A OR 3B CKD (HCC): Primary | ICD-10-CM

## 2021-04-29 LAB
HCT VFR BLD AUTO: 31.5 % (ref 34–46.6)
HGB BLD-MCNC: 10.6 G/DL (ref 12–15.9)

## 2021-04-29 PROCEDURE — 36415 COLL VENOUS BLD VENIPUNCTURE: CPT

## 2021-04-29 PROCEDURE — 96372 THER/PROPH/DIAG INJ SC/IM: CPT

## 2021-04-29 PROCEDURE — 85014 HEMATOCRIT: CPT | Performed by: INTERNAL MEDICINE

## 2021-04-29 PROCEDURE — 99215 OFFICE O/P EST HI 40 MIN: CPT | Performed by: NURSE PRACTITIONER

## 2021-04-29 PROCEDURE — 25010000002 EPOETIN ALFA PER 1000 UNITS: Performed by: INTERNAL MEDICINE

## 2021-04-29 PROCEDURE — 85018 HEMOGLOBIN: CPT | Performed by: INTERNAL MEDICINE

## 2021-04-29 PROCEDURE — 93000 ELECTROCARDIOGRAM COMPLETE: CPT | Performed by: NURSE PRACTITIONER

## 2021-04-29 RX ADMIN — ERYTHROPOIETIN 20000 UNITS: 20000 INJECTION, SOLUTION INTRAVENOUS; SUBCUTANEOUS at 14:35

## 2021-04-29 NOTE — PROGRESS NOTES
Date of Office Visit: 2021  Encounter Provider: REENA Daniel  Place of Service: [facility] CARDIOLOGY  Patient Name: Rachana Arguelles  :1945    Chief Complaint   Patient presents with   • PVC'S     FOLLOW UP   :     HPI: Rachana Arguelles is a 75 y.o. female who presents today for follow-up.  Old records have been obtained and reviewed by me.  She is a patient of Dr. Coello's with a past medical history significant for lung cancer, PVCs, hypertension, and peripheral vascular disease (status post bilateral common iliac artery stenting).  She follows with vascular surgery.  She follows with pulmonary for COPD and chronic shortness of breath.  In 2020, she was hospitalized with pneumonia.  At that time, she was also found to have a pericardial effusion for which we were consulted.  The effusion was small and unchanged from previous imaging.  Therefore, no further intervention was recommended.  There were concerns for possible cancer involvement given her past medical history.  However, pulmonary did not feel a bronchoscopy was warranted and an outpatient CT was recommended.   She was last seen in the office by Dr. Coello in 2021 at which time she continued to complain of palpitations.  Dr. Coello did not recommend any additional work-up.  She was advised to follow-up in 6 months.  During her appointment with pulmonary in March of this year, she was noted to be bradycardic with a heart rate in the 40s.  Subsequently, a Holter monitor was ordered revealing sinus rhythm with occasional PACs (1.4% of the time), frequent PVCs (10.9% of the time) with trigeminy, and an average heart rate of 51.  On 2021, she followed up with pulmonary.  She complained of increased shortness of breath and dizziness.  She is here today for follow-up and review of her monitor results.   She is here today with a multitude of symptoms and concerns.  She complains of weakness and  shortness of breath.  She has chronic shortness of breath but feels it is becoming steadily worse.  She denies any PND orthopnea.  She reports chronic dizziness ever since her fall.  This is mostly with turning her head.  She has been treated for vertigo in the past.  She denies any chest pain.  She denies any palpitations, edema, or syncope.  She denies any bleeding difficulties or melena.  There is a lot of confusion regarding her medications.  She has duplicate medications listed as well as both furosemide and torsemide.    Past Medical History:   Diagnosis Date   • AAA (abdominal aortic aneurysm) without rupture (CMS/Formerly [facility])    • Anemia    • Anxiety    • Arthritis    • Bilateral carotid artery stenosis 8/14/2020   • Cancer (CMS/[facility]ly [facility])     skin cancer   • Chest pain     OCCAS   • Chronic low back pain    • Chronic pain syndrome 3/12/2016   • CKD (chronic kidney disease), stage III (CMS/[facility]ly [facility])    • Claustrophobia 10/26/2015    Resolved   • COPD (chronic obstructive pulmonary disease) (CMS/[facility]ly [facility])    • Depression    • Dizziness    • Dyspnea    • GERD (gastroesophageal reflux disease)    • GI problem    • H/O concussion    • Head trauma     FELL CUT HEAD 12 STAPLES   • Hiatal hernia    • History of migraine headaches    • Hyperlipidemia    • Hypertension    • Lumbar postlaminectomy syndrome 10/26/2015    Resolved   • Lung cancer (CMS/[facility])    • Lung nodule    • Migraines    • Nausea    • Palpitations    • Polypharmacy 4/22/2016   • Pulmonary embolism (CMS/[facility]) 10/26/2015    January 2013 - Resolved, felt to be secondary to estrogen use   • Slow to wake up after anesthesia    • Submandibular sialoadenitis 10/26/2015    Resolved   • Visual changes    • Vitamin B 12 deficiency        Past Surgical History:   Procedure Laterality Date   • ANGIOPLASTY ILIAC ARTERY Bilateral 8/10/2018    Procedure: BILATERAL ILIAC STENTS;  Surgeon: Riki Mancera MD;  Location: [facility] OR;  Service: Vascular   • APPENDECTOMY     • BREAST  SURGERY      Breast Surgery Reduction Procedure   • BRONCHOSCOPY N/A 2019    Procedure: BRONCHOSCOPY;  Surgeon: Álvaro Gifford MD;  Location: [facility] ENDOSCOPY;  Service: Pulmonary   • CHOLECYSTECTOMY     • COLONOSCOPY     • ENDOSCOPY N/A 11/3/2020    Procedure: ESOPHAGOGASTRODUODENOSCOPY with 54 Latvian vernon dilation;  Surgeon: Yunior Vo MD;  Location: [facility] ENDOSCOPY;  Service: Gastroenterology;  Laterality: N/A;  pre- dysphagia  post- esophageal ring, hiatal hernia   • HYSTERECTOMY     • INTUBATION  1/15/2019        • JOINT REPLACEMENT      left knee, hip, shoulder   • LASIK     • LUMBAR FUSION      Lumbar Vertebral Fusion   • SHOULDER ARTHROSCOPY  2013    Dr. Carrillo/[facility] [place]   • THORACOSCOPY VIDEO ASSISTED WITH LOBECTOMY Right 2019    Procedure: BRONCOSCOPY, THORACOSCOPY VIDEO ASSISTED WITH RIGHT UPPER LOBE WEDGE RESECTION, COMPLETION RIGHT UPPER LOBECTOMY, LYMPH NODE DISSECTION, INTERCOSTAL NERVE BLOCK;  Surgeon: Quita Figueroa MD;  Location: [facility] MAIN OR;  Service: Thoracic   • TONSILLECTOMY     • TOTAL HIP ARTHROPLASTY REVISION Left    • TOTAL KNEE ARTHROPLASTY Left    • TOTAL SHOULDER ARTHROPLASTY Left    • TOTAL SHOULDER ARTHROPLASTY W/ DISTAL CLAVICLE EXCISION Right 2020    Procedure: RIGHT TOTAL SHOULDER REVERSE ARTHROPLASTY WITH GPS;  Surgeon: Lino Carrillo MD;  Location: [facility] OR [facility];  Service: Orthopedics;  Laterality: Right;       Social History     Socioeconomic History   • Marital status:      Spouse name: Not on file   • Number of children: Not on file   • Years of education: Not on file   • Highest education level: Not on file   Tobacco Use   • Smoking status: Former Smoker     Packs/day: 2.50     Years: 15.00     Pack years: 37.50     Types: Cigarettes     Quit date: 2003     Years since quittin.6   • Smokeless tobacco: Never Used   • Tobacco comment: CAFFEINE USE: 1-2 GLASSES TEA DAILY   Vaping Use   • Vaping Use: Never used   Substance  and Sexual Activity   • Alcohol use: No     Comment: occ   • Drug use: Never   • Sexual activity: Defer       Family History   Problem Relation Age of Onset   • Hypertension Mother    • Lung cancer Mother    • Cancer Mother         lung   • Kidney disease Father    • Other Brother         Coronary Artery Bypass Grafting   • Stroke Brother         Cerebrovascular Accident   • Malig Hyperthermia Neg Hx        Review of Systems   Constitutional: Positive for malaise/fatigue.   Cardiovascular: Positive for dyspnea on exertion. Negative for chest pain, leg swelling, orthopnea, paroxysmal nocturnal dyspnea and syncope.   Respiratory: Positive for shortness of breath.    Hematologic/Lymphatic: Negative for bleeding problem.   Musculoskeletal: Positive for falls.   Gastrointestinal: Negative for melena.   Neurological: Positive for dizziness and weakness. Negative for light-headedness.       Allergies   Allergen Reactions   • Neurontin [Gabapentin] Confusion   • Morphine Other (See Comments)     HEADACHE         Current Outpatient Medications:   •  albuterol sulfate HFA (Ventolin HFA) 108 (90 Base) MCG/ACT inhaler, Inhale 2 puffs Every 4 (Four) Hours As Needed for Wheezing or Shortness of Air., Disp: 18 g, Rfl: 1  •  allopurinol (ZYLOPRIM) 100 MG tablet, Take 100 mg by mouth Daily., Disp: , Rfl: 2  •  amLODIPine (NORVASC) 10 MG tablet, Take 10 mg by mouth Every Morning., Disp: , Rfl: 3  •  amLODIPine (NORVASC) 5 MG tablet, Take 5 mg by mouth Daily., Disp: , Rfl:   •  atorvastatin (LIPITOR) 40 MG tablet, Take 40 mg by mouth Every Night., Disp: , Rfl:   •  B Complex Vitamins (vitamin b complex) capsule capsule, Take 1 capsule by mouth Daily., Disp: , Rfl:   •  carBAMazepine (TEGretol) 200 MG tablet, Take 200 mg by mouth Daily., Disp: , Rfl: 0  •  carvedilol (COREG) 25 MG tablet, TAKE 1 TABLET BY MOUTH TWICE A DAY (Patient taking differently: Take 12.5 mg by mouth 2 (Two) Times a Day With Meals.), Disp: 180 tablet, Rfl: 2  •   cloNIDine (CATAPRES) 0.3 MG tablet, Take 0.3 mg by mouth 3 (Three) Times a Day., Disp: , Rfl:   •  clopidogrel (PLAVIX) 75 MG tablet, Take 75 mg by mouth Daily. PT STATED TO HOLD FOR SURGERY 5-7 DAYS PRIOR, Disp: , Rfl:   •  cyclobenzaprine (FLEXERIL) 10 MG tablet, Take 10 mg by mouth 3 (Three) Times a Day As Needed., Disp: , Rfl:   •  docusate sodium (COLACE) 100 MG capsule, Take 100 mg by mouth 2 (Two) Times a Day As Needed for Constipation., Disp: , Rfl:   •  epoetin lance (EPOGEN,PROCRIT) 70912 UNIT/ML injection, Inject 20,000 Units under the skin into the appropriate area as directed Every 14 (Fourteen) Days., Disp: , Rfl:   •  famotidine (PEPCID) 40 MG tablet, Take 40 mg by mouth 2 (Two) Times a Day., Disp: , Rfl:   •  fexofenadine (Allegra Allergy) 180 MG tablet, Take 1 tablet by mouth Daily As Needed (allergies)., Disp: 90 tablet, Rfl: 2  •  Fluticasone Furoate-Vilanterol (BREO ELLIPTA) 100-25 MCG/INH inhaler, Inhale 1 puff Daily., Disp: 28 each, Rfl: 1  •  folic acid (FOLVITE) 1 MG tablet, TAKE 1 TABLET BY MOUTH EVERY DAY, Disp: 30 tablet, Rfl: 2  •  furosemide (LASIX) 40 MG tablet, Take 40 mg by mouth Every Morning., Disp: , Rfl:   •  hydrALAZINE (APRESOLINE) 100 MG tablet, Take 1 tablet by mouth 3 (Three) Times a Day., Disp: 180 tablet, Rfl: 0  •  LORazepam (ATIVAN) 0.5 MG tablet, TAKE 1 TABLET BY MOUTH EVERY 8 (EIGHT) HOURS AS NEEDED FOR ANXIETY., Disp: 90 tablet, Rfl: 0  •  losartan (COZAAR) 25 MG tablet, Take 25 mg by mouth Daily., Disp: , Rfl:   •  meclizine (ANTIVERT) 25 MG tablet, TAKE 1 TABLET BY MOUTH 3 TIMES A DAY AS NEEDED FOR DIZZINESS/NAUSEA/SENSATION OF SPINNING/WHIRLING, Disp: 30 tablet, Rfl: 1  •  omeprazole (priLOSEC) 40 MG capsule, Take 40 mg by mouth Daily., Disp: , Rfl:   •  ondansetron (ZOFRAN) 4 MG tablet, Take 1-2 tablets by mouth Every 8 (Eight) Hours As Needed for Nausea or Vomiting., Disp: 60 tablet, Rfl: 1  •  oxyCODONE (ROXICODONE) 20 MG tablet, TAKE 1 TABLET BY MOUTH EVERY 4 TO  "6 HOURS NO MORE THAN 5 PER DAY, Disp: , Rfl:   •  sertraline (Zoloft) 100 MG tablet, Take 1 tablet by mouth Daily., Disp: 90 tablet, Rfl: 3  •  sucralfate (CARAFATE) 1 g tablet, Take 1 tablet by mouth 2 (two) times a day., Disp: 180 tablet, Rfl: 3  •  torsemide (DEMADEX) 20 MG tablet, Take 40 mg by mouth Daily., Disp: , Rfl:   •  traZODone (DESYREL) 150 MG tablet, TAKE 0.5 TABLETS BY MOUTH EVERY NIGHT, Disp: 45 tablet, Rfl: 1  •  venlafaxine XR (EFFEXOR-XR) 150 MG 24 hr capsule, TAKE 1 CAPSULE BY MOUTH DAILY. SWALLOW WHOLE DO NOT CRUSH OR CHEW., Disp: 90 capsule, Rfl: 2  •  vitamin D (ERGOCALCIFEROL) 1.25 MG (57652 UT) capsule capsule, 1 (ONE) CAPSULE WEEKLY FOR VITAMIN D DEFICIENCY, Disp: , Rfl:       Objective:     Vitals:    04/29/21 0903 04/29/21 0927   BP: 162/64 162/64   BP Location: Right arm Left arm   Patient Position: Sitting Sitting   Pulse: 61 (!) 41   Resp: 18    SpO2: 95%    Weight: 78.9 kg (174 lb)    Height: 167.6 cm (66\")      Body mass index is 28.08 kg/m².    PHYSICAL EXAM:    Neck:      Vascular: No JVD.   Pulmonary:      Effort: Pulmonary effort is normal.      Breath sounds: Decreased breath sounds present.   Cardiovascular:      Bradycardia present. Frequent ectopic beats. Regular rhythm.      Murmurs: There is no murmur.      No gallop. No click. No rub.   Pulses:     Intact distal pulses.           ECG 12 Lead    Date/Time: 4/29/2021 9:38 AM  Performed by: Mary Haddad APRN  Authorized by: Mary Haddad APRN   Comparison: compared with previous ECG from 1/28/2021  Similar to previous ECG  Rhythm: sinus bradycardia  Ectopy: unifocal PVCs  Rate: bradycardic  BPM: 41    Clinical impression: abnormal EKG  Comments: Indication: PVCs              Assessment:       Diagnosis Plan   1. Frequent PVCs  ECG 12 Lead   2. Bradycardia, sinus     3. Shortness of breath     4. Dizziness       Orders Placed This Encounter   Procedures   • ECG 12 Lead     This order was created via procedure " documentation     Order Specific Question:   Release to patient     Answer:   Immediate          Plan:       1.  Frequent PVCs/bradycardia.  Holter monitor from June 2019 revealed frequent PVCs accounting for 10% of the overall beats.  Recent Holter monitor revealed the same frequency of PVCs with trigeminy and an average heart rate of 51.  She is bradycardic today with a heart rate of 41.  Once I know what medications she is actually taking, I can make a recommendation.  Likely this will be to decrease the carvedilol.        2.  Shortness of breath. She has chronic shortness of breath.  She does not appear volume overloaded on exam.  Echocardiogram from August 2020 noted normal LV systolic function, mild mitral regurgitation, mild tricuspid regurgitation.        3.. Hypertension.  In March of this year, she was seen by Dr. Dudley.  Previous secondary hypertension work-up has been negative.  The medications listed on Dr. Dudley's office note are not consistent with what has been reported today.  I actually called and spoke with Dr. Dudley to see if she could offer any insight.  Unfortunately she seems to run into the same type of issue regarding the medication confusion.        I have asked the patient and her family member to call us later once they have her actual prescription bottles in front of them so we can have an accurate list of what she is actually taking.  It is very difficult to make any recommendations or changes without knowing what she is taking in the first place.  I also discussed the plan of care with Dr. Coello, and he is in agreement.        I spent 60 minutes in total including but not limited to the 20 minutes spent in direct conversation with this patient.       As always, it has been a pleasure to participate in your patient's care.      Sincerely,         REENA Burr

## 2021-04-29 NOTE — TELEPHONE ENCOUNTER
Pt's  came in office today to drop off med list. I have updated medication list.   Please review and advise.    JOEY Butt

## 2021-04-29 NOTE — TELEPHONE ENCOUNTER
----- Message from REENA Garg sent at 4/29/2021 12:42 PM EDT -----  Audrey,    Please follow-up with this patient and try to obtain an accurate list of medications.  I would like each medicine to be verified with the actual prescription bottle.

## 2021-04-30 DIAGNOSIS — I49.3 PVCS (PREMATURE VENTRICULAR CONTRACTIONS): ICD-10-CM

## 2021-04-30 DIAGNOSIS — I10 ESSENTIAL HYPERTENSION: Primary | ICD-10-CM

## 2021-04-30 DIAGNOSIS — R00.1 BRADYCARDIA: ICD-10-CM

## 2021-04-30 NOTE — TELEPHONE ENCOUNTER
The medication list provided is still unclear based on previous notes and documentation.  I spoke with Dr. Crowder.  He also had a different list of medications.  He is going to place a referral to home health for medication management.  It is impossible to make any medication recommendations when the patient is not clear what she is even taking.    Audrey, please inform the patient and family of the above.

## 2021-04-30 NOTE — TELEPHONE ENCOUNTER
Do you still have a copy of the list?  Amlodipine is listed twice at two different doses.  And she is no longer taking the hydralazine?

## 2021-05-13 ENCOUNTER — HOSPITAL ENCOUNTER (OUTPATIENT)
Dept: INFUSION THERAPY | Facility: HOSPITAL | Age: 76
Discharge: HOME | End: 2021-05-13

## 2021-05-13 VITALS
OXYGEN SATURATION: 98 % | HEART RATE: 53 BPM | SYSTOLIC BLOOD PRESSURE: 152 MMHG | RESPIRATION RATE: 20 BRPM | TEMPERATURE: 97.1 F | DIASTOLIC BLOOD PRESSURE: 80 MMHG

## 2021-05-14 ENCOUNTER — TELEPHONE (OUTPATIENT)
Dept: INTERNAL MEDICINE | Facility: CLINIC | Age: 76
End: 2021-05-14

## 2021-05-14 NOTE — TELEPHONE ENCOUNTER
Caller: SHONNA    Relationship:     Best call back number: [phone]    What is the best time to reach you: ANYTIME. LEAVE VOICEMAIL    Who are you requesting to speak with (clinical staff, provider,  specific staff member): CLINICAL        What was the call regarding: PATIENT CANCELLED PHYSICAL THERAPY WITH [facility]. THEY NEED A VERBAL ORDER TO SEE THE PATIENT NEXT WEEK    Do you require a callback: YES

## 2021-05-20 ENCOUNTER — HOSPITAL ENCOUNTER (OUTPATIENT)
Dept: INFUSION THERAPY | Facility: HOSPITAL | Age: 76
Discharge: HOME | End: 2021-05-20
Admitting: INTERNAL MEDICINE

## 2021-05-20 VITALS
HEART RATE: 48 BPM | TEMPERATURE: 96.9 F | DIASTOLIC BLOOD PRESSURE: 75 MMHG | SYSTOLIC BLOOD PRESSURE: 138 MMHG | OXYGEN SATURATION: 96 % | RESPIRATION RATE: 20 BRPM

## 2021-05-20 DIAGNOSIS — D63.1 ANEMIA DUE TO STAGE 3 CHRONIC KIDNEY DISEASE, UNSPECIFIED WHETHER STAGE 3A OR 3B CKD (HCC): Primary | ICD-10-CM

## 2021-05-20 DIAGNOSIS — N18.30 ANEMIA DUE TO STAGE 3 CHRONIC KIDNEY DISEASE, UNSPECIFIED WHETHER STAGE 3A OR 3B CKD (HCC): Primary | ICD-10-CM

## 2021-05-20 LAB
HCT VFR BLD AUTO: 35.6 % (ref 34–46.6)
HGB BLD-MCNC: 11.7 G/DL (ref 12–15.9)

## 2021-05-20 PROCEDURE — 36415 COLL VENOUS BLD VENIPUNCTURE: CPT

## 2021-05-20 PROCEDURE — 85018 HEMOGLOBIN: CPT | Performed by: INTERNAL MEDICINE

## 2021-05-20 PROCEDURE — G0463 HOSPITAL OUTPT CLINIC VISIT: HCPCS

## 2021-05-20 PROCEDURE — 85014 HEMATOCRIT: CPT | Performed by: INTERNAL MEDICINE

## 2021-05-25 ENCOUNTER — APPOINTMENT (OUTPATIENT)
Dept: GENERAL RADIOLOGY | Facility: HOSPITAL | Age: 76
End: 2021-05-25

## 2021-05-25 ENCOUNTER — HOSPITAL ENCOUNTER (INPATIENT)
Facility: HOSPITAL | Age: 76
LOS: 10 days | Discharge: HOME HEALTH | End: 2021-06-05
Attending: EMERGENCY MEDICINE | Admitting: HOSPITALIST

## 2021-05-25 ENCOUNTER — DOCUMENTATION (OUTPATIENT)
Dept: INTERNAL MEDICINE | Facility: CLINIC | Age: 76
End: 2021-05-25

## 2021-05-25 DIAGNOSIS — N17.9 AKI (ACUTE KIDNEY INJURY) (HCC): ICD-10-CM

## 2021-05-25 DIAGNOSIS — R00.1 BRADYCARDIA: Primary | ICD-10-CM

## 2021-05-25 DIAGNOSIS — N39.0 ACUTE UTI: ICD-10-CM

## 2021-05-25 PROBLEM — T83.511A UTI (URINARY TRACT INFECTION) DUE TO URINARY INDWELLING CATHETER (HCC): Status: ACTIVE | Noted: 2021-05-25

## 2021-05-25 LAB
ALBUMIN SERPL-MCNC: 4.3 G/DL (ref 3.5–5.2)
ALBUMIN/GLOB SERPL: 1.7 G/DL
ALP SERPL-CCNC: 110 U/L (ref 39–117)
ALT SERPL W P-5'-P-CCNC: 14 U/L (ref 1–33)
ANION GAP SERPL CALCULATED.3IONS-SCNC: 8.2 MMOL/L (ref 5–15)
AST SERPL-CCNC: 20 U/L (ref 1–32)
BACTERIA UR QL AUTO: ABNORMAL /HPF
BASOPHILS # BLD AUTO: 0.02 10*3/MM3 (ref 0–0.2)
BASOPHILS NFR BLD AUTO: 0.2 % (ref 0–1.5)
BILIRUB SERPL-MCNC: 0.2 MG/DL (ref 0–1.2)
BILIRUB UR QL STRIP: NEGATIVE
BUN SERPL-MCNC: 31 MG/DL (ref 8–23)
BUN/CREAT SERPL: 12.7 (ref 7–25)
CALCIUM SPEC-SCNC: 8.4 MG/DL (ref 8.6–10.5)
CHLORIDE SERPL-SCNC: 99 MMOL/L (ref 98–107)
CLARITY UR: ABNORMAL
CO2 SERPL-SCNC: 26.8 MMOL/L (ref 22–29)
COLOR UR: YELLOW
CREAT SERPL-MCNC: 2.45 MG/DL (ref 0.57–1)
DEPRECATED RDW RBC AUTO: 46.3 FL (ref 37–54)
EOSINOPHIL # BLD AUTO: 0.13 10*3/MM3 (ref 0–0.4)
EOSINOPHIL NFR BLD AUTO: 1.5 % (ref 0.3–6.2)
ERYTHROCYTE [DISTWIDTH] IN BLOOD BY AUTOMATED COUNT: 13.4 % (ref 12.3–15.4)
GFR SERPL CREATININE-BSD FRML MDRD: 19 ML/MIN/1.73
GLOBULIN UR ELPH-MCNC: 2.5 GM/DL
GLUCOSE SERPL-MCNC: 90 MG/DL (ref 65–99)
GLUCOSE UR STRIP-MCNC: NEGATIVE MG/DL
HCT VFR BLD AUTO: 33 % (ref 34–46.6)
HGB BLD-MCNC: 10.8 G/DL (ref 12–15.9)
HGB UR QL STRIP.AUTO: NEGATIVE
HYALINE CASTS UR QL AUTO: ABNORMAL /LPF
IMM GRANULOCYTES # BLD AUTO: 0.04 10*3/MM3 (ref 0–0.05)
IMM GRANULOCYTES NFR BLD AUTO: 0.5 % (ref 0–0.5)
KETONES UR QL STRIP: NEGATIVE
LEUKOCYTE ESTERASE UR QL STRIP.AUTO: ABNORMAL
LIPASE SERPL-CCNC: 47 U/L (ref 13–60)
LYMPHOCYTES # BLD AUTO: 1.67 10*3/MM3 (ref 0.7–3.1)
LYMPHOCYTES NFR BLD AUTO: 19.6 % (ref 19.6–45.3)
MAGNESIUM SERPL-MCNC: 2.5 MG/DL (ref 1.6–2.4)
MCH RBC QN AUTO: 31.1 PG (ref 26.6–33)
MCHC RBC AUTO-ENTMCNC: 32.7 G/DL (ref 31.5–35.7)
MCV RBC AUTO: 95.1 FL (ref 79–97)
MONOCYTES # BLD AUTO: 0.68 10*3/MM3 (ref 0.1–0.9)
MONOCYTES NFR BLD AUTO: 8 % (ref 5–12)
NEUTROPHILS NFR BLD AUTO: 5.97 10*3/MM3 (ref 1.7–7)
NEUTROPHILS NFR BLD AUTO: 70.2 % (ref 42.7–76)
NITRITE UR QL STRIP: NEGATIVE
NRBC BLD AUTO-RTO: 0 /100 WBC (ref 0–0.2)
NT-PROBNP SERPL-MCNC: 1422 PG/ML (ref 0–1800)
PH UR STRIP.AUTO: 5.5 [PH] (ref 5–8)
PLATELET # BLD AUTO: 239 10*3/MM3 (ref 140–450)
PMV BLD AUTO: 9.7 FL (ref 6–12)
POTASSIUM SERPL-SCNC: 5.2 MMOL/L (ref 3.5–5.2)
PROT SERPL-MCNC: 6.8 G/DL (ref 6–8.5)
PROT UR QL STRIP: ABNORMAL
QT INTERVAL: 505 MS
RBC # BLD AUTO: 3.47 10*6/MM3 (ref 3.77–5.28)
RBC # UR: ABNORMAL /HPF
REF LAB TEST METHOD: ABNORMAL
SARS-COV-2 RNA RESP QL NAA+PROBE: NOT DETECTED
SODIUM SERPL-SCNC: 134 MMOL/L (ref 136–145)
SP GR UR STRIP: 1.01 (ref 1–1.03)
SQUAMOUS #/AREA URNS HPF: ABNORMAL /HPF
TROPONIN T SERPL-MCNC: <0.01 NG/ML (ref 0–0.03)
TSH SERPL DL<=0.05 MIU/L-ACNC: 1.87 UIU/ML (ref 0.27–4.2)
UROBILINOGEN UR QL STRIP: ABNORMAL
WBC # BLD AUTO: 8.51 10*3/MM3 (ref 3.4–10.8)
WBC UR QL AUTO: ABNORMAL /HPF

## 2021-05-25 PROCEDURE — 25010000002 CEFTRIAXONE PER 250 MG: Performed by: EMERGENCY MEDICINE

## 2021-05-25 PROCEDURE — 93005 ELECTROCARDIOGRAM TRACING: CPT

## 2021-05-25 PROCEDURE — 85025 COMPLETE CBC W/AUTO DIFF WBC: CPT | Performed by: NURSE PRACTITIONER

## 2021-05-25 PROCEDURE — 83735 ASSAY OF MAGNESIUM: CPT | Performed by: NURSE PRACTITIONER

## 2021-05-25 PROCEDURE — U0005 INFEC AGEN DETEC AMPLI PROBE: HCPCS | Performed by: NURSE PRACTITIONER

## 2021-05-25 PROCEDURE — U0003 INFECTIOUS AGENT DETECTION BY NUCLEIC ACID (DNA OR RNA); SEVERE ACUTE RESPIRATORY SYNDROME CORONAVIRUS 2 (SARS-COV-2) (CORONAVIRUS DISEASE [COVID-19]), AMPLIFIED PROBE TECHNIQUE, MAKING USE OF HIGH THROUGHPUT TECHNOLOGIES AS DESCRIBED BY CMS-2020-01-R: HCPCS | Performed by: NURSE PRACTITIONER

## 2021-05-25 PROCEDURE — 80053 COMPREHEN METABOLIC PANEL: CPT | Performed by: NURSE PRACTITIONER

## 2021-05-25 PROCEDURE — 99284 EMERGENCY DEPT VISIT MOD MDM: CPT

## 2021-05-25 PROCEDURE — 83690 ASSAY OF LIPASE: CPT | Performed by: NURSE PRACTITIONER

## 2021-05-25 PROCEDURE — 83880 ASSAY OF NATRIURETIC PEPTIDE: CPT | Performed by: NURSE PRACTITIONER

## 2021-05-25 PROCEDURE — G0378 HOSPITAL OBSERVATION PER HR: HCPCS

## 2021-05-25 PROCEDURE — 84443 ASSAY THYROID STIM HORMONE: CPT | Performed by: NURSE PRACTITIONER

## 2021-05-25 PROCEDURE — 93010 ELECTROCARDIOGRAM REPORT: CPT | Performed by: INTERNAL MEDICINE

## 2021-05-25 PROCEDURE — 84484 ASSAY OF TROPONIN QUANT: CPT | Performed by: NURSE PRACTITIONER

## 2021-05-25 PROCEDURE — 81001 URINALYSIS AUTO W/SCOPE: CPT | Performed by: NURSE PRACTITIONER

## 2021-05-25 PROCEDURE — 71045 X-RAY EXAM CHEST 1 VIEW: CPT

## 2021-05-25 PROCEDURE — 93005 ELECTROCARDIOGRAM TRACING: CPT | Performed by: EMERGENCY MEDICINE

## 2021-05-25 RX ORDER — SODIUM CHLORIDE 0.9 % (FLUSH) 0.9 %
10 SYRINGE (ML) INJECTION AS NEEDED
Status: DISCONTINUED | OUTPATIENT
Start: 2021-05-25 | End: 2021-06-05 | Stop reason: HOSPADM

## 2021-05-25 RX ORDER — POLYETHYLENE GLYCOL 3350 17 G/17G
17 POWDER, FOR SOLUTION ORAL EVERY EVENING
COMMUNITY

## 2021-05-25 RX ORDER — ASCORBIC ACID 500 MG
500 TABLET ORAL NIGHTLY
COMMUNITY

## 2021-05-25 RX ORDER — AMLODIPINE BESYLATE 10 MG/1
10 TABLET ORAL DAILY
COMMUNITY
End: 2021-01-01 | Stop reason: HOSPADM

## 2021-05-25 RX ORDER — CEFTRIAXONE SODIUM 1 G/50ML
1 INJECTION, SOLUTION INTRAVENOUS ONCE
Status: COMPLETED | OUTPATIENT
Start: 2021-05-25 | End: 2021-05-25

## 2021-05-25 RX ORDER — OXYCODONE HYDROCHLORIDE AND ACETAMINOPHEN 5; 325 MG/1; MG/1
1 TABLET ORAL ONCE
Status: COMPLETED | OUTPATIENT
Start: 2021-05-25 | End: 2021-05-25

## 2021-05-25 RX ORDER — NITROGLYCERIN 0.4 MG/1
0.4 TABLET SUBLINGUAL
Status: DISCONTINUED | OUTPATIENT
Start: 2021-05-25 | End: 2021-06-05 | Stop reason: HOSPADM

## 2021-05-25 RX ORDER — ACETAMINOPHEN 325 MG/1
650 TABLET ORAL EVERY 4 HOURS PRN
Status: DISCONTINUED | OUTPATIENT
Start: 2021-05-25 | End: 2021-06-05 | Stop reason: HOSPADM

## 2021-05-25 RX ORDER — SODIUM CHLORIDE 0.9 % (FLUSH) 0.9 %
10 SYRINGE (ML) INJECTION EVERY 12 HOURS SCHEDULED
Status: DISCONTINUED | OUTPATIENT
Start: 2021-05-26 | End: 2021-06-05 | Stop reason: HOSPADM

## 2021-05-25 RX ORDER — ONDANSETRON 2 MG/ML
4 INJECTION INTRAMUSCULAR; INTRAVENOUS EVERY 6 HOURS PRN
Status: DISCONTINUED | OUTPATIENT
Start: 2021-05-25 | End: 2021-06-05 | Stop reason: HOSPADM

## 2021-05-25 RX ORDER — ACETAMINOPHEN 650 MG/1
650 SUPPOSITORY RECTAL EVERY 4 HOURS PRN
Status: DISCONTINUED | OUTPATIENT
Start: 2021-05-25 | End: 2021-05-27

## 2021-05-25 RX ORDER — CEFTRIAXONE SODIUM 1 G/50ML
1 INJECTION, SOLUTION INTRAVENOUS EVERY 24 HOURS
Status: DISCONTINUED | OUTPATIENT
Start: 2021-05-26 | End: 2021-05-28

## 2021-05-25 RX ORDER — ACETAMINOPHEN 160 MG/5ML
650 SOLUTION ORAL EVERY 4 HOURS PRN
Status: DISCONTINUED | OUTPATIENT
Start: 2021-05-25 | End: 2021-05-27

## 2021-05-25 RX ORDER — SODIUM CHLORIDE 9 MG/ML
100 INJECTION, SOLUTION INTRAVENOUS CONTINUOUS
Status: DISCONTINUED | OUTPATIENT
Start: 2021-05-26 | End: 2021-05-27

## 2021-05-25 RX ADMIN — SODIUM CHLORIDE 100 ML/HR: 9 INJECTION, SOLUTION INTRAVENOUS at 23:43

## 2021-05-25 RX ADMIN — SODIUM CHLORIDE 500 ML: 9 INJECTION, SOLUTION INTRAVENOUS at 20:23

## 2021-05-25 RX ADMIN — OXYCODONE HYDROCHLORIDE AND ACETAMINOPHEN 1 TABLET: 5; 325 TABLET ORAL at 21:22

## 2021-05-25 RX ADMIN — CEFTRIAXONE SODIUM 1 G: 1 INJECTION, SOLUTION INTRAVENOUS at 21:20

## 2021-05-25 RX ADMIN — SODIUM CHLORIDE, PRESERVATIVE FREE 10 ML: 5 INJECTION INTRAVENOUS at 23:53

## 2021-05-25 NOTE — PROGRESS NOTES
I was called at 1700 by her home physical therapist stating that the patient was diaphoretic, nauseous, and bradycardic 38.  The physical therapist notified me that she has low heart rates anyway but this is even more low than usual.  I spoke with the patient she says that she feels pretty nauseous and bad.  I asked her she may have accidentally taken a second dose of one of her medicines and she denied.  Her words did sound a little slurred on the phone.  I asked if she would go by EMS to the hospital and she declined but she said her  would take her immediately to the ER if I felt that was the best course of action.  I asked her to go straight to the ER and I called the ER myself and gave report that the patient was on the way via private vehicle.

## 2021-05-26 PROBLEM — N17.9 AKI (ACUTE KIDNEY INJURY) (HCC): Status: ACTIVE | Noted: 2021-05-26

## 2021-05-26 PROBLEM — F11.20 OPIOID DEPENDENCE (HCC): Status: ACTIVE | Noted: 2021-05-26

## 2021-05-26 LAB
ANION GAP SERPL CALCULATED.3IONS-SCNC: 8.9 MMOL/L (ref 5–15)
BACTERIA UR QL AUTO: ABNORMAL /HPF
BASOPHILS # BLD AUTO: 0.01 10*3/MM3 (ref 0–0.2)
BASOPHILS NFR BLD AUTO: 0.1 % (ref 0–1.5)
BILIRUB UR QL STRIP: NEGATIVE
BUN SERPL-MCNC: 28 MG/DL (ref 8–23)
BUN/CREAT SERPL: 13.7 (ref 7–25)
CALCIUM SPEC-SCNC: 8.2 MG/DL (ref 8.6–10.5)
CHLORIDE SERPL-SCNC: 105 MMOL/L (ref 98–107)
CLARITY UR: CLEAR
CO2 SERPL-SCNC: 23.1 MMOL/L (ref 22–29)
COLOR UR: YELLOW
CREAT SERPL-MCNC: 2.04 MG/DL (ref 0.57–1)
DEPRECATED RDW RBC AUTO: 50.2 FL (ref 37–54)
EOSINOPHIL # BLD AUTO: 0.11 10*3/MM3 (ref 0–0.4)
EOSINOPHIL NFR BLD AUTO: 1.6 % (ref 0.3–6.2)
ERYTHROCYTE [DISTWIDTH] IN BLOOD BY AUTOMATED COUNT: 13.9 % (ref 12.3–15.4)
GFR SERPL CREATININE-BSD FRML MDRD: 24 ML/MIN/1.73
GLUCOSE SERPL-MCNC: 93 MG/DL (ref 65–99)
GLUCOSE UR STRIP-MCNC: NEGATIVE MG/DL
HCT VFR BLD AUTO: 31.1 % (ref 34–46.6)
HGB BLD-MCNC: 9.8 G/DL (ref 12–15.9)
HGB UR QL STRIP.AUTO: NEGATIVE
HYALINE CASTS UR QL AUTO: ABNORMAL /LPF
IMM GRANULOCYTES # BLD AUTO: 0.03 10*3/MM3 (ref 0–0.05)
IMM GRANULOCYTES NFR BLD AUTO: 0.4 % (ref 0–0.5)
INR PPP: 1.04 (ref 0.9–1.1)
KETONES UR QL STRIP: NEGATIVE
LEUKOCYTE ESTERASE UR QL STRIP.AUTO: ABNORMAL
LYMPHOCYTES # BLD AUTO: 1.81 10*3/MM3 (ref 0.7–3.1)
LYMPHOCYTES NFR BLD AUTO: 26.9 % (ref 19.6–45.3)
MAGNESIUM SERPL-MCNC: 2.4 MG/DL (ref 1.6–2.4)
MCH RBC QN AUTO: 31.1 PG (ref 26.6–33)
MCHC RBC AUTO-ENTMCNC: 31.5 G/DL (ref 31.5–35.7)
MCV RBC AUTO: 98.7 FL (ref 79–97)
MONOCYTES # BLD AUTO: 0.58 10*3/MM3 (ref 0.1–0.9)
MONOCYTES NFR BLD AUTO: 8.6 % (ref 5–12)
NEUTROPHILS NFR BLD AUTO: 4.18 10*3/MM3 (ref 1.7–7)
NEUTROPHILS NFR BLD AUTO: 62.4 % (ref 42.7–76)
NITRITE UR QL STRIP: NEGATIVE
NRBC BLD AUTO-RTO: 0 /100 WBC (ref 0–0.2)
PH UR STRIP.AUTO: 6 [PH] (ref 5–8)
PLATELET # BLD AUTO: 196 10*3/MM3 (ref 140–450)
PMV BLD AUTO: 10.1 FL (ref 6–12)
POTASSIUM SERPL-SCNC: 4.7 MMOL/L (ref 3.5–5.2)
PROT UR QL STRIP: NEGATIVE
PROTHROMBIN TIME: 13.4 SECONDS (ref 11.7–14.2)
RBC # BLD AUTO: 3.15 10*6/MM3 (ref 3.77–5.28)
RBC # UR: ABNORMAL /HPF
REF LAB TEST METHOD: ABNORMAL
SODIUM SERPL-SCNC: 137 MMOL/L (ref 136–145)
SP GR UR STRIP: <=1.005 (ref 1–1.03)
SQUAMOUS #/AREA URNS HPF: ABNORMAL /HPF
UROBILINOGEN UR QL STRIP: ABNORMAL
WBC # BLD AUTO: 6.72 10*3/MM3 (ref 3.4–10.8)
WBC UR QL AUTO: ABNORMAL /HPF

## 2021-05-26 PROCEDURE — 25010000002 ONDANSETRON PER 1 MG: Performed by: NURSE PRACTITIONER

## 2021-05-26 PROCEDURE — 94640 AIRWAY INHALATION TREATMENT: CPT

## 2021-05-26 PROCEDURE — 99222 1ST HOSP IP/OBS MODERATE 55: CPT | Performed by: INTERNAL MEDICINE

## 2021-05-26 PROCEDURE — 87086 URINE CULTURE/COLONY COUNT: CPT | Performed by: NURSE PRACTITIONER

## 2021-05-26 PROCEDURE — 80048 BASIC METABOLIC PNL TOTAL CA: CPT | Performed by: NURSE PRACTITIONER

## 2021-05-26 PROCEDURE — 85025 COMPLETE CBC W/AUTO DIFF WBC: CPT | Performed by: NURSE PRACTITIONER

## 2021-05-26 PROCEDURE — 25010000002 CEFTRIAXONE PER 250 MG: Performed by: NURSE PRACTITIONER

## 2021-05-26 PROCEDURE — 83735 ASSAY OF MAGNESIUM: CPT | Performed by: NURSE PRACTITIONER

## 2021-05-26 PROCEDURE — 25010000002 HYDRALAZINE PER 20 MG: Performed by: NURSE PRACTITIONER

## 2021-05-26 PROCEDURE — 81001 URINALYSIS AUTO W/SCOPE: CPT | Performed by: NURSE PRACTITIONER

## 2021-05-26 PROCEDURE — 94664 DEMO&/EVAL PT USE INHALER: CPT

## 2021-05-26 PROCEDURE — 94760 N-INVAS EAR/PLS OXIMETRY 1: CPT

## 2021-05-26 PROCEDURE — 94799 UNLISTED PULMONARY SVC/PX: CPT

## 2021-05-26 PROCEDURE — 85610 PROTHROMBIN TIME: CPT | Performed by: NURSE PRACTITIONER

## 2021-05-26 RX ORDER — CLOPIDOGREL BISULFATE 75 MG/1
75 TABLET ORAL DAILY
Status: DISCONTINUED | OUTPATIENT
Start: 2021-05-26 | End: 2021-06-05 | Stop reason: HOSPADM

## 2021-05-26 RX ORDER — HYDRALAZINE HYDROCHLORIDE 20 MG/ML
10 INJECTION INTRAMUSCULAR; INTRAVENOUS EVERY 6 HOURS PRN
Status: DISCONTINUED | OUTPATIENT
Start: 2021-05-26 | End: 2021-06-05 | Stop reason: HOSPADM

## 2021-05-26 RX ORDER — FOLIC ACID 1 MG/1
1000 TABLET ORAL DAILY
Status: DISCONTINUED | OUTPATIENT
Start: 2021-05-26 | End: 2021-06-05 | Stop reason: HOSPADM

## 2021-05-26 RX ORDER — SERTRALINE HYDROCHLORIDE 100 MG/1
100 TABLET, FILM COATED ORAL DAILY
Status: DISCONTINUED | OUTPATIENT
Start: 2021-05-26 | End: 2021-06-05 | Stop reason: HOSPADM

## 2021-05-26 RX ORDER — CYCLOBENZAPRINE HCL 10 MG
10 TABLET ORAL 3 TIMES DAILY PRN
Status: DISCONTINUED | OUTPATIENT
Start: 2021-05-26 | End: 2021-05-31

## 2021-05-26 RX ORDER — CARBAMAZEPINE 200 MG/1
100 TABLET ORAL 2 TIMES DAILY
Status: DISCONTINUED | OUTPATIENT
Start: 2021-05-26 | End: 2021-05-29

## 2021-05-26 RX ORDER — SUCRALFATE 1 G/1
1 TABLET ORAL
Status: DISCONTINUED | OUTPATIENT
Start: 2021-05-26 | End: 2021-06-05 | Stop reason: HOSPADM

## 2021-05-26 RX ORDER — LORAZEPAM 0.5 MG/1
0.5 TABLET ORAL EVERY 8 HOURS PRN
Status: DISCONTINUED | OUTPATIENT
Start: 2021-05-26 | End: 2021-06-05 | Stop reason: HOSPADM

## 2021-05-26 RX ORDER — TRAZODONE HYDROCHLORIDE 50 MG/1
75 TABLET ORAL NIGHTLY
Status: DISCONTINUED | OUTPATIENT
Start: 2021-05-26 | End: 2021-05-31

## 2021-05-26 RX ORDER — ATORVASTATIN CALCIUM 20 MG/1
40 TABLET, FILM COATED ORAL NIGHTLY
Status: DISCONTINUED | OUTPATIENT
Start: 2021-05-26 | End: 2021-06-05 | Stop reason: HOSPADM

## 2021-05-26 RX ORDER — DOCUSATE SODIUM 100 MG/1
100 CAPSULE, LIQUID FILLED ORAL 2 TIMES DAILY PRN
Status: DISCONTINUED | OUTPATIENT
Start: 2021-05-26 | End: 2021-06-04

## 2021-05-26 RX ORDER — VENLAFAXINE HYDROCHLORIDE 150 MG/1
150 CAPSULE, EXTENDED RELEASE ORAL DAILY
Status: DISCONTINUED | OUTPATIENT
Start: 2021-05-26 | End: 2021-06-05 | Stop reason: HOSPADM

## 2021-05-26 RX ORDER — PANTOPRAZOLE SODIUM 40 MG/1
40 TABLET, DELAYED RELEASE ORAL EVERY MORNING
Status: DISCONTINUED | OUTPATIENT
Start: 2021-05-26 | End: 2021-06-05 | Stop reason: HOSPADM

## 2021-05-26 RX ORDER — AMLODIPINE BESYLATE 10 MG/1
10 TABLET ORAL
Status: DISCONTINUED | OUTPATIENT
Start: 2021-05-26 | End: 2021-06-05 | Stop reason: HOSPADM

## 2021-05-26 RX ORDER — BUDESONIDE AND FORMOTEROL FUMARATE DIHYDRATE 160; 4.5 UG/1; UG/1
2 AEROSOL RESPIRATORY (INHALATION)
Status: DISCONTINUED | OUTPATIENT
Start: 2021-05-26 | End: 2021-06-05 | Stop reason: HOSPADM

## 2021-05-26 RX ORDER — FAMOTIDINE 20 MG/1
20 TABLET, FILM COATED ORAL DAILY
Status: DISCONTINUED | OUTPATIENT
Start: 2021-05-27 | End: 2021-06-05 | Stop reason: HOSPADM

## 2021-05-26 RX ORDER — HYDRALAZINE HYDROCHLORIDE 25 MG/1
25 TABLET, FILM COATED ORAL EVERY 8 HOURS SCHEDULED
Status: DISCONTINUED | OUTPATIENT
Start: 2021-05-26 | End: 2021-05-27

## 2021-05-26 RX ORDER — POLYETHYLENE GLYCOL 3350 17 G/17G
17 POWDER, FOR SOLUTION ORAL DAILY
Status: DISCONTINUED | OUTPATIENT
Start: 2021-05-26 | End: 2021-06-02

## 2021-05-26 RX ORDER — CETIRIZINE HYDROCHLORIDE 10 MG/1
5 TABLET ORAL DAILY
Status: DISCONTINUED | OUTPATIENT
Start: 2021-05-26 | End: 2021-06-05 | Stop reason: HOSPADM

## 2021-05-26 RX ORDER — FAMOTIDINE 20 MG/1
20 TABLET, FILM COATED ORAL 2 TIMES DAILY
Status: DISCONTINUED | OUTPATIENT
Start: 2021-05-26 | End: 2021-05-26

## 2021-05-26 RX ORDER — ASCORBIC ACID 500 MG
500 TABLET ORAL DAILY
Status: DISCONTINUED | OUTPATIENT
Start: 2021-05-26 | End: 2021-06-05 | Stop reason: HOSPADM

## 2021-05-26 RX ADMIN — VENLAFAXINE HYDROCHLORIDE 150 MG: 150 CAPSULE, EXTENDED RELEASE ORAL at 13:55

## 2021-05-26 RX ADMIN — OXYCODONE HYDROCHLORIDE AND ACETAMINOPHEN 500 MG: 500 TABLET ORAL at 13:54

## 2021-05-26 RX ADMIN — AMLODIPINE BESYLATE 10 MG: 10 TABLET ORAL at 13:55

## 2021-05-26 RX ADMIN — ONDANSETRON 4 MG: 2 INJECTION INTRAMUSCULAR; INTRAVENOUS at 11:48

## 2021-05-26 RX ADMIN — OXYCODONE HYDROCHLORIDE 20 MG: 15 TABLET ORAL at 16:56

## 2021-05-26 RX ADMIN — BUDESONIDE AND FORMOTEROL FUMARATE DIHYDRATE 2 PUFF: 160; 4.5 AEROSOL RESPIRATORY (INHALATION) at 21:36

## 2021-05-26 RX ADMIN — OXYCODONE HYDROCHLORIDE 20 MG: 15 TABLET ORAL at 21:14

## 2021-05-26 RX ADMIN — SODIUM CHLORIDE 100 ML/HR: 9 INJECTION, SOLUTION INTRAVENOUS at 20:36

## 2021-05-26 RX ADMIN — FAMOTIDINE 20 MG: 20 TABLET, FILM COATED ORAL at 13:54

## 2021-05-26 RX ADMIN — TRAZODONE HYDROCHLORIDE 75 MG: 50 TABLET ORAL at 20:36

## 2021-05-26 RX ADMIN — CETIRIZINE HYDROCHLORIDE ALLERGY 5 MG: 10 TABLET ORAL at 13:55

## 2021-05-26 RX ADMIN — SUCRALFATE 1 G: 1 TABLET ORAL at 17:04

## 2021-05-26 RX ADMIN — SODIUM CHLORIDE 100 ML/HR: 9 INJECTION, SOLUTION INTRAVENOUS at 11:48

## 2021-05-26 RX ADMIN — CEFTRIAXONE SODIUM 1 G: 1 INJECTION, SOLUTION INTRAVENOUS at 20:36

## 2021-05-26 RX ADMIN — OXYCODONE HYDROCHLORIDE 20 MG: 15 TABLET ORAL at 09:26

## 2021-05-26 RX ADMIN — SODIUM CHLORIDE, PRESERVATIVE FREE 10 ML: 5 INJECTION INTRAVENOUS at 09:26

## 2021-05-26 RX ADMIN — FOLIC ACID 1000 MCG: 1 TABLET ORAL at 13:54

## 2021-05-26 RX ADMIN — SERTRALINE 100 MG: 100 TABLET, FILM COATED ORAL at 13:54

## 2021-05-26 RX ADMIN — CLOPIDOGREL 75 MG: 75 TABLET, FILM COATED ORAL at 13:55

## 2021-05-26 RX ADMIN — PANTOPRAZOLE SODIUM 40 MG: 40 TABLET, DELAYED RELEASE ORAL at 13:56

## 2021-05-26 RX ADMIN — HYDRALAZINE HYDROCHLORIDE 10 MG: 20 INJECTION, SOLUTION INTRAMUSCULAR; INTRAVENOUS at 20:45

## 2021-05-26 RX ADMIN — CARBAMAZEPINE 100 MG: 200 TABLET ORAL at 20:36

## 2021-05-26 RX ADMIN — SODIUM CHLORIDE, PRESERVATIVE FREE 10 ML: 5 INJECTION INTRAVENOUS at 20:37

## 2021-05-26 RX ADMIN — LORAZEPAM 0.5 MG: 0.5 TABLET ORAL at 15:03

## 2021-05-26 RX ADMIN — ATORVASTATIN CALCIUM 40 MG: 20 TABLET, FILM COATED ORAL at 20:33

## 2021-05-26 RX ADMIN — CARBAMAZEPINE 100 MG: 200 TABLET ORAL at 13:56

## 2021-05-27 ENCOUNTER — TELEPHONE (OUTPATIENT)
Dept: INTERNAL MEDICINE | Facility: CLINIC | Age: 76
End: 2021-05-27

## 2021-05-27 LAB
ANION GAP SERPL CALCULATED.3IONS-SCNC: 9.7 MMOL/L (ref 5–15)
BACTERIA SPEC AEROBE CULT: NO GROWTH
BUN SERPL-MCNC: 23 MG/DL (ref 8–23)
BUN/CREAT SERPL: 14.9 (ref 7–25)
CALCIUM SPEC-SCNC: 8.8 MG/DL (ref 8.6–10.5)
CHLORIDE SERPL-SCNC: 107 MMOL/L (ref 98–107)
CO2 SERPL-SCNC: 21.3 MMOL/L (ref 22–29)
CREAT SERPL-MCNC: 1.54 MG/DL (ref 0.57–1)
DEPRECATED RDW RBC AUTO: 46.2 FL (ref 37–54)
ERYTHROCYTE [DISTWIDTH] IN BLOOD BY AUTOMATED COUNT: 13.2 % (ref 12.3–15.4)
GFR SERPL CREATININE-BSD FRML MDRD: 33 ML/MIN/1.73
GLUCOSE SERPL-MCNC: 102 MG/DL (ref 65–99)
HCT VFR BLD AUTO: 38.9 % (ref 34–46.6)
HGB BLD-MCNC: 12.8 G/DL (ref 12–15.9)
MCH RBC QN AUTO: 31.4 PG (ref 26.6–33)
MCHC RBC AUTO-ENTMCNC: 32.9 G/DL (ref 31.5–35.7)
MCV RBC AUTO: 95.3 FL (ref 79–97)
PLATELET # BLD AUTO: 233 10*3/MM3 (ref 140–450)
PMV BLD AUTO: 9.5 FL (ref 6–12)
POTASSIUM SERPL-SCNC: 4.4 MMOL/L (ref 3.5–5.2)
RBC # BLD AUTO: 4.08 10*6/MM3 (ref 3.77–5.28)
SODIUM SERPL-SCNC: 138 MMOL/L (ref 136–145)
WBC # BLD AUTO: 9.26 10*3/MM3 (ref 3.4–10.8)

## 2021-05-27 PROCEDURE — 80048 BASIC METABOLIC PNL TOTAL CA: CPT | Performed by: HOSPITALIST

## 2021-05-27 PROCEDURE — 25010000002 CEFTRIAXONE PER 250 MG: Performed by: NURSE PRACTITIONER

## 2021-05-27 PROCEDURE — 94799 UNLISTED PULMONARY SVC/PX: CPT

## 2021-05-27 PROCEDURE — 25010000002 ONDANSETRON PER 1 MG: Performed by: NURSE PRACTITIONER

## 2021-05-27 PROCEDURE — 99232 SBSQ HOSP IP/OBS MODERATE 35: CPT | Performed by: NURSE PRACTITIONER

## 2021-05-27 PROCEDURE — 99232 SBSQ HOSP IP/OBS MODERATE 35: CPT | Performed by: INTERNAL MEDICINE

## 2021-05-27 PROCEDURE — 85027 COMPLETE CBC AUTOMATED: CPT | Performed by: HOSPITALIST

## 2021-05-27 RX ORDER — HYDRALAZINE HYDROCHLORIDE 50 MG/1
50 TABLET, FILM COATED ORAL EVERY 8 HOURS SCHEDULED
Status: DISCONTINUED | OUTPATIENT
Start: 2021-05-27 | End: 2021-05-27

## 2021-05-27 RX ADMIN — ONDANSETRON 4 MG: 2 INJECTION INTRAMUSCULAR; INTRAVENOUS at 16:38

## 2021-05-27 RX ADMIN — OXYCODONE HYDROCHLORIDE 20 MG: 15 TABLET ORAL at 16:38

## 2021-05-27 RX ADMIN — HYDRALAZINE HYDROCHLORIDE 25 MG: 25 TABLET, FILM COATED ORAL at 06:36

## 2021-05-27 RX ADMIN — BUDESONIDE AND FORMOTEROL FUMARATE DIHYDRATE 2 PUFF: 160; 4.5 AEROSOL RESPIRATORY (INHALATION) at 19:26

## 2021-05-27 RX ADMIN — POLYETHYLENE GLYCOL 3350 17 G: 17 POWDER, FOR SOLUTION ORAL at 00:49

## 2021-05-27 RX ADMIN — SODIUM CHLORIDE 100 ML/HR: 9 INJECTION, SOLUTION INTRAVENOUS at 07:34

## 2021-05-27 RX ADMIN — OXYCODONE HYDROCHLORIDE 20 MG: 15 TABLET ORAL at 01:18

## 2021-05-27 RX ADMIN — SODIUM CHLORIDE 5 MG/HR: 9 INJECTION, SOLUTION INTRAVENOUS at 18:21

## 2021-05-27 RX ADMIN — AMLODIPINE BESYLATE 10 MG: 10 TABLET ORAL at 09:07

## 2021-05-27 RX ADMIN — SUCRALFATE 1 G: 1 TABLET ORAL at 06:49

## 2021-05-27 RX ADMIN — CETIRIZINE HYDROCHLORIDE ALLERGY 5 MG: 10 TABLET ORAL at 08:15

## 2021-05-27 RX ADMIN — CARBAMAZEPINE 100 MG: 200 TABLET ORAL at 21:47

## 2021-05-27 RX ADMIN — SODIUM CHLORIDE 5 MG/HR: 9 INJECTION, SOLUTION INTRAVENOUS at 00:15

## 2021-05-27 RX ADMIN — SUCRALFATE 1 G: 1 TABLET ORAL at 17:41

## 2021-05-27 RX ADMIN — SODIUM CHLORIDE 5 MG/HR: 9 INJECTION, SOLUTION INTRAVENOUS at 12:59

## 2021-05-27 RX ADMIN — CARBAMAZEPINE 100 MG: 200 TABLET ORAL at 08:15

## 2021-05-27 RX ADMIN — ACETAMINOPHEN 650 MG: 325 TABLET, FILM COATED ORAL at 12:46

## 2021-05-27 RX ADMIN — VENLAFAXINE HYDROCHLORIDE 150 MG: 150 CAPSULE, EXTENDED RELEASE ORAL at 08:15

## 2021-05-27 RX ADMIN — HYDRALAZINE HYDROCHLORIDE 25 MG: 25 TABLET, FILM COATED ORAL at 13:03

## 2021-05-27 RX ADMIN — CLOPIDOGREL 75 MG: 75 TABLET, FILM COATED ORAL at 08:15

## 2021-05-27 RX ADMIN — LORAZEPAM 0.5 MG: 0.5 TABLET ORAL at 02:24

## 2021-05-27 RX ADMIN — FAMOTIDINE 20 MG: 20 TABLET, FILM COATED ORAL at 08:14

## 2021-05-27 RX ADMIN — LORAZEPAM 0.5 MG: 0.5 TABLET ORAL at 14:49

## 2021-05-27 RX ADMIN — SODIUM CHLORIDE, PRESERVATIVE FREE 10 ML: 5 INJECTION INTRAVENOUS at 08:15

## 2021-05-27 RX ADMIN — SODIUM CHLORIDE, PRESERVATIVE FREE 10 ML: 5 INJECTION INTRAVENOUS at 21:45

## 2021-05-27 RX ADMIN — PANTOPRAZOLE SODIUM 40 MG: 40 TABLET, DELAYED RELEASE ORAL at 06:36

## 2021-05-27 RX ADMIN — CEFTRIAXONE SODIUM 1 G: 1 INJECTION, SOLUTION INTRAVENOUS at 21:43

## 2021-05-27 RX ADMIN — OXYCODONE HYDROCHLORIDE 20 MG: 15 TABLET ORAL at 21:44

## 2021-05-27 RX ADMIN — ATORVASTATIN CALCIUM 40 MG: 20 TABLET, FILM COATED ORAL at 21:44

## 2021-05-27 RX ADMIN — SODIUM CHLORIDE 5 MG/HR: 9 INJECTION, SOLUTION INTRAVENOUS at 23:43

## 2021-05-27 RX ADMIN — FOLIC ACID 1000 MCG: 1 TABLET ORAL at 08:15

## 2021-05-27 RX ADMIN — POLYETHYLENE GLYCOL 3350 17 G: 17 POWDER, FOR SOLUTION ORAL at 08:14

## 2021-05-27 RX ADMIN — TRAZODONE HYDROCHLORIDE 75 MG: 50 TABLET ORAL at 21:44

## 2021-05-27 RX ADMIN — LORAZEPAM 0.5 MG: 0.5 TABLET ORAL at 23:43

## 2021-05-27 RX ADMIN — SERTRALINE 100 MG: 100 TABLET, FILM COATED ORAL at 08:15

## 2021-05-27 RX ADMIN — OXYCODONE HYDROCHLORIDE AND ACETAMINOPHEN 500 MG: 500 TABLET ORAL at 08:15

## 2021-05-27 RX ADMIN — HYDRALAZINE HYDROCHLORIDE 75 MG: 50 TABLET, FILM COATED ORAL at 21:44

## 2021-05-27 RX ADMIN — HYDRALAZINE HYDROCHLORIDE 25 MG: 25 TABLET, FILM COATED ORAL at 00:47

## 2021-05-27 RX ADMIN — BUDESONIDE AND FORMOTEROL FUMARATE DIHYDRATE 2 PUFF: 160; 4.5 AEROSOL RESPIRATORY (INHALATION) at 06:52

## 2021-05-27 NOTE — TELEPHONE ENCOUNTER
Caller: LILY    Relationship: [facility]    Best call back number: [phone]    What orders are you requesting (i.e. lab or imaging):NURSING AND PHYSICAL THERAPY    In what timeframe would the patient need to come in: ASAP    Where will you receive your lab/imaging services: IN HOME    Additional notes: LILY WITH [facility] IS CALLING TO  REQUEST  NURSING AND PHYSICAL THERAPY ORDERS FOR HOME HEALTH.  PATIENT IS GOING HOME WITH NEW OXYGEN TODAY OR TOMORROW.    PLEASE ADVISE.

## 2021-05-28 LAB
ANION GAP SERPL CALCULATED.3IONS-SCNC: 11.9 MMOL/L (ref 5–15)
BUN SERPL-MCNC: 19 MG/DL (ref 8–23)
BUN/CREAT SERPL: 11.4 (ref 7–25)
CALCIUM SPEC-SCNC: 8.7 MG/DL (ref 8.6–10.5)
CHLORIDE SERPL-SCNC: 113 MMOL/L (ref 98–107)
CO2 SERPL-SCNC: 17.1 MMOL/L (ref 22–29)
CREAT SERPL-MCNC: 1.66 MG/DL (ref 0.57–1)
GFR SERPL CREATININE-BSD FRML MDRD: 30 ML/MIN/1.73
GLUCOSE SERPL-MCNC: 100 MG/DL (ref 65–99)
POTASSIUM SERPL-SCNC: 5.3 MMOL/L (ref 3.5–5.2)
SODIUM SERPL-SCNC: 142 MMOL/L (ref 136–145)

## 2021-05-28 PROCEDURE — 80048 BASIC METABOLIC PNL TOTAL CA: CPT | Performed by: HOSPITALIST

## 2021-05-28 PROCEDURE — 94799 UNLISTED PULMONARY SVC/PX: CPT

## 2021-05-28 PROCEDURE — 99232 SBSQ HOSP IP/OBS MODERATE 35: CPT | Performed by: NURSE PRACTITIONER

## 2021-05-28 PROCEDURE — 25010000002 ONDANSETRON PER 1 MG: Performed by: NURSE PRACTITIONER

## 2021-05-28 RX ORDER — HYDRALAZINE HYDROCHLORIDE 50 MG/1
100 TABLET, FILM COATED ORAL EVERY 8 HOURS SCHEDULED
Status: DISCONTINUED | OUTPATIENT
Start: 2021-05-28 | End: 2021-06-02

## 2021-05-28 RX ORDER — MINOXIDIL 2.5 MG/1
5 TABLET ORAL
Status: DISCONTINUED | OUTPATIENT
Start: 2021-05-28 | End: 2021-05-29

## 2021-05-28 RX ADMIN — SUCRALFATE 1 G: 1 TABLET ORAL at 06:24

## 2021-05-28 RX ADMIN — HYDRALAZINE HYDROCHLORIDE 75 MG: 50 TABLET, FILM COATED ORAL at 06:15

## 2021-05-28 RX ADMIN — AMLODIPINE BESYLATE 10 MG: 10 TABLET ORAL at 09:17

## 2021-05-28 RX ADMIN — SODIUM CHLORIDE 5 MG/HR: 9 INJECTION, SOLUTION INTRAVENOUS at 05:20

## 2021-05-28 RX ADMIN — TRAZODONE HYDROCHLORIDE 75 MG: 50 TABLET ORAL at 20:29

## 2021-05-28 RX ADMIN — POLYETHYLENE GLYCOL 3350 17 G: 17 POWDER, FOR SOLUTION ORAL at 09:18

## 2021-05-28 RX ADMIN — OXYCODONE HYDROCHLORIDE 20 MG: 15 TABLET ORAL at 16:31

## 2021-05-28 RX ADMIN — CYCLOBENZAPRINE 10 MG: 10 TABLET, FILM COATED ORAL at 16:32

## 2021-05-28 RX ADMIN — SODIUM CHLORIDE 5 MG/HR: 9 INJECTION, SOLUTION INTRAVENOUS at 22:29

## 2021-05-28 RX ADMIN — SODIUM CHLORIDE, PRESERVATIVE FREE 10 ML: 5 INJECTION INTRAVENOUS at 09:18

## 2021-05-28 RX ADMIN — ONDANSETRON 4 MG: 2 INJECTION INTRAMUSCULAR; INTRAVENOUS at 11:08

## 2021-05-28 RX ADMIN — ATORVASTATIN CALCIUM 40 MG: 20 TABLET, FILM COATED ORAL at 20:29

## 2021-05-28 RX ADMIN — SODIUM CHLORIDE 5 MG/HR: 9 INJECTION, SOLUTION INTRAVENOUS at 11:59

## 2021-05-28 RX ADMIN — LORAZEPAM 0.5 MG: 0.5 TABLET ORAL at 20:29

## 2021-05-28 RX ADMIN — FOLIC ACID 1000 MCG: 1 TABLET ORAL at 09:17

## 2021-05-28 RX ADMIN — BUDESONIDE AND FORMOTEROL FUMARATE DIHYDRATE 2 PUFF: 160; 4.5 AEROSOL RESPIRATORY (INHALATION) at 06:48

## 2021-05-28 RX ADMIN — MINOXIDIL 5 MG: 2.5 TABLET ORAL at 10:49

## 2021-05-28 RX ADMIN — CLOPIDOGREL 75 MG: 75 TABLET, FILM COATED ORAL at 09:17

## 2021-05-28 RX ADMIN — PANTOPRAZOLE SODIUM 40 MG: 40 TABLET, DELAYED RELEASE ORAL at 06:23

## 2021-05-28 RX ADMIN — SERTRALINE 100 MG: 100 TABLET, FILM COATED ORAL at 09:18

## 2021-05-28 RX ADMIN — LORAZEPAM 0.5 MG: 0.5 TABLET ORAL at 11:10

## 2021-05-28 RX ADMIN — BUDESONIDE AND FORMOTEROL FUMARATE DIHYDRATE 2 PUFF: 160; 4.5 AEROSOL RESPIRATORY (INHALATION) at 19:57

## 2021-05-28 RX ADMIN — SODIUM CHLORIDE, PRESERVATIVE FREE 10 ML: 5 INJECTION INTRAVENOUS at 21:14

## 2021-05-28 RX ADMIN — OXYCODONE HYDROCHLORIDE AND ACETAMINOPHEN 500 MG: 500 TABLET ORAL at 09:18

## 2021-05-28 RX ADMIN — SUCRALFATE 1 G: 1 TABLET ORAL at 16:32

## 2021-05-28 RX ADMIN — HYDRALAZINE HYDROCHLORIDE 100 MG: 50 TABLET, FILM COATED ORAL at 14:46

## 2021-05-28 RX ADMIN — SODIUM CHLORIDE 5 MG/HR: 9 INJECTION, SOLUTION INTRAVENOUS at 17:09

## 2021-05-28 RX ADMIN — CARBAMAZEPINE 100 MG: 200 TABLET ORAL at 09:16

## 2021-05-28 RX ADMIN — OXYCODONE HYDROCHLORIDE 20 MG: 15 TABLET ORAL at 09:21

## 2021-05-28 RX ADMIN — CARBAMAZEPINE 100 MG: 200 TABLET ORAL at 20:29

## 2021-05-28 RX ADMIN — VENLAFAXINE HYDROCHLORIDE 150 MG: 150 CAPSULE, EXTENDED RELEASE ORAL at 09:17

## 2021-05-28 RX ADMIN — FAMOTIDINE 20 MG: 20 TABLET, FILM COATED ORAL at 09:18

## 2021-05-28 RX ADMIN — CETIRIZINE HYDROCHLORIDE ALLERGY 5 MG: 10 TABLET ORAL at 09:16

## 2021-05-28 RX ADMIN — ONDANSETRON 4 MG: 2 INJECTION INTRAMUSCULAR; INTRAVENOUS at 20:29

## 2021-05-28 RX ADMIN — OXYCODONE HYDROCHLORIDE 20 MG: 15 TABLET ORAL at 20:29

## 2021-05-28 RX ADMIN — HYDRALAZINE HYDROCHLORIDE 100 MG: 50 TABLET, FILM COATED ORAL at 21:12

## 2021-05-28 RX ADMIN — ACETAMINOPHEN 650 MG: 325 TABLET, FILM COATED ORAL at 22:33

## 2021-05-29 DIAGNOSIS — K21.9 GASTROESOPHAGEAL REFLUX DISEASE, UNSPECIFIED WHETHER ESOPHAGITIS PRESENT: Primary | ICD-10-CM

## 2021-05-29 LAB
ANION GAP SERPL CALCULATED.3IONS-SCNC: 11 MMOL/L (ref 5–15)
BUN SERPL-MCNC: 21 MG/DL (ref 8–23)
BUN/CREAT SERPL: 11.1 (ref 7–25)
CALCIUM SPEC-SCNC: 8.3 MG/DL (ref 8.6–10.5)
CHLORIDE SERPL-SCNC: 112 MMOL/L (ref 98–107)
CO2 SERPL-SCNC: 18 MMOL/L (ref 22–29)
CREAT SERPL-MCNC: 1.89 MG/DL (ref 0.57–1)
GFR SERPL CREATININE-BSD FRML MDRD: 26 ML/MIN/1.73
GLUCOSE SERPL-MCNC: 100 MG/DL (ref 65–99)
POTASSIUM SERPL-SCNC: 4.4 MMOL/L (ref 3.5–5.2)
SODIUM SERPL-SCNC: 141 MMOL/L (ref 136–145)

## 2021-05-29 PROCEDURE — 94799 UNLISTED PULMONARY SVC/PX: CPT

## 2021-05-29 PROCEDURE — 25010000002 HYDRALAZINE PER 20 MG: Performed by: NURSE PRACTITIONER

## 2021-05-29 PROCEDURE — 80048 BASIC METABOLIC PNL TOTAL CA: CPT | Performed by: HOSPITALIST

## 2021-05-29 PROCEDURE — 99223 1ST HOSP IP/OBS HIGH 75: CPT | Performed by: PSYCHIATRY & NEUROLOGY

## 2021-05-29 PROCEDURE — 25010000002 ONDANSETRON PER 1 MG: Performed by: NURSE PRACTITIONER

## 2021-05-29 PROCEDURE — 99232 SBSQ HOSP IP/OBS MODERATE 35: CPT | Performed by: NURSE PRACTITIONER

## 2021-05-29 RX ORDER — TERAZOSIN 1 MG/1
2 CAPSULE ORAL NIGHTLY
Status: DISCONTINUED | OUTPATIENT
Start: 2021-05-29 | End: 2021-05-31

## 2021-05-29 RX ORDER — MINOXIDIL 10 MG/1
10 TABLET ORAL
Status: DISCONTINUED | OUTPATIENT
Start: 2021-05-29 | End: 2021-06-01

## 2021-05-29 RX ADMIN — SERTRALINE 100 MG: 100 TABLET, FILM COATED ORAL at 09:42

## 2021-05-29 RX ADMIN — OXYCODONE HYDROCHLORIDE 20 MG: 15 TABLET ORAL at 16:21

## 2021-05-29 RX ADMIN — POLYETHYLENE GLYCOL 3350 17 G: 17 POWDER, FOR SOLUTION ORAL at 09:39

## 2021-05-29 RX ADMIN — SODIUM CHLORIDE 5 MG/HR: 9 INJECTION, SOLUTION INTRAVENOUS at 18:26

## 2021-05-29 RX ADMIN — MINOXIDIL 10 MG: 10 TABLET ORAL at 11:42

## 2021-05-29 RX ADMIN — FAMOTIDINE 20 MG: 20 TABLET, FILM COATED ORAL at 09:39

## 2021-05-29 RX ADMIN — Medication 400 MG: at 17:10

## 2021-05-29 RX ADMIN — CARBAMAZEPINE 100 MG: 200 TABLET ORAL at 09:45

## 2021-05-29 RX ADMIN — SODIUM CHLORIDE 5 MG/HR: 9 INJECTION, SOLUTION INTRAVENOUS at 09:50

## 2021-05-29 RX ADMIN — LORAZEPAM 0.5 MG: 0.5 TABLET ORAL at 20:27

## 2021-05-29 RX ADMIN — TERAZOSIN HYDROCHLORIDE 2 MG: 1 CAPSULE ORAL at 17:10

## 2021-05-29 RX ADMIN — BUDESONIDE AND FORMOTEROL FUMARATE DIHYDRATE 2 PUFF: 160; 4.5 AEROSOL RESPIRATORY (INHALATION) at 20:34

## 2021-05-29 RX ADMIN — CLOPIDOGREL 75 MG: 75 TABLET, FILM COATED ORAL at 09:42

## 2021-05-29 RX ADMIN — FOLIC ACID 1000 MCG: 1 TABLET ORAL at 09:41

## 2021-05-29 RX ADMIN — ONDANSETRON 4 MG: 2 INJECTION INTRAMUSCULAR; INTRAVENOUS at 18:23

## 2021-05-29 RX ADMIN — BUDESONIDE AND FORMOTEROL FUMARATE DIHYDRATE 2 PUFF: 160; 4.5 AEROSOL RESPIRATORY (INHALATION) at 09:24

## 2021-05-29 RX ADMIN — PANTOPRAZOLE SODIUM 40 MG: 40 TABLET, DELAYED RELEASE ORAL at 06:58

## 2021-05-29 RX ADMIN — OXYCODONE HYDROCHLORIDE AND ACETAMINOPHEN 500 MG: 500 TABLET ORAL at 09:41

## 2021-05-29 RX ADMIN — AMLODIPINE BESYLATE 10 MG: 10 TABLET ORAL at 09:40

## 2021-05-29 RX ADMIN — SUCRALFATE 1 G: 1 TABLET ORAL at 17:12

## 2021-05-29 RX ADMIN — SUCRALFATE 1 G: 1 TABLET ORAL at 09:42

## 2021-05-29 RX ADMIN — TRAZODONE HYDROCHLORIDE 75 MG: 50 TABLET ORAL at 20:27

## 2021-05-29 RX ADMIN — SODIUM CHLORIDE, PRESERVATIVE FREE 10 ML: 5 INJECTION INTRAVENOUS at 09:50

## 2021-05-29 RX ADMIN — HYDRALAZINE HYDROCHLORIDE 100 MG: 50 TABLET, FILM COATED ORAL at 22:06

## 2021-05-29 RX ADMIN — SODIUM CHLORIDE 5 MG/HR: 9 INJECTION, SOLUTION INTRAVENOUS at 04:19

## 2021-05-29 RX ADMIN — VENLAFAXINE HYDROCHLORIDE 150 MG: 150 CAPSULE, EXTENDED RELEASE ORAL at 09:42

## 2021-05-29 RX ADMIN — OXYCODONE HYDROCHLORIDE 20 MG: 15 TABLET ORAL at 04:15

## 2021-05-29 RX ADMIN — CETIRIZINE HYDROCHLORIDE ALLERGY 5 MG: 10 TABLET ORAL at 09:41

## 2021-05-29 RX ADMIN — HYDRALAZINE HYDROCHLORIDE 10 MG: 20 INJECTION, SOLUTION INTRAMUSCULAR; INTRAVENOUS at 14:45

## 2021-05-29 RX ADMIN — HYDRALAZINE HYDROCHLORIDE 100 MG: 50 TABLET, FILM COATED ORAL at 06:57

## 2021-05-29 RX ADMIN — ATORVASTATIN CALCIUM 40 MG: 20 TABLET, FILM COATED ORAL at 20:27

## 2021-05-29 RX ADMIN — SODIUM CHLORIDE, PRESERVATIVE FREE 10 ML: 5 INJECTION INTRAVENOUS at 20:28

## 2021-05-29 RX ADMIN — HYDRALAZINE HYDROCHLORIDE 100 MG: 50 TABLET, FILM COATED ORAL at 14:44

## 2021-05-29 RX ADMIN — CYCLOBENZAPRINE 10 MG: 10 TABLET, FILM COATED ORAL at 09:40

## 2021-05-30 ENCOUNTER — APPOINTMENT (OUTPATIENT)
Dept: GENERAL RADIOLOGY | Facility: HOSPITAL | Age: 76
End: 2021-05-30

## 2021-05-30 PROBLEM — N39.0 UTI (URINARY TRACT INFECTION) DUE TO URINARY INDWELLING CATHETER (HCC): Status: RESOLVED | Noted: 2021-05-25 | Resolved: 2021-05-30

## 2021-05-30 PROBLEM — T83.511A UTI (URINARY TRACT INFECTION) DUE TO URINARY INDWELLING CATHETER: Status: RESOLVED | Noted: 2021-05-25 | Resolved: 2021-05-30

## 2021-05-30 PROBLEM — R00.1 BRADYCARDIA: Status: RESOLVED | Noted: 2021-05-25 | Resolved: 2021-05-30

## 2021-05-30 LAB
ALBUMIN SERPL-MCNC: 3.9 G/DL (ref 3.5–5.2)
ALBUMIN/GLOB SERPL: 1.8 G/DL
ALP SERPL-CCNC: 98 U/L (ref 39–117)
ALT SERPL W P-5'-P-CCNC: 15 U/L (ref 1–33)
ANION GAP SERPL CALCULATED.3IONS-SCNC: 11.5 MMOL/L (ref 5–15)
ANION GAP SERPL CALCULATED.3IONS-SCNC: 12.5 MMOL/L (ref 5–15)
ARTERIAL PATENCY WRIST A: POSITIVE
AST SERPL-CCNC: 30 U/L (ref 1–32)
ATMOSPHERIC PRESS: 759.2 MMHG
BACTERIA UR QL AUTO: ABNORMAL /HPF
BASE EXCESS BLDA CALC-SCNC: -8.9 MMOL/L (ref 0–2)
BASOPHILS # BLD AUTO: 0.04 10*3/MM3 (ref 0–0.2)
BASOPHILS NFR BLD AUTO: 0.4 % (ref 0–1.5)
BDY SITE: ABNORMAL
BILIRUB SERPL-MCNC: 0.2 MG/DL (ref 0–1.2)
BILIRUB UR QL STRIP: NEGATIVE
BUN SERPL-MCNC: 27 MG/DL (ref 8–23)
BUN SERPL-MCNC: 31 MG/DL (ref 8–23)
BUN/CREAT SERPL: 11.2 (ref 7–25)
BUN/CREAT SERPL: 11.2 (ref 7–25)
CALCIUM SPEC-SCNC: 8.6 MG/DL (ref 8.6–10.5)
CALCIUM SPEC-SCNC: 8.7 MG/DL (ref 8.6–10.5)
CHLORIDE SERPL-SCNC: 110 MMOL/L (ref 98–107)
CHLORIDE SERPL-SCNC: 111 MMOL/L (ref 98–107)
CLARITY UR: CLEAR
CO2 SERPL-SCNC: 15.5 MMOL/L (ref 22–29)
CO2 SERPL-SCNC: 16.5 MMOL/L (ref 22–29)
COLOR UR: ABNORMAL
CREAT SERPL-MCNC: 2.42 MG/DL (ref 0.57–1)
CREAT SERPL-MCNC: 2.76 MG/DL (ref 0.57–1)
D-LACTATE SERPL-SCNC: 0.4 MMOL/L (ref 0.5–2)
DEPRECATED RDW RBC AUTO: 47.3 FL (ref 37–54)
EOSINOPHIL # BLD AUTO: 0.12 10*3/MM3 (ref 0–0.4)
EOSINOPHIL NFR BLD AUTO: 1.1 % (ref 0.3–6.2)
ERYTHROCYTE [DISTWIDTH] IN BLOOD BY AUTOMATED COUNT: 13.3 % (ref 12.3–15.4)
GAS FLOW AIRWAY: 2 LPM
GFR SERPL CREATININE-BSD FRML MDRD: 17 ML/MIN/1.73
GFR SERPL CREATININE-BSD FRML MDRD: 19 ML/MIN/1.73
GLOBULIN UR ELPH-MCNC: 2.2 GM/DL
GLUCOSE BLDC GLUCOMTR-MCNC: 111 MG/DL (ref 70–130)
GLUCOSE SERPL-MCNC: 102 MG/DL (ref 65–99)
GLUCOSE SERPL-MCNC: 110 MG/DL (ref 65–99)
GLUCOSE UR STRIP-MCNC: NEGATIVE MG/DL
HCO3 BLDA-SCNC: 17 MMOL/L (ref 22–28)
HCT VFR BLD AUTO: 28.9 % (ref 34–46.6)
HGB BLD-MCNC: 9.4 G/DL (ref 12–15.9)
HGB UR QL STRIP.AUTO: NEGATIVE
HYALINE CASTS UR QL AUTO: ABNORMAL /LPF
IMM GRANULOCYTES # BLD AUTO: 0.18 10*3/MM3 (ref 0–0.05)
IMM GRANULOCYTES NFR BLD AUTO: 1.6 % (ref 0–0.5)
KETONES UR QL STRIP: ABNORMAL
LEUKOCYTE ESTERASE UR QL STRIP.AUTO: ABNORMAL
LYMPHOCYTES # BLD AUTO: 1.19 10*3/MM3 (ref 0.7–3.1)
LYMPHOCYTES NFR BLD AUTO: 10.7 % (ref 19.6–45.3)
MCH RBC QN AUTO: 31.6 PG (ref 26.6–33)
MCHC RBC AUTO-ENTMCNC: 32.5 G/DL (ref 31.5–35.7)
MCV RBC AUTO: 97.3 FL (ref 79–97)
MODALITY: ABNORMAL
MONOCYTES # BLD AUTO: 0.87 10*3/MM3 (ref 0.1–0.9)
MONOCYTES NFR BLD AUTO: 7.8 % (ref 5–12)
NEUTROPHILS NFR BLD AUTO: 78.4 % (ref 42.7–76)
NEUTROPHILS NFR BLD AUTO: 8.72 10*3/MM3 (ref 1.7–7)
NITRITE UR QL STRIP: NEGATIVE
NRBC BLD AUTO-RTO: 0 /100 WBC (ref 0–0.2)
PCO2 BLDA: 36.1 MM HG (ref 35–45)
PH BLDA: 7.28 PH UNITS (ref 7.35–7.45)
PH UR STRIP.AUTO: <=5 [PH] (ref 5–8)
PLATELET # BLD AUTO: 204 10*3/MM3 (ref 140–450)
PMV BLD AUTO: 9.2 FL (ref 6–12)
PO2 BLDA: 78.1 MM HG (ref 80–100)
POTASSIUM SERPL-SCNC: 4.6 MMOL/L (ref 3.5–5.2)
POTASSIUM SERPL-SCNC: 4.6 MMOL/L (ref 3.5–5.2)
PROCALCITONIN SERPL-MCNC: 0.18 NG/ML (ref 0–0.25)
PROT SERPL-MCNC: 6.1 G/DL (ref 6–8.5)
PROT UR QL STRIP: ABNORMAL
QT INTERVAL: 358 MS
RBC # BLD AUTO: 2.97 10*6/MM3 (ref 3.77–5.28)
RBC # UR: ABNORMAL /HPF
REF LAB TEST METHOD: ABNORMAL
SAO2 % BLDCOA: 93.8 % (ref 92–99)
SODIUM SERPL-SCNC: 138 MMOL/L (ref 136–145)
SODIUM SERPL-SCNC: 139 MMOL/L (ref 136–145)
SODIUM UR-SCNC: 21 MMOL/L
SP GR UR STRIP: 1.02 (ref 1–1.03)
SQUAMOUS #/AREA URNS HPF: ABNORMAL /HPF
TOTAL RATE: 20 BREATHS/MINUTE
TROPONIN T SERPL-MCNC: 0.03 NG/ML (ref 0–0.03)
UROBILINOGEN UR QL STRIP: ABNORMAL
WBC # BLD AUTO: 11.12 10*3/MM3 (ref 3.4–10.8)
WBC UR QL AUTO: ABNORMAL /HPF
YEAST URNS QL MICRO: ABNORMAL /HPF

## 2021-05-30 PROCEDURE — 85025 COMPLETE CBC W/AUTO DIFF WBC: CPT | Performed by: NURSE PRACTITIONER

## 2021-05-30 PROCEDURE — 84484 ASSAY OF TROPONIN QUANT: CPT | Performed by: NURSE PRACTITIONER

## 2021-05-30 PROCEDURE — 80053 COMPREHEN METABOLIC PANEL: CPT | Performed by: HOSPITALIST

## 2021-05-30 PROCEDURE — 94799 UNLISTED PULMONARY SVC/PX: CPT

## 2021-05-30 PROCEDURE — 84300 ASSAY OF URINE SODIUM: CPT | Performed by: INTERNAL MEDICINE

## 2021-05-30 PROCEDURE — 36600 WITHDRAWAL OF ARTERIAL BLOOD: CPT

## 2021-05-30 PROCEDURE — 93005 ELECTROCARDIOGRAM TRACING: CPT | Performed by: NURSE PRACTITIONER

## 2021-05-30 PROCEDURE — 71045 X-RAY EXAM CHEST 1 VIEW: CPT

## 2021-05-30 PROCEDURE — 99232 SBSQ HOSP IP/OBS MODERATE 35: CPT | Performed by: NURSE PRACTITIONER

## 2021-05-30 PROCEDURE — 93010 ELECTROCARDIOGRAM REPORT: CPT | Performed by: INTERNAL MEDICINE

## 2021-05-30 PROCEDURE — 84145 PROCALCITONIN (PCT): CPT | Performed by: NURSE PRACTITIONER

## 2021-05-30 PROCEDURE — 81001 URINALYSIS AUTO W/SCOPE: CPT | Performed by: INTERNAL MEDICINE

## 2021-05-30 PROCEDURE — 82803 BLOOD GASES ANY COMBINATION: CPT

## 2021-05-30 PROCEDURE — 83605 ASSAY OF LACTIC ACID: CPT | Performed by: NURSE PRACTITIONER

## 2021-05-30 PROCEDURE — 82962 GLUCOSE BLOOD TEST: CPT

## 2021-05-30 RX ORDER — SODIUM BICARBONATE IN D5W 150/1000ML
150 PLASTIC BAG, INJECTION (ML) INTRAVENOUS CONTINUOUS
Status: DISCONTINUED | OUTPATIENT
Start: 2021-05-30 | End: 2021-05-31

## 2021-05-30 RX ORDER — CARVEDILOL 6.25 MG/1
6.25 TABLET ORAL 2 TIMES DAILY WITH MEALS
Status: DISCONTINUED | OUTPATIENT
Start: 2021-05-30 | End: 2021-05-31

## 2021-05-30 RX ADMIN — CARVEDILOL 6.25 MG: 6.25 TABLET, FILM COATED ORAL at 18:18

## 2021-05-30 RX ADMIN — VENLAFAXINE HYDROCHLORIDE 150 MG: 150 CAPSULE, EXTENDED RELEASE ORAL at 09:53

## 2021-05-30 RX ADMIN — SODIUM CHLORIDE, PRESERVATIVE FREE 10 ML: 5 INJECTION INTRAVENOUS at 23:07

## 2021-05-30 RX ADMIN — SODIUM CHLORIDE 5 MG/HR: 9 INJECTION, SOLUTION INTRAVENOUS at 08:22

## 2021-05-30 RX ADMIN — SUCRALFATE 1 G: 1 TABLET ORAL at 06:54

## 2021-05-30 RX ADMIN — ATORVASTATIN CALCIUM 40 MG: 20 TABLET, FILM COATED ORAL at 23:05

## 2021-05-30 RX ADMIN — CARVEDILOL 6.25 MG: 6.25 TABLET, FILM COATED ORAL at 11:14

## 2021-05-30 RX ADMIN — OXYCODONE HYDROCHLORIDE 20 MG: 15 TABLET ORAL at 16:16

## 2021-05-30 RX ADMIN — HYDRALAZINE HYDROCHLORIDE 100 MG: 50 TABLET, FILM COATED ORAL at 14:07

## 2021-05-30 RX ADMIN — SODIUM CHLORIDE, PRESERVATIVE FREE 10 ML: 5 INJECTION INTRAVENOUS at 09:57

## 2021-05-30 RX ADMIN — HYDRALAZINE HYDROCHLORIDE 100 MG: 50 TABLET, FILM COATED ORAL at 06:53

## 2021-05-30 RX ADMIN — BUDESONIDE AND FORMOTEROL FUMARATE DIHYDRATE 2 PUFF: 160; 4.5 AEROSOL RESPIRATORY (INHALATION) at 08:46

## 2021-05-30 RX ADMIN — MINOXIDIL 10 MG: 10 TABLET ORAL at 09:53

## 2021-05-30 RX ADMIN — CLOPIDOGREL 75 MG: 75 TABLET, FILM COATED ORAL at 09:53

## 2021-05-30 RX ADMIN — OXYCODONE HYDROCHLORIDE 20 MG: 15 TABLET ORAL at 11:16

## 2021-05-30 RX ADMIN — OXYCODONE HYDROCHLORIDE 20 MG: 15 TABLET ORAL at 07:06

## 2021-05-30 RX ADMIN — TERAZOSIN HYDROCHLORIDE 2 MG: 1 CAPSULE ORAL at 23:06

## 2021-05-30 RX ADMIN — FOLIC ACID 1000 MCG: 1 TABLET ORAL at 09:53

## 2021-05-30 RX ADMIN — FAMOTIDINE 20 MG: 20 TABLET, FILM COATED ORAL at 09:54

## 2021-05-30 RX ADMIN — Medication 400 MG: at 09:54

## 2021-05-30 RX ADMIN — SODIUM CHLORIDE 5 MG/HR: 9 INJECTION, SOLUTION INTRAVENOUS at 20:33

## 2021-05-30 RX ADMIN — Medication 150 MEQ: at 22:52

## 2021-05-30 RX ADMIN — SERTRALINE 100 MG: 100 TABLET, FILM COATED ORAL at 09:54

## 2021-05-30 RX ADMIN — TRAZODONE HYDROCHLORIDE 75 MG: 50 TABLET ORAL at 23:06

## 2021-05-30 RX ADMIN — OXYCODONE HYDROCHLORIDE AND ACETAMINOPHEN 500 MG: 500 TABLET ORAL at 09:54

## 2021-05-30 RX ADMIN — AMLODIPINE BESYLATE 10 MG: 10 TABLET ORAL at 09:53

## 2021-05-30 RX ADMIN — SODIUM CHLORIDE 5 MG/HR: 9 INJECTION, SOLUTION INTRAVENOUS at 02:15

## 2021-05-30 RX ADMIN — ACETAMINOPHEN 650 MG: 325 TABLET, FILM COATED ORAL at 23:07

## 2021-05-30 RX ADMIN — CETIRIZINE HYDROCHLORIDE ALLERGY 5 MG: 10 TABLET ORAL at 09:53

## 2021-05-30 RX ADMIN — PANTOPRAZOLE SODIUM 40 MG: 40 TABLET, DELAYED RELEASE ORAL at 06:54

## 2021-05-30 RX ADMIN — CYCLOBENZAPRINE 10 MG: 10 TABLET, FILM COATED ORAL at 09:53

## 2021-05-30 RX ADMIN — SODIUM CHLORIDE 5 MG/HR: 9 INJECTION, SOLUTION INTRAVENOUS at 14:58

## 2021-05-30 RX ADMIN — BUDESONIDE AND FORMOTEROL FUMARATE DIHYDRATE 2 PUFF: 160; 4.5 AEROSOL RESPIRATORY (INHALATION) at 20:39

## 2021-05-31 ENCOUNTER — APPOINTMENT (OUTPATIENT)
Dept: GENERAL RADIOLOGY | Facility: HOSPITAL | Age: 76
End: 2021-05-31

## 2021-05-31 LAB
ALBUMIN SERPL-MCNC: 3.6 G/DL (ref 3.5–5.2)
ANION GAP SERPL CALCULATED.3IONS-SCNC: 11.2 MMOL/L (ref 5–15)
BUN SERPL-MCNC: 32 MG/DL (ref 8–23)
BUN/CREAT SERPL: 11.9 (ref 7–25)
CALCIUM SPEC-SCNC: 8.5 MG/DL (ref 8.6–10.5)
CHLORIDE SERPL-SCNC: 110 MMOL/L (ref 98–107)
CO2 SERPL-SCNC: 17.8 MMOL/L (ref 22–29)
CREAT SERPL-MCNC: 2.69 MG/DL (ref 0.57–1)
GFR SERPL CREATININE-BSD FRML MDRD: 17 ML/MIN/1.73
GLUCOSE SERPL-MCNC: 111 MG/DL (ref 65–99)
MAGNESIUM SERPL-MCNC: 2.3 MG/DL (ref 1.6–2.4)
NT-PROBNP SERPL-MCNC: 6967 PG/ML (ref 0–1800)
PHOSPHATE SERPL-MCNC: 4.6 MG/DL (ref 2.5–4.5)
POTASSIUM SERPL-SCNC: 4.3 MMOL/L (ref 3.5–5.2)
QT INTERVAL: 381 MS
QT INTERVAL: 406 MS
SODIUM SERPL-SCNC: 139 MMOL/L (ref 136–145)
TROPONIN T SERPL-MCNC: 0.03 NG/ML (ref 0–0.03)
TROPONIN T SERPL-MCNC: 0.04 NG/ML (ref 0–0.03)
URATE SERPL-MCNC: 7.1 MG/DL (ref 2.4–5.7)

## 2021-05-31 PROCEDURE — 84484 ASSAY OF TROPONIN QUANT: CPT | Performed by: NURSE PRACTITIONER

## 2021-05-31 PROCEDURE — 74018 RADEX ABDOMEN 1 VIEW: CPT

## 2021-05-31 PROCEDURE — 83735 ASSAY OF MAGNESIUM: CPT | Performed by: INTERNAL MEDICINE

## 2021-05-31 PROCEDURE — 94799 UNLISTED PULMONARY SVC/PX: CPT

## 2021-05-31 PROCEDURE — 99233 SBSQ HOSP IP/OBS HIGH 50: CPT | Performed by: NURSE PRACTITIONER

## 2021-05-31 PROCEDURE — 83880 ASSAY OF NATRIURETIC PEPTIDE: CPT | Performed by: NURSE PRACTITIONER

## 2021-05-31 PROCEDURE — 93005 ELECTROCARDIOGRAM TRACING: CPT | Performed by: NURSE PRACTITIONER

## 2021-05-31 PROCEDURE — 83835 ASSAY OF METANEPHRINES: CPT | Performed by: NURSE PRACTITIONER

## 2021-05-31 PROCEDURE — 80069 RENAL FUNCTION PANEL: CPT | Performed by: INTERNAL MEDICINE

## 2021-05-31 PROCEDURE — 84550 ASSAY OF BLOOD/URIC ACID: CPT | Performed by: INTERNAL MEDICINE

## 2021-05-31 PROCEDURE — 93010 ELECTROCARDIOGRAM REPORT: CPT | Performed by: INTERNAL MEDICINE

## 2021-05-31 RX ORDER — CYCLOBENZAPRINE HCL 10 MG
5 TABLET ORAL 3 TIMES DAILY PRN
Status: DISCONTINUED | OUTPATIENT
Start: 2021-05-31 | End: 2021-06-05 | Stop reason: HOSPADM

## 2021-05-31 RX ORDER — TERAZOSIN 2 MG/1
4 CAPSULE ORAL NIGHTLY
Status: DISCONTINUED | OUTPATIENT
Start: 2021-05-31 | End: 2021-06-03

## 2021-05-31 RX ORDER — TRAZODONE HYDROCHLORIDE 50 MG/1
50 TABLET ORAL NIGHTLY
Status: DISCONTINUED | OUTPATIENT
Start: 2021-05-31 | End: 2021-06-05 | Stop reason: HOSPADM

## 2021-05-31 RX ORDER — OXYCODONE HYDROCHLORIDE 15 MG/1
15 TABLET ORAL EVERY 6 HOURS PRN
Status: DISCONTINUED | OUTPATIENT
Start: 2021-05-31 | End: 2021-06-03

## 2021-05-31 RX ORDER — CARVEDILOL 12.5 MG/1
12.5 TABLET ORAL 2 TIMES DAILY WITH MEALS
Status: DISCONTINUED | OUTPATIENT
Start: 2021-05-31 | End: 2021-06-03

## 2021-05-31 RX ORDER — CARVEDILOL 6.25 MG/1
6.25 TABLET ORAL ONCE
Status: COMPLETED | OUTPATIENT
Start: 2021-05-31 | End: 2021-05-31

## 2021-05-31 RX ORDER — SODIUM BICARBONATE 650 MG/1
1300 TABLET ORAL 3 TIMES DAILY
Status: DISCONTINUED | OUTPATIENT
Start: 2021-05-31 | End: 2021-06-05 | Stop reason: HOSPADM

## 2021-05-31 RX ADMIN — MINOXIDIL 10 MG: 10 TABLET ORAL at 08:04

## 2021-05-31 RX ADMIN — CARVEDILOL 6.25 MG: 6.25 TABLET, FILM COATED ORAL at 11:21

## 2021-05-31 RX ADMIN — OXYCODONE HYDROCHLORIDE AND ACETAMINOPHEN 500 MG: 500 TABLET ORAL at 08:05

## 2021-05-31 RX ADMIN — CARVEDILOL 6.25 MG: 6.25 TABLET, FILM COATED ORAL at 07:48

## 2021-05-31 RX ADMIN — SODIUM CHLORIDE, PRESERVATIVE FREE 10 ML: 5 INJECTION INTRAVENOUS at 08:05

## 2021-05-31 RX ADMIN — SODIUM CHLORIDE, PRESERVATIVE FREE 10 ML: 5 INJECTION INTRAVENOUS at 20:01

## 2021-05-31 RX ADMIN — CETIRIZINE HYDROCHLORIDE ALLERGY 5 MG: 10 TABLET ORAL at 08:05

## 2021-05-31 RX ADMIN — TERAZOSIN HYDROCHLORIDE 4 MG: 2 CAPSULE ORAL at 20:00

## 2021-05-31 RX ADMIN — AMLODIPINE BESYLATE 10 MG: 10 TABLET ORAL at 08:04

## 2021-05-31 RX ADMIN — SODIUM BICARBONATE 1300 MG: 650 TABLET ORAL at 20:00

## 2021-05-31 RX ADMIN — BUDESONIDE AND FORMOTEROL FUMARATE DIHYDRATE 2 PUFF: 160; 4.5 AEROSOL RESPIRATORY (INHALATION) at 12:45

## 2021-05-31 RX ADMIN — VENLAFAXINE HYDROCHLORIDE 150 MG: 150 CAPSULE, EXTENDED RELEASE ORAL at 08:04

## 2021-05-31 RX ADMIN — CARVEDILOL 12.5 MG: 12.5 TABLET, FILM COATED ORAL at 17:13

## 2021-05-31 RX ADMIN — PANTOPRAZOLE SODIUM 40 MG: 40 TABLET, DELAYED RELEASE ORAL at 06:17

## 2021-05-31 RX ADMIN — SODIUM BICARBONATE 1300 MG: 650 TABLET ORAL at 10:25

## 2021-05-31 RX ADMIN — FOLIC ACID 1000 MCG: 1 TABLET ORAL at 08:04

## 2021-05-31 RX ADMIN — SUCRALFATE 1 G: 1 TABLET ORAL at 06:17

## 2021-05-31 RX ADMIN — OXYCODONE HYDROCHLORIDE 15 MG: 15 TABLET ORAL at 14:23

## 2021-05-31 RX ADMIN — SODIUM BICARBONATE 1300 MG: 650 TABLET ORAL at 16:02

## 2021-05-31 RX ADMIN — TRAZODONE HYDROCHLORIDE 50 MG: 50 TABLET ORAL at 20:00

## 2021-05-31 RX ADMIN — CLOPIDOGREL 75 MG: 75 TABLET, FILM COATED ORAL at 08:04

## 2021-05-31 RX ADMIN — Medication 400 MG: at 08:05

## 2021-05-31 RX ADMIN — HYDRALAZINE HYDROCHLORIDE 100 MG: 50 TABLET, FILM COATED ORAL at 21:35

## 2021-05-31 RX ADMIN — SUCRALFATE 1 G: 1 TABLET ORAL at 06:42

## 2021-05-31 RX ADMIN — ATORVASTATIN CALCIUM 40 MG: 20 TABLET, FILM COATED ORAL at 20:00

## 2021-05-31 RX ADMIN — HYDRALAZINE HYDROCHLORIDE 100 MG: 50 TABLET, FILM COATED ORAL at 06:16

## 2021-05-31 RX ADMIN — SUCRALFATE 1 G: 1 TABLET ORAL at 17:11

## 2021-05-31 RX ADMIN — ACETAMINOPHEN 650 MG: 325 TABLET, FILM COATED ORAL at 18:39

## 2021-05-31 RX ADMIN — SERTRALINE 100 MG: 100 TABLET, FILM COATED ORAL at 08:04

## 2021-05-31 RX ADMIN — ACETAMINOPHEN 650 MG: 325 TABLET, FILM COATED ORAL at 10:25

## 2021-05-31 RX ADMIN — LORAZEPAM 0.5 MG: 0.5 TABLET ORAL at 18:39

## 2021-05-31 RX ADMIN — FAMOTIDINE 20 MG: 20 TABLET, FILM COATED ORAL at 08:05

## 2021-05-31 RX ADMIN — HYDRALAZINE HYDROCHLORIDE 100 MG: 50 TABLET, FILM COATED ORAL at 14:22

## 2021-05-31 RX ADMIN — BUDESONIDE AND FORMOTEROL FUMARATE DIHYDRATE 2 PUFF: 160; 4.5 AEROSOL RESPIRATORY (INHALATION) at 20:10

## 2021-06-01 ENCOUNTER — APPOINTMENT (OUTPATIENT)
Dept: CARDIOLOGY | Facility: HOSPITAL | Age: 76
End: 2021-06-01

## 2021-06-01 ENCOUNTER — TELEPHONE (OUTPATIENT)
Dept: NEUROLOGY | Facility: CLINIC | Age: 76
End: 2021-06-01

## 2021-06-01 ENCOUNTER — APPOINTMENT (OUTPATIENT)
Dept: ULTRASOUND IMAGING | Facility: HOSPITAL | Age: 76
End: 2021-06-01

## 2021-06-01 LAB
ANION GAP SERPL CALCULATED.3IONS-SCNC: 10.9 MMOL/L (ref 5–15)
AORTIC DIMENSIONLESS INDEX: 0.7 (DI)
BH CV ECHO MEAS - ACS: 1.3 CM
BH CV ECHO MEAS - AO ARCH DIAM (PROXIMAL TRANS.): 3.5 CM
BH CV ECHO MEAS - AO MAX PG (FULL): 8.6 MMHG
BH CV ECHO MEAS - AO MAX PG: 18.3 MMHG
BH CV ECHO MEAS - AO MEAN PG (FULL): 4 MMHG
BH CV ECHO MEAS - AO MEAN PG: 8 MMHG
BH CV ECHO MEAS - AO ROOT AREA (BSA CORRECTED): 1.4
BH CV ECHO MEAS - AO ROOT AREA: 6.6 CM^2
BH CV ECHO MEAS - AO ROOT DIAM: 2.9 CM
BH CV ECHO MEAS - AO V2 MAX: 214 CM/SEC
BH CV ECHO MEAS - AO V2 MEAN: 130 CM/SEC
BH CV ECHO MEAS - AO V2 VTI: 37 CM
BH CV ECHO MEAS - ASC AORTA: 3.1 CM
BH CV ECHO MEAS - AVA(I,A): 1.8 CM^2
BH CV ECHO MEAS - AVA(I,D): 1.8 CM^2
BH CV ECHO MEAS - AVA(V,A): 1.9 CM^2
BH CV ECHO MEAS - AVA(V,D): 1.9 CM^2
BH CV ECHO MEAS - BSA(HAYCOCK): 2.1 M^2
BH CV ECHO MEAS - BSA: 2 M^2
BH CV ECHO MEAS - BZI_BMI: 30.5 KILOGRAMS/M^2
BH CV ECHO MEAS - BZI_METRIC_HEIGHT: 170.2 CM
BH CV ECHO MEAS - BZI_METRIC_WEIGHT: 88.5 KG
BH CV ECHO MEAS - DIST REN A EDV LEFT: 23 CM/SEC
BH CV ECHO MEAS - DIST REN A PSV LEFT: 159 CM/SEC
BH CV ECHO MEAS - DIST REN A RI LEFT: 0.82
BH CV ECHO MEAS - EDV(CUBED): 85.2 ML
BH CV ECHO MEAS - EDV(MOD-SP2): 91 ML
BH CV ECHO MEAS - EDV(MOD-SP4): 95 ML
BH CV ECHO MEAS - EDV(TEICH): 87.7 ML
BH CV ECHO MEAS - EF(CUBED): 81.7 %
BH CV ECHO MEAS - EF(MOD-BP): 72.2 %
BH CV ECHO MEAS - EF(MOD-SP2): 75.8 %
BH CV ECHO MEAS - EF(MOD-SP4): 70.5 %
BH CV ECHO MEAS - EF(TEICH): 74.5 %
BH CV ECHO MEAS - ESV(CUBED): 15.6 ML
BH CV ECHO MEAS - ESV(MOD-SP2): 22 ML
BH CV ECHO MEAS - ESV(MOD-SP4): 28 ML
BH CV ECHO MEAS - ESV(TEICH): 22.3 ML
BH CV ECHO MEAS - FS: 43.2 %
BH CV ECHO MEAS - IVS/LVPW: 0.92
BH CV ECHO MEAS - IVSD: 1.2 CM
BH CV ECHO MEAS - LAT PEAK E' VEL: 14 CM/SEC
BH CV ECHO MEAS - LV DIASTOLIC VOL/BSA (35-75): 47.5 ML/M^2
BH CV ECHO MEAS - LV MASS(C)D: 203 GRAMS
BH CV ECHO MEAS - LV MASS(C)DI: 101.5 GRAMS/M^2
BH CV ECHO MEAS - LV MAX PG: 9.7 MMHG
BH CV ECHO MEAS - LV MEAN PG: 4 MMHG
BH CV ECHO MEAS - LV SYSTOLIC VOL/BSA (12-30): 14 ML/M^2
BH CV ECHO MEAS - LV V1 MAX: 156 CM/SEC
BH CV ECHO MEAS - LV V1 MEAN: 88.3 CM/SEC
BH CV ECHO MEAS - LV V1 VTI: 26.8 CM
BH CV ECHO MEAS - LVIDD: 4.4 CM
BH CV ECHO MEAS - LVIDS: 2.5 CM
BH CV ECHO MEAS - LVLD AP2: 8.2 CM
BH CV ECHO MEAS - LVLD AP4: 7.9 CM
BH CV ECHO MEAS - LVLS AP2: 6.7 CM
BH CV ECHO MEAS - LVLS AP4: 6.2 CM
BH CV ECHO MEAS - LVOT AREA (M): 2.5 CM^2
BH CV ECHO MEAS - LVOT AREA: 2.5 CM^2
BH CV ECHO MEAS - LVOT DIAM: 1.8 CM
BH CV ECHO MEAS - LVPWD: 1.3 CM
BH CV ECHO MEAS - MED PEAK E' VEL: 6.7 CM/SEC
BH CV ECHO MEAS - MID REN A EDV LEFT: 24 CM/SEC
BH CV ECHO MEAS - MID REN A PSV LEFT: 176 CM/SEC
BH CV ECHO MEAS - MID REN A RI LEFT: 0.71
BH CV ECHO MEAS - MV A DUR: 0.13 SEC
BH CV ECHO MEAS - MV A MAX VEL: 143 CM/SEC
BH CV ECHO MEAS - MV DEC SLOPE: 690.5 CM/SEC^2
BH CV ECHO MEAS - MV DEC TIME: 140 SEC
BH CV ECHO MEAS - MV E MAX VEL: 93.3 CM/SEC
BH CV ECHO MEAS - MV E/A: 0.65
BH CV ECHO MEAS - MV MAX PG: 8.5 MMHG
BH CV ECHO MEAS - MV MEAN PG: 5 MMHG
BH CV ECHO MEAS - MV P1/2T MAX VEL: 118 CM/SEC
BH CV ECHO MEAS - MV P1/2T: 50.1 MSEC
BH CV ECHO MEAS - MV V2 MAX: 145 CM/SEC
BH CV ECHO MEAS - MV V2 MEAN: 111 CM/SEC
BH CV ECHO MEAS - MV V2 VTI: 28 CM
BH CV ECHO MEAS - MVA P1/2T LCG: 1.9 CM^2
BH CV ECHO MEAS - MVA(P1/2T): 4.4 CM^2
BH CV ECHO MEAS - MVA(VTI): 2.4 CM^2
BH CV ECHO MEAS - PA ACC TIME: 0.07 SEC
BH CV ECHO MEAS - PA MAX PG (FULL): 0.31 MMHG
BH CV ECHO MEAS - PA MAX PG: 6.8 MMHG
BH CV ECHO MEAS - PA PR(ACCEL): 45.7 MMHG
BH CV ECHO MEAS - PA V2 MAX: 130 CM/SEC
BH CV ECHO MEAS - PROX REN A EDV LEFT: 24 CM/S
BH CV ECHO MEAS - PROX REN A PSV LEFT: 176 CM/S
BH CV ECHO MEAS - PULM A REVS DUR: 0.14 SEC
BH CV ECHO MEAS - PULM A REVS VEL: 46.8 CM/SEC
BH CV ECHO MEAS - PULM DIAS VEL: 44.5 CM/SEC
BH CV ECHO MEAS - PULM S/D: 2
BH CV ECHO MEAS - PULM SYS VEL: 90.4 CM/SEC
BH CV ECHO MEAS - PVA(V,A): 3.1 CM^2
BH CV ECHO MEAS - PVA(V,D): 3.1 CM^2
BH CV ECHO MEAS - QP/QS: 1.1
BH CV ECHO MEAS - RAP SYSTOLE: 8 MMHG
BH CV ECHO MEAS - RV MAX PG: 6.5 MMHG
BH CV ECHO MEAS - RV MEAN PG: 3 MMHG
BH CV ECHO MEAS - RV V1 MAX: 127 CM/SEC
BH CV ECHO MEAS - RV V1 MEAN: 85 CM/SEC
BH CV ECHO MEAS - RV V1 VTI: 22.9 CM
BH CV ECHO MEAS - RVOT AREA: 3.1 CM^2
BH CV ECHO MEAS - RVOT DIAM: 2 CM
BH CV ECHO MEAS - RVSP: 23 MMHG
BH CV ECHO MEAS - SI(AO): 122.2 ML/M^2
BH CV ECHO MEAS - SI(CUBED): 34.8 ML/M^2
BH CV ECHO MEAS - SI(LVOT): 34.1 ML/M^2
BH CV ECHO MEAS - SI(MOD-SP2): 34.5 ML/M^2
BH CV ECHO MEAS - SI(MOD-SP4): 33.5 ML/M^2
BH CV ECHO MEAS - SI(TEICH): 32.7 ML/M^2
BH CV ECHO MEAS - SV(AO): 244.4 ML
BH CV ECHO MEAS - SV(CUBED): 69.6 ML
BH CV ECHO MEAS - SV(LVOT): 68.2 ML
BH CV ECHO MEAS - SV(MOD-SP2): 69 ML
BH CV ECHO MEAS - SV(MOD-SP4): 67 ML
BH CV ECHO MEAS - SV(RVOT): 71.9 ML
BH CV ECHO MEAS - SV(TEICH): 65.4 ML
BH CV ECHO MEAS - TAPSE (>1.6): 2.2 CM
BH CV ECHO MEAS - TR MAX VEL: 191 CM/SEC
BH CV ECHO MEASUREMENTS AVERAGE E/E' RATIO: 9.01
BH CV VAS BP LEFT ARM: NORMAL MMHG
BH CV VAS BP RIGHT ARM: NORMAL MMHG
BH CV VAS BP RIGHT ARM: NORMAL MMHG
BH CV VAS RENAL AORTIC MID PSV: 194 CM/S
BH CV VAS RENAL HILUM LEFT EDV: 7 CM/S
BH CV VAS RENAL HILUM LEFT PSV: 32 CM/S
BH CV VAS RENAL HILUM RIGHT EDV: 7 CM/S
BH CV VAS RENAL HILUM RIGHT PSV: 28 CM/S
BH CV XLRA - RV BASE: 3.2 CM
BH CV XLRA - RV LENGTH: 7.9 CM
BH CV XLRA - RV MID: 1.1 CM
BH CV XLRA - TDI S': 16 CM/SEC
BH CV XLRA MEAS - KID L LEFT: 8.72 CM
BH CV XLRA MEAS DIST REN A EDV RIGHT: 23 CM/SEC
BH CV XLRA MEAS DIST REN A PSV RIGHT: 113 CM/SEC
BH CV XLRA MEAS DIST REN A RI RIGHT: 0.72
BH CV XLRA MEAS KID L RIGHT: 11.5 CM
BH CV XLRA MEAS KID W RIGHT: 4.69 CM
BH CV XLRA MEAS MID REN A EDV RIGHT: 32 CM/SEC
BH CV XLRA MEAS MID REN A PSV RIGHT: 152 CM/SEC
BH CV XLRA MEAS MID REN A RI RIGHT: 0.73
BH CV XLRA MEAS PROX REN A EDV RIGHT: 65 CM/SEC
BH CV XLRA MEAS PROX REN A PSV RIGHT: 407 CM/SEC
BH CV XLRA MEAS PROX REN A RI RIGHT: -0.83
BH CV XLRA MEAS RAR RIGHT: 2.1
BH CV XLRA MEAS RENAL A ORG EDV LEFT: 29 CM/S
BH CV XLRA MEAS RENAL A ORG EDV RIGHT: 54 CM/SEC
BH CV XLRA MEAS RENAL A ORG PSV LEFT: 184 CM/S
BH CV XLRA MEAS RENAL A ORG PSV RIGHT: 332 CM/SEC
BH CV XLRA MEAS RENAL A ORG RI RIGHT: 0.84
BUN SERPL-MCNC: 34 MG/DL (ref 8–23)
BUN/CREAT SERPL: 13.3 (ref 7–25)
CALCIUM SPEC-SCNC: 8.5 MG/DL (ref 8.6–10.5)
CHLORIDE SERPL-SCNC: 105 MMOL/L (ref 98–107)
CO2 SERPL-SCNC: 19.1 MMOL/L (ref 22–29)
CREAT SERPL-MCNC: 2.55 MG/DL (ref 0.57–1)
GFR SERPL CREATININE-BSD FRML MDRD: 18 ML/MIN/1.73
GLUCOSE SERPL-MCNC: 98 MG/DL (ref 65–99)
LEFT ATRIUM VOLUME INDEX: 30 ML/M2
LEFT KIDNEY WIDTH: 5.27 CM
LEFT RENAL UPPER PARENCHYMA MAX: 25 CM/S
LEFT RENAL UPPER PARENCHYMA MIN: 8 CM/S
LEFT RENAL UPPER PARENCHYMA RI: 0.68
LV EF 2D ECHO EST: 72 %
MAXIMAL PREDICTED HEART RATE: 145 BPM
MAXIMAL PREDICTED HEART RATE: 145 BPM
POTASSIUM SERPL-SCNC: 4 MMOL/L (ref 3.5–5.2)
RIGHT RENAL UPPER PARENCHYMA MAX: 7 CM/S
RIGHT RENAL UPPER PARENCHYMA MIN: 3 CM/S
RIGHT RENAL UPPER PARENCHYMA RI: 0.57
SODIUM SERPL-SCNC: 135 MMOL/L (ref 136–145)
STRESS TARGET HR: 123 BPM
STRESS TARGET HR: 123 BPM
TROPONIN T SERPL-MCNC: 0.03 NG/ML (ref 0–0.03)

## 2021-06-01 PROCEDURE — 80048 BASIC METABOLIC PNL TOTAL CA: CPT | Performed by: HOSPITALIST

## 2021-06-01 PROCEDURE — 25010000002 ONDANSETRON PER 1 MG: Performed by: NURSE PRACTITIONER

## 2021-06-01 PROCEDURE — 25010000002 HYDRALAZINE PER 20 MG: Performed by: NURSE PRACTITIONER

## 2021-06-01 PROCEDURE — 84484 ASSAY OF TROPONIN QUANT: CPT | Performed by: NURSE PRACTITIONER

## 2021-06-01 PROCEDURE — 99232 SBSQ HOSP IP/OBS MODERATE 35: CPT | Performed by: INTERNAL MEDICINE

## 2021-06-01 PROCEDURE — 93975 VASCULAR STUDY: CPT

## 2021-06-01 PROCEDURE — 93306 TTE W/DOPPLER COMPLETE: CPT | Performed by: INTERNAL MEDICINE

## 2021-06-01 PROCEDURE — 94799 UNLISTED PULMONARY SVC/PX: CPT

## 2021-06-01 PROCEDURE — 76775 US EXAM ABDO BACK WALL LIM: CPT

## 2021-06-01 PROCEDURE — 93306 TTE W/DOPPLER COMPLETE: CPT

## 2021-06-01 RX ORDER — TORSEMIDE 20 MG/1
40 TABLET ORAL DAILY
Status: DISCONTINUED | OUTPATIENT
Start: 2021-06-01 | End: 2021-06-02

## 2021-06-01 RX ORDER — OMEPRAZOLE 40 MG/1
CAPSULE, DELAYED RELEASE ORAL
Qty: 90 CAPSULE | Refills: 1 | Status: SHIPPED | OUTPATIENT
Start: 2021-06-01 | End: 2021-01-01

## 2021-06-01 RX ORDER — MINOXIDIL 10 MG/1
20 TABLET ORAL
Status: DISCONTINUED | OUTPATIENT
Start: 2021-06-01 | End: 2021-06-01

## 2021-06-01 RX ADMIN — CARVEDILOL 12.5 MG: 12.5 TABLET, FILM COATED ORAL at 21:39

## 2021-06-01 RX ADMIN — AMLODIPINE BESYLATE 10 MG: 10 TABLET ORAL at 11:47

## 2021-06-01 RX ADMIN — CARVEDILOL 12.5 MG: 12.5 TABLET, FILM COATED ORAL at 11:48

## 2021-06-01 RX ADMIN — SODIUM BICARBONATE 1300 MG: 650 TABLET ORAL at 11:47

## 2021-06-01 RX ADMIN — SODIUM BICARBONATE 1300 MG: 650 TABLET ORAL at 21:39

## 2021-06-01 RX ADMIN — HYDRALAZINE HYDROCHLORIDE 10 MG: 20 INJECTION, SOLUTION INTRAMUSCULAR; INTRAVENOUS at 17:31

## 2021-06-01 RX ADMIN — FAMOTIDINE 20 MG: 20 TABLET, FILM COATED ORAL at 11:50

## 2021-06-01 RX ADMIN — CLOPIDOGREL 75 MG: 75 TABLET, FILM COATED ORAL at 11:48

## 2021-06-01 RX ADMIN — VENLAFAXINE HYDROCHLORIDE 150 MG: 150 CAPSULE, EXTENDED RELEASE ORAL at 11:48

## 2021-06-01 RX ADMIN — SERTRALINE 100 MG: 100 TABLET, FILM COATED ORAL at 11:48

## 2021-06-01 RX ADMIN — BUDESONIDE AND FORMOTEROL FUMARATE DIHYDRATE 2 PUFF: 160; 4.5 AEROSOL RESPIRATORY (INHALATION) at 21:05

## 2021-06-01 RX ADMIN — MINOXIDIL 20 MG: 10 TABLET ORAL at 11:47

## 2021-06-01 RX ADMIN — FOLIC ACID 1000 MCG: 1 TABLET ORAL at 11:48

## 2021-06-01 RX ADMIN — TORSEMIDE 40 MG: 20 TABLET ORAL at 15:38

## 2021-06-01 RX ADMIN — ONDANSETRON 4 MG: 2 INJECTION INTRAMUSCULAR; INTRAVENOUS at 17:30

## 2021-06-01 RX ADMIN — POLYETHYLENE GLYCOL 3350 17 G: 17 POWDER, FOR SOLUTION ORAL at 21:39

## 2021-06-01 RX ADMIN — SUCRALFATE 1 G: 1 TABLET ORAL at 21:38

## 2021-06-01 RX ADMIN — OXYCODONE HYDROCHLORIDE AND ACETAMINOPHEN 500 MG: 500 TABLET ORAL at 11:47

## 2021-06-01 RX ADMIN — LORAZEPAM 0.5 MG: 0.5 TABLET ORAL at 22:09

## 2021-06-01 RX ADMIN — SODIUM CHLORIDE, PRESERVATIVE FREE 10 ML: 5 INJECTION INTRAVENOUS at 21:39

## 2021-06-01 RX ADMIN — SUCRALFATE 1 G: 1 TABLET ORAL at 11:47

## 2021-06-01 RX ADMIN — CETIRIZINE HYDROCHLORIDE ALLERGY 5 MG: 10 TABLET ORAL at 11:50

## 2021-06-01 RX ADMIN — HYDRALAZINE HYDROCHLORIDE 100 MG: 50 TABLET, FILM COATED ORAL at 06:05

## 2021-06-01 RX ADMIN — Medication 400 MG: at 11:47

## 2021-06-01 RX ADMIN — PANTOPRAZOLE SODIUM 40 MG: 40 TABLET, DELAYED RELEASE ORAL at 06:05

## 2021-06-01 RX ADMIN — OXYCODONE HYDROCHLORIDE 15 MG: 15 TABLET ORAL at 03:05

## 2021-06-01 RX ADMIN — HYDRALAZINE HYDROCHLORIDE 10 MG: 20 INJECTION, SOLUTION INTRAMUSCULAR; INTRAVENOUS at 06:36

## 2021-06-01 RX ADMIN — TERAZOSIN HYDROCHLORIDE 4 MG: 2 CAPSULE ORAL at 21:38

## 2021-06-01 RX ADMIN — HYDRALAZINE HYDROCHLORIDE 100 MG: 50 TABLET, FILM COATED ORAL at 21:39

## 2021-06-01 RX ADMIN — HYDRALAZINE HYDROCHLORIDE 100 MG: 50 TABLET, FILM COATED ORAL at 15:07

## 2021-06-01 RX ADMIN — ATORVASTATIN CALCIUM 40 MG: 20 TABLET, FILM COATED ORAL at 21:39

## 2021-06-01 RX ADMIN — SODIUM CHLORIDE, PRESERVATIVE FREE 10 ML: 5 INJECTION INTRAVENOUS at 11:51

## 2021-06-01 RX ADMIN — TRAZODONE HYDROCHLORIDE 50 MG: 50 TABLET ORAL at 21:39

## 2021-06-01 RX ADMIN — OXYCODONE HYDROCHLORIDE 15 MG: 15 TABLET ORAL at 22:09

## 2021-06-01 NOTE — PROGRESS NOTES
[facility]  [phone]  Álvaro Gifford MD    Subjective:  LOS: 6    Reports gi upset but no cough or soa. Denies n/v. Coughed up some streaky blood today. I suspect source is nares and O2.    Objective   Vital Signs past 24hrs    Temp range: Temp (24hrs), Av.2 °F (36.8 °C), Min:97.8 °F (36.6 °C), Max:98.6 °F (37 °C)    BP range: BP: (141-202)/(65-95) 192/88  Pulse range: Heart Rate:  [81-93] 91  Resp rate range: Resp:  [16-18] 18    Device (Oxygen Therapy): nasal cannulaFlow (L/min):  [2] 2  Oxygen range:SpO2:  [92 %-96 %] 95 %      88.5 kg (195 lb); Body mass index is 30.54 kg/m².    Intake/Output Summary (Last 24 hours) at 2021 1543  Last data filed at 2021 1401  Gross per 24 hour   Intake 20 ml   Output 575 ml   Net -555 ml       Physical Exam  Eyes:      Pupils: Pupils are equal, round, and reactive to light.   Cardiovascular:      Rate and Rhythm: Normal rate and regular rhythm.      Heart sounds: No murmur heard.     Pulmonary:      Effort: Pulmonary effort is normal.      Breath sounds: Normal breath sounds.   Abdominal:      General: Bowel sounds are normal.      Palpations: Abdomen is soft. There is no mass.      Tenderness: There is no abdominal tenderness.   Musculoskeletal:         General: No swelling.   Neurological:      Mental Status: She is alert.       Results Review:    I have reviewed the laboratory and imaging data since the last note by [facility] physician.  My annotations are noted in assessment and plan.    Medication Review:  I have reviewed the current MAR.  My annotations are noted in assessment and plan.    niCARdipine, 2.5 mg/hr, Last Rate: Stopped (21 1123)      Plan   PCCM Problems  Altered mental status, now resolved  Acute kidney injury  Non-anion gap metabolic acidosis from above  COPD without exacerbation  Chronic hypoxia on 2 L oxygen  History lung cancer  Relevant Medical Diagnoses  CKD 3  UTI  Hypertension  Bradycardia and now tachycardia  Frequent  PVCs, improved        Plan of Treatment  Currently without any acute pulmonary issues.  Continue management for COPD and supportive treatment with supplemental oxygen.    'Hemoptysis' which I suspect is from nares. Humidify O2. Will monitor. If persists or worse, may need bronch.    Altered mental status appears improved.  Polypharmacy noted.  Neurology has made recommendations to change.    Cardiac arrhythmias apparently now improved.    Acute kidney injury and nephrology is following. Defer to renal service for adjustment.    Álvaro Gifford MD  06/01/21  15:43 EDT    While in the room and during my examination of the patient I wore gloves, gown, mask, eye protection and followed enhanced droplet/contact isolation protocol and precautions.  I washed my hands before and after this patient encounter.    Part of this note may be an electronic transcription/translation of spoken language to printed text using the Dragon Dictation System.

## 2021-06-01 NOTE — PROGRESS NOTES
Name: Rachana Arguelles ADMIT: 2021   : 1945  PCP: Rhys Crowder Jr., MD    MRN: [id number] LOS: 6 days   AGE/SEX: 75 y.o. female  ROOM: [facility]/     Subjective   Subjective    Patient is lying on the bed and is in no major distress.  No new events overnight.       Objective   Objective   Vital Signs  Temp:  [97.8 °F (36.6 °C)-98.6 °F (37 °C)] 98.1 °F (36.7 °C)  Heart Rate:  [81-93] 91  Resp:  [16-18] 18  BP: (158-202)/(65-95) 174/87  SpO2:  [92 %-96 %] 95 %  on  Flow (L/min):  [2] 2;   Device (Oxygen Therapy): nasal cannula  Body mass index is 30.54 kg/m².  Physical Exam  Vitals and nursing note reviewed.   Constitutional:       General: She is not in acute distress.     Appearance: Normal appearance. She is well-developed.   HENT:      Head: Normocephalic and atraumatic.      Nose: Nose normal.      Mouth/Throat:      Mouth: Mucous membranes are dry.   Eyes:      Extraocular Movements: Extraocular movements intact.      Conjunctiva/sclera: Conjunctivae normal.      Pupils: Pupils are equal, round, and reactive to light.   Neck:      Vascular: No JVD.      Trachea: No tracheal deviation.   Cardiovascular:      Rate and Rhythm: Normal rate and regular rhythm.      Heart sounds: Murmur heard.     Pulmonary:      Effort: Pulmonary effort is normal. No respiratory distress.      Breath sounds: Normal breath sounds.   Abdominal:      General: Bowel sounds are normal. There is no distension.      Palpations: Abdomen is soft.      Tenderness: There is no abdominal tenderness.   Musculoskeletal:      Cervical back: Normal range of motion and neck supple. No rigidity.      Right lower leg: Edema present.      Left lower leg: Edema present.   Skin:     General: Skin is warm and dry.      Coloration: Skin is not jaundiced.   Neurological:      General: No focal deficit present.      Mental Status: She is alert and oriented to person, place, and time. Mental status is at baseline.      Cranial Nerves: No cranial  nerve deficit.   Psychiatric:         Behavior: Behavior normal. Behavior is cooperative.         Results Review     I reviewed the patient's new clinical results.  Results from last 7 days   Lab Units 05/30/21 2206 05/27/21 0616 05/26/21 0532 05/25/21  1821   WBC 10*3/mm3 11.12* 9.26 6.72 8.51   HEMOGLOBIN g/dL 9.4* 12.8 9.8* 10.8*   PLATELETS 10*3/mm3 204 233 196 239     Results from last 7 days   Lab Units 06/01/21 0451 05/31/21 0402 05/30/21 2206 05/30/21  0517   SODIUM mmol/L 135* 139 138 139   POTASSIUM mmol/L 4.0 4.3 4.6 4.6   CHLORIDE mmol/L 105 110* 110* 111*   CO2 mmol/L 19.1* 17.8* 16.5* 15.5*   BUN mg/dL 34* 32* 31* 27*   CREATININE mg/dL 2.55* 2.69* 2.76* 2.42*   GLUCOSE mg/dL 98 111* 102* 110*   Estimated Creatinine Clearance: 21.8 mL/min (A) (by C-G formula based on SCr of 2.55 mg/dL (H)).  Results from last 7 days   Lab Units 05/31/21 0402 05/30/21 2206 05/25/21  1821   ALBUMIN g/dL 3.60 3.90 4.30   BILIRUBIN mg/dL  --  0.2 0.2   ALK PHOS U/L  --  98 110   AST (SGOT) U/L  --  30 20   ALT (SGPT) U/L  --  15 14     Results from last 7 days   Lab Units 06/01/21 0451 05/31/21 0402 05/30/21 2206 05/30/21 0517 05/26/21 0532 05/25/21  1821   CALCIUM mg/dL 8.5* 8.5* 8.7 8.6 8.2* 8.4*   ALBUMIN g/dL  --  3.60 3.90  --   --  4.30   MAGNESIUM mg/dL  --  2.3  --   --  2.4 2.5*   PHOSPHORUS mg/dL  --  4.6*  --   --   --   --      Results from last 7 days   Lab Units 05/30/21  2206   PROCALCITONIN ng/mL 0.18   LACTATE mmol/L 0.4*       Scheduled Medications  amLODIPine, 10 mg, Oral, Q24H  ascorbic acid, 500 mg, Oral, Daily  atorvastatin, 40 mg, Oral, Nightly  budesonide-formoterol, 2 puff, Inhalation, BID - RT  carvedilol, 12.5 mg, Oral, BID With Meals  cetirizine, 5 mg, Oral, Daily  clopidogrel, 75 mg, Oral, Daily  famotidine, 20 mg, Oral, Daily  folic acid, 1,000 mcg, Oral, Daily  hydrALAZINE, 100 mg, Oral, Q8H  pantoprazole, 40 mg, Oral, QAM  polyethylene glycol, 17 g, Oral, Daily  sertraline, 100  mg, Oral, Daily  sodium bicarbonate, 1,300 mg, Oral, TID  sodium chloride, 10 mL, Intravenous, Q12H  sucralfate, 1 g, Oral, BID AC  terazosin, 4 mg, Oral, Nightly  torsemide, 40 mg, Oral, Daily  traZODone, 50 mg, Oral, Nightly  venlafaxine XR, 150 mg, Oral, Daily  Vitamin B-2, 400 mg, Oral, Daily    Infusions  niCARdipine, 2.5 mg/hr, Last Rate: Stopped (05/31/21 1123)    Diet  Diet Regular; Cardiac, Renal       Assessment/Plan     Active Hospital Problems    Diagnosis  POA   • YVONNE (acute kidney injury) (CMS/[facility]) [N17.9]  Yes   • Opioid dependence (CMS/[facility]) [F11.20]  Yes   • Malignant neoplasm of right upper lobe of lung (CMS/[facility]) [C34.11]  Yes   • Stage 3b chronic kidney disease (CMS/[facility]) [N18.32]  Yes   • Chronic pain syndrome [G89.4]  Yes   • Gastroesophageal reflux disease [K21.9]  Yes   • Hypertension [I10]  Yes      Resolved Hospital Problems    Diagnosis Date Resolved POA   • **Bradycardia [R00.1] 05/30/2021 Yes   • UTI (urinary tract infection) due to urinary indwelling catheter (CMS/[facility]) [T83.511A, N39.0] 05/30/2021 Yes       75 y.o. female admitted with Bradycardia.    Difficult situation.  Patient has multiple ongoing medical problems and is chronically ill.  Issues made worse by severe polypharmacy.  Heart rate improved off clonidine.  Cardiology managing and weaned off Cardene.  Respiratory status stable at present.  Pulmonology consulted and recommended further downward titration of medications.  Change oxycodone 20 mg every 4 hours to 15 mg every 6 hours as needed. Did cut Flexeril dose in half and decreased trazodone from 75 to 50 mg nightly.  She is on small dose of lorazepam as well but receiving infrequently.  Would not want to stop benzodiazepines or opiates abrubtly as she would almost certainly withdrawal.      SCDs for DVT prophylaxis.  Full code.  Discussed with patient, family and nursing staff.  Anticipate discharge home with family timing yet to be determined.      Bill Gutierrez,  MD [facility]  06/01/21  16:33 EDT

## 2021-06-01 NOTE — TELEPHONE ENCOUNTER
Per Dr. Deluna, patient is to have a 3 month hospital follow up.  Left voicemail with appt date and time.

## 2021-06-01 NOTE — PLAN OF CARE
Problem: Adult Inpatient Plan of Care  Goal: Plan of Care Review  Outcome: Ongoing, Progressing  Flowsheets (Taken 6/1/2021 0334)  Progress: no change  Plan of Care Reviewed With: patient  Outcome Summary: Pt c/o can not sleep, appeared anxious and restless. C/o perineum area pain and can't void. bladder scan result 433 ml. Gave straight catheter, had 300 ml urine came out.  BP stable. NPO since midnight. will continue to monitor.

## 2021-06-01 NOTE — PROGRESS NOTES
"   LOS: 6 days    Patient Care Team:  Rhys Crowder Jr., MD as PCP - General (Family Medicine)  Whitney Dudley MD as Consulting Physician (Nephrology)  Quita Figueroa MD as Surgeon (Thoracic Surgery)  Sabas Luther MD as Consulting Physician (Otolaryngology)  Álvaro Gifford MD as Consulting Physician (Pulmonary Disease)    Chief Complaint:    Chief Complaint   Patient presents with   • Low Heart Rate     Follow UP Mark on CKD IV.   Subjective     Interval History:     Eating. Pain in her back. Had echo and renal artery doppler this am.  Coughed up a mucous plug that was brown earlier. On nasal o2.  Review of Systems:   As noted above    Objective     Vital Signs  Temp:  [97.8 °F (36.6 °C)-98.6 °F (37 °C)] 98.1 °F (36.7 °C)  Heart Rate:  [78-93] 81  Resp:  [16-18] 18  BP: (141-202)/(65-95) 175/78    Flowsheet Rows      First Filed Value   Admission Height  167.6 cm (66\") Documented at 05/25/2021 1805   Admission Weight  78.9 kg (174 lb) Documented at 05/25/2021 1805          I/O this shift:  In: 20 [P.O.:20]  Out: 100 [Urine:100]  I/O last 3 completed shifts:  In: 0   Out: 900 [Urine:900]    Intake/Output Summary (Last 24 hours) at 6/1/2021 1421  Last data filed at 6/1/2021 1401  Gross per 24 hour   Intake 20 ml   Output 575 ml   Net -555 ml       Physical Exam:  General Appearance: alert,chronically ill.    Mask applied.  Nasal 02  Skin: warm and dry  HEENT: pupils round and reactive to light, oral mucosa normal, nonicteric sclera  Neck: supple, no JVD, trachea midline  Lungs: Clear to auscultation, no wheezing.   Heart: RRR, normal S1 and S2, no S3, no rub  Abdomen: soft, nontender, + bs, no bruit, but active bs make it difficult to hear.   : no palpable bladder  Extremities: no edema, cyanosis or clubbing  Neuro: normal speech and mental status .  Some involuntary movement of extremities. Not asterixis       Results Review:    Results from last 7 days   Lab Units 06/01/21  0451 05/31/21  0402 " 05/30/21  2206 05/25/21  1821   SODIUM mmol/L 135* 139 138 134*   POTASSIUM mmol/L 4.0 4.3 4.6 5.2   CHLORIDE mmol/L 105 110* 110* 99   CO2 mmol/L 19.1* 17.8* 16.5* 26.8   BUN mg/dL 34* 32* 31* 31*   CREATININE mg/dL 2.55* 2.69* 2.76* 2.45*   CALCIUM mg/dL 8.5* 8.5* 8.7 8.4*   BILIRUBIN mg/dL  --   --  0.2 0.2   ALK PHOS U/L  --   --  98 110   ALT (SGPT) U/L  --   --  15 14   AST (SGOT) U/L  --   --  30 20   GLUCOSE mg/dL 98 111* 102* 90       Estimated Creatinine Clearance: 21.8 mL/min (A) (by C-G formula based on SCr of 2.55 mg/dL (H)).    Results from last 7 days   Lab Units 05/31/21  0402 05/26/21  0532 05/25/21  1821   MAGNESIUM mg/dL 2.3 2.4 2.5*   PHOSPHORUS mg/dL 4.6*  --   --        Results from last 7 days   Lab Units 05/31/21  0402   URIC ACID mg/dL 7.1*       Results from last 7 days   Lab Units 05/30/21 2206 05/27/21  0616 05/26/21  0532 05/25/21  1821   WBC 10*3/mm3 11.12* 9.26 6.72 8.51   HEMOGLOBIN g/dL 9.4* 12.8 9.8* 10.8*   PLATELETS 10*3/mm3 204 233 196 239       Results from last 7 days   Lab Units 05/26/21  0532   INR  1.04         Imaging Results (Last 24 Hours)     ** No results found for the last 24 hours. **        amLODIPine, 10 mg, Oral, Q24H  ascorbic acid, 500 mg, Oral, Daily  atorvastatin, 40 mg, Oral, Nightly  budesonide-formoterol, 2 puff, Inhalation, BID - RT  carvedilol, 12.5 mg, Oral, BID With Meals  cetirizine, 5 mg, Oral, Daily  clopidogrel, 75 mg, Oral, Daily  famotidine, 20 mg, Oral, Daily  folic acid, 1,000 mcg, Oral, Daily  hydrALAZINE, 100 mg, Oral, Q8H  minoxidil, 20 mg, Oral, Q24H  pantoprazole, 40 mg, Oral, QAM  polyethylene glycol, 17 g, Oral, Daily  sertraline, 100 mg, Oral, Daily  sodium bicarbonate, 1,300 mg, Oral, TID  sodium chloride, 10 mL, Intravenous, Q12H  sucralfate, 1 g, Oral, BID AC  terazosin, 4 mg, Oral, Nightly  torsemide, 40 mg, Oral, Daily  traZODone, 50 mg, Oral, Nightly  venlafaxine XR, 150 mg, Oral, Daily  Vitamin B-2, 400 mg, Oral,  Daily      niCARdipine, 2.5 mg/hr, Last Rate: Stopped (05/31/21 1123)        Medication Review:   Current Facility-Administered Medications   Medication Dose Route Frequency Provider Last Rate Last Admin   • acetaminophen (TYLENOL) tablet 650 mg  650 mg Oral Q4H PRN Ericka Shaw APRN   650 mg at 05/31/21 1839   • amLODIPine (NORVASC) tablet 10 mg  10 mg Oral Q24H Mat Coello MD   10 mg at 06/01/21 1147   • ascorbic acid (VITAMIN C) tablet 500 mg  500 mg Oral Daily Jerman Lobo MD   500 mg at 06/01/21 1147   • atorvastatin (LIPITOR) tablet 40 mg  40 mg Oral Nightly Jerman Lobo MD   40 mg at 05/31/21 2000   • budesonide-formoterol (SYMBICORT) 160-4.5 MCG/ACT inhaler 2 puff  2 puff Inhalation BID - RT Jerman Lobo MD   2 puff at 05/31/21 2010   • carvedilol (COREG) tablet 12.5 mg  12.5 mg Oral BID With Meals Mary Haddad APRN   12.5 mg at 06/01/21 1148   • cetirizine (zyrTEC) tablet 5 mg  5 mg Oral Daily Jerman Lobo MD   5 mg at 06/01/21 1150   • clopidogrel (PLAVIX) tablet 75 mg  75 mg Oral Daily Jerman Lobo MD   75 mg at 06/01/21 1148   • cyclobenzaprine (FLEXERIL) tablet 5 mg  5 mg Oral TID PRN Jerman Lobo MD       • docusate sodium (COLACE) capsule 100 mg  100 mg Oral BID PRN Jerman Lobo MD       • famotidine (PEPCID) tablet 20 mg  20 mg Oral Daily Jerman Lobo MD   20 mg at 06/01/21 1150   • folic acid (FOLVITE) tablet 1,000 mcg  1,000 mcg Oral Daily Jerman Lobo MD   1,000 mcg at 06/01/21 1148   • hydrALAZINE (APRESOLINE) injection 10 mg  10 mg Intravenous Q6H PRN Leyla Knight APRN   10 mg at 06/01/21 0636   • hydrALAZINE (APRESOLINE) tablet 100 mg  100 mg Oral Q8H Mat Coello MD   100 mg at 06/01/21 0605   • LORazepam (ATIVAN) tablet 0.5 mg  0.5 mg Oral Q8H PRN Jerman Lobo MD   0.5 mg at 05/31/21 1839   • minoxidil (LONITEN) tablet 20 mg  20 mg Oral Q24H Yemi Gaming MD   20 mg at 06/01/21 1147   • niCARdipine (CARDENE) 25 mg in 250 mL NS  infusion kit  2.5 mg/hr Intravenous Titrated Lionel Noguera MD   Stopped at 05/31/21 1123   • nitroglycerin (NITROSTAT) SL tablet 0.4 mg  0.4 mg Sublingual Q5 Min PRN Ericka Shaw APRN       • ondansetron (ZOFRAN) injection 4 mg  4 mg Intravenous Q6H PRN Ericka Shaw APRN   4 mg at 05/29/21 1823   • oxyCODONE (ROXICODONE) immediate release tablet 15 mg  15 mg Oral Q6H PRN Jerman Lobo MD   15 mg at 06/01/21 0305   • pantoprazole (PROTONIX) EC tablet 40 mg  40 mg Oral QAM Jerman Lobo MD   40 mg at 06/01/21 0605   • polyethylene glycol (MIRALAX) packet 17 g  17 g Oral Daily Jerman Lobo MD   17 g at 05/29/21 0939   • sertraline (ZOLOFT) tablet 100 mg  100 mg Oral Daily Jerman Lobo MD   100 mg at 06/01/21 1148   • sodium bicarbonate tablet 1,300 mg  1,300 mg Oral TID Vincent Dutta MD   1,300 mg at 06/01/21 1147   • sodium chloride 0.9 % flush 10 mL  10 mL Intravenous Q12H Ericka Shaw APRN   10 mL at 06/01/21 1151   • sodium chloride 0.9 % flush 10 mL  10 mL Intravenous PRN Ericka Shaw APRN       • sucralfate (CARAFATE) tablet 1 g  1 g Oral BID AC Jerman Lobo MD   1 g at 06/01/21 1147   • terazosin (HYTRIN) capsule 4 mg  4 mg Oral Nightly Mary Haddad APRN   4 mg at 05/31/21 2000   • torsemide (DEMADEX) tablet 40 mg  40 mg Oral Daily Meghann Win MD       • traZODone (DESYREL) tablet 50 mg  50 mg Oral Nightly Jerman Lobo MD   50 mg at 05/31/21 2000   • venlafaxine XR (EFFEXOR-XR) 24 hr capsule 150 mg  150 mg Oral Daily Jerman Lobo MD   150 mg at 06/01/21 1148   • Vitamin B-2 (RIBOFLAVIN) tablet 400 mg  400 mg Oral Daily César Deluna MD   400 mg at 06/01/21 1147       Assessment/Plan   1. CKD 3, YVONNE.  Creatinine slowly improving.   Non gap metabolic acidosis.   2. HTN very difficult to control.  She is on 5 meds with poor control .  Renal artery doppler with >60 % stenosis which can be contributing to her difficulty in  controlling the BP.  Will add diuretic today.  Check Renal US to see renal size.  May be candidate for PTA, stent based on recommendation for BP not being controlled on multiple meds or intolerance for meds. Her  Echo today , has a larger  pericardial effusion.   She is  on minoxidil.  Will need to stop this .  Already received this today. Cannot increase coreg as she was bradycardic on admission.  No ACE inhibitor or ARB due to chronic kidney disease, advanced.   3. Chronic pain syndrome  4. Anemia.             Meghann Win MD  06/01/21  14:21 EDT

## 2021-06-01 NOTE — SIGNIFICANT NOTE
Pt GOLD for multiple imaging including ultrasounds and ECHO. Will attempt to follow up 6/2 if able. DC recs pending.

## 2021-06-01 NOTE — PROGRESS NOTES
"Rachana Arguelles  1945 75 y.o.  [id number]      Patient Care Team:  Rhys Crowder Jr., MD as PCP - General (Family Medicine)  Whitney Dudley MD as Consulting Physician (Nephrology)  Quita Figueroa MD as Surgeon (Thoracic Surgery)  Sabas Luther MD as Consulting Physician (Otolaryngology)  Álvaro Gifford MD as Consulting Physician (Pulmonary Disease)    CC: Renal insufficiency, difficult to control hypertension    Interval History: No significant change      Objective   Vital Signs  Temp:  [97.8 °F (36.6 °C)-98.6 °F (37 °C)] 98.6 °F (37 °C)  Heart Rate:  [78-93] 93  Resp:  [16-18] 18  BP: (141-202)/(65-95) 184/79    Intake/Output Summary (Last 24 hours) at 6/1/2021 1332  Last data filed at 6/1/2021 1300  Gross per 24 hour   Intake 0 ml   Output 575 ml   Net -575 ml     Flowsheet Rows      First Filed Value   Admission Height  167.6 cm (66\") Documented at 05/25/2021 1805   Admission Weight  78.9 kg (174 lb) Documented at 05/25/2021 1805          Physical Exam:   General Appearance:    Alert,oriented, in no acute distress   Lungs:     Clear to auscultation,BS are equal    Heart:    Normal S1 and S2, RRR without murmur, gallop or rub   HEENT:    Sclerae are clear, no JVD or adenopathy   Abdomen:     Normal bowel sounds, soft nontender, nondistended, no HSM   Extremities:   Moves all extremities well, no edema, no cyanosis, no             Redness, no rash     Medication Review:      amLODIPine, 10 mg, Oral, Q24H  ascorbic acid, 500 mg, Oral, Daily  atorvastatin, 40 mg, Oral, Nightly  budesonide-formoterol, 2 puff, Inhalation, BID - RT  carvedilol, 12.5 mg, Oral, BID With Meals  cetirizine, 5 mg, Oral, Daily  clopidogrel, 75 mg, Oral, Daily  famotidine, 20 mg, Oral, Daily  folic acid, 1,000 mcg, Oral, Daily  hydrALAZINE, 100 mg, Oral, Q8H  minoxidil, 20 mg, Oral, Q24H  pantoprazole, 40 mg, Oral, QAM  polyethylene glycol, 17 g, Oral, Daily  sertraline, 100 mg, Oral, Daily  sodium bicarbonate, 1,300 mg, Oral, " TID  sodium chloride, 10 mL, Intravenous, Q12H  sucralfate, 1 g, Oral, BID AC  terazosin, 4 mg, Oral, Nightly  traZODone, 50 mg, Oral, Nightly  venlafaxine XR, 150 mg, Oral, Daily  Vitamin B-2, 400 mg, Oral, Daily      niCARdipine, 2.5 mg/hr, Last Rate: Stopped (05/31/21 1123)          I reviewed the patient's new clinical results.  I personally viewed and interpreted the patient's EKG/Telemetry data    Assessment/Plan  Active Hospital Problems    Diagnosis  POA   • YVONNE (acute kidney injury) (CMS/[facility]) [N17.9]  Yes   • Opioid dependence (CMS/[facility]) [F11.20]  Yes   • Malignant neoplasm of right upper lobe of lung (CMS/[facility]) [C34.11]  Yes   • Stage 3b chronic kidney disease (CMS/[facility]) [N18.32]  Yes   • Chronic pain syndrome [G89.4]  Yes   • Gastroesophageal reflux disease [K21.9]  Yes   • Hypertension [I10]  Yes      Resolved Hospital Problems    Diagnosis Date Resolved POA   • **Bradycardia [R00.1] 05/30/2021 Yes   • UTI (urinary tract infection) due to urinary indwelling catheter (CMS/[facility]) [T83.511A, N39.0] 05/30/2021 Yes       Initially we were asked to see her for bradycardia which could have been related to her hypertension but also clonidine and that was stopped and she has had normal rates since then her blood pressure continues to be high I am going to increase the minoxidil to 20 mg a day she is getting a renal arterial duplex ultrasound today.  Nephrology is on the case    Yemi Gaming MD  06/01/21  13:32 EDT

## 2021-06-02 ENCOUNTER — APPOINTMENT (OUTPATIENT)
Dept: CT IMAGING | Facility: HOSPITAL | Age: 76
End: 2021-06-02

## 2021-06-02 ENCOUNTER — APPOINTMENT (OUTPATIENT)
Dept: GENERAL RADIOLOGY | Facility: HOSPITAL | Age: 76
End: 2021-06-02

## 2021-06-02 PROBLEM — J44.9 COPD (CHRONIC OBSTRUCTIVE PULMONARY DISEASE) (HCC): Status: ACTIVE | Noted: 2021-06-02

## 2021-06-02 LAB
ANION GAP SERPL CALCULATED.3IONS-SCNC: 13.3 MMOL/L (ref 5–15)
BUN SERPL-MCNC: 34 MG/DL (ref 8–23)
BUN/CREAT SERPL: 15.7 (ref 7–25)
CALCIUM SPEC-SCNC: 8.4 MG/DL (ref 8.6–10.5)
CHLORIDE SERPL-SCNC: 106 MMOL/L (ref 98–107)
CO2 SERPL-SCNC: 19.7 MMOL/L (ref 22–29)
CREAT SERPL-MCNC: 2.16 MG/DL (ref 0.57–1)
GFR SERPL CREATININE-BSD FRML MDRD: 22 ML/MIN/1.73
GLUCOSE BLDC GLUCOMTR-MCNC: 130 MG/DL (ref 70–130)
GLUCOSE SERPL-MCNC: 101 MG/DL (ref 65–99)
PA ADP PRP-ACNC: 197 PRU (ref 194–418)
POTASSIUM SERPL-SCNC: 4.1 MMOL/L (ref 3.5–5.2)
SODIUM SERPL-SCNC: 139 MMOL/L (ref 136–145)

## 2021-06-02 PROCEDURE — 99233 SBSQ HOSP IP/OBS HIGH 50: CPT | Performed by: PSYCHIATRY & NEUROLOGY

## 2021-06-02 PROCEDURE — 82384 ASSAY THREE CATECHOLAMINES: CPT | Performed by: NURSE PRACTITIONER

## 2021-06-02 PROCEDURE — 94664 DEMO&/EVAL PT USE INHALER: CPT

## 2021-06-02 PROCEDURE — 25010000002 FUROSEMIDE PER 20 MG: Performed by: INTERNAL MEDICINE

## 2021-06-02 PROCEDURE — 94799 UNLISTED PULMONARY SVC/PX: CPT

## 2021-06-02 PROCEDURE — 70450 CT HEAD/BRAIN W/O DYE: CPT

## 2021-06-02 PROCEDURE — 71045 X-RAY EXAM CHEST 1 VIEW: CPT

## 2021-06-02 PROCEDURE — 85576 BLOOD PLATELET AGGREGATION: CPT | Performed by: PSYCHIATRY & NEUROLOGY

## 2021-06-02 PROCEDURE — 80048 BASIC METABOLIC PNL TOTAL CA: CPT | Performed by: HOSPITALIST

## 2021-06-02 PROCEDURE — 25010000002 ONDANSETRON PER 1 MG: Performed by: NURSE PRACTITIONER

## 2021-06-02 PROCEDURE — 99232 SBSQ HOSP IP/OBS MODERATE 35: CPT | Performed by: INTERNAL MEDICINE

## 2021-06-02 PROCEDURE — 82962 GLUCOSE BLOOD TEST: CPT

## 2021-06-02 PROCEDURE — 25010000002 HYDRALAZINE PER 20 MG: Performed by: NURSE PRACTITIONER

## 2021-06-02 RX ORDER — HYDRALAZINE HYDROCHLORIDE 50 MG/1
50 TABLET, FILM COATED ORAL EVERY 8 HOURS SCHEDULED
Status: DISCONTINUED | OUTPATIENT
Start: 2021-06-02 | End: 2021-06-03

## 2021-06-02 RX ORDER — LACTULOSE 10 G/15ML
10 SOLUTION ORAL DAILY
Status: DISCONTINUED | OUTPATIENT
Start: 2021-06-02 | End: 2021-06-04

## 2021-06-02 RX ORDER — LORAZEPAM 0.5 MG/1
0.5 TABLET ORAL ONCE
Status: COMPLETED | OUTPATIENT
Start: 2021-06-02 | End: 2021-06-02

## 2021-06-02 RX ORDER — FUROSEMIDE 10 MG/ML
60 INJECTION INTRAMUSCULAR; INTRAVENOUS EVERY 8 HOURS
Status: DISPENSED | OUTPATIENT
Start: 2021-06-02 | End: 2021-06-04

## 2021-06-02 RX ADMIN — FOLIC ACID 1000 MCG: 1 TABLET ORAL at 09:07

## 2021-06-02 RX ADMIN — SODIUM BICARBONATE 1300 MG: 650 TABLET ORAL at 22:08

## 2021-06-02 RX ADMIN — LORAZEPAM 0.5 MG: 0.5 TABLET ORAL at 22:12

## 2021-06-02 RX ADMIN — CARVEDILOL 12.5 MG: 12.5 TABLET, FILM COATED ORAL at 17:25

## 2021-06-02 RX ADMIN — BUDESONIDE AND FORMOTEROL FUMARATE DIHYDRATE 2 PUFF: 160; 4.5 AEROSOL RESPIRATORY (INHALATION) at 07:09

## 2021-06-02 RX ADMIN — OXYCODONE HYDROCHLORIDE 15 MG: 15 TABLET ORAL at 06:35

## 2021-06-02 RX ADMIN — SODIUM BICARBONATE 1300 MG: 650 TABLET ORAL at 17:25

## 2021-06-02 RX ADMIN — CETIRIZINE HYDROCHLORIDE ALLERGY 5 MG: 10 TABLET ORAL at 09:07

## 2021-06-02 RX ADMIN — BUDESONIDE AND FORMOTEROL FUMARATE DIHYDRATE 2 PUFF: 160; 4.5 AEROSOL RESPIRATORY (INHALATION) at 21:06

## 2021-06-02 RX ADMIN — SUCRALFATE 1 G: 1 TABLET ORAL at 17:25

## 2021-06-02 RX ADMIN — AMLODIPINE BESYLATE 10 MG: 10 TABLET ORAL at 09:07

## 2021-06-02 RX ADMIN — HYDRALAZINE HYDROCHLORIDE 50 MG: 50 TABLET, FILM COATED ORAL at 22:08

## 2021-06-02 RX ADMIN — SUCRALFATE 1 G: 1 TABLET ORAL at 06:35

## 2021-06-02 RX ADMIN — ATORVASTATIN CALCIUM 40 MG: 20 TABLET, FILM COATED ORAL at 22:08

## 2021-06-02 RX ADMIN — SODIUM CHLORIDE, PRESERVATIVE FREE 10 ML: 5 INJECTION INTRAVENOUS at 22:08

## 2021-06-02 RX ADMIN — CARVEDILOL 12.5 MG: 12.5 TABLET, FILM COATED ORAL at 09:06

## 2021-06-02 RX ADMIN — TERAZOSIN HYDROCHLORIDE 4 MG: 2 CAPSULE ORAL at 22:08

## 2021-06-02 RX ADMIN — LORAZEPAM 0.5 MG: 0.5 TABLET ORAL at 06:35

## 2021-06-02 RX ADMIN — HYDRALAZINE HYDROCHLORIDE 50 MG: 50 TABLET, FILM COATED ORAL at 15:11

## 2021-06-02 RX ADMIN — FUROSEMIDE 60 MG: 10 INJECTION, SOLUTION INTRAMUSCULAR; INTRAVENOUS at 17:25

## 2021-06-02 RX ADMIN — PANTOPRAZOLE SODIUM 40 MG: 40 TABLET, DELAYED RELEASE ORAL at 06:35

## 2021-06-02 RX ADMIN — CLOPIDOGREL 75 MG: 75 TABLET, FILM COATED ORAL at 09:07

## 2021-06-02 RX ADMIN — SODIUM CHLORIDE, PRESERVATIVE FREE 10 ML: 5 INJECTION INTRAVENOUS at 09:08

## 2021-06-02 RX ADMIN — HYDRALAZINE HYDROCHLORIDE 10 MG: 20 INJECTION, SOLUTION INTRAMUSCULAR; INTRAVENOUS at 19:14

## 2021-06-02 RX ADMIN — Medication 400 MG: at 09:07

## 2021-06-02 RX ADMIN — TRAZODONE HYDROCHLORIDE 50 MG: 50 TABLET ORAL at 22:08

## 2021-06-02 RX ADMIN — FUROSEMIDE 60 MG: 10 INJECTION, SOLUTION INTRAMUSCULAR; INTRAVENOUS at 09:06

## 2021-06-02 RX ADMIN — CYCLOBENZAPRINE 5 MG: 10 TABLET, FILM COATED ORAL at 01:25

## 2021-06-02 RX ADMIN — SODIUM BICARBONATE 1300 MG: 650 TABLET ORAL at 09:07

## 2021-06-02 RX ADMIN — LACTULOSE 10 G: 10 SOLUTION ORAL at 11:45

## 2021-06-02 RX ADMIN — SERTRALINE 100 MG: 100 TABLET, FILM COATED ORAL at 09:07

## 2021-06-02 RX ADMIN — OXYCODONE HYDROCHLORIDE 15 MG: 15 TABLET ORAL at 17:25

## 2021-06-02 RX ADMIN — VENLAFAXINE HYDROCHLORIDE 150 MG: 150 CAPSULE, EXTENDED RELEASE ORAL at 09:07

## 2021-06-02 RX ADMIN — OXYCODONE HYDROCHLORIDE AND ACETAMINOPHEN 500 MG: 500 TABLET ORAL at 09:07

## 2021-06-02 RX ADMIN — HYDRALAZINE HYDROCHLORIDE 100 MG: 50 TABLET, FILM COATED ORAL at 06:35

## 2021-06-02 RX ADMIN — FAMOTIDINE 20 MG: 20 TABLET, FILM COATED ORAL at 09:07

## 2021-06-02 RX ADMIN — ONDANSETRON 4 MG: 2 INJECTION INTRAMUSCULAR; INTRAVENOUS at 13:34

## 2021-06-02 NOTE — PROGRESS NOTES
[facility]  [phone]  Álvaro Gifford MD    Subjective:  LOS: 7    Continued complains of Gi upset. On RA. Denies new cough or phlegm.    Objective   Vital Signs past 24hrs    Temp range: Temp (24hrs), Av °F (36.7 °C), Min:97.8 °F (36.6 °C), Max:98.2 °F (36.8 °C)    BP range: BP: (151-179)/(67-83) 177/79  Pulse range: Heart Rate:  [73-94] 79  Resp rate range: Resp:  [18-20] 18    Device (Oxygen Therapy): room airFlow (L/min):  [2] 2  Oxygen range:SpO2:  [84 %-96 %] 84 %      84.8 kg (187 lb); Body mass index is 29.29 kg/m².    Intake/Output Summary (Last 24 hours) at 2021 1724  Last data filed at 2021 1500  Gross per 24 hour   Intake 240 ml   Output 900 ml   Net -660 ml       Physical Exam  Eyes:      Pupils: Pupils are equal, round, and reactive to light.   Cardiovascular:      Rate and Rhythm: Normal rate and regular rhythm.      Heart sounds: No murmur heard.     Pulmonary:      Effort: Pulmonary effort is normal.      Breath sounds: Normal breath sounds.   Abdominal:      General: Bowel sounds are normal.      Palpations: Abdomen is soft. There is no mass.      Tenderness: There is no abdominal tenderness.   Musculoskeletal:         General: No swelling.   Neurological:      Mental Status: She is alert.       Results Review:    I have reviewed the laboratory and imaging data since the last note by [facility] physician.  My annotations are noted in assessment and plan.    Medication Review:  I have reviewed the current MAR.  My annotations are noted in assessment and plan.    niCARdipine, 2.5 mg/hr, Last Rate: Stopped (21 1123)      Plan   PCCM Problems  Altered mental status, now resolved  Acute kidney injury  Non-anion gap metabolic acidosis from above  COPD without exacerbation  Chronic hypoxia on 2 L oxygen  History lung cancer  Relevant Medical Diagnoses  CKD 3  UTI  Hypertension  Bradycardia and now tachycardia  Frequent PVCs, improved        Plan of Treatment    Currently without  any acute pulmonary issues.  Continue management for COPD and supportive treatment with supplemental oxygen.    No further hemoptysis reported. O2 is now humidified.    Altered mental status appears improved.  Polypharmacy noted.  Neurology has made recommendations to change.    Cardiac arrhythmias apparently now improved. Has pericardial effusion.    Acute kidney injury and nephrology is following.    Note plans to address labile BP with renal stent. Catecholamines pending.    Álvaro Gifford MD  06/02/21  17:24 EDT    While in the room and during my examination of the patient I wore gloves, gown, mask, eye protection and followed enhanced droplet/contact isolation protocol and precautions.  I washed my hands before and after this patient encounter.    Part of this note may be an electronic transcription/translation of spoken language to printed text using the Dragon Dictation System.

## 2021-06-02 NOTE — PLAN OF CARE
Problem: Adult Inpatient Plan of Care  Goal: Plan of Care Review  Outcome: Ongoing, Progressing  Flowsheets  Taken 6/2/2021 0041 by Mariam Bailey, RN  Outcome Summary: VITALS AS DOCUMENTED. RESTING QUIETLY W/O S/SX OF DISTRESS. INDEPENDENT WITH BED MOBILITY. PURE WICK IN PLACE TO COLLECT 24 URINE SPECIMEN AS ORDERED. RESTARTED AT 2100 ON 6/1 AS PATIENT WAS OFF FLOOR FOR AN ULTRASOUND AND VOIDED W/O BEING COLLECTED. PRN PAIN MEDICATION APPEARS EFFECTIVE. MONITORING.  Taken 6/1/2021 0334 by Farrah Martínez RN  Plan of Care Reviewed With: patient  Goal: Patient-Specific Goal (Individualized)  Outcome: Ongoing, Progressing  Goal: Absence of Hospital-Acquired Illness or Injury  Outcome: Ongoing, Progressing  Intervention: Identify and Manage Fall Risk  Description: Perform standard risk assessment with a validated tool or comprehensive approach appropriate to the patient on admission; reassess fall risk frequently, with change in status or transfer to another level of care.  Communicate fall injury risk to interprofessional healthcare team.  Determine need for increased observation, equipment and environmental modification, such as low bed and signage, as well as supportive, nonskid footwear.  Adjust safety measures to individual developmental age, stage and identified risk factors.  Reinforce the importance of safety and physical activity with patient and family.  Perform regular intentional rounding to assess need for position change, pain assessment, personal needs, including assistance with toileting.  Recent Flowsheet Documentation  Taken 6/2/2021 0002 by Mariam Bailey, RN  Safety Promotion/Fall Prevention:   assistive device/personal items within reach   clutter free environment maintained   fall prevention program maintained   room organization consistent   safety round/check completed   nonskid shoes/slippers when out of bed  Taken 6/1/2021 2141 by Mariam Bailey, RN  Safety Promotion/Fall Prevention:   assistive  device/personal items within reach   clutter free environment maintained   fall prevention program maintained   lighting adjusted   nonskid shoes/slippers when out of bed   room organization consistent   safety round/check completed  Taken 6/1/2021 2045 by Mariam Bailey RN  Safety Promotion/Fall Prevention:   assistive device/personal items within reach   clutter free environment maintained   fall prevention program maintained   room organization consistent   safety round/check completed  Intervention: Prevent Skin Injury  Description: Assess skin risk on admission and at regular intervals throughout hospital stay.  Keep all areas of skin (especially folds) clean and dry.  Maintain adequate skin hydration.  Relieve and redistribute pressure and protect bony prominences; implement measures based on patient-specific risk factors.  Match turning and repositioning schedule to clinical condition.  Encourage weight shift frequently; assist with reposition if unable to complete independently.  Float heels off bed. Avoid pressure on the Achilles tendon.  Keep skin free from extended contact with medical devices.  Use aids (e.g., slide boards, mechanical lift) during transfer.  Recent Flowsheet Documentation  Taken 6/2/2021 0002 by Mariam Bailey RN  Body Position:   position changed independently   neutral body alignment   neutral head position  Skin Protection: adhesive use limited  Taken 6/1/2021 2141 by Mariam Bailey RN  Body Position:   turned   log-rolled   neutral body alignment   neutral head position   supine   legs elevated  Skin Protection: adhesive use limited  Taken 6/1/2021 2045 by Mariam Bailey RN  Body Position:   neutral body alignment   neutral head position   position maintained  Intervention: Prevent and Manage VTE (venous thromboembolism) Risk  Description: Assess for VTE risk.  Encourage/assist with early ambulation.  Initiate and maintain compression or other therapy, as indicated based on  identified risk in accordance with organizational protocol/provider order.  Encourage both active and passive leg exercises while in bed, if unable to ambulate.  Recent Flowsheet Documentation  Taken 6/1/2021 2141 by Mariam Bailey RN  VTE Prevention/Management:   bilateral   compression stockings on   dorsiflexion/plantar flexion performed  Intervention: Prevent Infection  Description: Maintain skin and mucous membrane integrity; promote hand, oral and pulmonary hygiene.  Optimize fluid balance, nutrition, sleep and glycemic control to maximize infection resistance.  Identify potential sources of infection early to prevent or mitigate progression of infection (e.g., wound, lines, devices).  Evaluate ongoing need for invasive devices; remove promptly when no longer indicated.  Recent Flowsheet Documentation  Taken 6/2/2021 0002 by Mariam Bailey RN  Infection Prevention:   equipment surfaces disinfected   hand hygiene promoted   personal protective equipment utilized   rest/sleep promoted   single patient room provided   visitors restricted/screened  Taken 6/1/2021 2141 by Mariam Bailey RN  Infection Prevention:   equipment surfaces disinfected   hand hygiene promoted   personal protective equipment utilized   rest/sleep promoted   single patient room provided   visitors restricted/screened  Taken 6/1/2021 2045 by Mariam Bailey, RN  Infection Prevention:   equipment surfaces disinfected   hand hygiene promoted   personal protective equipment utilized   rest/sleep promoted   single patient room provided   visitors restricted/screened  Goal: Optimal Comfort and Wellbeing  Outcome: Ongoing, Progressing  Intervention: Provide Person-Centered Care  Description: Use a family-focused approach to care.  Develop trust and rapport by proactively providing information, encouraging questions, addressing concerns and offering reassurance.  Acknowledge emotional response to hospitalization.  Recognize and utilize personal  coping strategies.  [employer] spiritual and cultural preferences.  Recent Flowsheet Documentation  Taken 6/2/2021 0002 by Mariam Bailey RN  Trust Relationship/Rapport:   care explained   choices provided   emotional support provided   empathic listening provided   questions answered   questions encouraged   reassurance provided   thoughts/feelings acknowledged  Taken 6/1/2021 2141 by Mariam Bailey, CHASE  Trust Relationship/Rapport:   care explained   choices provided   emotional support provided   empathic listening provided   questions answered   questions encouraged   reassurance provided   thoughts/feelings acknowledged  Taken 6/1/2021 2045 by Mariam Bailey, CHASE  Trust Relationship/Rapport:   care explained   choices provided   emotional support provided   empathic listening provided   questions answered   questions encouraged   reassurance provided   thoughts/feelings acknowledged  Goal: Readiness for Transition of Care  Outcome: Ongoing, Progressing     Problem: Fall Injury Risk  Goal: Absence of Fall and Fall-Related Injury  Outcome: Ongoing, Progressing  Intervention: Identify and Manage Contributors to Fall Injury Risk  Description: Reassess fall risk frequently and with change in status or transfer to another level of care.  Communicate fall injury risk to all healthcare team members (e.g., rounds, change of shift/provider, patient transport).  Anticipate needs; perform regular intentional rounding to assess need for position change, pain assessment, personal needs (e.g., toileting) and placement of necessary items.  Provide reorientation, appropriate sensory stimulation and routines with changes in mental status to decrease risk of fall.  Promote use of personal vision and auditory aids (e.g., glasses, hearing aids).  Assess assistance level required for safe and effective care; provide support as needed (e.g., toileting, bathing, mobilization).  Define behavior and activity limits to patient and family.  If  fall occurs, assess for and treat injury; determine cause; revise fall injury prevention plan.  Regularly review medication contribution to fall risk; adjust medication administration times to minimize risk of falling.  Consider risk related to polypharmacy and age.  Balance adequate pain management with potential for oversedation.  Recent Flowsheet Documentation  Taken 6/2/2021 0002 by Mariam Bailey RN  Medication Review/Management: medications reviewed  Taken 6/1/2021 2141 by Mariam Bailey RN  Medication Review/Management: medications reviewed  Taken 6/1/2021 2045 by Mariam Bailey RN  Medication Review/Management: medications reviewed  Intervention: Promote Injury-Free Environment  Description: Provide a safe, barrier-free environment that encourages independent activity.  Keep care area uncluttered and well-lighted.  Determine need for increased observation or auditory alerts (e.g., bed or chair alarm).  Assess equipment and environmental modification needs (e.g., low bed, signage, nonskid footwear, grab bars).  Avoid use of restraints.  Recent Flowsheet Documentation  Taken 6/2/2021 0002 by Mariam Bailey RN  Safety Promotion/Fall Prevention:   assistive device/personal items within reach   clutter free environment maintained   fall prevention program maintained   room organization consistent   safety round/check completed   nonskid shoes/slippers when out of bed  Taken 6/1/2021 2141 by Mariam Bailey, RN  Safety Promotion/Fall Prevention:   assistive device/personal items within reach   clutter free environment maintained   fall prevention program maintained   lighting adjusted   nonskid shoes/slippers when out of bed   room organization consistent   safety round/check completed  Taken 6/1/2021 2045 by Mariam Bailey, RN  Safety Promotion/Fall Prevention:   assistive device/personal items within reach   clutter free environment maintained   fall prevention program maintained   room organization  consistent   safety round/check completed     Problem: Pain Chronic (Persistent) (Comorbidity Management)  Goal: Acceptable Pain Control and Functional Ability  Outcome: Ongoing, Progressing  Intervention: Develop Pain Management Plan  Description: Acknowledge patient as the expert in pain self-management.  Use a consistent, validated tool for pain assessment.  Set pain management goals; determine acceptable level of discomfort to allow for maximal functioning and quality of life.  Determine [id number] pain management plan, including both pharmacologic and nonpharmacologic measures.  Identify and integrate past successful treatment measures, if able.  Encourage patient and caregiver involvement in pain assessment, interventions and safety measures.  Reevaluate plan regularly.  Recent Flowsheet Documentation  Taken 6/2/2021 0002 by Mariam Bailey RN  Pain Management Interventions:   care clustered   around-the-clock dosing utilized   quiet environment facilitated  Taken 6/1/2021 2141 by Mariam Bailey, RN  Pain Management Interventions:   care clustered   diversional activity provided   see MAR  Intervention: Manage Persistent Pain  Description: Evaluate pain level, effect of treatment and patient response at regular intervals.  Note: Response to pain may diminish over time. This does not indicate that pain is absent.  Minimize pain stimuli; coordinate care and adjust environment (e.g., light, noise, unnecessary movement); promote sleep/rest.  Match pharmacologic analgesia to severity and type of pain mechanism (e.g., neuropathic, muscle, inflammatory); consider multimodal approach (e.g., nonopioid, opioid, adjuvant).  Provide medication at regular intervals; titrate to patient response.  Manage breakthrough pain with additional doses; consider rotation or switching medication.  Monitor for signs of substance tolerance (increased dose to reach desired effect, decreased effect with same dose).  Avoid abrupt  withdrawal of medication, especially agents capable of causing physical dependence.  Manage medication-induced effects, such as constipation, nausea, urinary retention, somnolence and dizziness.  Consider nonpharmacologic pain interventions to improve function (e.g., massage, transcutaneous electrical nerve stimulation, vibration, heat or cold application, immobilization, hydrotherapy).  Consider addition of complementary or alternative therapy, such as acupuncture, hypnosis or therapeutic touch.  Recent Flowsheet Documentation  Taken 6/2/2021 0002 by Mariam Bailey RN  Sleep/Rest Enhancement:   awakenings minimized   consistent schedule promoted   regular sleep/rest pattern promoted   room darkened  Medication Review/Management: medications reviewed  Taken 6/1/2021 2141 by Mariam Bailey RN  Sleep/Rest Enhancement:   awakenings minimized   consistent schedule promoted   regular sleep/rest pattern promoted   room darkened  Medication Review/Management: medications reviewed  Taken 6/1/2021 2045 by Mariam Bailey RN  Sleep/Rest Enhancement:   awakenings minimized   regular sleep/rest pattern promoted  Medication Review/Management: medications reviewed  Intervention: Optimize Psychosocial Wellbeing  Description: Facilitate patient’s self-control over pain by providing pain information and allowing choices in treatment.  Consider and address emotional response to pain.  Explore and promote use of coping strategies; address barriers to successful coping.  Evaluate and assist with psychosocial, cultural and spiritual factors impacting pain.  Modify pain perception by using cognitive-behavioral techniques, such as distraction, guided imagery, meditation, relaxation or music.  Recent Flowsheet Documentation  Taken 6/2/2021 0002 by Mariam Bailey RN  Supportive Measures:   active listening utilized   positive reinforcement provided  Taken 6/1/2021 2141 by Mariam Bailey RN  Supportive Measures:   active listening  utilized   positive reinforcement provided  Diversional Activities: television  Spiritual Activities Assistance: affirmation provided  Family/Support System Care:   support provided   self-care encouraged  Taken 6/1/2021 2045 by Mariam Bailey RN  Supportive Measures:   active listening utilized   positive reinforcement provided  Diversional Activities: television  Spiritual Activities Assistance: affirmation provided     Problem: Hypertension Acute  Goal: Blood Pressure Within Desired Range  Outcome: Ongoing, Progressing  Intervention: Normalize Blood Pressure  Description: Administer medications to reduce blood pressure (nitrate, calcium channel blocker, beta-blocker, ACE inhibitor, peripheral dopamine receptor agonist).  Promote anxiety-reducing practices (e.g., quiet, calm environment; relaxation techniques; normothermic environment).  Match activity level to patient ability and tolerance.  Recent Flowsheet Documentation  Taken 6/2/2021 0002 by Mariam Bailey RN  Medication Review/Management: medications reviewed  Taken 6/1/2021 2141 by Mariam Bailey RN  Medication Review/Management: medications reviewed  Taken 6/1/2021 2045 by Mariam Bailey RN  Medication Review/Management: medications reviewed     Problem: Skin Injury Risk Increased  Goal: Skin Health and Integrity  Outcome: Ongoing, Progressing  Intervention: Optimize Skin Protection  Description: Reassess skin injury risk and inspect skin frequently (e.g., scheduled interval, with turning, with change in condition) to provide optimal prevention.  Maintain adequate tissue perfusion (e.g., encourage fluid balance, avoid crossing legs, constrictive clothing or devices) to promote tissue oxygenation.  Maintain head of bed at lowest degree of elevation tolerated, considering medical condition and other restrictions. Limit amount of time head of bed is elevated greater than 30 degrees to prevent friction/shear injury.  Avoid positioning onto an area that  remains reddened.  Minimize incontinence and moisture (e.g., toileting schedule; moisture-wicking pad, diaper or incontinence collection device, skin moisture barrier).  Cleanse skin promptly and gently when soiled utilizing a pH-balanced cleanser.  Relieve and redistribute pressure (e.g., schedule position changes; utilize higher specification foam mattress, chair cushion, constant low-pressure or alternating-pressure support surface; medical device repositioning; protective dressing applicatio  Support increased progressive functional activity (e.g., therapeutic exercise) to decrease risk associated with immobility. Balance rest with activity.  Recent Flowsheet Documentation  Taken 6/2/2021 0002 by Mariam Bailey RN  Pressure Reduction Techniques: frequent weight shift encouraged  Head of Bed (HOB): HOB at 20-30 degrees  Pressure Reduction Devices: alternating pressure pump (ADD)  Skin Protection: adhesive use limited  Taken 6/1/2021 2141 by Mariam Bailey RN  Pressure Reduction Techniques:   heels elevated off bed   weight shift assistance provided  Head of Bed (HOB): HOB at 20-30 degrees  Pressure Reduction Devices: alternating pressure pump (ADD)  Skin Protection: adhesive use limited  Taken 6/1/2021 2045 by Mariam Bailey RN  Head of Bed (HOB): HOB at 20-30 degrees   Goal Outcome Evaluation:        Outcome Summary: VITALS AS DOCUMENTED. RESTING QUIETLY W/O S/SX OF DISTRESS. INDEPENDENT WITH BED MOBILITY. PURE WICK IN PLACE TO COLLECT 24 URINE SPECIMEN AS ORDERED. RESTARTED AT 2100 ON 6/1 AS PATIENT WAS OFF FLOOR FOR AN ULTRASOUND AND VOIDED W/O BEING COLLECTED. PRN PAIN MEDICATION APPEARS EFFECTIVE. MONITORING.

## 2021-06-02 NOTE — SIGNIFICANT NOTE
06/02/21 0957   OTHER   Discipline physical therapist   Rehab Time/Intention   Session Not Performed other (see comments)  (pt has 24 hour urine sample needed and is on purewick and collection needs to be accurate.  Collection is until 2100.  Will check in 6/3/21)   Recommendation   PT - Next Appointment 06/03/21

## 2021-06-02 NOTE — CONSULTS
Neurology Note    Patient:  Rachana Arguelles    YOB: 1945    REFERRING PHYSICIAN:  Dr. Gutierrez    CHIEF COMPLAINT:    Left leg numbness    HISTORY OF PRESENT ILLNESS:   The patient is a 75 y.o. female with chronic headaches, h/o lung cancer, resistent HTN, HLP, admitted on 5/25 for dizziness, bradycardia, UTI. Seen by Dr. Deluna this admission for headaches felt to be in part postconcussive and medication rebound. She is beeing evaluated for medication resistant HTN, possibility of needing a renal artery stent. This am she got up and developed left leg numbness and possible weakness, BP dropped from SBP of 180 to 130, assessed by rapid response, NIHSS 1 for left leg numbness, stat CTH negative. Leg numbness has improved. She does have a h/o chronic LBP, postlaminectomy syndrome. Denies focal symptoms elsewhere.    Past Medical History:  Past Medical History:   Diagnosis Date   • AAA (abdominal aortic aneurysm) without rupture (CMS/[facility])    • Anemia    • Anxiety    • Arthritis    • Bilateral carotid artery stenosis 8/14/2020   • Cancer (CMS/[facility])     skin cancer   • Chest pain     OCCAS   • Chronic low back pain    • Chronic pain syndrome 3/12/2016   • CKD (chronic kidney disease), stage III (CMS/HCC)    • Claustrophobia 10/26/2015    Resolved   • COPD (chronic obstructive pulmonary disease) (CMS/HCC)    • Depression    • Dizziness    • Dyspnea    • GERD (gastroesophageal reflux disease)    • GI problem    • H/O concussion    • Head trauma     FELL CUT HEAD 12 STAPLES   • Hiatal hernia    • History of migraine headaches    • Hyperlipidemia    • Hypertension    • Lumbar postlaminectomy syndrome 10/26/2015    Resolved   • Lung cancer (CMS/HCC)    • Lung nodule    • Migraines    • Nausea    • Palpitations    • Polypharmacy 4/22/2016   • Pulmonary embolism (CMS/HCC) 10/26/2015    January 2013 - Resolved, felt to be secondary to estrogen use   • Slow to wake up after anesthesia    • Submandibular sialoadenitis  10/26/2015    Resolved   • Visual changes    • Vitamin B 12 deficiency        Past Surgical History:  Past Surgical History:   Procedure Laterality Date   • ANGIOPLASTY ILIAC ARTERY Bilateral 8/10/2018    Procedure: BILATERAL ILIAC STENTS;  Surgeon: Riki Mancera MD;  Location: [facility] OR;  Service: Vascular   • APPENDECTOMY     • BREAST SURGERY      Breast Surgery Reduction Procedure   • BRONCHOSCOPY N/A 2/8/2019    Procedure: BRONCHOSCOPY;  Surgeon: Álvaro Gifford MD;  Location: [facility] ENDOSCOPY;  Service: Pulmonary   • CHOLECYSTECTOMY     • COLONOSCOPY     • ENDOSCOPY N/A 11/3/2020    Procedure: ESOPHAGOGASTRODUODENOSCOPY with 54 Icelandic vernon dilation;  Surgeon: Yunior Vo MD;  Location: [facility] ENDOSCOPY;  Service: Gastroenterology;  Laterality: N/A;  pre- dysphagia  post- esophageal ring, hiatal hernia   • HYSTERECTOMY     • INTUBATION  1/15/2019        • JOINT REPLACEMENT      left knee, hip, shoulder   • LASIK     • LUMBAR FUSION      Lumbar Vertebral Fusion   • SHOULDER ARTHROSCOPY  04/04/2013    Dr. Carrillo/E - Resolved   • THORACOSCOPY VIDEO ASSISTED WITH LOBECTOMY Right 1/11/2019    Procedure: BRONCOSCOPY, THORACOSCOPY VIDEO ASSISTED WITH RIGHT UPPER LOBE WEDGE RESECTION, COMPLETION RIGHT UPPER LOBECTOMY, LYMPH NODE DISSECTION, INTERCOSTAL NERVE BLOCK;  Surgeon: Quita Figueroa MD;  Location: [facility] OR;  Service: Thoracic   • TONSILLECTOMY     • TOTAL HIP ARTHROPLASTY REVISION Left    • TOTAL KNEE ARTHROPLASTY Left    • TOTAL SHOULDER ARTHROPLASTY Left    • TOTAL SHOULDER ARTHROPLASTY W/ DISTAL CLAVICLE EXCISION Right 6/18/2020    Procedure: RIGHT TOTAL SHOULDER REVERSE ARTHROPLASTY WITH GPS;  Surgeon: Lino Carrillo MD;  Location: [facility];  Service: Orthopedics;  Laterality: Right;       Social History:   Social History     Socioeconomic History   • Marital status:      Spouse name: Not on file   • Number of children: Not on file   • Years of education: Not on file    • Highest education level: Not on file   Tobacco Use   • Smoking status: Former Smoker     Packs/day: 2.50     Years: 15.00     Pack years: 37.50     Types: Cigarettes     Quit date: 2003     Years since quittin.7   • Smokeless tobacco: Never Used   • Tobacco comment: CAFFEINE USE: 1-2 GLASSES TEA DAILY   Vaping Use   • Vaping Use: Never used   Substance and Sexual Activity   • Alcohol use: No     Comment: occ   • Drug use: Never   • Sexual activity: Defer        Family History:   Family History   Problem Relation Age of Onset   • Hypertension Mother    • Lung cancer Mother    • Cancer Mother         lung   • Kidney disease Father    • Other Brother         Coronary Artery Bypass Grafting   • Stroke Brother         Cerebrovascular Accident   • Malig Hyperthermia Neg Hx        Medications Prior to Admission:    Prior to Admission medications    Medication Sig Start Date End Date Taking? Authorizing Provider   amLODIPine (NORVASC) 10 MG tablet Take 10 mg by mouth Daily. Take with 5mg tablet to make 15mg dose   Yes Shawn Conde MD   amLODIPine (NORVASC) 5 MG tablet Take 5 mg by mouth Daily. Take with 10mg tab to make 15mg dose. 20  Yes Shawn Conde MD   ascorbic acid (VITAMIN C) 500 MG tablet Take 500 mg by mouth Daily.   Yes ProviderShawn MD   atorvastatin (LIPITOR) 40 MG tablet Take 40 mg by mouth Every Night.   Yes Shawn Conde MD   carBAMazepine (TEGretol) 200 MG tablet Take 100 mg by mouth 2 (Two) Times a Day. 3/6/19  Yes Shawn Conde MD   carvedilol (COREG) 25 MG tablet TAKE 1 TABLET BY MOUTH TWICE A DAY  Patient taking differently: Take 12.5 mg by mouth 2 (Two) Times a Day With Meals. 21  Yes Rhys Crowder Jr., MD   cloNIDine (CATAPRES) 0.3 MG tablet Take 0.3 mg by mouth 2 (Two) Times a Day. 20  Yes Shawn Conde MD   clopidogrel (PLAVIX) 75 MG tablet Take 75 mg by mouth Daily.   Yes Shawn Conde MD   cyclobenzaprine  (FLEXERIL) 10 MG tablet Take 10 mg by mouth 3 (Three) Times a Day As Needed.   Yes ProviderShawn MD   docusate sodium (COLACE) 100 MG capsule Take 100 mg by mouth 2 (Two) Times a Day As Needed for Constipation.   Yes Shawn Conde MD   famotidine (PEPCID) 40 MG tablet Take 40 mg by mouth 2 (Two) Times a Day. 11/12/20  Yes Shawn Conde MD   fexofenadine (Allegra Allergy) 180 MG tablet Take 1 tablet by mouth Daily As Needed (allergies). 12/17/20  Yes Rhys Crowder Jr., MD   fluticasone-salmeterol (Advair Diskus) 250-50 MCG/DOSE DISKUS Inhale 2 puffs 2 (Two) Times a Day.   Yes Shawn Conde MD   folic acid (FOLVITE) 1 MG tablet TAKE 1 TABLET BY MOUTH EVERY DAY  Patient taking differently: Take 1 mg by mouth Daily. 4/2/21  Yes Rhys Crowder Jr., MD   LORazepam (ATIVAN) 0.5 MG tablet TAKE 1 TABLET BY MOUTH EVERY 8 (EIGHT) HOURS AS NEEDED FOR ANXIETY. 3/26/21  Yes Rhys Crowder Jr., MD   losartan (COZAAR) 25 MG tablet Take 50 mg by mouth Daily. 1/2/21  Yes Shawn Conde MD   ondansetron (ZOFRAN) 4 MG tablet Take 1-2 tablets by mouth Every 8 (Eight) Hours As Needed for Nausea or Vomiting. 4/16/21  Yes Rhys Crowder Jr., MD   oxyCODONE (ROXICODONE) 20 MG tablet Take 20 mg by mouth Every 4 (Four) Hours As Needed. No more than 5 per day 11/18/20  Yes ProviderShawn MD   polyethylene glycol (MiraLax) 17 g packet Take 17 g by mouth Daily.   Yes ProviderShawn MD   sertraline (Zoloft) 100 MG tablet Take 1 tablet by mouth Daily. 12/17/20  Yes Rhys Crowder Jr., MD   sucralfate (CARAFATE) 1 g tablet Take 1 tablet by mouth 2 (two) times a day. 3/11/21  Yes Rhys Crowder Jr., MD   torsemide (DEMADEX) 20 MG tablet Take 40 mg by mouth Daily.   Yes ProviderShawn MD   traZODone (DESYREL) 150 MG tablet TAKE 0.5 TABLETS BY MOUTH EVERY NIGHT  Patient taking differently: Take 75 mg by mouth Every Night. 11/29/20  Yes Rhys Crowder Jr., MD   venlafaxine XR (EFFEXOR-XR) 150 MG 24 hr  capsule TAKE 1 CAPSULE BY MOUTH DAILY. SWALLOW WHOLE DO NOT CRUSH OR CHEW.  Patient taking differently: Take 150 mg by mouth Daily. 3/30/21  Yes Rhys Crowder Jr., MD   vitamin D (ERGOCALCIFEROL) 1.25 MG (29142 UT) capsule capsule Take 50,000 Units by mouth Every 7 (Seven) Days. Takes on Wednesdays 11/21/20  Yes ProviderShawn MD   omeprazole (priLOSEC) 40 MG capsule TAKE 1 CAPSULE BY MOUTH EVERY DAY 6/1/21   Rhys Crowder Jr., MD   ondansetron (ZOFRAN) 4 MG tablet Take 1-2 tablets by mouth Every 8 (Eight) Hours As Needed for Nausea or Vomiting. 9/11/20   Ivette Baron APRN       Allergies:  Neurontin [gabapentin] and Morphine      Review of system  Review of Systems   Neurological: Positive for numbness.   All other systems reviewed and are negative.      Vitals:    06/02/21 1248   BP:    Pulse: 77   Resp:    Temp:    SpO2: 95%       Physical exam  Physical Exam  Constitutional:       Appearance: She is well-developed.   HENT:      Head: Normocephalic and atraumatic.   Eyes:      Extraocular Movements: Extraocular movements intact.      Pupils: Pupils are equal, round, and reactive to light.   Cardiovascular:      Rate and Rhythm: Normal rate and regular rhythm.      Pulses: Normal pulses.   Pulmonary:      Effort: Pulmonary effort is normal.   Musculoskeletal:      Comments: Positive left SLR.   Neurological:      General: No focal deficit present.      Mental Status: She is alert and oriented to person, place, and time.      Deep Tendon Reflexes: Reflexes are normal and symmetric. Babinski sign absent on the right side. Babinski sign absent on the left side.      Comments: Speech clear, VFF, no facial droop, no limb drift.   Psychiatric:         Behavior: Behavior normal.         Thought Content: Thought content normal.           Lab Results   Component Value Date    WBC 11.12 (H) 05/30/2021    HGB 9.4 (L) 05/30/2021    HCT 28.9 (L) 05/30/2021    MCV 97.3 (H) 05/30/2021     05/30/2021     Lab  Results   Component Value Date    GLUCOSE 101 (H) 06/02/2021    BUN 34 (H) 06/02/2021    CREATININE 2.16 (H) 06/02/2021    EGFRIFNONA 22 (L) 06/02/2021    EGFRIFAFRI 41 (L) 02/06/2019    BCR 15.7 06/02/2021    CO2 19.7 (L) 06/02/2021    CALCIUM 8.4 (L) 06/02/2021    PROTENTOTREF 6.4 02/06/2019    ALBUMIN 3.60 05/31/2021    LABIL2 1.1 02/06/2019    AST 30 05/30/2021    ALT 15 05/30/2021         Radiological Studies:   CT HEAD WITHOUT CONTRAST     HISTORY: Hypertension, confusion, mental status changes.     COMPARISON: CT head 01/14/2021.     FINDINGS: The brain and ventricles are symmetrical. There is no evidence  of hemorrhage, hydrocephalus or of a focal area of decreased attenuation  to suggest acute infarction.     IMPRESSION:  No evidence of acute infarction or hemorrhage. Further  evaluation could be performed with MRI examination of brain as  indicated. The above information was called to the patient's nurse at  time of dictation. The patient's nurse is to immediately relay the  information to the clinical service.           Radiation dose reduction techniques were utilized, including automated  exposure control and exposure modulation based on body size.               During this visit the following were done:  Labs Reviewed [x]    Labs Ordered []    Radiology Reports Reviewed [x]    Radiology Ordered []    EKG, echo, and/or stress test reviewed [x]    EEG results reviewed  []    EEG reviewed and interpreted per myself   []    Discussed case with neurointerventionalist or neuroradiologist []    Referring Provider Records Reviewed []    ER Records Reviewed []    Hospital Records Reviewed []    History Obtained From Family []    Radiological images view and Interpreted per myself [x]    Case Discussed with referring provider []     Decision to obtain and request outside records  []        Assessment and Plan     Left leg numbness, improved, favor radiculopathy over a stroke. Question of orthostatic BP drop.   -  Observation on telemetry.   - MRI brain and LS spine.   - Carotid duplex.   - P2Y12.   - -180, DBP<110, okay to normalize if no high grade stenosis.   - Continue Plavix and statin.      Thanks,        Electronically signed by Theo Guadarrama MD on 6/2/2021 at 16:45 EDT

## 2021-06-02 NOTE — NURSING NOTE
After standing at bedside, patient complains that her left leg feels different from her right. Patient placed back in bed, NIH score is 2, rapid response called; cardiology, neurology, and hospitalist groups notified.

## 2021-06-02 NOTE — PROGRESS NOTES
Name: Rachana Arguelles ADMIT: 2021   : 1945  PCP: Rhys Crowder Jr., MD    MRN: [id number] LOS: 7 days   AGE/SEX: 75 y.o. female  ROOM: [facility]/     Subjective   Subjective   Did not sleep well. C/O shortness of breath w/some chest heaviness; no desats. Denies fever, palpitations, cough, abd pain, n/v, difficulty urinating.  at bedside.    Review of Systems   Constitutional: Negative for fever.   HENT: Negative for congestion.    Respiratory: Positive for shortness of breath. Negative for cough.         Chest heaviness   Cardiovascular: Negative for chest pain and palpitations.   Gastrointestinal: Negative for abdominal pain and nausea.   Genitourinary: Negative for difficulty urinating.   Musculoskeletal: Negative for arthralgias and myalgias.   Skin: Negative for rash.   Neurological: Negative for headaches.   Psychiatric/Behavioral: Positive for sleep disturbance.        Objective   Objective   Vital Signs  Temp:  [97.8 °F (36.6 °C)-98.1 °F (36.7 °C)] 98.1 °F (36.7 °C)  Heart Rate:  [73-94] 77  Resp:  [18-20] 18  BP: (151-200)/() 173/78  SpO2:  [92 %-96 %] 95 %  on  Flow (L/min):  [2] 2;   Device (Oxygen Therapy): room air  Body mass index is 29.29 kg/m².  Physical Exam  Vitals and nursing note reviewed.   Constitutional:       General: She is not in acute distress.     Appearance: She is ill-appearing.      Comments: Chronically ill appearing   HENT:      Head: Normocephalic.      Mouth/Throat:      Mouth: Mucous membranes are moist.   Eyes:      Conjunctiva/sclera: Conjunctivae normal.   Cardiovascular:      Rate and Rhythm: Normal rate and regular rhythm.   Pulmonary:      Effort: Pulmonary effort is normal. No respiratory distress.      Breath sounds: Normal breath sounds.   Abdominal:      General: Bowel sounds are normal.      Palpations: Abdomen is soft.   Musculoskeletal:      Cervical back: Neck supple.      Right lower leg: No edema.      Left lower leg: No edema.      Comments:  BUE edema   Skin:     General: Skin is warm and dry.   Neurological:      Mental Status: She is alert and oriented to person, place, and time.   Psychiatric:         Mood and Affect: Mood normal.         Behavior: Behavior normal.         Results Review     I reviewed the patient's new clinical results.  Results from last 7 days   Lab Units 05/30/21 2206 05/27/21  0616   WBC 10*3/mm3 11.12* 9.26   HEMOGLOBIN g/dL 9.4* 12.8   PLATELETS 10*3/mm3 204 233     Results from last 7 days   Lab Units 06/02/21  0324 06/01/21 0451 05/31/21 0402 05/30/21 2206   SODIUM mmol/L 139 135* 139 138   POTASSIUM mmol/L 4.1 4.0 4.3 4.6   CHLORIDE mmol/L 106 105 110* 110*   CO2 mmol/L 19.7* 19.1* 17.8* 16.5*   BUN mg/dL 34* 34* 32* 31*   CREATININE mg/dL 2.16* 2.55* 2.69* 2.76*   GLUCOSE mg/dL 101* 98 111* 102*   Estimated Creatinine Clearance: 25.2 mL/min (A) (by C-G formula based on SCr of 2.16 mg/dL (H)).  Results from last 7 days   Lab Units 05/31/21 0402 05/30/21  2206   ALBUMIN g/dL 3.60 3.90   BILIRUBIN mg/dL  --  0.2   ALK PHOS U/L  --  98   AST (SGOT) U/L  --  30   ALT (SGPT) U/L  --  15     Results from last 7 days   Lab Units 06/02/21  0324 06/01/21 0451 05/31/21 0402 05/30/21  2206   CALCIUM mg/dL 8.4* 8.5* 8.5* 8.7   ALBUMIN g/dL  --   --  3.60 3.90   MAGNESIUM mg/dL  --   --  2.3  --    PHOSPHORUS mg/dL  --   --  4.6*  --      Results from last 7 days   Lab Units 05/30/21  2206   PROCALCITONIN ng/mL 0.18   LACTATE mmol/L 0.4*     COVID19   Date Value Ref Range Status   05/25/2021 Not Detected Not Detected - Ref. Range Final     SARS-CoV-2, MONIE   Date Value Ref Range Status   01/08/2021 Not Detected Not Detected Final     Comment:     This nucleic acid amplification test was developed and its performance  characteristics determined by [employer]. Nucleic acid  amplification tests include PCR and TMA. This test has not been FDA  cleared or approved. This test has been authorized by FDA under an  Emergency Use  Authorization (EUA). This test is only authorized for  the duration of time the declaration that circumstances exist  justifying the authorization of the emergency use of in vitro  diagnostic tests for detection of SARS-CoV-2 virus and/or diagnosis  of COVID-19 infection under section 564(b)(1) of the Act, 21 U.S.C.  360bbb-3(b) (1), unless the authorization is terminated or revoked  sooner.  When diagnostic testing is negative, the possibility of a false  negative result should be considered in the context of a patient's  recent exposures and the presence of clinical signs and symptoms  consistent with COVID-19. An individual without symptoms of COVID-19  and who is not shedding SARS-CoV-2 virus would expect to have a  negative (not detected) result in this assay.       Glucose   Date/Time Value Ref Range Status   06/02/2021 1219 130 70 - 130 mg/dL Final   05/30/2021 2115 111 70 - 130 mg/dL Final       US Renal Bilateral  Narrative: US RENAL BILATERAL-     INDICATIONS: Chronic kidney disease, renal artery stenosis, hypertension     TECHNIQUE: ULTRASOUND OF THE KIDNEYS AND URINARY BLADDER.     COMPARISON: None available     FINDINGS:     The right kidney measures 12.2 centimeters, the left kidney measures 9.5  centimeters.     Multiple bilateral renal cysts were noted, measuring as large as 1.2 cm  on the right, 3.4 cm on the left. No hydronephrosis or echogenic  nephrolithiasis.     The urinary bladder appears unremarkable. Bilateral ureteral jets were  observed.     Incidentally, small bilateral pleural effusions are seen.     Impression: No hydronephrosis or echogenic nephrolithiasis. Bilateral renal cysts.     Note: This exam does not assess the renal arteries for stenosis. If  there is clinical suspicion for renal artery stenosis, angiographic  imaging or nuclear captopril exam could be obtained.        This report was finalized on 6/1/2021 8:34 PM by Dr. Brad Prieto M.D.     Duplex Renal Artery -  Bilateral Complete CAR  Greater than 60% stenosis of right renal artery, of  hemodynamic   significance.  Less than 60% stenosis of left renal artery, of mid-moderate hemodynamic   significance.  Adult Transthoracic Echo Complete W/ Cont if Necessary Per Protocol  · Calculated left ventricular EF = 72.2% Estimated left ventricular EF =   72% Estimated left ventricular EF was in agreement with the calculated   left ventricular EF. Left ventricular systolic function is hyperdynamic   (EF > 70%). Normal left ventricular cavity size noted. Left ventricular   wall thickness is consistent with mild concentric hypertrophy. All left   ventricular wall segments contract normally. Left ventricular diastolic   function is consistent with (grade I) impaired relaxation.  · There is mild thickening of the aortic valve.  · Moderate mitral annular calcification is present.  · Trace tricuspid valve regurgitation is present. Estimated right   ventricular systolic pressure from tricuspid regurgitation is normal (<35   mmHg). Calculated right ventricular systolic pressure from tricuspid   regurgitation is 23 mmHg.  · There is a moderate (1-2cm) pericardial effusion. Doppler assessment   showed mild respiratory variation with inspiration on the mitral inflow   signal. However it was not conclusive. There is no significant diastolic   collapse of the RV or left right atrium. The effusion is circumferential   with slight predominance next to the right atrium  · Compared to prior study of 8/24/2020, the pericardial effusion slightly   larger but overall does not appear to be hemodynamically significant by   Doppler assessment at this point. Clinical correlation recommended       Scheduled Medications  amLODIPine, 10 mg, Oral, Q24H  ascorbic acid, 500 mg, Oral, Daily  atorvastatin, 40 mg, Oral, Nightly  budesonide-formoterol, 2 puff, Inhalation, BID - RT  carvedilol, 12.5 mg, Oral, BID With Meals  cetirizine, 5 mg, Oral, Daily  clopidogrel,  75 mg, Oral, Daily  famotidine, 20 mg, Oral, Daily  folic acid, 1,000 mcg, Oral, Daily  furosemide, 60 mg, Intravenous, Q8H  hydrALAZINE, 100 mg, Oral, Q8H  lactulose, 10 g, Oral, Daily  pantoprazole, 40 mg, Oral, QAM  sertraline, 100 mg, Oral, Daily  sodium bicarbonate, 1,300 mg, Oral, TID  sodium chloride, 10 mL, Intravenous, Q12H  sucralfate, 1 g, Oral, BID AC  terazosin, 4 mg, Oral, Nightly  traZODone, 50 mg, Oral, Nightly  venlafaxine XR, 150 mg, Oral, Daily  Vitamin B-2, 400 mg, Oral, Daily    Infusions  niCARdipine, 2.5 mg/hr, Last Rate: Stopped (05/31/21 1123)    Diet  Diet Regular; Cardiac, Renal  NPO Diet NPO Except: Sips With Meds       Assessment/Plan     Active Hospital Problems    Diagnosis  POA   • **Hypertension [I10]  Yes   • COPD (chronic obstructive pulmonary disease) (CMS/[facility]) [J44.9]  Yes   • YVONNE (acute kidney injury) (CMS/[facility]) [N17.9]  Yes   • Opioid dependence (CMS/[facility]) [F11.20]  Yes   • Malignant neoplasm of right upper lobe of lung (CMS/[facility]) [C34.11]  Yes   • Stage 3b chronic kidney disease (CMS/[facility]) [N18.32]  Yes   • Chronic pain syndrome [G89.4]  Yes   • Gastroesophageal reflux disease [K21.9]  Yes      Resolved Hospital Problems    Diagnosis Date Resolved POA   • Bradycardia [R00.1] 05/30/2021 Yes   • UTI (urinary tract infection) due to urinary indwelling catheter (CMS/[facility]) [T83.511A, N39.0] 05/30/2021 Yes       75 y.o. female admitted with Hypertension.    -Medications adjusted yesterday due to concern w/polypharmacy  -Has been weaned off Cardene gtt. Cardiology following. Continue amlodipine, hydralazine. Plan renal artery angiography Friday w/possible intervention. Bradycardia resolved   -Nephrology managing diuresis; IV lasix today. Baseline Cr 1.6-1.7. Cr improving. Continue sodium bicarb. No ACE/ARB  -Anemia: Recheck labs tomorrow  -COPD: Sats stable on room air. Continue inhalers. If shortness of breath doesn't improve w/diuretics, check cxr    After pt was seen, Nurse called to  report pt got up to use BSC and complained of LE sensation changes. Team D called. CTH pending. [facility] monitored.     SCDs for DVT prophylaxis.  Full code.  Discussed with patient, family and nursing staff.  Anticipate discharge TBD       REENA Olson  [facility]  06/02/21  14:23 EDT

## 2021-06-02 NOTE — PROGRESS NOTES
"   LOS: 7 days    Patient Care Team:  Rhys Crowder Jr., MD as PCP - General (Family Medicine)  Whitney Dudley MD as Consulting Physician (Nephrology)  Quita Figueroa MD as Surgeon (Thoracic Surgery)  Sabas Luther MD as Consulting Physician (Otolaryngology)  Álvaro Gifford MD as Consulting Physician (Pulmonary Disease)    Chief Complaint:    Chief Complaint   Patient presents with   • Low Heart Rate     Follow UP Mark on CKD IV.   Subjective     Interval History:     Eating. Very hungry today.  Did not sleep.  No bm. Feels very swollen.    Review of Systems:   As noted above    Objective     Vital Signs  Temp:  [97.8 °F (36.6 °C)-98.1 °F (36.7 °C)] 97.9 °F (36.6 °C)  Heart Rate:  [80-94] 85  Resp:  [18-20] 18  BP: (151-202)/() 160/71    Flowsheet Rows      First Filed Value   Admission Height  167.6 cm (66\") Documented at 05/25/2021 1805   Admission Weight  78.9 kg (174 lb) Documented at 05/25/2021 1805          No intake/output data recorded.  I/O last 3 completed shifts:  In: 260 [P.O.:260]  Out: 950 [Urine:950]    Intake/Output Summary (Last 24 hours) at 6/2/2021 0817  Last data filed at 6/2/2021 0348  Gross per 24 hour   Intake 260 ml   Output 600 ml   Net -340 ml       Physical Exam:  General Appearance: alert,chronically ill. Sitting up in bed.    Mask applied.    Skin: warm and dry  HEENT: pupils round and reactive to light, oral mucosa normal, nonicteric sclera  Neck: supple, no JVD, trachea midline  Lungs: Clear to auscultation, no wheezing.   Heart: RRR, normal S1 and S2, no S3, no rub  Abdomen: soft, nontender, distended. + bs.  : no palpable bladder. Purwik.   Extremities: 2+ upper and 1+ lower ext edema.  cyanosis or clubbing  Neuro: normal speech and mental status.     Results Review:    Results from last 7 days   Lab Units 06/02/21  0324 06/01/21  0451 05/31/21  0402 05/30/21  2206   SODIUM mmol/L 139 135* 139 138   POTASSIUM mmol/L 4.1 4.0 4.3 4.6   CHLORIDE mmol/L 106 105 110* " 110*   CO2 mmol/L 19.7* 19.1* 17.8* 16.5*   BUN mg/dL 34* 34* 32* 31*   CREATININE mg/dL 2.16* 2.55* 2.69* 2.76*   CALCIUM mg/dL 8.4* 8.5* 8.5* 8.7   BILIRUBIN mg/dL  --   --   --  0.2   ALK PHOS U/L  --   --   --  98   ALT (SGPT) U/L  --   --   --  15   AST (SGOT) U/L  --   --   --  30   GLUCOSE mg/dL 101* 98 111* 102*       Estimated Creatinine Clearance: 25.2 mL/min (A) (by C-G formula based on SCr of 2.16 mg/dL (H)).    Results from last 7 days   Lab Units 05/31/21  0402   MAGNESIUM mg/dL 2.3   PHOSPHORUS mg/dL 4.6*       Results from last 7 days   Lab Units 05/31/21  0402   URIC ACID mg/dL 7.1*       Results from last 7 days   Lab Units 05/30/21  2206 05/27/21  0616   WBC 10*3/mm3 11.12* 9.26   HEMOGLOBIN g/dL 9.4* 12.8   PLATELETS 10*3/mm3 204 233               Imaging Results (Last 24 Hours)     Procedure Component Value Units Date/Time    US Renal Bilateral [[id number]] Collected: 06/01/21 2029     Updated: 06/01/21 2037    Narrative:      US RENAL BILATERAL-     INDICATIONS: Chronic kidney disease, renal artery stenosis, hypertension     TECHNIQUE: ULTRASOUND OF THE KIDNEYS AND URINARY BLADDER.     COMPARISON: None available     FINDINGS:     The right kidney measures 12.2 centimeters, the left kidney measures 9.5  centimeters.     Multiple bilateral renal cysts were noted, measuring as large as 1.2 cm  on the right, 3.4 cm on the left. No hydronephrosis or echogenic  nephrolithiasis.     The urinary bladder appears unremarkable. Bilateral ureteral jets were  observed.     Incidentally, small bilateral pleural effusions are seen.       Impression:      No hydronephrosis or echogenic nephrolithiasis. Bilateral renal cysts.     Note: This exam does not assess the renal arteries for stenosis. If  there is clinical suspicion for renal artery stenosis, angiographic  imaging or nuclear captopril exam could be obtained.        This report was finalized on 6/1/2021 8:34 PM by Dr. Brad Prieto M.D.            amLODIPine, 10 mg, Oral, Q24H  ascorbic acid, 500 mg, Oral, Daily  atorvastatin, 40 mg, Oral, Nightly  budesonide-formoterol, 2 puff, Inhalation, BID - RT  carvedilol, 12.5 mg, Oral, BID With Meals  cetirizine, 5 mg, Oral, Daily  clopidogrel, 75 mg, Oral, Daily  famotidine, 20 mg, Oral, Daily  folic acid, 1,000 mcg, Oral, Daily  hydrALAZINE, 100 mg, Oral, Q8H  pantoprazole, 40 mg, Oral, QAM  polyethylene glycol, 17 g, Oral, Daily  sertraline, 100 mg, Oral, Daily  sodium bicarbonate, 1,300 mg, Oral, TID  sodium chloride, 10 mL, Intravenous, Q12H  sucralfate, 1 g, Oral, BID AC  terazosin, 4 mg, Oral, Nightly  torsemide, 40 mg, Oral, Daily  traZODone, 50 mg, Oral, Nightly  venlafaxine XR, 150 mg, Oral, Daily  Vitamin B-2, 400 mg, Oral, Daily      niCARdipine, 2.5 mg/hr, Last Rate: Stopped (05/31/21 1123)        Medication Review:   Current Facility-Administered Medications   Medication Dose Route Frequency Provider Last Rate Last Admin   • acetaminophen (TYLENOL) tablet 650 mg  650 mg Oral Q4H PRN Ericka Shaw APRN   650 mg at 05/31/21 1839   • amLODIPine (NORVASC) tablet 10 mg  10 mg Oral Q24H Mat Coello MD   10 mg at 06/01/21 1147   • ascorbic acid (VITAMIN C) tablet 500 mg  500 mg Oral Daily Jerman Lobo MD   500 mg at 06/01/21 1147   • atorvastatin (LIPITOR) tablet 40 mg  40 mg Oral Nightly Jerman Lobo MD   40 mg at 06/01/21 2139   • budesonide-formoterol (SYMBICORT) 160-4.5 MCG/ACT inhaler 2 puff  2 puff Inhalation BID - RT Jerman Lboo MD   2 puff at 06/02/21 0709   • carvedilol (COREG) tablet 12.5 mg  12.5 mg Oral BID With Meals Mary Haddad APRN   12.5 mg at 06/01/21 2139   • cetirizine (zyrTEC) tablet 5 mg  5 mg Oral Daily Jerman Lobo MD   5 mg at 06/01/21 1150   • clopidogrel (PLAVIX) tablet 75 mg  75 mg Oral Daily Jerman Lobo MD   75 mg at 06/01/21 1148   • cyclobenzaprine (FLEXERIL) tablet 5 mg  5 mg Oral TID PRN Jerman Lobo MD   5 mg at 06/02/21 0125   •  docusate sodium (COLACE) capsule 100 mg  100 mg Oral BID PRN Jerman Lobo MD       • famotidine (PEPCID) tablet 20 mg  20 mg Oral Daily Jerman Lobo MD   20 mg at 06/01/21 1150   • folic acid (FOLVITE) tablet 1,000 mcg  1,000 mcg Oral Daily Jerman Lobo MD   1,000 mcg at 06/01/21 1148   • hydrALAZINE (APRESOLINE) injection 10 mg  10 mg Intravenous Q6H PRN Leyla Knight APRN   10 mg at 06/01/21 1731   • hydrALAZINE (APRESOLINE) tablet 100 mg  100 mg Oral Q8H Mat Coello MD   100 mg at 06/02/21 0635   • LORazepam (ATIVAN) tablet 0.5 mg  0.5 mg Oral Q8H PRN Jerman Lobo MD   0.5 mg at 06/02/21 0635   • niCARdipine (CARDENE) 25 mg in 250 mL NS infusion kit  2.5 mg/hr Intravenous Titrated Lionel Noguera MD   Stopped at 05/31/21 1123   • nitroglycerin (NITROSTAT) SL tablet 0.4 mg  0.4 mg Sublingual Q5 Min PRN Ericka Shaw APRN       • ondansetron (ZOFRAN) injection 4 mg  4 mg Intravenous Q6H PRN Ericka Shaw APRN   4 mg at 06/01/21 1730   • oxyCODONE (ROXICODONE) immediate release tablet 15 mg  15 mg Oral Q6H PRN Jerman Lobo MD   15 mg at 06/02/21 0635   • pantoprazole (PROTONIX) EC tablet 40 mg  40 mg Oral QAM Jerman Lobo MD   40 mg at 06/02/21 0635   • polyethylene glycol (MIRALAX) packet 17 g  17 g Oral Daily Jerman Lobo MD   17 g at 06/01/21 2139   • sertraline (ZOLOFT) tablet 100 mg  100 mg Oral Daily Jerman Lobo MD   100 mg at 06/01/21 1148   • sodium bicarbonate tablet 1,300 mg  1,300 mg Oral TID Vincent Dutta MD   1,300 mg at 06/01/21 2139   • sodium chloride 0.9 % flush 10 mL  10 mL Intravenous Q12H Ericka Shaw APRN   10 mL at 06/01/21 2139   • sodium chloride 0.9 % flush 10 mL  10 mL Intravenous PRN Ericka Shaw APRN       • sucralfate (CARAFATE) tablet 1 g  1 g Oral BID Jerman Phipps MD   1 g at 06/02/21 0635   • terazosin (HYTRIN) capsule 4 mg  4 mg Oral Nightly Mary Haddad APRN   4 mg at 06/01/21 2138   • torsemide  (DEMADEX) tablet 40 mg  40 mg Oral Daily Meghann Win MD   40 mg at 06/01/21 1538   • traZODone (DESYREL) tablet 50 mg  50 mg Oral Nightly Jerman Lobo MD   50 mg at 06/01/21 2139   • venlafaxine XR (EFFEXOR-XR) 24 hr capsule 150 mg  150 mg Oral Daily Jerman Lobo MD   150 mg at 06/01/21 1148   • Vitamin B-2 (RIBOFLAVIN) tablet 400 mg  400 mg Oral Daily César Deluna MD   400 mg at 06/01/21 1147       Assessment/Plan   1. CKD 3, YVONNE.  Baseline creatinine 1.6-1.7.   Creatinine  improving.   Non gap metabolic acidosis a little better on po bicarb. .   2. HTN very difficult to control.  She is on 5 meds with poor control .  Renal artery doppler with >60 % stenosis and  which can be contributing to her difficulty in controlling the BP. Renal US with left kidney smaller than right despite right QUINTON.    May be candidate for PTA, stent based on recommendation for BP not being controlled on multiple meds or intolerance for meds. Her  Echo 6/1 demonstrated  larger  pericardial effusion. I stopped minoxidil yesterday.  Bradycardic on combination of coreg and clonidine.  No ACE inhibitor or ARB due to YVONNE on chronic kidney disease. Will give IV diuretic today.  If her bp is not controlled, would consider PTA, stent Right renal artery.  Although PTA and Stent are typically not favored over medical therapy, she meets the criterion for failure of optimal medical therapy to control BP,  Intolerance to medical therapy due to side effects.   3. Chronic pain syndrome  4. Anemia.   5. Altered mental status improved.             Meghann Win MD  06/02/21  08:17 EDT

## 2021-06-02 NOTE — PROGRESS NOTES
"Patient Care Team:  Rhys Crowder Jr., MD as PCP - General (Family Medicine)  Whitney Dudley MD as Consulting Physician (Nephrology)  Quita Figueroa MD as Surgeon (Thoracic Surgery)  Sabas Luther MD as Consulting Physician (Otolaryngology)  Álvaro Gifford MD as Consulting Physician (Pulmonary Disease)    Chief Complaint:  Blood pressure currently poorly controlled.    Interval History:   Minoxidil stopped by renal yesterday secondary to pericardial effusion.  Concern for worsening of that.  It still appears small to me.  Renal artery duplex has been reviewed.  Likely right renal artery stenosis.    Objective   Vital Signs  Temp:  [97.8 °F (36.6 °C)-98.1 °F (36.7 °C)] 98.1 °F (36.7 °C)  Heart Rate:  [80-94] 85  Resp:  [18-20] 18  BP: (151-202)/() 160/71    Intake/Output Summary (Last 24 hours) at 6/2/2021 0847  Last data filed at 6/2/2021 0348  Gross per 24 hour   Intake 260 ml   Output 600 ml   Net -340 ml     Flowsheet Rows      First Filed Value   Admission Height  167.6 cm (66\") Documented at 05/25/2021 1805   Admission Weight  78.9 kg (174 lb) Documented at 05/25/2021 1805          Temp:  [97.8 °F (36.6 °C)-98.1 °F (36.7 °C)] 98.1 °F (36.7 °C)  Heart Rate:  [80-94] 85  Resp:  [18-20] 18  BP: (151-202)/() 160/71    Intake/Output Summary (Last 24 hours) at 6/2/2021 0847  Last data filed at 6/2/2021 0348  Gross per 24 hour   Intake 260 ml   Output 600 ml   Net -340 ml     Flowsheet Rows      First Filed Value   Admission Height  167.6 cm (66\") Documented at 05/25/2021 1805   Admission Weight  78.9 kg (174 lb) Documented at 05/25/2021 1805          General Appearance:    Alert, cooperative, in no acute distress   Head:    Normocephalic, without obvious abnormality, atraumatic       Neck/Lymph   No adenopathy, supple, no thyromegaly, no carotid bruit, no    JVD   Lungs:     Clear to auscultation bilaterally, no wheezes, rales, or     rhonchi    Cardiac:    Normal rate, regular rhythm, no " murmur, no rub, no gallop   Chest Wall:    No abnormalities observed   GI:     Normal bowel sounds, soft, nontender, nondistended,            no rebound tenderness   Extremities:   No cyanosis, clubbing, 1+ bilateral lower extremity edema.   Circulatory/Peripheral Vascular :   Pulses palpable and equal bilaterally   Integumentary:   No bleeding or rash. Normal temperature       Neurologic:   Cranial nerves 2 - 12 grossly intact, sensation intact              amLODIPine, 10 mg, Oral, Q24H  ascorbic acid, 500 mg, Oral, Daily  atorvastatin, 40 mg, Oral, Nightly  budesonide-formoterol, 2 puff, Inhalation, BID - RT  carvedilol, 12.5 mg, Oral, BID With Meals  cetirizine, 5 mg, Oral, Daily  clopidogrel, 75 mg, Oral, Daily  famotidine, 20 mg, Oral, Daily  folic acid, 1,000 mcg, Oral, Daily  furosemide, 60 mg, Intravenous, Q8H  hydrALAZINE, 100 mg, Oral, Q8H  lactulose, 10 g, Oral, Daily  pantoprazole, 40 mg, Oral, QAM  sertraline, 100 mg, Oral, Daily  sodium bicarbonate, 1,300 mg, Oral, TID  sodium chloride, 10 mL, Intravenous, Q12H  sucralfate, 1 g, Oral, BID AC  terazosin, 4 mg, Oral, Nightly  traZODone, 50 mg, Oral, Nightly  venlafaxine XR, 150 mg, Oral, Daily  Vitamin B-2, 400 mg, Oral, Daily        niCARdipine, 2.5 mg/hr, Last Rate: Stopped (05/31/21 1123)        Results Review:    Results from last 7 days   Lab Units 06/02/21  0324   SODIUM mmol/L 139   POTASSIUM mmol/L 4.1   CHLORIDE mmol/L 106   CO2 mmol/L 19.7*   BUN mg/dL 34*   CREATININE mg/dL 2.16*   GLUCOSE mg/dL 101*   CALCIUM mg/dL 8.4*     Results from last 7 days   Lab Units 06/01/21  0451 05/31/21  0910 05/31/21  0402   TROPONIN T ng/mL 0.034* 0.036* 0.031*     Results from last 7 days   Lab Units 05/30/21  2206   WBC 10*3/mm3 11.12*   HEMOGLOBIN g/dL 9.4*   HEMATOCRIT % 28.9*   PLATELETS 10*3/mm3 204             Results from last 7 days   Lab Units 05/31/21  0402   MAGNESIUM mg/dL 2.3         @LABRCNT(bnp)@  I reviewed the patient's new clinical  results.  I personally viewed and interpreted the patient's EKG/Telemetry data            Assessment/Plan   1.  Resistant hypertension  2.  Acute on chronic kidney injury  3.  Bradycardia: Resolved off of carvedilol and clonidine therapy  4.  Chronic anemia    -Difficult to control blood pressure.  Renal is diuresing  -Continue amlodipine.  -Also on high doses of hydralazine.  -Plan on renal artery angiography on Friday with possible intervention.  I do think the patient has a clear diagnosis of resistant hypertension with very difficult to control blood pressure on multiple therapies and would be somebody who would be a candidate for renal artery stenting.

## 2021-06-03 ENCOUNTER — APPOINTMENT (OUTPATIENT)
Dept: MRI IMAGING | Facility: HOSPITAL | Age: 76
End: 2021-06-03

## 2021-06-03 ENCOUNTER — APPOINTMENT (OUTPATIENT)
Dept: CARDIOLOGY | Facility: HOSPITAL | Age: 76
End: 2021-06-03

## 2021-06-03 LAB
ALBUMIN SERPL-MCNC: 3.6 G/DL (ref 3.5–5.2)
ANION GAP SERPL CALCULATED.3IONS-SCNC: 13.7 MMOL/L (ref 5–15)
BH CV XLRA MEAS LEFT DIST CCA EDV: 16.2 CM/SEC
BH CV XLRA MEAS LEFT DIST CCA PSV: 86.4 CM/SEC
BH CV XLRA MEAS LEFT DIST ICA EDV: -25.3 CM/SEC
BH CV XLRA MEAS LEFT DIST ICA PSV: -121 CM/SEC
BH CV XLRA MEAS LEFT ICA/CCA RATIO: 1.4
BH CV XLRA MEAS LEFT MID ICA EDV: -25.5 CM/SEC
BH CV XLRA MEAS LEFT MID ICA PSV: -114.4 CM/SEC
BH CV XLRA MEAS LEFT PROX CCA EDV: 19.6 CM/SEC
BH CV XLRA MEAS LEFT PROX CCA PSV: 98.8 CM/SEC
BH CV XLRA MEAS LEFT PROX ECA EDV: -7.5 CM/SEC
BH CV XLRA MEAS LEFT PROX ECA PSV: -97.5 CM/SEC
BH CV XLRA MEAS LEFT PROX ICA EDV: 24.9 CM/SEC
BH CV XLRA MEAS LEFT PROX ICA PSV: 106.2 CM/SEC
BH CV XLRA MEAS LEFT PROX SCLA PSV: 153.7 CM/SEC
BH CV XLRA MEAS LEFT VERTEBRAL A EDV: -8.2 CM/SEC
BH CV XLRA MEAS LEFT VERTEBRAL A PSV: -50.5 CM/SEC
BH CV XLRA MEAS RIGHT DIST CCA EDV: 15.5 CM/SEC
BH CV XLRA MEAS RIGHT DIST CCA PSV: 83.9 CM/SEC
BH CV XLRA MEAS RIGHT DIST ICA EDV: -21.1 CM/SEC
BH CV XLRA MEAS RIGHT DIST ICA PSV: -79.5 CM/SEC
BH CV XLRA MEAS RIGHT ICA/CCA RATIO: 0.98
BH CV XLRA MEAS RIGHT MID ICA EDV: -18.9 CM/SEC
BH CV XLRA MEAS RIGHT MID ICA PSV: -82 CM/SEC
BH CV XLRA MEAS RIGHT PROX CCA EDV: 15.5 CM/SEC
BH CV XLRA MEAS RIGHT PROX CCA PSV: 91.9 CM/SEC
BH CV XLRA MEAS RIGHT PROX ICA EDV: -19.6 CM/SEC
BH CV XLRA MEAS RIGHT PROX ICA PSV: -79.2 CM/SEC
BH CV XLRA MEAS RIGHT PROX SCLA PSV: 138.3 CM/SEC
BH CV XLRA MEAS RIGHT VERTEBRAL A EDV: 12.8 CM/SEC
BH CV XLRA MEAS RIGHT VERTEBRAL A PSV: 52.6 CM/SEC
BUN SERPL-MCNC: 38 MG/DL (ref 8–23)
BUN/CREAT SERPL: 18.4 (ref 7–25)
CALCIUM SPEC-SCNC: 8.5 MG/DL (ref 8.6–10.5)
CHLORIDE SERPL-SCNC: 104 MMOL/L (ref 98–107)
CO2 SERPL-SCNC: 24.3 MMOL/L (ref 22–29)
CREAT SERPL-MCNC: 2.06 MG/DL (ref 0.57–1)
DEPRECATED RDW RBC AUTO: 45.2 FL (ref 37–54)
ERYTHROCYTE [DISTWIDTH] IN BLOOD BY AUTOMATED COUNT: 13 % (ref 12.3–15.4)
GFR SERPL CREATININE-BSD FRML MDRD: 23 ML/MIN/1.73
GLUCOSE SERPL-MCNC: 111 MG/DL (ref 65–99)
HCT VFR BLD AUTO: 27.1 % (ref 34–46.6)
HGB BLD-MCNC: 9 G/DL (ref 12–15.9)
LEFT ARM BP: NORMAL MMHG
MAXIMAL PREDICTED HEART RATE: 145 BPM
MCH RBC QN AUTO: 31.6 PG (ref 26.6–33)
MCHC RBC AUTO-ENTMCNC: 33.2 G/DL (ref 31.5–35.7)
MCV RBC AUTO: 95.1 FL (ref 79–97)
PHOSPHATE SERPL-MCNC: 4.5 MG/DL (ref 2.5–4.5)
PLATELET # BLD AUTO: 218 10*3/MM3 (ref 140–450)
PMV BLD AUTO: 9.7 FL (ref 6–12)
POTASSIUM SERPL-SCNC: 3.8 MMOL/L (ref 3.5–5.2)
RBC # BLD AUTO: 2.85 10*6/MM3 (ref 3.77–5.28)
RIGHT ARM BP: NORMAL MMHG
SODIUM SERPL-SCNC: 142 MMOL/L (ref 136–145)
STRESS TARGET HR: 123 BPM
WBC # BLD AUTO: 6.93 10*3/MM3 (ref 3.4–10.8)

## 2021-06-03 PROCEDURE — 72148 MRI LUMBAR SPINE W/O DYE: CPT

## 2021-06-03 PROCEDURE — 93880 EXTRACRANIAL BILAT STUDY: CPT

## 2021-06-03 PROCEDURE — 25010000002 LORAZEPAM PER 2 MG: Performed by: INTERNAL MEDICINE

## 2021-06-03 PROCEDURE — 25010000002 FUROSEMIDE PER 20 MG: Performed by: INTERNAL MEDICINE

## 2021-06-03 PROCEDURE — 70551 MRI BRAIN STEM W/O DYE: CPT

## 2021-06-03 PROCEDURE — 94799 UNLISTED PULMONARY SVC/PX: CPT

## 2021-06-03 PROCEDURE — 99232 SBSQ HOSP IP/OBS MODERATE 35: CPT | Performed by: NURSE PRACTITIONER

## 2021-06-03 PROCEDURE — 80069 RENAL FUNCTION PANEL: CPT | Performed by: INTERNAL MEDICINE

## 2021-06-03 PROCEDURE — 97162 PT EVAL MOD COMPLEX 30 MIN: CPT

## 2021-06-03 PROCEDURE — 97530 THERAPEUTIC ACTIVITIES: CPT

## 2021-06-03 PROCEDURE — 85027 COMPLETE CBC AUTOMATED: CPT | Performed by: NURSE PRACTITIONER

## 2021-06-03 PROCEDURE — 99233 SBSQ HOSP IP/OBS HIGH 50: CPT | Performed by: NURSE PRACTITIONER

## 2021-06-03 RX ORDER — CARVEDILOL 25 MG/1
25 TABLET ORAL 2 TIMES DAILY WITH MEALS
Status: DISCONTINUED | OUTPATIENT
Start: 2021-06-03 | End: 2021-06-05 | Stop reason: HOSPADM

## 2021-06-03 RX ORDER — LORAZEPAM 2 MG/ML
0.5 INJECTION INTRAMUSCULAR ONCE
Status: COMPLETED | OUTPATIENT
Start: 2021-06-03 | End: 2021-06-03

## 2021-06-03 RX ORDER — HYDRALAZINE HYDROCHLORIDE 50 MG/1
100 TABLET, FILM COATED ORAL EVERY 8 HOURS SCHEDULED
Status: DISCONTINUED | OUTPATIENT
Start: 2021-06-03 | End: 2021-06-05 | Stop reason: HOSPADM

## 2021-06-03 RX ORDER — CARVEDILOL 12.5 MG/1
25 TABLET ORAL ONCE
Status: COMPLETED | OUTPATIENT
Start: 2021-06-03 | End: 2021-06-03

## 2021-06-03 RX ORDER — TERAZOSIN 5 MG/1
5 CAPSULE ORAL NIGHTLY
Status: DISCONTINUED | OUTPATIENT
Start: 2021-06-03 | End: 2021-06-05 | Stop reason: HOSPADM

## 2021-06-03 RX ADMIN — OXYCODONE HYDROCHLORIDE 15 MG: 15 TABLET ORAL at 00:00

## 2021-06-03 RX ADMIN — HYDRALAZINE HYDROCHLORIDE 50 MG: 50 TABLET, FILM COATED ORAL at 06:27

## 2021-06-03 RX ADMIN — SERTRALINE 100 MG: 100 TABLET, FILM COATED ORAL at 08:54

## 2021-06-03 RX ADMIN — LORAZEPAM 0.5 MG: 0.5 TABLET ORAL at 09:55

## 2021-06-03 RX ADMIN — SODIUM BICARBONATE 1300 MG: 650 TABLET ORAL at 23:41

## 2021-06-03 RX ADMIN — CLOPIDOGREL 75 MG: 75 TABLET, FILM COATED ORAL at 08:54

## 2021-06-03 RX ADMIN — LORAZEPAM 0.5 MG: 0.5 TABLET ORAL at 19:27

## 2021-06-03 RX ADMIN — OXYCODONE HYDROCHLORIDE AND ACETAMINOPHEN 500 MG: 500 TABLET ORAL at 08:54

## 2021-06-03 RX ADMIN — HYDRALAZINE HYDROCHLORIDE 100 MG: 50 TABLET, FILM COATED ORAL at 21:55

## 2021-06-03 RX ADMIN — AMLODIPINE BESYLATE 10 MG: 10 TABLET ORAL at 08:54

## 2021-06-03 RX ADMIN — Medication 400 MG: at 08:54

## 2021-06-03 RX ADMIN — FAMOTIDINE 20 MG: 20 TABLET, FILM COATED ORAL at 08:53

## 2021-06-03 RX ADMIN — FUROSEMIDE 60 MG: 10 INJECTION, SOLUTION INTRAMUSCULAR; INTRAVENOUS at 02:53

## 2021-06-03 RX ADMIN — OXYCODONE HYDROCHLORIDE 20 MG: 15 TABLET ORAL at 19:27

## 2021-06-03 RX ADMIN — SUCRALFATE 1 G: 1 TABLET ORAL at 06:27

## 2021-06-03 RX ADMIN — TERAZOSIN HYDROCHLORIDE 5 MG: 5 CAPSULE ORAL at 23:40

## 2021-06-03 RX ADMIN — CETIRIZINE HYDROCHLORIDE ALLERGY 5 MG: 10 TABLET ORAL at 08:53

## 2021-06-03 RX ADMIN — CYCLOBENZAPRINE 5 MG: 10 TABLET, FILM COATED ORAL at 00:00

## 2021-06-03 RX ADMIN — VENLAFAXINE HYDROCHLORIDE 150 MG: 150 CAPSULE, EXTENDED RELEASE ORAL at 08:54

## 2021-06-03 RX ADMIN — ATORVASTATIN CALCIUM 40 MG: 20 TABLET, FILM COATED ORAL at 21:55

## 2021-06-03 RX ADMIN — SODIUM BICARBONATE 1300 MG: 650 TABLET ORAL at 08:54

## 2021-06-03 RX ADMIN — BUDESONIDE AND FORMOTEROL FUMARATE DIHYDRATE 2 PUFF: 160; 4.5 AEROSOL RESPIRATORY (INHALATION) at 07:30

## 2021-06-03 RX ADMIN — TRAZODONE HYDROCHLORIDE 50 MG: 50 TABLET ORAL at 23:40

## 2021-06-03 RX ADMIN — PANTOPRAZOLE SODIUM 40 MG: 40 TABLET, DELAYED RELEASE ORAL at 06:28

## 2021-06-03 RX ADMIN — OXYCODONE HYDROCHLORIDE 15 MG: 15 TABLET ORAL at 06:27

## 2021-06-03 RX ADMIN — LACTULOSE 10 G: 10 SOLUTION ORAL at 08:54

## 2021-06-03 RX ADMIN — BUDESONIDE AND FORMOTEROL FUMARATE DIHYDRATE 2 PUFF: 160; 4.5 AEROSOL RESPIRATORY (INHALATION) at 21:48

## 2021-06-03 RX ADMIN — FUROSEMIDE 60 MG: 10 INJECTION, SOLUTION INTRAMUSCULAR; INTRAVENOUS at 08:54

## 2021-06-03 RX ADMIN — FOLIC ACID 1000 MCG: 1 TABLET ORAL at 08:54

## 2021-06-03 RX ADMIN — CARVEDILOL 25 MG: 12.5 TABLET, FILM COATED ORAL at 08:54

## 2021-06-03 RX ADMIN — LORAZEPAM 0.5 MG: 2 INJECTION INTRAMUSCULAR; INTRAVENOUS at 16:45

## 2021-06-03 RX ADMIN — SODIUM CHLORIDE, PRESERVATIVE FREE 10 ML: 5 INJECTION INTRAVENOUS at 21:56

## 2021-06-03 RX ADMIN — OXYCODONE HYDROCHLORIDE 15 MG: 15 TABLET ORAL at 12:26

## 2021-06-03 NOTE — PROGRESS NOTES
" LOS: 8 days     Name: Rachana Arguelles  Age: 75 y.o.  Sex: female  :  1945  MRN: [id number]         Primary Care Physician: Rhys Crowder Jr., MD    Subjective   Subjective  Complains of back pain today.  Left leg numbness is resolving.  Requesting her short acting oxycodone be increased to 20 mg which is what she takes at home.  Currently on 15 mg.    Objective   Vital Signs  Temp:  [98.1 °F (36.7 °C)-98.7 °F (37.1 °C)] 98.7 °F (37.1 °C)  Heart Rate:  [73-92] 81  Resp:  [16-20] 18  BP: (159-199)/() 165/74  Body mass index is 29.29 kg/m².    Objective:  General Appearance:  Comfortable, in no acute distress and ill-appearing.    Vital signs: (most recent): Blood pressure 165/74, pulse 81, temperature 98.7 °F (37.1 °C), temperature source Oral, resp. rate 18, height 170.2 cm (67\"), weight 84.8 kg (187 lb), SpO2 90 %, not currently breastfeeding.    Lungs:  Normal effort and normal respiratory rate.  She is not in respiratory distress.  There are decreased breath sounds.    Heart: Normal rate.  Regular rhythm.    Abdomen: Abdomen is soft.  Bowel sounds are normal.   There is no abdominal tenderness.     Extremities: There is no dependent edema or local swelling.    Neurological: Patient is alert and oriented to person, place and time.    Skin:  Warm and dry.              Results Review:       I reviewed the patient's new clinical results.    Results from last 7 days   Lab Units 21  03321   WBC 10*3/mm3 6.93 11.12*   HEMOGLOBIN g/dL 9.0* 9.4*   PLATELETS 10*3/mm3 218 204     Results from last 7 days   Lab Units 21  0331 21  0324 21  0451 21  0402 21  2206 21  0517 21  0453   SODIUM mmol/L 142 139 135* 139 138 139 141   POTASSIUM mmol/L 3.8 4.1 4.0 4.3 4.6 4.6 4.4   CHLORIDE mmol/L 104 106 105 110* 110* 111* 112*   CO2 mmol/L 24.3 19.7* 19.1* 17.8* 16.5* 15.5* 18.0*   BUN mg/dL 38* 34* 34* 32* 31* 27* 21   CREATININE mg/dL 2.06* 2.16* 2.55* 2.69* " 2.76* 2.42* 1.89*   CALCIUM mg/dL 8.5* 8.4* 8.5* 8.5* 8.7 8.6 8.3*   GLUCOSE mg/dL 111* 101* 98 111* 102* 110* 100*                 Scheduled Meds:   amLODIPine, 10 mg, Oral, Q24H  ascorbic acid, 500 mg, Oral, Daily  atorvastatin, 40 mg, Oral, Nightly  budesonide-formoterol, 2 puff, Inhalation, BID - RT  carvedilol, 25 mg, Oral, BID With Meals  cetirizine, 5 mg, Oral, Daily  clopidogrel, 75 mg, Oral, Daily  famotidine, 20 mg, Oral, Daily  folic acid, 1,000 mcg, Oral, Daily  furosemide, 60 mg, Intravenous, Q8H  hydrALAZINE, 100 mg, Oral, Q8H  lactulose, 10 g, Oral, Daily  pantoprazole, 40 mg, Oral, QAM  sertraline, 100 mg, Oral, Daily  sodium bicarbonate, 1,300 mg, Oral, TID  sodium chloride, 10 mL, Intravenous, Q12H  sucralfate, 1 g, Oral, BID AC  terazosin, 5 mg, Oral, Nightly  traZODone, 50 mg, Oral, Nightly  venlafaxine XR, 150 mg, Oral, Daily  Vitamin B-2, 400 mg, Oral, Daily      PRN Meds:   •  acetaminophen **OR** [DISCONTINUED] acetaminophen **OR** [DISCONTINUED] acetaminophen  •  cyclobenzaprine  •  docusate sodium  •  hydrALAZINE  •  LORazepam  •  nitroglycerin  •  ondansetron  •  oxyCODONE  •  sodium chloride  Continuous Infusions:  niCARdipine, 2.5 mg/hr, Last Rate: Stopped (05/31/21 1123)        Assessment/Plan   Active Hospital Problems    Diagnosis  POA   • **Hypertension [I10]  Yes   • COPD (chronic obstructive pulmonary disease) (CMS/HCC) [J44.9]  Yes   • YVONNE (acute kidney injury) (CMS/HCC) [N17.9]  Yes   • Opioid dependence (CMS/HCC) [F11.20]  Yes   • Malignant neoplasm of right upper lobe of lung (CMS/HCC) [C34.11]  Yes   • Stage 3b chronic kidney disease (CMS/HCC) [N18.32]  Yes   • Chronic pain syndrome [G89.4]  Yes   • Gastroesophageal reflux disease [K21.9]  Yes      Resolved Hospital Problems    Diagnosis Date Resolved POA   • Bradycardia [R00.1] 05/30/2021 Yes   • UTI (urinary tract infection) due to urinary indwelling catheter (CMS/[facility]) [T83.511A, N39.0] 05/30/2021 Yes       Assessment & Plan      -Plans noted for renal angiogram and possible angioplasty/stenting tomorrow  -Blood pressure medications continue to be adjusted and receiving diuretics today with ongoing uncontrolled hypertension  -Nephrology managing diuresis; IV lasix today. Baseline Cr 1.6-1.7.   -Anemia: Hemoglobin stable  -COPD: Sats stable on room air. Continue inhalers.   -Neurology now following given acute numbness of the left leg.  They have ordered brain and lumbar spine MRI   -We will increase back to her home dose of oxycodone given complaints of back pain today     · SCDs for DVT prophylaxis.  · Full code.  · Discussed with patient, family and nursing staff.  · Anticipate discharge TBD       I wore full protective equipment throughout the patient encounter including eye protection and facemask.  Hand hygiene was performed before donning protective equipment and after removal when leaving the room.    Stefan Mancilla MD  [facility]  06/03/21  13:07 EDT

## 2021-06-03 NOTE — PROGRESS NOTES
Hospital Follow Up    LOS:  LOS: 8 days   Patient Name: Rachana Arguelles  Age/Sex: 75 y.o. female  : 1945  MRN: [id number]    Day of Service: 21   Length of Stay: 8  Encounter Provider: REENA Medina  Place of Service: [facility] CARDIOLOGY  Patient Care Team:  Rhys Crowder Jr., MD as PCP - General (Family Medicine)  Whitney Dudley MD as Consulting Physician (Nephrology)  Quita Figueroa MD as Surgeon (Thoracic Surgery)  Sabas Luther MD as Consulting Physician (Otolaryngology)  Álvaro Gifford MD as Consulting Physician (Pulmonary Disease)    Subjective:     Chief Complaint: Uncontrolled hypertension    Interval History: Complains of back pain today    Objective:     Objective:  Temp:  [98.1 °F (36.7 °C)-98.4 °F (36.9 °C)] 98.1 °F (36.7 °C)  Heart Rate:  [73-92] 75  Resp:  [16-20] 16  BP: (159-199)/() 165/74     Intake/Output Summary (Last 24 hours) at 6/3/2021 1249  Last data filed at 6/3/2021 0913  Gross per 24 hour   Intake 360 ml   Output 2350 ml   Net -1990 ml     Body mass index is 29.29 kg/m².      21  0644 21  0917 21  0710   Weight: 88.8 kg (195 lb 12.8 oz) 88.5 kg (195 lb) 84.8 kg (187 lb)     Weight change: -3.629 kg (-8 lb)    Physical Exam:   General Appearance:    Awake alert and oriented in no acute distress.   Color:  Skin:  Neuro:  HEENT:    Lungs:     Pink  Warm and dry  No focal, motor or sensory deficits  Neck supple, pupils equal, round and reactive. No JVD, No Bruit  Clear to auscultation,respirations regular, even and                  unlabored    Heart:    Regular rate and rhythm, S1 and S2, no murmur, no gallop, no rub. No edema, DP/PT pulses are 2+   Chest Wall:    No abnormalities observed   Abdomen:     Normal bowel sounds, no masses, no organomegaly, soft        non-tender, non-distended, no guarding, no ascites noted   Extremities:   Moves all extremities well, no edema, no cyanosis, no redness        Lab Review:   Results from last 7 days   Lab Units 06/03/21  0331 06/02/21  0324 05/30/21  2206   SODIUM mmol/L 142 139 138   POTASSIUM mmol/L 3.8 4.1 4.6   CHLORIDE mmol/L 104 106 110*   CO2 mmol/L 24.3 19.7* 16.5*   BUN mg/dL 38* 34* 31*   CREATININE mg/dL 2.06* 2.16* 2.76*   GLUCOSE mg/dL 111* 101* 102*   CALCIUM mg/dL 8.5* 8.4* 8.7   AST (SGOT) U/L  --   --  30   ALT (SGPT) U/L  --   --  15     Results from last 7 days   Lab Units 06/01/21  0451 05/31/21  0910 05/31/21  0402 05/30/21  2206   TROPONIN T ng/mL 0.034* 0.036* 0.031* 0.034*     Results from last 7 days   Lab Units 06/03/21  0331 05/30/21  2206   WBC 10*3/mm3 6.93 11.12*   HEMOGLOBIN g/dL 9.0* 9.4*   HEMATOCRIT % 27.1* 28.9*   PLATELETS 10*3/mm3 218 204         Results from last 7 days   Lab Units 05/31/21  0402   MAGNESIUM mg/dL 2.3           Invalid input(s): LDLCALC  Results from last 7 days   Lab Units 05/31/21  0910   PROBNP pg/mL 6,967.0*         I reviewed the patient's new clinical results.  I personally viewed and interpreted the patient's EKG  Current Medications:   Scheduled Meds:amLODIPine, 10 mg, Oral, Q24H  ascorbic acid, 500 mg, Oral, Daily  atorvastatin, 40 mg, Oral, Nightly  budesonide-formoterol, 2 puff, Inhalation, BID - RT  carvedilol, 25 mg, Oral, BID With Meals  cetirizine, 5 mg, Oral, Daily  clopidogrel, 75 mg, Oral, Daily  famotidine, 20 mg, Oral, Daily  folic acid, 1,000 mcg, Oral, Daily  furosemide, 60 mg, Intravenous, Q8H  hydrALAZINE, 100 mg, Oral, Q8H  lactulose, 10 g, Oral, Daily  pantoprazole, 40 mg, Oral, QAM  sertraline, 100 mg, Oral, Daily  sodium bicarbonate, 1,300 mg, Oral, TID  sodium chloride, 10 mL, Intravenous, Q12H  sucralfate, 1 g, Oral, BID AC  terazosin, 5 mg, Oral, Nightly  traZODone, 50 mg, Oral, Nightly  venlafaxine XR, 150 mg, Oral, Daily  Vitamin B-2, 400 mg, Oral, Daily      Continuous Infusions:niCARdipine, 2.5 mg/hr, Last Rate: Stopped (05/31/21 1123)        Allergies:  Allergies   Allergen  Reactions   • Neurontin [Gabapentin] Confusion   • Morphine Other (See Comments)     HEADACHE       Assessment:       Hypertension    Chronic pain syndrome    Gastroesophageal reflux disease    Stage 3b chronic kidney disease (CMS/HCC)    Malignant neoplasm of right upper lobe of lung (CMS/HCC)    YVONNE (acute kidney injury) (CMS/HCC)    Opioid dependence (CMS/HCC)    COPD (chronic obstructive pulmonary disease) (CMS/HCC)      1.  Resistant hypertension  2.  Acute on chronic kidney injury  3.  Bradycardia: Resolved off of carvedilol and clonidine therapy  4.  Chronic anemia  5. Opioid dependence  6. Chronic Pain     Plan:      Has not been weighed today.  But lost weight yesterday.  Blood pressure remains elevated I am get a make her hydralazine 100 mg 3 times a day instead of 50.  Plan for renal angiogram and possible [place] artery stenting tomorrow.  Discussed with her at length.    REENA Medina  06/03/21  12:49 EDT  Electronically signed by REENA Medina, 06/03/21, 12:49 PM EDT.

## 2021-06-03 NOTE — PROGRESS NOTES
"   LOS: 8 days    Patient Care Team:  Rhys Crowder Jr., MD as PCP - General (Family Medicine)  Whitney Dudley MD as Consulting Physician (Nephrology)  Quita Figueroa MD as Surgeon (Thoracic Surgery)  Sabas Luther MD as Consulting Physician (Otolaryngology)  Álvaro Gifford MD as Consulting Physician (Pulmonary Disease)    Chief Complaint:    Chief Complaint   Patient presents with   • Low Heart Rate     Follow UP Mark on CKD IV.   Subjective     Interval History:     Events noted. Complained of tingling of left leg last night.  Neuro evaluation.  BP not controlled. Complains of back pain and HA, both chronic.  Complains of edema. Drinking several cans of diet pepsi daily, not recorded, but 2 cans empty on breakfast tray and 2 unopened cans on table.    Plan for MRI, LS spine and carotid  Duplex today.   We discussed the renal angiogram and possible stent tomorrow.   Review of Systems:   As noted above    Objective     Vital Signs  Temp:  [98.1 °F (36.7 °C)-98.4 °F (36.9 °C)] 98.4 °F (36.9 °C)  Heart Rate:  [73-92] 82  Resp:  [18-20] 18  BP: (157-199)/() 165/70    Flowsheet Rows      First Filed Value   Admission Height  167.6 cm (66\") Documented at 05/25/2021 1805   Admission Weight  78.9 kg (174 lb) Documented at 05/25/2021 1805          No intake/output data recorded.  I/O last 3 completed shifts:  In: 240 [P.O.:240]  Out: 1200 [Urine:1200]    Intake/Output Summary (Last 24 hours) at 6/3/2021 0903  Last data filed at 6/2/2021 2000  Gross per 24 hour   Intake --   Output 700 ml   Net -700 ml       Physical Exam:  General Appearance: alert,chronically ill. Sitting up in bed.    Mask applied.    Skin: warm and dry  HEENT: pupils round and reactive to light, oral mucosa normal, nonicteric sclera  Neck: supple, no JVD, trachea midline  Lungs: Clear to auscultation, no wheezing.   Heart: RRR, normal S1 and S2, no S3, no rub  Abdomen: soft, nontender, distended. + bs.  : no palpable bladder. "   Extremities: 2+ upper and 2+ lower ext edema.  Neuro: normal speech and mental status.     Results Review:    Results from last 7 days   Lab Units 06/03/21  0331 06/02/21  0324 06/01/21  0451 05/30/21  2206   SODIUM mmol/L 142 139 135* 138   POTASSIUM mmol/L 3.8 4.1 4.0 4.6   CHLORIDE mmol/L 104 106 105 110*   CO2 mmol/L 24.3 19.7* 19.1* 16.5*   BUN mg/dL 38* 34* 34* 31*   CREATININE mg/dL 2.06* 2.16* 2.55* 2.76*   CALCIUM mg/dL 8.5* 8.4* 8.5* 8.7   BILIRUBIN mg/dL  --   --   --  0.2   ALK PHOS U/L  --   --   --  98   ALT (SGPT) U/L  --   --   --  15   AST (SGOT) U/L  --   --   --  30   GLUCOSE mg/dL 111* 101* 98 102*       Estimated Creatinine Clearance: 26.4 mL/min (A) (by C-G formula based on SCr of 2.06 mg/dL (H)).    Results from last 7 days   Lab Units 06/03/21  0331 05/31/21  0402   MAGNESIUM mg/dL  --  2.3   PHOSPHORUS mg/dL 4.5 4.6*       Results from last 7 days   Lab Units 05/31/21  0402   URIC ACID mg/dL 7.1*       Results from last 7 days   Lab Units 06/03/21  0331 05/30/21  2206   WBC 10*3/mm3 6.93 11.12*   HEMOGLOBIN g/dL 9.0* 9.4*   PLATELETS 10*3/mm3 218 204               Imaging Results (Last 24 Hours)     Procedure Component Value Units Date/Time    CT Head Without Contrast [[id number]] Collected: 06/02/21 1444     Updated: 06/03/21 0708    Narrative:      CT HEAD WITHOUT CONTRAST     HISTORY: Hypertension, confusion, mental status changes.     COMPARISON: CT head 01/14/2021.     FINDINGS: The brain and ventricles are symmetrical. There is no evidence  of hemorrhage, hydrocephalus or of a focal area of decreased attenuation  to suggest acute infarction.       Impression:      No evidence of acute infarction or hemorrhage. Further  evaluation could be performed with MRI examination of brain as  indicated. The above information was called to the patient's nurse at  time of dictation. The patient's nurse is to immediately relay the  information to the clinical service.           Radiation dose  reduction techniques were utilized, including automated  exposure control and exposure modulation based on body size.     This report was finalized on 6/3/2021 7:05 AM by Dr. Pieter Canas M.D.       XR Chest 1 View [[id number]] Collected: 06/02/21 2333     Updated: 06/02/21 2337    Narrative:      SINGLE VIEW OF THE CHEST     HISTORY: Shortness of breath     COMPARISON: 05/30/2021     FINDINGS:  Cardiomegaly is present. There may be some vascular congestion. No  pneumothorax is identified. No pleural effusion is seen. There is stable  elevation of the right hemidiaphragm. No definite acute infiltrates are  seen. There are bilateral shoulder arthroplasties.       Impression:      Cardiomegaly, with perhaps mild vascular congestion.     This report was finalized on 6/2/2021 11:34 PM by Dr. Frannie Jackson M.D.           amLODIPine, 10 mg, Oral, Q24H  ascorbic acid, 500 mg, Oral, Daily  atorvastatin, 40 mg, Oral, Nightly  budesonide-formoterol, 2 puff, Inhalation, BID - RT  carvedilol, 25 mg, Oral, BID With Meals  cetirizine, 5 mg, Oral, Daily  clopidogrel, 75 mg, Oral, Daily  famotidine, 20 mg, Oral, Daily  folic acid, 1,000 mcg, Oral, Daily  furosemide, 60 mg, Intravenous, Q8H  hydrALAZINE, 50 mg, Oral, Q8H  lactulose, 10 g, Oral, Daily  pantoprazole, 40 mg, Oral, QAM  sertraline, 100 mg, Oral, Daily  sodium bicarbonate, 1,300 mg, Oral, TID  sodium chloride, 10 mL, Intravenous, Q12H  sucralfate, 1 g, Oral, BID AC  terazosin, 4 mg, Oral, Nightly  traZODone, 50 mg, Oral, Nightly  venlafaxine XR, 150 mg, Oral, Daily  Vitamin B-2, 400 mg, Oral, Daily      niCARdipine, 2.5 mg/hr, Last Rate: Stopped (05/31/21 1123)        Medication Review:   Current Facility-Administered Medications   Medication Dose Route Frequency Provider Last Rate Last Admin   • acetaminophen (TYLENOL) tablet 650 mg  650 mg Oral Q4H PRN Ericka Shaw APRN   650 mg at 05/31/21 1757   • amLODIPine (NORVASC) tablet 10 mg  10 mg Oral Q24H  Mat Coello MD   10 mg at 06/03/21 0854   • ascorbic acid (VITAMIN C) tablet 500 mg  500 mg Oral Daily Jerman Lobo MD   500 mg at 06/03/21 0854   • atorvastatin (LIPITOR) tablet 40 mg  40 mg Oral Nightly Jerman Lobo MD   40 mg at 06/02/21 2208   • budesonide-formoterol (SYMBICORT) 160-4.5 MCG/ACT inhaler 2 puff  2 puff Inhalation BID - RT Jerman Lobo MD   2 puff at 06/03/21 0730   • carvedilol (COREG) tablet 25 mg  25 mg Oral BID With Meals Meghann Win MD       • cetirizine (zyrTEC) tablet 5 mg  5 mg Oral Daily Jerman Lobo MD   5 mg at 06/03/21 0853   • clopidogrel (PLAVIX) tablet 75 mg  75 mg Oral Daily Jerman Lobo MD   75 mg at 06/03/21 0854   • cyclobenzaprine (FLEXERIL) tablet 5 mg  5 mg Oral TID PRN Jerman Lobo MD   5 mg at 06/03/21 0000   • docusate sodium (COLACE) capsule 100 mg  100 mg Oral BID PRN Jerman Lobo MD       • famotidine (PEPCID) tablet 20 mg  20 mg Oral Daily Jerman Lobo MD   20 mg at 06/03/21 0853   • folic acid (FOLVITE) tablet 1,000 mcg  1,000 mcg Oral Daily Jerman Lobo MD   1,000 mcg at 06/03/21 0854   • furosemide (LASIX) injection 60 mg  60 mg Intravenous Q8H Meghann Win MD   60 mg at 06/03/21 0854   • hydrALAZINE (APRESOLINE) injection 10 mg  10 mg Intravenous Q6H PRN Leyla Knight APRN   10 mg at 06/02/21 1914   • hydrALAZINE (APRESOLINE) tablet 50 mg  50 mg Oral Q8H TiTheo hernadez MD   50 mg at 06/03/21 0627   • lactulose (CHRONULAC) 10 GM/15ML solution 10 g  10 g Oral Daily Meghann Win MD   10 g at 06/03/21 0854   • LORazepam (ATIVAN) tablet 0.5 mg  0.5 mg Oral Q8H PRN Jerman Lobo MD   0.5 mg at 06/02/21 0635   • niCARdipine (CARDENE) 25 mg in 250 mL NS infusion kit  2.5 mg/hr Intravenous Titrated ChuckieLionel hayward MD   Stopped at 05/31/21 1123   • nitroglycerin (NITROSTAT) SL tablet 0.4 mg  0.4 mg Sublingual Q5 Min PRN Ericka Shaw, APRN       • ondansetron (ZOFRAN) injection 4 mg  4 mg  Intravenous Q6H PRN Ericka Shaw APRN   4 mg at 06/02/21 1334   • oxyCODONE (ROXICODONE) immediate release tablet 15 mg  15 mg Oral Q6H PRN Jerman Lobo MD   15 mg at 06/03/21 0627   • pantoprazole (PROTONIX) EC tablet 40 mg  40 mg Oral QAM Jerman Lobo MD   40 mg at 06/03/21 0628   • sertraline (ZOLOFT) tablet 100 mg  100 mg Oral Daily Jerman Lobo MD   100 mg at 06/03/21 0854   • sodium bicarbonate tablet 1,300 mg  1,300 mg Oral TID Vincent Dutta MD   1,300 mg at 06/03/21 0854   • sodium chloride 0.9 % flush 10 mL  10 mL Intravenous Q12H Ericka Shaw APRN   10 mL at 06/02/21 2208   • sodium chloride 0.9 % flush 10 mL  10 mL Intravenous PRN Ericka Shaw APRN       • sucralfate (CARAFATE) tablet 1 g  1 g Oral BID AC Jerman Lobo MD   1 g at 06/03/21 0627   • terazosin (HYTRIN) capsule 4 mg  4 mg Oral Nightly Mary Haddad APRN   4 mg at 06/02/21 2208   • traZODone (DESYREL) tablet 50 mg  50 mg Oral Nightly Jerman Lobo MD   50 mg at 06/02/21 2208   • venlafaxine XR (EFFEXOR-XR) 24 hr capsule 150 mg  150 mg Oral Daily Jerman Lobo MD   150 mg at 06/03/21 0854   • Vitamin B-2 (RIBOFLAVIN) tablet 400 mg  400 mg Oral Daily César Deluna MD   400 mg at 06/03/21 0854       Assessment/Plan   1. CKD 3, YVONNE.  Baseline creatinine 1.6-1.7.   Creatinine  improving.   Non gap metabolic acidosis much better on po bicarb.    2. HTN very difficult to control.  She is on 5 meds with poor control .  Renal artery doppler with >60 % stenosis and  which can be contributing to her difficulty in controlling the BP. Renal US with left kidney smaller than right despite right QUINTON.    May be candidate for PTA, stent based on recommendation for BP not being controlled on multiple meds or intolerance for meds. Her  Echo 6/1 demonstrated  larger  pericardial effusion. I stopped minoxidil 6/1.  Bradycardic on combination of coreg and clonidine.  No ACE inhibitor or ARB due to  YVONNE on chronic kidney disease.  If her DW Dr. Coello. Plan PTA, stent Right renal artery tomorrow.  Although PTA and Stent are typically not favored over medical therapy, she meets the criterion for failure of optimal medical therapy to control BP,  Intolerance to medical therapy due to side effects.   I have also counselled patient about the caffeine intake.  She understands need to limit this.   3. Chronic pain syndrome  4. Anemia.   5. Altered mental status improved.   6. Paresthesias left leg.  MRI brain and LS spine today.     I spoke to her  about the PTA and renal artery stent.  We discussed the risk of contrast on worsening renal function, but I am concerned that her uncontrolled BP poses a greater risk to her renal function at this point..  He was also concerned about a med that she got around the time of anesthesia for RUL lobectomy in 2019.  Looking at record, it was neurontin, which is in the allergy list.     Plan:  1. Po fluid restrict  2. Increase coreg to 25 mg bid  3. Right renal angiogram, PTA/stent tomorrow  4. Caffeine free diet  5. DW  and patient in detail.  6. Stop IV lasix after doses today in anticipation of contrast tomorrow.     Meghann Win MD  06/03/21  09:03 EDT

## 2021-06-03 NOTE — PLAN OF CARE
Goal Outcome Evaluation:  Plan of Care Reviewed With: patient, spouse, daughter     Outcome Summary: Pt transfer to [facility], arrived at 11:20. C/o chronic back pain, administering Roxicodone around the clock. Administering Ativan around the clock. Pt does not c/o any numbness & tingling. Going for MRI, will be administering 0.5 IV Ativan x1 prior to test. Up assistx1. Scattered bruising, skin intact. A&Ox4. Daughter & spouse visited pt. Safety maintained, will continue to monitor.

## 2021-06-03 NOTE — PLAN OF CARE
Goal Outcome Evaluation:        Shift summary:  Pt blood pressures have been high 170s-190s/70s-100s. Pt has a slight headache and some swelling in the stomach, arms, and legs. Pt given oxycodone, flexaril, and trazodone before bed and had some confusing during the night with A&O x2-3, pt has bed alarm on but is much more alert this morning at A&Ox4. Pt is an assist x2. Provider notified about high blood pressures. Pt has c/o back pain during the night and given the oxycodone which relieved the pain. Pt SR 70s-80s. WCTM.

## 2021-06-03 NOTE — PROGRESS NOTES
[facility]  [phone]  Álvaro Gifford MD    Subjective:  LOS: 8    States she is anxious. Her leg numbness resolved. Currently resting comfortably on RA but states she is soa.    Objective   Vital Signs past 24hrs    Temp range: Temp (24hrs), Av.3 °F (36.8 °C), Min:98.1 °F (36.7 °C), Max:98.7 °F (37.1 °C)    BP range: BP: (159-199)/() 165/74  Pulse range: Heart Rate:  [73-92] 81  Resp rate range: Resp:  [16-20] 18    Device (Oxygen Therapy): room airFlow (L/min):  [1-3] 1  Oxygen range:SpO2:  [73 %-100 %] 90 %      84.8 kg (187 lb); Body mass index is 29.29 kg/m².    Intake/Output Summary (Last 24 hours) at 6/3/2021 1424  Last data filed at 6/3/2021 0913  Gross per 24 hour   Intake 360 ml   Output 2350 ml   Net -1990 ml       Physical Exam  Eyes:      Pupils: Pupils are equal, round, and reactive to light.   Cardiovascular:      Rate and Rhythm: Normal rate and regular rhythm.      Heart sounds: No murmur heard.     Pulmonary:      Effort: Pulmonary effort is normal.      Breath sounds: Normal breath sounds.   Abdominal:      General: Bowel sounds are normal.      Palpations: Abdomen is soft. There is no mass.      Tenderness: There is no abdominal tenderness.   Musculoskeletal:         General: No swelling.   Neurological:      Mental Status: She is alert.       Results Review:    I have reviewed the laboratory and imaging data since the last note by [facility] physician.  My annotations are noted in assessment and plan.    Medication Review:  I have reviewed the current MAR.  My annotations are noted in assessment and plan.    niCARdipine, 2.5 mg/hr, Last Rate: Stopped (21 1123)      Plan   PCCM Problems  Altered mental status, now resolved  Acute kidney injury  Non-anion gap metabolic acidosis from above  COPD without exacerbation  Chronic hypoxia on 2 L oxygen  History lung cancer  Relevant Medical Diagnoses  CKD 3  UTI  Hypertension  Bradycardia and now tachycardia  Frequent PVCs,  improved        Plan of Treatment    Currently without any acute pulmonary issues. Currently on RA.    No further hemoptysis reported.    Altered mental status due to polypharmacy has resolved.  Neurology has made recommendations to change.    Cardiac arrhythmias apparently now improved. Has pericardial effusion.    Acute kidney injury and nephrology is following.    Note plans to address labile BP with renal stent tomorrow.    Apparent left leg numbness and pending MRI.    Álvaro Gifford MD  06/03/21  14:24 EDT    While in the room and during my examination of the patient I wore gloves, gown, mask, eye protection and followed enhanced droplet/contact isolation protocol and precautions.  I washed my hands before and after this patient encounter.    Part of this note may be an electronic transcription/translation of spoken language to printed text using the Dragon Dictation System.

## 2021-06-03 NOTE — PLAN OF CARE
Goal Outcome Evaluation:  Plan of Care Reviewed With: patient   Patient is a 75 y.o. female admitted to [facility] for bradycardia on 5/25/2021. PMHx includes opioid dependence due to chronic pain, R lung cancer, CKD, GERD, HTN. Patient is independent at baseline and lives with her spouse. Patient reports she hangs onto furniture at home, uses rwx in community, 3 steps in her home between den and kitchen. Today, patient performed bed mobility with SBA, required CGA for transfers, and ambulated 60 feet with rwx and min/CGA.  Patient demonstrates impairments consisting of generalized weakness, decreased activity tolerance, decreased balance, decreased safety awareness. Patient may benefit from skilled PT services acutely to address functional deficits as well as improve level of independence prior to discharge. Anticipate home with assist and HH upon DC.   Patient was intermittently wearing a face mask during this therapy encounter. Therapist used appropriate personal protective equipment including eye protection, mask, and gloves.  Mask used was standard procedure mask. Appropriate PPE was worn during the entire therapy session. Hand hygiene was completed before and after therapy session. Patient is not in enhanced droplet precautions.

## 2021-06-03 NOTE — CASE MANAGEMENT/SOCIAL WORK
Continued Stay Note  [facility]     Patient Name: Rachana Arguelles  MRN: [id number]  Today's Date: 6/3/2021    Admit Date: 5/25/2021    Discharge Plan     Row Name 06/03/21 1403       Plan    Plan  Plans for home via family. Current with [facility] and [facility] ([facility])    Patient/Family in Agreement with Plan  yes    Plan Comments  Met with patient and daughter at bedside. Confirmed plans to return home via family with [facility] home [facility]. Denies other dc planning needs at this time. Zuly Willard, RN/CCP        Discharge Codes    No documentation.             Judit Willard

## 2021-06-03 NOTE — THERAPY EVALUATION
Patient Name: Rachana Arguelles  : 1945    MRN: [id number]                              Today's Date: 6/3/2021       Admit Date: 2021    Visit Dx:     ICD-10-CM ICD-9-CM   1. Bradycardia  R00.1 427.89   2. YVONNE (acute kidney injury) (CMS/HCC)  N17.9 584.9   3. Acute UTI  N39.0 599.0     Patient Active Problem List   Diagnosis   • Anxiety   • Chronic pain syndrome   • Depression   • Gastroesophageal reflux disease   • Hyperlipidemia   • Hypertension   • Osteoarthritis of hip   • Chronic low back pain   • Failed back syndrome of lumbar spine   • Pulmonary embolus (CMS/HCC)   • Weight loss   • Polypharmacy   • Stage 3b chronic kidney disease (CMS/HCC)   • Chronic constipation   • Sacroiliac joint pain   • Mixed incontinence   • Renal cyst   • Achilles tendonitis   • Atherosclerosis of native artery of left lower extremity with intermittent claudication (CMS/HCC)   • Morbidly obese (CMS/HCC)   • Lung nodule   • Malignant neoplasm of right upper lobe of lung (CMS/HCC)   • Lung cancer (CMS/HCC)   • Moderate single current episode of major depressive disorder (CMS/HCC)   • Palpitations   • Encounter for screening for malignant neoplasm of colon   • Hematoma of scalp   • Acute neck pain   • Dizziness   • Unstable cervical spine   • Postconcussive syndrome   • Positive colorectal cancer screening using Cologuard test   • Dysphagia   • S/P reverse total shoulder arthroplasty, right   • Chronic post-traumatic headache, not intractable   • Medication overuse headache   • Migraine without aura and without status migrainosus, not intractable   • Shortness of breath   • PAD (peripheral artery disease) (CMS/[facility])   • Bilateral carotid artery stenosis   • S/P lobectomy of lung   • YVONNE (acute kidney injury) (CMS/HCC)   • Anemia   • Frequent PVCs   • Pneumonia of both lower lobes due to infectious organism   • History of lung cancer   • Hypertensive urgency   • Bradycardia, sinus   • YVONNE (acute kidney injury) (CMS/HCC)   •  Opioid dependence (CMS/[facility])   • COPD (chronic obstructive pulmonary disease) (CMS/[facility])     Past Medical History:   Diagnosis Date   • AAA (abdominal aortic aneurysm) without rupture (CMS/[facility])    • Anemia    • Anxiety    • Arthritis    • Bilateral carotid artery stenosis 8/14/2020   • Cancer (CMS/[facility])     skin cancer   • Chest pain     OCCAS   • Chronic low back pain    • Chronic pain syndrome 3/12/2016   • CKD (chronic kidney disease), stage III (CMS/[facility])    • Claustrophobia 10/26/2015    Resolved   • COPD (chronic obstructive pulmonary disease) (CMS/[facility])    • Depression    • Dizziness    • Dyspnea    • GERD (gastroesophageal reflux disease)    • GI problem    • H/O concussion    • Head trauma     FELL CUT HEAD 12 STAPLES   • Hiatal hernia    • History of migraine headaches    • Hyperlipidemia    • Hypertension    • Lumbar postlaminectomy syndrome 10/26/2015    Resolved   • Lung cancer (CMS/[facility])    • Lung nodule    • Migraines    • Nausea    • Palpitations    • Polypharmacy 4/22/2016   • Pulmonary embolism (CMS/[facility]) 10/26/2015    January 2013 - Resolved, felt to be secondary to estrogen use   • Slow to wake up after anesthesia    • Submandibular sialoadenitis 10/26/2015    Resolved   • Visual changes    • Vitamin B 12 deficiency      Past Surgical History:   Procedure Laterality Date   • ANGIOPLASTY ILIAC ARTERY Bilateral 8/10/2018    Procedure: BILATERAL ILIAC STENTS;  Surgeon: Riki Mancera MD;  Location: [facility] MAIN OR;  Service: Vascular   • APPENDECTOMY     • BREAST SURGERY      Breast Surgery Reduction Procedure   • BRONCHOSCOPY N/A 2/8/2019    Procedure: BRONCHOSCOPY;  Surgeon: Álvaro Gifford MD;  Location: [facility] ENDOSCOPY;  Service: Pulmonary   • CHOLECYSTECTOMY     • COLONOSCOPY     • ENDOSCOPY N/A 11/3/2020    Procedure: ESOPHAGOGASTRODUODENOSCOPY with 54 [place] vernon dilation;  Surgeon: Yunior Vo MD;  Location: [facility] ENDOSCOPY;  Service: Gastroenterology;  Laterality: N/A;  pre-  "dysphagia  post- esophageal ring, hiatal hernia   • HYSTERECTOMY     • INTUBATION  1/15/2019        • JOINT REPLACEMENT      left knee, hip, shoulder   • LASIK     • LUMBAR FUSION      Lumbar Vertebral Fusion   • SHOULDER ARTHROSCOPY  04/04/2013    Dr. Carrillo/[facility] [place]   • THORACOSCOPY VIDEO ASSISTED WITH LOBECTOMY Right 1/11/2019    Procedure: BRONCOSCOPY, THORACOSCOPY VIDEO ASSISTED WITH RIGHT UPPER LOBE WEDGE RESECTION, COMPLETION RIGHT UPPER LOBECTOMY, LYMPH NODE DISSECTION, INTERCOSTAL NERVE BLOCK;  Surgeon: Quita Figueroa MD;  Location: [facility] OR;  Service: Thoracic   • TONSILLECTOMY     • TOTAL HIP ARTHROPLASTY REVISION Left    • TOTAL KNEE ARTHROPLASTY Left    • TOTAL SHOULDER ARTHROPLASTY Left    • TOTAL SHOULDER ARTHROPLASTY W/ DISTAL CLAVICLE EXCISION Right 6/18/2020    Procedure: RIGHT TOTAL SHOULDER REVERSE ARTHROPLASTY WITH GPS;  Surgeon: Lino Carrillo MD;  Location: [facility] OR [facility];  Service: Orthopedics;  Laterality: Right;     General Information     Row Name 06/03/21 1418          Physical Therapy Time and Intention    Document Type  evaluation  -EM     Mode of Treatment  individual therapy;physical therapy  -EM     Row Name 06/03/21 1418          General Information    Patient Profile Reviewed  yes  -EM     Prior Level of Function  independent: states she uses rwx in community, \"furniture surfs\" in the house  -EM     Existing Precautions/Restrictions  fall  -EM     Row Name 06/03/21 1418          Living Environment    Lives With  spouse  -EM     Row Name 06/03/21 1418          Home Main Entrance    Number of Stairs, Main Entrance  none  -EM     Row Name 06/03/21 1418          Stairs Within Home, Primary    Number of Stairs, Within Home, Primary  three  -EM     Row Name 06/03/21 1418          Cognition    Orientation Status (Cognition)  oriented x 3  -EM     Row Name 06/03/21 1418          Safety Issues, Functional Mobility    Safety Issues Affecting Function (Mobility)  insight into " deficits/self-awareness;judgment  -EM     Impairments Affecting Function (Mobility)  balance;endurance/activity tolerance;strength;pain  -EM       User Key  (r) = Recorded By, (t) = Taken By, (c) = Cosigned By    Initials Name Provider Type    April Nagy PT Physical Therapist        Mobility     Row Name 06/03/21 1419          Bed Mobility    Bed Mobility  supine-sit;sit-supine  -EM     Supine-Sit [place] (Bed Mobility)  standby assist  -EM     Sit-Supine [place] (Bed Mobility)  standby assist  -EM     Row Name 06/03/21 1419          Sit-Stand Transfer    Sit-Stand [place] (Transfers)  contact guard  -EM     Assistive Device (Sit-Stand Transfers)  walker, front-wheeled  -EM     Row Name 06/03/21 1419          Gait/Stairs (Locomotion)    [place] Level (Gait)  contact guard  -EM     Assistive Device (Gait)  walker, front-wheeled  -EM     Distance in Feet (Gait)  60  -EM     Deviations/Abnormal Patterns (Gait)  base of support, narrow;stride length decreased;gait speed decreased  -EM     Bilateral Gait Deviations  forward flexed posture  -EM     Comment (Gait/Stairs)  impulsive, unsteady  -EM       User Key  (r) = Recorded By, (t) = Taken By, (c) = Cosigned By    Initials Name Provider Type    April Nagy PT Physical Therapist        Obj/Interventions     Row Name 06/03/21 1421          Range of Motion Comprehensive    General Range of Motion  no range of motion deficits identified  -EM     Row Name 06/03/21 1421          Strength Comprehensive (MMT)    General Manual Muscle Testing (MMT) Assessment  no strength deficits identified  -EM     Comment, General Manual Muscle Testing (MMT) Assessment  no focal deficits identified  -EM     Row Name 06/03/21 1421          Motor Skills    Therapeutic Exercise  other (see comments) AP, QS x 10 reps  -EM     Row Name 06/03/21 1421          Balance    Balance Assessment  sitting dynamic balance;standing static balance;standing  dynamic balance  -EM     Dynamic Sitting Balance  WNL  -EM     Static Standing Balance  WNL  -EM     Dynamic Standing Balance  mild impairment  -EM     Row Name 06/03/21 1421          Sensory Assessment (Somatosensory)    Sensory Assessment (Somatosensory)  sensation intact  -EM       User Key  (r) = Recorded By, (t) = Taken By, (c) = Cosigned By    Initials Name Provider Type    EM April Corey PT Physical Therapist        Goals/Plan     Row Name 06/03/21 1423          Transfer Goal 1 (PT)    Activity/Assistive Device (Transfer Goal 1, PT)  transfers, all;walker, rolling  -EM     [place] Level/Cues Needed (Transfer Goal 1, PT)  standby assist  -EM     Time Frame (Transfer Goal 1, PT)  1 week  -EM     Row Name 06/03/21 1423          Gait Training Goal 1 (PT)    Activity/Assistive Device (Gait Training Goal 1, PT)  gait (walking locomotion);walker, rolling  -EM     [place] Level (Gait Training Goal 1, PT)  standby assist  -EM     Distance (Gait Training Goal 1, PT)  150  -EM     Time Frame (Gait Training Goal 1, PT)  1 week  -EM       User Key  (r) = Recorded By, (t) = Taken By, (c) = Cosigned By    Initials Name Provider Type    EM April Corey PT Physical Therapist        Clinical Impression     Row Name 06/03/21 1422          Pain    Additional Documentation  Pain Scale: Numbers Pre/Post-Treatment (Group)  -EM     Row Name 06/03/21 1422          Pain Scale: Numbers Pre/Post-Treatment    Pretreatment Pain Rating  8/10  -EM     Pain Location  back  -EM     Pre/Posttreatment Pain Comment  pt reports chronic pain issues  -EM     Pain Intervention(s)  Medication (See MAR);Repositioned  -EM     Row Name 06/03/21 1422          Plan of Care Review    Plan of Care Reviewed With  patient  -EM     Row Name 06/03/21 1422          Therapy Assessment/Plan (PT)    Patient/Family Therapy Goals Statement (PT)  go home  -EM     Rehab Potential (PT)  good, to achieve stated therapy goals  -EM     Criteria  for Skilled Interventions Met (PT)  yes;skilled treatment is necessary  -EM     Row Name 06/03/21 1422          Positioning and Restraints    Pre-Treatment Position  in bed  -EM     Post Treatment Position  bed  -EM     In Bed  supine;call light within reach;exit alarm on;notified nsg  -EM       User Key  (r) = Recorded By, (t) = Taken By, (c) = Cosigned By    Initials Name Provider Type    April Nagy PT Physical Therapist        Outcome Measures     Row Name 06/03/21 1424          How much help from another person do you currently need...    Turning from your back to your side while in flat bed without using bedrails?  4  -EM     Moving from lying on back to sitting on the side of a flat bed without bedrails?  3  -EM     Moving to and from a bed to a chair (including a wheelchair)?  3  -EM     Standing up from a chair using your arms (e.g., wheelchair, bedside chair)?  3  -EM     Climbing 3-5 steps with a railing?  3  -EM     To walk in hospital room?  3  -EM     AM-PAC 6 Clicks Score (PT)  19  -EM     Row Name 06/03/21 1424          Functional Assessment    Outcome Measure Options  AM-PAC 6 Clicks Basic Mobility (PT)  -EM       User Key  (r) = Recorded By, (t) = Taken By, (c) = Cosigned By    Initials Name Provider Type    April Nagy PT Physical Therapist        Physical Therapy Education                 Title: PT OT SLP Therapies (In Progress)     Topic: Physical Therapy (In Progress)     Point: Mobility training (In Progress)     Learning Progress Summary           Patient Acceptance, E, NR by EM at 6/3/2021 1424                   Point: Home exercise program (Not Started)     Learner Progress:  Not documented in this visit.          Point: Body mechanics (Not Started)     Learner Progress:  Not documented in this visit.          Point: Precautions (Not Started)     Learner Progress:  Not documented in this visit.                      User Key     Initials Effective Dates Name  Provider Type Discipline    EM 04/03/18 -  April Corey PT Physical Therapist PT              PT Recommendation and Plan  Planned Therapy Interventions (PT): bed mobility training, gait training, home exercise program, patient/family education, transfer training  Plan of Care Reviewed With: patient     Time Calculation:   PT Charges     Row Name 06/03/21 1427             Time Calculation    Start Time  0912  -EM      Stop Time  0930  -EM      Time Calculation (min)  18 min  -EM      PT Received On  06/03/21  -EM      PT - Next Appointment  06/04/21  -EM      PT Goal Re-Cert Due Date  06/10/21  -EM         Time Calculation- PT    Total Timed Code Minutes- PT  10 minute(s)  -EM         Timed Charges    98084 - PT Therapeutic Activity Minutes  10  -EM         Total Minutes    Timed Charges Total Minutes  10  -EM       Total Minutes  10  -EM        User Key  (r) = Recorded By, (t) = Taken By, (c) = Cosigned By    Initials Name Provider Type    EM April Corey PT Physical Therapist        Therapy Charges for Today     Code Description Service Date Service Provider Modifiers Qty    02317579311 HC PT THERAPEUTIC ACT EA 15 MIN 6/3/2021 April Corey, PT GP 1    01951096708 HC PT EVAL MOD COMPLEXITY 2 6/3/2021 Apirl Corey, PT GP 1    03620634371 HC PT THER SUPP EA 15 MIN 6/3/2021 April Corey, PT GP 1          PT G-Codes  Outcome Measure Options: AM-PAC 6 Clicks Basic Mobility (PT)  AM-PAC 6 Clicks Score (PT): 19    April Corey PT  6/3/2021

## 2021-06-03 NOTE — PROGRESS NOTES
DOS: 6/3/2021  NAME: Rachana Arguelles   : 1945  PCP: Rhys Crowder Jr., MD    Chief Complaint   Patient presents with   • Low Heart Rate        Stroke    Subjective: Pt seen in follow up, however the problem is new to me.  Lying in bed resting comfortably had just returned from obtaining her carotid duplex.  Reports that the numbness has resolved.  Her main complaint is her back pain which is a chronic issue for her.  She denies any new weakness, numbness, speech or visual disturbances, or headaches.   at bedside.    Objective:  Vital signs:      Vitals:    21 0752 21 0850 21 1126 21 1301   BP: (!) 190/89 165/70 165/74    BP Location: Right arm  Right arm    Patient Position: Lying  Lying    Pulse: 90 82 75 81   Resp: 18  16 18   Temp: 98.4 °F (36.9 °C)  98.1 °F (36.7 °C) 98.7 °F (37.1 °C)   TempSrc: Oral  Oral Oral   SpO2: (!) 89%  95% 90%   Weight:       Height:           Current Facility-Administered Medications:   •  acetaminophen (TYLENOL) tablet 650 mg, 650 mg, Oral, Q4H PRN, 650 mg at 21 1839 **OR** [DISCONTINUED] acetaminophen (TYLENOL) 160 MG/5ML solution 650 mg, 650 mg, Oral, Q4H PRN **OR** [DISCONTINUED] acetaminophen (TYLENOL) suppository 650 mg, 650 mg, Rectal, Q4H PRN, Ericka Shaw APRN  •  amLODIPine (NORVASC) tablet 10 mg, 10 mg, Oral, Q24H, Mat Coello MD, 10 mg at 21 0854  •  ascorbic acid (VITAMIN C) tablet 500 mg, 500 mg, Oral, Daily, Jerman Lobo MD, 500 mg at 21 0854  •  atorvastatin (LIPITOR) tablet 40 mg, 40 mg, Oral, Nightly, Jerman Lobo MD, 40 mg at 21 2208  •  budesonide-formoterol (SYMBICORT) 160-4.5 MCG/ACT inhaler 2 puff, 2 puff, Inhalation, BID - RT, Jerman Lobo MD, 2 puff at 21 0730  •  carvedilol (COREG) tablet 25 mg, 25 mg, Oral, BID With Meals, Meghann Win MD  •  cetirizine (zyrTEC) tablet 5 mg, 5 mg, Oral, Daily, Jerman Lobo MD, 5 mg at 21 0853  •  clopidogrel (PLAVIX)  tablet 75 mg, 75 mg, Oral, Daily, Jerman Lobo MD, 75 mg at 06/03/21 0854  •  cyclobenzaprine (FLEXERIL) tablet 5 mg, 5 mg, Oral, TID PRN, Jerman Lobo MD, 5 mg at 06/03/21 0000  •  docusate sodium (COLACE) capsule 100 mg, 100 mg, Oral, BID PRN, Jerman Lobo MD  •  famotidine (PEPCID) tablet 20 mg, 20 mg, Oral, Daily, Jerman Lobo MD, 20 mg at 06/03/21 0853  •  folic acid (FOLVITE) tablet 1,000 mcg, 1,000 mcg, Oral, Daily, Jerman Lobo MD, 1,000 mcg at 06/03/21 0854  •  furosemide (LASIX) injection 60 mg, 60 mg, Intravenous, Q8H, Meghann Win MD, 60 mg at 06/03/21 0854  •  hydrALAZINE (APRESOLINE) injection 10 mg, 10 mg, Intravenous, Q6H PRN, Leyla Knight, APRN, 10 mg at 06/02/21 1914  •  hydrALAZINE (APRESOLINE) tablet 100 mg, 100 mg, Oral, Q8H, Mireya Palmer APRN  •  lactulose (CHRONULAC) 10 GM/15ML solution 10 g, 10 g, Oral, Daily, Meghann Win MD, 10 g at 06/03/21 0854  •  LORazepam (ATIVAN) injection 0.5 mg, 0.5 mg, Intravenous, Once, Stefan Mancilla MD  •  LORazepam (ATIVAN) tablet 0.5 mg, 0.5 mg, Oral, Q8H PRN, Jerman Lobo MD, 0.5 mg at 06/03/21 0955  •  niCARdipine (CARDENE) 25 mg in 250 mL NS infusion kit, 2.5 mg/hr, Intravenous, Titrated, Chuckie, Lionel Adams MD, Stopped at 05/31/21 1123  •  nitroglycerin (NITROSTAT) SL tablet 0.4 mg, 0.4 mg, Sublingual, Q5 Min PRN, Ericka Shaw, APRN  •  ondansetron (ZOFRAN) injection 4 mg, 4 mg, Intravenous, Q6H PRN, Ericka Shaw APRN, 4 mg at 06/02/21 1334  •  oxyCODONE (ROXICODONE) immediate release tablet 20 mg, 20 mg, Oral, Q6H PRN, Stefan Mancilla MD  •  pantoprazole (PROTONIX) EC tablet 40 mg, 40 mg, Oral, QAM, Jerman Lobo MD, 40 mg at 06/03/21 0628  •  sertraline (ZOLOFT) tablet 100 mg, 100 mg, Oral, Daily, Jerman Lobo MD, 100 mg at 06/03/21 0854  •  sodium bicarbonate tablet 1,300 mg, 1,300 mg, Oral, TID, Vincent Dutta MD, 1,300 mg at 06/03/21 0854  •  sodium chloride 0.9 %  flush 10 mL, 10 mL, Intravenous, Q12H, Ericka Shaw APRN, 10 mL at 06/02/21 2208  •  sodium chloride 0.9 % flush 10 mL, 10 mL, Intravenous, PRN, Ericka Shaw, APRN  •  sucralfate (CARAFATE) tablet 1 g, 1 g, Oral, BID AC, Jerman Lobo MD, 1 g at 06/03/21 0627  •  terazosin (HYTRIN) capsule 5 mg, 5 mg, Oral, Nightly, Mireya Palmer, APRN  •  traZODone (DESYREL) tablet 50 mg, 50 mg, Oral, Nightly, Jerman Lobo MD, 50 mg at 06/02/21 2208  •  venlafaxine XR (EFFEXOR-XR) 24 hr capsule 150 mg, 150 mg, Oral, Daily, Jerman Lobo MD, 150 mg at 06/03/21 0854  •  Vitamin B-2 (RIBOFLAVIN) tablet 400 mg, 400 mg, Oral, Daily, César Deluna MD, 400 mg at 06/03/21 0854    PRN meds  •  acetaminophen **OR** [DISCONTINUED] acetaminophen **OR** [DISCONTINUED] acetaminophen  •  cyclobenzaprine  •  docusate sodium  •  hydrALAZINE  •  LORazepam  •  nitroglycerin  •  ondansetron  •  oxyCODONE  •  sodium chloride    No current facility-administered medications on file prior to encounter.     Current Outpatient Medications on File Prior to Encounter   Medication Sig   • amLODIPine (NORVASC) 10 MG tablet Take 10 mg by mouth Daily. Take with 5mg tablet to make 15mg dose   • amLODIPine (NORVASC) 5 MG tablet Take 5 mg by mouth Daily. Take with 10mg tab to make 15mg dose.   • ascorbic acid (VITAMIN C) 500 MG tablet Take 500 mg by mouth Daily.   • atorvastatin (LIPITOR) 40 MG tablet Take 40 mg by mouth Every Night.   • carBAMazepine (TEGretol) 200 MG tablet Take 100 mg by mouth 2 (Two) Times a Day.   • carvedilol (COREG) 25 MG tablet TAKE 1 TABLET BY MOUTH TWICE A DAY (Patient taking differently: Take 12.5 mg by mouth 2 (Two) Times a Day With Meals.)   • cloNIDine (CATAPRES) 0.3 MG tablet Take 0.3 mg by mouth 2 (Two) Times a Day.   • clopidogrel (PLAVIX) 75 MG tablet Take 75 mg by mouth Daily.   • cyclobenzaprine (FLEXERIL) 10 MG tablet Take 10 mg by mouth 3 (Three) Times a Day As Needed.   • docusate sodium  (COLACE) 100 MG capsule Take 100 mg by mouth 2 (Two) Times a Day As Needed for Constipation.   • famotidine (PEPCID) 40 MG tablet Take 40 mg by mouth 2 (Two) Times a Day.   • fexofenadine (Allegra Allergy) 180 MG tablet Take 1 tablet by mouth Daily As Needed (allergies).   • fluticasone-salmeterol (Advair Diskus) 250-50 MCG/DOSE DISKUS Inhale 2 puffs 2 (Two) Times a Day.   • folic acid (FOLVITE) 1 MG tablet TAKE 1 TABLET BY MOUTH EVERY DAY (Patient taking differently: Take 1 mg by mouth Daily.)   • LORazepam (ATIVAN) 0.5 MG tablet TAKE 1 TABLET BY MOUTH EVERY 8 (EIGHT) HOURS AS NEEDED FOR ANXIETY.   • losartan (COZAAR) 25 MG tablet Take 50 mg by mouth Daily.   • ondansetron (ZOFRAN) 4 MG tablet Take 1-2 tablets by mouth Every 8 (Eight) Hours As Needed for Nausea or Vomiting.   • oxyCODONE (ROXICODONE) 20 MG tablet Take 20 mg by mouth Every 4 (Four) Hours As Needed. No more than 5 per day   • polyethylene glycol (MiraLax) 17 g packet Take 17 g by mouth Daily.   • sertraline (Zoloft) 100 MG tablet Take 1 tablet by mouth Daily.   • sucralfate (CARAFATE) 1 g tablet Take 1 tablet by mouth 2 (two) times a day.   • torsemide (DEMADEX) 20 MG tablet Take 40 mg by mouth Daily.   • traZODone (DESYREL) 150 MG tablet TAKE 0.5 TABLETS BY MOUTH EVERY NIGHT (Patient taking differently: Take 75 mg by mouth Every Night.)   • venlafaxine XR (EFFEXOR-XR) 150 MG 24 hr capsule TAKE 1 CAPSULE BY MOUTH DAILY. SWALLOW WHOLE DO NOT CRUSH OR CHEW. (Patient taking differently: Take 150 mg by mouth Daily.)   • vitamin D (ERGOCALCIFEROL) 1.25 MG (98897 UT) capsule capsule Take 50,000 Units by mouth Every 7 (Seven) Days. Takes on Wednesdays   • [DISCONTINUED] ondansetron (ZOFRAN) 4 MG tablet Take 1-2 tablets by mouth Every 8 (Eight) Hours As Needed for Nausea or Vomiting.       General appearance: Chronically ill-appearing female, NAD, alert and cooperative, well groomed  HEENT: Normocephalic, atraumatic, PERRL, no masses or tenderness  COR:  RRR  Resp: Even and unlabored  Extremities: No obvious edema  Skin: warm, dry    Neurological:   MS: oriented x3, recent/remote memory intact, normal attention/concentration, language intact but some mild delay with response, no neglect  CN: visual acuity grossly normal, visual fields full, PERRL, EOMI, facial sensation equal, no facial droop, hearing symmetric, palate elevates symmetrically, shoulder shrug equal, tongue midline  Motor: 5/5 upper extremities, 4/5 lower extremities.  Reflexes: Toes downgoing  Sensory: light touch sensation intact in all 4 ext.  Coordination: Normal finger to nose test  Gait and station: Deferred she was complaining of worsening back pain  Rapid alternating movements: normal finger to thumb tap    Laboratory results:  Lab Results   Component Value Date    TSH 1.870 05/25/2021     Lab Results   Component Value Date    HGBA1C 5.6 03/16/2016     Lab Results   Component Value Date    VGTYXENI54 751 02/25/2021     Lab Results   Component Value Date    CHLPL 182 12/02/2019    CHLPL 171 05/30/2019    CHLPL 223 (H) 06/21/2018     Lab Results   Component Value Date    TRIG 188 (H) 12/02/2019    TRIG 206 (H) 05/30/2019    TRIG 205 (H) 06/21/2018     Lab Results   Component Value Date    HDL 44 12/02/2019    HDL 47 05/30/2019    HDL 56 06/21/2018     Lab Results   Component Value Date     (H) 12/02/2019    LDL 83 05/30/2019     (H) 06/21/2018     Lab Results   Component Value Date    WBC 6.93 06/03/2021    HGB 9.0 (L) 06/03/2021    HCT 27.1 (L) 06/03/2021    MCV 95.1 06/03/2021     06/03/2021     Lab Results   Component Value Date    GLUCOSE 111 (H) 06/03/2021    BUN 38 (H) 06/03/2021    CREATININE 2.06 (H) 06/03/2021    EGFRIFNONA 23 (L) 06/03/2021    EGFRIFAFRI 41 (L) 02/06/2019    BCR 18.4 06/03/2021    K 3.8 06/03/2021    CO2 24.3 06/03/2021    CALCIUM 8.5 (L) 06/03/2021    PROTENTOTREF 6.4 02/06/2019    ALBUMIN 3.60 06/03/2021    LABIL2 1.1 02/06/2019    AST 30  05/30/2021    ALT 15 05/30/2021     Lab Results   Component Value Date    PTT 32.1 01/09/2019     Lab Results   Component Value Date    INR 1.04 05/26/2021    INR 1.1 06/24/2019    INR 0.99 01/09/2019    PROTIME 13.4 05/26/2021    PROTIME 13.2 06/24/2019    PROTIME 12.9 01/09/2019     Brief Urine Lab Results  (Last result in the past 365 days)      Color   Clarity   Blood   Leuk Est   Nitrite   Protein   CREAT   Urine HCG        05/30/21 1618 Dark Yellow Clear Negative Small (1+) Negative >=300 mg/dL (3+)               Review and interpretation of imaging:  CT Head Without Contrast    Result Date: 6/3/2021  No evidence of acute infarction or hemorrhage. Further evaluation could be performed with MRI examination of brain as indicated. The above information was called to the patient's nurse at time of dictation. The patient's nurse is to immediately relay the information to the clinical service.    Radiation dose reduction techniques were utilized, including automated exposure control and exposure modulation based on body size.  This report was finalized on 6/3/2021 7:05 AM by Dr. Pieter Canas M.D.      XR Chest 1 View    Result Date: 6/2/2021  Cardiomegaly, with perhaps mild vascular congestion.  This report was finalized on 6/2/2021 11:34 PM by Dr. Frannie Jackson M.D.      XR Chest 1 View    Result Date: 5/30/2021   1. Cardiomegaly with perhaps mild vascular congestion. 2. Increasing bibasilar atelectasis.  This report was finalized on 5/30/2021 10:41 PM by Dr. Frannie Jackson M.D.      US Renal Bilateral    Result Date: 6/1/2021  No hydronephrosis or echogenic nephrolithiasis. Bilateral renal cysts.  Note: This exam does not assess the renal arteries for stenosis. If there is clinical suspicion for renal artery stenosis, angiographic imaging or nuclear captopril exam could be obtained.   This report was finalized on 6/1/2021 8:34 PM by Dr. Brad Prieto M.D.      Results for orders placed during the  hospital encounter of 05/25/21    Adult Transthoracic Echo Complete W/ Cont if Necessary Per Protocol    Interpretation Summary  · Calculated left ventricular EF = 72.2% Estimated left ventricular EF = 72% Estimated left ventricular EF was in agreement with the calculated left ventricular EF. Left ventricular systolic function is hyperdynamic (EF > 70%). Normal left ventricular cavity size noted. Left ventricular wall thickness is consistent with mild concentric hypertrophy. All left ventricular wall segments contract normally. Left ventricular diastolic function is consistent with (grade I) impaired relaxation.  · There is mild thickening of the aortic valve.  · Moderate mitral annular calcification is present.  · Trace tricuspid valve regurgitation is present. Estimated right ventricular systolic pressure from tricuspid regurgitation is normal (<35 mmHg). Calculated right ventricular systolic pressure from tricuspid regurgitation is 23 mmHg.  · There is a moderate (1-2cm) pericardial effusion. Doppler assessment showed mild respiratory variation with inspiration on the mitral inflow signal. However it was not conclusive. There is no significant diastolic collapse of the RV or left right atrium. The effusion is circumferential with slight predominance next to the right atrium  · Compared to prior study of 8/24/2020, the pericardial effusion slightly larger but overall does not appear to be hemodynamically significant by Doppler assessment at this point. Clinical correlation recommended      Impression/Assessment:  This is a 75-year-old female with past medical history of chronic low back pain s/p multiple back surgeries, migraines, HTN, CKD who presented to the hospital on 5/25/2021 with complaints of dizziness.  She was found to be bradycardic, resistant hypertension, and also had a UTI.  She was initially seen by our service due to complaints of headaches on 5/29 which were felt to be secondary to postconcussive  and significant medication rebound due to polypharmacy.  Recommendations were made to discontinue her Tegretol, tapering off of Effexor and trazodone, significantly reducing use of Excedrin, and starting riboflavin 400 mg daily for migraine preventative.    We were reconsulted on 6/2 due to the patient developing left leg numbness after getting up.  She was noted to have a SBP drop of 180-130.  RRT was called and she scored a 1 on the NIHSS for left leg numbness.  She underwent a stat CT head that revealed no acute intracranial abnormality.     MRI brain and L-spine were ordered due to concern for stroke versus radiculopathy. Both are still pending.  She did undergo a carotid duplex which shows bilateral plaque without significant stenosis. P2Y12 level was 197. She was on Plavix PTA.    Diagnosis: Acute left leg numbness-resolved, chronic low back pain, resistant hypertension    Plan:  She reports left leg numbness has remained resolved.  She has no new weakness of her lower extremity.  She reports she has baseline lower extremity weakness due to her previous back surgeries.  MRI brain and L-spine still pending.  Carotid duplex without significant stenosis, okay to normalize BP.  P2Y12 level shows she is not responding to Plavix, will defer to cardiology if need to switch antiplatelet therapy.  Will f/u once further imaging complete.    Case discussed with patient, , and Dr. Guadarrama, and he agrees with plan above.     REENA Ingram

## 2021-06-04 LAB
ACT BLD: 252 SECONDS (ref 82–152)
ALBUMIN SERPL-MCNC: 3.4 G/DL (ref 3.5–5.2)
ANION GAP SERPL CALCULATED.3IONS-SCNC: 11.3 MMOL/L (ref 5–15)
BUN SERPL-MCNC: 36 MG/DL (ref 8–23)
BUN/CREAT SERPL: 20 (ref 7–25)
CALCIUM SPEC-SCNC: 8.5 MG/DL (ref 8.6–10.5)
CHLORIDE SERPL-SCNC: 101 MMOL/L (ref 98–107)
CO2 SERPL-SCNC: 27.7 MMOL/L (ref 22–29)
CREAT SERPL-MCNC: 1.8 MG/DL (ref 0.57–1)
GFR SERPL CREATININE-BSD FRML MDRD: 27 ML/MIN/1.73
GLUCOSE SERPL-MCNC: 113 MG/DL (ref 65–99)
METANEPH FREE SERPL-MCNC: 50.1 PG/ML (ref 0–88)
NORMETANEPHRINE SERPL-MCNC: 843.8 PG/ML (ref 0–191.8)
PHOSPHATE SERPL-MCNC: 4.6 MG/DL (ref 2.5–4.5)
POTASSIUM SERPL-SCNC: 3.6 MMOL/L (ref 3.5–5.2)
SODIUM SERPL-SCNC: 140 MMOL/L (ref 136–145)

## 2021-06-04 PROCEDURE — C1887 CATHETER, GUIDING: HCPCS | Performed by: INTERNAL MEDICINE

## 2021-06-04 PROCEDURE — 99232 SBSQ HOSP IP/OBS MODERATE 35: CPT | Performed by: NURSE PRACTITIONER

## 2021-06-04 PROCEDURE — 25010000002 FUROSEMIDE PER 20 MG: Performed by: INTERNAL MEDICINE

## 2021-06-04 PROCEDURE — C1725 CATH, TRANSLUMIN NON-LASER: HCPCS | Performed by: INTERNAL MEDICINE

## 2021-06-04 PROCEDURE — 80069 RENAL FUNCTION PANEL: CPT | Performed by: INTERNAL MEDICINE

## 2021-06-04 PROCEDURE — 25010000002 HYDRALAZINE PER 20 MG: Performed by: INTERNAL MEDICINE

## 2021-06-04 PROCEDURE — 85347 COAGULATION TIME ACTIVATED: CPT

## 2021-06-04 PROCEDURE — 25010000002 MIDAZOLAM PER 1 MG: Performed by: INTERNAL MEDICINE

## 2021-06-04 PROCEDURE — B410YZZ FLUOROSCOPY OF ABDOMINAL AORTA USING OTHER CONTRAST: ICD-10-PCS | Performed by: INTERNAL MEDICINE

## 2021-06-04 PROCEDURE — 94799 UNLISTED PULMONARY SVC/PX: CPT

## 2021-06-04 PROCEDURE — C1876 STENT, NON-COA/NON-COV W/DEL: HCPCS | Performed by: INTERNAL MEDICINE

## 2021-06-04 PROCEDURE — C1769 GUIDE WIRE: HCPCS | Performed by: INTERNAL MEDICINE

## 2021-06-04 PROCEDURE — 99153 MOD SED SAME PHYS/QHP EA: CPT | Performed by: INTERNAL MEDICINE

## 2021-06-04 PROCEDURE — 25010000002 HEPARIN (PORCINE) PER 1000 UNITS: Performed by: INTERNAL MEDICINE

## 2021-06-04 PROCEDURE — 36252 INS CATH REN ART 1ST BILAT: CPT | Performed by: INTERNAL MEDICINE

## 2021-06-04 PROCEDURE — C1894 INTRO/SHEATH, NON-LASER: HCPCS | Performed by: INTERNAL MEDICINE

## 2021-06-04 PROCEDURE — B4181ZZ FLUOROSCOPY OF BILATERAL RENAL ARTERIES USING LOW OSMOLAR CONTRAST: ICD-10-PCS | Performed by: INTERNAL MEDICINE

## 2021-06-04 PROCEDURE — 25010000002 FENTANYL CITRATE (PF) 50 MCG/ML SOLUTION: Performed by: INTERNAL MEDICINE

## 2021-06-04 PROCEDURE — 0 IOPAMIDOL PER 1 ML: Performed by: INTERNAL MEDICINE

## 2021-06-04 PROCEDURE — C1760 CLOSURE DEV, VASC: HCPCS | Performed by: INTERNAL MEDICINE

## 2021-06-04 PROCEDURE — 37236 OPEN/PERQ PLACE STENT 1ST: CPT | Performed by: INTERNAL MEDICINE

## 2021-06-04 PROCEDURE — 99152 MOD SED SAME PHYS/QHP 5/>YRS: CPT | Performed by: INTERNAL MEDICINE

## 2021-06-04 PROCEDURE — 04793DZ DILATION OF RIGHT RENAL ARTERY WITH INTRALUMINAL DEVICE, PERCUTANEOUS APPROACH: ICD-10-PCS | Performed by: INTERNAL MEDICINE

## 2021-06-04 DEVICE — RX HERCULINK ELITE RENAL AND BILIARY STENT SYSTEM 6.0 MM X 18 MM X 135 CM
Type: IMPLANTABLE DEVICE | Status: FUNCTIONAL
Brand: HERCULINK ELITE

## 2021-06-04 RX ORDER — TEMAZEPAM 15 MG/1
15 CAPSULE ORAL NIGHTLY PRN
Status: DISCONTINUED | OUTPATIENT
Start: 2021-06-04 | End: 2021-06-05 | Stop reason: HOSPADM

## 2021-06-04 RX ORDER — MIDAZOLAM HYDROCHLORIDE 1 MG/ML
INJECTION INTRAMUSCULAR; INTRAVENOUS AS NEEDED
Status: DISCONTINUED | OUTPATIENT
Start: 2021-06-04 | End: 2021-06-04 | Stop reason: HOSPADM

## 2021-06-04 RX ORDER — ASPIRIN 325 MG
TABLET ORAL AS NEEDED
Status: DISCONTINUED | OUTPATIENT
Start: 2021-06-04 | End: 2021-06-04 | Stop reason: HOSPADM

## 2021-06-04 RX ORDER — HEPARIN SODIUM 1000 [USP'U]/ML
INJECTION, SOLUTION INTRAVENOUS; SUBCUTANEOUS AS NEEDED
Status: DISCONTINUED | OUTPATIENT
Start: 2021-06-04 | End: 2021-06-04 | Stop reason: HOSPADM

## 2021-06-04 RX ORDER — ONDANSETRON 2 MG/ML
4 INJECTION INTRAMUSCULAR; INTRAVENOUS EVERY 6 HOURS PRN
Status: DISCONTINUED | OUTPATIENT
Start: 2021-06-04 | End: 2021-06-05 | Stop reason: HOSPADM

## 2021-06-04 RX ORDER — FENTANYL CITRATE 50 UG/ML
INJECTION, SOLUTION INTRAMUSCULAR; INTRAVENOUS AS NEEDED
Status: DISCONTINUED | OUTPATIENT
Start: 2021-06-04 | End: 2021-06-04 | Stop reason: HOSPADM

## 2021-06-04 RX ORDER — AMOXICILLIN 250 MG
2 CAPSULE ORAL NIGHTLY
Status: DISCONTINUED | OUTPATIENT
Start: 2021-06-04 | End: 2021-06-05 | Stop reason: HOSPADM

## 2021-06-04 RX ORDER — SODIUM CHLORIDE 9 MG/ML
INJECTION, SOLUTION INTRAVENOUS CONTINUOUS PRN
Status: COMPLETED | OUTPATIENT
Start: 2021-06-04 | End: 2021-06-04

## 2021-06-04 RX ORDER — SODIUM CHLORIDE 9 MG/ML
100 INJECTION, SOLUTION INTRAVENOUS CONTINUOUS
Status: ACTIVE | OUTPATIENT
Start: 2021-06-04 | End: 2021-06-04

## 2021-06-04 RX ORDER — LIDOCAINE HYDROCHLORIDE 20 MG/ML
INJECTION, SOLUTION INFILTRATION; PERINEURAL AS NEEDED
Status: DISCONTINUED | OUTPATIENT
Start: 2021-06-04 | End: 2021-06-04 | Stop reason: HOSPADM

## 2021-06-04 RX ORDER — HYDRALAZINE HYDROCHLORIDE 20 MG/ML
INJECTION INTRAMUSCULAR; INTRAVENOUS AS NEEDED
Status: DISCONTINUED | OUTPATIENT
Start: 2021-06-04 | End: 2021-06-04 | Stop reason: HOSPADM

## 2021-06-04 RX ORDER — POLYETHYLENE GLYCOL 3350 17 G/17G
17 POWDER, FOR SOLUTION ORAL DAILY PRN
Status: DISCONTINUED | OUTPATIENT
Start: 2021-06-04 | End: 2021-06-05 | Stop reason: HOSPADM

## 2021-06-04 RX ORDER — ONDANSETRON 4 MG/1
4 TABLET, FILM COATED ORAL EVERY 6 HOURS PRN
Status: DISCONTINUED | OUTPATIENT
Start: 2021-06-04 | End: 2021-06-05 | Stop reason: HOSPADM

## 2021-06-04 RX ORDER — ACETAMINOPHEN 325 MG/1
650 TABLET ORAL EVERY 4 HOURS PRN
Status: DISCONTINUED | OUTPATIENT
Start: 2021-06-04 | End: 2021-06-05 | Stop reason: HOSPADM

## 2021-06-04 RX ADMIN — HYDRALAZINE HYDROCHLORIDE 100 MG: 50 TABLET, FILM COATED ORAL at 22:53

## 2021-06-04 RX ADMIN — CETIRIZINE HYDROCHLORIDE ALLERGY 5 MG: 10 TABLET ORAL at 07:42

## 2021-06-04 RX ADMIN — SODIUM CHLORIDE, PRESERVATIVE FREE 10 ML: 5 INJECTION INTRAVENOUS at 07:44

## 2021-06-04 RX ADMIN — Medication 400 MG: at 07:42

## 2021-06-04 RX ADMIN — PANTOPRAZOLE SODIUM 40 MG: 40 TABLET, DELAYED RELEASE ORAL at 05:35

## 2021-06-04 RX ADMIN — SODIUM CHLORIDE, PRESERVATIVE FREE 10 ML: 5 INJECTION INTRAVENOUS at 21:23

## 2021-06-04 RX ADMIN — OXYCODONE HYDROCHLORIDE 20 MG: 15 TABLET ORAL at 07:54

## 2021-06-04 RX ADMIN — HYDRALAZINE HYDROCHLORIDE 100 MG: 50 TABLET, FILM COATED ORAL at 15:59

## 2021-06-04 RX ADMIN — BUDESONIDE AND FORMOTEROL FUMARATE DIHYDRATE 2 PUFF: 160; 4.5 AEROSOL RESPIRATORY (INHALATION) at 08:37

## 2021-06-04 RX ADMIN — DOCUSATE SODIUM 50MG AND SENNOSIDES 8.6MG 2 TABLET: 8.6; 5 TABLET, FILM COATED ORAL at 21:15

## 2021-06-04 RX ADMIN — SERTRALINE 100 MG: 100 TABLET, FILM COATED ORAL at 07:43

## 2021-06-04 RX ADMIN — FAMOTIDINE 20 MG: 20 TABLET, FILM COATED ORAL at 07:41

## 2021-06-04 RX ADMIN — OXYCODONE HYDROCHLORIDE AND ACETAMINOPHEN 500 MG: 500 TABLET ORAL at 07:40

## 2021-06-04 RX ADMIN — ATORVASTATIN CALCIUM 40 MG: 20 TABLET, FILM COATED ORAL at 20:24

## 2021-06-04 RX ADMIN — BUDESONIDE AND FORMOTEROL FUMARATE DIHYDRATE 2 PUFF: 160; 4.5 AEROSOL RESPIRATORY (INHALATION) at 21:32

## 2021-06-04 RX ADMIN — CLOPIDOGREL 75 MG: 75 TABLET, FILM COATED ORAL at 10:14

## 2021-06-04 RX ADMIN — SUCRALFATE 1 G: 1 TABLET ORAL at 07:41

## 2021-06-04 RX ADMIN — TRAZODONE HYDROCHLORIDE 50 MG: 50 TABLET ORAL at 20:24

## 2021-06-04 RX ADMIN — PANTOPRAZOLE SODIUM 40 MG: 40 TABLET, DELAYED RELEASE ORAL at 07:40

## 2021-06-04 RX ADMIN — FOLIC ACID 1000 MCG: 1 TABLET ORAL at 07:43

## 2021-06-04 RX ADMIN — HYDRALAZINE HYDROCHLORIDE 10 MG: 20 INJECTION, SOLUTION INTRAMUSCULAR; INTRAVENOUS at 17:11

## 2021-06-04 RX ADMIN — TERAZOSIN HYDROCHLORIDE 5 MG: 5 CAPSULE ORAL at 20:24

## 2021-06-04 RX ADMIN — SODIUM CHLORIDE 100 ML/HR: 9 INJECTION, SOLUTION INTRAVENOUS at 17:19

## 2021-06-04 RX ADMIN — LORAZEPAM 0.5 MG: 0.5 TABLET ORAL at 21:15

## 2021-06-04 RX ADMIN — FUROSEMIDE 60 MG: 10 INJECTION, SOLUTION INTRAMUSCULAR; INTRAVENOUS at 02:22

## 2021-06-04 RX ADMIN — VENLAFAXINE HYDROCHLORIDE 150 MG: 150 CAPSULE, EXTENDED RELEASE ORAL at 07:42

## 2021-06-04 RX ADMIN — SUCRALFATE 1 G: 1 TABLET ORAL at 15:59

## 2021-06-04 RX ADMIN — CARVEDILOL 25 MG: 25 TABLET, FILM COATED ORAL at 17:10

## 2021-06-04 RX ADMIN — LORAZEPAM 0.5 MG: 0.5 TABLET ORAL at 07:54

## 2021-06-04 RX ADMIN — SODIUM BICARBONATE 1300 MG: 650 TABLET ORAL at 20:24

## 2021-06-04 RX ADMIN — CARVEDILOL 25 MG: 25 TABLET, FILM COATED ORAL at 07:41

## 2021-06-04 RX ADMIN — SODIUM BICARBONATE 1300 MG: 650 TABLET ORAL at 07:41

## 2021-06-04 RX ADMIN — SODIUM BICARBONATE 1300 MG: 650 TABLET ORAL at 15:59

## 2021-06-04 RX ADMIN — AMLODIPINE BESYLATE 10 MG: 10 TABLET ORAL at 07:44

## 2021-06-04 RX ADMIN — HYDRALAZINE HYDROCHLORIDE 100 MG: 50 TABLET, FILM COATED ORAL at 05:35

## 2021-06-04 RX ADMIN — OXYCODONE HYDROCHLORIDE 20 MG: 15 TABLET ORAL at 19:18

## 2021-06-04 RX ADMIN — OXYCODONE HYDROCHLORIDE 20 MG: 15 TABLET ORAL at 00:45

## 2021-06-04 NOTE — PROGRESS NOTES
Nephrology Daily Progress Note    Assessment/Plan       Hypertension    Chronic pain syndrome    Gastroesophageal reflux disease    Stage 3b chronic kidney disease (CMS/HCC)    Malignant neoplasm of right upper lobe of lung (CMS/HCC)    YVONNE (acute kidney injury) (CMS/HCC)    Opioid dependence (CMS/HCC)    COPD (chronic obstructive pulmonary disease) (CMS/[facility])       LOS: 9 days     Rachana Arguelles is a 75 y.o. female who was admitted with Hypertension. She reports his symptoms are improving with treatment.     Subjective     Interval History: Rachana Arguelles     Patient underwent abdominal aortic angiography with selective bilateral renal angiography with subsequent right renal stent placement  Currently on gentle hydration    Patient  without any chest pain palpitation or diaphoresis    Medication side effects: none    Current Facility-Administered Medications   Medication Dose Route Frequency Provider Last Rate Last Admin   • acetaminophen (TYLENOL) tablet 650 mg  650 mg Oral Q4H PRN Mat Coello MD   650 mg at 05/31/21 1839   • acetaminophen (TYLENOL) tablet 650 mg  650 mg Oral Q4H PRN Mat Coello MD       • amLODIPine (NORVASC) tablet 10 mg  10 mg Oral Q24H Mat Coello MD   10 mg at 06/04/21 0744   • ascorbic acid (VITAMIN C) tablet 500 mg  500 mg Oral Daily Mat Coello MD   500 mg at 06/04/21 0740   • atorvastatin (LIPITOR) tablet 40 mg  40 mg Oral Nightly aMt Coello MD   40 mg at 06/03/21 2155   • budesonide-formoterol (SYMBICORT) 160-4.5 MCG/ACT inhaler 2 puff  2 puff Inhalation BID - RT Mat Coello MD   2 puff at 06/04/21 0837   • carvedilol (COREG) tablet 25 mg  25 mg Oral BID With Meals Mat Coello MD   25 mg at 06/04/21 0741   • cetirizine (zyrTEC) tablet 5 mg  5 mg Oral Daily Mat Coello MD   5 mg at 06/04/21 0742   • clopidogrel (PLAVIX) tablet 75 mg  75 mg Oral Daily Mat Coello MD   75 mg at 06/04/21 1014   •  cyclobenzaprine (FLEXERIL) tablet 5 mg  5 mg Oral TID PRN Mat Coello MD   5 mg at 06/03/21 0000   • famotidine (PEPCID) tablet 20 mg  20 mg Oral Daily Mat Coello MD   20 mg at 06/04/21 0741   • folic acid (FOLVITE) tablet 1,000 mcg  1,000 mcg Oral Daily Mat Coello MD   1,000 mcg at 06/04/21 0743   • hydrALAZINE (APRESOLINE) injection 10 mg  10 mg Intravenous Q6H PRN Mat Coello MD   10 mg at 06/02/21 1914   • hydrALAZINE (APRESOLINE) tablet 100 mg  100 mg Oral Q8H Mat Coello MD   100 mg at 06/04/21 1559   • LORazepam (ATIVAN) tablet 0.5 mg  0.5 mg Oral Q8H PRN Mat Coello MD   0.5 mg at 06/04/21 0754   • niCARdipine (CARDENE) 25 mg in 250 mL NS infusion kit  2.5 mg/hr Intravenous Titrated Mat Coello MD   Stopped at 05/31/21 1123   • nitroglycerin (NITROSTAT) SL tablet 0.4 mg  0.4 mg Sublingual Q5 Min PRN Mat Coello MD       • ondansetron (ZOFRAN) injection 4 mg  4 mg Intravenous Q6H PRN Mat Coello MD   4 mg at 06/02/21 1334   • ondansetron (ZOFRAN) tablet 4 mg  4 mg Oral Q6H PRN Mat Coello MD        Or   • ondansetron (ZOFRAN) injection 4 mg  4 mg Intravenous Q6H PRN Mat Coello MD       • oxyCODONE (ROXICODONE) immediate release tablet 20 mg  20 mg Oral Q6H PRN Mat Coello MD   20 mg at 06/04/21 0754   • pantoprazole (PROTONIX) EC tablet 40 mg  40 mg Oral QAM Mat Coello MD   40 mg at 06/04/21 0740   • polyethylene glycol (MIRALAX) packet 17 g  17 g Oral Daily PRN Mat Coello MD       • sennosides-docusate (PERICOLACE) 8.6-50 MG per tablet 2 tablet  2 tablet Oral Nightly Mat Coello MD       • sertraline (ZOLOFT) tablet 100 mg  100 mg Oral Daily Mat Coello MD   100 mg at 06/04/21 0743   • sodium bicarbonate tablet 1,300 mg  1,300 mg Oral TID Mat Coello MD   1,300 mg at 06/04/21 1559   • sodium chloride 0.9 % flush 10 mL   "10 mL Intravenous Q12H Mat Coello MD   10 mL at 06/04/21 0744   • sodium chloride 0.9 % flush 10 mL  10 mL Intravenous PRN Mat Coello MD       • sodium chloride 0.9 % infusion  100 mL/hr Intravenous Continuous Mat Coello MD       • sucralfate (CARAFATE) tablet 1 g  1 g Oral BID AC Mat Coello MD   1 g at 06/04/21 1559   • temazepam (RESTORIL) capsule 15 mg  15 mg Oral Nightly PRN Mat Coello MD       • terazosin (HYTRIN) capsule 5 mg  5 mg Oral Nightly Mat Coello MD   5 mg at 06/03/21 2340   • traZODone (DESYREL) tablet 50 mg  50 mg Oral Nightly Mat Coello MD   50 mg at 06/03/21 2340   • venlafaxine XR (EFFEXOR-XR) 24 hr capsule 150 mg  150 mg Oral Daily Mat Coello MD   150 mg at 06/04/21 0742   • Vitamin B-2 (RIBOFLAVIN) tablet 400 mg  400 mg Oral Daily Mat Coello MD   400 mg at 06/04/21 0742       Objective     Vital signs in last 24 hours:  Temp:  [98 °F (36.7 °C)-98.4 °F (36.9 °C)] 98 °F (36.7 °C)  Heart Rate:  [70-90] 70  Resp:  [9-20] 18  BP: (154-219)/() 154/84    Intake/Output last 3 shifts:  I/O last 3 completed shifts:  In: 360 [P.O.:360]  Out: 2550 [Urine:2550]    Physical exam  /84 (BP Location: Right arm, Patient Position: Lying)   Pulse 70   Temp 98 °F (36.7 °C) (Oral)   Resp 18   Ht 170.2 cm (67\")   Wt 84.4 kg (186 lb)   SpO2 (!) 89%   BMI 29.13 kg/m²     General Appearance: alert, oriented x 3, no acute distress   Skin: warm and dry  HEENT: oral mucosa normal, nonicteric sclera  Neck: supple, no JVD  Lungs: CTA  Heart: RRR, normal S1 and S2  Abdomen: soft, nontender, nondistended  : no palpable bladder  Extremities: , cyanosis or clubbing.  Edema present   Neuro: Weak and alert and answers questions appropriately    Labs:  Results from last 7 days   Lab Units 06/03/21  0331   WBC 10*3/mm3 6.93   HEMOGLOBIN g/dL 9.0*   HEMATOCRIT % 27.1*     Results from last 7 days   Lab " Units 06/04/21  0321 06/01/21  0451 05/31/21  0402   SODIUM mmol/L 140  --  139   POTASSIUM mmol/L 3.6  --  4.3   CHLORIDE mmol/L 101  --  110*   CO2 mmol/L 27.7  --  17.8*   BUN mg/dL 36*  --  32*   PHOSPHORUS mg/dL 4.6*   < > 4.6*   MAGNESIUM mg/dL  --   --  2.3    < > = values in this interval not displayed.       Impression and plan    This is Kindra is a 75-year-old female with history of abdominal aortic aneurysm, anxiety disorder, osteoarthritis, history of skin cancer, chronic back pain with chronic pain syndrome on multiple analgesia medications, chronic kidney disease stage III, history of claustrophobia, chronic obstructive pulmonary disease, depression disorder, gastroesophageal reflux disease, hypertension, dyslipidemia, history of lung cancer, migraines, history of pulmonary embolism in 2013 secondary to estrogen use. patient admitted for syncope    Acute on chronic kidney disease stage IIIb  Results from last 7 days   Lab Units 06/04/21  0321 06/03/21  0331 06/02/21  0324   CREATININE mg/dL 1.80* 2.06* 2.16*   -Patient with long history of hypertension likely underlying hypertensive nephrosclerosis  -Acute event secondary to hemodynamic changes after syncope  -  Urinalysis and UPCR bland  - Images studies ; Renal US / CT abdomen   - Avoid nephrotoxins or IV contrast exposure , if need consider alternative images studies if possible   - Keep MAP > 65   - Strick I/O    Bradycardia secondary to AV block  -As per cardiology management  -Secondary to combination of clonidine carvedilol    Chronic normocytic anemia  -Multifactorial from chronic illness, chronic kidney disease nutrition  -No evidence of active bleeding  - We will continue to follow H&H closely     Resistant Hypertension  -Currently on amlodipine, carvedilol, hydralazine, and Terazosin  -Patient underwent abdominal aortic angiography with selective bilateral renal angiography with subsequent right renal stent placement  - We will continue to  follow closely  - Heart healthy diet     PLAN   -Follow renal function closely  -We will follow blood pressure aortic angiography  after right stent placement  -Currently on  gentle hydration after angiography  -Avoid nephrotoxins  -Adjust medications to GFR  -Thank you for allowing me to participate from this patient care        Vincent Dutta MD, MD

## 2021-06-04 NOTE — PROGRESS NOTES
" LOS: 9 days     Name: Rachana Arguelles  Age: 75 y.o.  Sex: female  :  1945  MRN: [id number]         Primary Care Physician: Rhys Crowder Jr., MD    Subjective   Subjective  No new complaints morning.  No acute overnight events side from blood pressure running in the 200s systolic.  Better down to the 150s today.  Going for renal angiogram today.    Objective   Vital Signs  Temp:  [98 °F (36.7 °C)-98.7 °F (37.1 °C)] 98 °F (36.7 °C)  Heart Rate:  [70-90] 70  Resp:  [16-18] 17  BP: (154-219)/() 154/84  Body mass index is 29.13 kg/m².    Objective:  General Appearance:  Comfortable and in no acute distress.    Vital signs: (most recent): Blood pressure 154/84, pulse 70, temperature 98 °F (36.7 °C), temperature source Oral, resp. rate 17, height 170.2 cm (67\"), weight 84.4 kg (186 lb), SpO2 93 %, not currently breastfeeding.    Lungs:  Normal effort and normal respiratory rate.    Heart: Normal rate.  Regular rhythm.    Abdomen: Abdomen is soft.  Bowel sounds are normal.   There is no abdominal tenderness.     Extremities: There is no dependent edema or local swelling.    Neurological: Patient is alert and oriented to person, place and time.    Skin:  Warm and dry.              Results Review:       I reviewed the patient's new clinical results.    Results from last 7 days   Lab Units 21  03321  220   WBC 10*3/mm3 6.93 11.12*   HEMOGLOBIN g/dL 9.0* 9.4*   PLATELETS 10*3/mm3 218 204     Results from last 7 days   Lab Units 21  0321 21  0331 21  0324 21  0451 21  0402 21  2206 21  0517   SODIUM mmol/L 140 142 139 135* 139 138 139   POTASSIUM mmol/L 3.6 3.8 4.1 4.0 4.3 4.6 4.6   CHLORIDE mmol/L 101 104 106 105 110* 110* 111*   CO2 mmol/L 27.7 24.3 19.7* 19.1* 17.8* 16.5* 15.5*   BUN mg/dL 36* 38* 34* 34* 32* 31* 27*   CREATININE mg/dL 1.80* 2.06* 2.16* 2.55* 2.69* 2.76* 2.42*   CALCIUM mg/dL 8.5* 8.5* 8.4* 8.5* 8.5* 8.7 8.6   GLUCOSE mg/dL 113* 111* 101* " 98 111* 102* 110*                 Scheduled Meds:   amLODIPine, 10 mg, Oral, Q24H  [MAR Hold] ascorbic acid, 500 mg, Oral, Daily  [MAR Hold] atorvastatin, 40 mg, Oral, Nightly  [MAR Hold] budesonide-formoterol, 2 puff, Inhalation, BID - RT  carvedilol, 25 mg, Oral, BID With Meals  [MAR Hold] cetirizine, 5 mg, Oral, Daily  [MAR Hold] clopidogrel, 75 mg, Oral, Daily  famotidine, 20 mg, Oral, Daily  [MAR Hold] folic acid, 1,000 mcg, Oral, Daily  hydrALAZINE, 100 mg, Oral, Q8H  [MAR Hold] pantoprazole, 40 mg, Oral, QAM  [MAR Hold] senna-docusate sodium, 2 tablet, Oral, Nightly  [MAR Hold] sertraline, 100 mg, Oral, Daily  [MAR Hold] sodium bicarbonate, 1,300 mg, Oral, TID  [MAR Hold] sodium chloride, 10 mL, Intravenous, Q12H  [MAR Hold] sucralfate, 1 g, Oral, BID AC  terazosin, 5 mg, Oral, Nightly  [MAR Hold] traZODone, 50 mg, Oral, Nightly  [MAR Hold] venlafaxine XR, 150 mg, Oral, Daily  [MAR Hold] Vitamin B-2, 400 mg, Oral, Daily      PRN Meds:   •  [MAR Hold] acetaminophen **OR** [DISCONTINUED] acetaminophen **OR** [DISCONTINUED] acetaminophen  •  [MAR Hold] cyclobenzaprine  •  hydrALAZINE  •  [MAR Hold] LORazepam  •  [MAR Hold] nitroglycerin  •  [MAR Hold] ondansetron  •  [MAR Hold] oxyCODONE  •  [MAR Hold] polyethylene glycol  •  [MAR Hold] sodium chloride  Continuous Infusions:  niCARdipine, 2.5 mg/hr, Last Rate: Stopped (05/31/21 1123)        Assessment/Plan   Active Hospital Problems    Diagnosis  POA   • **Hypertension [I10]  Yes   • COPD (chronic obstructive pulmonary disease) (CMS/HCC) [J44.9]  Yes   • YVONNE (acute kidney injury) (CMS/HCC) [N17.9]  Yes   • Opioid dependence (CMS/HCC) [F11.20]  Yes   • Malignant neoplasm of right upper lobe of lung (CMS/HCC) [C34.11]  Yes   • Stage 3b chronic kidney disease (CMS/HCC) [N18.32]  Yes   • Chronic pain syndrome [G89.4]  Yes   • Gastroesophageal reflux disease [K21.9]  Yes      Resolved Hospital Problems    Diagnosis Date Resolved POA   • Bradycardia [R00.1] 05/30/2021  Yes   • UTI (urinary tract infection) due to urinary indwelling catheter (CMS/[facility]) [T83.511A, N39.0] 05/30/2021 Yes       Assessment & Plan    -Going for renal angiogram and possible angioplasty/stenting today  -Nephrology managing diuresis; IV lasix has been discontinued. Baseline Cr 1.6-1.7.   -Anemia: Hemoglobin stable  -COPD: Sats stable on room air. Continue inhalers.   -Neurology now following given acute numbness of the left leg.    Brain MRI negative.  There is some disc extrusion in the lumbar spine.  Symptoms improved today and this can be followed outpatient.  -Audiology to reevaluate antiplatelets given P2Y12 level     · SCDs for DVT prophylaxis.  · Full code.  · Discussed with patient, family and nursing staff.  · Anticipate discharge TBD       I wore full protective equipment throughout the patient encounter including eye protection and facemask.  Hand hygiene was performed before donning protective equipment and after removal when leaving the room.    Stefan Mancilla MD  [facility]  06/04/21  12:36 EDT

## 2021-06-04 NOTE — PROGRESS NOTES
DOS: 2021  NAME: Rachana Arguelles   : 1945  PCP: Rhys Crowder Jr., MD    Chief Complaint   Patient presents with   • Low Heart Rate        Stroke    Subjective: No acute events overnight.  She states she will have surgery for a renal stent placement today.  She is still not having any worsening numbness or tingling or weakness in any of her extremities.  Again remains with her chronic back pain and generalized lower extremity weakness.   at bedside.    Objective:  Vital signs:      Vitals:    21 1344 21 1349 21 1353 21 1358   BP:       BP Location:       Patient Position:       Pulse:       Resp: 10 18 16 18   Temp:       TempSrc:       SpO2:       Weight:       Height:           Current Facility-Administered Medications:   •  [MAR Hold] acetaminophen (TYLENOL) tablet 650 mg, 650 mg, Oral, Q4H PRN, 650 mg at 21 1839 **OR** [DISCONTINUED] acetaminophen (TYLENOL) 160 MG/5ML solution 650 mg, 650 mg, Oral, Q4H PRN **OR** [DISCONTINUED] acetaminophen (TYLENOL) suppository 650 mg, 650 mg, Rectal, Q4H PRN, Ericka Shaw, APRN  •  amLODIPine (NORVASC) tablet 10 mg, 10 mg, Oral, Q24H, Mat Coello MD, 10 mg at 21 0744  •  [MAR Hold] ascorbic acid (VITAMIN C) tablet 500 mg, 500 mg, Oral, Daily, Jerman Lobo MD, 500 mg at 21 0740  •  [MAR Hold] atorvastatin (LIPITOR) tablet 40 mg, 40 mg, Oral, Nightly, Jerman Lobo MD, 40 mg at 21 2155  •  [MAR Hold] budesonide-formoterol (SYMBICORT) 160-4.5 MCG/ACT inhaler 2 puff, 2 puff, Inhalation, BID - RT, Jerman Lobo MD, 2 puff at 21 0837  •  carvedilol (COREG) tablet 25 mg, 25 mg, Oral, BID With Meals, Meghann Win MD, 25 mg at 06/04/21 0741  •  [MAR Hold] cetirizine (zyrTEC) tablet 5 mg, 5 mg, Oral, Daily, Jerman Lobo MD, 5 mg at 21 0742  •  [MAR Hold] clopidogrel (PLAVIX) tablet 75 mg, 75 mg, Oral, Daily, Jerman Lobo MD, 75 mg at 21 1014  •  [MAR Hold] cyclobenzaprine  (FLEXERIL) tablet 5 mg, 5 mg, Oral, TID PRN, Jerman Lobo MD, 5 mg at 06/03/21 0000  •  famotidine (PEPCID) tablet 20 mg, 20 mg, Oral, Daily, Jerman Lobo MD, 20 mg at 06/04/21 0741  •  fentaNYL citrate (PF) (SUBLIMAZE) injection, , , PRN, Mat Coello MD, 50 mcg at 06/04/21 1347  •  [MAR Hold] folic acid (FOLVITE) tablet 1,000 mcg, 1,000 mcg, Oral, Daily, Jerman Loob MD, 1,000 mcg at 06/04/21 0743  •  heparin (porcine) injection, , , PRN, Mat Coello MD, 3,000 Units at 06/04/21 1346  •  hydrALAZINE (APRESOLINE) injection 10 mg, 10 mg, Intravenous, Q6H PRN, Leyla Knight, APRN, 10 mg at 06/02/21 1914  •  hydrALAZINE (APRESOLINE) injection, , , PRN, Mat Coello MD, 20 mg at 06/04/21 1353  •  hydrALAZINE (APRESOLINE) tablet 100 mg, 100 mg, Oral, Q8H, Mireya Palmer, APRN, 100 mg at 06/04/21 0535  •  iopamidol (ISOVUE-250) 51 % injection, , , PRN, Mat Coello MD, 368 mL at 06/04/21 1401  •  lidocaine (XYLOCAINE) 2% injection, , , PRN, Mat Coello MD, 10 mL at 06/04/21 1246  •  [MAR Hold] LORazepam (ATIVAN) tablet 0.5 mg, 0.5 mg, Oral, Q8H PRN, Jerman Lobo MD, 0.5 mg at 06/04/21 0754  •  midazolam (VERSED) injection, , , PRN, Mat Coello MD, 1 mg at 06/04/21 1318  •  niCARdipine (CARDENE) 25 mg in 250 mL NS infusion kit, 2.5 mg/hr, Intravenous, Titrated, Chuckie, Flowers Nasri, MD, Stopped at 05/31/21 1123  •  nitroglycerin (NITROSTAT) ointment, , , PRN, Mat Coello MD, 1 inch at 06/04/21 1355  •  [MAR Hold] nitroglycerin (NITROSTAT) SL tablet 0.4 mg, 0.4 mg, Sublingual, Q5 Min PRN, Ericka Shaw, APRN  •  [MAR Hold] ondansetron (ZOFRAN) injection 4 mg, 4 mg, Intravenous, Q6H PRN, Ericka Shaw, APRN, 4 mg at 06/02/21 1334  •  [MAR Hold] oxyCODONE (ROXICODONE) immediate release tablet 20 mg, 20 mg, Oral, Q6H PRN, Stefan Mancilla MD, 20 mg at 06/04/21 0754  •  [MAR Hold] pantoprazole (PROTONIX) EC tablet 40  mg, 40 mg, Oral, QAM, Jerman Lobo MD, 40 mg at 06/04/21 0740  •  [MAR Hold] polyethylene glycol (MIRALAX) packet 17 g, 17 g, Oral, Daily PRN, Stefan Mancilla MD  •  [MAR Hold] sennosides-docusate (PERICOLACE) 8.6-50 MG per tablet 2 tablet, 2 tablet, Oral, Nightly, Stefan Mancilla MD  •  [MAR Hold] sertraline (ZOLOFT) tablet 100 mg, 100 mg, Oral, Daily, Jerman Lobo MD, 100 mg at 06/04/21 0743  •  [MAR Hold] sodium bicarbonate tablet 1,300 mg, 1,300 mg, Oral, TID, Vincent Dutta MD, 1,300 mg at 06/04/21 0741  •  [MAR Hold] sodium chloride 0.9 % flush 10 mL, 10 mL, Intravenous, Q12H, Ericka Shaw, APRN, 10 mL at 06/04/21 0744  •  [MAR Hold] sodium chloride 0.9 % flush 10 mL, 10 mL, Intravenous, PRN, Ericka Shaw, APRN  •  [MAR Hold] sucralfate (CARAFATE) tablet 1 g, 1 g, Oral, BID AC, Jerman Lobo MD, 1 g at 06/04/21 0741  •  terazosin (HYTRIN) capsule 5 mg, 5 mg, Oral, Nightly, Mireya Palmer, APRN, 5 mg at 06/03/21 2340  •  [MAR Hold] traZODone (DESYREL) tablet 50 mg, 50 mg, Oral, Nightly, Jerman Lobo MD, 50 mg at 06/03/21 2340  •  [MAR Hold] venlafaxine XR (EFFEXOR-XR) 24 hr capsule 150 mg, 150 mg, Oral, Daily, Jerman Lobo MD, 150 mg at 06/04/21 0742  •  [MAR Hold] Vitamin B-2 (RIBOFLAVIN) tablet 400 mg, 400 mg, Oral, Daily, César Deluna MD, 400 mg at 06/04/21 0742    PRN meds  •  [MAR Hold] acetaminophen **OR** [DISCONTINUED] acetaminophen **OR** [DISCONTINUED] acetaminophen  •  [MAR Hold] cyclobenzaprine  •  fentaNYL citrate (PF)  •  heparin (porcine)  •  hydrALAZINE  •  hydrALAZINE  •  iopamidol  •  lidocaine  •  [MAR Hold] LORazepam  •  midazolam  •  nitroglycerin  •  [MAR Hold] nitroglycerin  •  [MAR Hold] ondansetron  •  [MAR Hold] oxyCODONE  •  [MAR Hold] polyethylene glycol  •  [MAR Hold] sodium chloride    No current facility-administered medications on file prior to encounter.     Current Outpatient Medications on File Prior to  Encounter   Medication Sig   • amLODIPine (NORVASC) 10 MG tablet Take 10 mg by mouth Daily. Take with 5mg tablet to make 15mg dose   • amLODIPine (NORVASC) 5 MG tablet Take 5 mg by mouth Daily. Take with 10mg tab to make 15mg dose.   • ascorbic acid (VITAMIN C) 500 MG tablet Take 500 mg by mouth Daily.   • atorvastatin (LIPITOR) 40 MG tablet Take 40 mg by mouth Every Night.   • carBAMazepine (TEGretol) 200 MG tablet Take 100 mg by mouth 2 (Two) Times a Day.   • carvedilol (COREG) 25 MG tablet TAKE 1 TABLET BY MOUTH TWICE A DAY (Patient taking differently: Take 12.5 mg by mouth 2 (Two) Times a Day With Meals.)   • cloNIDine (CATAPRES) 0.3 MG tablet Take 0.3 mg by mouth 2 (Two) Times a Day.   • clopidogrel (PLAVIX) 75 MG tablet Take 75 mg by mouth Daily.   • cyclobenzaprine (FLEXERIL) 10 MG tablet Take 10 mg by mouth 3 (Three) Times a Day As Needed.   • docusate sodium (COLACE) 100 MG capsule Take 100 mg by mouth 2 (Two) Times a Day As Needed for Constipation.   • famotidine (PEPCID) 40 MG tablet Take 40 mg by mouth 2 (Two) Times a Day.   • fexofenadine (Allegra Allergy) 180 MG tablet Take 1 tablet by mouth Daily As Needed (allergies).   • fluticasone-salmeterol (Advair Diskus) 250-50 MCG/DOSE DISKUS Inhale 2 puffs 2 (Two) Times a Day.   • folic acid (FOLVITE) 1 MG tablet TAKE 1 TABLET BY MOUTH EVERY DAY (Patient taking differently: Take 1 mg by mouth Daily.)   • LORazepam (ATIVAN) 0.5 MG tablet TAKE 1 TABLET BY MOUTH EVERY 8 (EIGHT) HOURS AS NEEDED FOR ANXIETY.   • losartan (COZAAR) 25 MG tablet Take 50 mg by mouth Daily.   • ondansetron (ZOFRAN) 4 MG tablet Take 1-2 tablets by mouth Every 8 (Eight) Hours As Needed for Nausea or Vomiting.   • oxyCODONE (ROXICODONE) 20 MG tablet Take 20 mg by mouth Every 4 (Four) Hours As Needed. No more than 5 per day   • polyethylene glycol (MiraLax) 17 g packet Take 17 g by mouth Daily.   • sertraline (Zoloft) 100 MG tablet Take 1 tablet by mouth Daily.   • sucralfate (CARAFATE) 1  g tablet Take 1 tablet by mouth 2 (two) times a day.   • torsemide (DEMADEX) 20 MG tablet Take 40 mg by mouth Daily.   • traZODone (DESYREL) 150 MG tablet TAKE 0.5 TABLETS BY MOUTH EVERY NIGHT (Patient taking differently: Take 75 mg by mouth Every Night.)   • venlafaxine XR (EFFEXOR-XR) 150 MG 24 hr capsule TAKE 1 CAPSULE BY MOUTH DAILY. SWALLOW WHOLE DO NOT CRUSH OR CHEW. (Patient taking differently: Take 150 mg by mouth Daily.)   • vitamin D (ERGOCALCIFEROL) 1.25 MG (51419 UT) capsule capsule Take 50,000 Units by mouth Every 7 (Seven) Days. Takes on Wednesdays   • [DISCONTINUED] ondansetron (ZOFRAN) 4 MG tablet Take 1-2 tablets by mouth Every 8 (Eight) Hours As Needed for Nausea or Vomiting.       General appearance: Chronically ill-appearing female, NAD, alert and cooperative, well groomed  HEENT: Normocephalic, atraumatic, PERRL, no masses or tenderness  COR: RRR  Resp: Even and unlabored  Extremities: No obvious edema  Skin: warm, dry     Neurological:   MS: oriented x3, recent/remote memory intact, normal attention/concentration, language intact but some mild delay with response, no neglect  CN: visual acuity grossly normal, visual fields full, PERRL, EOMI, facial sensation equal, no facial droop, hearing symmetric, palate elevates symmetrically, shoulder shrug equal, tongue midline  Motor: 5/5 upper extremities, 4/5 lower extremities.  Reflexes: Toes downgoing  Sensory: light touch sensation intact in all 4 ext.  Coordination: Normal finger to nose test  Rapid alternating movements: normal finger to thumb tap    Physical exam performed, changes noted.    Laboratory results:  Lab Results   Component Value Date    TSH 1.870 05/25/2021     Lab Results   Component Value Date    HGBA1C 5.6 03/16/2016     Lab Results   Component Value Date    QCQLGQXQ91 751 02/25/2021     Lab Results   Component Value Date    CHLPL 182 12/02/2019    CHLPL 171 05/30/2019    CHLPL 223 (H) 06/21/2018     Lab Results   Component Value  Date    TRIG 188 (H) 12/02/2019    TRIG 206 (H) 05/30/2019    TRIG 205 (H) 06/21/2018     Lab Results   Component Value Date    HDL 44 12/02/2019    HDL 47 05/30/2019    HDL 56 06/21/2018     Lab Results   Component Value Date     (H) 12/02/2019    LDL 83 05/30/2019     (H) 06/21/2018     Lab Results   Component Value Date    WBC 6.93 06/03/2021    HGB 9.0 (L) 06/03/2021    HCT 27.1 (L) 06/03/2021    MCV 95.1 06/03/2021     06/03/2021     Lab Results   Component Value Date    GLUCOSE 113 (H) 06/04/2021    BUN 36 (H) 06/04/2021    CREATININE 1.80 (H) 06/04/2021    EGFRIFNONA 27 (L) 06/04/2021    EGFRIFAFRI 41 (L) 02/06/2019    BCR 20.0 06/04/2021    K 3.6 06/04/2021    CO2 27.7 06/04/2021    CALCIUM 8.5 (L) 06/04/2021    PROTENTOTREF 6.4 02/06/2019    ALBUMIN 3.40 (L) 06/04/2021    LABIL2 1.1 02/06/2019    AST 30 05/30/2021    ALT 15 05/30/2021     Lab Results   Component Value Date    PTT 32.1 01/09/2019     Lab Results   Component Value Date    INR 1.04 05/26/2021    INR 1.1 06/24/2019    INR 0.99 01/09/2019    PROTIME 13.4 05/26/2021    PROTIME 13.2 06/24/2019    PROTIME 12.9 01/09/2019     Brief Urine Lab Results  (Last result in the past 365 days)      Color   Clarity   Blood   Leuk Est   Nitrite   Protein   CREAT   Urine HCG        05/30/21 1618 Dark Yellow Clear Negative Small (1+) Negative >=300 mg/dL (3+)               Review and interpretation of imaging:  CT Head Without Contrast    Result Date: 6/3/2021  No evidence of acute infarction or hemorrhage. Further evaluation could be performed with MRI examination of brain as indicated. The above information was called to the patient's nurse at time of dictation. The patient's nurse is to immediately relay the information to the clinical service.    Radiation dose reduction techniques were utilized, including automated exposure control and exposure modulation based on body size.  This report was finalized on 6/3/2021 7:05 AM by Dr. Stapleton  NATANAEL Canas      MRI Brain Without Contrast    Result Date: 6/3/2021  Unremarkable MRI of the brain without contrast.  This report was finalized on 6/3/2021 8:24 PM by Dr. Abdirahman Francisco M.D.      MRI Lumbar Spine Without Contrast    Result Date: 6/3/2021   Multilevel lumbar spondyloarthropathy, as detailed above with disc extrusion on the left at L2/3.  This report was finalized on 6/3/2021 8:39 PM by Dr. Brad Prieto M.D.      XR Chest 1 View    Result Date: 6/2/2021  Cardiomegaly, with perhaps mild vascular congestion.  This report was finalized on 6/2/2021 11:34 PM by Dr. Frannie Jackson M.D.      XR Chest 1 View    Result Date: 5/30/2021   1. Cardiomegaly with perhaps mild vascular congestion. 2. Increasing bibasilar atelectasis.  This report was finalized on 5/30/2021 10:41 PM by Dr. Frannie Jackson M.D.      US Renal Bilateral    Result Date: 6/1/2021  No hydronephrosis or echogenic nephrolithiasis. Bilateral renal cysts.  Note: This exam does not assess the renal arteries for stenosis. If there is clinical suspicion for renal artery stenosis, angiographic imaging or nuclear captopril exam could be obtained.   This report was finalized on 6/1/2021 8:34 PM by Dr. Brad Prieto M.D.      Results for orders placed during the hospital encounter of 05/25/21    Adult Transthoracic Echo Complete W/ Cont if Necessary Per Protocol    Interpretation Summary  · Calculated left ventricular EF = 72.2% Estimated left ventricular EF = 72% Estimated left ventricular EF was in agreement with the calculated left ventricular EF. Left ventricular systolic function is hyperdynamic (EF > 70%). Normal left ventricular cavity size noted. Left ventricular wall thickness is consistent with mild concentric hypertrophy. All left ventricular wall segments contract normally. Left ventricular diastolic function is consistent with (grade I) impaired relaxation.  · There is mild thickening of the aortic valve.  · Moderate  mitral annular calcification is present.  · Trace tricuspid valve regurgitation is present. Estimated right ventricular systolic pressure from tricuspid regurgitation is normal (<35 mmHg). Calculated right ventricular systolic pressure from tricuspid regurgitation is 23 mmHg.  · There is a moderate (1-2cm) pericardial effusion. Doppler assessment showed mild respiratory variation with inspiration on the mitral inflow signal. However it was not conclusive. There is no significant diastolic collapse of the RV or left right atrium. The effusion is circumferential with slight predominance next to the right atrium  · Compared to prior study of 8/24/2020, the pericardial effusion slightly larger but overall does not appear to be hemodynamically significant by Doppler assessment at this point. Clinical correlation recommended      Impression/Assessment:  This is a 75-year-old female with past medical history of chronic low back pain s/p multiple back surgeries, migraines, HTN, CKD who presented to the hospital on 5/25/2021 with complaints of dizziness.  She was found to be bradycardic, resistant hypertension, and also had a UTI.  She was initially seen by our service due to complaints of headaches on 5/29 which were felt to be secondary to postconcussive and significant medication rebound due to polypharmacy.  Recommendations were made to discontinue her Tegretol, tapering off of Effexor and trazodone, significantly reducing use of Excedrin, and starting riboflavin 400 mg daily for migraine preventative.     We were reconsulted on 6/2 due to the patient developing left leg numbness after getting up.  She was noted to have a SBP drop of 180-130.  RRT was called and she scored a 1 on the NIHSS for left leg numbness.  She underwent a stat CT head that revealed no acute intracranial abnormality.      MRI brain and L-spine were ordered due to concern for stroke versus radiculopathy. She did undergo a carotid duplex which shows  bilateral plaque without significant stenosis. P2Y12 level was 197. She was on Plavix PTA.     Diagnosis: Acute left leg numbness, chronic low back pain s/p spinal fusion, resistant hypertension    Plan:  She still has resolution of her symptoms.  Reviewed her MRI brain, no evidence of stroke, mass, or tumor.  MRI L-spine also reviewed, showing multilevel small disc herniation and spondyloarthropathy which is a chronic finding for the patient.  No evidence of cord compression.  I have asked that she follow-up with her outpatient neurosurgeon post discharge so that they can also review her films and determine need for further studies or evaluation.  Again her P2Y12 showing that she is not responding to her Plavix.  Will defer to cardiology for adjustments of antiplatelet therapy as her symptoms were not stroke related.  We will sign off, will see again per request.    Case discussed with patient, , and Dr. Guadarrama, and he agrees with plan above.    REENA Ingram

## 2021-06-04 NOTE — NURSING NOTE
bp to be managed with oral and prn meds, no changes in order, 1000cc chang output noted per purewick while bedrest, 0.9ns fluid stop time is 2030, patient complains of numbness on top of right foot after lying flat for procedure, pulses +2 palpable, <3 sec capillary refill, soft right groin site no s/s of complications, high falls risk up with assistance q4hour neuro checks, next shift aware and to monitor.

## 2021-06-04 NOTE — PROGRESS NOTES
Hospital Follow Up    LOS:  LOS: 9 days   Patient Name: Rachana Arguelles  Age/Sex: 75 y.o. female  : 1945  MRN: [id number]    Day of Service: 21   Length of Stay: 9  Encounter Provider: REENA Medina  Place of Service: [facility]  Patient Care Team:  Rhys Crowder Jr., MD as PCP - General (Family Medicine)  Whitney Dudley MD as Consulting Physician (Nephrology)  Quita Figueroa MD as Surgeon (Thoracic Surgery)  Sabas Luther MD as Consulting Physician (Otolaryngology)  Álvaro Gifford MD as Consulting Physician (Pulmonary Disease)    Subjective:     Chief Complaint: uncontrolled htn    Interval History: complains of back pain    Objective:     Objective:  Temp:  [98 °F (36.7 °C)-98.7 °F (37.1 °C)] 98 °F (36.7 °C)  Heart Rate:  [70-90] 70  Resp:  [16-18] 18  BP: (154-219)/() 154/84     Intake/Output Summary (Last 24 hours) at 2021 1226  Last data filed at 2021 0757  Gross per 24 hour   Intake 100 ml   Output 1300 ml   Net -1200 ml     Body mass index is 29.13 kg/m².      21  0917 21  0710 21  0535   Weight: 88.5 kg (195 lb) 84.8 kg (187 lb) 84.4 kg (186 lb)     Weight change: -0.454 kg (-1 lb)    Physical Exam:   General Appearance:    Awake alert and oriented in no acute distress.   Color:  Skin:  Neuro:  HEENT:    Lungs:     Pink  Warm and dry  No focal, motor or sensory deficits  Neck supple, pupils equal, round and reactive. No JVD, No Bruit  Clear to auscultation,respirations regular, even and                  unlabored    Heart:    Regular rate and rhythm, S1 and S2, no murmur, no gallop, no rub. No edema, DP/PT pulses are 2+   Chest Wall:    No abnormalities observed   Abdomen:     Normal bowel sounds, no masses, no organomegaly, soft        non-tender, non-distended, no guarding, no ascites noted   Extremities:   Moves all extremities well, no edema, no cyanosis, no redness       Lab Review:   Results  from last 7 days   Lab Units 06/04/21  0321 06/03/21  0331 05/30/21  2206   SODIUM mmol/L 140 142 138   POTASSIUM mmol/L 3.6 3.8 4.6   CHLORIDE mmol/L 101 104 110*   CO2 mmol/L 27.7 24.3 16.5*   BUN mg/dL 36* 38* 31*   CREATININE mg/dL 1.80* 2.06* 2.76*   GLUCOSE mg/dL 113* 111* 102*   CALCIUM mg/dL 8.5* 8.5* 8.7   AST (SGOT) U/L  --   --  30   ALT (SGPT) U/L  --   --  15     Results from last 7 days   Lab Units 06/01/21  0451 05/31/21  0910 05/31/21  0402 05/30/21  2206   TROPONIN T ng/mL 0.034* 0.036* 0.031* 0.034*     Results from last 7 days   Lab Units 06/03/21  0331 05/30/21  2206   WBC 10*3/mm3 6.93 11.12*   HEMOGLOBIN g/dL 9.0* 9.4*   HEMATOCRIT % 27.1* 28.9*   PLATELETS 10*3/mm3 218 204         Results from last 7 days   Lab Units 05/31/21  0402   MAGNESIUM mg/dL 2.3           Invalid input(s): LDLCALC  Results from last 7 days   Lab Units 05/31/21  0910   PROBNP pg/mL 6,967.0*         I reviewed the patient's new clinical results.  I personally viewed and interpreted the patient's EKG  Current Medications:   Scheduled Meds:amLODIPine, 10 mg, Oral, Q24H  [MAR Hold] ascorbic acid, 500 mg, Oral, Daily  [MAR Hold] atorvastatin, 40 mg, Oral, Nightly  [MAR Hold] budesonide-formoterol, 2 puff, Inhalation, BID - RT  carvedilol, 25 mg, Oral, BID With Meals  [MAR Hold] cetirizine, 5 mg, Oral, Daily  [MAR Hold] clopidogrel, 75 mg, Oral, Daily  famotidine, 20 mg, Oral, Daily  [MAR Hold] folic acid, 1,000 mcg, Oral, Daily  hydrALAZINE, 100 mg, Oral, Q8H  [MAR Hold] pantoprazole, 40 mg, Oral, QAM  [MAR Hold] senna-docusate sodium, 2 tablet, Oral, Nightly  [MAR Hold] sertraline, 100 mg, Oral, Daily  [MAR Hold] sodium bicarbonate, 1,300 mg, Oral, TID  [MAR Hold] sodium chloride, 10 mL, Intravenous, Q12H  [MAR Hold] sucralfate, 1 g, Oral, BID AC  terazosin, 5 mg, Oral, Nightly  [MAR Hold] traZODone, 50 mg, Oral, Nightly  [MAR Hold] venlafaxine XR, 150 mg, Oral, Daily  [MAR Hold] Vitamin B-2, 400 mg, Oral,  Daily      Continuous Infusions:niCARdipine, 2.5 mg/hr, Last Rate: Stopped (05/31/21 1123)        Allergies:  Allergies   Allergen Reactions   • Neurontin [Gabapentin] Confusion   • Morphine Other (See Comments)     HEADACHE       Assessment:       Hypertension    Chronic pain syndrome    Gastroesophageal reflux disease    Stage 3b chronic kidney disease (CMS/HCC)    Malignant neoplasm of right upper lobe of lung (CMS/HCC)    YVONNE (acute kidney injury) (CMS/HCC)    Opioid dependence (CMS/HCC)    COPD (chronic obstructive pulmonary disease) (CMS/HCC)         1.  Resistant hypertension  2.  Acute on chronic kidney injury  3.  Bradycardia: Resolved off of carvedilol and clonidine therapy  4.  Chronic anemia  5. Opioid dependence  6. Chronic Pain  7.  Renal artery stenosis     Plan:   This morning pressure was in the 200s.  A little bit better now after medications.  Plan for renal angio and stenting of the right renal artery today.  Her P2 Y 12 is mildly abnormal at 197.  Will discuss with Dr. Coello whether he thinks changing antiplatelet therapy is necessary at this time.        REENA Medina  06/04/21  12:26 EDT  Electronically signed by REENA Medina, 06/04/21, 12:26 PM EDT.

## 2021-06-04 NOTE — PLAN OF CARE
Goal Outcome Evaluation:  Plan of Care Reviewed With: patient      No acute distress noted this shift, up to BSC with one assist all through the night, safety maintained, continue plan of care

## 2021-06-05 ENCOUNTER — READMISSION MANAGEMENT (OUTPATIENT)
Dept: CALL CENTER | Facility: HOSPITAL | Age: 76
End: 2021-06-05

## 2021-06-05 VITALS
HEIGHT: 67 IN | BODY MASS INDEX: 29.19 KG/M2 | TEMPERATURE: 98.8 F | DIASTOLIC BLOOD PRESSURE: 69 MMHG | HEART RATE: 71 BPM | RESPIRATION RATE: 16 BRPM | SYSTOLIC BLOOD PRESSURE: 159 MMHG | OXYGEN SATURATION: 93 % | WEIGHT: 186 LBS

## 2021-06-05 LAB
ANION GAP SERPL CALCULATED.3IONS-SCNC: 11.3 MMOL/L (ref 5–15)
BUN SERPL-MCNC: 25 MG/DL (ref 8–23)
BUN/CREAT SERPL: 15.9 (ref 7–25)
CALCIUM SPEC-SCNC: 8.3 MG/DL (ref 8.6–10.5)
CHLORIDE SERPL-SCNC: 98 MMOL/L (ref 98–107)
CO2 SERPL-SCNC: 28.7 MMOL/L (ref 22–29)
CREAT SERPL-MCNC: 1.57 MG/DL (ref 0.57–1)
DEPRECATED RDW RBC AUTO: 47.9 FL (ref 37–54)
ERYTHROCYTE [DISTWIDTH] IN BLOOD BY AUTOMATED COUNT: 13.5 % (ref 12.3–15.4)
GFR SERPL CREATININE-BSD FRML MDRD: 32 ML/MIN/1.73
GLUCOSE SERPL-MCNC: 96 MG/DL (ref 65–99)
HCT VFR BLD AUTO: 27.9 % (ref 34–46.6)
HGB BLD-MCNC: 9.1 G/DL (ref 12–15.9)
MCH RBC QN AUTO: 31.8 PG (ref 26.6–33)
MCHC RBC AUTO-ENTMCNC: 32.6 G/DL (ref 31.5–35.7)
MCV RBC AUTO: 97.6 FL (ref 79–97)
PLATELET # BLD AUTO: 222 10*3/MM3 (ref 140–450)
PMV BLD AUTO: 9.8 FL (ref 6–12)
POTASSIUM SERPL-SCNC: 3.6 MMOL/L (ref 3.5–5.2)
RBC # BLD AUTO: 2.86 10*6/MM3 (ref 3.77–5.28)
SODIUM SERPL-SCNC: 138 MMOL/L (ref 136–145)
WBC # BLD AUTO: 6.99 10*3/MM3 (ref 3.4–10.8)

## 2021-06-05 PROCEDURE — 99232 SBSQ HOSP IP/OBS MODERATE 35: CPT | Performed by: INTERNAL MEDICINE

## 2021-06-05 PROCEDURE — 97116 GAIT TRAINING THERAPY: CPT

## 2021-06-05 PROCEDURE — 97530 THERAPEUTIC ACTIVITIES: CPT

## 2021-06-05 PROCEDURE — 80048 BASIC METABOLIC PNL TOTAL CA: CPT | Performed by: INTERNAL MEDICINE

## 2021-06-05 PROCEDURE — 25010000002 HYDRALAZINE PER 20 MG: Performed by: INTERNAL MEDICINE

## 2021-06-05 PROCEDURE — 85027 COMPLETE CBC AUTOMATED: CPT | Performed by: INTERNAL MEDICINE

## 2021-06-05 PROCEDURE — 94799 UNLISTED PULMONARY SVC/PX: CPT

## 2021-06-05 RX ORDER — CYCLOBENZAPRINE HCL 10 MG
5 TABLET ORAL 3 TIMES DAILY PRN
Start: 2021-06-05 | End: 2021-01-01 | Stop reason: HOSPADM

## 2021-06-05 RX ORDER — CLONIDINE HYDROCHLORIDE 0.1 MG/1
0.1 TABLET ORAL EVERY 12 HOURS PRN
Status: DISCONTINUED | OUTPATIENT
Start: 2021-06-05 | End: 2021-06-05

## 2021-06-05 RX ORDER — TERAZOSIN 5 MG/1
5 CAPSULE ORAL NIGHTLY
Qty: 30 CAPSULE | Refills: 0 | Status: ON HOLD | OUTPATIENT
Start: 2021-06-05 | End: 2021-01-01

## 2021-06-05 RX ORDER — HYDRALAZINE HYDROCHLORIDE 100 MG/1
100 TABLET, FILM COATED ORAL EVERY 8 HOURS SCHEDULED
Qty: 90 TABLET | Refills: 0 | Status: ON HOLD | OUTPATIENT
Start: 2021-06-05 | End: 2021-01-01

## 2021-06-05 RX ORDER — SODIUM BICARBONATE 650 MG/1
1300 TABLET ORAL 3 TIMES DAILY
Qty: 180 TABLET | Refills: 0 | Status: ON HOLD | OUTPATIENT
Start: 2021-06-05 | End: 2021-01-01

## 2021-06-05 RX ORDER — CARVEDILOL 25 MG/1
25 TABLET ORAL 2 TIMES DAILY WITH MEALS
Qty: 60 TABLET | Refills: 0 | Status: ON HOLD | OUTPATIENT
Start: 2021-06-05 | End: 2021-01-01

## 2021-06-05 RX ADMIN — FAMOTIDINE 20 MG: 20 TABLET, FILM COATED ORAL at 08:48

## 2021-06-05 RX ADMIN — AMLODIPINE BESYLATE 10 MG: 10 TABLET ORAL at 08:42

## 2021-06-05 RX ADMIN — SERTRALINE 100 MG: 100 TABLET, FILM COATED ORAL at 08:43

## 2021-06-05 RX ADMIN — CLOPIDOGREL 75 MG: 75 TABLET, FILM COATED ORAL at 08:43

## 2021-06-05 RX ADMIN — OXYCODONE HYDROCHLORIDE 20 MG: 15 TABLET ORAL at 08:48

## 2021-06-05 RX ADMIN — Medication 400 MG: at 08:42

## 2021-06-05 RX ADMIN — OXYCODONE HYDROCHLORIDE AND ACETAMINOPHEN 500 MG: 500 TABLET ORAL at 08:43

## 2021-06-05 RX ADMIN — SODIUM BICARBONATE 1300 MG: 650 TABLET ORAL at 08:42

## 2021-06-05 RX ADMIN — OXYCODONE HYDROCHLORIDE 20 MG: 15 TABLET ORAL at 01:38

## 2021-06-05 RX ADMIN — HYDRALAZINE HYDROCHLORIDE 100 MG: 50 TABLET, FILM COATED ORAL at 05:22

## 2021-06-05 RX ADMIN — BUDESONIDE AND FORMOTEROL FUMARATE DIHYDRATE 2 PUFF: 160; 4.5 AEROSOL RESPIRATORY (INHALATION) at 08:37

## 2021-06-05 RX ADMIN — CARVEDILOL 25 MG: 25 TABLET, FILM COATED ORAL at 06:43

## 2021-06-05 RX ADMIN — SODIUM CHLORIDE, PRESERVATIVE FREE 10 ML: 5 INJECTION INTRAVENOUS at 11:42

## 2021-06-05 RX ADMIN — PANTOPRAZOLE SODIUM 40 MG: 40 TABLET, DELAYED RELEASE ORAL at 06:44

## 2021-06-05 RX ADMIN — SUCRALFATE 1 G: 1 TABLET ORAL at 06:43

## 2021-06-05 RX ADMIN — FOLIC ACID 1000 MCG: 1 TABLET ORAL at 08:43

## 2021-06-05 RX ADMIN — HYDRALAZINE HYDROCHLORIDE 100 MG: 50 TABLET, FILM COATED ORAL at 13:48

## 2021-06-05 RX ADMIN — CETIRIZINE HYDROCHLORIDE ALLERGY 5 MG: 10 TABLET ORAL at 08:43

## 2021-06-05 RX ADMIN — HYDRALAZINE HYDROCHLORIDE 10 MG: 20 INJECTION, SOLUTION INTRAMUSCULAR; INTRAVENOUS at 03:41

## 2021-06-05 RX ADMIN — OXYCODONE HYDROCHLORIDE 20 MG: 15 TABLET ORAL at 13:47

## 2021-06-05 RX ADMIN — VENLAFAXINE HYDROCHLORIDE 150 MG: 150 CAPSULE, EXTENDED RELEASE ORAL at 08:43

## 2021-06-05 NOTE — OUTREACH NOTE
Prep Survey      Responses   [facility] patient discharged from?  [place]   Is LACE score < 7 ?  No   Emergency Room discharge w/ pulse ox?  No   Eligibility  [facility]   Date of Admission  05/25/21   Date of Discharge  06/05/21   Discharge Disposition  Home or Self Care   Discharge diagnosis  HTN   Does the patient have one of the following disease processes/diagnoses(primary or secondary)?  Other   Does the patient have Home health ordered?  Yes   What is the Home health agency?   [facility]  (was seeing [facility])   Is there a DME ordered?  Yes   What DME was ordered?  home O2@2LNC through [employer]- has at home   Medication alerts for this patient  BP Meds Changed   Prep survey completed?  Yes          Aliza Ventura RN

## 2021-06-05 NOTE — PLAN OF CARE
Pt found to be saturated in urine in the bed. Her call light was not on, she is oriented and physically able to use BSC or walk in the bathroom but is fine to lay in her own urine instead. However patient pleasant and agreeable to PT today. Requires SBA/CGA for all mobiltiy including sitting EOB, transfers with FWW. Pt ascended/descended 3 steps with B rails to mimic stairs to enter her kitchen. Then ambulated ~40ft on room with with CGA and FWW, had to stop due to patient feeling SOB despite oxygen being above 94%. Likely only needs 1-2 more sessions to ensure consistency however is safe to return home with 24hr assist and HHPT to follow.      Problem: Adult Inpatient Plan of Care  Goal: Plan of Care Review  Outcome: Ongoing, Progressing  Flowsheets (Taken 6/5/2021 1209)  Plan of Care Reviewed With:   patient   spouse   Goal Outcome Evaluation:  Plan of Care Reviewed With: patient, spouse

## 2021-06-05 NOTE — THERAPY TREATMENT NOTE
Patient Name: Rachana Arguelles  : 1945    MRN: [id number]                              Today's Date: 2021       Admit Date: 2021    Visit Dx:     ICD-10-CM ICD-9-CM   1. Bradycardia  R00.1 427.89   2. YVONNE (acute kidney injury) (CMS/HCC)  N17.9 584.9   3. Acute UTI  N39.0 599.0     Patient Active Problem List   Diagnosis   • Anxiety   • Chronic pain syndrome   • Depression   • Gastroesophageal reflux disease   • Hyperlipidemia   • Hypertension   • Osteoarthritis of hip   • Chronic low back pain   • Failed back syndrome of lumbar spine   • Pulmonary embolus (CMS/HCC)   • Weight loss   • Polypharmacy   • Stage 3b chronic kidney disease (CMS/HCC)   • Chronic constipation   • Sacroiliac joint pain   • Mixed incontinence   • Renal cyst   • Achilles tendonitis   • Atherosclerosis of native artery of left lower extremity with intermittent claudication (CMS/HCC)   • Morbidly obese (CMS/HCC)   • Lung nodule   • Malignant neoplasm of right upper lobe of lung (CMS/HCC)   • Lung cancer (CMS/HCC)   • Moderate single current episode of major depressive disorder (CMS/HCC)   • Palpitations   • Encounter for screening for malignant neoplasm of colon   • Hematoma of scalp   • Acute neck pain   • Dizziness   • Unstable cervical spine   • Postconcussive syndrome   • Positive colorectal cancer screening using Cologuard test   • Dysphagia   • S/P reverse total shoulder arthroplasty, right   • Chronic post-traumatic headache, not intractable   • Medication overuse headache   • Migraine without aura and without status migrainosus, not intractable   • Shortness of breath   • PAD (peripheral artery disease) (CMS/[facility])   • Bilateral carotid artery stenosis   • S/P lobectomy of lung   • YVONNE (acute kidney injury) (CMS/HCC)   • Anemia   • Frequent PVCs   • Pneumonia of both lower lobes due to infectious organism   • History of lung cancer   • Hypertensive urgency   • Bradycardia, sinus   • YVONNE (acute kidney injury) (CMS/HCC)   •  Opioid dependence (CMS/[facility])   • COPD (chronic obstructive pulmonary disease) (CMS/[facility])     Past Medical History:   Diagnosis Date   • AAA (abdominal aortic aneurysm) without rupture (CMS/[facility])    • Anemia    • Anxiety    • Arthritis    • Bilateral carotid artery stenosis 8/14/2020   • Cancer (CMS/[facility])     skin cancer   • Chest pain     OCCAS   • Chronic low back pain    • Chronic pain syndrome 3/12/2016   • CKD (chronic kidney disease), stage III (CMS/[facility])    • Claustrophobia 10/26/2015    Resolved   • COPD (chronic obstructive pulmonary disease) (CMS/[facility])    • Depression    • Dizziness    • Dyspnea    • GERD (gastroesophageal reflux disease)    • GI problem    • H/O concussion    • Head trauma     FELL CUT HEAD 12 STAPLES   • Hiatal hernia    • History of migraine headaches    • Hyperlipidemia    • Hypertension    • Lumbar postlaminectomy syndrome 10/26/2015    Resolved   • Lung cancer (CMS/[facility])    • Lung nodule    • Migraines    • Nausea    • Palpitations    • Polypharmacy 4/22/2016   • Pulmonary embolism (CMS/[facility]) 10/26/2015    January 2013 - Resolved, felt to be secondary to estrogen use   • Slow to wake up after anesthesia    • Submandibular sialoadenitis 10/26/2015    Resolved   • Visual changes    • Vitamin B 12 deficiency      Past Surgical History:   Procedure Laterality Date   • ANGIOPLASTY ILIAC ARTERY Bilateral 8/10/2018    Procedure: BILATERAL ILIAC STENTS;  Surgeon: Riki Mancera MD;  Location: [facility] MAIN OR;  Service: Vascular   • APPENDECTOMY     • BREAST SURGERY      Breast Surgery Reduction Procedure   • BRONCHOSCOPY N/A 2/8/2019    Procedure: BRONCHOSCOPY;  Surgeon: Álvaro Gifford MD;  Location: [facility] ENDOSCOPY;  Service: Pulmonary   • CHOLECYSTECTOMY     • COLONOSCOPY     • ENDOSCOPY N/A 11/3/2020    Procedure: ESOPHAGOGASTRODUODENOSCOPY with 54 [place] vernon dilation;  Surgeon: Yunior Vo MD;  Location: [facility] ENDOSCOPY;  Service: Gastroenterology;  Laterality: N/A;  pre-  dysphagia  post- esophageal ring, hiatal hernia   • HYSTERECTOMY     • INTUBATION  1/15/2019        • JOINT REPLACEMENT      left knee, hip, shoulder   • LASIK     • LUMBAR FUSION      Lumbar Vertebral Fusion   • SHOULDER ARTHROSCOPY  04/04/2013    Dr. Carrillo/[facility] - [place]   • THORACOSCOPY VIDEO ASSISTED WITH LOBECTOMY Right 1/11/2019    Procedure: BRONCOSCOPY, THORACOSCOPY VIDEO ASSISTED WITH RIGHT UPPER LOBE WEDGE RESECTION, COMPLETION RIGHT UPPER LOBECTOMY, LYMPH NODE DISSECTION, INTERCOSTAL NERVE BLOCK;  Surgeon: Quita Figueroa MD;  Location: [facility] OR;  Service: Thoracic   • TONSILLECTOMY     • TOTAL HIP ARTHROPLASTY REVISION Left    • TOTAL KNEE ARTHROPLASTY Left    • TOTAL SHOULDER ARTHROPLASTY Left    • TOTAL SHOULDER ARTHROPLASTY W/ DISTAL CLAVICLE EXCISION Right 6/18/2020    Procedure: RIGHT TOTAL SHOULDER REVERSE ARTHROPLASTY WITH GPS;  Surgeon: Lino Carrillo MD;  Location: [facility];  Service: Orthopedics;  Laterality: Right;     General Information     Row Name 06/05/21 1208          Physical Therapy Time and Intention    Document Type  therapy note (daily note)  -AE     Mode of Treatment  individual therapy;physical therapy  -AE     Row Name 06/05/21 1208          General Information    Patient Profile Reviewed  yes  -AE       User Key  (r) = Recorded By, (t) = Taken By, (c) = Cosigned By    Initials Name Provider Type    AE Char Singh, TIMI Physical Therapist        Mobility     Row Name 06/05/21 1208          Bed Mobility    Supine-Sit [place] (Bed Mobility)  standby assist  -AE     Row Name 06/05/21 1208          Transfers    Comment (Transfers)  SBA all transfers with FWW  -AE     Row Name 06/05/21 1208          Bed-Chair Transfer    Bed-Chair [place] (Transfers)  standby assist  -AE     Assistive Device (Bed-Chair Transfers)  walker, front-wheeled  -AE     Row Name 06/05/21 1208          Sit-Stand Transfer    Sit-Stand [place] (Transfers)  standby assist  -AE      Assistive Device (Sit-Stand Transfers)  walker, front-wheeled  -AE     Row Name 06/05/21 1208          Gait/Stairs (Locomotion)    Summers Level (Gait)  contact guard;1 person assist  -AE     Assistive Device (Gait)  walker, front-wheeled  -AE     Distance in Feet (Gait)  40ft  -AE     Handrail Location (Stairs)  both sides  -AE     Number of Steps (Stairs)  3  -AE     Ascending Technique (Stairs)  step-to-step  -AE     Descending Technique (Stairs)  step-to-step  -AE       User Key  (r) = Recorded By, (t) = Taken By, (c) = Cosigned By    Initials Name Provider Type    AE Char Singh PT Physical Therapist        Obj/Interventions    No documentation.       Goals/Plan    No documentation.       Clinical Impression     Row Name 06/05/21 1209          Pain    Additional Documentation  Pain Scale: Numbers Pre/Post-Treatment (Group)  -AE     Row Name 06/05/21 1209          Pain Scale: Numbers Pre/Post-Treatment    Pretreatment Pain Rating  0/10 - no pain  -AE     Posttreatment Pain Rating  0/10 - no pain  -AE     Row Name 06/05/21 1209          Plan of Care Review    Plan of Care Reviewed With  patient;spouse  -AE     Row Name 06/05/21 1209          Therapy Assessment/Plan (PT)    Patient/Family Therapy Goals Statement (PT)  home today  -AE     Row Name 06/05/21 1209          Positioning and Restraints    Pre-Treatment Position  in bed  -AE     Post Treatment Position  chair  -AE     In Chair  reclined;call light within reach;encouraged to call for assist;exit alarm on  -AE       User Key  (r) = Recorded By, (t) = Taken By, (c) = Cosigned By    Initials Name Provider Type    AE Char Singh, TIMI Physical Therapist        Outcome Measures     Row Name 06/05/21 1209          How much help from another person do you currently need...    Turning from your back to your side while in flat bed without using bedrails?  4  -AE     Moving from lying on back to sitting on the side of a flat bed without bedrails?  3   -AE     Moving to and from a bed to a chair (including a wheelchair)?  4  -AE     Standing up from a chair using your arms (e.g., wheelchair, bedside chair)?  4  -AE     Climbing 3-5 steps with a railing?  3  -AE     To walk in hospital room?  3  -AE     AM-PAC 6 Clicks Score (PT)  21  -AE       User Key  (r) = Recorded By, (t) = Taken By, (c) = Cosigned By    Initials Name Provider Type    AE Char Singh PT Physical Therapist        Physical Therapy Education                 Title: PT OT SLP Therapies (In Progress)     Topic: Physical Therapy (In Progress)     Point: Mobility training (In Progress)     Learning Progress Summary           Patient Acceptance, E, NR by EM at 6/3/2021 1424                   Point: Home exercise program (Not Started)     Learner Progress:  Not documented in this visit.          Point: Body mechanics (Not Started)     Learner Progress:  Not documented in this visit.          Point: Precautions (Not Started)     Learner Progress:  Not documented in this visit.                      User Key     Initials Effective Dates Name Provider Type Discipline    EM 04/03/18 -  April Corey PT Physical Therapist PT              PT Recommendation and Plan     Plan of Care Reviewed With: patient, spouse     Time Calculation:   PT Charges     Row Name 06/05/21 1212             Time Calculation    Start Time  1120  -AE      Stop Time  1150  -AE      Time Calculation (min)  30 min  -AE      PT Received On  06/05/21  -AE      PT - Next Appointment  06/07/21  -AE         Time Calculation- PT    Total Timed Code Minutes- PT  30 minute(s)  -AE        User Key  (r) = Recorded By, (t) = Taken By, (c) = Cosigned By    Initials Name Provider Type    AE Char Signh PT Physical Therapist        Therapy Charges for Today     Code Description Service Date Service Provider Modifiers Qty    11625124859 HC GAIT TRAINING EA 15 MIN 6/5/2021 Char Singh PT GP 1    73582319235 HC PT THERAPEUTIC  ACT EA 15 MIN 6/5/2021 Char Singh, PT GP 1    26406212982 HC PT THER SUPP EA 15 MIN 6/5/2021 Char Singh, PT GP 1          PT G-Codes  Outcome Measure Options: AM-PAC 6 Clicks Basic Mobility (PT)  AM-PAC 6 Clicks Score (PT): 21    Char Singh, TIMI  6/5/2021

## 2021-06-05 NOTE — PROGRESS NOTES
"Patient Care Team:  Rhys Crowder Jr., MD as PCP - General (Family Medicine)  Whitney Dudley MD as Consulting Physician (Nephrology)  Quita Figueroa MD as Surgeon (Thoracic Surgery)  Sabas Luther MD as Consulting Physician (Otolaryngology)  Álvaro Gifford MD as Consulting Physician (Pulmonary Disease)    Chief Complaint: Follow-up resistant hypertension, renal artery stenting.    Interval History:   Doing well.  Blood pressure still elevated.  No groin issues.    Objective   Vital Signs  Temp:  [98 °F (36.7 °C)-98.4 °F (36.9 °C)] 98.4 °F (36.9 °C)  Heart Rate:  [66-86] 73  Resp:  [9-20] 16  BP: (141-196)/() 171/94    Intake/Output Summary (Last 24 hours) at 6/5/2021 0928  Last data filed at 6/5/2021 0600  Gross per 24 hour   Intake 1340 ml   Output 2300 ml   Net -960 ml     Flowsheet Rows      First Filed Value   Admission Height  167.6 cm (66\") Documented at 05/25/2021 1805   Admission Weight  78.9 kg (174 lb) Documented at 05/25/2021 1805          Temp:  [98 °F (36.7 °C)-98.4 °F (36.9 °C)] 98.4 °F (36.9 °C)  Heart Rate:  [66-86] 73  Resp:  [9-20] 16  BP: (141-196)/() 171/94    Intake/Output Summary (Last 24 hours) at 6/5/2021 0928  Last data filed at 6/5/2021 0600  Gross per 24 hour   Intake 1340 ml   Output 2300 ml   Net -960 ml     Flowsheet Rows      First Filed Value   Admission Height  167.6 cm (66\") Documented at 05/25/2021 1805   Admission Weight  78.9 kg (174 lb) Documented at 05/25/2021 1805          General Appearance:    Alert, cooperative, in no acute distress   Head:    Normocephalic, without obvious abnormality, atraumatic       Neck/Lymph   No adenopathy, supple, no thyromegaly, no carotid bruit, no    JVD   Lungs:     Clear to auscultation bilaterally, no wheezes, rales, or     rhonchi    Cardiac:    Normal rate, regular rhythm, no murmur, no rub, no gallop   Chest Wall:    No abnormalities observed   GI:     Normal bowel sounds, soft, nontender, nondistended,            no " rebound tenderness   Extremities:   No cyanosis, clubbing, or edema   Circulatory/Peripheral Vascular :   Pulses palpable and equal bilaterally   Integumentary:   No bleeding or rash. Normal temperature       Neurologic:   Cranial nerves 2 - 12 grossly intact, sensation intact              amLODIPine, 10 mg, Oral, Q24H  ascorbic acid, 500 mg, Oral, Daily  atorvastatin, 40 mg, Oral, Nightly  budesonide-formoterol, 2 puff, Inhalation, BID - RT  carvedilol, 25 mg, Oral, BID With Meals  cetirizine, 5 mg, Oral, Daily  clopidogrel, 75 mg, Oral, Daily  famotidine, 20 mg, Oral, Daily  folic acid, 1,000 mcg, Oral, Daily  hydrALAZINE, 100 mg, Oral, Q8H  pantoprazole, 40 mg, Oral, QAM  senna-docusate sodium, 2 tablet, Oral, Nightly  sertraline, 100 mg, Oral, Daily  sodium bicarbonate, 1,300 mg, Oral, TID  sodium chloride, 10 mL, Intravenous, Q12H  sucralfate, 1 g, Oral, BID AC  terazosin, 5 mg, Oral, Nightly  traZODone, 50 mg, Oral, Nightly  venlafaxine XR, 150 mg, Oral, Daily  Vitamin B-2, 400 mg, Oral, Daily        niCARdipine, 2.5 mg/hr, Last Rate: Stopped (05/31/21 1123)        Results Review:    Results from last 7 days   Lab Units 06/05/21  0430   SODIUM mmol/L 138   POTASSIUM mmol/L 3.6   CHLORIDE mmol/L 98   CO2 mmol/L 28.7   BUN mg/dL 25*   CREATININE mg/dL 1.57*   GLUCOSE mg/dL 96   CALCIUM mg/dL 8.3*     Results from last 7 days   Lab Units 06/01/21  0451 05/31/21  0910 05/31/21  0402   TROPONIN T ng/mL 0.034* 0.036* 0.031*     Results from last 7 days   Lab Units 06/05/21  0430   WBC 10*3/mm3 6.99   HEMOGLOBIN g/dL 9.1*   HEMATOCRIT % 27.9*   PLATELETS 10*3/mm3 222             Results from last 7 days   Lab Units 05/31/21  0402   MAGNESIUM mg/dL 2.3         @LABNT(bnp)@  I reviewed the patient's new clinical results.  I personally viewed and interpreted the patient's EKG/Telemetry data            Assessment/Plan   1.  Resistant hypertension  2.  Bradycardia: Secondary to the combination of carvedilol and  clonidine therapy.  Resolved.  3.  Acute on chronic kidney injury: Creatinine improving.  4.  Chronic anemia: Hematocrit is stable    -Patient did undergo right renal artery stenting yesterday with a good result.  Does have residual 20% ostial stenosis secondary to severe aortic calcification.  -She has a left renal artery stenosis as well that is a least 70% and will need outpatient revascularization of that left renal artery also.  We chose not to move down that path during her inpatient stay as she received significant amount of contrast with the right renal artery stenting and did have acute on chronic kidney injury prior to the angiography.  -Recommend continuing current antihypertensive therapy.  Leave off of clonidine.  She is currently off of minoxidil as well secondary to the risk of pericardial effusion or renal failure patients with minoxidil therapy.    -I will schedule her for follow-up with our nurse practitioner team in 1 week and plan for left renal artery stenting in 2 to 3 weeks.  I will sign off.

## 2021-06-05 NOTE — DISCHARGE SUMMARY
Date of Admission: 5/25/2021  Date of Discharge:  6/5/2021  Primary Care Physician: Rhys Crowder Jr., MD     Discharge Diagnosis:  Active Hospital Problems    Diagnosis  POA   • **Hypertension [I10]  Yes   • COPD (chronic obstructive pulmonary disease) (CMS/[facility]) [J44.9]  Yes   • YVONNE (acute kidney injury) (CMS/[facility]) [N17.9]  Yes   • Opioid dependence (CMS/HCC) [F11.20]  Yes   • Malignant neoplasm of right upper lobe of lung (CMS/[facility]) [C34.11]  Yes   • Stage 3b chronic kidney disease (CMS/[facility]) [N18.32]  Yes   • Chronic pain syndrome [G89.4]  Yes   • Gastroesophageal reflux disease [K21.9]  Yes      Resolved Hospital Problems    Diagnosis Date Resolved POA   • Bradycardia [R00.1] 05/30/2021 Yes   • UTI (urinary tract infection) due to urinary indwelling catheter (CMS/HCC) [T83.511A, N39.0] 05/30/2021 Yes       DETAILS OF HOSPITAL STAY     Pertinent Test Results and Procedures Performed    Chest x-ray:  1. Cardiomegaly with perhaps mild vascular congestion.   2. Increasing bibasilar atelectasis.     Renal artery Doppler:  Greater than 60% stenosis of right renal artery, of  hemodynamic   significance.   Less than 60% stenosis of left renal artery, of mid-moderate hemodynamic   significance.    Renal ultrasound:  No hydronephrosis or echogenic nephrolithiasis. Bilateral renal cysts.     Head CT:  No evidence of acute infarction or hemorrhage. Further   evaluation could be performed with MRI examination of brain as   indicated. The above information was called to the patient's nurse at   time of dictation. The patient's nurse is to immediately relay the   information to the clinical service.     Carotid Doppler:  · Proximal right internal carotid artery plaque without significant   stenosis.   · Proximal left internal carotid artery plaque without significant   stenosis.     MRI lumbar spine:  Multilevel lumbar spondyloarthropathy, as detailed above with disc   extrusion on the left at L2/3.     Brain MRI;  Unremarkable  MRI of the brain without contrast.     Hospital Course  This is a 75-year-old female who presented to the emergency room with dizziness and lightheadedness.  She was found to be severely bradycardic due to clonidine and carvedilol.  Cardiology was consulted.  Her bradycardia resolved with adjustment of medications.  She had significantly uncontrolled and resistant hypertension on multiple medications and ultimately had further work-up with renal artery ultrasound which showed stenosis.  She had worsening renal failure initially and nephrology was consulted.  Medications were further adjusted to avoid nephrotoxins and she was given IV fluids.  She required right renal artery stenting yesterday.  She also has stenosis on the left but due to her present renal function the decision was made to delay revascularization of this for a couple of weeks so not to give her too much contrast dye.  Blood pressure is looking more stable.  She has been cleared by consulting services for discharge.  Please see below for antihypertensive regimen moving forward.  She will follow up with cardiology in 1 week to plan for the left renal artery revascularization.      Physical Exam at Discharge:  General: No acute distress, AAOx3  HEENT: EOMI, PERRL  Cardiovascular: +s1 and s2, RRR  Lungs: No rhonchi or wheezing  Abdomen: soft, nontender    Consults:   Consults     Date and Time Order Name Status Description    5/30/2021  9:39 PM Inpatient Pulmonology Consult Completed     5/30/2021  9:16 AM Inpatient Nephrology Consult Completed     5/29/2021 12:13 PM Inpatient Neurology Consult Completed     5/26/2021 10:49 AM Cardiac Electrophysiologist Inpatient Consult      5/25/2021 11:11 PM Inpatient Cardiology Consult Completed     5/25/2021  8:12 PM LCG (on-call MD unless specified) Completed     5/25/2021  8:12 PM LHA (on-call MD unless specified) Details Completed             Condition on Discharge: Stable, improved    Discharge  Disposition  Home or Self Care    Discharge Medications     Discharge Medications      New Medications      Instructions Start Date   hydrALAZINE 100 MG tablet  Commonly known as: APRESOLINE   100 mg, Oral, Every 8 Hours Scheduled      sodium bicarbonate 650 MG tablet   1,300 mg, Oral, 3 Times Daily      terazosin 5 MG capsule  Commonly known as: HYTRIN   5 mg, Oral, Nightly      Vitamin B-2 100 MG tablet tablet  Commonly known as: RIBOFLAVIN   400 mg, Oral, Daily   Start Date: June 6, 2021        Changes to Medications      Instructions Start Date   amLODIPine 10 MG tablet  Commonly known as: NORVASC  What changed: Another medication with the same name was removed. Continue taking this medication, and follow the directions you see here.   10 mg, Oral, Daily, Take with 5mg tablet to make 15mg dose       carvedilol 25 MG tablet  Commonly known as: COREG  What changed: when to take this   25 mg, Oral, 2 Times Daily With Meals      cyclobenzaprine 10 MG tablet  Commonly known as: FLEXERIL  What changed:   · how much to take  · reasons to take this   5 mg, Oral, 3 Times Daily PRN      venlafaxine  MG 24 hr capsule  Commonly known as: EFFEXOR-XR  What changed: See the new instructions.   TAKE 1 CAPSULE BY MOUTH DAILY. SWALLOW WHOLE DO NOT CRUSH OR CHEW.         Continue These Medications      Instructions Start Date   Advair Diskus 250-50 MCG/DOSE DISKUS  Generic drug: fluticasone-salmeterol   2 puffs, Inhalation, 2 Times Daily - RT      ascorbic acid 500 MG tablet  Commonly known as: VITAMIN C   500 mg, Oral, Daily      atorvastatin 40 MG tablet  Commonly known as: LIPITOR   40 mg, Oral, Nightly      clopidogrel 75 MG tablet  Commonly known as: PLAVIX   75 mg, Oral, Daily      docusate sodium 100 MG capsule  Commonly known as: COLACE   100 mg, Oral, 2 Times Daily PRN      famotidine 40 MG tablet  Commonly known as: PEPCID   40 mg, Oral, 2 Times Daily      fexofenadine 180 MG tablet  Commonly known as: Allegra  Allergy   180 mg, Oral, Daily PRN      folic acid 1 MG tablet  Commonly known as: FOLVITE   TAKE 1 TABLET BY MOUTH EVERY DAY      LORazepam 0.5 MG tablet  Commonly known as: ATIVAN   0.5 mg, Oral, Every 8 Hours PRN      MiraLax 17 g packet  Generic drug: polyethylene glycol   17 g, Oral, Daily      omeprazole 40 MG capsule  Commonly known as: priLOSEC   TAKE 1 CAPSULE BY MOUTH EVERY DAY      ondansetron 4 MG tablet  Commonly known as: ZOFRAN   4-8 mg, Oral, Every 8 Hours PRN      oxyCODONE 20 MG tablet  Commonly known as: ROXICODONE   20 mg, Oral, Every 4 Hours PRN, No more than 5 per day      sertraline 100 MG tablet  Commonly known as: Zoloft   100 mg, Oral, Daily      sucralfate 1 g tablet  Commonly known as: CARAFATE   1 g, Oral, 2 times daily      traZODone 150 MG tablet  Commonly known as: DESYREL   TAKE 0.5 TABLETS BY MOUTH EVERY NIGHT      vitamin D 1.25 MG (53196 UT) capsule capsule  Commonly known as: ERGOCALCIFEROL   50,000 Units, Oral, Every 7 Days, Takes on Wednesdays         Stop These Medications    carBAMazepine 200 MG tablet  Commonly known as: TEGretol     cloNIDine 0.3 MG tablet  Commonly known as: CATAPRES     losartan 25 MG tablet  Commonly known as: COZAAR     torsemide 20 MG tablet  Commonly known as: DEMADEX            Discharge Diet:   Diet Instructions     Diet: Regular, Consistent Carbohydrate, Cardiac      Discharge Diet:  Regular  Consistent Carbohydrate  Cardiac             Activity at Discharge:   Activity Instructions     Activity as Tolerated            Follow-up Appointments  Future Appointments   Date Time Provider Department Center   6/10/2021  2:00 PM ROOM 1 YA ACU BH YA ACU YA   7/20/2021  2:00 PM Rhys Crowder Jr., MD [facility] PC MDEST YA   8/17/2021 11:00 AM YA CT 2 BH YA CT YA   8/25/2021  1:45 PM Judit Salinas APRN [facility] TS YA YA   9/8/2021 10:00 AM César Deluna MD [facility] N KRESGE YA   2/9/2022 12:20 PM Mat Coello MD [facility] CD LCGKR YA     Additional  Instructions for the Follow-ups that You Need to Schedule     Discharge Follow-up with PCP   As directed       Currently Documented PCP:    Rhys Crowder Jr., MD    PCP Phone Number:    [phone]     Follow Up Details: 1 week         Discharge Follow-up with Specialty: [facility] in 1 week   As directed      Specialty: [facility] in 1 week               Test Results Pending at Discharge  Pending Labs     Order Current Status    Catecholamines, Fractionated, Urine, 24 Hour - Urine, Clean Catch In process          I have examined and discussed discharge planning with the patient today.    I wore full protective equipment throughout the patient encounter including eye protection and facemask.  Hand hygiene was performed before donning protective equipment and after removal when leaving the room.     Stefan Mancilla MD  06/05/21  13:49 EDT    Time: Discharge greater than 30 min

## 2021-06-07 ENCOUNTER — TRANSITIONAL CARE MANAGEMENT TELEPHONE ENCOUNTER (OUTPATIENT)
Dept: CALL CENTER | Facility: HOSPITAL | Age: 76
End: 2021-06-07

## 2021-06-07 NOTE — OUTREACH NOTE
Call Center TCM Note      Responses   [facility] patient discharged from?  [place]   Does the patient have one of the following disease processes/diagnoses(primary or secondary)?  Other   TCM attempt successful?  Yes   Discharge diagnosis  HTN   Medication alerts for this patient  BP Meds Changed   Meds reviewed with patient/caregiver?  Yes   Is the patient having any side effects they believe may be caused by any medication additions or changes?  No   Does the patient have all medications ordered at discharge?  Yes   Is the patient taking all medications as directed (includes completed medication regime)?  Yes   Does the patient have a primary care provider?   Yes   Does the patient have an appointment with their PCP within 7 days of discharge?  No   Comments regarding PCP  PCP Dr Crowder has no appts avial within TCM time frame. I am asking ofc in routing msg to please review sched and call pt for TCM FWP to be completed by 06/19/2021. Pt is anxious to fwp with Dr Crodwer.    Has the patient kept scheduled appointments due by today?  N/A   What is the [facility] health agency?   [facility]  (was seeing PTA)   Has home health visited the patient within 72 hours of discharge?  Yes   What DME was ordered?  home O2@2LNC through [facility]- has at home   Has all DME been delivered?  Yes   Psychosocial issues?  No   Did the patient receive a copy of their discharge instructions?  Yes   Nursing interventions  Reviewed instructions with patient   What is the patient's perception of their health status since discharge?  Improving   Is the patient/caregiver able to teach back signs and symptoms related to disease process for when to call PCP?  Yes   Is the patient/caregiver able to teach back the hierarchy of who to call/visit for symptoms/problems? PCP, Specialist, Home health nurse, Urgent Care, ED, 911  Yes   If the patient is a current smoker, are they able to teach back resources for cessation?  Not a smoker   TCM call  completed?  Yes   Wrap up additional comments  This very nice lady states other than feeling pretty tired she does feel better. Lots of med changes and dtr has this all in order and monitors for pt. No questions right now. PCP Dr Crowder has no appts avial within TCM time frame. I am asking ofc in routing msg to please review sched and call pt for TCM FWP to be completed by 06/19/2021. Pt is anxious to fwp with Dr Crowder.           Ying Rebollar MA    6/7/2021, 15:26 EDT

## 2021-06-07 NOTE — CASE MANAGEMENT/SOCIAL WORK
Case Management Discharge Note      Final Note: Pt d/c home 6/5/21 with continued [facility].    Provided Post Acute Provider List?: N/A  N/A Provider List Comment: Wants to continue HH with [facility]  Provided Post Acute Provider Quality & Resource List?: Refused    Selected Continued Care - Discharged on 6/5/2021 Admission date: 5/25/2021 - Discharge disposition: Home or Self Care    Destination    No services have been selected for the patient.              Durable Medical Equipment    No services have been selected for the patient.              Dialysis/Infusion    No services have been selected for the patient.              Home Medical Care Coordination complete    Service Provider Selected Services Address Phone Fax Patient Preferred    [facility] - [facility] [id number] [address]  [place] [address] [phone] --       Internal Comment last updated by Killian Austin RN 5/27/2021 7703    Pt states she is current with [facility]. Killian Austin RN                       Therapy    No services have been selected for the patient.              Community Resources    No services have been selected for the patient.                       Final Discharge Disposition Code: 06 - home with home health care

## 2021-06-09 ENCOUNTER — TELEPHONE (OUTPATIENT)
Dept: INTERNAL MEDICINE | Facility: CLINIC | Age: 76
End: 2021-06-09

## 2021-06-09 LAB
DOPAMINE 24H UR-MRATE: 160 UG/24 HR (ref 0–510)
DOPAMINE UR-MCNC: 76 UG/L
EPINEPH 24H UR-MRATE: 8 UG/24 HR (ref 0–20)
EPINEPH UR-MCNC: 4 UG/L
NOREPINEPH 24H UR-MRATE: 166 UG/24 HR (ref 0–135)
NOREPINEPH UR-MCNC: 79 UG/L

## 2021-06-09 NOTE — TELEPHONE ENCOUNTER
Caller: LILY    Relationship: [facility]    Best call back number: [phone]    What orders are you requesting (i.e. lab or imaging): VERBAL ORDERS     In what timeframe would the patient need to come in: 06/09/2021    Where will you receive your lab/imaging services:     Additional notes: VERBAL ORDERS FOR FIELD NURSING AND PHYSICAL THERAPY EVALUATION. NURSE WILL CALL FOR CARE PLAN. PLEASE CALL [facility] TO GIVE VERBAL ORDERS FOR EVALUATION

## 2021-06-10 ENCOUNTER — HOSPITAL ENCOUNTER (OUTPATIENT)
Dept: INFUSION THERAPY | Facility: HOSPITAL | Age: 76
Discharge: HOME | End: 2021-06-10
Admitting: INTERNAL MEDICINE

## 2021-06-10 VITALS
DIASTOLIC BLOOD PRESSURE: 70 MMHG | SYSTOLIC BLOOD PRESSURE: 157 MMHG | HEART RATE: 58 BPM | TEMPERATURE: 97.7 F | RESPIRATION RATE: 20 BRPM | OXYGEN SATURATION: 97 %

## 2021-06-10 DIAGNOSIS — D63.1 ANEMIA DUE TO STAGE 3 CHRONIC KIDNEY DISEASE, UNSPECIFIED WHETHER STAGE 3A OR 3B CKD (HCC): Primary | ICD-10-CM

## 2021-06-10 DIAGNOSIS — N18.30 ANEMIA DUE TO STAGE 3 CHRONIC KIDNEY DISEASE, UNSPECIFIED WHETHER STAGE 3A OR 3B CKD (HCC): Primary | ICD-10-CM

## 2021-06-10 LAB
25(OH)D3 SERPL-MCNC: 60.1 NG/ML (ref 30–100)
ALBUMIN SERPL-MCNC: 3.6 G/DL (ref 3.5–5.2)
ALBUMIN/GLOB SERPL: 1.2 G/DL
ALP SERPL-CCNC: 100 U/L (ref 39–117)
ALT SERPL W P-5'-P-CCNC: 19 U/L (ref 1–33)
ANION GAP SERPL CALCULATED.3IONS-SCNC: 9.2 MMOL/L (ref 5–15)
AST SERPL-CCNC: 31 U/L (ref 1–32)
BILIRUB SERPL-MCNC: 0.3 MG/DL (ref 0–1.2)
BUN SERPL-MCNC: 17 MG/DL (ref 8–23)
BUN/CREAT SERPL: 10.1 (ref 7–25)
CALCIUM SPEC-SCNC: 8.7 MG/DL (ref 8.6–10.5)
CALCIUM SPEC-SCNC: 9 MG/DL (ref 8.6–10.5)
CHLORIDE SERPL-SCNC: 97 MMOL/L (ref 98–107)
CO2 SERPL-SCNC: 30.8 MMOL/L (ref 22–29)
CREAT SERPL-MCNC: 1.68 MG/DL (ref 0.57–1)
DEPRECATED RDW RBC AUTO: 47.2 FL (ref 37–54)
ERYTHROCYTE [DISTWIDTH] IN BLOOD BY AUTOMATED COUNT: 12.9 % (ref 12.3–15.4)
FERRITIN SERPL-MCNC: 115 NG/ML (ref 13–150)
FOLATE SERPL-MCNC: >20 NG/ML (ref 4.78–24.2)
GFR SERPL CREATININE-BSD FRML MDRD: 30 ML/MIN/1.73
GLOBULIN UR ELPH-MCNC: 2.9 GM/DL
GLUCOSE SERPL-MCNC: 116 MG/DL (ref 65–99)
HCT VFR BLD AUTO: 28.3 % (ref 34–46.6)
HGB BLD-MCNC: 9.1 G/DL (ref 12–15.9)
IRON 24H UR-MRATE: 67 MCG/DL (ref 37–145)
IRON SATN MFR SERPL: 28 % (ref 20–50)
MCH RBC QN AUTO: 32.2 PG (ref 26.6–33)
MCHC RBC AUTO-ENTMCNC: 32.2 G/DL (ref 31.5–35.7)
MCV RBC AUTO: 100 FL (ref 79–97)
PHOSPHATE SERPL-MCNC: 3.6 MG/DL (ref 2.5–4.5)
PLATELET # BLD AUTO: 287 10*3/MM3 (ref 140–450)
PMV BLD AUTO: 9.6 FL (ref 6–12)
POTASSIUM SERPL-SCNC: 3.9 MMOL/L (ref 3.5–5.2)
PROT SERPL-MCNC: 6.5 G/DL (ref 6–8.5)
PTH-INTACT SERPL-MCNC: 104 PG/ML (ref 15–65)
RBC # BLD AUTO: 2.83 10*6/MM3 (ref 3.77–5.28)
SODIUM SERPL-SCNC: 137 MMOL/L (ref 136–145)
TIBC SERPL-MCNC: 238 MCG/DL (ref 298–536)
TRANSFERRIN SERPL-MCNC: 160 MG/DL (ref 200–360)
URATE SERPL-MCNC: 7.3 MG/DL (ref 2.4–5.7)
VIT B12 BLD-MCNC: 793 PG/ML (ref 211–946)
WBC # BLD AUTO: 8.3 10*3/MM3 (ref 3.4–10.8)

## 2021-06-10 PROCEDURE — 25010000002 EPOETIN ALFA PER 1000 UNITS: Performed by: INTERNAL MEDICINE

## 2021-06-10 PROCEDURE — 84466 ASSAY OF TRANSFERRIN: CPT | Performed by: INTERNAL MEDICINE

## 2021-06-10 PROCEDURE — 82728 ASSAY OF FERRITIN: CPT | Performed by: INTERNAL MEDICINE

## 2021-06-10 PROCEDURE — 82746 ASSAY OF FOLIC ACID SERUM: CPT | Performed by: INTERNAL MEDICINE

## 2021-06-10 PROCEDURE — 82607 VITAMIN B-12: CPT | Performed by: INTERNAL MEDICINE

## 2021-06-10 PROCEDURE — 83970 ASSAY OF PARATHORMONE: CPT | Performed by: INTERNAL MEDICINE

## 2021-06-10 PROCEDURE — 80053 COMPREHEN METABOLIC PANEL: CPT | Performed by: INTERNAL MEDICINE

## 2021-06-10 PROCEDURE — 83540 ASSAY OF IRON: CPT | Performed by: INTERNAL MEDICINE

## 2021-06-10 PROCEDURE — 84550 ASSAY OF BLOOD/URIC ACID: CPT | Performed by: INTERNAL MEDICINE

## 2021-06-10 PROCEDURE — 96372 THER/PROPH/DIAG INJ SC/IM: CPT

## 2021-06-10 PROCEDURE — 84100 ASSAY OF PHOSPHORUS: CPT | Performed by: INTERNAL MEDICINE

## 2021-06-10 PROCEDURE — 82306 VITAMIN D 25 HYDROXY: CPT | Performed by: INTERNAL MEDICINE

## 2021-06-10 PROCEDURE — 36415 COLL VENOUS BLD VENIPUNCTURE: CPT

## 2021-06-10 PROCEDURE — 85027 COMPLETE CBC AUTOMATED: CPT | Performed by: INTERNAL MEDICINE

## 2021-06-10 RX ADMIN — ERYTHROPOIETIN 20000 UNITS: 20000 INJECTION, SOLUTION INTRAVENOUS; SUBCUTANEOUS at 14:45

## 2021-06-14 RX ORDER — TORSEMIDE 20 MG/1
20 TABLET ORAL DAILY
COMMUNITY
End: 2021-01-01 | Stop reason: SDUPTHER

## 2021-06-15 NOTE — PROGRESS NOTES
Patient's daughter called the after-hours line asking about this patient having involuntary jerking motions that are intermittent.  This patient was recently in the hospital being treated for liver failure, heart problems, renal problems.  I asked that the daughter take her back to the ER as sometimes worsening organ failure can manifest as involuntary twitching or jerking that comes and goes.  She said she  would take her back to [facility] ER to make sure that she is safe.

## 2021-06-17 NOTE — PROGRESS NOTES
Chief Complaint  Hospital Follow Up Visit  Transitional Care Management Certification  I certify that the following are true:  1. Communication was made within 2 business days of discharge.  2. Complexity of Medical Decision Making is high.  3. Face to face visit occurred within 12 days.    *Note: 59546 is for high complexity patients with a face to face visit within 7 days of discharge.  85157 is for high complexity patients with a face to face on days 8-14 post discharge or medium complexity with face to face visit within 14 days post discharge.    Santiago Arguelles presents to [facility]  Patient has a complex medical history previously in the hospital with right renal artery stent placement and follow-up with nephrology.  She has persistent shortness of breath with exertion of any sort.  She has been seen by nephrology who started back on torsemide yesterday.  She has nasal oxygen at home.  O2 sat in the office is 87%.  Chest x-ray PA lateral done in the office shows evidence of cardiomegaly and some increased vascularity consistent with pulmonary edema.  She was originally admitted to the hospital with bradycardia.    Other problem has been repetitive myoclonic jerks.  He has had a pretty normal MRI of the brain in the hospital I reviewed her medications.  In December we placed her on some sertraline an increased dose of 200 mg to try to treat intractable depression.  She is also on venlafaxine.  Cautions were noted back then.  I feel that she is having serotonin syndrome from sertraline plus venlafaxine.  Given the fact that she is on antiplatelet drugs Plavix plus possible increase your time we will cut the Zoloft down to 50 mg from 100 this week and then reduce and to 25 next week with an update from her daughter.      Objective   Vital Signs:   /56 (BP Location: Left arm, Patient Position: Sitting, Cuff Size: Adult)   Pulse 61   Temp 98 °F (36.7 °C)  (Tympanic)   Wt 82.6 kg (182 lb)   SpO2 (!) 84%   BMI 28.51 kg/m²     Physical Exam  Vitals reviewed.   Constitutional:       Appearance: She is well-developed. She is ill-appearing.   HENT:      Head: Normocephalic and atraumatic.      Right Ear: Tympanic membrane and external ear normal.      Left Ear: Tympanic membrane and external ear normal.   Eyes:      Conjunctiva/sclera: Conjunctivae normal.      Pupils: Pupils are equal, round, and reactive to light.   Neck:      Thyroid: No thyromegaly.      Vascular: No JVD.   Cardiovascular:      Rate and Rhythm: Normal rate and regular rhythm.      Heart sounds: Normal heart sounds.   Pulmonary:      Effort: Pulmonary effort is normal.      Breath sounds: Examination of the right-lower field reveals decreased breath sounds. Examination of the left-lower field reveals decreased breath sounds. Decreased breath sounds present.   Abdominal:      General: Bowel sounds are normal.      Palpations: Abdomen is soft.   Musculoskeletal:         General: Normal range of motion.      Cervical back: Normal range of motion and neck supple.   Lymphadenopathy:      Cervical: No cervical adenopathy.   Skin:     General: Skin is warm and dry.      Findings: No rash.   Neurological:      Mental Status: She is alert and oriented to person, place, and time.      Cranial Nerves: No cranial nerve deficit.      Motor: Abnormal muscle tone present.      Coordination: Coordination normal.      Comments: Occasional myoclonic jerks   Psychiatric:         Behavior: Behavior normal.         Thought Content: Thought content normal.         Judgment: Judgment normal.        Result Review :   The following data was reviewed by: Rhys Crowder Jr., MD on 06/17/2021:  Common labs    Common Labsle 6/4/21 6/5/21 6/5/21 6/10/21 6/10/21 6/10/21 6/10/21     0430 0430 1420 1420 1420 1420   Glucose 113 (A)  96  116 (A)     BUN 36 (A)  25 (A)  17     Creatinine 1.80 (A)  1.57 (A)  1.68 (A)     eGFR Non   Am 27 (A)  32 (A)  30 (A)     Sodium 140  138  137     Potassium 3.6  3.6  3.9     Chloride 101  98  97 (A)     Calcium 8.5 (A)  8.3 (A)  9.0  8.7   Albumin 3.40 (A)    3.60     Total Bilirubin     0.3     Alkaline Phosphatase     100     AST (SGOT)     31     ALT (SGPT)     19     WBC  6.99  8.30      Hemoglobin  9.1 (A)  9.1 (A)      Hematocrit  27.9 (A)  28.3 (A)      Platelets  222  287      Uric Acid      7.3 (A)    (A) Abnormal value            Data reviewed: Recent hospitalization notes Hospitalization at [facility] and Plan low  Diagnoses and all orders for this visit:    1. Shortness of breath (Primary)  Comments:  X-ray shows increased pulmonary artery prominence with possible onset of pulmonary edema.  Continue Demadex as per nephrology  Orders:  -     XR Chest PA & Lateral (In Office)    2. Chronic obstructive pulmonary disease, unspecified COPD type (CMS/[facility])  Comments:  Continue nasal oxygen follow-up with pulmonary    3. S/P lobectomy of lung    4. Stage 3b chronic kidney disease (CMS/HCC)  Comments:  Demadex orders/torsemide as per nephrology    5. Current moderate episode of major depressive disorder without prior episode (CMS/HCC)  Comments:  Reduce sertraline to 50 mg daily this week in 1 week cut to 25 mg daily give update at that time.  Continue venlafaxine at current dose.  Orders:  -     LORazepam (ATIVAN) 0.5 MG tablet; Take 1 tablet by mouth Every 8 (Eight) Hours As Needed for Anxiety.  Dispense: 90 tablet; Refill: 1    6. Clonus  Comments:  Reduce sertraline to 50 mg daily for 1 week then reduce to 25 mg daily and then give updated.  Continue venlafaxine.    7. Serotonin syndrome  Comments:  Reduction of Zoloft taper and continue venlafaxine.        Follow Up   Return in about 1 month (around 7/17/2021) for Recheck.  Patient was given instructions and counseling regarding her condition or for health maintenance advice. Please see specific information pulled into the  AVS if appropriate.

## 2021-06-18 NOTE — TELEPHONE ENCOUNTER
Caller: SHONNA CHUA/[facility]    Relationship: Home Health    Best call back number:[phone]    What orders are you requesting (i.e. lab or imaging): HOME HEALTH SKILLED PHYSICAL THERAPY TO EVALUATE EFFECTIVE WEEK June 21    In what timeframe would the patient need to come in: ASAP    Where will you receive your lab/imaging services: AT HOME    Additional notes: SHONNA NEEDS A VERBAL ORDER.

## 2021-06-21 PROBLEM — I70.1 RENAL ARTERY STENOSIS (HCC): Status: ACTIVE | Noted: 2021-01-01

## 2021-06-21 NOTE — PROGRESS NOTES
Date of Office Visit: 2021  Encounter Provider: REENA Daniel  Place of Service: [facility] CARDIOLOGY  Patient Name: Rachana Arguelles  :1945    Chief Complaint   Patient presents with   • Hypertension     hospital follow up    :     HPI: Rachana Arguelles is a 75 y.o. female who presents today for follow-up.  Old records have been obtained and reviewed by me.  She is a patient of Dr. Mac with a past medical history significant for lung cancer, PVCs, hypertension, and peripheral vascular disease (status post bilateral common iliac artery stenting).  She follows with vascular surgery.  She follows with pulmonary for COPD and chronic shortness of breath.  In 2020, she was hospitalized with pneumonia.  At that time, she was also found to have a pericardial effusion for which we were consulted.  The effusion was small and unchanged from previous imaging.  Therefore, no further intervention was recommended.  There were concerns for possible cancer involvement given her past medical history.  However, pulmonary did not feel a bronchoscopy was warranted and an outpatient CT was recommended.   I last saw her in the office in April of this year with a multitude of symptoms including weakness, shortness of breath, and dizziness.  She had previously worn a Holter monitor revealing a predominant rhythm of sinus, no atrial arrhythmias, frequent PVCs, and an average heart rate of 51.  There was a lot of confusion regarding her medications.  Ultimately, I asked her to call me with an updated medication list once they had her actual prescription bottles in front of her.  However, there is still a lot of confusion and after discussion with her PCP, a home health referral was placed for medication management.   On 2021, she came into the ER for dizziness and lightheadedness and was noted to be bradycardic with rates in the 30s and 40s along with occasional Mobitz 1 AV  block.  She was also hypertensive.  She had a prolonged hospitalization.  Regarding the bradycardia, this resolved with the adjustment of medications and the discontinuation of clonidine.  She had significantly uncontrolled and resistant hypertension and was found to have renal artery stenosis.  She developed acute kidney injury likely due to underlying hypertensive nephrosclerosis and nephrology was consulted.  The losartan was discontinued.  At one point, she was on minoxidil.  However, this was discontinued following an echocardiogram showing a moderate pericardial effusion.  On 6/4/2021, she underwent an abdominal aortic angiography, selective bilateral renal artery angiography, and right renal artery stenting.  She was also noted to have left renal artery stenosis for which a staged intervention would be planned.  Her blood pressures stabilized.  On 6/5/2021, she was stable for discharge.  She is here for follow-up.   On 6/172021, she had an appointment with her PCP at which time she reported persistent shortness of breath with any exertion.  She also reported repetitive myoclonic jerks which her PCP felt were due to serotonin syndrome and the Zoloft was decreased.  Chest x-ray showed increased pulmonary artery prominence with possible onset of pulmonary edema.  Her torsemide had been resumed the day before by nephrology.   She feels pretty terrible.  She says she cannot breathe.  Although this has reportedly been going on since her hospitalization in August 2020.  She does feel like it is progressively getting worse.  When she saw all Dr. Dudley on 6/16/2021, torsemide was resumed at 20 mg daily.  She does not feel this has made much of a difference in her symptoms.  She still feels swollen and reports shortness of breath with very mild exertion, including eating and drinking.  She sleeps at an incline and this is unchanged.  She denies any pain, palpitations, syncope, bleeding difficulties or melena.  She  reports chronic dizziness and nausea.  Of note, she does admit to eating a lot more salt and sodium than she even realized.    Past Medical History:   Diagnosis Date   • AAA (abdominal aortic aneurysm) without rupture (CMS/[facility])    • Anemia    • Anxiety    • Arthritis    • Bilateral carotid artery stenosis 8/14/2020   • Cancer (CMS/[facility])     skin cancer   • Chest pain     OCCAS   • Chronic low back pain    • Chronic pain syndrome 3/12/2016   • CKD (chronic kidney disease), stage III (CMS/[facility])    • Claustrophobia 10/26/2015    Resolved   • COPD (chronic obstructive pulmonary disease) (CMS/[facility])    • Depression    • Dizziness    • Dyspnea    • GERD (gastroesophageal reflux disease)    • GI problem    • H/O concussion    • Head trauma     FELL CUT HEAD 12 STAPLES   • Hiatal hernia    • History of migraine headaches    • Hyperlipidemia    • Hypertension    • Lumbar postlaminectomy syndrome 10/26/2015    Resolved   • Lung cancer (CMS/[facility])    • Lung nodule    • Migraines    • Nausea    • Palpitations    • Polypharmacy 4/22/2016   • Pulmonary embolism (CMS/[facility]) 10/26/2015    January 2013 - Resolved, felt to be secondary to estrogen use   • Slow to wake up after anesthesia    • Submandibular sialoadenitis 10/26/2015    Resolved   • Visual changes    • Vitamin B 12 deficiency        Past Surgical History:   Procedure Laterality Date   • ANGIOPLASTY ILIAC ARTERY Bilateral 8/10/2018    Procedure: BILATERAL ILIAC STENTS;  Surgeon: Riki Mancera MD;  Location: [facility] MAIN OR;  Service: Vascular   • APPENDECTOMY     • ARTERIOGRAM N/A 6/4/2021    Procedure: Renal Arteriogram with possible intervention;  Surgeon: Mat Coello MD;  Location: [facility] CATH INVASIVE LOCATION;  Service: Cardiology;  Laterality: N/A;   • BREAST SURGERY      Breast Surgery Reduction Procedure   • BRONCHOSCOPY N/A 2/8/2019    Procedure: BRONCHOSCOPY;  Surgeon: Álvaro Gifford MD;  Location: [facility] ENDOSCOPY;  Service: Pulmonary   • CARDIAC  CATHETERIZATION N/A 6/4/2021    Procedure: Stent BMS peripheral- RENAL-RIGHT;  Surgeon: Mat Coello MD;  Location: [facility] CATH INVASIVE LOCATION;  Service: Cardiology;  Laterality: N/A;   • CHOLECYSTECTOMY     • COLONOSCOPY     • ENDOSCOPY N/A 11/3/2020    Procedure: ESOPHAGOGASTRODUODENOSCOPY with 54 Maldivian vernon dilation;  Surgeon: Yunior Vo MD;  Location: [facility] ENDOSCOPY;  Service: Gastroenterology;  Laterality: N/A;  pre- dysphagia  post- esophageal ring, hiatal hernia   • HYSTERECTOMY     • INTERVENTIONAL RADIOLOGY PROCEDURE N/A 6/4/2021    Procedure: Abdominal Aortogram;  Surgeon: Mat Coello MD;  Location: [facility] CATH INVASIVE LOCATION;  Service: Cardiology;  Laterality: N/A;   • INTUBATION  1/15/2019        • JOINT REPLACEMENT      left knee, hip, shoulder   • LASIK     • LUMBAR FUSION      Lumbar Vertebral Fusion   • RENAL ARTERY STENT Right    • SHOULDER ARTHROSCOPY  04/04/2013    Dr. Carrillo/MERRILL Kent   • THORACOSCOPY VIDEO ASSISTED WITH LOBECTOMY Right 1/11/2019    Procedure: BRONCOSCOPY, THORACOSCOPY VIDEO ASSISTED WITH RIGHT UPPER LOBE WEDGE RESECTION, COMPLETION RIGHT UPPER LOBECTOMY, LYMPH NODE DISSECTION, INTERCOSTAL NERVE BLOCK;  Surgeon: Quita Figueroa MD;  Location: [facility] MAIN OR;  Service: Thoracic   • TONSILLECTOMY     • TOTAL HIP ARTHROPLASTY REVISION Left    • TOTAL KNEE ARTHROPLASTY Left    • TOTAL SHOULDER ARTHROPLASTY Left    • TOTAL SHOULDER ARTHROPLASTY W/ DISTAL CLAVICLE EXCISION Right 6/18/2020    Procedure: RIGHT TOTAL SHOULDER REVERSE ARTHROPLASTY WITH GPS;  Surgeon: Lino Carrillo MD;  Location: [facility] OR [facility];  Service: Orthopedics;  Laterality: Right;       Social History     Socioeconomic History   • Marital status:      Spouse name: Not on file   • Number of children: Not on file   • Years of education: Not on file   • Highest education level: Not on file   Tobacco Use   • Smoking status: Former Smoker     Packs/day: 2.50     Years:  15.00     Pack years: 37.50     Types: Cigarettes     Quit date: 2003     Years since quittin.7   • Smokeless tobacco: Never Used   • Tobacco comment: CAFFEINE USE: 1-2 GLASSES TEA DAILY   Vaping Use   • Vaping Use: Never used   Substance and Sexual Activity   • Alcohol use: No     Comment: occ   • Drug use: Never   • Sexual activity: Defer       Family History   Problem Relation Age of Onset   • Hypertension Mother    • Lung cancer Mother    • Cancer Mother         lung   • Kidney disease Father    • Other Brother         Coronary Artery Bypass Grafting   • Stroke Brother         Cerebrovascular Accident   • Malig Hyperthermia Neg Hx        Review of Systems   Constitutional: Positive for malaise/fatigue.   Cardiovascular: Positive for dyspnea on exertion and leg swelling. Negative for chest pain, orthopnea, paroxysmal nocturnal dyspnea and syncope.   Respiratory: Positive for shortness of breath.    Hematologic/Lymphatic: Negative for bleeding problem.   Musculoskeletal: Negative for falls.   Gastrointestinal: Positive for bloating and nausea. Negative for melena.   Neurological: Positive for dizziness. Negative for light-headedness.       Allergies   Allergen Reactions   • Neurontin [Gabapentin] Confusion   • Morphine Other (See Comments)     HEADACHE         Current Outpatient Medications:   •  amLODIPine (NORVASC) 10 MG tablet, Take 10 mg by mouth Daily. Take with 5mg tablet to make 15mg dose, Disp: , Rfl:   •  ascorbic acid (VITAMIN C) 500 MG tablet, Take 500 mg by mouth Daily., Disp: , Rfl:   •  atorvastatin (LIPITOR) 40 MG tablet, Take 40 mg by mouth Every Night., Disp: , Rfl:   •  carvedilol (COREG) 25 MG tablet, Take 1 tablet by mouth 2 (Two) Times a Day With Meals for 30 days., Disp: 60 tablet, Rfl: 0  •  clopidogrel (PLAVIX) 75 MG tablet, Take 75 mg by mouth Daily., Disp: , Rfl:   •  cyclobenzaprine (FLEXERIL) 10 MG tablet, Take 0.5 tablets by mouth 3 (Three) Times a Day As Needed for Muscle  Spasms., Disp: , Rfl:   •  docusate sodium (COLACE) 100 MG capsule, Take 100 mg by mouth 2 (Two) Times a Day As Needed for Constipation., Disp: , Rfl:   •  famotidine (PEPCID) 40 MG tablet, Take 40 mg by mouth 2 (Two) Times a Day., Disp: , Rfl:   •  fexofenadine (Allegra Allergy) 180 MG tablet, Take 1 tablet by mouth Daily As Needed (allergies)., Disp: 90 tablet, Rfl: 2  •  fluticasone-salmeterol (Advair Diskus) 250-50 MCG/DOSE DISKUS, Inhale 2 puffs 2 (Two) Times a Day., Disp: , Rfl:   •  folic acid (FOLVITE) 1 MG tablet, TAKE 1 TABLET BY MOUTH EVERY DAY (Patient taking differently: Take 1 mg by mouth Daily.), Disp: 30 tablet, Rfl: 2  •  hydrALAZINE (APRESOLINE) 100 MG tablet, Take 1 tablet by mouth Every 8 (Eight) Hours for 30 days., Disp: 90 tablet, Rfl: 0  •  LORazepam (ATIVAN) 0.5 MG tablet, Take 1 tablet by mouth Every 8 (Eight) Hours As Needed for Anxiety., Disp: 90 tablet, Rfl: 1  •  omeprazole (priLOSEC) 40 MG capsule, TAKE 1 CAPSULE BY MOUTH EVERY DAY, Disp: 90 capsule, Rfl: 1  •  ondansetron (ZOFRAN) 4 MG tablet, Take 1-2 tablets by mouth Every 8 (Eight) Hours As Needed for Nausea or Vomiting., Disp: 60 tablet, Rfl: 1  •  oxyCODONE (ROXICODONE) 20 MG tablet, Take 20 mg by mouth Every 4 (Four) Hours As Needed. No more than 5 per day, Disp: , Rfl:   •  polyethylene glycol (MiraLax) 17 g packet, Take 17 g by mouth Daily., Disp: , Rfl:   •  sertraline (Zoloft) 100 MG tablet, Take 1 tablet by mouth Daily., Disp: 90 tablet, Rfl: 3  •  sodium bicarbonate 650 MG tablet, Take 2 tablets by mouth 3 (Three) Times a Day for 30 days., Disp: 180 tablet, Rfl: 0  •  sucralfate (CARAFATE) 1 g tablet, Take 1 tablet by mouth 2 (two) times a day., Disp: 180 tablet, Rfl: 3  •  terazosin (HYTRIN) 5 MG capsule, Take 1 capsule by mouth Every Night for 30 days., Disp: 30 capsule, Rfl: 0  •  torsemide (DEMADEX) 20 MG tablet, Take 2 tablets by mouth Daily., Disp: 60 tablet, Rfl: 1  •  traZODone (DESYREL) 150 MG tablet, TAKE 0.5  "TABLETS BY MOUTH EVERY NIGHT (Patient taking differently: Take 75 mg by mouth Every Night.), Disp: 45 tablet, Rfl: 1  •  venlafaxine XR (EFFEXOR-XR) 150 MG 24 hr capsule, TAKE 1 CAPSULE BY MOUTH DAILY. SWALLOW WHOLE DO NOT CRUSH OR CHEW. (Patient taking differently: Take 150 mg by mouth Daily.), Disp: 90 capsule, Rfl: 2  •  Vitamin B-2 (RIBOFLAVIN) 100 MG tablet tablet, Take 4 tablets by mouth Daily for 22 doses., Disp: 88 tablet, Rfl: 0  •  vitamin D (ERGOCALCIFEROL) 1.25 MG (72728 UT) capsule capsule, Take 50,000 Units by mouth Every 7 (Seven) Days. Takes on Wednesdays, Disp: , Rfl:       Objective:     Vitals:    06/21/21 1506   BP: 138/60   Pulse: 62   Weight: 81.6 kg (180 lb)   Height: 170.2 cm (67\")     Body mass index is 28.19 kg/m².    PHYSICAL EXAM:    Neck:      Vascular: No JVD.   Pulmonary:      Effort: Pulmonary effort is normal.      Breath sounds: Examination of the right-lower field reveals decreased breath sounds and rales. Examination of the left-lower field reveals decreased breath sounds and rales. Decreased breath sounds present. Rales present.   Cardiovascular:      Normal rate. Regular rhythm.      Murmurs: There is a systolic murmur.      No gallop. No click. No rub.   Pulses:     Intact distal pulses.   Neurological:      Mental Status: Lethargic.           ECG 12 Lead    Date/Time: 6/21/2021 3:22 PM  Performed by: Mary Haddad APRN  Authorized by: Mary Haddad APRN   Comparison: compared with previous ECG from 5/31/2021  Similar to previous ECG  Rhythm: sinus rhythm  Rate: normal  BPM: 62  Other findings: non-specific ST-T wave changes    Clinical impression: abnormal EKG  Comments: Indication: Hypertension              Assessment:       Diagnosis Plan   1. Essential hypertension  ECG 12 Lead   2. Renal artery stenosis (CMS/HCC)     3. Stage 3b chronic kidney disease (CMS/HCC)     4. Dizziness     5. Shortness of breath  Antihistone Antibodies   6. Pericardial effusion  " Antihistone Antibodies     Orders Placed This Encounter   Procedures   • Antihistone Antibodies     Standing Status:   Future     Number of Occurrences:   1     Standing Expiration Date:   6/21/2022     Order Specific Question:   Release to patient     Answer:   Immediate   • ECG 12 Lead     This order was created via procedure documentation     Order Specific Question:   Release to patient     Answer:   Immediate          Plan:       1.  Hypertension.  Her blood pressure actually looks pretty good today.  She is on amlodipine 10 mg, carvedilol 25 mg twice daily, Terazosin 5 mg, and hydralazine 100 mg 3 times daily.      2.  Renal artery stenosis.  Status post right renal artery stenting.  She was also noted to have left renal artery stenosis for which a staged intervention was recommended.  Given her renal function and the fact that her blood pressure has stabilized, I think we can hold off for now.      3.  Congestive heart failure/shortness of breath.  I think we are dealing with some diastolic dysfunction here.  I spoke with Dr. Dudley on the phone.  Per her recommendation, I will increase the torsemide to 40 mg daily.  She is already scheduled to see nephrology on Friday and is also scheduled for repeat lab work next week.  I also educated her on the importance of sodium discretion.      4.  Pericardial effusion.  She was first noted to have a pericardial effusion in August 2020.  During the most recent hospitalization, a repeat echocardiogram revealed a moderate sized pericardial effusion.  She is on hydralazine, and I am wondering if there is some component of medication induced lupus.  Therefore, I will order antihistone antibodies for assessment.      Further recommendations will be made pending the above.  Otherwise, she will follow-up with Dr. Coello in 1 month.      I spent  48 minutes in total including but not limited to the 15 minutes spent in direct conversation with this patient.       As always, it  has been a pleasure to participate in your patient's care.      Sincerely,         REENA Burr

## 2021-06-21 NOTE — TELEPHONE ENCOUNTER
Caller: SHONNA    Relationship to patient: Other    Best call back number: [phone]    Patient is needing: PATIENT IS REFUSING PHYSICAL THERAPY THIS WEEK AND LAST WEEK. SHE IS REQUESTING NEXT WEEK. SHONNA SAID IF SHE REFUSES AGAIN THEN SHE WILL HAVE TO DISCHARGE THE PATIENT. PLEASE CALL AND ADVISE.

## 2021-06-24 NOTE — TELEPHONE ENCOUNTER
I spoke with the patient's daughter Katia regarding the normal antihistone antibodies.  Madonna tells me the patient is actually on her way to the emergency room at the instruction of her nephrologist.

## 2021-07-01 PROBLEM — N17.9 ACUTE KIDNEY INJURY (HCC): Status: ACTIVE | Noted: 2021-01-01

## 2021-07-01 NOTE — H&P
HISTORY AND PHYSICAL   [facility]        Patient Identification:  Name: Rachana Arguelles  Age: 75 y.o.  Sex: female  :  1945  MRN: [id number]                     Primary Care Physician: Rhys Crowder Jr., MD    Chief Complaint:  75 year old female who was admitted per nephrology request; she has a history of ckd but was in acute renal failure and had evidence of chf; she is followed by dr sosa; she denies fever or chills; she has been short of air; she was seen in the ED recently for the same    History of Present Illness:   As above    Past Medical History:  Past Medical History:   Diagnosis Date   • AAA (abdominal aortic aneurysm) without rupture (CMS/HCC)    • Anemia    • Anxiety    • Arthritis    • Bilateral carotid artery stenosis 2020   • Cancer (CMS/HCC)     skin cancer   • Chest pain     OCCAS   • Chronic low back pain    • Chronic pain syndrome 3/12/2016   • CKD (chronic kidney disease), stage III (CMS/HCC)    • Claustrophobia 10/26/2015    Resolved   • COPD (chronic obstructive pulmonary disease) (CMS/HCC)    • Depression    • Dizziness    • Dyspnea    • GERD (gastroesophageal reflux disease)    • GI problem    • H/O concussion    • Head trauma     FELL CUT HEAD 12 STAPLES   • Hiatal hernia    • History of migraine headaches    • Hyperlipidemia    • Hypertension    • Lumbar postlaminectomy syndrome 10/26/2015    Resolved   • Lung cancer (CMS/HCC)    • Lung nodule    • Migraines    • Nausea    • Palpitations    • Polypharmacy 2016   • Pulmonary embolism (CMS/HCC) 10/26/2015    2013 - Resolved, felt to be secondary to estrogen use   • Slow to wake up after anesthesia    • Submandibular sialoadenitis 10/26/2015    Resolved   • Visual changes    • Vitamin B 12 deficiency      Past Surgical History:  Past Surgical History:   Procedure Laterality Date   • ANGIOPLASTY ILIAC ARTERY Bilateral 8/10/2018    Procedure: BILATERAL ILIAC STENTS;  Surgeon: Riki Mancera MD;   Location: [facility] MAIN OR;  Service: Vascular   • APPENDECTOMY     • ARTERIOGRAM N/A 6/4/2021    Procedure: Renal Arteriogram with possible intervention;  Surgeon: Mat Coello MD;  Location:  [facility] CATH INVASIVE LOCATION;  Service: Cardiology;  Laterality: N/A;   • BREAST SURGERY      Breast Surgery Reduction Procedure   • BRONCHOSCOPY N/A 2/8/2019    Procedure: BRONCHOSCOPY;  Surgeon: Álvaro Gifford MD;  [facility];  Service: Pulmonary   • CARDIAC CATHETERIZATION N/A 6/4/2021    Procedure: Stent BMS peripheral- RENAL-RIGHT;  Surgeon: Mat Coello MD;  [facility];  Service: Cardiology;  Laterality: N/A;   • CHOLECYSTECTOMY     • COLONOSCOPY     • ENDOSCOPY N/A 11/3/2020    Procedure: ESOPHAGOGASTRODUODENOSCOPY with 54 Japanese vernon dilation;  Surgeon: Yunior Vo MD;  Location: [facility] ENDOSCOPY;  Service: Gastroenterology;  Laterality: N/A;  pre- dysphagia  post- esophageal ring, hiatal hernia   • HYSTERECTOMY     • INTERVENTIONAL RADIOLOGY PROCEDURE N/A 6/4/2021    Procedure: Abdominal Aortogram;  Surgeon: Mat Coello MD;  Location: [facility] CATH INVASIVE LOCATION;  Service: Cardiology;  Laterality: N/A;   • INTUBATION  1/15/2019        • JOINT REPLACEMENT      left knee, hip, shoulder   • LASIK     • LUMBAR FUSION      Lumbar Vertebral Fusion   • RENAL ARTERY STENT Right    • SHOULDER ARTHROSCOPY  04/04/2013    Dr. Carrillo/[facility] [place]   • THORACOSCOPY VIDEO ASSISTED WITH LOBECTOMY Right 1/11/2019    Procedure: BRONCOSCOPY, THORACOSCOPY VIDEO ASSISTED WITH RIGHT UPPER LOBE WEDGE RESECTION, COMPLETION RIGHT UPPER LOBECTOMY, LYMPH NODE DISSECTION, INTERCOSTAL NERVE BLOCK;  Surgeon: Quita Figueroa MD;  Location: [facility] MAIN OR;  Service: Thoracic   • TONSILLECTOMY     • TOTAL HIP ARTHROPLASTY REVISION Left    • TOTAL KNEE ARTHROPLASTY Left    • TOTAL SHOULDER ARTHROPLASTY Left    • TOTAL SHOULDER ARTHROPLASTY W/ DISTAL CLAVICLE EXCISION  Right 6/18/2020    Procedure: RIGHT TOTAL SHOULDER REVERSE ARTHROPLASTY WITH GPS;  Surgeon: Lino Carrillo MD;  Location: [facility] OR [facility];  Service: Orthopedics;  Laterality: Right;      Home Meds:  Medications Prior to Admission   Medication Sig Dispense Refill Last Dose   • amLODIPine (NORVASC) 10 MG tablet Take 10 mg by mouth Daily. Take with 5mg tablet to make 15mg dose   7/1/2021 at morning   • ascorbic acid (VITAMIN C) 500 MG tablet Take 500 mg by mouth Daily.   6/30/2021 at Unknown time   • atorvastatin (LIPITOR) 40 MG tablet Take 40 mg by mouth Every Night.   6/30/2021 at Unknown time   • carvedilol (COREG) 25 MG tablet Take 1 tablet by mouth 2 (Two) Times a Day With Meals for 30 days. 60 tablet 0 7/1/2021 at Unknown time   • clopidogrel (PLAVIX) 75 MG tablet Take 75 mg by mouth Daily.   7/1/2021 at Unknown time   • cyclobenzaprine (FLEXERIL) 10 MG tablet Take 0.5 tablets by mouth 3 (Three) Times a Day As Needed for Muscle Spasms.   7/1/2021 at Unknown time   • docusate sodium (COLACE) 100 MG capsule Take 100 mg by mouth 2 (Two) Times a Day As Needed for Constipation.   Past Week at Unknown time   • famotidine (PEPCID) 40 MG tablet Take 40 mg by mouth 2 (Two) Times a Day.   7/1/2021 at Unknown time   • fexofenadine (Allegra Allergy) 180 MG tablet Take 1 tablet by mouth Daily As Needed (allergies). 90 tablet 2 Past Month at Unknown time   • fluticasone-salmeterol (Advair Diskus) 250-50 MCG/DOSE DISKUS Inhale 2 puffs 2 (Two) Times a Day.   6/30/2021 at Unknown time   • folic acid (FOLVITE) 1 MG tablet TAKE 1 TABLET BY MOUTH EVERY DAY (Patient taking differently: Take 1 mg by mouth Daily.) 30 tablet 2 7/1/2021 at Unknown time   • hydrALAZINE (APRESOLINE) 100 MG tablet Take 1 tablet by mouth Every 8 (Eight) Hours for 30 days. 90 tablet 0 7/1/2021 at Unknown time   • LORazepam (ATIVAN) 0.5 MG tablet Take 1 tablet by mouth Every 8 (Eight) Hours As Needed for Anxiety. 90 tablet 1 6/30/2021 at Unknown time   •  omeprazole (priLOSEC) 40 MG capsule TAKE 1 CAPSULE BY MOUTH EVERY DAY 90 capsule 1 7/1/2021 at Unknown time   • ondansetron (ZOFRAN) 4 MG tablet Take 1-2 tablets by mouth Every 8 (Eight) Hours As Needed for Nausea or Vomiting. 60 tablet 1 6/30/2021 at Unknown time   • oxyCODONE (ROXICODONE) 20 MG tablet Take 20 mg by mouth Every 4 (Four) Hours As Needed. No more than 5 per day   7/1/2021 at Unknown time   • polyethylene glycol (MiraLax) 17 g packet Take 17 g by mouth Daily.   6/30/2021 at Unknown time   • sertraline (Zoloft) 100 MG tablet Take 1 tablet by mouth Daily. (Patient taking differently: Take 50 mg by mouth Daily.) 90 tablet 3 7/1/2021 at Unknown time   • sodium bicarbonate 650 MG tablet Take 2 tablets by mouth 3 (Three) Times a Day for 30 days. 180 tablet 0 7/1/2021 at Unknown time   • sucralfate (CARAFATE) 1 g tablet Take 1 tablet by mouth 2 (two) times a day. 180 tablet 3 Past Month at Unknown time   • terazosin (HYTRIN) 5 MG capsule Take 1 capsule by mouth Every Night for 30 days. 30 capsule 0 6/30/2021 at Unknown time   • torsemide (DEMADEX) 20 MG tablet Take 2 tablets by mouth 2 (two) times a day. 60 tablet 0 7/1/2021 at Unknown time   • traZODone (DESYREL) 150 MG tablet TAKE 0.5 TABLETS BY MOUTH EVERY NIGHT (Patient taking differently: Take 75 mg by mouth Every Night.) 45 tablet 1 6/30/2021 at Unknown time   • venlafaxine XR (EFFEXOR-XR) 150 MG 24 hr capsule TAKE 1 CAPSULE BY MOUTH DAILY. SWALLOW WHOLE DO NOT CRUSH OR CHEW. (Patient taking differently: Take 150 mg by mouth Daily.) 90 capsule 2 7/1/2021 at Unknown time   • vitamin D (ERGOCALCIFEROL) 1.25 MG (58176 UT) capsule capsule Take 50,000 Units by mouth Every 7 (Seven) Days. Takes on Wednesdays 6/30/2021 at Unknown time       Allergies:  Allergies   Allergen Reactions   • Neurontin [Gabapentin] Confusion   • Morphine Other (See Comments)     HEADACHE     Immunizations:  Immunization History   Administered Date(s) Administered   • FLUAD TRI  65YR+ 10/29/2019   • Fluad Quad 65+ 2020   • Fluzone High Dose =>65 Years ([employer] ONLY) 10/05/2017, 10/29/2019   • Pneumococcal Conjugate 13-Valent (PCV13) 2018, 2018   • Pneumococcal Polysaccharide (PPSV23) 2016   • Tdap 2016, 2019     Social History:   Social History     Social History Narrative   • Not on file     Social History     Socioeconomic History   • Marital status:      Spouse name: Not on file   • Number of children: Not on file   • Years of education: Not on file   • Highest education level: Not on file   Tobacco Use   • Smoking status: Former Smoker     Packs/day: 2.50     Years: 15.00     Pack years: 37.50     Types: Cigarettes     Quit date: 2003     Years since quittin.7   • Smokeless tobacco: Never Used   • Tobacco comment: CAFFEINE USE: 1-2 GLASSES TEA DAILY   Vaping Use   • Vaping Use: Never used   Substance and Sexual Activity   • Alcohol use: No     Comment: occ   • Drug use: Never   • Sexual activity: Defer       Family History:  Family History   Problem Relation Age of Onset   • Hypertension Mother    • Lung cancer Mother    • Cancer Mother         lung   • Kidney disease Father    • Other Brother         Coronary Artery Bypass Grafting   • Stroke Brother         Cerebrovascular Accident   • Malig Hyperthermia Neg Hx         Review of Systems  See history of present illness and past medical history.  Patient denies headache, dizziness, syncope, falls, trauma, change in vision, change in hearing, change in taste, changes in weight, changes in appetite, focal weakness, numbness, or paresthesia.  Patient denies chest pain, palpitations, dyspnea, orthopnea, PND, cough, sinus pressure, rhinorrhea, epistaxis, hemoptysis, nausea, vomiting,hematemesis, diarrhea, constipation or hematchezia.  Denies cold or heat intolerance, polydipsia, polyuria, polyphagia. Denies hematuria, pyuria, dysuria, hesitancy, frequency or urgency. Denies consumption of  raw and under cooked meats foods or change in water source.  Denies fever, chills, sweats, night sweats.  Denies missing any routine medications. Remainder of ROS is negative.    Objective:  T Max 24 hrs: Temp (24hrs), Av °F (36.7 °C), Min:97.5 °F (36.4 °C), Max:98.5 °F (36.9 °C)    Vitals Ranges:   Temp:  [97.5 °F (36.4 °C)-98.5 °F (36.9 °C)] 98.5 °F (36.9 °C)  Heart Rate:  [70-89] 89  Resp:  [16] 16  BP: (134-140)/(49-71) 134/71      Exam:  /71 (BP Location: Left arm, Patient Position: Lying)   Pulse 89   Temp 98.5 °F (36.9 °C) (Oral)   Resp 16   SpO2 96%     General Appearance:    Alert, cooperative, no distress, appears stated age   Head:    Normocephalic, without obvious abnormality, atraumatic   Eyes:    PERRL, conjunctivae/corneas clear, EOM's intact, both eyes   Ears:    Normal external ear canals, both ears   Nose:   Nares normal, septum midline, mucosa normal, no drainage    or sinus tenderness   Throat:   Lips, mucosa, and tongue normal   Neck:   Supple, symmetrical, trachea midline, no adenopathy;     thyroid:  no enlargement/tenderness/nodules; no carotid    bruit or JVD   Back:     Symmetric, no curvature, ROM normal, no CVA tenderness   Lungs:     Clear to auscultation bilaterally, respirations unlabored   Chest Wall:    No tenderness or deformity    Heart:    Regular rate and rhythm, S1 and S2 normal, no murmur, rub   or gallop   Abdomen:     Soft, nontender, bowel sounds active all four quadrants,     no masses, no hepatomegaly, no splenomegaly   Extremities:   Extremities normal, atraumatic, no cyanosis or edema   Pulses:   2+ and symmetric all extremities   Skin:   Skin color, texture, turgor normal, no rashes or lesions   Lymph nodes:   Cervical, supraclavicular, and axillary nodes normal   Neurologic:   CNII-XII intact, normal strength, sensation intact throughout      .    Data Review:  Labs in chart were reviewed.  Hemoglobin   Date Value Ref Range Status   2021 9.3 (L)  12.0 - 15.9 g/dL Final     Hematocrit   Date Value Ref Range Status   07/01/2021 28.9 (L) 34.0 - 46.6 % Final     Sodium   Date Value Ref Range Status   07/01/2021 143 136 - 145 mmol/L Final     Potassium   Date Value Ref Range Status   07/01/2021 3.3 (L) 3.5 - 5.2 mmol/L Final     Chloride   Date Value Ref Range Status   07/01/2021 101 98 - 107 mmol/L Final     CO2   Date Value Ref Range Status   07/01/2021 26.3 22.0 - 29.0 mmol/L Final     BUN   Date Value Ref Range Status   07/01/2021 31 (H) 8 - 23 mg/dL Final     Creatinine   Date Value Ref Range Status   07/01/2021 3.96 (H) 0.57 - 1.00 mg/dL Final     Glucose   Date Value Ref Range Status   07/01/2021 136 (H) 65 - 99 mg/dL Final     Calcium   Date Value Ref Range Status   07/01/2021 8.4 (L) 8.6 - 10.5 mg/dL Final     No results found for: AST, ALT, ALKPHOS             Imaging Results (All)     None        Patient Active Problem List   Diagnosis Code   • Anxiety F41.9   • Chronic pain syndrome G89.4   • Depression F32.9   • Gastroesophageal reflux disease K21.9   • Hyperlipidemia E78.5   • Hypertension I10   • Osteoarthritis of hip M16.9   • Chronic low back pain M54.5, G89.29   • Failed back syndrome of lumbar spine M96.1   • Pulmonary embolus (CMS/[facility]) I26.99   • Weight loss R63.4   • Polypharmacy Z79.899   • Stage 3b chronic kidney disease (CMS/HCC) N18.32   • Chronic constipation K59.09   • Sacroiliac joint pain M53.3   • Mixed incontinence N39.46   • Renal cyst N28.1   • Achilles tendonitis M76.60   • Atherosclerosis of native artery of left lower extremity with intermittent claudication (CMS/[facility]) I70.212   • Morbidly obese (CMS/HCC) E66.01   • Lung nodule R91.1   • Malignant neoplasm of right upper lobe of lung (CMS/HCC) C34.11   • Lung cancer (CMS/HCC) C34.90   • Moderate single current episode of major depressive disorder (CMS/HCC) F32.1   • Palpitations R00.2   • Encounter for screening for malignant neoplasm of colon Z12.11   • Hematoma of scalp  S00.03XA   • Acute neck pain M54.2   • Dizziness R42   • Unstable cervical spine M53.2X2   • Postconcussive syndrome F07.81   • Positive colorectal cancer screening using Cologuard test R19.5   • Dysphagia R13.10   • S/P reverse total shoulder arthroplasty, right Z96.611   • Chronic post-traumatic headache, not intractable G44.329   • Medication overuse headache G44.40   • Migraine without aura and without status migrainosus, not intractable G43.009   • Shortness of breath R06.02   • PAD (peripheral artery disease) (CMS/HCC) I73.9   • Bilateral carotid artery stenosis I65.23   • S/P lobectomy of lung Z90.2   • YVONNE (acute kidney injury) (CMS/[facility]) N17.9   • Anemia D64.9   • Frequent PVCs I49.3   • Pneumonia of both lower lobes due to infectious organism J18.9   • History of lung cancer Z85.118   • Hypertensive urgency I16.0   • Bradycardia, sinus R00.1   • YVONNE (acute kidney injury) (CMS/HCC) N17.9   • Opioid dependence (CMS/[facility]) F11.20   • COPD (chronic obstructive pulmonary disease) (CMS/[facility]) J44.9   • Renal artery stenosis (CMS/[facility]) I70.1   • Acute kidney injury (CMS/HCC) N17.9       Assessment:    Acute kidney injury (CMS/[facility])  copd  Anemia  Hypertension  Hyperlipidemia  pad    Plan:  Ask nephrology to see her  Check labs  Monitor on telemetry  Diurese  Ask cardiology to see her  Check troponins  D.w patient    Starla Danielle MD  7/1/2021  19:36 EDT

## 2021-07-01 NOTE — PLAN OF CARE
Goal Outcome Evaluation:   VSS.  Pt awake and oriented.  Hx and current shortness of air on exertion and chronic pain.  Skin intact.  Pt and daughter excellent historians, safety maintained.

## 2021-07-02 NOTE — PLAN OF CARE
Goal Outcome Evaluation:  Plan of Care Reviewed With: patient           Outcome Summary: Pt is a 75 y.o female admitted to [facility] with YVONNE, pt with hx of COPD and on 2L of O2 during encounter, reports she wears O2 at night and at times during day at home. Pt reports spouse assists her with ADL and she fatiques easily and requires significant extra time if attempt to complete independently. Pt reports spouse just had recent surgery/health issues and can not assist much physcially at home. Pt uses rwx for mobility in household and w/c for community mobility, pt has a shower chair as well. Pt presents with generalized weakness and activity tolerance deficits, SOB with activity. Pt would benefit from continued OT to increase independence with ADL and mobility. Educated in energy conservation technique and UE exercises this date where pt completed light reps 2 sets of 5 reps. Up to sink with X1 assist for ADL but fatiques very quickly. Recommend SNF vs. home with HH.    Appropriate PPE worn during encounter including gloves, mask, and eye protection. Hand hygiene completed. Pt did not wear a mask.

## 2021-07-02 NOTE — CASE MANAGEMENT/SOCIAL WORK
[facility]charge Planning Assessment  [facility]     Patient Name: Rachana Arguelles  MRN: [id number]  Today's Date: 7/2/2021    Admit Date: 7/1/2021    Discharge Needs Assessment     Row Name 07/02/21 1735       Living Environment    Lives With  spouse    Current Living Arrangements  home/apartment/condo    Primary Care Provided by  self    Provides Primary Care For  no one, unable/limited ability to care for self    Family Caregiver if Needed  child(flaca), adult;spouse    Family Caregiver Names  Rai Arguelles, spouse, [phone]    Quality of Family Relationships  helpful;involved;supportive    Able to Return to Prior Arrangements  yes       Resource/Environmental Concerns    Resource/Environmental Concerns  none    Transportation Concerns  car, none       Transition Planning    Patient/Family Anticipates Transition to  home with help/services;home with family    Patient/Family Anticipated Services at Transition  none    Transportation Anticipated  family or friend will provide       Discharge Needs Assessment    Readmission Within the Last 30 Days  previous discharge plan unsuccessful    Current Outpatient/Agency/Support Group  homecare agency    Equipment Currently Used at Home  walker, rolling;shower chair;oxygen;grab bar    Concerns to be Addressed  denies needs/concerns at this time    Anticipated Changes Related to Illness  none    Equipment Needed After Discharge  none    Outpatient/Agency/Support Group Needs  homecare agency    Discharge Facility/Level of Care Needs  home with home health    Provided Post Acute Provider List?  N/A    N/A Provider List Comment  patient is current with [facility] and plans to resume services        Discharge Plan     Row Name 07/02/21 1733       Plan    Plan  Plans for home via spouse. Current with [facility]     Patient/Family in Agreement with Plan  yes    Plan Comments  CCP met with patient at bedside. Introduced self, explained role, and verified face sheet. Patient sees Rhys Crowder  for PCP and fills prescriptions at [facility] [address] [facility]. Her daughter assists her with remembering to take her medications with a pill organizer. Patient has 0 exterior steps to her home and 3 steps to access her bedroom. She lives with her . She does not drive. Her  assists her with transportation to appointments, picking up medications, and grocery shopping. She is dependent on her spouse for ADLs and he also assists her with bathing. For DME patient has a walker, nocturnal o2 through goulds, shower chair, and grab bars. She is current with [facility] and plans to continue services. She has no SNF history. At discharge she plans to return home via her spouse who can assist her at home as needed. Zuly Willard RN/CCP        Continued Care and Services - Admitted Since 7/1/2021     Home Medical Care     Service Provider Request Status Selected Services Address Phone Fax Patient Preferred    [facility]  Pending - Request Sent N/A [address] [place], [place] [id number] [phone] --            Selected Continued Care - Prior Encounters Includes selections from prior encounters from 4/2/2021 to 7/2/2021    Discharged on 6/5/2021 Admission date: 5/25/2021 - Discharge disposition: Home-Health Care [facility]    Home Medical Care     Service Provider Selected Services Address Phone Fax Patient Preferred    [facility]  Home Health Services [address] [id number] [phone] --       Internal Comment last updated by Killian Austin RN 5/27/2021 1405    [facility] Killian Austin RN                                   Demographic Summary     Row Name 07/02/21 7177       General Information    Admission Type  inpatient    Arrived From  home    Required Notices Provided  Important Message from Medicare    Referral Source  admission list    Reason for  Consult  discharge planning    Preferred Language  English        Functional Status     Row Name 07/02/21 1734       Functional Status    Usual Activity Tolerance  moderate    Current Activity Tolerance  moderate       Functional Status, IADL    Medications  assistive person    Meal Preparation  assistive person    Housekeeping  assistive person    Laundry  assistive person    Shopping  assistive person        Psychosocial    No documentation.       Abuse/Neglect    No documentation.       Legal    No documentation.       Substance Abuse    No documentation.       Patient Forms    No documentation.           Judit Willard

## 2021-07-02 NOTE — PROGRESS NOTES
DAILY PROGRESS NOTE  [facility]    Patient Identification:  Name: Rachana Arguelles  Age: 75 y.o.  Sex: female  :  1945  MRN: [id number]         Primary Care Physician: Rhys Crowder Jr., MD    Subjective:  Interval History: Her main complaint is fatigue.  She admits to some nausea but denies any emesis or abdominal pain.  Denies any chest pain.  She was currently resting completely supine and she states that she is not struggling for air at this time.  Denies any confusion    Objective: Interactive and conversational and pleasant.  No family at bedside.  Nontoxic in the appearance though elderly and frail    Scheduled Meds:amLODIPine, 10 mg, Oral, Daily  ascorbic acid, 500 mg, Oral, Daily  atorvastatin, 40 mg, Oral, Nightly  budesonide-formoterol, 2 puff, Inhalation, BID - RT  bumetanide, 4 mg, Intravenous, Q8H  carvedilol, 25 mg, Oral, BID With Meals  cetirizine, 5 mg, Oral, Daily  clopidogrel, 75 mg, Oral, Daily  [START ON 7/3/2021] famotidine, 20 mg, Oral, Daily  folic acid, 1 mg, Oral, Daily  hydrALAZINE, 100 mg, Oral, Q8H  polyethylene glycol, 17 g, Oral, Daily  sertraline, 50 mg, Oral, Daily  sodium bicarbonate, 1,300 mg, Oral, TID  sucralfate, 1 g, Oral, BID AC  terazosin, 5 mg, Oral, Nightly  traZODone, 75 mg, Oral, Nightly  venlafaxine XR, 150 mg, Oral, Daily      Continuous Infusions:     Vital signs in last 24 hours:  Temp:  [98.5 °F (36.9 °C)-99 °F (37.2 °C)] 99 °F (37.2 °C)  Heart Rate:  [64-89] 67  Resp:  [16-18] 18  BP: (124-148)/(50-99) 124/63    Intake/Output:    Intake/Output Summary (Last 24 hours) at 2021 1435  Last data filed at 2021 1340  Gross per 24 hour   Intake 1010 ml   Output 400 ml   Net 610 ml       Exam:  /63 (BP Location: Left arm, Patient Position: Standing)   Pulse 67   Temp 99 °F (37.2 °C) (Oral)   Resp 18   Wt 80.5 kg (177 lb 7.5 oz)   SpO2 92%   BMI 27.80 kg/m²     General Appearance:    Alert, cooperative, AAOx3                           Head:    Normocephalic, without obvious abnormality, atraumatic                           Eyes:    PERRL, conjunctivae/corneas clear, EOM's intact, both eyes                         Throat:   Oral mucosa pink and moist                           Neck:   Supple, symmetrical, trachea midline, no JVD                         Lungs:    Decreased bases with some rales to auscultation bilaterally, respirations unlabored                 Chest Wall:    No tenderness or deformity                          Heart:    Regular rate and rhythm, S1 and S2 normal                  Abdomen:     Soft, nontender, bowel sounds active                 Extremities: Moving all, no cyanosis or edema                        Pulses:   Pulses palpable in lower extremities                              Data Review:  Labs in chart were reviewed.    Assessment:  Active Hospital Problems    Diagnosis  POA   • **Acute kidney injury (CMS/[facility]) [N17.9]  Yes   • COPD (chronic obstructive pulmonary disease) (CMS/[facility]) [J44.9]  Yes   • PAD (peripheral artery disease) (CMS/[facility]) [I73.9]  Yes   • Hypertension [I10]  Yes      Resolved Hospital Problems   No resolved problems to display.       Plan:    Appreciate nephrology assistance and they were utilizing IV Bumex for possible thoughts for initiation of hemodialysis pending trends and urine output   - Patient makes statements that she does not want to do dialysis but she also makes additional statements that she does not want to die.  I think she would be amenable to initiation of this if it was perhaps temporary     Also appreciate cardiology input and assistance.  Pericardial effusion possibly related to minoxidil and they will further evaluate with echo otherwise mild volume overload with grade 1 diastolic dysfunction    HTN stable    PAD with past history of renal artery stenosis as well      Disposition will be determined.  Await further recommendations from cardiology and nephrology with this consult driven  federico Richardson MD  7/2/2021  14:35 EDT

## 2021-07-02 NOTE — PLAN OF CARE
Goal Outcome Evaluation:  Plan of Care Reviewed With: patient        Progress: no change  Outcome Summary: VSS, pt appears restless and has frequent muscle jerking in legs. SOA with exertion, 2L nc (home dose). pain tx per MAR. pt requested cough medicine, [facility] called and ordered cxray.

## 2021-07-02 NOTE — THERAPY EVALUATION
Patient Name: Rachana Arguelles  : 1945    MRN: [id number]                              Today's Date: 2021       Admit Date: 2021    Visit Dx: No diagnosis found.  Patient Active Problem List   Diagnosis   • Anxiety   • Chronic pain syndrome   • Depression   • Gastroesophageal reflux disease   • Hyperlipidemia   • Hypertension   • Osteoarthritis of hip   • Chronic low back pain   • Failed back syndrome of lumbar spine   • Pulmonary embolus (CMS/HCC)   • Weight loss   • Polypharmacy   • Stage 3b chronic kidney disease (CMS/HCC)   • Chronic constipation   • Sacroiliac joint pain   • Mixed incontinence   • Renal cyst   • Achilles tendonitis   • Atherosclerosis of native artery of left lower extremity with intermittent claudication (CMS/HCC)   • Morbidly obese (CMS/HCC)   • Lung nodule   • Malignant neoplasm of right upper lobe of lung (CMS/HCC)   • Lung cancer (CMS/HCC)   • Moderate single current episode of major depressive disorder (CMS/HCC)   • Palpitations   • Encounter for screening for malignant neoplasm of colon   • Hematoma of scalp   • Acute neck pain   • Dizziness   • Unstable cervical spine   • Postconcussive syndrome   • Positive colorectal cancer screening using Cologuard test   • Dysphagia   • S/P reverse total shoulder arthroplasty, right   • Chronic post-traumatic headache, not intractable   • Medication overuse headache   • Migraine without aura and without status migrainosus, not intractable   • Shortness of breath   • PAD (peripheral artery disease) (CMS/HCC)   • Bilateral carotid artery stenosis   • S/P lobectomy of lung   • YVONNE (acute kidney injury) (CMS/HCC)   • Anemia   • Frequent PVCs   • Pneumonia of both lower lobes due to infectious organism   • History of lung cancer   • Hypertensive urgency   • Bradycardia, sinus   • YVONNE (acute kidney injury) (CMS/HCC)   • Opioid dependence (CMS/HCC)   • COPD (chronic obstructive pulmonary disease) (CMS/HCC)   • Renal artery stenosis (CMS/HCC)    • Acute kidney injury (CMS/HCC)     Past Medical History:   Diagnosis Date   • AAA (abdominal aortic aneurysm) without rupture (CMS/[facility])    • Anemia    • Anxiety    • Arthritis    • Bilateral carotid artery stenosis 8/14/2020   • Cancer (CMS/[facility])     skin cancer   • Chest pain     OCCAS   • Chronic low back pain    • Chronic pain syndrome 3/12/2016   • CKD (chronic kidney disease), stage III (CMS/[facility])    • Claustrophobia 10/26/2015    Resolved   • COPD (chronic obstructive pulmonary disease) (CMS/[facility])    • Depression    • Dizziness    • Dyspnea    • GERD (gastroesophageal reflux disease)    • GI problem    • H/O concussion    • Head trauma     FELL CUT HEAD 12 STAPLES   • Hiatal hernia    • History of migraine headaches    • Hyperlipidemia    • Hypertension    • Lumbar postlaminectomy syndrome 10/26/2015    Resolved   • Lung cancer (CMS/[facility])    • Lung nodule    • Migraines    • Nausea    • Palpitations    • Polypharmacy 4/22/2016   • Pulmonary embolism (CMS/[facility]) 10/26/2015    January 2013 - Resolved, felt to be secondary to estrogen use   • Slow to wake up after anesthesia    • Submandibular sialoadenitis 10/26/2015    Resolved   • Visual changes    • Vitamin B 12 deficiency      Past Surgical History:   Procedure Laterality Date   • ANGIOPLASTY ILIAC ARTERY Bilateral 8/10/2018    Procedure: BILATERAL ILIAC STENTS;  Surgeon: Riki Mancera MD;  Location: [facility] MAIN OR;  Service: Vascular   • APPENDECTOMY     • ARTERIOGRAM N/A 6/4/2021    Procedure: Renal Arteriogram with possible intervention;  Surgeon: Mat Coello MD;  Location: [facility] CATH INVASIVE LOCATION;  Service: Cardiology;  Laterality: N/A;   • BREAST SURGERY      Breast Surgery Reduction Procedure   • BRONCHOSCOPY N/A 2/8/2019    Procedure: BRONCHOSCOPY;  Surgeon: Álvaro Gifford MD;  Location: [facility] ENDOSCOPY;  Service: Pulmonary   • CARDIAC CATHETERIZATION N/A 6/4/2021    Procedure: Stent BMS peripheral- RENAL-RIGHT;  Surgeon: Oumou  Mat CERDA MD;  Location: [facility] CATH INVASIVE LOCATION;  Service: Cardiology;  Laterality: N/A;   • CHOLECYSTECTOMY     • COLONOSCOPY     • ENDOSCOPY N/A 11/3/2020    Procedure: ESOPHAGOGASTRODUODENOSCOPY with 54 [place] vernon dilation;  Surgeon: Yunior Vo MD;  Location: [facility] ENDOSCOPY;  Service: Gastroenterology;  Laterality: N/A;  pre- dysphagia  post- esophageal ring, hiatal hernia   • HYSTERECTOMY     • INTERVENTIONAL RADIOLOGY PROCEDURE N/A 6/4/2021    Procedure: Abdominal Aortogram;  Surgeon: Mat Coello MD;  Location: [facility] CATH INVASIVE LOCATION;  Service: Cardiology;  Laterality: N/A;   • INTUBATION  1/15/2019        • JOINT REPLACEMENT      left knee, hip, shoulder   • LASIK     • LUMBAR FUSION      Lumbar Vertebral Fusion   • RENAL ARTERY STENT Right    • SHOULDER ARTHROSCOPY  04/04/2013    Dr. Carrillo/[facility]   • THORACOSCOPY VIDEO ASSISTED WITH LOBECTOMY Right 1/11/2019    Procedure: BRONCOSCOPY, THORACOSCOPY VIDEO ASSISTED WITH RIGHT UPPER LOBE WEDGE RESECTION, COMPLETION RIGHT UPPER LOBECTOMY, LYMPH NODE DISSECTION, INTERCOSTAL NERVE BLOCK;  Surgeon: Quita Figueroa MD;  Location: [facility] MAIN OR;  Service: Thoracic   • TONSILLECTOMY     • TOTAL HIP ARTHROPLASTY REVISION Left    • TOTAL KNEE ARTHROPLASTY Left    • TOTAL SHOULDER ARTHROPLASTY Left    • TOTAL SHOULDER ARTHROPLASTY W/ DISTAL CLAVICLE EXCISION Right 6/18/2020    Procedure: RIGHT TOTAL SHOULDER REVERSE ARTHROPLASTY WITH GPS;  Surgeon: Lino Carrillo MD;  Location: [facility] OR [facility];  Service: Orthopedics;  Laterality: Right;     General Information     Row Name 07/02/21 0902          Physical Therapy Time and Intention    Document Type  evaluation Pt. admitted an acute kidney injury  -MS     Mode of Treatment  physical therapy;individual therapy  -MS     Row Name 07/02/21 0902          General Information    Patient Profile Reviewed  yes  -MS     Prior Level of Function  independent: Rwx use with ambulation   -MS     Existing Precautions/Restrictions  (S) fall;oxygen therapy device and L/min Exit alarm  -MS     Barriers to Rehab  none identified  -MS     Row Name 07/02/21 0902          Cognition    Orientation Status (Cognition)  oriented x 3  -MS     Row Name 07/02/21 0902          Safety Issues, Functional Mobility    Comment, Safety Issues/Impairments (Mobility)  Gait belt used for safety.  -MS       User Key  (r) = Recorded By, (t) = Taken By, (c) = Cosigned By    Initials Name Provider Type    Bryan Arteaga, PT Physical Therapist        Mobility     Row Name 07/02/21 0903          Bed Mobility    Bed Mobility  supine-sit;sit-supine  -MS     Supine-Sit [place] (Bed Mobility)  minimum assist (75% patient effort)  -MS     Sit-Supine [place] (Bed Mobility)  minimum assist (75% patient effort)  -MS     Row Name 07/02/21 0903          Sit-Stand Transfer    Sit-Stand [place] (Transfers)  minimum assist (75% patient effort)  -MS     Assistive Device (Sit-Stand Transfers)  walker, front-wheeled  -MS     Row Name 07/02/21 0903          Gait/Stairs (Locomotion)    [place] Level (Gait)  contact guard  -MS     Assistive Device (Gait)  walker, front-wheeled  -MS     Distance in Feet (Gait)  25 feet  -MS     Deviations/Abnormal Patterns (Gait)  janet decreased  -MS     Bilateral Gait Deviations  forward flexed posture  -MS     Comment (Gait/Stairs)  Verbal/tactile cues for posture correction. Limited in gait distance due to fatigue and SOA.  -MS       User Key  (r) = Recorded By, (t) = Taken By, (c) = Cosigned By    Initials Name Provider Type    Bryan Arteaga, PT Physical Therapist        Obj/Interventions     Row Name 07/02/21 0904          Range of Motion Comprehensive    Comment, General Range of Motion  BUE/LE (WFL's)  -MS     Row Name 07/02/21 0904          Strength Comprehensive (MMT)    Comment, General Manual Muscle Testing (MMT) Assessment  BUE/LE (3+/5)  -MS     Row Name 07/02/21 0904  "         Motor Skills    Therapeutic Exercise  -- BLE ther. ex. program x 10 reps completed (Ankle Pumps, Hip Flexion, LAQ's)  -MS       User Key  (r) = Recorded By, (t) = Taken By, (c) = Cosigned By    Initials Name Provider Type    Bryan Arteaga, PT Physical Therapist        Goals/Plan     Row Name 07/02/21 0906          Bed Mobility Goal 1 (PT)    Activity/Assistive Device (Bed Mobility Goal 1, PT)  bed mobility activities, all  -MS     [place] Level/Cues Needed (Bed Mobility Goal 1, PT)  independent  -MS     Time Frame (Bed Mobility Goal 1, PT)  long term goal (LTG);1 week  -MS     Row Name 07/02/21 0906          Transfer Goal 1 (PT)    Activity/Assistive Device (Transfer Goal 1, PT)  transfers, all;walker, rolling  -MS     [place] Level/Cues Needed (Transfer Goal 1, PT)  independent  -MS     Time Frame (Transfer Goal 1, PT)  long term goal (LTG);1 week  -MS     [facility] Name 07/02/21 0906          Gait Training Goal 1 (PT)    Activity/Assistive Device (Gait Training Goal 1, PT)  gait (walking locomotion);walker, rolling  -MS     [place] Level (Gait Training Goal 1, PT)  independent  -MS     Distance (Gait Training Goal 1, PT)  100 feet  -MS     Time Frame (Gait Training Goal 1, PT)  long term goal (LTG);1 week  -MS       User Key  (r) = Recorded By, (t) = Taken By, (c) = Cosigned By    Initials Name Provider Type    Bryan Arteaga, PT Physical Therapist        Clinical Impression     Row Name 07/02/21 0905          Pain    Additional Documentation  Pain Scale: Numbers Pre/Post-Treatment (Group)  -MS     Row Name 07/02/21 0905          Pain Scale: Numbers Pre/Post-Treatment    Pretreatment Pain Rating  3/10  -MS     Posttreatment Pain Rating  3/10  -MS     Pain Location - Side  Bilateral  -MS     Pre/Posttreatment Pain Comment  Anterior \"Thighs\" per pt. report  -MS     Row Name 07/02/21 0905          Plan of Care Review    Plan of Care Reviewed With  patient  -MS     Row Name 07/02/21 0905 "          Therapy Assessment/Plan (PT)    Rehab Potential (PT)  good, to achieve stated therapy goals  -MS     Criteria for Skilled Interventions Met (PT)  skilled treatment is necessary  -MS     Row Name 07/02/21 0905          Positioning and Restraints    Pre-Treatment Position  in bed  -MS     Post Treatment Position  bed  -MS     In Bed  notified nsg;supine;call light within reach;encouraged to call for assist;exit alarm on All lines intact.  -MS       User Key  (r) = Recorded By, (t) = Taken By, (c) = Cosigned By    Initials Name Provider Type    Bryan Arteaga, PT Physical Therapist        Outcome Measures     Row Name 07/02/21 0907          How much help from another person do you currently need...    Turning from your back to your side while in flat bed without using bedrails?  3  -MS     Moving from lying on back to sitting on the side of a flat bed without bedrails?  3  -MS     Moving to and from a bed to a chair (including a wheelchair)?  3  -MS     Standing up from a chair using your arms (e.g., wheelchair, bedside chair)?  3  -MS     Climbing 3-5 steps with a railing?  3  -MS     To walk in hospital room?  3  -MS     AM-PAC 6 Clicks Score (PT)  18  -MS     Row Name 07/02/21 0907          Functional Assessment    Outcome Measure Options  AM-PAC 6 Clicks Basic Mobility (PT)  -MS       User Key  (r) = Recorded By, (t) = Taken By, (c) = Cosigned By    Initials Name Provider Type    Bryan Arteaga, PT Physical Therapist        Physical Therapy Education                 Title: PT OT SLP Therapies (Done)     Topic: Physical Therapy (Done)     Point: Mobility training (Done)     Learning Progress Summary           Patient Acceptance, E,D, VU,NR by MS at 7/2/2021 0908                   Point: Home exercise program (Done)     Learning Progress Summary           Patient Acceptance, E,D, VU,NR by MS at 7/2/2021 0908                   Point: Body mechanics (Done)     Learning Progress Summary            Patient Acceptance, E,D, VU,NR by MS at 7/2/2021 0908                   Point: Precautions (Done)     Learning Progress Summary           Patient Acceptance, E,D, VU,NR by MS at 7/2/2021 0908                               User Key     Initials Effective Dates Name Provider Type Discipline    MS 06/16/21 -  Bryan Haddad, PT Physical Therapist PT              PT Recommendation and Plan  Planned Therapy Interventions (PT): balance training, bed mobility training, gait training, home exercise program, patient/family education, postural re-education, transfer training, strengthening  Plan of Care Reviewed With: patient  Outcome Summary: Pt. is a 75 year old Female admitted to the hospital with an acute kidney injury.  Pt. reports that prior to admission she was independent with functional mobility and used a Rwx for ambulation.  Pt. currently presents with decreased strength, decreased balance, and decreased tolerance to functional activity. This AM, pt. able to ambulate 25 feet, CGA x 1, with use of Rwx.  Pt. requires Min. assist x 1 for bed mobility and Min. assist x 1 for sit <-> stand transfers. BLE ther. ex. program x 10 reps completed for general strengthening.  Pt. will benefit from continued skilled inpt. P.T. to address her functional deficits and to assist pt. in regaining her maximum level of independence with functional mobility.     Time Calculation:   PT Charges     Row Name 07/02/21 0912             Time Calculation    Start Time  0820  -MS      Stop Time  0837  -MS      Time Calculation (min)  17 min  -MS      PT Received On  07/02/21  -MS      PT - Next Appointment  07/03/21  -MS      PT Goal Re-Cert Due Date  07/09/21  -MS         Time Calculation- PT    Total Timed Code Minutes- PT  16 minute(s)  -MS        User Key  (r) = Recorded By, (t) = Taken By, (c) = Cosigned By    Initials Name Provider Type    MS Bryan Haddad, PT Physical Therapist        Therapy Charges for Today     Code  Description Service Date Service Provider Modifiers Qty    17203647038 HC PT EVAL MOD COMPLEXITY 2 7/2/2021 Bryan Haddad, PT GP 1    40912367971 HC PT THER PROC EA 15 MIN 7/2/2021 Bryan Haddad, PT GP 1          PT G-Codes  Outcome Measure Options: AM-PAC 6 Clicks Basic Mobility (PT)  AM-PAC 6 Clicks Score (PT): 18    Bryan Haddad, PT  7/2/2021

## 2021-07-02 NOTE — THERAPY EVALUATION
Patient Name: Rachana Arguelles  : 1945    MRN: [id number]                              Today's Date: 2021       Admit Date: 2021    Visit Dx: No diagnosis found.  Patient Active Problem List   Diagnosis   • Anxiety   • Chronic pain syndrome   • Depression   • Gastroesophageal reflux disease   • Hyperlipidemia   • Hypertension   • Osteoarthritis of hip   • Chronic low back pain   • Failed back syndrome of lumbar spine   • Pulmonary embolus (CMS/HCC)   • Weight loss   • Polypharmacy   • Stage 3b chronic kidney disease (CMS/HCC)   • Chronic constipation   • Sacroiliac joint pain   • Mixed incontinence   • Renal cyst   • Achilles tendonitis   • Atherosclerosis of native artery of left lower extremity with intermittent claudication (CMS/HCC)   • Morbidly obese (CMS/HCC)   • Lung nodule   • Malignant neoplasm of right upper lobe of lung (CMS/HCC)   • Lung cancer (CMS/HCC)   • Moderate single current episode of major depressive disorder (CMS/HCC)   • Palpitations   • Encounter for screening for malignant neoplasm of colon   • Hematoma of scalp   • Acute neck pain   • Dizziness   • Unstable cervical spine   • Postconcussive syndrome   • Positive colorectal cancer screening using Cologuard test   • Dysphagia   • S/P reverse total shoulder arthroplasty, right   • Chronic post-traumatic headache, not intractable   • Medication overuse headache   • Migraine without aura and without status migrainosus, not intractable   • Shortness of breath   • PAD (peripheral artery disease) (CMS/HCC)   • Bilateral carotid artery stenosis   • S/P lobectomy of lung   • YVONNE (acute kidney injury) (CMS/HCC)   • Anemia   • Frequent PVCs   • Pneumonia of both lower lobes due to infectious organism   • History of lung cancer   • Hypertensive urgency   • Bradycardia, sinus   • YVONNE (acute kidney injury) (CMS/HCC)   • Opioid dependence (CMS/HCC)   • COPD (chronic obstructive pulmonary disease) (CMS/HCC)   • Renal artery stenosis (CMS/HCC)    • Acute kidney injury (CMS/HCC)     Past Medical History:   Diagnosis Date   • AAA (abdominal aortic aneurysm) without rupture (CMS/[facility])    • Anemia    • Anxiety    • Arthritis    • Bilateral carotid artery stenosis 8/14/2020   • Cancer (CMS/[facility])     skin cancer   • Chest pain     OCCAS   • Chronic low back pain    • Chronic pain syndrome 3/12/2016   • CKD (chronic kidney disease), stage III (CMS/[facility])    • Claustrophobia 10/26/2015    Resolved   • COPD (chronic obstructive pulmonary disease) (CMS/[facility])    • Depression    • Dizziness    • Dyspnea    • GERD (gastroesophageal reflux disease)    • GI problem    • H/O concussion    • Head trauma     FELL CUT HEAD 12 STAPLES   • Hiatal hernia    • History of migraine headaches    • Hyperlipidemia    • Hypertension    • Lumbar postlaminectomy syndrome 10/26/2015    Resolved   • Lung cancer (CMS/[facility])    • Lung nodule    • Migraines    • Nausea    • Palpitations    • Polypharmacy 4/22/2016   • Pulmonary embolism (CMS/[facility]) 10/26/2015    January 2013 - Resolved, felt to be secondary to estrogen use   • Slow to wake up after anesthesia    • Submandibular sialoadenitis 10/26/2015    Resolved   • Visual changes    • Vitamin B 12 deficiency      Past Surgical History:   Procedure Laterality Date   • ANGIOPLASTY ILIAC ARTERY Bilateral 8/10/2018    Procedure: BILATERAL ILIAC STENTS;  Surgeon: Riki Mancera MD;  Location: [facility] MAIN OR;  Service: Vascular   • APPENDECTOMY     • ARTERIOGRAM N/A 6/4/2021    Procedure: Renal Arteriogram with possible intervention;  Surgeon: Mat Coello MD;  Location: [facility] CATH INVASIVE LOCATION;  Service: Cardiology;  Laterality: N/A;   • BREAST SURGERY      Breast Surgery Reduction Procedure   • BRONCHOSCOPY N/A 2/8/2019    Procedure: BRONCHOSCOPY;  Surgeon: Álvaro Gifford MD;  Location: [facility] ENDOSCOPY;  Service: Pulmonary   • CARDIAC CATHETERIZATION N/A 6/4/2021    Procedure: Stent BMS peripheral- RENAL-RIGHT;  Surgeon: Oumou  "Mat CERDA MD;  Location: [facility] CATH INVASIVE LOCATION;  Service: Cardiology;  Laterality: N/A;   • CHOLECYSTECTOMY     • COLONOSCOPY     • ENDOSCOPY N/A 11/3/2020    Procedure: ESOPHAGOGASTRODUODENOSCOPY with 54 [place] vernon dilation;  Surgeon: Yunior Vo MD;  Location: [facility] ENDOSCOPY;  Service: Gastroenterology;  Laterality: N/A;  pre- dysphagia  post- esophageal ring, hiatal hernia   • HYSTERECTOMY     • INTERVENTIONAL RADIOLOGY PROCEDURE N/A 6/4/2021    Procedure: Abdominal Aortogram;  Surgeon: Mat Coello MD;  Location: [facility] CATH INVASIVE LOCATION;  Service: Cardiology;  Laterality: N/A;   • INTUBATION  1/15/2019        • JOINT REPLACEMENT      left knee, hip, shoulder   • LASIK     • LUMBAR FUSION      Lumbar Vertebral Fusion   • RENAL ARTERY STENT Right    • SHOULDER ARTHROSCOPY  04/04/2013    Dr. Carrillo/[facility] [place]   • THORACOSCOPY VIDEO ASSISTED WITH LOBECTOMY Right 1/11/2019    Procedure: BRONCOSCOPY, THORACOSCOPY VIDEO ASSISTED WITH RIGHT UPPER LOBE WEDGE RESECTION, COMPLETION RIGHT UPPER LOBECTOMY, LYMPH NODE DISSECTION, INTERCOSTAL NERVE BLOCK;  Surgeon: Quita Figueroa MD;  Location: [facility] MAIN OR;  Service: Thoracic   • TONSILLECTOMY     • TOTAL HIP ARTHROPLASTY REVISION Left    • TOTAL KNEE ARTHROPLASTY Left    • TOTAL SHOULDER ARTHROPLASTY Left    • TOTAL SHOULDER ARTHROPLASTY W/ DISTAL CLAVICLE EXCISION Right 6/18/2020    Procedure: RIGHT TOTAL SHOULDER REVERSE ARTHROPLASTY WITH GPS;  Surgeon: Lino Carrillo MD;  Location: [facility] OR OSC;  Service: Orthopedics;  Laterality: Right;     General Information     Row Name 07/02/21 1534          OT Time and Intention    Document Type  evaluation  -SM     Mode of Treatment  occupational therapy;individual therapy  -     Row Name 07/02/21 1534          General Information    Patient Profile Reviewed  yes  -SM     Prior Level of Function  -- Pt reports spouse assists with ADL as she reports \"it takes me 30 minutes to do " "something that should take 5 minutes\"  -     Existing Precautions/Restrictions  fall;oxygen therapy device and L/min  -[facility] Name 07/02/21 1534          Living Environment    Lives With  spouse  -[facility] Name 07/02/21 1534          Cognition    Orientation Status (Cognition)  oriented x 3  -[facility] Name 07/02/21 1534          Safety Issues, Functional Mobility    Safety Issues Affecting Function (Mobility)  positioning of assistive device  -     Impairments Affecting Function (Mobility)  endurance/activity tolerance;strength;shortness of breath  -       User Key  (r) = Recorded By, (t) = Taken By, (c) = Cosigned By    Initials Name Provider Type     Judit Estes OT Occupational Therapist          Mobility/ADL's     [facility] Name 07/02/21 1535          Bed Mobility    Supine-Sit [place] (Bed Mobility)  contact guard  -SM     Row Name 07/02/21 1535          Transfers    Transfers  sit-stand transfer  -     Comment (Transfers)  unsafe technique with transfers, almost trips over rwx with return to chair.  -     Sit-Stand [place] (Transfers)  contact guard;verbal cues  -SM     Row Name 07/02/21 1535          Functional Mobility    Functional Mobility- Ind. Level  contact guard assist  -     Functional Mobility- Device  rolling walker  -     Functional Mobility- Comment  to sink side, fatiques and requests to return back to chair immediatly.  -[facility] Name 07/02/21 1535          Activities of Daily Living    BADL Assessment/Intervention  lower body dressing;grooming  -SM     Row Name 07/02/21 1535          Lower Body Dressing Assessment/Training    [place] Level (Lower Body Dressing)  lower body dressing skills;don;socks;standby assist  -     Position (Lower Body Dressing)  edge of bed sitting  -[facility] Name 07/02/21 1535          Grooming Assessment/Training    [place] Level (Grooming)  grooming skills;set up  -     Position (Grooming)  supported sitting  -     "   User Key  (r) = Recorded By, (t) = Taken By, (c) = Cosigned By    Initials Name Provider Type    Judit Kasper OT Occupational Therapist        Obj/Interventions     [facility] Name 07/02/21 1536          Range of Motion Comprehensive    Comment, General Range of Motion  BUE AROM WFL  -[facility] Name 07/02/21 1536          Strength Comprehensive (MMT)    Comment, General Manual Muscle Testing (MMT) Assessment  generalized weakness, BUE grossly 3/5  -[facility] Name 07/02/21 1536          Shoulder (Therapeutic Exercise)    Shoulder (Therapeutic Exercise)  AROM (active range of motion)  -     Shoulder AROM (Therapeutic Exercise)  bilateral;flexion;extension;aBduction;aDduction;2 sets;5 repetitions forward press  -[facility] Name 07/02/21 1536          Balance    Balance Assessment  sitting static balance;standing static balance  -     Static Sitting Balance  WFL;sitting, edge of bed  -     Static Standing Balance  mild impairment;supported  -     Comment, Balance  stands at rwx CGA.  -SM     Row Name 07/02/21 1536          Therapeutic Exercise    Therapeutic Exercise  shoulder  -       User Key  (r) = Recorded By, (t) = Taken By, (c) = Cosigned By    Initials Name Provider Type    Judit Kasper OT Occupational Therapist        Goals/Plan     Row Name 07/02/21 1543          Transfer Goal 1 (OT)    Activity/Assistive Device (Transfer Goal 1, OT)  transfers, all;toilet  -SM     [place] Level/Cues Needed (Transfer Goal 1, OT)  supervision required  -SM     Time Frame (Transfer Goal 1, OT)  short term goal (STG);2 weeks  -SM     Progress/Outcome (Transfer Goal 1, OT)  goal ongoing  -SM     Row Name 07/02/21 1543          Bathing Goal 1 (OT)    Activity/Device (Bathing Goal 1, OT)  bathing skills, all  -SM     [place] Level/Cues Needed (Bathing Goal 1, OT)  supervision required  -SM     Time Frame (Bathing Goal 1, OT)  short term goal (STG);2 weeks  -SM     Strategies/Barriers (Bathing Goal  1, OT)  energy conservation technique  -SM     Progress/Outcomes (Bathing Goal 1, OT)  goal ongoing  -SM     Row Name 07/02/21 1543          Dressing Goal 1 (OT)    Activity/Device (Dressing Goal 1, OT)  dressing skills, all  -SM     [place]/Cues Needed (Dressing Goal 1, OT)  supervision required  -SM     Time Frame (Dressing Goal 1, OT)  short term goal (STG);2 weeks  -SM     Progress/Outcome (Dressing Goal 1, OT)  goal ongoing  -     Row Name 07/02/21 1543          Toileting Goal 1 (OT)    Activity/Device (Toileting Goal 1, OT)  toileting skills, all  -SM     [place] Level/Cues Needed (Toileting Goal 1, OT)  supervision required  -SM     Time Frame (Toileting Goal 1, OT)  short term goal (STG);2 weeks  -SM     Progress/Outcome (Toileting Goal 1, OT)  goal ongoing  -SM     Row Name 07/02/21 1543          Grooming Goal 1 (OT)    Activity/Device (Grooming Goal 1, OT)  grooming skills, all  -SM     [place] (Grooming Goal 1, OT)  supervision required  -SM     Time Frame (Grooming Goal 1, OT)  short term goal (STG)  -SM     Strategies/Barriers (Grooming Goal 1, OT)  sink side standing  -SM     Progress/Outcome (Grooming Goal 1, OT)  goal ongoing  -     Row Name 07/02/21 1543          Strength Goal 1 (OT)    Strength Goal 1 (OT)  Pt to be independent with HEP for UE strengthening.  -SM     Time Frame (Strength Goal 1, OT)  short term goal (STG);2 weeks  -SM     Progress/Outcome (Strength Goal 1, OT)  goal ongoing  -     Row Name 07/02/21 1543          Therapy Assessment/Plan (OT)    Planned Therapy Interventions (OT)  activity tolerance training;adaptive equipment training;BADL retraining;functional balance retraining;IADL retraining;occupation/activity based interventions;patient/caregiver education/training;ROM/therapeutic exercise;strengthening exercise;transfer/mobility retraining  -SM       User Key  (r) = Recorded By, (t) = Taken By, (c) = Cosigned By    Initials Name Provider Type    SM  Judit Estes, OT Occupational Therapist        Clinical Impression     Row Name 07/02/21 153          Pain Assessment    Additional Documentation  Pain Scale: FACES Pre/Post-Treatment (Group)  -     Row Name 07/02/21 1530          Pain Scale: FACES Pre/Post-Treatment    Pain: FACES Scale, Pretreatment  0-->no hurt  -     Row Name 07/02/21 1532          Plan of Care Review    Plan of Care Reviewed With  patient  -     Outcome Summary  Pt is a 75 y.o female admitted to [facility] with YVONNE, pt with hx of COPD and on 2L of O2 during encounter, reports she wears O2 at night and at times during day at home. Pt reports spouse assists her with ADL and she fatiques easily and requires significant extra time if attempt to complete independently. Pt reports spouse just had recent surgery/health issues and can not assist much physcially at home. Pt uses rwx for mobility in household and w/c for community mobility, pt has a shower chair as well. Pt presents with generalized weakness and activity tolerance deficits, SOB with activity. Pt would benefit from continued OT to increase independence with ADL and mobility. Educated in energy conservation technique and UE exercises this date where pt completed light reps 2 sets of 5 reps. Up to sink with X1 assist for ADL but fatiques very quickly. Recommend SNF vs. home with HH.  -     Row Name 07/02/21 2359          Therapy Assessment/Plan (OT)    Rehab Potential (OT)  good, to achieve stated therapy goals  -     Criteria for Skilled Therapeutic Interventions Met (OT)  yes;skilled treatment is necessary  -     Therapy Frequency (OT)  3 times/wk  -     Row Name 07/02/21 1533          Therapy Plan Review/Discharge Plan (OT)    Anticipated Discharge Disposition (OT)  skilled nursing facility;home with home health;home with assist  -     Row Name 07/02/21 1533          Positioning and Restraints    Pre-Treatment Position  in bed  -     Post Treatment Position  chair  -      In Chair  reclined;call light within reach;encouraged to call for assist;exit alarm on;notified nsg  -       User Key  (r) = Recorded By, (t) = Taken By, (c) = Cosigned By    Initials Name Provider Type    Judit Kasper OT Occupational Therapist        Outcome Measures     Row Name 07/02/21 1545          How much help from another is currently needed...    Putting on and taking off regular lower body clothing?  3  -SM     Bathing (including washing, rinsing, and drying)  3  -SM     Toileting (which includes using toilet bed pan or urinal)  2  -SM     Putting on and taking off regular upper body clothing  3  -SM     Taking care of personal grooming (such as brushing teeth)  3  -SM     Eating meals  4  -SM     AM-PAC 6 Clicks Score (OT)  18  -SM     Row Name 07/02/21 0907          How much help from another person do you currently need...    Turning from your back to your side while in flat bed without using bedrails?  3  -MS     Moving from lying on back to sitting on the side of a flat bed without bedrails?  3  -MS     Moving to and from a bed to a chair (including a wheelchair)?  3  -MS     Standing up from a chair using your arms (e.g., wheelchair, bedside chair)?  3  -MS     Climbing 3-5 steps with a railing?  3  -MS     To walk in hospital room?  3  -MS     AM-PAC 6 Clicks Score (PT)  18  -MS     Row Name 07/02/21 1545 07/02/21 0907       Functional Assessment    Outcome Measure Options  AM-PAC 6 Clicks Daily Activity (OT)  -  AM-PAC 6 Clicks Basic Mobility (PT)  -MS      User Key  (r) = Recorded By, (t) = Taken By, (c) = Cosigned By    Initials Name Provider Type    Bryan Arteaga, PT Physical Therapist    Judit Kasper OT Occupational Therapist        Occupational Therapy Education                 Title: PT OT SLP Therapies (In Progress)     Topic: Occupational Therapy (In Progress)     Point: ADL training (Done)     Description:   Instruct learner(s) on proper safety adaptation  and remediation techniques during self care or transfers.   Instruct in proper use of assistive devices.              Learning Progress Summary           Patient Acceptance, E, VU by  at 7/2/2021 2571    Comment: energy conservation with ADL, PLB exercises, UE exercises to improve endurance/strength for ADL.                   Point: Home exercise program (Not Started)     Description:   Instruct learner(s) on appropriate technique for monitoring, assisting and/or progressing therapeutic exercises/activities.              Learner Progress:  Not documented in this visit.          Point: Precautions (Not Started)     Description:   Instruct learner(s) on prescribed precautions during self-care and functional transfers.              Learner Progress:  Not documented in this visit.          Point: Body mechanics (Not Started)     Description:   Instruct learner(s) on proper positioning and spine alignment during self-care, functional mobility activities and/or exercises.              Learner Progress:  Not documented in this visit.                      User Key     Initials Effective Dates Name Provider Type Discipline     04/02/20 -  Judit Estes OT Occupational Therapist OT              OT Recommendation and Plan  Planned Therapy Interventions (OT): activity tolerance training, adaptive equipment training, BADL retraining, functional balance retraining, IADL retraining, occupation/activity based interventions, patient/caregiver education/training, ROM/therapeutic exercise, strengthening exercise, transfer/mobility retraining  Therapy Frequency (OT): 3 times/wk  Plan of Care Review  Plan of Care Reviewed With: patient  Outcome Summary: Pt is a 75 y.o female admitted to [facility] with YVONNE, pt with hx of COPD and on 2L of O2 during encounter, reports she wears O2 at night and at times during day at home. Pt reports spouse assists her with ADL and she fatiques easily and requires significant extra time if attempt to  complete independently. Pt reports spouse just had recent surgery/health issues and can not assist much physcially at home. Pt uses rwx for mobility in household and w/c for community mobility, pt has a shower chair as well. Pt presents with generalized weakness and activity tolerance deficits, SOB with activity. Pt would benefit from continued OT to increase independence with ADL and mobility. Educated in energy conservation technique and UE exercises this date where pt completed light reps 2 sets of 5 reps. Up to sink with X1 assist for ADL but fatiques very quickly. Recommend SNF vs. home with HH.     Time Calculation:   Time Calculation- OT     Row Name 07/02/21 1546             Time Calculation- OT    OT Start Time  1510  -SM      OT Stop Time  1528  -SM      OT Time Calculation (min)  18 min  -SM      Total Timed Code Minutes- OT  10 minute(s)  -SM      OT Received On  07/02/21  -      OT - Next Appointment  07/05/21  -         Timed Charges    90917 - OT Self Care/Mgmt Minutes  10  -SM         Untimed Charges    OT Eval/Re-eval Minutes  8  -SM         Total Minutes    Timed Charges Total Minutes  10  -SM      Untimed Charges Total Minutes  8  -SM       Total Minutes  18  -SM        User Key  (r) = Recorded By, (t) = Taken By, (c) = Cosigned By    Initials Name Provider Type     Judit Estes OT Occupational Therapist        Therapy Charges for Today     Code Description Service Date Service Provider Modifiers Qty    70203318249  OT EVAL MOD COMPLEXITY 2 7/2/2021 Judit Estes OT GO 1    [id number]  OT SELF CARE/MGMT/TRAIN EA 15 MIN 7/2/2021 Judit Estes OT GO 1               Judit Estes OT  7/2/2021

## 2021-07-02 NOTE — DISCHARGE PLACEMENT REQUEST
"Rachana Arguelles (75 y.o. Female)     Date of Birth Social Security Number Address Home Phone MRN    1945  [facility] [facility] [address] [phone] [id number]    Zoroastrianism Marital Status          Non-Jewish        Admission Date Admission Type Admitting Provider Attending Provider Department, Room/Bed    7/1/21 Urgent Stingl, MD Debra Blancas Troy Andrew, MD [address], [address]    Discharge Date Discharge Disposition Discharge Destination                       Attending Provider: Herrera Richardson MD    Allergies: Neurontin [Gabapentin], Morphine    Isolation: None   Infection: None   Code Status: CPR    Ht: 170 cm (66.93\")   Wt: 80 kg (176 lb 5.9 oz)    Admission Cmt: None   Principal Problem: Acute kidney injury (CMS/HCC) [N17.9]                 Active Insurance as of 7/1/2021     Primary Coverage     Payor Plan Insurance Group Employer/Plan Group    MEDICARE MEDICARE A & B      Payor Plan Address Payor Plan Phone Number Payor Plan Fax Number Effective Dates    [address] [phone]  8/1/2004 - None Entered    [place] [id number]       Subscriber Name Subscriber Birth Date Member ID       RACHANA ARGUELLES 1945 [id number]                 Emergency Contacts      (Rel.) Home Phone Work Phone Mobile Phone    Rai Arguelles (Spouse) [phone] --    Katia Stephenson (Daughter) [phone] -- [phone]    DELMI LAMA (Daughter) -- -- [phone]            "

## 2021-07-02 NOTE — CONSULTS
[facility] Consult Note      Patient Name: Rachana Arguelles  : 1945  MRN: [id number]  Primary Care Physician:  Rhys Crowder Jr., MD  Referring Physician: Whitney Dudley,*  Date of admission: 2021    Subjective     Reason for Consult: Acute on chronic kidney disease    HPI:   Rachana Arguelles is a 75 y.o. female was admitted yesterday with progressive shortness of breath with minimal exertion for the past few weeks, noted to have worsening creatinine, hence nephrology consult was requested, patient has history of chronic kidney disease followed by my partner Dr. Whitney Dudley, baseline creatinine around 1.8 but has been increasing slowly on 6/10/2021 creatinine was 1.68, on 2021 was 2.87 on admission 3.96 and this morning 4.13.  She has been having progressive shortness of breath with minimal exertion mild orthopnea and PND, no chest pain, no nausea or vomiting but has poor appetite and when she feels hungry she tried to eat and she loses her appetite at the site of the food.  She has been having changes in her sleep patterns sleeping most during the day and requiring a sleep aid at night and able to sleep, and her friend who has been a close friend her for the past 7 years ago who is in the room with her mentioned about decreased cognitive function in the past few weeks.      Review of Systems:   14 point review of systems is otherwise negative except for mentioned above on HPI    Personal History     Past Medical History:   Diagnosis Date   • AAA (abdominal aortic aneurysm) without rupture (CMS/HCC)    • Anemia    • Anxiety    • Arthritis    • Bilateral carotid artery stenosis 2020   • Cancer (CMS/HCC)     skin cancer   • Chest pain     OCCAS   • Chronic low back pain    • Chronic pain syndrome 3/12/2016   • CKD (chronic kidney disease), stage III (CMS/HCC)    • Claustrophobia 10/26/2015    Resolved   • COPD (chronic obstructive pulmonary disease) (CMS/HCC)    •  Depression    • Dizziness    • Dyspnea    • GERD (gastroesophageal reflux disease)    • GI problem    • H/O concussion    • Head trauma     FELL CUT HEAD 12 STAPLES   • Hiatal hernia    • History of migraine headaches    • Hyperlipidemia    • Hypertension    • Lumbar postlaminectomy syndrome 10/26/2015    Resolved   • Lung cancer (CMS/HCC)    • Lung nodule    • Migraines    • Nausea    • Palpitations    • Polypharmacy 4/22/2016   • Pulmonary embolism (CMS/HCC) 10/26/2015    January 2013 - Resolved, felt to be secondary to estrogen use   • Slow to wake up after anesthesia    • Submandibular sialoadenitis 10/26/2015    Resolved   • Visual changes    • Vitamin B 12 deficiency        Past Surgical History:   Procedure Laterality Date   • ANGIOPLASTY ILIAC ARTERY Bilateral 8/10/2018    Procedure: BILATERAL ILIAC STENTS;  Surgeon: Riki Mancera MD;  Location: [facility] MAIN OR;  Service: Vascular   • APPENDECTOMY     • ARTERIOGRAM N/A 6/4/2021    Procedure: Renal Arteriogram with possible intervention;  Surgeon: Mat Coello MD;  Location: [facility] CATH INVASIVE LOCATION;  Service: Cardiology;  Laterality: N/A;   • BREAST SURGERY      Breast Surgery Reduction Procedure   • BRONCHOSCOPY N/A 2/8/2019    Procedure: BRONCHOSCOPY;  Surgeon: Álvaro Gifford MD;  Location: [facility] ENDOSCOPY;  Service: Pulmonary   • CARDIAC CATHETERIZATION N/A 6/4/2021    Procedure: Stent BMS peripheral- RENAL-RIGHT;  Surgeon: Mat Coello MD;  Location: [facility] CATH INVASIVE LOCATION;  Service: Cardiology;  Laterality: N/A;   • CHOLECYSTECTOMY     • COLONOSCOPY     • ENDOSCOPY N/A 11/3/2020    Procedure: ESOPHAGOGASTRODUODENOSCOPY with 54 Telugu vernon dilation;  Surgeon: Yunior Vo MD;  Location: [facility] ENDOSCOPY;  Service: Gastroenterology;  Laterality: N/A;  pre- dysphagia  post- esophageal ring, hiatal hernia   • HYSTERECTOMY     • INTERVENTIONAL RADIOLOGY PROCEDURE N/A 6/4/2021    Procedure: Abdominal Aortogram;   Surgeon: Mat Coello MD;  Location: [facility] CATH INVASIVE LOCATION;  Service: Cardiology;  Laterality: N/A;   • INTUBATION  1/15/2019        • JOINT REPLACEMENT      left knee, hip, shoulder   • LASIK     • LUMBAR FUSION      Lumbar Vertebral Fusion   • RENAL ARTERY STENT Right    • SHOULDER ARTHROSCOPY  04/04/2013    Dr. Carrillo/MERRILL Kent   • THORACOSCOPY VIDEO ASSISTED WITH LOBECTOMY Right 1/11/2019    Procedure: BRONCOSCOPY, THORACOSCOPY VIDEO ASSISTED WITH RIGHT UPPER LOBE WEDGE RESECTION, COMPLETION RIGHT UPPER LOBECTOMY, LYMPH NODE DISSECTION, INTERCOSTAL NERVE BLOCK;  Surgeon: Quita Figueroa MD;  Location: [facility] MAIN OR;  Service: Thoracic   • TONSILLECTOMY     • TOTAL HIP ARTHROPLASTY REVISION Left    • TOTAL KNEE ARTHROPLASTY Left    • TOTAL SHOULDER ARTHROPLASTY Left    • TOTAL SHOULDER ARTHROPLASTY W/ DISTAL CLAVICLE EXCISION Right 6/18/2020    Procedure: RIGHT TOTAL SHOULDER REVERSE ARTHROPLASTY WITH GPS;  Surgeon: Lino Carrillo MD;  Location: [facility] OR OSC;  Service: Orthopedics;  Laterality: Right;       Family History: family history includes Cancer in her mother; Hypertension in her mother; Kidney disease in her father; Lung cancer in her mother; Other in her brother; Stroke in her brother.    Social History:  reports that she quit smoking about 17 years ago. Her smoking use included cigarettes. She has a 37.50 pack-year smoking history. She has never used smokeless tobacco. She reports that she does not drink alcohol and does not use drugs.    Home Medications:  Prior to Admission medications    Medication Sig Start Date End Date Taking? Authorizing Provider   amLODIPine (NORVASC) 10 MG tablet Take 10 mg by mouth Daily. Take with 5mg tablet to make 15mg dose   Yes Provider, MD Shawn   ascorbic acid (VITAMIN C) 500 MG tablet Take 500 mg by mouth Daily.   Yes ProviderShawn MD   atorvastatin (LIPITOR) 40 MG tablet Take 40 mg by mouth Every Night.   Yes Provider  MD Shawn   carvedilol (COREG) 25 MG tablet Take 1 tablet by mouth 2 (Two) Times a Day With Meals for 30 days. 6/5/21 7/5/21 Yes Stefan Mancilla MD   clopidogrel (PLAVIX) 75 MG tablet Take 75 mg by mouth Daily.   Yes ProviderShawn MD   cyclobenzaprine (FLEXERIL) 10 MG tablet Take 0.5 tablets by mouth 3 (Three) Times a Day As Needed for Muscle Spasms. 6/5/21  Yes Stefan Mancilla MD   docusate sodium (COLACE) 100 MG capsule Take 100 mg by mouth 2 (Two) Times a Day As Needed for Constipation.   Yes ProviderShawn MD   famotidine (PEPCID) 40 MG tablet Take 40 mg by mouth 2 (Two) Times a Day. 11/12/20  Yes Shawn Conde MD   fexofenadine (Allegra Allergy) 180 MG tablet Take 1 tablet by mouth Daily As Needed (allergies). 12/17/20  Yes Rhys Crowder Jr., MD   fluticasone-salmeterol (Advair Diskus) 250-50 MCG/DOSE DISKUS Inhale 2 puffs 2 (Two) Times a Day.   Yes ProviderShawn MD   folic acid (FOLVITE) 1 MG tablet TAKE 1 TABLET BY MOUTH EVERY DAY  Patient taking differently: Take 1 mg by mouth Daily. 4/2/21  Yes Rhys Crowder Jr., MD   hydrALAZINE (APRESOLINE) 100 MG tablet Take 1 tablet by mouth Every 8 (Eight) Hours for 30 days. 6/5/21 7/5/21 Yes Stefna Mancilla MD   LORazepam (ATIVAN) 0.5 MG tablet Take 1 tablet by mouth Every 8 (Eight) Hours As Needed for Anxiety. 6/17/21  Yes Rhys Crowder Jr., MD   omeprazole (priLOSEC) 40 MG capsule TAKE 1 CAPSULE BY MOUTH EVERY DAY 6/1/21  Yes Rhys Crowder Jr., MD   ondansetron (ZOFRAN) 4 MG tablet Take 1-2 tablets by mouth Every 8 (Eight) Hours As Needed for Nausea or Vomiting. 4/16/21  Yes Rhys Crowder Jr., MD   oxyCODONE (ROXICODONE) 20 MG tablet Take 20 mg by mouth Every 4 (Four) Hours As Needed. No more than 5 per day 11/18/20  Yes Provider, MD Shawn   polyethylene glycol (MiraLax) 17 g packet Take 17 g by mouth Daily.   Yes Provider, MD Shawn   sertraline (Zoloft) 100 MG tablet Take 1 tablet by mouth  Daily.  Patient taking differently: Take 50 mg by mouth Daily. 12/17/20  Yes Rhys Crowder Jr., MD   sodium bicarbonate 650 MG tablet Take 2 tablets by mouth 3 (Three) Times a Day for 30 days. 6/5/21 7/5/21 Yes Stefan Mancilla MD   sucralfate (CARAFATE) 1 g tablet Take 1 tablet by mouth 2 (two) times a day. 3/11/21  Yes Rhys Crowder Jr., MD   terazosin (HYTRIN) 5 MG capsule Take 1 capsule by mouth Every Night for 30 days. 6/5/21 7/5/21 Yes Stefan Mancilla MD   torsemide (DEMADEX) 20 MG tablet Take 2 tablets by mouth 2 (two) times a day. 6/24/21  Yes Arslan Byrne PA   traZODone (DESYREL) 150 MG tablet TAKE 0.5 TABLETS BY MOUTH EVERY NIGHT  Patient taking differently: Take 75 mg by mouth Every Night. 11/29/20  Yes Rhys Crowder Jr., MD   venlafaxine XR (EFFEXOR-XR) 150 MG 24 hr capsule TAKE 1 CAPSULE BY MOUTH DAILY. SWALLOW WHOLE DO NOT CRUSH OR CHEW.  Patient taking differently: Take 150 mg by mouth Daily. 3/30/21  Yes Rhys Crowder Jr., MD   vitamin D (ERGOCALCIFEROL) 1.25 MG (05803 UT) capsule capsule Take 50,000 Units by mouth Every 7 (Seven) Days. Takes on Wednesdays 11/21/20  Yes ProviderShawn MD   ondansetron (ZOFRAN) 4 MG tablet Take 1-2 tablets by mouth Every 8 (Eight) Hours As Needed for Nausea or Vomiting. 9/11/20   Ivette Baron APRN       Allergies:  Allergies   Allergen Reactions   • Neurontin [Gabapentin] Confusion   • Morphine Other (See Comments)     HEADACHE       Objective     Vitals:   Temp:  [98.5 °F (36.9 °C)-99 °F (37.2 °C)] 99 °F (37.2 °C)  Heart Rate:  [69-89] 69  Resp:  [16-18] 18  BP: (129-142)/(50-99) 129/50  Flow (L/min):  [2] 2    Intake/Output Summary (Last 24 hours) at 7/2/2021 1322  Last data filed at 7/2/2021 0940  Gross per 24 hour   Intake 800 ml   Output 400 ml   Net 400 ml       Physical Exam:   Constitutional: Awake, alert, no acute distress.  Chronically ill and appears to be frail, obese  HEENT: Sclera anicteric, no conjunctival injection, oral  mucosa dry  Neck: Supple, no thyromegaly, no lymphadenopathy, trachea at midline, no JVD  Respiratory: Clear to auscultation bilaterally, nonlabored respiration  Cardiovascular: RRR, no murmurs, no rubs or gallops, no carotid bruit  Gastrointestinal: Positive bowel sounds, abdomen is soft, nontender and nondistended  : No palpable bladder  Musculoskeletal: No edema, no clubbing or cyanosis  Psychiatric: Appropriate affect, cooperative  Neurologic: Oriented x3, moving all extremities, normal speech and mental status, the patient has asterixis  Skin: Warm and dry       Scheduled Meds:     amLODIPine, 10 mg, Oral, Daily  ascorbic acid, 500 mg, Oral, Daily  atorvastatin, 40 mg, Oral, Nightly  budesonide-formoterol, 2 puff, Inhalation, BID - RT  carvedilol, 25 mg, Oral, BID With Meals  cetirizine, 5 mg, Oral, Daily  clopidogrel, 75 mg, Oral, Daily  [START ON 7/3/2021] famotidine, 20 mg, Oral, Daily  folic acid, 1 mg, Oral, Daily  furosemide, 40 mg, Intravenous, Q12H  hydrALAZINE, 100 mg, Oral, Q8H  polyethylene glycol, 17 g, Oral, Daily  sertraline, 50 mg, Oral, Daily  sodium bicarbonate, 1,300 mg, Oral, TID  sucralfate, 1 g, Oral, BID AC  terazosin, 5 mg, Oral, Nightly  traZODone, 75 mg, Oral, Nightly  venlafaxine XR, 150 mg, Oral, Daily      IV Meds:        Results Reviewed:   I have personally reviewed the results from the time of this admission to 7/2/2021 13:22 EDT     Lab Results   Component Value Date    GLUCOSE 92 07/02/2021    CALCIUM 8.0 (L) 07/02/2021     07/02/2021    K 3.6 07/02/2021    CO2 25.4 07/02/2021     07/02/2021    BUN 32 (H) 07/02/2021    CREATININE 4.13 (H) 07/02/2021    EGFRIFAFRI  07/02/2021      Comment:      <15 Indicative of kidney failure.    EGFRIFNONA 11 (L) 07/02/2021    BCR 7.7 07/02/2021    ANIONGAP 13.6 07/02/2021      Lab Results   Component Value Date    MG 2.3 05/31/2021    PHOS 3.6 06/10/2021    ALBUMIN 3.00 (L) 07/01/2021     US Renal Bilateral    Result Date:  7/2/2021  RENAL ULTRASOUND  HISTORY: Renal artery stenosis  COMPARISON: 06/01/2021  TECHNIQUE: Grayscale and color Doppler sonographic images were obtained through the kidneys and bladder.  FINDINGS: Right kidney measures 11.0 x 4.7 x 5.7 cm. Left kidney measures 9.0 x 3.5 cm. No hydronephrosis is seen on either side. Both kidneys appear somewhat echogenic, which may reflect underlying chronic medical renal disease. Multiple tiny cysts are identified within both kidneys. Most of these are simple in appearance. The single largest cyst is arising from the left kidney, and measures 3.3 x 3.1 x 3.1 cm. An additional cyst within the left kidney has some hyperechoic debris layering within it, which may represent milk of calcium. Urinary bladder is relatively contracted, and neither ureteral jet was identified.      Impression:  1. Both kidneys are echogenic in appearance, which may reflect underlying chronic medical renal disease. 2. No hydronephrosis seen on either side.  This report was finalized on 7/2/2021 1:09 AM by Dr. Frannie Jackson M.D.         Assessment / Plan     ASSESSMENT:  1.  Acute kidney injury on chronic kidney disease, slowly worsening patient has increased shortness of breath with symptoms of congestive heart failure this is probably part of cardiorenal syndrome picture.  Patient has what appears to be somewhat uremic symptoms including poor appetite and asterixis  2.  Hypertension controlled  3.  Chronic pericardial effusion previously considered to be related to minoxidil  4.  Grade 1 diastolic dysfunction/HFpEF.  Patient is symptomatic    PLAN:  1.  Restrict sodium intake to 2 g daily  2.  Diurese with IV Bumex  3.  Patient may need dialysis in the next 24 hours if no improvement of the symptoms or the renal function or worsening uremic symptoms  4.  Check random urine for sodium improved chloride and protein to creatinine ratio also will monitor uric acid use it as a marker for effective  intravascular volume    I discussed the scenario with the patient at length and she voiced good understanding    Thank you for involving us in the care of Rachana Arguelles.  Please feel free to call with any questions.    Lionel Noguera MD  07/02/21  13:22 EDT    [facility]  [phone]      Much of this encounter note is an electronic transcription/translation of spoken language to printed text. The electronic translation of spoken language may permit erroneous, or at times, nonsensical words or phrases to be inadvertently transcribed; Although I have reviewed the note for such errors, some may still exist.

## 2021-07-02 NOTE — PLAN OF CARE
Goal Outcome Evaluation:  Plan of Care Reviewed With: patient           Outcome Summary: Pt. is a 75 year old Female admitted to the hospital with an acute kidney injury.  Pt. reports that prior to admission she was independent with functional mobility and used a Rwx for ambulation.  Pt. currently presents with decreased strength, decreased balance, and decreased tolerance to functional activity. This AM, pt. able to ambulate 25 feet, CGA x 1, with use of Rwx.  Pt. requires Min. assist x 1 for bed mobility and Min. assist x 1 for sit <-> stand transfers. BLE ther. ex. program x 10 reps completed for general strengthening.  Pt. will benefit from continued skilled inpt. P.T. to address her functional deficits and to assist pt. in regaining her maximum level of independence with functional mobility.    Patient was wearing a face mask during this therapy encounter. Therapist used appropriate personal protective equipment including eye protection, mask, and gloves.  Mask used was standard procedure mask. Appropriate PPE was worn during the entire therapy session. Hand hygiene was completed before and after therapy session. Patient is not in enhanced droplet precautions.

## 2021-07-02 NOTE — CONSULTS
Patient Name: Rachana Arguelles  :1945  75 y.o.    Date of Admission: 2021  Date of Consultation:  21  Encounter Provider: Angelique Gonzalez RN  Place of Service: [facility] CARDIOLOGY  Referring Provider: Whitney Dudley,*  Patient Care Team:  Rhys Crowder Jr., MD as PCP - General (Family Medicine)  Whitney Dudley MD as Consulting Physician (Nephrology)  Quita Figueroa MD as Surgeon (Thoracic Surgery)  Sabas Luther MD as Consulting Physician (Otolaryngology)  Álvaro Gifford MD as Consulting Physician (Pulmonary Disease)      Chief complaint: Acute Kidney injury    Reason for Consult:  CHF    History of Present Illness:  This is a 75-year-old woman known to our service.  She has a history of hypertension, lung cancer, peripheral vascular disease who recently underwent right renal artery stenting with Dr. Reddy on 2021.  She has a residual left-sided renal artery stenosis as well..    When she underwent renal artery stenting on the right earlier last month there was discussion of left-sided stenting but that was planned on being staged due to the need to conserve contrast dye..  When the patient was seen in the office, she was feeling well and blood pressures were controlled.  The decision was made to hold off on left renal artery stenting at that time.   The patient saw her primary nephrologist Dr. Dudley in the office a few days ago.  Her kidney function has been slowly worsening over the last month.  Her baseline is about 1.8.  On  she was instructed to go to the emergency room due to worsening renal failure, creatinine had risen to about 2.3.  Her torsemide was increased from 20 mg to 40 mg twice daily. The patient states she has not really noticed much improvement in her urination since then.  No change in urine color.      She had labs drawn a few days ago which show that her creatinine had peaked at about 4.  It is never  been this high before.  She was instructed to come to the emergency room.  She had a renal artery duplex yesterday which shows mild to moderate velocities in the bilateral kidneys, however it was felt that these were underestimated.  CT angiogram was suggested.  She had her bilateral renal ultrasound yesterday which showed echogenicity but normal size kidneys.  Chest x-ray showed mild vascular congestion, no pleural effusions.    She has chronic dyspnea.  No chest pain.  No dizziness.  Blood pressures have overall been relatively well controlled at home.    On 5/25/2021 she presented to the ED for dizziness and was noted to be bradycardic with HR 30-40's and occasional Mobitz 1 AV block along with hypertension.  Her bradycardia resolved with medication adjustment.  Her hypertension was difficult to control and was found to have renal stenosis.  She developed YVONNE and was seen by nephrology. Her ARB was stopped as was her minoxidil due to her ECHO showing a moderate pericardial effusion.  She had her right renal artery stent placed prior to discharge       Previous Cardiac Testing  ECHO 6/1/2021  · Calculated left ventricular EF = 72.2% Estimated left ventricular EF = 72% Estimated left ventricular EF was in agreement with the calculated left ventricular EF. Left ventricular systolic function is hyperdynamic (EF > 70%). Normal left ventricular cavity size noted. Left ventricular wall thickness is consistent with mild concentric hypertrophy. All left ventricular wall segments contract normally. Left ventricular diastolic function is consistent with (grade I) impaired relaxation.  · There is mild thickening of the aortic valve.  · Moderate mitral annular calcification is present.  · Trace tricuspid valve regurgitation is present. Estimated right ventricular systolic pressure from tricuspid regurgitation is normal (<35 mmHg). Calculated right ventricular systolic pressure from tricuspid regurgitation is 23 mmHg.  · There  is a moderate (1-2cm) pericardial effusion. Doppler assessment showed mild respiratory variation with inspiration on the mitral inflow signal. However it was not conclusive. There is no significant diastolic collapse of the RV or left right atrium. The effusion is circumferential with slight predominance next to the right atrium  · Compared to prior study of 8/24/2020, the pericardial effusion slightly larger but overall does not appear to be hemodynamically significant by Doppler assessment at this point. Clinical correlation recommended    Renal arteriogram 6/4/2021  Procedure findings:  Right renal artery: Severely calcified 95% ostial to proximal stenosis.  Left renal artery: 80% proximal stenosis with severe calcification     Severe descending aortic calcification.  Previously documented iliac artery stents documented.     Hemodynamics:   AO: 196/69     Estimated blood loss: Minimal     Complications: None     Conclusions:   1.  Severely calcified 95% ostial to proximal right renal artery stenosis  2.  80% proximal left renal artery stenosis with severe calcification  3.  Severe descending aortic calcification and abdominal aortic calcification.  4.  Successful right renal artery stent placed to the ostial to proximal right renal artery.  6.0 x 18 mm Herculink balloon expandable stent.    Stress Test 1/7/2019  · Findings consistent with a normal ECG stress test.  · Left ventricular ejection fraction is normal (Calculated EF = 64%).  · Myocardial perfusion imaging indicates a normal myocardial perfusion study with no evidence of ischemia.  · Impressions are consistent with a low risk study.  · Breast attenuation and diaphragmatic attenuation artifacts are present.         Past Medical History:   Diagnosis Date   • AAA (abdominal aortic aneurysm) without rupture (CMS/HCC)    • Anemia    • Anxiety    • Arthritis    • Bilateral carotid artery stenosis 8/14/2020   • Cancer (CMS/HCC)     skin cancer   • Chest pain      OCCAS   • Chronic low back pain    • Chronic pain syndrome 3/12/2016   • CKD (chronic kidney disease), stage III (CMS/[facility])    • Claustrophobia 10/26/2015    Resolved   • COPD (chronic obstructive pulmonary disease) (CMS/[facility])    • Depression    • Dizziness    • Dyspnea    • GERD (gastroesophageal reflux disease)    • GI problem    • H/O concussion    • Head trauma     FELL CUT HEAD 12 STAPLES   • Hiatal hernia    • History of migraine headaches    • Hyperlipidemia    • Hypertension    • Lumbar postlaminectomy syndrome 10/26/2015    Resolved   • Lung cancer (CMS/[facility])    • Lung nodule    • Migraines    • Nausea    • Palpitations    • Polypharmacy 4/22/2016   • Pulmonary embolism (CMS/[facility]) 10/26/2015    January 2013 - Resolved, felt to be secondary to estrogen use   • Slow to wake up after anesthesia    • Submandibular sialoadenitis 10/26/2015    Resolved   • Visual changes    • Vitamin B 12 deficiency        Past Surgical History:   Procedure Laterality Date   • ANGIOPLASTY ILIAC ARTERY Bilateral 8/10/2018    Procedure: BILATERAL ILIAC STENTS;  Surgeon: Riki Mancera MD;  Location: [facility] MAIN OR;  Service: Vascular   • APPENDECTOMY     • ARTERIOGRAM N/A 6/4/2021    Procedure: Renal Arteriogram with possible intervention;  Surgeon: Mat Coello MD;  Location: [facility] CATH INVASIVE LOCATION;  Service: Cardiology;  Laterality: N/A;   • BREAST SURGERY      Breast Surgery Reduction Procedure   • BRONCHOSCOPY N/A 2/8/2019    Procedure: BRONCHOSCOPY;  Surgeon: Álvaro Gifford MD;  Location: [facility] ENDOSCOPY;  Service: Pulmonary   • CARDIAC CATHETERIZATION N/A 6/4/2021    Procedure: Stent BMS peripheral- RENAL-RIGHT;  Surgeon: Mat Coello MD;  Location: [facility] CATH INVASIVE LOCATION;  Service: Cardiology;  Laterality: N/A;   • CHOLECYSTECTOMY     • COLONOSCOPY     • ENDOSCOPY N/A 11/3/2020    Procedure: ESOPHAGOGASTRODUODENOSCOPY with 54 Irish vernon dilation;  Surgeon: Yunior Vo MD;   Location: [facility] ENDOSCOPY;  Service: Gastroenterology;  Laterality: N/A;  pre- dysphagia  post- esophageal ring, hiatal hernia   • HYSTERECTOMY     • INTERVENTIONAL RADIOLOGY PROCEDURE N/A 6/4/2021    Procedure: Abdominal Aortogram;  Surgeon: Mat Coello MD;  Location: [facility] CATH INVASIVE LOCATION;  Service: Cardiology;  Laterality: N/A;   • INTUBATION  1/15/2019        • JOINT REPLACEMENT      left knee, hip, shoulder   • LASIK     • LUMBAR FUSION      Lumbar Vertebral Fusion   • RENAL ARTERY STENT Right    • SHOULDER ARTHROSCOPY  04/04/2013    Dr. Carrillo/E - Resolved   • THORACOSCOPY VIDEO ASSISTED WITH LOBECTOMY Right 1/11/2019    Procedure: BRONCOSCOPY, THORACOSCOPY VIDEO ASSISTED WITH RIGHT UPPER LOBE WEDGE RESECTION, COMPLETION RIGHT UPPER LOBECTOMY, LYMPH NODE DISSECTION, INTERCOSTAL NERVE BLOCK;  Surgeon: Quita Figueroa MD;  Location: [facility] MAIN OR;  Service: Thoracic   • TONSILLECTOMY     • TOTAL HIP ARTHROPLASTY REVISION Left    • TOTAL KNEE ARTHROPLASTY Left    • TOTAL SHOULDER ARTHROPLASTY Left    • TOTAL SHOULDER ARTHROPLASTY W/ DISTAL CLAVICLE EXCISION Right 6/18/2020    Procedure: RIGHT TOTAL SHOULDER REVERSE ARTHROPLASTY WITH GPS;  Surgeon: Lino Carrillo MD;  Location: [facility] OR OSC;  Service: Orthopedics;  Laterality: Right;         Prior to Admission medications    Medication Sig Start Date End Date Taking? Authorizing Provider   amLODIPine (NORVASC) 10 MG tablet Take 10 mg by mouth Daily. Take with 5mg tablet to make 15mg dose   Yes ProviderShawn MD   ascorbic acid (VITAMIN C) 500 MG tablet Take 500 mg by mouth Daily.   Yes ProviderShawn MD   atorvastatin (LIPITOR) 40 MG tablet Take 40 mg by mouth Every Night.   Yes ProviderShawn MD   carvedilol (COREG) 25 MG tablet Take 1 tablet by mouth 2 (Two) Times a Day With Meals for 30 days. 6/5/21 7/5/21 Yes Stefan Mancilla MD   clopidogrel (PLAVIX) 75 MG tablet Take 75 mg by mouth Daily.   Yes  Shawn Conde MD   cyclobenzaprine (FLEXERIL) 10 MG tablet Take 0.5 tablets by mouth 3 (Three) Times a Day As Needed for Muscle Spasms. 6/5/21  Yes Stefan Mancilla MD   docusate sodium (COLACE) 100 MG capsule Take 100 mg by mouth 2 (Two) Times a Day As Needed for Constipation.   Yes Shawn Conde MD   famotidine (PEPCID) 40 MG tablet Take 40 mg by mouth 2 (Two) Times a Day. 11/12/20  Yes Shawn Conde MD   fexofenadine (Allegra Allergy) 180 MG tablet Take 1 tablet by mouth Daily As Needed (allergies). 12/17/20  Yes Rhys Crowder Jr., MD   fluticasone-salmeterol (Advair Diskus) 250-50 MCG/DOSE DISKUS Inhale 2 puffs 2 (Two) Times a Day.   Yes Shawn Conde MD   folic acid (FOLVITE) 1 MG tablet TAKE 1 TABLET BY MOUTH EVERY DAY  Patient taking differently: Take 1 mg by mouth Daily. 4/2/21  Yes Rhys Crowder Jr., MD   hydrALAZINE (APRESOLINE) 100 MG tablet Take 1 tablet by mouth Every 8 (Eight) Hours for 30 days. 6/5/21 7/5/21 Yes Stefan Mancilla MD   LORazepam (ATIVAN) 0.5 MG tablet Take 1 tablet by mouth Every 8 (Eight) Hours As Needed for Anxiety. 6/17/21  Yes Rhys Crowder Jr., MD   omeprazole (priLOSEC) 40 MG capsule TAKE 1 CAPSULE BY MOUTH EVERY DAY 6/1/21  Yes Rhys Crowder Jr., MD   ondansetron (ZOFRAN) 4 MG tablet Take 1-2 tablets by mouth Every 8 (Eight) Hours As Needed for Nausea or Vomiting. 4/16/21  Yes Rhys Crowder Jr., MD   oxyCODONE (ROXICODONE) 20 MG tablet Take 20 mg by mouth Every 4 (Four) Hours As Needed. No more than 5 per day 11/18/20  Yes Shawn Conde MD   polyethylene glycol (MiraLax) 17 g packet Take 17 g by mouth Daily.   Yes Shawn Conde MD   sertraline (Zoloft) 100 MG tablet Take 1 tablet by mouth Daily.  Patient taking differently: Take 50 mg by mouth Daily. 12/17/20  Yes Rhys Crowder Jr., MD   sodium bicarbonate 650 MG tablet Take 2 tablets by mouth 3 (Three) Times a Day for 30 days. 6/5/21 7/5/21 Yes Stefan Mancilla  MD [place]   sucralfate (CARAFATE) 1 g tablet Take 1 tablet by mouth 2 (two) times a day. 3/11/21  Yes Rhys Crowder Jr., MD   terazosin (HYTRIN) 5 MG capsule Take 1 capsule by mouth Every Night for 30 days. 21 Yes Stefan Mancilla MD   torsemide (DEMADEX) 20 MG tablet Take 2 tablets by mouth 2 (two) times a day. 21  Yes Arslan Byrne PA   traZODone (DESYREL) 150 MG tablet TAKE 0.5 TABLETS BY MOUTH EVERY NIGHT  Patient taking differently: Take 75 mg by mouth Every Night. 20  Yes Rhys Crowder Jr., MD   venlafaxine XR (EFFEXOR-XR) 150 MG 24 hr capsule TAKE 1 CAPSULE BY MOUTH DAILY. SWALLOW WHOLE DO NOT CRUSH OR CHEW.  Patient taking differently: Take 150 mg by mouth Daily. 3/30/21  Yes Rhys Crowder Jr., MD   vitamin D (ERGOCALCIFEROL) 1.25 MG (88320 UT) capsule capsule Take 50,000 Units by mouth Every 7 (Seven) Days. Takes on   Yes Shawn Conde MD   ondansetron (ZOFRAN) 4 MG tablet Take 1-2 tablets by mouth Every 8 (Eight) Hours As Needed for Nausea or Vomiting. 20   Ivette Baron APRN       Allergies   Allergen Reactions   • Neurontin [Gabapentin] Confusion   • Morphine Other (See Comments)     HEADACHE       Social History     Socioeconomic History   • Marital status:      Spouse name: Not on file   • Number of children: Not on file   • Years of education: Not on file   • Highest education level: Not on file   Tobacco Use   • Smoking status: Former Smoker     Packs/day: 2.50     Years: 15.00     Pack years: 37.50     Types: Cigarettes     Quit date: 2003     Years since quittin.7   • Smokeless tobacco: Never Used   • Tobacco comment: CAFFEINE USE: 1-2 GLASSES TEA DAILY   Vaping Use   • Vaping Use: Never used   Substance and Sexual Activity   • Alcohol use: No     Comment: occ   • Drug use: Never   • Sexual activity: Defer       Family History   Problem Relation Age of Onset   • Hypertension Mother    • Lung cancer Mother    •  Cancer Mother         lung   • Kidney disease Father    • Other Brother         Coronary Artery Bypass Grafting   • Stroke Brother         Cerebrovascular Accident   • Malig Hyperthermia Neg Hx        REVIEW OF SYSTEMS:   All systems reviewed.  Pertinent positives identified in HPI.  All other systems are negative.      Objective:     Vitals:    07/01/21 1952 07/01/21 2341 07/02/21 0507 07/02/21 0732   BP: 142/99 133/54  129/50   BP Location: Left arm Left arm  Left arm   Patient Position: Lying Lying  Lying   Pulse: 71 76  70   Resp: 16 16  18   Temp: 98.7 °F (37.1 °C) 98.8 °F (37.1 °C)  99 °F (37.2 °C)   TempSrc: Oral Oral  Oral   SpO2: 92% 94%  93%   Weight:   80.5 kg (177 lb 7.5 oz)      Body mass index is 27.8 kg/m².  GEN: no distress, alert and oriented  Eyes: normal sclerae, normal lids and lashes  HENT: moist mucous membranes,   Lungs:  faint rales bilaterally  Chest: no abnormalities  Neck: no JVD or carotid bruits  CV: RRR, 3/6 systolic ejection murmur +2 DP and 2+ carotid pulses b/l  Abdomen: soft, nontender, nondistended  Extremities: no edema  Skin: no rash, warm, dry  Heme/Lymph: no bruising  Psych: organized thought, normal behavior and affect    Lab Review:     Results from last 7 days   Lab Units 07/02/21 0622 07/01/21 2011   SODIUM mmol/L 142 143   POTASSIUM mmol/L 3.6 3.3*   CHLORIDE mmol/L 103 102   CO2 mmol/L 25.4 27.8   BUN mg/dL 32* 33*   CREATININE mg/dL 4.13* 4.29*   CALCIUM mg/dL 8.0* 7.9*   BILIRUBIN mg/dL  --  0.3   ALK PHOS U/L  --  88   ALT (SGPT) U/L  --  14   AST (SGOT) U/L  --  22   GLUCOSE mg/dL 92 105*     Results from last 7 days   Lab Units 07/02/21 0622 07/01/21 2011   TROPONIN T ng/mL 0.021 0.015     Results from last 7 days   Lab Units 07/02/21 0622   WBC 10*3/mm3 14.40*   HEMOGLOBIN g/dL 8.5*   HEMATOCRIT % 25.8*   PLATELETS 10*3/mm3 234     Results from last 7 days   Lab Units 07/01/21 2011   INR  1.00                     CXR  FINDINGS:  Cardiomegaly is present. There  is vascular congestion. There appears to  be some chronic scarring within the right lung. There is calcification  of the aorta. There are changes of bilateral shoulder arthroplasties. No  definite acute infiltrates are seen.     IMPRESSION:  Cardiomegaly with mild vascular congestion.    Telemetry      EKG      EKG baseline      I personally viewed and interpreted the patient's EKG/Telemetry data.  Normal sinus rhythm, PVC      Assessment and Plan:       1.  Acute kidney injury, over the last month her renal function has worsened from a creatinine of 1.8 now to greater than 4.  She did have some contrast associated with her June 4 right renal artery intervention, about 368 mL.  Unclear what the etiology of her rapid decline is-- natural progression of worsening renal function, ALVIN, or combination of both?  Renal Dopplers and ultrasound does not suggest that her right sided stent is occluded.  I would not expect a stable left-sided lesion to cause this rapid decline.  2.  PAD: Bilateral renal artery stenosis, status post right renal artery stent in June 2021.  Residual 80% left renal artery stenosis with severe calcification.  I do not think there is any indication today to intervene on this lesion particularly with her worsening renal function.  Continue to monitor blood pressure.  3.  Hypertension: Controlled  4.  Pericardial effusion: Chronic pericardial effusion.  Previously considered to be related to minoxidil.  Measured to be moderate in size in June 2021.  No evidence of tamponade at this time.  Heart size is large on chest x-ray.  Can get a limited echo to assess size.  5. Grade 1 diastolic dysfunction/HFpEF, expected for age. Mild volume overload on exam and CXR. IV Lasix has been ordered by the primary team, I have asked the nurse to hold this and defer to renal's choice.     Thank you for including me in the care of this patient. Will continue to follow. Please call with any further questions or concerns.      Ebony Wolf MD  [facility]  07/02/21

## 2021-07-03 PROBLEM — M10.379 ACUTE GOUT DUE TO RENAL IMPAIRMENT INVOLVING FOOT: Status: ACTIVE | Noted: 2021-01-01

## 2021-07-03 PROBLEM — D72.829 LEUKOCYTOSIS: Status: ACTIVE | Noted: 2021-01-01

## 2021-07-03 PROBLEM — E87.6 HYPOKALEMIA: Status: ACTIVE | Noted: 2021-01-01

## 2021-07-03 PROBLEM — I50.33 ACUTE ON CHRONIC DIASTOLIC CHF (CONGESTIVE HEART FAILURE) (HCC): Status: ACTIVE | Noted: 2021-01-01

## 2021-07-03 PROBLEM — D63.8 ANEMIA OF CHRONIC DISEASE: Chronic | Status: ACTIVE | Noted: 2021-01-01

## 2021-07-03 NOTE — PROGRESS NOTES
"    Patient Name: Rachana Arguelles  :1945  75 y.o.      Patient Care Team:  Rhys Crowder Jr., MD as PCP - General (Family Medicine)  Whitney Dudley MD as Consulting Physician (Nephrology)  Quita Figueroa MD as Surgeon (Thoracic Surgery)  Sabas Luther MD as Consulting Physician (Otolaryngology)  Álvaro Gifford MD as Consulting Physician (Pulmonary Disease)    Chief Complaint: YVONNE    Interval History:    breathing feels better. Pain in b/l toes    Objective   Vital Signs  Temp:  [98.2 °F (36.8 °C)-98.8 °F (37.1 °C)] 98.5 °F (36.9 °C)  Heart Rate:  [64-72] 68  Resp:  [16-18] 16  BP: (124-165)/(50-75) 145/50    Intake/Output Summary (Last 24 hours) at 7/3/2021 1233  Last data filed at 7/3/2021 1111  Gross per 24 hour   Intake 420 ml   Output 1250 ml   Net -830 ml     Flowsheet Rows      First Filed Value   Admission Height  170 cm (66.93\") Documented at 2021 1716   Admission Weight  80.5 kg (177 lb 7.5 oz) Documented at 2021 0507          Physical Exam:   General Appearance:    Alert, cooperative, in no acute distress   Lungs:     Clear to auscultation.  Normal respiratory effort and rate.      Heart:    Regular rhythm and normal rate, normal S1 and S2, no murmurs, gallops or rubs.     Chest Wall:    No abnormalities observed   Abdomen:     Soft, nontender, positive bowel sounds.     Extremities:   no cyanosis, clubbing or edema.  No marked joint deformities.  Adequate musculoskeletal strength.       Results Review:    Results from last 7 days   Lab Units 21  0716   SODIUM mmol/L 139   POTASSIUM mmol/L 2.8*   CHLORIDE mmol/L 99   CO2 mmol/L 28.8   BUN mg/dL 35*   CREATININE mg/dL 3.95*   GLUCOSE mg/dL 95   CALCIUM mg/dL 8.2*     Results from last 7 days   Lab Units 21  0622 21   TROPONIN T ng/mL 0.021 0.015     Results from last 7 days   Lab Units 21  0716   WBC 10*3/mm3 12.70*   HEMOGLOBIN g/dL 8.6*   HEMATOCRIT % 26.4*   PLATELETS 10*3/mm3 240     Results " from last 7 days   Lab Units 07/01/21 2011   INR  1.00     Results from last 7 days   Lab Units 07/03/21  0716   MAGNESIUM mg/dL 2.1                   Medication Review:   amLODIPine, 10 mg, Oral, Daily  ascorbic acid, 500 mg, Oral, Daily  atorvastatin, 40 mg, Oral, Nightly  budesonide-formoterol, 2 puff, Inhalation, BID - RT  carvedilol, 25 mg, Oral, BID With Meals  cetirizine, 5 mg, Oral, Daily  clopidogrel, 75 mg, Oral, Daily  famotidine, 20 mg, Oral, Daily  folic acid, 1 mg, Oral, Daily  hydrALAZINE, 100 mg, Oral, Q8H  polyethylene glycol, 17 g, Oral, Daily  sertraline, 50 mg, Oral, Daily  sodium bicarbonate, 1,300 mg, Oral, TID  sucralfate, 1 g, Oral, BID AC  terazosin, 5 mg, Oral, Nightly  traZODone, 75 mg, Oral, Nightly  venlafaxine XR, 150 mg, Oral, Daily              Assessment/Plan   1. YVONNE on CKD- unclear etiology. Peak around 4. Improving  2. Bilateral renal artery stenosis: no indication for left sided stenting at this time. Right stent velocities normal on recent doppler  3. HTN controlled  4. Pericardial effusion: small  5. HFpEF: continue diuresis per renal     Not much to add from a cardiac/peripheral arterial disease perspective. Will see again on Monday unless any issues arise.     Ebony Wolf MD  [facility]  07/03/21  12:33 EDT

## 2021-07-03 NOTE — PLAN OF CARE
Goal Outcome Evaluation:              Outcome Summary: VSS pain meds given for pain. Purewick in place. Maintain safety and continue to monitor.

## 2021-07-03 NOTE — PROGRESS NOTES
NEPHROLOGY PROGRESS NOTE    PATIENT IDENTIFICATION:   Name:  Rachana Arguelles      MRN:  [id number]     75 y.o.  female             Reason for visit: YVONNE    SUBJECTIVE:     Seen and examined.  Complaining of big toe pain especially in the left foot.  No shortness of air or chest pain      OBJECTIVE:  Vitals:    07/03/21 0517 07/03/21 0700 07/03/21 0802 07/03/21 1302   BP:  145/50  117/89   BP Location:  Left arm  Left arm   Patient Position:  Lying  Lying   Pulse:   68    Resp:  16  16   Temp:  98.5 °F (36.9 °C)  98.2 °F (36.8 °C)   TempSrc:  Oral  Oral   SpO2:       Weight: 79.8 kg (175 lb 14.8 oz)      Height:               Body mass index is 27.61 kg/m².    Intake/Output Summary (Last 24 hours) at 7/3/2021 1326  Last data filed at 7/3/2021 1302  Gross per 24 hour   Intake 420 ml   Output 1650 ml   Net -1230 ml         Exam:  GEN:  No distress, appears stated age  EYES:   Anicteric sclera  ENT:    External ears/nose normal, MM are moist  NECK:  No adenopathy, JVP none  LUNGS: Normal chest on inspection; not labored  CV:  Normal S1S2, without murmur  ABD:  Non-tender, non-distended, no hepatosplenomegaly, +BS  EXT:  No edema; no cyanosis; clubbing    Scheduled meds:  amLODIPine, 10 mg, Oral, Daily  ascorbic acid, 500 mg, Oral, Daily  atorvastatin, 40 mg, Oral, Nightly  budesonide-formoterol, 2 puff, Inhalation, BID - RT  carvedilol, 25 mg, Oral, BID With Meals  cetirizine, 5 mg, Oral, Daily  clopidogrel, 75 mg, Oral, Daily  famotidine, 20 mg, Oral, Daily  folic acid, 1 mg, Oral, Daily  hydrALAZINE, 100 mg, Oral, Q8H  polyethylene glycol, 17 g, Oral, Daily  sertraline, 50 mg, Oral, Daily  sodium bicarbonate, 1,300 mg, Oral, TID  sucralfate, 1 g, Oral, BID AC  terazosin, 5 mg, Oral, Nightly  traZODone, 75 mg, Oral, Nightly  venlafaxine XR, 150 mg, Oral, Daily      IV meds:                           Data Review:    Results from last 7 days   Lab Units 07/03/21  1202 07/03/21  0716 07/02/21  0622 07/01/21 2011    SODIUM mmol/L 138 139 142 143   POTASSIUM mmol/L 3.0* 2.8* 3.6 3.3*   CHLORIDE mmol/L 97* 99 103 102   CO2 mmol/L 27.6 28.8 25.4 27.8   BUN mg/dL 33* 35* 32* 33*   CREATININE mg/dL 4.23* 3.95* 4.13* 4.29*   CALCIUM mg/dL 8.1* 8.2* 8.0* 7.9*   BILIRUBIN mg/dL  --  0.2  --  0.3   ALK PHOS U/L  --  85  --  88   ALT (SGPT) U/L  --  11  --  14   AST (SGOT) U/L  --  19  --  22   GLUCOSE mg/dL 118* 95 92 105*       Estimated Creatinine Clearance: 12.5 mL/min (A) (by C-G formula based on SCr of 4.23 mg/dL (H)).    Results from last 7 days   Lab Units 07/03/21  0716   MAGNESIUM mg/dL 2.1   PHOSPHORUS mg/dL 7.0*       Results from last 7 days   Lab Units 07/03/21  0716 07/02/21  0622 07/01/21 2011 07/01/21  1324   WBC 10*3/mm3 12.70* 14.40* 15.91*  --    HEMOGLOBIN g/dL 8.6* 8.5* 8.4* 9.3*   PLATELETS 10*3/mm3 240 234 248  --        Results from last 7 days   Lab Units 07/01/21 2011   INR  1.00             ASSESSMENT:     Acute kidney injury (CMS/[facility])    Hypertension    PAD (peripheral artery disease) (CMS/[facility])    COPD (chronic obstructive pulmonary disease) (CMS/[facility])      ASSESSMENT:  1.  Acute kidney injury on chronic kidney disease, slowly worsening patient has increased shortness of breath with symptoms of congestive heart failure this is probably part of cardiorenal syndrome picture.  Patient has what appears to be somewhat uremic symptoms including poor appetite and asterixis  2.  Hypertension controlled  3.  Chronic pericardial effusion previously considered to be related to minoxidil  4.  Grade 1 diastolic dysfunction/HFpEF.  Patient is symptomatic     PLAN:      -We will continue to diurese  -We will give prednisone for probable gout flareup  -We will need IV iron replacement  -Continue to evaluate need for dialysis  -Surveillance labs        Claudia Wilson MD  7/3/2021    13:26 EDT

## 2021-07-03 NOTE — PLAN OF CARE
Goal Outcome Evaluation:              Outcome Summary: purewick in place per patient request continues for same, cardiology discussing possibility of needing other kidney stented, renal consulted, echo ordered & completed, orthostatics completed WNL, family visited today, pain meds continue for chronic back pain

## 2021-07-03 NOTE — PLAN OF CARE
Goal Outcome Evaluation:  Plan of Care Reviewed With: patient        Progress: improving  Outcome Summary: Pt tolerated treatment fair this date. Pt declined any OOB activity d/t pain in B feet from gout flare up, though agreed to supine bed exercises. Instructed pt on several LE exercises, and encouraged her to perform on own later as well, and to also get up to the chair w/ nsg if feeling better.

## 2021-07-03 NOTE — THERAPY TREATMENT NOTE
Patient Name: Rachana Arguelles  : 1945    MRN: [id number]                              Today's Date: 7/3/2021       Admit Date: 2021    Visit Dx: No diagnosis found.  Patient Active Problem List   Diagnosis   • Anxiety   • Chronic pain syndrome   • Depression   • Gastroesophageal reflux disease   • Hyperlipidemia   • Hypertension   • Osteoarthritis of hip   • Chronic low back pain   • Failed back syndrome of lumbar spine   • Pulmonary embolus (CMS/HCC)   • Weight loss   • Polypharmacy   • Stage 3b chronic kidney disease (CMS/HCC)   • Chronic constipation   • Sacroiliac joint pain   • Mixed incontinence   • Renal cyst   • Achilles tendonitis   • Atherosclerosis of native artery of left lower extremity with intermittent claudication (CMS/HCC)   • Morbidly obese (CMS/HCC)   • Lung nodule   • Malignant neoplasm of right upper lobe of lung (CMS/HCC)   • Lung cancer (CMS/HCC)   • Moderate single current episode of major depressive disorder (CMS/HCC)   • Palpitations   • Encounter for screening for malignant neoplasm of colon   • Hematoma of scalp   • Acute neck pain   • Dizziness   • Unstable cervical spine   • Postconcussive syndrome   • Positive colorectal cancer screening using Cologuard test   • Dysphagia   • S/P reverse total shoulder arthroplasty, right   • Chronic post-traumatic headache, not intractable   • Medication overuse headache   • Migraine without aura and without status migrainosus, not intractable   • Shortness of breath   • PAD (peripheral artery disease) (CMS/HCC)   • Bilateral carotid artery stenosis   • S/P lobectomy of lung   • YVONNE (acute kidney injury) (CMS/HCC)   • Anemia   • Frequent PVCs   • Pneumonia of both lower lobes due to infectious organism   • History of lung cancer   • Hypertensive urgency   • Bradycardia, sinus   • YVONNE (acute kidney injury) (CMS/HCC)   • Opioid dependence (CMS/HCC)   • COPD (chronic obstructive pulmonary disease) (CMS/HCC)   • Renal artery stenosis (CMS/HCC)    • Acute kidney injury (CMS/HCC)     Past Medical History:   Diagnosis Date   • AAA (abdominal aortic aneurysm) without rupture (CMS/[facility])    • Anemia    • Anxiety    • Arthritis    • Bilateral carotid artery stenosis 8/14/2020   • Cancer (CMS/[facility])     skin cancer   • Chest pain     OCCAS   • Chronic low back pain    • Chronic pain syndrome 3/12/2016   • CKD (chronic kidney disease), stage III (CMS/[facility])    • Claustrophobia 10/26/2015    Resolved   • COPD (chronic obstructive pulmonary disease) (CMS/[facility])    • Depression    • Dizziness    • Dyspnea    • GERD (gastroesophageal reflux disease)    • GI problem    • H/O concussion    • Head trauma     FELL CUT HEAD 12 STAPLES   • Hiatal hernia    • History of migraine headaches    • Hyperlipidemia    • Hypertension    • Lumbar postlaminectomy syndrome 10/26/2015    Resolved   • Lung cancer (CMS/[facility])    • Lung nodule    • Migraines    • Nausea    • Palpitations    • Polypharmacy 4/22/2016   • Pulmonary embolism (CMS/[facility]) 10/26/2015    January 2013 - Resolved, felt to be secondary to estrogen use   • Slow to wake up after anesthesia    • Submandibular sialoadenitis 10/26/2015    Resolved   • Visual changes    • Vitamin B 12 deficiency      Past Surgical History:   Procedure Laterality Date   • ANGIOPLASTY ILIAC ARTERY Bilateral 8/10/2018    Procedure: BILATERAL ILIAC STENTS;  Surgeon: Riki Mancera MD;  Location: [facility] MAIN OR;  Service: Vascular   • APPENDECTOMY     • ARTERIOGRAM N/A 6/4/2021    Procedure: Renal Arteriogram with possible intervention;  Surgeon: Mat Coello MD;  Location: [facility] CATH INVASIVE LOCATION;  Service: Cardiology;  Laterality: N/A;   • BREAST SURGERY      Breast Surgery Reduction Procedure   • BRONCHOSCOPY N/A 2/8/2019    Procedure: BRONCHOSCOPY;  Surgeon: Álvaro Gifford MD;  Location: [facility] ENDOSCOPY;  Service: Pulmonary   • CARDIAC CATHETERIZATION N/A 6/4/2021    Procedure: Stent BMS peripheral- RENAL-RIGHT;  Surgeon: Oumou  Mat CERDA MD;  Location: [facility] CATH INVASIVE LOCATION;  Service: Cardiology;  Laterality: N/A;   • CHOLECYSTECTOMY     • COLONOSCOPY     • ENDOSCOPY N/A 11/3/2020    Procedure: ESOPHAGOGASTRODUODENOSCOPY with [address] dilation;  Surgeon: Yunior Vo MD;  Location: [facility] ENDOSCOPY;  Service: Gastroenterology;  Laterality: N/A;  pre- dysphagia  post- esophageal ring, hiatal hernia   • HYSTERECTOMY     • INTERVENTIONAL RADIOLOGY PROCEDURE N/A 6/4/2021    Procedure: Abdominal Aortogram;  Surgeon: Mat Coello MD;  Location: [facility] CATH INVASIVE LOCATION;  Service: Cardiology;  Laterality: N/A;   • INTUBATION  1/15/2019        • JOINT REPLACEMENT      left knee, hip, shoulder   • LASIK     • LUMBAR FUSION      Lumbar Vertebral Fusion   • RENAL ARTERY STENT Right    • SHOULDER ARTHROSCOPY  04/04/2013    Dr. Carrillo/[facility] [place]   • THORACOSCOPY VIDEO ASSISTED WITH LOBECTOMY Right 1/11/2019    Procedure: BRONCOSCOPY, THORACOSCOPY VIDEO ASSISTED WITH RIGHT UPPER LOBE WEDGE RESECTION, COMPLETION RIGHT UPPER LOBECTOMY, LYMPH NODE DISSECTION, INTERCOSTAL NERVE BLOCK;  Surgeon: Quita iFgueroa MD;  Location: [facility] MAIN OR;  Service: Thoracic   • TONSILLECTOMY     • TOTAL HIP ARTHROPLASTY REVISION Left    • TOTAL KNEE ARTHROPLASTY Left    • TOTAL SHOULDER ARTHROPLASTY Left    • TOTAL SHOULDER ARTHROPLASTY W/ DISTAL CLAVICLE EXCISION Right 6/18/2020    Procedure: RIGHT TOTAL SHOULDER REVERSE ARTHROPLASTY WITH GPS;  Surgeon: Lino Carrillo MD;  Location: [facility] OR [facility];  Service: Orthopedics;  Laterality: Right;     General Information     Row Name 07/03/21 1608          Physical Therapy Time and Intention    Document Type  therapy note (daily note)  -     Mode of Treatment  physical therapy  -     Row Name 07/03/21 1608          General Information    Existing Precautions/Restrictions  fall;oxygen therapy device and L/min  -     Row Name 07/03/21 1608          Cognition    Orientation  Status (Cognition)  oriented x 3  -SM       User Key  (r) = Recorded By, (t) = Taken By, (c) = Cosigned By    Initials Name Provider Type    Judit Clemente PTA Physical Therapy Assistant        Mobility     Row Name 07/03/21 1609          Bed Mobility    Comment (Bed Mobility)  pt declined d/t pain in B feet from gout  -SM       User Key  (r) = Recorded By, (t) = Taken By, (c) = Cosigned By    Initials Name Provider Type    Judit Clemente PTA Physical Therapy Assistant        Obj/Interventions     Row Name 07/03/21 1610          Motor Skills    Therapeutic Exercise  -- supine AP, QS, GS, heel slides, hip abd/add, SLR, SAQ x10 reps  -       User Key  (r) = Recorded By, (t) = Taken By, (c) = Cosigned By    Initials Name Provider Type    Judit Clemente PTA Physical Therapy Assistant        Goals/Plan    No documentation.       Clinical Impression     Row Name 07/03/21 1610          Pain    Additional Documentation  Pain Scale: Numbers Pre/Post-Treatment (Group)  -SM     Row Name 07/03/21 1610          Pain Scale: Numbers Pre/Post-Treatment    Pretreatment Pain Rating  8/10  -SM     Posttreatment Pain Rating  8/10  -SM     Pain Location - Side  Bilateral  -SM     Pain Location  foot  -SM     Pain Intervention(s)  Repositioned;Rest  -SM     Row Name 07/03/21 1610          Positioning and Restraints    Pre-Treatment Position  in bed  -SM     Post Treatment Position  bed  -SM     In Bed  supine;call light within reach;encouraged to call for assist;exit alarm on  -SM       User Key  (r) = Recorded By, (t) = Taken By, (c) = Cosigned By    Initials Name Provider Type    Judit Clemente PTA Physical Therapy Assistant        Outcome Measures     Row Name 07/03/21 1611          How much help from another person do you currently need...    Turning from your back to your side while in flat bed without using bedrails?  3  -SM     Moving from lying on back to sitting on the side of a flat bed  without bedrails?  3  -SM     Moving to and from a bed to a chair (including a wheelchair)?  2  -SM     Standing up from a chair using your arms (e.g., wheelchair, bedside chair)?  2  -SM     Climbing 3-5 steps with a railing?  1  -SM     To walk in hospital room?  2  -SM     AM-PAC 6 Clicks Score (PT)  13  -SM     Row Name 07/03/21 1611          Functional Assessment    Outcome Measure Options  AM-PAC 6 Clicks Basic Mobility (PT)  -       User Key  (r) = Recorded By, (t) = Taken By, (c) = Cosigned By    Initials Name Provider Type     Judit Constantino PTA Physical Therapy Assistant        Physical Therapy Education                 Title: PT OT SLP Therapies (In Progress)     Topic: Physical Therapy (Done)     Point: Mobility training (Done)     Learning Progress Summary           Patient Acceptance, E,TB,D, VU,NR by  at 7/3/2021 1611    Acceptance, E,D, VU,NR by MS at 7/2/2021 0908                   Point: Home exercise program (Done)     Learning Progress Summary           Patient Acceptance, E,TB,D, VU,NR by  at 7/3/2021 1611    Acceptance, E,D, VU,NR by MS at 7/2/2021 0908                   Point: Body mechanics (Done)     Learning Progress Summary           Patient Acceptance, E,TB,D, VU,NR by  at 7/3/2021 1611    Acceptance, E,D, VU,NR by MS at 7/2/2021 0908                   Point: Precautions (Done)     Learning Progress Summary           Patient Acceptance, E,TB,D, VU,NR by  at 7/3/2021 1611    Acceptance, E,D, VU,NR by MS at 7/2/2021 0908                               User Key     Initials Effective Dates Name Provider Type Discipline    MS 06/16/21 -  Bryan Haddad, PT Physical Therapist PT     03/07/18 -  Judit Constantino PTA Physical Therapy Assistant PT              PT Recommendation and Plan     Plan of Care Reviewed With: patient  Progress: improving  Outcome Summary: Pt tolerated treatment fair this date. Pt declined any OOB activity d/t pain in B feet from gout flare up,  though agreed to supine bed exercises. Instructed pt on several LE exercises, and encouraged her to perform on own later as well, and to also get up to the chair w/ nsg if feeling better.     Time Calculation:   PT Charges     Row Name 07/03/21 1613             Time Calculation    Start Time  1556  -      Stop Time  1608  -      Time Calculation (min)  12 min  -      PT Received On  07/03/21  -      PT - Next Appointment  07/04/21  -        User Key  (r) = Recorded By, (t) = Taken By, (c) = Cosigned By    Initials Name Provider Type    Judit Clemente PTA Physical Therapy Assistant        Therapy Charges for Today     Code Description Service Date Service Provider Modifiers Qty    41446959703 HC PT THER PROC EA 15 MIN 7/3/2021 Judit Constantino PTA GP 1          PT G-Codes  Outcome Measure Options: AM-PAC 6 Clicks Basic Mobility (PT)  AM-PAC 6 Clicks Score (PT): 13  AM-PAC 6 Clicks Score (OT): 18    Judit Constantino PTA  7/3/2021

## 2021-07-03 NOTE — PROGRESS NOTES
Name: Rachana Arguelles ADMIT: 2021   : 1945  PCP: Rhys Crowder Jr., MD    MRN: [id number] LOS: 2 days   AGE/SEX: 75 y.o. female  ROOM: [facility]     Subjective   Subjective   Feeling the same today. C/o pain in both her feet around base of big toes. Dtr concerned about some skin lesions that have crept up on her face arms and back just since admission. Not painful or itchy. She is tolerating diet though has poor appetite. No N/V. No SOA or CP. She is voiding well, but small amounts.    Review of Systems   Constitutional: Positive for appetite change and fatigue. Negative for chills, diaphoresis and fever.   HENT: Negative for congestion, ear pain, facial swelling, mouth sores, nosebleeds, rhinorrhea, sore throat, trouble swallowing and voice change.    Eyes: Negative for pain and visual disturbance.   Respiratory: Negative for cough, choking, chest tightness, shortness of breath and stridor.    Cardiovascular: Positive for leg swelling. Negative for chest pain and palpitations.   Gastrointestinal: Negative for abdominal pain, blood in stool, diarrhea, nausea and vomiting.   Endocrine: Negative for cold intolerance and heat intolerance.   Genitourinary: Positive for decreased urine volume. Negative for difficulty urinating, dysuria and hematuria.   Musculoskeletal: Negative for back pain and neck pain.   Skin: Positive for rash (small lesions scattered on upper body). Negative for color change and pallor.   Allergic/Immunologic: Negative for environmental allergies and food allergies.   Neurological: Negative for tremors, seizures, syncope, speech difficulty and headaches.   Hematological: Negative for adenopathy. Does not bruise/bleed easily.   Psychiatric/Behavioral: Negative for behavioral problems, confusion and hallucinations.        Objective   Objective   Vital Signs  Temp:  [98.2 °F (36.8 °C)-98.8 °F (37.1 °C)] 98.2 °F (36.8 °C)  Heart Rate:  [66-72] 68  Resp:  [16-18] 16  BP: (117-165)/(50-89)  165/63  SpO2:  [94 %-95 %] 94 %  on  Flow (L/min):  [2] 2;   Device (Oxygen Therapy): nasal cannula  Body mass index is 27.61 kg/m².  Physical Exam  Vitals and nursing note reviewed. Exam conducted with a chaperone present.   Constitutional:       General: She is not in acute distress.     Appearance: She is ill-appearing. She is not toxic-appearing or diaphoretic.   HENT:      Head: Normocephalic and atraumatic.      Right Ear: External ear normal.      Left Ear: External ear normal.      Nose: Nose normal.      Mouth/Throat:      Mouth: Mucous membranes are moist.      Pharynx: Oropharynx is clear.   Eyes:      General: No scleral icterus.        Right eye: No discharge.         Left eye: No discharge.      Conjunctiva/sclera: Conjunctivae normal.   Cardiovascular:      Rate and Rhythm: Normal rate and regular rhythm.      Pulses: Normal pulses.   Pulmonary:      Effort: Pulmonary effort is normal. No respiratory distress.      Breath sounds: Normal breath sounds. No rales.   Abdominal:      General: Bowel sounds are normal. There is no distension.      Palpations: Abdomen is soft.      Tenderness: There is no abdominal tenderness.   Musculoskeletal:         General: Swelling (trace edema in BLEs) present. Normal range of motion.      Cervical back: Neck supple. No rigidity.      Comments: Very TTP about bilateral MTP joints, no warmth or erythema   Lymphadenopathy:      Cervical: No cervical adenopathy.   Skin:     General: Skin is warm and dry.      Capillary Refill: Capillary refill takes less than 2 seconds.      Coloration: Skin is not jaundiced.      Findings: Rash (some small scattered fungating lesions on back, right anterior shoulder, and left forehead, not in dermatomal distribution, nontender) present.   Neurological:      General: No focal deficit present.      Mental Status: She is alert and oriented to person, place, and time. Mental status is at baseline.      Cranial Nerves: No cranial nerve  deficit.      Coordination: Coordination normal.   Psychiatric:         Mood and Affect: Mood normal.         Behavior: Behavior normal.         Thought Content: Thought content normal.         Results Review     I reviewed the patient's new clinical results.  Results from last 7 days   Lab Units 07/03/21  0716 07/02/21 0622 07/01/21 2011 07/01/21  1324   WBC 10*3/mm3 12.70* 14.40* 15.91*  --    HEMOGLOBIN g/dL 8.6* 8.5* 8.4* 9.3*   PLATELETS 10*3/mm3 240 234 248  --      Results from last 7 days   Lab Units 07/03/21  1202 07/03/21  0716 07/02/21 0622 07/01/21 2011   SODIUM mmol/L 138 139 142 143   POTASSIUM mmol/L 3.0* 2.8* 3.6 3.3*   CHLORIDE mmol/L 97* 99 103 102   CO2 mmol/L 27.6 28.8 25.4 27.8   BUN mg/dL 33* 35* 32* 33*   CREATININE mg/dL 4.23* 3.95* 4.13* 4.29*   GLUCOSE mg/dL 118* 95 92 105*   Estimated Creatinine Clearance: 12.5 mL/min (A) (by C-G formula based on SCr of 4.23 mg/dL (H)).  Results from last 7 days   Lab Units 07/03/21  0716 07/01/21 2011   ALBUMIN g/dL 2.70* 3.00*   BILIRUBIN mg/dL 0.2 0.3   ALK PHOS U/L 85 88   AST (SGOT) U/L 19 22   ALT (SGPT) U/L 11 14     Results from last 7 days   Lab Units 07/03/21  1202 07/03/21  0716 07/02/21 0622 07/01/21 2011   CALCIUM mg/dL 8.1* 8.2* 8.0* 7.9*   ALBUMIN g/dL  --  2.70*  --  3.00*   MAGNESIUM mg/dL  --  2.1  --   --    PHOSPHORUS mg/dL  --  7.0*  --   --        COVID19   Date Value Ref Range Status   07/01/2021 Not Detected Not Detected - Ref. Range Final   05/25/2021 Not Detected Not Detected - Ref. Range Final     No results found for: HGBA1C, POCGLU    Adult Transthoracic Echo Limited W/ Cont if Necessary Per Protocol  · There is a small (<1cm) circumferential pericardial effusion.  · There is no evidence of cardiac tamponade.     US Renal Bilateral  Narrative: RENAL ULTRASOUND     HISTORY: Renal artery stenosis     COMPARISON: 06/01/2021     TECHNIQUE: Grayscale and color Doppler sonographic images were obtained  through the kidneys and  bladder.     FINDINGS:  Right kidney measures 11.0 x 4.7 x 5.7 cm. Left kidney measures 9.0 x  3.5 cm. No hydronephrosis is seen on either side. Both kidneys appear  somewhat echogenic, which may reflect underlying chronic medical renal  disease. Multiple tiny cysts are identified within both kidneys. Most of  these are simple in appearance. The single largest cyst is arising from  the left kidney, and measures 3.3 x 3.1 x 3.1 cm. An additional cyst  within the left kidney has some hyperechoic debris layering within it,  which may represent milk of calcium. Urinary bladder is relatively  contracted, and neither ureteral jet was identified.     Impression:    1. Both kidneys are echogenic in appearance, which may reflect  underlying chronic medical renal disease.  2. No hydronephrosis seen on either side.     This report was finalized on 7/2/2021 1:09 AM by Dr. Frannie Jackson M.D.     XR Chest 1 View  Narrative: SINGLE VIEW OF THE CHEST     HISTORY: Cough     COMPARISON: 06/24/2021     FINDINGS:  Cardiomegaly is present. There is vascular congestion. There appears to  be some chronic scarring within the right lung. There is calcification  of the aorta. There are changes of bilateral shoulder arthroplasties. No  definite acute infiltrates are seen.     Impression: Cardiomegaly with mild vascular congestion.     This report was finalized on 7/2/2021 12:01 AM by Dr. Frannie Jackson M.D.       Scheduled Medications  amLODIPine, 10 mg, Oral, Daily  ascorbic acid, 500 mg, Oral, Daily  atorvastatin, 40 mg, Oral, Nightly  budesonide-formoterol, 2 puff, Inhalation, BID - RT  bumetanide, 4 mg, Intravenous, Q8H  carvedilol, 25 mg, Oral, BID With Meals  cetirizine, 5 mg, Oral, Daily  clopidogrel, 75 mg, Oral, Daily  famotidine, 20 mg, Oral, Daily  folic acid, 1 mg, Oral, Daily  hydrALAZINE, 100 mg, Oral, Q8H  polyethylene glycol, 17 g, Oral, Daily  predniSONE, 40 mg, Oral, Daily  sertraline, 50 mg, Oral, Daily  sodium  bicarbonate, 1,300 mg, Oral, TID  sucralfate, 1 g, Oral, BID AC  terazosin, 5 mg, Oral, Nightly  traZODone, 75 mg, Oral, Nightly  venlafaxine XR, 150 mg, Oral, Daily    Infusions   Diet  Diet Regular; Cardiac, Consistent Carbohydrate, Renal, Low Sodium; 2,000 mg Na       Assessment/Plan     Active Hospital Problems    Diagnosis  POA   • **Acute kidney injury (CMS/[facility]) [N17.9]  Yes   • Acute on chronic diastolic CHF (congestive heart failure) (CMS/[facility]) [I50.33]  Yes   • Acute gout due to renal impairment involving foot [M10.379]  No   • Hypokalemia [E87.6]  Yes   • Anemia of chronic disease [D63.8]  Yes   • Leukocytosis [D72.829]  Yes   • Renal artery stenosis (CMS/[facility]) [I70.1]  Yes   • COPD (chronic obstructive pulmonary disease) (CMS/[facility]) [J44.9]  Yes   • Opioid dependence (CMS/[facility]) [F11.20]  Yes   • History of lung cancer [Z85.118]  Not Applicable   • PAD (peripheral artery disease) (CMS/[facility]) [I73.9]  Yes   • Stage 3b chronic kidney disease (CMS/[facility]) [N18.32]  Yes   • Hypertension [I10]  Yes   • Chronic pain syndrome [G89.4]  Yes      Resolved Hospital Problems   No resolved problems to display.     Very pleasant 76yo woman admitted with YVONNE/CKD3b.    YVONNE/CKD, uremia  -appreciate Renal attention to pt  -Cr remains largely unchanged  -continue diuresis with IV Bumex  -suspect her scattered skin lesions are due to uremia    Anemia of CKD  -?IV iron here  -Hgb remains stable    Acute gouty arthritis of bilateral 1st MTP joints  -Prednisone started    Acute/chronic diastolic CHF  -diuresis per Renal  -appreciate Card attention to pt    Hypokalemia  -replacing cautiously given her YVONNE  -Mg++ level fine    Leukocytosis  -suspect reactive as it has been falling steadily without any anti-infective treatment    HTN  -BPs acceptable on current multi-drug regimen    Bilateral QUINTON (has stent on right)  -u/s shows decent flow bilaterally here      SCDs for DVT prophylaxis.  Full code.  Discussed with patient, family and  nursing staff.  Anticipate discharge home with family, timing yet to be determined.      Karthikeyan Montano MD  [facility]  07/03/21  17:05 EDT

## 2021-07-04 NOTE — PLAN OF CARE
Goal Outcome Evaluation:  Plan of Care Reviewed With: patient        Progress: no change  Outcome Summary: VSS, Oxycodone x1 for feet & back pain, NSR, IS up to 1000 with fair results - encouraged pt to use Q1 hr while awake, would not get up r/t foot pain, rested well

## 2021-07-04 NOTE — PROGRESS NOTES
Name: Rachana Arguelles ADMIT: 2021   : 1945  PCP: Rhys Crowder Jr., MD    MRN: [id number] LOS: 3 days   AGE/SEX: 75 y.o. female  ROOM: [facility]     Subjective   Subjective   Feeling a little better today. Gout pain is much improved today with steroid tx. She is tolerating diet though has poor appetite. No N/V. No SOA or CP. She is voiding well, but small amounts.    Review of Systems   Constitutional: Positive for appetite change and fatigue. Negative for chills, diaphoresis and fever.   HENT: Negative for congestion, ear pain, facial swelling, mouth sores, nosebleeds, rhinorrhea, sore throat, trouble swallowing and voice change.    Eyes: Negative for pain and visual disturbance.   Respiratory: Negative for cough, choking, chest tightness, shortness of breath and stridor.    Cardiovascular: Positive for leg swelling. Negative for chest pain and palpitations.   Gastrointestinal: Negative for abdominal pain, blood in stool, diarrhea, nausea and vomiting.   Endocrine: Negative for cold intolerance and heat intolerance.   Genitourinary: Positive for decreased urine volume. Negative for difficulty urinating, dysuria and hematuria.   Musculoskeletal: Negative for back pain and neck pain.   Skin: Positive for rash (small lesions scattered on upper body). Negative for color change and pallor.   Allergic/Immunologic: Negative for environmental allergies and food allergies.   Neurological: Negative for tremors, seizures, syncope, speech difficulty and headaches.   Hematological: Negative for adenopathy. Does not bruise/bleed easily.   Psychiatric/Behavioral: Negative for behavioral problems, confusion and hallucinations.        Objective   Objective   Vital Signs  Temp:  [97.9 °F (36.6 °C)-98.8 °F (37.1 °C)] 98.2 °F (36.8 °C)  Heart Rate:  [66-72] 66  Resp:  [18] 18  BP: (127-165)/(52-87) 135/64  SpO2:  [94 %-97 %] 95 %  on  Flow (L/min):  [2] 2;   Device (Oxygen Therapy): nasal cannula  Body mass index is 27.58  kg/m².   I/O last 3 completed shifts:  In: -   Out: 2550 [Urine:2550]  I/O this shift:  In: 220 [P.O.:220]  Out: -         Physical Exam  Vitals and nursing note reviewed. Exam conducted with a chaperone present.   Constitutional:       General: She is not in acute distress.     Appearance: She is ill-appearing (chronically). She is not toxic-appearing or diaphoretic.   HENT:      Head: Normocephalic and atraumatic.      Right Ear: External ear normal.      Left Ear: External ear normal.      Nose: Nose normal.      Mouth/Throat:      Mouth: Mucous membranes are moist.      Pharynx: Oropharynx is clear.   Eyes:      General: No scleral icterus.        Right eye: No discharge.         Left eye: No discharge.      Conjunctiva/sclera: Conjunctivae normal.   Cardiovascular:      Rate and Rhythm: Normal rate and regular rhythm.      Pulses: Normal pulses.   Pulmonary:      Effort: Pulmonary effort is normal. No respiratory distress.      Breath sounds: Normal breath sounds. No rales.   Abdominal:      General: Bowel sounds are normal. There is no distension.      Palpations: Abdomen is soft.      Tenderness: There is no abdominal tenderness.   Musculoskeletal:         General: Swelling (trace doughy edema in BLEs) present. Normal range of motion.      Cervical back: Neck supple. No rigidity.      Comments: Bilateral MTP joints are much less TTP today   Lymphadenopathy:      Cervical: No cervical adenopathy.   Skin:     General: Skin is warm and dry.      Capillary Refill: Capillary refill takes less than 2 seconds.      Coloration: Skin is not jaundiced.      Findings: Rash (some small scattered fungating lesions on back, right anterior shoulder, and left forehead, not in dermatomal distribution, nontender) present.   Neurological:      General: No focal deficit present.      Mental Status: She is alert and oriented to person, place, and time. Mental status is at baseline.      Cranial Nerves: No cranial nerve deficit.       Coordination: Coordination normal.   Psychiatric:         Mood and Affect: Mood normal.         Behavior: Behavior normal.         Thought Content: Thought content normal.         Results Review     I reviewed the patient's new clinical results.  Results from last 7 days   Lab Units 07/04/21  0500 07/03/21  0716 07/02/21 0622 07/01/21 2011   WBC 10*3/mm3 11.65* 12.70* 14.40* 15.91*   HEMOGLOBIN g/dL 8.6* 8.6* 8.5* 8.4*   PLATELETS 10*3/mm3 293 240 234 248     Results from last 7 days   Lab Units 07/04/21  0500 07/03/21  1202 07/03/21 0716 07/02/21 0622   SODIUM mmol/L 137 138 139 142   POTASSIUM mmol/L 3.2* 3.0* 2.8* 3.6   CHLORIDE mmol/L 96* 97* 99 103   CO2 mmol/L 27.6 27.6 28.8 25.4   BUN mg/dL 37* 33* 35* 32*   CREATININE mg/dL 3.85* 4.23* 3.95* 4.13*   GLUCOSE mg/dL 123* 118* 95 92   Estimated Creatinine Clearance: 13.7 mL/min (A) (by C-G formula based on SCr of 3.85 mg/dL (H)).  Results from last 7 days   Lab Units 07/04/21  0500 07/03/21 0716 07/01/21 2011   ALBUMIN g/dL 2.40* 2.70* 3.00*   BILIRUBIN mg/dL 0.2 0.2 0.3   ALK PHOS U/L 85 85 88   AST (SGOT) U/L 15 19 22   ALT (SGPT) U/L 13 11 14     Results from last 7 days   Lab Units 07/04/21  0500 07/03/21  1202 07/03/21 0716 07/02/21 0622 07/01/21 2011   CALCIUM mg/dL 8.1* 8.1* 8.2* 8.0* 7.9*   ALBUMIN g/dL 2.40*  --  2.70*  --  3.00*   MAGNESIUM mg/dL 2.0  --  2.1  --   --    PHOSPHORUS mg/dL  --   --  7.0*  --   --        COVID19   Date Value Ref Range Status   07/01/2021 Not Detected Not Detected - Ref. Range Final   05/25/2021 Not Detected Not Detected - Ref. Range Final     No results found for: HGBA1C, POCGLU    Adult Transthoracic Echo Limited W/ Cont if Necessary Per Protocol  · There is a small (<1cm) circumferential pericardial effusion.  · There is no evidence of cardiac tamponade.     US Renal Bilateral  Narrative: RENAL ULTRASOUND     HISTORY: Renal artery stenosis     COMPARISON: 06/01/2021     TECHNIQUE: Grayscale and color  Doppler sonographic images were obtained  through the kidneys and bladder.     FINDINGS:  Right kidney measures 11.0 x 4.7 x 5.7 cm. Left kidney measures 9.0 x  3.5 cm. No hydronephrosis is seen on either side. Both kidneys appear  somewhat echogenic, which may reflect underlying chronic medical renal  disease. Multiple tiny cysts are identified within both kidneys. Most of  these are simple in appearance. The single largest cyst is arising from  the left kidney, and measures 3.3 x 3.1 x 3.1 cm. An additional cyst  within the left kidney has some hyperechoic debris layering within it,  which may represent milk of calcium. Urinary bladder is relatively  contracted, and neither ureteral jet was identified.     Impression:    1. Both kidneys are echogenic in appearance, which may reflect  underlying chronic medical renal disease.  2. No hydronephrosis seen on either side.     This report was finalized on 7/2/2021 1:09 AM by Dr. Frannie Jackson M.D.     XR Chest 1 View  Narrative: SINGLE VIEW OF THE CHEST     HISTORY: Cough     COMPARISON: 06/24/2021     FINDINGS:  Cardiomegaly is present. There is vascular congestion. There appears to  be some chronic scarring within the right lung. There is calcification  of the aorta. There are changes of bilateral shoulder arthroplasties. No  definite acute infiltrates are seen.     Impression: Cardiomegaly with mild vascular congestion.     This report was finalized on 7/2/2021 12:01 AM by Dr. Frannie Jackson M.D.       Scheduled Medications  amLODIPine, 10 mg, Oral, Daily  ascorbic acid, 500 mg, Oral, Daily  atorvastatin, 40 mg, Oral, Nightly  budesonide-formoterol, 2 puff, Inhalation, BID - RT  carvedilol, 25 mg, Oral, BID With Meals  cetirizine, 5 mg, Oral, Daily  clopidogrel, 75 mg, Oral, Daily  famotidine, 20 mg, Oral, Daily  folic acid, 1 mg, Oral, Daily  hydrALAZINE, 100 mg, Oral, Q8H  polyethylene glycol, 17 g, Oral, Daily  potassium chloride, 40 mEq, Oral,  Once  sertraline, 50 mg, Oral, Daily  sodium bicarbonate, 1,300 mg, Oral, TID  sucralfate, 1 g, Oral, BID AC  terazosin, 5 mg, Oral, Nightly  traZODone, 75 mg, Oral, Nightly  venlafaxine XR, 150 mg, Oral, Daily    Infusions   Diet  Diet Regular; Cardiac, Consistent Carbohydrate, Renal, Low Sodium; 2,000 mg Na       Assessment/Plan     Active Hospital Problems    Diagnosis  POA   • **Acute kidney injury (CMS/[facility]) [N17.9]  Yes   • Acute on chronic diastolic CHF (congestive heart failure) (CMS/[facility]) [I50.33]  Yes   • Acute gout due to renal impairment involving foot [M10.379]  No   • Hypokalemia [E87.6]  Yes   • Anemia of chronic disease [D63.8]  Yes   • Leukocytosis [D72.829]  Yes   • Renal artery stenosis (CMS/[facility]) [I70.1]  Yes   • COPD (chronic obstructive pulmonary disease) (CMS/[facility]) [J44.9]  Yes   • Opioid dependence (CMS/[facility]) [F11.20]  Yes   • History of lung cancer [Z85.118]  Not Applicable   • PAD (peripheral artery disease) (CMS/[facility]) [I73.9]  Yes   • Stage 3b chronic kidney disease (CMS/[facility]) [N18.32]  Yes   • Hypertension [I10]  Yes   • Chronic pain syndrome [G89.4]  Yes      Resolved Hospital Problems   No resolved problems to display.     Very pleasant 74yo woman admitted with YVONNE/CKD3b.    YVONNE/CKD, uremia  -appreciate Renal attention to pt  -Cr down a bit today  -continue diuresis with IV Bumex, possibly change to oral tomorrow  -suspect her scattered skin lesions are due to uremia    Anemia of CKD  -?IV iron here  -Hgb remains stable today    Acute gouty arthritis of bilateral 1st MTP joints  -Prednisone started 7/3    Acute/chronic diastolic CHF  -diuresis per Renal, weight unchanged  -appreciate Card attention to pt    Hypokalemia  -replacing cautiously given her YVONNE, single dose KCl ordered today for K+ of 3.2  -Mg++ level fine    Leukocytosis  -suspect reactive as it has been falling steadily without any anti-infective treatment    HTN  -BPs acceptable on current multi-drug regimen    Bilateral QUINTON (has  stent on right)  -u/s shows decent flow bilaterally here    COPD with chronic hypoxic respiratory failure  -at baseline, normal lung exam  -on O2 chronically at home      SCDs for DVT prophylaxis.  Full code.  Discussed with patient, family and nursing staff.  Anticipate discharge home with family, timing yet to be determined.      Karthikeyan Montano MD  [facility]  07/04/21  14:07 EDT

## 2021-07-04 NOTE — PROGRESS NOTES
NEPHROLOGY PROGRESS NOTE    PATIENT IDENTIFICATION:   Name:  Rachana Arguelles      MRN:  [id number]     75 y.o.  female             Reason for visit: YVONNE    SUBJECTIVE:     Seen and examined.  Complaining of big toe pain especially in the left foot.  No shortness of air or chest pain      OBJECTIVE:  Vitals:    07/03/21 2318 07/04/21 0503 07/04/21 0732 07/04/21 1111   BP: 127/87  138/59    BP Location: Left arm  Left arm    Patient Position: Lying  Lying    Pulse: 68  68    Resp: 18  18 18   Temp: 98.4 °F (36.9 °C)  97.9 °F (36.6 °C)    TempSrc: Oral  Oral    SpO2: 97%  94%    Weight:  79.7 kg (175 lb 11.3 oz)     Height:               Body mass index is 27.58 kg/m².    Intake/Output Summary (Last 24 hours) at 7/4/2021 1241  Last data filed at 7/4/2021 0939  Gross per 24 hour   Intake 220 ml   Output 1300 ml   Net -1080 ml         Exam:  GEN:  No distress, appears stated age  EYES:   Anicteric sclera  ENT:    External ears/nose normal, MM are moist  NECK:  No adenopathy, JVP none  LUNGS: Normal chest on inspection; not labored  CV:  Normal S1S2, without murmur  ABD:  Non-tender, non-distended, no hepatosplenomegaly, +BS  EXT:  No edema; no cyanosis; clubbing    Scheduled meds:  amLODIPine, 10 mg, Oral, Daily  ascorbic acid, 500 mg, Oral, Daily  atorvastatin, 40 mg, Oral, Nightly  budesonide-formoterol, 2 puff, Inhalation, BID - RT  carvedilol, 25 mg, Oral, BID With Meals  cetirizine, 5 mg, Oral, Daily  clopidogrel, 75 mg, Oral, Daily  famotidine, 20 mg, Oral, Daily  folic acid, 1 mg, Oral, Daily  hydrALAZINE, 100 mg, Oral, Q8H  polyethylene glycol, 17 g, Oral, Daily  sertraline, 50 mg, Oral, Daily  sodium bicarbonate, 1,300 mg, Oral, TID  sucralfate, 1 g, Oral, BID AC  terazosin, 5 mg, Oral, Nightly  traZODone, 75 mg, Oral, Nightly  venlafaxine XR, 150 mg, Oral, Daily      IV meds:                           Data Review:    Results from last 7 days   Lab Units 07/04/21  0500 07/03/21  1202 07/03/21  0758  07/01/21 2011   SODIUM mmol/L 137 138 139 143   POTASSIUM mmol/L 3.2* 3.0* 2.8* 3.3*   CHLORIDE mmol/L 96* 97* 99 102   CO2 mmol/L 27.6 27.6 28.8 27.8   BUN mg/dL 37* 33* 35* 33*   CREATININE mg/dL 3.85* 4.23* 3.95* 4.29*   CALCIUM mg/dL 8.1* 8.1* 8.2* 7.9*   BILIRUBIN mg/dL 0.2  --  0.2 0.3   ALK PHOS U/L 85  --  85 88   ALT (SGPT) U/L 13  --  11 14   AST (SGOT) U/L 15  --  19 22   GLUCOSE mg/dL 123* 118* 95 105*       Estimated Creatinine Clearance: 13.7 mL/min (A) (by C-G formula based on SCr of 3.85 mg/dL (H)).    Results from last 7 days   Lab Units 07/04/21  0500 07/03/21  0716   MAGNESIUM mg/dL 2.0 2.1   PHOSPHORUS mg/dL  --  7.0*       Results from last 7 days   Lab Units 07/04/21  0500 07/03/21  0716 07/02/21  0622 07/01/21 2011 07/01/21  1324   WBC 10*3/mm3 11.65* 12.70* 14.40* 15.91*  --    HEMOGLOBIN g/dL 8.6* 8.6* 8.5* 8.4* 9.3*   PLATELETS 10*3/mm3 293 240 234 248  --        Results from last 7 days   Lab Units 07/01/21 2011   INR  1.00             ASSESSMENT:     Acute kidney injury (CMS/HCC)    Chronic pain syndrome    Hypertension    Stage 3b chronic kidney disease (CMS/[facility])    PAD (peripheral artery disease) (CMS/[facility])    History of lung cancer    Opioid dependence (CMS/[facility])    COPD (chronic obstructive pulmonary disease) (CMS/[facility])    Renal artery stenosis (CMS/HCC)    Acute on chronic diastolic CHF (congestive heart failure) (CMS/[facility])    Acute gout due to renal impairment involving foot    Hypokalemia    Anemia of chronic disease    Leukocytosis      ASSESSMENT:  1.  Acute kidney injury on chronic kidney disease, slowly worsening patient has increased shortness of breath with symptoms of congestive heart failure this is probably part of cardiorenal syndrome picture.   2.  Hypertension controlled  3.  Chronic pericardial effusion previously considered to be related to minoxidil  4.  Grade 1 diastolic dysfunction/HFpEF.  Patient is symptomatic     PLAN:      Kidney function is improving with  creatinine down to 3.8 mg/dL.  We will continue current dose of diuretic and plan to switch tomorrow to torsemide  Continue prednisone 1 more day for gouty flareup  No indication for dialysis  Obtain daily standing weight  Replete potassium  Surveillance labs          Claudia Wilson MD  7/4/2021    12:41 EDT

## 2021-07-04 NOTE — PLAN OF CARE
Goal Outcome Evaluation:  Plan of Care Reviewed With: patient, daughter           Outcome Summary: pt vss and afebrile during shift.  pt K 3.2.  Pt received extra dose of K, 40 mEq PO, per Nephrologist. Labs to be checked in the morning.  Pt worked w/PT today by ambulating in room w/walker.  Pt up in chair x 3 hours, pre and post lunch.   Tried to ween patient of O2.  Pt on room air for 3 hours and then 2L NC was returned after working with PT.  PT weened down to 1L @ 1700, sating @ 92-94%.  Pt had no acute issues during shift.  pt safety maintained.

## 2021-07-04 NOTE — THERAPY TREATMENT NOTE
Patient Name: Rachana Arguelles  : 1945    MRN: [id number]                              Today's Date: 2021       Admit Date: 2021    Visit Dx: No diagnosis found.  Patient Active Problem List   Diagnosis   • Anxiety   • Chronic pain syndrome   • Depression   • Gastroesophageal reflux disease   • Hyperlipidemia   • Hypertension   • Osteoarthritis of hip   • Chronic low back pain   • Failed back syndrome of lumbar spine   • Pulmonary embolus (CMS/HCC)   • Weight loss   • Polypharmacy   • Stage 3b chronic kidney disease (CMS/HCC)   • Chronic constipation   • Sacroiliac joint pain   • Mixed incontinence   • Renal cyst   • Achilles tendonitis   • Atherosclerosis of native artery of left lower extremity with intermittent claudication (CMS/HCC)   • Morbidly obese (CMS/HCC)   • Lung nodule   • Malignant neoplasm of right upper lobe of lung (CMS/HCC)   • Lung cancer (CMS/HCC)   • Moderate single current episode of major depressive disorder (CMS/HCC)   • Palpitations   • Encounter for screening for malignant neoplasm of colon   • Hematoma of scalp   • Acute neck pain   • Dizziness   • Unstable cervical spine   • Postconcussive syndrome   • Positive colorectal cancer screening using Cologuard test   • Dysphagia   • S/P reverse total shoulder arthroplasty, right   • Chronic post-traumatic headache, not intractable   • Medication overuse headache   • Migraine without aura and without status migrainosus, not intractable   • Shortness of breath   • PAD (peripheral artery disease) (CMS/HCC)   • Bilateral carotid artery stenosis   • S/P lobectomy of lung   • YVONNE (acute kidney injury) (CMS/HCC)   • Anemia   • Frequent PVCs   • Pneumonia of both lower lobes due to infectious organism   • History of lung cancer   • Hypertensive urgency   • Bradycardia, sinus   • YVONNE (acute kidney injury) (CMS/HCC)   • Opioid dependence (CMS/HCC)   • COPD (chronic obstructive pulmonary disease) (CMS/HCC)   • Renal artery stenosis (CMS/HCC)    • Acute kidney injury (CMS/[facility])   • Acute on chronic diastolic CHF (congestive heart failure) (CMS/[facility])   • Acute gout due to renal impairment involving foot   • Hypokalemia   • Anemia of chronic disease   • Leukocytosis     Past Medical History:   Diagnosis Date   • AAA (abdominal aortic aneurysm) without rupture (CMS/[facility])    • Anemia    • Anxiety    • Arthritis    • Bilateral carotid artery stenosis 8/14/2020   • Cancer (CMS/[facility])     skin cancer   • Chest pain     OCCAS   • Chronic low back pain    • Chronic pain syndrome 3/12/2016   • CKD (chronic kidney disease), stage III (CMS/[facility])    • Claustrophobia 10/26/2015    Resolved   • COPD (chronic obstructive pulmonary disease) (CMS/[facility])    • Depression    • Dizziness    • Dyspnea    • GERD (gastroesophageal reflux disease)    • GI problem    • H/O concussion    • Head trauma     FELL CUT HEAD 12 STAPLES   • Hiatal hernia    • History of migraine headaches    • Hyperlipidemia    • Hypertension    • Lumbar postlaminectomy syndrome 10/26/2015    Resolved   • Lung cancer (CMS/[facility])    • Lung nodule    • Migraines    • Nausea    • Palpitations    • Polypharmacy 4/22/2016   • Pulmonary embolism (CMS/[facility]) 10/26/2015    January 2013 - Resolved, felt to be secondary to estrogen use   • Slow to wake up after anesthesia    • Submandibular sialoadenitis 10/26/2015    Resolved   • Visual changes    • Vitamin B 12 deficiency      Past Surgical History:   Procedure Laterality Date   • ANGIOPLASTY ILIAC ARTERY Bilateral 8/10/2018    Procedure: BILATERAL ILIAC STENTS;  Surgeon: Riki Mancera MD;  Location: [facility] OR;  Service: Vascular   • APPENDECTOMY     • ARTERIOGRAM N/A 6/4/2021    Procedure: Renal Arteriogram with possible intervention;  Surgeon: Mat Coello MD;  Location: [facility] INVASIVE LOCATION;  Service: Cardiology;  Laterality: N/A;   • BREAST SURGERY      Breast Surgery Reduction Procedure   • BRONCHOSCOPY N/A 2/8/2019    Procedure:  BRONCHOSCOPY;  Surgeon: Álvaro Gifford MD;  Location: [facility] ENDOSCOPY;  Service: Pulmonary   • CARDIAC CATHETERIZATION N/A 6/4/2021    Procedure: Stent BMS peripheral- RENAL-RIGHT;  Surgeon: Mat Coello MD;  Location: [facility] CATH INVASIVE LOCATION;  Service: Cardiology;  Laterality: N/A;   • CHOLECYSTECTOMY     • COLONOSCOPY     • ENDOSCOPY N/A 11/3/2020    Procedure: ESOPHAGOGASTRODUODENOSCOPY with 54 [place] vernon dilation;  Surgeon: Yunior Vo MD;  Location: [facility] ENDOSCOPY;  Service: Gastroenterology;  Laterality: N/A;  pre- dysphagia  post- esophageal ring, hiatal hernia   • HYSTERECTOMY     • INTERVENTIONAL RADIOLOGY PROCEDURE N/A 6/4/2021    Procedure: Abdominal Aortogram;  Surgeon: Mat Coello MD;  Location: [facility] CATH INVASIVE LOCATION;  Service: Cardiology;  Laterality: N/A;   • INTUBATION  1/15/2019        • JOINT REPLACEMENT      left knee, hip, shoulder   • LASIK     • LUMBAR FUSION      Lumbar Vertebral Fusion   • RENAL ARTERY STENT Right    • SHOULDER ARTHROSCOPY  04/04/2013    Dr. Carrillo/[facility]   • THORACOSCOPY VIDEO ASSISTED WITH LOBECTOMY Right 1/11/2019    Procedure: BRONCOSCOPY, THORACOSCOPY VIDEO ASSISTED WITH RIGHT UPPER LOBE WEDGE RESECTION, COMPLETION RIGHT UPPER LOBECTOMY, LYMPH NODE DISSECTION, INTERCOSTAL NERVE BLOCK;  Surgeon: Quita Figueroa MD;  Location: [facility] MAIN OR;  Service: Thoracic   • TONSILLECTOMY     • TOTAL HIP ARTHROPLASTY REVISION Left    • TOTAL KNEE ARTHROPLASTY Left    • TOTAL SHOULDER ARTHROPLASTY Left    • TOTAL SHOULDER ARTHROPLASTY W/ DISTAL CLAVICLE EXCISION Right 6/18/2020    Procedure: RIGHT TOTAL SHOULDER REVERSE ARTHROPLASTY WITH GPS;  Surgeon: Lino Carrillo MD;  [place] [facility] [place] [facility]  Service: Orthopedics;  Laterality: Right;     General Information     Row Name 07/04/21 1201          Physical Therapy Time and Intention    Document Type  therapy note (daily note)  -SM     Mode of Treatment  physical therapy  -SM      Row Name 07/04/21 1201          General Information    Existing Precautions/Restrictions  fall;oxygen therapy device and L/min  -SM     Row Name 07/04/21 1201          Cognition    Orientation Status (Cognition)  oriented x 4  -       User Key  (r) = Recorded By, (t) = Taken By, (c) = Cosigned By    Initials Name Provider Type    Judit Clemente PTA Physical Therapy Assistant        Mobility     Row Name 07/04/21 1201          Bed Mobility    Bed Mobility  supine-sit  -     Supine-Sit [place] (Bed Mobility)  supervision  -     Assistive Device (Bed Mobility)  bed rails;head of bed elevated  -     Row Name 07/04/21 1201          Sit-Stand Transfer    Sit-Stand [place] (Transfers)  contact guard;minimum assist (75% patient effort)  -     Assistive Device (Sit-Stand Transfers)  walker, front-wheeled  -     Row Name 07/04/21 1201          Gait/Stairs (Locomotion)    [place] Level (Gait)  contact guard  -     Assistive Device (Gait)  walker, front-wheeled  -     Distance in Feet (Gait)  25  -SM     Deviations/Abnormal Patterns (Gait)  janet decreased;stride length decreased;antalgic  -SM     Bilateral Gait Deviations  forward flexed posture  -SM     Left Sided Gait Deviations  weight shift ability decreased  -SM     Comment (Gait/Stairs)  pt reports more pain from gout on L foot today  -       User Key  (r) = Recorded By, (t) = Taken By, (c) = Cosigned By    Initials Name Provider Type    Judit Clemente PTA Physical Therapy Assistant        Obj/Interventions     Row Name 07/04/21 1202          Motor Skills    Therapeutic Exercise  -- seated AP and LAQ x10 reps  -       User Key  (r) = Recorded By, (t) = Taken By, (c) = Cosigned By    Initials Name Provider Type    Judit Clemente PTA Physical Therapy Assistant        Goals/Plan    No documentation.       Clinical Impression     Row Name 07/04/21 1202          Pain    Additional Documentation  Pain Scale:  Numbers Pre/Post-Treatment (Group)  -     Row Name 07/04/21 1202          Pain Scale: Numbers Pre/Post-Treatment    Pretreatment Pain Rating  0/10 - no pain  -     Posttreatment Pain Rating  3/10  -SM     Pain Location - Side  Left  -     Pain Location  foot  -     Pain Intervention(s)  Repositioned;Ambulation/increased activity;Rest  -     Row Name 07/04/21 1202          Positioning and Restraints    Pre-Treatment Position  in bed  -SM     Post Treatment Position  chair  -SM     In Chair  reclined;call light within reach;encouraged to call for assist;exit alarm on;notified nsg  -       User Key  (r) = Recorded By, (t) = Taken By, (c) = Cosigned By    Initials Name Provider Type    Judit Clemente PTA Physical Therapy Assistant        Outcome Measures     Row Name 07/04/21 1203          How much help from another person do you currently need...    Turning from your back to your side while in flat bed without using bedrails?  3  -SM     Moving from lying on back to sitting on the side of a flat bed without bedrails?  3  -SM     Moving to and from a bed to a chair (including a wheelchair)?  3  -SM     Standing up from a chair using your arms (e.g., wheelchair, bedside chair)?  3  -SM     Climbing 3-5 steps with a railing?  2  -SM     To walk in hospital room?  3  -SM     AM-PAC 6 Clicks Score (PT)  17  -     Row Name 07/04/21 1203          Functional Assessment    Outcome Measure Options  AM-PAC 6 Clicks Basic Mobility (PT)  -       User Key  (r) = Recorded By, (t) = Taken By, (c) = Cosigned By    Initials Name Provider Type    Judit Clemente PTA Physical Therapy Assistant        Physical Therapy Education                 Title: PT OT SLP Therapies (In Progress)     Topic: Physical Therapy (Done)     Point: Mobility training (Done)     Learning Progress Summary           Patient Acceptance, E,TB,D, VU,NR by  at 7/4/2021 1203    Acceptance, E,TB,D, VU,NR by  at 7/3/2021 1611     Acceptance, E,D, VU,NR by MS at 7/2/2021 0908                   Point: Home exercise program (Done)     Learning Progress Summary           Patient Acceptance, E,TB,D, VU,NR by  at 7/4/2021 1203    Acceptance, E,TB,D, VU,NR by  at 7/3/2021 1611    Acceptance, E,D, VU,NR by MS at 7/2/2021 0908                   Point: Body mechanics (Done)     Learning Progress Summary           Patient Acceptance, E,TB,D, VU,NR by  at 7/4/2021 1203    Acceptance, E,TB,D, VU,NR by  at 7/3/2021 1611    Acceptance, E,D, VU,NR by MS at 7/2/2021 0908                   Point: Precautions (Done)     Learning Progress Summary           Patient Acceptance, E,TB,D, VU,NR by  at 7/4/2021 1203    Acceptance, E,TB,D, VU,NR by  at 7/3/2021 1611    Acceptance, E,D, VU,NR by MS at 7/2/2021 0908                               User Key     Initials Effective Dates Name Provider Type Discipline    MS 06/16/21 -  Bryan Haddad, PT Physical Therapist PT     03/07/18 -  Judit Constantino PTA Physical Therapy Assistant PT              PT Recommendation and Plan     Plan of Care Reviewed With: patient  Progress: improving  Outcome Summary: Pt tolerated treatment well this date. Increased gait distance to 25ft w/ Rw and CGA. Pt c/o no pain initially, then mild pain in L foot while ambulating. Encouraged pt to ambulate in room w/ nsg during the day.     Time Calculation:   PT Charges     Row Name 07/04/21 1205             Time Calculation    Start Time  1117  -      Stop Time  1144  -      Time Calculation (min)  27 min  -      PT Received On  07/04/21  -      PT - Next Appointment  07/06/21  -        User Key  (r) = Recorded By, (t) = Taken By, (c) = Cosigned By    Initials Name Provider Type     Judit Constantino PTA Physical Therapy Assistant        Therapy Charges for Today     Code Description Service Date Service Provider Modifiers Qty    25952662992 HC PT THER PROC EA 15 MIN 7/3/2021 Judit Constantino PTA GP 1     [id number]  PT THER PROC EA 15 MIN 7/4/2021 Judit Constantino, PTA GP 2          PT G-Codes  Outcome Measure Options: AM-PAC 6 Clicks Basic Mobility (PT)  AM-PAC 6 Clicks Score (PT): 17  AM-PAC 6 Clicks Score (OT): 18    Judit Constantino PTA  7/4/2021

## 2021-07-04 NOTE — PLAN OF CARE
Goal Outcome Evaluation:  Plan of Care Reviewed With: patient        Progress: improving  Outcome Summary: Pt tolerated treatment well this date. Increased gait distance to 25ft w/ Rw and CGA. Pt c/o no pain initially, then mild pain in L foot while ambulating. Encouraged pt to ambulate in room w/ nsg during the day.

## 2021-07-05 NOTE — PLAN OF CARE
Goal Outcome Evaluation:  Plan of Care Reviewed With: patient        Progress: no change  Outcome Summary: VSS. RESTED WELL IN BETWEEN CARE. UNEVENTFUL NIGHT.

## 2021-07-05 NOTE — PROGRESS NOTES
NEPHROLOGY PROGRESS NOTE    PATIENT IDENTIFICATION:   Name:  Rachana Arguelles      MRN:  [id number]     75 y.o.  female             Reason for visit: YVONNE on CKD3    SUBJECTIVE:   Less nauseated than yesterday but still not eating well; no shortness of breath; feels stronger than yesterday      OBJECTIVE:  Vitals:    07/05/21 0615 07/05/21 0717 07/05/21 1151 07/05/21 1332   BP:  150/70  152/67   BP Location:    Left arm   Patient Position:    Lying   Pulse:  66 64    Resp:  20 18 12   Temp:  97.9 °F (36.6 °C)  98.1 °F (36.7 °C)   TempSrc:  Oral  Oral   SpO2:  94% 93%    Weight: 76.6 kg (168 lb 14 oz)      Height:               Body mass index is 26.51 kg/m².    Intake/Output Summary (Last 24 hours) at 7/5/2021 1534  Last data filed at 7/5/2021 0935  Gross per 24 hour   Intake 330 ml   Output 725 ml   Net -395 ml         Exam:  GEN:  No distress, chronically ill, oriented  EYES:   Anicteric sclera  ENT:    External ears/nose normal, MM are moist  NECK:  No adenopathy, JVP normal  LUNGS: Normal chest on inspection; not labored  CV:  Normal S1S2, without rub  ABD:  Non-tender, non-distended, soft +BS  EXT:  Trace doughy edema; no cyanosis; no clubbing  Neurologic:      No asterixis    Scheduled meds:    amLODIPine, 10 mg, Oral, Daily  ascorbic acid, 500 mg, Oral, Daily  atorvastatin, 40 mg, Oral, Nightly  budesonide-formoterol, 2 puff, Inhalation, BID - RT  carvedilol, 25 mg, Oral, BID With Meals  cetirizine, 5 mg, Oral, Daily  clopidogrel, 75 mg, Oral, Daily  famotidine, 20 mg, Oral, Daily  folic acid, 1 mg, Oral, Daily  hydrALAZINE, 100 mg, Oral, Q8H  polyethylene glycol, 17 g, Oral, Daily  sertraline, 50 mg, Oral, Daily  sucralfate, 1 g, Oral, BID AC  terazosin, 5 mg, Oral, Nightly  traZODone, 75 mg, Oral, Nightly  venlafaxine XR, 150 mg, Oral, Daily      IV meds:                           Data Review:    Results from last 7 days   Lab Units 07/05/21  0535 07/04/21  0500 07/03/21  1202 07/03/21  0716 07/01/21  2011    SODIUM mmol/L 136 137 138 139 143   POTASSIUM mmol/L 3.4* 3.2* 3.0* 2.8* 3.3*   CHLORIDE mmol/L 98 96* 97* 99 102   CO2 mmol/L 27.9 27.6 27.6 28.8 27.8   BUN mg/dL 38* 37* 33* 35* 33*   CREATININE mg/dL 3.61* 3.85* 4.23* 3.95* 4.29*   CALCIUM mg/dL 8.5* 8.1* 8.1* 8.2* 7.9*   BILIRUBIN mg/dL  --  0.2  --  0.2 0.3   ALK PHOS U/L  --  85  --  85 88   ALT (SGPT) U/L  --  13  --  11 14   AST (SGOT) U/L  --  15  --  19 22   GLUCOSE mg/dL 102* 123* 118* 95 105*       Estimated Creatinine Clearance: 14.3 mL/min (A) (by C-G formula based on SCr of 3.61 mg/dL (H)).    Results from last 7 days   Lab Units 07/05/21  0535 07/04/21  0500 07/03/21  0716   MAGNESIUM mg/dL 2.1 2.0 2.1   PHOSPHORUS mg/dL 4.5  --  7.0*       Results from last 7 days   Lab Units 07/05/21  0535 07/04/21  0500 07/03/21  0716 07/02/21  0622 07/01/21 2011   WBC 10*3/mm3 9.38 11.65* 12.70* 14.40* 15.91*   HEMOGLOBIN g/dL 8.8* 8.6* 8.6* 8.5* 8.4*   PLATELETS 10*3/mm3 339 293 240 234 248       Results from last 7 days   Lab Units 07/01/21 2011   INR  1.00             ASSESSMENT:     Acute kidney injury (CMS/HCC)    Chronic pain syndrome    Hypertension    Stage 3b chronic kidney disease (CMS/HCC)    PAD (peripheral artery disease) (CMS/HCC)    History of lung cancer    Opioid dependence (CMS/HCC)    COPD (chronic obstructive pulmonary disease) (CMS/HCC)    Renal artery stenosis (CMS/HCC)    Acute on chronic diastolic CHF (congestive heart failure) (CMS/HCC)    Acute gout due to renal impairment involving foot    Hypokalemia    Anemia of chronic disease    Leukocytosis      ASSESSMENT:  1.   YVONNE on CKD3, nonoliguric, improving:  suspect multifactorial with prerenal effect of cardiorenal syndrome, but wonder also about atheroembolic phenomenon following stent of right renal artery last month.  Hypokalemia from diuretics and poor oral intake; central volume stable by exam.  Urine studies on 7/2 suggest profound nephrotic syndrome (over 13 g/day)  2.   Hypertension, stabilizing  3.  Chronic pericardial effusion, in face of recent use of minoxidil  4.  Grade 1 diastolic dysfunction/HFpEF  5.  Anemia with iron deficiency  6.  Bilateral renal artery stenosis, s/p right renal artery stent June '21     PLAN:  1.  Hold diuretic today given poor oral intake and stable oxygenation  2.  Re-check Uprot:cr  3.  Replace potassium  4.  Bladder scan each shift for completeness' sake  5.  Check C3 and C4 for serologic correlates of atheroembolic phenomenon        Jonathan Rebolledo MD  7/5/2021    15:34 EDT

## 2021-07-05 NOTE — PROGRESS NOTES
Name: Rachana Arguelles ADMIT: 2021   : 1945  PCP: Rhys Crowder Jr., MD    MRN: [id number] LOS: 4 days   AGE/SEX: 75 y.o. female  ROOM: [facility]     Subjective   Subjective   Feeling a little better today. Gout pain is much improved today with steroid tx. She is tolerating diet though has poor appetite. No N/V. No SOA or CP. She is voiding well, but small amounts.    Review of Systems   Constitutional: Positive for appetite change and fatigue. Negative for chills, diaphoresis and fever.   HENT: Negative for congestion, ear pain, facial swelling, mouth sores, nosebleeds, rhinorrhea, sore throat, trouble swallowing and voice change.    Eyes: Negative for pain and visual disturbance.   Respiratory: Negative for cough, choking, chest tightness, shortness of breath and stridor.    Cardiovascular: Positive for leg swelling. Negative for chest pain and palpitations.   Gastrointestinal: Negative for abdominal pain, blood in stool, diarrhea, nausea and vomiting.   Endocrine: Negative for cold intolerance and heat intolerance.   Genitourinary: Positive for decreased urine volume. Negative for difficulty urinating, dysuria and hematuria.   Musculoskeletal: Negative for back pain and neck pain.   Skin: Positive for rash (small lesions scattered on upper body). Negative for color change and pallor.   Allergic/Immunologic: Negative for environmental allergies and food allergies.   Neurological: Negative for tremors, seizures, syncope, speech difficulty and headaches.   Hematological: Negative for adenopathy. Does not bruise/bleed easily.   Psychiatric/Behavioral: Negative for behavioral problems, confusion and hallucinations.        Objective   Objective   Vital Signs  Temp:  [97.9 °F (36.6 °C)-98.3 °F (36.8 °C)] 97.9 °F (36.6 °C)  Heart Rate:  [63-66] 64  Resp:  [18-20] 18  BP: (135-150)/(63-77) 150/70  SpO2:  [92 %-95 %] 93 %  on  Flow (L/min):  [1-2] 1;   Device (Oxygen Therapy): nasal cannula  Body mass index is  26.51 kg/m².   I/O last 3 completed shifts:  In: 640 [P.O.:640]  Out: 1125 [Urine:1125]  I/O this shift:  In: 120 [P.O.:120]  Out: -         Physical Exam  Vitals and nursing note reviewed. Exam conducted with a chaperone present.   Constitutional:       General: She is not in acute distress.     Appearance: She is ill-appearing (chronically). She is not toxic-appearing or diaphoretic.   HENT:      Head: Normocephalic and atraumatic.      Right Ear: External ear normal.      Left Ear: External ear normal.      Nose: Nose normal.      Mouth/Throat:      Mouth: Mucous membranes are moist.      Pharynx: Oropharynx is clear.   Eyes:      General: No scleral icterus.        Right eye: No discharge.         Left eye: No discharge.      Conjunctiva/sclera: Conjunctivae normal.   Cardiovascular:      Rate and Rhythm: Normal rate and regular rhythm.      Pulses: Normal pulses.   Pulmonary:      Effort: Pulmonary effort is normal. No respiratory distress.      Breath sounds: Normal breath sounds. No rales.   Abdominal:      General: Bowel sounds are normal. There is no distension.      Palpations: Abdomen is soft.      Tenderness: There is no abdominal tenderness.   Musculoskeletal:         General: Swelling (trace doughy edema in BLEs) present. Normal range of motion.      Cervical back: Neck supple. No rigidity.      Comments: Bilateral MTP joints are much less TTP today   Lymphadenopathy:      Cervical: No cervical adenopathy.   Skin:     General: Skin is warm and dry.      Capillary Refill: Capillary refill takes less than 2 seconds.      Coloration: Skin is not jaundiced.      Findings: Rash (some small scattered fungating lesions on back, right anterior shoulder, and left forehead, not in dermatomal distribution, nontender) present.   Neurological:      General: No focal deficit present.      Mental Status: She is alert and oriented to person, place, and time. Mental status is at baseline.      Cranial Nerves: No  cranial nerve deficit.      Coordination: Coordination normal.   Psychiatric:         Mood and Affect: Mood normal.         Behavior: Behavior normal.         Thought Content: Thought content normal.         Results Review     I reviewed the patient's new clinical results.  Results from last 7 days   Lab Units 07/05/21 0535 07/04/21  0500 07/03/21  0716 07/02/21  0622   WBC 10*3/mm3 9.38 11.65* 12.70* 14.40*   HEMOGLOBIN g/dL 8.8* 8.6* 8.6* 8.5*   PLATELETS 10*3/mm3 339 293 240 234     Results from last 7 days   Lab Units 07/05/21 0535 07/04/21  0500 07/03/21  1202 07/03/21  0716   SODIUM mmol/L 136 137 138 139   POTASSIUM mmol/L 3.4* 3.2* 3.0* 2.8*   CHLORIDE mmol/L 98 96* 97* 99   CO2 mmol/L 27.9 27.6 27.6 28.8   BUN mg/dL 38* 37* 33* 35*   CREATININE mg/dL 3.61* 3.85* 4.23* 3.95*   GLUCOSE mg/dL 102* 123* 118* 95   Estimated Creatinine Clearance: 14.3 mL/min (A) (by C-G formula based on SCr of 3.61 mg/dL (H)).  Results from last 7 days   Lab Units 07/05/21 0535 07/04/21  0500 07/03/21 0716 07/01/21 2011   ALBUMIN g/dL 2.50* 2.40* 2.70* 3.00*   BILIRUBIN mg/dL  --  0.2 0.2 0.3   ALK PHOS U/L  --  85 85 88   AST (SGOT) U/L  --  15 19 22   ALT (SGPT) U/L  --  13 11 14     Results from last 7 days   Lab Units 07/05/21 0535 07/04/21  0500 07/03/21  1202 07/03/21 0716 07/01/21 2011   CALCIUM mg/dL 8.5* 8.1* 8.1* 8.2* 7.9*   ALBUMIN g/dL 2.50* 2.40*  --  2.70* 3.00*   MAGNESIUM mg/dL 2.1 2.0  --  2.1  --    PHOSPHORUS mg/dL 4.5  --   --  7.0*  --        COVID19   Date Value Ref Range Status   07/01/2021 Not Detected Not Detected - Ref. Range Final   05/25/2021 Not Detected Not Detected - Ref. Range Final     No results found for: HGBA1C, POCGLU    Adult Transthoracic Echo Limited W/ Cont if Necessary Per Protocol  · There is a small (<1cm) circumferential pericardial effusion.  · There is no evidence of cardiac tamponade.     US Renal Bilateral  Narrative: RENAL ULTRASOUND     HISTORY: Renal artery stenosis      COMPARISON: 06/01/2021     TECHNIQUE: Grayscale and color Doppler sonographic images were obtained  through the kidneys and bladder.     FINDINGS:  Right kidney measures 11.0 x 4.7 x 5.7 cm. Left kidney measures 9.0 x  3.5 cm. No hydronephrosis is seen on either side. Both kidneys appear  somewhat echogenic, which may reflect underlying chronic medical renal  disease. Multiple tiny cysts are identified within both kidneys. Most of  these are simple in appearance. The single largest cyst is arising from  the left kidney, and measures 3.3 x 3.1 x 3.1 cm. An additional cyst  within the left kidney has some hyperechoic debris layering within it,  which may represent milk of calcium. Urinary bladder is relatively  contracted, and neither ureteral jet was identified.     Impression:    1. Both kidneys are echogenic in appearance, which may reflect  underlying chronic medical renal disease.  2. No hydronephrosis seen on either side.     This report was finalized on 7/2/2021 1:09 AM by Dr. Frannie Jackson M.D.     XR Chest 1 View  Narrative: SINGLE VIEW OF THE CHEST     HISTORY: Cough     COMPARISON: 06/24/2021     FINDINGS:  Cardiomegaly is present. There is vascular congestion. There appears to  be some chronic scarring within the right lung. There is calcification  of the aorta. There are changes of bilateral shoulder arthroplasties. No  definite acute infiltrates are seen.     Impression: Cardiomegaly with mild vascular congestion.     This report was finalized on 7/2/2021 12:01 AM by Dr. Frannie Jackson M.D.       Scheduled Medications  amLODIPine, 10 mg, Oral, Daily  ascorbic acid, 500 mg, Oral, Daily  atorvastatin, 40 mg, Oral, Nightly  budesonide-formoterol, 2 puff, Inhalation, BID - RT  carvedilol, 25 mg, Oral, BID With Meals  cetirizine, 5 mg, Oral, Daily  clopidogrel, 75 mg, Oral, Daily  famotidine, 20 mg, Oral, Daily  folic acid, 1 mg, Oral, Daily  hydrALAZINE, 100 mg, Oral, Q8H  polyethylene glycol, 17  g, Oral, Daily  sertraline, 50 mg, Oral, Daily  sucralfate, 1 g, Oral, BID AC  terazosin, 5 mg, Oral, Nightly  traZODone, 75 mg, Oral, Nightly  venlafaxine XR, 150 mg, Oral, Daily    Infusions   Diet  Diet Regular; Cardiac, Consistent Carbohydrate, Renal, Low Sodium; 2,000 mg Na       Assessment/Plan     Active Hospital Problems    Diagnosis  POA   • **Acute kidney injury (CMS/[facility]) [N17.9]  Yes   • Acute on chronic diastolic CHF (congestive heart failure) (CMS/[facility]) [I50.33]  Yes   • Acute gout due to renal impairment involving foot [M10.379]  No   • Hypokalemia [E87.6]  Yes   • Anemia of chronic disease [D63.8]  Yes   • Leukocytosis [D72.829]  Yes   • Renal artery stenosis (CMS/[facility]) [I70.1]  Yes   • COPD (chronic obstructive pulmonary disease) (CMS/HCC) [J44.9]  Yes   • Opioid dependence (CMS/HCC) [F11.20]  Yes   • History of lung cancer [Z85.118]  Not Applicable   • PAD (peripheral artery disease) (CMS/[facility]) [I73.9]  Yes   • Stage 3b chronic kidney disease (CMS/[facility]) [N18.32]  Yes   • Hypertension [I10]  Yes   • Chronic pain syndrome [G89.4]  Yes      Resolved Hospital Problems   No resolved problems to display.       Very pleasant 74yo woman admitted with YVONNE/CKD3b.    YVONNE/CKD (?cardiorenal syndrome), uremia  -appreciate Renal attention to pt  -Cr down a bit again today  -diuresis per Renal, s/p IV Bumex, ?PO torsemide today  -suspect her scattered skin lesions are due to uremia    Anemia of CKD  -?IV iron here, Renal has mentioned, defer to them  -Hgb remains stable today    Acute gouty arthritis of bilateral 1st MTP joints  -s/p Prednisone 7/3 and 7/4 with good result    Acute/chronic diastolic CHF  -diuresis per Renal, weight down today  -appreciate Card attention to pt    Hypokalemia  -replacing cautiously given her YVONNE, single dose KCl ordered today for K+ of 3.4  -Mg++ level fine    Leukocytosis  -suspect reactive as it has been falling steadily without any anti-infective treatment  -normalized  today    HTN  -BPs acceptable/robust on current multi-drug regimen    Bilateral QUINTON (has stent on right)  -u/s shows decent flow bilaterally here    COPD with chronic hypoxic respiratory failure  -at baseline, normal lung exam  -on O2 chronically at home      SCDs for DVT prophylaxis.  Full code.  Discussed with pt, RN, and CCP.  Anticipate discharge home with family, timing yet to be determined.      Karthikeyan Montano MD  [facility]  07/05/21  13:01 EDT

## 2021-07-06 NOTE — PLAN OF CARE
Goal Outcome Evaluation:           Progress: improving  Outcome Summary: encouraged with meals in chair today, bedside commode continues, ambulating well to same, pain meds x 1  today with zofran, appetite poor, cr continues to trend down, possible d/c tomorrow per nephro recommendations.

## 2021-07-06 NOTE — PROGRESS NOTES
Name: Rachana Arguelles ADMIT: 2021   : 1945  PCP: Rhys Crowder Jr., MD    MRN: [id number] LOS: 5 days   AGE/SEX: 75 y.o. female  ROOM: [facility]     Subjective   Subjective      Feeling a little better today. Gout pain is much improved today with steroid tx. She is tolerating diet though has poor appetite. No N/V. No SOA or CP. She is voiding well, but small amounts.    Review of Systems        Objective   Objective   Vital Signs  Temp:  [97.5 °F (36.4 °C)-98.1 °F (36.7 °C)] 97.9 °F (36.6 °C)  Heart Rate:  [61-72] 67  Resp:  [12-18] 18  BP: (152-160)/(67-73) 155/67  SpO2:  [93 %-97 %] 94 %  on  Flow (L/min):  [1] 1;   Device (Oxygen Therapy): nasal cannula  Body mass index is 26.35 kg/m².   I/O last 3 completed shifts:  In: 340 [P.O.:340]  Out: 965 [Urine:965]  I/O this shift:  In: 100 [P.O.:100]  Out: -         Physical Exam  Vitals and nursing note reviewed. Exam conducted with a chaperone present.   Constitutional:       General: She is not in acute distress.     Appearance: She is ill-appearing (chronically). She is not toxic-appearing or diaphoretic.   HENT:      Head: Normocephalic and atraumatic.      Right Ear: External ear normal.      Left Ear: External ear normal.      Nose: Nose normal.      Mouth/Throat:      Mouth: Mucous membranes are moist.      Pharynx: Oropharynx is clear.   Eyes:      General: No scleral icterus.        Right eye: No discharge.         Left eye: No discharge.      Conjunctiva/sclera: Conjunctivae normal.   Cardiovascular:      Rate and Rhythm: Normal rate and regular rhythm.      Pulses: Normal pulses.   Pulmonary:      Effort: Pulmonary effort is normal. No respiratory distress.      Breath sounds: Normal breath sounds. No rales.   Abdominal:      General: Bowel sounds are normal. There is no distension.      Palpations: Abdomen is soft.      Tenderness: There is no abdominal tenderness.   Musculoskeletal:         General: Swelling (trace doughy edema in BLEs) present.  Normal range of motion.      Cervical back: Neck supple. No rigidity.      Comments: Bilateral MTP joints are much less TTP today   Lymphadenopathy:      Cervical: No cervical adenopathy.   Skin:     General: Skin is warm and dry.      Capillary Refill: Capillary refill takes less than 2 seconds.      Coloration: Skin is not jaundiced.      Findings: Rash (some small scattered fungating lesions on back, right anterior shoulder, and left forehead, not in dermatomal distribution, nontender) present.   Neurological:      General: No focal deficit present.      Mental Status: She is alert and oriented to person, place, and time. Mental status is at baseline.      Cranial Nerves: No cranial nerve deficit.      Coordination: Coordination normal.   Psychiatric:         Mood and Affect: Mood normal.         Behavior: Behavior normal.         Thought Content: Thought content normal.         Results Review     I reviewed the patient's new clinical results.  Results from last 7 days   Lab Units 07/06/21 0523 07/05/21 0535 07/04/21  0500 07/03/21  0716   WBC 10*3/mm3 9.73 9.38 11.65* 12.70*   HEMOGLOBIN g/dL 9.2* 8.8* 8.6* 8.6*   PLATELETS 10*3/mm3 294 339 293 240     Results from last 7 days   Lab Units 07/06/21 0523 07/05/21 0535 07/04/21  0500 07/03/21  1202   SODIUM mmol/L 137 136 137 138   POTASSIUM mmol/L 3.4* 3.4* 3.2* 3.0*   CHLORIDE mmol/L 100 98 96* 97*   CO2 mmol/L 27.4 27.9 27.6 27.6   BUN mg/dL 34* 38* 37* 33*   CREATININE mg/dL 3.12* 3.61* 3.85* 4.23*   GLUCOSE mg/dL 88 102* 123* 118*   Estimated Creatinine Clearance: 16.6 mL/min (A) (by C-G formula based on SCr of 3.12 mg/dL (H)).  Results from last 7 days   Lab Units 07/06/21 0523 07/05/21 0535 07/04/21  0500 07/03/21  0716 07/01/21 2011   ALBUMIN g/dL 2.50* 2.50* 2.40* 2.70* 3.00*   BILIRUBIN mg/dL  --   --  0.2 0.2 0.3   ALK PHOS U/L  --   --  85 85 88   AST (SGOT) U/L  --   --  15 19 22   ALT (SGPT) U/L  --   --  13 11 14     Results from last 7 days    Lab Units 07/06/21  0523 07/05/21  0535 07/04/21  0500 07/03/21  1202 07/03/21  0716   CALCIUM mg/dL 8.4* 8.5* 8.1* 8.1* 8.2*   ALBUMIN g/dL 2.50* 2.50* 2.40*  --  2.70*   MAGNESIUM mg/dL 2.0 2.1 2.0  --  2.1   PHOSPHORUS mg/dL 3.3 4.5  --   --  7.0*       COVID19   Date Value Ref Range Status   07/01/2021 Not Detected Not Detected - Ref. Range Final   05/25/2021 Not Detected Not Detected - Ref. Range Final     No results found for: HGBA1C, POCGLU    Adult Transthoracic Echo Limited W/ Cont if Necessary Per Protocol  · There is a small (<1cm) circumferential pericardial effusion.  · There is no evidence of cardiac tamponade.     US Renal Bilateral  Narrative: RENAL ULTRASOUND     HISTORY: Renal artery stenosis     COMPARISON: 06/01/2021     TECHNIQUE: Grayscale and color Doppler sonographic images were obtained  through the kidneys and bladder.     FINDINGS:  Right kidney measures 11.0 x 4.7 x 5.7 cm. Left kidney measures 9.0 x  3.5 cm. No hydronephrosis is seen on either side. Both kidneys appear  somewhat echogenic, which may reflect underlying chronic medical renal  disease. Multiple tiny cysts are identified within both kidneys. Most of  these are simple in appearance. The single largest cyst is arising from  the left kidney, and measures 3.3 x 3.1 x 3.1 cm. An additional cyst  within the left kidney has some hyperechoic debris layering within it,  which may represent milk of calcium. Urinary bladder is relatively  contracted, and neither ureteral jet was identified.     Impression:    1. Both kidneys are echogenic in appearance, which may reflect  underlying chronic medical renal disease.  2. No hydronephrosis seen on either side.     This report was finalized on 7/2/2021 1:09 AM by Dr. Frannie Jackson M.D.     XR Chest 1 View  Narrative: SINGLE VIEW OF THE CHEST     HISTORY: Cough     COMPARISON: 06/24/2021     FINDINGS:  Cardiomegaly is present. There is vascular congestion. There appears to  be some  chronic scarring within the right lung. There is calcification  of the aorta. There are changes of bilateral shoulder arthroplasties. No  definite acute infiltrates are seen.     Impression: Cardiomegaly with mild vascular congestion.     This report was finalized on 7/2/2021 12:01 AM by Dr. Frannie Jackson M.D.       Scheduled Medications  ascorbic acid, 500 mg, Oral, Daily  atorvastatin, 40 mg, Oral, Nightly  budesonide-formoterol, 2 puff, Inhalation, BID - RT  carvedilol, 25 mg, Oral, BID With Meals  cetirizine, 5 mg, Oral, Daily  clopidogrel, 75 mg, Oral, Daily  famotidine, 20 mg, Oral, Daily  folic acid, 1 mg, Oral, Daily  hydrALAZINE, 100 mg, Oral, Q8H  NIFEdipine XL, 90 mg, Oral, Q24H  polyethylene glycol, 17 g, Oral, Daily  sertraline, 50 mg, Oral, Daily  sucralfate, 1 g, Oral, BID AC  terazosin, 5 mg, Oral, Nightly  traZODone, 75 mg, Oral, Nightly  venlafaxine XR, 150 mg, Oral, Daily    Infusions   Diet  Diet Regular; Cardiac, Consistent Carbohydrate, Renal, Low Sodium; 2,000 mg Na       Assessment/Plan     Active Hospital Problems    Diagnosis  POA   • **Acute kidney injury (CMS/HCC) [N17.9]  Yes   • Acute on chronic diastolic CHF (congestive heart failure) (CMS/HCC) [I50.33]  Yes   • Acute gout due to renal impairment involving foot [M10.379]  No   • Hypokalemia [E87.6]  Yes   • Anemia of chronic disease [D63.8]  Yes   • Leukocytosis [D72.829]  Yes   • Renal artery stenosis (CMS/HCC) [I70.1]  Yes   • COPD (chronic obstructive pulmonary disease) (CMS/[facility]) [J44.9]  Yes   • Opioid dependence (CMS/[facility]) [F11.20]  Yes   • History of lung cancer [Z85.118]  Not Applicable   • PAD (peripheral artery disease) (CMS/HCC) [I73.9]  Yes   • Stage 3b chronic kidney disease (CMS/HCC) [N18.32]  Yes   • Hypertension [I10]  Yes   • Chronic pain syndrome [G89.4]  Yes      Resolved Hospital Problems   No resolved problems to display.       75 y.o. female with Acute kidney injury (CMS/HCC).  Cardiorenal syndrome suspected  per nephrology.    Hemoglobin stable.  WBC normal.  Potassium low getting replaced.  Magnesium normal.  Creatinine slowly improving.  Baseline around 1.6.  Anticipate discharge when okay with renal.    She is on baseline home oxygen requirement.      SCDs for DVT prophylaxis.  Full code.  Discussed with patient and care team on multidisciplinary rounds.  Anticipate discharge home with home health later this week.      Jerman Lobo MD  [facility]  07/06/21  15:20 EDT

## 2021-07-06 NOTE — PLAN OF CARE
Goal Outcome Evaluation:   VSS.  Pt awake and oriented, maintained safety, remained on 1 L O2 per NC.  Pt not out of bed this shift, on physical therapy's schedule to see 7/06/21.  Did not have purewick in place this shift.  To RN's knowledge, pt was mostly continent.

## 2021-07-06 NOTE — THERAPY TREATMENT NOTE
Patient Name: Rachana Arguelles  : 1945    MRN: [id number]                              Today's Date: 2021       Admit Date: 2021    Visit Dx: No diagnosis found.  Patient Active Problem List   Diagnosis   • Anxiety   • Chronic pain syndrome   • Depression   • Gastroesophageal reflux disease   • Hyperlipidemia   • Hypertension   • Osteoarthritis of hip   • Chronic low back pain   • Failed back syndrome of lumbar spine   • Pulmonary embolus (CMS/HCC)   • Weight loss   • Polypharmacy   • Stage 3b chronic kidney disease (CMS/HCC)   • Chronic constipation   • Sacroiliac joint pain   • Mixed incontinence   • Renal cyst   • Achilles tendonitis   • Atherosclerosis of native artery of left lower extremity with intermittent claudication (CMS/HCC)   • Morbidly obese (CMS/HCC)   • Lung nodule   • Malignant neoplasm of right upper lobe of lung (CMS/HCC)   • Lung cancer (CMS/HCC)   • Moderate single current episode of major depressive disorder (CMS/HCC)   • Palpitations   • Encounter for screening for malignant neoplasm of colon   • Hematoma of scalp   • Acute neck pain   • Dizziness   • Unstable cervical spine   • Postconcussive syndrome   • Positive colorectal cancer screening using Cologuard test   • Dysphagia   • S/P reverse total shoulder arthroplasty, right   • Chronic post-traumatic headache, not intractable   • Medication overuse headache   • Migraine without aura and without status migrainosus, not intractable   • Shortness of breath   • PAD (peripheral artery disease) (CMS/HCC)   • Bilateral carotid artery stenosis   • S/P lobectomy of lung   • YVONNE (acute kidney injury) (CMS/HCC)   • Anemia   • Frequent PVCs   • Pneumonia of both lower lobes due to infectious organism   • History of lung cancer   • Hypertensive urgency   • Bradycardia, sinus   • YVONNE (acute kidney injury) (CMS/HCC)   • Opioid dependence (CMS/HCC)   • COPD (chronic obstructive pulmonary disease) (CMS/HCC)   • Renal artery stenosis (CMS/HCC)    • Acute kidney injury (CMS/[facility])   • Acute on chronic diastolic CHF (congestive heart failure) (CMS/[facility])   • Acute gout due to renal impairment involving foot   • Hypokalemia   • Anemia of chronic disease   • Leukocytosis     Past Medical History:   Diagnosis Date   • AAA (abdominal aortic aneurysm) without rupture (CMS/[facility])    • Anemia    • Anxiety    • Arthritis    • Bilateral carotid artery stenosis 8/14/2020   • Cancer (CMS/[facility])     skin cancer   • Chest pain     OCCAS   • Chronic low back pain    • Chronic pain syndrome 3/12/2016   • CKD (chronic kidney disease), stage III (CMS/[facility])    • Claustrophobia 10/26/2015    Resolved   • COPD (chronic obstructive pulmonary disease) (CMS/[facility])    • Depression    • Dizziness    • Dyspnea    • GERD (gastroesophageal reflux disease)    • GI problem    • H/O concussion    • Head trauma     FELL CUT HEAD 12 STAPLES   • Hiatal hernia    • History of migraine headaches    • Hyperlipidemia    • Hypertension    • Lumbar postlaminectomy syndrome 10/26/2015    Resolved   • Lung cancer (CMS/[facility])    • Lung nodule    • Migraines    • Nausea    • Palpitations    • Polypharmacy 4/22/2016   • Pulmonary embolism (CMS/[facility]) 10/26/2015    January 2013 - Resolved, felt to be secondary to estrogen use   • Slow to wake up after anesthesia    • Submandibular sialoadenitis 10/26/2015    Resolved   • Visual changes    • Vitamin B 12 deficiency      Past Surgical History:   Procedure Laterality Date   • ANGIOPLASTY ILIAC ARTERY Bilateral 8/10/2018    Procedure: BILATERAL ILIAC STENTS;  Surgeon: Riki Mancera MD;  Location: [facility] OR;  Service: Vascular   • APPENDECTOMY     • ARTERIOGRAM N/A 6/4/2021    Procedure: Renal Arteriogram with possible intervention;  Surgeon: Mat Coello MD;  Location: [facility] INVASIVE LOCATION;  Service: Cardiology;  Laterality: N/A;   • BREAST SURGERY      Breast Surgery Reduction Procedure   • BRONCHOSCOPY N/A 2/8/2019    Procedure:  BRONCHOSCOPY;  Surgeon: Álvaro Gifford MD;  Location: [facility] ENDOSCOPY;  Service: Pulmonary   • CARDIAC CATHETERIZATION N/A 6/4/2021    Procedure: Stent BMS peripheral- RENAL-RIGHT;  Surgeon: Mat Coello MD;  Location: [facility] CATH INVASIVE LOCATION;  Service: Cardiology;  Laterality: N/A;   • CHOLECYSTECTOMY     • COLONOSCOPY     • ENDOSCOPY N/A 11/3/2020    Procedure: ESOPHAGOGASTRODUODENOSCOPY with 54 Romanian vernon dilation;  Surgeon: Yunior Vo MD;  Location: [facility] ENDOSCOPY;  Service: Gastroenterology;  Laterality: N/A;  pre- dysphagia  post- esophageal ring, hiatal hernia   • HYSTERECTOMY     • INTERVENTIONAL RADIOLOGY PROCEDURE N/A 6/4/2021    Procedure: Abdominal Aortogram;  Surgeon: Mat Coello MD;  Location: [facility] CATH INVASIVE LOCATION;  Service: Cardiology;  Laterality: N/A;   • INTUBATION  1/15/2019        • JOINT REPLACEMENT      left knee, hip, shoulder   • LASIK     • LUMBAR FUSION      Lumbar Vertebral Fusion   • RENAL ARTERY STENT Right    • SHOULDER ARTHROSCOPY  04/04/2013    Dr. Carrillo/[facility]   • THORACOSCOPY VIDEO ASSISTED WITH LOBECTOMY Right 1/11/2019    Procedure: BRONCOSCOPY, THORACOSCOPY VIDEO ASSISTED WITH RIGHT UPPER LOBE WEDGE RESECTION, COMPLETION RIGHT UPPER LOBECTOMY, LYMPH NODE DISSECTION, INTERCOSTAL NERVE BLOCK;  Surgeon: Quita Figueroa MD;  Location: [facility] MAIN OR;  Service: Thoracic   • TONSILLECTOMY     • TOTAL HIP ARTHROPLASTY REVISION Left    • TOTAL KNEE ARTHROPLASTY Left    • TOTAL SHOULDER ARTHROPLASTY Left    • TOTAL SHOULDER ARTHROPLASTY W/ DISTAL CLAVICLE EXCISION Right 6/18/2020    Procedure: RIGHT TOTAL SHOULDER REVERSE ARTHROPLASTY WITH GPS;  Surgeon: Lino Carrillo MD;  Location: [facility] OR [facility];  Service: Orthopedics;  Laterality: Right;     General Information     Row Name 07/06/21 1041          Physical Therapy Time and Intention    Document Type  therapy note (daily note)  -SM     Mode of Treatment  physical therapy  -SM      Row Name 07/06/21 1041          General Information    Existing Precautions/Restrictions  fall;oxygen therapy device and L/min  -SM     Row Name 07/06/21 1041          Cognition    Orientation Status (Cognition)  oriented x 3  -SM       User Key  (r) = Recorded By, (t) = Taken By, (c) = Cosigned By    Initials Name Provider Type    Judit Clemente PTA Physical Therapy Assistant        Mobility     Row Name 07/06/21 1041          Bed Mobility    Bed Mobility  supine-sit;sit-supine  -     Supine-Sit [place] (Bed Mobility)  supervision  -     Sit-Supine [place] (Bed Mobility)  supervision  -     Assistive Device (Bed Mobility)  bed rails;head of bed elevated  -     Comment (Bed Mobility)  pt requested to lie back down after ~5min d/t feeling light headed  -       User Key  (r) = Recorded By, (t) = Taken By, (c) = Cosigned By    Initials Name Provider Type    Judit Clemente PTA Physical Therapy Assistant        Obj/Interventions     Row Name 07/06/21 1042          Motor Skills    Therapeutic Exercise  -- seated AP and LAQ x10 reps; supine AP, QS, GS, heel slides, SLR, hip abd/add x10 reps  -       User Key  (r) = Recorded By, (t) = Taken By, (c) = Cosigned By    Initials Name Provider Type    Judit Clemente PTA Physical Therapy Assistant        Goals/Plan    No documentation.       Clinical Impression     [facility] Name 07/06/21 1043          Pain    Additional Documentation  Pain Scale: Numbers Pre/Post-Treatment (Group)  -SM     Row Name 07/06/21 1043          Pain Scale: Numbers Pre/Post-Treatment    Pretreatment Pain Rating  0/10 - no pain  -     Posttreatment Pain Rating  0/10 - no pain  -[facility] Name 07/06/21 1043          Positioning and Restraints    Pre-Treatment Position  in bed  -     Post Treatment Position  bed  -SM     In Bed  supine;call light within reach;encouraged to call for assist;exit alarm on  -       User Key  (r) = Recorded By, (t) = Taken By,  (c) = Cosigned By    Initials Name Provider Type    Judit Clemente PTA Physical Therapy Assistant        Outcome Measures     Row Name 07/06/21 1044          How much help from another person do you currently need...    Turning from your back to your side while in flat bed without using bedrails?  3  -SM     Moving from lying on back to sitting on the side of a flat bed without bedrails?  3  -SM     Moving to and from a bed to a chair (including a wheelchair)?  3  -SM     Standing up from a chair using your arms (e.g., wheelchair, bedside chair)?  3  -SM     Climbing 3-5 steps with a railing?  1  -SM     To walk in hospital room?  3  -SM     AM-PAC 6 Clicks Score (PT)  16  -     Row Name 07/06/21 1044          Functional Assessment    Outcome Measure Options  AM-PAC 6 Clicks Basic Mobility (PT)  -       User Key  (r) = Recorded By, (t) = Taken By, (c) = Cosigned By    Initials Name Provider Type    Judit Clemente PTA Physical Therapy Assistant        Physical Therapy Education                 Title: PT OT SLP Therapies (In Progress)     Topic: Physical Therapy (Done)     Point: Mobility training (Done)     Learning Progress Summary           Patient Acceptance, E,TB,D, VU,NR by  at 7/6/2021 1044    Acceptance, E,TB,D, VU,NR by  at 7/4/2021 1203    Acceptance, E,TB,D, VU,NR by  at 7/3/2021 1611    Acceptance, E,D, VU,NR by MS at 7/2/2021 0908                   Point: Home exercise program (Done)     Learning Progress Summary           Patient Acceptance, E,TB,D, VU,NR by  at 7/6/2021 1044    Acceptance, E,TB,D, VU,NR by  at 7/4/2021 1203    Acceptance, E,TB,D, VU,NR by  at 7/3/2021 1611    Acceptance, E,D, VU,NR by MS at 7/2/2021 0908                   Point: Body mechanics (Done)     Learning Progress Summary           Patient Acceptance, E,TB,D, VU,NR by  at 7/6/2021 1044    Acceptance, E,TB,D, VU,NR by  at 7/4/2021 1203    Acceptance, E,TB,D, VU,NR by  at 7/3/2021 2057     Acceptance, E,D, VU,NR by MS at 7/2/2021 0908                   Point: Precautions (Done)     Learning Progress Summary           Patient Acceptance, E,TB,D, VU,NR by  at 7/6/2021 1044    Acceptance, E,TB,D, VU,NR by  at 7/4/2021 1203    Acceptance, E,TB,D, VU,NR by  at 7/3/2021 1611    Acceptance, E,D, VU,NR by MS at 7/2/2021 0908                               User Key     Initials Effective Dates Name Provider Type Discipline    MS 06/16/21 -  Bryan Haddad, PT Physical Therapist PT     03/07/18 -  Judit Constantino PTA Physical Therapy Assistant PT              PT Recommendation and Plan     Plan of Care Reviewed With: patient  Progress: no change  Outcome Summary: Pt tolerated treatment fair this date. Required SV for bed mobility, though only able to sit up for ~5min before requesting to lie back down d/t feeling light-headed. Pt agreed to bed exercises after resting for a minute. Performed several LE exercises including in seated and supine position. Encouraged pt to ambulate in room w/ nsg and to get up to the chair later.     Time Calculation:   PT Charges     Row Name 07/06/21 1048             Time Calculation    Start Time  0955  -      Stop Time  1009  -      Time Calculation (min)  14 min  -      PT Received On  07/06/21  -      PT - Next Appointment  07/07/21  -        User Key  (r) = Recorded By, (t) = Taken By, (c) = Cosigned By    Initials Name Provider Type     Judit Constantino PTA Physical Therapy Assistant        Therapy Charges for Today     Code Description Service Date Service Provider Modifiers Qty    [id number] HC PT THER PROC EA 15 MIN 7/6/2021 Judit Constantino PTA GP 1          PT G-Codes  Outcome Measure Options: AM-PAC 6 Clicks Basic Mobility (PT)  AM-PAC 6 Clicks Score (PT): 16  AM-PAC 6 Clicks Score (OT): 18    Judit Constantino PTA  7/6/2021

## 2021-07-06 NOTE — PROGRESS NOTES
[facility] Progress Note      Patient Name: Rachana Arguelles  : 1945  MRN: [id number]  Primary Care Physician:  Rhys Crowder Jr., MD  Date of admission: 2021    Subjective     Interval History:   Follow up YVONNE on CKD 3.  Feels better. Eating. Urinating. Bowels moving. Breathing better. Has been up to chair.  Wants to go home .    Review of Systems:   As noted above    Objective     Vitals:   Temp:  [97.5 °F (36.4 °C)-98 °F (36.7 °C)] 97.6 °F (36.4 °C)  Heart Rate:  [60-72] 60  Resp:  [16-18] 18  BP: (135-160)/(67-79) 135/79  Flow (L/min):  [1] 1    Intake/Output Summary (Last 24 hours) at 2021 1520  Last data filed at 2021 1250  Gross per 24 hour   Intake 440 ml   Output 240 ml   Net 200 ml       Physical Exam:    General Appearance: alert, oriented x 3, no acute distress. Chronically ill   Skin: warm and dry  HEENT: oral mucosa normal, nonicteric sclera  Neck: supple, no JVD  Lungs: Clear to auscultation, no wheezing.   Heart: RRR, normal S1 and S2  Abdomen: soft, nontender, non distended, + bs  Extremities: 1+ lower ext  Edema.   Neuro: normal speech and mental status     Scheduled Meds:     ascorbic acid, 500 mg, Oral, Daily  atorvastatin, 40 mg, Oral, Nightly  budesonide-formoterol, 2 puff, Inhalation, BID - RT  carvedilol, 25 mg, Oral, BID With Meals  cetirizine, 5 mg, Oral, Daily  clopidogrel, 75 mg, Oral, Daily  famotidine, 20 mg, Oral, Daily  folic acid, 1 mg, Oral, Daily  hydrALAZINE, 100 mg, Oral, Q8H  NIFEdipine XL, 90 mg, Oral, Q24H  polyethylene glycol, 17 g, Oral, Daily  sertraline, 50 mg, Oral, Daily  sucralfate, 1 g, Oral, BID AC  terazosin, 5 mg, Oral, Nightly  traZODone, 75 mg, Oral, Nightly  venlafaxine XR, 150 mg, Oral, Daily      IV Meds:        Results Reviewed:   I have personally reviewed the results from the time of this admission to 2021 15:20 EDT     Results from last 7 days   Lab Units 21  0523 21  0535 21  0500  07/03/21  0716 07/01/21 2011   SODIUM mmol/L 137 136 137 139 143   POTASSIUM mmol/L 3.4* 3.4* 3.2* 2.8* 3.3*   CHLORIDE mmol/L 100 98 96* 99 102   CO2 mmol/L 27.4 27.9 27.6 28.8 27.8   BUN mg/dL 34* 38* 37* 35* 33*   CREATININE mg/dL 3.12* 3.61* 3.85* 3.95* 4.29*   CALCIUM mg/dL 8.4* 8.5* 8.1* 8.2* 7.9*   BILIRUBIN mg/dL  --   --  0.2 0.2 0.3   ALK PHOS U/L  --   --  85 85 88   ALT (SGPT) U/L  --   --  13 11 14   AST (SGOT) U/L  --   --  15 19 22   GLUCOSE mg/dL 88 102* 123* 95 105*       Estimated Creatinine Clearance: 16.6 mL/min (A) (by C-G formula based on SCr of 3.12 mg/dL (H)).    Results from last 7 days   Lab Units 07/06/21  0523 07/05/21  0535 07/04/21  0500 07/03/21  0716   MAGNESIUM mg/dL 2.0 2.1 2.0 2.1   PHOSPHORUS mg/dL 3.3 4.5  --  7.0*       Results from last 7 days   Lab Units 07/05/21  0535 07/04/21  0500 07/03/21  0716   URIC ACID mg/dL 8.8* 8.2* 8.0*       Results from last 7 days   Lab Units 07/06/21  0523 07/05/21  0535 07/04/21  0500 07/03/21  0716 07/02/21  0622   WBC 10*3/mm3 9.73 9.38 11.65* 12.70* 14.40*   HEMOGLOBIN g/dL 9.2* 8.8* 8.6* 8.6* 8.5*   PLATELETS 10*3/mm3 294 339 293 240 234       Results from last 7 days   Lab Units 07/01/21 2011   INR  1.00       Assessment / Plan     ASSESSMENT:  1. Mark on CKD IV with baseline creatinine 1.7 prior to dc 6/5/21, 2.87 6/24/21.  Creatinine improving to 3.12 today.  Possible atheroembolic event after renal artery stent last month versus acute on chronic diastolic dysfunction. C3 and C4 normal.    Known QUINTON with right calcified 95% ostial and left 80% proximal on renal arteriogram 6/4.  SP right renal artery stent 6/4/21.  No urgent indication for left renal stenting at this time.  BP is better controlled and renal function improving.   2. HTN overall better after right renal artery stent. Cardiology has changed norvasc to nifedipine. I have listed minoxidil as allergy due to large pericardial effusion while on it.  DC'd it last admission.   Informed patient and .   3. Anemia  4. Gout.   5. Nephrotic syndrome with 13 grams protein by protein/crt ratio.   5. Acute on chronic diastolic heart failure .  PLAN:  1. Oral diuretic today.  2. If stable tomorrow, possible dc.   3. Recheck urine protein/ crt ratio.     Thank you for involving us in the care of Rachana Arguelles.  Please feel free to call with any questions.    Meghann Win MD  07/06/21  15:20 EDT    [facility]  [phone]

## 2021-07-06 NOTE — PROGRESS NOTES
Hospital Follow Up    LOS:  LOS: 5 days   Patient Name: Rachana Arguelles  Age/Sex: 75 y.o. female  : 1945  MRN: [id number]    Day of Service: 21   Length of Stay: 5  Encounter Provider: REENA Medina  [facility] of Service: [facility] CARDIOLOGY  Patient Care Team:  Rhys Crowder Jr., MD as PCP - General (Family Medicine)  Whitney Dudley MD as Consulting Physician (Nephrology)  Quita Figueroa MD as Surgeon (Thoracic Surgery)  Sabas Luther MD as Consulting Physician (Otolaryngology)  Álvaro Gifford MD as Consulting Physician (Pulmonary Disease)    Subjective:     Chief Complaint: SOA/Diastolic CHF    Interval History: NO complaints of shortness of breath    Objective:     Objective:  Temp:  [97.5 °F (36.4 °C)-98.1 °F (36.7 °C)] 97.9 °F (36.6 °C)  Heart Rate:  [61-72] 67  Resp:  [12-18] 18  BP: (152-160)/(67-73) 155/67     Intake/Output Summary (Last 24 hours) at 2021 1051  Last data filed at 2021 0932  Gross per 24 hour   Intake 320 ml   Output 240 ml   Net 80 ml     Body mass index is 26.35 kg/m².      21  1344 21  0615 21  0530   Weight: 77.2 kg (170 lb 3.2 oz) 76.6 kg (168 lb 14 oz) 76.2 kg (167 lb 14.4 oz)     Weight change: -1.043 kg (-2 lb 4.8 oz)    Physical Exam:   General Appearance:    Awake alert and oriented in no acute distress.   Color:  Skin:  Neuro:  HEENT:    Lungs:     Pink  Warm and dry  No focal, motor or sensory deficits  Neck supple, pupils equal, round and reactive. No JVD, No Bruit  Clear to auscultation,respirations regular, even and                  unlabored    Heart:    Regular rate and rhythm, S1 and S2, no murmur, no gallop, no rub. No edema, DP/PT pulses are 2+   Chest Wall:    No abnormalities observed   Abdomen:     Normal bowel sounds, no masses, no organomegaly, soft        non-tender, non-distended, no guarding, no ascites noted   Extremities:   Moves all extremities well, no edema, no cyanosis,  no redness       Lab Review:   Results from last 7 days   Lab Units 07/06/21  0523 07/05/21  0535 07/04/21  0500 07/03/21  0716   SODIUM mmol/L 137 136 137 139   POTASSIUM mmol/L 3.4* 3.4* 3.2* 2.8*   CHLORIDE mmol/L 100 98 96* 99   CO2 mmol/L 27.4 27.9 27.6 28.8   BUN mg/dL 34* 38* 37* 35*   CREATININE mg/dL 3.12* 3.61* 3.85* 3.95*   GLUCOSE mg/dL 88 102* 123* 95   CALCIUM mg/dL 8.4* 8.5* 8.1* 8.2*   AST (SGOT) U/L  --   --  15 19   ALT (SGPT) U/L  --   --  13 11     Results from last 7 days   Lab Units 07/02/21 0622 07/01/21 2011   TROPONIN T ng/mL 0.021 0.015     Results from last 7 days   Lab Units 07/06/21 0523 07/05/21  0535   WBC 10*3/mm3 9.73 9.38   HEMOGLOBIN g/dL 9.2* 8.8*   HEMATOCRIT % 28.0* 27.1*   PLATELETS 10*3/mm3 294 339     Results from last 7 days   Lab Units 07/01/21 2011   INR  1.00     Results from last 7 days   Lab Units 07/06/21 0523 07/05/21  0535   MAGNESIUM mg/dL 2.0 2.1           Invalid input(s): LDLCALC          I reviewed the patient's new clinical results.  I personally viewed and interpreted the patient's EKG  Current Medications:   Scheduled Meds:amLODIPine, 10 mg, Oral, Daily  ascorbic acid, 500 mg, Oral, Daily  atorvastatin, 40 mg, Oral, Nightly  budesonide-formoterol, 2 puff, Inhalation, BID - RT  carvedilol, 25 mg, Oral, BID With Meals  cetirizine, 5 mg, Oral, Daily  clopidogrel, 75 mg, Oral, Daily  famotidine, 20 mg, Oral, Daily  folic acid, 1 mg, Oral, Daily  hydrALAZINE, 100 mg, Oral, Q8H  polyethylene glycol, 17 g, Oral, Daily  sertraline, 50 mg, Oral, Daily  sucralfate, 1 g, Oral, BID AC  terazosin, 5 mg, Oral, Nightly  traZODone, 75 mg, Oral, Nightly  venlafaxine XR, 150 mg, Oral, Daily      Continuous Infusions:     Allergies:  Allergies   Allergen Reactions   • Neurontin [Gabapentin] Confusion   • Morphine Other (See Comments)     HEADACHE       Assessment:       Acute kidney injury (CMS/HCC)    Chronic pain syndrome    Hypertension    Stage 3b chronic kidney  disease (CMS/[facility])    PAD (peripheral artery disease) (CMS/[facility])    History of lung cancer    Opioid dependence (CMS/HCC)    COPD (chronic obstructive pulmonary disease) (CMS/HCC)    Renal artery stenosis (CMS/HCC)    Acute on chronic diastolic CHF (congestive heart failure) (CMS/[facility])    Acute gout due to renal impairment involving foot    Hypokalemia    Anemia of chronic disease    Leukocytosis      1. YVONNE on CKD- unclear etiology. Peak around 4. Improving  2. Bilateral renal artery stenosis: no indication for left sided stenting at this time. Right stent velocities normal on recent doppler  3. HTN controlled  4. Pericardial effusion: small  5. HFpEF: continue diuresis per renal      Plan:      It is down from over the weekend.  Renal dosing diuretics.  Creatinine a little better today.  Blood pressure remains elevated we will switch amlodipine to nifedipine.  REENA Medina  07/06/21  10:51 EDT  Electronically signed by REENA Medina, 07/06/21, 10:51 AM EDT.

## 2021-07-06 NOTE — PLAN OF CARE
Goal Outcome Evaluation:  Plan of Care Reviewed With: patient        Progress: no change  Outcome Summary: Pt tolerated treatment fair this date. Required SV for bed mobility, though only able to sit up for ~5min before requesting to lie back down d/t feeling light-headed. Pt agreed to bed exercises after resting for a minute. Performed several LE exercises including in seated and supine position. Encouraged pt to ambulate in room w/ nsg and to get up to the chair later.

## 2021-07-07 NOTE — PLAN OF CARE
Problem: Adult Inpatient Plan of Care  Goal: Plan of Care Review  7/7/2021 1208 by Graciela Blake RN  Outcome: Adequate for Care Transition  7/7/2021 1207 by Graciela Blake RN  Outcome: Ongoing, Progressing  Flowsheets (Taken 7/7/2021 1207)  Progress: improving  Plan of Care Reviewed With:   patient   spouse  Outcome Summary: VSS, no c/o pain, zofran given for c/o nausea, AAOx4, up to chair and BRP with assist x1, plan to dc home today  Goal: Patient-Specific Goal (Individualized)  7/7/2021 1208 by Graciela Blake RN  Outcome: Adequate for Care Transition  7/7/2021 1207 by Graciela Blake RN  Outcome: Ongoing, Progressing  Goal: Absence of Hospital-Acquired Illness or Injury  7/7/2021 1208 by Graciela Blake RN  Outcome: Adequate for Care Transition  7/7/2021 1207 by Graciela Blake RN  Outcome: Ongoing, Progressing  Intervention: Identify and Manage Fall Risk  Recent Flowsheet Documentation  Taken 7/7/2021 1157 by Graciela Blake RN  Safety Promotion/Fall Prevention: safety round/check completed  Taken 7/7/2021 1000 by Graciela Blake RN  Safety Promotion/Fall Prevention: safety round/check completed  Taken 7/7/2021 0800 by Graciela Blake RN  Safety Promotion/Fall Prevention: safety round/check completed  Goal: Optimal Comfort and Wellbeing  7/7/2021 1208 by Graciela Blake RN  Outcome: Adequate for Care Transition  7/7/2021 1207 by Graciela Blake RN  Outcome: Ongoing, Progressing  Intervention: Provide Person-Centered Care  Recent Flowsheet Documentation  Taken 7/7/2021 0800 by Graciela Blake RN  Trust Relationship/Rapport:   care explained   questions encouraged   questions answered  Goal: Readiness for Transition of Care  7/7/2021 1208 by Graciela Blake RN  Outcome: Adequate for Care Transition  7/7/2021 1207 by Graciela Blake RN  Outcome: Ongoing, Progressing     Problem: Heart Failure Comorbidity  Goal: Maintenance of Heart Failure Symptom Control  7/7/2021 1208 by Graciela Blake RN  Outcome: Adequate for Care  Transition  7/7/2021 1207 by Graciela Blake RN  Outcome: Ongoing, Progressing     Problem: Pain Chronic (Persistent) (Comorbidity Management)  Goal: Acceptable Pain Control and Functional Ability  7/7/2021 1208 by Graciela Blake RN  Outcome: Adequate for Care Transition  7/7/2021 1207 by Graciela Blake RN  Outcome: Ongoing, Progressing     Problem: Fall Injury Risk  Goal: Absence of Fall and Fall-Related Injury  7/7/2021 1208 by Graciela Blake RN  Outcome: Adequate for Care Transition  7/7/2021 1207 by Graciela Blake RN  Outcome: Ongoing, Progressing  Intervention: Promote Injury-Free Environment  Recent Flowsheet Documentation  Taken 7/7/2021 1157 by Graciela Blake RN  Safety Promotion/Fall Prevention: safety round/check completed  Taken 7/7/2021 1000 by Graciela Blake RN  Safety Promotion/Fall Prevention: safety round/check completed  Taken 7/7/2021 0800 by Graciela Blake RN  Safety Promotion/Fall Prevention: safety round/check completed     Problem: Skin Injury Risk Increased  Goal: Skin Health and Integrity  7/7/2021 1208 by Graciela Blake RN  Outcome: Adequate for Care Transition  7/7/2021 1207 by Graciela Blake RN  Outcome: Ongoing, Progressing   Goal Outcome Evaluation:  Plan of Care Reviewed With: patient, spouse        Progress: improving  Outcome Summary: VSS, no c/o pain, zofran given for c/o nausea, AAOx4, up to chair and BRP with assist x1, plan to dc home today

## 2021-07-07 NOTE — DISCHARGE SUMMARY
Patient Name: Rachana Arguelles  : 1945  MRN: [id number]    Date of Admission: 2021  Date of Discharge:  2021  Primary Care Physician: Rhys Crowder Jr., MD      Chief Complaint:   Direct admit from nephrology for abnormal labs and shortness of breath    Discharge Diagnoses     Active Hospital Problems    Diagnosis  POA   • **Acute kidney injury (CMS/[facility]) [N17.9]  Yes   • Acute on chronic diastolic CHF (congestive heart failure) (CMS/[facility]) [I50.33]  Yes   • Acute gout due to renal impairment involving foot [M10.379]  No   • Hypokalemia [E87.6]  Yes   • Anemia of chronic disease [D63.8]  Yes   • Leukocytosis [D72.829]  Yes   • Renal artery stenosis (CMS/[facility]) [I70.1]  Yes   • COPD (chronic obstructive pulmonary disease) (CMS/[facility]) [J44.9]  Yes   • Opioid dependence (CMS/[facility]) [F11.20]  Yes   • History of lung cancer [Z85.118]  Not Applicable   • PAD (peripheral artery disease) (CMS/[facility]) [I73.9]  Yes   • Stage 3b chronic kidney disease (CMS/[facility]) [N18.32]  Yes   • Hypertension [I10]  Yes   • Chronic pain syndrome [G89.4]  Yes      Resolved Hospital Problems   No resolved problems to display.        Hospital Course     Ms. Arguelles is a 75 y.o. female with a history of COPD, PAD, HTN, CKD 3B and diastolic CHF who presented to [facility] initially complaining of worsening shortness of breath.  Please see the admitting history and physical for further details.  She was found to have worsening renal function and was admitted to the hospital for further evaluation and treatment.  She is a patient of [facility].  They were asked to assist with her work-up.  They felt she was volume overloaded on admission with worsening renal function possibly consistent with cardiorenal syndrome.  She was started on IV Bumex and sodium restriction.  She had renal artery stent placed last month which coincides with worsening renal function.  Atheroembolic event felt possible.  She has left-sided renal artery  stenosis as well but no current plans for intervention.  Urine studies did show nephrotic range proteinuria.  She was given IV diuresis and today felt that her renal function was stable enough she can be discharged.  She will have BMP repeated next week with results faxed to nephrology office.  She will follow-up with them on July 20.      Day of Discharge     Subjective:  Feels better today and wants to go home.     Physical Exam:  Temp:  [97.6 °F (36.4 °C)-98.8 °F (37.1 °C)] 97.8 °F (36.6 °C)  Heart Rate:  [60-75] 71  Resp:  [16-20] 20  BP: (130-162)/(58-88) 130/62  Body mass index is 26.1 kg/m².  Physical Exam  Vitals and nursing note reviewed.   Constitutional:       Appearance: Normal appearance.   Pulmonary:      Effort: Pulmonary effort is normal.   Neurological:      Mental Status: She is alert. Mental status is at baseline.   Psychiatric:         Mood and Affect: Mood normal.         Consultants     Consult Orders (all) (From admission, onward)     Start     Ordered    07/01/21 1934  Inpatient Cardiology Consult  Once     Specialty:  Cardiology  Provider:  Whitney Morin MD    07/01/21 1933    07/01/21 1550  Inpatient Nephrology Consult  Once     Specialty:  Nephrology  Provider:  Jonathan Rebolledo MD    07/01/21 1549              Procedures     Imaging Results (All)     Procedure Component Value Units Date/Time    XR Chest 1 View [[id number]] Collected: 07/02/21 0000     Updated: 07/02/21 0004    Narrative:      SINGLE VIEW OF THE CHEST     HISTORY: Cough     COMPARISON: 06/24/2021     FINDINGS:  Cardiomegaly is present. There is vascular congestion. There appears to  be some chronic scarring within the right lung. There is calcification  of the aorta. There are changes of bilateral shoulder arthroplasties. No  definite acute infiltrates are seen.       Impression:      Cardiomegaly with mild vascular congestion.     This report was finalized on 7/2/2021 12:01 AM by Dr. Frannie Jackson M.D.            Results for orders placed during the hospital encounter of 07/01/21    Duplex renal artery bilateral complete CAR    Interpretation Summary  · Mild-moderate right renal artery stenosis.  · Mild-moderate left renal artery stenosis.  · True degree of stenosis is difficult to fully determine due to elevated aortic velocities. If accurate degree of stenosis required would recommend CT angiogram for further evaluation.    Results for orders placed during the hospital encounter of 07/01/21    Adult Transthoracic Echo Limited W/ Cont if Necessary Per Protocol    Interpretation Summary  · There is a small (<1cm) circumferential pericardial effusion.  · There is no evidence of cardiac tamponade.    Pertinent Labs     Results from last 7 days   Lab Units 07/06/21  0523 07/05/21  0535 07/04/21  0500 07/03/21  0716   WBC 10*3/mm3 9.73 9.38 11.65* 12.70*   HEMOGLOBIN g/dL 9.2* 8.8* 8.6* 8.6*   PLATELETS 10*3/mm3 294 339 293 240     Results from last 7 days   Lab Units 07/07/21  0505 07/06/21  0523 07/05/21  0535 07/04/21  0500   SODIUM mmol/L 136 137 136 137   POTASSIUM mmol/L 3.8 3.4* 3.4* 3.2*   CHLORIDE mmol/L 101 100 98 96*   CO2 mmol/L 25.5 27.4 27.9 27.6   BUN mg/dL 35* 34* 38* 37*   CREATININE mg/dL 3.26* 3.12* 3.61* 3.85*   GLUCOSE mg/dL 101* 88 102* 123*   Estimated Creatinine Clearance: 15.8 mL/min (A) (by C-G formula based on SCr of 3.26 mg/dL (H)).  Results from last 7 days   Lab Units 07/07/21  0505 07/06/21  0523 07/05/21  0535 07/04/21  0500 07/03/21  0716 07/01/21 2011   ALBUMIN g/dL 2.30* 2.50* 2.50* 2.40* 2.70* 3.00*   BILIRUBIN mg/dL  --   --   --  0.2 0.2 0.3   ALK PHOS U/L  --   --   --  85 85 88   AST (SGOT) U/L  --   --   --  15 19 22   ALT (SGPT) U/L  --   --   --  13 11 14     Results from last 7 days   Lab Units 07/07/21  0505 07/06/21  0523 07/05/21  0535 07/04/21  0500 07/03/21  0716   CALCIUM mg/dL 8.1* 8.4* 8.5* 8.1* 8.2*   ALBUMIN g/dL 2.30* 2.50* 2.50* 2.40* 2.70*   MAGNESIUM mg/dL 2.0 2.0  2.1 2.0 2.1   PHOSPHORUS mg/dL 3.2 3.3 4.5  --  7.0*       Results from last 7 days   Lab Units 07/02/21  0622 07/01/21 2011   TROPONIN T ng/mL 0.021 0.015     Results from last 7 days   Lab Units 07/06/21  1725 07/05/21  0535 07/03/21  0716 07/02/21  1231   SODIUM UR mmol/L  --   --   --  74   CREATININE UR mg/dL 58.1  --    < > 69.9   CHLORIDE UR mmol/L  --   --   --  43   PROTEIN TOTAL URINE mg/dL 740.0  --    < > 930.0   URIC ACID mg/dL  --  8.8*  --   --    PROT/CREAT RATIO UR mg/G Crea  --   --   --  13,304.7*    < > = values in this interval not displayed.         Invalid input(s): LDLCALC      Results from last 7 days   Lab Units 07/01/21  1859   COVID19  Not Detected       Test Results Pending at Discharge     Pending Labs     Order Current Status    JUDY & PE, Random Urine - Urine, Clean Catch In process    Immunofixation, Serum In process          Discharge Details        Discharge Medications      New Medications      Instructions Start Date   NIFEdipine XL 90 MG 24 hr tablet  Commonly known as: PROCARDIA XL   90 mg, Oral, Daily         Changes to Medications      Instructions Start Date   hydrALAZINE 100 MG tablet  Commonly known as: APRESOLINE  What changed: when to take this   100 mg, Oral, 3 Times Daily      sertraline 100 MG tablet  Commonly known as: Zoloft  What changed: how much to take   100 mg, Oral, Daily      venlafaxine  MG 24 hr capsule  Commonly known as: EFFEXOR-XR  What changed: See the new instructions.   TAKE 1 CAPSULE BY MOUTH DAILY. SWALLOW WHOLE DO NOT CRUSH OR CHEW.         Continue These Medications      Instructions Start Date   Advair Diskus 250-50 MCG/DOSE DISKUS  Generic drug: fluticasone-salmeterol   2 puffs, Inhalation, 2 Times Daily - RT      ascorbic acid 500 MG tablet  Commonly known as: VITAMIN C   500 mg, Oral, Daily      atorvastatin 40 MG tablet  Commonly known as: LIPITOR   40 mg, Oral, Nightly      carvedilol 25 MG tablet  Commonly known as: COREG   25 mg,  Oral, 2 Times Daily With Meals      clopidogrel 75 MG tablet  Commonly known as: PLAVIX   75 mg, Oral, Daily      cyclobenzaprine 10 MG tablet  Commonly known as: FLEXERIL   5 mg, Oral, 3 Times Daily PRN      docusate sodium 100 MG capsule  Commonly known as: COLACE   100 mg, Oral, 2 Times Daily PRN      famotidine 40 MG tablet  Commonly known as: PEPCID   40 mg, Oral, 2 Times Daily      fexofenadine 180 MG tablet  Commonly known as: Allegra Allergy   180 mg, Oral, Daily PRN      folic acid 1 MG tablet  Commonly known as: FOLVITE   TAKE 1 TABLET BY MOUTH EVERY DAY      LORazepam 0.5 MG tablet  Commonly known as: ATIVAN   0.5 mg, Oral, Every 8 Hours PRN      MiraLax 17 g packet  Generic drug: polyethylene glycol   17 g, Oral, Daily      omeprazole 40 MG capsule  Commonly known as: priLOSEC   TAKE 1 CAPSULE BY MOUTH EVERY DAY      ondansetron 4 MG tablet  Commonly known as: ZOFRAN   4-8 mg, Oral, Every 8 Hours PRN      oxyCODONE 20 MG tablet  Commonly known as: ROXICODONE   20 mg, Oral, Every 4 Hours PRN, No more than 5 per day      sodium bicarbonate 650 MG tablet   1,300 mg, Oral, 3 Times Daily      sucralfate 1 g tablet  Commonly known as: CARAFATE   1 g, Oral, 2 times daily      terazosin 5 MG capsule  Commonly known as: HYTRIN   5 mg, Oral, Nightly      torsemide 20 MG tablet  Commonly known as: DEMADEX   40 mg, Oral, 2 times daily      traZODone 150 MG tablet  Commonly known as: DESYREL   TAKE 0.5 TABLETS BY MOUTH EVERY NIGHT      vitamin D 1.25 MG (66447 UT) capsule capsule  Commonly known as: ERGOCALCIFEROL   50,000 Units, Oral, Every 7 Days, Takes on Wednesdays         Stop These Medications    amLODIPine 10 MG tablet  Commonly known as: NORVASC            Allergies   Allergen Reactions   • Minoxidil Other (See Comments)     Pericardial effusion .   • Neurontin [Gabapentin] Confusion   • Morphine Other (See Comments)     HEADACHE         Discharge Disposition:  Home or Self Care    Discharge Diet:  Diet Order    Procedures   • Diet Regular; Cardiac, Consistent Carbohydrate, Renal, Low Sodium; 2,000 mg Na       Discharge Activity:   Activity Instructions     Activity as Tolerated            CODE STATUS:    Code Status and Medical Interventions:   Ordered at: 07/01/21 1549     Code Status:    CPR     Medical Interventions (Level of Support Prior to Arrest):    Full       Future Appointments   Date Time Provider Department Center   7/12/2021 11:30 AM Kenya Oneill APRN [facility]   7/20/2021  2:00 PM Rhys Crowder Jr., MD [facility]   7/22/2021  1:00 PM ROOM 2 YA ACU BH YA ACU YA   7/28/2021  1:50 PM LAB CHAIR 5 [facility]   7/28/2021  2:20 PM Ld Paulino MD [facility] [place]   8/12/2021  8:20 AM Mat Coello MD [facility]   8/17/2021 11:00 AM YA CT 2 BH YA CT YA   8/25/2021  1:45 PM Judit Salinas APRN [facility] YA YA   9/8/2021 10:00 AM César Deluna MD [facility] N [facility]   2/9/2022 12:20 PM Mat Coello MD [facility] CD L[facility] AY     Additional Instructions for the Follow-ups that You Need to Schedule     Basic Metabolic Panel    Jul 14, 2021 (Approximate)      Fax results to [phone] saúl Rodriges APRN    Order Comments: Fax results to [phone] saúl VALLES     Release to patient: Immediate            Contact information for follow-up providers     Whitney Dudley MD. Go in 13 day(s).    Specialty: Nephrology  Why:   Has follow up with DR. Dudley's REENA Rodriges July 20 at 1;20 pm.   patient has follow up with Dr. Dudley Aug 25 at 10:15 am.  Contact information:  [address]  [place] [id number]  [phone]             Rhys Crowder Jr., MD Follow up in 1 week(s).    Specialty: Family Medicine  Contact information:  [address]  [place] [id number] [phone]                   Contact information for after-discharge care     Home Medical Care     [facility]  [facility] .    Service: Home Health Services  Contact information:  [address]  [place] [address]  [phone]                             Additional Instructions for the Follow-ups that You Need to Schedule     Basic Metabolic Panel    Jul 14, 2021 (Approximate)      Fax results to [phone] saúl Rodriges APRWES    Order Comments: Fax results to [phone] saúl VALLES     Release to patient: Immediate           Time Spent on Discharge:  Greater than 30 minutes      Jerman Lobo MD  [facility]  07/07/21  12:08 EDT

## 2021-07-07 NOTE — PROGRESS NOTES
Hospital Follow Up    LOS:  LOS: 6 days   Patient Name: Rachana Arguelles  Age/Sex: 75 y.o. female  : 1945  MRN: [id number]    Day of Service: 21   Length of Stay: 6  Encounter Provider: REENA Medina  Place of Service: [facility] CARDIOLOGY  Patient Care Team:  Rhys Crowder Jr., MD as PCP - General (Family Medicine)  Whitney Dudley MD as Consulting Physician (Nephrology)  Quita Figueroa MD as Surgeon (Thoracic Surgery)  Sabas Luther MD as Consulting Physician (Otolaryngology)  Álvaro Gifford MD as Consulting Physician (Pulmonary Disease)    Subjective:     Chief Complaint: Diastolic heart failure and hypertension    Interval History: No complaints today except some nausea wants to go home    Objective:     Objective:  Temp:  [97.6 °F (36.4 °C)-98.8 °F (37.1 °C)] 97.8 °F (36.6 °C)  Heart Rate:  [60-75] 71  Resp:  [16-20] 20  BP: (130-162)/(58-88) 130/62     Intake/Output Summary (Last 24 hours) at 2021 1153  Last data filed at 2021 1115  Gross per 24 hour   Intake 580 ml   Output 1460 ml   Net -880 ml     Body mass index is 26.1 kg/m².      21  0615 21  0530 21  0530   Weight: 76.6 kg (168 lb 14 oz) 76.2 kg (167 lb 14.4 oz) 75.4 kg (166 lb 4.8 oz)     Weight change: -0.726 kg (-1 lb 9.6 oz)    Physical Exam:   General Appearance:    Awake alert and oriented in no acute distress.   Color:  Skin:  Neuro:  HEENT:    Lungs:     Pink  Warm and dry  No focal, motor or sensory deficits  Neck supple, pupils equal, round and reactive. No JVD, No Bruit  Clear to auscultation,respirations regular, even and                  unlabored    Heart:    Regular rate and rhythm, S1 and S2, no murmur, no gallop, no rub. No edema, DP/PT pulses are 2+   Chest Wall:    No abnormalities observed   Abdomen:     Normal bowel sounds, no masses, no organomegaly, soft        non-tender, non-distended, no guarding, no ascites noted   Extremities:   Moves  all extremities well, no edema, no cyanosis, no redness       Lab Review:   Results from last 7 days   Lab Units 07/07/21  0505 07/06/21  0523 07/04/21  0500 07/03/21  0716   SODIUM mmol/L 136 137 137 139   POTASSIUM mmol/L 3.8 3.4* 3.2* 2.8*   CHLORIDE mmol/L 101 100 96* 99   CO2 mmol/L 25.5 27.4 27.6 28.8   BUN mg/dL 35* 34* 37* 35*   CREATININE mg/dL 3.26* 3.12* 3.85* 3.95*   GLUCOSE mg/dL 101* 88 123* 95   CALCIUM mg/dL 8.1* 8.4* 8.1* 8.2*   AST (SGOT) U/L  --   --  15 19   ALT (SGPT) U/L  --   --  13 11     Results from last 7 days   Lab Units 07/02/21 0622 07/01/21 2011   TROPONIN T ng/mL 0.021 0.015     Results from last 7 days   Lab Units 07/06/21  0523 07/05/21  0535   WBC 10*3/mm3 9.73 9.38   HEMOGLOBIN g/dL 9.2* 8.8*   HEMATOCRIT % 28.0* 27.1*   PLATELETS 10*3/mm3 294 339     Results from last 7 days   Lab Units 07/01/21 2011   INR  1.00     Results from last 7 days   Lab Units 07/07/21  0505 07/06/21  0523   MAGNESIUM mg/dL 2.0 2.0           Invalid input(s): LDLCALC          I reviewed the patient's new clinical results.  I personally viewed and interpreted the patient's EKG  Current Medications:   Scheduled Meds:ascorbic acid, 500 mg, Oral, Daily  atorvastatin, 40 mg, Oral, Nightly  budesonide-formoterol, 2 puff, Inhalation, BID - RT  carvedilol, 25 mg, Oral, BID With Meals  cetirizine, 5 mg, Oral, Daily  clopidogrel, 75 mg, Oral, Daily  famotidine, 20 mg, Oral, Daily  folic acid, 1 mg, Oral, Daily  hydrALAZINE, 100 mg, Oral, Q8H  NIFEdipine XL, 90 mg, Oral, Q24H  polyethylene glycol, 17 g, Oral, Daily  sertraline, 50 mg, Oral, Daily  sucralfate, 1 g, Oral, BID AC  terazosin, 5 mg, Oral, Nightly  torsemide, 40 mg, Oral, Daily  traZODone, 75 mg, Oral, Nightly  venlafaxine XR, 150 mg, Oral, Daily      Continuous Infusions:     Allergies:  Allergies   Allergen Reactions   • Minoxidil Other (See Comments)     Pericardial effusion .   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 YVONNE on CKD- unclear etiology. Peak around 4. Improving  2. Bilateral renal artery stenosis: no indication for left sided stenting at this time. Right stent velocities normal on recent doppler  3. HTN controlled  4. Pericardial effusion: small  5. HFpEF: continue diuresis per renal        Plan:      Blood pressure better with the addition of nifedipine.  Renal managing diuresis.  Nauseated today but overall stable.    REENA Medina  07/07/21  11:53 EDT  Electronically signed by REENA Medina, 07/07/21, 11:53 AM EDT.

## 2021-07-07 NOTE — PROGRESS NOTES
[facility] Progress Note      Patient Name: Rachana Arguelles  : 1945  MRN: [id number]  Primary Care Physician:  Rhys Crowder Jr., MD  Date of admission: 2021    Subjective     Interval History:   Follow up YVONNE on CKD 3.  Feels fine.  Slept well.  Hungry. Bowels moving. Voided this am. Weight down a pound .    Review of Systems:   As noted above    Objective     Vitals:   Temp:  [97.6 °F (36.4 °C)-98.8 °F (37.1 °C)] 98.4 °F (36.9 °C)  Heart Rate:  [60-75] 73  Resp:  [16-18] 16  BP: (135-162)/(58-88) 145/59  Flow (L/min):  [1] 1    Intake/Output Summary (Last 24 hours) at 2021 0703  Last data filed at 2021 0604  Gross per 24 hour   Intake 460 ml   Output 960 ml   Net -500 ml       Physical Exam:    General Appearance: alert, lying in bed.  no acute distress. Chronically ill   Skin: warm and dry  HEENT: oral mucosa normal, nonicteric sclera. NC o2 1 L.   Neck: supple, no JVD  Lungs: Clear to auscultation, no wheezing.   Heart: RRR, normal S1 and S2  Abdomen: soft, nontender, non distended, + bs  Extremities: 1+ lower ext  Edema.   Neuro: normal speech and mental status     Scheduled Meds:     ascorbic acid, 500 mg, Oral, Daily  atorvastatin, 40 mg, Oral, Nightly  budesonide-formoterol, 2 puff, Inhalation, BID - RT  carvedilol, 25 mg, Oral, BID With Meals  cetirizine, 5 mg, Oral, Daily  clopidogrel, 75 mg, Oral, Daily  famotidine, 20 mg, Oral, Daily  folic acid, 1 mg, Oral, Daily  hydrALAZINE, 100 mg, Oral, Q8H  NIFEdipine XL, 90 mg, Oral, Q24H  polyethylene glycol, 17 g, Oral, Daily  sertraline, 50 mg, Oral, Daily  sucralfate, 1 g, Oral, BID AC  terazosin, 5 mg, Oral, Nightly  torsemide, 40 mg, Oral, Daily  traZODone, 75 mg, Oral, Nightly  venlafaxine XR, 150 mg, Oral, Daily      IV Meds:        Results Reviewed:   I have personally reviewed the results from the time of this admission to 2021 07:03 EDT     Results from last 7 days   Lab Units 21  0519  07/05/21  0535 07/04/21  0500 07/03/21  0716 07/01/21 2011   SODIUM mmol/L 137 136 137 139 143   POTASSIUM mmol/L 3.4* 3.4* 3.2* 2.8* 3.3*   CHLORIDE mmol/L 100 98 96* 99 102   CO2 mmol/L 27.4 27.9 27.6 28.8 27.8   BUN mg/dL 34* 38* 37* 35* 33*   CREATININE mg/dL 3.12* 3.61* 3.85* 3.95* 4.29*   CALCIUM mg/dL 8.4* 8.5* 8.1* 8.2* 7.9*   BILIRUBIN mg/dL  --   --  0.2 0.2 0.3   ALK PHOS U/L  --   --  85 85 88   ALT (SGPT) U/L  --   --  13 11 14   AST (SGOT) U/L  --   --  15 19 22   GLUCOSE mg/dL 88 102* 123* 95 105*       Estimated Creatinine Clearance: 16.5 mL/min (A) (by C-G formula based on SCr of 3.12 mg/dL (H)).    Results from last 7 days   Lab Units 07/07/21  0505 07/06/21  0523 07/05/21  0535 07/03/21  0716   MAGNESIUM mg/dL 2.0 2.0 2.1 2.1   PHOSPHORUS mg/dL  --  3.3 4.5 7.0*       Results from last 7 days   Lab Units 07/05/21  0535 07/04/21  0500 07/03/21  0716   URIC ACID mg/dL 8.8* 8.2* 8.0*       Results from last 7 days   Lab Units 07/06/21  0523 07/05/21  0535 07/04/21  0500 07/03/21  0716 07/02/21  0622   WBC 10*3/mm3 9.73 9.38 11.65* 12.70* 14.40*   HEMOGLOBIN g/dL 9.2* 8.8* 8.6* 8.6* 8.5*   PLATELETS 10*3/mm3 294 339 293 240 234       Results from last 7 days   Lab Units 07/01/21 2011   INR  1.00       Assessment / Plan     ASSESSMENT:  1. Mark on CKD IV with baseline creatinine 1.7 prior to dc 6/5/21, 2.87 6/24/21.  Creatinine improving to 3.12 today.  Possible atheroembolic event after renal artery stent last month versus acute on chronic diastolic dysfunction. C3 and C4 normal.    Known QUINTON with right calcified 95% ostial and left 80% proximal on renal arteriogram 6/4.  SP right renal artery stent 6/4/21.  No urgent indication for left renal stenting at this time.  BP is better controlled and renal function improving as of yesterday.  Chem pending.   2. HTN overall better after right renal artery stent. Cardiology has changed norvasc to nifedipine. I have listed minoxidil as allergy due to large  pericardial effusion while on it.  DC'd it last admission.  Informed patient and .   3. Anemia  4. Gout.   5. Nephrotic syndrome with 12 grams protein by repeat protein/crt ratio. Etiology unclear as this seems to be new looking back at prior UA's.  Protein was negative as recently as 5/26/21.  Certainly uncontrolled hypertension can acutely increase proteinuria, but her bp this admission has been controlled.  Would not consider renal biopsy as she has YVONNE, CKD IV, small left kidney. Will check urine protein electrophoresis, serum JUDY.    5. Acute on chronic diastolic heart failure .  PLAN:  1. If renal function stable today, possible dc.   2. Urine random JUDY,  Serum JUDY.      Thank you for involving us in the care of Rachana Arguelles.  Please feel free to call with any questions.    Meghann Win MD  07/07/21  07:03 EDT    [facility]  [phone]

## 2021-07-07 NOTE — PLAN OF CARE
Goal Outcome Evaluation:  Plan of Care Reviewed With: patient, spouse        Progress: improving  Outcome Summary: VSS, no c/o pain, zofran given for c/o nausea, AAOx4, up to chair and BRP with assist x1, plan to dc home today

## 2021-07-07 NOTE — PLAN OF CARE
Goal Outcome Evaluation:  Plan of Care Reviewed With: patient        Progress: improving  Outcome Summary: Patient has been resting comfortably this shift. No complaints of pain or discomfort. Patient has been impulsive with transfers from bed to BSC this shift. Bladder scan at start of shift was 0ml. AM bladder scan this am, >400ml, patient states she does not want to get up yet and will go to bathroom in a little bit. Will follow up to ensure voids. No BM this shift. Patient states she is excited to go home home today. Call light in reach. Safety maintained. BP remains elevated but asymptomatic.

## 2021-07-07 NOTE — OUTREACH NOTE
Prep Survey      Responses   [facility] facility patient discharged from?  [facility] [place]   Is LACE score < 7 ?  No   Emergency Room discharge w/ pulse ox?  No   Eligibility  Psychiatric   Date of Admission  07/01/21   Date of Discharge  07/07/21   Discharge Disposition  Home or Self Care   Discharge diagnosis  acute kidney injury   Does the patient have one of the following disease processes/diagnoses(primary or secondary)?  Other   Does the patient have Home health ordered?  -- [Current with [facility]]   Is there a DME ordered?  No   Prep survey completed?  Yes          Bobbi Stephenson RN

## 2021-07-08 NOTE — CASE MANAGEMENT/SOCIAL WORK
Case Management Discharge Note      Final Note: home via spouse. Current with [facility]    Provided Post Acute Provider List?: N/A  N/A Provider List Comment: patient is current with [facility] and plans to resume services    Selected Continued Care - Discharged on 7/7/2021 Admission date: 7/1/2021 - Discharge disposition: Home or Self Care    Destination    No services have been selected for the patient.              Durable Medical Equipment    No services have been selected for the patient.              Dialysis/Infusion    No services have been selected for the patient.              Home Medical Care Coordination complete    Service Provider Selected Services Address Phone Fax Patient Preferred    [facility]  Home Health Services [address], [place] [id number] [phone] --          Therapy    No services have been selected for the patient.              Community Resources    No services have been selected for the patient.              Community & DME    No services have been selected for the patient.                Selected Continued Care - Prior Encounters Includes selections from prior encounters from 4/2/2021 to 7/7/2021    Discharged on 6/5/2021 Admission date: 5/25/2021 - Discharge disposition: Home-Health Care c    Home Medical Care     Service Provider Selected Services Address Phone Fax Patient Preferred    [facility]  Home Health Services [address], [place] [id number] [phone] --       Internal Comment last updated by Killian Austin RN 5/27/2021 1405    Pt states she is current with [facility]. Killian Austin RN                                 Transportation Services  Private: Car    Final Discharge Disposition Code: 06 - home with home health care

## 2021-07-08 NOTE — OUTREACH NOTE
Call Center TCM Note      Responses   [facility] patient discharged from?  [place]   Does the patient have one of the following disease processes/diagnoses(primary or secondary)?  Other   TCM attempt successful?  Yes   Discharge diagnosis  acute kidney injury   Medication alerts for this patient  BP Meds Changed   Meds reviewed with patient/caregiver?  Yes   Is the patient having any side effects they believe may be caused by any medication additions or changes?  No   Does the patient have all medications ordered at discharge?  Yes   Is the patient taking all medications as directed (includes completed medication regime)?  Yes   Does the patient have a primary care provider?   Yes   Does the patient have an appointment with their PCP within 7 days of discharge?  Yes   Comments regarding PCP  TCM FWP with PCP ofc is 07/12/2021, and pt has sched fwp with Nephrologist as well, she did not have that date in front of her.    Has the patient kept scheduled appointments due by today?  N/A   What is the Home health agency?   [facility] (was seeing [facility])   Has home health visited the patient within 72 hours of discharge?  N/A   Home health comments  Case Mgmt notes pt already established with lazara Alvarado is going to call to be sure they know pt back home   What DME was ordered?  home O2@2LNC through [facility]- has at home   Psychosocial issues?  No   Did the patient receive a copy of their discharge instructions?  Yes   Nursing interventions  Reviewed instructions with patient   What is the patient's perception of their health status since discharge?  Improving   Is the patient/caregiver able to teach back signs and symptoms related to disease process for when to call PCP?  Yes   Is the patient/caregiver able to teach back signs and symptoms related to disease process for when to call 911?  Yes   Is the patient/caregiver able to teach back the hierarchy of who to call/visit for symptoms/problems? PCP, Specialist,  Home health nurse, Urgent Care, ED, 911  Yes   If the patient is a current smoker, are they able to teach back resources for cessation?  Not a smoker   Wrap up additional comments  Pt sounds very weak/tired, which she confirms, but is feeling better. She is breathing better. All meds in place. Pt spouse and dtr watch her closely. No questions at this time. TCM FWP with PCP ofc is 07/12/2021, and pt has sched fwp with Nephrologist on 07/20/2021.          Ying Rebollar MA    7/8/2021, 11:45 EDT       5

## 2021-07-08 NOTE — TELEPHONE ENCOUNTER
PATIENT JUST RELEASED FROM HOSPITAL. HOME HEALTH REQUESTING ORDERS TO EVALUATE PATIENT FOR CONTINUED PHYSICAL THERAPY AND NURSING CARE.      PLEASE ADVISE: [phone] (LILY)

## 2021-07-14 NOTE — ED TRIAGE NOTES
Abd pain with N/V onset in June. Family states she has been here 2 times for same but they DC her to early. Patient drinking a [employer] Coke at triage. Advised nothing else to eat or drink while in ER      Mask placed on patient in triage. Triage staff wore appropriate PPE during interaction with patient.

## 2021-07-14 NOTE — PROGRESS NOTES
"Chief Complaint  Hospital Follow Up Visit    Subjective          Rachana Arguelles presents to [facility]  History of Present Illness  Patient is a pleasant 75-year-old female who typically sees Dr. Crowder.  Patient is new to me.  Patient is here with her daughter today and she is given me consent to speak about her care in front of her daughter.  Patient is here for follow-up of the hospital admission that was on July 1 of 2021 for acute kidney injury, acute on chronic diastolic CHF, acute gout, hypokalemia, anemia of chronic disease, leukocytosis, and stage IIIb chronic kidney disease. She was discharged on July 7 of 2021.  Patients daughter states she has to physically pick her up and take her to the bathroom. She is having a hard time breathing, nausea, vomiting, weakness, anxiety, chills and bradycardic, and hypotensive.     Objective   Vital Signs:   BP 94/50 (BP Location: Left arm, Patient Position: Sitting, Cuff Size: Adult)   Pulse 56   Temp 98.6 °F (37 °C)   Ht 170.2 cm (67\")   Wt 74.8 kg (164 lb 12.8 oz)   SpO2 99%   BMI 25.81 kg/m²     Physical Exam  Vitals and nursing note reviewed.   Constitutional:       Appearance: She is ill-appearing.   HENT:      Head: Normocephalic.      Nose: Nose normal.      Mouth/Throat:      Mouth: Mucous membranes are moist.   Eyes:      Pupils: Pupils are equal, round, and reactive to light.   Cardiovascular:      Rate and Rhythm: Bradycardia present.      Comments: There is no peripheral edema noted.  Pulmonary:      Effort: Pulmonary effort is normal. No respiratory distress.      Breath sounds: Normal breath sounds. No stridor. No wheezing, rhonchi or rales.      Comments: Patient states she is more short of breath with ambulation.  She has to take many rest breaks before she is able to go to her destination.  Chest:      Chest wall: No tenderness.   Musculoskeletal:      Cervical back: Normal range of motion.      Comments: Patient is " unable to walk and is currently in a wheelchair that is pushed by her daughter.   Skin:     General: Skin is warm.      Capillary Refill: Capillary refill takes less than 2 seconds.   Neurological:      Mental Status: She is alert and oriented to person, place, and time.   Psychiatric:      Comments: Patient seems anxious about another hospital admission.        Result Review :            Admission (Discharged) with Jerman Lobo MD (07/01/2021)  CBC & Differential (07/06/2021 5:23 AM)  Magnesium (07/06/2021 5:23 AM)  Renal Function Panel (07/06/2021 5:23 AM)  Creatinine, Urine, Random - Urine, Clean Catch (07/06/2021 5:25 PM)  Protein, Urine, Random - Urine, Clean Catch (07/06/2021 5:25 PM)  Renal Function Panel (07/07/2021 5:05 AM)  Magnesium (07/07/2021 5:05 AM)  Immunofixation, Serum (07/07/2021 9:27 AM)  JUDY & PE, Random Urine - Urine, Clean Catch (07/07/2021 9:54 AM)  Basic Metabolic Panel (07/07/2021 12:06 PM)       Assessment and Plan    There are no diagnoses linked to this encounter.     Patient was instructed to go to the emergency room due to her hypotensive and bradycardic status.  Patient is unable to ambulate alone.  Family member has to physically help her to the restroom.  Patient's daughter stated her mother has to stop in the kitchen to take a break before she is able to go to the restroom.  Patient is also not been eating or drinking well.  Follow Up   Return if symptoms worsen or fail to improve.  Patient was given instructions and counseling regarding her condition or for health maintenance advice. Please see specific information pulled into the AVS if appropriate.   Patient is to go the emergency room for further treatment management of chronic kidney disease.

## 2021-07-14 NOTE — PATIENT INSTRUCTIONS
Acute Kidney Injury, Adult    Acute kidney injury is a sudden worsening of kidney function. The kidneys are organs that have several jobs. They filter the blood to remove waste products and extra fluid. They also maintain a healthy balance of minerals and hormones in the body, which helps control blood pressure and keep bones strong. With this condition, your kidneys do not do their jobs as well as they should.  This condition ranges from mild to severe. Over time, it may develop into long-lasting (chronic) kidney disease. Early detection and treatment may prevent acute kidney injury from developing into a chronic condition.  What are the causes?  Common causes of this condition include:  · A problem with blood flow to the kidneys. This may be caused by:  ? Low blood pressure (hypotension) or shock.  ? Blood loss.  ? Heart and blood vessel (cardiovascular) disease.  ? Severe burns.  ? Liver disease.  · Direct damage to the kidneys. This may be caused by:  ? Certain medicines.  ? A kidney infection.  ? Poisoning.  ? Being around or in contact with toxic substances.  ? A surgical wound.  ? A hard, direct hit to the kidney area.  · A sudden blockage of urine flow. This may be caused by:  ? Cancer.  ? Kidney stones.  ? An enlarged prostate in males.  What increases the risk?  You are more likely to develop this condition if you:  · Are older than age 65.  · Are female.  · Are hospitalized, especially if you are in critical condition.  · Have certain conditions, such as:  ? Chronic kidney disease.  ? Diabetes.  ? Coronary artery disease and heart failure.  ? Pulmonary disease.  ? Chronic liver disease.  What are the signs or symptoms?  Symptoms of this condition may not be obvious until the condition becomes severe. Symptoms of this condition can include:  · Tiredness (lethargy) or difficulty staying awake.  · Nausea or vomiting.  · Swelling (edema) of the face, legs, ankles, or feet.  · Problems with urination, such  as:  ? Pain in the abdomen, or pain along the side of your stomach (flank).  ? Producing little or no urine.  ? Passing urine with a weak flow.  · Muscle twitches and cramps, especially in the legs.  · Confusion or trouble concentrating.  · Loss of appetite.  · Fever.  How is this diagnosed?  Your health care provider can diagnose this condition based on your symptoms, medical history, and a physical exam.   You may also have other tests, such as:  · Blood tests.  · Urine tests.  · Imaging tests.  · A test in which a sample of tissue is removed from the kidneys to be examined under a microscope (kidney biopsy).  How is this treated?  Treatment for this condition depends on the cause and how severe the condition is. In mild cases, treatment may not be needed. The kidneys may heal on their own. In more severe cases, treatment will involve:  · Treating the cause of the kidney injury. This may involve changing any medicines you are taking or adjusting your dosage.  · Fluids. You may need specialized IV fluids to balance your body's needs.  · Having a catheter placed to drain urine and prevent blockages.  · Preventing problems from occurring. This may mean avoiding certain medicines or procedures that can cause further injury to the kidneys.  In some cases, treatment may also require:  · A procedure to remove toxic wastes from the body (dialysis or continuous renal replacement therapy, CRRT).  · Surgery. This may be done to repair a torn kidney or to remove the blockage from the urinary system.  Follow these instructions at home:  Medicines  · Take over-the-counter and prescription medicines only as told by your health care provider.  · Do not take any new medicines without your health care provider's approval. Many medicines can worsen your kidney damage.  · Do not take any vitamin and mineral supplements without your health care provider's approval. Many nutritional supplements can worsen your kidney  damage.  Lifestyle    · If your health care provider prescribed changes to your diet, follow them. You may need to decrease the amount of protein you eat.  · Achieve and maintain a healthy weight. If you need help with this, ask your health care provider.  · Start or continue an exercise plan. Try to exercise at least 30 minutes a day, 5 days a week.  · Do not use any products that contain nicotine or tobacco, such as cigarettes, e-cigarettes, and chewing tobacco. If you need help quitting, ask your health care provider.  General instructions    · Keep track of your blood pressure. Report changes in your blood pressure as told by your health care provider.  · Stay up to date with your vaccines. Ask your health care provider which vaccines you need.  · Keep all follow-up visits as told by your health care provider. This is important.  Where to find more information  · American Association of Kidney Patients: www.aakp.org  · National Kidney Foundation: www.kidney.org  · American Kidney Fund: www.akfinc.org  · Life Options Rehabilitation Program:  ? www.lifeoptions.org  ? www.kidneyschool.org  Contact a health care provider if:  · Your symptoms get worse.  · You develop new symptoms.  Get help right away if:  · You develop symptoms of worsening kidney disease, which include:  ? Headaches.  ? Abnormally dark or light skin.  ? Easy bruising.  ? Frequent hiccups.  ? Chest pain.  ? Shortness of breath.  ? End of menstruation in women.  ? Seizures.  ? Confusion or altered mental status.  ? Abdominal or back pain.  ? Itchiness.  · You have a fever.  · Your body is producing less urine.  · You have pain or bleeding when you urinate.  Summary  · Acute kidney injury is a sudden worsening of kidney function.  · Acute kidney injury can be caused by problems with blood flow to the kidneys, direct damage to the kidneys, and sudden blockage of urine flow.  · Symptoms of this condition may not be obvious until it becomes severe.  Symptoms may include edema, lethargy, confusion, nausea or vomiting, and problems passing urine.  · This condition can be diagnosed with blood tests, urine tests, and imaging tests. Sometimes a kidney biopsy is done to diagnose this condition.  · Treatment for this condition often involves treating the underlying cause. It is treated with fluids, medicines, diet changes, dialysis, or surgery.  This information is not intended to replace advice given to you by your health care provider. Make sure you discuss any questions you have with your health care provider.  Document Revised: 10/27/2020 Document Reviewed: 10/27/2020  Elsevier Patient Education © 2021 [employer] Inc[employer]

## 2021-07-14 NOTE — ED NOTES
Spoke with patients daughter per request of the  and she states that the patient needs to be admitted because her back hurts and she has to have help to get up and go to the bathroom.  She stated that the patient is very sick and we need to take care of her and admit her, this nurse told the daughter that it was the decision of the ED MD and any consulting MD regarding any admission.     Berenice Avendano, CHASE  07/14/21 5216

## 2021-07-15 NOTE — ED PROVIDER NOTES
EMERGENCY DEPARTMENT ENCOUNTER    Room Number:  32/32  Date of encounter:  7/15/2021  PCP: Rhys Crowder Jr., MD  Historian: patient      HPI:  Chief Complaint: abd pain   A complete HPI/ROS/PMH/PSH/SH/FH are unobtainable due to: none    Context: Rachana Arguelles is a 75 y.o. female who presents to the ED c/o abdominal pain for 1 week. It is constant and generalized. Some radiation into back. Nothing makes this better or worse. She endorses nausea and 1 episode of emesis yesterday.     She went to go see a new PCP today and was told to come to the ED due to bradycardia (HR in 50's).       PAST MEDICAL HISTORY  Active Ambulatory Problems     Diagnosis Date Noted   • Anxiety 03/12/2016   • Chronic pain syndrome 03/12/2016   • Depression 03/12/2016   • Gastroesophageal reflux disease 03/12/2016   • Hyperlipidemia 03/12/2016   • Hypertension 03/12/2016   • Osteoarthritis of hip 03/12/2016   • Chronic low back pain 03/12/2016   • Failed back syndrome of lumbar spine 03/12/2016   • Pulmonary embolus (CMS/HCC) 03/12/2016   • Weight loss 04/11/2016   • Polypharmacy 04/22/2016   • Stage 3b chronic kidney disease (CMS/HCC)    • Chronic constipation 05/19/2016   • Sacroiliac joint pain 05/08/2014   • Mixed incontinence 02/01/2016   • Renal cyst 08/31/2016   • Achilles tendonitis 08/31/2016   • Atherosclerosis of native artery of left lower extremity with intermittent claudication (CMS/HCC) 08/10/2018   • Morbidly obese (CMS/HCC) 12/05/2018   • Lung nodule 12/05/2018   • Malignant neoplasm of right upper lobe of lung (CMS/HCC) 12/26/2018   • Lung cancer (CMS/HCC) 01/02/2019   • Moderate single current episode of major depressive disorder (CMS/HCC) 05/30/2019   • Palpitations 07/31/2019   • Encounter for screening for malignant neoplasm of colon 12/04/2019   • Hematoma of scalp 12/20/2019   • Acute neck pain 12/20/2019   • Dizziness 12/20/2019   • Unstable cervical spine 12/20/2019   • Postconcussive syndrome 02/10/2020   •  Positive colorectal cancer screening using Cologuard test 02/12/2020   • Dysphagia 02/12/2020   • S/P reverse total shoulder arthroplasty, right 06/18/2020   • Chronic post-traumatic headache, not intractable 05/21/2020   • Medication overuse headache 05/21/2020   • Migraine without aura and without status migrainosus, not intractable 05/21/2020   • Shortness of breath 08/14/2020   • PAD (peripheral artery disease) (CMS/[facility]) 08/14/2020   • Bilateral carotid artery stenosis 08/14/2020   • S/P lobectomy of lung 08/14/2020   • YVONNE (acute kidney injury) (CMS/[facility]) 08/14/2020   • Anemia 08/14/2020   • Frequent PVCs 08/14/2020   • Pneumonia of both lower lobes due to infectious organism 08/23/2020   • History of lung cancer 08/23/2020   • Hypertensive urgency 08/23/2020   • Bradycardia, sinus 04/29/2021   • YVONNE (acute kidney injury) (CMS/[facility]) 05/26/2021   • Opioid dependence (CMS/HCC) 05/26/2021   • COPD (chronic obstructive pulmonary disease) (CMS/[facility]) 06/02/2021   • Renal artery stenosis (CMS/[facility]) 06/21/2021   • Acute kidney injury (CMS/[facility]) 07/01/2021   • Acute on chronic diastolic CHF (congestive heart failure) (CMS/[facility]) 07/03/2021   • Acute gout due to renal impairment involving foot 07/03/2021   • Hypokalemia 07/03/2021   • Anemia of chronic disease 07/03/2021   • Leukocytosis 07/03/2021     Resolved Ambulatory Problems     Diagnosis Date Noted   • Acute-on-chronic renal failure (CMS/[facility]) 03/12/2016   • Lumbar radiculopathy 03/12/2016   • Claustrophobia 03/12/2016   • Impaired glucose tolerance 03/12/2016   • Sciatica 03/12/2016   • Inflammation of submandibular gland 03/12/2016   • Increased frequency of urination 03/12/2016   • Oral phase dysphagia 03/23/2016   • Medicare annual wellness visit, subsequent 03/23/2016   • Choreiform movements 03/25/2016   • Gastritis 04/11/2016   • Shortness of breath 04/11/2016   • Hypercalcemia 04/11/2016   • Chest pain radiating to jaw 04/22/2016   • Orthostatic hypotension  04/22/2016   • Nocturia 02/01/2016   • Elevated troponin I level 01/20/2013   • CAP (community acquired pneumonia) 12/15/2016   • Bradycardia 05/25/2021   • UTI (urinary tract infection) due to urinary indwelling catheter (CMS/[facility]) 05/25/2021     Past Medical History:   Diagnosis Date   • AAA (abdominal aortic aneurysm) without rupture (CMS/[facility])    • Arthritis    • Cancer (CMS/[facility])    • Chest pain    • CKD (chronic kidney disease), stage III (CMS/[facility])    • Dyspnea    • GERD (gastroesophageal reflux disease)    • GI problem    • H/O concussion    • Head trauma    • Hiatal hernia    • History of migraine headaches    • Lumbar postlaminectomy syndrome 10/26/2015   • Migraines    • Nausea    • Pulmonary embolism (CMS/[facility]) 10/26/2015   • Slow to wake up after anesthesia    • Submandibular sialoadenitis 10/26/2015   • Visual changes    • Vitamin B 12 deficiency          PAST SURGICAL HISTORY  Past Surgical History:   Procedure Laterality Date   • ANGIOPLASTY ILIAC ARTERY Bilateral 8/10/2018    Procedure: BILATERAL ILIAC STENTS;  Surgeon: Riki Mancera MD;  Location: [facility] MAIN OR;  Service: Vascular   • APPENDECTOMY     • ARTERIOGRAM N/A 6/4/2021    Procedure: Renal Arteriogram with possible intervention;  Surgeon: Mat Coello MD;  Location: [facility] CATH INVASIVE LOCATION;  Service: Cardiology;  Laterality: N/A;   • BREAST SURGERY      Breast Surgery Reduction Procedure   • BRONCHOSCOPY N/A 2/8/2019    Procedure: BRONCHOSCOPY;  Surgeon: Álvaro Gifford MD;  Location: [facility] ENDOSCOPY;  Service: Pulmonary   • CARDIAC CATHETERIZATION N/A 6/4/2021    Procedure: Stent BMS peripheral- RENAL-RIGHT;  Surgeon: Mat Coello MD;  Location: [facility] CATH INVASIVE LOCATION;  Service: Cardiology;  Laterality: N/A;   • CHOLECYSTECTOMY     • COLONOSCOPY     • ENDOSCOPY N/A 11/3/2020    Procedure: ESOPHAGOGASTRODUODENOSCOPY with 54 [place]ish vernon dilation;  Surgeon: Yunior Vo MD;  Location: [facility]  ENDOSCOPY;  Service: Gastroenterology;  Laterality: N/A;  pre- dysphagia  post- esophageal ring, hiatal hernia   • HYSTERECTOMY     • INTERVENTIONAL RADIOLOGY PROCEDURE N/A 6/4/2021    Procedure: Abdominal Aortogram;  Surgeon: Mat Coello MD;  Location: [facility] INVASIVE LOCATION;  Service: Cardiology;  Laterality: N/A;   • INTUBATION  1/15/2019        • JOINT REPLACEMENT      left knee, hip, shoulder   • LASIK     • LUMBAR FUSION      Lumbar Vertebral Fusion   • RENAL ARTERY STENT Right    • SHOULDER ARTHROSCOPY  04/04/2013    Dr. Carrillo/[facility]   • THORACOSCOPY VIDEO ASSISTED WITH LOBECTOMY Right 1/11/2019    Procedure: BRONCOSCOPY, THORACOSCOPY VIDEO ASSISTED WITH RIGHT UPPER LOBE WEDGE RESECTION, COMPLETION RIGHT UPPER LOBECTOMY, LYMPH NODE DISSECTION, INTERCOSTAL NERVE BLOCK;  Surgeon: Quita Figueroa MD;  Location: [facility] OR;  Service: Thoracic   • TONSILLECTOMY     • TOTAL HIP ARTHROPLASTY REVISION Left    • TOTAL KNEE ARTHROPLASTY Left    • TOTAL SHOULDER ARTHROPLASTY Left    • TOTAL SHOULDER ARTHROPLASTY W/ DISTAL CLAVICLE EXCISION Right 6/18/2020    Procedure: RIGHT TOTAL SHOULDER REVERSE ARTHROPLASTY WITH GPS;  Surgeon: Lino Carrillo MD;  Location: [facility] OR [facility];  Service: Orthopedics;  Laterality: Right;         FAMILY HISTORY  Family History   Problem Relation Age of Onset   • Hypertension Mother    • Lung cancer Mother    • Cancer Mother         lung   • Kidney disease Father    • Other Brother         Coronary Artery Bypass Grafting   • Stroke Brother         Cerebrovascular Accident   • Malig Hyperthermia Neg Hx          SOCIAL HISTORY  Social History     Socioeconomic History   • Marital status:      Spouse name: Not on file   • Number of children: Not on file   • Years of education: Not on file   • Highest education level: Not on file   Tobacco Use   • Smoking status: Former Smoker     Packs/day: 2.50     Years: 15.00     Pack years: 37.50     Types: Cigarettes      Quit date: 2003     Years since quittin.8   • Smokeless tobacco: Never Used   • Tobacco comment: CAFFEINE USE: 1-2 GLASSES TEA DAILY   Vaping Use   • Vaping Use: Never used   Substance and Sexual Activity   • Alcohol use: No     Comment: occ   • Drug use: Never   • Sexual activity: Defer         ALLERGIES  Minoxidil, Neurontin [gabapentin], and Morphine        REVIEW OF SYSTEMS  Review of Systems     All systems reviewed and negative except for those discussed in HPI.       PHYSICAL EXAM    I have reviewed the triage vital signs and nursing notes.    ED Triage Vitals   Temp Heart Rate Resp BP SpO2   21 1530 21 1530 21 1530 21 1811 21 1530   98 °F (36.7 °C) 57 15 145/65 97 %      Temp src Heart Rate Source Patient Position BP Location FiO2 (%)   21 1530 -- -- -- --   Temporal           Physical Exam  GENERAL: not distressed  HENT: nares patent  EYES: no scleral icterus  CV: regular rhythm, regular rate  RESPIRATORY: normal effort CTAB  ABDOMEN: soft minimal generalized abd tenderness  MUSCULOSKELETAL: no deformity  NEURO: alert, moves all extremities, follows commands  SKIN: warm, dry        LAB RESULTS  Recent Results (from the past 24 hour(s))   Comprehensive Metabolic Panel    Collection Time: 21  4:57 PM    Specimen: Blood   Result Value Ref Range    Glucose 133 (H) 65 - 99 mg/dL    BUN 33 (H) 8 - 23 mg/dL    Creatinine 3.37 (H) 0.57 - 1.00 mg/dL    Sodium 136 136 - 145 mmol/L    Potassium 3.8 3.5 - 5.2 mmol/L    Chloride 101 98 - 107 mmol/L    CO2 24.5 22.0 - 29.0 mmol/L    Calcium 8.1 (L) 8.6 - 10.5 mg/dL    Total Protein 5.7 (L) 6.0 - 8.5 g/dL    Albumin 2.80 (L) 3.50 - 5.20 g/dL    ALT (SGPT) 15 1 - 33 U/L    AST (SGOT) 22 1 - 32 U/L    Alkaline Phosphatase 90 39 - 117 U/L    Total Bilirubin 0.3 0.0 - 1.2 mg/dL    eGFR Non African Amer 13 (L) >60 mL/min/1.73    eGFR  African Amer      Globulin 2.9 gm/dL    A/G Ratio 1.0 g/dL    BUN/Creatinine Ratio 9.8  7.0 - 25.0    Anion Gap 10.5 5.0 - 15.0 mmol/L   Lipase    Collection Time: 07/14/21  4:57 PM    Specimen: Blood   Result Value Ref Range    Lipase 43 13 - 60 U/L   Green Top (Gel)    Collection Time: 07/14/21  4:57 PM   Result Value Ref Range    Extra Tube Hold for add-ons.    Lavender Top    Collection Time: 07/14/21  4:57 PM   Result Value Ref Range    Extra Tube hold for add-on    Gold Top - SST    Collection Time: 07/14/21  4:57 PM   Result Value Ref Range    Extra Tube Hold for add-ons.    CBC Auto Differential    Collection Time: 07/14/21  4:57 PM    Specimen: Blood   Result Value Ref Range    WBC 10.20 3.40 - 10.80 10*3/mm3    RBC 3.20 (L) 3.77 - 5.28 10*6/mm3    Hemoglobin 9.9 (L) 12.0 - 15.9 g/dL    Hematocrit 31.1 (L) 34.0 - 46.6 %    MCV 97.2 (H) 79.0 - 97.0 fL    MCH 30.9 26.6 - 33.0 pg    MCHC 31.8 31.5 - 35.7 g/dL    RDW 13.1 12.3 - 15.4 %    RDW-SD 46.5 37.0 - 54.0 fl    MPV 9.4 6.0 - 12.0 fL    Platelets 329 140 - 450 10*3/mm3    Neutrophil % 78.1 (H) 42.7 - 76.0 %    Lymphocyte % 12.3 (L) 19.6 - 45.3 %    Monocyte % 6.5 5.0 - 12.0 %    Eosinophil % 0.8 0.3 - 6.2 %    Basophil % 0.6 0.0 - 1.5 %    Immature Grans % 1.7 (H) 0.0 - 0.5 %    Neutrophils, Absolute 7.98 (H) 1.70 - 7.00 10*3/mm3    Lymphocytes, Absolute 1.25 0.70 - 3.10 10*3/mm3    Monocytes, Absolute 0.66 0.10 - 0.90 10*3/mm3    Eosinophils, Absolute 0.08 0.00 - 0.40 10*3/mm3    Basophils, Absolute 0.06 0.00 - 0.20 10*3/mm3    Immature Grans, Absolute 0.17 (H) 0.00 - 0.05 10*3/mm3    nRBC 0.0 0.0 - 0.2 /100 WBC   Urinalysis With Microscopic If Indicated (No Culture) - Urine, Clean Catch    Collection Time: 07/14/21  6:03 PM    Specimen: Urine, Clean Catch   Result Value Ref Range    Color, UA Dark Yellow (A) Yellow, Straw    Appearance, UA Clear Clear    pH, UA 6.5 5.0 - 8.0    Specific Gravity, UA 1.020 1.005 - 1.030    Glucose, UA Negative Negative    Ketones, UA Negative Negative    Bilirubin, UA Negative Negative    Blood, UA Negative  Negative    Protein, UA >=300 mg/dL (3+) (A) Negative    Leuk Esterase, UA Small (1+) (A) Negative    Nitrite, UA Negative Negative    Urobilinogen, UA 0.2 E.U./dL 0.2 - 1.0 E.U./dL   Urinalysis, Microscopic Only - Urine, Clean Catch    Collection Time: 07/14/21  6:03 PM    Specimen: Urine, Clean Catch   Result Value Ref Range    RBC, UA 0-2 None Seen, 0-2 /HPF    WBC, UA 21-30 (A) None Seen, 0-2 /HPF    Bacteria, UA 3+ (A) None Seen /HPF    Squamous Epithelial Cells, UA 0-2 None Seen, 0-2 /HPF    Hyaline Casts, UA 0-2 None Seen /LPF    Methodology Manual Light Microscopy        Ordered the above labs and independently reviewed the results.        RADIOLOGY  CT Abdomen Pelvis Without Contrast    Result Date: 7/14/2021  ABDOMEN AND PELVIS CT WITHOUT CONTRAST  HISTORY: Generalized abdominal pain. Prior lung cancer.  TECHNIQUE: Noncontrast abdomen and pelvis CT is provided and correlated with images from CT-guided liver biopsy June 24, 2019 and PET/CT scan from 11/27/2018. Chest CT January 28, 2021 was also reviewed.  Radiation dose reduction techniques were utilized, including automated exposure control and exposure modulation based on body size.  FINDINGS: Perihilar infiltrative change in the right lung is partially demonstrated and appears to be new in comparison with the chest CT from earlier this year. There are some patchy areas of groundglass density in the left lower lobe which are new as well.  Parenchyma the liver, spleen, adrenals, and pancreas appears normal. Left kidney is atrophic. Two high attenuation right renal lesions are again observed and a low-density lesion at the midpole of the left kidney is also again observed. There is no hydronephrosis or hydroureter. Dense atheromatous calcification is observed along the normal caliber abdominal aorta.  There is extensive colonic diverticulosis without evidence of diverticulitis or bowel obstruction. The small bowel appears normal. No abdominal or pelvic  lymphadenopathy is present.  Lumbosacral spinal fusion hardware is noted along with degenerative change at other portions of the lumbar spine. No bone lesion is identified. There is left hip arthroplasty hardware.      No acute abnormality identified in the abdomen or the pelvis. There is some right perihilar pulmonary infiltrate and patchy groundglass density in left lower lobe which appear new in comparison to the chest CT from January 2021. Most likely this represents pneumonia but the patient does have a history of lung cancer. Follow-up chest CT suggested.  This report was finalized on 7/14/2021 8:55 PM by Dr. Karthikeyan Maloney M.D.      XR Chest 1 View    Result Date: 7/14/2021  SINGLE VIEW OF THE CHEST  HISTORY: Bibasilar infiltrates on CT  COMPARISON: 07/14/2021  FINDINGS: Cardiomegaly is present. There is no vascular congestion. There is calcification of the aorta. Lung volumes are diminished. Patchy infiltrates are present at the lung bases, better seen on the earlier CT. No pneumothorax or pleural effusion is seen. Bilateral shoulder arthroplasties are present.      Patchy bibasilar infiltrates, better seen on the earlier CT.  This report was finalized on 7/14/2021 9:47 PM by Dr. Frannie Jackson M.D.        I ordered the above noted radiological studies. Reviewed by me and discussed with radiologist.  See dictation for official radiology interpretation.      PROCEDURES    Procedures      MEDICATIONS GIVEN IN ER    Medications   HYDROmorphone (DILAUDID) injection 1 mg (1 mg Intravenous Given 7/14/21 1836)   ondansetron (ZOFRAN) injection 4 mg (4 mg Intravenous Given 7/14/21 1836)   cefdinir (OMNICEF) capsule 300 mg (300 mg Oral Given 7/14/21 2133)         PROGRESS, DATA ANALYSIS, CONSULTS, AND MEDICAL DECISION MAKING    All labs have been independently reviewed by me.  All radiology studies have been reviewed by me and discussed with radiologist dictating the report.   EKG's independently viewed and  interpreted by me.  Discussion below represents my analysis of pertinent findings related to patient's condition, differential diagnosis, treatment plan and final disposition.    Differential diagnosis includes but is not limited to:  - hepatobiliary pathology such as cholecystitis, cholangitis, and symptomatic cholelithiasis  - Pancreatitis  - Dyspepsia  - Small bowel obstruction  - Appendicitis  - Diverticulitis  - UTI including pyelonephritis  - Ureteral stone  - Zoster  - Colitis, including infectious and ischemic        ED Course as of Jul 15 0001   Wed Jul 14, 2021   1820 Hemoglobin(!): 9.9 [TD]   1820 WBC: 10.20 [TD]   1820 Creatinine(!): 3.37 [TD]   2022 WBC, UA(!): 21-30 [TD]   2022 Bacteria, UA(!): 3+ [TD]   2113 I confirm with the patient that she does not have any fever, cough, or shortness of breath.  I am going to obtain a chest x-ray.  She will be treated empirically for pneumonia.  Clinically reasonable given the abnormal findings on CT scan.  However, I do believe that she is safe for discharge home which is her strong preference.    [TD]      ED Course User Index  [TD] Calderon Pierre II, MD       On medical chart review, she saw Kenya Oneill as new APRN PCP visit today. She was sent to ED due to concern for hypotension and bradycardia.    Patient is not hypotensive here. HR is mid to upper 50's.     Family at bedside is very comfortable with her going home.        CT scan shows no intra-abdominal process. However, there is suspected pneumonia.     Patient is very persistent in her preference to go home. I will give her cefdinir. Discharge home with good return precautions.     PPE: Both the patient and I wore a surgical mask throughout the entire patient encounter. I wore protective goggles.     AS OF 00:01 EDT VITALS:    BP - 134/65  HR - 71  TEMP - 98 °F (36.7 °C) (Temporal)  O2 SATS - 96%        DIAGNOSIS  Final diagnoses:   Community acquired pneumonia, unspecified laterality   Chronic  kidney disease, unspecified CKD stage   Generalized abdominal pain         DISPOSITION  DISCHARGE    FOLLOW-UP  Rhys Crowder Jr., MD  [address] [address]  [phone]    Schedule an appointment as soon as possible for a visit in 2 days           Medication List      New Prescriptions    cefdinir 300 MG capsule  Commonly known as: OMNICEF  Take 1 capsule by mouth Daily for 7 days.        Changed    sertraline 100 MG tablet  Commonly known as: Zoloft  Take 1 tablet by mouth Daily.  What changed: how much to take     venlafaxine  MG 24 hr capsule  Commonly known as: EFFEXOR-XR  TAKE 1 CAPSULE BY MOUTH DAILY. SWALLOW WHOLE DO NOT CRUSH OR CHEW.  What changed: See the new instructions.           Where to Get Your Medications      These medications were sent to [facility]/pharmacy #[id number] - [place], [place] - [address] - [phone]  - [phone] FX  [address] [address]    Phone: [phone]   · cefdinir 300 MG capsule                  Calderon Pierre II, MD  07/15/21 0005

## 2021-07-16 NOTE — TELEPHONE ENCOUNTER
Pt daughter advised to take Pt to ED. Pt is very weak and confused and can't lift her arms.     Pt daughter would really like to talk to Dr. Crowder.     Thanks!

## 2021-07-16 NOTE — TELEPHONE ENCOUNTER
PATIENT STATES:HOME HEALTH  SAID THAT PATIENT IS REFUSING PHYSICAL THERAPY      PATIENT CAN BE REACHED ON: [phone]

## 2021-07-16 NOTE — TELEPHONE ENCOUNTER
Caller: Katia Stephenson    Relationship: Emergency Contact    Best call back number:  [phone]     What is the best time to reach you:  ANY     Who are you requesting to speak with (clinical staff, provider,  specific staff member): DR. MADSEN     Do you know the name of the person who called: N/A     What was the call regarding: KATIA WOULD LIKE DR. MADSEN TO REVIEW ALL TEST RESULTS  AND BELIEVES PATIENT IS AT END OF LIFE AND WANTS TO KNOW WHAT ELSE NEEDS TO BE DONE. THEY ARE AT THE POINT THEY DON'T KNOW WHAT TO DO .       Do you require a callback: YES

## 2021-07-16 NOTE — OUTREACH NOTE
Medical Week 2 Survey      Responses   [facility] patient discharged from?  [place]   Does the patient have one of the following disease processes/diagnoses(primary or secondary)?  Other   Week 2 attempt successful?  No   Unsuccessful attempts  Attempt 1          Kenyetta Warner RN

## 2021-07-17 NOTE — TELEPHONE ENCOUNTER
I talked to katia and reviewed labs.  She has end stage kidney disease with a lot of problems on the labs.  She may refuse dialysis.  I reviewed medications and she needs to stop venlafaxine which I told Katia.  Otherwise if hospice is needed they will let us know.

## 2021-07-20 NOTE — OUTREACH NOTE
Medical Week 2 Survey      Responses   [facility] patient discharged from?  [place]   Does the patient have one of the following disease processes/diagnoses(primary or secondary)?  Other   Week 2 attempt successful?  No   Rescheduled  Revoked   Revoke  Readmitted [Readmitted to [facility]]          Tere Manuel RN

## 2021-07-27 NOTE — PROGRESS NOTES
"REFERRING PROVIDER:    Rhys Crowder Jr., MD  [address] [address]    REASON FOR CONSULTATION:    IgG lambda monoclonal gammopathy    HISTORY OF PRESENT ILLNESS:  Rachana Arguelles is a 75 y.o. female who is referred today for further evaluation of IgG lambda monoclonal gammopathy.    She has a history of chronic kidney disease which has recently progressed to stage IV.  She is being followed by Dr. Dudley with nephrology.  She did have a right renal artery stent placed by Dr. Coello on 6/4/2021.    Labs on 6/25/2021 did include an immunofixation which showed an IgG lambda monoclonal protein.  However, the IgG was low at 560, IgA 138, IgM slightly elevated at 285.    She did have a serum immunofixation performed on 7/7/2021 which stated that \"presence of monoclonal protein is unclear at this time.\"  The IgG was low at 465.  The IgM was slightly elevated at 290.  Random urine protein electrophoresis with immunofixation showed also \"presence of monoclonal protein is unclear at this time\" but no M spike was observed on urine protein electrophoresis.  She was referred for further evaluation.    Labs on 7/14/2021 showed a hemoglobin of 9.9, creatinine 3.37, calcium 8.1, total protein 5.7 with an albumin of 2.8.    She complains of nausea ongoing for about a year and a half.  This started after a fall that resulted in a head injury.  She also reports profound fatigue.  She has to sit down and take a break even with ambulating short distances.  She has chronic back pain.  She has chronic bruising on antiplatelet agents.  No bleeding.    Past Medical History:   Diagnosis Date   • AAA (abdominal aortic aneurysm) without rupture (CMS/HCC)    • Anemia    • Anxiety    • Arthritis    • Bilateral carotid artery stenosis 8/14/2020   • Cancer (CMS/HCC)     skin cancer   • Chest pain     OCCAS   • Chronic low back pain    • Chronic pain syndrome 3/12/2016   • CKD (chronic kidney disease), stage III (CMS/HCC)  "   • Claustrophobia 10/26/2015    Resolved   • COPD (chronic obstructive pulmonary disease) (CMS/[facility])    • Depression    • Dizziness    • Dyspnea    • GERD (gastroesophageal reflux disease)    • GI problem    • H/O concussion    • Head trauma     FELL CUT HEAD 12 STAPLES   • Hiatal hernia    • History of migraine headaches    • Hyperlipidemia    • Hypertension    • Lumbar postlaminectomy syndrome 10/26/2015    Resolved   • Lung cancer (CMS/[facility])    • Lung nodule    • Migraines    • Nausea    • Palpitations    • Polypharmacy 4/22/2016   • Pulmonary embolism (CMS/HCC) 10/26/2015    January 2013 - Resolved, felt to be secondary to estrogen use   • Slow to wake up after anesthesia    • Submandibular sialoadenitis 10/26/2015    Resolved   • Visual changes    • Vitamin B 12 deficiency        Past Surgical History:   Procedure Laterality Date   • ANGIOPLASTY ILIAC ARTERY Bilateral 8/10/2018    Procedure: BILATERAL ILIAC STENTS;  Surgeon: Riki Mancera MD;  Location: [facility] MAIN OR;  Service: Vascular   • APPENDECTOMY     • ARTERIOGRAM N/A 6/4/2021    Procedure: Renal Arteriogram with possible intervention;  Surgeon: Mat Coello MD;  Location: [facility] CATH INVASIVE LOCATION;  Service: Cardiology;  Laterality: N/A;   • BREAST SURGERY      Breast Surgery Reduction Procedure   • BRONCHOSCOPY N/A 2/8/2019    Procedure: BRONCHOSCOPY;  Surgeon: Álvaro Gifford MD;  Location: [facility] ENDOSCOPY;  Service: Pulmonary   • CARDIAC CATHETERIZATION N/A 6/4/2021    Procedure: Stent BMS peripheral- RENAL-RIGHT;  Surgeon: Mat Coello MD;  Location: [facility] CATH INVASIVE LOCATION;  Service: Cardiology;  Laterality: N/A;   • CATARACT EXTRACTION Bilateral    • CHOLECYSTECTOMY     • COLONOSCOPY  2006    Dr. Shaa   • COLONOSCOPY  2012    Dr. Saha   • ENDOSCOPY N/A 11/3/2020    Procedure: ESOPHAGOGASTRODUODENOSCOPY with [address] vernon dilation;  Surgeon: Yunior Vo MD;  Location: [facility] ENDOSCOPY;   Service: Gastroenterology;  Laterality: N/A;  pre- dysphagia  post- esophageal ring, hiatal hernia   • HYSTERECTOMY     • INTERVENTIONAL RADIOLOGY PROCEDURE N/A 6/4/2021    Procedure: Abdominal Aortogram;  Surgeon: Mat Coello MD;  Location: [facility] INVASIVE LOCATION;  Service: Cardiology;  Laterality: N/A;   • INTUBATION  1/15/2019        • JOINT REPLACEMENT      left knee, hip, shoulder   • LASIK     • LUMBAR FUSION      Lumbar Vertebral Fusion   • RENAL ARTERY STENT Right    • SHOULDER ARTHROSCOPY  04/04/2013    Dr. Carrillo/[facility]   • THORACOSCOPY VIDEO ASSISTED WITH LOBECTOMY Right 1/11/2019    Procedure: BRONCOSCOPY, THORACOSCOPY VIDEO ASSISTED WITH RIGHT UPPER LOBE WEDGE RESECTION, COMPLETION RIGHT UPPER LOBECTOMY, LYMPH NODE DISSECTION, INTERCOSTAL NERVE BLOCK;  Surgeon: Quita Figueroa MD;  Location: [facility] OR;  Service: Thoracic   • TONSILLECTOMY     • TOTAL HIP ARTHROPLASTY REVISION Left    • TOTAL KNEE ARTHROPLASTY Left    • TOTAL SHOULDER ARTHROPLASTY Left    • TOTAL SHOULDER ARTHROPLASTY W/ DISTAL CLAVICLE EXCISION Right 6/18/2020    Procedure: RIGHT TOTAL SHOULDER REVERSE ARTHROPLASTY WITH GPS;  Surgeon: Lino Carrillo MD;  Location: [facility] OR [facility];  Service: Orthopedics;  Laterality: Right;       SOCIAL HISTORY:   reports that she quit smoking about 17 years ago. Her smoking use included cigarettes. She has a 37.50 pack-year smoking history. She has never used smokeless tobacco. She reports that she does not drink alcohol and does not use drugs.    FAMILY HISTORY:  family history includes Cancer in her mother; Hypertension in her mother; Kidney disease in her father; Lung cancer in her mother; Other in her brother; Stroke in her brother.    ALLERGIES:  Allergies   Allergen Reactions   • Minoxidil Unknown - High Severity     Pericardial effusion .   • Neurontin [Gabapentin] Confusion   • Morphine Unknown - Low Severity     HEADACHE       MEDICATIONS:  The medication list  "has been reviewed with the patient by the medical assistant, and the list has been updated in the electronic medical record, which I reviewed.  Medication dosages and frequencies were confirmed to be accurate.    Review of Systems   Constitutional: Positive for fatigue.   Respiratory: Positive for shortness of breath.    Cardiovascular: Positive for palpitations.   Gastrointestinal: Positive for nausea.   Neurological: Positive for weakness.   All other systems reviewed and are negative.    Physical exam:  Vitals:    07/28/21 1355   BP: 110/57   Pulse: 65   Resp: 16   Temp: 98 °F (36.7 °C)   TempSrc: Temporal   SpO2: 95%   Weight: 76.6 kg (168 lb 14.4 oz)   Height: 170.2 cm (67.01\")   PainSc: 0-No pain  Comment: lung cancer     General frail-appearing elderly  female sitting in a wheelchair awake alert and oriented in no acute distress.  There is no palpable cervical supraclavicular or axillary adenopathy.  Lungs are clear to auscultation bilaterally.  Heart is regular without murmurs gallops or rubs.  Abdomen is soft.  Extremities are warm well perfused without visible edema.    DIAGNOSTIC DATA:  CBC & Differential (07/28/2021 14:04)      IMAGING:    None reviewed    ASSESSMENT:  This is a 75 y.o. female with:    *IgG lambda monoclonal gammopathy  · Labs on 6/25/2021 did include an immunofixation which showed an IgG lambda monoclonal protein.  However, the IgG was low at 560, IgA 138, IgM slightly elevated at 285.  · She did have a serum immunofixation performed on 7/7/2021 which stated that \"presence of monoclonal protein is unclear at this time.\"  The IgG was low at 465.  The IgM was slightly elevated at 290.    · Random urine protein electrophoresis with immunofixation showed also \"presence of monoclonal protein is unclear at this time\" but no M spike was observed on urine protein electrophoresis.  She was referred for further evaluation.  • Therefore, if there is a monoclonal protein present it is very " tiny at this time and it is extremely unlikely that this has been causing any problems for her.    *Stage IV chronic kidney disease  • Managed by Dr. Dudley with nephrology  • Unclear plans for dialysis  • Left renal atrophy  • Status post right renal artery stent 6/4/2021    *Normocytic anemia  • Hemoglobin today is 8.9 with an MCV of 91.8  • Labs on 7/21/2021 with an iron of 58, ferritin 174  • It is likely that anemia is secondary to her chronic kidney disease   • She has been receiving Procrit every few weeks as prescribed by Dr. Dudley.  She last received 20,000 units on 7/1/2021 with another injection planned on 8/2.    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 Figueroa  · Follow-up CT imaging scheduled on 8/17 and follow-up with thoracic surgery scheduled thereafter    *Chronic nausea  · History of head injury which is contributing  · Unclear how much uremia may be contributing    *Poor performance status  · ECOG performance status 3-4    PLAN:   1. Laboratory evaluation as noted below  2. She states that she does not like going to the hospital for her Procrit injections so I will speak with Dr. Dudley her nephrologist to see if she would mind if we take over management of Procrit  3. If by labs she continues to be a candidate for Procrit, which I suspect she will be, we will arrange this to start in the near future.    Discussed with the patient and her daughter.  I will communicate with Dr. Dudley as well.    I spent 60 minutes in this visit today reviewing her record, communicating with her and her daughter, communicating with Dr. Dudley, placing orders, and documenting the encounter.    Orders Placed This Encounter   Procedures   • JUDY,PE and FLC, Serum     Order Specific Question:   Release to patient     Answer:   Immediate   • Ferritin     Order Specific Question:   Is the patient on iron therapy?     Answer:   No     Order  Specific Question:   Release to patient     Answer:   Immediate   • Iron Profile     Order Specific Question:   Is the patient on iron therapy?     Answer:   No     Order Specific Question:   Release to patient     Answer:   Immediate   • Retic With IRF & RET-He     Order Specific Question:   Release to patient     Answer:   Immediate   • Vitamin B12     Order Specific Question:   Release to patient     Answer:   Immediate   • Folate RBC     Order Specific Question:   Release to patient     Answer:   Immediate   • Comprehensive Metabolic Panel     Order Specific Question:   Release to patient     Answer:   Immediate   • Erythropoietin     Order Specific Question:   Release to patient     Answer:   Immediate

## 2021-07-28 PROBLEM — D47.2 IGG MONOCLONAL GAMMOPATHY: Status: ACTIVE | Noted: 2021-01-01

## 2021-07-28 PROBLEM — N18.4 ANEMIA DUE TO STAGE 4 CHRONIC KIDNEY DISEASE (HCC): Status: ACTIVE | Noted: 2021-01-01

## 2021-07-28 PROBLEM — D63.1 ANEMIA DUE TO STAGE 4 CHRONIC KIDNEY DISEASE (HCC): Status: ACTIVE | Noted: 2021-01-01

## 2021-07-29 NOTE — TELEPHONE ENCOUNTER
[facility] NEEDS CHARTS NOTES FOR OXYGEN THERAPY  MAY 31, 2021 TO PRESENT.  [facility] NEEDS CHART NOTES:    * PATIENT PULMONARY DIAGNOSIS  * BENEFITS/USAGE FOR OXYGENT  * DOCTORS RECOMMENDATION    THEY WILL SEND A RECORDS RELEASE    CALL BACK #: [phone]

## 2021-08-02 NOTE — PROGRESS NOTES
Patient given Procrit as indicated per MD parameters. Patient D/C'd from [facility] via wheelchair per spouse at 1316.

## 2021-08-02 NOTE — PROGRESS NOTES
"Chief Complaint  Hospital Follow Up Visit (pneumonia and kidney failure)    Subjective          Rachana Arguelles presents to [facility]  Patient has a great deal of difficulty with fluid retention chronic kidney disease CHF.  Her blood pressure is difficult control and now she has low blood pressure 106/52.  She is on multiple antihypertensives.  I question the use of nifedipine XL 90 mg daily which is my increase in blood pressure medicine.  We will confer with nephrology as stated she is to see them tomorrow.  She has gotten Procrit today with hemoglobin of 8.0.  We will see her back in 3 months for recheck.      Objective   Vital Signs:   /52   Pulse 82   Temp 98.2 °F (36.8 °C)   Ht 170.2 cm (67\")   Wt 80.7 kg (178 lb)   SpO2 98%   BMI 27.88 kg/m²     Physical Exam  Vitals reviewed.   Constitutional:       Appearance: She is well-developed. She is ill-appearing.   HENT:      Head: Normocephalic and atraumatic.      Right Ear: Tympanic membrane and external ear normal.      Left Ear: Tympanic membrane and external ear normal.      Nose: Nose normal.   Eyes:      Conjunctiva/sclera: Conjunctivae normal.      Pupils: Pupils are equal, round, and reactive to light.   Neck:      Thyroid: No thyromegaly.      Vascular: No JVD.   Cardiovascular:      Rate and Rhythm: Normal rate and regular rhythm.      Heart sounds: Normal heart sounds.   Pulmonary:      Effort: Pulmonary effort is normal.      Breath sounds: Examination of the right-lower field reveals decreased breath sounds. Examination of the left-lower field reveals decreased breath sounds. Decreased breath sounds present.   Abdominal:      General: Bowel sounds are normal.      Palpations: Abdomen is soft.   Musculoskeletal:         General: Normal range of motion.      Cervical back: Normal range of motion and neck supple.        Legs:    Lymphadenopathy:      Cervical: No cervical adenopathy.   Skin:     General: Skin is " warm and dry.      Findings: No rash.   Neurological:      Mental Status: She is alert and oriented to person, place, and time.      Cranial Nerves: No cranial nerve deficit.      Motor: Weakness and abnormal muscle tone present.      Coordination: Coordination normal.      Gait: Gait abnormal.      Comments: Patient not adequately ambulate by herself.   Psychiatric:         Behavior: Behavior normal.         Thought Content: Thought content normal.         Judgment: Judgment normal.        Result Review :   The following data was reviewed by: Rhys Crowder Jr., MD on 08/02/2021:  Common labs    Common Labsle 7/21/21 7/28/21 7/28/21 7/28/21 7/28/21 8/2/21     1404 1503 1503 1503    Glucose   121      BUN   36 (A)      Creatinine   3.29 (A)      eGFR Non  Am   14 (A)      eGFR  Am         Sodium   136      Potassium   3.2 (A)      Chloride   98      Calcium   8.7      Total Protein    5.2 (A)     Albumin   2.90 (A) 2.4 (A)     Total Bilirubin   0.2      Alkaline Phosphatase   94      AST (SGOT)   22      ALT (SGPT)   11      WBC 8.53 7.26       Hemoglobin 8.7 (A) 8.9 (A)    8.0 (A)   Hematocrit 27.9 (A) 26.7 (A)   28.2 (A) 26.0 (A)   Platelets 292 278       (A) Abnormal value       Comments are available for some flowsheets but are not being displayed.           Data reviewed: Recent hospitalization notes [facility]          Assessment and Plan    Diagnoses and all orders for this visit:    1. Shortness of breath (Primary)    2. Essential hypertension    3. Stage 4 chronic kidney disease (CMS/HCC)    4. Pedal edema    5. Hypotension, unspecified hypotension type        Follow Up   Return in about 3 months (around 11/2/2021) for Recheck.  Patient was given instructions and counseling regarding her condition or for health maintenance advice. Please see specific information pulled into the AVS if appropriate.

## 2021-08-06 NOTE — TELEPHONE ENCOUNTER
Eric Chavez,    This patient's one month follow up needs to be rescheduled on 8/12 due to Dr. Coello being out of the office. The next available is 9/9. Is that date okay or would you like to get her in sooner? If so, please advise where to schedule!     Thanks,   Anali

## 2021-08-06 NOTE — TELEPHONE ENCOUNTER
We don't have anything the 2 weeks after you get back and we are bumping her from next Thu when you are gone.   Can you advise about this? Can we do a 10:20a one day or a 4pm or a cath day at 3p?    Kathy

## 2021-08-10 NOTE — PROGRESS NOTES
Communication with Dr. Dudley with nephrology. We will take over her Procrit injections. We will schedule a weekly CBC with Procrit as appropriate in the office. I will see her back in 4 to 6 weeks for follow-up.

## 2021-08-10 NOTE — TELEPHONE ENCOUNTER
Plan for EPO placed and message sent to pre-certs for authorization.     ----- Message from Ld Paulino MD sent at 8/10/2021 10:47 AM EDT -----  Regarding: Juliann Senior and Ying,    The patient would like to start coming here for Procrit injections. She has been receiving them at the hospital. The last one was on 8/2. I spoke with Dr. Dudley with nephrology and she is okay with this. Please arrange weekly CBC and Procrit and have her follow-up with me in about 4 to 6 weeks.    Thanks! IRA

## 2021-08-11 NOTE — TELEPHONE ENCOUNTER
----- Message from Ld Paulino MD sent at 8/10/2021 10:47 AM EDT -----  Regarding: Neryriayde Senior and Ying,    The patient would like to start coming here for Procrit injections. She has been receiving them at the hospital. The last one was on 8/2. I spoke with Dr. Dudley with nephrology and she is okay with this. Please arrange weekly CBC and Procrit and have her follow-up with me in about 4 to 6 weeks.    Thanks! IRA

## 2021-08-16 NOTE — PROGRESS NOTES
Date of Office Visit: 21  Encounter Provider: Mat Coello MD  Place of Service: [facility] CARDIOLOGY  Patient Name: Rachana Arguelles  :1945    Chief Complaint   Patient presents with   • Follow-up     from [facility]   • Chest Pain   • Shortness of Breath   :     HPI:  75 y.o. female who presents today for follow-up. She has a past medical history significant for lung cancer, PVCs, hypertension, and peripheral vascular disease (status post bilateral common iliac artery stenting).  She follows with vascular surgery.  She follows with pulmonary for COPD and chronic shortness of breath.  In 2020, she was hospitalized with pneumonia.  At that time, she was also found to have a pericardial effusion for which we were consulted.  The effusion was small and unchanged from previous imaging.  Therefore, no further intervention was recommended.      On 2021, she came into the ER for dizziness and lightheadedness and was noted to be bradycardic with rates in the 30s and 40s along with occasional Mobitz 1 AV block.  She was also hypertensive.  She had a prolonged hospitalization.  Regarding the bradycardia, this resolved with the adjustment of medications and the discontinuation of clonidine.  She had significantly uncontrolled and resistant hypertension and was found to have renal artery stenosis.  She developed acute kidney injury likely due to underlying hypertensive nephrosclerosis and nephrology was consulted.  The losartan was discontinued.  At one point, she was on minoxidil.  However, this was discontinued following an echocardiogram showing a moderate pericardial effusion.  On 2021, she underwent an abdominal aortic angiography, selective bilateral renal artery angiography, and right renal artery stenting.  She was also noted to have left renal artery stenosis for which a staged intervention would be planned.  Her blood pressures stabilized and  this was deferred    Patient was in the ER with complaint of chest discomfort and underwent evaluation including EKGs with no change from baseline.  Echocardiogram showed no segmental wall motion abnormalities and a normal ejection fraction.  She was not felt to need ischemic work-up at that time.  She states that she has had no recurrence since then.  She still has dyspnea on exertion when she walks about 10 to 20 feet but this is chronic for her.    Past Medical History:   Diagnosis Date   • AAA (abdominal aortic aneurysm) without rupture (CMS/HCC)    • Anemia    • Anxiety    • Arthritis    • Bilateral carotid artery stenosis 8/14/2020   • Cancer (CMS/HCC)     skin cancer   • Chest pain     OCCAS   • Chronic low back pain    • Chronic pain syndrome 3/12/2016   • CKD (chronic kidney disease), stage III (CMS/HCC)    • Claustrophobia 10/26/2015    Resolved   • COPD (chronic obstructive pulmonary disease) (CMS/HCC)    • Depression    • Dizziness    • Dyspnea    • GERD (gastroesophageal reflux disease)    • GI problem    • H/O concussion    • Head trauma     FELL CUT HEAD 12 STAPLES   • Hiatal hernia    • History of migraine headaches    • Hyperlipidemia    • Hypertension    • Lumbar postlaminectomy syndrome 10/26/2015    Resolved   • Lung cancer (CMS/HCC)    • Lung nodule    • Migraines    • Nausea    • Palpitations    • Polypharmacy 4/22/2016   • Pulmonary embolism (CMS/HCC) 10/26/2015    January 2013 - Resolved, felt to be secondary to estrogen use   • Slow to wake up after anesthesia    • Submandibular sialoadenitis 10/26/2015    Resolved   • Visual changes    • Vitamin B 12 deficiency        Past Surgical History:   Procedure Laterality Date   • ANGIOPLASTY ILIAC ARTERY Bilateral 8/10/2018    Procedure: BILATERAL ILIAC STENTS;  Surgeon: Riki Mancera MD;  Location: [facility];  Service: Vascular   • APPENDECTOMY     • ARTERIOGRAM N/A 6/4/2021    Procedure: Renal Arteriogram with possible intervention;   Surgeon: Mat Coello MD;  Location: [facility] CATH INVASIVE LOCATION;  Service: Cardiology;  Laterality: N/A;   • BREAST SURGERY      Breast Surgery Reduction Procedure   • BRONCHOSCOPY N/A 2/8/2019    Procedure: BRONCHOSCOPY;  Surgeon: Álvaro Gifford MD;  [facility];  Service: Pulmonary   • CARDIAC CATHETERIZATION N/A 6/4/2021    Procedure: Stent BMS peripheral- RENAL-RIGHT;  Surgeon: Mat Coello MD;  Location: [facility] CATH INVASIVE LOCATION;  Service: Cardiology;  Laterality: N/A;   • CATARACT EXTRACTION Bilateral    • CHOLECYSTECTOMY     • COLONOSCOPY  2006    Dr. Saha   • COLONOSCOPY  2012    Dr. Saha   • ENDOSCOPY N/A 11/3/2020    Procedure: ESOPHAGOGASTRODUODENOSCOPY with 54 [place] dilation;  Surgeon: Yunior Vo MD;  Location: [facility] ENDOSCOPY;  Service: Gastroenterology;  Laterality: N/A;  pre- dysphagia  post- esophageal ring, hiatal hernia   • HYSTERECTOMY     • INTERVENTIONAL RADIOLOGY PROCEDURE N/A 6/4/2021    Procedure: Abdominal Aortogram;  Surgeon: Mat Coello MD;  Location: [facility] CATH INVASIVE LOCATION;  Service: Cardiology;  Laterality: N/A;   • INTUBATION  1/15/2019        • JOINT REPLACEMENT      left knee, hip, shoulder   • LASIK     • LUMBAR FUSION      Lumbar Vertebral Fusion   • RENAL ARTERY STENT Right    • SHOULDER ARTHROSCOPY  04/04/2013    Dr. Carrillo/JANINE Kent   • THORACOSCOPY VIDEO ASSISTED WITH LOBECTOMY Right 1/11/2019    Procedure: BRONCOSCOPY, THORACOSCOPY VIDEO ASSISTED WITH RIGHT UPPER LOBE WEDGE RESECTION, COMPLETION RIGHT UPPER LOBECTOMY, LYMPH NODE DISSECTION, INTERCOSTAL NERVE BLOCK;  Surgeon: Quita Figueroa MD;  Location: [facility] MAIN OR;  Service: Thoracic   • TONSILLECTOMY     • TOTAL HIP ARTHROPLASTY REVISION Left    • TOTAL KNEE ARTHROPLASTY Left    • TOTAL SHOULDER ARTHROPLASTY Left    • TOTAL SHOULDER ARTHROPLASTY W/ DISTAL CLAVICLE EXCISION Right 6/18/2020    Procedure: RIGHT TOTAL SHOULDER REVERSE  ARTHROPLASTY WITH GPS;  Surgeon: Lino Carrillo MD;  [facility];  Service: Orthopedics;  Laterality: Right;       Social History     Socioeconomic History   • Marital status:      Spouse name: Not on file   • Number of children: Not on file   • Years of education: Not on file   • Highest education level: Not on file   Tobacco Use   • Smoking status: Former Smoker     Packs/day: 2.50     Years: 15.00     Pack years: 37.50     Types: Cigarettes     Quit date: 2003     Years since quittin.9   • Smokeless tobacco: Never Used   • Tobacco comment: CAFFEINE USE: 1-2 GLASSES TEA DAILY   Vaping Use   • Vaping Use: Never used   Substance and Sexual Activity   • Alcohol use: No     Comment: occ   • Drug use: Never   • Sexual activity: Defer       Family History   Problem Relation Age of Onset   • Hypertension Mother    • Lung cancer Mother    • Cancer Mother         lung   • Kidney disease Father    • Other Brother         Coronary Artery Bypass Grafting   • Stroke Brother         Cerebrovascular Accident   • Malig Hyperthermia Neg Hx        Review of Systems   Constitutional: Positive for malaise/fatigue. Negative for fever.   HENT: Negative for nosebleeds and sore throat.    Eyes: Negative for blurred vision and double vision.   Cardiovascular: Positive for dyspnea on exertion and leg swelling. Negative for chest pain, claudication, orthopnea, palpitations, paroxysmal nocturnal dyspnea and syncope.   Respiratory: Positive for shortness of breath. Negative for cough and snoring.    Endocrine: Negative for cold intolerance, heat intolerance and polydipsia.   Hematologic/Lymphatic: Negative for bleeding problem.   Skin: Negative for itching, poor wound healing and rash.   Musculoskeletal: Negative for falls, joint pain, joint swelling, muscle weakness and myalgias.   Gastrointestinal: Positive for bloating and nausea. Negative for abdominal pain, melena and vomiting.   Neurological: Positive for  dizziness. Negative for light-headedness, loss of balance, seizures, vertigo and weakness.   Psychiatric/Behavioral: Negative for altered mental status and depression.       Allergies   Allergen Reactions   • Minoxidil Unknown - High Severity     Pericardial effusion .   • Neurontin [Gabapentin] Confusion   • Morphine Unknown - Low Severity     HEADACHE         Current Outpatient Medications:   •  torsemide (DEMADEX) 20 MG tablet, 80mg am and 40mg pm, Disp: , Rfl:   •  ascorbic acid (VITAMIN C) 500 MG tablet, Take 500 mg by mouth Daily., Disp: , Rfl:   •  atorvastatin (LIPITOR) 40 MG tablet, Take 40 mg by mouth Every Night., Disp: , Rfl:   •  carvedilol (COREG) 25 MG tablet, Take 1 tablet by mouth 2 (Two) Times a Day With Meals for 30 days., Disp: 60 tablet, Rfl: 0  •  clopidogrel (PLAVIX) 75 MG tablet, Take 75 mg by mouth Daily., Disp: , Rfl:   •  cyclobenzaprine (FLEXERIL) 10 MG tablet, Take 0.5 tablets by mouth 3 (Three) Times a Day As Needed for Muscle Spasms., Disp: , Rfl:   •  docusate sodium (COLACE) 100 MG capsule, Take 100 mg by mouth 2 (Two) Times a Day As Needed for Constipation., Disp: , Rfl:   •  famotidine (PEPCID) 40 MG tablet, Take 40 mg by mouth 2 (Two) Times a Day., Disp: , Rfl:   •  fexofenadine (Allegra Allergy) 180 MG tablet, Take 1 tablet by mouth Daily As Needed (allergies)., Disp: 90 tablet, Rfl: 2  •  fluticasone-salmeterol (Advair Diskus) 250-50 MCG/DOSE DISKUS, Inhale 2 puffs 2 (Two) Times a Day., Disp: , Rfl:   •  folic acid (FOLVITE) 1 MG tablet, TAKE 1 TABLET BY MOUTH EVERY DAY, Disp: 30 tablet, Rfl: 2  •  hydrALAZINE (APRESOLINE) 100 MG tablet, Take 1 tablet by mouth 3 (Three) Times a Day for 30 days., Disp: 90 tablet, Rfl: 0  •  LORazepam (ATIVAN) 0.5 MG tablet, Take 1 tablet by mouth Every 8 (Eight) Hours As Needed for Anxiety., Disp: 90 tablet, Rfl: 1  •  omeprazole (priLOSEC) 40 MG capsule, TAKE 1 CAPSULE BY MOUTH EVERY DAY, Disp: 90 capsule, Rfl: 1  •  ondansetron (ZOFRAN) 4 MG  "tablet, Take 1-2 tablets by mouth Every 8 (Eight) Hours As Needed for Nausea or Vomiting., Disp: 60 tablet, Rfl: 1  •  oxyCODONE (ROXICODONE) 20 MG tablet, Take 20 mg by mouth Every 4 (Four) Hours As Needed. No more than 5 per day, Disp: , Rfl:   •  polyethylene glycol (MiraLax) 17 g packet, Take 17 g by mouth Daily., Disp: , Rfl:   •  sertraline (Zoloft) 100 MG tablet, Take 1 tablet by mouth Daily. (Patient taking differently: Take 50 mg by mouth Daily.), Disp: 90 tablet, Rfl: 3  •  terazosin (HYTRIN) 5 MG capsule, Take 1 capsule by mouth Every Night for 30 days., Disp: 30 capsule, Rfl: 0  •  traZODone (DESYREL) 150 MG tablet, TAKE 0.5 TABLETS BY MOUTH EVERY NIGHT, Disp: 45 tablet, Rfl: 1  •  vitamin D (ERGOCALCIFEROL) 1.25 MG (05435 UT) capsule capsule, Take 50,000 Units by mouth Every 7 (Seven) Days. Takes on Wednesdays, Disp: , Rfl:       Objective:     Vitals:    08/16/21 1128   BP: 162/56   Pulse: 61   Weight: 83 kg (183 lb)   Height: 170.2 cm (67\")     Body mass index is 28.66 kg/m².    PHYSICAL EXAM:    Constitutional:       Appearance: Well-developed.   Eyes:      General: No scleral icterus.     Conjunctiva/sclera: Conjunctivae normal.   HENT:      Head: Normocephalic and atraumatic.   Neck:      Thyroid: No thyromegaly.      Vascular: Normal carotid pulses. No carotid bruit, hepatojugular reflux or JVD.      Trachea: No tracheal deviation.   Pulmonary:      Effort: Pulmonary effort is normal. No respiratory distress.      Breath sounds: No decreased breath sounds. No wheezing. No rhonchi. No rales.   Chest:      Chest wall: Not tender to palpatation.   Cardiovascular:      Normal rate. Regular rhythm.      Murmurs: There is a systolic murmur.      No gallop. No click. No rub.   Abdominal:      General: Bowel sounds are normal. There is no distension.      Palpations: Abdomen is soft.      Tenderness: There is no abdominal tenderness.   Musculoskeletal:         General: No deformity.      Cervical back: " Normal range of motion and neck supple. Skin:     Findings: No erythema or rash.   Neurological:      Mental Status: Oriented to person, place, and time.      Sensory: No sensory deficit.   Psychiatric:         Behavior: Behavior normal.           ECG 12 Lead    Date/Time: 8/16/2021 11:49 AM  Performed by: Mat Coello MD  Authorized by: Mat Coello MD   Comparison: compared with previous ECG from 6/24/2021  Similar to previous ECG  Rhythm: sinus rhythm  Ectopy: unifocal PVCs    Clinical impression: non-specific ECG           7/21/2021                          The ejection fraction biplane was calculated at 60%.                            The estimated ejection fraction is 55-60%.                            Overall left ventricular function is normal.                            E/A flow reversal noted. Suggestive of diastolic dysfunction.                            Mildly dilated left atrium.                            Trace tricuspid regurgitation.     1/7/2019  · Findings consistent with a normal ECG stress test.  · Left ventricular ejection fraction is normal (Calculated EF = 64%).  · Myocardial perfusion imaging indicates a normal myocardial perfusion study with no evidence of ischemia.  · Impressions are consistent with a low risk study.  · Breast attenuation and diaphragmatic attenuation artifacts are present.           Assessment:      No diagnosis found.  No orders of the defined types were placed in this encounter.    Plan:   Very pleasant 75-year-old female with medical history of chronic kidney disease, renal artery stenosis status post right renal artery stenting, chronic diastolic heart failure, essential hypertension who presents back in follow-up.  She did have chest discomfort, however my opinion this was atypical.  Her cardiac work-up has been negative.  She previously has had a normal stress test in 2019.  Her echocardiogram looked great other than mild diastolic dysfunction.   Her blood pressure slightly elevated today, however it is better than it has been in the past    1.  Resistant hypertension: Mildly elevated today.  Status post right renal artery stenting.  -Continue current antihypertensive regimen.  Blood pressure is acceptable.   -I would recommend no additional intervention in the left renal artery with the setting of worsening creatinine. Patient is aware.    2.  Renal artery stenosis.  Status post right renal artery stenting.  She was also noted to have left renal artery stenosis for which a staged intervention was recommended. Continue Plavix.    3.  Chronic diastolic heart failure: she currently appears to be euvolemic  -Recommend continue current torsemide dose. No indication for changes in that therapy. Renal function appears to be stable on my review of her creatinine.    4.  Pericardial effusion

## 2021-08-16 NOTE — PROGRESS NOTES
Hgb today is 7.8.  Patient denies any increase in fatigue, SOA.  This is patient's second weekly dose of procrit 82550 units.  Declines need for transfusion at this time.   Return appt in one week.  Patient v/u to call if any change before next appt.

## 2021-08-16 NOTE — PROGRESS NOTES
PROGRESS NOTE  Patient Name: Rachana Arguelles  Age/Sex: 75 y.o. female  : 1945  MRN: [id number]    Date of Admission: 2021  Date of Encounter Visit: 21   LOS: 10 days   Patient Care Team:  Rhys Crowder Jr., MD as PCP - General (Family Medicine)  Whitney Dudley MD as Consulting Physician (Nephrology)  Quita Figueroa MD as Surgeon (Thoracic Surgery)  Sabas Luther MD as Consulting Physician (Otolaryngology)  Álvaro Gifford MD as Consulting Physician (Pulmonary Disease)    Chief Complaint: Uncontrolled hypertension, renal artery stenosis status post stenting    Hospital course: Patient has underlying COPD by history, on chronic oxygen therapy usually at 2 L/min currently on 1 L/min nasal cannula.  She is here for uncontrolled hypertension presenting with mental status changes and acute renal failure with metabolic acidosis and this had improved except for the blood pressure that is still slightly out of control and that is being managed by internal medicine/cardiology/nephrology.  No active respiratory complaints, no wheezing no chest tightness or chest pain.      REVIEW OF SYSTEMS:   CONSTITUTIONAL: no fever or chills  CARDIOVASCULAR: No chest pain, chest pressure or chest discomfort. No palpitations or edema.   RESPIRATORY: No shortness of breath, cough or sputum.   GASTROINTESTINAL: No anorexia, nausea, vomiting or diarrhea. No abdominal pain or blood.   HEMATOLOGIC: No bleeding or bruising.     Ventilator/Non-Invasive Ventilation Settings (From admission, onward) Comment    None            Vital Signs  Temp:  [98 °F (36.7 °C)-98.4 °F (36.9 °C)] 98.4 °F (36.9 °C)  Heart Rate:  [66-86] 73  Resp:  [9-20] 16  BP: (141-196)/() 171/94  SpO2:  [89 %-93 %] 93 %  on  Flow (L/min):  [1-3] 1 Device (Oxygen Therapy): room air    Intake/Output Summary (Last 24 hours) at 2021 0948  Last data filed at 2021 0600  Gross per 24 hour   Intake 1340 ml   Output 2300 ml   Net -960 ml  "    Flowsheet Rows      First Filed Value   Admission Height  167.6 cm (66\") Documented at 05/25/2021 1805   Admission Weight  78.9 kg (174 lb) Documented at 05/25/2021 1805        Body mass index is 29.13 kg/m².      06/01/21  0917 06/02/21  0710 06/04/21  0535   Weight: 88.5 kg (195 lb) 84.8 kg (187 lb) 84.4 kg (186 lb)       Physical Exam:  GEN:  No acute distress, alert, cooperative, well developed   EYES:   Sclerae clear. No icterus. PERRL. Normal EOM  ENT:   External ears/nose normal, no oral lesions, no thrush, mucous membranes moist  NECK:  Supple, midline trachea, no JVD  LUNGS: Normal chest on inspection, CTAB, no wheezes. No rhonchi. No crackles. Respirations regular, even and unlabored.  On nasal cannula oxygen  CV:  Regular rhythm and rate. Normal S1/S2. No murmurs, gallops, or rubs noted.  ABD:  Soft, nontender and nondistended. Normal bowel sounds. No guarding  EXT:  Moves all extremities well. No cyanosis. No redness. No edema.   Skin: Dry, intact, no bleeding    Results Review:    Results From Last 14 Days   Lab Units 05/30/21  2206   LACTATE mmol/L 0.4*     Results from last 7 days   Lab Units 06/05/21  0430 06/04/21  0321 06/03/21  0331 06/02/21  0324 06/01/21  0451 05/31/21  0402 05/30/21  2206   SODIUM mmol/L 138 140 142 139 135* 139 138   POTASSIUM mmol/L 3.6 3.6 3.8 4.1 4.0 4.3 4.6   CHLORIDE mmol/L 98 101 104 106 105 110* 110*   CO2 mmol/L 28.7 27.7 24.3 19.7* 19.1* 17.8* 16.5*   BUN mg/dL 25* 36* 38* 34* 34* 32* 31*   CREATININE mg/dL 1.57* 1.80* 2.06* 2.16* 2.55* 2.69* 2.76*   CALCIUM mg/dL 8.3* 8.5* 8.5* 8.4* 8.5* 8.5* 8.7   AST (SGOT) U/L  --   --   --   --   --   --  30   ALT (SGPT) U/L  --   --   --   --   --   --  15   ANION GAP mmol/L 11.3 11.3 13.7 13.3 10.9 11.2 11.5   ALBUMIN g/dL  --  3.40* 3.60  --   --  3.60 3.90     Results from last 7 days   Lab Units 06/01/21  0451 05/31/21  0910 05/31/21  0402   TROPONIN T ng/mL 0.034* 0.036* 0.031*         Results from last 7 days   Lab " Units 05/31/21  0910   PROBNP pg/mL 6,967.0*     Results from last 7 days   Lab Units 06/05/21  0430 06/03/21  0331 05/30/21  2206   WBC 10*3/mm3 6.99 6.93 11.12*   HEMOGLOBIN g/dL 9.1* 9.0* 9.4*   HEMATOCRIT % 27.9* 27.1* 28.9*   PLATELETS 10*3/mm3 222 218 204   MCV fL 97.6* 95.1 97.3*   NEUTROPHIL % %  --   --  78.4*   LYMPHOCYTE % %  --   --  10.7*   MONOCYTES % %  --   --  7.8   EOSINOPHIL % %  --   --  1.1   BASOPHIL % %  --   --  0.4   IMM GRAN % %  --   --  1.6*         Results from last 7 days   Lab Units 05/31/21  0402   MAGNESIUM mg/dL 2.3           Invalid input(s): LDLCALC  Results from last 7 days   Lab Units 05/30/21  2128   PH, ARTERIAL pH units 7.282*   PCO2, ARTERIAL mm Hg 36.1   PO2 ART mm Hg 78.1*   HCO3 ART mmol/L 17.0*         Glucose   Date/Time Value Ref Range Status   06/02/2021 1219 130 70 - 130 mg/dL Final     Results from last 7 days   Lab Units 05/30/21  2206   PROCALCITONIN ng/mL 0.18   LACTATE mmol/L 0.4*         Results from last 7 days   Lab Units 05/30/21  1618   NITRITE UA  Negative   WBC UA /HPF 6-12*   BACTERIA UA /HPF Trace*   SQUAM EPITHEL UA /HPF 0-2         Results from last 7 days   Lab Units 05/31/21  0402 05/30/21  1618   SODIUM UR mmol/L  --  21   URIC ACID mg/dL 7.1*  --            Imaging:   Imaging Results (All)     Procedure Component Value Units Date/Time       I reviewed the patient's new clinical results.  I personally viewed and interpreted the patient's imaging results:        Medication Review:   amLODIPine, 10 mg, Oral, Q24H  ascorbic acid, 500 mg, Oral, Daily  atorvastatin, 40 mg, Oral, Nightly  budesonide-formoterol, 2 puff, Inhalation, BID - RT  carvedilol, 25 mg, Oral, BID With Meals  cetirizine, 5 mg, Oral, Daily  clopidogrel, 75 mg, Oral, Daily  famotidine, 20 mg, Oral, Daily  folic acid, 1,000 mcg, Oral, Daily  hydrALAZINE, 100 mg, Oral, Q8H  pantoprazole, 40 mg, Oral, QAM  senna-docusate sodium, 2 tablet, Oral, Nightly  sertraline, 100 mg, Oral,  Daily  sodium bicarbonate, 1,300 mg, Oral, TID  sodium chloride, 10 mL, Intravenous, Q12H  sucralfate, 1 g, Oral, BID AC  terazosin, 5 mg, Oral, Nightly  traZODone, 50 mg, Oral, Nightly  venlafaxine XR, 150 mg, Oral, Daily  Vitamin B-2, 400 mg, Oral, Daily        niCARdipine, 2.5 mg/hr, Last Rate: Stopped (05/31/21 1123)        ASSESSMENT:   Altered mental status, now resolved  Acute kidney injury  Non-anion gap metabolic acidosis from above  COPD without exacerbation  Chronic hypoxia on 2 L oxygen  History lung cancer  CKD 3  UTI  Hypertension, s/p renal artery stenting for stenosis   Bradycardia, drug induced, resolved, antihypertensive per cardiology   Frequent PVCs, improved    PLAN:  Creatinine is trending down, blood pressure still elevated at the time of my assessment with systolic in the 170s and that would be deferred to the cardiology and the other consultants, from the pulmonary standpoint the patient is doing good, at baseline with no exacerbation of her underlying lung condition.  No evidence of any decompensated heart failure.  We will be available in case of any changes in her respiratory status but the patient is at baseline, she is cleared for discharge from the pulmonary standpoint, please call in case there is any concern or respiratory new problem  Discussed with patient    Note from my partners, notes from other consultants were reviewed, patient is new to me    Disposition: Per primary team    Umesh Rosenberg MD  06/05/21  09:48 EDT          Dictated utilizing Dragon dictation   show

## 2021-08-17 NOTE — TELEPHONE ENCOUNTER
I spoke to spouse about the HH order. I informed him that our office got a message on 7/16 stating she refused HH services for PT.    Her  indicated his wife need the HH.    I told him that we would get an order in for HH to [facility]

## 2021-08-17 NOTE — TELEPHONE ENCOUNTER
Caller: Rai Arguelles    Relationship: Emergency Contact    Best call back number: [phone]    What is the medical concern/diagnosis: MOBILITY ISSUES     What specialty or service is being requested: REFERRAL TO A PHYSICAL THERAPIST     What is the provider, practice or medical service name: [facility]     Any additional details:     PLEASE CALL AND ADVISE

## 2021-08-24 NOTE — TELEPHONE ENCOUNTER
Spoke with patient and patient's  to reschedule upcoming appt with Dr. Deluna. Mailing new appt reminder.

## 2021-08-24 NOTE — PROGRESS NOTES
"Chief Complaint  Lung cancer, follow-up with CT chest    Subjective          Rachana Arguelles presents to [facility] THORACIC SURGERY for continued follow-up and surveillance of a non-small cell lung cancer for which she underwent a right video-assisted thoracoscopy with right upper lobe wedge resection, completion right upper lobectomy and lymph node dissection by Dr. Quita Figueroa on 1/11/2019.  Ms. Arguelles presents to our office today accompanied by her .  She is presently in a wheelchair.  She reports that she has seen her nephrologist earlier with plans to be referred to vascular surgery for dialysis catheter placement due to her progressive kidney disease.  Her greatest complaint today to me is itching.    History of Present Illness     Objective   Vital Signs:   /71 (BP Location: Right arm, Patient Position: Sitting, Cuff Size: Adult)   Pulse 67   Temp 98.3 °F (36.8 °C) (Temporal)   Resp 16   Ht 170.2 cm (67.01\")   Wt 83 kg (182 lb 15.7 oz)   SpO2 97%   BMI 28.65 kg/m²     Physical Exam  Vitals and nursing note reviewed.   Constitutional:       General: She is not in acute distress.     Appearance: She is not ill-appearing.   HENT:      Head: Normocephalic and atraumatic.      Mouth/Throat:      Mouth: Mucous membranes are moist.   Eyes:      General: No scleral icterus.     Conjunctiva/sclera: Conjunctivae normal.   Cardiovascular:      Rate and Rhythm: Normal rate and regular rhythm.   Pulmonary:      Effort: Pulmonary effort is normal. No respiratory distress.      Breath sounds: Decreased breath sounds present. No wheezing, rhonchi or rales.   Abdominal:      General: Bowel sounds are normal.      Palpations: Abdomen is soft.   Musculoskeletal:      Cervical back: Neck supple.      Right lower leg: Edema present.      Left lower leg: Edema present.   Neurological:      General: No focal deficit present.      Mental Status: She is alert and oriented to person, place, and " time. Mental status is at baseline.   Psychiatric:         Mood and Affect: Mood normal.         Behavior: Behavior normal.         Thought Content: Thought content normal.         Judgment: Judgment normal.        Result Review :   The following data was reviewed by: REENA Allen on 08/25/2021:    Data reviewed: Radiologic studies CT of the chest performed without contrast on 8/17/2021 was independently reviewed.  There are trace bilateral effusions, left greater than right.  Minimal groundglass infiltrate seen bilaterally primarily in the lung bases suggestive of vascular congestion.  The left upper lobe has a new nodular area measuring approximately 9 mm in size.  Small pericardial effusion has diminished in size compared to previous imaging in January of this year.          Assessment and Plan    Diagnoses and all orders for this visit:    1. Malignant neoplasm of right upper lobe of lung (CMS/HCC) (Primary)  -     CT Chest Without Contrast Diagnostic; Future    2. Solitary pulmonary nodule  -     CT Chest Without Contrast Diagnostic; Future    3. Bilateral pleural effusion  -     CT Chest Without Contrast Diagnostic; Future      CT of the chest demonstrates bilateral pleural effusions with some body wall edema and suggestion of vascular congestion. Presently on torsemide and patient was seen by her nephrologist, Dr. Dudley earlier today.  They discussed plan to initiate dialysis and she has been referred to vascular surgery for dialysis catheter placement.  I explained to the patient that her chronic itching is likely secondary to kidney disease.    Small left upper lobe nodular area measures approximately 9 mm in size and is new compared to previous imaging.  Given patient's history of lung cancer, I would like to continue close surveillance with another CT without contrast in 3 months.  This was discussed at length with the patient and her .  All their questions were answered to their  satisfaction and they are in agreement with this plan.  Thank you for allowing us to participate in the care of Rachana Arguelles.  We will continue to keep you informed of her progress.    I spent 23 minutes caring for Rachana on this date of service. This time includes time spent by me in the following activities:preparing for the visit, reviewing tests, obtaining and/or reviewing a separately obtained history, performing a medically appropriate examination and/or evaluation , counseling and educating the patient/family/caregiver, ordering medications, tests, or procedures, referring and communicating with other health care professionals , documenting information in the medical record, independently interpreting results and communicating that information with the patient/family/caregiver and care coordination  Follow Up   Return in about 3 months (around 11/25/2021) for Next scheduled follow up with CT chest.  Patient was given instructions and counseling regarding her condition or for health maintenance advice. Please see specific information pulled into the AVS if appropriate.

## 2021-08-27 NOTE — TELEPHONE ENCOUNTER
----- Message from Nadeen Dunbar RN sent at 8/27/2021 10:55 AM EDT -----  Pt needs to be rescheduled for her procrit injection. She is one day too early on 8/30, she needs to be scheduled for Tuesday, 8/31. Thank you!

## 2021-09-01 NOTE — DISCHARGE INSTRUCTIONS
Take the following medications the morning of surgery:  CARVEDILOL,NIFEDIPINE,  HYDRALAZINE, ATIVAN IF NEEDED, ADVAIR IF NEEDED    If you are on prescription narcotic pain medication to control your pain you may also take that medication the morning of surgery.  ROXICODONE IF NEEDED    General Instructions:  • Do not eat solid food after midnight the night before surgery.  • You may drink clear liquids day of surgery but must stop at least one hour before your hospital arrival time.  • It is beneficial for you to have a clear drink that contains carbohydrates the day of surgery.  We suggest a 12 to 20 ounce bottle of Gatorade or Powerade for non-diabetic patients or a 12 to 20 ounce bottle of G2 or Powerade Zero for diabetic patients. (Pediatric patients, are not advised to drink a 12 to 20 ounce carbohydrate drink)    Clear liquids are liquids you can see through.  Nothing red in color.     Plain water                               Sports drinks  Sodas                                   Gelatin (Jell-O)  Fruit juices without pulp such as white grape juice and apple juice  Popsicles that contain no fruit or yogurt  Tea or coffee (no cream or milk added)  Gatorade / Powerade  G2 / Powerade Zero      • Patients who avoid smoking, chewing tobacco and alcohol for 4 weeks prior to surgery have a reduced risk of post-operative complications.  Quit smoking as many days before surgery as you can.  • Do not smoke, use chewing tobacco or drink alcohol the day of surgery.   • If applicable bring your C-PAP/ BI-PAP machine.  • Bring any papers given to you in the doctor’s office.  • Wear clean comfortable clothes.  • Do not wear contact lenses, false eyelashes or make-up.  Bring a case for your glasses.   • Bring crutches or walker if applicable.  • Remove all piercings.  Leave jewelry and any other valuables at home.  • Hair extensions with metal clips must be removed prior to surgery.  • The Pre-Admission Testing nurse will  instruct you to bring medications if unable to obtain an accurate list in Pre-Admission Testing.            Preventing a Surgical Site Infection:  • For 2 to 3 days before surgery, avoid shaving with a razor because the razor can irritate skin and make it easier to develop an infection.    • Any areas of open skin can increase the risk of a post-operative wound infection by allowing bacteria to enter and travel throughout the body.  Notify your surgeon if you have any skin wounds / rashes even if it is not near the expected surgical site.  The area will need assessed to determine if surgery should be delayed until it is healed.  • The night prior to surgery shower using a fresh bar of anti-bacterial soap (such as Dial) and clean washcloth.  Sleep in a clean bed with clean clothing.  Do not allow pets to sleep with you.  • Shower on the morning of surgery using a fresh bar of anti-bacterial soap (such as Dial) and clean washcloth.  Dry with a clean towel and dress in clean clothing.  • Ask your surgeon if you will be receiving antibiotics prior to surgery.  • Make sure you, your family, and all healthcare providers clean their hands with soap and water or an alcohol based hand  before caring for you or your wound.    CHLORHEXIDINE CLOTH INSTRUCTIONS  The morning of surgery follow these instructions using the Chlorhexidine cloths you've been given.  These steps reduce bacteria on the body.  Do not use the cloths near your eyes, ears mouth, genitalia or on open wounds.  Throw the cloths away after use but do not try to flush them down a toilet.      • Open and remove one cloth at a time from the package.    • Leave the cloth unfolded and begin the bathing.  • Massage the skin with the cloths using gentle pressure to remove bacteria.  Do not scrub harshly.   • Follow the steps below with one 2% CHG cloth per area (6 total cloths).  • One cloth for neck, shoulders and chest.  • One cloth for both arms, hands,  fingers and underarms (do underarms last).  • One cloth for the abdomen followed by groin.  • One cloth for right leg and foot including between the toes.  • One cloth for left leg and foot including between the toes.  • The last cloth is to be used for the back of the neck, back and buttocks.    Allow the CHG to air dry 3 minutes on the skin which will give it time to work and decrease the chance of irritation.  The skin may feel sticky until it is dry.  Do not rinse with water or any other liquid or you will lose the beneficial effects of the CHG.  If mild skin irritation occurs, do rinse the skin to remove the CHG.  Report this to the nurse at time of admission.  Do not apply lotions, creams, ointments, deodorants or perfumes after using the clothes. Dress in clean clothes before coming to the hospital.    Day of surgery:  Your arrival time is approximately two hours before your scheduled surgery time.  Upon arrival, a Pre-op nurse and Anesthesiologist will review your health history, obtain vital signs, and answer questions you may have.  The only belongings needed at this time will be a list of your home medications and if applicable your C-PAP/BI-PAP machine.  A Pre-op nurse will start an IV and you may receive medication in preparation for surgery, including something to help you relax.     Please be aware that surgery does come with discomfort.  We want to make every effort to control your discomfort so please discuss any uncontrolled symptoms with your nurse.   Your doctor will most likely have prescribed pain medications.      If you are going home after surgery you will receive individualized written care instructions before being discharged.  A responsible adult must drive you to and from the hospital on the day of your surgery and stay with you for 24 hours.  Discharge prescriptions can be filled by the hospital pharmacy during regular pharmacy hours.  If you are having surgery late in the day/evening  your prescription may be e-prescribed to your pharmacy.  Please verify your pharmacy hours or chose a 24 hour pharmacy to avoid not having access to your prescription because your pharmacy has closed for the day.    If you are staying overnight following surgery, you will be transported to your hospital room following the recovery period.  [facility] has all private rooms.    If you have any questions please call Pre-Admission Testing at [phone].  Deductibles and co-payments are collected on the day of service. Please be prepared to pay the required co-pay, deductible or deposit on the day of service as defined by your plan.    Patient Education for Self-Quarantine Process    • Following your COVID testing, we strongly recommend that you wear a mask when you are with other people and practice social distancing.   • Limit your activities to only required outings.  • Wash your hands with soap and water frequently for at least 20 seconds.   • Avoid touching your eyes, nose and mouth with unwashed hands.  • Do not share anything - utensils, drinking glasses, food from the same bowl.   • Sanitize household surfaces daily. Include all high touch areas (door handles, light switches, phones, countertops, etc.)    Call your surgeon immediately if you experience any of the following symptoms:  • Sore Throat  • Shortness of Breath or difficulty breathing  • Cough  • Chills  • Body soreness or muscle pain  • Headache  • Fever  • New loss of taste or smell  • Do not arrive for your surgery ill.  Your procedure will need to be rescheduled to another time.  You will need to call your physician before the day of surgery to avoid any unnecessary exposure to hospital staff as well as other patients.

## 2021-09-02 PROBLEM — N18.6 ESRD (END STAGE RENAL DISEASE): Status: ACTIVE | Noted: 2021-01-01

## 2021-09-02 NOTE — PERIOPERATIVE NURSING NOTE
Spoke to outpatient pharmacy per pt and family request, pt does not want to  po norco, pt has meds at home

## 2021-09-02 NOTE — ANESTHESIA POSTPROCEDURE EVALUATION
"Patient: Rachana Arguelles    Procedure Summary     Date: 09/02/21 Room / Location: [facility] OR 03 / [facility] MAIN OR    Anesthesia Start: 0800 Anesthesia Stop: 0847    Procedure: PALINDROME CATHERER (Right ) Diagnosis:     Surgeons: Riki Mancera MD Provider: Richard Arnett MD    Anesthesia Type: MAC ASA Status: 4          Anesthesia Type: MAC    Vitals  Vitals Value Taken Time   /70 09/02/21 0944   Temp 36.8 °C (98.3 °F) 09/02/21 0844   Pulse 71 09/02/21 0935   Resp 18 09/02/21 0915   SpO2 100 % 09/02/21 0935   Vitals shown include unvalidated device data.        Post Anesthesia Care and Evaluation    Patient location during evaluation: bedside  Patient participation: complete - patient participated  Level of consciousness: awake and alert  Pain management: adequate  Airway patency: patent  Anesthetic complications: No anesthetic complications    Cardiovascular status: acceptable  Respiratory status: acceptable  Hydration status: acceptable    Comments: /65   Pulse 72   Temp 36.8 °C (98.3 °F) (Oral)   Resp 18   Ht 167.6 cm (66\")   Wt 80.2 kg (176 lb 12.8 oz)   SpO2 98%   BMI 28.54 kg/m²       "

## 2021-09-02 NOTE — OP NOTE
Operative Note  Location: [facility]  Date of Admission:  9/2/2021  OR Date: 9/2/2021    Pre-op Diagnosis:   CKD/ESRD    Post-op Diagnosis:     Same    Procedure:  1) Tunneled dialysis catheter insertion (85518)  2) Ultrasound guided vascular access (89351)  3) Radiologic supervision of catheter tip placement (00039)    Assistant: Hyun Mcwilliams RN    Anesthesia: Monitored Anesthesia Care    Estimated Blood Loss: minimal    Staff:   Circulator: Narendra Tapia RN  Radiology Technologist: Renae Walters  Scrub Person: Selena Dominguez; Hu Varela  Assistant: Hyun Mcwilliams    Complications: None    Specimen: None    Findings:  Tip in SVC/Atrial     Implants: Nothing was implanted during the procedure    Indications:    The patient is an 75 y.o. female referred for tunneled dialysis catheter placement.  The risks, benefits, and alternatives have been discussed with the patient and/or family and include but are not limited to injury to artery, vein, lung, bleeding, and infection.    Procedure:  The patient was taken to the operating room and placed supine on the operating room table. After the induction of IV sedation, the neck and chest were prepped and draped with ChloraPrep. The patient was placed in Trendelenburg position. Timeout was observed. The right  Internal Jugular  was evaluated on ultrasound and found to be easily compressible. The vessel was entered under direct ultrasound guidance and photographic documentation was recorded in the chart for the record. An angled wire was then advanced down into the superior vena cava under fluoroscopy without any difficulty. Exit site was chosen on the chest. The tract was anesthetized with 1% lidocaine mixed with 0.25% Marcaine. A 23 cm catheter was then flushed and brought up onto the field. It was tunneled in an antegrade fashion up to the IJ insertion site. Dilator and peel-away sheath were then advanced over the wire under fluoroscopy without any significant difficulty.  Dilator and wire were removed leaving the peel-away sheath in place. The distal tip of the catheter was then placed into the peel-away sheath and the peel-away sheath was removed. Under fluoroscopy, the distal tip of the catheter was positioned into the right atrium. There was no kinking at the catheter insertion site. The catheter flushed and aspirated easily. The lung fields were examined. There was no evidence of hemothorax or pneumothorax. The catheter flushed and aspirated quite easily. It was locked with concentrated heparin. The catheter was then anchored to the chest with nylon sutures. A stitch and dermal glue were used to close the neck site as well. Sterile dressings were applied. The patient tolerated the procedure well. There were no immediately apparent complications.           There are no hospital problems to display for this patient.     Riki Mancera MD     Date: 9/2/2021  Time: 08:38 EDT

## 2021-09-02 NOTE — DISCHARGE INSTRUCTIONS
[facility]  Brad Choudhury, Chet Engle Scherrer, [place]  [address]  [place] [id number] [phone]    Discharge Instructions for Tunnel Catheter Placement    1. Go home, rest and take it easy today.       2. You may experience some dizziness or memory loss from the anesthesia.  This may last for the next 24 hours.  Someone should plan on staying with you for the first 24 hours for your safety.     3. Do not make any important legal decisions or sign any legal papers for the next 24 hours.       4. Eat and drink lightly today.  Start off with liquids, jello, soup, crackers or other bland foods at first. You may advance your diet tomorrow as tolerated as long as you do not experience any nausea or vomiting.     5. You may continue previous activity.      6. Do not get your catheter site wet.  It is okay to sponge bathe.  Do not submerge catheter site or take a shower.      7. Keep the dressing on.  Check you dressing for bleeding.  If the bandage is saturated, you may remove it and replace it with a new dressing. Your dialysis center will also change this for you.        8. Your dressing should be changed with each dialysis treatment by the dialysis unit nurses.      9. For minor discomfort, take either Tylenol or Ibuprofen as directed on the package, unless otherwise instructed.      10.  If your doctor gave you a prescription for a narcotic pill (Tylenol #3, Vicodin, Hydrocodone, Oxycodone or Percocet), you may not drive a vehicle or operate machinery while taking this type of medication.      11.   Remember to contact our office for any of the following:    • Swelling, tenderness, redness, oozing or drainage at the exit site  • Fever > 101 degrees, chills or fatigue  • Severe pain that cannot be controlled by taking your pain pills  • Severe nausea or vomiting   • Significant bleeding of your incisions  • Drainage that has a bad smell or is yellow or green in  appearance  • Any other questions or concerns

## 2021-09-02 NOTE — ANESTHESIA PREPROCEDURE EVALUATION
Anesthesia Evaluation     Patient summary reviewed and Nursing notes reviewed   history of anesthetic complications: PONV prolonged sedation               Airway   Mallampati: II  TM distance: >3 FB  Neck ROM: full  No difficulty expected  Dental - normal exam     Pulmonary - normal exam   (+) pulmonary embolism, a smoker Former, lung cancer, COPD, shortness of breath,     ROS comment: On 2L O2 at night and prn/hx lung CA, s/p right upper lobectomy 1/19  Cardiovascular - normal exam    ECG reviewed    (+) hypertension 2 medications or greater, dysrhythmias PVC, CHF , PVD, hyperlipidemia,  carotid artery disease carotid bilateral    ROS comment: AAA/hx PVD and QUINTON, s/p renal artery stent and bilateral iliac stents/anemia (Hgb 8.6/Hct 27.5)    Neuro/Psych  (+) headaches, dizziness/light headedness, psychiatric history Anxiety and Depression, dementia,       ROS Comment: Migraines  GI/Hepatic/Renal/Endo    (+)  hiatal hernia, GERD,  renal disease ESRD,     Musculoskeletal     (+) back pain, chronic pain, neck pain,       ROS comment: Hx of lumbar postlaminectomy syndrome  Abdominal  - normal exam   Substance History   (+) drug use      Comment: Opioid dependence   OB/GYN          Other   arthritis,    history of cancer      Other Comment: Hx of lung CA                Anesthesia Plan    ASA 4     MAC     intravenous induction     Anesthetic plan, all risks, benefits, and alternatives have been provided, discussed and informed consent has been obtained with: patient.    Plan discussed with CRNA.

## 2021-09-02 NOTE — NURSING NOTE
Lab results given to Dr. Arnett, anesthesia.  No need to repeat CBC or BMP.  States these lab results are to be expected with pts history.

## 2021-09-13 NOTE — PROGRESS NOTES
"[facility] CBC GROUP OUTPATIENT FOLLOW UP CLINIC VISIT    REASON FOR FOLLOW-UP:    IgG lambda monoclonal gammopathy  Anemia related to stage IV chronic kidney disease requiring Procrit    HISTORY OF PRESENT ILLNESS:  Rachana Arguelles is a 76 y.o. female who returns today for follow up of the above issue.      She did have a tunneled dialysis catheter placed on 9/2/2021 with Dr. Christophe Mancera.  She initiated hemodialysis last Friday.  She has had 2 dialysis sessions.  She reports ongoing fatigue and nausea.  She has some tenderness at the catheter insertion site.  She has some purpura on the distal left lower extremity but she denies any other bleeding or petechiae.    REVIEW OF SYSTEMS:  As per the HPI    PHYSICAL EXAMINATION:    Vitals:    09/14/21 0813   BP: (!) 182/70  Comment: did not take bp meds this morning   Pulse: 78   Resp: 16   Temp: 97.8 °F (36.6 °C)   TempSrc: Temporal   SpO2: 97%   Weight: 79.7 kg (175 lb 11.2 oz)   Height: 167.6 cm (65.98\")   PainSc: 0-No pain  Comment: lung cancer       GENERAL:  Well-developed well-nourished female; awake, alert and oriented, in no acute distress.  Sitting in a wheelchair.  SKIN: Small scattered purpura on the distal left lower extremity.  No petechiae.  No other bleeding or bruising.  HEAD:  Normocephalic, atraumatic. Wearing a facemask.  EARS:  Hearing intact.  CHEST: Normal respiratory effort.  Tunneled dialysis catheter present in the right subclavian area.  EXTREMITIES:   1+ bilateral lower extremity edema    DIAGNOSTIC DATA:  CBC and Differential (09/14/2021 08:16)      IMAGING: None reviewed    ASSESSMENT:  This is a 76 y.o. female with:  *IgG lambda monoclonal gammopathy  · Labs on 6/25/2021 did include an immunofixation which showed an IgG lambda monoclonal protein.  However, the IgG was low at 560, IgA 138, IgM slightly elevated at 285.  · She did have a serum immunofixation performed on 7/7/2021 which stated that \"presence of monoclonal protein is unclear " "at this time.\"  The IgG was low at 465.  The IgM was slightly elevated at 290.    · Random urine protein electrophoresis with immunofixation showed also \"presence of monoclonal protein is unclear at this time\" but no M spike was observed on urine protein electrophoresis.  She was referred for further evaluation.  · Therefore, if there is a monoclonal protein present it is very tiny at this time and it is extremely unlikely that this has been causing any problems for her.  · Labs repeated on 7/28/2021 with IgG 474, IgA 141, IgM 329, no M spike observed, immunofixation with \"presence of monoclonal protein is unclear at this time.\"  Serum free light chain kappa/lambda ratio normal at 0.58.  · We will repeat labs in July 2022     *Stage V chronic kidney disease  · Managed by Dr. Dudley with nephrology  · Left renal atrophy  · Status post right renal artery stent 6/4/2021  · Tunneled dialysis catheter by Dr. Mancera 9/2/2021  · Initiated hemodialysis on 9/10/2021     *Normocytic anemia  · Labs on 7/21/2021 with an iron of 58, ferritin 174  · It is likely that anemia is secondary to her chronic kidney disease   · She was previously receiving Procrit every few weeks as prescribed by Dr. Dudley.    · After she was seen in our office, we took over Procrit injections.  She has been receiving them weekly, last 9/7/2021 with a dose increase in Procrit to 25,000 units at that time.  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 Figueroa  · Follow-up CT imaging scheduled on 8/17 and follow-up with thoracic surgery scheduled thereafter     *Chronic nausea  · History of head injury which is contributing  · Unclear how much uremia may be contributing     *Poor performance status  · ECOG performance status 3-4     PLAN:   1. I will see her back in July 2022 with " labs including a CBC, CMP, serum protein electrophoresis with immunofixation and serum free light chains.  If serum protein studies are stable at that time, no follow-up will be required.

## 2021-09-14 NOTE — PROGRESS NOTES
Patient started dialysis this week. Spoke to [facility] at [phone], and patient has not started on an SANJANA there yet.  Patient to receive Procrit in our office today and then will be followed by [facility] for future Procrit  Injections.   This was discussed with Dr Jefferson Senior's RN clinician.

## 2021-10-07 NOTE — ED PROVIDER NOTES
EMERGENCY DEPARTMENT ENCOUNTER    Room Number:  18/18  Date of encounter:  10/7/2021  PCP: Rhys Crowder Jr., MD  Historian: patient   Full history not obtainable due to: AMS     HPI:  Chief Complaint: Catheter problem     Context: Rachana Arguelles is a 76 y.o. female who presents to the ED c/o catheter problem onset in the middle of the night. The pt daughter states her mother must have pulled her shiley catheter out in the middle of the night. The pt does not recall pulling it out. No reported falls. Takes plavix. Followed by Dr Mancera. She has been on dialysis for 3 weeks. Last treatment was yesterday.    Her daughter states she fell last Friday and has had short term memory problems since the fall. She was seen and admitted to [facility] and discharged 4 days ago. There was no explanation for the memory loss and she is still experiencing these symptoms which have been grossly unchanged for the past week.     MEDICAL RECORD REVIEW:  Patient admitted to [facility] and discharged on 10/1/2021.  Reviewed discharge summaries by Dr. Delgadillo.  Essentially the patient presented for acute altered mental status which was thought to be due to polypharmacy.  Psychiatry was consulted and agreed with discontinuing narcotics benzos and muscle relaxers.  The patient's mental status improved.  The daughter was not in favor of discontinuing these medications and states she would follow-up with her PCP at time of discharge.  Additionally the patient's mental status was reported to be at baseline at time of disposition according to the note.  Discharge summary is listed as below:      Hospital Course:   Patient was admitted with the impression of acute encephalopathy due to polypharmacy. Patient's antidepressant doses were reduced and narcotics, benzos and muscle relaxers were discontinued. Her mental status improved. She is not displaying any signs of withdrawal. Family was educated on the importance of avoiding  polypharmacy in the elderly and the potential complications of these medications building up as she is ESRD on HD and causing further confusion. Psychiatry was consulted and agreed with the modifications made and stated that daughter was not interested in having these medications modified during this visit and that she would follow-up with her physicians after discharge. She received HD during her admission and tolerated well. At time of her admission, patient also had hypertensive emergency failing to respond to oral meds and required Cardene drip which has improved. Cardene drip was discontinued and oral antihypertensive regimen modified. She is currently hemodynamically stable with mental status appearing to be at baseline per family at bedside. She no longer requires inpatient level of care. Will need to follow-up with her primary care doctor and nephrologist after discharge. Patient was evaluated by physical therapy recommended [facility] however family declined and wished to take patient home with home health/home PT.       PAST MEDICAL HISTORY    Active Ambulatory Problems     Diagnosis Date Noted   • Anxiety 03/12/2016   • Chronic pain syndrome 03/12/2016   • Depression 03/12/2016   • Gastroesophageal reflux disease 03/12/2016   • Hyperlipidemia 03/12/2016   • Hypertension 03/12/2016   • Osteoarthritis of hip 03/12/2016   • Chronic low back pain 03/12/2016   • Failed back syndrome of lumbar spine 03/12/2016   • Pulmonary embolus (HCC) 03/12/2016   • Weight loss 04/11/2016   • Polypharmacy 04/22/2016   • Stage 3b chronic kidney disease (HCC)    • Chronic constipation 05/19/2016   • Sacroiliac joint pain 05/08/2014   • Mixed incontinence 02/01/2016   • Renal cyst 08/31/2016   • Achilles tendonitis 08/31/2016   • Atherosclerosis of native artery of left lower extremity with intermittent claudication (HCC) 08/10/2018   • Morbidly obese (HCC) 12/05/2018   • Lung nodule 12/05/2018   • Malignant neoplasm of right upper  lobe of lung ([facility]) 12/26/2018   • Lung cancer ([facility]) 01/02/2019   • Moderate single current episode of major depressive disorder ([facility]) 05/30/2019   • Palpitations 07/31/2019   • Encounter for screening for malignant neoplasm of colon 12/04/2019   • Hematoma of scalp 12/20/2019   • Acute neck pain 12/20/2019   • Dizziness 12/20/2019   • Unstable cervical spine 12/20/2019   • Postconcussive syndrome 02/10/2020   • Positive colorectal cancer screening using Cologuard test 02/12/2020   • Dysphagia 02/12/2020   • S/P reverse total shoulder arthroplasty, right 06/18/2020   • Chronic post-traumatic headache, not intractable 05/21/2020   • Medication overuse headache 05/21/2020   • Migraine without aura and without status migrainosus, not intractable 05/21/2020   • Shortness of breath 08/14/2020   • PAD (peripheral artery disease) ([facility]) 08/14/2020   • Bilateral carotid artery stenosis 08/14/2020   • S/P lobectomy of lung 08/14/2020   • YVONNE (acute kidney injury) ([facility]) 08/14/2020   • Anemia 08/14/2020   • Frequent PVCs 08/14/2020   • Pneumonia of both lower lobes due to infectious organism 08/23/2020   • History of lung cancer 08/23/2020   • Hypertensive urgency 08/23/2020   • Bradycardia, sinus 04/29/2021   • YVONNE (acute kidney injury) ([facility]) 05/26/2021   • Opioid dependence ([facility]) 05/26/2021   • COPD (chronic obstructive pulmonary disease) ([facility]) 06/02/2021   • Renal artery stenosis ([facility]) 06/21/2021   • Acute kidney injury ([facility]) 07/01/2021   • Acute on chronic diastolic CHF (congestive heart failure) ([facility]) 07/03/2021   • Acute gout due to renal impairment involving foot 07/03/2021   • Hypokalemia 07/03/2021   • Anemia of chronic disease 07/03/2021   • Leukocytosis 07/03/2021   • Anemia due to stage 4 chronic kidney disease ([facility]) 07/28/2021   • IgG monoclonal gammopathy 07/28/2021   • ESRD (end stage renal disease) ([facility]) 09/02/2021     Resolved Ambulatory Problems     Diagnosis Date Noted   • Acute-on-chronic renal failure ([facility])  03/12/2016   • Lumbar radiculopathy 03/12/2016   • Claustrophobia 03/12/2016   • Impaired glucose tolerance 03/12/2016   • Sciatica 03/12/2016   • Inflammation of submandibular gland 03/12/2016   • Increased frequency of urination 03/12/2016   • Oral phase dysphagia 03/23/2016   • Medicare annual wellness visit, subsequent 03/23/2016   • Choreiform movements 03/25/2016   • Gastritis 04/11/2016   • Shortness of breath 04/11/2016   • Hypercalcemia 04/11/2016   • Chest pain radiating to jaw 04/22/2016   • Orthostatic hypotension 04/22/2016   • Nocturia 02/01/2016   • Elevated troponin I level 01/20/2013   • CAP (community acquired pneumonia) 12/15/2016   • Bradycardia 05/25/2021   • UTI (urinary tract infection) due to urinary indwelling catheter (HCC) 05/25/2021     Past Medical History:   Diagnosis Date   • AAA (abdominal aortic aneurysm) without rupture ([facility])    • Arthritis    • Cancer (HCC)    • Chest pain    • CKD (chronic kidney disease), stage III (HCC)    • Dyspnea    • GERD (gastroesophageal reflux disease)    • GI problem    • H/O concussion    • Head trauma    • Hiatal hernia    • History of migraine headaches    • Lumbar postlaminectomy syndrome 10/26/2015   • Migraines    • Nausea    • PONV (postoperative nausea and vomiting)    • Pulmonary embolism (HCC) 10/26/2015   • Requires supplemental oxygen    • Slow to wake up after anesthesia    • Submandibular sialoadenitis 10/26/2015   • Visual changes    • Vitamin B 12 deficiency          PAST SURGICAL HISTORY  Past Surgical History:   Procedure Laterality Date   • ANGIOPLASTY ILIAC ARTERY Bilateral 8/10/2018    Procedure: BILATERAL ILIAC STENTS;  Surgeon: Riki Mancera MD;  Location: [facility] MAIN OR;  Service: Vascular   • APPENDECTOMY     • ARTERIOGRAM N/A 6/4/2021    Procedure: Renal Arteriogram with possible intervention;  Surgeon: Mat Coello MD;  Location: [facility] CATH INVASIVE LOCATION;  Service: Cardiology;  Laterality: N/A;   •  BREAST SURGERY      Breast Surgery Reduction Procedure   • BRONCHOSCOPY N/A 2/8/2019    Procedure: BRONCHOSCOPY;  Surgeon: Álvaro Gifford MD;  [facility] END[facility];  Service: Pulmonary   • CARDIAC CATHETERIZATION N/A 6/4/2021    Procedure: Stent BMS peripheral- RENAL-RIGHT;  Surgeon: Mat Coello MD;  Location:  [facility] CATH INVASIVE LOCATION;  Service: Cardiology;  Laterality: N/A;   • CATARACT EXTRACTION Bilateral    • CHOLECYSTECTOMY     • COLONOSCOPY  2006    Dr. Saha   • COLONOSCOPY  2012    Dr. Saha   • ENDOSCOPY N/A 11/3/2020    Procedure: ESOPHAGOGASTRODUODENOSCOPY with 54 Greek vernon dilation;  Surgeon: Yunior Vo MD;  Location: [facility] ENDOSCOPY;  Service: Gastroenterology;  Laterality: N/A;  pre- dysphagia  post- esophageal ring, hiatal hernia   • HYSTERECTOMY     • INSERTION HEMODIALYSIS CATHETER Right 9/2/2021    Procedure: PALINDROME CATHERER;  Surgeon: Riki Mancera MD;  Location: [facility] MAIN OR;  Service: Vascular;  Laterality: Right;   • INTERVENTIONAL RADIOLOGY PROCEDURE N/A 6/4/2021    Procedure: Abdominal Aortogram;  Surgeon: Mat Coello MD;  Location: [facility] CATH INVASIVE LOCATION;  Service: Cardiology;  Laterality: N/A;   • INTUBATION  1/15/2019        • JOINT REPLACEMENT      left knee, hip, shoulder   • LASIK     • LUMBAR FUSION      Lumbar Vertebral Fusion   • RENAL ARTERY STENT Right    • SHOULDER ARTHROSCOPY  04/04/2013    Dr. Carrillo/MERRI[facility]   • THORACOSCOPY VIDEO ASSISTED WITH LOBECTOMY Right 1/11/2019    Procedure: BRONCOSCOPY, THORACOSCOPY VIDEO ASSISTED WITH RIGHT UPPER LOBE WEDGE RESECTION, COMPLETION RIGHT UPPER LOBECTOMY, LYMPH NODE DISSECTION, INTERCOSTAL NERVE BLOCK;  Surgeon: Quita Figueroa MD;  Location: [facility] MAIN OR;  Service: Thoracic   • TONSILLECTOMY     • TOTAL HIP ARTHROPLASTY REVISION Left    • TOTAL KNEE ARTHROPLASTY Left    • TOTAL SHOULDER ARTHROPLASTY Left    • TOTAL SHOULDER ARTHROPLASTY W/ DISTAL CLAVICLE  EXCISION Right 2020    Procedure: RIGHT TOTAL SHOULDER REVERSE ARTHROPLASTY WITH GPS;  Surgeon: Lino Carrillo MD;  Location: [facility] OR [facility];  Service: Orthopedics;  Laterality: Right;         FAMILY HISTORY  Family History   Problem Relation Age of Onset   • Hypertension Mother    • Lung cancer Mother    • Cancer Mother         lung   • Aortic aneurysm Mother    • Kidney disease Father    • Other Brother         Coronary Artery Bypass Grafting   • Stroke Brother         Cerebrovascular Accident   • Depression Daughter    • Malig Hyperthermia Neg Hx          SOCIAL HISTORY  Social History     Socioeconomic History   • Marital status:      Spouse name: Don   • Number of children: Not on file   • Years of education: College   • Highest education level: Not on file   Tobacco Use   • Smoking status: Former Smoker     Packs/day: 2.50     Years: 15.00     Pack years: 37.50     Types: Cigarettes     Quit date: 2003     Years since quittin.0   • Smokeless tobacco: Never Used   Vaping Use   • Vaping Use: Never used   Substance and Sexual Activity   • Alcohol use: No     Comment: occ   • Drug use: Never   • Sexual activity: Defer         ALLERGIES  Minoxidil, Neurontin [gabapentin], and Morphine        REVIEW OF SYSTEMS  Review of Systems   All systems reviewed and marked as negative except as listed in HPI       PHYSICAL EXAM    I have reviewed the triage vital signs and nursing notes.    ED Triage Vitals   Temp Heart Rate Resp BP SpO2   10/07/21 0839 10/07/21 0838 10/07/21 0838 10/07/21 0838 10/07/21 0838   97.1 °F (36.2 °C) 74 16 (!) 190/100 97 %      Temp src Heart Rate Source Patient Position BP Location FiO2 (%)   10/07/21 0838 10/07/21 0838 10/07/21 1009 10/07/21 1009 --   Tympanic Monitor Lying Left arm        GENERAL: alert well developed, well nourished in no distress  HENT: NCAT, neck supple, trachea midline  EYES: no scleral icterus, PERRL, normal conjunctivae  CV: regular rhythm, regular  rate, no murmur  RESPIRATORY: unlabored effort, CTAB  ABDOMEN: soft, nontender, nondistended, bowel sounds present  MUSCULOSKELETAL: no gross deformity  NEURO: alert,  sensory and motor function of extremities grossly intact, speech clear, mental status normal/baseline. Pt is conversive and participates in history taking. Oriented to person, place and time.   SKIN: warm, dry, no rash. Dressing saturated with sanguinous drainage to the right upper chest overlying .5cm incision which is not actively bleeding. No tenderness or milena incisional edema.  PSYCH:  Appropriate mood and affect    Vital signs and nursing notes reviewed.          LAB RESULTS  Recent Results (from the past 24 hour(s))   CBC Auto Differential    Collection Time: 10/07/21 10:44 AM    Specimen: Blood   Result Value Ref Range    WBC 7.13 3.40 - 10.80 10*3/mm3    RBC 2.99 (L) 3.77 - 5.28 10*6/mm3    Hemoglobin 8.9 (L) 12.0 - 15.9 g/dL    Hematocrit 28.5 (L) 34.0 - 46.6 %    MCV 95.3 79.0 - 97.0 fL    MCH 29.8 26.6 - 33.0 pg    MCHC 31.2 (L) 31.5 - 35.7 g/dL    RDW 14.5 12.3 - 15.4 %    RDW-SD 51.0 37.0 - 54.0 fl    MPV 9.0 6.0 - 12.0 fL    Platelets 234 140 - 450 10*3/mm3    Neutrophil % 64.8 42.7 - 76.0 %    Lymphocyte % 21.6 19.6 - 45.3 %    Monocyte % 8.6 5.0 - 12.0 %    Eosinophil % 3.4 0.3 - 6.2 %    Basophil % 0.3 0.0 - 1.5 %    Immature Grans % 1.3 (H) 0.0 - 0.5 %    Neutrophils, Absolute 4.63 1.70 - 7.00 10*3/mm3    Lymphocytes, Absolute 1.54 0.70 - 3.10 10*3/mm3    Monocytes, Absolute 0.61 0.10 - 0.90 10*3/mm3    Eosinophils, Absolute 0.24 0.00 - 0.40 10*3/mm3    Basophils, Absolute 0.02 0.00 - 0.20 10*3/mm3    Immature Grans, Absolute 0.09 (H) 0.00 - 0.05 10*3/mm3    nRBC 0.0 0.0 - 0.2 /100 WBC       Ordered the above labs and independently reviewed the results.      PROCEDURES    Procedures        MEDICATIONS GIVEN IN ER    Medications   ceFAZolin in dextrose (ANCEF) IVPB solution 2 g (has no administration in time range)   carvedilol  (COREG) tablet 25 mg (25 mg Oral Given 10/7/21 1051)   hydrALAZINE (APRESOLINE) tablet 50 mg (50 mg Oral Given 10/7/21 1052)   NIFEdipine XL (PROCARDIA XL) 24 hr tablet 90 mg (90 mg Oral Given 10/7/21 1052)         PROGRESS, DATA ANALYSIS, CONSULTS, AND MEDICAL DECISION MAKING    All labs have been independently reviewed by me.  All radiology studies have been reviewed by me.   EKG's independently reviewed by me.  Discussion below represents my analysis of pertinent findings related to patient's condition, differential diagnosis, treatment plan and final disposition.      ED Course as of Oct 07 1135   Thu Oct 07, 2021   1058 Stable for pt on chart review    Hemoglobin(!): 8.9 [JS]   1115 BP is elevated. Home blood pressure medications ordered as pt has not had any of them today.     [JS]   1124 Discussed pt with Dr Philip who evaluated the pt in the ER . She will try to add her on to the OR schedule to replace shiley catheter.     [JS]      ED Course User Index  [JS] Whitney Sharpe APRN       AS OF 11:35 EDT VITALS:        BP - (!) 194/88  HR - 74  TEMP - 97.1 °F (36.2 °C) (Temporal)  O2 SATS - 97%        DIAGNOSIS  Final diagnoses:   Problem with vascular access   Essential hypertension   ESRD (end stage renal disease) (HCC)   Uncomplicated opioid dependence (HCC)         DISPOSITION  Admit to OR     Pt masked in first look. I wore appropriate PPE throughout my encounters with the pt. I performed hand hygiene on entry into the pt room and upon exit.     Dictated utilizing Dragon dictation:  Much of this encounter note is an electronic transcription/translation of spoken language to printed text. The electronic translation of spoken language may permit erroneous, or at times, nonsensical words or phrases to be inadvertently transcribed; Although I have reviewed the note for such errors, some may still exist.     Whitney Sharpe APRN  10/07/21 1133

## 2021-10-07 NOTE — ED NOTES
Patient's dressing checked 3 additional times by this RN. It is dry with no visible new blood noted.      Sade Nur, RN  10/07/21 8167

## 2021-10-07 NOTE — PERIOPERATIVE NURSING NOTE
Called radiology for tunnelled cath chest xray results- Judit in radiology  States that Dr. Sharpe's report reads no Pneumothorax and lungs are clear.

## 2021-10-07 NOTE — ANESTHESIA POSTPROCEDURE EVALUATION
"Patient: Rachana Arguelles    Procedure Summary     Date: 10/07/21 Room / Location: [facility] OR [address] [place] [address] MAIN OR    Anesthesia Start: 1555 Anesthesia Stop: 1642    Procedure: HEMODIALYSIS CATHETER INSERTION (N/A ) Diagnosis:     Surgeons: Fany Salinas Jr., MD Provider: Mat Finch MD    Anesthesia Type: general ASA Status: 4          Anesthesia Type: general    Vitals  Vitals Value Taken Time   /71 10/07/21 1726   Temp 36.8 °C (98.2 °F) 10/07/21 1638   Pulse 66 10/07/21 1737   Resp 16 10/07/21 1725   SpO2 95 % 10/07/21 1737   Vitals shown include unvalidated device data.        Post Anesthesia Care and Evaluation    Patient location during evaluation: bedside  Patient participation: complete - patient participated  Level of consciousness: sleepy but conscious  Pain score: 0  Pain management: adequate  Airway patency: patent  Anesthetic complications: No anesthetic complications    Cardiovascular status: acceptable  Respiratory status: acceptable  Hydration status: acceptable    Comments: /71 (BP Location: Right arm, Patient Position: Lying)   Pulse 67   Temp 36.8 °C (98.2 °F) (Oral)   Resp 16   Ht 167.6 cm (66\")   Wt 77.1 kg (170 lb)   SpO2 94%   BMI 27.44 kg/m²         "

## 2021-10-07 NOTE — OP NOTE
Date of Admission:  10/7/2021  10/07/21  Char Marte MD  [facility]    Preoperative Diagnosis: End stage renal disease    Postoperative Diagnosis: same as above    Procedure Performed:     1)  Tunneled catheter insertion into the Right internal jugular vein  2)  Ultrasound guided vascular access  3)  Radiologic supervision of catheter tip placement    CPT 13320, 49800, 23127    Surgeon: Char Marte MD    Assistant:  Meghann RIDLEY, Provided critical assistance in exposure, retraction, and suction that overall decrease blood loss and operative time.    Anesthesia: MAC with local    Estimated Blood Loss:  Minimal    Findings:     PatentRight internal jugular vein vein on ultrasound.  Under real time ultrasound visualization needle accessed of the vein was performed.  Ultrasound image of access printed and stored in the medical record.    Both blue and red ports draw and flush without resistance    Successful  placement of a 23 cm Palindrome catheter in the Right internal jugular vein.    Implants:    Nothing was implanted during the procedure    Specimen: none    Complications: none    Dispo: to PACU    Indication for procedure: 76 y.o. female with ESRD on HD who is scheduled for AV access placement in the near future.  Her catheter became dislodged this morning and she presented to the ER with bleeding.  She is brought to the OR for tunneled catheter placement.     Description of procedure:     The patient was taken to the operating room. Vein was interrogated with an ultrasound which appeared to be adequate for catheter placement. Surgical sites were prepped and draped in the usual sterile fashion. A full surgical timeout was performed.  The skin overlying the vein was infiltrated with local. Under ultrasound guidance, the vein was accessed and wire was placed under fluoroscopic guidance into the cava.  15  bladed scalpel was used to make a half centimeter skin neck at the vascular insertion  site.  Local anesthetic was used to anesthetize the tunneling tract of the palindrome catheter.  Half centimeters skin neck was made at the planned exit site of the catheter.  Serial dilation was performed under fluoroscopic guidance to vascular access site with the supplied vascular dilators.  Peel-away sheath and dilator were placed under fluoroscopic guidance over the guidewire.  Palindrome catheter was tunneled from the exit site to the vascular insertion site.  Wire and dilator were removed from the peel-away sheath.  Catheter was placed down into the peel-away sheath into the vena cava.  Catheter was withdrawn until adequate tip placement under fluoroscopic guidance.  Fluoroscopy of the apex of the catheter was performed to ensure no kinking.  Catheter was secured in place with nylon sutures.  Thre vascular access site was closed using Vicryl suture.  Sterile dressings were applied throughout.    Char Marte MD  10/07/21    There are no hospital problems to display for this patient.      .

## 2021-10-07 NOTE — ED NOTES
Patient's dressing checked. It is dry with no new visible blood noted.      Sade Nur, RN  10/07/21 4836

## 2021-10-07 NOTE — ED TRIAGE NOTES
"Patient arrived with bandage in place applied by her \"neighbor that is a nurse\". The dressing is dry with no visible new blood noted.   "

## 2021-10-07 NOTE — CONSULTS
Name: Rachana Arguelles ADMIT: 10/7/2021   : 1945  PCP: Rhys Crowder Jr., MD    MRN: [id number] LOS: 0 days   AGE/SEX: 76 y.o. female  ROOM:      Consults  Char Marte MD     LOS: 0 days   Patient Care Team:  Rhys Crowder Jr., MD as PCP - General (Family Medicine)  Whitney Dudley MD as Consulting Physician (Nephrology)  Quita Figueroa MD as Surgeon (Thoracic Surgery)  Sabas Luther MD as Consulting Physician (Otolaryngology)  Álvaro Gifford MD as Consulting Physician (Pulmonary Disease)  Rhys Crowder Jr., MD as Referring Physician (Family Medicine)  Ld Paulino MD as Consulting Physician (Hematology and Oncology)  Mat Coello MD as Consulting Physician (Cardiology)    Subjective     History of Present Illness  76 y.o. female with a h/o ESRD on HD M// via right IJV catheter followed by my partner Dr. Christophe Mancera who presents after tunneled catheter dislodged this morning.  She does not have any other indwelling HD access.  No defibrillator or pacemaker in place.  She is scheduled for AV access creation with Dr. Mancera in the near future.  Bleeding has stopped and she is hypertensive with a SBP in the 200s with a headache.  She did not take her blood pressure medications this morning due to the bleeding.      Review of Systems   Constitutional: Negative.    Eyes: Negative.    Respiratory: Negative.    Cardiovascular: Negative.    Gastrointestinal: Negative.    Endocrine: Negative.    Genitourinary: Negative.    Musculoskeletal: Negative.    Skin: Negative.    Allergic/Immunologic: Negative.    Neurological: Positive for headaches.   Hematological: Negative.    Psychiatric/Behavioral: Negative.        Past Medical History:   Diagnosis Date   • AAA (abdominal aortic aneurysm) without rupture (HCC)    • Anemia    • Anxiety    • Arthritis    • Bilateral carotid artery stenosis 2020   • Cancer (HCC)     skin cancer   • Chest pain     OCCAS   • Chronic low back pain    •  Chronic pain syndrome 3/12/2016   • CKD (chronic kidney disease), stage III ([facility])    • Claustrophobia 10/26/2015    Resolved   • COPD (chronic obstructive pulmonary disease) ([facility])    • Depression    • Dizziness    • Dyspnea    • GERD (gastroesophageal reflux disease)    • GI problem    • H/O concussion    • Head trauma     FELL CUT HEAD 12 STAPLES   • Hiatal hernia    • History of migraine headaches    • Hyperlipidemia    • Hypertension    • Lumbar postlaminectomy syndrome 10/26/2015    Resolved   • Lung cancer ([facility])    • Lung nodule    • Migraines    • Nausea    • Palpitations    • Polypharmacy 4/22/2016   • PONV (postoperative nausea and vomiting)    • Pulmonary embolism (HCC) 10/26/2015    January 2013 - Resolved, felt to be secondary to estrogen use   • Requires supplemental oxygen     2 LITERS WHEN SLEEPING AND AS NEEDED   • Slow to wake up after anesthesia    • Submandibular sialoadenitis 10/26/2015    Resolved   • Visual changes    • Vitamin B 12 deficiency        Past Surgical History:   Procedure Laterality Date   • ANGIOPLASTY ILIAC ARTERY Bilateral 8/10/2018    Procedure: BILATERAL ILIAC STENTS;  Surgeon: Riki Mancera MD;  Location: [facility] MAIN OR;  Service: Vascular   • APPENDECTOMY     • ARTERIOGRAM N/A 6/4/2021    Procedure: Renal Arteriogram with possible intervention;  Surgeon: Mat Coello MD;  Location: [facility] CATH INVASIVE LOCATION;  Service: Cardiology;  Laterality: N/A;   • BREAST SURGERY      Breast Surgery Reduction Procedure   • BRONCHOSCOPY N/A 2/8/2019    Procedure: BRONCHOSCOPY;  Surgeon: Álvaro Gifford MD;  Location: [facility] ENDOSCOPY;  Service: Pulmonary   • CARDIAC CATHETERIZATION N/A 6/4/2021    Procedure: Stent BMS peripheral- RENAL-RIGHT;  Surgeon: Mat Coello MD;  Location: [facility] CATH INVASIVE LOCATION;  Service: Cardiology;  Laterality: N/A;   • CATARACT EXTRACTION Bilateral    • CHOLECYSTECTOMY     • COLONOSCOPY  2006    Dr. Saha   • COLONOSCOPY   2012    Dr. Saha   • ENDOSCOPY N/A 11/3/2020    Procedure: ESOPHAGOGASTRODUODENOSCOPY with 54 Macedonian vernon dilation;  Surgeon: Yunior Vo MD;  Location: [facility] ENDOSCOPY;  Service: Gastroenterology;  Laterality: N/A;  pre- dysphagia  post- esophageal ring, hiatal hernia   • HYSTERECTOMY     • INSERTION HEMODIALYSIS CATHETER Right 9/2/2021    Procedure: PALINDROME CATHERER;  Surgeon: Riki Mancera MD;  Location: [facility] MAIN OR;  Service: Vascular;  Laterality: Right;   • INTERVENTIONAL RADIOLOGY PROCEDURE N/A 6/4/2021    Procedure: Abdominal Aortogram;  Surgeon: Mat Coello MD;  Location: [facility] CATH INVASIVE LOCATION;  Service: Cardiology;  Laterality: N/A;   • INTUBATION  1/15/2019        • JOINT REPLACEMENT      left knee, hip, shoulder   • LASIK     • LUMBAR FUSION      Lumbar Vertebral Fusion   • RENAL ARTERY STENT Right    • SHOULDER ARTHROSCOPY  04/04/2013    Dr. Carrillo/MERRILL Kent   • THORACOSCOPY VIDEO ASSISTED WITH LOBECTOMY Right 1/11/2019    Procedure: BRONCOSCOPY, THORACOSCOPY VIDEO ASSISTED WITH RIGHT UPPER LOBE WEDGE RESECTION, COMPLETION RIGHT UPPER LOBECTOMY, LYMPH NODE DISSECTION, INTERCOSTAL NERVE BLOCK;  Surgeon: Quita Figueroa MD;  Location: [facility] OR;  Service: Thoracic   • TONSILLECTOMY     • TOTAL HIP ARTHROPLASTY REVISION Left    • TOTAL KNEE ARTHROPLASTY Left    • TOTAL SHOULDER ARTHROPLASTY Left    • TOTAL SHOULDER ARTHROPLASTY W/ DISTAL CLAVICLE EXCISION Right 6/18/2020    Procedure: RIGHT TOTAL SHOULDER REVERSE ARTHROPLASTY WITH GPS;  Surgeon: Lino Carrillo MD;  Location: [facility];  Service: Orthopedics;  Laterality: Right;       Family History   Problem Relation Age of Onset   • Hypertension Mother    • Lung cancer Mother    • Cancer Mother         lung   • Aortic aneurysm Mother    • Kidney disease Father    • Other Brother         Coronary Artery Bypass Grafting   • Stroke Brother         Cerebrovascular Accident   • Depression  Daughter    • Malig Hyperthermia Neg Hx        Social History     Tobacco Use   • Smoking status: Former Smoker     Packs/day: 2.50     Years: 15.00     Pack years: 37.50     Types: Cigarettes     Quit date: 2003     Years since quittin.0   • Smokeless tobacco: Never Used   Vaping Use   • Vaping Use: Never used   Substance Use Topics   • Alcohol use: No     Comment: occ   • Drug use: Never       Allergies: Minoxidil, Neurontin [gabapentin], and Morphine    (Not in a hospital admission)      No current facility-administered medications for this encounter.          Objective   Temp:  [97.1 °F (36.2 °C)] 97.1 °F (36.2 °C)  Heart Rate:  [74] 74  Resp:  [16] 16  BP: (190-194)/() 194/88    No intake/output data recorded.    Physical Exam  Constitutional:       General: She is not in acute distress.     Appearance: Normal appearance.   HENT:      Head: Normocephalic and atraumatic.      Right Ear: External ear normal.      Left Ear: External ear normal.      Nose: Nose normal.      Mouth/Throat:      Mouth: Mucous membranes are dry.   Eyes:      Extraocular Movements: Extraocular movements intact.      Pupils: Pupils are equal, round, and reactive to light.   Neck:      Comments: Right IJV tunneled catheter site c/d/i with no active bleeding, no hematoma at neck puncture site  Cardiovascular:      Rate and Rhythm: Normal rate and regular rhythm.      Comments: Palpable radial pulses bilaterally  Pulmonary:      Effort: Pulmonary effort is normal. No respiratory distress.   Abdominal:      General: Abdomen is flat.      Palpations: Abdomen is soft.   Musculoskeletal:         General: No swelling or deformity.      Cervical back: Normal range of motion and neck supple.   Skin:     General: Skin is warm and dry.      Capillary Refill: Capillary refill takes less than 2 seconds.   Neurological:      General: No focal deficit present.      Mental Status: She is alert and oriented to person, place, and time.    Psychiatric:         Mood and Affect: Mood normal.         Behavior: Behavior normal.         Results from last 7 days   Lab Units 10/07/21  1044 10/04/21  0506 10/03/21  0231 10/02/21  0014 10/01/21  1126   WBC 10*3/mm3 7.13 8.11 7.90 8.11 8.77   HEMOGLOBIN g/dL 8.9* 8.4* 8.3* 8.3* 8.7*   PLATELETS 10*3/mm3 234 257 266 244 225     Estimated Creatinine Clearance: 18.2 mL/min (A) (by C-G formula based on SCr of 2.75 mg/dL (C)).  Results from last 7 days   Lab Units 10/01/21  1126   PROTIME s 10.1*   INR INR 0.9       Imaging Studies:  CXR reviewed from 21      There are no hospital problems to display for this patient.    Problem Points:  4:  Patient has a new problem, with additional work-up planned  Total problem points:4 or more    Data Points:  2:  I have personally and independently review of image, tracing, or specimen  2:  I have reviewed and summation of old records and/or discussed the patients care with another health care provider  Total data points:4 or more    Risk Points:  High:  Any illness that poses threat to life or body funciton    MDM Prob point Data point Risk   SF 1 1 Minimal   Low 2 2 Low   Mod 3 3 Moderate   High 4 4 High     Code MDM History Exam Time   47495 SF/Low Detailed Detailed 30   36889 Mod Comprehensive Comprehensive 50   51007 High Comprehensive Comprehensive 70     Detailed history:  4 elements HPI or status of 3 chronic problems; 2-9 system ROS  Comprehensive:  4 elements HPI or status of 3 chronic problems;  10 system ROS    Detailed Exam:  12 findings from any organ system  Comprehensive Exam:  2 findings from each of 9 systems.     Billin    Assessment/Plan       * No active hospital problems. *        76 y.o. female with ESRD on HD M/W/F who presents to ER with bleeding due to catheter dislodgement this morning.    -will add on to OR schedule for TDC placement today    I discussed the patients findings and my recommendations with patient and family.  Please call  my office with any question: [phone]    Char Marte MD  10/07/21  11:25 EDT

## 2021-10-07 NOTE — ED PROVIDER NOTES
MD ATTESTATION NOTE    The LUPE and I have discussed this patient's history, physical exam, and treatment plan.  I have reviewed the documentation and personally had a face to face interaction with the patient. I affirm the documentation and agree with the treatment and plan.  The attached note describes my personal findings.      History  76-year-old female presents after accidentally pulling out Shiley catheter last night.  Patient did not take blood pressure medications prior to coming to ER today.    Physical Exam  Vital Signs reviewed  Alert, Well Appearing in NAD  Heart-regular without murmur    Disposition  I discussed treatment and evaluation this patient with NP Barbara Sharpe.  Patient has been seen by vascular surgery and they plan to take her for Shiley placement later today.  We will go ahead and give patient's routine blood pressure medications.     Abdirahman Christine MD  10/07/21 1127

## 2021-10-07 NOTE — ANESTHESIA PREPROCEDURE EVALUATION
Anesthesia Evaluation     Patient summary reviewed and Nursing notes reviewed   history of anesthetic complications: PONV               Airway   Mallampati: II  TM distance: >3 FB  Neck ROM: limited  Dental      Pulmonary    (+) pneumonia , pulmonary embolism, a smoker Former, lung cancer, COPD, home oxygen, shortness of breath,   Cardiovascular     ECG reviewed  PT is on anticoagulation therapy  Rate: normal    (+) hypertension, dysrhythmias PVC, CHF , PVD, hyperlipidemia,  carotid artery disease      Neuro/Psych  (+) headaches, dizziness/light headedness, psychiatric history Anxiety and Depression,     GI/Hepatic/Renal/Endo    (+) obesity,  hiatal hernia, GERD,  renal disease ESRD and dialysis,     Musculoskeletal     (+) neck pain,   Abdominal    Substance History - negative use     OB/GYN negative ob/gyn ROS         Other   arthritis,    history of cancer                    Anesthesia Plan    ASA 4     general   (I have reviewed the patient's history with the patient and the chart, including all pertinent laboratory results and imaging. I have explained the risks of anesthesia including but not limited to dental damage, corneal abrasion, nerve injury, MI, stroke, and death. Questions asked and answered. Anesthetic plan discussed with patient and team as indicated. Patient expressed understanding of the above.    Anemia    Patient had dialysis yesterday 10/6/21)  intravenous induction     Anesthetic plan, all risks, benefits, and alternatives have been provided, discussed and informed consent has been obtained with: patient.

## 2021-10-11 NOTE — H&P
This procedure was performed by Dr. Philip in her consult note from that day will be the history physical examination.

## 2021-10-12 NOTE — DISCHARGE INSTRUCTIONS
Take the following medications the morning of surgery:  SERTRALINE, OMEPRAZOLE, CARVEDILOL, ADVAIR, HYDRALAZINE, LORAZEPAM AND OXYCODONE IF NEEDED      Arrive to hospital on your day of surgery at 7:00 AM.      If you are on prescription narcotic pain medication to control your pain you may also take that medication the morning of surgery.    General Instructions:  • Do not eat solid food after midnight the night before surgery.  • You may drink clear liquids day of surgery but must stop at least one hour before your hospital arrival time.  • It is beneficial for you to have a clear drink that contains carbohydrates the day of surgery.  We suggest a 12 to 20 ounce bottle of Gatorade or Powerade for non-diabetic patients or a 12 to 20 ounce bottle of G2 or Powerade Zero for diabetic patients. (Pediatric patients, are not advised to drink a 12 to 20 ounce carbohydrate drink)    Clear liquids are liquids you can see through.  Nothing red in color.     Plain water                               Sports drinks  Sodas                                   Gelatin (Jell-O)  Fruit juices without pulp such as white grape juice and apple juice  Popsicles that contain no fruit or yogurt  Tea or coffee (no cream or milk added)  Gatorade / Powerade  G2 / Powerade Zero    • Infants may have breast milk up to four hours before surgery.  • Infants drinking formula may drink formula up to six hours before surgery.   • Patients who avoid smoking, chewing tobacco and alcohol for 4 weeks prior to surgery have a reduced risk of post-operative complications.  Quit smoking as many days before surgery as you can.  • Do not smoke, use chewing tobacco or drink alcohol the day of surgery.   • If applicable bring your C-PAP/ BI-PAP machine.  • Bring any papers given to you in the doctor’s office.  • Wear clean comfortable clothes.  • Do not wear contact lenses, false eyelashes or make-up.  Bring a case for your glasses.   • Bring crutches or walker  if applicable.  • Remove all piercings.  Leave jewelry and any other valuables at home.  • Hair extensions with metal clips must be removed prior to surgery.  • The Pre-Admission Testing nurse will instruct you to bring medications if unable to obtain an accurate list in Pre-Admission Testing.        If you were given a blood bank ID arm band remember to bring it with you the day of surgery.    Preventing a Surgical Site Infection:  • For 2 to 3 days before surgery, avoid shaving with a razor because the razor can irritate skin and make it easier to develop an infection.    • Any areas of open skin can increase the risk of a post-operative wound infection by allowing bacteria to enter and travel throughout the body.  Notify your surgeon if you have any skin wounds / rashes even if it is not near the expected surgical site.  The area will need assessed to determine if surgery should be delayed until it is healed.  • The night prior to surgery shower using a fresh bar of anti-bacterial soap (such as Dial) and clean washcloth.  Sleep in a clean bed with clean clothing.  Do not allow pets to sleep with you.  • Shower on the morning of surgery using a fresh bar of anti-bacterial soap (such as Dial) and clean washcloth.  Dry with a clean towel and dress in clean clothing.  • Ask your surgeon if you will be receiving antibiotics prior to surgery.  • Make sure you, your family, and all healthcare providers clean their hands with soap and water or an alcohol based hand  before caring for you or your wound.    Day of surgery:  Your arrival time is approximately two hours before your scheduled surgery time.  Upon arrival, a Pre-op nurse and Anesthesiologist will review your health history, obtain vital signs, and answer questions you may have.  The only belongings needed at this time will be a list of your home medications and if applicable your C-PAP/BI-PAP machine.  A Pre-op nurse will start an IV and you may  receive medication in preparation for surgery, including something to help you relax.     Please be aware that surgery does come with discomfort.  We want to make every effort to control your discomfort so please discuss any uncontrolled symptoms with your nurse.   Your doctor will most likely have prescribed pain medications.      If you are going home after surgery you will receive individualized written care instructions before being discharged.  A responsible adult must drive you to and from the hospital on the day of your surgery and stay with you for 24 hours.  Discharge prescriptions can be filled by the hospital pharmacy during regular pharmacy hours.  If you are having surgery late in the day/evening your prescription may be e-prescribed to your pharmacy.  Please verify your pharmacy hours or chose a 24 hour pharmacy to avoid not having access to your prescription because your pharmacy has closed for the day.    If you are staying overnight following surgery, you will be transported to your hospital room following the recovery period.  [facility] has all private rooms.    If you have any questions please call Pre-Admission Testing at [phone].  Deductibles and co-payments are collected on the day of service. Please be prepared to pay the required co-pay, deductible or deposit on the day of service as defined by your plan.    Patient Education for Self-Quarantine Process    • Following your COVID testing, we strongly recommend that you wear a mask when you are with other people and practice social distancing.   • Limit your activities to only required outings.  • Wash your hands with soap and water frequently for at least 20 seconds.   • Avoid touching your eyes, nose and mouth with unwashed hands.  • Do not share anything - utensils, drinking glasses, food from the same bowl.   • Sanitize household surfaces daily. Include all high touch areas (door handles, light switches, phones,  countertops, etc.)    Call your surgeon immediately if you experience any of the following symptoms:  • Sore Throat  • Shortness of Breath or difficulty breathing  • Cough  • Chills  • Body soreness or muscle pain  • Headache  • Fever  • New loss of taste or smell  • Do not arrive for your surgery ill.  Your procedure will need to be rescheduled to another time.  You will need to call your physician before the day of surgery to avoid any unnecessary exposure to hospital staff as well as other patients.    CHLORHEXIDINE CLOTH INSTRUCTIONS  The morning of surgery follow these instructions using the Chlorhexidine cloths you've been given.  These steps reduce bacteria on the body.  Do not use the cloths near your eyes, ears mouth, genitalia or on open wounds.  Throw the cloths away after use but do not try to flush them down a toilet.      • Open and remove one cloth at a time from the package.    • Leave the cloth unfolded and begin the bathing.  • Massage the skin with the cloths using gentle pressure to remove bacteria.  Do not scrub harshly.   • Follow the steps below with one 2% CHG cloth per area (6 total cloths).  • One cloth for neck, shoulders and chest.  • One cloth for both arms, hands, fingers and underarms (do underarms last).  • One cloth for the abdomen followed by groin.  • One cloth for right leg and foot including between the toes.  • One cloth for left leg and foot including between the toes.  • The last cloth is to be used for the back of the neck, back and buttocks.    Allow the CHG to air dry 3 minutes on the skin which will give it time to work and decrease the chance of irritation.  The skin may feel sticky until it is dry.  Do not rinse with water or any other liquid or you will lose the beneficial effects of the CHG.  If mild skin irritation occurs, do rinse the skin to remove the CHG.  Report this to the nurse at time of admission.  Do not apply lotions, creams, ointments, deodorants or  perfumes after using the clothes. Dress in clean clothes before coming to the hospital.

## 2021-10-14 NOTE — PERIOPERATIVE NURSING NOTE
Dr Gaitan stopped by unit & was updated about my conversation with Dr Arnold on pt's sats.  Dr Gaitan saw pt ambulate to her wheelchair.  Dr Gaitan agreeable  With plan to discharge pt to home now.

## 2021-10-14 NOTE — PERIOPERATIVE NURSING NOTE
Spoke with CHASE Stapleton at Dr Mancera's side who is in OR at present.  RN called to notify that pt has a great bruit, but I cannot feel the thrill on her arm.  Dr Mancera says this is to be expected.  No new orders.

## 2021-10-14 NOTE — ANESTHESIA PROCEDURE NOTES
Peripheral Block      Patient location during procedure: holding area  Start time: 10/14/2021 8:49 AM  Stop time: 10/14/2021 8:56 AM  Reason for block: at surgeon's request and post-op pain management  Performed by  Anesthesiologist: Karthikeyan Arnold MD  Preanesthetic Checklist  Completed: patient identified, IV checked, site marked, risks and benefits discussed, surgical consent, monitors and equipment checked, pre-op evaluation and timeout performed  Prep:  Pt Position: supine  Sterile barriers:cap and gloves  Prep: ChloraPrep  Patient monitoring: blood pressure monitoring, continuous pulse oximetry and EKG  Procedure  Sedation:yes  Performed under: local infiltration  Guidance:ultrasound guided  ULTRASOUND INTERPRETATION. Using ultrasound guidance a 21 G gauge needle was placed in close proximity to the nerve, at which point, under ultrasound guidance anesthetic was injected in the area of the nerve and spread of the anesthesia was seen on ultrasound in close proximity thereto.  There were no abnormalities seen on ultrasound; a digital image was taken; and the patient tolerated the procedure with no complications. Images:still images obtained    Laterality:left  Block Type:interscalene  Injection Technique:single-shot  Needle Type:long-bevel  Needle Gauge:20 G      Medications Used: ropivacaine (NAROPIN) 0.5 % injection, 30 mL  Med admintered at 10/14/2021 8:56 AM      Post Assessment  Injection Assessment: negative aspiration for heme, no paresthesia on injection and incremental injection  Patient Tolerance:comfortable throughout block  Complications:no

## 2021-10-14 NOTE — PERIOPERATIVE NURSING NOTE
Spoke with Dr Arnold about pt's oxygen sats (see prior note for details).  Dr Arnold approved for pt to discharge now with her .  No need for him to go home to get her supplemental oxygen.  Pt & spouse updated & agreeable /stated comfort with this plan.

## 2021-10-14 NOTE — ANESTHESIA POSTPROCEDURE EVALUATION
Patient: Rachana Arguelles    Procedure Summary     Date: 10/14/21 Room / Location: [facility] OR  / [facility] MAIN OR    Anesthesia Start: 1053 Anesthesia Stop: 1308    Procedure: LEFT ARM BRACHIOAXILLARY ARTERIOVENOUS GRAFT (Left ) Diagnosis:     Surgeons: Riki Mancera MD Provider: Diaz Barrios MD    Anesthesia Type: MAC ASA Status: 3          Anesthesia Type: MAC    Vitals  Vitals Value Taken Time   /54 10/14/21 1346   Temp     Pulse 57 10/14/21 1355   Resp 16 10/14/21 1305   SpO2 97 % 10/14/21 1355   Vitals shown include unvalidated device data.        Post Anesthesia Care and Evaluation    Patient location during evaluation: PACU  Patient participation: complete - patient participated  Level of consciousness: awake and alert  Pain management: adequate  Airway patency: patent  Anesthetic complications: No anesthetic complications    Cardiovascular status: acceptable  Respiratory status: acceptable  Hydration status: acceptable    Comments: --------------------            10/14/21               1318     --------------------   BP:                  Pulse:               Resp:                Temp:                SpO2:      92%      --------------------

## 2021-10-14 NOTE — PERIOPERATIVE NURSING NOTE
Pt rates her pain as an 8/10 but is happy, smiling & conversive.  Pt confirms that her pain is tolerable & states that she lives in chronic pain all the time.  Pt says she is tolerable on a pain perspective & is ready to go home now.

## 2021-10-14 NOTE — OP NOTE
Operative Note  Location: [facility]  Date of Admission:  10/14/2021  OR Date: 10/14/2021    Pre-op Diagnosis: End-stage renal disease    Post-op Diagnosis: Same    Procedure:   1) left arm brachial artery to axillary right AV graft (4-7)    Surgeon: Riki Mancera MD    Assistant: Nadeen Anthony RN    Anesthesia: Monitored Anesthesia Care with Regional    Staff:   Circulator: Pieter Constantino RN; Yaritza Mckenna RN  Scrub Person: Mat Kat; Michelle Alcocer    Estimated Blood Loss: minimal    Specimen:   Order Name Source Comment Collection Info Order Time   BASIC METABOLIC PANEL   Collected By: Meghann García RN 10/14/2021  7:34 AM     Release to patient   Immediate            Complications: None    Findings: Good artery and good vein.  Palpable pulse at the completion of the case.    Implants:   Implant Name Type Inv. Item Serial No.  Lot No. LRB No. Used Action   CLIPAPPLR M/ ENDO LIGACLIP 9 3/8IN SM - [id number] Implant CLIPAPPLR M/ ENDO LIGACLIP 9 3/8IN SM  [employer]  DIV OF J AND J [id number] Left 1 Implanted   CLIPAPPLR M/ ENDO LIGACLIP 20CLP 11IN MD - [id number] Implant CLIPAPPLR M/ ENDO LIGACLIP 20CLP 11IN MD  [employer]  DIV OF J AND J [id number] Left 1 Implanted   GRFT VASC PROPAT HEP IR 4/7 38 45CM - [id number] - [id number] Implant GRFT VASC PROPAT HEP IR 4/7 38 45CM [id number] [employer] NA Left 1 Implanted   HEMOST ABS SURGICEL 2X14 - [id number] Implant HEMOST ABS SURGICEL 2X14  [employer]  DIV OF J AND J [id number] Left 1 Implanted       Indications:    The patient is an 76 y.o. female seen for evaluation ESRD and AV graft placement.       Procedure:    The patient was taken to the operating room and placed supine on the operating table. The left arm was prepped and draped. Timeout was observed. Incision was made in the axillary crease and I exposed the axillary vein. She did have actually a paired axillary system, so I was making sure that I  was not looking at the basilic but the deeper of the 2 structures was smaller and the exposed axillary was adequate for outflow. The nerve was identified and protected. I then exposed the brachial artery without much difficulty. It was a good quality brachial artery. The 4-7 [place] graft was tunneled. I did the venous anastomosis first with a 7-9 mm venotomy. This was sewn in a running fashion with Prolene sutures. It was flushed and de-aired. No repair sutures required here. I then packed this with Surgicel and turned my attention to the arterial anastomosis. This was done again with 6.0. It was flushed and de-aired and no repair sutures were required here. I then went back up into the axillary incision after we reversed the protamine, and there was still a lit bit of oozing at the baird. I found a small area with some pulsatile bleeding and a single repair suture was placed here. There was still some minor oozing from needle holes but this was irrigated and dramatically improved with some Surgicel and after reversing the protamine. The wound was closed in layers with Vicryl suture, but then staples were used for the skin because of the patient's poor skin quality. There was a good signal at the wrist and it was actually palpable. Good thrill in the proximal portion of the graft and total change in the outflow vein than when the graft was occluded. She tolerated this well.       Active Hospital Problems    Diagnosis  POA   • ESRD (end stage renal disease) ([facility]) [N18.6]  Yes      Resolved Hospital Problems   No resolved problems to display.      Riki Mancera MD     Date: 10/14/2021  Time: 12:55 EDT

## 2021-10-14 NOTE — DISCHARGE INSTRUCTIONS
[facility]  [address], [address] ,[place], [place]  [address], [address]  [phone]    Post-Operative Instructions for AV Fistula / Graft   Diet: Regular Diet    Medications: Take your regularly scheduled medications on the day of your surgery, unless your doctor has directed you otherwise. You may be sent home with a prescription for pain medication, follow the directions as prescribed.    Activity Restrictions / Driving: Avoid lifting more than 15 pounds or other activities that stress or compress the access area. No driving for the remainder of the day after surgery. You may drive when you no longer are taking narcotic pain medications. If a nerve block was done to numb your arm for surgery, you will be placed in an arm sling.  This numbness and inability to move the arm can last for as little as 6 hours but as many as 18.  The sling should be used during this time but can be removed when sensation and movement of your arm is normal and does not need to be used after that. Use of the arm is encouraged after the surgery.    Incision Care: Some bruising is normal. If you have drainage from the incision please notify the office. Dressing should be removed in 48 hours. After dressing is removed, it is OK to shower. Do not submerge incision until cleared by your surgeon (bath or swimming).    Bathing and Showering: You may shower after you remove your dressing.    Follow-up Appointments: You will need to return to the office for a follow-up visit within 1-3 weeks after your surgery. Please make sure you have your appointment scheduled, call [phone].    The patient (you) should:  1. Avoid wearing tight constrictive clothing over that arm.  2. Avoid wearing jewelry that is tight, such as a watch on the access arm.  3. Avoid carrying heavy objects.  4. Avoid purse straps over the fistula.  5. Avoid sleeping on the arm or keeping it bent for extended periods of time.  6.  "Each day, using your opposite hand, feel over the fistula for the \"thrill\" or vibration that is normally present.    Fistula Information / Care:  ·  It is normal to have swelling in the surgical area. To help control this swelling, you should elevate your arm on a pillow.  ·  Wiggle your fingers and clinch your fist 10 times every hour, while awake, for the first 5-7 days. Also, bend and straighten at the elbow to regain normal range of motion. These exercises are designed to promote circulation in the fingers and aid in draining away the excess fluid accumulation in the immediate area.  · No blood pressures or needle sticks in the arm with your access.    Call the office for the followin. Fever greater than 101.0  2. Uncontrolled pain. This is on a scale of 1-10 (10 being the worst pain imaginable) your pain is a level 7 or above.  3. It is important that you notify our office if you are having numbness and significant pain in the extremity in which you have just had surgery!  4. Decreased or absent thrill.  5. Nausea, diarrhea, and/or vomiting that continue for 12-24 hours.  6. Signs of an infection: redness, increased swelling, drainage, fever and/or chills.  7. Chest pain or difficulty breathing.    The fistula or graft CAN NOT be used until the MD has given written approval. Generally, a graft will be ready to use in 2 weeks, and a fistula will be ready to use in 6-8 weeks.     If you have further questions after reading this handout, the office is open from 8:30am to 5:00pm Monday through Friday. Call [phone].          What to expect after a Nerve Block    Nerve blocks administered to block pain affect many types of nerves, including those nerves that control movement, pain, and normal sensation. Following a nerve block, you may notice some bruising at the site where the block was given. You may experience sensations such as: numbness of the affected area or limb, tingling, heaviness (that is the " limb feels heavy to you), weakness or inability to move the affected arm or leg, or a feeling as if your arm or leg has “fallen asleep.”     A nerve block can last from 2 to 36 hours depending on the medications used.  Usually the weakness wears off first followed by the tingling and heaviness. As the block wears off, you may begin to notice pain; however, this sequence of events may occur in any order. Typically, you will be able to move your limb before you will feel it. Once a nerve block begins to wear off, the effects are usually completely gone within 60 minutes.  If you experience continued side effects that you believe are block related for longer than 48 hours, please call your healthcare provider. Please see block-specific instructions below.    Instructions for any block involving the shoulder or arm  • If you have had any kind of shoulder/arm block, you will go home with your arm in a sling. Wear the sling until the block has completely worn off. You may be required to wear it for a longer period of time per your surgeon’s recommendations.  • If you have had a shoulder/arm block, it is a good idea to sleep on a recliner with pillows under your arm.    You may experience symptoms such as:  Shortness of breath  Hoarseness   Blurry vision  Unequal pupils  Drooping of your face on the same side as the block was performed    These are side effects associated with this kind of block and should go away within 12 hours.    Note: If you have severe or prolonged shortness of breath, please seek medical assistance as soon as possible.     Protection of a “blocked” arm or leg (limb)  • After a nerve block, you cannot feel pain, pressure, or extremes of temperature in the affected limb. And because of this, your blocked limb is at more risk for injury. For example, it is possible to burn your limb on an extremely hot surface without feeling it.     • When resting, it is important to reposition your limb periodically  to avoid prolonged pressure on it. This may require the use of pillows and padding.    • While sleeping, you should avoid rolling onto the affected limb or putting too much pressure on it.     • If you have a cast or tight dressing, check the color of your fingers or toes of the affected limb. Call your surgeon if they look discolored (that is, dusky, dark colored).    • Use caution in cold weather. Cover your limb appropriately to protect it from the cold.      Pain Management:    Your surgeon will give you a prescription for pain medication. Begin taking this before the nerve block wears off. Bear in mind that sometimes the block can wear off in the middle of the night.

## 2021-10-14 NOTE — PERIOPERATIVE NURSING NOTE
Pt is on 2 liters of oxygen via nasal cannula PRN at home.  Pt's  did not bring her oxygen with her to the hospital.  Pt's sats will go up to 95%, but occasionally drop as low as 88%.  Pt's lungs are clear to auscultation bilaterally, diminished in the bases.  Working with pt on coughing & deep breathing to see if her sats will be stable to discharge without supplemental oxygen or not.

## 2021-10-14 NOTE — ANESTHESIA PREPROCEDURE EVALUATION
Anesthesia Evaluation     Patient summary reviewed and Nursing notes reviewed   history of anesthetic complications: PONV  NPO Solid Status: > 8 hours             Airway   Mallampati: II  TM distance: >3 FB  Neck ROM: full  no difficulty expected  Dental - normal exam     Pulmonary - normal exam   (+) pneumonia resolved , pulmonary embolism, lung cancer, COPD, shortness of breath,   Cardiovascular - normal exam    (+) hypertension, CHF , pericardial effusion, PVD, hyperlipidemia,  carotid artery disease      Neuro/Psych  (+) headaches, dizziness/light headedness, psychiatric history Anxiety and Depression,     GI/Hepatic/Renal/Endo    (+)  hiatal hernia, GERD,  renal disease ESRD and dialysis,     Musculoskeletal     (+) neck pain,   Abdominal  - normal exam   Substance History      OB/GYN          Other   arthritis,    history of cancer                    Anesthesia Plan    ASA 3     MAC   (ISB)    Anesthetic plan, all risks, benefits, and alternatives have been provided, discussed and informed consent has been obtained with: patient.

## 2021-10-18 NOTE — TELEPHONE ENCOUNTER
Pt daughter states Pt would like referral to [facility] - [facility].     It can be faxed to: [phone].

## 2021-10-19 NOTE — TELEPHONE ENCOUNTER
Caller: ELSY     Relationship:     Best call back number: [phone]    What is the best time to reach you:ANYTIME    Who are you requesting to speak with (clinical staff, provider,  specific staff member): DR. MADSEN OR CLINICAL STAFF    Do you know the name of the person who called:    What was the call regarding: ELSY RAN PATIENT THROUGH HER BENEFITS AND SHE IS CURRENTLY A PATIENT WITH [facility].     Do you require a callback: YES        THANKS

## 2021-11-22 PROBLEM — R06.00 DYSPNEA: Status: ACTIVE | Noted: 2021-01-01

## 2021-11-29 NOTE — OUTREACH NOTE
Call Center TCM Note      Responses   [facility] patient discharged from? [place]   Does the patient have one of the following disease processes/diagnoses(primary or secondary)? Other   TCM attempt successful? Yes   Call start time 1357   Call end time 1423   Discharge diagnosis Dyspnea   Person spoke with today (if not patient) and relationship Spouse   Meds reviewed with patient/caregiver? Yes   Is the patient having any side effects they believe may be caused by any medication additions or changes? No   Does the patient have all medications ordered at discharge? N/A   Is the patient taking all medications as directed (includes completed medication regime)? Yes   Does the patient have a primary care provider?  Yes   Does the patient have an appointment with their PCP within 7 days of discharge? Yes   Comments regarding PCP hospital fu appt on 11/30/21 at 11:30 AM   Has the patient kept scheduled appointments due by today? N/A   What is the Home health agency?  [facility]   Has home health visited the patient within 72 hours of discharge? Yes   Psychosocial issues? No   Did the patient receive a copy of their discharge instructions? Yes   Nursing interventions Reviewed instructions with patient   What is the patient's perception of their health status since discharge? Same   Is the patient/caregiver able to teach back signs and symptoms related to disease process for when to call PCP? Yes   Is the patient/caregiver able to teach back signs and symptoms related to disease process for when to call 911? Yes   Is the patient/caregiver able to teach back the hierarchy of who to call/visit for symptoms/problems? PCP, Specialist, Home health nurse, Urgent Care, ED, 911 Yes   If the patient is a current smoker, are they able to teach back resources for cessation? Not a smoker   TCM call completed? Yes   Wrap up additional comments Spouse/Daughter states pt is about the same,  reports pt is very weak. Katia, daughter,  is very worried that many medications were stopped,  especially torsemide since pt retains fluid. Pt sees PCP tomorrow, and daughter advised to speak with PCP about pt's medications that were stopped per recent hospital visit. Daughter verified PCP hospital fu appt on 11/30/21. Questions/concerns addressed.          Kaya Richmond RN    11/29/2021, 14:27 EST

## 2021-11-29 NOTE — OUTREACH NOTE
Prep Survey      Responses   [facility] patient discharged from? [facility]   Is LACE score < 7 ? No   Emergency Room discharge w/ pulse ox? No   Eligibility [facility]   Date of Admission 11/22/21   Date of Discharge 11/28/21   Discharge Disposition Home or Self Care   Discharge diagnosis Dyspnea   Does the patient have one of the following disease processes/diagnoses(primary or secondary)? Other   Does the patient have Home health ordered? Yes   What is the Home health agency?  [facility]   Is there a DME ordered? No   Prep survey completed? Yes          Belén Dominguez RN

## 2021-11-30 PROBLEM — G47.00 INSOMNIA: Status: ACTIVE | Noted: 2021-01-01

## 2021-11-30 NOTE — PROGRESS NOTES
Transitional Care Follow Up Visit  Subjective     Rachana ISRAEL Kindra is a 76 y.o. female who presents for a transitional care management visit.    Within 48 business hours after discharge our office contacted her via telephone to coordinate her care and needs.      I reviewed and discussed the details of that call along with the discharge summary, hospital problems, inpatient lab results, inpatient diagnostic studies, and consultation reports with Rachana.     Current outpatient and discharge medications have been reconciled for the patient.  Reviewed by: Mona Del Angel MD      Date of TCM Phone Call 11/28/2021   [facility]   Date of Admission 11/22/2021   Date of Discharge 11/28/2021   Discharge Disposition Home or Self Care     Risk for Readmission (LACE) Score: 17 (11/28/2021  6:00 AM)      History of Present Illness   77 yo female present with daughter Katia for hospital discharge appt. Pt states she is feeling better. Issues breathing when she get anxious. She has oxygen at home using at bedtime.  Oxygen sat in office 96-97%.   Per daughter she has only held medication for one day. She is new to dialysis for the last 3 months.  No issues with swelling at this time, holding water pill.   Per daughter she was taking pain medication as needed, not as often as prescribed in chart. Tramadol was 1/2 pill each night.  She has issues with anxiety and taking atavan as needed to help with anxiety. Anxiety causes issues breathing.  She has inhaler at home, however has not taken twice a day as prescribed upon dis charge.        Course During Hospital Stay:   76-year-old female with history of lung cancer, status post lobectomy, advanced COPD with chronic hypoxic hypercarbic respiratory failure and end-stage renal disease-hemodialysis dependent.  She had recently had a prolonged hospitalization at another facility being treated for mycoplasma pneumonia.  She presents with increasing shortness of air.   Please H&P for full details.  On presentation ABG showed a pH of 7.267, CO2 63.5 and PO2 of 22.6.  BNP was 7263.  CT of the chest revealed infiltrates as well as a large left pleural effusion.  The effusion was tapped and was exudative in nature.  She did not appear to be actively infected however this is felt to be residual for mycoplasma pneumonia that she was recently treated for.  She was managed off antibiotics and did improve nicely.  She was managed with aggressive dialysis for fluid status.  She received aggressive bronchodilators.  She responded very nicely to regime and states she feels like she is back to baseline and very anxious to return home now.  She has exceedingly frail and has minimal exercise tolerance.  I have strongly urged her to consider transfer to rehab but she is adamant on returning home at this point.  Of note, because of her propensity to CO2 retention attempts were made to wean off sedating medications.  She is on exceedingly high doses of oxycodone on presentation and is doing exceedingly well without any at all at this point.  Benzodiazepines have also been discontinued.  It is recommended that these remain on hold.  She notes she has several inhalers at home but is unsure as to exactly what they are.  Have urged her to call her pulmonologist for second morning to ensure that she has all the breathing treatments she needs at home.      Current Outpatient Medications:   •  acetaminophen (TYLENOL) 500 MG tablet, Take 2 tablets by mouth Every 8 (Eight) Hours As Needed for Mild Pain  or Moderate Pain ., Disp: , Rfl:   •  ascorbic acid (VITAMIN C) 500 MG tablet, Take 500 mg by mouth Every Night., Disp: , Rfl:   •  atorvastatin (LIPITOR) 40 MG tablet, Take 40 mg by mouth Every Night., Disp: , Rfl:   •  carvedilol (COREG) 25 MG tablet, Take 1 tablet by mouth 2 (Two) Times a Day With Meals for 30 days., Disp: 60 tablet, Rfl: 0  •  clopidogrel (PLAVIX) 75 MG tablet, Take 75 mg by mouth Daily.  PER MD OFFICE PT DOESN'T HAVE TO HOLD FOR SURGERY, Disp: , Rfl:   •  fluticasone-salmeterol (Advair Diskus) 250-50 MCG/DOSE DISKUS, Inhale 2 puffs 2 (Two) Times a Day., Disp: , Rfl:   •  folic acid (FOLVITE) 1 MG tablet, TAKE 1 TABLET BY MOUTH EVERY DAY, Disp: 30 tablet, Rfl: 2  •  hydrOXYzine (ATARAX) 25 MG tablet, Take 25 mg by mouth 3 (Three) Times a Day As Needed for Itching., Disp: , Rfl:   •  LORazepam (ATIVAN) 0.5 MG tablet, Take 1 tablet by mouth Every 8 (Eight) Hours As Needed for Anxiety., Disp: 90 tablet, Rfl: 1  •  NIFEdipine XL (PROCARDIA XL) 90 MG 24 hr tablet, Take 1 tablet by mouth Every Evening., Disp: 30 tablet, Rfl: 3  •  omeprazole (priLOSEC) 40 MG capsule, TAKE 1 CAPSULE BY MOUTH EVERY DAY (Patient not taking: Reported on 11/22/2021), Disp: 90 capsule, Rfl: 1  •  polyethylene glycol (MiraLax) 17 g packet, Take 17 g by mouth Every Evening., Disp: , Rfl:   •  sertraline (Zoloft) 100 MG tablet, Take 1 tablet by mouth Daily., Disp: 90 tablet, Rfl: 1  •  traZODone (DESYREL) 50 MG tablet, Take 1 tablet by mouth Every Night. As needed for sleep, Disp: 30 tablet, Rfl: 1  •  vitamin D (ERGOCALCIFEROL) 1.25 MG (53383 UT) capsule capsule, Take 50,000 Units by mouth Every 7 (Seven) Days. Takes on Wednesdays, Disp: , Rfl:    The following portions of the patient's history were reviewed and updated as appropriate: allergies, current medications, past family history, past medical history, past social history, past surgical history and problem list.    Review of Systems    Objective    Vitals:    11/30/21 1424   BP: 124/72   Pulse: 80   Temp: 98 °F (36.7 °C)   SpO2: 98%     Physical Exam  Vitals reviewed.   Constitutional:       Comments: Sitting in wheelchair   HENT:      Head: Normocephalic.   Eyes:      Conjunctiva/sclera: Conjunctivae normal.   Cardiovascular:      Rate and Rhythm: Regular rhythm.      Heart sounds: Normal heart sounds.   Pulmonary:      Effort: Pulmonary effort is normal.      Breath  sounds: Normal breath sounds.   Abdominal:      General: Bowel sounds are normal.      Palpations: Abdomen is soft.   Musculoskeletal:      Right lower leg: No edema.      Left lower leg: No edema.   Neurological:      Mental Status: She is alert.   Psychiatric:         Mood and Affect: Mood normal.         Assessment/Plan   Diagnoses and all orders for this visit:    1. History of lung cancer (Primary)  -     Ambulatory Referral to Pulmonology    2. Dyspnea, unspecified type  Assessment & Plan:  Advise pt to use inhaler as prescribed twice a day  Monitor breathing and evaluate need for oxygen.    History of lung cancer  Referral placed for pulmonary follow up.  Discussion with daughter today regarding multiple medication which can contribute to dyspnea and alter mental status. Advise hold oxycocone and atavan. Take as needed, hopefully minimal use.  Take acetaminophen     Orders:  -     Ambulatory Referral to Pulmonology    3. Primary hypertension  Assessment & Plan:  Hypertension is chronc, mediction changes in hospital due to low BP..  Dietary sodium restriction.  Ambulatory blood pressure monitoring.  Advise to hold the medication as advise discharge. Continue carvediol and nifedipine.  Monitor BP at dialysis.  BP controlled today  Blood pressure will be reassessed at the next regular appointment. With pcp, Dr. Crowder    Orders:  -     NIFEdipine XL (PROCARDIA XL) 90 MG 24 hr tablet; Take 1 tablet by mouth Every Evening.  Dispense: 30 tablet; Refill: 3    4. Insomnia, unspecified type  Assessment & Plan:  Per daughter issues for a while, taking 1/2 tab of 150 each night prior to going into the hospital. She does not feel it is good for her to stop.   Discussion with daughter why medication changes can help with mental status.s    Pt states no issues sleeping currenlty  Given 50 mg tab take as needed for sleep.     Orders:  -     traZODone (DESYREL) 50 MG tablet; Take 1 tablet by mouth Every Night. As needed for  sleep  Dispense: 30 tablet; Refill: 1    5. Anxiety  Assessment & Plan:  Discussion today regarding taking zoloft as we try to minimalize the use of ativan.  rEfill given for 100 mg tab  followup with pcp in a few weeks.     Orders:  -     sertraline (Zoloft) 100 MG tablet; Take 1 tablet by mouth Daily.  Dispense: 90 tablet; Refill: 1      Continue to hold water pill, take as needed. Now that she is getting dialysis it will help with fluid balance. Follow up wit nephrology as needed.

## 2021-11-30 NOTE — TELEPHONE ENCOUNTER
Caller: APRIL    Relationship: RADHAZAIDA    Best call back number: [phone]    What is the best time to reach you: ANY    Who are you requesting to speak with (clinical staff, provider,  specific staff member): DR MADSEN/CLINICAL STAFF    What was the call regarding: CALLING BECAUSE IT IS HER 3RD HOSPITALIZATION SINCE 10/08, NEEDS WHAT HER LITER FLOW SHOULD BE. JONO STATES THAT IT SHOULD BE 5 LITERS BUT IT IS NOT ON ANY OF HER DISCHARGE PAPER WORK.     THIS HOSPITALIZATION PATIENT HAS HAD MEDICATION DISCONTINUED.     Do you require a callback: YES

## 2021-12-03 NOTE — TELEPHONE ENCOUNTER
Caller: Katia Stephenson    Relationship: Emergency Contact    Best call back number: [phone]    What is the best time to reach you: ANYTIME    Who are you requesting to speak with (clinical staff, provider,  specific staff member): CLINICAL STAFF    What was the call regarding: THE PATIENT'S MEDICATIONS WERE RECENTLY CHANGED DUE TO HER BEING HOSPITALIZED. NOW, THE PATIENT'S SYSTOLIC BLOOD PRESSURE  IS RUNNING 190+. THE DAUGHTER WOULD LIKE TO KNOW IF THERE ARE ANY MEDICATIONS THAT THE PATIENT SHOULD BEGIN AGAIN TO COMBAT THIS ISSUE. PLEASE ADVISE.     Do you require a callback: YES, PLEASE

## 2021-12-08 NOTE — OUTREACH NOTE
Medical Week 2 Survey      Responses   [facility] [place] patient discharged from? [place]   Does the patient have one of the following disease processes/diagnoses(primary or secondary)? Other   Week 2 attempt successful? Yes   Call start time 0755   Call end time 0759   Person spoke with today (if not patient) and relationship Spouse   Meds reviewed with patient/caregiver? Yes   Is the patient taking all medications as directed (includes completed medication regime)? Yes   Has the patient kept scheduled appointments due by today? Yes   What is the Home health agency?  [facility]    Comments She states is feeling bad today, has stomach bug,    What is the patient's perception of their health status since discharge? New symptoms unrelated to diagnosis   Week 2 Call Completed? Yes   Wrap up additional comments Pt. and spouse states, she is not feelingwell today, has stomach bug, she states, feelign rough today, no questions, did not feel like talking much          Lorie Bender RN

## 2021-12-10 NOTE — TELEPHONE ENCOUNTER
I talked to her daughter and she is back on hydralazine and doing better as far as blood pressure.

## 2021-12-10 NOTE — TELEPHONE ENCOUNTER
Emy [facility] Home [facility] nurse is on the phone. She reports that Pt BP is 180/70 and Pt is complaing of naseua and stomach/back pain. Pt also complains of headache but no fever. Pt declined dialysis today.     After discussing this with Dr. Crowder he advises ER.     Emy voiced understanding and advised Pt to report to ER.

## 2021-12-10 NOTE — TELEPHONE ENCOUNTER
I talked to the occupational therapist and her daughter.  She is refusing go to the emergency room.  Her vitals are stable and oxygen is better on oxygen at 99 percent O2 sat.  We'll have her take an extra omeprazole 40 mg and they will check back with me.

## 2021-12-17 NOTE — OUTREACH NOTE
Medical Week 3 Survey      Responses   [facility] patient discharged from? [place]   Does the patient have one of the following disease processes/diagnoses(primary or secondary)? Other   Week 3 attempt successful? Yes   Call start time 1306   Rescheduled Rescheduled-pt requested   Call end time 1308   Discharge diagnosis Dyspnea   Person spoke with today (if not patient) and relationship Spouse   Wrap up additional comments Pt spouse states she is at dialysis and he is in a meeting, call rescheduled.           Ying Duarte RN

## 2021-12-20 NOTE — OUTREACH NOTE
Medical Week 3 Survey      Responses   [facility] patient discharged from? [place]   Does the patient have one of the following disease processes/diagnoses(primary or secondary)? Other   Week 3 attempt successful? Yes   Call start time 1346   Rescheduled Revoked  [This was a rescheduled call this is not a good time to talk. ]   Call end time 1347   Discharge diagnosis Dyspnea          Sade Cheng RN

## 2022-01-01 ENCOUNTER — READMISSION MANAGEMENT (OUTPATIENT)
Dept: CALL CENTER | Facility: HOSPITAL | Age: 77
End: 2022-01-01

## 2022-01-01 ENCOUNTER — TELEPHONE (OUTPATIENT)
Dept: INTERNAL MEDICINE | Facility: CLINIC | Age: 77
End: 2022-01-01

## 2022-01-01 ENCOUNTER — HOSPITAL ENCOUNTER (OUTPATIENT)
Dept: GENERAL RADIOLOGY | Facility: HOSPITAL | Age: 77
Discharge: HOME | End: 2022-04-04

## 2022-01-01 ENCOUNTER — ANESTHESIA (OUTPATIENT)
Dept: PERIOP | Facility: HOSPITAL | Age: 77
End: 2022-01-01

## 2022-01-01 ENCOUNTER — HOSPITAL ENCOUNTER (INPATIENT)
Facility: HOSPITAL | Age: 77
LOS: 6 days | Discharge: HOME HEALTH | End: 2022-02-05
Attending: EMERGENCY MEDICINE | Admitting: INTERNAL MEDICINE

## 2022-01-01 ENCOUNTER — HOSPITAL ENCOUNTER (OUTPATIENT)
Dept: ULTRASOUND IMAGING | Facility: HOSPITAL | Age: 77
Discharge: HOME | End: 2022-05-03
Admitting: INTERNAL MEDICINE

## 2022-01-01 ENCOUNTER — OFFICE VISIT (OUTPATIENT)
Dept: CARDIOLOGY | Facility: CLINIC | Age: 77
End: 2022-01-01

## 2022-01-01 ENCOUNTER — ANESTHESIA EVENT (OUTPATIENT)
Dept: PERIOP | Facility: HOSPITAL | Age: 77
End: 2022-01-01

## 2022-01-01 ENCOUNTER — OFFICE VISIT (OUTPATIENT)
Dept: INTERNAL MEDICINE | Facility: CLINIC | Age: 77
End: 2022-01-01

## 2022-01-01 ENCOUNTER — LAB (OUTPATIENT)
Dept: LAB | Facility: HOSPITAL | Age: 77
End: 2022-01-01

## 2022-01-01 ENCOUNTER — TRANSCRIBE ORDERS (OUTPATIENT)
Dept: GENERAL RADIOLOGY | Facility: HOSPITAL | Age: 77
End: 2022-01-01

## 2022-01-01 ENCOUNTER — TRANSITIONAL CARE MANAGEMENT TELEPHONE ENCOUNTER (OUTPATIENT)
Dept: CALL CENTER | Facility: HOSPITAL | Age: 77
End: 2022-01-01

## 2022-01-01 ENCOUNTER — HOSPITAL ENCOUNTER (OUTPATIENT)
Dept: CT IMAGING | Facility: HOSPITAL | Age: 77
Discharge: HOME | End: 2022-03-31
Admitting: INTERNAL MEDICINE

## 2022-01-01 ENCOUNTER — APPOINTMENT (OUTPATIENT)
Dept: GENERAL RADIOLOGY | Facility: HOSPITAL | Age: 77
End: 2022-01-01

## 2022-01-01 ENCOUNTER — HOSPITAL ENCOUNTER (OUTPATIENT)
Dept: CARDIOLOGY | Facility: HOSPITAL | Age: 77
Discharge: HOME | End: 2022-01-06

## 2022-01-01 ENCOUNTER — TRANSCRIBE ORDERS (OUTPATIENT)
Dept: ADMINISTRATIVE | Facility: HOSPITAL | Age: 77
End: 2022-01-01

## 2022-01-01 ENCOUNTER — TELEPHONE (OUTPATIENT)
Dept: CARDIOLOGY | Facility: CLINIC | Age: 77
End: 2022-01-01

## 2022-01-01 ENCOUNTER — APPOINTMENT (OUTPATIENT)
Dept: CARDIOLOGY | Facility: HOSPITAL | Age: 77
End: 2022-01-01

## 2022-01-01 ENCOUNTER — TELEPHONE (OUTPATIENT)
Dept: INTERVENTIONAL RADIOLOGY/VASCULAR | Facility: HOSPITAL | Age: 77
End: 2022-01-01

## 2022-01-01 ENCOUNTER — HOSPITAL ENCOUNTER (OUTPATIENT)
Dept: GENERAL RADIOLOGY | Facility: HOSPITAL | Age: 77
Discharge: HOME | End: 2022-01-10
Admitting: SURGERY

## 2022-01-01 ENCOUNTER — HOSPITAL ENCOUNTER (OUTPATIENT)
Dept: CARDIOLOGY | Facility: HOSPITAL | Age: 77
Discharge: HOME | End: 2022-04-04

## 2022-01-01 ENCOUNTER — DOCUMENTATION (OUTPATIENT)
Dept: INTERNAL MEDICINE | Facility: CLINIC | Age: 77
End: 2022-01-01

## 2022-01-01 ENCOUNTER — HOSPITAL ENCOUNTER (OUTPATIENT)
Dept: GENERAL RADIOLOGY | Facility: HOSPITAL | Age: 77
Discharge: HOME | End: 2022-02-23
Admitting: NURSE PRACTITIONER

## 2022-01-01 ENCOUNTER — HOSPITAL ENCOUNTER (OUTPATIENT)
Facility: HOSPITAL | Age: 77
Setting detail: HOSPITAL OUTPATIENT SURGERY
Discharge: HOME | End: 2022-01-07
Attending: SURGERY | Admitting: SURGERY

## 2022-01-01 ENCOUNTER — HOSPITAL ENCOUNTER (OUTPATIENT)
Dept: GENERAL RADIOLOGY | Facility: HOSPITAL | Age: 77
Discharge: HOME | End: 2022-01-06

## 2022-01-01 ENCOUNTER — APPOINTMENT (OUTPATIENT)
Dept: CT IMAGING | Facility: HOSPITAL | Age: 77
End: 2022-01-01

## 2022-01-01 VITALS
DIASTOLIC BLOOD PRESSURE: 66 MMHG | SYSTOLIC BLOOD PRESSURE: 153 MMHG | HEART RATE: 69 BPM | OXYGEN SATURATION: 98 % | BODY MASS INDEX: 25.16 KG/M2 | WEIGHT: 160.27 LBS | TEMPERATURE: 97.9 F | RESPIRATION RATE: 18 BRPM | HEIGHT: 67 IN

## 2022-01-01 VITALS
SYSTOLIC BLOOD PRESSURE: 144 MMHG | HEIGHT: 66 IN | DIASTOLIC BLOOD PRESSURE: 46 MMHG | OXYGEN SATURATION: 93 % | BODY MASS INDEX: 25.87 KG/M2 | TEMPERATURE: 97.9 F | HEART RATE: 65 BPM

## 2022-01-01 VITALS
BODY MASS INDEX: 25.39 KG/M2 | HEART RATE: 58 BPM | DIASTOLIC BLOOD PRESSURE: 60 MMHG | SYSTOLIC BLOOD PRESSURE: 124 MMHG | WEIGHT: 158 LBS | HEIGHT: 66 IN

## 2022-01-01 VITALS
DIASTOLIC BLOOD PRESSURE: 64 MMHG | RESPIRATION RATE: 18 BRPM | WEIGHT: 156 LBS | HEIGHT: 66 IN | BODY MASS INDEX: 25.07 KG/M2 | HEART RATE: 53 BPM | OXYGEN SATURATION: 100 % | TEMPERATURE: 97.8 F | SYSTOLIC BLOOD PRESSURE: 146 MMHG

## 2022-01-01 VITALS
OXYGEN SATURATION: 99 % | TEMPERATURE: 97.1 F | DIASTOLIC BLOOD PRESSURE: 63 MMHG | RESPIRATION RATE: 18 BRPM | SYSTOLIC BLOOD PRESSURE: 146 MMHG | HEIGHT: 67 IN | WEIGHT: 175 LBS | HEART RATE: 69 BPM | BODY MASS INDEX: 27.47 KG/M2

## 2022-01-01 DIAGNOSIS — Z01.812 ENCOUNTER FOR PREPROCEDURE SCREENING LABORATORY TESTING FOR COVID-19: ICD-10-CM

## 2022-01-01 DIAGNOSIS — Z01.818 PRE-OP EXAM: ICD-10-CM

## 2022-01-01 DIAGNOSIS — J44.9 CHRONIC OBSTRUCTIVE PULMONARY DISEASE, UNSPECIFIED COPD TYPE: ICD-10-CM

## 2022-01-01 DIAGNOSIS — R06.02 SHORTNESS OF BREATH: Primary | ICD-10-CM

## 2022-01-01 DIAGNOSIS — R91.8 PULMONARY INFILTRATES: Primary | ICD-10-CM

## 2022-01-01 DIAGNOSIS — N18.6 ESRD (END STAGE RENAL DISEASE): ICD-10-CM

## 2022-01-01 DIAGNOSIS — K21.9 GASTROESOPHAGEAL REFLUX DISEASE, UNSPECIFIED WHETHER ESOPHAGITIS PRESENT: ICD-10-CM

## 2022-01-01 DIAGNOSIS — Z85.118 HISTORY OF LUNG CANCER: ICD-10-CM

## 2022-01-01 DIAGNOSIS — G47.00 INSOMNIA, UNSPECIFIED TYPE: ICD-10-CM

## 2022-01-01 DIAGNOSIS — Z01.818 PRE-OP TESTING: ICD-10-CM

## 2022-01-01 DIAGNOSIS — F32.1 CURRENT MODERATE EPISODE OF MAJOR DEPRESSIVE DISORDER WITHOUT PRIOR EPISODE: ICD-10-CM

## 2022-01-01 DIAGNOSIS — M25.532 LEFT WRIST PAIN: ICD-10-CM

## 2022-01-01 DIAGNOSIS — R06.02 SHORTNESS OF BREATH: ICD-10-CM

## 2022-01-01 DIAGNOSIS — J90 PLEURAL EFFUSION, LEFT: Primary | ICD-10-CM

## 2022-01-01 DIAGNOSIS — I10 PRIMARY HYPERTENSION: ICD-10-CM

## 2022-01-01 DIAGNOSIS — Z79.01 LONG TERM (CURRENT) USE OF ANTICOAGULANTS: ICD-10-CM

## 2022-01-01 DIAGNOSIS — I50.32 CHRONIC DIASTOLIC CHF (CONGESTIVE HEART FAILURE): Primary | ICD-10-CM

## 2022-01-01 DIAGNOSIS — E53.8 FOLIC ACID DEFICIENCY: ICD-10-CM

## 2022-01-01 DIAGNOSIS — Z09 FOLLOW UP: ICD-10-CM

## 2022-01-01 DIAGNOSIS — Z99.2 ESRD (END STAGE RENAL DISEASE) ON DIALYSIS: ICD-10-CM

## 2022-01-01 DIAGNOSIS — J90 PLEURAL EFFUSION, LEFT: ICD-10-CM

## 2022-01-01 DIAGNOSIS — Z01.818 PRE-OP EXAM: Primary | ICD-10-CM

## 2022-01-01 DIAGNOSIS — Z20.822 ENCOUNTER FOR PREPROCEDURE SCREENING LABORATORY TESTING FOR COVID-19: ICD-10-CM

## 2022-01-01 DIAGNOSIS — G89.4 CHRONIC PAIN SYNDROME: ICD-10-CM

## 2022-01-01 DIAGNOSIS — J18.9 COMMUNITY ACQUIRED PNEUMONIA, UNSPECIFIED LATERALITY: Primary | ICD-10-CM

## 2022-01-01 DIAGNOSIS — G89.29 OTHER CHRONIC PAIN: Primary | ICD-10-CM

## 2022-01-01 DIAGNOSIS — I50.32 CHRONIC DIASTOLIC CHF (CONGESTIVE HEART FAILURE): ICD-10-CM

## 2022-01-01 DIAGNOSIS — Z01.811 PRE-OP CHEST EXAM: ICD-10-CM

## 2022-01-01 DIAGNOSIS — N18.6 ESRD (END STAGE RENAL DISEASE): Primary | ICD-10-CM

## 2022-01-01 DIAGNOSIS — R91.8 PULMONARY INFILTRATES: ICD-10-CM

## 2022-01-01 DIAGNOSIS — J18.9 HEALTHCARE-ASSOCIATED PNEUMONIA: Primary | ICD-10-CM

## 2022-01-01 DIAGNOSIS — N18.6 ESRD (END STAGE RENAL DISEASE) ON DIALYSIS: ICD-10-CM

## 2022-01-01 DIAGNOSIS — R53.1 GENERALIZED WEAKNESS: ICD-10-CM

## 2022-01-01 LAB
ALBUMIN FLD-MCNC: 1.2 G/DL
ALBUMIN SERPL-MCNC: 2.9 G/DL (ref 3.5–5.2)
ALBUMIN SERPL-MCNC: 3.1 G/DL (ref 3.5–5.2)
ALBUMIN SERPL-MCNC: 3.2 G/DL (ref 3.5–5.2)
ALBUMIN SERPL-MCNC: 3.3 G/DL (ref 3.5–5.2)
ALBUMIN SERPL-MCNC: 3.6 G/DL (ref 3.5–5.2)
ALBUMIN SERPL-MCNC: 3.7 G/DL (ref 3.5–5.2)
ALBUMIN SERPL-MCNC: 3.9 G/DL (ref 3.5–5.2)
ALBUMIN/GLOB SERPL: 1.1 G/DL
ALBUMIN/GLOB SERPL: 1.2 G/DL
ALBUMIN/GLOB SERPL: 1.3 G/DL
ALBUMIN/GLOB SERPL: 1.5 G/DL
ALP SERPL-CCNC: 111 U/L (ref 39–117)
ALP SERPL-CCNC: 65 U/L (ref 39–117)
ALP SERPL-CCNC: 76 U/L (ref 39–117)
ALP SERPL-CCNC: 79 U/L (ref 39–117)
ALP SERPL-CCNC: 80 U/L (ref 39–117)
ALP SERPL-CCNC: 99 U/L (ref 39–117)
ALT SERPL W P-5'-P-CCNC: 11 U/L (ref 1–33)
ALT SERPL W P-5'-P-CCNC: 12 U/L (ref 1–33)
ALT SERPL W P-5'-P-CCNC: 19 U/L (ref 1–33)
ALT SERPL W P-5'-P-CCNC: 21 U/L (ref 1–33)
ALT SERPL W P-5'-P-CCNC: 21 U/L (ref 1–33)
ALT SERPL W P-5'-P-CCNC: 24 U/L (ref 1–33)
ANION GAP SERPL CALCULATED.3IONS-SCNC: 10.4 MMOL/L (ref 5–15)
ANION GAP SERPL CALCULATED.3IONS-SCNC: 10.5 MMOL/L (ref 5–15)
ANION GAP SERPL CALCULATED.3IONS-SCNC: 12 MMOL/L (ref 5–15)
ANION GAP SERPL CALCULATED.3IONS-SCNC: 13 MMOL/L (ref 5–15)
ANION GAP SERPL CALCULATED.3IONS-SCNC: 14 MMOL/L (ref 5–15)
ANION GAP SERPL CALCULATED.3IONS-SCNC: 14.2 MMOL/L (ref 5–15)
ANION GAP SERPL CALCULATED.3IONS-SCNC: 14.9 MMOL/L (ref 5–15)
ANION GAP SERPL CALCULATED.3IONS-SCNC: 16.1 MMOL/L (ref 5–15)
AORTIC DIMENSIONLESS INDEX: 1 (DI)
APPEARANCE FLD: CLEAR
APTT PPP: 30.6 SECONDS (ref 22.7–35.4)
APTT PPP: 32 SECONDS (ref 22.7–35.4)
ARTERIAL PATENCY WRIST A: ABNORMAL
AST SERPL-CCNC: 19 U/L (ref 1–32)
AST SERPL-CCNC: 23 U/L (ref 1–32)
AST SERPL-CCNC: 29 U/L (ref 1–32)
AST SERPL-CCNC: 40 U/L (ref 1–32)
AST SERPL-CCNC: 44 U/L (ref 1–32)
AST SERPL-CCNC: 51 U/L (ref 1–32)
ATMOSPHERIC PRESS: 753.6 MMHG
B PARAPERT DNA SPEC QL NAA+PROBE: NOT DETECTED
B PERT DNA SPEC QL NAA+PROBE: NOT DETECTED
BACTERIA BLD CULT: ABNORMAL
BACTERIA FLD CULT: NORMAL
BACTERIA SPEC AEROBE CULT: ABNORMAL
BACTERIA SPEC AEROBE CULT: ABNORMAL
BACTERIA SPEC AEROBE CULT: NORMAL
BACTERIA SPEC AEROBE CULT: NORMAL
BACTERIA SPEC RESP CULT: NORMAL
BACTERIA UR QL AUTO: ABNORMAL /HPF
BACTERIA UR QL AUTO: ABNORMAL /HPF
BASE EXCESS BLDA CALC-SCNC: -2.6 MMOL/L (ref 0–2)
BASOPHILS # BLD AUTO: 0.02 10*3/MM3 (ref 0–0.2)
BASOPHILS # BLD AUTO: 0.03 10*3/MM3 (ref 0–0.2)
BASOPHILS # BLD AUTO: 0.05 10*3/MM3 (ref 0–0.2)
BASOPHILS NFR BLD AUTO: 0.2 % (ref 0–1.5)
BASOPHILS NFR BLD AUTO: 0.3 % (ref 0–1.5)
BASOPHILS NFR BLD AUTO: 0.3 % (ref 0–1.5)
BDY SITE: ABNORMAL
BH CV ECHO MEAS - ACS: 1.5 CM
BH CV ECHO MEAS - AO MAX PG (FULL): 3.7 MMHG
BH CV ECHO MEAS - AO MAX PG: 10.8 MMHG
BH CV ECHO MEAS - AO MEAN PG (FULL): 1.9 MMHG
BH CV ECHO MEAS - AO MEAN PG: 5.9 MMHG
BH CV ECHO MEAS - AO ROOT AREA (BSA CORRECTED): 1.5
BH CV ECHO MEAS - AO ROOT AREA: 6.5 CM^2
BH CV ECHO MEAS - AO ROOT DIAM: 2.9 CM
BH CV ECHO MEAS - AO V2 MAX: 164.2 CM/SEC
BH CV ECHO MEAS - AO V2 MEAN: 113.1 CM/SEC
BH CV ECHO MEAS - AO V2 VTI: 34.1 CM
BH CV ECHO MEAS - AVA(I,A): 3.3 CM^2
BH CV ECHO MEAS - AVA(I,D): 3.3 CM^2
BH CV ECHO MEAS - AVA(V,A): 2.8 CM^2
BH CV ECHO MEAS - AVA(V,D): 2.8 CM^2
BH CV ECHO MEAS - BSA(HAYCOCK): 1.9 M^2
BH CV ECHO MEAS - BSA(HAYCOCK): 1.9 M^2
BH CV ECHO MEAS - BSA: 1.9 M^2
BH CV ECHO MEAS - BSA: 1.9 M^2
BH CV ECHO MEAS - BZI_BMI: 25.8 KILOGRAMS/M^2
BH CV ECHO MEAS - BZI_BMI: 27.4 KILOGRAMS/M^2
BH CV ECHO MEAS - BZI_METRIC_HEIGHT: 167.6 CM
BH CV ECHO MEAS - BZI_METRIC_HEIGHT: 170.2 CM
BH CV ECHO MEAS - BZI_METRIC_WEIGHT: 74.8 KG
BH CV ECHO MEAS - BZI_METRIC_WEIGHT: 77.1 KG
BH CV ECHO MEAS - EDV(CUBED): 117.8 ML
BH CV ECHO MEAS - EDV(MOD-SP2): 100 ML
BH CV ECHO MEAS - EDV(MOD-SP4): 131 ML
BH CV ECHO MEAS - EDV(TEICH): 112.9 ML
BH CV ECHO MEAS - EF(CUBED): 80.3 %
BH CV ECHO MEAS - EF(MOD-BP): 60.7 %
BH CV ECHO MEAS - EF(MOD-SP2): 58 %
BH CV ECHO MEAS - EF(MOD-SP4): 61.8 %
BH CV ECHO MEAS - EF(TEICH): 72.6 %
BH CV ECHO MEAS - ESV(CUBED): 23.2 ML
BH CV ECHO MEAS - ESV(MOD-SP2): 42 ML
BH CV ECHO MEAS - ESV(MOD-SP4): 50 ML
BH CV ECHO MEAS - ESV(TEICH): 31 ML
BH CV ECHO MEAS - FS: 41.8 %
BH CV ECHO MEAS - IVS/LVPW: 0.98
BH CV ECHO MEAS - IVSD: 1.2 CM
BH CV ECHO MEAS - LAT PEAK E' VEL: 6.4 CM/SEC
BH CV ECHO MEAS - LV DIASTOLIC VOL/BSA (35-75): 70.3 ML/M^2
BH CV ECHO MEAS - LV MASS(C)D: 233.1 GRAMS
BH CV ECHO MEAS - LV MASS(C)DI: 125.1 GRAMS/M^2
BH CV ECHO MEAS - LV MAX PG: 7.1 MMHG
BH CV ECHO MEAS - LV MEAN PG: 4.1 MMHG
BH CV ECHO MEAS - LV SYSTOLIC VOL/BSA (12-30): 26.8 ML/M^2
BH CV ECHO MEAS - LV V1 MAX: 132.8 CM/SEC
BH CV ECHO MEAS - LV V1 MEAN: 95.3 CM/SEC
BH CV ECHO MEAS - LV V1 VTI: 33 CM
BH CV ECHO MEAS - LVIDD: 4.9 CM
BH CV ECHO MEAS - LVIDS: 2.9 CM
BH CV ECHO MEAS - LVLD AP2: 7.8 CM
BH CV ECHO MEAS - LVLD AP4: 8.4 CM
BH CV ECHO MEAS - LVLS AP2: 6.7 CM
BH CV ECHO MEAS - LVLS AP4: 6.9 CM
BH CV ECHO MEAS - LVOT AREA (M): 3.5 CM^2
BH CV ECHO MEAS - LVOT AREA: 3.4 CM^2
BH CV ECHO MEAS - LVOT DIAM: 2.1 CM
BH CV ECHO MEAS - LVPWD: 1.2 CM
BH CV ECHO MEAS - MED PEAK E' VEL: 3.7 CM/SEC
BH CV ECHO MEAS - MR MAX PG: 44.6 MMHG
BH CV ECHO MEAS - MR MAX VEL: 333.9 CM/SEC
BH CV ECHO MEAS - MV A MAX VEL: 121.8 CM/SEC
BH CV ECHO MEAS - MV DEC SLOPE: 416.6 CM/SEC^2
BH CV ECHO MEAS - MV DEC TIME: 237 SEC
BH CV ECHO MEAS - MV E MAX VEL: 90.6 CM/SEC
BH CV ECHO MEAS - MV E/A: 0.74
BH CV ECHO MEAS - MV MAX PG: 6.7 MMHG
BH CV ECHO MEAS - MV MEAN PG: 3.2 MMHG
BH CV ECHO MEAS - MV P1/2T MAX VEL: 105.9 CM/SEC
BH CV ECHO MEAS - MV P1/2T: 74.4 MSEC
BH CV ECHO MEAS - MV V2 MAX: 129 CM/SEC
BH CV ECHO MEAS - MV V2 MEAN: 84.9 CM/SEC
BH CV ECHO MEAS - MV V2 VTI: 38.8 CM
BH CV ECHO MEAS - MVA P1/2T LCG: 2.1 CM^2
BH CV ECHO MEAS - MVA(P1/2T): 3 CM^2
BH CV ECHO MEAS - MVA(VTI): 2.9 CM^2
BH CV ECHO MEAS - PA MAX PG (FULL): 2.6 MMHG
BH CV ECHO MEAS - PA MAX PG: 5.3 MMHG
BH CV ECHO MEAS - PA V2 MAX: 115.4 CM/SEC
BH CV ECHO MEAS - PVA(V,A): 2.9 CM^2
BH CV ECHO MEAS - PVA(V,D): 2.9 CM^2
BH CV ECHO MEAS - QP/QS: 0.71
BH CV ECHO MEAS - RAP SYSTOLE: 3 MMHG
BH CV ECHO MEAS - RV MAX PG: 2.7 MMHG
BH CV ECHO MEAS - RV MEAN PG: 1.7 MMHG
BH CV ECHO MEAS - RV V1 MAX: 82.7 CM/SEC
BH CV ECHO MEAS - RV V1 MEAN: 60 CM/SEC
BH CV ECHO MEAS - RV V1 VTI: 19.6 CM
BH CV ECHO MEAS - RVOT AREA: 4 CM^2
BH CV ECHO MEAS - RVOT DIAM: 2.3 CM
BH CV ECHO MEAS - SI(AO): 119.1 ML/M^2
BH CV ECHO MEAS - SI(CUBED): 50.7 ML/M^2
BH CV ECHO MEAS - SI(LVOT): 60.5 ML/M^2
BH CV ECHO MEAS - SI(MOD-SP2): 31.1 ML/M^2
BH CV ECHO MEAS - SI(MOD-SP4): 43.5 ML/M^2
BH CV ECHO MEAS - SI(TEICH): 44 ML/M^2
BH CV ECHO MEAS - SV(AO): 222 ML
BH CV ECHO MEAS - SV(CUBED): 94.6 ML
BH CV ECHO MEAS - SV(LVOT): 112.7 ML
BH CV ECHO MEAS - SV(MOD-SP2): 58 ML
BH CV ECHO MEAS - SV(MOD-SP4): 81 ML
BH CV ECHO MEAS - SV(RVOT): 79.4 ML
BH CV ECHO MEAS - SV(TEICH): 82 ML
BH CV ECHO MEAS - TAPSE (>1.6): 2.4 CM
BH CV ECHO MEAS - TR MAX VEL: 230.8 CM/SEC
BH CV ECHO MEASUREMENTS AVERAGE E/E' RATIO: 17.94
BH CV XLRA - TDI S': 10.7 CM/SEC
BILIRUB SERPL-MCNC: 0.2 MG/DL (ref 0–1.2)
BILIRUB SERPL-MCNC: 0.2 MG/DL (ref 0–1.2)
BILIRUB SERPL-MCNC: 0.3 MG/DL (ref 0–1.2)
BILIRUB UR QL STRIP: NEGATIVE
BILIRUB UR QL STRIP: NEGATIVE
BOTTLE TYPE: ABNORMAL
BUN SERPL-MCNC: 17 MG/DL (ref 8–23)
BUN SERPL-MCNC: 17 MG/DL (ref 8–23)
BUN SERPL-MCNC: 21 MG/DL (ref 8–23)
BUN SERPL-MCNC: 27 MG/DL (ref 8–23)
BUN SERPL-MCNC: 28 MG/DL (ref 8–23)
BUN SERPL-MCNC: 32 MG/DL (ref 8–23)
BUN SERPL-MCNC: 35 MG/DL (ref 8–23)
BUN SERPL-MCNC: 48 MG/DL (ref 8–23)
BUN/CREAT SERPL: 4.8 (ref 7–25)
BUN/CREAT SERPL: 5.3 (ref 7–25)
BUN/CREAT SERPL: 5.5 (ref 7–25)
BUN/CREAT SERPL: 5.8 (ref 7–25)
BUN/CREAT SERPL: 6 (ref 7–25)
BUN/CREAT SERPL: 7 (ref 7–25)
BUN/CREAT SERPL: 7.2 (ref 7–25)
BUN/CREAT SERPL: 9.5 (ref 7–25)
C PNEUM DNA NPH QL NAA+NON-PROBE: NOT DETECTED
CALCIUM SPEC-SCNC: 7.7 MG/DL (ref 8.6–10.5)
CALCIUM SPEC-SCNC: 8.2 MG/DL (ref 8.6–10.5)
CALCIUM SPEC-SCNC: 8.2 MG/DL (ref 8.6–10.5)
CALCIUM SPEC-SCNC: 8.4 MG/DL (ref 8.6–10.5)
CALCIUM SPEC-SCNC: 8.6 MG/DL (ref 8.6–10.5)
CALCIUM SPEC-SCNC: 8.6 MG/DL (ref 8.6–10.5)
CALCIUM SPEC-SCNC: 8.7 MG/DL (ref 8.6–10.5)
CALCIUM SPEC-SCNC: 8.9 MG/DL (ref 8.6–10.5)
CHLORIDE SERPL-SCNC: 93 MMOL/L (ref 98–107)
CHLORIDE SERPL-SCNC: 94 MMOL/L (ref 98–107)
CHLORIDE SERPL-SCNC: 95 MMOL/L (ref 98–107)
CHLORIDE SERPL-SCNC: 95 MMOL/L (ref 98–107)
CHLORIDE SERPL-SCNC: 97 MMOL/L (ref 98–107)
CHLORIDE SERPL-SCNC: 98 MMOL/L (ref 98–107)
CHLORIDE SERPL-SCNC: 98 MMOL/L (ref 98–107)
CHLORIDE SERPL-SCNC: 99 MMOL/L (ref 98–107)
CHOLEST SERPL-MCNC: 97 MG/DL (ref 0–200)
CLARITY UR: CLEAR
CLARITY UR: CLEAR
CO2 SERPL-SCNC: 17.9 MMOL/L (ref 22–29)
CO2 SERPL-SCNC: 20 MMOL/L (ref 22–29)
CO2 SERPL-SCNC: 22.1 MMOL/L (ref 22–29)
CO2 SERPL-SCNC: 22.8 MMOL/L (ref 22–29)
CO2 SERPL-SCNC: 24 MMOL/L (ref 22–29)
CO2 SERPL-SCNC: 26 MMOL/L (ref 22–29)
CO2 SERPL-SCNC: 26.5 MMOL/L (ref 22–29)
CO2 SERPL-SCNC: 29.6 MMOL/L (ref 22–29)
COLOR FLD: YELLOW
COLOR UR: ABNORMAL
COLOR UR: YELLOW
CREAT SERPL-MCNC: 3.21 MG/DL (ref 0.57–1)
CREAT SERPL-MCNC: 3.55 MG/DL (ref 0.57–1)
CREAT SERPL-MCNC: 3.61 MG/DL (ref 0.57–1)
CREAT SERPL-MCNC: 4.56 MG/DL (ref 0.57–1)
CREAT SERPL-MCNC: 4.68 MG/DL (ref 0.57–1)
CREAT SERPL-MCNC: 4.87 MG/DL (ref 0.57–1)
CREAT SERPL-MCNC: 4.92 MG/DL (ref 0.57–1)
CREAT SERPL-MCNC: 5.06 MG/DL (ref 0.57–1)
CRYPTOC AG CSF QL: NEGATIVE
CYTO UR: NORMAL
D-LACTATE SERPL-SCNC: 1.5 MMOL/L (ref 0.5–2)
DEPRECATED RDW RBC AUTO: 50.6 FL (ref 37–54)
DEPRECATED RDW RBC AUTO: 52 FL (ref 37–54)
DEPRECATED RDW RBC AUTO: 53.3 FL (ref 37–54)
DEPRECATED RDW RBC AUTO: 53.4 FL (ref 37–54)
DEPRECATED RDW RBC AUTO: 56.8 FL (ref 37–54)
DEPRECATED RDW RBC AUTO: 64 FL (ref 37–54)
EGFRCR SERPLBLD CKD-EPI 2021: 8.4 ML/MIN/1.73
EOSINOPHIL # BLD AUTO: 0 10*3/MM3 (ref 0–0.4)
EOSINOPHIL # BLD AUTO: 0 10*3/MM3 (ref 0–0.4)
EOSINOPHIL # BLD AUTO: 0.06 10*3/MM3 (ref 0–0.4)
EOSINOPHIL # BLD MANUAL: 0.21 10*3/MM3 (ref 0–0.4)
EOSINOPHIL NFR BLD AUTO: 0 % (ref 0.3–6.2)
EOSINOPHIL NFR BLD AUTO: 0 % (ref 0.3–6.2)
EOSINOPHIL NFR BLD AUTO: 0.8 % (ref 0.3–6.2)
EOSINOPHIL NFR BLD MANUAL: 2.1 % (ref 0.3–6.2)
ERYTHROCYTE [DISTWIDTH] IN BLOOD BY AUTOMATED COUNT: 14.1 % (ref 12.3–15.4)
ERYTHROCYTE [DISTWIDTH] IN BLOOD BY AUTOMATED COUNT: 14.3 % (ref 12.3–15.4)
ERYTHROCYTE [DISTWIDTH] IN BLOOD BY AUTOMATED COUNT: 14.4 % (ref 12.3–15.4)
ERYTHROCYTE [DISTWIDTH] IN BLOOD BY AUTOMATED COUNT: 14.5 % (ref 12.3–15.4)
ERYTHROCYTE [DISTWIDTH] IN BLOOD BY AUTOMATED COUNT: 17 % (ref 12.3–15.4)
ERYTHROCYTE [DISTWIDTH] IN BLOOD BY AUTOMATED COUNT: 17.1 % (ref 12.3–15.4)
FLUAV SUBTYP SPEC NAA+PROBE: NOT DETECTED
FLUBV RNA ISLT QL NAA+PROBE: NOT DETECTED
GALACTOMANNAN AG SPEC IA-ACNC: 0.03 INDEX (ref 0–0.49)
GAS FLOW AIRWAY: 6 LPM
GFR SERPL CREATININE-BSD FRML MDRD: 12 ML/MIN/1.73
GFR SERPL CREATININE-BSD FRML MDRD: 12 ML/MIN/1.73
GFR SERPL CREATININE-BSD FRML MDRD: 14 ML/MIN/1.73
GFR SERPL CREATININE-BSD FRML MDRD: 9 ML/MIN/1.73
GFR SERPL CREATININE-BSD FRML MDRD: ABNORMAL ML/MIN/{1.73_M2}
GLOBULIN UR ELPH-MCNC: 2.5 GM/DL
GLOBULIN UR ELPH-MCNC: 2.5 GM/DL
GLOBULIN UR ELPH-MCNC: 2.6 GM/DL
GLOBULIN UR ELPH-MCNC: 2.8 GM/DL
GLOBULIN UR ELPH-MCNC: 2.9 GM/DL
GLOBULIN UR ELPH-MCNC: 3 GM/DL
GLUCOSE FLD-MCNC: 99 MG/DL
GLUCOSE SERPL-MCNC: 102 MG/DL (ref 65–99)
GLUCOSE SERPL-MCNC: 104 MG/DL (ref 65–99)
GLUCOSE SERPL-MCNC: 106 MG/DL (ref 65–99)
GLUCOSE SERPL-MCNC: 112 MG/DL (ref 65–99)
GLUCOSE SERPL-MCNC: 114 MG/DL (ref 65–99)
GLUCOSE SERPL-MCNC: 127 MG/DL (ref 65–99)
GLUCOSE SERPL-MCNC: 131 MG/DL (ref 65–99)
GLUCOSE SERPL-MCNC: 89 MG/DL (ref 65–99)
GLUCOSE UR STRIP-MCNC: NEGATIVE MG/DL
GLUCOSE UR STRIP-MCNC: NEGATIVE MG/DL
GRAM STN SPEC: ABNORMAL
GRAM STN SPEC: NORMAL
H CAPSUL AG SPEC QL: NORMAL
HADV DNA SPEC NAA+PROBE: NOT DETECTED
HCO3 BLDA-SCNC: 22 MMOL/L (ref 22–28)
HCOV 229E RNA SPEC QL NAA+PROBE: NOT DETECTED
HCOV HKU1 RNA SPEC QL NAA+PROBE: NOT DETECTED
HCOV NL63 RNA SPEC QL NAA+PROBE: NOT DETECTED
HCOV OC43 RNA SPEC QL NAA+PROBE: NOT DETECTED
HCT VFR BLD AUTO: 29.2 % (ref 34–46.6)
HCT VFR BLD AUTO: 30.7 % (ref 34–46.6)
HCT VFR BLD AUTO: 33.4 % (ref 34–46.6)
HCT VFR BLD AUTO: 34.7 % (ref 34–46.6)
HCT VFR BLD AUTO: 36 % (ref 34–46.6)
HCT VFR BLD AUTO: 39.5 % (ref 34–46.6)
HDLC SERPL-MCNC: 26 MG/DL (ref 40–60)
HGB BLD-MCNC: 10.4 G/DL (ref 12–15.9)
HGB BLD-MCNC: 11.2 G/DL (ref 12–15.9)
HGB BLD-MCNC: 11.3 G/DL (ref 12–15.9)
HGB BLD-MCNC: 12.5 G/DL (ref 12–15.9)
HGB BLD-MCNC: 9.3 G/DL (ref 12–15.9)
HGB BLD-MCNC: 9.3 G/DL (ref 12–15.9)
HGB UR QL STRIP.AUTO: ABNORMAL
HGB UR QL STRIP.AUTO: NEGATIVE
HMPV RNA NPH QL NAA+NON-PROBE: NOT DETECTED
HPIV1 RNA ISLT QL NAA+PROBE: NOT DETECTED
HPIV2 RNA SPEC QL NAA+PROBE: NOT DETECTED
HPIV3 RNA NPH QL NAA+PROBE: NOT DETECTED
HPIV4 P GENE NPH QL NAA+PROBE: NOT DETECTED
HYALINE CASTS UR QL AUTO: ABNORMAL /LPF
HYALINE CASTS UR QL AUTO: ABNORMAL /LPF
IMM GRANULOCYTES # BLD AUTO: 0.09 10*3/MM3 (ref 0–0.05)
IMM GRANULOCYTES # BLD AUTO: 0.13 10*3/MM3 (ref 0–0.05)
IMM GRANULOCYTES # BLD AUTO: 0.21 10*3/MM3 (ref 0–0.05)
IMM GRANULOCYTES NFR BLD AUTO: 0.8 % (ref 0–0.5)
IMM GRANULOCYTES NFR BLD AUTO: 1.2 % (ref 0–0.5)
IMM GRANULOCYTES NFR BLD AUTO: 1.3 % (ref 0–0.5)
INR PPP: 0.97 (ref 0.9–1.1)
INR PPP: 1.04 (ref 0.9–1.1)
INR PPP: 1.09 (ref 0.9–1.1)
ISOLATED FROM: ABNORMAL
ISOLATED FROM: ABNORMAL
KETONES UR QL STRIP: NEGATIVE
KETONES UR QL STRIP: NEGATIVE
L PNEUMO1 AG UR QL IA: NEGATIVE
LAB AP CASE REPORT: NORMAL
LDH FLD-CCNC: 65 U/L
LDLC SERPL CALC-MCNC: 41 MG/DL (ref 0–100)
LDLC/HDLC SERPL: 1.32 {RATIO}
LEFT ATRIUM VOLUME INDEX: 32 ML/M2
LEUKOCYTE ESTERASE UR QL STRIP.AUTO: ABNORMAL
LEUKOCYTE ESTERASE UR QL STRIP.AUTO: NEGATIVE
LIPASE SERPL-CCNC: 34 U/L (ref 13–60)
LYMPHOCYTES # BLD AUTO: 0.4 10*3/MM3 (ref 0.7–3.1)
LYMPHOCYTES # BLD AUTO: 0.55 10*3/MM3 (ref 0.7–3.1)
LYMPHOCYTES # BLD AUTO: 0.64 10*3/MM3 (ref 0.7–3.1)
LYMPHOCYTES # BLD MANUAL: 0.74 10*3/MM3 (ref 0.7–3.1)
LYMPHOCYTES NFR BLD AUTO: 2.2 % (ref 19.6–45.3)
LYMPHOCYTES NFR BLD AUTO: 4 % (ref 19.6–45.3)
LYMPHOCYTES NFR BLD AUTO: 7.7 % (ref 19.6–45.3)
LYMPHOCYTES NFR BLD MANUAL: 10.4 % (ref 5–12)
LYMPHOCYTES NFR FLD MANUAL: 15 %
M PNEUMO IGG SER IA-ACNC: NOT DETECTED
MAGNESIUM SERPL-MCNC: 2 MG/DL (ref 1.6–2.4)
MAGNESIUM SERPL-MCNC: 2 MG/DL (ref 1.6–2.4)
MAXIMAL PREDICTED HEART RATE: 144 BPM
MCH RBC QN AUTO: 29.8 PG (ref 26.6–33)
MCH RBC QN AUTO: 31 PG (ref 26.6–33)
MCH RBC QN AUTO: 31.4 PG (ref 26.6–33)
MCH RBC QN AUTO: 31.6 PG (ref 26.6–33)
MCH RBC QN AUTO: 31.7 PG (ref 26.6–33)
MCH RBC QN AUTO: 32 PG (ref 26.6–33)
MCHC RBC AUTO-ENTMCNC: 30.3 G/DL (ref 31.5–35.7)
MCHC RBC AUTO-ENTMCNC: 31.1 G/DL (ref 31.5–35.7)
MCHC RBC AUTO-ENTMCNC: 31.4 G/DL (ref 31.5–35.7)
MCHC RBC AUTO-ENTMCNC: 31.6 G/DL (ref 31.5–35.7)
MCHC RBC AUTO-ENTMCNC: 31.8 G/DL (ref 31.5–35.7)
MCHC RBC AUTO-ENTMCNC: 32.3 G/DL (ref 31.5–35.7)
MCV RBC AUTO: 101 FL (ref 79–97)
MCV RBC AUTO: 101.8 FL (ref 79–97)
MCV RBC AUTO: 103.7 FL (ref 79–97)
MCV RBC AUTO: 93.6 FL (ref 79–97)
MCV RBC AUTO: 98 FL (ref 79–97)
MCV RBC AUTO: 98.6 FL (ref 79–97)
METAMYELOCYTES NFR BLD MANUAL: 3.1 % (ref 0–0)
METHOD: NORMAL
MODALITY: ABNORMAL
MONOCYTES # BLD AUTO: 0.65 10*3/MM3 (ref 0.1–0.9)
MONOCYTES # BLD AUTO: 0.96 10*3/MM3 (ref 0.1–0.9)
MONOCYTES # BLD AUTO: 0.98 10*3/MM3 (ref 0.1–0.9)
MONOCYTES # BLD: 1.05 10*3/MM3 (ref 0.1–0.9)
MONOCYTES NFR BLD AUTO: 5.4 % (ref 5–12)
MONOCYTES NFR BLD AUTO: 6.1 % (ref 5–12)
MONOCYTES NFR BLD AUTO: 9.1 % (ref 5–12)
MONOCYTES NFR FLD: 1 %
MONOS+MACROS NFR FLD: 72 %
MRSA DNA SPEC QL NAA+PROBE: NORMAL
MYELOCYTES NFR BLD MANUAL: 2.1 % (ref 0–0)
NEUTROPHILS # BLD AUTO: 7.6 10*3/MM3 (ref 1.7–7)
NEUTROPHILS NFR BLD AUTO: 14.29 10*3/MM3 (ref 1.7–7)
NEUTROPHILS NFR BLD AUTO: 16.2 10*3/MM3 (ref 1.7–7)
NEUTROPHILS NFR BLD AUTO: 5.74 10*3/MM3 (ref 1.7–7)
NEUTROPHILS NFR BLD AUTO: 80.8 % (ref 42.7–76)
NEUTROPHILS NFR BLD AUTO: 88.8 % (ref 42.7–76)
NEUTROPHILS NFR BLD AUTO: 91 % (ref 42.7–76)
NEUTROPHILS NFR BLD MANUAL: 75 % (ref 42.7–76)
NEUTROPHILS NFR FLD MANUAL: 12 %
NITRITE UR QL STRIP: NEGATIVE
NITRITE UR QL STRIP: NEGATIVE
NRBC BLD AUTO-RTO: 0 /100 WBC (ref 0–0.2)
NT-PROBNP SERPL-MCNC: ABNORMAL PG/ML (ref 0–1800)
NUC CELL # FLD: 197 /MM3
PATH REPORT.FINAL DX SPEC: NORMAL
PATH REPORT.GROSS SPEC: NORMAL
PCO2 BLDA: 36.9 MM HG (ref 35–45)
PH BLDA: 7.38 PH UNITS (ref 7.35–7.45)
PH UR STRIP.AUTO: 7 [PH] (ref 5–8)
PH UR STRIP.AUTO: 7 [PH] (ref 5–8)
PHOSPHATE SERPL-MCNC: 3.5 MG/DL (ref 2.5–4.5)
PHOSPHATE SERPL-MCNC: 3.7 MG/DL (ref 2.5–4.5)
PHOSPHATE SERPL-MCNC: 5.9 MG/DL (ref 2.5–4.5)
PLAT MORPH BLD: NORMAL
PLATELET # BLD AUTO: 167 10*3/MM3 (ref 140–450)
PLATELET # BLD AUTO: 173 10*3/MM3 (ref 140–450)
PLATELET # BLD AUTO: 189 10*3/MM3 (ref 140–450)
PLATELET # BLD AUTO: 247 10*3/MM3 (ref 140–450)
PLATELET # BLD AUTO: 256 10*3/MM3 (ref 140–450)
PLATELET # BLD AUTO: 366 10*3/MM3 (ref 140–450)
PMV BLD AUTO: 10.1 FL (ref 6–12)
PMV BLD AUTO: 9.2 FL (ref 6–12)
PMV BLD AUTO: 9.5 FL (ref 6–12)
PMV BLD AUTO: 9.6 FL (ref 6–12)
PMV BLD AUTO: 9.6 FL (ref 6–12)
PMV BLD AUTO: 9.8 FL (ref 6–12)
PO2 BLDA: 99.4 MM HG (ref 80–100)
POTASSIUM SERPL-SCNC: 3.5 MMOL/L (ref 3.5–5.2)
POTASSIUM SERPL-SCNC: 4 MMOL/L (ref 3.5–5.2)
POTASSIUM SERPL-SCNC: 4 MMOL/L (ref 3.5–5.2)
POTASSIUM SERPL-SCNC: 4.3 MMOL/L (ref 3.5–5.2)
POTASSIUM SERPL-SCNC: 4.4 MMOL/L (ref 3.5–5.2)
POTASSIUM SERPL-SCNC: 4.5 MMOL/L (ref 3.5–5.2)
POTASSIUM SERPL-SCNC: 4.5 MMOL/L (ref 3.5–5.2)
POTASSIUM SERPL-SCNC: 4.7 MMOL/L (ref 3.5–5.2)
PROCALCITONIN SERPL-MCNC: 10.1 NG/ML (ref 0–0.25)
PROCALCITONIN SERPL-MCNC: 13.3 NG/ML (ref 0–0.25)
PROT FLD-MCNC: 2 G/DL
PROT SERPL-MCNC: 5.7 G/DL (ref 6–8.5)
PROT SERPL-MCNC: 5.8 G/DL (ref 6–8.5)
PROT SERPL-MCNC: 5.9 G/DL (ref 6–8.5)
PROT SERPL-MCNC: 6.5 G/DL (ref 6–8.5)
PROT SERPL-MCNC: 6.6 G/DL (ref 6–8.5)
PROT SERPL-MCNC: 6.6 G/DL (ref 6–8.5)
PROT UR QL STRIP: ABNORMAL
PROT UR QL STRIP: ABNORMAL
PROTHROMBIN TIME: 12.8 SECONDS (ref 11.7–14.2)
PROTHROMBIN TIME: 13.4 SECONDS (ref 11.7–14.2)
PROTHROMBIN TIME: 13.9 SECONDS (ref 11.7–14.2)
QT INTERVAL: 380 MS
QT INTERVAL: 388 MS
QT INTERVAL: 406 MS
QT INTERVAL: 422 MS
QT INTERVAL: 443 MS
RBC # BLD AUTO: 2.96 10*6/MM3 (ref 3.77–5.28)
RBC # BLD AUTO: 3.12 10*6/MM3 (ref 3.77–5.28)
RBC # BLD AUTO: 3.28 10*6/MM3 (ref 3.77–5.28)
RBC # BLD AUTO: 3.54 10*6/MM3 (ref 3.77–5.28)
RBC # BLD AUTO: 3.65 10*6/MM3 (ref 3.77–5.28)
RBC # BLD AUTO: 3.91 10*6/MM3 (ref 3.77–5.28)
RBC # FLD AUTO: 1120 /MM3
RBC # UR STRIP: ABNORMAL /HPF
RBC # UR STRIP: ABNORMAL /HPF
RBC MORPH BLD: NORMAL
REF LAB TEST METHOD: ABNORMAL
REF LAB TEST METHOD: ABNORMAL
REF LAB TEST METHOD: NORMAL
RHINOVIRUS RNA SPEC NAA+PROBE: NOT DETECTED
RSV RNA NPH QL NAA+NON-PROBE: NOT DETECTED
S PNEUM AG SPEC QL LA: NEGATIVE
SAO2 % BLDCOA: 97.6 % (ref 92–99)
SARS-COV-2 ORF1AB RESP QL NAA+PROBE: NOT DETECTED
SARS-COV-2 RNA NPH QL NAA+NON-PROBE: NOT DETECTED
SARS-COV-2 RNA PNL SPEC NAA+PROBE: NOT DETECTED
SINUS: 2.8 CM
SODIUM SERPL-SCNC: 129 MMOL/L (ref 136–145)
SODIUM SERPL-SCNC: 130 MMOL/L (ref 136–145)
SODIUM SERPL-SCNC: 131 MMOL/L (ref 136–145)
SODIUM SERPL-SCNC: 131 MMOL/L (ref 136–145)
SODIUM SERPL-SCNC: 134 MMOL/L (ref 136–145)
SODIUM SERPL-SCNC: 134 MMOL/L (ref 136–145)
SODIUM SERPL-SCNC: 135 MMOL/L (ref 136–145)
SODIUM SERPL-SCNC: 139 MMOL/L (ref 136–145)
SP GR UR STRIP: 1.01 (ref 1–1.03)
SP GR UR STRIP: 1.02 (ref 1–1.03)
SQUAMOUS #/AREA URNS HPF: ABNORMAL /HPF
SQUAMOUS #/AREA URNS HPF: ABNORMAL /HPF
STRESS TARGET HR: 122 BPM
TOTAL RATE: 18 BREATHS/MINUTE
TRIGL SERPL-MCNC: 183 MG/DL (ref 0–150)
TROPONIN T SERPL-MCNC: 0.02 NG/ML (ref 0–0.03)
TROPONIN T SERPL-MCNC: 0.15 NG/ML (ref 0–0.03)
TROPONIN T SERPL-MCNC: 0.18 NG/ML (ref 0–0.03)
TROPONIN T SERPL-MCNC: 0.18 NG/ML (ref 0–0.03)
TROPONIN T SERPL-MCNC: 0.21 NG/ML (ref 0–0.03)
UROBILINOGEN UR QL STRIP: ABNORMAL
UROBILINOGEN UR QL STRIP: ABNORMAL
VANCOMYCIN SERPL-MCNC: 11.3 MCG/ML (ref 5–40)
VANCOMYCIN SERPL-MCNC: 15.7 MCG/ML (ref 5–40)
VANCOMYCIN SERPL-MCNC: 23 MCG/ML (ref 5–40)
VARIANT LYMPHS NFR BLD MANUAL: 1 % (ref 0–5)
VARIANT LYMPHS NFR BLD MANUAL: 6.3 % (ref 19.6–45.3)
VLDLC SERPL-MCNC: 30 MG/DL (ref 5–40)
WBC # UR STRIP: ABNORMAL /HPF
WBC # UR STRIP: ABNORMAL /HPF
WBC MORPH BLD: NORMAL
WBC NRBC COR # BLD: 10.13 10*3/MM3 (ref 3.4–10.8)
WBC NRBC COR # BLD: 14.52 10*3/MM3 (ref 3.4–10.8)
WBC NRBC COR # BLD: 16.09 10*3/MM3 (ref 3.4–10.8)
WBC NRBC COR # BLD: 17.8 10*3/MM3 (ref 3.4–10.8)
WBC NRBC COR # BLD: 7.11 10*3/MM3 (ref 3.4–10.8)
WBC NRBC COR # BLD: 9.19 10*3/MM3 (ref 3.4–10.8)
YEAST URNS QL MICRO: ABNORMAL /HPF
YEAST URNS QL MICRO: ABNORMAL /HPF

## 2022-01-01 PROCEDURE — 87899 AGENT NOS ASSAY W/OPTIC: CPT | Performed by: INTERNAL MEDICINE

## 2022-01-01 PROCEDURE — 83880 ASSAY OF NATRIURETIC PEPTIDE: CPT | Performed by: EMERGENCY MEDICINE

## 2022-01-01 PROCEDURE — 93000 ELECTROCARDIOGRAM COMPLETE: CPT | Performed by: NURSE PRACTITIONER

## 2022-01-01 PROCEDURE — 80202 ASSAY OF VANCOMYCIN: CPT | Performed by: INTERNAL MEDICINE

## 2022-01-01 PROCEDURE — 99152 MOD SED SAME PHYS/QHP 5/>YRS: CPT

## 2022-01-01 PROCEDURE — 87641 MR-STAPH DNA AMP PROBE: CPT | Performed by: INTERNAL MEDICINE

## 2022-01-01 PROCEDURE — 0 MEPIVACAINE HCL (PF) 1.5 % SOLUTION: Performed by: ANESTHESIOLOGY

## 2022-01-01 PROCEDURE — 25010000002 PIPERACILLIN SOD-TAZOBACTAM PER 1 G: Performed by: INTERNAL MEDICINE

## 2022-01-01 PROCEDURE — 97530 THERAPEUTIC ACTIVITIES: CPT

## 2022-01-01 PROCEDURE — 25010000002 HEPARIN (PORCINE) PER 1000 UNITS: Performed by: INTERNAL MEDICINE

## 2022-01-01 PROCEDURE — 94799 UNLISTED PULMONARY SVC/PX: CPT

## 2022-01-01 PROCEDURE — 88112 CYTOPATH CELL ENHANCE TECH: CPT | Performed by: INTERNAL MEDICINE

## 2022-01-01 PROCEDURE — 71046 X-RAY EXAM CHEST 2 VIEWS: CPT

## 2022-01-01 PROCEDURE — 87070 CULTURE OTHR SPECIMN AEROBIC: CPT | Performed by: INTERNAL MEDICINE

## 2022-01-01 PROCEDURE — 94640 AIRWAY INHALATION TREATMENT: CPT

## 2022-01-01 PROCEDURE — 93321 DOPPLER ECHO F-UP/LMTD STD: CPT | Performed by: INTERNAL MEDICINE

## 2022-01-01 PROCEDURE — 25010000002 AMPICILLIN PER 500 MG: Performed by: INTERNAL MEDICINE

## 2022-01-01 PROCEDURE — 93005 ELECTROCARDIOGRAM TRACING: CPT | Performed by: EMERGENCY MEDICINE

## 2022-01-01 PROCEDURE — 93005 ELECTROCARDIOGRAM TRACING: CPT | Performed by: SURGERY

## 2022-01-01 PROCEDURE — 93010 ELECTROCARDIOGRAM REPORT: CPT | Performed by: INTERNAL MEDICINE

## 2022-01-01 PROCEDURE — 36415 COLL VENOUS BLD VENIPUNCTURE: CPT

## 2022-01-01 PROCEDURE — 87449 NOS EACH ORGANISM AG IA: CPT | Performed by: INTERNAL MEDICINE

## 2022-01-01 PROCEDURE — 85610 PROTHROMBIN TIME: CPT

## 2022-01-01 PROCEDURE — 82042 OTHER SOURCE ALBUMIN QUAN EA: CPT | Performed by: INTERNAL MEDICINE

## 2022-01-01 PROCEDURE — 80061 LIPID PANEL: CPT | Performed by: INTERNAL MEDICINE

## 2022-01-01 PROCEDURE — 94760 N-INVAS EAR/PLS OXIMETRY 1: CPT

## 2022-01-01 PROCEDURE — 82945 GLUCOSE OTHER FLUID: CPT | Performed by: INTERNAL MEDICINE

## 2022-01-01 PROCEDURE — 82803 BLOOD GASES ANY COMBINATION: CPT

## 2022-01-01 PROCEDURE — 85025 COMPLETE CBC W/AUTO DIFF WBC: CPT | Performed by: INTERNAL MEDICINE

## 2022-01-01 PROCEDURE — 80053 COMPREHEN METABOLIC PANEL: CPT | Performed by: INTERNAL MEDICINE

## 2022-01-01 PROCEDURE — U0004 COV-19 TEST NON-CDC HGH THRU: HCPCS

## 2022-01-01 PROCEDURE — 25010000002 HYDROMORPHONE 1 MG/ML SOLUTION: Performed by: EMERGENCY MEDICINE

## 2022-01-01 PROCEDURE — 99232 SBSQ HOSP IP/OBS MODERATE 35: CPT | Performed by: NURSE PRACTITIONER

## 2022-01-01 PROCEDURE — 99232 SBSQ HOSP IP/OBS MODERATE 35: CPT | Performed by: INTERNAL MEDICINE

## 2022-01-01 PROCEDURE — 0 CEFAZOLIN IN DEXTROSE 2-4 GM/100ML-% SOLUTION: Performed by: SURGERY

## 2022-01-01 PROCEDURE — 89051 BODY FLUID CELL COUNT: CPT | Performed by: INTERNAL MEDICINE

## 2022-01-01 PROCEDURE — 1111F DSCHRG MED/CURRENT MED MERGE: CPT | Performed by: FAMILY MEDICINE

## 2022-01-01 PROCEDURE — 80048 BASIC METABOLIC PNL TOTAL CA: CPT | Performed by: SURGERY

## 2022-01-01 PROCEDURE — 93312 ECHO TRANSESOPHAGEAL: CPT

## 2022-01-01 PROCEDURE — 87077 CULTURE AEROBIC IDENTIFY: CPT | Performed by: EMERGENCY MEDICINE

## 2022-01-01 PROCEDURE — 25010000002 ONDANSETRON PER 1 MG: Performed by: INTERNAL MEDICINE

## 2022-01-01 PROCEDURE — 87040 BLOOD CULTURE FOR BACTERIA: CPT | Performed by: INTERNAL MEDICINE

## 2022-01-01 PROCEDURE — 73110 X-RAY EXAM OF WRIST: CPT

## 2022-01-01 PROCEDURE — 25010000002 FENTANYL CITRATE (PF) 50 MCG/ML SOLUTION: Performed by: INTERNAL MEDICINE

## 2022-01-01 PROCEDURE — 84484 ASSAY OF TROPONIN QUANT: CPT | Performed by: INTERNAL MEDICINE

## 2022-01-01 PROCEDURE — 25010000002 MIDAZOLAM PER 1 MG: Performed by: INTERNAL MEDICINE

## 2022-01-01 PROCEDURE — 94761 N-INVAS EAR/PLS OXIMETRY MLT: CPT

## 2022-01-01 PROCEDURE — 83615 LACTATE (LD) (LDH) ENZYME: CPT | Performed by: INTERNAL MEDICINE

## 2022-01-01 PROCEDURE — 83735 ASSAY OF MAGNESIUM: CPT | Performed by: INTERNAL MEDICINE

## 2022-01-01 PROCEDURE — 93306 TTE W/DOPPLER COMPLETE: CPT | Performed by: INTERNAL MEDICINE

## 2022-01-01 PROCEDURE — 36415 COLL VENOUS BLD VENIPUNCTURE: CPT | Performed by: INTERNAL MEDICINE

## 2022-01-01 PROCEDURE — 71045 X-RAY EXAM CHEST 1 VIEW: CPT

## 2022-01-01 PROCEDURE — 99223 1ST HOSP IP/OBS HIGH 75: CPT | Performed by: INTERNAL MEDICINE

## 2022-01-01 PROCEDURE — 97530 THERAPEUTIC ACTIVITIES: CPT | Performed by: PHYSICAL THERAPIST

## 2022-01-01 PROCEDURE — 97535 SELF CARE MNGMENT TRAINING: CPT

## 2022-01-01 PROCEDURE — C9803 HOPD COVID-19 SPEC COLLECT: HCPCS | Performed by: SURGERY

## 2022-01-01 PROCEDURE — 87186 SC STD MICRODIL/AGAR DIL: CPT | Performed by: EMERGENCY MEDICINE

## 2022-01-01 PROCEDURE — 97162 PT EVAL MOD COMPLEX 30 MIN: CPT

## 2022-01-01 PROCEDURE — 25010000002 PROPOFOL 10 MG/ML EMULSION: Performed by: NURSE ANESTHETIST, CERTIFIED REGISTERED

## 2022-01-01 PROCEDURE — 0 CEFAZOLIN PER 500 MG: Performed by: SURGERY

## 2022-01-01 PROCEDURE — 93312 ECHO TRANSESOPHAGEAL: CPT | Performed by: INTERNAL MEDICINE

## 2022-01-01 PROCEDURE — 25010000002 CEFEPIME PER 500 MG: Performed by: EMERGENCY MEDICINE

## 2022-01-01 PROCEDURE — 84484 ASSAY OF TROPONIN QUANT: CPT | Performed by: EMERGENCY MEDICINE

## 2022-01-01 PROCEDURE — 93325 DOPPLER ECHO COLOR FLOW MAPG: CPT | Performed by: INTERNAL MEDICINE

## 2022-01-01 PROCEDURE — 85027 COMPLETE CBC AUTOMATED: CPT

## 2022-01-01 PROCEDURE — 76942 ECHO GUIDE FOR BIOPSY: CPT | Performed by: SURGERY

## 2022-01-01 PROCEDURE — 80053 COMPREHEN METABOLIC PANEL: CPT | Performed by: EMERGENCY MEDICINE

## 2022-01-01 PROCEDURE — 25010000002 MIDAZOLAM PER 1 MG: Performed by: NURSE ANESTHETIST, CERTIFIED REGISTERED

## 2022-01-01 PROCEDURE — 99495 TRANSJ CARE MGMT MOD F2F 14D: CPT | Performed by: FAMILY MEDICINE

## 2022-01-01 PROCEDURE — 87305 ASPERGILLUS AG IA: CPT | Performed by: INTERNAL MEDICINE

## 2022-01-01 PROCEDURE — 81001 URINALYSIS AUTO W/SCOPE: CPT

## 2022-01-01 PROCEDURE — 97110 THERAPEUTIC EXERCISES: CPT

## 2022-01-01 PROCEDURE — 87040 BLOOD CULTURE FOR BACTERIA: CPT | Performed by: EMERGENCY MEDICINE

## 2022-01-01 PROCEDURE — 84484 ASSAY OF TROPONIN QUANT: CPT | Performed by: NURSE PRACTITIONER

## 2022-01-01 PROCEDURE — 36600 WITHDRAWAL OF ARTERIAL BLOOD: CPT

## 2022-01-01 PROCEDURE — 83605 ASSAY OF LACTIC ACID: CPT | Performed by: EMERGENCY MEDICINE

## 2022-01-01 PROCEDURE — 0202U NFCT DS 22 TRGT SARS-COV-2: CPT | Performed by: EMERGENCY MEDICINE

## 2022-01-01 PROCEDURE — 85730 THROMBOPLASTIN TIME PARTIAL: CPT

## 2022-01-01 PROCEDURE — 85610 PROTHROMBIN TIME: CPT | Performed by: EMERGENCY MEDICINE

## 2022-01-01 PROCEDURE — 99214 OFFICE O/P EST MOD 30 MIN: CPT | Performed by: NURSE PRACTITIONER

## 2022-01-01 PROCEDURE — C9803 HOPD COVID-19 SPEC COLLECT: HCPCS

## 2022-01-01 PROCEDURE — 85007 BL SMEAR W/DIFF WBC COUNT: CPT | Performed by: INTERNAL MEDICINE

## 2022-01-01 PROCEDURE — 93005 ELECTROCARDIOGRAM TRACING: CPT | Performed by: INTERNAL MEDICINE

## 2022-01-01 PROCEDURE — 99285 EMERGENCY DEPT VISIT HI MDM: CPT

## 2022-01-01 PROCEDURE — 83690 ASSAY OF LIPASE: CPT | Performed by: INTERNAL MEDICINE

## 2022-01-01 PROCEDURE — 93325 DOPPLER ECHO COLOR FLOW MAPG: CPT

## 2022-01-01 PROCEDURE — 87205 SMEAR GRAM STAIN: CPT | Performed by: INTERNAL MEDICINE

## 2022-01-01 PROCEDURE — 94664 DEMO&/EVAL PT USE INHALER: CPT

## 2022-01-01 PROCEDURE — 25010000002 VANCOMYCIN 10 G RECONSTITUTED SOLUTION: Performed by: EMERGENCY MEDICINE

## 2022-01-01 PROCEDURE — 71250 CT THORAX DX C-: CPT

## 2022-01-01 PROCEDURE — 97166 OT EVAL MOD COMPLEX 45 MIN: CPT

## 2022-01-01 PROCEDURE — 63710000001 ONDANSETRON PER 8 MG: Performed by: INTERNAL MEDICINE

## 2022-01-01 PROCEDURE — 0 LIDOCAINE 1 % SOLUTION: Performed by: RADIOLOGY

## 2022-01-01 PROCEDURE — 80053 COMPREHEN METABOLIC PANEL: CPT

## 2022-01-01 PROCEDURE — 88305 TISSUE EXAM BY PATHOLOGIST: CPT | Performed by: INTERNAL MEDICINE

## 2022-01-01 PROCEDURE — 74176 CT ABD & PELVIS W/O CONTRAST: CPT

## 2022-01-01 PROCEDURE — 84145 PROCALCITONIN (PCT): CPT | Performed by: INTERNAL MEDICINE

## 2022-01-01 PROCEDURE — 25010000002 VANCOMYCIN 10 G RECONSTITUTED SOLUTION: Performed by: INTERNAL MEDICINE

## 2022-01-01 PROCEDURE — 76942 ECHO GUIDE FOR BIOPSY: CPT

## 2022-01-01 PROCEDURE — 93321 DOPPLER ECHO F-UP/LMTD STD: CPT

## 2022-01-01 PROCEDURE — 25010000002 FENTANYL CITRATE (PF) 50 MCG/ML SOLUTION: Performed by: ANESTHESIOLOGY

## 2022-01-01 PROCEDURE — U0004 COV-19 TEST NON-CDC HGH THRU: HCPCS | Performed by: SURGERY

## 2022-01-01 PROCEDURE — 99222 1ST HOSP IP/OBS MODERATE 55: CPT | Performed by: INTERNAL MEDICINE

## 2022-01-01 PROCEDURE — 87150 DNA/RNA AMPLIFIED PROBE: CPT | Performed by: EMERGENCY MEDICINE

## 2022-01-01 PROCEDURE — 25010000002 DROPERIDOL PER 5 MG: Performed by: EMERGENCY MEDICINE

## 2022-01-01 PROCEDURE — 25010000002 MIDAZOLAM PER 1 MG: Performed by: ANESTHESIOLOGY

## 2022-01-01 PROCEDURE — 87147 CULTURE TYPE IMMUNOLOGIC: CPT | Performed by: EMERGENCY MEDICINE

## 2022-01-01 PROCEDURE — 87015 SPECIMEN INFECT AGNT CONCNTJ: CPT | Performed by: INTERNAL MEDICINE

## 2022-01-01 PROCEDURE — 84157 ASSAY OF PROTEIN OTHER: CPT | Performed by: INTERNAL MEDICINE

## 2022-01-01 PROCEDURE — 87635 SARS-COV-2 COVID-19 AMP PRB: CPT | Performed by: INTERNAL MEDICINE

## 2022-01-01 PROCEDURE — 85025 COMPLETE CBC W/AUTO DIFF WBC: CPT | Performed by: EMERGENCY MEDICINE

## 2022-01-01 PROCEDURE — 87385 HISTOPLASMA CAPSUL AG IA: CPT | Performed by: INTERNAL MEDICINE

## 2022-01-01 PROCEDURE — 25010000002 CEFEPIME PER 500 MG: Performed by: INTERNAL MEDICINE

## 2022-01-01 PROCEDURE — 25010000002 LORAZEPAM PER 2 MG: Performed by: NURSE PRACTITIONER

## 2022-01-01 PROCEDURE — 84145 PROCALCITONIN (PCT): CPT | Performed by: EMERGENCY MEDICINE

## 2022-01-01 PROCEDURE — 93306 TTE W/DOPPLER COMPLETE: CPT

## 2022-01-01 PROCEDURE — 80069 RENAL FUNCTION PANEL: CPT | Performed by: INTERNAL MEDICINE

## 2022-01-01 PROCEDURE — 84100 ASSAY OF PHOSPHORUS: CPT | Performed by: INTERNAL MEDICINE

## 2022-01-01 PROCEDURE — 25010000002 ROPIVACAINE PER 1 MG: Performed by: ANESTHESIOLOGY

## 2022-01-01 RX ORDER — SERTRALINE HYDROCHLORIDE 100 MG/1
100 TABLET, FILM COATED ORAL DAILY
Status: DISCONTINUED | OUTPATIENT
Start: 2022-01-01 | End: 2022-01-01 | Stop reason: HOSPADM

## 2022-01-01 RX ORDER — SODIUM CHLORIDE 9 MG/ML
100 INJECTION, SOLUTION INTRAVENOUS CONTINUOUS
Status: DISCONTINUED | OUTPATIENT
Start: 2022-01-01 | End: 2022-01-01 | Stop reason: HOSPADM

## 2022-01-01 RX ORDER — LABETALOL HYDROCHLORIDE 5 MG/ML
5 INJECTION, SOLUTION INTRAVENOUS
Status: DISCONTINUED | OUTPATIENT
Start: 2022-01-01 | End: 2022-01-01 | Stop reason: HOSPADM

## 2022-01-01 RX ORDER — OXYCODONE HYDROCHLORIDE AND ACETAMINOPHEN 5; 325 MG/1; MG/1
1 TABLET ORAL EVERY 4 HOURS PRN
Status: DISCONTINUED | OUTPATIENT
Start: 2022-01-01 | End: 2022-01-01 | Stop reason: HOSPADM

## 2022-01-01 RX ORDER — HYDRALAZINE HYDROCHLORIDE 25 MG/1
25 TABLET, FILM COATED ORAL 2 TIMES DAILY
Status: DISCONTINUED | OUTPATIENT
Start: 2022-01-01 | End: 2022-01-01

## 2022-01-01 RX ORDER — NIFEDIPINE 30 MG/1
90 TABLET, EXTENDED RELEASE ORAL EVERY EVENING
Status: DISCONTINUED | OUTPATIENT
Start: 2022-01-01 | End: 2022-01-01 | Stop reason: HOSPADM

## 2022-01-01 RX ORDER — MIDAZOLAM HYDROCHLORIDE 1 MG/ML
0.5 INJECTION INTRAMUSCULAR; INTRAVENOUS
Status: DISCONTINUED | OUTPATIENT
Start: 2022-01-01 | End: 2022-01-01 | Stop reason: HOSPADM

## 2022-01-01 RX ORDER — OXYCODONE AND ACETAMINOPHEN 7.5; 325 MG/1; MG/1
1 TABLET ORAL EVERY 4 HOURS PRN
Status: DISCONTINUED | OUTPATIENT
Start: 2022-01-01 | End: 2022-01-01 | Stop reason: HOSPADM

## 2022-01-01 RX ORDER — CLOPIDOGREL BISULFATE 75 MG/1
75 TABLET ORAL DAILY
Qty: 90 TABLET | Refills: 0 | Status: SHIPPED | OUTPATIENT
Start: 2022-01-01 | End: 2022-01-01 | Stop reason: SDUPTHER

## 2022-01-01 RX ORDER — FENTANYL CITRATE 50 UG/ML
INJECTION, SOLUTION INTRAMUSCULAR; INTRAVENOUS
Status: COMPLETED | OUTPATIENT
Start: 2022-01-01 | End: 2022-01-01

## 2022-01-01 RX ORDER — NIFEDIPINE 90 MG/1
90 TABLET, EXTENDED RELEASE ORAL EVERY EVENING
Qty: 30 TABLET | Refills: 3 | Status: SHIPPED | OUTPATIENT
Start: 2022-01-01

## 2022-01-01 RX ORDER — GABAPENTIN 100 MG/1
100 CAPSULE ORAL 3 TIMES DAILY
Qty: 90 CAPSULE | Refills: 0 | Status: ON HOLD | OUTPATIENT
Start: 2022-01-01 | End: 2022-01-01

## 2022-01-01 RX ORDER — HYDRALAZINE HYDROCHLORIDE 25 MG/1
25 TABLET, FILM COATED ORAL 2 TIMES DAILY
Qty: 60 TABLET | Refills: 2 | Status: SHIPPED | OUTPATIENT
Start: 2022-01-01 | End: 2022-01-01 | Stop reason: HOSPADM

## 2022-01-01 RX ORDER — TRAZODONE HYDROCHLORIDE 50 MG/1
50 TABLET ORAL NIGHTLY
Status: DISCONTINUED | OUTPATIENT
Start: 2022-01-01 | End: 2022-01-01 | Stop reason: HOSPADM

## 2022-01-01 RX ORDER — FENTANYL CITRATE 50 UG/ML
50 INJECTION, SOLUTION INTRAMUSCULAR; INTRAVENOUS ONCE
Status: DISCONTINUED | OUTPATIENT
Start: 2022-01-01 | End: 2022-01-01 | Stop reason: HOSPADM

## 2022-01-01 RX ORDER — ASPIRIN 81 MG/1
81 TABLET ORAL DAILY
Qty: 30 TABLET | Refills: 0 | Status: SHIPPED | OUTPATIENT
Start: 2022-01-01

## 2022-01-01 RX ORDER — OXYCODONE HCL 20 MG/1
20 TABLET, FILM COATED, EXTENDED RELEASE ORAL EVERY 12 HOURS
Status: ON HOLD | COMMUNITY
End: 2022-01-01

## 2022-01-01 RX ORDER — FOLIC ACID 1 MG/1
TABLET ORAL
Qty: 30 TABLET | Refills: 2 | Status: SHIPPED | OUTPATIENT
Start: 2022-01-01

## 2022-01-01 RX ORDER — BUDESONIDE AND FORMOTEROL FUMARATE DIHYDRATE 160; 4.5 UG/1; UG/1
2 AEROSOL RESPIRATORY (INHALATION)
Status: DISCONTINUED | OUTPATIENT
Start: 2022-01-01 | End: 2022-01-01 | Stop reason: HOSPADM

## 2022-01-01 RX ORDER — LIDOCAINE HYDROCHLORIDE 20 MG/ML
SOLUTION OROPHARYNGEAL
Status: COMPLETED | OUTPATIENT
Start: 2022-01-01 | End: 2022-01-01

## 2022-01-01 RX ORDER — DROPERIDOL 2.5 MG/ML
2.5 INJECTION, SOLUTION INTRAMUSCULAR; INTRAVENOUS ONCE
Status: COMPLETED | OUTPATIENT
Start: 2022-01-01 | End: 2022-01-01

## 2022-01-01 RX ORDER — SODIUM CHLORIDE 0.9 % (FLUSH) 0.9 %
3-10 SYRINGE (ML) INJECTION AS NEEDED
Status: DISCONTINUED | OUTPATIENT
Start: 2022-01-01 | End: 2022-01-01 | Stop reason: HOSPADM

## 2022-01-01 RX ORDER — SODIUM CHLORIDE 0.9 % (FLUSH) 0.9 %
3 SYRINGE (ML) INJECTION EVERY 12 HOURS SCHEDULED
Status: DISCONTINUED | OUTPATIENT
Start: 2022-01-01 | End: 2022-01-01 | Stop reason: HOSPADM

## 2022-01-01 RX ORDER — CARVEDILOL 25 MG/1
TABLET ORAL
Qty: 180 TABLET | Refills: 2 | Status: SHIPPED | OUTPATIENT
Start: 2022-01-01

## 2022-01-01 RX ORDER — MIDAZOLAM HYDROCHLORIDE 1 MG/ML
INJECTION INTRAMUSCULAR; INTRAVENOUS
Status: COMPLETED | OUTPATIENT
Start: 2022-01-01 | End: 2022-01-01

## 2022-01-01 RX ORDER — ATORVASTATIN CALCIUM 80 MG/1
80 TABLET, FILM COATED ORAL NIGHTLY
Qty: 30 TABLET | Refills: 3 | Status: SHIPPED | OUTPATIENT
Start: 2022-01-01 | End: 2022-01-01

## 2022-01-01 RX ORDER — CLOPIDOGREL BISULFATE 75 MG/1
75 TABLET ORAL DAILY
Status: DISCONTINUED | OUTPATIENT
Start: 2022-01-01 | End: 2022-01-01 | Stop reason: HOSPADM

## 2022-01-01 RX ORDER — ALBUTEROL SULFATE 2.5 MG/3ML
2.5 SOLUTION RESPIRATORY (INHALATION) EVERY 6 HOURS PRN
Status: DISCONTINUED | OUTPATIENT
Start: 2022-01-01 | End: 2022-01-01 | Stop reason: HOSPADM

## 2022-01-01 RX ORDER — SODIUM CHLORIDE 0.9 % (FLUSH) 0.9 %
10 SYRINGE (ML) INJECTION EVERY 12 HOURS SCHEDULED
Status: DISCONTINUED | OUTPATIENT
Start: 2022-01-01 | End: 2022-01-01 | Stop reason: HOSPADM

## 2022-01-01 RX ORDER — NALOXONE HCL 0.4 MG/ML
0.2 VIAL (ML) INJECTION AS NEEDED
Status: DISCONTINUED | OUTPATIENT
Start: 2022-01-01 | End: 2022-01-01 | Stop reason: HOSPADM

## 2022-01-01 RX ORDER — LIDOCAINE HYDROCHLORIDE 20 MG/ML
INJECTION, SOLUTION INFILTRATION; PERINEURAL AS NEEDED
Status: DISCONTINUED | OUTPATIENT
Start: 2022-01-01 | End: 2022-01-01 | Stop reason: SURG

## 2022-01-01 RX ORDER — GABAPENTIN 100 MG/1
100 CAPSULE ORAL 3 TIMES DAILY
Status: DISCONTINUED | OUTPATIENT
Start: 2022-01-01 | End: 2022-01-01

## 2022-01-01 RX ORDER — LORAZEPAM 0.5 MG/1
0.5 TABLET ORAL EVERY 8 HOURS PRN
Status: DISCONTINUED | OUTPATIENT
Start: 2022-01-01 | End: 2022-01-01 | Stop reason: HOSPADM

## 2022-01-01 RX ORDER — LORAZEPAM 0.5 MG/1
0.5 TABLET ORAL EVERY 8 HOURS PRN
Qty: 90 TABLET | Refills: 5 | Status: SHIPPED | OUTPATIENT
Start: 2022-01-01

## 2022-01-01 RX ORDER — ALBUTEROL SULFATE 90 UG/1
2 AEROSOL, METERED RESPIRATORY (INHALATION) EVERY 4 HOURS PRN
COMMUNITY
Start: 2022-01-01

## 2022-01-01 RX ORDER — HYDRALAZINE HYDROCHLORIDE 50 MG/1
100 TABLET, FILM COATED ORAL 3 TIMES DAILY
Status: DISCONTINUED | OUTPATIENT
Start: 2022-01-01 | End: 2022-01-01 | Stop reason: HOSPADM

## 2022-01-01 RX ORDER — MIDAZOLAM HYDROCHLORIDE 1 MG/ML
INJECTION INTRAMUSCULAR; INTRAVENOUS AS NEEDED
Status: DISCONTINUED | OUTPATIENT
Start: 2022-01-01 | End: 2022-01-01 | Stop reason: SURG

## 2022-01-01 RX ORDER — ATORVASTATIN CALCIUM 80 MG/1
80 TABLET, FILM COATED ORAL NIGHTLY
Qty: 30 TABLET | Refills: 0 | Status: SHIPPED | OUTPATIENT
Start: 2022-01-01 | End: 2022-01-01 | Stop reason: SDUPTHER

## 2022-01-01 RX ORDER — HYDROCODONE BITARTRATE AND ACETAMINOPHEN 7.5; 325 MG/1; MG/1
1 TABLET ORAL ONCE AS NEEDED
Status: DISCONTINUED | OUTPATIENT
Start: 2022-01-01 | End: 2022-01-01 | Stop reason: HOSPADM

## 2022-01-01 RX ORDER — TORSEMIDE 100 MG/1
100 TABLET ORAL DAILY
Status: DISCONTINUED | OUTPATIENT
Start: 2022-01-01 | End: 2022-01-01

## 2022-01-01 RX ORDER — ATORVASTATIN CALCIUM 80 MG/1
TABLET, FILM COATED ORAL
Qty: 90 TABLET | Refills: 0 | Status: SHIPPED | OUTPATIENT
Start: 2022-01-01

## 2022-01-01 RX ORDER — ONDANSETRON 2 MG/ML
4 INJECTION INTRAMUSCULAR; INTRAVENOUS EVERY 6 HOURS PRN
Status: DISCONTINUED | OUTPATIENT
Start: 2022-01-01 | End: 2022-01-01 | Stop reason: HOSPADM

## 2022-01-01 RX ORDER — OMEPRAZOLE 40 MG/1
40 CAPSULE, DELAYED RELEASE ORAL DAILY
Qty: 90 CAPSULE | Refills: 3 | Status: SHIPPED | OUTPATIENT
Start: 2022-01-01

## 2022-01-01 RX ORDER — ONDANSETRON 2 MG/ML
4 INJECTION INTRAMUSCULAR; INTRAVENOUS ONCE AS NEEDED
Status: DISCONTINUED | OUTPATIENT
Start: 2022-01-01 | End: 2022-01-01 | Stop reason: HOSPADM

## 2022-01-01 RX ORDER — FOLIC ACID 1 MG/1
1000 TABLET ORAL DAILY
Status: DISCONTINUED | OUTPATIENT
Start: 2022-01-01 | End: 2022-01-01 | Stop reason: HOSPADM

## 2022-01-01 RX ORDER — EPHEDRINE SULFATE 50 MG/ML
5 INJECTION, SOLUTION INTRAVENOUS ONCE AS NEEDED
Status: DISCONTINUED | OUTPATIENT
Start: 2022-01-01 | End: 2022-01-01 | Stop reason: HOSPADM

## 2022-01-01 RX ORDER — DIPHENHYDRAMINE HCL 25 MG
25 CAPSULE ORAL
Status: DISCONTINUED | OUTPATIENT
Start: 2022-01-01 | End: 2022-01-01 | Stop reason: HOSPADM

## 2022-01-01 RX ORDER — UREA 10 %
3 LOTION (ML) TOPICAL NIGHTLY PRN
Status: DISCONTINUED | OUTPATIENT
Start: 2022-01-01 | End: 2022-01-01 | Stop reason: HOSPADM

## 2022-01-01 RX ORDER — ACETAMINOPHEN 650 MG/1
650 SUPPOSITORY RECTAL EVERY 4 HOURS PRN
Status: DISCONTINUED | OUTPATIENT
Start: 2022-01-01 | End: 2022-01-01 | Stop reason: HOSPADM

## 2022-01-01 RX ORDER — CARVEDILOL 25 MG/1
25 TABLET ORAL 2 TIMES DAILY WITH MEALS
Status: DISCONTINUED | OUTPATIENT
Start: 2022-01-01 | End: 2022-01-01 | Stop reason: HOSPADM

## 2022-01-01 RX ORDER — ACETAMINOPHEN 325 MG/1
650 TABLET ORAL EVERY 4 HOURS PRN
Status: DISCONTINUED | OUTPATIENT
Start: 2022-01-01 | End: 2022-01-01 | Stop reason: HOSPADM

## 2022-01-01 RX ORDER — FENTANYL CITRATE 50 UG/ML
100 INJECTION, SOLUTION INTRAMUSCULAR; INTRAVENOUS
Status: DISCONTINUED | OUTPATIENT
Start: 2022-01-01 | End: 2022-01-01 | Stop reason: HOSPADM

## 2022-01-01 RX ORDER — SEVELAMER CARBONATE 800 MG/1
800 TABLET, FILM COATED ORAL
Status: DISCONTINUED | OUTPATIENT
Start: 2022-01-01 | End: 2022-01-01 | Stop reason: HOSPADM

## 2022-01-01 RX ORDER — PANTOPRAZOLE SODIUM 40 MG/1
40 TABLET, DELAYED RELEASE ORAL EVERY MORNING
Refills: 1 | Status: DISCONTINUED | OUTPATIENT
Start: 2022-01-01 | End: 2022-01-01 | Stop reason: HOSPADM

## 2022-01-01 RX ORDER — SODIUM CHLORIDE 9 MG/ML
INJECTION, SOLUTION INTRAVENOUS
Status: COMPLETED | OUTPATIENT
Start: 2022-01-01 | End: 2022-01-01

## 2022-01-01 RX ORDER — SEVELAMER CARBONATE 800 MG/1
800 TABLET, FILM COATED ORAL
Qty: 180 TABLET | Refills: 3 | Status: SHIPPED | OUTPATIENT
Start: 2022-01-01

## 2022-01-01 RX ORDER — FENTANYL CITRATE 50 UG/ML
50 INJECTION, SOLUTION INTRAMUSCULAR; INTRAVENOUS
Status: DISCONTINUED | OUTPATIENT
Start: 2022-01-01 | End: 2022-01-01 | Stop reason: HOSPADM

## 2022-01-01 RX ORDER — TORSEMIDE 100 MG/1
200 TABLET ORAL DAILY
Status: DISCONTINUED | OUTPATIENT
Start: 2022-01-01 | End: 2022-01-01 | Stop reason: HOSPADM

## 2022-01-01 RX ORDER — NIFEDIPINE 90 MG/1
90 TABLET, EXTENDED RELEASE ORAL EVERY EVENING
Status: DISCONTINUED | OUTPATIENT
Start: 2022-01-01 | End: 2022-01-01 | Stop reason: CLARIF

## 2022-01-01 RX ORDER — ERGOCALCIFEROL 1.25 MG/1
50000 CAPSULE ORAL
Qty: 12 CAPSULE | Refills: 0 | Status: SHIPPED | OUTPATIENT
Start: 2022-01-01

## 2022-01-01 RX ORDER — SODIUM CHLORIDE 0.9 % (FLUSH) 0.9 %
10 SYRINGE (ML) INJECTION AS NEEDED
Status: DISCONTINUED | OUTPATIENT
Start: 2022-01-01 | End: 2022-01-01 | Stop reason: HOSPADM

## 2022-01-01 RX ORDER — HYDROMORPHONE HYDROCHLORIDE 1 MG/ML
0.5 INJECTION, SOLUTION INTRAMUSCULAR; INTRAVENOUS; SUBCUTANEOUS
Status: DISCONTINUED | OUTPATIENT
Start: 2022-01-01 | End: 2022-01-01 | Stop reason: HOSPADM

## 2022-01-01 RX ORDER — CEFAZOLIN SODIUM 2 G/100ML
2 INJECTION, SOLUTION INTRAVENOUS ONCE
Status: COMPLETED | OUTPATIENT
Start: 2022-01-01 | End: 2022-01-01

## 2022-01-01 RX ORDER — OXYCODONE HYDROCHLORIDE 5 MG/1
10 TABLET ORAL EVERY 4 HOURS PRN
Status: DISCONTINUED | OUTPATIENT
Start: 2022-01-01 | End: 2022-01-01

## 2022-01-01 RX ORDER — SEVELAMER CARBONATE 800 MG/1
800 TABLET, FILM COATED ORAL
Qty: 60 TABLET | Refills: 0 | Status: SHIPPED | OUTPATIENT
Start: 2022-01-01 | End: 2022-01-01 | Stop reason: SDUPTHER

## 2022-01-01 RX ORDER — TORSEMIDE 100 MG/1
200 TABLET ORAL DAILY
Qty: 180 TABLET | Refills: 3 | Status: SHIPPED | OUTPATIENT
Start: 2022-01-01

## 2022-01-01 RX ORDER — IBUPROFEN 600 MG/1
600 TABLET ORAL ONCE AS NEEDED
Status: DISCONTINUED | OUTPATIENT
Start: 2022-01-01 | End: 2022-01-01 | Stop reason: HOSPADM

## 2022-01-01 RX ORDER — SODIUM CHLORIDE 9 MG/ML
9 INJECTION, SOLUTION INTRAVENOUS CONTINUOUS
Status: DISCONTINUED | OUTPATIENT
Start: 2022-01-01 | End: 2022-01-01 | Stop reason: HOSPADM

## 2022-01-01 RX ORDER — ASPIRIN 81 MG/1
81 TABLET ORAL DAILY
Status: DISCONTINUED | OUTPATIENT
Start: 2022-01-01 | End: 2022-01-01 | Stop reason: HOSPADM

## 2022-01-01 RX ORDER — FLUMAZENIL 0.1 MG/ML
0.2 INJECTION INTRAVENOUS AS NEEDED
Status: DISCONTINUED | OUTPATIENT
Start: 2022-01-01 | End: 2022-01-01 | Stop reason: HOSPADM

## 2022-01-01 RX ORDER — ASPIRIN 81 MG/1
324 TABLET, CHEWABLE ORAL ONCE
Status: COMPLETED | OUTPATIENT
Start: 2022-01-01 | End: 2022-01-01

## 2022-01-01 RX ORDER — OMEPRAZOLE 40 MG/1
40 CAPSULE, DELAYED RELEASE ORAL DAILY
Qty: 90 CAPSULE | Refills: 3 | Status: SHIPPED | OUTPATIENT
Start: 2022-01-01 | End: 2022-01-01 | Stop reason: SDUPTHER

## 2022-01-01 RX ORDER — HYDRALAZINE HYDROCHLORIDE 20 MG/ML
5 INJECTION INTRAMUSCULAR; INTRAVENOUS
Status: DISCONTINUED | OUTPATIENT
Start: 2022-01-01 | End: 2022-01-01 | Stop reason: HOSPADM

## 2022-01-01 RX ORDER — LORAZEPAM 0.5 MG/1
0.5 TABLET ORAL ONCE
Status: COMPLETED | OUTPATIENT
Start: 2022-01-01 | End: 2022-01-01

## 2022-01-01 RX ORDER — SODIUM CHLORIDE, SODIUM LACTATE, POTASSIUM CHLORIDE, CALCIUM CHLORIDE 600; 310; 30; 20 MG/100ML; MG/100ML; MG/100ML; MG/100ML
9 INJECTION, SOLUTION INTRAVENOUS CONTINUOUS
Status: DISCONTINUED | OUTPATIENT
Start: 2022-01-01 | End: 2022-01-01

## 2022-01-01 RX ORDER — ROPIVACAINE HYDROCHLORIDE 5 MG/ML
INJECTION, SOLUTION EPIDURAL; INFILTRATION; PERINEURAL
Status: COMPLETED | OUTPATIENT
Start: 2022-01-01 | End: 2022-01-01

## 2022-01-01 RX ORDER — LIDOCAINE HYDROCHLORIDE 10 MG/ML
0.5 INJECTION, SOLUTION EPIDURAL; INFILTRATION; INTRACAUDAL; PERINEURAL ONCE AS NEEDED
Status: DISCONTINUED | OUTPATIENT
Start: 2022-01-01 | End: 2022-01-01 | Stop reason: HOSPADM

## 2022-01-01 RX ORDER — OXYCODONE HCL 20 MG/1
20 TABLET, FILM COATED, EXTENDED RELEASE ORAL EVERY 12 HOURS
Status: DISCONTINUED | OUTPATIENT
Start: 2022-01-01 | End: 2022-01-01

## 2022-01-01 RX ORDER — HEPARIN SODIUM 5000 [USP'U]/ML
5000 INJECTION, SOLUTION INTRAVENOUS; SUBCUTANEOUS EVERY 12 HOURS SCHEDULED
Status: DISCONTINUED | OUTPATIENT
Start: 2022-01-01 | End: 2022-01-01 | Stop reason: HOSPADM

## 2022-01-01 RX ORDER — HYDROXYZINE HYDROCHLORIDE 25 MG/1
25 TABLET, FILM COATED ORAL 3 TIMES DAILY PRN
Status: DISCONTINUED | OUTPATIENT
Start: 2022-01-01 | End: 2022-01-01 | Stop reason: HOSPADM

## 2022-01-01 RX ORDER — ACETAMINOPHEN 160 MG/5ML
650 SOLUTION ORAL EVERY 4 HOURS PRN
Status: DISCONTINUED | OUTPATIENT
Start: 2022-01-01 | End: 2022-01-01 | Stop reason: HOSPADM

## 2022-01-01 RX ORDER — LIDOCAINE HYDROCHLORIDE 10 MG/ML
10 INJECTION, SOLUTION INFILTRATION; PERINEURAL ONCE
Status: COMPLETED | OUTPATIENT
Start: 2022-01-01 | End: 2022-01-01

## 2022-01-01 RX ORDER — DIPHENHYDRAMINE HYDROCHLORIDE 50 MG/ML
12.5 INJECTION INTRAMUSCULAR; INTRAVENOUS
Status: DISCONTINUED | OUTPATIENT
Start: 2022-01-01 | End: 2022-01-01 | Stop reason: HOSPADM

## 2022-01-01 RX ORDER — MIDAZOLAM HYDROCHLORIDE 1 MG/ML
2 INJECTION INTRAMUSCULAR; INTRAVENOUS ONCE
Status: DISCONTINUED | OUTPATIENT
Start: 2022-01-01 | End: 2022-01-01 | Stop reason: HOSPADM

## 2022-01-01 RX ORDER — TRAZODONE HYDROCHLORIDE 50 MG/1
50 TABLET ORAL NIGHTLY
Qty: 90 TABLET | Refills: 3 | Status: SHIPPED | OUTPATIENT
Start: 2022-01-01

## 2022-01-01 RX ORDER — CARVEDILOL 25 MG/1
25 TABLET ORAL 2 TIMES DAILY WITH MEALS
Qty: 60 TABLET | Refills: 0 | Status: SHIPPED | OUTPATIENT
Start: 2022-01-01 | End: 2022-01-01

## 2022-01-01 RX ORDER — HYDRALAZINE HYDROCHLORIDE 100 MG/1
100 TABLET, FILM COATED ORAL 3 TIMES DAILY
Qty: 90 TABLET | Refills: 0 | Status: SHIPPED | OUTPATIENT
Start: 2022-01-01

## 2022-01-01 RX ORDER — NITROGLYCERIN 0.4 MG/1
0.4 TABLET SUBLINGUAL
Status: DISCONTINUED | OUTPATIENT
Start: 2022-01-01 | End: 2022-01-01 | Stop reason: HOSPADM

## 2022-01-01 RX ORDER — OXYCODONE HYDROCHLORIDE 15 MG/1
20 TABLET ORAL 2 TIMES DAILY
COMMUNITY

## 2022-01-01 RX ORDER — CARVEDILOL 25 MG/1
TABLET ORAL
Qty: 180 TABLET | Refills: 2 | Status: SHIPPED | OUTPATIENT
Start: 2022-01-01 | End: 2022-01-01 | Stop reason: HOSPADM

## 2022-01-01 RX ORDER — AMLODIPINE BESYLATE 10 MG/1
10 TABLET ORAL
Status: DISCONTINUED | OUTPATIENT
Start: 2022-01-01 | End: 2022-01-01 | Stop reason: HOSPADM

## 2022-01-01 RX ORDER — HEPARIN SODIUM 1000 [USP'U]/ML
4000 INJECTION, SOLUTION INTRAVENOUS; SUBCUTANEOUS ONCE
Status: COMPLETED | OUTPATIENT
Start: 2022-01-01 | End: 2022-01-01

## 2022-01-01 RX ORDER — ATORVASTATIN CALCIUM 80 MG/1
80 TABLET, FILM COATED ORAL NIGHTLY
Status: DISCONTINUED | OUTPATIENT
Start: 2022-01-01 | End: 2022-01-01 | Stop reason: HOSPADM

## 2022-01-01 RX ORDER — LORAZEPAM 2 MG/ML
0.5 INJECTION INTRAMUSCULAR ONCE
Status: COMPLETED | OUTPATIENT
Start: 2022-01-01 | End: 2022-01-01

## 2022-01-01 RX ORDER — IPRATROPIUM BROMIDE AND ALBUTEROL SULFATE 2.5; .5 MG/3ML; MG/3ML
3 SOLUTION RESPIRATORY (INHALATION) ONCE AS NEEDED
Status: DISCONTINUED | OUTPATIENT
Start: 2022-01-01 | End: 2022-01-01 | Stop reason: HOSPADM

## 2022-01-01 RX ORDER — TRAZODONE HYDROCHLORIDE 50 MG/1
50 TABLET ORAL NIGHTLY
Qty: 30 TABLET | Refills: 1 | Status: SHIPPED | OUTPATIENT
Start: 2022-01-01 | End: 2022-01-01 | Stop reason: SDUPTHER

## 2022-01-01 RX ORDER — AMLODIPINE BESYLATE 5 MG/1
5 TABLET ORAL
Status: DISCONTINUED | OUTPATIENT
Start: 2022-01-01 | End: 2022-01-01

## 2022-01-01 RX ORDER — ONDANSETRON 4 MG/1
4 TABLET, FILM COATED ORAL EVERY 6 HOURS PRN
Status: DISCONTINUED | OUTPATIENT
Start: 2022-01-01 | End: 2022-01-01 | Stop reason: HOSPADM

## 2022-01-01 RX ORDER — PROPOFOL 10 MG/ML
VIAL (ML) INTRAVENOUS CONTINUOUS PRN
Status: DISCONTINUED | OUTPATIENT
Start: 2022-01-01 | End: 2022-01-01 | Stop reason: SURG

## 2022-01-01 RX ORDER — DULOXETIN HYDROCHLORIDE 30 MG/1
30 CAPSULE, DELAYED RELEASE ORAL
COMMUNITY
Start: 2022-01-01

## 2022-01-01 RX ORDER — AMOXICILLIN 250 MG
2 CAPSULE ORAL 2 TIMES DAILY PRN
Status: DISCONTINUED | OUTPATIENT
Start: 2022-01-01 | End: 2022-01-01 | Stop reason: HOSPADM

## 2022-01-01 RX ORDER — TORSEMIDE 100 MG/1
200 TABLET ORAL DAILY
Qty: 30 TABLET | Refills: 0 | Status: SHIPPED | OUTPATIENT
Start: 2022-01-01 | End: 2022-01-01 | Stop reason: SDUPTHER

## 2022-01-01 RX ORDER — CLOPIDOGREL BISULFATE 75 MG/1
75 TABLET ORAL DAILY
Qty: 90 TABLET | Refills: 3 | Status: SHIPPED | OUTPATIENT
Start: 2022-01-01

## 2022-01-01 RX ADMIN — LIDOCAINE HYDROCHLORIDE 10 ML: 20 SOLUTION ORAL; TOPICAL at 12:17

## 2022-01-01 RX ADMIN — ATORVASTATIN CALCIUM 80 MG: 80 TABLET, FILM COATED ORAL at 20:45

## 2022-01-01 RX ADMIN — OXYCODONE AND ACETAMINOPHEN 1 TABLET: 5; 325 TABLET ORAL at 20:45

## 2022-01-01 RX ADMIN — AMLODIPINE BESYLATE 5 MG: 5 TABLET ORAL at 16:58

## 2022-01-01 RX ADMIN — CARVEDILOL 25 MG: 25 TABLET, FILM COATED ORAL at 00:07

## 2022-01-01 RX ADMIN — HYDRALAZINE HYDROCHLORIDE 100 MG: 50 TABLET ORAL at 20:48

## 2022-01-01 RX ADMIN — SEVELAMER CARBONATE 800 MG: 800 TABLET, FILM COATED ORAL at 08:45

## 2022-01-01 RX ADMIN — ATORVASTATIN CALCIUM 80 MG: 80 TABLET, FILM COATED ORAL at 21:21

## 2022-01-01 RX ADMIN — CEFAZOLIN SODIUM 2 G: 2 INJECTION, SOLUTION INTRAVENOUS at 11:39

## 2022-01-01 RX ADMIN — CLOPIDOGREL 75 MG: 75 TABLET, FILM COATED ORAL at 08:45

## 2022-01-01 RX ADMIN — SEVELAMER CARBONATE 800 MG: 800 TABLET, FILM COATED ORAL at 09:10

## 2022-01-01 RX ADMIN — FOLIC ACID 1000 MCG: 1 TABLET ORAL at 08:20

## 2022-01-01 RX ADMIN — PROPOFOL 50 MCG/KG/MIN: 10 INJECTION, EMULSION INTRAVENOUS at 11:55

## 2022-01-01 RX ADMIN — FOLIC ACID 1000 MCG: 1 TABLET ORAL at 14:38

## 2022-01-01 RX ADMIN — HYDRALAZINE HYDROCHLORIDE 100 MG: 50 TABLET ORAL at 08:33

## 2022-01-01 RX ADMIN — OXYCODONE AND ACETAMINOPHEN 1 TABLET: 5; 325 TABLET ORAL at 11:08

## 2022-01-01 RX ADMIN — HEPARIN SODIUM 3800 UNITS: 1000 INJECTION INTRAVENOUS; SUBCUTANEOUS at 05:11

## 2022-01-01 RX ADMIN — LORAZEPAM 0.5 MG: 0.5 TABLET ORAL at 00:12

## 2022-01-01 RX ADMIN — PANTOPRAZOLE SODIUM 40 MG: 40 TABLET, DELAYED RELEASE ORAL at 09:10

## 2022-01-01 RX ADMIN — Medication 3 MG: at 21:22

## 2022-01-01 RX ADMIN — BUDESONIDE AND FORMOTEROL FUMARATE DIHYDRATE 2 PUFF: 160; 4.5 AEROSOL RESPIRATORY (INHALATION) at 20:20

## 2022-01-01 RX ADMIN — CARVEDILOL 25 MG: 25 TABLET, FILM COATED ORAL at 08:34

## 2022-01-01 RX ADMIN — AMLODIPINE BESYLATE 5 MG: 5 TABLET ORAL at 08:20

## 2022-01-01 RX ADMIN — ASPIRIN 81 MG: 81 TABLET, COATED ORAL at 08:20

## 2022-01-01 RX ADMIN — SERTRALINE 100 MG: 100 TABLET, FILM COATED ORAL at 08:47

## 2022-01-01 RX ADMIN — SODIUM CHLORIDE, PRESERVATIVE FREE 10 ML: 5 INJECTION INTRAVENOUS at 09:39

## 2022-01-01 RX ADMIN — OXYCODONE AND ACETAMINOPHEN 1 TABLET: 5; 325 TABLET ORAL at 10:40

## 2022-01-01 RX ADMIN — HEPARIN SODIUM 5000 UNITS: 5000 INJECTION, SOLUTION INTRAVENOUS; SUBCUTANEOUS at 20:07

## 2022-01-01 RX ADMIN — SEVELAMER CARBONATE 800 MG: 800 TABLET, FILM COATED ORAL at 17:27

## 2022-01-01 RX ADMIN — TRAZODONE HYDROCHLORIDE 50 MG: 50 TABLET ORAL at 20:07

## 2022-01-01 RX ADMIN — SODIUM CHLORIDE 50 ML/HR: 9 INJECTION, SOLUTION INTRAVENOUS at 12:16

## 2022-01-01 RX ADMIN — ATORVASTATIN CALCIUM 80 MG: 80 TABLET, FILM COATED ORAL at 20:48

## 2022-01-01 RX ADMIN — OXYCODONE AND ACETAMINOPHEN 1 TABLET: 5; 325 TABLET ORAL at 23:34

## 2022-01-01 RX ADMIN — SERTRALINE 100 MG: 100 TABLET, FILM COATED ORAL at 18:16

## 2022-01-01 RX ADMIN — HYDRALAZINE HYDROCHLORIDE 100 MG: 50 TABLET ORAL at 15:43

## 2022-01-01 RX ADMIN — SERTRALINE 100 MG: 100 TABLET, FILM COATED ORAL at 08:20

## 2022-01-01 RX ADMIN — AMLODIPINE BESYLATE 5 MG: 5 TABLET ORAL at 09:09

## 2022-01-01 RX ADMIN — HYDRALAZINE HYDROCHLORIDE 100 MG: 50 TABLET ORAL at 08:46

## 2022-01-01 RX ADMIN — TRAZODONE HYDROCHLORIDE 50 MG: 50 TABLET ORAL at 21:21

## 2022-01-01 RX ADMIN — MIDAZOLAM 1 MG: 1 INJECTION INTRAMUSCULAR; INTRAVENOUS at 12:31

## 2022-01-01 RX ADMIN — CARVEDILOL 25 MG: 25 TABLET, FILM COATED ORAL at 08:20

## 2022-01-01 RX ADMIN — FOLIC ACID 1000 MCG: 1 TABLET ORAL at 18:16

## 2022-01-01 RX ADMIN — LORAZEPAM 0.5 MG: 0.5 TABLET ORAL at 22:06

## 2022-01-01 RX ADMIN — LIDOCAINE HYDROCHLORIDE 40 MG: 20 INJECTION, SOLUTION INFILTRATION; PERINEURAL at 11:55

## 2022-01-01 RX ADMIN — DOCUSATE SODIUM 50MG AND SENNOSIDES 8.6MG 2 TABLET: 8.6; 5 TABLET, FILM COATED ORAL at 00:12

## 2022-01-01 RX ADMIN — HYDRALAZINE HYDROCHLORIDE 100 MG: 50 TABLET ORAL at 16:58

## 2022-01-01 RX ADMIN — HYDRALAZINE HYDROCHLORIDE 100 MG: 50 TABLET ORAL at 20:07

## 2022-01-01 RX ADMIN — FOLIC ACID 1000 MCG: 1 TABLET ORAL at 09:09

## 2022-01-01 RX ADMIN — OXYCODONE AND ACETAMINOPHEN 1 TABLET: 5; 325 TABLET ORAL at 06:59

## 2022-01-01 RX ADMIN — BUDESONIDE AND FORMOTEROL FUMARATE DIHYDRATE 2 PUFF: 160; 4.5 AEROSOL RESPIRATORY (INHALATION) at 11:19

## 2022-01-01 RX ADMIN — AMLODIPINE BESYLATE 5 MG: 5 TABLET ORAL at 08:47

## 2022-01-01 RX ADMIN — AMPICILLIN SODIUM 2 G: 2 INJECTION, POWDER, FOR SOLUTION INTRAMUSCULAR; INTRAVENOUS at 11:01

## 2022-01-01 RX ADMIN — TRAZODONE HYDROCHLORIDE 50 MG: 50 TABLET ORAL at 20:44

## 2022-01-01 RX ADMIN — HEPARIN SODIUM 5000 UNITS: 5000 INJECTION, SOLUTION INTRAVENOUS; SUBCUTANEOUS at 09:11

## 2022-01-01 RX ADMIN — SODIUM CHLORIDE, PRESERVATIVE FREE 10 ML: 5 INJECTION INTRAVENOUS at 09:11

## 2022-01-01 RX ADMIN — SEVELAMER CARBONATE 800 MG: 800 TABLET, FILM COATED ORAL at 20:18

## 2022-01-01 RX ADMIN — PANTOPRAZOLE SODIUM 40 MG: 40 TABLET, DELAYED RELEASE ORAL at 08:45

## 2022-01-01 RX ADMIN — CLOPIDOGREL 75 MG: 75 TABLET, FILM COATED ORAL at 08:20

## 2022-01-01 RX ADMIN — TAZOBACTAM SODIUM AND PIPERACILLIN SODIUM 3.38 G: 375; 3 INJECTION, SOLUTION INTRAVENOUS at 23:12

## 2022-01-01 RX ADMIN — BUDESONIDE AND FORMOTEROL FUMARATE DIHYDRATE 2 PUFF: 160; 4.5 AEROSOL RESPIRATORY (INHALATION) at 08:11

## 2022-01-01 RX ADMIN — HEPARIN SODIUM 5000 UNITS: 5000 INJECTION, SOLUTION INTRAVENOUS; SUBCUTANEOUS at 08:46

## 2022-01-01 RX ADMIN — ROPIVACAINE HYDROCHLORIDE 20 ML: 5 INJECTION, SOLUTION EPIDURAL; INFILTRATION; PERINEURAL at 10:24

## 2022-01-01 RX ADMIN — CARVEDILOL 25 MG: 25 TABLET, FILM COATED ORAL at 17:05

## 2022-01-01 RX ADMIN — SODIUM CHLORIDE, PRESERVATIVE FREE 10 ML: 5 INJECTION INTRAVENOUS at 16:05

## 2022-01-01 RX ADMIN — ONDANSETRON 4 MG: 2 INJECTION INTRAMUSCULAR; INTRAVENOUS at 00:14

## 2022-01-01 RX ADMIN — AMPICILLIN SODIUM 2 G: 2 INJECTION, POWDER, FOR SOLUTION INTRAMUSCULAR; INTRAVENOUS at 23:11

## 2022-01-01 RX ADMIN — TRAZODONE HYDROCHLORIDE 50 MG: 50 TABLET ORAL at 20:18

## 2022-01-01 RX ADMIN — FENTANYL CITRATE 25 MCG: 50 INJECTION INTRAMUSCULAR; INTRAVENOUS at 12:31

## 2022-01-01 RX ADMIN — CARVEDILOL 25 MG: 25 TABLET, FILM COATED ORAL at 18:17

## 2022-01-01 RX ADMIN — HYDROXYZINE HYDROCHLORIDE 25 MG: 25 TABLET, FILM COATED ORAL at 20:45

## 2022-01-01 RX ADMIN — HEPARIN SODIUM 5000 UNITS: 5000 INJECTION, SOLUTION INTRAVENOUS; SUBCUTANEOUS at 09:42

## 2022-01-01 RX ADMIN — AMPICILLIN SODIUM 2 G: 2 INJECTION, POWDER, FOR SOLUTION INTRAMUSCULAR; INTRAVENOUS at 15:42

## 2022-01-01 RX ADMIN — OXYCODONE AND ACETAMINOPHEN 1 TABLET: 5; 325 TABLET ORAL at 18:41

## 2022-01-01 RX ADMIN — OXYCODONE AND ACETAMINOPHEN 1 TABLET: 5; 325 TABLET ORAL at 20:18

## 2022-01-01 RX ADMIN — CARVEDILOL 25 MG: 25 TABLET, FILM COATED ORAL at 15:44

## 2022-01-01 RX ADMIN — SODIUM CHLORIDE, PRESERVATIVE FREE 10 ML: 5 INJECTION INTRAVENOUS at 20:18

## 2022-01-01 RX ADMIN — SEVELAMER CARBONATE 800 MG: 800 TABLET, FILM COATED ORAL at 14:38

## 2022-01-01 RX ADMIN — CARVEDILOL 25 MG: 25 TABLET, FILM COATED ORAL at 08:46

## 2022-01-01 RX ADMIN — FENTANYL CITRATE 50 MCG: 0.05 INJECTION, SOLUTION INTRAMUSCULAR; INTRAVENOUS at 10:09

## 2022-01-01 RX ADMIN — ASPIRIN 81 MG: 81 TABLET, COATED ORAL at 14:38

## 2022-01-01 RX ADMIN — HEPARIN SODIUM 4000 UNITS: 1000 INJECTION INTRAVENOUS; SUBCUTANEOUS at 13:12

## 2022-01-01 RX ADMIN — HYDROXYZINE HYDROCHLORIDE 25 MG: 25 TABLET, FILM COATED ORAL at 00:25

## 2022-01-01 RX ADMIN — LORAZEPAM 0.5 MG: 0.5 TABLET ORAL at 01:18

## 2022-01-01 RX ADMIN — MIDAZOLAM 1 MG: 1 INJECTION INTRAMUSCULAR; INTRAVENOUS at 10:09

## 2022-01-01 RX ADMIN — SODIUM CHLORIDE, PRESERVATIVE FREE 10 ML: 5 INJECTION INTRAVENOUS at 08:46

## 2022-01-01 RX ADMIN — SODIUM CHLORIDE, PRESERVATIVE FREE 10 ML: 5 INJECTION INTRAVENOUS at 21:40

## 2022-01-01 RX ADMIN — MEPIVACAINE HYDROCHLORIDE 10 ML: 15 INJECTION, SOLUTION EPIDURAL; INFILTRATION at 10:24

## 2022-01-01 RX ADMIN — HYDRALAZINE HYDROCHLORIDE 100 MG: 50 TABLET ORAL at 20:18

## 2022-01-01 RX ADMIN — HYDRALAZINE HYDROCHLORIDE 100 MG: 50 TABLET ORAL at 08:20

## 2022-01-01 RX ADMIN — FOLIC ACID 1000 MCG: 1 TABLET ORAL at 08:45

## 2022-01-01 RX ADMIN — TORSEMIDE 200 MG: 100 TABLET ORAL at 14:38

## 2022-01-01 RX ADMIN — TAZOBACTAM SODIUM AND PIPERACILLIN SODIUM 3.38 G: 375; 3 INJECTION, SOLUTION INTRAVENOUS at 14:07

## 2022-01-01 RX ADMIN — PANTOPRAZOLE SODIUM 40 MG: 40 TABLET, DELAYED RELEASE ORAL at 08:21

## 2022-01-01 RX ADMIN — OXYCODONE HYDROCHLORIDE 20 MG: 20 TABLET, FILM COATED, EXTENDED RELEASE ORAL at 16:00

## 2022-01-01 RX ADMIN — SERTRALINE 100 MG: 100 TABLET, FILM COATED ORAL at 08:45

## 2022-01-01 RX ADMIN — SEVELAMER CARBONATE 800 MG: 800 TABLET, FILM COATED ORAL at 18:41

## 2022-01-01 RX ADMIN — LIDOCAINE HYDROCHLORIDE 10 ML: 10 INJECTION, SOLUTION INFILTRATION; PERINEURAL at 12:42

## 2022-01-01 RX ADMIN — LORAZEPAM 0.5 MG: 0.5 TABLET ORAL at 23:49

## 2022-01-01 RX ADMIN — ASPIRIN 81 MG: 81 TABLET, COATED ORAL at 08:53

## 2022-01-01 RX ADMIN — PANTOPRAZOLE SODIUM 40 MG: 40 TABLET, DELAYED RELEASE ORAL at 08:53

## 2022-01-01 RX ADMIN — VANCOMYCIN HYDROCHLORIDE 1500 MG: 10 INJECTION, POWDER, LYOPHILIZED, FOR SOLUTION INTRAVENOUS at 06:49

## 2022-01-01 RX ADMIN — OXYCODONE AND ACETAMINOPHEN 1 TABLET: 5; 325 TABLET ORAL at 20:23

## 2022-01-01 RX ADMIN — SEVELAMER CARBONATE 800 MG: 800 TABLET, FILM COATED ORAL at 18:16

## 2022-01-01 RX ADMIN — CARVEDILOL 25 MG: 25 TABLET, FILM COATED ORAL at 09:09

## 2022-01-01 RX ADMIN — CEFEPIME HYDROCHLORIDE 2 G: 2 INJECTION, POWDER, FOR SOLUTION INTRAVENOUS at 12:07

## 2022-01-01 RX ADMIN — SODIUM CHLORIDE, PRESERVATIVE FREE 10 ML: 5 INJECTION INTRAVENOUS at 20:46

## 2022-01-01 RX ADMIN — MIDAZOLAM 1 MG: 1 INJECTION INTRAMUSCULAR; INTRAVENOUS at 12:36

## 2022-01-01 RX ADMIN — MIDAZOLAM 0.5 MG: 1 INJECTION INTRAMUSCULAR; INTRAVENOUS at 11:50

## 2022-01-01 RX ADMIN — CLOPIDOGREL 75 MG: 75 TABLET, FILM COATED ORAL at 14:38

## 2022-01-01 RX ADMIN — LORAZEPAM 0.5 MG: 0.5 TABLET ORAL at 15:42

## 2022-01-01 RX ADMIN — HYDRALAZINE HYDROCHLORIDE 100 MG: 50 TABLET ORAL at 09:38

## 2022-01-01 RX ADMIN — HEPARIN SODIUM 5000 UNITS: 5000 INJECTION, SOLUTION INTRAVENOUS; SUBCUTANEOUS at 08:21

## 2022-01-01 RX ADMIN — TAZOBACTAM SODIUM AND PIPERACILLIN SODIUM 3.38 G: 375; 3 INJECTION, SOLUTION INTRAVENOUS at 14:45

## 2022-01-01 RX ADMIN — TAZOBACTAM SODIUM AND PIPERACILLIN SODIUM 3.38 G: 375; 3 INJECTION, SOLUTION INTRAVENOUS at 23:04

## 2022-01-01 RX ADMIN — SEVELAMER CARBONATE 800 MG: 800 TABLET, FILM COATED ORAL at 09:39

## 2022-01-01 RX ADMIN — BUDESONIDE AND FORMOTEROL FUMARATE DIHYDRATE 2 PUFF: 160; 4.5 AEROSOL RESPIRATORY (INHALATION) at 19:33

## 2022-01-01 RX ADMIN — ASPIRIN 81 MG: 81 TABLET, COATED ORAL at 09:39

## 2022-01-01 RX ADMIN — Medication 3 MG: at 23:11

## 2022-01-01 RX ADMIN — OXYCODONE AND ACETAMINOPHEN 1 TABLET: 5; 325 TABLET ORAL at 03:52

## 2022-01-01 RX ADMIN — VANCOMYCIN HYDROCHLORIDE 1500 MG: 10 INJECTION, POWDER, LYOPHILIZED, FOR SOLUTION INTRAVENOUS at 14:13

## 2022-01-01 RX ADMIN — LORAZEPAM 0.5 MG: 0.5 TABLET ORAL at 23:03

## 2022-01-01 RX ADMIN — CLOPIDOGREL 75 MG: 75 TABLET, FILM COATED ORAL at 09:09

## 2022-01-01 RX ADMIN — ALBUTEROL SULFATE 2.5 MG: 2.5 SOLUTION RESPIRATORY (INHALATION) at 20:31

## 2022-01-01 RX ADMIN — Medication 3 MG: at 04:06

## 2022-01-01 RX ADMIN — BUDESONIDE AND FORMOTEROL FUMARATE DIHYDRATE 2 PUFF: 160; 4.5 AEROSOL RESPIRATORY (INHALATION) at 20:30

## 2022-01-01 RX ADMIN — TORSEMIDE 100 MG: 100 TABLET ORAL at 08:20

## 2022-01-01 RX ADMIN — AMLODIPINE BESYLATE 5 MG: 5 TABLET ORAL at 15:43

## 2022-01-01 RX ADMIN — HYDRALAZINE HYDROCHLORIDE 100 MG: 50 TABLET ORAL at 23:04

## 2022-01-01 RX ADMIN — Medication 3 MG: at 01:16

## 2022-01-01 RX ADMIN — CEFEPIME HYDROCHLORIDE 2 G: 2 INJECTION, POWDER, FOR SOLUTION INTRAVENOUS at 21:35

## 2022-01-01 RX ADMIN — CARVEDILOL 25 MG: 25 TABLET, FILM COATED ORAL at 09:39

## 2022-01-01 RX ADMIN — HYDROMORPHONE HYDROCHLORIDE 1 MG: 1 INJECTION, SOLUTION INTRAMUSCULAR; INTRAVENOUS; SUBCUTANEOUS at 10:39

## 2022-01-01 RX ADMIN — DROPERIDOL 2.5 MG: 2.5 INJECTION, SOLUTION INTRAMUSCULAR; INTRAVENOUS at 10:40

## 2022-01-01 RX ADMIN — LORAZEPAM 0.5 MG: 2 INJECTION INTRAMUSCULAR; INTRAVENOUS at 00:55

## 2022-01-01 RX ADMIN — BUDESONIDE AND FORMOTEROL FUMARATE DIHYDRATE 2 PUFF: 160; 4.5 AEROSOL RESPIRATORY (INHALATION) at 07:06

## 2022-01-01 RX ADMIN — BUDESONIDE AND FORMOTEROL FUMARATE DIHYDRATE 2 PUFF: 160; 4.5 AEROSOL RESPIRATORY (INHALATION) at 08:03

## 2022-01-01 RX ADMIN — SODIUM CHLORIDE, PRESERVATIVE FREE 10 ML: 5 INJECTION INTRAVENOUS at 08:34

## 2022-01-01 RX ADMIN — NIFEDIPINE 90 MG: 30 TABLET, FILM COATED, EXTENDED RELEASE ORAL at 16:58

## 2022-01-01 RX ADMIN — BUDESONIDE AND FORMOTEROL FUMARATE DIHYDRATE 2 PUFF: 160; 4.5 AEROSOL RESPIRATORY (INHALATION) at 07:33

## 2022-01-01 RX ADMIN — ASPIRIN 81 MG: 81 TABLET, COATED ORAL at 10:40

## 2022-01-01 RX ADMIN — ONDANSETRON HYDROCHLORIDE 4 MG: 4 TABLET, FILM COATED ORAL at 20:45

## 2022-01-01 RX ADMIN — FENTANYL CITRATE 50 MCG: 50 INJECTION INTRAMUSCULAR; INTRAVENOUS at 08:51

## 2022-01-01 RX ADMIN — SODIUM CHLORIDE, PRESERVATIVE FREE 10 ML: 5 INJECTION INTRAVENOUS at 21:21

## 2022-01-01 RX ADMIN — CEFEPIME HYDROCHLORIDE 2 G: 2 INJECTION, POWDER, FOR SOLUTION INTRAVENOUS at 05:31

## 2022-01-01 RX ADMIN — LORAZEPAM 0.5 MG: 0.5 TABLET ORAL at 09:10

## 2022-01-01 RX ADMIN — TORSEMIDE 200 MG: 100 TABLET ORAL at 15:43

## 2022-01-01 RX ADMIN — NIFEDIPINE 90 MG: 30 TABLET, FILM COATED, EXTENDED RELEASE ORAL at 16:00

## 2022-01-01 RX ADMIN — HEPARIN SODIUM 5000 UNITS: 5000 INJECTION, SOLUTION INTRAVENOUS; SUBCUTANEOUS at 00:12

## 2022-01-01 RX ADMIN — NIFEDIPINE 90 MG: 30 TABLET, FILM COATED, EXTENDED RELEASE ORAL at 18:41

## 2022-01-01 RX ADMIN — BUDESONIDE AND FORMOTEROL FUMARATE DIHYDRATE 2 PUFF: 160; 4.5 AEROSOL RESPIRATORY (INHALATION) at 19:22

## 2022-01-01 RX ADMIN — Medication 3 MG: at 23:03

## 2022-01-01 RX ADMIN — DOCUSATE SODIUM 50MG AND SENNOSIDES 8.6MG 2 TABLET: 8.6; 5 TABLET, FILM COATED ORAL at 23:03

## 2022-01-01 RX ADMIN — ATORVASTATIN CALCIUM 80 MG: 80 TABLET, FILM COATED ORAL at 20:18

## 2022-01-01 RX ADMIN — OXYCODONE AND ACETAMINOPHEN 1 TABLET: 5; 325 TABLET ORAL at 23:11

## 2022-01-01 RX ADMIN — LORAZEPAM 0.5 MG: 0.5 TABLET ORAL at 08:45

## 2022-01-01 RX ADMIN — OXYCODONE AND ACETAMINOPHEN 1 TABLET: 5; 325 TABLET ORAL at 23:55

## 2022-01-01 RX ADMIN — BUDESONIDE AND FORMOTEROL FUMARATE DIHYDRATE 2 PUFF: 160; 4.5 AEROSOL RESPIRATORY (INHALATION) at 19:28

## 2022-01-01 RX ADMIN — SODIUM CHLORIDE 9 ML/HR: 9 INJECTION, SOLUTION INTRAVENOUS at 09:19

## 2022-01-01 RX ADMIN — ATORVASTATIN CALCIUM 80 MG: 80 TABLET, FILM COATED ORAL at 22:06

## 2022-01-01 RX ADMIN — SEVELAMER CARBONATE 800 MG: 800 TABLET, FILM COATED ORAL at 08:20

## 2022-01-01 RX ADMIN — HEPARIN SODIUM 5000 UNITS: 5000 INJECTION, SOLUTION INTRAVENOUS; SUBCUTANEOUS at 20:45

## 2022-01-01 RX ADMIN — OXYCODONE AND ACETAMINOPHEN 1 TABLET: 5; 325 TABLET ORAL at 14:59

## 2022-01-01 RX ADMIN — HEPARIN SODIUM 5000 UNITS: 5000 INJECTION, SOLUTION INTRAVENOUS; SUBCUTANEOUS at 01:57

## 2022-01-01 RX ADMIN — HYDRALAZINE HYDROCHLORIDE 100 MG: 50 TABLET ORAL at 09:10

## 2022-01-01 RX ADMIN — ATORVASTATIN CALCIUM 80 MG: 80 TABLET, FILM COATED ORAL at 21:36

## 2022-01-01 RX ADMIN — HYDRALAZINE HYDROCHLORIDE 100 MG: 50 TABLET ORAL at 21:21

## 2022-01-01 RX ADMIN — TORSEMIDE 200 MG: 100 TABLET ORAL at 09:09

## 2022-01-01 RX ADMIN — CARVEDILOL 25 MG: 25 TABLET, FILM COATED ORAL at 17:33

## 2022-01-01 RX ADMIN — TORSEMIDE 100 MG: 100 TABLET ORAL at 09:39

## 2022-01-01 RX ADMIN — TAZOBACTAM SODIUM AND PIPERACILLIN SODIUM 3.38 G: 375; 3 INJECTION, SOLUTION INTRAVENOUS at 13:49

## 2022-01-01 RX ADMIN — HYDRALAZINE HYDROCHLORIDE 100 MG: 50 TABLET ORAL at 05:41

## 2022-01-01 RX ADMIN — CLOPIDOGREL 75 MG: 75 TABLET, FILM COATED ORAL at 08:34

## 2022-01-01 RX ADMIN — SODIUM CHLORIDE, PRESERVATIVE FREE 10 ML: 5 INJECTION INTRAVENOUS at 08:45

## 2022-01-01 RX ADMIN — OXYCODONE AND ACETAMINOPHEN 1 TABLET: 5; 325 TABLET ORAL at 02:15

## 2022-01-01 RX ADMIN — HEPARIN SODIUM 5000 UNITS: 5000 INJECTION, SOLUTION INTRAVENOUS; SUBCUTANEOUS at 21:21

## 2022-01-01 RX ADMIN — SODIUM CHLORIDE, PRESERVATIVE FREE 10 ML: 5 INJECTION INTRAVENOUS at 08:22

## 2022-01-01 RX ADMIN — CLOPIDOGREL 75 MG: 75 TABLET, FILM COATED ORAL at 15:59

## 2022-01-01 RX ADMIN — ASPIRIN 81 MG: 81 TABLET, COATED ORAL at 09:10

## 2022-01-01 RX ADMIN — SEVELAMER CARBONATE 800 MG: 800 TABLET, FILM COATED ORAL at 17:05

## 2022-01-01 RX ADMIN — CARVEDILOL 25 MG: 25 TABLET, FILM COATED ORAL at 20:18

## 2022-01-01 RX ADMIN — HYDRALAZINE HYDROCHLORIDE 100 MG: 50 TABLET ORAL at 15:58

## 2022-01-01 RX ADMIN — HEPARIN SODIUM 5000 UNITS: 5000 INJECTION, SOLUTION INTRAVENOUS; SUBCUTANEOUS at 08:34

## 2022-01-01 RX ADMIN — CARVEDILOL 25 MG: 25 TABLET, FILM COATED ORAL at 23:12

## 2022-01-01 RX ADMIN — HEPARIN SODIUM 5000 UNITS: 5000 INJECTION, SOLUTION INTRAVENOUS; SUBCUTANEOUS at 15:59

## 2022-01-01 RX ADMIN — SODIUM CHLORIDE, PRESERVATIVE FREE 10 ML: 5 INJECTION INTRAVENOUS at 20:49

## 2022-01-01 RX ADMIN — GABAPENTIN 100 MG: 100 CAPSULE ORAL at 15:59

## 2022-01-01 RX ADMIN — OXYCODONE AND ACETAMINOPHEN 1 TABLET: 5; 325 TABLET ORAL at 09:39

## 2022-01-01 RX ADMIN — SERTRALINE 100 MG: 100 TABLET, FILM COATED ORAL at 09:10

## 2022-01-01 RX ADMIN — ASPIRIN 324 MG: 81 TABLET, CHEWABLE ORAL at 15:59

## 2022-01-01 RX ADMIN — SODIUM CHLORIDE, PRESERVATIVE FREE 10 ML: 5 INJECTION INTRAVENOUS at 20:07

## 2022-01-01 RX ADMIN — OXYCODONE AND ACETAMINOPHEN 1 TABLET: 5; 325 TABLET ORAL at 08:53

## 2022-01-01 RX ADMIN — CLOPIDOGREL 75 MG: 75 TABLET, FILM COATED ORAL at 09:39

## 2022-01-01 RX ADMIN — NIFEDIPINE 90 MG: 30 TABLET, FILM COATED, EXTENDED RELEASE ORAL at 23:03

## 2022-01-01 RX ADMIN — HEPARIN SODIUM 5000 UNITS: 5000 INJECTION, SOLUTION INTRAVENOUS; SUBCUTANEOUS at 14:37

## 2022-01-01 RX ADMIN — LORAZEPAM 0.5 MG: 0.5 TABLET ORAL at 17:04

## 2022-01-01 RX ADMIN — HEPARIN SODIUM 5000 UNITS: 5000 INJECTION, SOLUTION INTRAVENOUS; SUBCUTANEOUS at 20:18

## 2022-01-01 RX ADMIN — TAZOBACTAM SODIUM AND PIPERACILLIN SODIUM 3.38 G: 375; 3 INJECTION, SOLUTION INTRAVENOUS at 01:18

## 2022-01-07 NOTE — DISCHARGE INSTRUCTIONS
[facility]  Brad Choudhury, Chet Engle Scherrer, [place], [place]  [address], [address]  [phone]    Post-Operative Instructions for AV Graft   Diet: Regular Diet    Medications: Take your regularly scheduled medications on the day of your surgery, unless your doctor has directed you otherwise. You may be sent home with a prescription for pain medication, follow the directions as prescribed.    Activity Restrictions / Driving: Avoid lifting more than 15 pounds or other activities that stress or compress the access area. No driving for the remainder of the day after surgery. You may drive when you no longer are taking narcotic pain medications. If a nerve block was done to numb your arm for surgery, you will be placed in an arm sling.  This numbness and inability to move the arm can last for as little as 6 hours but as many as 18.  The sling should be used during this time but can be removed when sensation and movement of your arm is normal and does not need to be used after that. Use of the arm is encouraged after the surgery.    Incision Care: Some bruising is normal. If you have drainage from the incision please notify the office. Dressing should be removed in 48 hours. After dressing is removed, it is OK to shower. Do not submerge incision until cleared by your surgeon (bath or swimming).    Bathing and Showering: You may shower after you remove your dressing.    Follow-up Appointments: You will need to return to the office for a follow-up visit within 1-3 weeks after your surgery. Please make sure you have your appointment scheduled, call [phone].    The patient (you) should:  1. Avoid wearing tight constrictive clothing over that arm.  2. Avoid wearing jewelry that is tight, such as a watch on the access arm.  3. Avoid carrying heavy objects.  4. Avoid purse straps over the fistula.  5. Avoid sleeping on the arm or keeping it bent for extended periods of time.  6. Each day,  "using your opposite hand, feel over the fistula for the \"thrill\" or vibration that is normally present.    Fistula Information / Care:  ·  It is normal to have swelling in the surgical area. To help control this swelling, you should elevate your arm on a pillow.  ·  Wiggle your fingers and clinch your fist 10 times every hour, while awake, for the first 5-7 days. Also, bend and straighten at the elbow to regain normal range of motion. These exercises are designed to promote circulation in the fingers and aid in draining away the excess fluid accumulation in the immediate area.  · No blood pressures or needle sticks in the arm with your access.    Call the office for the followin. Fever greater than 101.0  2. Uncontrolled pain. This is on a scale of 1-10 (10 being the worst pain imaginable) your pain is a level 7 or above.  3. It is important that you notify our office if you are having numbness and significant pain in the extremity in which you have just had surgery!  4. Decreased or absent thrill.  5. Nausea, diarrhea, and/or vomiting that continue for 12-24 hours.  6. Signs of an infection: redness, increased swelling, drainage, fever and/or chills.  7. Chest pain or difficulty breathing.    The  graft CAN NOT be used until the MD has given written approval.  For now, she needs to use her dialysis catheter              What to expect after a Nerve Block    Nerve blocks administered to block pain affect many types of nerves, including those nerves that control movement, pain, and normal sensation. Following a nerve block, you may notice some bruising at the site where the block was given. You may experience sensations such as: numbness of the affected area or limb, tingling, heaviness (that is the limb feels heavy to you), weakness or inability to move the affected arm or leg, or a feeling as if your arm or leg has “fallen asleep.”     A nerve block can last from 2 to 36 hours depending on the medications " used.  Usually the weakness wears off first followed by the tingling and heaviness. As the block wears off, you may begin to notice pain; however, this sequence of events may occur in any order. Typically, you will be able to move your limb before you will feel it. Once a nerve block begins to wear off, the effects are usually completely gone within 60 minutes.  If you experience continued side effects that you believe are block related for longer than 48 hours, please call your healthcare provider. Please see block-specific instructions below.    Instructions for any block involving the shoulder or arm  • If you have had any kind of shoulder/arm block, you will go home with your arm in a sling. Wear the sling until the block has completely worn off. You may be required to wear it for a longer period of time per your surgeon’s recommendations.  • If you have had a shoulder/arm block, it is a good idea to sleep on a recliner with pillows under your arm.    You may experience symptoms such as:  Shortness of breath  Hoarseness   Blurry vision  Unequal pupils  Drooping of your face on the same side as the block was performed    These are side effects associated with this kind of block and should go away within 12 hours.    Note: If you have severe or prolonged shortness of breath, please seek medical assistance as soon as possible.     Protection of a “blocked” arm or leg (limb)  • After a nerve block, you cannot feel pain, pressure, or extremes of temperature in the affected limb. And because of this, your blocked limb is at more risk for injury. For example, it is possible to burn your limb on an extremely hot surface without feeling it.     • When resting, it is important to reposition your limb periodically to avoid prolonged pressure on it. This may require the use of pillows and padding.    • While sleeping, you should avoid rolling onto the affected limb or putting too much pressure on it.     • If you have a  cast or tight dressing, check the color of your fingers or toes of the affected limb. Call your surgeon if they look discolored (that is, dusky, dark colored).    • Use caution in cold weather. Cover your limb appropriately to protect it from the cold.      Pain Management:    Your surgeon will give you a prescription for pain medication. Begin taking this before the nerve block wears off. Bear in mind that sometimes the block can wear off in the middle of the night.   If you have further questions after reading this handout, the office is open from 8:30am to 5:00pm Monday through Friday. Call [phone].

## 2022-01-07 NOTE — PERIOPERATIVE NURSING NOTE
"Phone call to Dr. Hernandez to inform of patient c/o \"can't take a deep breath, trouble breathing.\"  Informed that patient on O2 at 5L/NC at home and here the SaO2 is % on that.  She does not appear to be in distress or to be SOA.  Dr. Hernandez came to the bedside, assessed patient and discussed with her and her  the SE of the block, and that he thinks she is doing fine and able to be discharged.  Patient and spouse. Verbalized understanding.  "

## 2022-01-07 NOTE — ADDENDUM NOTE
Addendum  created 01/07/22 1644 by Karthikeyan Mathur MD    Attestation recorded in Intraprocedure, Intraprocedure Attestations filed

## 2022-01-07 NOTE — PERIOPERATIVE NURSING NOTE
"Dr Mancera at [facility]---aware of patient c/o feels like she's \"struggling\" to breathe. 02 sat remains 100% on 5 liters. Reassurance provided and relaxation breathing performed. Pt dozing between conversations.   "

## 2022-01-07 NOTE — OP NOTE
Operative Note  Location: [facility]  Date of Admission:  1/7/2022  OR Date: 1/7/2022    Pre-op Diagnosis: Infiltration of AV graft with left arm hematoma    Post-op Diagnosis: Same    Procedure:   1) evacuation of left arm hematoma    Surgeon: Riki Mancera MD    Assistant: Nadeen Anthony RN    Anesthesia: Monitored Anesthesia Care with Regional    Staff:   Circulator: Terry Ly RN; Whitney Sharpe RN  Scrub Person: Abdirahman Ojeda; Festus Seymour  Assistant: Nadeen Anthony    Estimated Blood Loss: 100ml    Specimen: * No orders in the log *    Complications: None    Findings: Patient with a large hematoma beneath the fascia.  Graft remained patent.    Implants: Nothing was implanted during the procedure    Indications:    The patient is an 76 y.o. female seen for evaluation after a large infiltration of her left arm AV graft from a recent dialysis session.  Patient was having neuropathy of her hand with weakness and pain particularly on the ulnar side and ultrasound showed large well-organized hematoma.  She was seen in the office and Dr. Engle recommended evacuation.       Procedure:    The patient was taken to the operating placed supine on the operating table.  A block of been placed by the anesthesia service.  The left arm was prepped and draped with ChloraPrep.  Under ultrasound, I reevaluated her anatomy to see where it would be my best place for incision in order to get into the hematoma in the most expeditious fashion.  This is essentially in the mid arm.  Skin and subcutaneous tissues were divided sharply and then the fascia was exposed and divided.  Immediately upon doing so, I entered a large hematoma cavity.  The hematoma was moderately organized and I fractured it with my fingers bluntly and then expressed out large volumes of hematoma into a basin.  The wound was then irrigated with antibiotic irrigation.  No bleeding.  The fascia was reapproximated with 2-0 Vicryl.  I  elected not to leave a drain.  The skin was then closed in 2 more layers with Vicryl sutures.  Graft was open throughout the case and had a nice thrill.  Sterile dressing was applied.    Plan:  Patient is going to use a sling until her block wears off probably tomorrow or the next day.  She is going to continue to use her tunneled dialysis catheter while we rest this arm.  I am going to see her in 2 to 3 weeks to reevaluate her surgical site and make sure it is okay to begin using her graft.      Active Hospital Problems    Diagnosis  POA   • ESRD (end stage renal disease) ([facility]) [N18.6]  Yes      Resolved Hospital Problems   No resolved problems to display.      Riki Mancera MD     Date: 1/7/2022  Time: 12:40 EST

## 2022-01-07 NOTE — ANESTHESIA PREPROCEDURE EVALUATION
Anesthesia Evaluation     Patient summary reviewed and Nursing notes reviewed   history of anesthetic complications: PONV  NPO Solid Status: > 8 hours             Airway   Mallampati: II  TM distance: >3 FB  Neck ROM: limited  Dental      Pulmonary    (+) pneumonia , pulmonary embolism, a smoker Former, lung cancer, COPD, home oxygen, shortness of breath,   Cardiovascular     ECG reviewed  PT is on anticoagulation therapy  Rate: normal    (+) hypertension, dysrhythmias PVC, CHF , PVD, hyperlipidemia,  carotid artery disease      Neuro/Psych  (+) headaches, dizziness/light headedness, psychiatric history Anxiety and Depression,     GI/Hepatic/Renal/Endo    (+) obesity,  hiatal hernia, GERD,  renal disease ESRD and dialysis,     Musculoskeletal     (+) neck pain,   Abdominal    Substance History - negative use     OB/GYN negative ob/gyn ROS         Other   arthritis,    history of cancer                      Anesthesia Plan    ASA 4     regional   (        Patient had dialysis 1/5/22)  intravenous induction     Anesthetic plan, all risks, benefits, and alternatives have been provided, discussed and informed consent has been obtained with: patient.

## 2022-01-07 NOTE — ANESTHESIA POSTPROCEDURE EVALUATION
"Patient: Rachana Arguelles    Procedure Summary     Date: 01/07/22 Room / Location: [facility] OR  / [facility] MAIN OR    Anesthesia Start: 1146 Anesthesia Stop: 1242    Procedure: LEFT ARM HEMATOMA EVACUATION (Left Arm Upper) Diagnosis:     Surgeons: Riki Mancera MD Provider: Karthikeyan Mathur MD    Anesthesia Type: regional ASA Status: 4          Anesthesia Type: regional    Vitals  Vitals Value Taken Time   BP     Temp     Pulse 62 01/07/22 1245   Resp     SpO2 99 % 01/07/22 1245   Vitals shown include unvalidated device data.        Post Anesthesia Care and Evaluation    Patient location during evaluation: bedside  Patient participation: complete - patient participated  Level of consciousness: awake  Pain management: adequate  Airway patency: patent  Anesthetic complications: No anesthetic complications    Cardiovascular status: acceptable  Respiratory status: acceptable  Hydration status: acceptable    Comments: */62 (BP Location: Right arm, Patient Position: Lying)   Pulse 62   Temp 36.6 °C (97.9 °F) (Oral)   Resp 18   Ht 167.6 cm (66\")   Wt 70.8 kg (156 lb)   SpO2 100% Comment: pt resting with eyes closed  BMI 25.18 kg/m²         "

## 2022-01-07 NOTE — ANESTHESIA PROCEDURE NOTES
Peripheral Block    Pre-sedation assessment completed: 1/7/2022 10:13 AM    Patient reassessed immediately prior to procedure    Patient location during procedure: pre-op  Start time: 1/7/2022 10:13 AM  Stop time: 1/7/2022 10:23 AM  Reason for block: at surgeon's request and post-op pain management  Performed by  Anesthesiologist: Karthikeyan Le MD  Preanesthetic Checklist  Completed: patient identified, IV checked, site marked, risks and benefits discussed, surgical consent, monitors and equipment checked, pre-op evaluation and timeout performed  Prep:  Pt Position: supine  Sterile barriers:cap, gloves, mask and sterile barriers  Prep: ChloraPrep  Patient monitoring: blood pressure monitoring, continuous pulse oximetry and EKG  Procedure    Sedation: yes  Performed under: local infiltration  Guidance:ultrasound guided    ULTRASOUND INTERPRETATION.  Using ultrasound guidance a 22 G gauge needle was placed in close proximity to the brachial plexus nerve, at which point, under ultrasound guidance anesthetic was injected in the area of the nerve and spread of the anesthesia was seen on ultrasound in close proximity thereto.  There were no abnormalities seen on ultrasound; a digital image was taken; and the patient tolerated the procedure with no complications. Images:still images obtained    Laterality:left  Block Type:interscalene  Injection Technique:single-shot  Needle Type:echogenic  Needle Gauge:22 G  Resistance on Injection: less than 15 psi    Medications Used: ropivacaine (NAROPIN) 0.5 % injection, 20 mL  Mepivacaine HCl (PF) (CARBOCAINE) 1.5 % injection, 10 mL  Med administered at 1/7/2022 10:24 AM      Post Assessment  Injection Assessment: negative aspiration for heme, no paresthesia on injection and incremental injection  Patient Tolerance:comfortable throughout block  Complications:no

## 2022-01-11 NOTE — TELEPHONE ENCOUNTER
Caller: ISABEL    Relationship to patient: Home [facility]    Best call back number: [phone]    Patient is needing: ISABEL IS FOLLOWING UP ON ORDERS THAT WERE FAXED IN 12/21 FOR DR. MADSEN TO SIGN. SHE HAS OTHERS THAT HAVE BEEN SENT IN ALSO BUT THE OLDEST ONE IS FROM LAST MONTH. PLEASE CALL AND ADVISE.

## 2022-01-20 NOTE — TELEPHONE ENCOUNTER
Caller: ISABEL    Relationship to patient:     Best call back number: [phone]    Patient is needing: ISABEL FROM [facility] CALLING IN REGARDS TO CHECK THE STATUS OF ORDERS THAT HAVE BEEN FAXED OVER ON 01/12/22     HOME HEALTH ORDER #[id number]    ISABEL STATES NEEDS ORDER SIGNED DATED AND FAXED BACK OVER

## 2022-01-25 NOTE — PROGRESS NOTES
The patient has had really high blood pressures and we will send hydroxyzine 25 mg bid. I relayed this to home health.  196/76

## 2022-01-25 NOTE — TELEPHONE ENCOUNTER
Call saleem Montalvo with [facility] home [facility] calling in regards to patients BP.   Requesting to speak to MA.  C-

## 2022-01-27 NOTE — TELEPHONE ENCOUNTER
Caller: APRIL     Relationship: Other    Best call back number:[phone]      What is the best time to reach you: ANYTIME     Who are you requesting to speak with (clinical staff, provider,  specific staff member): CLINICAL STAFF         What was the call regarding: APRIL FROM [facility] WANTS TO LET DR MADSEN KNOW THAT PATIENTS BLOOD PRESSURE /76 TODAY.     APRIL WOULD ALSO LIKE TO KNOW IF DR MADSEN WOULD LIKE THE PATIENT TO START TAKING   hydrALAZINE (APRESOLINE) 25 MG tablet TWICE A DAY?    PATENT STATES SHE HAS BEEN TAKING 100 MG 3 TIMES A DAY. APRIL WAS UNAWARE OF THIS WHEN SHE TALKED TO DR MADSEN.     Do you require a callback: YES

## 2022-01-30 PROBLEM — A41.9 SEPSIS, UNSPECIFIED ORGANISM (HCC): Status: ACTIVE | Noted: 2022-01-01

## 2022-01-30 PROBLEM — I50.32 CHRONIC DIASTOLIC CHF (CONGESTIVE HEART FAILURE): Status: ACTIVE | Noted: 2022-01-01

## 2022-01-30 PROBLEM — R10.13 EPIGASTRIC ABDOMINAL PAIN: Status: ACTIVE | Noted: 2022-01-01

## 2022-01-30 PROBLEM — J18.9 HEALTHCARE-ASSOCIATED PNEUMONIA: Status: ACTIVE | Noted: 2022-01-01

## 2022-01-31 PROBLEM — J96.11 CHRONIC RESPIRATORY FAILURE WITH HYPOXIA (HCC): Status: ACTIVE | Noted: 2022-01-01

## 2022-02-01 NOTE — DISCHARGE PLACEMENT REQUEST
"Rachana Arguelles (76 y.o. Female)             Date of Birth Social Security Number Address Home Phone MRN    1945  [address]   [facility] [address] [phone] [id number]    Catholic Marital Status             Non-Quaker        Admission Date Admission Type Admitting Provider Attending Provider Department, Room/Bed    1/30/22 Emergency Jonathan Triana MD Reddy, Rahul Kandada, MD [address], [address]    Discharge Date Discharge Disposition Discharge Destination                         Attending Provider: Bill Gutierrez MD    Allergies: Minoxidil, Neurontin [Gabapentin], Minocycline, Morphine    Isolation: None   Infection: None   Code Status: CPR   Advance Care Planning Activity    Ht: 170.2 cm (67\")   Wt: 74.8 kg (165 lb)    Admission Cmt: None   Principal Problem: Healthcare-associated pneumonia [J18.9]                 Active Insurance as of 1/30/2022     Primary Coverage     Payor Plan Insurance Group Employer/Plan Group    ANTH MEDICARE REPLACEMENT ANTH MEDICARE ADVANTAGE [id number]     Payor Plan Address Payor Plan Phone Number Payor Plan Fax Number Effective Dates    [address] [phone]  1/1/2022 - None Entered    [facility] [id number]       Subscriber Name Subscriber Birth Date Member ID       RACHANA ARGUELLES 1945 [id number]                 Emergency Contacts      (Rel.) Home Phone Work Phone Mobile Phone    Rai Arguelles (Spouse) [phone]    Katia Stephenson (Daughter) [phone] -- [phone]    DELMI LAMA (Daughter) -- -- [phone]              "

## 2022-02-01 NOTE — CASE MANAGEMENT/SOCIAL WORK
Discharge Planning Assessment  [facility]     Patient Name: Rachana Arguelles  MRN: [id number]  Today's Date: 2/1/2022    Admit Date: 1/30/2022     Discharge Needs Assessment     Row Name 02/01/22 1030       Living Environment    Lives With spouse    Name(s) of Who Lives With Patient Spouse ( Rai Arguelles [phone])    Current Living Arrangements home/apartment/condo    Primary Care Provided by self    Provides Primary Care For no one    Family Caregiver if Needed spouse    Family Caregiver Names Spouse ( Rai Arguelles [phone]) and Daughter ( Katia Stephenson [phone])    Quality of Family Relationships helpful; involved; supportive    Able to Return to Prior Arrangements yes    Living Arrangement Comments Pt lives in a single story house with spouse ( Rai Arguelles [phone]).       Resource/Environmental Concerns    Resource/Environmental Concerns none    Transportation Concerns car, none       Transition Planning    Patient/Family Anticipates Transition to home with family    Patient/Family Anticipated Services at Transition none    Transportation Anticipated family or friend will provide       Discharge Needs Assessment    Readmission Within the Last 30 Days no previous admission in last 30 days    Equipment Currently Used at Home cane, straight; commode; grab bar; oxygen; rollator; shower chair; walker, rolling; wheelchair    Concerns to be Addressed no discharge needs identified    Anticipated Changes Related to Illness none    Equipment Needed After Discharge cane, straight; commode; grab bar, tub/shower; oxygen; rollator; shower chair; walker, rolling; wheelchair, manual               Discharge Plan     Row Name 02/01/22 1035       Plan    Plan Plan home with spouse and [facility] YIMI Billingsley RN    Patient/Family in Agreement with Plan yes    Plan Comments FACE SHEET VERIFIED/ IM LETTER SIGNED.  PT IS IN ISOLATION.  Called and spoke to pt at bedside.  Pt's PCP is Dr. Rhys Crowder.  Pt lives in a single story  house with spouse ( Rai Arguelles [phone]).  Pt is independent with ALDs, but states her spouse is assisting her more.  Pt is on home O2 at night provided by [facility].  Pt has a straight cane, commode, grab bars, rollator, shower chair, rolling walker and wheelchair for home use.  Pt gets her prescriptions at [facility] on [address] .  Pt denies any issues affording medications.  Pt states she is current with HH but does not know the agency.  Called Dr. Yousif office who states pt is current with [facility].  Elizabeth  [phone]) called to follow.  Pt is a dialysis pt at [facility] [place] on M-W-F.    CCP to follow for needs.  Plan home with [facility] .  LESLEY Billingsley RN              Continued Care and Services - Admitted Since 1/30/2022     Home Medical Care     Service Provider Request Status Selected Services Address Phone Fax Patient Preferred    [facility]  [facility] - Request Sent N/A [address] [place] [address] [phone] --            Selected Continued Care - Prior Encounters Includes selections from prior encounters from 11/1/2021 to 2/1/2022    Discharged on 11/28/2021 Admission date: 11/22/2021 - Discharge disposition: Home-Health Care [facility]    Home Medical Care     Service Provider Selected Services Address Phone Fax Patient Preferred    [facility] [address] [place] [address] [phone] --                    Expected Discharge Date and Time     Expected Discharge Date Expected Discharge Time    Feb 3, 2022          Demographic Summary     Row Name 02/01/22 1029       General Information    Admission Type inpatient    Arrived From emergency department    Required Notices Provided Important Message from Medicare    Referral Source admission list    Reason for Consult discharge planning    Preferred Language English     Used During This Interaction no               Functional Status      Row Name 02/01/22 1030       Functional Status    Usual Activity Tolerance moderate    Current Activity Tolerance fair       Functional Status, IADL    Medications independent    Meal Preparation assistive person    Housekeeping assistive person    Laundry assistive person    Shopping assistive person       Mental Status    General Appearance WDL WDL               Psychosocial    No documentation.                Abuse/Neglect    No documentation.                Legal    No documentation.                Substance Abuse    No documentation.                Patient Forms    No documentation.                   Jessica Billingsley RN

## 2022-02-01 NOTE — PLAN OF CARE
"Goal Outcome Evaluation:  Plan of Care Reviewed With: patient           Outcome Summary: Pt seen by OT this date for treatment. Upon arrival pt lying supine in bed, no c/o pain, A&O x4. Pt performed bed mobility for sup>sit transition with Supervision. Once sitting EOB pt requiring time to recover as pt started feeling SOB. Pt instructed in purse lip breathing. Pt then performed sit>stand transition with HHA x1 and CGA to transfer from bed>chair. Once sitting in chair pt completed grooming task sitting in chair with S/U. Pt requiring time to recover as pt reports \"I just feel a little winded.\"  Pt declining having to use bathroom this date. Pt left sitting up in chair, needs in reach, alarm on. Pt to continue with skilled OT services to address goals and deficits. OT wore mask, gloves, glasses, hand hygiene performed.  "

## 2022-02-01 NOTE — PLAN OF CARE
Goal Outcome Evaluation:      VSS. Up in chair this shift.   up to visit for a while.  Client much more calm/relaxed with family here.  Daughter here this evening.  Scheduled for hemodialysis tomorrow.  Started on diuretic today.  Still need urine clean catch specimen for ordered tests Dr. Paulino. Safety maintained. Continue to monitor.

## 2022-02-01 NOTE — PLAN OF CARE
Problem: Fluid Imbalance (Pneumonia)  Goal: Fluid Balance  Intervention: Monitor and Manage Fluid Balance  Recent Flowsheet Documentation  Taken 1/31/2022 2334 by Hayde Gotti, RN  Fluid/Electrolyte Management: fluids provided  Taken 1/31/2022 2015 by Hayde Gotti, RN  Fluid/Electrolyte Management: fluids provided   Goal Outcome Evaluation:  Plan of Care Reviewed With: patient Pt d/o of left arm being numb and tingling and painful. Called [facility] and they will try to get Nephrology to come assess. Pt had HD started around midnight. Pt had 3L off. Breathing improved a little per pt. Ct complete. Cont to monitor, safety maintained.         Progress: no change  Outcome Summary: Pt what

## 2022-02-01 NOTE — PROGRESS NOTES
LOS: 2 days     Chief Complaint:  Enterococcus bacteremia    Interval History: Afebrile, slightly decreased oxygen requirement now on 4 L..  She looks more comfortable and states she feels a little bit better.  Tolerating antibiotics without nausea vomiting or diarrhea.  No rashes    Vital Signs  Temp:  [97 °F (36.1 °C)-98.3 °F (36.8 °C)] 98.3 °F (36.8 °C)  Heart Rate:  [65-85] 79  Resp:  [18-24] 18  BP: (135-199)/(55-75) 159/66    Physical Exam:  General: Tachypneic  Cardiovascular: RRR, no LE edema   Respiratory: Coarse breath sounds bilaterally with wheezing  GI: Soft, NT/ND, + bowel sounds bilaterally  Skin: No rashes    Antibiotics:  Vancomycin dosing per pharmacy  Cefepime 2 g IV every 24 hours     Results Review:    Lab Results   Component Value Date    WBC 16.09 (H) 01/31/2022    HGB 11.2 (L) 01/31/2022    HCT 34.7 01/31/2022    MCV 98.0 (H) 01/31/2022     01/31/2022     Lab Results   Component Value Date    GLUCOSE 131 (H) 01/31/2022    BUN 35 (H) 01/31/2022    CREATININE 4.87 (H) 01/31/2022    EGFRIFNONA 9 (L) 01/31/2022    EGFRIFAFRI  01/31/2022      Comment:      <15 Indicative of kidney failure.    BCR 7.2 01/31/2022    CO2 17.9 (L) 01/31/2022    CALCIUM 8.2 (L) 01/31/2022    PROTENTOTREF 5.2 (L) 07/28/2021    ALBUMIN 3.10 (L) 01/31/2022    LABIL2 0.9 07/28/2021    AST 44 (H) 01/31/2022    ALT 21 01/31/2022     BNP 24,832    Microbiology:  2/1 SCx P  1/31 BCx P x 1  1/30 MRSA nasal PCR negative  1/30 Legionella and strep urinary antigen negative  1/30 BCx Enterococcus faecalis x 2  1/30 RVP/COVID neg    2/1 CT of the chest abdomen pelvis personally reviewed by me shows bilateral infiltrates with mediastinal adenopathy increased as compared to November.  Nodular appearing infiltrates.  Malignancy cannot be excluded.  Anasarca within the soft tissues and small amount of free fluid in the pelvis.  Normal liver pancreas spleen adrenals.    TTE EF of 60%.  Grade 1 diastolic dysfunction.  Marked  thickening of the aortic valve could not exclude vegetation.    Assessment/Plan   Enterococcus septicemia  End-stage renal disease on hemodialysis  Leukocytosis  Chronic hypoxic respiratory failure  History of lung cancer status post lobectomy complicating above  Epigastric pain with nausea  Pneumonia    Enterococcus faecalis is pansensitive.  Discontinue empiric vancomycin and cefepime and start Zosyn 3.375 g IV every 12 hours.  This will cover Enterococcus but also additional pathogens that might be responsible for her pneumonia while awaiting sputum culture results.  Given the nodular appearance of her infiltrate will also obtain fungal studies with Fungitell, histo blasto urinary antigens Cryptococcus and Aspergillus galactomannan.  1 set of blood cultures been obtained yesterday.  Please obtain 1 more today.  Echocardiogram reviewed with thickening of the aortic valve.  Could not exclude vegetation therefore a OMAIRA will be needed.  I am not sure when the patient will be felt to be stable enough from a respiratory standpoint.  Will discuss with cardiology.

## 2022-02-01 NOTE — PROGRESS NOTES
Hospital Follow Up    LOS:  LOS: 2 days   Patient Name: Rachana Arguelles  Age/Sex: 76 y.o. female  : 1945  MRN: [id number]    Day of Service: 22   Length of Stay: 2  Encounter Provider: REENA Medina  Place of Service: [facility] CARDIOLOGY  Patient Care Team:  Rhys Crowder Jr., MD as PCP - General (Family Medicine)  Whitney Dudley MD as Consulting Physician (Nephrology)  Quita Figueroa MD as Surgeon (Thoracic Surgery)  Sabas Luther MD as Consulting Physician (Otolaryngology)  Álvaro Gifford MD as Consulting Physician (Pulmonary Disease)  Rhys Crowder Jr., MD as Referring Physician (Family Medicine)  Ld Paulino MD as Consulting Physician (Hematology and Oncology)  Mat Coello MD as Consulting Physician (Cardiology)    Subjective:     Chief Complaint: elevated troponin    Interval History: complains of abdominal pain and chest tightness today    Objective:     Objective:  Temp:  [97 °F (36.1 °C)-98.3 °F (36.8 °C)] 98.3 °F (36.8 °C)  Heart Rate:  [60-85] 79  Resp:  [18-24] 18  BP: (135-199)/(55-75) 159/66     Intake/Output Summary (Last 24 hours) at 2022 1105  Last data filed at 2022 0602  Gross per 24 hour   Intake --   Output 3000 ml   Net -3000 ml     Body mass index is 25.84 kg/m².      22  1719 22  1740 22  1851   Weight: 75.2 kg (165 lb 11.2 oz) 74.8 kg (165 lb) 74.8 kg (165 lb)     Weight change: -3.084 kg (-6 lb 12.8 oz)    Physical Exam:   General Appearance:    Awake alert and oriented in no acute distress.   Color:  Skin:  Neuro:  HEENT:    Lungs:     Pink  Warm and dry  No focal, motor or sensory deficits  Neck supple, pupils equal, round and reactive. No JVD, No Bruit  Tachypneic, diminished    Heart:    Regular rate and rhythm, S1 and S2, 1/6 systolic murmur, no gallop, no rub. No edema, DP/PT pulses are 2+   Chest Wall:    No abnormalities observed   Abdomen:     Normal bowel sounds, no masses, no  organomegaly, soft        non-tender, non-distended, no guarding, no ascites noted   Extremities:   Moves all extremities well, no edema, no cyanosis, no redness       Lab Review:   Results from last 7 days   Lab Units 01/31/22  0043 01/30/22  1030   SODIUM mmol/L 129* 131*   POTASSIUM mmol/L 4.7 4.5   CHLORIDE mmol/L 95* 94*   CO2 mmol/L 17.9* 22.1   BUN mg/dL 35* 32*   CREATININE mg/dL 4.87* 4.56*   GLUCOSE mg/dL 131* 104*   CALCIUM mg/dL 8.2* 8.7   AST (SGOT) U/L 44* 51*   ALT (SGPT) U/L 21 24     Results from last 7 days   Lab Units 01/31/22  0645 01/31/22  0043 01/30/22  1810 01/30/22  1030   TROPONIN T ng/mL 0.180* 0.215* 0.183* 0.019     Results from last 7 days   Lab Units 01/31/22  0043 01/30/22  1030   WBC 10*3/mm3 16.09* 17.80*   HEMOGLOBIN g/dL 11.2* 12.5   HEMATOCRIT % 34.7 39.5   PLATELETS 10*3/mm3 167 173     Results from last 7 days   Lab Units 01/30/22  1030   INR  1.09     Results from last 7 days   Lab Units 01/31/22  0043   MAGNESIUM mg/dL 2.0     Results from last 7 days   Lab Units 01/31/22  0043   CHOLESTEROL mg/dL 97   TRIGLYCERIDES mg/dL 183*   HDL CHOL mg/dL 26*     Results from last 7 days   Lab Units 01/30/22  1030   PROBNP pg/mL 24,832.0*         I reviewed the patient's new clinical results.  I personally viewed and interpreted the patient's EKG  Current Medications:   Scheduled Meds:aspirin, 81 mg, Oral, Daily  atorvastatin, 80 mg, Oral, Nightly  budesonide-formoterol, 2 puff, Inhalation, BID - RT  carvedilol, 25 mg, Oral, BID With Meals  cefepime, 2 g, Intravenous, Q24H  clopidogrel, 75 mg, Oral, Daily  heparin (porcine), 5,000 Units, Subcutaneous, Q12H  hydrALAZINE, 100 mg, Oral, TID  sevelamer, 800 mg, Oral, Daily With Breakfast & Dinner  sodium chloride, 10 mL, Intravenous, Q12H  torsemide, 100 mg, Oral, Daily  Vancomycin Pharmacy Intermittent Dosing, , Does not apply, Daily      Continuous Infusions:Pharmacy to dose vancomycin,         Allergies:  Allergies   Allergen Reactions    • Minoxidil Other (See Comments)     Pericardial effusion    • Neurontin [Gabapentin] Confusion   • Minocycline Unknown - Low Severity     PT AND SPOUSE NOT SURE OF   • Morphine Headache       Assessment:       Healthcare-associated pneumonia    Chronic pain syndrome    Hypertension    COPD (chronic obstructive pulmonary disease) ([facility])    Anemia of chronic disease    ESRD (end stage renal disease) ([facility])    Sepsis, unspecified organism ([facility])    Chronic diastolic CHF (congestive heart failure) ([facility])    Epigastric abdominal pain    Chronic respiratory failure with hypoxia ([facility])    1. Sepsis  2. HCAP  3. Elevated troponin- type II nstemi  4. Chronic diastolic CHF  5. COPD  6. Abnormal EKG  7. ESRD    Plan:       BP has been elevated. Looks more short of breath. Labs being drawn now. Will optimize medications and add a troponin on.     REENA Medina  02/01/22  11:05 EST  Electronically signed by REENA Medina, 02/01/22, 11:05 AM EST.

## 2022-02-01 NOTE — CASE MANAGEMENT/SOCIAL WORK
Continued Stay Note  [facility]     Patient Name: Rachana Arguelles  MRN: [id number]  Today's Date: 2/1/2022    Admit Date: 1/30/2022     Discharge Plan     Row Name 02/01/22 1035       Plan    Plan Plan home with spouse and [facility].   LESLEY Billingsley RN    Patient/Family in Agreement with Plan yes    Plan Comments FACE SHEET VERIFIED/ IM LETTER SIGNED.  PT IS IN ISOLATION.  Called and spoke to pt at bedside.  Pt's PCP is Dr. Rhys Crowder.  Pt lives in a single story house with spouse ( Rai Arguelles [phone]).  Pt is independent with ALDs, but states her spouse is assisting her more.  Pt is on home O2 at night provided by [facility] [facility].  Pt has a straight cane, commode, grab bars, rollator, shower chair, rolling walker and wheelchair for home use.  Pt gets her prescriptions at [facility] on [address] .  Pt denies any issues affording medications.  Pt states she is current with HH but does not know the agency.  Called Dr. Yousif office who [phone][facility] to follow.  Pt is a dialysis pt at [facility] [place] on M-W-F.    CCP to follow for needs.  Plan home with [facility].  LESLEY Billingsley RN               Discharge Codes    No documentation.               Expected Discharge Date and Time     Expected Discharge Date Expected Discharge Time    Feb 3, 2022             Jessica Billingsley, RN

## 2022-02-01 NOTE — PROGRESS NOTES
[facility] Progress Note      Patient Name: Rachana Arguelles  : 1945  MRN: [id number]  Primary Care Physician:  Rhys Crowder Jr., MD  Date of admission: 2022    Subjective     Interval History:     Underwent hemodialysis with liters of fluid removal without significant improvement on her dyspnea and shortness of breath  She continues to feel tired fatigue  Decreased appetite    Review of Systems:   As noted above    Objective     Vitals:   Temp:  [97 °F (36.1 °C)-98.3 °F (36.8 °C)] 98.3 °F (36.8 °C)  Heart Rate:  [60-85] 79  Resp:  [18-24] 18  BP: (135-199)/(55-75) 159/66  Flow (L/min):  [4-8] 4    Intake/Output Summary (Last 24 hours) at 2022 0848  Last data filed at 2022 0602  Gross per 24 hour   Intake --   Output 3000 ml   Net -3000 ml       Physical Exam:    General Appearance: alert, oriented x 3, no acute distress   Skin: warm and dry  HEENT: oral mucosa normal, nonicteric sclera  Neck: supple, no JVD  Chest RIJ TDC in place w/ dressing   Lungs: Bilateral fine rales  Heart: RRR, normal S1 and S2  Abdomen: soft, nontender, nondistended  : no palpable bladder  Extremities: no edema, cyanosis or clubbing  Neuro: normal speech and mental status     Scheduled Meds:     aspirin, 81 mg, Oral, Daily  atorvastatin, 80 mg, Oral, Nightly  budesonide-formoterol, 2 puff, Inhalation, BID - RT  carvedilol, 25 mg, Oral, BID With Meals  cefepime, 2 g, Intravenous, Q24H  clopidogrel, 75 mg, Oral, Daily  heparin (porcine), 5,000 Units, Subcutaneous, Q12H  hydrALAZINE, 100 mg, Oral, TID  sevelamer, 800 mg, Oral, Daily With Breakfast & Dinner  sodium chloride, 10 mL, Intravenous, Q12H  Vancomycin Pharmacy Intermittent Dosing, , Does not apply, Daily      IV Meds:   Pharmacy to dose vancomycin,         Results Reviewed:   I have personally reviewed the results from the time of this admission to 2022 08:48 EST     Results from last 7 days   Lab Units 22  0043  01/30/22  1030   SODIUM mmol/L 129* 131*   POTASSIUM mmol/L 4.7 4.5   CHLORIDE mmol/L 95* 94*   CO2 mmol/L 17.9* 22.1   BUN mg/dL 35* 32*   CREATININE mg/dL 4.87* 4.56*   CALCIUM mg/dL 8.2* 8.7   BILIRUBIN mg/dL 0.2 0.3   ALK PHOS U/L 99 111   ALT (SGPT) U/L 21 24   AST (SGOT) U/L 44* 51*   GLUCOSE mg/dL 131* 104*       Estimated Creatinine Clearance: 10.4 mL/min (A) (by C-G formula based on SCr of 4.87 mg/dL (H)).    Results from last 7 days   Lab Units 01/31/22  0043   MAGNESIUM mg/dL 2.0   PHOSPHORUS mg/dL 5.9*             Results from last 7 days   Lab Units 01/31/22  0043 01/30/22  1030   WBC 10*3/mm3 16.09* 17.80*   HEMOGLOBIN g/dL 11.2* 12.5   PLATELETS 10*3/mm3 167 173       Results from last 7 days   Lab Units 01/30/22  1030   INR  1.09       Assessment / Plan     ASSESSMENT:  -End Stage Renal Disease ( ESRD ) on Hemodialysis [facility]  (since 10/2021 ) .s/p  RIJ TDC in place  functional .Continue to arrange hemodialysis treatment during his admission. Continue renal diet   -Chronic normocytic anemia. On Epogen protocol.  Currently on hold hemoglobin > 11 .  we will continue to follow H&H closely   -Hypertension.  Continue  combination of hydralazine and carvedilol,      . We will continue to follow closely. Heart healthy diet   -Hyperphosphatemia. Continue binders / renvela  with meals, Continue renal diet. We will follow phosphorus to make further adjustments  -Hypervolemic  hyponatremia secondary to volume excess will optimize with dialysis fluid restriction with discussed fluid restriction with patient  -health care  associated pneumonia currently on cefepime and vancomycin, as per pharmacy , as per primary team       PLAN:  -Continue to arrange hemodialysis during her admission  -Patient underwent hemodialysis yesterday  without any complication and removal of 3 L, will arrange for  HD treatment tomorrow  - Trial of Torsemide 100 mg daily  -We will follow SANJANA protocol and binders  -Adjust medications to  GFR    Thank you for involving us in the care of Rachana Arguelles.  Please feel free to call with any questions.    Vincent Dutta MD  02/01/22  08:48 [facility]    [facility]  [phone]

## 2022-02-01 NOTE — THERAPY TREATMENT NOTE
Patient Name: Rachana Arguelles  : 1945    MRN: [id number]                              Today's Date: 2022       Admit Date: 2022    Visit Dx:     ICD-10-CM ICD-9-CM   1. Healthcare-associated pneumonia  J18.9 486   2. ESRD (end stage renal disease) ([facility])  N18.6 585.6   3. Chronic pain syndrome  G89.4 338.4   4. Chronic obstructive pulmonary disease, unspecified COPD type ([facility])  J44.9 496     Patient Active Problem List   Diagnosis   • Anxiety   • Chronic pain syndrome   • Depression   • Gastroesophageal reflux disease   • Hyperlipidemia   • Hypertension   • Osteoarthritis of hip   • Chronic low back pain   • Failed back syndrome of lumbar spine   • Pulmonary embolus ([facility])   • Weight loss   • Polypharmacy   • Stage 3b chronic kidney disease ([facility])   • Chronic constipation   • Sacroiliac joint pain   • Mixed incontinence   • Renal cyst   • Achilles tendonitis   • Atherosclerosis of native artery of left lower extremity with intermittent claudication ([facility])   • Morbidly obese ([facility])   • Lung nodule   • Malignant neoplasm of right upper lobe of lung ([facility])   • Lung cancer ([facility])   • Moderate single current episode of major depressive disorder ([facility])   • Palpitations   • Encounter for screening for malignant neoplasm of colon   • Hematoma of scalp   • Acute neck pain   • Dizziness   • Unstable cervical spine   • Postconcussive syndrome   • Positive colorectal cancer screening using Cologuard test   • Dysphagia   • S/P reverse total shoulder arthroplasty, right   • Chronic post-traumatic headache, not intractable   • Medication overuse headache   • Migraine without aura and without status migrainosus, not intractable   • Shortness of breath   • PAD (peripheral artery disease) ([facility])   • Bilateral carotid artery stenosis   • S/P lobectomy of lung   • YVONNE (acute kidney injury) ([facility])   • Anemia   • Frequent PVCs   • Pneumonia of both lower lobes due to infectious organism   • History of lung cancer   • Hypertensive  urgency   • Bradycardia, sinus   • YVONNE (acute kidney injury) (HCC)   • Opioid dependence (HCC)   • COPD (chronic obstructive pulmonary disease) (HCC)   • Renal artery stenosis (HCC)   • Acute kidney injury (HCC)   • Acute on chronic diastolic CHF (congestive heart failure) (HCC)   • Acute gout due to renal impairment involving foot   • Hypokalemia   • Anemia of chronic disease   • Leukocytosis   • Anemia due to stage 4 chronic kidney disease (HCC)   • IgG monoclonal gammopathy   • ESRD (end stage renal disease) ([facility])   • Dyspnea   • Insomnia   • Healthcare-associated pneumonia   • Sepsis, unspecified organism (HCC)   • Chronic diastolic CHF (congestive heart failure) (HCC)   • Epigastric abdominal pain   • Chronic respiratory failure with hypoxia ([facility])     Past Medical History:   Diagnosis Date   • AAA (abdominal aortic aneurysm) without rupture ([facility])    • Anemia    • Anesthesia complication     AGITATION AND COMBATIVE AFTER LUNG SURGERY 2019   • Anxiety and depression    • Arthritis    • Bilateral carotid artery stenosis 8/14/2020   • Chest pain     OCCAS   • Chronic low back pain    • Chronic pain syndrome 3/12/2016   • Claustrophobia 10/26/2015    Resolved   • COPD (chronic obstructive pulmonary disease) ([facility])    • Dizziness    • Dyspnea    • ESRD (end stage renal disease) on dialysis ([facility])     MONDAY, WEDNESDAY AND FRIDAYS [facility]   • GERD (gastroesophageal reflux disease)    • H/O concussion    • Head trauma 12/16/2019    FELL CUT HEAD 12 STAPLES   • Hiatal hernia    • History of bradycardia    • History of confusion    • History of diverticulitis    • History of migraine headaches    • History of pneumonia    • History of skin cancer    • History of transfusion    • Hyperlipidemia    • Hypertension    • Itching    • Lumbar postlaminectomy syndrome 10/26/2015    Resolved   • Lung cancer (HCC) 2019    RIGHT LUNG, HAD SURGERY   • Migraines    • Nausea    • On home oxygen therapy     5L NC   • Palpitations      PVC'S   • Pericardial effusion     HX   • Polypharmacy 4/22/2016   • PONV (postoperative nausea and vomiting)    • Pulmonary embolism (HCC) 10/26/2015    January 2013 - Resolved, felt to be secondary to estrogen use   • Renal artery stenosis (HCC)    • Requires supplemental oxygen     2 LITERS WHEN SLEEPING AND AS NEEDED   • Slow to wake up after anesthesia     AFTER LUNG SURGERY 2019   • Slow to wake up after anesthesia     AFTER LUNG SURGERY   • Submandibular sialoadenitis 10/26/2015    Resolved   • Visual changes    • Vitamin B 12 deficiency      Past Surgical History:   Procedure Laterality Date   • ANGIOPLASTY ILIAC ARTERY Bilateral 8/10/2018    Procedure: BILATERAL ILIAC STENTS;  Surgeon: Riki Mancera MD;  Location: [facility] MAIN OR;  Service: Vascular   • APPENDECTOMY     • ARTERIOGRAM N/A 6/4/2021    Procedure: Renal Arteriogram with possible intervention;  Surgeon: Mat Coello MD;  Location: [facility] CATH INVASIVE LOCATION;  Service: Cardiology;  Laterality: N/A;   • ARTERIOVENOUS FISTULA/SHUNT SURGERY Left 10/14/2021    Procedure: LEFT ARM BRACHIOAXILLARY ARTERIOVENOUS GRAFT;  Surgeon: Riki Mancera MD;  Location: [facility] MAIN OR;  Service: Vascular;  Laterality: Left;   • BREAST SURGERY      Breast Surgery Reduction Procedure   • BRONCHOSCOPY N/A 2/8/2019    Procedure: BRONCHOSCOPY;  Surgeon: Álvaro Gifford MD;  [facility];  Service: Pulmonary   • CARDIAC CATHETERIZATION N/A 6/4/2021    Procedure: Stent BMS peripheral- RENAL-RIGHT;  Surgeon: Mat Coello MD;  Location: [facility] CATH INVASIVE LOCATION;  Service: Cardiology;  Laterality: N/A;   • CATARACT EXTRACTION Bilateral    • CHOLECYSTECTOMY     • COLONOSCOPY  2006    Dr. Saha   • COLONOSCOPY  2012    Dr. Saha   • ENDOSCOPY N/A 11/3/2020    Procedure: ESOPHAGOGASTRODUODENOSCOPY with 54 french vernon dilation;  Surgeon: Yunior Vo MD;  Location: [facility] ENDOSCOPY;  Service:  Gastroenterology;  Laterality: N/A;  pre- dysphagia  post- esophageal ring, hiatal hernia   • HYSTERECTOMY     • INCISION AND DRAINAGE ARM Left 1/7/2022    Procedure: LEFT ARM HEMATOMA EVACUATION;  Surgeon: Riki Mancera MD;  Location: [facility] MAIN OR;  Service: Vascular;  Laterality: Left;   • INSERTION HEMODIALYSIS CATHETER Right 9/2/2021    Procedure: PALINDROME CATHERER;  Surgeon: Riki Mancera MD;  Location: [facility] MAIN OR;  Service: Vascular;  Laterality: Right;   • INSERTION HEMODIALYSIS CATHETER N/A 10/7/2021    Procedure: HEMODIALYSIS CATHETER INSERTION;  Surgeon: Fany Salinas Jr., MD;  Location: [facility] MAIN OR;  Service: Vascular;  Laterality: N/A;   • INTERVENTIONAL RADIOLOGY PROCEDURE N/A 6/4/2021    Procedure: Abdominal Aortogram;  Surgeon: Mat Coello MD;  Location: [facility] CATH INVASIVE LOCATION;  Service: Cardiology;  Laterality: N/A;   • INTUBATION  1/15/2019        • JOINT REPLACEMENT      left knee, hip, shoulder   • LASIK     • LUMBAR FUSION      Lumbar Vertebral Fusion   • RENAL ARTERY STENT Right    • SHOULDER ARTHROSCOPY  04/04/2013    Dr. Carrillo/MERRI[facility]   • THORACOSCOPY VIDEO ASSISTED WITH LOBECTOMY Right 1/11/2019    Procedure: BRONCOSCOPY, THORACOSCOPY VIDEO ASSISTED WITH RIGHT UPPER LOBE WEDGE RESECTION, COMPLETION RIGHT UPPER LOBECTOMY, LYMPH NODE DISSECTION, INTERCOSTAL NERVE BLOCK;  Surgeon: Quita Figueroa MD;  Location: [facility] OR;  Service: Thoracic   • TONSILLECTOMY     • TOTAL HIP ARTHROPLASTY REVISION Left    • TOTAL KNEE ARTHROPLASTY Left    • TOTAL SHOULDER ARTHROPLASTY Left    • TOTAL SHOULDER ARTHROPLASTY W/ DISTAL CLAVICLE EXCISION Right 6/18/2020    Procedure: RIGHT TOTAL SHOULDER REVERSE ARTHROPLASTY WITH GPS;  Surgeon: Lino Carrillo MD;  Location: [facility] OR OSC;  Service: Orthopedics;  Laterality: Right;      General Information     Row Name 02/01/22 1407          OT Time and Intention    Document Type therapy note (daily  note)  -     Mode of Treatment individual therapy; occupational therapy  -     Row Name 02/01/22 1407          General Information    Patient Profile Reviewed yes  -     Existing Precautions/Restrictions fall; oxygen therapy device and L/min  -     Row Name 02/01/22 1407          Cognition    Orientation Status (Cognition) oriented x 4  -BL     Row Name 02/01/22 1407          Safety Issues, Functional Mobility    Safety Issues Affecting Function (Mobility) insight into deficits/self-awareness  -     Impairments Affecting Function (Mobility) endurance/activity tolerance; shortness of breath; strength  -           User Key  (r) = Recorded By, (t) = Taken By, (c) = Cosigned By    Initials Name Provider Type     Radha Saunders OT Occupational Therapist                 Mobility/ADL's     Row Name 02/01/22 1407          Bed Mobility    Bed Mobility supine-sit  -     Supine-Sit [place] (Bed Mobility) supervision  -     Assistive Device (Bed Mobility) bed rails; head of bed elevated  -     Row Name 02/01/22 1407          Transfers    Transfers sit-stand transfer; bed-chair transfer  -     Bed-Chair [place] (Transfers) contact guard  -     Assistive Device (Bed-Chair Transfers) --  HHA x1  -     Sit-Stand [place] (Transfers) contact guard  -     Row Name 02/01/22 1407          Sit-Stand Transfer    Assistive Device (Sit-Stand Transfers) --  HHA x1  -     Row Name 02/01/22 1407          Activities of Daily Living    BADL Assessment/Intervention grooming  -     Row Name 02/01/22 1407          Grooming Assessment/Training    [place] Level (Grooming) wash face, hands; oral care regimen; hair care, combing/brushing; set up  -     Position (Grooming) supported sitting  -           User Key  (r) = Recorded By, (t) = Taken By, (c) = Cosigned By    Initials Name Provider Type    BL Radha Saunders OT Occupational Therapist               Obj/Interventions    No  "documentation.                Goals/Plan    No documentation.                Clinical Impression     Row Name 02/01/22 1408          Pain Assessment    Additional Documentation Pain Scale: Numbers Pre/Post-Treatment (Group)  -BL     Row Name 02/01/22 1408          Pain Scale: Numbers Pre/Post-Treatment    Pretreatment Pain Rating 0/10 - no pain  -BL     Posttreatment Pain Rating 0/10 - no pain  -BL     Row Name 02/01/22 1408          Plan of Care Review    Plan of Care Reviewed With patient  -BL     Outcome Summary Pt seen by OT this date for treatment. Upon arrival pt lying supine in bed, no c/o pain, A&O x4. Pt performed bed mobility for sup>sit transition with Supervision. Once sitting EOB pt requiring time to recover as pt started feeling SOB. Pt instructed in purse lip breathing. Pt then performed sit>stand transition with HHA x1 and CGA to transfer from bed>chair. Once sitting in chair pt completed grooming task sitting in chair with S/U. Pt requiring time to recover as pt reports \"I just feel a little winded.\"  Pt declining having to use bathroom this date. Pt left sitting up in chair, needs in reach, alarm on. Pt to continue with skilled OT services to address goals and deficits. OT wore mask, gloves, glasses, hand hygiene performed.  -BL     Row Name 02/01/22 1408          Vital Signs    Pre Patient Position Supine  -BL     Intra Patient Position Standing  -BL     Post Patient Position Sitting  -BL     Row Name 02/01/22 1408          Positioning and Restraints    Pre-Treatment Position in bed  -BL     Post Treatment Position chair  -BL     In Chair sitting; call light within reach; encouraged to call for assist; exit alarm on; with nsg  -BL           User Key  (r) = Recorded By, (t) = Taken By, (c) = Cosigned By    Initials Name Provider Type    Radha Buckner OT Occupational Therapist               Outcome Measures    No documentation.                 Occupational Therapy Education                 " Title: PT OT SLP Therapies (In Progress)     Topic: Occupational Therapy (In Progress)     Point: ADL training (Done)     Description:   Instruct learner(s) on proper safety adaptation and remediation techniques during self care or transfers.   Instruct in proper use of assistive devices.              Learning Progress Summary           Patient Acceptance, E,TB,D, VU,DU by MR at 1/31/2022 1412    Comment: Educated on role of OT; OT POC. Pt instructed on pacing and pursed-lip breathing with functional tasks and therapeutic exercise, as pt with decreased activity tolerance.                   Point: Home exercise program (Done)     Description:   Instruct learner(s) on appropriate technique for monitoring, assisting and/or progressing therapeutic exercises/activities.              Learning Progress Summary           Patient Acceptance, E,TB,D, VU,DU by MR at 1/31/2022 1412    Comment: Educated on role of OT; OT POC. Pt instructed on pacing and pursed-lip breathing with functional tasks and therapeutic exercise, as pt with decreased activity tolerance.                   Point: Precautions (Not Started)     Description:   Instruct learner(s) on prescribed precautions during self-care and functional transfers.              Learner Progress:  Not documented in this visit.          Point: Body mechanics (Not Started)     Description:   Instruct learner(s) on proper positioning and spine alignment during self-care, functional mobility activities and/or exercises.              Learner Progress:  Not documented in this visit.                      User Key     Initials Effective Dates Name Provider Type Discipline    MR 06/16/21 -  Ying Gruber, OT Occupational Therapist OT              OT Recommendation and Plan     Plan of Care Review  Plan of Care Reviewed With: patient  Outcome Summary: Pt seen by OT this date for treatment. Upon arrival pt lying supine in bed, no c/o pain, A&O x4. Pt performed bed mobility for  "sup>sit transition with Supervision. Once sitting EOB pt requiring time to recover as pt started feeling SOB. Pt instructed in purse lip breathing. Pt then performed sit>stand transition with HHA x1 and CGA to transfer from bed>chair. Once sitting in chair pt completed grooming task sitting in chair with S/U. Pt requiring time to recover as pt reports \"I just feel a little winded.\"  Pt declining having to use bathroom this date. Pt left sitting up in chair, needs in reach, alarm on. Pt to continue with skilled OT services to address goals and deficits. OT wore mask, gloves, glasses, hand hygiene performed.     Time Calculation:    Time Calculation- OT     Row Name 02/01/22 1412             Time Calculation- OT    OT Start Time 1333  -BL      OT Stop Time 1357  -BL      OT Time Calculation (min) 24 min  -BL      Total Timed Code Minutes- OT 24 minute(s)  -BL      OT Received On 02/01/22  -BL      OT - Next Appointment 02/02/22  -BL              Timed Charges    54466 - OT Therapeutic Activity Minutes 8  -BL      97130 - OT Self Care/Mgmt Minutes 16  -BL              Total Minutes    Timed Charges Total Minutes 24  -BL       Total Minutes 24  -BL            User Key  (r) = Recorded By, (t) = Taken By, (c) = Cosigned By    Initials Name Provider Type     Radha Saunders OT Occupational Therapist              Therapy Charges for Today     Code Description Service Date Service Provider Modifiers Qty    82616475645 HC OT THERAPEUTIC ACT EA 15 MIN 2/1/2022 Radha Saunders OT GO 1    66646290269 HC OT SELF CARE/MGMT/TRAIN EA 15 MIN 2/1/2022 Radha Saunders OT GO 1               Radha Saunders OT  2/1/2022  "

## 2022-02-01 NOTE — PROGRESS NOTES
Name: Rachana Arguelles ADMIT: 2022   : 1945  PCP: Rhys Crowder Jr., MD    MRN: [id number] LOS: 2 days   AGE/SEX: 76 y.o. female  ROOM: [facility]   Subjective   Chief Complaint   Patient presents with   • Shortness of Breath   • Nausea   • Fever       patient is lying in bed in no major distress but appears chronically ill.  Currently requiring 5 L of oxygen which she normally uses at home.    Objective   Vital Signs  Temp:  [96.2 °F (35.7 °C)-98.3 °F (36.8 °C)] 96.2 °F (35.7 °C)  Heart Rate:  [66-85] 66  Resp:  [18-24] 18  BP: (150-199)/(65-75) 150/68  SpO2:  [93 %-98 %] 98 %  on  Flow (L/min):  [4-8] 4;   Device (Oxygen Therapy): nasal cannula  Body mass index is 25.84 kg/m².    Physical Exam  Vitals and nursing note reviewed.   Constitutional:       General: She is in acute distress.      Appearance: She is ill-appearing. She is not diaphoretic.   HENT:      Head: Normocephalic and atraumatic.   Eyes:      General:         Right eye: No discharge.         Left eye: No discharge.      Conjunctiva/sclera: Conjunctivae normal.   Cardiovascular:      Rate and Rhythm: Normal rate and regular rhythm.      Pulses: Normal pulses.   Pulmonary:      Breath sounds: Decreased breath sounds and rales present. No wheezing.   Abdominal:      General: There is no distension.      Palpations: Abdomen is soft.      Tenderness: There is abdominal tenderness (epigastric). There is no guarding or rebound.   Musculoskeletal:         General: No swelling or tenderness.   Skin:     General: Skin is warm and dry.   Neurological:      Mental Status: She is alert. Mental status is at baseline.   Psychiatric:         Mood and Affect: Mood normal.         Behavior: Behavior normal.         Results Review:       I reviewed the patient's new clinical results.     I reviewed imaging, agree with interpretation.     I reviewed telemetry results, sinus rhythm currently on telemetry.       Results from last 7 days   Lab Units  02/01/22  1316 01/31/22  0043 01/30/22  1030   WBC 10*3/mm3 7.11 16.09* 17.80*   HEMOGLOBIN g/dL 10.4* 11.2* 12.5   PLATELETS 10*3/mm3 189 167 173     Results from last 7 days   Lab Units 02/01/22  1053 01/31/22  0043 01/30/22  1030   SODIUM mmol/L 134* 129* 131*   POTASSIUM mmol/L 3.5 4.7 4.5   CHLORIDE mmol/L 95* 95* 94*   CO2 mmol/L 26.0 17.9* 22.1   BUN mg/dL 21 35* 32*   CREATININE mg/dL 3.61* 4.87* 4.56*   GLUCOSE mg/dL 127* 131* 104*   Estimated Creatinine Clearance: 14 mL/min (A) (by C-G formula based on SCr of 3.61 mg/dL (H)).  Results from last 7 days   Lab Units 02/01/22  1053 01/31/22  0043 01/30/22  1030   CALCIUM mg/dL 8.4* 8.2* 8.7   ALBUMIN g/dL 3.30* 3.10* 3.60   MAGNESIUM mg/dL 2.0 2.0  --    PHOSPHORUS mg/dL 3.7 5.9*  --      Results from last 7 days   Lab Units 01/30/22  1030   INR  1.09        amLODIPine, 5 mg, Oral, Q24H  aspirin, 81 mg, Oral, Daily  atorvastatin, 80 mg, Oral, Nightly  budesonide-formoterol, 2 puff, Inhalation, BID - RT  carvedilol, 25 mg, Oral, BID With Meals  clopidogrel, 75 mg, Oral, Daily  heparin (porcine), 5,000 Units, Subcutaneous, Q12H  hydrALAZINE, 100 mg, Oral, TID  [START ON 2/2/2022] piperacillin-tazobactam, 3.375 g, Intravenous, Q12H  sevelamer, 800 mg, Oral, Daily With Breakfast & Dinner  sodium chloride, 10 mL, Intravenous, Q12H  torsemide, 100 mg, Oral, Daily       Diet Regular; Renal    Assessment/Plan      Active Hospital Problems    Diagnosis  POA   • **Healthcare-associated pneumonia [J18.9]  Yes   • Chronic respiratory failure with hypoxia (HCC) [J96.11]  Yes   • Sepsis, unspecified organism (HCC) [A41.9]  Yes   • Chronic diastolic CHF (congestive heart failure) (HCC) [I50.32]  Yes   • Epigastric abdominal pain [R10.13]  Yes   • ESRD (end stage renal disease) (HCC) [N18.6]  Yes   • Anemia of chronic disease [D63.8]  Yes   • COPD (chronic obstructive pulmonary disease) (HCC) [J44.9]  Yes   • Chronic pain syndrome [G89.4]  Yes   • Hypertension [I10]  Yes       Resolved Hospital Problems   No resolved problems to display.       · PNA/Septicemia: Enterococcus , infectious disease did evaluate and IV vancomycin and cefepime have been discontinued and is currently on Zosyn.  Echocardiogram revealed aortic valve thickening and may need OMAIRA and ID discuss with Cardiology.  CT of the chest findings reviewed and there are multiple areas of pulmonary opacification and nodular appearance and malignancy needs to be ruled out per radiology.  Pulmonary consultation will be obtained.  Small to moderate left pleural effusion noted but decrease in size when compared to 11/23/2021.  · Chronic Hypoxic Respiratory Failure: 5L baseline. Currently requiring 6 L and evidence of distress on exam.  · Epigastric Pain:   Advance diet as tolerated and lipase is normal and LFTs also fairly unremarkable except AST very minimally elevated and bilirubin is normal.  CT of the and pelvis with no specific intra-abdominal findings other than mild free fluid.  · NSTEMI: In setting of ESRD and septicemia. ASA/Plavix/BBlocker/Statin. Cardiology consulted.  · ESRD: Tunnel for HD access without erythema surrounding, will follow up ID recommendations as above. Nephrology following.  · A/C HFpEF: Volume per nephrology.  · ACD  · Chronic Pain  · Hx lung cancer and right upper lobectomy: CT as above  · PPx: Heparin sq  · Disposition: TBD    Bill Gutierrez MD  [facility]  02/01/22  15:28 EST    Dictated portions using Dragon dictation software.    During the entire encounter, I was wearing recommended PPE including face mask and eye protection. Hand sanitization was performed prior to entering room and upon exit.

## 2022-02-01 NOTE — CONSULTS
[facility]    Reason for consult: lung cancer, pneumonia, pleural effusion, pneumonia, chronic hypoxemic respiratory failure (5liters)    HPI:  Mrs. Arguelles is a 75yo wf follows with Dr. Gifford in our office.  She was admitted back in November after a hospital admission at [place] where she underwent bronchoscopy and had mycoplasma on culture.  She was admitted Jan 30 after having sudden onset of abdominal pain and nausea several day prior to admission.  She then developed acute onset of shortness of breath with increasingly productive cough. Symptoms were persistent, moderate, and worsening. No specific alleviating or exacerbating factors.  She had wbc 17k and procal of 10 on admission.  CT chest on admission shows persistent but smaller pleural effusion and bilateral infiltrates of a different character than on last admission.  She has been started on cefepime and vancomycin and her blood cultures have come back positive for E. Faecalis.  Dr. Paulino is adjusting antibiotics to cover for this and pneumonia.  Mrs. Arguelles's oxygen requirement is currently 4liters and she feels less short of breath than on admission. But she still feels she is working to breath.     Past Medical History:   Diagnosis Date   • AAA (abdominal aortic aneurysm) without rupture ([facility])    • Anemia    • Anesthesia complication     AGITATION AND COMBATIVE AFTER LUNG SURGERY 2019   • Anxiety and depression    • Arthritis    • Bilateral carotid artery stenosis 8/14/2020   • Chest pain     OCCAS   • Chronic low back pain    • Chronic pain syndrome 3/12/2016   • Claustrophobia 10/26/2015    Resolved   • COPD (chronic obstructive pulmonary disease) ([facility])    • Dizziness    • Dyspnea    • ESRD (end stage renal disease) on dialysis ([facility])     MONDAY, WEDNESDAY AND FRIDAYS [place] [facility]ZETTE   • GERD (gastroesophageal reflux disease)    • H/O concussion    • Head trauma 12/16/2019    FELL CUT HEAD 12 STAPLES   • Hiatal hernia    • History of  bradycardia    • History of confusion    • History of diverticulitis    • History of migraine headaches    • History of pneumonia    • History of skin cancer    • History of transfusion    • Hyperlipidemia    • Hypertension    • Itching    • Lumbar postlaminectomy syndrome 10/26/2015    Resolved   • Lung cancer (HCC) 2019    RIGHT LUNG, HAD SURGERY   • Migraines    • Nausea    • On home oxygen therapy     5L NC   • Palpitations     PVC'S   • Pericardial effusion     HX   • Polypharmacy 2016   • PONV (postoperative nausea and vomiting)    • Pulmonary embolism (HCC) 10/26/2015    2013 - Resolved, felt to be secondary to estrogen use   • Renal artery stenosis (HCC)    • Requires supplemental oxygen     2 LITERS WHEN SLEEPING AND AS NEEDED   • Slow to wake up after anesthesia     AFTER LUNG SURGERY    • Slow to wake up after anesthesia     AFTER LUNG SURGERY   • Submandibular sialoadenitis 10/26/2015    Resolved   • Visual changes    • Vitamin B 12 deficiency      Social History     Socioeconomic History   • Marital status:      Spouse name: Rai   • Years of education: College   Tobacco Use   • Smoking status: Former Smoker     Packs/day: 2.50     Years: 15.00     Pack years: 37.50     Types: Cigarettes     Quit date: 2003     Years since quittin.3   • Smokeless tobacco: Never Used   Vaping Use   • Vaping Use: Never used   Substance and Sexual Activity   • Alcohol use: No   • Drug use: Never   • Sexual activity: Defer     Family History   Problem Relation Age of Onset   • Hypertension Mother    • Lung cancer Mother    • Cancer Mother         lung   • Aortic aneurysm Mother    • Kidney disease Father    • Other Brother         Coronary Artery Bypass Grafting   • Stroke Brother         Cerebrovascular Accident   • Depression Daughter    • Malig Hyperthermia Neg Hx      MEDS: reviewed as per chart  ALL: minoxidil, minocycline,morphine, neurontin  ROS: 12 point negative except as in  HPI    Vital Sign Min/Max for last 24 hours  Temp  Min: 96.2 °F (35.7 °C)  Max: 98.3 °F (36.8 °C)   BP  Min: 150/68  Max: 199/70   Pulse  Min: 66  Max: 85   Resp  Min: 18  Max: 24   SpO2  Min: 93 %  Max: 98 %   Flow (L/min)  Min: 4  Max: 8   Weight  Min: 74.8 kg (165 lb)  Max: 74.8 kg (165 lb)     GEN:   appears ill, AxOx3  HEENT: PERRL, EOMI, normal sclera, mmm, no jvd, trachea midline, neck supple  CHEST: decreased ae bilat, no wheezes, + crackles, + use of accessory muscles  CV: RRR, no m/g/r  ABD: soft, nt, nd +bs, no hepatosplenomegaly  EXT: no c/c/ 1+edema  SKIN: no rashes, no xanthomas, nl turgor, warm, dry  LYMPH: no palpable cervical or supraclavicular lymphadenopathy  NEURO: CN 2-12 intact and symmetric bilaterally  PSYCH: nl affect, nl orientation, nl judgement, nl mood  MKS: no kyphoscoliosis, 5/5 strength ue and le bilaterally    Labs: 2/1: reviewed:  Wbc 7  hgb 10.4 mcv 101  plts 189  Glucose 127  Bun 21  Cr 3.61  Na 134  Bicarb 26  Total protein 5.8  Albumin 3.3  Trop 0.146  Ct chest: 2/1: reviewed changes as listed in HPI and on radiology report, reviewed actual images and compared with prior    A/P:  1. Bilateral pulmonary infiltrates -- certainly differential is broad but she appears to be improving with treatment of underlying bacterial pneumonia, would recommend repeat imaging in 6 weeks time or sooner as needed, suspect some of this could be edema.   2. Chronic hypoxemic respiratory failure -- continue to wean oxygen as able  3. Pleural effusion -- she might benefit from thoracentesis for comfort. She is on plavix, will see if she get better after dialysis  al4. COPD --stable continue bronchodilators  5. Lung cancer --s/p surgery  6. ESRD      Hopefully she can have some fluid pulled with dialysis

## 2022-02-01 NOTE — PLAN OF CARE
Goal Outcome Evaluation:  Plan of Care Reviewed With: patient        Progress: no change  Outcome Summary: Pt what

## 2022-02-02 NOTE — THERAPY EVALUATION
Patient Name: Rachana Arguelles  : 1945    MRN: [id number]                              Today's Date: 2022       Admit Date: 2022    Visit Dx:     ICD-10-CM ICD-9-CM   1. Healthcare-associated pneumonia  J18.9 486   2. ESRD (end stage renal disease) ([facility])  N18.6 585.6   3. Chronic pain syndrome  G89.4 338.4   4. Chronic obstructive pulmonary disease, unspecified COPD type ([facility])  J44.9 496     Patient Active Problem List   Diagnosis   • Anxiety   • Chronic pain syndrome   • Depression   • Gastroesophageal reflux disease   • Hyperlipidemia   • Hypertension   • Osteoarthritis of hip   • Chronic low back pain   • Failed back syndrome of lumbar spine   • Pulmonary embolus ([facility])   • Weight loss   • Polypharmacy   • Stage 3b chronic kidney disease ([facility])   • Chronic constipation   • Sacroiliac joint pain   • Mixed incontinence   • Renal cyst   • Achilles tendonitis   • Atherosclerosis of native artery of left lower extremity with intermittent claudication ([facility])   • Morbidly obese ([facility])   • Lung nodule   • Malignant neoplasm of right upper lobe of lung ([facility])   • Lung cancer ([facility])   • Moderate single current episode of major depressive disorder ([facility])   • Palpitations   • Encounter for screening for malignant neoplasm of colon   • Hematoma of scalp   • Acute neck pain   • Dizziness   • Unstable cervical spine   • Postconcussive syndrome   • Positive colorectal cancer screening using Cologuard test   • Dysphagia   • S/P reverse total shoulder arthroplasty, right   • Chronic post-traumatic headache, not intractable   • Medication overuse headache   • Migraine without aura and without status migrainosus, not intractable   • Shortness of breath   • PAD (peripheral artery disease) ([facility])   • Bilateral carotid artery stenosis   • S/P lobectomy of lung   • YVONNE (acute kidney injury) ([facility])   • Anemia   • Frequent PVCs   • Pneumonia of both lower lobes due to infectious organism   • History of lung cancer   • Hypertensive  urgency   • Bradycardia, sinus   • YVONNE (acute kidney injury) (HCC)   • Opioid dependence (HCC)   • COPD (chronic obstructive pulmonary disease) (HCC)   • Renal artery stenosis (HCC)   • Acute kidney injury (HCC)   • Acute on chronic diastolic CHF (congestive heart failure) (HCC)   • Acute gout due to renal impairment involving foot   • Hypokalemia   • Anemia of chronic disease   • Leukocytosis   • Anemia due to stage 4 chronic kidney disease (HCC)   • IgG monoclonal gammopathy   • ESRD (end stage renal disease) ([facility])   • Dyspnea   • Insomnia   • Healthcare-associated pneumonia   • Sepsis, unspecified organism (HCC)   • Chronic diastolic CHF (congestive heart failure) (HCC)   • Epigastric abdominal pain   • Chronic respiratory failure with hypoxia ([facility])     Past Medical History:   Diagnosis Date   • AAA (abdominal aortic aneurysm) without rupture ([facility])    • Anemia    • Anesthesia complication     AGITATION AND COMBATIVE AFTER LUNG SURGERY 2019   • Anxiety and depression    • Arthritis    • Bilateral carotid artery stenosis 8/14/2020   • Chest pain     OCCAS   • Chronic low back pain    • Chronic pain syndrome 3/12/2016   • Claustrophobia 10/26/2015    Resolved   • COPD (chronic obstructive pulmonary disease) ([facility])    • Dizziness    • Dyspnea    • ESRD (end stage renal disease) on dialysis ([facility])     MONDAY, WEDNESDAY AND FRIDAYS [facility]   • GERD (gastroesophageal reflux disease)    • H/O concussion    • Head trauma 12/16/2019    FELL CUT HEAD 12 STAPLES   • Hiatal hernia    • History of bradycardia    • History of confusion    • History of diverticulitis    • History of migraine headaches    • History of pneumonia    • History of skin cancer    • History of transfusion    • Hyperlipidemia    • Hypertension    • Itching    • Lumbar postlaminectomy syndrome 10/26/2015    Resolved   • Lung cancer (HCC) 2019    RIGHT LUNG, HAD SURGERY   • Migraines    • Nausea    • On home oxygen therapy     5L NC   • Palpitations      PVC'S   • Pericardial effusion     HX   • Polypharmacy 4/22/2016   • PONV (postoperative nausea and vomiting)    • Pulmonary embolism (HCC) 10/26/2015    January 2013 - Resolved, felt to be secondary to estrogen use   • Renal artery stenosis (HCC)    • Requires supplemental oxygen     2 LITERS WHEN SLEEPING AND AS NEEDED   • Slow to wake up after anesthesia     AFTER LUNG SURGERY 2019   • Slow to wake up after anesthesia     AFTER LUNG SURGERY   • Submandibular sialoadenitis 10/26/2015    Resolved   • Visual changes    • Vitamin B 12 deficiency      Past Surgical History:   Procedure Laterality Date   • ANGIOPLASTY ILIAC ARTERY Bilateral 8/10/2018    Procedure: BILATERAL ILIAC STENTS;  Surgeon: Riki Mancera MD;  Location: [facility] MAIN OR;  Service: Vascular   • APPENDECTOMY     • ARTERIOGRAM N/A 6/4/2021    Procedure: Renal Arteriogram with possible intervention;  Surgeon: Mat Coello MD;  Location: [facility] CATH INVASIVE LOCATION;  Service: Cardiology;  Laterality: N/A;   • ARTERIOVENOUS FISTULA/SHUNT SURGERY Left 10/14/2021    Procedure: LEFT ARM BRACHIOAXILLARY ARTERIOVENOUS GRAFT;  Surgeon: Riki Mancera MD;  Location: [facility] MAIN OR;  Service: Vascular;  Laterality: Left;   • BREAST SURGERY      Breast Surgery Reduction Procedure   • BRONCHOSCOPY N/A 2/8/2019    Procedure: BRONCHOSCOPY;  Surgeon: Álvaro Gifford MD;  [facility];  Service: Pulmonary   • CARDIAC CATHETERIZATION N/A 6/4/2021    Procedure: Stent BMS peripheral- RENAL-RIGHT;  Surgeon: Mat Coello MD;  Location: [facility] CATH INVASIVE LOCATION;  Service: Cardiology;  Laterality: N/A;   • CATARACT EXTRACTION Bilateral    • CHOLECYSTECTOMY     • COLONOSCOPY  2006    Dr. Saha   • COLONOSCOPY  2012    Dr. Saha   • ENDOSCOPY N/A 11/3/2020    Procedure: ESOPHAGOGASTRODUODENOSCOPY with 54 french vernon dilation;  Surgeon: Yunior Vo MD;  Location: [facility] ENDOSCOPY;  Service:  Gastroenterology;  Laterality: N/A;  pre- dysphagia  post- esophageal ring, hiatal hernia   • HYSTERECTOMY     • INCISION AND DRAINAGE ARM Left 1/7/2022    Procedure: LEFT ARM HEMATOMA EVACUATION;  Surgeon: Riki Mancera MD;  Location: [facility];  Service: Vascular;  Laterality: Left;   • INSERTION HEMODIALYSIS CATHETER Right 9/2/2021    Procedure: PALINDROME CATHERER;  Surgeon: Riki Mancera MD;  Location: [facility] MAIN OR;  Service: Vascular;  Laterality: Right;   • INSERTION HEMODIALYSIS CATHETER N/A 10/7/2021    Procedure: HEMODIALYSIS CATHETER INSERTION;  Surgeon: Fany Salinas Jr., MD;  Location: [facility] MAIN OR;  Service: Vascular;  Laterality: N/A;   • INTERVENTIONAL RADIOLOGY PROCEDURE N/A 6/4/2021    Procedure: Abdominal Aortogram;  Surgeon: Mat Coello MD;  Location: [facility] CATH INVASIVE LOCATION;  Service: Cardiology;  Laterality: N/A;   • INTUBATION  1/15/2019        • JOINT REPLACEMENT      left knee, hip, shoulder   • LASIK     • LUMBAR FUSION      Lumbar Vertebral Fusion   • RENAL ARTERY STENT Right    • SHOULDER ARTHROSCOPY  04/04/2013    Dr. Carrillo/MERRILL Kent   • THORACOSCOPY VIDEO ASSISTED WITH LOBECTOMY Right 1/11/2019    Procedure: BRONCOSCOPY, THORACOSCOPY VIDEO ASSISTED WITH RIGHT UPPER LOBE WEDGE RESECTION, COMPLETION RIGHT UPPER LOBECTOMY, LYMPH NODE DISSECTION, INTERCOSTAL NERVE BLOCK;  Surgeon: Quita Figueroa MD;  Location: [facility] OR;  Service: Thoracic   • TONSILLECTOMY     • TOTAL HIP ARTHROPLASTY REVISION Left    • TOTAL KNEE ARTHROPLASTY Left    • TOTAL SHOULDER ARTHROPLASTY Left    • TOTAL SHOULDER ARTHROPLASTY W/ DISTAL CLAVICLE EXCISION Right 6/18/2020    Procedure: RIGHT TOTAL SHOULDER REVERSE ARTHROPLASTY WITH GPS;  Surgeon: Lino Carrillo MD;  Location: [facility];  Service: Orthopedics;  Laterality: Right;      General Information     Row Name 02/02/22 1534          Physical Therapy Time and Intention    Document Type  therapy note (daily note)  -CF     Mode of Treatment physical therapy; individual therapy  -     Row Name 02/02/22 1534          General Information    Patient Profile Reviewed yes  -CF     Existing Precautions/Restrictions fall; oxygen therapy device and L/min  -     Row Name 02/02/22 1534          Cognition    Orientation Status (Cognition) oriented x 4  -CF     Row Name 02/02/22 1534          Safety Issues, Functional Mobility    Impairments Affecting Function (Mobility) endurance/activity tolerance; shortness of breath; strength; balance  -CF           User Key  (r) = Recorded By, (t) = Taken By, (c) = Cosigned By    Initials Name Provider Type     Zonia Ceron, TIMI Physical Therapist               Mobility     Row Name 02/02/22 1535          Bed Mobility    Bed Mobility supine-sit; sit-supine  -CF     Supine-Sit [place] (Bed Mobility) modified independence  -CF     Sit-Supine [place] (Bed Mobility) modified independence  -CF     Assistive Device (Bed Mobility) bed rails; head of bed elevated  -     Row Name 02/02/22 1535          Sit-Stand Transfer    Sit-Stand [place] (Transfers) contact guard  -CF     Assistive Device (Sit-Stand Transfers) walker, front-wheeled  -     Row Name 02/02/22 1535          Gait/Stairs (Locomotion)    [place] Level (Gait) contact guard; minimum assist (75% patient effort)  -CF     Assistive Device (Gait) walker, front-wheeled  -     Distance in Feet (Gait) 80'  -CF     Deviations/Abnormal Patterns (Gait) janet decreased; gait speed decreased; base of support, narrow; stride length decreased  -CF     Bilateral Gait Deviations forward flexed posture  -CF     Comment (Gait/Stairs) Unsteadiness noted, narrow base of support and near scissoring at times, especially with turns  -           User Key  (r) = Recorded By, (t) = Taken By, (c) = Cosigned By    Initials Name Provider Type    Zonia Carmona PT Physical Therapist                Obj/Interventions     Row Name 02/02/22 1536          Motor Skills    Therapeutic Exercise other (see comments)  reji fonseca x20 reps at EOB  -CF     Row Name 02/02/22 1536          Balance    Static Standing Balance mild impairment; supported  -CF           User Key  (r) = Recorded By, (t) = Taken By, (c) = Cosigned By    Initials Name Provider Type    Zonia Carmona, PT Physical Therapist               Goals/Plan    No documentation.                Clinical Impression     Row Name 02/02/22 1536          Pain Scale: Numbers Pre/Post-Treatment    Pretreatment Pain Rating 0/10 - no pain  -CF     Row Name 02/02/22 1536          Plan of Care Review    Plan of Care Reviewed With patient; spouse  -CF     Outcome Summary Pt participated with PT this afternoon. Pt able to increase ambulation distance to 80' with cga to min A using walker. Pt demonstrates unsteadiness and narrow base of support at times, nearly crossing feet with turns. Cues to keep feet inside walker and to not cross. O2 sats maintained >90% on 4L O2 today, did complain of mild SOA following gait training. PT plans to follow up tomorrow.  -CF     Row Name 02/02/22 1536          Vital Signs    O2 Delivery Pre Treatment nasal cannula  -CF     O2 Delivery Intra Treatment nasal cannula  -CF     O2 Delivery Post Treatment nasal cannula  -CF     Row Name 02/02/22 1536          Positioning and Restraints    Pre-Treatment Position in bed  -CF     Post Treatment Position bed  -CF     In Bed notified nsg; call light within reach; encouraged to call for assist; exit alarm on; fowlers; side rails up x2  -CF           User Key  (r) = Recorded By, (t) = Taken By, (c) = Cosigned By    Initials Name Provider Type    Zonia Carmona, PT Physical Therapist               Outcome Measures     Row Name 02/02/22 1547          How much help from another person do you currently need...    Turning from your back to your side while in flat bed without using bedrails? 4   -CF     Moving from lying on back to sitting on the side of a flat bed without bedrails? 3  -CF     Moving to and from a bed to a chair (including a wheelchair)? 3  -CF     Standing up from a chair using your arms (e.g., wheelchair, bedside chair)? 3  -CF     Climbing 3-5 steps with a railing? 2  -CF     To walk in hospital room? 3  -CF     AM-PAC 6 Clicks Score (PT) 18  -CF     Row Name 02/02/22 1547          Functional Assessment    Outcome Measure Options AM-PAC 6 Clicks Basic Mobility (PT)  -CF           User Key  (r) = Recorded By, (t) = Taken By, (c) = Cosigned By    Initials Name Provider Type    CF Zonia Ceron, TIMI Physical Therapist                             Physical Therapy Education                 Title: PT OT SLP Therapies (In Progress)     Topic: Physical Therapy (Done)     Point: Mobility training (Done)     Learning Progress Summary           Patient Acceptance, E, VU,NR by CF at 2/2/2022 1547    Acceptance, E, VU,NR by CF at 1/31/2022 1309                   Point: Home exercise program (Done)     Learning Progress Summary           Patient Acceptance, E, VU,NR by CF at 2/2/2022 1547    Acceptance, E, VU,NR by CF at 1/31/2022 1309                   Point: Body mechanics (Done)     Learning Progress Summary           Patient Acceptance, E, VU,NR by CF at 2/2/2022 1547    Acceptance, E, VU,NR by CF at 1/31/2022 1309                   Point: Precautions (Done)     Learning Progress Summary           Patient Acceptance, E, VU,NR by CF at 2/2/2022 1547    Acceptance, E, VU,NR by CF at 1/31/2022 1309                               User Key     Initials Effective Dates Name Provider Type Discipline    CF 06/16/21 -  Zonia Ceron, TIMI Physical Therapist PT              PT Recommendation and Plan  Planned Therapy Interventions (PT): balance training, bed mobility training, gait training, ROM (range of motion), strengthening, patient/family education, transfer training  Plan of Care Reviewed With:  patient, spouse  Outcome Summary: Pt participated with PT this afternoon. Pt able to increase ambulation distance to 80' with cga to min A using walker. Pt demonstrates unsteadiness and narrow base of support at times, nearly crossing feet with turns. Cues to keep feet inside walker and to not cross. O2 sats maintained >90% on 4L O2 today, did complain of mild SOA following gait training. PT plans to follow up tomorrow.     Time Calculation:    PT Charges     Row Name 02/02/22 1534             Time Calculation    Start Time 1510  -CF      Stop Time 1529  -CF      Time Calculation (min) 19 min  -CF      PT Received On 02/02/22  -CF      PT - Next Appointment 02/03/22  -CF            User Key  (r) = Recorded By, (t) = Taken By, (c) = Cosigned By    Initials Name Provider Type    CF Zonia Ceron, TIMI Physical Therapist              Therapy Charges for Today     Code Description Service Date Service Provider Modifiers Qty    [id number]  PT THERAPEUTIC ACT EA 15 MIN 2/2/2022 Zonia Ceron, PT GP 1          PT G-Codes  Outcome Measure Options: AM-PAC 6 Clicks Basic Mobility (PT)  AM-PAC 6 Clicks Score (PT): 18  AM-PAC 6 Clicks Score (OT): 18    Zonia Ceron PT  2/2/2022

## 2022-02-02 NOTE — NURSING NOTE
HD WITHOUT INCIDENT OR C/O. TOLERATED WELL. REMOVED 3 L AS ORDERED. NO MEDS ADMINISTERED. DRSG CURRENT, CDI WITH BIOPATCH. STABLE, NO C/O UPON COMPLETION OF TREATMENT.

## 2022-02-02 NOTE — PROGRESS NOTES
Name: Rachana Arguelles ADMIT: 2022   : 1945  PCP: Rhys Crowder Jr., MD    MRN: [id number] LOS: 3 days   AGE/SEX: 76 y.o. female  ROOM: [facility]   Subjective   Chief Complaint   Patient presents with   • Shortness of Breath   • Nausea   • Fever       patient is lying in bed in no major distress but appears chronically ill.  Currently requiring 5 L of oxygen which she normally uses at home.    Objective   Vital Signs  Temp:  [97.2 °F (36.2 °C)-98 °F (36.7 °C)] 98 °F (36.7 °C)  Heart Rate:  [63-75] 68  Resp:  [16-20] 18  BP: (140-157)/(40-98) 146/58  SpO2:  [95 %-100 %] 100 %  on  Flow (L/min):  [4-5] 4;   Device (Oxygen Therapy): humidified; nasal cannula  Body mass index is 25.84 kg/m².    Physical Exam  Vitals and nursing note reviewed.   Constitutional:       General: She is in acute distress.      Appearance: She is ill-appearing. She is not diaphoretic.   HENT:      Head: Normocephalic and atraumatic.   Eyes:      General:         Right eye: No discharge.         Left eye: No discharge.      Conjunctiva/sclera: Conjunctivae normal.   Cardiovascular:      Rate and Rhythm: Normal rate and regular rhythm.      Pulses: Normal pulses.   Pulmonary:      Breath sounds: Decreased breath sounds and rales present. No wheezing.   Abdominal:      General: There is no distension.      Palpations: Abdomen is soft.      Tenderness: There is abdominal tenderness (epigastric). There is no guarding or rebound.   Musculoskeletal:         General: No swelling or tenderness.   Skin:     General: Skin is warm and dry.   Neurological:      Mental Status: She is alert. Mental status is at baseline.   Psychiatric:         Mood and Affect: Mood normal.         Behavior: Behavior normal.         Results Review:       I reviewed the patient's new clinical results.     I reviewed imaging, agree with interpretation.     I reviewed telemetry results, sinus rhythm currently on telemetry.       Results from last 7 days   Lab Units  02/01/22  1316 01/31/22  0043 01/30/22  1030   WBC 10*3/mm3 7.11 16.09* 17.80*   HEMOGLOBIN g/dL 10.4* 11.2* 12.5   PLATELETS 10*3/mm3 189 167 173     Results from last 7 days   Lab Units 02/01/22  1053 01/31/22  0043 01/30/22  1030   SODIUM mmol/L 134* 129* 131*   POTASSIUM mmol/L 3.5 4.7 4.5   CHLORIDE mmol/L 95* 95* 94*   CO2 mmol/L 26.0 17.9* 22.1   BUN mg/dL 21 35* 32*   CREATININE mg/dL 3.61* 4.87* 4.56*   GLUCOSE mg/dL 127* 131* 104*   Estimated Creatinine Clearance: 14 mL/min (A) (by C-G formula based on SCr of 3.61 mg/dL (H)).  Results from last 7 days   Lab Units 02/01/22  1053 01/31/22  0043 01/30/22  1030   CALCIUM mg/dL 8.4* 8.2* 8.7   ALBUMIN g/dL 3.30* 3.10* 3.60   MAGNESIUM mg/dL 2.0 2.0  --    PHOSPHORUS mg/dL 3.7 5.9*  --      Results from last 7 days   Lab Units 01/30/22  1030   INR  1.09        amLODIPine, 5 mg, Oral, Q24H  aspirin, 81 mg, Oral, Daily  atorvastatin, 80 mg, Oral, Nightly  budesonide-formoterol, 2 puff, Inhalation, BID - RT  carvedilol, 25 mg, Oral, BID With Meals  clopidogrel, 75 mg, Oral, Daily  folic acid, 1,000 mcg, Oral, Daily  heparin (porcine), 5,000 Units, Subcutaneous, Q12H  hydrALAZINE, 100 mg, Oral, TID  NIFEdipine XL, 90 mg, Oral, Q PM  pantoprazole, 40 mg, Oral, QAM  piperacillin-tazobactam, 3.375 g, Intravenous, Q12H  sertraline, 100 mg, Oral, Daily  sevelamer, 800 mg, Oral, Daily With Breakfast & Dinner  sodium chloride, 10 mL, Intravenous, Q12H  [START ON 2/3/2022] torsemide, 200 mg, Oral, Daily  traZODone, 50 mg, Oral, Nightly       Diet Regular; Renal    Assessment/Plan      Active Hospital Problems    Diagnosis  POA   • **Healthcare-associated pneumonia [J18.9]  Yes   • Chronic respiratory failure with hypoxia ([facility]) [J96.11]  Yes   • Sepsis, unspecified organism ([facility]) [A41.9]  Yes   • Chronic diastolic CHF (congestive heart failure) ([facility]) [I50.32]  Yes   • Epigastric abdominal pain [R10.13]  Yes   • ESRD (end stage renal disease) ([facility]) [N18.6]  Yes   • Anemia of  chronic disease [D63.8]  Yes   • COPD (chronic obstructive pulmonary disease) ([facility]) [J44.9]  Yes   • Chronic pain syndrome [G89.4]  Yes   • Hypertension [I10]  Yes      Resolved Hospital Problems   No resolved problems to display.       · PNA/Septicemia: Enterococcus , infectious disease did evaluate and IV vancomycin and cefepime have been discontinued and is currently on Zosyn.  Echocardiogram revealed aortic valve thickening and may need OMAIRA and ID discuss with Cardiology.  Given guarded respiratory status timing of OMAIRA yet to be decided by Cardiology.  CT of the chest findings reviewed and there are multiple areas of pulmonary opacification and nodular appearance and malignancy needs to be ruled out per radiology.  Pulmonary consult has been obtained. Small to moderate left pleural effusion noted but decrease in size when compared to 11/23/2021.  · Chronic Hypoxic Respiratory Failure: 5L baseline. Currently requiring 6 L and evidence of distress on exam.  · Epigastric Pain:   Advance diet as tolerated and lipase is normal and LFTs also fairly unremarkable except AST very minimally elevated and bilirubin is normal.  CT of the and pelvis with no specific intra-abdominal findings other than mild free fluid.  · NSTEMI: In setting of ESRD and septicemia. ASA/Plavix/BBlocker/Statin. Cardiology consulted.  · ESRD: Tunnel for HD access without erythema surrounding, will follow up ID recommendations as above. Nephrology following.  · A/C HFpEF: Volume per nephrology.  · ACD  · Chronic Pain  · Hx lung cancer and right upper lobectomy: CT as above  · PPx: Heparin sq  · Disposition: TBD    Bill Gutierrez MD  [facility]  02/02/22  16:17 EST    Dictated portions using Dragon dictation software.    During the entire encounter, I was wearing recommended PPE including face mask and eye protection. Hand sanitization was performed prior to entering room and upon exit.

## 2022-02-02 NOTE — PLAN OF CARE
Goal Outcome Evaluation:  Plan of Care Reviewed With: patient, spouse           Outcome Summary: Pt participated with PT this afternoon. Pt able to increase ambulation distance to 80' with cga to min A using walker. Pt demonstrates unsteadiness and narrow base of support at times, nearly crossing feet with turns. Cues to keep feet inside walker and to not cross. O2 sats maintained >90% on 4L O2 today, did complain of mild SOA following gait training. PT plans to follow up tomorrow.    Patient was intermittently wearing a face mask during this therapy encounter. Therapist used appropriate personal protective equipment including gown, eye protection, mask and gloves.  Mask used was [id number]/duckbill. Appropriate PPE was worn during the entire therapy session. Hand hygiene was completed before and after therapy session. Patient is in enhanced droplet precautions.

## 2022-02-02 NOTE — PROGRESS NOTES
"Patient Care Team:  Rhys Crowder Jr., MD as PCP - General (Family Medicine)  Whitney Dudley MD as Consulting Physician (Nephrology)  Quita Figueroa MD as Surgeon (Thoracic Surgery)  Sabas Luther MD as Consulting Physician (Otolaryngology)  Álvaro Gifford MD as Consulting Physician (Pulmonary Disease)  Rhys Crowder Jr., MD as Referring Physician (Family Medicine)  Ld Paulino MD as Consulting Physician (Hematology and Oncology)  Mat Coello MD as Consulting Physician (Cardiology)    Chief Complaint:  Enterococcus bacteremia, elevated troponin      Interval History:   States her breathing is slightly improved.  Still labored.  On 4 to 5 L nasal cannula.  Blood cultures are now clear.    Objective   Vital Signs  Temp:  [97.2 °F (36.2 °C)-98 °F (36.7 °C)] 98 °F (36.7 °C)  Heart Rate:  [63-75] 67  Resp:  [16-20] 18  BP: (140-157)/(40-98) 146/58    Intake/Output Summary (Last 24 hours) at 2/2/2022 1526  Last data filed at 2/2/2022 1300  Gross per 24 hour   Intake 720 ml   Output 3000 ml   Net -2280 ml     Flowsheet Rows      First Filed Value   Admission Height 170.2 cm (67\") Documented at 01/30/2022 1740   Admission Weight 77.9 kg (171 lb 12.8 oz) Documented at 01/30/2022 1207          Temp:  [97.2 °F (36.2 °C)-98 °F (36.7 °C)] 98 °F (36.7 °C)  Heart Rate:  [63-75] 67  Resp:  [16-20] 18  BP: (140-157)/(40-98) 146/58    Intake/Output Summary (Last 24 hours) at 2/2/2022 1526  Last data filed at 2/2/2022 1300  Gross per 24 hour   Intake 720 ml   Output 3000 ml   Net -2280 ml     Flowsheet Rows      First Filed Value   Admission Height 170.2 cm (67\") Documented at 01/30/2022 1740   Admission Weight 77.9 kg (171 lb 12.8 oz) Documented at 01/30/2022 1207          General Appearance:    Alert, cooperative, in no acute distress   Head:    Normocephalic, without obvious abnormality, atraumatic       Neck/Lymph   No adenopathy, supple, no thyromegaly, no carotid bruit, no    JVD   Lungs:    Coarse breath " sounds bilaterally with wheezing.    Cardiac:    Normal rate, regular rhythm, no murmur, no rub, no gallop   Chest Wall:    No abnormalities observed   GI:     Normal bowel sounds, soft, nontender, nondistended,            no rebound tenderness   Extremities:   No cyanosis, clubbing, or edema   Circulatory/Peripheral Vascular :   Pulses palpable and equal bilaterally   Integumentary:   No bleeding or rash. Normal temperature                amLODIPine, 5 mg, Oral, Q24H  aspirin, 81 mg, Oral, Daily  atorvastatin, 80 mg, Oral, Nightly  budesonide-formoterol, 2 puff, Inhalation, BID - RT  carvedilol, 25 mg, Oral, BID With Meals  clopidogrel, 75 mg, Oral, Daily  folic acid, 1,000 mcg, Oral, Daily  heparin (porcine), 5,000 Units, Subcutaneous, Q12H  hydrALAZINE, 100 mg, Oral, TID  NIFEdipine XL, 90 mg, Oral, Q PM  pantoprazole, 40 mg, Oral, QAM  piperacillin-tazobactam, 3.375 g, Intravenous, Q12H  sertraline, 100 mg, Oral, Daily  sevelamer, 800 mg, Oral, Daily With Breakfast & Dinner  sodium chloride, 10 mL, Intravenous, Q12H  [START ON 2/3/2022] torsemide, 200 mg, Oral, Daily  traZODone, 50 mg, Oral, Nightly             Results Review:    Results from last 7 days   Lab Units 02/01/22  1053   SODIUM mmol/L 134*   POTASSIUM mmol/L 3.5   CHLORIDE mmol/L 95*   CO2 mmol/L 26.0   BUN mg/dL 21   CREATININE mg/dL 3.61*   GLUCOSE mg/dL 127*   CALCIUM mg/dL 8.4*     Results from last 7 days   Lab Units 02/01/22  1053 01/31/22  0645 01/31/22  0043   TROPONIN T ng/mL 0.146* 0.180* 0.215*     Results from last 7 days   Lab Units 02/01/22  1316   WBC 10*3/mm3 7.11   HEMOGLOBIN g/dL 10.4*   HEMATOCRIT % 33.4*   PLATELETS 10*3/mm3 189     Results from last 7 days   Lab Units 01/30/22  1030   INR  1.09     Results from last 7 days   Lab Units 01/31/22  0043   CHOLESTEROL mg/dL 97     Results from last 7 days   Lab Units 02/01/22  1053   MAGNESIUM mg/dL 2.0     Results from last 7 days   Lab Units 01/31/22  0043   CHOLESTEROL mg/dL 97    TRIGLYCERIDES mg/dL 183*   HDL CHOL mg/dL 26*   LDL CHOL mg/dL 41     @LABRCNT(bnp)@  I reviewed the patient's new clinical results.  I personally viewed and interpreted the patient's EKG/Telemetry data            Assessment/Plan   1. Enterococcus septicemia  2. HCAP  3. Elevated troponin- type II nstemi.  Troponin not consistent with ACS.  4. Chronic diastolic CHF  5. COPD  6. Abnormal EKG  7. ESRD    -Patient underwent transthoracic echocardiogram which I have reviewed.  There is mild thickening of her aortic valve leaflets mostly involving the noncoronary cusp.  Also noted to have mild mitral and tricuspid valve regurgitation.  -In the setting of Enterococcus bacteremia and a poor quality echocardiogram with some degree of thickening of the aortic valve I think a OMAIRA is reasonable.  -I would recommend holding off on this evaluation until her respiratory status has improved.

## 2022-02-02 NOTE — PROGRESS NOTES
[facility]  [phone]  Álvaro Gifford MD     Subjective:  LOS: 3    Chief Complaint: Pulmonary infiltrates    Patient today states she is quite well except for some pain in her left hand.  Denies cough of phlegm.    Objective   Vital Signs past 24hrs    Temp range: Temp (24hrs), Av.8 °F (36.6 °C), Min:97.2 °F (36.2 °C), Max:98 °F (36.7 °C)    BP range: BP: (140-157)/(40-98) 146/58  Pulse range: Heart Rate:  [63-75] 68  Resp rate range: Resp:  [16-20] 18    Device (Oxygen Therapy): humidified; nasal cannulaFlow (L/min):  [4-5] 4  Oxygen range:SpO2:  [95 %-100 %] 100 %      74.8 kg (165 lb); Body mass index is 25.84 kg/m².    Intake/Output Summary (Last 24 hours) at 2022 1634  Last data filed at 2022 1300  Gross per 24 hour   Intake 720 ml   Output 3000 ml   Net -2280 ml       Physical Exam  Cardiovascular:      Rate and Rhythm: Normal rate and regular rhythm.      Heart sounds: No murmur heard.      Pulmonary:      Effort: Pulmonary effort is normal.      Breath sounds: Decreased breath sounds and rales (bases) present.   Abdominal:      General: Bowel sounds are normal.      Palpations: Abdomen is soft. There is no mass.      Tenderness: There is no abdominal tenderness.   Musculoskeletal:         General: No swelling.   Neurological:      Mental Status: She is alert.       Results Review:    I have reviewed the laboratory and imaging data since the last note by [facility] physician.  My annotations are noted in assessment and plan.    Results from last 7 days   Lab Units 22  0043 22  1030   PROCALCITONIN ng/mL 13.30* 10.10*   LACTATE mmol/L  --  1.5       Medication Review:  I have reviewed the current MAR.  My annotations are noted in assessment and plan.       Plan   PCCM Problems  Pulmonary infiltrates  Left effusion  Mediastinal adenopathy  Enterococcus septicemia  Chronic hypoxia  History lung cancer  ESRD on hemodialysis      THESE ARE NEW MEDICAL PROBLEMS TO ME.    Plan of  Treatment    He seems comfortable from the respiratory standpoint.  Currently getting hemodialysis.  In view of enterococcal septicemia appropriate antibiotics per ID.  Note plans for OMAIRA.  Would not tap or do bronchoscopy at this time.    Remains on chronic O2.    Will require follow-up CT in 3 to 4 months to follow-up on mediastinal adenopathy and infiltrates.    Álvaro Gifford MD  02/02/22  16:34 EST    While in the room and during my examination of the patient I wore gloves, gown, mask, eye protection and followed enhanced droplet/contact isolation protocol and precautions.  I washed my hands before and after this patient encounter.    Part of this note may be an electronic transcription/translation of spoken language to printed text using the Dragon Dictation System.

## 2022-02-02 NOTE — PROGRESS NOTES
LOS: 3 days     Chief Complaint:  Enterococcus bacteremia    Interval History: Afebrile, continues to do better from a respiratory standpoint.  Currently on 4 L nasal cannula.  Cough persists but is improving.  She is currently on dialysis and states she does not feel well and has a headache.  Denies any vomiting diarrhea or rash.    Vital Signs  Temp:  [97.2 °F (36.2 °C)-98 °F (36.7 °C)] 98 °F (36.7 °C)  Heart Rate:  [63-75] 63  Resp:  [16-20] 16  BP: (140-157)/(40-98) 140/40    Physical Exam:  General: Tachypneic  Cardiovascular: RRR, no LE edema   Respiratory: Coarse breath sounds bilaterally with wheezing  GI: Soft, NT/ND, + bowel sounds bilaterally  Skin: No rashes    Antibiotics:  Vancomycin dosing per pharmacy  Cefepime 2 g IV every 24 hours     Results Review:    Lab Results   Component Value Date    WBC 7.11 02/01/2022    HGB 10.4 (L) 02/01/2022    HCT 33.4 (L) 02/01/2022    .8 (H) 02/01/2022     02/01/2022     Lab Results   Component Value Date    GLUCOSE 127 (H) 02/01/2022    BUN 21 02/01/2022    CREATININE 3.61 (H) 02/01/2022    EGFRIFNONA 12 (L) 02/01/2022    EGFRIFAFRI  02/01/2022      Comment:      <15 Indicative of kidney failure.    BCR 5.8 (L) 02/01/2022    CO2 26.0 02/01/2022    CALCIUM 8.4 (L) 02/01/2022    PROTENTOTREF 5.2 (L) 07/28/2021    ALBUMIN 3.30 (L) 02/01/2022    LABIL2 0.9 07/28/2021    AST 40 (H) 02/01/2022    ALT 19 02/01/2022     BNP 24,832    Microbiology:  2/1 SCx P  1/31 BCx P x 1  1/30 MRSA nasal PCR negative  1/30 Legionella and strep urinary antigen negative  1/30 BCx Enterococcus faecalis x 2  1/30 RVP/COVID neg    2/1 CT of the chest abdomen pelvis personally reviewed by me shows bilateral infiltrates with mediastinal adenopathy increased as compared to November.  Nodular appearing infiltrates.  Malignancy cannot be excluded.  Anasarca within the soft tissues and small amount of free fluid in the pelvis.  Normal liver pancreas spleen adrenals.    TTE EF of 60%.   Grade 1 diastolic dysfunction.  Marked thickening of the aortic valve could not exclude vegetation.    Assessment/Plan   Enterococcus septicemia  End-stage renal disease on hemodialysis  Leukocytosis  Chronic hypoxic respiratory failure  History of lung cancer status post lobectomy complicating above  Epigastric pain with nausea  Pneumonia    Continue Zosyn 3.375 g IV every 12 hours.  Repeat blood cultures and sputum cultures are negative to date.  Awaiting fungal work-up but doubt this is the cause of her respiratory decline given improvement on broad-spectrum antibiotics.  Will discuss with cardiology if the OMAIRA is possible given thickening of the aortic valve seen on transthoracic echocardiogram.  Antibiotic duration to be determined

## 2022-02-02 NOTE — PROGRESS NOTES
[facility] Progress Note      Patient Name: Rachaan Arguelles  : 1945  MRN: [id number]  Primary Care Physician:  Rhys Crowder Jr., MD  Date of admission: 2022    Subjective     Interval History:     Patient lying in bed  States her shortness of breath is improved  Some dry cough  No fevers or chills  Scheduled to have hemodialysis today    Review of Systems:   As noted above    Objective     Vitals:   Temp:  [96.2 °F (35.7 °C)-97.7 °F (36.5 °C)] 97.2 °F (36.2 °C)  Heart Rate:  [63-75] 66  Resp:  [18-20] 18  BP: (150-157)/(68-91) 154/75  Flow (L/min):  [4-5] 4    Intake/Output Summary (Last 24 hours) at 2022 0832  Last data filed at 2022 1700  Gross per 24 hour   Intake 580 ml   Output --   Net 580 ml       Physical Exam:    General Appearance: alert, oriented x 3, no acute distress   Skin: warm and dry  HEENT: oral mucosa normal, nonicteric sclera  Neck: supple, no JVD  Chest RIJ TDC in place w/ dressing   Lungs: Bilateral fine rales  Heart: RRR, normal S1 and S2  Abdomen: soft, nontender, nondistended  : no palpable bladder  Extremities: no edema, cyanosis or clubbing  Neuro: normal speech and mental status     Scheduled Meds:     amLODIPine, 5 mg, Oral, Q24H  aspirin, 81 mg, Oral, Daily  atorvastatin, 80 mg, Oral, Nightly  budesonide-formoterol, 2 puff, Inhalation, BID - RT  carvedilol, 25 mg, Oral, BID With Meals  clopidogrel, 75 mg, Oral, Daily  folic acid, 1,000 mcg, Oral, Daily  heparin (porcine), 5,000 Units, Subcutaneous, Q12H  hydrALAZINE, 100 mg, Oral, TID  NIFEdipine XL, 90 mg, Oral, Q PM  pantoprazole, 40 mg, Oral, QAM  piperacillin-tazobactam, 3.375 g, Intravenous, Q12H  sertraline, 100 mg, Oral, Daily  sevelamer, 800 mg, Oral, Daily With Breakfast & Dinner  sodium chloride, 10 mL, Intravenous, Q12H  torsemide, 100 mg, Oral, Daily  traZODone, 50 mg, Oral, Nightly      IV Meds:        Results Reviewed:   I have personally reviewed the results from the  time of this admission to 2/2/2022 08:32 EST     Results from last 7 days   Lab Units 02/01/22  1053 01/31/22  0043 01/30/22  1030   SODIUM mmol/L 134* 129* 131*   POTASSIUM mmol/L 3.5 4.7 4.5   CHLORIDE mmol/L 95* 95* 94*   CO2 mmol/L 26.0 17.9* 22.1   BUN mg/dL 21 35* 32*   CREATININE mg/dL 3.61* 4.87* 4.56*   CALCIUM mg/dL 8.4* 8.2* 8.7   BILIRUBIN mg/dL 0.3 0.2 0.3   ALK PHOS U/L 80 99 111   ALT (SGPT) U/L 19 21 24   AST (SGOT) U/L 40* 44* 51*   GLUCOSE mg/dL 127* 131* 104*       Estimated Creatinine Clearance: 14 mL/min (A) (by C-G formula based on SCr of 3.61 mg/dL (H)).    Results from last 7 days   Lab Units 02/01/22  1053 01/31/22  0043   MAGNESIUM mg/dL 2.0 2.0   PHOSPHORUS mg/dL 3.7 5.9*             Results from last 7 days   Lab Units 02/01/22  1316 01/31/22  0043 01/30/22  1030   WBC 10*3/mm3 7.11 16.09* 17.80*   HEMOGLOBIN g/dL 10.4* 11.2* 12.5   PLATELETS 10*3/mm3 189 167 173       Results from last 7 days   Lab Units 01/30/22  1030   INR  1.09       Assessment / Plan     ASSESSMENT:  -End Stage Renal Disease ( ESRD ) on Hemodialysis [facility]  (since 10/2021 ) .s/p  [facility] TDC in place  functional .Continue to arrange hemodialysis treatment during his admission. Continue renal diet   -Chronic normocytic anemia. On Epogen protocol.  Currently on hold hemoglobin > 11 .  we will continue to follow H&H closely   -Hypertension.  Continue  combination of hydralazine and carvedilol,      . We will continue to follow closely. Heart healthy diet   -Hyperphosphatemia. Continue binders / renvela  with meals, Continue renal diet. We will follow phosphorus to make further adjustments  -Hypervolemic  hyponatremia secondary to volume excess will optimize with dialysis fluid restriction with discussed fluid restriction with patient  -health care  associated pneumonia currently on cefepime and vancomycin, as per pharmacy , as per primary team       PLAN:  -Continue to arrange hemodialysis during her admission  - Trial of  Torsemide 200 mg daily, increased dose   -We will follow SANJANA protocol and binders  -Adjust medications to GFR    Thank you for involving us in the care of Rachana Arguelles.  Please feel free to call with any questions.    Vincent Dutta MD  02/02/22  08:32 [facility]    [facility]  [phone]

## 2022-02-03 NOTE — PROGRESS NOTES
[facility] Progress Note      Patient Name: Rachana Arguelles  : 1945  MRN: [id number]  Primary Care Physician:  Rhys Crowder Jr., MD  Date of admission: 2022    Subjective     Interval History:     Patient lying in bed  Somnolent  Offering no complaint  , Underwent hemodialysis today with removal of 3 L without any complications  N.p.o. overnight  Scheduled for  OMAIRA.     Review of Systems:   As noted above    Objective     Vitals:   Temp:  [97.2 °F (36.2 °C)-98.3 °F (36.8 °C)] 98.3 °F (36.8 °C)  Heart Rate:  [61-69] 64  Resp:  [16-18] 18  BP: (138-154)/(40-98) 148/61  Flow (L/min):  [4] 4    Intake/Output Summary (Last 24 hours) at 2/3/2022 0729  Last data filed at 2022 2312  Gross per 24 hour   Intake 970 ml   Output 3000 ml   Net -2030 ml       Physical Exam:    General Appearance: alert, oriented x 3, no acute distress   Skin: warm and dry  HEENT: oral mucosa normal, nonicteric sclera  Neck: supple, no JVD  Chest RIJ TDC in place w/ dressing   Lungs: Bilateral fine rales  Heart: RRR, normal S1 and S2  Abdomen: soft, nontender, nondistended  : no palpable bladder  Extremities: no edema, cyanosis or clubbing  Neuro: normal speech and mental status     Scheduled Meds:     amLODIPine, 5 mg, Oral, Q24H  aspirin, 81 mg, Oral, Daily  atorvastatin, 80 mg, Oral, Nightly  budesonide-formoterol, 2 puff, Inhalation, BID - RT  carvedilol, 25 mg, Oral, BID With Meals  clopidogrel, 75 mg, Oral, Daily  folic acid, 1,000 mcg, Oral, Daily  heparin (porcine), 5,000 Units, Subcutaneous, Q12H  hydrALAZINE, 100 mg, Oral, TID  NIFEdipine XL, 90 mg, Oral, Q PM  pantoprazole, 40 mg, Oral, QAM  piperacillin-tazobactam, 3.375 g, Intravenous, Q12H  sertraline, 100 mg, Oral, Daily  sevelamer, 800 mg, Oral, Daily With Breakfast & Dinner  sodium chloride, 10 mL, Intravenous, Q12H  torsemide, 200 mg, Oral, Daily  traZODone, 50 mg, Oral, Nightly      IV Meds:        Results Reviewed:   I have  personally reviewed the results from the time of this admission to 2/3/2022 07:29 EST     Results from last 7 days   Lab Units 02/03/22  0337 02/01/22  1053 01/31/22  0043 01/30/22  1030 01/30/22  1030   SODIUM mmol/L 130* 134* 129*   < > 131*   POTASSIUM mmol/L 4.0 3.5 4.7   < > 4.5   CHLORIDE mmol/L 98 95* 95*   < > 94*   CO2 mmol/L 20.0* 26.0 17.9*   < > 22.1   BUN mg/dL 17 21 35*   < > 32*   CREATININE mg/dL 3.21* 3.61* 4.87*   < > 4.56*   CALCIUM mg/dL 7.7* 8.4* 8.2*   < > 8.7   BILIRUBIN mg/dL  --  0.3 0.2  --  0.3   ALK PHOS U/L  --  80 99  --  111   ALT (SGPT) U/L  --  19 21  --  24   AST (SGOT) U/L  --  40* 44*  --  51*   GLUCOSE mg/dL 114* 127* 131*   < > 104*    < > = values in this interval not displayed.       Estimated Creatinine Clearance: 15.7 mL/min (A) (by C-G formula based on SCr of 3.21 mg/dL (H)).    Results from last 7 days   Lab Units 02/03/22  0337 02/01/22  1053 01/31/22  0043   MAGNESIUM mg/dL  --  2.0 2.0   PHOSPHORUS mg/dL 3.5 3.7 5.9*             Results from last 7 days   Lab Units 02/01/22  1316 01/31/22  0043 01/30/22  1030   WBC 10*3/mm3 7.11 16.09* 17.80*   HEMOGLOBIN g/dL 10.4* 11.2* 12.5   PLATELETS 10*3/mm3 189 167 173       Results from last 7 days   Lab Units 01/30/22  1030   INR  1.09       Assessment / Plan     ASSESSMENT:    -End Stage Renal Disease ( ESRD ) on Hemodialysis [facility]  (since 10/2021 ) .s/p  RIJ TDC in place  functional .Continue to arrange hemodialysis treatment during his admission. Continue renal diet   -Chronic normocytic anemia. On Epogen protocol.  Currently on hold hemoglobin > 10 .  we will continue to follow H&H closely   -Hypertension.  Continue  combination of hydralazine and carvedilol,      . We will continue to follow closely. Heart healthy diet   -Hyperphosphatemia. Continue binders / renvela  with meals, Continue renal diet. We will follow phosphorus to make further adjustments  -Hypervolemic  hyponatremia secondary to volume excess will optimize  with dialysis fluid restriction with discussed fluid restriction with patient  -Health care  associated pneumonia currently on cefepime and vancomycin, as per pharmacy , as per primary team   -Enterococcal septicemia appropriate antibiotics per ID.  Planning  OMAIRA.     PLAN:  -Continue to arrange hemodialysis during her admission.  Patient underwent hemodialysis today with removal of 3 L.   - We will arrange for hemodialysis tomorrow  - Continue  Torsemide 200 mg daily   -We will follow SANJANA protocol and binders  -Adjust medications to GFR    Thank you for involving us in the care of Rachana Arguelles.  Please feel free to call with any questions.    Vincent Dutta MD  02/03/22  07:29 EST    [facility] \[facility]\""  [phone]

## 2022-02-03 NOTE — THERAPY TREATMENT NOTE
Patient Name: Rachana Arguelles  : 1945    MRN: [id number]                              Today's Date: 2/3/2022       Admit Date: 2022    Visit Dx:     ICD-10-CM ICD-9-CM   1. Healthcare-associated pneumonia  J18.9 486   2. ESRD (end stage renal disease) ([facility])  N18.6 585.6   3. Chronic pain syndrome  G89.4 338.4   4. Chronic obstructive pulmonary disease, unspecified COPD type ([facility])  J44.9 496     Patient Active Problem List   Diagnosis   • Anxiety   • Chronic pain syndrome   • Depression   • Gastroesophageal reflux disease   • Hyperlipidemia   • Hypertension   • Osteoarthritis of hip   • Chronic low back pain   • Failed back syndrome of lumbar spine   • Pulmonary embolus ([facility])   • Weight loss   • Polypharmacy   • Stage 3b chronic kidney disease ([facility])   • Chronic constipation   • Sacroiliac joint pain   • Mixed incontinence   • Renal cyst   • Achilles tendonitis   • Atherosclerosis of native artery of left lower extremity with intermittent claudication ([facility])   • Morbidly obese ([facility])   • Lung nodule   • Malignant neoplasm of right upper lobe of lung ([facility])   • Lung cancer ([facility])   • Moderate single current episode of major depressive disorder ([facility])   • Palpitations   • Encounter for screening for malignant neoplasm of colon   • Hematoma of scalp   • Acute neck pain   • Dizziness   • Unstable cervical spine   • Postconcussive syndrome   • Positive colorectal cancer screening using Cologuard test   • Dysphagia   • S/P reverse total shoulder arthroplasty, right   • Chronic post-traumatic headache, not intractable   • Medication overuse headache   • Migraine without aura and without status migrainosus, not intractable   • Shortness of breath   • PAD (peripheral artery disease) ([facility])   • Bilateral carotid artery stenosis   • S/P lobectomy of lung   • YVONNE (acute kidney injury) ([facility])   • Anemia   • Frequent PVCs   • Pneumonia of both lower lobes due to infectious organism   • History of lung cancer   • Hypertensive  urgency   • Bradycardia, sinus   • YVONNE (acute kidney injury) ([facility])   • Opioid dependence (HCC)   • COPD (chronic obstructive pulmonary disease) ([facility])   • Renal artery stenosis (HCC)   • Acute kidney injury (HCC)   • Acute on chronic diastolic CHF (congestive heart failure) (HCC)   • Acute gout due to renal impairment involving foot   • Hypokalemia   • Anemia of chronic disease   • Leukocytosis   • Anemia due to stage 4 chronic kidney disease ([facility]CC)   • IgG monoclonal gammopathy   • ESRD (end stage renal disease) ([facility])   • Dyspnea   • Insomnia   • Healthcare-associated pneumonia   • Sepsis, unspecified organism (HCC)   • Chronic diastolic CHF (congestive heart failure) (HCC)   • Epigastric abdominal pain   • Chronic respiratory failure with hypoxia ([facility])     Past Medical History:   Diagnosis Date   • AAA (abdominal aortic aneurysm) without rupture ([facility])    • Anemia    • Anesthesia complication     AGITATION AND COMBATIVE AFTER LUNG SURGERY 2019   • Anxiety and depression    • Arthritis    • Bilateral carotid artery stenosis 8/14/2020   • Chest pain     OCCAS   • Chronic low back pain    • Chronic pain syndrome 3/12/2016   • Claustrophobia 10/26/2015    Resolved   • COPD (chronic obstructive pulmonary disease) ([facility])    • Dizziness    • Dyspnea    • ESRD (end stage renal disease) on dialysis ([facility])     MONDAY, WEDNESDAY AND FRIDAYS [facility]   • GERD (gastroesophageal reflux disease)    • H/O concussion    • Head trauma 12/16/2019    FELL CUT HEAD 12 STAPLES   • Hiatal hernia    • History of bradycardia    • History of confusion    • History of diverticulitis    • History of migraine headaches    • History of pneumonia    • History of skin cancer    • History of transfusion    • Hyperlipidemia    • Hypertension    • Itching    • Lumbar postlaminectomy syndrome 10/26/2015    Resolved   • Lung cancer (HCC) 2019    RIGHT LUNG, HAD SURGERY   • Migraines    • Nausea    • On home oxygen therapy     5L NC   • Palpitations      PVC'S   • Pericardial effusion     HX   • Polypharmacy 4/22/2016   • PONV (postoperative nausea and vomiting)    • Pulmonary embolism (HCC) 10/26/2015    January 2013 - Resolved, felt to be secondary to estrogen use   • Renal artery stenosis (HCC)    • Requires supplemental oxygen     2 LITERS WHEN SLEEPING AND AS NEEDED   • Slow to wake up after anesthesia     AFTER LUNG SURGERY 2019   • Slow to wake up after anesthesia     AFTER LUNG SURGERY   • Submandibular sialoadenitis 10/26/2015    Resolved   • Visual changes    • Vitamin B 12 deficiency      Past Surgical History:   Procedure Laterality Date   • ANGIOPLASTY ILIAC ARTERY Bilateral 8/10/2018    Procedure: BILATERAL ILIAC STENTS;  Surgeon: Riki Mancera MD;  Location: [facility] MAIN OR;  Service: Vascular   • APPENDECTOMY     • ARTERIOGRAM N/A 6/4/2021    Procedure: Renal Arteriogram with possible intervention;  Surgeon: Mat Coello MD;  Location: [facility] CATH INVASIVE LOCATION;  Service: Cardiology;  Laterality: N/A;   • ARTERIOVENOUS FISTULA/SHUNT SURGERY Left 10/14/2021    Procedure: LEFT ARM BRACHIOAXILLARY ARTERIOVENOUS GRAFT;  Surgeon: Riki Mancera MD;  Location: [facility] MAIN OR;  Service: Vascular;  Laterality: Left;   • BREAST SURGERY      Breast Surgery Reduction Procedure   • BRONCHOSCOPY N/A 2/8/2019    Procedure: BRONCHOSCOPY;  Surgeon: Álvaro Gifford MD;  Location: [facility] ENDOSCOPY;  Service: Pulmonary   • CARDIAC CATHETERIZATION N/A 6/4/2021    Procedure: Stent BMS peripheral- RENAL-RIGHT;  Surgeon: Mat Coello MD;  Location: [facility] CATH INVASIVE LOCATION;  Service: Cardiology;  Laterality: N/A;   • CATARACT EXTRACTION Bilateral    • CHOLECYSTECTOMY     • COLONOSCOPY  2006    Dr. Saha   • COLONOSCOPY  2012    Dr. Saha   • ENDOSCOPY N/A 11/3/2020    Procedure: ESOPHAGOGASTRODUODENOSCOPY with 54 french vernon dilation;  Surgeon: Yunior Vo MD;  Location: [facility] ENDOSCOPY;  Service:  Gastroenterology;  Laterality: N/A;  pre- dysphagia  post- esophageal ring, hiatal hernia   • HYSTERECTOMY     • INCISION AND DRAINAGE ARM Left 1/7/2022    Procedure: LEFT ARM HEMATOMA EVACUATION;  Surgeon: Riki Mancera MD;  Location: [facility] MAIN OR;  Service: Vascular;  Laterality: Left;   • INSERTION HEMODIALYSIS CATHETER Right 9/2/2021    Procedure: PALINDROME CATHERER;  Surgeon: Riki Mancera MD;  Location: [facility] MAIN OR;  Service: Vascular;  Laterality: Right;   • INSERTION HEMODIALYSIS CATHETER N/A 10/7/2021    Procedure: HEMODIALYSIS CATHETER INSERTION;  Surgeon: Fany Salinas Jr., MD;  Location: [facility] MAIN OR;  Service: Vascular;  Laterality: N/A;   • INTERVENTIONAL RADIOLOGY PROCEDURE N/A 6/4/2021    Procedure: Abdominal Aortogram;  Surgeon: Mat Coello MD;  Location: [facility] CATH INVASIVE LOCATION;  Service: Cardiology;  Laterality: N/A;   • INTUBATION  1/15/2019        • JOINT REPLACEMENT      left knee, hip, shoulder   • LASIK     • LUMBAR FUSION      Lumbar Vertebral Fusion   • RENAL ARTERY STENT Right    • SHOULDER ARTHROSCOPY  04/04/2013    Dr. Carrillo/MERRILL Kent   • THORACOSCOPY VIDEO ASSISTED WITH LOBECTOMY Right 1/11/2019    Procedure: BRONCOSCOPY, THORACOSCOPY VIDEO ASSISTED WITH RIGHT UPPER LOBE WEDGE RESECTION, COMPLETION RIGHT UPPER LOBECTOMY, LYMPH NODE DISSECTION, INTERCOSTAL NERVE BLOCK;  Surgeon: Quita Figueroa MD;  Location: [facility] OR;  Service: Thoracic   • TONSILLECTOMY     • TOTAL HIP ARTHROPLASTY REVISION Left    • TOTAL KNEE ARTHROPLASTY Left    • TOTAL SHOULDER ARTHROPLASTY Left    • TOTAL SHOULDER ARTHROPLASTY W/ DISTAL CLAVICLE EXCISION Right 6/18/2020    Procedure: RIGHT TOTAL SHOULDER REVERSE ARTHROPLASTY WITH GPS;  Surgeon: Lino Carrillo MD;  Location: [facility] OR OSC;  Service: Orthopedics;  Laterality: Right;      General Information     Row Name 02/03/22 0916          Physical Therapy Time and Intention    Document Type  therapy note (daily note)  -     Mode of Treatment physical therapy; individual therapy  -     Row Name 02/03/22 0916          General Information    Patient Profile Reviewed yes  -     Existing Precautions/Restrictions fall; oxygen therapy device and L/min  -     Row Name 02/03/22 0916          Cognition    Orientation Status (Cognition) oriented x 4  -     Row Name 02/03/22 0916          Safety Issues, Functional Mobility    Impairments Affecting Function (Mobility) endurance/activity tolerance; shortness of breath; strength; balance  -           User Key  (r) = Recorded By, (t) = Taken By, (c) = Cosigned By    Initials Name Provider Type     Jose Alfredo Rascon, PT DPT Physical Therapist               Mobility     Row Name 02/03/22 0916          Bed Mobility    Bed Mobility supine-sit; sit-supine  -     Supine-Sit [place] (Bed Mobility) modified independence  -     Sit-Supine [place] (Bed Mobility) modified independence  -     Assistive Device (Bed Mobility) bed rails; head of bed elevated  -     Row Name 02/03/22 0916          Sit-Stand Transfer    Sit-Stand [place] (Transfers) contact guard  -     Assistive Device (Sit-Stand Transfers) walker, front-wheeled  -     Row Name 02/03/22 0916          Gait/Stairs (Locomotion)    [place] Level (Gait) contact guard  -     Assistive Device (Gait) walker, front-wheeled  -     Distance in Feet (Gait) 2 x 60 ft. Sitting break due to dyspnea in between sets  -     Deviations/Abnormal Patterns (Gait) janet decreased; gait speed decreased; base of support, narrow; stride length decreased  -     Bilateral Gait Deviations forward flexed posture  -           User Key  (r) = Recorded By, (t) = Taken By, (c) = Cosigned By    Initials Name Provider Type     Jose Alfredo Rascon, PT DPT Physical Therapist               Obj/Interventions    No documentation.                Goals/Plan    No documentation.                Clinical  Impression     Row Name 02/03/22 0916          Pain Scale: Numbers Pre/Post-Treatment    Pretreatment Pain Rating 0/10 - no pain  -LC     Posttreatment Pain Rating 0/10 - no pain  -LC     Pain Intervention(s) Medication (See MAR); Repositioned  -LC     Row Name 02/03/22 0916          Plan of Care Review    Plan of Care Reviewed With patient  -LC     Progress improving  -     Outcome Summary Pt AO x 4. Pt transferred supine to sit with mod Indep. Pt performed seated manual resisted AROM 1 x 10 LAQ, hip ABD, hip ADD, HS curl, hip extension. Pt transferred sit to stand with CGA x 1 and RW. Pt ambulated 2 x 60 ft with O2 and RW with CGA x 1. Pt required sitting break due to dyspnea. Pt was sitting EOB with RN at end of therapy.  -     Row Name 02/03/22 0916          Vital Signs    O2 Delivery Pre Treatment nasal cannula  -     O2 Delivery Intra Treatment nasal cannula  -     O2 Delivery Post Treatment nasal cannula  -     Row Name 02/03/22 0916          Positioning and Restraints    Pre-Treatment Position in bed  -     In Bed notified nsg; sitting EOB; call light within reach; encouraged to call for assist; with nsg  -           User Key  (r) = Recorded By, (t) = Taken By, (c) = Cosigned By    Initials Name Provider Type    Jose Alfredo Lewis, PT DPT Physical Therapist               Outcome Measures     Row Name 02/03/22 0918          How much help from another person do you currently need...    Turning from your back to your side while in flat bed without using bedrails? 4  -LC     Moving from lying on back to sitting on the side of a flat bed without bedrails? 4  -LC     Moving to and from a bed to a chair (including a wheelchair)? 3  -LC     Standing up from a chair using your arms (e.g., wheelchair, bedside chair)? 3  -LC     Climbing 3-5 steps with a railing? 2  -LC     To walk in hospital room? 3  -LC     AM-PAC 6 Clicks Score (PT) 19  -LC     Row Name 02/03/22 0918          Functional Assessment     Outcome Measure Options AM-PAC 6 Clicks Basic Mobility (PT)  -           User Key  (r) = Recorded By, (t) = Taken By, (c) = Cosigned By    Initials Name Provider Type    LC Jose Alfredo Rascon, PT DPT Physical Therapist                             Physical Therapy Education                 Title: PT OT SLP Therapies (In Progress)     Topic: Physical Therapy (Done)     Point: Mobility training (Done)     Learning Progress Summary           Patient Acceptance, E,D, DU,VU by  at 2/3/2022 0918    Acceptance, E, VU,NR by  at 2/2/2022 1547    Acceptance, E, VU,NR by CF at 1/31/2022 1309                   Point: Home exercise program (Done)     Learning Progress Summary           Patient Acceptance, E,D, DU,VU by  at 2/3/2022 0918    Acceptance, E, VU,NR by  at 2/2/2022 1547    Acceptance, E, VU,NR by  at 1/31/2022 1309                   Point: Body mechanics (Done)     Learning Progress Summary           Patient Acceptance, E,D, DU,VU by  at 2/3/2022 0918    Acceptance, E, VU,NR by CF at 2/2/2022 1547    Acceptance, E, VU,NR by  at 1/31/2022 1309                   Point: Precautions (Done)     Learning Progress Summary           Patient Acceptance, E,D, DU,VU by  at 2/3/2022 0918    Acceptance, E, VU,NR by  at 2/2/2022 1547    Acceptance, E, VU,NR by  at 1/31/2022 1309                               User Key     Initials Effective Dates Name Provider Type Discipline     07/02/20 -  Jose Alfredo Rascon, PT DPT Physical Therapist PT     06/16/21 -  Zonia Ceron PT Physical Therapist PT              PT Recommendation and Plan     Plan of Care Reviewed With: patient  Progress: improving  Outcome Summary: Pt AO x 4. Pt transferred supine to sit with mod Indep. Pt performed seated manual resisted AROM 1 x 10 LAQ, hip ABD, hip ADD, HS curl, hip extension. Pt transferred sit to stand with CGA x 1 and RW. Pt ambulated 2 x 60 ft with O2 and RW with CGA x 1. Pt required sitting break due to dyspnea. Pt was  sitting EOB with RN at end of therapy.     Time Calculation:    PT Charges     Row Name 02/03/22 0919             Time Calculation    Start Time 0810  -LC      Stop Time 0835  -LC      Time Calculation (min) 25 min  -LC      PT Received On 02/03/22  -LC      PT - Next Appointment 02/05/22  -LC              Time Calculation- PT    Total Timed Code Minutes- PT 25 minute(s)  -LC              Timed Charges    25343 - PT Therapeutic Activity Minutes 25  -LC              Total Minutes    Timed Charges Total Minutes 25  -LC       Total Minutes 25  -LC            User Key  (r) = Recorded By, (t) = Taken By, (c) = Cosigned By    Initials Name Provider Type    Jose Alfredo Lewis, PT DPT Physical Therapist              Therapy Charges for Today     Code Description Service Date Service Provider Modifiers Qty    [id number]  PT THERAPEUTIC ACT EA 15 MIN 2/3/2022 Jose Alfredo Rascon, PT DPT GP 2          PT G-Codes  Outcome Measure Options: AM-PAC 6 Clicks Basic Mobility (PT)  AM-PAC 6 Clicks Score (PT): 19  AM-PAC 6 Clicks Score (OT): 18    Jose Alfredo Rascon PT DPT  2/3/2022

## 2022-02-03 NOTE — PLAN OF CARE
Goal Outcome Evaluation:  Plan of Care Reviewed With: patient        Progress: no change  Outcome Summary: HD completed today. Pt reports some intermittent soa,occ loose non prod cough. PRN pain med for c/o's of L hand discomfort with moderate relief. Pt reports anxiety and inability to sleep at HS. Medicated with PRN Ativan with fair effect. Maintaining 02 sats >90% on 4L N/C. BSC with assist tolerated. well. Safety maintained. NPO for possible OMAIRA today.

## 2022-02-03 NOTE — CONSULTS
"Adult Nutrition  Assessment/PES    Patient Name:  Rachana Arguelles  YOB: 1945  MRN: [id number]  Admit Date:  1/30/2022    Assessment Date:  2/3/2022    Comments:  MST 2. Pt NPO at time of consult. Pt said she was waiting on procedure and was hungry. Pt was unsure of any recent weight changes. 85% average documented intake x 5 meals noted. Continue to follow.     Reason for Assessment     Row Name 02/03/22 1220          Reason for Assessment    Reason For Assessment identified at risk by screening criteria     Diagnosis other (see comments); pulmonary disease  Admit w/: chronic pain syndrome, ESRD, healthcare associated PNA, COPD, high bp, anemia, sepsis, heart failure, abdominal pain, chronic respiratory failure, hx: anxiety, depression, acid reflux, osteoarthritis, CKD III, chronic constipation     Identified At Risk by Screening Criteria MST SCORE 2+                Nutrition/Diet History     Row Name 02/03/22 1221          Nutrition/Diet History    Typical Food/Fluid Intake NPO. Pt reported she was hungry.                Anthropometrics     Row Name 02/03/22 1222          Anthropometrics    Height 170.2 cm (67.01\")     Weight 74.8 kg (164 lb 14.5 oz)  not weighed by RD            Ideal Body Weight (IBW)    Ideal Body Weight (IBW) (kg) 61.88     % Ideal Body Weight 120.88            Usual Body Weight (UBW)    Weight Gain --  weight gain noted: 20# in 2 months, 11/28/2021: 145#, 1/31/2022: 165#            Body Mass Index (BMI)    BMI (kg/m2) 25.88     BMI Assessment BMI 25-29.9: overweight                Labs/Tests/Procedures/Meds     Row Name 02/03/22 1223          Labs/Procedures/Meds    Lab Results Reviewed reviewed     Lab Results Comments sodium (low), glucose (high), creatinine (high), triglycerides (high), albumin (low), procalcitonin (high)            Diagnostic Tests/Procedures    Diagnostic Test/Procedure Reviewed reviewed     Diagnostic Test/Procedures Comments CT            Medications    " "Pertinent Medications Reviewed reviewed     Pertinent Medications Comments norvasc, lipitor, coreg, plavix, folic acid, heparin, apresoline, procardia, protonix, zosyn, zoloft, renvela, sodium chloride, demadex, desyrel, PRN: atarax, ativan, melatonin, zofran, percocet, pericolace                  Estimated/Assessed Needs     Row Name 02/03/22 1222          Calculation Measurements    Height 170.2 cm (67.01\")                Nutrition Prescription Ordered     Row Name 02/03/22 1226          Nutrition Prescription PO    Current PO Diet NPO                       Problem/Interventions:   Problem 1     Row Name 02/03/22 1226          Nutrition Diagnoses Problem 1    Problem 1 Inadequate Nutrient Intake     Etiology (related to) Other (comment)  procedure     Signs/Symptoms (evidenced by) NPO                      Intervention Goal     Row Name 02/03/22 1228          Intervention Goal    General Meet nutritional needs for age/condition     Weight Maintain weight                Nutrition Intervention     Row Name 02/03/22 1228          Nutrition Intervention    RD/Tech Action Encourage intake; Follow Tx progress; Care plan reviewd                  Education/Evaluation     Row Name 02/03/22 1229          Education    Education Will Instruct as appropriate            Monitor/Evaluation    Monitor Per protocol; I&O; Pertinent labs; Weight; Symptoms                 Electronically signed by:  Ne Dickerson RD  02/03/22 12:29 EST  "

## 2022-02-03 NOTE — PROGRESS NOTES
[facility]  [phone]  Álvaro Gifford MD     Subjective:  LOS: 4    Chief Complaint: Pulmonary infiltrates    On low flow O2. Denies complaints.    Objective   Vital Signs past 24hrs    Temp range: Temp (24hrs), Av.8 °F (36.6 °C), Min:96.6 °F (35.9 °C), Max:98.3 °F (36.8 °C)    BP range: BP: (122-153)/(36-95) 132/55  Pulse range: Heart Rate:  [61-70] 61  Resp rate range: Resp:  [14-18] 14    Device (Oxygen Therapy): humidified; nasal cannulaFlow (L/min):  [4] 4  Oxygen range:SpO2:  [94 %-100 %] 99 %      74.8 kg (164 lb 14.5 oz); Body mass index is 25.82 kg/m².    Intake/Output Summary (Last 24 hours) at 2/3/2022 1701  Last data filed at 2/3/2022 1500  Gross per 24 hour   Intake 590 ml   Output 0 ml   Net 590 ml       Physical Exam  Cardiovascular:      Rate and Rhythm: Normal rate and regular rhythm.      Heart sounds: No murmur heard.      Pulmonary:      Effort: Pulmonary effort is normal.      Breath sounds: Decreased breath sounds and rales (bases) present.   Abdominal:      General: Bowel sounds are normal.      Palpations: Abdomen is soft. There is no mass.      Tenderness: There is no abdominal tenderness.   Musculoskeletal:         General: No swelling.   Neurological:      Mental Status: She is alert.       Results Review:    I have reviewed the laboratory and imaging data since the last note by [facility] physician.  My annotations are noted in assessment and plan.    Results from last 7 days   Lab Units 22  0043 22  1030   PROCALCITONIN ng/mL 13.30* 10.10*   LACTATE mmol/L  --  1.5       Medication Review:  I have reviewed the current MAR.  My annotations are noted in assessment and plan.       Plan   PCCM Problems  Pulmonary infiltrates  Left effusion  Mediastinal adenopathy  Enterococcus septicemia  Chronic hypoxia  History lung cancer  ESRD on hemodialysis          Plan of Treatment    No new resp complaints. On LF O2.    On abx for enterococcus septicemia.    Remains on  chronic O2.    Will require follow-up CT in 3 to 4 months to follow-up on mediastinal adenopathy and infiltrates.  Get CxR tomorrow.    OMAIRA noted.      Álvaro Gifford MD  02/03/22  17:01 EST    While in the room and during my examination of the patient I wore gloves, gown, mask, eye protection and followed enhanced droplet/contact isolation protocol and precautions.  I washed my hands before and after this patient encounter.    Part of this note may be an electronic transcription/translation of spoken language to printed text using the Dragon Dictation System.

## 2022-02-03 NOTE — PLAN OF CARE
Goal Outcome Evaluation:  Plan of Care Reviewed With: patient        Progress: improving  Outcome Summary: Pt AO x 4. Pt transferred supine to sit with mod Indep. Pt performed seated manual resisted AROM 1 x 10 LAQ, hip ABD, hip ADD, HS curl, hip extension. Pt transferred sit to stand with CGA x 1 and RW. Pt ambulated 2 x 60 ft with O2 and RW with CGA x 1. Pt required sitting break due to dyspnea. Pt was sitting EOB with RN at end of therapy.    PPE: Patient was placed in face mask in first look. Patient was wearing facemask when I entered the room and throughout our encounter. I wore full protective equipment throughout this patient encounter including a face mask, and gloves. Hand hygiene was performed before donning protective equipment and after removal when leaving the room.

## 2022-02-03 NOTE — PROGRESS NOTES
[facility] Progress Note       Encounter Date:22  Patient:Rachana Arguelles  :1945  MRN:[id number]      Chief Complaint: Follow-up bacteremia      Subjective:        Patient doing reasonably well.  No new complaints.  Remains on oxygen.    Review of Systems:  Review of Systems   Constitutional: Positive for malaise/fatigue.   Cardiovascular: Negative for chest pain.   Respiratory: Positive for shortness of breath.        Medications:  Scheduled Meds:  amLODIPine, 5 mg, Oral, Q24H  aspirin, 81 mg, Oral, Daily  atorvastatin, 80 mg, Oral, Nightly  budesonide-formoterol, 2 puff, Inhalation, BID - RT  carvedilol, 25 mg, Oral, BID With Meals  clopidogrel, 75 mg, Oral, Daily  folic acid, 1,000 mcg, Oral, Daily  heparin (porcine), 5,000 Units, Subcutaneous, Q12H  hydrALAZINE, 100 mg, Oral, TID  NIFEdipine XL, 90 mg, Oral, Q PM  pantoprazole, 40 mg, Oral, QAM  piperacillin-tazobactam, 3.375 g, Intravenous, Q12H  sertraline, 100 mg, Oral, Daily  sevelamer, 800 mg, Oral, Daily With Breakfast & Dinner  sodium chloride, 10 mL, Intravenous, Q12H  torsemide, 200 mg, Oral, Daily  traZODone, 50 mg, Oral, Nightly    Continuous Infusions:   PRN Meds:  •  acetaminophen **OR** acetaminophen **OR** acetaminophen  •  albuterol  •  hydrOXYzine  •  LORazepam  •  melatonin  •  nitroglycerin  •  ondansetron **OR** ondansetron  •  oxyCODONE  •  oxyCODONE-acetaminophen  •  senna-docusate sodium  •  sodium chloride         Objective:       Vitals:    22 2257 22 0733 22 0746   BP: 138/51 148/61  133/49   BP Location: Right arm Right arm  Right arm   Patient Position: Lying Lying  Lying   Pulse: 61 64 65 63   Resp: 18    Temp: 98.2 °F (36.8 °C) 98.3 °F (36.8 °C)  96.6 °F (35.9 °C)   TempSrc: Oral Oral  Oral   SpO2: 99% 99% 99% 99%   Weight:       Height:               Physical Exam:  Constitutional: Well appearing, frail, no acute distress   HENT: Oropharynx clear and membrane  moist  Eyes: Normal conjunctiva, no sclera icterus.  Neck: Supple, no carotid bruit bilaterally.  Cardiovascular: Regular rate and rhythm, Early peaking systolic murmur over the right upper sternal border, No bilateral lower extremity edema.  Pulmonary: Normal respiratory effort, normal lung sounds, no wheezing.  Abdominal: Soft, nontender, no hepatosplenomegaly, liver is non-pulsatile.  Neurological: Alert and orient x 3.   Skin: Warm, dry, no ecchymosis, no rash.  Psych: Appropriate mood and affect. Normal judgment and insight.           Lab Review:   Results from last 7 days   Lab Units 02/03/22  0337 02/01/22  1053 01/31/22  0043 01/30/22  1030   SODIUM mmol/L 130* 134* 129* 131*   POTASSIUM mmol/L 4.0 3.5 4.7 4.5   CHLORIDE mmol/L 98 95* 95* 94*   CO2 mmol/L 20.0* 26.0 17.9* 22.1   BUN mg/dL 17 21 35* 32*   CREATININE mg/dL 3.21* 3.61* 4.87* 4.56*   GLUCOSE mg/dL 114* 127* 131* 104*   CALCIUM mg/dL 7.7* 8.4* 8.2* 8.7   AST (SGOT) U/L  --  40* 44* 51*   ALT (SGPT) U/L  --  19 21 24     Results from last 7 days   Lab Units 02/01/22  1053 01/31/22  0645 01/31/22  0043 01/30/22  1810 01/30/22  1030   TROPONIN T ng/mL 0.146* 0.180* 0.215* 0.183* 0.019     Results from last 7 days   Lab Units 02/01/22  1316 01/31/22  0043 01/30/22  1030   WBC 10*3/mm3 7.11 16.09* 17.80*   HEMOGLOBIN g/dL 10.4* 11.2* 12.5   HEMATOCRIT % 33.4* 34.7 39.5   PLATELETS 10*3/mm3 189 167 173     Results from last 7 days   Lab Units 01/30/22  1030   INR  1.09     Results from last 7 days   Lab Units 02/01/22  1053 01/31/22  0043   MAGNESIUM mg/dL 2.0 2.0     Results from last 7 days   Lab Units 01/31/22  0043   CHOLESTEROL mg/dL 97   TRIGLYCERIDES mg/dL 183*   HDL CHOL mg/dL 26*     Results from last 7 days   Lab Units 01/30/22  1030   PROBNP pg/mL 24,832.0*           Echocardiogram 1/31/2022 with images reviewed by myself:  · Left ventricular wall thickness is consistent with mild concentric hypertrophy.  · Calculated left ventricular EF =  60.7% Estimated left ventricular EF was in agreement with the calculated left ventricular EF.  · Left ventricular diastolic function is consistent with (grade Ia w/high LAP) impaired relaxation.  · Marked thickening of the noncoronary cusp of the aortic valve. Could not exclude vegetation.  · Cannot exclude vegetation on the anterior leaflet of the mitral valve  · Mild mitral valve regurgitation is present.  · Mild tricuspid valve regurgitation is present.  · There is a small (<1cm) circumferential pericardial effusion.           Assessment:          Diagnosis Plan   1. Healthcare-associated pneumonia     2. ESRD (end stage renal disease) ([facility])     3. Chronic pain syndrome     4. Chronic obstructive pulmonary disease, unspecified COPD type ([facility])            Plan:       Ms. Arguelles is a 76 y.o. woman with past medical history notable for lung cancer, PVCs, hypertension, peripheral vascular disease, and end-stage renal disease who presents to the hospital for generalized weakness.  Unfortunately she was found to have bacteremia and pneumonia.  We are asked to see her due to her past cardiac issues as well as mildly elevated troponins which were noted throughout.  More concerning those are bacteremia.  Her echocardiogram was not fully convincing that there could not be any evidence of endocarditis.  For this reason plans are for transesophageal echocardiogram once her respiratory status is stabilized.  It seems like over the last couple of days it has improved.  She is on home oxygen at home and on her normal dose.  From pulmonary standpoint she seems to be relatively stable and can try proceed forward with a OMAIRA today.  If unable to do it in the cardiology department may try tomorrow with anesthesia.    Bacteremia:  · Plan for transesophageal echocardiogram today    Elevated troponins:  · Does not represent acute myocardial infarction but rather poor clearance related to end-stage renal disease.               Riki CROOK  MD Marva  [facility]  02/03/22  09:45 EST

## 2022-02-04 NOTE — PLAN OF CARE
Goal Outcome Evaluation:  Plan of Care Reviewed With: patient        Progress: improving  Outcome Summary: pt states no longer coughing and feels like breathing is better today. Slept off and on after ativan percocet and sedation for OMAIRA today. C/O left hand and arm pain and note left for MD

## 2022-02-04 NOTE — PROGRESS NOTES
LOS: 5 days   Patient Care Team:  Rhys Crowder Jr., MD as PCP - General (Family Medicine)  Whitney Dudley MD as Consulting Physician (Nephrology)  Quita Figueroa MD as Surgeon (Thoracic Surgery)  Sabas Luther MD as Consulting Physician (Otolaryngology)  Álvaro Gifford MD as Consulting Physician (Pulmonary Disease)  Rhys Crowder Jr., MD as Referring Physician (Family Medicine)  Ld Paulino MD as Consulting Physician (Hematology and Oncology)  Mat Coello MD as Consulting Physician (Cardiology)      Chief Complaint: Follow-up elevated troponin, bacteremia       Interval History: Patient was examined in dialysis.  No cardiac complaints at this time.      Objective   Vital Signs  Temp:  [95.4 °F (35.2 °C)-98.1 °F (36.7 °C)] 96.6 °F (35.9 °C)  Heart Rate:  [61-72] 72  Resp:  [14-20] 20  BP: (122-157)/(36-95) 157/60    Intake/Output Summary (Last 24 hours) at 2/4/2022 0929  Last data filed at 2/4/2022 0736  Gross per 24 hour   Intake 340 ml   Output 300 ml   Net 40 ml         Physical Exam:  Constitutional: Well appearing, well developed, no acute distress   HENT: Oropharynx clear and membrane moist  Eyes: Normal conjunctiva, no sclera icterus.  Neck: Supple, no carotid bruit bilaterally.  Cardiovascular: Regular rate and rhythm, No Murmur and Early peaking systolic murmur over the right upper sternal border, No bilateral lower extremity edema.  Pulmonary: Normal respiratory effort, normal lung sounds, no wheezing.  Abdominal: Soft, nontender, no hepatosplenomegaly, liver is non-pulsatile.  Neurological: Alert and orient x 3.   Skin: Warm, dry, no ecchymosis, no rash.  Psych: Appropriate mood and affect. Normal judgment and insight.      Results Review:      Results from last 7 days   Lab Units 02/04/22  0512 02/03/22  0337 02/03/22  0337 02/01/22  1053 02/01/22  1053   SODIUM mmol/L 134*  --  130*  --  134*   POTASSIUM mmol/L 4.0  --  4.0  --  3.5   CHLORIDE mmol/L 97*  --  98  --  95*   CO2  mmol/L 22.8  --  20.0*  --  26.0   BUN mg/dL 27*  --  17  --  21   CREATININE mg/dL 4.92*  --  3.21*  --  3.61*   GLUCOSE mg/dL 102*   < > 114*   < > 127*   CALCIUM mg/dL 8.6  --  7.7*  --  8.4*    < > = values in this interval not displayed.     Results from last 7 days   Lab Units 02/01/22  1053 01/31/22  0645 01/31/22  0043   TROPONIN T ng/mL 0.146* 0.180* 0.215*     Results from last 7 days   Lab Units 02/04/22  0512 02/01/22  1316 01/31/22  0043   WBC 10*3/mm3 10.13 7.11 16.09*   HEMOGLOBIN g/dL 11.3* 10.4* 11.2*   HEMATOCRIT % 36.0 33.4* 34.7   PLATELETS 10*3/mm3 247 189 167     Results from last 7 days   Lab Units 01/30/22  1030   INR  1.09     Results from last 7 days   Lab Units 01/31/22  0043   CHOLESTEROL mg/dL 97     Results from last 7 days   Lab Units 02/01/22  1053   MAGNESIUM mg/dL 2.0     Results from last 7 days   Lab Units 01/31/22  0043   CHOLESTEROL mg/dL 97   TRIGLYCERIDES mg/dL 183*   HDL CHOL mg/dL 26*   LDL CHOL mg/dL 41       I reviewed the patient's new clinical results.  I personally viewed and interpreted the patient's EKG/Telemetry data        Medication Review:   amLODIPine, 5 mg, Oral, Q24H  ampicillin, 2 g, Intravenous, Q12H  aspirin, 81 mg, Oral, Daily  atorvastatin, 80 mg, Oral, Nightly  budesonide-formoterol, 2 puff, Inhalation, BID - RT  carvedilol, 25 mg, Oral, BID With Meals  clopidogrel, 75 mg, Oral, Daily  folic acid, 1,000 mcg, Oral, Daily  heparin (porcine), 5,000 Units, Subcutaneous, Q12H  hydrALAZINE, 100 mg, Oral, TID  NIFEdipine XL, 90 mg, Oral, Q PM  pantoprazole, 40 mg, Oral, QAM  sertraline, 100 mg, Oral, Daily  sevelamer, 800 mg, Oral, Daily With Breakfast & Dinner  sodium chloride, 10 mL, Intravenous, Q12H  torsemide, 200 mg, Oral, Daily  traZODone, 50 mg, Oral, Nightly             Assessment/Plan       Healthcare-associated pneumonia    Chronic pain syndrome    Hypertension    COPD (chronic obstructive pulmonary disease) (HCC)    Anemia of chronic disease    ESRD  (end stage renal disease) (HCC)    Sepsis, unspecified organism (HCC)    Chronic diastolic CHF (congestive heart failure) (HCC)    Epigastric abdominal pain    Chronic respiratory failure with hypoxia (HCC)      1.  Enterococcus bacteremia.  OMAIRA demonstrated no evidence of vegetation.  2.  Elevated troponin.  Not representative of ACS.  Type II non-STEMI.  No chest pain.  3.  Hypertension.  Stable.      Mary Haddad, REENA  02/04/22  09:29 EST

## 2022-02-04 NOTE — PROGRESS NOTES
LOS: 5 days     Chief Complaint:  Enterococcus bacteremia    Interval History: Afebrile, that is post OMAIRA yesterday with no evidence of endocarditis.  Remains stable on 4 L of oxygen via nasal cannula which is her baseline.  Denies any vomiting diarrhea or rashes.  Mild cough persists.    Vital Signs  Temp:  [95.4 °F (35.2 °C)-98.1 °F (36.7 °C)] 96.6 °F (35.9 °C)  Heart Rate:  [61-72] 72  Resp:  [14-20] 20  BP: (122-157)/(36-95) 157/60    Physical Exam:  General: No apparent distress  Cardiovascular: RRR, no LE edema   Respiratory: Coarse breath sounds bilaterally with wheezing  GI: Soft, NT/ND, + bowel sounds bilaterally  Skin: No rashes    Antibiotics:  Zosyn 3.375 g IV every 12 hours     Results Review:    Lab Results   Component Value Date    WBC 10.13 02/04/2022    HGB 11.3 (L) 02/04/2022    HCT 36.0 02/04/2022    MCV 98.6 (H) 02/04/2022     02/04/2022     Lab Results   Component Value Date    GLUCOSE 102 (H) 02/04/2022    BUN 27 (H) 02/04/2022    CREATININE 4.92 (H) 02/04/2022    EGFRIFNONA 9 (L) 02/04/2022    EGFRIFAFRI  02/04/2022      Comment:      <15 Indicative of kidney failure.    BCR 5.5 (L) 02/04/2022    CO2 22.8 02/04/2022    CALCIUM 8.6 02/04/2022    PROTENTOTREF 5.2 (L) 07/28/2021    ALBUMIN 3.20 (L) 02/04/2022    LABIL2 0.9 07/28/2021    AST 19 02/04/2022    ALT 12 02/04/2022     BNP 24,832  Cryptococcal antigen negative    Microbiology:  2/1 SCx neg  2/1 BCx NGTD x 1  2/1 Aspergillus galactomannan negative  1/31 BCx NGTD x 1  1/30 MRSA nasal PCR negative  1/30 Legionella and strep urinary antigen negative  1/30 BCx Enterococcus faecalis x 2  1/30 RVP/COVID neg    2/1 CT of the chest abdomen pelvis personally reviewed by me shows bilateral infiltrates with mediastinal adenopathy increased as compared to November.  Nodular appearing infiltrates.  Malignancy cannot be excluded.  Anasarca within the soft tissues and small amount of free fluid in the pelvis.  Normal liver pancreas spleen  adrenals.    TTE EF of 60%.  Grade 1 diastolic dysfunction.  Marked thickening of the aortic valve could not exclude vegetation.    OMAIRA no evidence of endocarditis    Assessment/Plan   Enterococcus septicemia  End-stage renal disease on hemodialysis  Leukocytosis  Chronic hypoxic respiratory failure  History of lung cancer status post lobectomy complicating above  Epigastric pain with nausea  Pneumonia    Patient remains afebrile.  Respiratory status back to baseline.  Repeat blood cultures are negative to date.  OMAIRA without any evidence of endocarditis.  Discontinue Zosyn and start ampicillin 2 g IV every 12 hours x10 days.  Antibiotic stop date is February 9.  If the patient gets discharge prior to that date she can be transitioned to vancomycin dosing with dialysis only to complete a 10-day course of antibiotics.    ID will sign off.  Please do not hesitate to call us with further questions or concerns

## 2022-02-04 NOTE — PROGRESS NOTES
[facility]  [phone]  Álvaro Gifford MD     Subjective:  LOS: 5    Chief Complaint: Pulmonary infiltrates    Currently getting HD. Mostly complains of pain in left hand near ulnar aspect. Denies cough and phlegm.    Objective   Vital Signs past 24hrs    Temp range: Temp (24hrs), Av °F (36.1 °C), Min:95.4 °F (35.2 °C), Max:98.1 °F (36.7 °C)    BP range: BP: (132-157)/(48-60) 142/54  Pulse range: Heart Rate:  [61-72] 67  Resp rate range: Resp:  [14-20] 16    Device (Oxygen Therapy): nasal cannulaFlow (L/min):  [4] 4  Oxygen range:SpO2:  [97 %-100 %] 97 %      72.7 kg (160 lb 4.4 oz); Body mass index is 25.1 kg/m².    Intake/Output Summary (Last 24 hours) at 2022 1343  Last data filed at 2022 1324  Gross per 24 hour   Intake 540 ml   Output 2300 ml   Net -1760 ml       Physical Exam  Cardiovascular:      Rate and Rhythm: Normal rate and regular rhythm.      Heart sounds: No murmur heard.      Pulmonary:      Effort: Pulmonary effort is normal.      Breath sounds: Decreased breath sounds and rales (bases) present.   Abdominal:      General: Bowel sounds are normal.      Palpations: Abdomen is soft. There is no mass.      Tenderness: There is no abdominal tenderness.   Musculoskeletal:         General: No swelling.   Neurological:      Mental Status: She is alert.       Results Review:    I have reviewed the laboratory and imaging data since the last note by [facility] physician.  My annotations are noted in assessment and plan.    Results from last 7 days   Lab Units 22  0043 22  1030   PROCALCITONIN ng/mL 13.30* 10.10*   LACTATE mmol/L  --  1.5       Medication Review:  I have reviewed the current MAR.  My annotations are noted in assessment and plan.       Plan   PCCM Problems  Pulmonary infiltrates  Left effusion  Mediastinal adenopathy  Enterococcus septicemia  Chronic hypoxia  History lung cancer  ESRD on hemodialysis          Plan of Treatment    No new resp complaints. On LF  O2.    On abx for enterococcus septicemia.    Remains on chronic O2.    CxR today not yet done.    Needs CT chest in 2 months and fu with me in office.    OMAIRA noted.    Left hand pain - per primary team.    Álvaro Gifford MD  02/04/22  13:43 EST    While in the room and during my examination of the patient I wore gloves, gown, mask, eye protection and followed enhanced droplet/contact isolation protocol and precautions.  I washed my hands before and after this patient encounter.    Part of this note may be an electronic transcription/translation of spoken language to printed text using the Dragon Dictation System.

## 2022-02-04 NOTE — PROGRESS NOTES
[facility] Progress Note      Patient Name: Rachana Arguelles  : 1945  MRN: [id number]  Primary Care Physician:  Rhys Crowder Jr., MD  Date of admission: 2022    Subjective     Interval History:     Patient underwent OMAIRA.  Yesterday    Seen and examined during hemodialysis tolerating procedure without any complications    Patient states improved dyspnea    Review of Systems:   As noted above    Objective     Vitals:   Temp:  [95.4 °F (35.2 °C)-98.1 °F (36.7 °C)] 97.7 °F (36.5 °C)  Heart Rate:  [61-72] 67  Resp:  [14-20] 16  BP: (122-157)/(36-95) 142/54  Flow (L/min):  [4] 4    Intake/Output Summary (Last 24 hours) at 2022 1047  Last data filed at 2022 0900  Gross per 24 hour   Intake 540 ml   Output 300 ml   Net 240 ml       Physical Exam:    General Appearance: alert, oriented x 3, no acute distress   Skin: warm and dry  HEENT: oral mucosa normal, nonicteric sclera  Neck: supple, no JVD  Chest RIJ TDC in place w/ dressing   Lungs: Bilateral fine rales  Heart: RRR, normal S1 and S2  Abdomen: soft, nontender, nondistended  : no palpable bladder  Extremities: no edema, cyanosis or clubbing  Neuro: normal speech and mental status     Scheduled Meds:     amLODIPine, 5 mg, Oral, Q24H  ampicillin, 2 g, Intravenous, Q12H  aspirin, 81 mg, Oral, Daily  atorvastatin, 80 mg, Oral, Nightly  budesonide-formoterol, 2 puff, Inhalation, BID - RT  carvedilol, 25 mg, Oral, BID With Meals  clopidogrel, 75 mg, Oral, Daily  folic acid, 1,000 mcg, Oral, Daily  heparin (porcine), 5,000 Units, Subcutaneous, Q12H  hydrALAZINE, 100 mg, Oral, TID  NIFEdipine XL, 90 mg, Oral, Q PM  pantoprazole, 40 mg, Oral, QAM  sertraline, 100 mg, Oral, Daily  sevelamer, 800 mg, Oral, Daily With Breakfast & Dinner  sodium chloride, 10 mL, Intravenous, Q12H  torsemide, 200 mg, Oral, Daily  traZODone, 50 mg, Oral, Nightly      IV Meds:        Results Reviewed:   I have personally reviewed the results from the  time of this admission to 2/4/2022 10:47 EST     Results from last 7 days   Lab Units 02/04/22  0512 02/03/22  0337 02/01/22  1053 01/31/22  0043 01/31/22  0043   SODIUM mmol/L 134* 130* 134*   < > 129*   POTASSIUM mmol/L 4.0 4.0 3.5   < > 4.7   CHLORIDE mmol/L 97* 98 95*   < > 95*   CO2 mmol/L 22.8 20.0* 26.0   < > 17.9*   BUN mg/dL 27* 17 21   < > 35*   CREATININE mg/dL 4.92* 3.21* 3.61*   < > 4.87*   CALCIUM mg/dL 8.6 7.7* 8.4*   < > 8.2*   BILIRUBIN mg/dL 0.2  --  0.3  --  0.2   ALK PHOS U/L 65  --  80  --  99   ALT (SGPT) U/L 12  --  19  --  21   AST (SGOT) U/L 19  --  40*  --  44*   GLUCOSE mg/dL 102* 114* 127*   < > 131*    < > = values in this interval not displayed.       Estimated Creatinine Clearance: 10.3 mL/min (A) (by C-G formula based on SCr of 4.92 mg/dL (H)).    Results from last 7 days   Lab Units 02/03/22  0337 02/01/22  1053 01/31/22  0043   MAGNESIUM mg/dL  --  2.0 2.0   PHOSPHORUS mg/dL 3.5 3.7 5.9*             Results from last 7 days   Lab Units 02/04/22  0512 02/01/22  1316 01/31/22  0043 01/30/22  1030   WBC 10*3/mm3 10.13 7.11 16.09* 17.80*   HEMOGLOBIN g/dL 11.3* 10.4* 11.2* 12.5   PLATELETS 10*3/mm3 247 189 167 173       Results from last 7 days   Lab Units 01/30/22  1030   INR  1.09       Assessment / Plan     ASSESSMENT:    -End Stage Renal Disease ( ESRD ) on Hemodialysis [facility]  (since 10/2021 ) .s/p  RIJ TDC in place  functional .Continue to arrange hemodialysis treatment during his admission. Continue renal diet   -Chronic normocytic anemia. On Epogen protocol.  Currently on hold hemoglobin > 10 .  we will continue to follow H&H closely   -Hypertension.  Continue  combination of hydralazine and carvedilol,      . We will continue to follow closely. Heart healthy diet   -Hyperphosphatemia. Continue binders / renvela  with meals, Continue renal diet. We will follow phosphorus to make further adjustments  -Hypervolemic  hyponatremia secondary to volume excess will optimize with dialysis  fluid restriction with discussed fluid restriction with patient  -Health care  associated pneumonia currently on cefepime and vancomycin, as per pharmacy , as per primary team   -Enterococcal septicemia appropriate antibiotics per ID.  s/p OMAIRA. ( 2/3/22 ) demonstrated no evidence of vegetation.     PLAN:  -Patient seen and examined on hemodialysis tolerating procedure without any complication  -Continue  Torsemide 200 mg daily   -We will follow SANJANA protocol and binders  -Adjust medications to GFR    Thank you for involving us in the care of Rachana Arguelles.  Please feel free to call with any questions.    Vincent Dutta MD  02/04/22  10:47 EST    [facility]  [phone]

## 2022-02-04 NOTE — PROGRESS NOTES
Name: Rachana Arguelles ADMIT: 2022   : 1945  PCP: Rhys Crowder Jr., MD    MRN: [id number] LOS: 5 days   AGE/SEX: 76 y.o. female  ROOM: [facility]     Subjective   Subjective   No new complaints or events overnight.  Patient seen in dialysis. States she feels much better today, breathing is good and she denies any cough.    Review of Systems  Denies chest pain, palpitations, SOA, edema, fever, chills, nausea, vomiting and diarrhea.     Objective   Objective   Vital Signs  Temp:  [95.4 °F (35.2 °C)-98.1 °F (36.7 °C)] 97.7 °F (36.5 °C)  Heart Rate:  [61-72] 67  Resp:  [14-20] 16  BP: (122-157)/(36-95) 142/54  SpO2:  [97 %-100 %] 97 %  on  Flow (L/min):  [4] 4;   Device (Oxygen Therapy): nasal cannula    Intake/Output Summary (Last 24 hours) at 2022 1216  Last data filed at 2022 0900  Gross per 24 hour   Intake 540 ml   Output 300 ml   Net 240 ml     Body mass index is 25.82 kg/m².      22  1740 22  1851 22  1222   Weight: 74.8 kg (165 lb) 74.8 kg (165 lb) 74.8 kg (164 lb 14.5 oz) (not weighed by RD)     Physical Exam  General.  Elderly female.  Alert and oriented x3.  No apparent pain/distress/diaphoresis.  Normal mood and affect.  Eyes.  Pupils equal round and reactive.  Intact extraocular musculature.  No pallor or jaundice.  Normal conjunctive and lids.    Oral cavity.  Moist mucous membrane.   Neck.  Supple.  No JVD.  No lymphadenopathy.  Cardiovascular.  Regular rate and rhythm and grade 3 systolic murmur.  Chest.  Poor bilateral air entry with bilateral rhonchi.  Abdomen.  Soft lax.  No tenderness.  No organomegaly.  No guarding or rebound.  Extremities.  No clubbing cyanosis or edema.  CNS.  No acute focal neurological deficits.    Results Review:      Results from last 7 days   Lab Units 22  0512 22  0337 22  1053 22  0043 22  1030   SODIUM mmol/L 134* 130* 134* 129* 131*   POTASSIUM mmol/L 4.0 4.0 3.5 4.7 4.5   CHLORIDE mmol/L 97* 98 95* 95* 94*    CO2 mmol/L 22.8 20.0* 26.0 17.9* 22.1   BUN mg/dL 27* 17 21 35* 32*   CREATININE mg/dL 4.92* 3.21* 3.61* 4.87* 4.56*   GLUCOSE mg/dL 102* 114* 127* 131* 104*   CALCIUM mg/dL 8.6 7.7* 8.4* 8.2* 8.7   AST (SGOT) U/L 19  --  40* 44* 51*   ALT (SGPT) U/L 12  --  19 21 24     Estimated Creatinine Clearance: 10.3 mL/min (A) (by C-G formula based on SCr of 4.92 mg/dL (H)).          Results from last 7 days   Lab Units 02/01/22  1053 01/31/22  0645 01/31/22  0043 01/30/22  1810 01/30/22  1030   TROPONIN T ng/mL 0.146* 0.180* 0.215* 0.183* 0.019     Results from last 7 days   Lab Units 01/30/22  1030   PROBNP pg/mL 24,832.0*         Results from last 7 days   Lab Units 02/01/22  1053 01/31/22  0043   MAGNESIUM mg/dL 2.0 2.0     Results from last 7 days   Lab Units 01/31/22  0043   CHOLESTEROL mg/dL 97   TRIGLYCERIDES mg/dL 183*   HDL CHOL mg/dL 26*     Results from last 7 days   Lab Units 02/04/22  0512 02/01/22  1316 02/01/22  1316 01/31/22  0043 01/31/22  0043 01/30/22  1030 01/30/22  1030   WBC 10*3/mm3 10.13  --  7.11  --  16.09*  --  17.80*   HEMOGLOBIN g/dL 11.3*  --  10.4*  --  11.2*  --  12.5   HEMATOCRIT % 36.0  --  33.4*  --  34.7  --  39.5   PLATELETS 10*3/mm3 247  --  189  --  167  --  173   MCV fL 98.6*   < > 101.8*   < > 98.0*   < > 101.0*   MCH pg 31.0   < > 31.7   < > 31.6   < > 32.0   MCHC g/dL 31.4*   < > 31.1*   < > 32.3   < > 31.6   RDW % 14.1   < > 14.3   < > 14.4   < > 14.5   RDW-SD fl 50.6   < > 53.4   < > 52.0   < > 53.3   MPV fL 9.6   < > 9.8   < > 9.6   < > 10.1   NEUTROPHIL % %  --   --  80.8*   < > 88.8*   < > 91.0*   LYMPHOCYTE % %  --   --  7.7*   < > 4.0*   < > 2.2*   MONOCYTES % %  --   --  9.1   < > 6.1   < > 5.4   EOSINOPHIL % %  --   --  0.8   < > 0.0*   < > 0.0*   BASOPHIL % %  --   --  0.3   < > 0.3   < > 0.2   IMM GRAN % %  --   --  1.3*   < > 0.8*   < > 1.2*   NEUTROS ABS 10*3/mm3 7.60*  --  5.74   < > 14.29*   < > 16.20*   LYMPHS ABS 10*3/mm3  --   --  0.55*   < > 0.64*   < > 0.40*    MONOS ABS 10*3/mm3  --   --  0.65   < > 0.98*   < > 0.96*   EOS ABS 10*3/mm3 0.21  --  0.06   < > 0.00   < > 0.00   BASOS ABS 10*3/mm3  --   --  0.02   < > 0.05   < > 0.03   IMMATURE GRANS (ABS) 10*3/mm3  --   --  0.09*   < > 0.13*   < > 0.21*   NRBC /100 WBC  --   --  0.0   < > 0.0   < > 0.0    < > = values in this interval not displayed.     Results from last 7 days   Lab Units 01/30/22  1030   INR  1.09     Results from last 7 days   Lab Units 01/31/22  0000   PH, ARTERIAL pH units 7.384   PO2 ART mm Hg 99.4   PCO2, ARTERIAL mm Hg 36.9   HCO3 ART mmol/L 22.0     Results from last 7 days   Lab Units 01/31/22  0043 01/30/22  1030   PROCALCITONIN ng/mL 13.30* 10.10*   LACTATE mmol/L  --  1.5         Results from last 7 days   Lab Units 01/30/22  1030   LIPASE U/L 34     Results from last 7 days   Lab Units 02/01/22  1316 02/01/22  0041 01/31/22  1146 01/30/22  1207 01/30/22  1030   BLOODCX  No growth at 2 days  --  No growth at 3 days Enterococcus faecalis* Enterococcus faecalis*   RESPCX   --  Moderate growth (3+) Normal respiratory swathi. No S. aureus or Pseudomonas aeruginosa detected. Final report.  --   --   --    BCIDPCR   --   --   --   --  Enterococcus faecalis. Trev/B (vancomycin resistance gene) not detected. Identification by BCID2 PCR.*     Results from last 7 days   Lab Units 01/30/22  1031   ADENOVIRUS DETECTION BY PCR  Not Detected   CORONAVIRUS 229E  Not Detected   CORONAVIRUS HKU1  Not Detected   CORONAVIRUS NL63  Not Detected   CORONAVIRUS OC43  Not Detected   HUMAN METAPNEUMOVIRUS  Not Detected   HUMAN RHINOVIRUS/ENTEROVIRUS  Not Detected   INFLUENZA B PCR  Not Detected   PARAINFLUENZA 1  Not Detected   PARAINFLUENZA VIRUS 2  Not Detected   PARAINFLUENZA VIRUS 3  Not Detected   PARAINFLUENZA VIRUS 4  Not Detected   BORDETELLA PERTUSSIS PCR  Not Detected   CHLAMYDOPHILA PNEUMONIAE PCR  Not Detected   MYCOPLAMA PNEUMO PCR  Not Detected   INFLUENZA A PCR  Not Detected   RSV, PCR  Not Detected                Imaging:  Imaging Results (Last 24 Hours)     ** No results found for the last 24 hours. **             I reviewed the patient's new clinical results / labs / tests / procedures      Assessment/Plan     Active Hospital Problems    Diagnosis  POA   • **Healthcare-associated pneumonia [J18.9]  Yes   • Chronic respiratory failure with hypoxia ([facility]) [J96.11]  Yes   • Sepsis, unspecified organism ([facility]) [A41.9]  Yes   • Chronic diastolic CHF (congestive heart failure) ([facility]) [I50.32]  Yes   • Epigastric abdominal pain [R10.13]  Yes   • ESRD (end stage renal disease) ([facility]) [N18.6]  Yes   • Anemia of chronic disease [D63.8]  Yes   • COPD (chronic obstructive pulmonary disease) ([facility]) [J44.9]  Yes   • Chronic pain syndrome [G89.4]  Yes   • Hypertension [I10]  Yes      Resolved Hospital Problems   No resolved problems to display.         Acute hypoxemic respiratory failure secondary to PNA/Septicemia in a patient with a history of lung cancer s/p right upper lobectomy: Enterococcus , infectious disease did evaluate and IV vancomycin and cefepime have been discontinued and is currently on Zosyn await final recommendations.  Echocardiogram revealed aortic valve thickening.  OMAIRA demonstrated no evidence of vegetation. CT of the chest findings reviewed and there are multiple areas of pulmonary opacification and nodular appearance and malignancy needs to be ruled out per radiology.  Pulmonology following. Small to moderate left pleural effusion noted but decreased in size when compared to 11/23/2021.  Continue oxygen.  Epigastric Pain:   Advance diet as tolerated and lipase is normal and LFTs also fairly unremarkable except AST very minimally elevated and bilirubin is normal.  CT of the and pelvis with no specific intra-abdominal findings other than mild free fluid.  Resolved.  NSTEMI type II/acute exacerbation of diastolic congestive heart failure: In setting of ESRD and septicemia. ASA/Plavix/BBlocker/Statin. Cardiology  following.  ESRD: Tunnel for HD access without erythema surrounding, will follow up ID recommendations as above. Nephrology following.      Discussed with the patient/nurse.  Dispo home tbd- hopefully tomorrow or when okay with all ? later today?       REENA Garcia  [facility]  02/04/22  12:16 EST

## 2022-02-04 NOTE — PLAN OF CARE
Goal Outcome Evaluation:  Plan of Care Reviewed With: patient        Progress: improving  Outcome Summary: Pt reports less cough and less soa. Pt medicated with prn pain med for c/o's of L hand/forearm discomfort with fair effect. Pt did seem to rest better but states that she didnt sleep well. PRN Ativan x1 with good effect. For HD today. Safety maintained.

## 2022-02-04 NOTE — PROGRESS NOTES
Name: Rachana Arguelles ADMIT: 2022   : 1945  PCP: Rhys Crowder Jr., MD    MRN: [id number] LOS: 4 days   AGE/SEX: 76 y.o. female  ROOM: [facility]     Subjective   Subjective   Shortness of breath and cough continues to improve.  No chest pain.  No hemoptysis.  No fever or chills.  No wheezing.  No PND or orthopnea.  No palpitation.  No ankle edema.  No fever or chills.    Review of Systems  GI.  No abdominal pain or nausea or vomiting.  Normal bowel movement yesterday without diarrhea/constipation/bleeding per rectum/melena.  .  No and no dysuria or hematuria.     Objective   Objective   Vital Signs  Temp:  [96.6 °F (35.9 °C)-98.3 °F (36.8 °C)] 98.1 °F (36.7 °C)  Heart Rate:  [61-70] 61  Resp:  [14-18] 14  BP: (122-153)/(36-95) 132/55  SpO2:  [94 %-100 %] 99 %  on  Flow (L/min):  [4] 4;   Device (Oxygen Therapy): humidified; nasal cannula    Intake/Output Summary (Last 24 hours) at 2/3/2022 2023  Last data filed at 2/3/2022 1500  Gross per 24 hour   Intake 270 ml   Output --   Net 270 ml     Body mass index is 25.82 kg/m².      22  1740 22  1851 22  1222   Weight: 74.8 kg (165 lb) 74.8 kg (165 lb) 74.8 kg (164 lb 14.5 oz) (not weighed by RD)     Physical Exam  General.  Elderly female.  Alert and oriented x3.  No apparent pain/distress/diaphoresis.  Normal mood and affect.  Eyes.  Pupils equal round and reactive.  Intact extraocular musculature.  No pallor or jaundice.  Normal conjunctive and lids.    Oral cavity.  Moist mucous membrane.   Neck.  Supple.  No JVD.  No lymphadenopathy.  Cardiovascular.  Regular rate and rhythm and grade 3 systolic murmur.  Chest.  Poor bilateral air entry with bilateral rhonchi.  Abdomen.  Soft lax.  No tenderness.  No organomegaly.  No guarding or rebound.  Extremities.  No clubbing cyanosis or edema.  CNS.  No acute focal neurological deficits.    Results Review:      Results from last 7 days   Lab Units 22  0337 22  1053 22  0043  01/30/22  1030   SODIUM mmol/L 130* 134* 129* 131*   POTASSIUM mmol/L 4.0 3.5 4.7 4.5   CHLORIDE mmol/L 98 95* 95* 94*   CO2 mmol/L 20.0* 26.0 17.9* 22.1   BUN mg/dL 17 21 35* 32*   CREATININE mg/dL 3.21* 3.61* 4.87* 4.56*   GLUCOSE mg/dL 114* 127* 131* 104*   CALCIUM mg/dL 7.7* 8.4* 8.2* 8.7   AST (SGOT) U/L  --  40* 44* 51*   ALT (SGPT) U/L  --  19 21 24     Estimated Creatinine Clearance: 15.7 mL/min (A) (by C-G formula based on SCr of 3.21 mg/dL (H)).          Results from last 7 days   Lab Units 02/01/22  1053 01/31/22  0645 01/31/22  0043 01/30/22  1810 01/30/22  1030   TROPONIN T ng/mL 0.146* 0.180* 0.215* 0.183* 0.019     Results from last 7 days   Lab Units 01/30/22  1030   PROBNP pg/mL 24,832.0*         Results from last 7 days   Lab Units 02/01/22  1053 01/31/22  0043   MAGNESIUM mg/dL 2.0 2.0     Results from last 7 days   Lab Units 01/31/22  0043   CHOLESTEROL mg/dL 97   TRIGLYCERIDES mg/dL 183*   HDL CHOL mg/dL 26*     Results from last 7 days   Lab Units 02/01/22  1316 01/31/22  0043 01/31/22  0043 01/30/22  1030 01/30/22  1030   WBC 10*3/mm3 7.11  --  16.09*  --  17.80*   HEMOGLOBIN g/dL 10.4*  --  11.2*  --  12.5   HEMATOCRIT % 33.4*  --  34.7  --  39.5   PLATELETS 10*3/mm3 189  --  167  --  173   MCV fL 101.8*   < > 98.0*   < > 101.0*   MCH pg 31.7   < > 31.6   < > 32.0   MCHC g/dL 31.1*   < > 32.3   < > 31.6   RDW % 14.3   < > 14.4   < > 14.5   RDW-SD fl 53.4   < > 52.0   < > 53.3   MPV fL 9.8   < > 9.6   < > 10.1   NEUTROPHIL % % 80.8*   < > 88.8*   < > 91.0*   LYMPHOCYTE % % 7.7*   < > 4.0*   < > 2.2*   MONOCYTES % % 9.1   < > 6.1   < > 5.4   EOSINOPHIL % % 0.8   < > 0.0*   < > 0.0*   BASOPHIL % % 0.3   < > 0.3   < > 0.2   IMM GRAN % % 1.3*   < > 0.8*   < > 1.2*   NEUTROS ABS 10*3/mm3 5.74   < > 14.29*   < > 16.20*   LYMPHS ABS 10*3/mm3 0.55*   < > 0.64*   < > 0.40*   MONOS ABS 10*3/mm3 0.65   < > 0.98*   < > 0.96*   EOS ABS 10*3/mm3 0.06   < > 0.00   < > 0.00   BASOS ABS 10*3/mm3 0.02   < >  0.05   < > 0.03   IMMATURE GRANS (ABS) 10*3/mm3 0.09*   < > 0.13*   < > 0.21*   NRBC /100 WBC 0.0   < > 0.0   < > 0.0    < > = values in this interval not displayed.     Results from last 7 days   Lab Units 01/30/22  1030   INR  1.09     Results from last 7 days   Lab Units 01/31/22  0000   PH, ARTERIAL pH units 7.384   PO2 ART mm Hg 99.4   PCO2, ARTERIAL mm Hg 36.9   HCO3 ART mmol/L 22.0     Results from last 7 days   Lab Units 01/31/22  0043 01/30/22  1030   PROCALCITONIN ng/mL 13.30* 10.10*   LACTATE mmol/L  --  1.5         Results from last 7 days   Lab Units 01/30/22  1030   LIPASE U/L 34     Results from last 7 days   Lab Units 02/01/22  1316 02/01/22  0041 01/31/22  1146 01/30/22  1207 01/30/22  1030   BLOODCX  No growth at 2 days  --  No growth at 3 days Enterococcus faecalis* Enterococcus faecalis*   RESPCX   --  Moderate growth (3+) Normal respiratory swathi. No S. aureus or Pseudomonas aeruginosa detected. Final report.  --   --   --    BCIDPCR   --   --   --   --  Enterococcus faecalis. Trev/B (vancomycin resistance gene) not detected. Identification by BCID2 PCR.*     Results from last 7 days   Lab Units 01/30/22  1031   ADENOVIRUS DETECTION BY PCR  Not Detected   CORONAVIRUS 229E  Not Detected   CORONAVIRUS HKU1  Not Detected   CORONAVIRUS NL63  Not Detected   CORONAVIRUS OC43  Not Detected   HUMAN METAPNEUMOVIRUS  Not Detected   HUMAN RHINOVIRUS/ENTEROVIRUS  Not Detected   INFLUENZA B PCR  Not Detected   PARAINFLUENZA 1  Not Detected   PARAINFLUENZA VIRUS 2  Not Detected   PARAINFLUENZA VIRUS 3  Not Detected   PARAINFLUENZA VIRUS 4  Not Detected   BORDETELLA PERTUSSIS PCR  Not Detected   CHLAMYDOPHILA PNEUMONIAE PCR  Not Detected   MYCOPLAMA PNEUMO PCR  Not Detected   INFLUENZA A PCR  Not Detected   RSV, PCR  Not Detected               Imaging:  Imaging Results (Last 24 Hours)     ** No results found for the last 24 hours. **             I reviewed the patient's new clinical results / labs / tests /  procedures      Assessment/Plan     Active Hospital Problems    Diagnosis  POA   • **Healthcare-associated pneumonia [J18.9]  Yes   • Chronic respiratory failure with hypoxia ([facility]) [J96.11]  Yes   • Sepsis, unspecified organism ([facility]) [A41.9]  Yes   • Chronic diastolic CHF (congestive heart failure) ([facility]) [I50.32]  Yes   • Epigastric abdominal pain [R10.13]  Yes   • ESRD (end stage renal disease) ([facility]) [N18.6]  Yes   • Anemia of chronic disease [D63.8]  Yes   • COPD (chronic obstructive pulmonary disease) ([facility]) [J44.9]  Yes   • Chronic pain syndrome [G89.4]  Yes   • Hypertension [I10]  Yes      Resolved Hospital Problems   No resolved problems to display.         Acute hypoxemic respiratory failure secondary to PNA/Septicemia in a patient with a history of lung cancer s/p right upper lobectomy: Enterococcus , infectious disease did evaluate and IV vancomycin and cefepime have been discontinued and is currently on Zosyn.  Echocardiogram revealed aortic valve thickening and may need OMAIRA and ID discuss with Cardiology.  Given guarded respiratory status timing of OMAIRA yet to be decided by Cardiology.  CT of the chest findings reviewed and there are multiple areas of pulmonary opacification and nodular appearance and malignancy needs to be ruled out per radiology.  Pulmonary following. Small to moderate left pleural effusion noted but decrease in size when compared to 11/23/2021.  Continue oxygen.  Epigastric Pain:   Advance diet as tolerated and lipase is normal and LFTs also fairly unremarkable except AST very minimally elevated and bilirubin is normal.  CT of the and pelvis with no specific intra-abdominal findings other than mild free fluid.  Resolved.  NSTEMI/acute exacerbation of diastolic congestive heart failure: In setting of ESRD and septicemia.  Mostly demand ischemia.  ASA/Plavix/BBlocker/Statin. Cardiology following.  ESRD: Tunnel for HD access without erythema surrounding, will follow up ID recommendations as  above. Nephrology following.  Chronic Pain        Discussed with the patient/nurse.      Tito Goodrich MD  [facility]  02/03/22  20:23 EST

## 2022-02-05 NOTE — PROGRESS NOTES
HOSPITAL FOLLOW UP NOTE    Patient Name: Rachana Arguelles  Patient : 1945        Date of Service:22  Provider of Service: REENA Castillo  Place of Service: [facility]  Referral Provider: Jonathan Triana MD          Follow Up: elevated troponin, hypertension    Interval Hx:        OBJECTIVE  Temp:  [97.2 °F (36.2 °C)-98.1 °F (36.7 °C)] 97.3 °F (36.3 °C)  Heart Rate:  [65-71] 67  Resp:  [18] 18  BP: (153-172)/(58-67) 153/67     Intake/Output Summary (Last 24 hours) at 2022 1134  Last data filed at 2022 1324  Gross per 24 hour   Intake --   Output 2000 ml   Net -2000 ml     Body mass index is 25.1 kg/m².      22  1851 22  1222 22  1324   Weight: 74.8 kg (165 lb) 74.8 kg (164 lb 14.5 oz) (not weighed by RD) 72.7 kg (160 lb 4.4 oz)         Physical Exam:     General Appearance:    Alert, cooperative, in no acute distress   Head:    Normocephalic, without obvious abnormality, atraumatic   Eyes:            Conjunctivae and sclerae normal, no   icterus, no pallor, corneas clear, PERRLA   Neck:   No adenopathy, supple, trachea midline, no thyromegaly, no   carotid bruit, no JVD   Lungs:     Clear to auscultation,respirations regular, even and unlabored    Heart:    Regular rhythm and normal rate, normal S1 and S2, no murmur, no gallop, no rub, no click   Chest Wall:    No abnormalities observed   Abdomen:     Normal bowel sounds, no masses, no organomegaly, soft, nontender, nondistended, no guarding, no rebound  tenderness   Extremities:   Moves all extremities well, no edema, no cyanosis, no redness   Pulses:   Pulses palpable and equal bilaterally.          CURRENT MEDS    Scheduled Meds:amLODIPine, 5 mg, Oral, Q24H  ampicillin, 2 g, Intravenous, Q12H  aspirin, 81 mg, Oral, Daily  atorvastatin, 80 mg, Oral, Nightly  budesonide-formoterol, 2 puff, Inhalation, BID - RT  carvedilol, 25 mg, Oral, BID With Meals  clopidogrel, 75 mg, Oral, Daily  folic acid,  1,000 mcg, Oral, Daily  heparin (porcine), 5,000 Units, Subcutaneous, Q12H  hydrALAZINE, 100 mg, Oral, TID  NIFEdipine XL, 90 mg, Oral, Q PM  pantoprazole, 40 mg, Oral, QAM  sertraline, 100 mg, Oral, Daily  sevelamer, 800 mg, Oral, Daily With Breakfast & Dinner  sodium chloride, 10 mL, Intravenous, Q12H  torsemide, 200 mg, Oral, Daily  traZODone, 50 mg, Oral, Nightly      Continuous Infusions:       Lab Review:   Results from last 7 days   Lab Units 02/04/22  0512 02/03/22  0337 02/01/22  1053 02/01/22  1053   SODIUM mmol/L 134* 130*   < > 134*   POTASSIUM mmol/L 4.0 4.0   < > 3.5   CHLORIDE mmol/L 97* 98   < > 95*   CO2 mmol/L 22.8 20.0*   < > 26.0   BUN mg/dL 27* 17   < > 21   CREATININE mg/dL 4.92* 3.21*   < > 3.61*   GLUCOSE mg/dL 102* 114*   < > 127*   CALCIUM mg/dL 8.6 7.7*   < > 8.4*   AST (SGOT) U/L 19  --   --  40*   ALT (SGPT) U/L 12  --   --  19    < > = values in this interval not displayed.         Results from last 7 days   Lab Units 02/04/22  0512 02/01/22  1316   WBC 10*3/mm3 10.13 7.11   HEMOGLOBIN g/dL 11.3* 10.4*   HEMATOCRIT % 36.0 33.4*   PLATELETS 10*3/mm3 247 189     Results from last 7 days   Lab Units 01/30/22  1030   INR  1.09     Results from last 7 days   Lab Units 02/01/22  1053 01/31/22  0043   MAGNESIUM mg/dL 2.0 2.0     Results from last 7 days   Lab Units 01/31/22  0043   CHOLESTEROL mg/dL 97   TRIGLYCERIDES mg/dL 183*   HDL CHOL mg/dL 26*   LDL CHOL mg/dL 41     Results from last 7 days   Lab Units 01/30/22  1030   PROBNP pg/mL 24,832.0*               ASSESSMENT & PLAN    Healthcare-associated pneumonia    Chronic pain syndrome    Hypertension    COPD (chronic obstructive pulmonary disease) (HCC)    Anemia of chronic disease    ESRD (end stage renal disease) (HCC)    Sepsis, unspecified organism (HCC)    Chronic diastolic CHF (congestive heart failure) (HCC)    Epigastric abdominal pain    Chronic respiratory failure with hypoxia (HCC)    1.  Enterococcus bacteremia: No vegetation  per OMAIRA 02/03  2.  Type 2 elevated troponin secondary to end-stage renal disease  3.  Hypertension: Remains somewhat elevated.  Amlodipine 5 mg p.o. daily started on 2/01. Increase amlodipine to 10 mg p.o. daily.             Loida Gamble, APRN  02/05/22

## 2022-02-05 NOTE — THERAPY TREATMENT NOTE
Patient Name: Rachana Arguelles  : 1945    MRN: [id number]                              Today's Date: 2022       Admit Date: 2022    Visit Dx:     ICD-10-CM ICD-9-CM   1. Healthcare-associated pneumonia  J18.9 486   2. ESRD (end stage renal disease) ([facility])  N18.6 585.6   3. Chronic pain syndrome  G89.4 338.4   4. Chronic obstructive pulmonary disease, unspecified COPD type ([facility])  J44.9 496     Patient Active Problem List   Diagnosis   • Anxiety   • Chronic pain syndrome   • Depression   • Gastroesophageal reflux disease   • Hyperlipidemia   • Hypertension   • Osteoarthritis of hip   • Chronic low back pain   • Failed back syndrome of lumbar spine   • Pulmonary embolus ([facility])   • Weight loss   • Polypharmacy   • Stage 3b chronic kidney disease ([facility])   • Chronic constipation   • Sacroiliac joint pain   • Mixed incontinence   • Renal cyst   • Achilles tendonitis   • Atherosclerosis of native artery of left lower extremity with intermittent claudication ([facility])   • Morbidly obese ([facility])   • Lung nodule   • Malignant neoplasm of right upper lobe of lung ([facility])   • Lung cancer ([facility])   • Moderate single current episode of major depressive disorder ([facility])   • Palpitations   • Encounter for screening for malignant neoplasm of colon   • Hematoma of scalp   • Acute neck pain   • Dizziness   • Unstable cervical spine   • Postconcussive syndrome   • Positive colorectal cancer screening using Cologuard test   • Dysphagia   • S/P reverse total shoulder arthroplasty, right   • Chronic post-traumatic headache, not intractable   • Medication overuse headache   • Migraine without aura and without status migrainosus, not intractable   • Shortness of breath   • PAD (peripheral artery disease) ([facility])   • Bilateral carotid artery stenosis   • S/P lobectomy of lung   • YVONNE (acute kidney injury) ([facility])   • Anemia   • Frequent PVCs   • Pneumonia of both lower lobes due to infectious organism   • History of lung cancer   • Hypertensive  urgency   • Bradycardia, sinus   • YVONNE (acute kidney injury) ([facility])   • Opioid dependence (HCC)   • COPD (chronic obstructive pulmonary disease) ([facility])   • Renal artery stenosis (HCC)   • Acute kidney injury (HCC)   • Acute on chronic diastolic CHF (congestive heart failure) (HCC)   • Acute gout due to renal impairment involving foot   • Hypokalemia   • Anemia of chronic disease   • Leukocytosis   • Anemia due to stage 4 chronic kidney disease (HCC)   • IgG monoclonal gammopathy   • ESRD (end stage renal disease) ([facility])   • Dyspnea   • Insomnia   • Healthcare-associated pneumonia   • Sepsis, unspecified organism (HCC)   • Chronic diastolic CHF (congestive heart failure) (HCC)   • Epigastric abdominal pain   • Chronic respiratory failure with hypoxia ([facility])     Past Medical History:   Diagnosis Date   • AAA (abdominal aortic aneurysm) without rupture ([facility])    • Anemia    • Anesthesia complication     AGITATION AND COMBATIVE AFTER LUNG SURGERY 2019   • Anxiety and depression    • Arthritis    • Bilateral carotid artery stenosis 8/14/2020   • Chest pain     OCCAS   • Chronic low back pain    • Chronic pain syndrome 3/12/2016   • Claustrophobia 10/26/2015    Resolved   • COPD (chronic obstructive pulmonary disease) ([facility])    • Dizziness    • Dyspnea    • ESRD (end stage renal disease) on dialysis ([facility])     MONDAY, WEDNESDAY AND FRIDAYS [facility]   • GERD (gastroesophageal reflux disease)    • H/O concussion    • Head trauma 12/16/2019    FELL CUT HEAD 12 STAPLES   • Hiatal hernia    • History of bradycardia    • History of confusion    • History of diverticulitis    • History of migraine headaches    • History of pneumonia    • History of skin cancer    • History of transfusion    • Hyperlipidemia    • Hypertension    • Itching    • Lumbar postlaminectomy syndrome 10/26/2015    Resolved   • Lung cancer (HCC) 2019    RIGHT LUNG, HAD SURGERY   • Migraines    • Nausea    • On home oxygen therapy     5L NC   • Palpitations      PVC'S   • Pericardial effusion     HX   • Polypharmacy 4/22/2016   • PONV (postoperative nausea and vomiting)    • Pulmonary embolism (HCC) 10/26/2015    January 2013 - Resolved, felt to be secondary to estrogen use   • Renal artery stenosis (HCC)    • Requires supplemental oxygen     2 LITERS WHEN SLEEPING AND AS NEEDED   • Slow to wake up after anesthesia     AFTER LUNG SURGERY 2019   • Slow to wake up after anesthesia     AFTER LUNG SURGERY   • Submandibular sialoadenitis 10/26/2015    Resolved   • Visual changes    • Vitamin B 12 deficiency      Past Surgical History:   Procedure Laterality Date   • ANGIOPLASTY ILIAC ARTERY Bilateral 8/10/2018    Procedure: BILATERAL ILIAC STENTS;  Surgeon: Riki Mancera MD;  Location: [facility] MAIN OR;  Service: Vascular   • APPENDECTOMY     • ARTERIOGRAM N/A 6/4/2021    Procedure: Renal Arteriogram with possible intervention;  Surgeon: Mat Coello MD;  Location: [facility] CATH INVASIVE LOCATION;  Service: Cardiology;  Laterality: N/A;   • ARTERIOVENOUS FISTULA/SHUNT SURGERY Left 10/14/2021    Procedure: LEFT ARM BRACHIOAXILLARY ARTERIOVENOUS GRAFT;  Surgeon: Riki Mancera MD;  Location: [facility] MAIN OR;  Service: Vascular;  Laterality: Left;   • BREAST SURGERY      Breast Surgery Reduction Procedure   • BRONCHOSCOPY N/A 2/8/2019    Procedure: BRONCHOSCOPY;  Surgeon: Álvaro Gifford MD;  [facility];  Service: Pulmonary   • CARDIAC CATHETERIZATION N/A 6/4/2021    Procedure: Stent BMS peripheral- RENAL-RIGHT;  Surgeon: Mat Coello MD;  Location: [facility] CATH INVASIVE LOCATION;  Service: Cardiology;  Laterality: N/A;   • CATARACT EXTRACTION Bilateral    • CHOLECYSTECTOMY     • COLONOSCOPY  2006    Dr. Saha   • COLONOSCOPY  2012    Dr. Saha   • ENDOSCOPY N/A 11/3/2020    Procedure: ESOPHAGOGASTRODUODENOSCOPY with 54 french vernon dilation;  Surgeon: Yunior Vo MD;  Location: [facility] ENDOSCOPY;  Service:  Gastroenterology;  Laterality: N/A;  pre- dysphagia  post- esophageal ring, hiatal hernia   • HYSTERECTOMY     • INCISION AND DRAINAGE ARM Left 1/7/2022    Procedure: LEFT ARM HEMATOMA EVACUATION;  Surgeon: Riki Mancera MD;  Location: [facility] MAIN OR;  Service: Vascular;  Laterality: Left;   • INSERTION HEMODIALYSIS CATHETER Right 9/2/2021    Procedure: PALINDROME CATHERER;  Surgeon: Riki Mancera MD;  Location: [facility] MAIN OR;  Service: Vascular;  Laterality: Right;   • INSERTION HEMODIALYSIS CATHETER N/A 10/7/2021    Procedure: HEMODIALYSIS CATHETER INSERTION;  Surgeon: Fany Salinas Jr., MD;  Location: [facility] MAIN OR;  Service: Vascular;  Laterality: N/A;   • INTERVENTIONAL RADIOLOGY PROCEDURE N/A 6/4/2021    Procedure: Abdominal Aortogram;  Surgeon: Mat Coello MD;  Location: [facility] CATH INVASIVE LOCATION;  Service: Cardiology;  Laterality: N/A;   • INTUBATION  1/15/2019        • JOINT REPLACEMENT      left knee, hip, shoulder   • LASIK     • LUMBAR FUSION      Lumbar Vertebral Fusion   • RENAL ARTERY STENT Right    • SHOULDER ARTHROSCOPY  04/04/2013    Dr. Carirllo/MERRILL Kent   • THORACOSCOPY VIDEO ASSISTED WITH LOBECTOMY Right 1/11/2019    Procedure: BRONCOSCOPY, THORACOSCOPY VIDEO ASSISTED WITH RIGHT UPPER LOBE WEDGE RESECTION, COMPLETION RIGHT UPPER LOBECTOMY, LYMPH NODE DISSECTION, INTERCOSTAL NERVE BLOCK;  Surgeon: Quita Figueroa MD;  Location: [facility] OR;  Service: Thoracic   • TONSILLECTOMY     • TOTAL HIP ARTHROPLASTY REVISION Left    • TOTAL KNEE ARTHROPLASTY Left    • TOTAL SHOULDER ARTHROPLASTY Left    • TOTAL SHOULDER ARTHROPLASTY W/ DISTAL CLAVICLE EXCISION Right 6/18/2020    Procedure: RIGHT TOTAL SHOULDER REVERSE ARTHROPLASTY WITH GPS;  Surgeon: Lino Carrillo MD;  Location: [facility] OR OSC;  Service: Orthopedics;  Laterality: Right;      General Information     Row Name 02/05/22 1210          Physical Therapy Time and Intention    Document Type  therapy note (daily note)  -CF     Mode of Treatment physical therapy; individual therapy  -CF     Row Name 02/05/22 1210          General Information    Patient Profile Reviewed yes  -CF     Existing Precautions/Restrictions fall; oxygen therapy device and L/min  -CF     Row Name 02/05/22 1210          Cognition    Orientation Status (Cognition) oriented x 4  -CF     Row Name 02/05/22 1210          Safety Issues, Functional Mobility    Impairments Affecting Function (Mobility) endurance/activity tolerance; shortness of breath; strength; balance  -CF           User Key  (r) = Recorded By, (t) = Taken By, (c) = Cosigned By    Initials Name Provider Type    CF Zonia Ceron, TIMI Physical Therapist               Mobility     Row Name 02/05/22 1210          Bed Mobility    Bed Mobility supine-sit  -CF     Supine-Sit [place] (Bed Mobility) modified independence  -CF     Assistive Device (Bed Mobility) bed rails; head of bed elevated  -CF     Row Name 02/05/22 1210          Bed-Chair Transfer    Bed-Chair [place] (Transfers) contact guard; verbal cues  -CF     Assistive Device (Bed-Chair Transfers) walker, front-wheeled  -CF     Row Name 02/05/22 1210          Sit-Stand Transfer    Sit-Stand [place] (Transfers) contact guard  -CF     Assistive Device (Sit-Stand Transfers) walker, front-wheeled  -CF     Row Name 02/05/22 1210          Gait/Stairs (Locomotion)    [place] Level (Gait) contact guard  -CF     Assistive Device (Gait) walker, front-wheeled  -CF     Distance in Feet (Gait) 100'  -CF     Deviations/Abnormal Patterns (Gait) janet decreased; gait speed decreased; base of support, narrow; stride length decreased  -CF     Bilateral Gait Deviations forward flexed posture  -CF           User Key  (r) = Recorded By, (t) = Taken By, (c) = Cosigned By    Initials Name Provider Type    CF Zonia Ceron PT Physical Therapist               Obj/Interventions     Row Name 02/05/22 1213           Motor Skills    Therapeutic Exercise other (see comments)  B AP, laq, marches x10 reps  -CF           User Key  (r) = Recorded By, (t) = Taken By, (c) = Cosigned By    Initials Name Provider Type    Zonia Carmona, PT Physical Therapist               Goals/Plan    No documentation.                Clinical Impression     Row Name 02/05/22 1214          Pain Scale: Numbers Pre/Post-Treatment    Pretreatment Pain Rating 0/10 - no pain  -CF     Posttreatment Pain Rating 0/10 - no pain  -CF     Row Name 02/05/22 1214          Plan of Care Review    Plan of Care Reviewed With patient  -CF     Outcome Summary Pt seen for PT treatment this morning. Pt increased ambulation distance to 100' with cga using walker. Pt completed a few seated exercises. O2 not on upon arrival, PT assisted pt putting NC back in. PT will continue to follow and progress as able.  -CF     Row Name 02/05/22 1214          Vital Signs    O2 Delivery Pre Treatment room air  NC out of nose  -CF     O2 Delivery Intra Treatment nasal cannula  5L  -CF     O2 Delivery Post Treatment nasal cannula  -CF     Row Name 02/05/22 1214          Positioning and Restraints    Pre-Treatment Position in bed  -CF     Post Treatment Position chair  -CF     In Chair sitting; call light within reach; encouraged to call for assist; exit alarm on; with family/caregiver  -CF           User Key  (r) = Recorded By, (t) = Taken By, (c) = Cosigned By    Initials Name Provider Type    Zonia Carmona, TIMI Physical Therapist               Outcome Measures     Row Name 02/05/22 1217          How much help from another person do you currently need...    Turning from your back to your side while in flat bed without using bedrails? 4  -CF     Moving from lying on back to sitting on the side of a flat bed without bedrails? 3  -CF     Moving to and from a bed to a chair (including a wheelchair)? 3  -CF     Standing up from a chair using your arms (e.g., wheelchair, bedside  chair)? 3  -CF     Climbing 3-5 steps with a railing? 2  -CF     To walk in hospital room? 3  -CF     AM-PAC 6 Clicks Score (PT) 18  -CF     Row Name 02/05/22 1217          Functional Assessment    Outcome Measure Options AM-PAC 6 Clicks Basic Mobility (PT)  -CF           User Key  (r) = Recorded By, (t) = Taken By, (c) = Cosigned By    Initials Name Provider Type    CF Zonia Ceron, TIMI Physical Therapist                             Physical Therapy Education                 Title: PT OT SLP Therapies (In Progress)     Topic: Physical Therapy (Done)     Point: Mobility training (Done)     Learning Progress Summary           Patient Acceptance, E, VU,NR by  at 2/5/2022 1217    Acceptance, E,D, DU,VU by  at 2/3/2022 0918    Acceptance, E, VU,NR by  at 2/2/2022 1547    Acceptance, E, VU,NR by  at 1/31/2022 1309                   Point: Home exercise program (Done)     Learning Progress Summary           Patient Acceptance, E, VU,NR by  at 2/5/2022 1217    Acceptance, E,D, DU,VU by  at 2/3/2022 0918    Acceptance, E, VU,NR by  at 2/2/2022 1547    Acceptance, E, VU,NR by  at 1/31/2022 1309                   Point: Body mechanics (Done)     Learning Progress Summary           Patient Acceptance, E, VU,NR by  at 2/5/2022 1217    Acceptance, E,D, DU,VU by  at 2/3/2022 0918    Acceptance, E, VU,NR by  at 2/2/2022 1547    Acceptance, E, VU,NR by  at 1/31/2022 1309                   Point: Precautions (Done)     Learning Progress Summary           Patient Acceptance, E, VU,NR by  at 2/5/2022 1217    Acceptance, E,D, DU,VU by  at 2/3/2022 0918    Acceptance, E, VU,NR by  at 2/2/2022 1547    Acceptance, E, VU,NR by  at 1/31/2022 1309                               User Key     Initials Effective Dates Name Provider Type Discipline     07/02/20 -  Jose Alfredo Rascon, PT DPT Physical Therapist PT     06/16/21 -  Ceron, Zonia, PT Physical Therapist PT              PT Recommendation and  Plan  Planned Therapy Interventions (PT): balance training, bed mobility training, gait training, ROM (range of motion), strengthening, patient/family education, transfer training  Plan of Care Reviewed With: patient  Outcome Summary: Pt seen for PT treatment this morning. Pt increased ambulation distance to 100' with cga using walker. Pt completed a few seated exercises. O2 not on upon arrival, PT assisted pt putting NC back in. PT will continue to follow and progress as able.     Time Calculation:    PT Charges     Row Name 02/05/22 1210 02/05/22 1209          Time Calculation    Start Time -- 0938  -     Stop Time -- 0955  -     Time Calculation (min) -- 17 min  -CF     PT Received On -- 02/05/22  -CF     PT - Next Appointment 02/06/22  -CF 02/07/22  -           User Key  (r) = Recorded By, (t) = Taken By, (c) = Cosigned By    Initials Name Provider Type    CF Zonia Ceron, TIMI Physical Therapist              Therapy Charges for Today     Code Description Service Date Service Provider Modifiers Qty    [id number]  PT THERAPEUTIC ACT EA 15 MIN 2/5/2022 Zonia Ceron, PT GP 1          PT G-Codes  Outcome Measure Options: AM-PAC 6 Clicks Basic Mobility (PT)  AM-PAC 6 Clicks Score (PT): 18  AM-PAC 6 Clicks Score (OT): 18    Zonia Ceron PT  2/5/2022

## 2022-02-05 NOTE — PLAN OF CARE
Goal Outcome Evaluation:  Plan of Care Reviewed With: patient           Outcome Summary: Pt seen for PT treatment this morning. Pt increased ambulation distance to 100' with cga using walker. Pt completed a few seated exercises. O2 not on upon arrival, PT assisted pt putting NC back in. PT will continue to follow and progress as able.

## 2022-02-05 NOTE — CASE MANAGEMENT/SOCIAL WORK
Continued Stay Note  [facility]     Patient Name: Rachana Arguelles  MRN: [id number]  Today's Date: 2/5/2022    Admit Date: 1/30/2022     Discharge Plan     Row Name 02/05/22 1206       Plan    Plan Plan home with spouse and [facility] HH.  LESLEY Billingsley RN    Patient/Family in Agreement with Plan yes    Plan Comments Per Dr. Salinas pt will need IV Vancomycin with dialysis.  Called [facility] ( [phone]) and spoke with Sade she requested discharge summary be faxed to [facility] ( [phone]) and they can arrange.  Plan home with spouse and [facility] HH and outpatient antibiotics at [facility].   LESLEY Billingsley RN               Discharge Codes    No documentation.               Expected Discharge Date and Time     Expected Discharge Date Expected Discharge Time    Feb 5, 2022             Jessica Billingsley RN

## 2022-02-05 NOTE — PROGRESS NOTES
[facility] Progress Note      Patient Name: Rachana Arguelles  : 1945  MRN: [id number]  Primary Care Physician:  Rhys Crowder Jr., MD  Date of admission: 2022    Subjective     Interval History:     Underwent hemodialysis today without any complication with removal of 2 L    Shortness of breath gradually improving  No fevers or chills  Tolerating oral intake    Review of Systems:   As noted above    Objective     Vitals:   Temp:  [97.2 °F (36.2 °C)-98.1 °F (36.7 °C)] 97.3 °F (36.3 °C)  Heart Rate:  [65-71] 67  Resp:  [18] 18  BP: (153-172)/(58-67) 153/67  Flow (L/min):  [4-5] 5    Intake/Output Summary (Last 24 hours) at 2022 1111  Last data filed at 2022 1324  Gross per 24 hour   Intake --   Output 2000 ml   Net -2000 ml       Physical Exam:    General Appearance: alert, oriented x 3, no acute distress   Skin: warm and dry  HEENT: oral mucosa normal, nonicteric sclera  Neck: supple, no JVD  Chest RIJ TDC in place w/ dressing   Lungs: Bilateral fine rales  Heart: RRR, normal S1 and S2  Abdomen: soft, nontender, nondistended  : no palpable bladder  Extremities: no edema, cyanosis or clubbing  Neuro: normal speech and mental status     Scheduled Meds:     amLODIPine, 5 mg, Oral, Q24H  ampicillin, 2 g, Intravenous, Q12H  aspirin, 81 mg, Oral, Daily  atorvastatin, 80 mg, Oral, Nightly  budesonide-formoterol, 2 puff, Inhalation, BID - RT  carvedilol, 25 mg, Oral, BID With Meals  clopidogrel, 75 mg, Oral, Daily  folic acid, 1,000 mcg, Oral, Daily  heparin (porcine), 5,000 Units, Subcutaneous, Q12H  hydrALAZINE, 100 mg, Oral, TID  NIFEdipine XL, 90 mg, Oral, Q PM  pantoprazole, 40 mg, Oral, QAM  sertraline, 100 mg, Oral, Daily  sevelamer, 800 mg, Oral, Daily With Breakfast & Dinner  sodium chloride, 10 mL, Intravenous, Q12H  torsemide, 200 mg, Oral, Daily  traZODone, 50 mg, Oral, Nightly      IV Meds:        Results Reviewed:   I have personally reviewed the results from  the time of this admission to 2/5/2022 11:11 EST     Results from last 7 days   Lab Units 02/04/22  0512 02/03/22  0337 02/01/22  1053 01/31/22  0043 01/31/22  0043   SODIUM mmol/L 134* 130* 134*   < > 129*   POTASSIUM mmol/L 4.0 4.0 3.5   < > 4.7   CHLORIDE mmol/L 97* 98 95*   < > 95*   CO2 mmol/L 22.8 20.0* 26.0   < > 17.9*   BUN mg/dL 27* 17 21   < > 35*   CREATININE mg/dL 4.92* 3.21* 3.61*   < > 4.87*   CALCIUM mg/dL 8.6 7.7* 8.4*   < > 8.2*   BILIRUBIN mg/dL 0.2  --  0.3  --  0.2   ALK PHOS U/L 65  --  80  --  99   ALT (SGPT) U/L 12  --  19  --  21   AST (SGOT) U/L 19  --  40*  --  44*   GLUCOSE mg/dL 102* 114* 127*   < > 131*    < > = values in this interval not displayed.       Estimated Creatinine Clearance: 11.2 mL/min (A) (by C-G formula based on SCr of 4.92 mg/dL (H)).    Results from last 7 days   Lab Units 02/03/22  0337 02/01/22  1053 01/31/22  0043   MAGNESIUM mg/dL  --  2.0 2.0   PHOSPHORUS mg/dL 3.5 3.7 5.9*             Results from last 7 days   Lab Units 02/04/22  0512 02/01/22  1316 01/31/22  0043 01/30/22  1030   WBC 10*3/mm3 10.13 7.11 16.09* 17.80*   HEMOGLOBIN g/dL 11.3* 10.4* 11.2* 12.5   PLATELETS 10*3/mm3 247 189 167 173       Results from last 7 days   Lab Units 01/30/22  1030   INR  1.09       Assessment / Plan     ASSESSMENT:    -End Stage Renal Disease ( ESRD ) on Hemodialysis [facility]  (since 10/2021 ) .s/p  RIJ TDC in place  functional .Continue to arrange hemodialysis treatment during his admission. Continue renal diet   -Chronic normocytic anemia. On Epogen protocol.  Currently on hold hemoglobin > 10 .  we will continue to follow H&H closely   -Hypertension.  Continue  combination of hydralazine and carvedilol,      . We will continue to follow closely. Heart healthy diet   -Hyperphosphatemia. Continue binders / renvela  with meals, Continue renal diet. We will follow phosphorus to make further adjustments  -Hypervolemic  hyponatremia secondary to volume excess will optimize with  dialysis fluid restriction with discussed fluid restriction with patient  -Health care  associated pneumonia currently on cefepime and vancomycin, as per pharmacy , as per primary team   -Enterococcal septicemia appropriate antibiotics per ID.  s/p OMAIRA. ( 2/3/22 ) demonstrated no evidence of vegetation.     PLAN:  -We will continue to arrange hemodialysis during her admission.  -Yesterday she had 2 L of ultrafiltration during HD  without any complications  -Continue  Torsemide 200 mg daily   -We will follow SANJANA protocol and binders  -Adjust medications to GFR    Thank you for involving us in the care of Rachana Arguelles.  Please feel free to call with any questions.    Vincent Dutta MD  02/05/22  11:11 [facility], [place]    [facility]  [phone]

## 2022-02-05 NOTE — DISCHARGE SUMMARY
Patient Name: Rachana Arguelles  : 1945  MRN: [id number]    Date of Admission: 2022  Date of Discharge:  2022  Primary Care Physician: Rhys Crowder Jr., MD      Chief Complaint:   Shortness of Breath, Nausea, and Fever      Discharge Diagnoses     Active Hospital Problems    Diagnosis  POA   • **Healthcare-associated pneumonia [J18.9]  Yes   • Chronic respiratory failure with hypoxia ([facility]) [J96.11]  Yes   • Sepsis, unspecified organism ([facility]) [A41.9]  Yes   • Chronic diastolic CHF (congestive heart failure) ([facility]) [I50.32]  Yes   • Epigastric abdominal pain [R10.13]  Yes   • ESRD (end stage renal disease) ([facility]) [N18.6]  Yes   • Anemia of chronic disease [D63.8]  Yes   • COPD (chronic obstructive pulmonary disease) ([facility]) [J44.9]  Yes   • Chronic pain syndrome [G89.4]  Yes   • Hypertension [I10]  Yes      Resolved Hospital Problems   No resolved problems to display.        Hospital Course     Ms. Arguelles is a 76 y.o. female with a history of ESRD, COPD on 5 L home oxygen, lung cancer status post right upper lobectomy who presented with weakness and generalized malaise.  She was found to have multifocal pneumonia and Enterococcus bacteremia.  Hospital course was complicated by type II NSTEMI and acute exacerbation of chronic diastolic heart failure.    Multifocal pneumonia and Enterococcus bacteremia: She was evaluated by pulmonology and infectious disease.  She was treated with broad-spectrum antibiotics which will be narrowed to IV vancomycin until 2022 (to be administered during dialysis).  She had OMAIRA which showed no evidence of vegetation.    Type II NSTEMI: Evaluated by cardiology; not ACS.  Thought to be myocardial injury in the setting of ESRD and sepsis.  She should continue aspirin, Plavix, Coreg and statin    ESRD: Continue  dialysis    Acute exacerbation of chronic diastolic heart failure: Volume status improved with dialysis    COPD, chronic hypoxic respiratory  "failure on 5 L home oxygen: Continue home inhalers and pulmonology follow-up    Incidental CT findings needing outpatient follow-up: \"multiple areas of pulmonary opacification and nodular appearance, 1 area in the right midlung is new since 11/23/2021. Given patient history of malignancy underlying malignant recurrence and metastatic disease cannot be excluded and at least continued close attention on follow-up is recommended\".    Day of Discharge     Subjective:  Resting comfortably in bed.  No new complaints.  Looking forward to going home today.    Review of Systems   Constitutional: Negative for chills and fever.   Respiratory: Negative for cough and shortness of breath.    Cardiovascular: Negative for chest pain, palpitations and leg swelling.   Gastrointestinal: Negative for abdominal pain, blood in stool, nausea and vomiting.   Genitourinary: Negative for dysuria and hematuria.       Physical Exam:  Temp:  [97.2 °F (36.2 °C)-98.1 °F (36.7 °C)] 97.9 °F (36.6 °C)  Heart Rate:  [65-71] 69  Resp:  [18] 18  BP: (153-172)/(58-67) 153/66  Body mass index is 25.1 kg/m².  Physical Exam  Constitutional:       General: She is not in acute distress.     Appearance: She is not ill-appearing.   HENT:      Head: Normocephalic and atraumatic.      Mouth/Throat:      Mouth: Mucous membranes are moist.   Eyes:      General: No scleral icterus.  Cardiovascular:      Rate and Rhythm: Normal rate and regular rhythm.   Pulmonary:      Effort: Pulmonary effort is normal. No respiratory distress.      Breath sounds: No wheezing or rhonchi.   Abdominal:      Palpations: Abdomen is soft.      Tenderness: There is no abdominal tenderness. There is no guarding.   Skin:     General: Skin is warm and dry.   Neurological:      Mental Status: She is alert.      Cranial Nerves: No cranial nerve deficit.   Psychiatric:         Mood and Affect: Mood normal.         Consultants     Consult Orders (all) (From admission, onward)     Start     " Ordered    02/01/22 1555  Inpatient Pulmonology Consult  Once        Specialty:  Pulmonary Disease  Provider:  Rufus Wood MD    02/01/22 1554    01/31/22 0847  Inpatient Infectious Diseases Consult  Once        Specialty:  Infectious Diseases  Provider:  Evens Jean Baptiste MD    01/31/22 0847    01/31/22 0600  Inpatient Case Management  Consult  Once        Provider:  (Not yet assigned)    01/30/22 1319    01/30/22 1304  Inpatient Cardiology Consult  Once        Specialty:  Cardiology  Provider:  Mat Coello MD    01/30/22 1305    01/30/22 1217  Inpatient Nephrology Consult  Once        Specialty:  Nephrology  Provider:  Whitney Dudley MD    01/30/22 1216    01/30/22 1148  [facility] (on-call MD unless specified) Details  Once        Specialty:  Hospitalist  Provider:  Jonathan Triana MD    01/30/22 1148              Procedures     Imaging Results (All)     Procedure Component Value Units Date/Time    XR Chest 2 View [[id number]] Collected: 02/04/22 1808     Updated: 02/04/22 1814    Narrative:      PA AND LATERAL CHEST     CLINICAL HISTORY: Pleural effusion.     Compared to the previous chest x-ray dated 01/31/2022 and CT chest dated  02/01/2022.     There is mild vascular congestion that appears improved. Left upper lobe  atelectasis is unchanged. No acute focal infiltrates are identified.  There are tiny bilateral pleural effusions, greater in size on the left  than the right that appear essentially unchanged. The heart is  moderately enlarged. A right internal jugular dialysis catheter remains  in satisfactory position without change. Bilateral total shoulder  arthroplasties are noted.     IMPRESSIONS: Improved vascular congestion. Tiny bilateral pleural  effusions. No acute focal infiltrates are identified.     This report was finalized on 2/4/2022 6:11 PM by Dr. Abdirahman Francisco M.D.       CT Abdomen Pelvis Without Contrast [[id number]] Collected: 02/01/22 0702      Updated: 02/01/22 0839    Narrative:      CT CHEST, ABDOMEN, AND PELVIS WITHOUT IV CONTRAST     HISTORY: Lung cancer, septicemia acute on chronic hypoxia     TECHNIQUE: Radiation dose reduction techniques were utilized, including  automated exposure control and exposure modulation based on body size.   3 mm images were obtained through the chest, abdomen, and pelvis without  IV contrast.      COMPARISON: Multiple CT chest dating back to 08/17/2021 and CT abdomen  pelvis 07/14/2021     FINDINGS: Evaluation is suboptimal without intravenous contrast,  particularly the solid organs, vasculature and hilar nodes     Chest:      Streak artifact from bilateral total shoulder arthroplasties  significantly limits evaluation of the upper chest and thoracic inlet.  There is a moderate pericardial effusion, as seen on prior CTs..  Mediastinal adenopathy is present with index nodes as below:  *  Subcarinal node measuring 1.3 cm in short axis dimension, grossly  unchanged since 11/23/2021 but increased since 08/17/2021  *  Anterior paratracheal node measuring 1 cm in short axis dimension  versus previously 0.7 cm on 11/23/2021).     No axillary adenopathy is present by size criteria. A right central  venous catheter tip terminates within the right atrium. There is  scattered consolidation and pulmonary and groundglass opacification  throughout the bilateral lungs. Areas within the bilateral upper lungs  have improved since 11/23/2021. Many areas of opacification have a  somewhat nodular appearance, as before. The pulmonary opacification  within the right base has mildly increased in density since 11/23/2021.  The small moderate left pleural effusion has decreased in size since  11/23/2021 with improved aeration of the left base. Sub-6 mm pulmonary  nodule within the right middle lobe is grossly unchanged since  08/17/2021. New nodular area of opacification within the right midlung  anteriorly measuring 0.6 cm (image 41)      Abdomen and pelvis:  There is colonic diverticulosis. The appendix is not seen. There are no  findings of small bowel obstruction. No abdominopelvic adenopathy by  size criteria is present. There are bilateral common iliac artery stents  which cannot be evaluated without intravenous contrast. There is a lack  of well-formed stool in the colon. Small amount of free intraperitoneal  fluid is present within the pelvis. No free intraperitoneal air is seen.     The gallbladder is surgically absent.     The liver, pancreas, spleen and adrenal glands have an unremarkable  noncontrast CT appearance.     Subcentimeter renal lesions are too small to characterize. Larger  lesions demonstrating density less than 15 Hounsfield units are likely  benign per Bosniak 2019 criteria. There is no hydronephrosis. Please  note that the distal aspect of the left ureter is not visualized in its  entirety due to significant streak artifact from left total hip  arthroplasty.     Anasarca is present within the soft tissues.     Bone windows: No suspicious lytic or blastic osseous lesions are  present. Streak artifact from left total hip arthroplasty significantly  limits evaluation pelvis. Mild dextrocurvature of the lumbar spine is  present.       Impression:      Impression:  1.  Evaluation is suboptimal due to the above stated reasons.  2.  Constellation of findings are most suggestive of multifocal  pneumonia, some areas of which have improved since 11/23/2021. Findings  in the right lower lobe have mildly worsened. Mediastinal adenopathy is  present, as before, one of which has increased in size since 11/23/2021.  Additionally, multiple areas of pulmonary opacification and nodular  appearance, 1 area in the right midlung is new since 11/23/2021. Given  patient history of malignancy underlying malignant recurrence and  metastatic disease cannot be excluded and at least continued close  attention on follow-up is recommended.  3.   Anasarca within the soft tissues and small amount of free fluid in  the pelvis. The small-to-moderate left pleural effusion has decreased  since 11/23/2021. Moderate pericardial effusion is grossly unchanged.  4.  Other findings as above.     This report was finalized on 2/1/2022 8:36 AM by Dr. Case Lind M.D.       CT Chest Without Contrast Diagnostic [[id number]] Collected: 02/01/22 0736     Updated: 02/01/22 0839    Narrative:      CT CHEST, ABDOMEN, AND PELVIS WITHOUT IV CONTRAST     HISTORY: Lung cancer, septicemia acute on chronic hypoxia     TECHNIQUE: Radiation dose reduction techniques were utilized, including  automated exposure control and exposure modulation based on body size.   3 mm images were obtained through the chest, abdomen, and pelvis without  IV contrast.      COMPARISON: Multiple CT chest dating back to 08/17/2021 and CT abdomen  pelvis 07/14/2021     FINDINGS: Evaluation is suboptimal without intravenous contrast,  particularly the solid organs, vasculature and hilar nodes     Chest:      Streak artifact from bilateral total shoulder arthroplasties  significantly limits evaluation of the upper chest and thoracic inlet.  There is a moderate pericardial effusion, as seen on prior CTs..  Mediastinal adenopathy is present with index nodes as below:  *  Subcarinal node measuring 1.3 cm in short axis dimension, grossly  unchanged since 11/23/2021 but increased since 08/17/2021  *  Anterior paratracheal node measuring 1 cm in short axis dimension  versus previously 0.7 cm on 11/23/2021).     No axillary adenopathy is present by size criteria. A right central  venous catheter tip terminates within the right atrium. There is  scattered consolidation and pulmonary and groundglass opacification  throughout the bilateral lungs. Areas within the bilateral upper lungs  have improved since 11/23/2021. Many areas of opacification have a  somewhat nodular appearance, as before. The pulmonary  opacification  within the right base has mildly increased in density since 11/23/2021.  The small moderate left pleural effusion has decreased in size since  11/23/2021 with improved aeration of the left base. Sub-6 mm pulmonary  nodule within the right middle lobe is grossly unchanged since  08/17/2021. New nodular area of opacification within the right midlung  anteriorly measuring 0.6 cm (image 41)     Abdomen and pelvis:  There is colonic diverticulosis. The appendix is not seen. There are no  findings of small bowel obstruction. No abdominopelvic adenopathy by  size criteria is present. There are bilateral common iliac artery stents  which cannot be evaluated without intravenous contrast. There is a lack  of well-formed stool in the colon. Small amount of free intraperitoneal  fluid is present within the pelvis. No free intraperitoneal air is seen.     The gallbladder is surgically absent.     The liver, pancreas, spleen and adrenal glands have an unremarkable  noncontrast CT appearance.     Subcentimeter renal lesions are too small to characterize. Larger  lesions demonstrating density less than 15 Hounsfield units are likely  benign per Bosniak 2019 criteria. There is no hydronephrosis. Please  note that the distal aspect of the left ureter is not visualized in its  entirety due to significant streak artifact from left total hip  arthroplasty.     Anasarca is present within the soft tissues.     Bone windows: No suspicious lytic or blastic osseous lesions are  present. Streak artifact from left total hip arthroplasty significantly  limits evaluation pelvis. Mild dextrocurvature of the lumbar spine is  present.       Impression:      Impression:  1.  Evaluation is suboptimal due to the above stated reasons.  2.  Constellation of findings are most suggestive of multifocal  pneumonia, some areas of which have improved since 11/23/2021. Findings  in the right lower lobe have mildly worsened. Mediastinal  adenopathy is  present, as before, one of which has increased in size since 11/23/2021.  Additionally, multiple areas of pulmonary opacification and nodular  appearance, 1 area in the right midlung is new since 11/23/2021. Given  patient history of malignancy underlying malignant recurrence and  metastatic disease cannot be excluded and at least continued close  attention on follow-up is recommended.  3.  Anasarca within the soft tissues and small amount of free fluid in  the pelvis. The small-to-moderate left pleural effusion has decreased  since 11/23/2021. Moderate pericardial effusion is grossly unchanged.  4.  Other findings as above.     This report was finalized on 2/1/2022 8:36 AM by Dr. Case Lind M.D.       XR Chest 1 View [[id number]] Collected: 01/31/22 0505     Updated: 01/31/22 1042    Narrative:      ONE VIEW CHEST     HISTORY: 76-year-old female with shortness of breath. Lung cancer  history.     FINDINGS: There is overall no significant change since the radiograph  from 1/30/2022. The perihilar left upper lobe lung mass and the  moderate-sized left pleural effusion are unchanged. The  small-moderate-sized right pleural effusion is largely unchanged. There  is mild pulmonary edema which is largely unchanged as well. The right  central line catheter tip is within the distal SVC, unchanged.     This report was finalized on 1/31/2022 10:39 AM by Dr. Marylin Wang M.D.       XR Chest 1 View [[id number]] Collected: 01/30/22 1050     Updated: 01/30/22 1102    Narrative:      XR CHEST 1 VW-     HISTORY: Female who is 76 years-old,  cough     TECHNIQUE: Frontal view of the chest     COMPARISON: 01/06/2022     FINDINGS: Right-sided central venous catheter appears stable. The heart  is enlarged. Mild prominence of interstitial markings. Bilateral  perihilar infiltrates may reflect edema and/or pneumonia, appearance  represents interval worsening. Minimal right pleural effusion. No  pneumothorax. No acute  osseous process.       Impression:      Bilateral perihilar infiltrates and minimal right pleural  effusion, follow-up recommended. Cardiomegaly with mild prominence of  interstitial markings.     This report was finalized on 1/30/2022 10:59 AM by Dr. Brad Prieto M.D.             Pertinent Labs     Results from last 7 days   Lab Units 02/04/22 0512 02/01/22  1316 01/31/22  0043 01/30/22  1030   WBC 10*3/mm3 10.13 7.11 16.09* 17.80*   HEMOGLOBIN g/dL 11.3* 10.4* 11.2* 12.5   PLATELETS 10*3/mm3 247 189 167 173     Results from last 7 days   Lab Units 02/04/22 0512 02/03/22 0337 02/01/22 1053 01/31/22  0043   SODIUM mmol/L 134* 130* 134* 129*   POTASSIUM mmol/L 4.0 4.0 3.5 4.7   CHLORIDE mmol/L 97* 98 95* 95*   CO2 mmol/L 22.8 20.0* 26.0 17.9*   BUN mg/dL 27* 17 21 35*   CREATININE mg/dL 4.92* 3.21* 3.61* 4.87*   GLUCOSE mg/dL 102* 114* 127* 131*   Estimated Creatinine Clearance: 11.2 mL/min (A) (by C-G formula based on SCr of 4.92 mg/dL (H)).  Results from last 7 days   Lab Units 02/04/22 0512 02/03/22 0337 02/01/22  1053 01/31/22 0043 01/30/22  1030 01/30/22  1030   ALBUMIN g/dL 3.20* 2.90* 3.30* 3.10*   < > 3.60   BILIRUBIN mg/dL 0.2  --  0.3 0.2  --  0.3   ALK PHOS U/L 65  --  80 99  --  111   AST (SGOT) U/L 19  --  40* 44*  --  51*   ALT (SGPT) U/L 12  --  19 21  --  24    < > = values in this interval not displayed.     Results from last 7 days   Lab Units 02/04/22 0512 02/03/22 0337 02/01/22  1053 01/31/22  0043   CALCIUM mg/dL 8.6 7.7* 8.4* 8.2*   ALBUMIN g/dL 3.20* 2.90* 3.30* 3.10*   MAGNESIUM mg/dL  --   --  2.0 2.0   PHOSPHORUS mg/dL  --  3.5 3.7 5.9*     Results from last 7 days   Lab Units 01/30/22  1030   LIPASE U/L 34     Results from last 7 days   Lab Units 02/01/22  1053 01/31/22  0645 01/31/22  0043 01/30/22  1810 01/30/22  1030 01/30/22  1030   TROPONIN T ng/mL 0.146* 0.180* 0.215* 0.183*   < > 0.019   PROBNP pg/mL  --   --   --   --   --  24,832.0*    < > = values in this interval  not displayed.       Results from last 7 days   Lab Units 01/31/22  0043   CHOLESTEROL mg/dL 97   TRIGLYCERIDES mg/dL 183*   HDL CHOL mg/dL 26*   LDL CHOL mg/dL 41     Results from last 7 days   Lab Units 02/01/22  1316 02/01/22  0041 01/31/22  1146 01/30/22  1207 01/30/22  1030   BLOODCX  No growth at 4 days  --  No growth at 5 days Enterococcus faecalis* Enterococcus faecalis*   RESPCX   --  Moderate growth (3+) Normal respiratory swathi. No S. aureus or Pseudomonas aeruginosa detected. Final report.  --   --   --    BCIDPCR   --   --   --   --  Enterococcus faecalis. Trev/B (vancomycin resistance gene) not detected. Identification by BCID2 PCR.*       Test Results Pending at Discharge     Pending Labs     Order Current Status    Fungitell B-D Glucan In process    Blood Culture - Blood, Wrist, Right Preliminary result          Discharge Details        Discharge Medications      New Medications      Instructions Start Date   aspirin 81 MG EC tablet   81 mg, Oral, Daily   Start Date: February 6, 2022     sevelamer 800 MG tablet  Commonly known as: RENVELA   800 mg, Oral, Daily With Breakfast & Dinner      torsemide 100 MG tablet  Commonly known as: DEMADEX   200 mg, Oral, Daily   Start Date: February 6, 2022        Changes to Medications      Instructions Start Date   atorvastatin 80 MG tablet  Commonly known as: LIPITOR  What changed:   medication strength  how much to take   80 mg, Oral, Nightly      carvedilol 25 MG tablet  Commonly known as: COREG  What changed: when to take this   25 mg, Oral, 2 Times Daily With Meals      hydrALAZINE 100 MG tablet  Commonly known as: APRESOLINE  What changed:   medication strength  how much to take  when to take this   100 mg, Oral, 3 Times Daily      NIFEdipine XL 90 MG 24 hr tablet  Commonly known as: PROCARDIA XL  What changed: when to take this   90 mg, Oral, Every Evening         Continue These Medications      Instructions Start Date   acetaminophen 500 MG  tablet  Commonly known as: TYLENOL   1,000 mg, Oral, Every 8 Hours PRN      Advair Diskus 250-50 MCG/DOSE DISKUS  Generic drug: fluticasone-salmeterol   2 puffs, Inhalation, 2 Times Daily - RT      ascorbic acid 500 MG tablet  Commonly known as: VITAMIN C   500 mg, Oral, Nightly      clopidogrel 75 MG tablet  Commonly known as: PLAVIX   75 mg, Oral, Daily, PER MD OFFICE PT DOESN'T HAVE TO HOLD FOR SURGERY      folic acid 1 MG tablet  Commonly known as: FOLVITE   TAKE 1 TABLET BY MOUTH EVERY DAY      hydrOXYzine 25 MG tablet  Commonly known as: ATARAX   25 mg, Oral, 3 Times Daily PRN      LORazepam 0.5 MG tablet  Commonly known as: ATIVAN   0.5 mg, Oral, Every 8 Hours PRN      MiraLax 17 g packet  Generic drug: polyethylene glycol   17 g, Oral, Every Evening      omeprazole 40 MG capsule  Commonly known as: priLOSEC   TAKE 1 CAPSULE BY MOUTH EVERY DAY      oxyCODONE 15 MG immediate release tablet  Commonly known as: ROXICODONE   20 mg, Oral, Every 4 Hours PRN      sertraline 100 MG tablet  Commonly known as: Zoloft   100 mg, Oral, Daily      traZODone 50 MG tablet  Commonly known as: DESYREL   50 mg, Oral, Nightly, As needed for sleep      vitamin D 1.25 MG (61597 UT) capsule capsule  Commonly known as: ERGOCALCIFEROL   50,000 Units, Oral, Every 7 Days, Takes on Wednesdays             Allergies   Allergen Reactions   • Minoxidil Other (See Comments)     Pericardial effusion    • Neurontin [Gabapentin] Confusion   • Minocycline Unknown - Low Severity     PT AND SPOUSE NOT SURE OF   • Morphine Headache         Discharge Disposition:  Home-Health Care Svc    Discharge Diet:  Diet Order   Procedures   • Diet Regular; Renal       Discharge Activity:   As tolerated    CODE STATUS:    Code Status and Medical Interventions:   Ordered at: 01/30/22 1314     Code Status (Patient has no pulse and is not breathing):    CPR (Attempt to Resuscitate)     Medical Interventions (Patient has pulse or is breathing):    Full Support        Future Appointments   Date Time Provider Department Center   2/9/2022 12:20 PM Mat Coello MD [facility]   7/14/2022 11:10 AM LAB CHAIR 5 [facility]  [facility]   7/14/2022 11:40 AM Ld Paulino MD [facility]      Contact information for follow-up providers     Rhys Crowder Jr., MD. Schedule an appointment as soon as possible for a visit in 1 week(s).    Specialty: Family Medicine  Contact information:  [facility] [place] [address]  [phone]                   Contact information for after-discharge care     Home Medical Care     [facility]    Service: [facility]  Contact information:  [address] [place] [address] [address]  [place] [id number] [phone]                             Time Spent on Discharge:  Greater than 30 minutes      Jenniffer Salinas DO  [facility]  02/05/22  13:29 EST

## 2022-02-06 NOTE — CASE MANAGEMENT/SOCIAL WORK
Case Management Discharge Note      Final Note: home via family with plans for IV vanc administration with HD at [facility] (Shannon Epley contacted to assist with medication). Current with [facility]         Selected Continued Care - Discharged on 2/5/2022 Admission date: 1/30/2022 - Discharge disposition: Home-Health Care [facility] [place]    Destination    No services have been selected for the patient.              Durable Medical Equipment    No services have been selected for the patient.              Dialysis/Infusion    No services have been selected for the patient.              Home Medical Care Coordination complete.    Service Provider Selected Services Address Phone Fax Patient Preferred    [facility]  [facility] Services [address] [id number] [phone] --          Therapy    No services have been selected for the patient.              Community Resources    No services have been selected for the patient.              Community & DME    No services have been selected for the patient.                Selected Continued Care - Prior Encounters Includes selections from prior encounters from 11/1/2021 to 2/5/2022    Discharged on 11/28/2021 Admission date: 11/22/2021 - Discharge disposition: Home-Health Care [facility] [place]    Home Medical Care     Service Provider Selected Services Address Phone Fax Patient Preferred    [facility]  Home [facility] Services [address] [address] [phone] --                    Transportation Services  Private: Car    Final Discharge Disposition Code: 06 - home with home health care

## 2022-02-07 NOTE — OUTREACH NOTE
Call Center TCM Note      Responses   [facility] patient discharged from? [place]   Does the patient have one of the following disease processes/diagnoses(primary or secondary)? COPD/Pneumonia   Was the primary reason for admission: Pneumonia   TCM attempt successful? No   Unsuccessful attempts Attempt 1          Ying Rebollar MA    2/7/2022, 10:52 EST

## 2022-02-07 NOTE — OUTREACH NOTE
Call Center TCM Note      Responses   [facility] patient discharged from? [place]   Does the patient have one of the following disease processes/diagnoses(primary or secondary)? COPD/Pneumonia   Was the primary reason for admission: Pneumonia   TCM attempt successful? Yes   Discharge diagnosis Healthcare-associated pneumonia,    Enterococcus bacteremia,    NSTEMI,   acute exacerbation of chronic diastolic heart failure    Is patient permission given to speak with other caregiver? Yes   Meds reviewed with patient/caregiver? Yes   Is the patient having any side effects they believe may be caused by any medication additions or changes? No   Does the patient have all medications ordered at discharge? Yes   Is the patient taking all medications as directed (includes completed medication regime)? Yes   Does the patient have a primary care provider?  Yes   Does the patient have an appointment with their PCP or specialist within 7 days of discharge? Yes   Comments regarding PCP TCM FWP with PCP Dr Crowder is 02/10/2022. Hosp sched vik shaikh Cardio MD on 02/09/2022 but is during her dialysis appt so  will call to r/s this.    Has the patient kept scheduled appointments due by today? Yes   What is the Home health agency?  [facility]   Has home health visited the patient within 72 hours of discharge? Yes   Psychosocial issues? No   Did the patient receive a copy of their discharge instructions? Yes   Nursing interventions Reviewed instructions with patient   What is the patient's perception of their health status since discharge? Improving   If the patient is a current smoker, are they able to teach back resources for cessation? Not a smoker   Is the patient able to teach back COPD zones? Yes   Is the patient/caregiver able to teach back signs and symptoms of worsening condition: Fever/chills,  Shortness of breath,  Chest pain   Is the patient/caregiver able to teach back importance of completing antibiotic course of  treatment? Yes   TCM call completed? Yes   Wrap up additional comments Pt states she is doing ok, resumed dialysis today and is pretty worn out. New medications in place. No questions at this time. TCM FWP with PCP Dr Crowder is 02/10/2022. Hosp sched vik shaikh Cardio MD on 02/09/2022 but is during her dialysis appt so  will call to r/s this.           Ying Rebollar MA    2/7/2022, 16:58 EST

## 2022-02-10 PROBLEM — Z99.2 ESRD (END STAGE RENAL DISEASE) ON DIALYSIS (HCC): Status: ACTIVE | Noted: 2021-01-01

## 2022-02-10 NOTE — PROGRESS NOTES
"Chief Complaint  Hospital Follow Up Visit (pneumonia and sepsis)    Subjective          Rachana Arguelles presents to [facility]  Transitional Care Management Certification  I certify that the following are true:  Communication was made within 2 business days of discharge.  Complexity of Medical Decision Making is high.  Face to face visit occurred within 5 days.    *Note: 15536 is for high complexity patients with a face to face visit within 7 days of discharge.  61554 is for high complexity patients with a face to face on days 8-14 post discharge or medium complexity with face to face visit within 14 days post discharge.    Medically complex interaction patient recently in hospital with healthcare associated pneumonia and sepsis.  Hospitalization was reviewed the patient and her daughter.  She is weak and she is started on torsemide 200 mg daily to reduce diastolic CHF.  She gets dialysis Monday Wednesday Friday I reviewed the diuretic use with her nephrologist we will continue torsemide 200 mg daily.  Otherwise she has trouble sleeping but are not gone to giving her any extra medicine at this time but will refill lorazepam 0.5 3 times daily as needed.    She has had evacuation of a large hematoma from the left arm with the associated: Neuropraxis which is slowly resolving.  She has sensation of cold in the left hand and difficulty putting the fourth and fifth finger together on the left hand.      Objective   Vital Signs:   /46   Pulse 65   Temp 97.9 °F (36.6 °C)   Ht 167.6 cm (66\")   SpO2 93%   BMI 25.87 kg/m²     Physical Exam  Vitals reviewed.   Constitutional:       Appearance: She is well-developed. She is ill-appearing.   HENT:      Head: Normocephalic and atraumatic.      Right Ear: Tympanic membrane and external ear normal.      Left Ear: Tympanic membrane and external ear normal.   Eyes:      Conjunctiva/sclera: Conjunctivae normal.      Pupils: Pupils are equal, " round, and reactive to light.   Neck:      Thyroid: No thyromegaly.      Vascular: No JVD.   Cardiovascular:      Rate and Rhythm: Normal rate and regular rhythm.      Heart sounds: Normal heart sounds.   Pulmonary:      Effort: Pulmonary effort is normal.      Breath sounds: Normal breath sounds.       Abdominal:      General: Bowel sounds are normal.      Palpations: Abdomen is soft.   Musculoskeletal:         General: Normal range of motion.      Left upper arm: Swelling present.        Arms:       Cervical back: Normal range of motion and neck supple.   Lymphadenopathy:      Cervical: No cervical adenopathy.   Skin:     General: Skin is warm and dry.      Findings: No rash.   Neurological:      Mental Status: She is oriented to person, place, and time.      Cranial Nerves: No cranial nerve deficit.      Motor: Weakness present.      Coordination: Coordination abnormal.      Gait: Gait abnormal.   Psychiatric:         Attention and Perception: Attention normal.         Mood and Affect: Affect is labile.         Speech: Speech normal.         Behavior: Behavior normal.         Thought Content: Thought content normal.         Cognition and Memory: Memory is impaired.         Judgment: Judgment is inappropriate.        Result Review :   The following data was reviewed by: Rhys Crowder MD on 02/10/2022:  Common labs    Common Labsle 2/1/22 2/1/22 2/3/22 2/4/22 2/4/22    1053 1316  0512 0512   Glucose 127 (A)  114 (A) 102 (A)    BUN 21  17 27 (A)    Creatinine 3.61 (A)  3.21 (A) 4.92 (A)    eGFR Non African Am 12 (A)  14 (A) 9 (A)    eGFR  Am        Sodium 134 (A)  130 (A) 134 (A)    Potassium 3.5  4.0 4.0    Chloride 95 (A)  98 97 (A)    Calcium 8.4 (A)  7.7 (A) 8.6    Albumin 3.30 (A)  2.90 (A) 3.20 (A)    Total Bilirubin 0.3   0.2    Alkaline Phosphatase 80   65    AST (SGOT) 40 (A)   19    ALT (SGPT) 19   12    WBC  7.11   10.13   Hemoglobin  10.4 (A)   11.3 (A)   Hematocrit  33.4 (A)   36.0   Platelets   189   247   (A) Abnormal value       Comments are available for some flowsheets but are not being displayed.           Data reviewed: Recent hospitalization notes Hospitalization [facility]          Assessment and Plan    Diagnoses and all orders for this visit:    1. Community acquired pneumonia, unspecified laterality (Primary)  Comments:  Appears to be resolved with healthcare associated pneumonia    2. Gastroesophageal reflux disease, unspecified whether esophagitis present  Comments:  Refill omeprazole  Orders:  -     omeprazole (priLOSEC) 40 MG capsule; Take 1 capsule by mouth Daily.  Dispense: 90 capsule; Refill: 3    3. Current moderate episode of major depressive disorder without prior episode (HCC)  Comments:  Refill lorazepam 0.5 3 times daily as needed/sertraline 100 mg daily  Orders:  -     LORazepam (ATIVAN) 0.5 MG tablet; Take 1 tablet by mouth Every 8 (Eight) Hours As Needed for Anxiety.  Dispense: 90 tablet; Refill: 5    4. ESRD (end stage renal disease) on dialysis ([facility])    5. Chronic diastolic CHF (congestive heart failure) ([facility])  Comments:  Continue with torsemide 200 mg daily after conferring with nephrology Dr. Dudley    Other orders  -     torsemide (DEMADEX) 100 MG tablet; Take 2 tablets by mouth Daily.  Dispense: 180 tablet; Refill: 3  -     sevelamer (RENVELA) 800 MG tablet; Take 1 tablet by mouth Daily With Breakfast & Dinner.  Dispense: 180 tablet; Refill: 3        Follow Up   Return in about 3 months (around 5/10/2022) for Recheck, Medicare Wellness.  Patient was given instructions and counseling regarding her condition or for health maintenance advice. Please see specific information pulled into the AVS if appropriate.

## 2022-02-15 NOTE — OUTREACH NOTE
COPD/PN Week 2 Survey      Responses   [facility] patient discharged from? [place]   Does the patient have one of the following disease processes/diagnoses(primary or secondary)? COPD/Pneumonia   Was the primary reason for admission: Pneumonia   Week 2 attempt successful? Yes   Call start time 1448   Call end time 1451   Discharge diagnosis Healthcare-associated pneumonia,    Enterococcus bacteremia,    NSTEMI,   acute exacerbation of chronic diastolic heart failure    Person spoke with today (if not patient) and relationship Katia-daughter    Meds reviewed with patient/caregiver? Yes   Is the patient taking all medications as directed (includes completed medication regime)? Yes   Comments regarding appointments Appt with hand surgeon is on 2/15/22   Has the patient kept scheduled appointments due by today? Yes   Pulse Ox monitoring None   What is the patient's perception of their health status since discharge? Improving   Are the patient's immunizations up to date?  Yes   Is the patient/caregiver able to teach back the hierarchy of who to call/visit for symptoms/problems? PCP, Specialist, Home health nurse, Urgent Care, ED, 911 Yes   Week 2 call completed? Yes          Miladys Koroma RN

## 2022-02-23 PROBLEM — R53.1 GENERALIZED WEAKNESS: Status: ACTIVE | Noted: 2022-01-01

## 2022-02-23 PROBLEM — R06.09 DYSPNEA ON EXERTION: Status: ACTIVE | Noted: 2021-01-01

## 2022-02-23 NOTE — OUTREACH NOTE
COPD/PN Week 3 Survey      Responses   [facility] patient discharged from? [place]   Does the patient have one of the following disease processes/diagnoses(primary or secondary)? COPD/Pneumonia   Was the primary reason for admission: Pneumonia   Week 3 attempt successful? No   Unsuccessful attempts Attempt 1          Rylie Colon RN

## 2022-02-23 NOTE — PROGRESS NOTES
Date of Office Visit: 2022  Encounter Provider: REENA Velasquez  Place of Service: [facility] CARDIOLOGY  Patient Name: Rachana Arguelles  :1945    Chief Complaint   Patient presents with   • Congestive Heart Failure   :     HPI: Rachana Arguelles is a 76 y.o. female.  She is a patient of Dr. Coello's whom we follow for the management of hypertension, PVCs, and peripheral vascular disease (status post bilateral common iliac artery stenting).  She does follow with vascular.  She also has a history of a pericardial effusion for which no intervention has been recommended.   In May 2021, she presented with dizziness and lightheadedness.  Upon arrival, she was bradycardic with occasional Mobitz 1 AV block.  She had a prolonged hospitalization.  Regarding the bradycardia, this resolved with the adjustment of medications and the discontinuation of clonidine.  She had significantly uncontrolled and resistant hypertension and was found to have renal artery stenosis.  She developed acute kidney injury likely due to underlying hypertensive nephrosclerosis and nephrology was consulted.  The losartan was discontinued.  At one point, she was on minoxidil.  However, this was discontinued following an echocardiogram showing a moderate pericardial effusion.  On 2021, she underwent an abdominal aortic angiography, selective bilateral renal artery angiography, and right renal artery stenting.  She was also noted to have left renal artery stenosis.  However, intervention was deferred.   She was last seen in the office by Dr. Coello in 2021 at which time she continued reporting dyspnea on exertion that was unchanged.  No changes were recommended.  Additionally, Dr. Coello did not recommend any additional intervention of the left renal artery in the setting of worsening creatinine.  She was advised to follow-up in 6 months.   On , she presented with generalized weakness, shortness  "of breath, and epigastric tenderness.  Blood cultures were positive for gram-positive cocci.  Troponin was elevated and felt to be consistent with a type II non-STEMI.  Transthoracic echocardiogram demonstrated normal LV systolic function, grade 1 diastolic dysfunction, a marked thickening of the noncoronary cusp of the aortic valve, mild MR, and mild TR.  In the setting of Enterococcus bacteremia, a OMAIRA was recommended.  This demonstrated no evidence of vegetation on the aortic valve.  She was followed by nephrology for diuresis.  She was followed by pulmonary for bilateral pulmonary infiltrates.  Pulmonary did not recommend a thoracentesis or a bronchoscopy.  However, a follow-up CT in 3 or 4 months was recommended due to mediastinal adenopathy and infiltrates.  On 2/5, she was stable for discharge.  She is here today for follow-up.   She does not feel very well.  Her biggest symptom is \"prevailing weakness\".  She is so weak she cannot stand, and her  is afraid to even leave her alone.  She reports worsening shortness of breath on exertion.  She is also coughing again which had improved in the hospital.  She reports a dull pain in her epigastric region similar to the pain she presented to the hospital with.  Her symptoms are not exacerbated by exertion.  She does sleep with her head of bed elevated and has for the last 6 months.  She denies any palpitations, edema, or syncope.  She denies any bleeding difficulties or melena.  She has an appointment with pulmonary next week.    Past Medical History:   Diagnosis Date   • AAA (abdominal aortic aneurysm) without rupture (HCC)    • Anemia    • Anesthesia complication     AGITATION AND COMBATIVE AFTER LUNG SURGERY 2019   • Anxiety and depression    • Arthritis    • Bilateral carotid artery stenosis 8/14/2020   • Chest pain     OCCAS   • Chronic low back pain    • Chronic pain syndrome 3/12/2016   • Claustrophobia 10/26/2015    Resolved   • COPD (chronic " obstructive pulmonary disease) ([facility])    • Dizziness    • Dyspnea    • ESRD (end stage renal disease) on dialysis ([facility])     MONDAY, WEDNESDAY AND FRIDAYS AJ BAUER   • GERD (gastroesophageal reflux disease)    • H/O concussion    • Head trauma 12/16/2019    FELL CUT HEAD 12 STAPLES   • Hiatal hernia    • History of bradycardia    • History of confusion    • History of diverticulitis    • History of migraine headaches    • History of pneumonia    • History of skin cancer    • History of transfusion    • Hyperlipidemia    • Hypertension    • Itching    • Lumbar postlaminectomy syndrome 10/26/2015    Resolved   • Lung cancer ([facility]) 2019    RIGHT LUNG, HAD SURGERY   • Migraines    • Nausea    • On home oxygen therapy     5L NC   • Palpitations     PVC'S   • Pericardial effusion     HX   • Polypharmacy 4/22/2016   • PONV (postoperative nausea and vomiting)    • Pulmonary embolism ([facility]) 10/26/2015    January 2013 - Resolved, felt to be secondary to estrogen use   • Renal artery stenosis ([facility])    • Requires supplemental oxygen     2 LITERS WHEN SLEEPING AND AS NEEDED   • Slow to wake up after anesthesia     AFTER LUNG SURGERY 2019   • Slow to wake up after anesthesia     AFTER LUNG SURGERY   • Submandibular sialoadenitis 10/26/2015    Resolved   • Visual changes    • Vitamin B 12 deficiency        Past Surgical History:   Procedure Laterality Date   • ANGIOPLASTY ILIAC ARTERY Bilateral 8/10/2018    Procedure: BILATERAL ILIAC STENTS;  Surgeon: Riki Mancera MD;  Location: [facility] OR;  Service: Vascular   • APPENDECTOMY     • ARTERIOGRAM N/A 6/4/2021    Procedure: Renal Arteriogram with possible intervention;  Surgeon: Mat Coello MD;  Location: [facility] INVASIVE LOCATION;  Service: Cardiology;  Laterality: N/A;   • ARTERIOVENOUS FISTULA/SHUNT SURGERY Left 10/14/2021    Procedure: LEFT ARM BRACHIOAXILLARY ARTERIOVENOUS GRAFT;  Surgeon: Riki Mancera MD;  Location: [facility] OR;   Service: Vascular;  Laterality: Left;   • BREAST SURGERY      Breast Surgery Reduction Procedure   • BRONCHOSCOPY N/A 2/8/2019    Procedure: BRONCHOSCOPY;  Surgeon: Álvaro Gifford MD;  [facility];  Service: Pulmonary   • CARDIAC CATHETERIZATION N/A 6/4/2021    Procedure: Stent BMS peripheral- RENAL-RIGHT;  Surgeon: Mat Coello MD;  Location:  [facility] CATH INVASIVE LOCATION;  Service: Cardiology;  Laterality: N/A;   • CATARACT EXTRACTION Bilateral    • CHOLECYSTECTOMY     • COLONOSCOPY  2006    Dr. Saha   • COLONOSCOPY  2012    Dr. Saha   • ENDOSCOPY N/A 11/3/2020    Procedure: ESOPHAGOGASTRODUODENOSCOPY with 54 Mauritian vernon dilation;  Surgeon: Yunior Vo MD;  [facility];  Service: Gastroenterology;  Laterality: N/A;  pre- dysphagia  post- esophageal ring, hiatal hernia   • HYSTERECTOMY     • INCISION AND DRAINAGE ARM Left 1/7/2022    Procedure: LEFT ARM HEMATOMA EVACUATION;  Surgeon: Riki Mancera MD;  Location: [facility] MAIN OR;  Service: Vascular;  Laterality: Left;   • INSERTION HEMODIALYSIS CATHETER Right 9/2/2021    Procedure: PALINDROME CATHERER;  Surgeon: Riki Mancera MD;  Location: [facility] MAIN OR;  Service: Vascular;  Laterality: Right;   • INSERTION HEMODIALYSIS CATHETER N/A 10/7/2021    Procedure: HEMODIALYSIS CATHETER INSERTION;  Surgeon: Fany Salinas Jr., MD;  Location: [facility] MAIN OR;  Service: Vascular;  Laterality: N/A;   • INTERVENTIONAL RADIOLOGY PROCEDURE N/A 6/4/2021    Procedure: Abdominal Aortogram;  Surgeon: Mat Coello MD;  Location: [facility] CATH INVASIVE LOCATION;  Service: Cardiology;  Laterality: N/A;   • INTUBATION  1/15/2019        • JOINT REPLACEMENT      left knee, hip, shoulder   • LASIK     • LUMBAR FUSION      Lumbar Vertebral Fusion   • RENAL ARTERY STENT Right    • SHOULDER ARTHROSCOPY  04/04/2013    Dr. Carrillo/MER[facility] Kent   • THORACOSCOPY VIDEO ASSISTED WITH LOBECTOMY Right 1/11/2019     Procedure: BRONCOSCOPY, THORACOSCOPY VIDEO ASSISTED WITH RIGHT UPPER LOBE WEDGE RESECTION, COMPLETION RIGHT UPPER LOBECTOMY, LYMPH NODE DISSECTION, INTERCOSTAL NERVE BLOCK;  Surgeon: Quita Figueroa MD;  Location: [facility];  Service: Thoracic   • TONSILLECTOMY     • TOTAL HIP ARTHROPLASTY REVISION Left    • TOTAL KNEE ARTHROPLASTY Left    • TOTAL SHOULDER ARTHROPLASTY Left    • TOTAL SHOULDER ARTHROPLASTY W/ DISTAL CLAVICLE EXCISION Right 2020    Procedure: RIGHT TOTAL SHOULDER REVERSE ARTHROPLASTY WITH GPS;  Surgeon: Lino Carrillo MD;  Location: [facility];  Service: Orthopedics;  Laterality: Right;       Social History     Socioeconomic History   • Marital status:      Spouse name: Rai   • Years of education: College   Tobacco Use   • Smoking status: Former Smoker     Packs/day: 2.50     Years: 15.00     Pack years: 37.50     Types: Cigarettes     Quit date: 2003     Years since quittin.4   • Smokeless tobacco: Never Used   Vaping Use   • Vaping Use: Never used   Substance and Sexual Activity   • Alcohol use: No   • Drug use: Never   • Sexual activity: Defer       Family History   Problem Relation Age of Onset   • Hypertension Mother    • Lung cancer Mother    • Cancer Mother         lung   • Aortic aneurysm Mother    • Kidney disease Father    • Other Brother         Coronary Artery Bypass Grafting   • Stroke Brother         Cerebrovascular Accident   • Depression Daughter    • Malig Hyperthermia Neg Hx        Review of Systems   Cardiovascular: Positive for chest pain (Epigastric) and dyspnea on exertion. Negative for leg swelling, orthopnea, paroxysmal nocturnal dyspnea and syncope.   Respiratory: Positive for cough.    Hematologic/Lymphatic: Negative for bleeding problem.   Musculoskeletal: Negative for falls.   Gastrointestinal: Negative for melena.   Neurological: Positive for weakness. Negative for dizziness and light-headedness.       Allergies   Allergen Reactions   •  Minoxidil Other (See Comments)     Pericardial effusion    • Neurontin [Gabapentin] Confusion   • Minocycline Unknown - Low Severity     PT AND SPOUSE NOT SURE OF   • Morphine Headache         Current Outpatient Medications:   •  acetaminophen (TYLENOL) 500 MG tablet, Take 2 tablets by mouth Every 8 (Eight) Hours As Needed for Mild Pain  or Moderate Pain ., Disp: , Rfl:   •  albuterol sulfate  (90 Base) MCG/ACT inhaler, Inhale 2 puffs Every 4 (Four) Hours As Needed., Disp: , Rfl:   •  ascorbic acid (VITAMIN C) 500 MG tablet, Take 500 mg by mouth Every Night., Disp: , Rfl:   •  aspirin 81 MG EC tablet, Take 1 tablet by mouth Daily., Disp: 30 tablet, Rfl: 0  •  atorvastatin (LIPITOR) 80 MG tablet, Take 1 tablet by mouth Every Night., Disp: 30 tablet, Rfl: 0  •  carvedilol (COREG) 25 MG tablet, TAKE 1 TABLET BY MOUTH TWICE A DAY, Disp: 180 tablet, Rfl: 2  •  clopidogrel (PLAVIX) 75 MG tablet, Take 1 tablet by mouth Daily., Disp: 90 tablet, Rfl: 0  •  DULoxetine (CYMBALTA) 30 MG capsule, Take 30 mg by mouth every night at bedtime., Disp: , Rfl:   •  fluticasone-salmeterol (Advair Diskus) 250-50 MCG/DOSE DISKUS, Inhale 2 puffs 2 (Two) Times a Day., Disp: , Rfl:   •  folic acid (FOLVITE) 1 MG tablet, TAKE 1 TABLET BY MOUTH EVERY DAY, Disp: 30 tablet, Rfl: 2  •  hydrALAZINE (APRESOLINE) 100 MG tablet, Take 1 tablet by mouth 3 (Three) Times a Day., Disp: 90 tablet, Rfl: 0  •  hydrOXYzine (ATARAX) 25 MG tablet, Take 25 mg by mouth 3 (Three) Times a Day As Needed for Itching (nausea)., Disp: , Rfl:   •  LORazepam (ATIVAN) 0.5 MG tablet, Take 1 tablet by mouth Every 8 (Eight) Hours As Needed for Anxiety., Disp: 90 tablet, Rfl: 5  •  NIFEdipine XL (PROCARDIA XL) 90 MG 24 hr tablet, Take 1 tablet by mouth Every Evening. (Patient taking differently: Take 90 mg by mouth Daily.), Disp: 30 tablet, Rfl: 3  •  omeprazole (priLOSEC) 40 MG capsule, Take 1 capsule by mouth Daily., Disp: 90 capsule, Rfl: 3  •  oxyCODONE  "(ROXICODONE) 15 MG immediate release tablet, Take 20 mg by mouth Every 4 (Four) Hours As Needed for Moderate Pain ., Disp: , Rfl:   •  polyethylene glycol (MiraLax) 17 g packet, Take 17 g by mouth Every Evening., Disp: , Rfl:   •  sertraline (Zoloft) 100 MG tablet, Take 1 tablet by mouth Daily., Disp: 90 tablet, Rfl: 1  •  sevelamer (RENVELA) 800 MG tablet, Take 1 tablet by mouth Daily With Breakfast & Dinner., Disp: 180 tablet, Rfl: 3  •  torsemide (DEMADEX) 100 MG tablet, Take 2 tablets by mouth Daily., Disp: 180 tablet, Rfl: 3  •  traZODone (DESYREL) 50 MG tablet, TAKE 1 TABLET BY MOUTH EVERY NIGHT. AS NEEDED FOR SLEEP, Disp: 30 tablet, Rfl: 1  •  vitamin D (ERGOCALCIFEROL) 1.25 MG (71994 UT) capsule capsule, Take 50,000 Units by mouth Every 7 (Seven) Days. Takes on Wednesdays, Disp: , Rfl:       Objective:     Vitals:    02/23/22 1020   BP: 124/60   Pulse: 58   Weight: 71.7 kg (158 lb)   Height: 167.6 cm (66\")     Body mass index is 25.5 kg/m².    PHYSICAL EXAM:    Neck:      Vascular: No JVD.   Pulmonary:      Effort: Pulmonary effort is normal.      Breath sounds: Decreased breath sounds present. Rhonchi present.   Cardiovascular:      Normal rate. Regular rhythm.      Murmurs: There is no murmur.      No gallop. No click. No rub.   Pulses:     Intact distal pulses.           ECG 12 Lead    Date/Time: 2/23/2022 10:30 AM  Performed by: Mary Haddad APRN  Authorized by: Mary Haddad APRN   Comparison: compared with previous ECG from 1/30/2022  Similar to previous ECG  Rhythm: sinus rhythm  Rate: normal  BPM: 58  T inversion: I, aVL and V1    Clinical impression: abnormal EKG  Comments: Indication: CHF              Assessment:       Diagnosis Plan   1. Chronic diastolic CHF (congestive heart failure) (HCC)  ECG 12 Lead   2. Shortness of breath  XR Chest 2 View   3. Generalized weakness     4. Primary hypertension       Orders Placed This Encounter   Procedures   • XR Chest 2 View     Standing " Status:   Future     Number of Occurrences:   1     Standing Expiration Date:   2/23/2023     Order Specific Question:   Reason for Exam:     Answer:   shortness of breath   • ECG 12 Lead     This order was created via procedure documentation     Order Specific Question:   Release to patient     Answer:   Immediate          Plan:       1.  Chronic diastolic CHF/shortness of breath/weakness.  Not entirely sure what to make of her persistent and worsening shortness of breath or profound weakness.  She appears euvolemic.   I do wonder if we need to consider ischemia, although pursuing an invasive workup would not be ideal given her CKD.  I will discuss with Dr. Coello.      2.   Hypertension.  Her blood pressure looks great.  Continue current regimen including carvedilol and nifedipine.        Further recommendations will be made pending my discussion with Dr. Coello.      As always, it has been a pleasure to participate in your patient's care.      Sincerely,         REENA Burr

## 2022-02-23 NOTE — TELEPHONE ENCOUNTER
Left voicemail for Rachana Arguelles requesting callback.    Thank you,  Celine Garcia RN  Triage Nurse [facility]

## 2022-02-23 NOTE — TELEPHONE ENCOUNTER
Please let her know that her chest x-ray was stable.  No significant change from prior.  Please also ensure she does in fact have a follow-up appointment with pulmonary next week.

## 2022-02-24 NOTE — TELEPHONE ENCOUNTER
Notified Rachana Arguelles of results, patient verbalized understanding. Patient confirmed that she does have an appointment with her pulmonologist on 3/1/22.    Thanks,   Zuly Jhaveri, TONYN, RN  Triage Nurse [facility]

## 2022-02-24 NOTE — TELEPHONE ENCOUNTER
I discussed the plan of care with Dr. Coello.  We are recommending proceeding with a stress test for further evaluation of her symptoms.  Given her profound weakness and activity intolerance, this will need to be a Lexiscan.    Please schedule Lexiscan stress test.

## 2022-02-25 NOTE — TELEPHONE ENCOUNTER
Caller: ISABEL    Relationship to patient: PARTHA [facility]    Best call back number: [phone]    Patient is needing: PARTHA IS CALLING TO CHECK ON THE ORDERS THAT WAS FAXED 2-16 AND 2-18 AND 2-19 AND SHE JUST WANTS TO MAKE SURE THE OFFICE RECEIVED THEM.

## 2022-02-25 NOTE — OUTREACH NOTE
COPD/PN Week 3 Survey      Responses   [facility] patient discharged from? [place]   Does the patient have one of the following disease processes/diagnoses(primary or secondary)? COPD/Pneumonia   Was the primary reason for admission: Pneumonia   Week 3 attempt successful? Yes   Call start time 1100   Call end time 1104   Discharge diagnosis Healthcare-associated pneumonia,    Enterococcus bacteremia,    NSTEMI,   acute exacerbation of chronic diastolic heart failure    Is patient permission given to speak with other caregiver? Yes   Person spoke with today (if not patient) and relationship Katia-daughter    Meds reviewed with patient/caregiver? Yes   Is the patient taking all medications as directed (includes completed medication regime)? Yes   Does the patient have a primary care provider?  Yes   Does the patient have an appointment with their PCP or specialist within 7 days of discharge? Yes   Has the patient kept scheduled appointments due by today? Yes   Comments Has apptment on 03/01/2022 with Pulmonology   What is the Home health agency?  [facility]   Has home health visited the patient within 72 hours of discharge? Yes   Home health comments HH is still coming to home   Pulse Ox monitoring None   Psychosocial issues? No   Did the patient receive a copy of their discharge instructions? Yes   What is the patient's perception of their health status since discharge? Same  [States she had gotten better but now her breathing is getting worse]   Are the patient's immunizations up to date?  Yes   If the patient is a current smoker, are they able to teach back resources for cessation? Not a smoker   Is the patient/caregiver able to teach back the hierarchy of who to call/visit for symptoms/problems? PCP, Specialist, Home health nurse, Urgent Care, ED, 911 Yes   Is the patient/caregiver able to teach back signs and symptoms of worsening condition: Fever/chills,  Shortness of breath,  Chest pain   Is the  patient/caregiver able to teach back importance of completing antibiotic course of treatment? Yes   Week 3 call completed? Yes   Wrap up additional comments Dtr states she is having difficulty breathing again, states she has had pneumonia 4 times in the last few months. Pt is at dialysis right now.           Ying Duarte RN

## 2022-03-07 NOTE — TELEPHONE ENCOUNTER
----- Message from Rachana Arguelles sent at 3/7/2022 11:25 AM EST -----  Regarding: New Pharmacy and refills  [facility] [address] [address]  I need a Rx for Nifedipine ER 90mg and Atorvastatin 80 mg at this time.  Thank you!!

## 2022-03-09 NOTE — OUTREACH NOTE
COVID-19 Week 4 Survey    Flowsheet Row Responses   [facility] facility patient discharged from? [place]   Does the patient have one of the following disease processes/diagnoses(primary or secondary)? COPD/Pneumonia   Call start time 1047   Call end time 1052   Discharge diagnosis Healthcare-associated pneumonia,    Enterococcus bacteremia,    NSTEMI,   acute exacerbation of chronic diastolic heart failure    Meds reviewed with patient/caregiver? Yes   Is the patient taking all medications as directed (includes completed medication regime)? Yes   Has the patient kept scheduled appointments due by today? Yes  [3-10-22 surgery ]   Is the patient still receiving Home Health Services? Yes   Home health comments [facility] nurse [facility]    What is the patient's perception of their health status since discharge? Same  [left arm and hand are numb and nauseated - pt declined calling the doctor ]   Pulse Ox monitoring Intermittent   Pulse Ox device source Patient   O2 Sat: education provided Sat levels, Monitoring frequency, When to seek care   Is the patient able to teach back COPD zones? Yes   Nursing interventions Education provided on various zones   Patient reports what zone on this call? Green Zone   Green Zone Breathing without shortness of breath   Green Zone interventions: Take daily medications, Use oxygen as prescribed   Was the number of calls appropriate? Yes          ADRIENNE ISRAEL - Registered Nurse

## 2022-04-20 NOTE — TELEPHONE ENCOUNTER
----- Message from Rachana Arguelles sent at 4/19/2022  3:29 PM EDT -----  Regarding: Refill please  Please send Rx for Trazadone 50 MG to pharmacy,  [facility] [address]  Thanks so much

## 2022-05-03 NOTE — NURSING NOTE
Pt in xray triage for US Thoracentesis.  Pt and  wearing mask and RN wearing mask and eye protection for all pt interactions.

## 2022-05-03 NOTE — DISCHARGE INSTRUCTIONS
EDUCATION /DISCHARGE INSTRUCTIONS Thoracentesis:  A thoracentesis is a procedure to remove fluid or air from the space between the lung and it's lining.  It is done to relieve lung compression and respiratory distress.  A sample can also be obtained to aid diagnosis.   Medications can be administered into the space.      During the procedure:  You will be seated comfortable leaning forward onto a pillow supported by a table.  The area will be cleaned with antiseptic soap and draped with a sterile towel.  The physician will administer a local antiseptic to numb the area.  Next, the physician will insert a needle with tubing attached into the space between the lung and lining.  Fluid is removed and a sample sent to the laboratory.   When completed a pressure dressing is applied to the site.    Risks of the procedure include but are not limited to:   *  Bleeding    *  Low blood pressure *  Infection     *  Lung collapse possibly requiring insertion of a tube to re-inflate the lung.    Benefits of the procedure:  Relief of respiratory distress and facilitation of a diagnosis.  Alternatives to the procedure:  Drug therapy with diuretics to remove fluid.  Risks of diuretic drug therapy include possible dehydration and renal failure.  The benefit of drug therapy is that it can be done at home under physician supervision.  THIS EDUCATION INFORMATION WAS REVIEWED PRIOR TO THE PROCEDURE AND CONSENT. Patient initials___________________Time___12:05 PM_______________    Post Procedure:    *  Rest today (no pushing pulling, straining or heavy lifting).   *  Slowly increase activity tomorow.    *  If you received sedation do not drive for 24 hours.   *  Keep dressing clean and dry.   *  Leave dressing on puncture site for 24 hours.    *  You may shower when dressing removed.   *  Expect some coughing as the lung re-expands.    Call your doctor if experiencing:   *  If experiencing sudden / severe shortness of breath, chest pain or  if coughing       up blood go to the nearest emergency room.   *  Signs of infection such as redness, swelling, excessive pain and / or foul       smelling drainage from the puncture site.   *  Chills or fever over 101 degrees (by mouth).   *  Change in sputum color (yellow, green, red).   *  Unrelieved pain.   *  Any new or severe symptoms.  Following the procedure:     Follow-up with the ordering physician as directed.    Continue to take other medications as directed by your physician unless    otherwise instructed.   If applicable, resume taking your blood thinners or Aspirin on _Wednesday, May 4, 2022 __________.    If you have any concerns please call the Radiology Nurses Desk at [phone].  You are the most important factor in your recovery.  Follow the above instructions carefully.

## 2022-06-16 NOTE — DISCHARGE INSTRUCTIONS
"06/16/2022 @ 10:56am- RSW attempted to meet with patient to discuss SDOH and liaison assessment. Pt stated "Now is not a good time." RSW will follow up with patient at a later time.    06/17/2022 @ 11:05AM - RSW attempted to meet with patient for initial visit. RSW unable to meet with pt due to pt at cath lab. RSW will follow up with patient at a later time.      IP Liaison - Initial Visit Note    Patient: Houston Jc  MRN:  [id number]  Date of Service:  6/20/2022  Completed by:  BEKAH Yeh    Reason for Visit   Patient presents with    IP Liaison Initial Visit       RSW met with patient and pt spouse Paulette at bedside in order to complete SDOH questionnaire and liaison assessment.  Pt and Paulette have identified no social barriers to care. Per pt, pt is not in need of resources at this time.      The following were addressed during this visit:  - Review SDOH Questions   - Complete patient assessment   - Complete initial visit with patient        Patient Summary     IP Liaison Patient Assessment    General  Level of Caregiver support: Member independent and does not need caregiver assistance  Have you had to make a decision between paying for any of the following in the last 2 months?: None  Transportation means: Family  Employment status: Retired and not working  Assessments  Was the PHQ Depression Screening completed this visit?: No  Was the VICENTE-7 Screening completed this visit?: No       BEKAH Yeh    " YOU had a pain pill at 9:52 am    [facility]  Joel Pelayo, Brad, Oniel, Radha, Mary, Calderon Moody  [address]  [place] [address]  [phone]      Discharge Instructions for Angiogram    1. Go home, rest and take it easy today.      2. You may experience some dizziness or memory loss from the anesthesia.  This may last for the next 24 hours.  Someone should plan on staying with you for the first 24 hours for your safety.    3. Do not make any important legal decisions or sign any legal papers for the next 24 hours.      4. Eat and drink lightly today.  Start off with liquids, jello, soup, crackers or other bland foods at first. You may advance your diet tomorrow as tolerated as long as you do not experience any nausea or vomiting.    5. You may resume routine medications including blood thinners. However, Glucophage should be not be started for 72 hours after the dye is given.      6. No lifting over 10 pounds and no strenuous activity for the next 2-3 days.    7. Try not to strain or bear down when your bowels move or when you empty your bladder.    8. Limit going up and down steps for 2 days.    9. No driving for the remainder of the day.  You may resume limited driving tomorrow if necessary.     10.  You may shower tomorrow.  No bath or hot tubs for at least 2 days after the procedure.          11. Leave the pressure dressing on until tomorrow morning.  You may then take this off, as well as the small see through dressing with gauze tomorrow.  If it doesn’t come off easily, do not pull this off.  If it is stuck, shower and let the warm water loosen it before removal.       12. Check your groin dressing regularly for bleeding through the dressing (under the pressure dressing).  A small amount of blood contained by the gauze is normal; the whole dressing should not be filled with blood or leaking out under the sides.     13. If you experience bleeding through the  gauze/clear sticky dressing, lay flat and have someone apply direct pressure for 15 minutes.  If bleeding has stopped after this, you may put a clean gauze and tape over the area.  Continue to lie flat for 1-2 hours.  If bleeding continues after 15 minutes of pressure, call us at the number listed above.  There is an MD available after hours.      14. If you experience heavy bleeding or large swelling, continue to hold firm pressure and              call 911.  Do not call the MD on call.     15.  If you experience pain or discomfort you may take Tylenol or Ibuprofen, whichever you normally use for minor discomfort, unless otherwise instructed.       16.  If the MD gives you a prescription for narcotic pain pills (Tylenol #3, Vicodin, Hydrocodone, Oxycodone or Percocet), you cannot drive a vehicle or operate machinery while taking these.    17.  Severe pain is not expected after this procedure.  If you experience severe pain, please call our office at [phone].    18.  Remember to contact our office for any of the following:    • Fever > 101 degrees  • Severe pain that cannot be controlled by taking your pain pills  • Severe nausea or vomiting   • Significant bleeding of your incisions  • Drainage that has a bad smell or is yellow or green in appearance  • Any other questions or concerns

## 2022-07-14 ENCOUNTER — APPOINTMENT (OUTPATIENT)
Dept: LAB | Facility: HOSPITAL | Age: 77
End: 2022-07-14

## 2022-08-20 NOTE — PROGRESS NOTES
LOS: 10 days   Nephrology Daily Progress Note    Assessment/Plan       Hypertension    Chronic pain syndrome    Gastroesophageal reflux disease    Stage 3b chronic kidney disease (CMS/HCC)    Malignant neoplasm of right upper lobe of lung (CMS/HCC)    YVONNE (acute kidney injury) (CMS/HCC)    Opioid dependence (CMS/[facility])    COPD (chronic obstructive pulmonary disease) (CMS/[facility])       LOS: 10 days     Rachana Arguelles is a 75 y.o. female who was admitted with Hypertension. She reports his symptoms are improving with treatment.     Subjective     Interval History: Rachana Arguelles     Feels cold, no soa or cp, slept poorly, no soa, bleeding or cp    Patient  without any chest pain palpitation or diaphoresis    Medication side effects: none    Current Facility-Administered Medications   Medication Dose Route Frequency Provider Last Rate Last Admin   • acetaminophen (TYLENOL) tablet 650 mg  650 mg Oral Q4H PRN Mat Coello MD   650 mg at 05/31/21 1839   • acetaminophen (TYLENOL) tablet 650 mg  650 mg Oral Q4H PRN Mat Coello MD       • amLODIPine (NORVASC) tablet 10 mg  10 mg Oral Q24H Mat Coello MD   10 mg at 06/05/21 0842   • ascorbic acid (VITAMIN C) tablet 500 mg  500 mg Oral Daily Mat Coello MD   500 mg at 06/05/21 0843   • atorvastatin (LIPITOR) tablet 40 mg  40 mg Oral Nightly Mat Coello MD   40 mg at 06/04/21 2024   • budesonide-formoterol (SYMBICORT) 160-4.5 MCG/ACT inhaler 2 puff  2 puff Inhalation BID - RT Mat Coello MD   2 puff at 06/05/21 0837   • carvedilol (COREG) tablet 25 mg  25 mg Oral BID With Meals Mat Coello MD   25 mg at 06/05/21 0643   • cetirizine (zyrTEC) tablet 5 mg  5 mg Oral Daily Mat Coello MD   5 mg at 06/05/21 0843   • clopidogrel (PLAVIX) tablet 75 mg  75 mg Oral Daily Mat Coello MD   75 mg at 06/05/21 0843   • cyclobenzaprine (FLEXERIL) tablet 5 mg  5 mg Oral TID PRN Mat Coello MD    5 mg at 06/03/21 0000   • famotidine (PEPCID) tablet 20 mg  20 mg Oral Daily Mat Coello MD   20 mg at 06/05/21 0848   • folic acid (FOLVITE) tablet 1,000 mcg  1,000 mcg Oral Daily Mat Coello MD   1,000 mcg at 06/05/21 0843   • hydrALAZINE (APRESOLINE) injection 10 mg  10 mg Intravenous Q6H PRN Mat Coello MD   10 mg at 06/05/21 0341   • hydrALAZINE (APRESOLINE) tablet 100 mg  100 mg Oral Q8H Mat Coello MD   100 mg at 06/05/21 0522   • LORazepam (ATIVAN) tablet 0.5 mg  0.5 mg Oral Q8H PRN Mat Coello MD   0.5 mg at 06/04/21 2115   • niCARdipine (CARDENE) 25 mg in 250 mL NS infusion kit  2.5 mg/hr Intravenous Titrated Mat Coello MD   Stopped at 05/31/21 1123   • nitroglycerin (NITROSTAT) SL tablet 0.4 mg  0.4 mg Sublingual Q5 Min PRN Mat Coello MD       • ondansetron (ZOFRAN) injection 4 mg  4 mg Intravenous Q6H PRN Mat Coello MD   4 mg at 06/02/21 1334   • ondansetron (ZOFRAN) tablet 4 mg  4 mg Oral Q6H PRN Mat Coello MD        Or   • ondansetron (ZOFRAN) injection 4 mg  4 mg Intravenous Q6H PRN Mat Coello MD       • oxyCODONE (ROXICODONE) immediate release tablet 20 mg  20 mg Oral Q6H PRN Mat Coello MD   20 mg at 06/05/21 0848   • pantoprazole (PROTONIX) EC tablet 40 mg  40 mg Oral QAM Mat Coello MD   40 mg at 06/05/21 0644   • polyethylene glycol (MIRALAX) packet 17 g  17 g Oral Daily PRN Mat Coello MD       • sennosides-docusate (PERICOLACE) 8.6-50 MG per tablet 2 tablet  2 tablet Oral Nightly Mat Coello MD   2 tablet at 06/04/21 2115   • sertraline (ZOLOFT) tablet 100 mg  100 mg Oral Daily Mat Coello MD   100 mg at 06/05/21 0843   • sodium bicarbonate tablet 1,300 mg  1,300 mg Oral TID Mat Coello MD   1,300 mg at 06/05/21 0842   • sodium chloride 0.9 % flush 10 mL  10 mL Intravenous Q12H Mat Coello MD   10 mL at  "06/04/21 2123   • sodium chloride 0.9 % flush 10 mL  10 mL Intravenous PRN Mat Coello MD       • sucralfate (CARAFATE) tablet 1 g  1 g Oral BID AC Mat Coello MD   1 g at 06/05/21 0643   • temazepam (RESTORIL) capsule 15 mg  15 mg Oral Nightly PRN Mat Coello MD       • terazosin (HYTRIN) capsule 5 mg  5 mg Oral Nightly Mat Coello MD   5 mg at 06/04/21 2024   • traZODone (DESYREL) tablet 50 mg  50 mg Oral Nightly Mat Coello MD   50 mg at 06/04/21 2024   • venlafaxine XR (EFFEXOR-XR) 24 hr capsule 150 mg  150 mg Oral Daily Mat Coello MD   150 mg at 06/05/21 0843   • Vitamin B-2 (RIBOFLAVIN) tablet 400 mg  400 mg Oral Daily Mat Coello MD   400 mg at 06/05/21 0842       Objective     Vital signs in last 24 hours:  Temp:  [98 °F (36.7 °C)-98.4 °F (36.9 °C)] 98.4 °F (36.9 °C)  Heart Rate:  [66-86] 73  Resp:  [9-20] 16  BP: (141-196)/() 171/94    Intake/Output last 3 shifts:  I/O last 3 completed shifts:  In: 1440 [P.O.:1140; I.V.:300]  Out: 3600 [Urine:3600]    Physical exam  /94 (BP Location: Left arm, Patient Position: Lying)   Pulse 73   Temp 98.4 °F (36.9 °C) (Oral)   Resp 16   Ht 170.2 cm (67\")   Wt 84.4 kg (186 lb)   SpO2 93%   BMI 29.13 kg/m²     General Appearance: alert, oriented x 3, no acute distress   Skin: warm and dry  HEENT: oral mucosa normal, nonicteric sclera  Neck: supple, no JVD  Lungs: CTA  Heart: RRR, normal S1 and S2  Abdomen: soft, nontender, nondistended  : no palpable bladder  Extremities: , cyanosis or clubbing.  Trace edema is present , distal pulses are intact, feet are warm  Neuro: Weak and alert and answers questions appropriately    Labs:  Results from last 7 days   Lab Units 06/05/21  0430   WBC 10*3/mm3 6.99   HEMOGLOBIN g/dL 9.1*   HEMATOCRIT % 27.9*     Results from last 7 days   Lab Units 06/05/21  0430 06/04/21  0321 06/01/21  0451 05/31/21  0402   SODIUM mmol/L 138 140  --  139 "   POTASSIUM mmol/L 3.6 3.6  --  4.3   CHLORIDE mmol/L 98 101  --  110*   CO2 mmol/L 28.7 27.7  --  17.8*   BUN mg/dL 25* 36*  --  32*   PHOSPHORUS mg/dL  --  4.6*   < > 4.6*   MAGNESIUM mg/dL  --   --   --  2.3    < > = values in this interval not displayed.       Impression and plan    This is Kindra is a 75-year-old female with history of abdominal aortic aneurysm, anxiety disorder, osteoarthritis, history of skin cancer, chronic back pain with chronic pain syndrome on multiple analgesia medications, chronic kidney disease stage III, history of claustrophobia, chronic obstructive pulmonary disease, depression disorder, gastroesophageal reflux disease, hypertension, dyslipidemia, history of lung cancer, migraines, history of pulmonary embolism in 2013 secondary to estrogen use. patient admitted for syncope    Acute on chronic kidney disease stage IIIb  Results from last 7 days   Lab Units 06/05/21  0430 06/04/21  0321 06/03/21  0331   CREATININE mg/dL 1.57* 1.80* 2.06*   -Patient with long history of hypertension likely underlying hypertensive nephrosclerosis  -Acute event secondary to hemodynamic changes after syncope  -  Urinalysis and UPCR bland  - Images studies ; Renal US / CT abdomen   - Avoid nephrotoxins or IV contrast exposure , if need consider alternative images studies if possible   - Keep MAP > 65   - Strick I/O    Bradycardia secondary to AV block  -As per cardiology management  -Secondary to combination of clonidine carvedilol    Chronic normocytic anemia  -Multifactorial from chronic illness, chronic kidney disease nutrition  -No evidence of active bleeding  - We will continue to follow H&H closely     Resistant Hypertension  -meds reviewed, not much improvement noted despite successful renal artery stenting  - We will continue to follow closely  - Heart healthy diet     PLAN   -Follow renal function closely  -We will follow blood pressure aortic angiography  after right stent placement  -Currently  on  gentle hydration after angiography  -Avoid nephrotoxins  -Adjust medications to GFR  -dc home is ok from a renal standpoint, sees Dr. Dudley in our office  -Thank you for allowing me to participate from this patient care        Earnest Kennedy MD   Never smoker

## 2022-08-23 NOTE — ED PROVIDER NOTES
MD ATTESTATION NOTE    The LUPE and I have discussed this patient's history, physical exam, and treatment plan.  I have reviewed the documentation and personally had a face to face interaction with the patient. I affirm the documentation and agree with the treatment and plan.  The attached note describes my personal findings.    Presents [place] mechanical [place]  She had no loss of consciousness and takes Plavix.  She has a large scalp laceration which is been repaired with staples by the physician assistant.    Her head CT is unremarkable, the CT scan of the neck showed some possible signs of ligamentous instability in the upper cervical spine, and a MRI was recommended.  The MRI was somewhat degraded by motion artifact, but there were no obvious signs of injury.    Because of the CT scan and the degraded MRI, we felt it best to place her in a c-collar and have her follow-up with neurosurgery.  She is having absolutely no signs of cord contusion on exam, not complaining of any numbness or weakness anywhere.     Dustin Hightower MD  12/17/19 0203     Normal vision: sees adequately in most situations; can see medication labels, newsprint

## 2022-10-29 NOTE — PROGRESS NOTES
Subjective   Rachana Arguelles is a 74 y.o. female.     Chief Complaint   Patient presents with   • Hypertension   • Hyperlipidemia   • Depression   • Leg Pain         History of Present Illness   Delightful lady with a number of issues.  We are seeing her for hypertension hyperlipidemia history of depression.  She has had a movement disorder specialist neurologist at [facility] with evidence of dystonia of the jaw and she is trying Artane which helps.    Otherwise she is since Saturday developed what appears to be an anserine bursitis of the right knee.  Is getting a little better she will see orthopedics today.    She has been treated effectively for lung cancer with close follow-up.  The biopsy of the liver turned out to not have anything malignant.    She is required to have a colonoscopy now since she had a positive Cologuard.  I have counseled her about the possibilities on the positive Cologuard.  She will check with Dr. Thomas about the prep.    She had evidence of B12 deficiency and not sure she is getting her B12 orally.  We will recheck labs today.      The following portions of the patient's history were reviewed and updated as appropriate: allergies, current medications, past social history and problem list.    Review of Systems   Constitutional: Positive for fatigue.   HENT: Negative.    Eyes: Negative.    Respiratory: Negative.    Cardiovascular: Negative.    Gastrointestinal: Negative.    Endocrine: Negative.    Genitourinary: Negative.    Musculoskeletal: Positive for arthralgias, gait problem, joint swelling and myalgias.   Skin: Negative.    Allergic/Immunologic: Negative.    Hematological: Negative.    Psychiatric/Behavioral: Negative.        Objective   Vitals:    12/02/19 1153   BP: 144/72   Pulse: 66   Temp: 98.2 °F (36.8 °C)   SpO2: 96%     Physical Exam   Constitutional: She is oriented to person, place, and time. She appears well-developed and well-nourished.   HENT:   Head:  Normocephalic and atraumatic.   Right Ear: Tympanic membrane and external ear normal.   Left Ear: Tympanic membrane and external ear normal.   Nose: Nose normal.   Mouth/Throat: Oropharynx is clear and moist.   Eyes: Conjunctivae and EOM are normal. Pupils are equal, round, and reactive to light.   Neck: Normal range of motion. Neck supple. No JVD present. No thyromegaly present.   Cardiovascular: Normal rate, regular rhythm, normal heart sounds and intact distal pulses.   Pulmonary/Chest: Effort normal and breath sounds normal.   Abdominal: Soft. Bowel sounds are normal.   Musculoskeletal:        Right shoulder: She exhibits decreased range of motion and tenderness.        Right knee: She exhibits decreased range of motion. Tenderness found.        Legs:  Lymphadenopathy:     She has no cervical adenopathy.   Neurological: She is alert and oriented to person, place, and time. No cranial nerve deficit. Gait abnormal. Coordination normal.   Skin: Skin is warm and dry. No rash noted.   Psychiatric: She has a normal mood and affect. Her behavior is normal. Judgment and thought content normal.   Vitals reviewed.      Assessment/Plan   Problem List Items Addressed This Visit        Cardiovascular and Mediastinum    Hypertension    Relevant Medications    hydrALAZINE (APRESOLINE) 50 MG tablet    Hyperlipidemia    Relevant Orders    Vitamin B12    CBC & Differential    Folate    Methylmalonic Acid, Serum    Sedimentation Rate    Lipid Panel With / Chol / HDL Ratio       Other    Depression      Other Visit Diagnoses     Dystonia    -  Primary    Relevant Medications    trihexyphenidyl (ARTANE) 2 MG tablet    Anserine bursitis        Relevant Orders    Vitamin B12    CBC & Differential    Folate    Methylmalonic Acid, Serum    Sedimentation Rate    Lipid Panel With / Chol / HDL Ratio    B12 deficiency        Relevant Orders    Vitamin B12    CBC & Differential    Folate    Methylmalonic Acid, Serum    Sedimentation Rate     Lipid Panel With / Chol / HDL Ratio      Recheck labs for B12 deficiency and hyperlipidemia follow-up with gastroenterology follow-up with orthopedics.  Follow-up with chest surgery.  Recheck here in about 3 to 4 months.        Clothing

## (undated) DEVICE — NDL HYPO PRECISIONGLIDE REG 20G 1 1/2

## (undated) DEVICE — SINGLE USE BIOPSY VALVE MAJ-210: Brand: SINGLE USE BIOPSY VALVE (STERILE)

## (undated) DEVICE — Device

## (undated) DEVICE — SYR LL TP 10ML STRL

## (undated) DEVICE — DECANT BG O JET

## (undated) DEVICE — ANTIBACTERIAL UNDYED BRAIDED (POLYGLACTIN 910), SYNTHETIC ABSORBABLE SUTURE: Brand: COATED VICRYL

## (undated) DEVICE — PK CATH CARD 40

## (undated) DEVICE — SUT SILK 3/0 TIES 18IN A184H

## (undated) DEVICE — GAUZE,SPONGE,FLUFF,6"X6.75",STRL,10/TRAY: Brand: MEDLINE

## (undated) DEVICE — PK ORTHO MINOR 40

## (undated) DEVICE — ST. SORBAVIEW ULTIMATE IJ SYSTEM A,C: Brand: CENTURION

## (undated) DEVICE — INTENDED FOR TISSUE SEPARATION, AND OTHER PROCEDURES THAT REQUIRE A SHARP SURGICAL BLADE TO PUNCTURE OR CUT.: Brand: BARD-PARKER ® CARBON RIB-BACK BLADES

## (undated) DEVICE — KT ORCA ORCAPOD DISP STRL

## (undated) DEVICE — LN SMPL O2 NASL/ORL SMART/CAPNOLINE PLS A/

## (undated) DEVICE — SOL NACL 0.9PCT 1000ML

## (undated) DEVICE — SUT VIC 0 CT1 36IN J946H

## (undated) DEVICE — TREK CORONARY DILATATION CATHETER 4.0 MM X 15 MM / RAPID-EXCHANGE: Brand: TREK

## (undated) DEVICE — GW HYDRPHLC STD ANG .035IN 180CM

## (undated) DEVICE — PATIENT RETURN ELECTRODE, SINGLE-USE, CONTACT QUALITY MONITORING, ADULT, WITH 9FT CORD, FOR PATIENTS WEIGING OVER 33LBS. (15KG): Brand: MEGADYNE

## (undated) DEVICE — SPNG GZ WOVN 4X4IN 12PLY 10/BX STRL

## (undated) DEVICE — INFLATION DEVICE: Brand: ENCORE 26

## (undated) DEVICE — GLV SURG BIOGEL LTX PF 7

## (undated) DEVICE — GLV SURG BIOGEL LTX PF 6 1/2

## (undated) DEVICE — SYR LUERLOK 20CC BX/50

## (undated) DEVICE — SUT SILK 0 TIES 30IN A306H

## (undated) DEVICE — DRSNG GZ PETROLTM XEROFORM CURAD 1X8IN STRL

## (undated) DEVICE — RADIFOCUS TORQUE DEVICE MULTI-TORQUE VISE: Brand: RADIFOCUS TORQUE DEVICE

## (undated) DEVICE — SUT VIC 2/0 X1 27IN J459H

## (undated) DEVICE — MSK AIRWY LARYNG LMA PILOT SZ4

## (undated) DEVICE — TOTAL TRAY, 16FR 10ML SIL FOLEY, URN: Brand: MEDLINE

## (undated) DEVICE — ADAPT SWVL FIBROPTIC BRONCH

## (undated) DEVICE — ANGIO-SEAL EVOLUTION VASCULAR CLOSURE DEVICE: Brand: ANGIO-SEAL

## (undated) DEVICE — PINNACLE R/O II INTRODUCER SHEATH WITH RADIOPAQUE MARKER: Brand: PINNACLE

## (undated) DEVICE — ADHS SKIN SURG TISS VISC PREMIERPRO EXOFIN HI/VISC FAST/DRY

## (undated) DEVICE — VITAL SIGNS™ JACKSON-REES CIRCUITS: Brand: VITAL SIGNS™

## (undated) DEVICE — SUT SILK 0 PSL 18IN 580H

## (undated) DEVICE — KT SHLDR EXACTECHGPS DISP

## (undated) DEVICE — ST ACC MICROPUNCTURE STFF .018 ECHO/PLDM/TP 4F/10CM 21G/7CM

## (undated) DEVICE — APPL CHLORAPREP W/TINT 26ML ORNG

## (undated) DEVICE — LOU MINOR PROCEDURE: Brand: MEDLINE INDUSTRIES, INC.

## (undated) DEVICE — WOUND RETRACTOR AND PROTECTOR: Brand: ALEXIS WOUND PROTECTOR-RETRACTOR

## (undated) DEVICE — TUBING, SUCTION, 1/4" X 10', STRAIGHT: Brand: MEDLINE

## (undated) DEVICE — PTA BALLOON DILATATION CATHETER: Brand: MUSTANG™

## (undated) DEVICE — GOWN,NON-REINFORCED,SIRUS,SET IN SLV,XL: Brand: MEDLINE

## (undated) DEVICE — MAT FLR ABSORBENT LG 4FT 10 2.5FT

## (undated) DEVICE — TRAP FLD MINIVAC MEGADYNE 100ML

## (undated) DEVICE — TOWEL,OR,DSP,ST,WHITE,DLX,4/PK,20PK/CS: Brand: MEDLINE

## (undated) DEVICE — PENCL ES MEGADINE EZ/CLEAN BUTN W/HOLSTR 10FT

## (undated) DEVICE — ZIP 16 SURGICAL SKIN CLOSURE DEVICE, PSA: Brand: ZIP 16 SURGICAL SKIN CLOSURE DEVICE

## (undated) DEVICE — NDL HYPO PRECISIONGLIDE REG 25G 1 1/2

## (undated) DEVICE — KT CATH TAL PALINDROME RUBY 14.5F23CM

## (undated) DEVICE — UNIVERSAL STAPLER: Brand: ENDO GIA ULTRA

## (undated) DEVICE — SUT ETHLN 2/0 PS 18IN 585H

## (undated) DEVICE — CONTN STRL 32OZ

## (undated) DEVICE — ELECTRD BLD EDGE/INSUL1P 2.4X5.1MM STRL

## (undated) DEVICE — VIATRAC 14 PLUS PERIPHERAL DILATATION CATHETER 5.0 MM X 20 MM X 135 CM: Brand: VIATRAC

## (undated) DEVICE — CANN O2 ETCO2 FITS ALL CONN CO2 SMPL A/ 7IN DISP LF

## (undated) DEVICE — CVR PROB 96IN LF STRL

## (undated) DEVICE — POOLE SUCTION HANDLE: Brand: CARDINAL HEALTH

## (undated) DEVICE — SHEET, DRAPE, SPLIT, STERILE: Brand: MEDLINE

## (undated) DEVICE — PENCL E/S ULTRAVAC TELESCP NOSE HOLSTR 10FT

## (undated) DEVICE — 2108 SERIES SAGITTAL BLADE (19.5 X 1.27 X 81.0MM)

## (undated) DEVICE — CATH SZ ACCUVU SEG/20CM PIG .038IN 5F 70CM

## (undated) DEVICE — HANDPIECE SET WITH COAXIAL HIGH FLOW TIP AND SUCTION TUBE: Brand: INTERPULSE

## (undated) DEVICE — ARM SLING: Brand: DEROYAL

## (undated) DEVICE — GLV SURG SENSICARE PI MIC PF SZ7.5 LF STRL

## (undated) DEVICE — STPLR SKIN VISISTAT WD 35CT

## (undated) DEVICE — SUT SILK 2/0 TIES 18IN A185H

## (undated) DEVICE — BITEBLOCK OMNI BLOC

## (undated) DEVICE — PK ANGIO 40

## (undated) DEVICE — EXTENSION SET, MALE LUER LOCK ADAPTER WITH RETRACTABLE COLLAR

## (undated) DEVICE — NC TREK™ CORONARY DILATATION CATHETER 5.0 MM X 15 MM / RAPID-EXCHANGE: Brand: NC TREK™

## (undated) DEVICE — SOL NS 500ML

## (undated) DEVICE — GLV SURG BIOGEL LTX PF 6

## (undated) DEVICE — ADHS SKIN DERMABOND TOP ADVANCED

## (undated) DEVICE — SKIN PREP TRAY W/CHG: Brand: MEDLINE INDUSTRIES, INC.

## (undated) DEVICE — LN SMPL CO2 SHTRM SD STREAM W/M LUER

## (undated) DEVICE — INTRO SHEATH ART/FEM ENGAGE .035 5F12CM

## (undated) DEVICE — SENSR O2 OXIMAX FNGR A/ 18IN NONSTR

## (undated) DEVICE — 200 ML FASTURN SYRINGE WITH QUICK FILL TUBE(ANGIO-SET,PKG,200ML FASTURN SYR W/QFT,MC)(60729695): Brand: MEDRAD® MARK V PROVIS 200ML FASTURN STERILE DISPOSABLE SYRINGE & QFT

## (undated) DEVICE — ADAPT CLN BIOGUARD AIR/H2O DISP

## (undated) DEVICE — BIT DRL EQUINOXE 2 AND 3.2MM

## (undated) DEVICE — GLV SURG BIOGEL LTX PF 8 1/2

## (undated) DEVICE — LOU THORACIC: Brand: MEDLINE INDUSTRIES, INC.

## (undated) DEVICE — PK AV FISTL/SHNT 40

## (undated) DEVICE — MYNXGRIP 6F/7F: Brand: MYNXGRIP

## (undated) DEVICE — GLV SURG BIOGEL LTX PF 8

## (undated) DEVICE — VISUALIZATION SYSTEM: Brand: CLEARIFY

## (undated) DEVICE — GUIDE CATHETER: Brand: MACH1™

## (undated) DEVICE — CATH GUIDE SOFTVU SELECT/V HT OMNI .038 5F 65CM

## (undated) DEVICE — CONTAINER,SPECIMEN,OR STERILE,4OZ: Brand: MEDLINE

## (undated) DEVICE — UNDYED BRAIDED (POLYGLACTIN 910), SYNTHETIC ABSORBABLE SUTURE: Brand: COATED VICRYL

## (undated) DEVICE — INTRO SHEATH ART/FEM ENGAGE .035 6F12CM

## (undated) DEVICE — PK SHLDR OPN 40

## (undated) DEVICE — SYR LUERLOK 5CC

## (undated) DEVICE — RADIFOCUS GLIDEWIRE: Brand: GLIDEWIRE

## (undated) DEVICE — SUT PROLN 6/0 BV1 D/A 30IN 8709H

## (undated) DEVICE — SUT ETHIB 2 CV V37 MS/4 30IN MX69G

## (undated) DEVICE — SUT SILK 3/0 SH CR5 18IN C0135

## (undated) DEVICE — BIOPATCH™ ANTIMICROBIAL DRESSING WITH CHLORHEXIDINE GLUCONATE IS A HYDROPHILLIC POLYURETHANE ABSORPTIVE FOAM WITH CHLORHEXIDINE GLUCONATE (CHG) WHICH INHIBITS BACTERIAL GROWTH UNDER THE DRESSING. THE DRESSING IS INTENDED TO BE USED TO ABSORB EXUDATE, COVER A WOUND CAUSED BY VASCULAR AND NONVASCULAR PERCUTANEOUS MEDICAL DEVICES DURING SURGERY, AS WELL AS REDUCE LOCAL INFECTION AND COLONIZATION OF MICROORGANISMS.: Brand: BIOPATCH

## (undated) DEVICE — KT PIN HEX SHLDR CORACOID EXACTECHGPS DISP

## (undated) DEVICE — KT BIT DRL EXATECHGPS REV 2MM 3.2MM DISP

## (undated) DEVICE — PREP SOL POVIDONE/IODINE BT 4OZ

## (undated) DEVICE — SUT SILK 4/0 TIES 18IN A183H

## (undated) DEVICE — SINGLE USE SUCTION VALVE MAJ-209: Brand: SINGLE USE SUCTION VALVE (STERILE)

## (undated) DEVICE — GW EMR FIX EXCHG J STD .035 3MM 260CM

## (undated) DEVICE — SYR LUERLOK 20CC

## (undated) DEVICE — HI-TORQUE SPARTACORE 14 PERIPHERAL GUIDE WIRE .014 5.0 CM X 190 CM: Brand: SPARTACORE

## (undated) DEVICE — BANDAGE,GAUZE,BULKEE II,4.5"X4.1YD,STRL: Brand: MEDLINE